# Patient Record
Sex: FEMALE | Race: WHITE | NOT HISPANIC OR LATINO | Employment: OTHER | URBAN - METROPOLITAN AREA
[De-identification: names, ages, dates, MRNs, and addresses within clinical notes are randomized per-mention and may not be internally consistent; named-entity substitution may affect disease eponyms.]

---

## 2017-03-24 DIAGNOSIS — C50.919 MALIGNANT NEOPLASM OF FEMALE BREAST (HCC): ICD-10-CM

## 2017-03-24 DIAGNOSIS — Z86.79 PERSONAL HISTORY OF OTHER DISEASES OF THE CIRCULATORY SYSTEM: ICD-10-CM

## 2017-03-24 DIAGNOSIS — E78.5 HYPERLIPIDEMIA: ICD-10-CM

## 2017-04-05 ENCOUNTER — APPOINTMENT (OUTPATIENT)
Dept: LAB | Facility: CLINIC | Age: 77
End: 2017-04-05
Payer: MEDICARE

## 2017-04-05 DIAGNOSIS — Z86.79 PERSONAL HISTORY OF OTHER DISEASES OF THE CIRCULATORY SYSTEM: ICD-10-CM

## 2017-04-05 DIAGNOSIS — E78.5 HYPERLIPIDEMIA: ICD-10-CM

## 2017-04-05 DIAGNOSIS — C50.919 MALIGNANT NEOPLASM OF FEMALE BREAST (HCC): ICD-10-CM

## 2017-04-05 LAB
ALBUMIN SERPL BCP-MCNC: 3.5 G/DL (ref 3.5–5)
ALP SERPL-CCNC: 100 U/L (ref 46–116)
ALT SERPL W P-5'-P-CCNC: 27 U/L (ref 12–78)
ANION GAP SERPL CALCULATED.3IONS-SCNC: 5 MMOL/L (ref 4–13)
AST SERPL W P-5'-P-CCNC: 16 U/L (ref 5–45)
BILIRUB SERPL-MCNC: 0.64 MG/DL (ref 0.2–1)
BUN SERPL-MCNC: 17 MG/DL (ref 5–25)
CALCIUM SERPL-MCNC: 9.2 MG/DL (ref 8.3–10.1)
CHLORIDE SERPL-SCNC: 105 MMOL/L (ref 100–108)
CHOLEST SERPL-MCNC: 188 MG/DL (ref 50–200)
CO2 SERPL-SCNC: 29 MMOL/L (ref 21–32)
CREAT SERPL-MCNC: 1.05 MG/DL (ref 0.6–1.3)
GFR SERPL CREATININE-BSD FRML MDRD: 50.8 ML/MIN/1.73SQ M
GLUCOSE P FAST SERPL-MCNC: 99 MG/DL (ref 65–99)
HDLC SERPL-MCNC: 49 MG/DL (ref 40–60)
LDLC SERPL CALC-MCNC: 108 MG/DL (ref 0–100)
POTASSIUM SERPL-SCNC: 3.9 MMOL/L (ref 3.5–5.3)
PROT SERPL-MCNC: 7.2 G/DL (ref 6.4–8.2)
SODIUM SERPL-SCNC: 139 MMOL/L (ref 136–145)
TRIGL SERPL-MCNC: 154 MG/DL

## 2017-04-05 PROCEDURE — 36415 COLL VENOUS BLD VENIPUNCTURE: CPT

## 2017-04-05 PROCEDURE — 80061 LIPID PANEL: CPT

## 2017-04-05 PROCEDURE — 80053 COMPREHEN METABOLIC PANEL: CPT

## 2017-04-10 ENCOUNTER — ALLSCRIPTS OFFICE VISIT (OUTPATIENT)
Dept: OTHER | Facility: OTHER | Age: 77
End: 2017-04-10

## 2017-06-09 DIAGNOSIS — C50.919 MALIGNANT NEOPLASM OF FEMALE BREAST (HCC): ICD-10-CM

## 2017-06-09 DIAGNOSIS — Z12.31 ENCOUNTER FOR SCREENING MAMMOGRAM FOR MALIGNANT NEOPLASM OF BREAST: ICD-10-CM

## 2017-06-20 ENCOUNTER — HOSPITAL ENCOUNTER (OUTPATIENT)
Dept: MAMMOGRAPHY | Facility: CLINIC | Age: 77
Discharge: HOME | End: 2017-06-20
Payer: MEDICARE

## 2017-06-20 DIAGNOSIS — C50.919 MALIGNANT NEOPLASM OF FEMALE BREAST (HCC): ICD-10-CM

## 2017-06-20 DIAGNOSIS — Z12.31 ENCOUNTER FOR SCREENING MAMMOGRAM FOR MALIGNANT NEOPLASM OF BREAST: ICD-10-CM

## 2017-06-20 PROCEDURE — G0202 SCR MAMMO BI INCL CAD: HCPCS

## 2017-06-20 PROCEDURE — 77063 BREAST TOMOSYNTHESIS BI: CPT

## 2017-07-03 ENCOUNTER — GENERIC CONVERSION - ENCOUNTER (OUTPATIENT)
Dept: OTHER | Facility: OTHER | Age: 77
End: 2017-07-03

## 2017-07-12 ENCOUNTER — GENERIC CONVERSION - ENCOUNTER (OUTPATIENT)
Dept: OTHER | Facility: OTHER | Age: 77
End: 2017-07-12

## 2017-07-20 ENCOUNTER — GENERIC CONVERSION - ENCOUNTER (OUTPATIENT)
Dept: OTHER | Facility: OTHER | Age: 77
End: 2017-07-20

## 2017-07-24 ENCOUNTER — APPOINTMENT (OUTPATIENT)
Dept: LAB | Facility: CLINIC | Age: 77
End: 2017-07-24
Payer: MEDICARE

## 2017-07-24 DIAGNOSIS — C50.919 MALIGNANT NEOPLASM OF FEMALE BREAST (HCC): ICD-10-CM

## 2017-07-24 LAB
ALBUMIN SERPL BCP-MCNC: 3.5 G/DL (ref 3.5–5)
ALP SERPL-CCNC: 102 U/L (ref 46–116)
ALT SERPL W P-5'-P-CCNC: 23 U/L (ref 12–78)
ANION GAP SERPL CALCULATED.3IONS-SCNC: 7 MMOL/L (ref 4–13)
AST SERPL W P-5'-P-CCNC: 16 U/L (ref 5–45)
BASOPHILS # BLD AUTO: 0.04 THOUSANDS/ΜL (ref 0–0.1)
BASOPHILS NFR BLD AUTO: 1 % (ref 0–1)
BILIRUB SERPL-MCNC: 0.57 MG/DL (ref 0.2–1)
BUN SERPL-MCNC: 15 MG/DL (ref 5–25)
CALCIUM SERPL-MCNC: 8.9 MG/DL (ref 8.3–10.1)
CANCER AG27-29 SERPL-ACNC: 26.4 U/ML (ref 0–42.3)
CHLORIDE SERPL-SCNC: 108 MMOL/L (ref 100–108)
CO2 SERPL-SCNC: 27 MMOL/L (ref 21–32)
CREAT SERPL-MCNC: 1.1 MG/DL (ref 0.6–1.3)
EOSINOPHIL # BLD AUTO: 0.43 THOUSAND/ΜL (ref 0–0.61)
EOSINOPHIL NFR BLD AUTO: 7 % (ref 0–6)
ERYTHROCYTE [DISTWIDTH] IN BLOOD BY AUTOMATED COUNT: 14.5 % (ref 11.6–15.1)
GFR SERPL CREATININE-BSD FRML MDRD: 49 ML/MIN/1.73SQ M
GLUCOSE SERPL-MCNC: 107 MG/DL (ref 65–140)
HCT VFR BLD AUTO: 44.2 % (ref 34.8–46.1)
HGB BLD-MCNC: 14.8 G/DL (ref 11.5–15.4)
LYMPHOCYTES # BLD AUTO: 1.5 THOUSANDS/ΜL (ref 0.6–4.47)
LYMPHOCYTES NFR BLD AUTO: 25 % (ref 14–44)
MCH RBC QN AUTO: 27.3 PG (ref 26.8–34.3)
MCHC RBC AUTO-ENTMCNC: 33.5 G/DL (ref 31.4–37.4)
MCV RBC AUTO: 82 FL (ref 82–98)
MONOCYTES # BLD AUTO: 0.4 THOUSAND/ΜL (ref 0.17–1.22)
MONOCYTES NFR BLD AUTO: 7 % (ref 4–12)
NEUTROPHILS # BLD AUTO: 3.6 THOUSANDS/ΜL (ref 1.85–7.62)
NEUTS SEG NFR BLD AUTO: 60 % (ref 43–75)
NRBC BLD AUTO-RTO: 0 /100 WBCS
PLATELET # BLD AUTO: 195 THOUSANDS/UL (ref 149–390)
PMV BLD AUTO: 11.7 FL (ref 8.9–12.7)
POTASSIUM SERPL-SCNC: 3.6 MMOL/L (ref 3.5–5.3)
PROT SERPL-MCNC: 7.1 G/DL (ref 6.4–8.2)
RBC # BLD AUTO: 5.42 MILLION/UL (ref 3.81–5.12)
SODIUM SERPL-SCNC: 142 MMOL/L (ref 136–145)
WBC # BLD AUTO: 5.98 THOUSAND/UL (ref 4.31–10.16)

## 2017-07-24 PROCEDURE — 36415 COLL VENOUS BLD VENIPUNCTURE: CPT

## 2017-07-24 PROCEDURE — 86300 IMMUNOASSAY TUMOR CA 15-3: CPT

## 2017-07-24 PROCEDURE — 80053 COMPREHEN METABOLIC PANEL: CPT

## 2017-07-24 PROCEDURE — 85025 COMPLETE CBC W/AUTO DIFF WBC: CPT

## 2017-07-25 ENCOUNTER — GENERIC CONVERSION - ENCOUNTER (OUTPATIENT)
Dept: OTHER | Facility: OTHER | Age: 77
End: 2017-07-25

## 2017-07-27 ENCOUNTER — GENERIC CONVERSION - ENCOUNTER (OUTPATIENT)
Dept: OTHER | Facility: OTHER | Age: 77
End: 2017-07-27

## 2017-07-31 ENCOUNTER — ALLSCRIPTS OFFICE VISIT (OUTPATIENT)
Dept: OTHER | Facility: OTHER | Age: 77
End: 2017-07-31

## 2017-08-01 ENCOUNTER — GENERIC CONVERSION - ENCOUNTER (OUTPATIENT)
Dept: OTHER | Facility: OTHER | Age: 77
End: 2017-08-01

## 2018-01-10 NOTE — PROGRESS NOTES
Assessment    1  Encounter for preventive health examination (V70 0) (Z00 00)    Plan   Health Maintenance    · Begin a limited exercise program ; Status:Complete;   Done: 12AJB8234   · Eat a low fat and low cholesterol diet ; Status:Complete;   Done: 08OOY6673   · Eat no more than 30 grams of fat per day ; Status:Complete;   Done: 26JVH5130   · There are many exercise options for seniors ; Status:Complete;   Done: 07Apr2016   · There ways to avoid falling ; Status:Complete;   Done: 07Apr2016   · These are things you can do to prevent falls in and around the home ; Status:Complete;    Done: 07Apr2016   · We encourage all of our patients to exercise regularly  30 minutes of exercise or physical  activity five or more days a week is recommended for children and adults ;  Status:Complete;   Done: 07Apr2016   · We encourage you to begin to make lifestyle changes to help control your blood  pressure  These may include losing weight, increasing your activity level, limiting salt in  your diet, decreasing alcohol intake, and eating a diet low in fat and rich in fruits  and vegetables ; Status:Complete;   Done: 61XIF1231   · We recommend that you create an advance directive ; Status:Complete;   Done:  07Apr2016   · Medicare Annual Wellness Visit; Status:Complete;   Done: 24QSE5613 10:49PM   · Follow-up visit in 1 year Evaluation and Treatment  Follow-up  Status: Complete  Done:  07Apr2016  Influenza vaccine needed    · Stop: Fluzone Quadrivalent Intramuscular Suspension  Need for Tdap vaccination    · Stop: Adacel 5-2-15 5 LF-MCG/0 5 Intramuscular Suspension  Screening for genitourinary condition    · *VB-Urinary Incontinence Screen (Dx V81 6 Screen for UI); Status:Complete;   Done:  94CTN2425 10:48PM    MAMMO DIAGNOSTIC BILATERAL W CAD; Status:Hold For - Scheduling; Requested for:07Apr2016;   Perform:[facility]; PRL:30TUK7852;FZWPXNB;     For:Adenocarcinoma of breast, Screening for breast cancer; Ordered By:Filiberto Escalera;      Discussion/Summary    Angelique Raymond had a normal exam today  She will continue with her healthy diet and exercise  She will follow up with her specialists as scheduled  We will see her for a Medicare physical in 1 year  Impression: Subsequent Annual Wellness Visit, with preventive exam as well as age and risk appropriate counseling completed  Cardiovascular screening and counseling: the risks and benefits of screening were discussed and screening is current  Diabetes screening and counseling: the risks and benefits of screening were discussed and screening is current  Colorectal cancer screening and counseling: the risks and benefits of screening were discussed and screening is current  Breast cancer screening and counseling: the risks and benefits of screening were discussed and screening is current  Cervical cancer screening and counseling: the risks and benefits of screening were discussed  Glaucoma screening and counseling: the risks and benefits of screening were discussed and screening is current  HIV screening and counseling: screening not indicated  Immunizations: the risks and benefits of influenza vaccination were discussed with the patient, influenza vaccine is up to date this year, the risks and benefits of pneumococcal vaccination were discussed with the patient, the lifetime pneumococcal vaccine has been completed, hepatitis B vaccination series is not indicated at this time due to the patient's low risk of benita the disease, the risks and benefits of the Zostavax vaccine were discussed with the patient, Zostavax vaccination up to date, the risks and benefits of the Td vaccine were discussed with the patient, Td vaccination up to date, the risks and benefits of the Tdap vaccine were discussed with the patient and Tdap vaccination up to date     Advance Directive Planning: not complete, paperwork and instructions were given to the patient, she was encouraged to follow-up with me to discuss her questions and/or decisions  Advice and education were given regarding alcohol use, fall risk reduction, helmet use, increasing physical activity, nutrition (non-diabetic), seat belt use, skin self-exam, sunscreen use, weight gain and weight loss  She was referred to dental services  Chief Complaint  Wellness Physical Exam, 68 yo  Patient due for UI assessment  Soon due for mammogram, fall risk and depression screening  History of Present Illness  HPI: Kelli Galloway is a 66 y/o female who presents for a Medicare Wellness visit  She will due for a mammogram  She sees Dr Enrique Solano on a regular basis for a checkup  Welcome to [employer] and Wellness Visits: The patient is being seen for the initial annual wellness visit  Medicare Screening and Risk Factors   Hospitalizations: no previous hospitalizations and she has been hospitalized 0 times  Once per lifetime medicare screening tests: ECG (on EMR)  Medicare Screening Tests Risk Questions   Abdominal aortic aneurysm risk assessment: none indicated  Osteoporosis risk assessment: , female gender and over 48years of age  HIV risk assessment: none indicated  Drug and Alcohol Use: The patient is a former cigarette smoker  The patient reports never drinking alcohol  She has never used illicit drugs  Diet and Physical Activity: Current diet includes well balanced meals, limited junk food, 3-4 servings of fruit per day, 3-4 servings of vegetables per day, 1-2 servings of meat per day, 1-2 servings of whole grains per day, 2-3 servings of dairy products per day, 1 cups of coffee per day, 0 cups of tea per day and 0-1 cans of regular soda per day  She exercises daily and exercises 3 times per week  Exercise: walking 90 minutes per week  Mood Disorder and Cognitive Impairment Screening: PHQ-9 Depression Scale   Over the past 2 weeks, how often have you been bothered by the following problems?    1 ) Little interest or pleasure in doing things? Not at all    2 ) Feeling down, depressed or hopeless? Not at all    3 ) Trouble falling asleep or sleeping too much? Not at all    4 ) Feeling tired or having little energy? Not at all    5 ) Poor appetite or overeating? Not at all    6 ) Feeling bad about yourself, or that you are a failure, or have let yourself or your family down? Not at all    7 ) Trouble concentrating on things, such as reading a newspaper or watching television? Not at all    8 ) Moving or speaking so slowly that other people could have noticed, or the opposite, moving or speaking faster than usual? Not at all    9 ) Thoughts that you would be off dead or of hurting yourself in some way? Not at all  TOTAL SCORE: 0    Depression screening  negative for symptoms  She denies feeling down, depressed, or hopeless over the past two weeks  She denies feeling little interest or pleasure in doing things over the past two weeks  Cognitive impairment screening: denies difficulty learning/retaining new information, denies difficulty handling complex tasks, denies difficulty with reasoning, denies difficulty with spatial ability and orientation, denies difficulty with language and denies difficulty with behavior  Functional Ability/Level of Safety: Hearing is normal bilaterally and a hearing aid is not used  She denies hearing difficulties  The patient is currently able to do activities of daily living without limitations, able to do instrumental activities of daily living without limitations, able to participate in social activities without limitations and able to drive without limitations  Activities of daily living details: does not need help using the phone, no transportation help needed, does not need help shopping, no meal preparation help needed, does not need help doing housework, does not need help doing laundry, does not need help managing medications and does not need help managing money   Fall risk factors: polypharmacy and alcohol use, but The patient fell 0 times in the past 12 months , no mobility impairment, no antidepressant use, no deconditioning, no postural hypotension, no sedative use, no visual impairment, no urinary incontinence, no antihypertensive use, no cognitive impairment, up and go test was normal and no previous fall  Home safety risk factors: There are no stairs in her home , but no unfamiliar surroundings, no loose rugs, no poor household lighting, no uneven floors, no household clutter, grab bars in the bathroom and handrails on the stairs  Advance Directives: Advance directives: she is looking into this  , but no living will, no durable power of  for health care directives and no advance directives  end of life decisions were not reviewed with the patient and I disagree with the patient's decisions  Co-Managers and Medical Equipment/Suppliers: See Patient Care Team   Reviewed Updated Gloria Ruiz:   Last Medicare Wellness Visit Information was reviewed, patient interviewed and updates made to the record today  Patient Care Team    Care Team Member Role Specialty Office Number   Fercho PEREZ  Hematology Oncology [phone]   Hunter Cox MD  Gynecological Oncology [phone]   My Butler  Ophthalmology [phone]   Kady Doan MD  Radiation Oncology [phone]   Sunitha Escalera [address] [phone]     Review of Systems    Constitutional: negative  Eyes: negative  ENT: negative  Cardiovascular: negative  Respiratory: negative  Gastrointestinal: negative  Genitourinary: negative  Musculoskeletal: negative  Integumentary and Breasts: negative  Neurological: negative  Psychiatric: negative  Endocrine: negative  Hematologic and Lymphatic: negative  Active Problems    1  Adenocarcinoma of breast (174 9) (C57 919)   2  Adverse reaction to pneumococcal vaccine (E948 8) (T50 A95A)   3  Anxiety (300 00) (F41 9)   4   Cataract, bilateral (366 9) (H26 9)   5  Chronic Pyometra (615 1)   6  History of hypertension (V12 59) (Z86 79)   7  Hyperlipidemia (272 4) (E78 5)   8  Need for pneumococcal vaccination (V03 82) (Z23)   9  Pes planus (734) (M21 40)   10  Pulmonary nodule seen on imaging study (793 11) (R91 1)   11  History of Radiation Therapy   12  Sleep disorder (780 50) (G47 9)    Past Medical History    · Adenocarcinoma of breast (174 9) (C50 919)   · Anxiety (300 00) (F41 9)   · 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)   · History of Endometrial hyperplasia (621 30) (N85 00)   · History of abnormal weight loss (V13 89) (L55 269)   · History of allergic reaction (V15 09) (Z88 9)   · History of hypertension (V12 59) (Z86 79)   · Hyperlipidemia (272 4) (E78 5)   · History of Internal Hemorrhoids (455 0)   · History of Knee tendonitis (727 09) (M76 899)   · Need for prophylactic vaccination and inoculation against influenza (V04 81) (Z23)   · History of Need for prophylactic vaccination and inoculation against influenza (V04 81)  (Z23)   · History of Ovarian cyst (620 2) (N83 20)   · History of Screening for osteoporosis (V82 81) (Z13 820)   · History of Summary Of Previous Pregnancies  5  (Total No )   · History of Summary Of Previous Pregnancies Para 3  (Deliveries)   · Denied: History of Taking Female Hormones For Postmenopausal HRT    The active problems and past medical history were reviewed and updated today  Surgical History    · History of Breast Biopsy During Breast Surgery   · History of Breast Surgery Excision Of Breast Single Lesion   · History of Cataract Surgery   · History of Cholecystectomy   · History of Radiation Therapy   · History of Salpingo-oophorectomy Bilateral   · History of Tonsillectomy With Adenoidectomy    The surgical history was reviewed and updated today         Family History    · Family history of Gastric Cancer (V16 0)    The family history was reviewed and updated today  Social History    · Denied: History of Alcohol Use (History)   · Denied: History of Drug Use   · Former smoker (V15 82) (H23 499)   · No alcohol use   · No drug use  The social history was reviewed and updated today  The social history was reviewed and is unchanged  Current Meds   1  Adult Aspirin Low Strength 81 MG TBDP; Take 1 tablet daily Recorded   2  Amitriptyline HCl - 25 MG Oral Tablet; TAKE 1 TABLET DAILY AT BEDTIME; Therapy: 57YGL3180 to (Evaluate:95Dob8910)  Requested for: 86LTZ6824; Last   Rx:27Nov2015 Ordered   3  Anastrozole 1 MG Oral Tablet; TAKE 1 TABLET DAILY; Therapy: 30Brc4189 to (Devon Guerrero)  Requested for: 75QHI0764; Last   Rx:14Mar2016 Ordered   4  Prolia 60 MG/ML Subcutaneous Solution; INJECT SUBCUTANEOUSLY  60 MG / 1 ML   EVERY 6 MONTHS Recorded   5  Simvastatin 10 MG Oral Tablet; TAKE 1 TABLET IN THE EVENING; Therapy: 58FHA4075 to (Evaluate:41Wem3168)  Requested for: 99TSW7787; Last   Rx:29Mar2016 Ordered   6  Vitamin D3 1000 UNIT Oral Tablet; Take 1 tablet daily; Therapy: (Recorded:56Zrg9486) to Recorded    Allergies    1  Pneumovax 23 INJ    Immunizations   1 2 3    Influenza  81NRP5035 84LQQ1052 34Xnt4393    Pneumococcal  47Rbo1972      Zoster  48COQ9586       Vitals  Signs [Data Includes: Current Encounter]    Heart Rate: 64  Systolic: 412  Diastolic: 80   Temperature: 98 8 F, Tympanic  Heart Rate: 95  Respiration: 14  Systolic: 325, LUE, Sitting  Diastolic: 84, LUE, Sitting  Height: 5 ft 1 in  Weight: 157 lb   BMI Calculated: 29 66  BSA Calculated: 1 7    Physical Exam    Constitutional   General appearance: No acute distress, well appearing and well nourished  Eyes   Conjunctiva and lids: No swelling, erythema or discharge  Pupils and irises: Equal, round, reactive to light  Ophthalmoscopic examination: Normal fundi and optic discs      Ears, Nose, Mouth, and Throat   External inspection of ears and nose: Normal     Otoscopic examination: Tympanic membranes translucent with normal light reflex  Canals patent without erythema  Hearing: Normal     Nasal mucosa, septum, and turbinates: Normal without edema or erythema  Lips, teeth, and gums: Normal, good dentition  Oropharynx: Normal with no erythema, edema, exudate or lesions  Neck   Neck: Supple, symmetric, trachea midline, no masses  Thyroid: Normal, no thyromegaly  Pulmonary   Respiratory effort: No increased work of breathing or signs of respiratory distress  Percussion of chest: Normal     Palpation of chest: Normal     Auscultation of lungs: Clear to auscultation  Cardiovascular   Palpation of heart: Normal PMI, no thrills  Auscultation of heart: Normal rate and rhythm, normal S1 and S2, no murmurs  Carotid pulses: 2+ bilaterally  Abdominal aorta: Normal     Femoral pulses: 2+ bilaterally  Pedal pulses: 2+ bilaterally  Examination of extremities for edema and/or varicosities: Normal     Chest   Breasts: Normal, no dimpling or skin changes appreciated  Palpation of breasts and axillae: Normal, no masses palpated  Abdomen   Abdomen: Non-tender, no masses  Liver and spleen: No hepatomegaly or splenomegaly  Lymphatic   Palpation of lymph nodes in neck: No lymphadenopathy  Palpation of lymph nodes in axillae: No lymphadenopathy  Palpation of lymph nodes in groin: No lymphadenopathy  Palpation of lymph nodes in other areas: No lymphadenopathy  Musculoskeletal   Gait and station: Normal     Digits and nails: Normal without clubbing or cyanosis  Joints, bones, and muscles: Normal     Range of motion: Normal     Stability: Normal     Muscle strength/tone: Normal     Skin   Skin and subcutaneous tissue: Normal without rashes or lesions  Palpation of skin and subcutaneous tissue: Normal turgor  Neurologic   Cranial nerves: Cranial nerves II-XII intact  Reflexes: 2+ and symmetric  Sensation: No sensory loss      Psychiatric Judgment and insight: Normal     Orientation to person, place, and time: Normal     Recent and remote memory: Intact  Mood and affect: Normal        Results/Data  PHQ-2 Adult Depression Screening 07Apr2016 01:23PM Andra Dyer     Test Name Result Flag Reference   PHQ-2 Adult Depression Score 0     Q1: 0, Q2: 0   PHQ-2 Adult Depression Screening Negative       Falls Risk Assessment (Dx V80 09 Screen for Neurologic Disorder) 07Apr2016 01:23PM Andra Dyer     Test Name Result Flag Reference   Falls Risk      No falls in the past year     (1) LIPID PANEL FASTING W DIRECT LDL REFLEX 80VYW1742 09:27AM Andra Dyer   TW Order Number: [id number]      Triglyceride:         Normal              <150 mg/dl       Borderline High    150-199 mg/dl       High               200-499 mg/dl       Very High          >499 mg/dl  Cholesterol:         Desirable        <200 mg/dl      Borderline High  200-239 mg/dl      High             >239 mg/dl  HDL Cholesterol:        High    >59 mg/dL      Low     <41 mg/dL  LDL Cholesterol:        Optimal          <100 mg/dl         Near Optimal     100-129 mg/dl        Above Optimal          Borderline High   130-159 mg/dl          High              160-189 mg/dl          Very High        >189 mg/dl  LDL CALCULATED:    This screening LDL is a calculated result  It does not have the accuracy of the Direct Measured LDL in the monitoring of patients with hyperlipidemia and/or statin therapy  Direct Measure LDL (NDU835) must be ordered separately in these patients  Test Name Result Flag Reference   CHOLESTEROL 177 mg/dL     LDL CHOLESTEROL CALCULATED 93 mg/dL  0-100   TRIGLYCERIDES 164 mg/dL H <=150   HDL,DIRECT 51 mg/dL  40-60     (1) CBC/PLT/DIFF 22SDQ3649 08:58AM Deacon Broderick     Test Name Result Flag Reference   WBC COUNT 6 25 Thousand/uL  4 31-10 16   RBC COUNT 5 26 Million/uL H 3 81-5 12   HEMOGLOBIN 14 8 g/dL  11 5-15 4   HEMATOCRIT 44 4 %  34 8-46  1   MCV 84 fL  82-98   MCH 28 1 pg 26 8-34 3   MCHC 33 3 g/dL  31 4-37 4   RDW 14 1 %  11 6-15 1   MPV 11 9 fL  8 9-12 7   PLATELET COUNT 742 Thousands/uL  149-390   nRBC AUTOMATED 0 /100 WBCs     NEUTROPHILS RELATIVE PERCENT 58 %  43-75   LYMPHOCYTES RELATIVE PERCENT 25 %  14-44   MONOCYTES RELATIVE PERCENT 6 %  4-12   EOSINOPHILS RELATIVE PERCENT 10 % H 0-6   BASOPHILS RELATIVE PERCENT 1 %  0-1   NEUTROPHILS ABSOLUTE COUNT 3 61 Thousands/µL  1 85-7 62   LYMPHOCYTES ABSOLUTE COUNT 1 56 Thousands/µL  0 60-4 47   MONOCYTES ABSOLUTE COUNT 0 40 Thousand/µL  0 17-1 22   EOSINOPHILS ABSOLUTE COUNT 0 61 Thousand/µL  0 00-0 61   BASOPHILS ABSOLUTE COUNT 0 06 Thousands/µL  0 00-0 10     Procedure    Procedure: Visual Acuity Test    Inforrmation supplied by a Snellen chart  Results: 20/30 in the right eye with corrective device, 20/70 in the left eye with corrective device Wears glasses, needs to see the eye doctor-Dr Marcelina Gunn  Future Appointments    Date/Time Provider Specialty Site   07/25/2016 10:00 AM QI Morton  Hematology Oncology [facility]   04/10/2017 01:00 PM aSvage Lin DO Family Medicine [facility]     Signatures   Electronically signed by : Jason Cadet DO;  Apr 7 2016 10:55PM EST                       (Author)

## 2018-01-12 VITALS
DIASTOLIC BLOOD PRESSURE: 84 MMHG | SYSTOLIC BLOOD PRESSURE: 122 MMHG | BODY MASS INDEX: 29.53 KG/M2 | OXYGEN SATURATION: 98 % | RESPIRATION RATE: 16 BRPM | HEART RATE: 110 BPM | WEIGHT: 160.5 LBS | HEIGHT: 62 IN | TEMPERATURE: 98.5 F

## 2018-01-12 NOTE — MISCELLANEOUS
Message     Recorded as Task   Date: 11/28/2016 02:18 PM, Created By: Christofer Devlin   Task Name: Follow Up   Assigned To: Eduardo Melendrez   Regarding Patient: NAWAF HILL, Status: Active   Comment:    Herminia Teixeira - 28 Nov 2016 2:18 PM     TASK CREATED  Caller: Self; General Medical Question; [phone] (Home); [phone] x,,,,, (Work)  PT WANTS TO WEAN OFF OF AMITRIPTYLIN 25 MG  FEELS SHE DOES NOT NEED THE MED ANYMORE  SHE CAN BE REACHED [phone]   Elena Escalera - 29 Nov 2016 4:54 PM     TASK EDITED  The patient wishes to wean off of the Amitryptiline  I will send in the 10 mg pills and I have instructed her to take 10 mg once daily on MON, Wed, and Friday the first week and 25 mg the other days of that week  Week 2- she will alternate between 10 mg and 25 mg every other day  Week 3- she will take 10 mg at bedtime daily and will then call the office to report on her progress and for further instructions          Plan  Anxiety, Sleep disorder    · From  Amitriptyline HCl - 25 MG Oral Tablet TAKE 1 TABLET AT BEDTIME To  Amitriptyline HCl - 10 MG Oral Tablet TAKE 1 TABLET AT BEDTIME    Signatures   Electronically signed by : Miguel Rivas DO; Nov 29 2016  4:56PM EST                       (Author)

## 2018-01-14 VITALS
WEIGHT: 158 LBS | HEART RATE: 64 BPM | HEIGHT: 61 IN | BODY MASS INDEX: 29.83 KG/M2 | TEMPERATURE: 99 F | DIASTOLIC BLOOD PRESSURE: 80 MMHG | SYSTOLIC BLOOD PRESSURE: 130 MMHG | RESPIRATION RATE: 16 BRPM

## 2018-01-15 NOTE — MISCELLANEOUS
Message   Recorded as Task   Date: 12/27/2016 10:45 AM, Created By: Mendoza John   Task Name: Med Renewal Request   Assigned To: Kenroy Lay   Regarding Patient: NAWAF HILL, Status: Active   Comment:    Anel Zhang - 27 Dec 2016 10:45 AM     TASK CREATED  Caller: Self; Renew Medication; [phone] (Home); [phone] x,,,,, (Work)  NAWAF IS WONDERING IF YOU COULD CALL IN A CREAM, SHE IS VERY RED AND ITCHY "DOWN BELOW", AND WONDERING IF IT COULD BE FROM THE VALTREX, SHE HAS TRIED OTC AND NOTHING IS HELPING  Sepideh Anguiano      [phone] OR  [phone] (C)   Elena Escalera - 27 Dec 2016 7:26 PM     TASK EDITED  I spoke with the patient  She complains of having vaginal redness and itching for the last 2 days  There is no relief with over-the-counter vagisil  I advised her she most likely has a yeast infection we'll treat her with the Terazol cream as ordered  We will see her back as scheduled          Plan  Vulvovaginal candidiasis    · Terconazole 0 8 % Vaginal Cream; INSERT 1 APPLICATORFUL  INTRAVAGINALLY AT BEDTIME    Signatures   Electronically signed by : Buster Martinez DO; Dec 27 2016  7:27PM EST                       (Author)

## 2018-01-16 NOTE — PROGRESS NOTES
Assessment    1  Encounter for preventive health examination (V70 0) (Z00 00)    Plan  Adenocarcinoma of breast, Encounter for screening mammogram for breast cancer    · MAMMO SCREENING BILATERAL W 3D & CAD; Status:Active; Requested BEBE:16YGP4917; Anxiety, Sleep disorder    · Amitriptyline HCl - 25 MG Oral Tablet; TAKE 1 TABLET AT BEDTIME  Health Maintenance    · Follow-up visit in 1 year Evaluation and Treatment  Follow-up  Status: Hold For -  Scheduling  Requested for: 50Amj7431   · Begin a bladder training program to help you control your urgency  Start by urinating  every 2 hours ; Status:Complete;   Done: 86UCH9536   · Begin a limited exercise program ; Status:Complete;   Done: 44RQC4700   · Decreasing the stress in your life may help your condition improve ; Status:Complete;    Done: 17BAK7907   · Eat a low fat and low cholesterol diet ; Status:Complete;   Done: 54NTK6946   · Eat no more than 30 grams of fat per day ; Status:Complete;   Done: 45RJW6887   · Stretch and warm up your muscles during the first 10 minutes , then cool down your  muscles for the last 10 minutes of exercise ; Status:Complete;   Done: 96AGX8909   · The plan of care for falls is detailed in the plan and/or discussion section of today's note ;  Status:Complete;   Done: 83FXY4990   · The plan of care for urinary incontinence is detailed in the plan and/or discussion section  of today's note ; Status:Complete;   Done: 78XOW4737   · There are many exercise options for seniors ; Status:Complete;   Done: 34JWK0720   · There are many ways to reduce your risk of catching or spreading a sexually transmitted  Infection ; Status:Complete;   Done: 36RVQ5457   · Use a sun block product with an SPF of 15 or more ; Status:Complete;   Done:  71GQS9066   · We encourage all of our patients to exercise regularly    30 minutes of exercise or physical  activity five or more days a week is recommended for children and adults ;  Status:Complete;   Done: 05FGF7014   · We recommend you modify your diet to achieve and maintain a healthy weight  Being  underweight may increase your risk of developing health problems from vitamin and  mineral deficiencies  We recommend a balanced diet rich in fruits and vegetables  You  may also consider increasing your calorie intake by eating more frequently or adding  nuts, avocados, and low-fat cheese or milk to your meals  Please let us know  if you would like to learn more about your nutrition and calorie needs, and additional  options to help you achieve your weight goals ; Status:Complete;   Done: 94TNU2847   · Medicare Annual Wellness Visit ; every 1 year; Last 10Apr2017; Next 10Apr2018;  Status:Active  Screening for genitourinary condition    · *VB - Urinary Incontinence Screen (Dx Z13 89 Screen for UI); Status:Complete;   Done:  80YAF0463 01:53PM    Discussion/Summary    Ramses Blancas had a normal exam today  She will continue with her healthy diet and exercise  She will follow up with her specialists as scheduled  We will see her for a Medicare physical in 1 year  Impression: Subsequent Annual Wellness Visit, with preventive exam as well as age and risk appropriate counseling completed  Cardiovascular screening and counseling: the risks and benefits of screening were discussed and screening is current  Diabetes screening and counseling: the risks and benefits of screening were discussed and screening is current  Colorectal cancer screening and counseling: the risks and benefits of screening were discussed and screening is current  Breast cancer screening and counseling: the risks and benefits of screening were discussed and screening is current  Cervical cancer screening and counseling: the risks and benefits of screening were discussed  Glaucoma screening and counseling: the risks and benefits of screening were discussed and screening is current  HIV screening and counseling: screening not indicated  Immunizations: the risks and benefits of influenza vaccination were discussed with the patient, influenza vaccine is up to date this year, the risks and benefits of pneumococcal vaccination were discussed with the patient, the lifetime pneumococcal vaccine has been completed, hepatitis B vaccination series is not indicated at this time due to the patient's low risk of benita the disease, the risks and benefits of the Zostavax vaccine were discussed with the patient, Zostavax vaccination up to date, the risks and benefits of the Td vaccine were discussed with the patient, Td vaccination up to date, the risks and benefits of the Tdap vaccine were discussed with the patient and Tdap vaccination up to date  Advance Directive Planning: not complete, paperwork and instructions were given to the patient, she was encouraged to follow-up with me to discuss her questions and/or decisions, The patient is going to look into this  Advice and education were given regarding alcohol use, fall risk reduction, helmet use, increasing physical activity, nutrition (non-diabetic), seat belt use, skin self-exam, sunscreen use, weight gain and weight loss  She was referred to dental services  Chief Complaint  Medicare Wellness Exam, 66 yo  History of Present Illness  HPI: Dennie Michaels is a 68 y/o female who presents for a Medicare Wellness visit  She will due for a mammogram in June 2016  She sees Dr Jeromy Salas on a regular basis for a checkup and will see him in July 2017  The patient denies any chest pain, shortness of breath, or palpitations  There is no edema  There are no headaches or visual changes  There is no lightheadedness, dizziness, or fainting spells  There are no GI problems  Welcome to [employer] and Wellness Visits: The patient is being seen for the subsequent annual wellness visit  Medicare Screening and Risk Factors   Hospitalizations: no previous hospitalizations and she has been hospitalized 0 times  Once per lifetime medicare screening tests: ECG (on EMR)  Medicare Screening Tests Risk Questions   Abdominal aortic aneurysm risk assessment: none indicated  Osteoporosis risk assessment: , female gender and over 48years of age  HIV risk assessment: none indicated  Drug and Alcohol Use: The patient is a former cigarette smoker  The patient reports never drinking alcohol  She has never used illicit drugs  Diet and Physical Activity: Current diet includes well balanced meals, limited junk food, 1 servings of fruit per day, 1 servings of vegetables per day, 1 servings of meat per day, 1-2 servings of whole grains per day, 2 servings of simple carbohydrates per day, 2 servings of dairy products per day, 1 cups of coffee per day, 0 cups of tea per day, 0-1 cans of regular soda per day and 0 cans of diet soda per day  She exercises daily  Exercise: walking minutes per week  Mood Disorder and Cognitive Impairment Screening: PHQ-9 Depression Scale   Over the past 2 weeks, how often have you been bothered by the following problems? 1 ) Little interest or pleasure in doing things? Not at all    2 ) Feeling down, depressed or hopeless? Not at all    3 ) Trouble falling asleep or sleeping too much? Not at all    4 ) Feeling tired or having little energy? Not at all    5 ) Poor appetite or overeating? Not at all    6 ) Feeling bad about yourself, or that you are a failure, or have let yourself or your family down? Not at all    7 ) Trouble concentrating on things, such as reading a newspaper or watching television? Not at all    8 ) Moving or speaking so slowly that other people could have noticed, or the opposite, moving or speaking faster than usual? Not at all    9 ) Thoughts that you would be off dead or of hurting yourself in some way? Not at all  TOTAL SCORE: 0    Depression screening  negative for symptoms  She denies feeling down, depressed, or hopeless over the past two weeks   She denies feeling little interest or pleasure in doing things over the past two weeks  Cognitive impairment screening: denies difficulty learning/retaining new information, denies difficulty handling complex tasks, denies difficulty with reasoning, denies difficulty with spatial ability and orientation, denies difficulty with language and denies difficulty with behavior  Functional Ability/Level of Safety: Hearing is normal bilaterally and a hearing aid is not used  She denies hearing difficulties  The patient is currently able to do activities of daily living without limitations, able to do instrumental activities of daily living without limitations, able to participate in social activities without limitations and able to drive without limitations  Activities of daily living details: does not need help using the phone, no transportation help needed, does not need help shopping, no meal preparation help needed, does not need help doing housework, does not need help doing laundry, does not need help managing medications and does not need help managing money  Fall risk factors:  polypharmacy, but The patient fell 0 times in the past 12 months , no alcohol use, no mobility impairment, no antidepressant use, no deconditioning, no postural hypotension, no sedative use, no visual impairment, no urinary incontinence, no antihypertensive use, no cognitive impairment, up and go test was normal and no previous fall  Home safety risk factors: There are no stairs in her home , but no unfamiliar surroundings, no loose rugs, no poor household lighting, no uneven floors, no household clutter, grab bars in the bathroom and handrails on the stairs  Advance Directives: Advance directives: she is looking into this  , but no living will, no durable power of  for health care directives and no advance directives  end of life decisions were not reviewed with the patient and I disagree with the patient's decisions   Concerns with the patient's end of life decisions: She is going to look into this  Co-Managers and Medical Equipment/Suppliers: See Patient Care Team   Reviewed Updated [facility]:   Last Medicare Wellness Visit Information was reviewed, patient interviewed and updates made to the record today  Patient Care Team    Care Team Member Role Specialty Office Number   Monalisa Lombardialice PEREZ  Hematology Oncology [phone]   Alexey Tatum MD  Gynecological Oncology [phone]   [facility]  Ophthalmology [phone]   Wilbert Gleason MD  Radiation Oncology [phone]   [facility], [address] [place] [address] [phone]     Review of Systems    Constitutional: negative  Eyes: negative  ENT: negative  Cardiovascular: negative  Respiratory: negative  Gastrointestinal: negative  Genitourinary: negative  Musculoskeletal: negative  Integumentary and Breasts: negative  Neurological: negative  Psychiatric: negative  Endocrine: negative  Hematologic and Lymphatic: negative  Active Problems    1  Adenocarcinoma of breast (174 9) (C50 919)   2  Adverse reaction to pneumococcal vaccine (E948 8) (T50 A95A)   3  Anxiety (300 00) (F41 9)   4  Cataract, bilateral (366 9) (H26 9)   5  Chronic Pyometra (615 1)   6  Herpes zoster without complication (464 2) (P51 3)   7  History of hypertension (V12 59) (Z86 79)   8  Hyperlipidemia (272 4) (E78 5)   9  Influenza vaccine needed (V04 81) (Z23)   10  Need for pneumococcal vaccination (V03 82) (Z23)   11  Pes planus (734) (M21 40)   12  Pulmonary nodule seen on imaging study (793 11) (R91 1)   13  History of Radiation Therapy   14   Sleep disorder (780 50) (G47 9)    Past Medical History    · Adenocarcinoma of breast (174 9) (C50 919)   · Anxiety (300 00) (F41 9)   · 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)   · History of Endometrial hyperplasia (621 30) (N85 00)   · History of abnormal weight loss (V13 89) (X03 196)   · History of allergic reaction (V15 09) (Z88 9)   · History of candidal vulvovaginitis (V13 29) (Z86 19)   · History of hypertension (V12 59) (Z86 79)   · Hyperlipidemia (272 4) (E78 5)   · History of Internal Hemorrhoids (455 0)   · History of Knee tendonitis (727 09) (M76 899)   · Need for prophylactic vaccination and inoculation against influenza (V04 81) (Z23)   · History of Need for prophylactic vaccination and inoculation against influenza (V04 81)  (Z23)   · Need for Tdap vaccination (V06 1) (Z23)   · History of Ovarian cyst (620 2) (N83 20)   · Screening for breast cancer (V76 10) (Z12 39)   · History of Screening for osteoporosis (V82 81) (Z13 820)   · History of Summary Of Previous Pregnancies  5  (Total No )   · History of Summary Of Previous Pregnancies Para 3  (Deliveries)   · Denied: History of Taking Female Hormones For Postmenopausal HRT    The active problems and past medical history were reviewed and updated today  Surgical History    · History of Breast Biopsy During Breast Surgery   · History of Breast Surgery Excision Of Breast Single Lesion   · History of Cataract Surgery   · History of Cholecystectomy   · History of Radiation Therapy   · History of Salpingo-oophorectomy Bilateral   · History of Tonsillectomy With Adenoidectomy    The surgical history was reviewed and updated today  Family History  Mother    · Family history of Gastric Cancer (V16 0)    The family history was reviewed and updated today  Social History    · Denied: History of Alcohol Use (History)   · Denied: History of Drug Use   · Former smoker (V15 82) (Q74 334)   · No alcohol use   · No drug use  The social history was reviewed and updated today  The social history was reviewed and is unchanged  Current Meds   1  Adult Aspirin Low Strength 81 MG TBDP; Take 1 tablet daily Recorded   2  Amitriptyline HCl - 25 MG Oral Tablet; TAKE 1 TABLET AT BEDTIME;    Therapy: 32ECV1087 to (Evaluate:67Zub6277)  Requested for: 26RPR6796; Last JT:37WOO0032; Status: ACTIVE - Transmit to Pharmacy - Awaiting Verification Ordered   3  Simvastatin 10 MG Oral Tablet; TAKE 1 TABLET IN THE EVENING; Therapy: 89TYR1576 to (Jean Guzmán)  Requested for: 45Mia0345; Last   Rx:28Tkc5416; Status: ACTIVE - Renewal Denied Ordered   4  Terconazole 0 8 % Vaginal Cream; INSERT 1 APPLICATORFUL INTRAVAGINALLY AT   BEDTIME; Therapy: 11Hhs8336 to (Evaluate:02Jan2017)  Requested for: 17Uih1414; Last   Rx:34Ugp3105 Ordered   5  ValACYclovir HCl - 1 GM Oral Tablet; TAKE 1 TABLET 3 times daily; Therapy: 77Pwr1736 to (Evaluate:45Vsj6199)  Requested for: 42Nlt3768; Last   Rx:74Jnd5097 Ordered   6  Vitamin D3 1000 UNIT Oral Tablet; Take 1 tablet daily; Therapy: (Recorded:06Apr2015) to Recorded    Allergies    1  Pneumovax 23 INJ    Immunizations   ** Printed in Appendix #1 below  Vitals  Signs    Heart Rate: 64  Systolic: 996  Diastolic: 80   Temperature: 99 F, Tympanic  Heart Rate: 84  Respiration: 16  Systolic: 985, RUE, Sitting  Diastolic: 82, RUE, Sitting  Height: 5 ft 1 in  Weight: 158 lb   BMI Calculated: 29 85  BSA Calculated: 1 71    Physical Exam    Constitutional   General appearance: No acute distress, well appearing and well nourished  Eyes   Conjunctiva and lids: No swelling, erythema or discharge  Pupils and irises: Equal, round, reactive to light  Ophthalmoscopic examination: Normal fundi and optic discs  Ears, Nose, Mouth, and Throat   External inspection of ears and nose: Normal     Otoscopic examination: Tympanic membranes translucent with normal light reflex  Canals patent without erythema  Hearing: Normal     Nasal mucosa, septum, and turbinates: Normal without edema or erythema  Lips, teeth, and gums: Normal, good dentition  Oropharynx: Normal with no erythema, edema, exudate or lesions  Neck   Neck: Supple, symmetric, trachea midline, no masses  Thyroid: Normal, no thyromegaly      Pulmonary   Respiratory effort: No increased work of breathing or signs of respiratory distress  Percussion of chest: Normal     Palpation of chest: Normal     Auscultation of lungs: Clear to auscultation  Auscultation of the lungs revealed no expiratory wheezing, normal expiratory time and no inspiratory wheezing  no rales or crackles were heard bilaterally  no rhonchi  no friction rub  no wheezing  no diminished breath sounds  no bronchial breath sounds  Cardiovascular   Palpation of heart: Normal PMI, no thrills  Auscultation of heart: Normal rate and rhythm, normal S1 and S2, no murmurs  The heart rate was normal  Heart sounds: normal S1, normal S2, no S3 and no S4  no murmurs were heard  Carotid pulses: 2+ bilaterally  Abdominal aorta: Normal     Femoral pulses: 2+ bilaterally  Pedal pulses: 2+ bilaterally  Examination of extremities for edema and/or varicosities: Normal     Chest   Breasts: Normal, no dimpling or skin changes appreciated  Palpation of breasts and axillae: Normal, no masses palpated  Abdomen   Abdomen: Non-tender, no masses  Liver and spleen: No hepatomegaly or splenomegaly  Lymphatic   Palpation of lymph nodes in neck: No lymphadenopathy  Palpation of lymph nodes in axillae: No lymphadenopathy  Palpation of lymph nodes in groin: No lymphadenopathy  Palpation of lymph nodes in other areas: No lymphadenopathy  Musculoskeletal   Gait and station: Normal     Digits and nails: Normal without clubbing or cyanosis  Joints, bones, and muscles: Normal     Range of motion: Normal     Stability: Normal     Muscle strength/tone: Normal     Skin   Skin and subcutaneous tissue: Normal without rashes or lesions  Palpation of skin and subcutaneous tissue: Normal turgor  Neurologic   Cranial nerves: Cranial nerves II-XII intact  Reflexes: 2+ and symmetric  Sensation: No sensory loss      Psychiatric   Judgment and insight: Normal     Orientation to person, place, and time: Normal  Recent and remote memory: Intact  Mood and affect: Normal        Results/Data  Falls Risk Assessment (Dx Z13 89 Screen for Neurologic Disorder) 25Pvp1778 01:23PM Jovani May     Test Name Result Flag Reference   Falls Risk      No falls in the past year     (1) COMPREHENSIVE METABOLIC PANEL 43FNC9586 76:28IJ Melanie Ko Order Number: [id number]     Test Name Result Flag Reference   SODIUM 139 mmol/L  136-145   POTASSIUM 3 9 mmol/L  3 5-5 3   CHLORIDE 105 mmol/L  100-108   CARBON DIOXIDE 29 mmol/L  21-32   ANION GAP (CALC) 5 mmol/L  4-13   BLOOD UREA NITROGEN 17 mg/dL  5-25   CREATININE 1 05 mg/dL  0 60-1 30   Standardized to IDMS reference method   CALCIUM 9 2 mg/dL  8 3-10 1   BILI, TOTAL 0 64 mg/dL  0 20-1 00   ALK PHOSPHATAS 100 U/L     ALT (SGPT) 27 U/L  12-78   AST(SGOT) 16 U/L  5-45   ALBUMIN 3 5 g/dL  3 5-5 0   TOTAL PROTEIN 7 2 g/dL  6 4-8 2   eGFR Non-African American 50 8 ml/min/1 73sq m     - Patient Instructions: This is a fasting blood test  Water, black tea or black coffee only after 9:00pm the night before test Drink 2 glasses of water the morning of test   National Kidney Disease Education Program recommendations are as follows:  GFR calculation is accurate only with a steady state creatinine  Chronic Kidney disease less than 60 ml/min/1 73 sq  meters  Kidney failure less than 15 ml/min/1 73 sq  meters  GLUCOSE FASTING 99 mg/dL  65-99     (1) LIPID PANEL FASTING W DIRECT LDL REFLEX 11Lew3114 08:50AM Jovani May    Order Number: [id number]     Test Name Result Flag Reference   CHOLESTEROL 188 mg/dL     LDL CHOLESTEROL CALCULATED 108 mg/dL H 0-100   - Patient Instructions: This is a fasting blood test  Water, black tea or black coffee only after 9:00pm the night before test   Drink 2 glasses of water the morning of test     - Patient Instructions:  This is a fasting blood test  Water, black tea or black coffee only after 9:00pm the night before test Drink 2 glasses of water the morning of test   Triglyceride:         Normal              <150 mg/dl       Borderline High    150-199 mg/dl       High               200-499 mg/dl       Very High          >499 mg/dl  Cholesterol:         Desirable        <200 mg/dl      Borderline High  200-239 mg/dl      High             >239 mg/dl  HDL Cholesterol:        High    >59 mg/dL      Low     <41 mg/dL  LDL Cholesterol:        Optimal          <100 mg/dl        Near Optimal     100-129 mg/dl        Above Optimal          Borderline High   130-159 mg/dl          High              160-189 mg/dl          Very High        >189 mg/dl  LDL CALCULATED:    This screening LDL is a calculated result  It does not have the accuracy of the Direct Measured LDL in the monitoring of patients with hyperlipidemia and/or statin therapy  Direct Measure LDL (JRB242) must be ordered separately in these patients  TRIGLYCERIDES 154 mg/dL H <=150   Specimen collection should occur prior to N-Acetylcysteine or Metamizole administration due to the potential for falsely depressed results  HDL,DIRECT 49 mg/dL  40-60   Specimen collection should occur prior to Metamizole administration due to the potential for falsely depressed results  Procedure    Procedure: Visual Acuity Test    Inforrmation supplied by a Snellen chart  Results: 20/25 in the right eye with corrective device, 20/40 in the left eye with corrective device Wears glasses      Future Appointments    Date/Time Provider Specialty Site   2017 10:00 AM QI Singer  Hematology Oncology [facility]     Signatures   Electronically signed by : Miguel Rivas DO;  Apr 10 2017  1:54PM EST                       (Author)    Appendix #1     Patient: NAWAF HILL ; : 1940; MRN: [id number]      1 2 3 4    Influenza  17-Oct-2012  (71y) 29-Oct-2013  (72y) 29-Sep-2014  (73y) 25-Oct-2016  (75y)    PPSV  2015  (74y)       Tdap  Temporarily Deferred: Pt refuses Zoster  06-Jun-2007  (08H)

## 2018-03-30 DIAGNOSIS — Z85.3 HISTORY OF RIGHT BREAST CANCER: Primary | ICD-10-CM

## 2018-03-30 DIAGNOSIS — I10 HYPERTENSION, ESSENTIAL: ICD-10-CM

## 2018-03-30 DIAGNOSIS — E78.5 DYSLIPIDEMIA: ICD-10-CM

## 2018-04-03 ENCOUNTER — APPOINTMENT (OUTPATIENT)
Dept: LAB | Facility: CLINIC | Age: 78
End: 2018-04-03
Payer: MEDICARE

## 2018-04-03 DIAGNOSIS — Z85.3 HISTORY OF RIGHT BREAST CANCER: ICD-10-CM

## 2018-04-03 DIAGNOSIS — E78.5 DYSLIPIDEMIA: ICD-10-CM

## 2018-04-03 DIAGNOSIS — I10 HYPERTENSION, ESSENTIAL: ICD-10-CM

## 2018-04-03 LAB
ALBUMIN SERPL BCP-MCNC: 3.5 G/DL (ref 3.5–5)
ALP SERPL-CCNC: 108 U/L (ref 46–116)
ALT SERPL W P-5'-P-CCNC: 24 U/L (ref 12–78)
ANION GAP SERPL CALCULATED.3IONS-SCNC: 4 MMOL/L (ref 4–13)
AST SERPL W P-5'-P-CCNC: 19 U/L (ref 5–45)
BILIRUB SERPL-MCNC: 0.58 MG/DL (ref 0.2–1)
BUN SERPL-MCNC: 16 MG/DL (ref 5–25)
CALCIUM SERPL-MCNC: 8.9 MG/DL (ref 8.3–10.1)
CHLORIDE SERPL-SCNC: 109 MMOL/L (ref 100–108)
CHOLEST SERPL-MCNC: 191 MG/DL (ref 50–200)
CO2 SERPL-SCNC: 30 MMOL/L (ref 21–32)
CREAT SERPL-MCNC: 1.05 MG/DL (ref 0.6–1.3)
GFR SERPL CREATININE-BSD FRML MDRD: 51 ML/MIN/1.73SQ M
GLUCOSE P FAST SERPL-MCNC: 114 MG/DL (ref 65–99)
HDLC SERPL-MCNC: 50 MG/DL (ref 40–60)
LDLC SERPL CALC-MCNC: 112 MG/DL (ref 0–100)
POTASSIUM SERPL-SCNC: 3.9 MMOL/L (ref 3.5–5.3)
PROT SERPL-MCNC: 7.6 G/DL (ref 6.4–8.2)
SODIUM SERPL-SCNC: 143 MMOL/L (ref 136–145)
TRIGL SERPL-MCNC: 144 MG/DL

## 2018-04-03 PROCEDURE — 80053 COMPREHEN METABOLIC PANEL: CPT

## 2018-04-03 PROCEDURE — 36415 COLL VENOUS BLD VENIPUNCTURE: CPT

## 2018-04-03 PROCEDURE — 80061 LIPID PANEL: CPT

## 2018-04-05 ENCOUNTER — CLINICAL SUPPORT (OUTPATIENT)
Dept: FAMILY MEDICINE CLINIC | Facility: CLINIC | Age: 78
End: 2018-04-05
Payer: MEDICARE

## 2018-04-05 DIAGNOSIS — Z86.79 HISTORY OF HYPERTENSION: Primary | ICD-10-CM

## 2018-04-05 PROCEDURE — 93000 ELECTROCARDIOGRAM COMPLETE: CPT

## 2018-04-12 ENCOUNTER — OFFICE VISIT (OUTPATIENT)
Dept: FAMILY MEDICINE CLINIC | Facility: CLINIC | Age: 78
End: 2018-04-12
Payer: MEDICARE

## 2018-04-12 VITALS
SYSTOLIC BLOOD PRESSURE: 144 MMHG | RESPIRATION RATE: 14 BRPM | BODY MASS INDEX: 32.49 KG/M2 | DIASTOLIC BLOOD PRESSURE: 78 MMHG | HEART RATE: 88 BPM | TEMPERATURE: 98.4 F | HEIGHT: 61 IN | WEIGHT: 172.1 LBS

## 2018-04-12 DIAGNOSIS — Z12.31 ENCOUNTER FOR SCREENING MAMMOGRAM FOR BREAST CANCER: ICD-10-CM

## 2018-04-12 DIAGNOSIS — Z13.820 SCREENING FOR OSTEOPOROSIS: ICD-10-CM

## 2018-04-12 DIAGNOSIS — Z00.00 MEDICARE ANNUAL WELLNESS VISIT, SUBSEQUENT: Primary | ICD-10-CM

## 2018-04-12 PROCEDURE — G0439 PPPS, SUBSEQ VISIT: HCPCS | Performed by: FAMILY MEDICINE

## 2018-04-12 RX ORDER — ACETAMINOPHEN 160 MG
2000 TABLET,DISINTEGRATING ORAL DAILY
COMMUNITY

## 2018-04-12 RX ORDER — AMITRIPTYLINE HYDROCHLORIDE 25 MG/1
1 TABLET, FILM COATED ORAL
COMMUNITY
Start: 2014-12-12 | End: 2018-04-23 | Stop reason: SDUPTHER

## 2018-04-12 NOTE — PROGRESS NOTES
Chief Complaint   Patient presents with   [facility] Wellness Visit     65years old       HPI:  Colby Gómez is a 65 y o  female here for her Subsequent Wellness Visit  She has been feeling well  The patient denies any chest pain, shortness of breath, or palpitations  There is no edema  There are no headaches or visual changes  There is no lightheadedness, dizziness, or fainting spells  There are no GI problems  Patient Active Problem List   Diagnosis    Carcinoma of breast ([address] )    Adverse reaction to pneumococcal vaccine    Anxiety    Cataract, bilateral    Chronic inflammatory disease of uterus    Hyperlipidemia    Pulmonary nodule seen on imaging study    Pes planus    Sleep disorder     Past Medical History:   Diagnosis Date    Abnormal weight loss     Allergic reaction     Anxiety     Breast cancer, right (HCC)     Candidal vulvovaginitis     Endometrial hyperplasia     Epithelial cyst     benign, ovary    Hyperlipidemia     Hypertension     Internal hemorrhoids     Knee tendonitis     Ovarian cyst      Past Surgical History:   Procedure Laterality Date    BILATERAL SALPINGOOPHORECTOMY      onset: 7/23/13    BREAST BIOPSY      CATARACT EXTRACTION W/  INTRAOCULAR LENS IMPLANT Right     phacoemulsification   Onset: 10/27/14    CHOLECYSTECTOMY      INTRAOPERATIVE RADIATION THERAPY (IORT)      SKIN LESION EXCISION Right     breast, single lesion    TONSILLECTOMY AND ADENOIDECTOMY       Family History   Problem Relation Age of Onset    Stomach cancer Mother      History   Smoking Status    Former Smoker   Smokeless Tobacco    Never Used     History   Alcohol Use No     Comment: (history)      History   Drug Use No     /78 (BP Location: Left arm, Patient Position: Sitting, Cuff Size: Standard)   Pulse 88   Temp 98 4 °F (36 9 °C) (Tympanic)   Resp 14   Ht 5' 1" (1 549 m)   Wt 78 1 kg (172 lb 1 6 oz)   BMI 32 52 kg/m²       Current Outpatient Prescriptions Medication Sig Dispense Refill    amitriptyline (ELAVIL) 25 mg tablet Take 1 tablet by mouth daily at bedtime      aspirin 81 MG tablet Take 81 mg by mouth daily   Cholecalciferol (VITAMIN D3) 2000 units capsule Take 2,000 Units by mouth daily        Multiple Vitamins-Minerals (ICAPS PO) Take 1 capsule by mouth 2 (two) times a day      simvastatin (ZOCOR) 10 mg tablet Take 10 mg by mouth daily at bedtime  No current facility-administered medications for this visit        Allergies   Allergen Reactions    Sulfa Antibiotics     Pneumococcal Polysaccharide Vaccine Rash and Edema     Immunization History   Administered Date(s) Administered    Influenza 11/15/2006, 11/16/2007, 11/05/2008, 09/29/2014, 10/25/2016, 09/29/2017    Influenza Split High Dose Preservative Free IM 10/17/2012, 10/29/2013, 09/29/2014, 10/25/2016    Pneumococcal Polysaccharide PPV23 11/05/2008, 04/06/2015    Zoster 06/06/2007       Patient Care Team:  Estiven Aquino DO as PCP - General  MD Nicky Burrows MD  [facility], DO  MD Estiven Hernandez, DO    Medicare Screening Tests and Risk Assessments:  AWV Clinical     ISAR:   Previous hospitalizations?:  No       Once in a Lifetime Medicare Screening:   EKG performed?:  Yes Results:  on EMR   AAA screening performed? (if performed, please add date to Health Maintenance):  No       Medicare Screening Tests and Risk Assessment:   AAA Risk Assessment    None Indicated:  Yes    Osteoporosis Risk Assessment     Female:  Yes   :  Yes    Age over 48:  Yes    Low calcium diet:  Yes    HIV Risk Assessment    None indicated:  Yes        Drug and Alcohol Use:   Tobacco use    Cigarettes:  former smoker    Tobacco use duration    Tobacco Cessation Readiness    Alcohol use    Alcohol use:  never    Alcohol Treatment Readiness   Illicit Drug Use    Drug use:  never    Drug type:  no sedative use       Diet & Exercise:   Diet   What is your diet?:  Regular, Low Cholesterol, Low Saturated Fat, No Added Salt   How many servings a day of the following:   Fruits and Vegetables:  1-2, 3-4 Meat:  1-2   Whole Grains:  1 Simple Carbs:  1   Dairy:  2 Soda:  1   Coffee:  1 Tea:  0   Exercise    Do you currently exercise?:  yes    Frequency:  occasional   Times per week:  5     Type of exercise:  walking   She likes to go for walks  Cognitive Impairment Screening:   Anxiety screenings preformed:   Yes Anxiety screen score:  0   Depression screening preformed:  Yes     PHQ-9 Depression scale score:  0   Depression screening results:  negative for symptoms   Cognitive Impairment Screening    Do you have difficulty learning or retaining new information?:  No Do you have difficulty handling new tasks?:  No   Do you have difficulty with reasoning?:  No Do you have difficulty with spatial ability and orientation?:  No   Do you have difficulty with language?:  No Do you have difficulty with behavior?:  No       Functional Ability/Level of Safety:   Hearing    Hearing difficulties:  No Bilateral:  normal   Left:  normal    Hearing aid:  No    Hearing Impairment Assessment    Hearing status:  No impairment   Current Activities    Status:  unlimited ADL's, unlimited IADL's, unlimited social activities, unlimited driving   Help needed with the folllowing:    Using the phone:  No Transportation:  No   Shopping:  No Preparing Meals:  No   Doing Housework:  No Doing Laundry:  No   Managing Medications:  No Managing Money:  No   ADL    Feeding:  Independant   Oral hygiene and Facial grooming:  Independant   Bathing:  Independant   Upper Body Dressing:  Independant   Lower Body Dressing:  Independant   Toileting:  Independant   Bed Mobility:  Independant   Fall Risk   Have you fallen in the last 12 months?:  No    Injury History   Polypharmacy:  No Antidepressant Use:  No   Sedative Use:  No Antihypertensive Use:  No   Previous Fall:  No Alcohol Use:  No   Deconditioning:  No Visual Impairment:  No   Cogitive Impairment:  No Mmobility Impairment:  No   Postural Hypotension:  No Urinary Incontinence:  No       Home Safety:   Are there hazards in your environment?:  No   If you fell, would you need help to get back up from the ground?:  No Do you have problems or concerns getting in/out of a bed, chair, tub, or toilet?:  No   Do you feel unsteady when walking?:  No Is your activity limited by pain?:  Yes   Do you have handrails and grab-bars in the home?:  Yes Are emergency numbers kept by the phone and regularly updated?:  Yes   Are you and/or family members aware of the dangers of smoking in bed?:  Yes    Do you have working smoke alarms and fire extinguisher?:  Yes Do all household members know how to use them?:  Yes   Have you left the stove on unsupervised?:  No    Home Safety Risk Factors   Unfamilar with surroundings:  No Uneven floors:  No   Stairs or handrail saftey risk:  No Loose rugs:  No   Household clutter:  No Poor household lighting:  No   No grab bars in bathroom:  No Further evaluation needed:  No       Advanced Directives:   Advanced Directives    Living Will:  No Durable POA for healthcare:  No   Advanced directive:  No    Patient's End of Life Decisions    Reviewed with patient:  No Provider agrees with end of life decisions:  No   End of life decisions comments:  She is going to look into this           Urinary Incontinence:       Glaucoma:            Provider Screening     Preventative Screening/Counseling:   Cardiovascular Screening/Counseling:   (Labs Q5 years, EKG optional one-time)   General:  Risks and Benefits Discussed, Screening Current           Diabetes Screening/Counseling:   (2 tests/year if Pre-Diabetes or 1 test/year if no Diabetes)   General:  Risks and Benefits Discussed, Screening Current           Colorectal Cancer Screening/Counseling:   (FOBT Q1 yr; Flex Sig Q4 yrs or Q10 yrs after Screening Colonoscopy; Screening Colonoscpy Q2 yrs High Risk or Q10 yrs Low Risk; Barium Enema Q2 yrs High Risk or Q4 yrs Low Risk)   General:  Risks and Benefits Discussed, Screening Current           Prostate Cancer Screening/Counseling:   (Annual)          Breast Cancer Screening/Counseling:   (Baseline Age 28 - 43; Annual Age 36+)   General:  Risks and Benefits Discussed, Screening Current          Cervical Cancer Screening/Counseling:   (Annual for High Risk or Childbearing Age with Abnormal Pap in Last 3 yrs; Every 2 all others)   General:  Risks and Benefits Discussed, Screening Current           Osteoporosis Screening/Counseling:   (Every 2 Yrs if at risk or more if medically necessary)   General:  Risks and Benefits Discussed, Screening Current           AAA Screening/Counseling:   (Once per Lifetime with risk factors)    General:  Screening Not Indicated           Glaucoma Screening/Counseling:   (Annual)   General:  Risks and Benefits Discussed, Screening Current          HIV Screening/Counseling:   (Voluntary;  Once annually for high risk OR 3 times for Pregnancy at diagnosis of IUP; 3rd trimester; and at Labor   General:  Screening Not Indicated           Hepatitis C Screening:             Immunizations:   Influenza (annual):  Risks & Benefits Discussed, Influenza UTD This Year   Pneumococcal (Once in a Lifetime):  Risks & Benefits Discussed, Lifetime Vaccine Completed   Hepatitis B Series (low risk patients):  Series Not Indicated   Zostavax (Medicare D Coverage, Pt >66 yo):  Risks & Benefits Discussed, Zostavax Vaccine UTD   TD (Non-Medicare Wellness  Visit required):  Risks & Benefits Discussed, Td Vaccine UTD   Tdap (Non-Medicare Wellness Visit required):  Risks & Benefits Discussed, Tdap Vaccine UTD       Other Preventative Couseling (Non-Medicare Wellness Visit Required):       Referrals (Non-Medicare Wellness Visit Required):       Medical Equipment/Suppliers:            Visual Acuity Screening    Right eye Left eye Both eyes   Without correction:      With correction: 20/30 20/20    Comments: Wears glasses    Review of Systems   Constitutional: Negative  HENT: Negative  Eyes: Negative  Respiratory: Negative  Cardiovascular: Negative  Gastrointestinal: Negative  Endocrine: Negative  Genitourinary: Negative  Musculoskeletal: Negative  Skin: Negative  Allergic/Immunologic: Negative  Neurological: Negative  Hematological: Negative  Psychiatric/Behavioral: Negative  Physical Exam :  Vitals:    04/12/18 1300   BP: 144/78   BP Location: Left arm   Patient Position: Sitting   Cuff Size: Standard   Pulse: 88   Resp: 14   Temp: 98 4 °F (36 9 °C)   TempSrc: Tympanic   Weight: 78 1 kg (172 lb 1 6 oz)   Height: 5' 1" (1 549 m)     Body mass index is 32 52 kg/m²  Physical Exam   Constitutional: She is oriented to person, place, and time  She appears well-developed and well-nourished  No distress  HENT:   Head: Normocephalic and atraumatic  Right Ear: External ear normal    Left Ear: External ear normal    Nose: Nose normal    Mouth/Throat: Oropharynx is clear and moist  No oropharyngeal exudate  Eyes: EOM are normal  Pupils are equal, round, and reactive to light  Right eye exhibits no discharge  Left eye exhibits no discharge  No scleral icterus  Neck: Normal range of motion  Neck supple  No JVD present  No tracheal deviation present  No thyromegaly present  Cardiovascular: Normal rate, regular rhythm, normal heart sounds and intact distal pulses  Exam reveals no gallop and no friction rub  No murmur heard  Pulmonary/Chest: Effort normal and breath sounds normal  No respiratory distress  She has no wheezes  She has no rales  She exhibits no tenderness  Abdominal: Soft  Bowel sounds are normal  She exhibits no distension and no mass  There is no tenderness  There is no rebound and no guarding  No hernia  Musculoskeletal: Normal range of motion  She exhibits no edema, tenderness or deformity     Lymphadenopathy: She has no cervical adenopathy  Neurological: She is alert and oriented to person, place, and time  She displays normal reflexes  No cranial nerve deficit or sensory deficit  She exhibits normal muscle tone  Coordination normal    Skin: Skin is warm and dry  No rash noted  She is not diaphoretic  No erythema  No pallor  Psychiatric: She has a normal mood and affect  Her behavior is normal  Thought content normal      Appointment on 04/03/2018   Component Date Value    Sodium 04/03/2018 143     Potassium 04/03/2018 3 9     Chloride 04/03/2018 109*    CO2 04/03/2018 30     Anion Gap 04/03/2018 4     BUN 04/03/2018 16     Creatinine 04/03/2018 1 05     Glucose, Fasting 04/03/2018 114*    Calcium 04/03/2018 8 9     AST 04/03/2018 19     ALT 04/03/2018 24     Alkaline Phosphatase 04/03/2018 108     Total Protein 04/03/2018 7 6     Albumin 04/03/2018 3 5     Total Bilirubin 04/03/2018 0 58     eGFR 04/03/2018 51     Cholesterol 04/03/2018 191     Triglycerides 04/03/2018 144     HDL, Direct 04/03/2018 50     LDL Calculated 04/03/2018 112*          Reviewed Updated [facility] Prior Wellness Visits:   Last Medicare wellness visit information was reviewed, patient interviewed , no change since last AWVno  Last Medicare wellness visit information was reviewed, patient interviewed and updates made to the record today yes    Assessment and Plan:  1  Medicare annual wellness visit, subsequent     2  Screening for osteoporosis  DXA bone density spine hip and pelvis   3  Encounter for screening mammogram for breast cancer  Mammo screening bilateral w 3d & cad   Celia Romero had a normal exam today in the office  She is stable on her current medications  Her lab work looked good  She will go for the testing as ordered  We will see her back in the office again in a year for her annual wellness visit    Health Maintenance Due   Topic Date Due    SLP PLAN OF CARE  1940    Depression Screening PHQ-9 12/20/1952    DTaP,Tdap,and Td Vaccines (1 - Tdap) 12/20/1960    GLAUCOMA SCREENING 67+ YR  09/11/2015    DXA SCAN  02/18/2018

## 2018-04-12 NOTE — PATIENT INSTRUCTIONS
Obesity   AMBULATORY CARE:   Obesity  is when your body mass index (BMI) is greater than 30  Your healthcare provider will use your height and weight to measure your BMI  The risks of obesity include  many health problems, such as injuries or physical disability  You may need tests to check for the following:  · Diabetes     · High blood pressure or high cholesterol     · Heart disease     · Gallbladder or liver disease     · Cancer of the colon, breast, prostate, liver, or kidney     · Sleep apnea     · Arthritis or gout  Seek care immediately if:   · You have a severe headache, confusion, or difficulty speaking  · You have weakness on one side of your body  · You have chest pain, sweating, or shortness of breath  Contact your healthcare provider if:   · You have symptoms of gallbladder or liver disease, such as pain in your upper abdomen  · You have knee or hip pain and discomfort while walking  · You have symptoms of diabetes, such as intense hunger and thirst, and frequent urination  · You have symptoms of sleep apnea, such as snoring or daytime sleepiness  · You have questions or concerns about your condition or care  Treatment for obesity  focuses on helping you lose weight to improve your health  Even a small decrease in BMI can reduce the risk for many health problems  Your healthcare provider will help you set a weight-loss goal   · Lifestyle changes  are the first step in treating obesity  These include making healthy food choices and getting regular physical activity  Your healthcare provider may suggest a weight-loss program that involves coaching, education, and therapy  · Medicine  may help you lose weight when it is used with a healthy diet and physical activity  · Surgery  can help you lose weight if you are very obese and have other health problems  There are several types of weight-loss surgery  Ask your healthcare provider for more information    Be successful losing weight:   · Set small, realistic goals  An example of a small goal is to walk for 20 minutes 5 days a week  Anther goal is to lose 5% of your body weight  · Tell friends, family members, and coworkers about your goals  and ask for their support  Ask a friend to lose weight with you, or join a weight-loss support group  · Identify foods or triggers that may cause you to overeat , and find ways to avoid them  Remove tempting high-calorie foods from your home and workplace  Place a bowl of fresh fruit on your kitchen counter  If stress causes you to eat, then find other ways to cope with stress  · Keep a diary to track what you eat and drink  Also write down how many minutes of physical activity you do each day  Weigh yourself once a week and record it in your diary  Eating changes: You will need to eat 500 to 1,000 fewer calories each day than you currently eat to lose 1 to 2 pounds a week  The following changes will help you cut calories:  · Eat smaller portions  Use small plates, no larger than 9 inches in diameter  Fill your plate half full of fruits and vegetables  Measure your food using measuring cups until you know what a serving size looks like  · Eat 3 meals and 1 or 2 snacks each day  Plan your meals in advance  Kena Chen and eat at home most of the time  Eat slowly  · Eat fruits and vegetables at every meal   They are low in calories and high in fiber, which makes you feel full  Do not add butter, margarine, or cream sauce to vegetables  Use herbs to season steamed vegetables  · Eat less fat and fewer fried foods  Eat more baked or grilled chicken and fish  These protein sources are lower in calories and fat than red meat  Limit fast food  Dress your salads with olive oil and vinegar instead of bottled dressing  · Limit the amount of sugar you eat  Do not drink sugary beverages  Limit alcohol  Activity changes:  Physical activity is good for your body in many ways   It helps you burn calories and build strong muscles  It decreases stress and depression, and improves your mood  It can also help you sleep better  Talk to your healthcare provider before you begin an exercise program   · Exercise for at least 30 minutes 5 days a week  Start slowly  Set aside time each day for physical activity that you enjoy and that is convenient for you  It is best to do both weight training and an activity that increases your heart rate, such as walking, bicycling, or swimming  · Find ways to be more active  Do yard work and housecleaning  Walk up the stairs instead of using elevators  Spend your leisure time going to events that require walking, such as outdoor festivals or fairs  This extra physical activity can help you lose weight and keep it off  Follow up with your healthcare provider as directed: You may need to meet with a dietitian  Write down your questions so you remember to ask them during your visits  © 2017 [employer] Information is for End User's use only and may not be sold, redistributed or otherwise used for commercial purposes  All illustrations and images included in CareNotes® are the copyrighted property of [employer]  or Gopi Romero  The above information is an  only  It is not intended as medical advice for individual conditions or treatments  Talk to your doctor, nurse or pharmacist before following any medical regimen to see if it is safe and effective for you  Urinary Incontinence   WHAT YOU NEED TO KNOW:   What is urinary incontinence? Urinary incontinence (UI) is when you lose control of your bladder  What causes UI? UI occurs because your bladder cannot store or empty urine properly  The following are the most common types of UI:  · Stress incontinence  is when you leak urine due to increased bladder pressure  This may happen when you cough, sneeze, or exercise       · Urge incontinence  is when you feel the need to urinate right away and leak urine accidentally  · Mixed incontinence  is when you have both stress and urge UI  What are the signs and symptoms of UI?   · You feel like your bladder does not empty completely when you urinate  · You urinate often and need to urinate immediately  · You leak urine when you sleep, or you wake up with the urge to urinate  · You leak urine when you cough, sneeze, exercise, or laugh  How is UI diagnosed? Your healthcare provider will ask how often you leak urine and whether you have stress or urge symptoms  Tell him which medicines you take, how often you urinate, and how much liquid you drink each day  You may need any of the following tests:  · Urine tests  may show infection or kidney function  · A pelvic exam  may be done to check for blockages  A pelvic exam will also show if your bladder, uterus, or other organs have moved out of place  · An x-ray, ultrasound, or CT  may show problems with parts of your urinary system  You may be given contrast liquid to help your organs show up better in the pictures  Tell the healthcare provider if you have ever had an allergic reaction to contrast liquid  Do not enter the MRI room with anything metal  Metal can cause serious injury  Tell the healthcare provider if you have any metal in or on your body  · A bladder scan  will show how much urine is left in your bladder after you urinate  You will be asked to urinate and then healthcare providers will use a small ultrasound machine to check the urine left in your bladder  · Cystometry  is used to check the function of your urinary system  Your healthcare provider checks the pressure in your bladder while filling it with fluid  Your bladder pressure may also be tested when your bladder is full and while you urinate  How is UI treated? · Medicines  can help strengthen your bladder control      · Electrical stimulation  is used to send a small amount of electrical energy to your pelvic floor muscles  This helps control your bladder function  Electrodes may be placed outside your body or in your rectum  For women, the electrodes may be placed in the vagina  · A bulking agent  may be injected into the wall of your urethra to make it thicker  This helps keep your urethra closed and decreases urine leakage  · Devices  such as a clamp, pessary, or tampon may help stop urine leaks  Ask your healthcare provider for more information about these and other devices  · Surgery  may be needed if other treatments do not work  Several types of surgery can help improve your bladder control  Ask your healthcare provider for more information about the surgery you may need  How can I manage my symptoms? · Do pelvic muscle exercises often  Your pelvic muscles help you stop urinating  Squeeze these muscles tight for 5 seconds, then relax for 5 seconds  Gradually work up to squeezing for 10 seconds  Do 3 sets of 15 repetitions a day, or as directed  This will help strengthen your pelvic muscles and improve bladder control  · A catheter  may be used to help empty your bladder  A catheter is a tiny, plastic tube that is put into your bladder to drain your urine  Your healthcare provider may tell you to use a catheter to prevent your bladder from getting too full and leaking urine  · Keep a UI record  Write down how often you leak urine and how much you leak  Make a note of what you were doing when you leaked urine  · Train your bladder  Go to the bathroom at set times, such as every 2 hours, even if you do not feel the urge to go  You can also try to hold your urine when you feel the urge to go  For example, hold your urine for 5 minutes when you feel the urge to go  As that becomes easier, hold your urine for 10 minutes  · Drink liquids as directed  Ask your healthcare provider how much liquid to drink each day and which liquids are best for you   You may need to limit the amount of liquid you drink to help control your urine leakage  Limit or do not have drinks that contain caffeine or alcohol  Do not drink any liquid right before you go to bed  · Prevent constipation  Eat a variety of high-fiber foods  Good examples are high-fiber cereals, beans, vegetables, and whole-grain breads  Prune juice may help make your bowel movement softer  Walking is the best way to trigger your intestines to have a bowel movement  · Exercise regularly and maintain a healthy weight  Ask your healthcare provider how much you should weigh and about the best exercise plan for you  Weight loss and exercise will decrease pressure on your bladder and help you control your leakage  Ask him to help you create a weight loss plan if you are overweight  When should I seek immediate care? · You have severe pain  · You are confused or cannot think clearly  When should I contact my healthcare provider? · You have a fever  · You see blood in your urine  · You have pain when you urinate  · You have new or worse pain, even after treatment  · Your mouth feels dry or you have vision changes  · Your urine is cloudy or smells bad  · You have questions or concerns about your condition or care  CARE AGREEMENT:   You have the right to help plan your care  Learn about your health condition and how it may be treated  Discuss treatment options with your caregivers to decide what care you want to receive  You always have the right to refuse treatment  The above information is an  only  It is not intended as medical advice for individual conditions or treatments  Talk to your doctor, nurse or pharmacist before following any medical regimen to see if it is safe and effective for you  © 2017 [employer] Information is for End User's use only and may not be sold, redistributed or otherwise used for commercial purposes   All illustrations and images included in CareNotes® are the copyrighted property of [employer] M [employer] Inc  or Gopi Romero  Cigarette Smoking and Your Health   AMBULATORY CARE:   Risks to your health if you smoke:  Nicotine and other chemicals found in tobacco damage every cell in your body  Even if you are a light smoker, you have an increased risk for cancer, heart disease, and lung disease  If you are pregnant or have diabetes, smoking increases your risk for complications  Benefits to your health if you stop smoking:   · You decrease respiratory symptoms such as coughing, wheezing, and shortness of breath  · You reduce your risk for cancers of the lung, mouth, throat, kidney, bladder, pancreas, stomach, and cervix  If you already have cancer, you increase the benefits of chemotherapy  You also reduce your risk for cancer returning or a second cancer from developing  · You reduce your risk for heart disease, blood clots, heart attack, and stroke  · You reduce your risk for lung infections, and diseases such as pneumonia, asthma, chronic bronchitis, and emphysema  · Your circulation improves  More oxygen can be delivered to your body  If you have diabetes, you lower your risk for complications, such as kidney, artery, and eye diseases  You also lower your risk for nerve damage  Nerve damage can lead to amputations, poor vision, and blindness  · You improve your body's ability to heal and to fight infections  Benefits to the health of others if you stop smoking:  Tobacco is harmful to nonsmokers who breathe in your secondhand smoke  The following are ways the health of others around you may improve when you stop smoking:  · You lower the risks for lung cancer and heart disease in nonsmoking adults  · If you are pregnant, you lower the risk for miscarriage, early delivery, low birth weight, and stillbirth  You also lower your baby's risk for SIDS, obesity, developmental delay, and neurobehavioral problems, such as ADHD  · If you have children, you lower their risk for ear infections, colds, pneumonia, bronchitis, and asthma  For more information and support to stop smoking:   · Smokefree  gov  Phone: 0- 166 - 995-6425  Web Address: www smokefree  gov  Follow up with your healthcare provider as directed:  Write down your questions so you remember to ask them during your visits  © 2017 [employer] [employer] Information is for End User's use only and may not be sold, redistributed or otherwise used for commercial purposes  All illustrations and images included in CareNotes® are the copyrighted property of A [employer] M [employer]  or Gopi Romero  The above information is an  only  It is not intended as medical advice for individual conditions or treatments  Talk to your doctor, nurse or pharmacist before following any medical regimen to see if it is safe and effective for you  Fall Prevention   AMBULATORY CARE:   Fall prevention  includes ways to make your home and other areas safer  It also includes ways you can move more carefully to prevent a fall  Health conditions that cause changes in your blood pressure, vision, or muscle strength and coordination may increase your risk for falls  Medicines may also increase your risk for falls if they make you dizzy, weak, or sleepy  Call 911 or have someone else call if:   · You have fallen and are unconscious  · You have fallen and cannot move part of your body  Contact your healthcare provider if:   · You have fallen and have pain or a headache  · You have questions or concerns about your condition or care  Fall prevention tips:   · Stand or sit up slowly  This may help you keep your balance and prevent falls  · Use assistive devices as directed  Your healthcare provider may suggest that you use a cane or walker to help you keep your balance  You may need to have grab bars put in your bathroom near the toilet or in the shower      · Wear shoes that fit well and have soles that   Wear shoes both inside and outside  Use slippers with good   Do not wear shoes with high heels  · Wear a personal alarm  This is a device that allows you to call 911 if you fall and need help  Ask your healthcare provider for more information  · Stay active  Exercise can help strengthen your muscles and improve your balance  Your healthcare provider may recommend water aerobics or walking  He or she may also recommend physical therapy to improve your coordination  Never start an exercise program without talking to your healthcare provider first      · Manage your medical conditions  Keep all appointments with your healthcare providers  Visit your eye doctor as directed  Home safety tips:   · Add items to prevent falls in the bathroom  Put nonslip strips on your bath or shower floor to prevent you from slipping  Use a bath mat if you do not have carpet in the bathroom  This will prevent you from falling when you step out of the bath or shower  Use a shower seat so you do not need to stand while you shower  Sit on the toilet or a chair in your bathroom to dry yourself and put on clothing  This will prevent you from losing your balance from drying or dressing yourself while you are standing  · Keep paths clear  Remove books, shoes, and other objects from walkways and stairs  Place cords for telephones and lamps out of the way so that you do not need to walk over them  Tape them down if you cannot move them  Remove small rugs  If you cannot remove a rug, secure it with double-sided tape  This will prevent you from tripping  · Install bright lights in your home  Use night lights to help light paths to the bathroom or kitchen  Always turn on the light before you start walking  · Keep items you use often on shelves within reach  Do not use a step stool to help you reach an item  · Paint or place reflective tape on the edges of your stairs    This will help you see the stairs better  Follow up with your healthcare provider as directed:  Write down your questions so you remember to ask them during your visits  © 2017 [employer] Information is for End User's use only and may not be sold, redistributed or otherwise used for commercial purposes  All illustrations and images included in CareNotes® are the copyrighted property of [employer] M [employer]  or Gopi Romero  The above information is an  only  It is not intended as medical advice for individual conditions or treatments  Talk to your doctor, nurse or pharmacist before following any medical regimen to see if it is safe and effective for you  Advance Directives   WHAT YOU NEED TO KNOW:   What are advance directives? Advance directives are legal documents that state your wishes and plans for medical care  These plans are made ahead of time in case you lose your ability to make decisions for yourself  Advance directives can apply to any medical decision, such as the treatments you want, and if you want to donate organs  What are the types of advance directives? There are many types of advance directives, and each state has rules about how to use them  You may choose a combination of any of the following:  · Living will: This is a written record of the treatment you want  You can also choose which treatments you do not want, which to limit, and which to stop at a certain time  This includes surgery, medicine, IV fluid, and tube feedings  · Durable power of  for healthcare [place] [facility]): This is a written record that states who you want to make healthcare choices for you when you are unable to make them for yourself  This person, called a proxy, is usually a family member or a friend  You may choose more than 1 proxy  · Do not resuscitate (DNR) order:  A DNR order is used in case your heart stops beating or you stop breathing   It is a request not to have certain forms of treatment, such as CPR  A DNR order may be included in other types of advance directives  · Medical directive: This covers the care that you want if you are in a coma, near death, or unable to make decisions for yourself  You can list the treatments you want for each condition  Treatment may include pain medicine, surgery, blood transfusions, dialysis, IV or tube feedings, and a ventilator (breathing machine)  · Values history: This document has questions about your views, beliefs, and how you feel and think about life  This information can help others choose the care that you would choose  Why are advance directives important? An advance directive helps you control your care  Although spoken wishes may be used, it is better to have your wishes written down  Spoken wishes can be misunderstood, or not followed  Treatments may be given even if you do not want them  An advance directive may make it easier for your family to make difficult choices about your care  How do I decide what to put in my advance directives? · Make informed decisions:  Make sure you fully understand treatments or care you may receive  Think about the benefits and problems your decisions could cause for you or your family  Talk to healthcare providers if you have concerns or questions before you write down your wishes  You may also want to talk with your Rastafari or , or a   Check your state laws to make sure that what you put in your advance directive is legal      · Sign all forms:  Sign and date your advance directive when you have finished  You may also need 2 witnesses to sign the forms  Witnesses cannot be your doctor or his staff, your spouse, heirs or beneficiaries, people you owe money to, or your chosen proxy  Talk to your family, proxy, and healthcare providers about your advance directive  Give each person a copy, and keep one for yourself in a place you can get to easily   Do not keep it hidden or locked away  · Review and revise your plans: You can revise your advance directive at any time, as long as you are able to make decisions  Review your plan every year, and when there are changes in your life, or your health  When you make changes, let your family, proxy, and healthcare providers know  Give each a new copy  Where can I find more information? · [employer]  [place] , [place]  Phone: 3- 802 - [phone]  Phone: 7- [phone]  Web Address: http://www  aafp org  · [address] [facility])  [address] [address] [facility], [address] [facility] , [address] [place] [address]  Phone: [phone]  Phone: [phone]  Web Address: Eva carey  CARE AGREEMENT:   You have the right to help plan your care  To help with this plan, you must learn about your health condition and treatment options  You must also learn about advance directives and how they are used  Work with your healthcare providers to decide what care will be used to treat you  You always have the right to refuse treatment  The above information is an  only  It is not intended as medical advice for individual conditions or treatments  Talk to your doctor, nurse or pharmacist before following any medical regimen to see if it is safe and effective for you  © 2017 [employer] [employer] Information is for End User's use only and may not be sold, redistributed or otherwise used for commercial purposes  All illustrations and images included in CareNotes® are the copyrighted property of A [employer] M , [employer]  or Gopi Romero

## 2018-04-23 DIAGNOSIS — F41.9 ANXIETY: Primary | ICD-10-CM

## 2018-04-23 RX ORDER — AMITRIPTYLINE HYDROCHLORIDE 25 MG/1
25 TABLET, FILM COATED ORAL
Qty: 90 TABLET | Refills: 1 | Status: SHIPPED | OUTPATIENT
Start: 2018-04-23 | End: 2018-10-15 | Stop reason: SDUPTHER

## 2018-06-21 ENCOUNTER — OFFICE VISIT (OUTPATIENT)
Dept: FAMILY MEDICINE CLINIC | Facility: CLINIC | Age: 78
End: 2018-06-21
Payer: MEDICARE

## 2018-06-21 VITALS
BODY MASS INDEX: 32.69 KG/M2 | DIASTOLIC BLOOD PRESSURE: 94 MMHG | WEIGHT: 173 LBS | SYSTOLIC BLOOD PRESSURE: 150 MMHG | HEART RATE: 109 BPM

## 2018-06-21 DIAGNOSIS — N89.8 ITCHING IN THE VAGINAL AREA: Primary | ICD-10-CM

## 2018-06-21 PROCEDURE — 99213 OFFICE O/P EST LOW 20 MIN: CPT | Performed by: NURSE PRACTITIONER

## 2018-06-21 RX ORDER — CLOTRIMAZOLE 1 %
1 CREAM WITH APPLICATOR VAGINAL
Qty: 45 G | Refills: 0 | Status: SHIPPED | OUTPATIENT
Start: 2018-06-21 | End: 2018-06-23

## 2018-06-21 NOTE — PROGRESS NOTES
Assessment/Plan   Diagnoses and all orders for this visit:    Itching in the vaginal area  Comments:   redness and excoriation of labia bilaterally, will attempt some Lotrimin cream, patient will return in a few days for re-evaluation  Orders:  -     clotrimazole (GYNE-LOTRIMIN) 1 % vaginal cream; Insert 1 applicator into the vagina daily at bedtime for 2 days        Chief Complaint   Patient presents with    Vaginal Itching     Vaginal itching and redness, and dry for 1 week  Subjective   Patient ID: Charlotte Atkinson is a 65 y o  female  Vitals:    06/21/18 1401   BP: 150/94   Pulse: (!) 109     Vaginal Itching   The patient's pertinent negatives include no pelvic pain, vaginal bleeding or vaginal discharge  Primary symptoms comment:  states outer skin of vagina is red and inflamed  This is a recurrent ( has had same in past and has used Desitin but it did not work this time to relieve the redness or burning she is experiencing) problem  The current episode started in the past 7 days  The problem occurs constantly  The problem has been unchanged  The pain is moderate  The problem affects both sides  She is not pregnant  Pertinent negatives include no dysuria, fever, flank pain or hematuria  Associated symptoms comments:  No other associated symptoms  Exacerbated by:  urinating  Treatments tried:  initially use Desitin on her labia bilaterally, change to witch hazel,  and then tried aloe from a plant  The treatment provided no relief  She is not sexually active  No, her partner does not have an STD  She uses nothing ( postmenopausal) for contraception  She is postmenopausal        The following portions of the patient's history were reviewed and updated as appropriate: allergies, current medications, past family history, past medical history, past social history and problem list     Review of Systems   Constitutional: Negative  Negative for fatigue and fever  HENT: Negative  Eyes: Negative  Respiratory: Negative  Cardiovascular: Negative  Gastrointestinal: Negative  Endocrine: Negative  Genitourinary: Positive for vaginal pain ( labial pain)  Negative for decreased urine volume, dysuria, flank pain, hematuria, pelvic pain, vaginal bleeding and vaginal discharge  Musculoskeletal: Negative  Skin: Negative  Allergic/Immunologic: Negative  Neurological: Negative  Hematological: Negative  Psychiatric/Behavioral: Negative  Objective     Physical Exam   Constitutional: She is oriented to person, place, and time  She appears well-developed and well-nourished  No distress  HENT:   Head: Normocephalic  Eyes: Conjunctivae are normal    Neck: Normal range of motion  Cardiovascular: Normal rate and regular rhythm  Pulmonary/Chest: Effort normal and breath sounds normal    Abdominal: Soft  She exhibits no distension and no mass  There is no tenderness  Genitourinary:       Pelvic exam was performed with patient prone  No labial fusion  There is tenderness on the right labia  There is no rash ( labia is red and inflamed without any particular rash), lesion or injury on the right labia  There is tenderness on the left labia  There is no rash, lesion or injury on the left labia  No vaginal discharge found  Musculoskeletal: Normal range of motion  She exhibits no edema, tenderness or deformity  Neurological: She is alert and oriented to person, place, and time  Skin: Skin is warm and dry  Capillary refill takes less than 2 seconds  No rash noted  There is erythema ( of labia only)  Psychiatric: She has a normal mood and affect  Her behavior is normal  Judgment and thought content normal    Nursing note and vitals reviewed  Allergies   Allergen Reactions    Sulfa Antibiotics     Pneumococcal Polysaccharide Vaccine Rash and Edema         Patient Active Problem List   Diagnosis    Carcinoma of breast ([facility] [address] )    Adverse reaction to pneumococcal vaccine    Anxiety  Cataract, bilateral    Chronic inflammatory disease of uterus    Hyperlipidemia    Pulmonary nodule seen on imaging study    Pes planus    Sleep disorder       Current Outpatient Prescriptions:     amitriptyline (ELAVIL) 25 mg tablet, Take 1 tablet (25 mg total) by mouth daily at bedtime, Disp: 90 tablet, Rfl: 1    aspirin 81 MG tablet, Take 81 mg by mouth daily  , Disp: , Rfl:     Cholecalciferol (VITAMIN D3) 2000 units capsule, Take 2,000 Units by mouth daily  , Disp: , Rfl:     Multiple Vitamins-Minerals (ICAPS PO), Take 1 capsule by mouth 2 (two) times a day, Disp: , Rfl:     simvastatin (ZOCOR) 10 mg tablet, Take 10 mg by mouth daily at bedtime  , Disp: , Rfl:     clotrimazole (GYNE-LOTRIMIN) 1 % vaginal cream, Insert 1 applicator into the vagina daily at bedtime for 2 days, Disp: 45 g, Rfl: 0  Social History     Social History    Marital status:      Spouse name: N/A    Number of children: N/A    Years of education: N/A     Occupational History    Not on file       Social History Main Topics    Smoking status: Former Smoker    Smokeless tobacco: Never Used    Alcohol use No      Comment: (history)    Drug use: No    Sexual activity: Not on file     Other Topics Concern    Not on file     Social History Narrative    No narrative on file     Family History   Problem Relation Age of Onset    Stomach cancer Mother

## 2018-06-25 ENCOUNTER — HOSPITAL ENCOUNTER (OUTPATIENT)
Dept: MAMMOGRAPHY | Facility: CLINIC | Age: 78
Discharge: HOME | End: 2018-06-25
Payer: MEDICARE

## 2018-06-25 ENCOUNTER — OFFICE VISIT (OUTPATIENT)
Dept: FAMILY MEDICINE CLINIC | Facility: CLINIC | Age: 78
End: 2018-06-25
Payer: MEDICARE

## 2018-06-25 VITALS
BODY MASS INDEX: 32.59 KG/M2 | SYSTOLIC BLOOD PRESSURE: 140 MMHG | WEIGHT: 172.5 LBS | HEART RATE: 98 BPM | DIASTOLIC BLOOD PRESSURE: 90 MMHG

## 2018-06-25 DIAGNOSIS — B37.9 CANDIDIASIS: Primary | ICD-10-CM

## 2018-06-25 DIAGNOSIS — Z12.31 ENCOUNTER FOR SCREENING MAMMOGRAM FOR BREAST CANCER: ICD-10-CM

## 2018-06-25 DIAGNOSIS — E78.5 DYSLIPIDEMIA: Primary | ICD-10-CM

## 2018-06-25 DIAGNOSIS — Z13.820 SCREENING FOR OSTEOPOROSIS: ICD-10-CM

## 2018-06-25 PROCEDURE — 77067 SCR MAMMO BI INCL CAD: CPT

## 2018-06-25 PROCEDURE — 77080 DXA BONE DENSITY AXIAL: CPT

## 2018-06-25 PROCEDURE — 99212 OFFICE O/P EST SF 10 MIN: CPT | Performed by: NURSE PRACTITIONER

## 2018-06-25 PROCEDURE — 77063 BREAST TOMOSYNTHESIS BI: CPT

## 2018-06-25 RX ORDER — SIMVASTATIN 10 MG
10 TABLET ORAL
Qty: 90 TABLET | Refills: 1 | Status: SHIPPED | OUTPATIENT
Start: 2018-06-25 | End: 2018-12-24 | Stop reason: SDUPTHER

## 2018-06-25 NOTE — PROGRESS NOTES
Assessment/Plan   Diagnoses and all orders for this visit:    Candidiasis  Comments:   continue current Lotrimin to labia, improvement noted        Chief Complaint   Patient presents with    Follow-up     Follow up itching vagina       Subjective   Patient ID: Frank Harding is a 65 y o  female  Vitals:    06/25/18 1110   BP: 140/90   Pulse: 80     May Will is here today for follow-up, post inflammation of her labia  Due to a delay at her pharmacy for her prescription she began the Lotrimin is just 2 days ago, but after 2 days and labia is less reddened, states all itchiness has resolved, and is feeling much better  Will continue for another 4 days  The following portions of the patient's history were reviewed and updated as appropriate: allergies, current medications, past medical history, past social history, past surgical history and problem list     Review of Systems   Constitutional: Negative  HENT: Negative  Eyes: Negative  Respiratory: Negative  Cardiovascular: Negative  Gastrointestinal: Negative  Endocrine: Negative  Genitourinary: Positive for vaginal pain  Musculoskeletal: Negative  Skin: Negative  Allergic/Immunologic: Negative  Neurological: Negative  Hematological: Negative  Psychiatric/Behavioral: Negative  Objective     Physical Exam   Constitutional: She is oriented to person, place, and time  She appears well-developed and well-nourished  No distress  HENT:   Head: Normocephalic  Eyes: Conjunctivae are normal    Neck: No thyromegaly present  Cardiovascular: Normal rate and regular rhythm  Pulmonary/Chest: Effort normal and breath sounds normal    Genitourinary:   Genitourinary Comments: The redness  And swelling,of the labia greatly reduced, excoriation resolved  Musculoskeletal: Normal range of motion  Lymphadenopathy:     She has no cervical adenopathy  Neurological: She is alert and oriented to person, place, and time  Skin: Skin is warm and dry  Psychiatric: She has a normal mood and affect  Her behavior is normal  Judgment and thought content normal    Nursing note and vitals reviewed

## 2018-07-27 DIAGNOSIS — C50.919 MALIGNANT NEOPLASM OF FEMALE BREAST (HCC): ICD-10-CM

## 2018-08-14 ENCOUNTER — TELEPHONE (OUTPATIENT)
Dept: HEMATOLOGY ONCOLOGY | Facility: CLINIC | Age: 78
End: 2018-08-14

## 2018-08-14 NOTE — TELEPHONE ENCOUNTER
Pt called back stating the appt change does not work for her  She also is wanting to know how many more f/u does she need, "it's been 7 years, now"  Pt is going to get her blood work done tomorrow morning and would like a call back after they are resulted, and let her know if she even needs a f/u appt

## 2018-08-14 NOTE — TELEPHONE ENCOUNTER
Can we please reschedule her follow up appointment she can discuss follow up moving forward with Dr Priya Carbone at that time  He will need to make that determination

## 2018-08-15 ENCOUNTER — APPOINTMENT (OUTPATIENT)
Dept: LAB | Facility: CLINIC | Age: 78
End: 2018-08-15
Payer: MEDICARE

## 2018-08-15 DIAGNOSIS — C50.919 MALIGNANT NEOPLASM OF FEMALE BREAST (HCC): ICD-10-CM

## 2018-08-15 LAB
ALBUMIN SERPL BCP-MCNC: 3.5 G/DL (ref 3.5–5)
ALP SERPL-CCNC: 103 U/L (ref 46–116)
ALT SERPL W P-5'-P-CCNC: 29 U/L (ref 12–78)
ANION GAP SERPL CALCULATED.3IONS-SCNC: 7 MMOL/L (ref 4–13)
AST SERPL W P-5'-P-CCNC: 19 U/L (ref 5–45)
BASOPHILS # BLD AUTO: 0.05 THOUSANDS/ΜL (ref 0–0.1)
BASOPHILS NFR BLD AUTO: 1 % (ref 0–1)
BILIRUB SERPL-MCNC: 0.69 MG/DL (ref 0.2–1)
BUN SERPL-MCNC: 13 MG/DL (ref 5–25)
CALCIUM SERPL-MCNC: 8.7 MG/DL (ref 8.3–10.1)
CHLORIDE SERPL-SCNC: 107 MMOL/L (ref 100–108)
CO2 SERPL-SCNC: 27 MMOL/L (ref 21–32)
CREAT SERPL-MCNC: 1.05 MG/DL (ref 0.6–1.3)
EOSINOPHIL # BLD AUTO: 0.51 THOUSAND/ΜL (ref 0–0.61)
EOSINOPHIL NFR BLD AUTO: 8 % (ref 0–6)
ERYTHROCYTE [DISTWIDTH] IN BLOOD BY AUTOMATED COUNT: 14.5 % (ref 11.6–15.1)
GFR SERPL CREATININE-BSD FRML MDRD: 51 ML/MIN/1.73SQ M
GLUCOSE SERPL-MCNC: 130 MG/DL (ref 65–140)
HCT VFR BLD AUTO: 46.5 % (ref 34.8–46.1)
HGB BLD-MCNC: 14.6 G/DL (ref 11.5–15.4)
IMM GRANULOCYTES # BLD AUTO: 0.02 THOUSAND/UL (ref 0–0.2)
IMM GRANULOCYTES NFR BLD AUTO: 0 % (ref 0–2)
LYMPHOCYTES # BLD AUTO: 1.38 THOUSANDS/ΜL (ref 0.6–4.47)
LYMPHOCYTES NFR BLD AUTO: 22 % (ref 14–44)
MCH RBC QN AUTO: 26.4 PG (ref 26.8–34.3)
MCHC RBC AUTO-ENTMCNC: 31.4 G/DL (ref 31.4–37.4)
MCV RBC AUTO: 84 FL (ref 82–98)
MONOCYTES # BLD AUTO: 0.48 THOUSAND/ΜL (ref 0.17–1.22)
MONOCYTES NFR BLD AUTO: 8 % (ref 4–12)
NEUTROPHILS # BLD AUTO: 3.94 THOUSANDS/ΜL (ref 1.85–7.62)
NEUTS SEG NFR BLD AUTO: 61 % (ref 43–75)
NRBC BLD AUTO-RTO: 0 /100 WBCS
PLATELET # BLD AUTO: 211 THOUSANDS/UL (ref 149–390)
PMV BLD AUTO: 11.5 FL (ref 8.9–12.7)
POTASSIUM SERPL-SCNC: 3.9 MMOL/L (ref 3.5–5.3)
PROT SERPL-MCNC: 7.5 G/DL (ref 6.4–8.2)
RBC # BLD AUTO: 5.52 MILLION/UL (ref 3.81–5.12)
SODIUM SERPL-SCNC: 141 MMOL/L (ref 136–145)
WBC # BLD AUTO: 6.38 THOUSAND/UL (ref 4.31–10.16)

## 2018-08-15 PROCEDURE — 86300 IMMUNOASSAY TUMOR CA 15-3: CPT

## 2018-08-15 PROCEDURE — 85025 COMPLETE CBC W/AUTO DIFF WBC: CPT

## 2018-08-15 PROCEDURE — 36415 COLL VENOUS BLD VENIPUNCTURE: CPT

## 2018-08-15 PROCEDURE — 80053 COMPREHEN METABOLIC PANEL: CPT

## 2018-08-16 LAB — CANCER AG27-29 SERPL-ACNC: 28.6 U/ML (ref 0–42.3)

## 2018-09-19 ENCOUNTER — OFFICE VISIT (OUTPATIENT)
Dept: HEMATOLOGY ONCOLOGY | Facility: HOSPITAL | Age: 78
End: 2018-09-19
Payer: MEDICARE

## 2018-09-19 VITALS
HEART RATE: 132 BPM | WEIGHT: 172 LBS | HEIGHT: 61 IN | DIASTOLIC BLOOD PRESSURE: 92 MMHG | SYSTOLIC BLOOD PRESSURE: 142 MMHG | TEMPERATURE: 96.6 F | RESPIRATION RATE: 16 BRPM | BODY MASS INDEX: 32.47 KG/M2 | OXYGEN SATURATION: 98 %

## 2018-09-19 DIAGNOSIS — C50.919 CARCINOMA OF BREAST (HCC): Primary | ICD-10-CM

## 2018-09-19 PROCEDURE — 99214 OFFICE O/P EST MOD 30 MIN: CPT | Performed by: INTERNAL MEDICINE

## 2018-09-19 NOTE — PROGRESS NOTES
Hematology/Oncology Outpatient Follow- up Note  Renay Silverman 65 y o  female MRN: @ Encounter: [id number]        Date:  9/19/2018    Presenting Complaint/Diagnosis : The patient presents to the office today with Patient has a history of stage IA breast cancer  The tumor was ER positive IN positive HER-2 negative  Previous Hematologic/ Oncologic History:    The patient is status post resection  She then got adjuvant radiation  She was then put on Arimidex 1 mg once a day  Current Hematologic/ Oncologic Treatment:    Observation  Interval History:      The patient Returns for a followup visit  She states she is doing very well  Again she has had an oophorectomy in the past  As far as symptoms are concerned she states she is doing well  He denies any complaints  Denies any chest pain, shortness of breath, nausea, vomiting, diarrhea and her 14 point review of systems today was otherwise negative  She completed 5 years of an adjuvant aromatase inhibitor  Test Results:    Imaging: No results found  Labs:   Lab Results   Component Value Date    WBC 6 38 08/15/2018    HGB 14 6 08/15/2018    HCT 46 5 (H) 08/15/2018    MCV 84 08/15/2018     08/15/2018     Lab Results   Component Value Date     08/15/2018    K 3 9 08/15/2018     08/15/2018    CO2 27 08/15/2018    ANIONGAP 6 09/08/2015    BUN 13 08/15/2018    CREATININE 1 05 08/15/2018    GLUCOSE 115 09/08/2015    GLUF 114 (H) 04/03/2018    CALCIUM 8 7 08/15/2018    AST 19 08/15/2018    ALT 29 08/15/2018    ALKPHOS 103 08/15/2018    PROT 7 3 09/08/2015    BILITOT 0 66 09/08/2015    EGFR 51 08/15/2018       ROS: As stated in the history of present illness otherwise his 14 point review of systems today was negative        Active Problems:   Patient Active Problem List   Diagnosis    Car[facility]oma of breast ([facility] [address] [facility]    Adverse reaction to pneumococcal vaccine    Anxiety    Cataract, bilateral    Chronic inflammatory disease of uterus    Hyperlipidemia    Pulmonary nodule seen on imaging study    Pes planus    Sleep disorder       Past Medical History:   Past Medical History:   Diagnosis Date    Abnormal weight loss     Allergic reaction     Anxiety     Breast cancer, right (HCC)     Candidal vulvovaginitis     Endometrial hyperplasia     Epithelial cyst     benign, ovary    Hyperlipidemia     Hypertension     Internal hemorrhoids     Knee tendonitis     Ovarian cyst        Surgical History:   Past Surgical History:   Procedure Laterality Date    BILATERAL SALPINGOOPHORECTOMY      onset: 7/23/13    BREAST BIOPSY      CATARACT EXTRACTION W/  INTRAOCULAR LENS IMPLANT Right     phacoemulsification  Onset: 10/27/14    CHOLECYSTECTOMY      INTRAOPERATIVE RADIATION THERAPY ([facility])      SKIN LESION EXCISION Right     breast, single lesion    TONSILLECTOMY AND ADENOIDECTOMY         Family History:    Family History   Problem Relation Age of Onset    Stomach cancer Mother        Cancer-related family history includes Stomach cancer in her mother  Social History:   Social History     Social History    Marital status:      Spouse name: N/A    Number of children: N/A    Years of education: N/A     Occupational History    Not on file  Social History Main Topics    Smoking status: Former Smoker    Smokeless tobacco: Never Used    Alcohol use No      Comment: (history)    Drug use: No    Sexual activity: Not on file     Other Topics Concern    Not on file     Social History Narrative    No narrative on file       Current Medications:   Current Outpatient Prescriptions   Medication Sig Dispense Refill    amitriptyline (ELAVIL) 25 mg tablet Take 1 tablet (25 mg total) by mouth daily at bedtime 90 tablet 1    aspirin 81 MG tablet Take 81 mg by mouth daily        Cholecalciferol (VITAMIN D3) 2000 units capsule Take 2,000 Units by mouth daily        simvastatin (ZOCOR) 10 mg tablet Take 1 tablet (10 mg total) by mouth daily at bedtime 90 tablet 1    Multiple Vitamins-Minerals (ICAPS PO) Take 1 capsule by mouth 2 (two) times a day       No current facility-administered medications for this visit  Allergies: Allergies   Allergen Reactions    Sulfa Antibiotics     Pneumococcal Polysaccharide Vaccine Rash and Edema       Physical Exam:    Body surface area is 1 77 meters squared  Wt Readings from Last 3 Encounters:   09/19/18 78 kg (172 lb)   06/25/18 78 2 kg (172 lb 8 oz)   06/21/18 78 5 kg (173 lb)        Temp Readings from Last 3 Encounters:   09/19/18 (!) 96 6 °F (35 9 °C) (Tympanic)   04/12/18 98 4 °F (36 9 °C) (Tympanic)   07/31/17 98 5 °F (36 9 °C)        BP Readings from Last 3 Encounters:   09/19/18 142/92   06/25/18 140/90   06/21/18 150/94         Pulse Readings from Last 3 Encounters:   09/19/18 (!) 132   06/25/18 98   06/21/18 (!) 109       Physical Exam     Constitutional   General appearance: No acute distress, well appearing and well nourished  Eyes   Conjunctiva and lids: No swelling, erythema or discharge  Pupils and irises: Equal, round and reactive to light  Ears, Nose, Mouth, and Throat   External inspection of ears and nose: Normal     Nasal mucosa, septum, and turbinates: Normal without edema or erythema  Oropharynx: Normal with no erythema, edema, exudate or lesions  Pulmonary   Respiratory effort: No increased work of breathing or signs of respiratory distress  Auscultation of lungs: Clear to auscultation  Cardiovascular   Palpation of heart: Normal PMI, no thrills  Auscultation of heart: Normal rate and rhythm, normal S1 and S2, without murmurs  Examination of extremities for edema and/or varicosities: Normal     Carotid pulses: Normal     Abdomen   Abdomen: Non-tender, no masses  Liver and spleen: No hepatomegaly or splenomegaly  Lymphatic   Palpation of lymph nodes in neck: No lymphadenopathy      Musculoskeletal   Gait and station: Normal     Digits and nails: Normal without clubbing or cyanosis  Inspection/palpation of joints, bones, and muscles: Normal     Skin   Skin and subcutaneous tissue: Normal without rashes or lesions  Neurologic   Cranial nerves: Cranial nerves 2-12 intact  Sensation: No sensory loss  Psychiatric   Orientation to person, place, and time: Normal     Mood and affect: Normal         Assessment / Plan: This is a pleasant 74-year-old female with a past medical history of breast cancer who completed 5 years of adjuvant treatment with aromatase inhibitor for her early stage breast cancer  She finished up her aromatase inhibitor Almost 2 years ago  He is just on yearly observation  She was inquiring if she could see her family doctor once a year and I think this is reasonable  They can check her tumor marker I e  [id number] along with a CBC with differential and CMP  If her liver function tests or any of her other tests become abnormal indicating any sign of recurrence a scan can be done and she can come back to see me  She will see me as needed for now and follow with her primary care physician regularly  Goals and Barriers:  Current Goal:  Prolong Survival from breast cancer  Barriers: None  Patient's Capacity to Self Care:  Patient able to self care  Portions of the record may have been created with voice recognition software   Occasional wrong word or "sound a like" substitutions may have occurred due to the inherent limitations of voice recognition software   Read the chart carefully and recognize, using context, where substitutions have occurred

## 2018-10-15 DIAGNOSIS — F41.9 ANXIETY: ICD-10-CM

## 2018-10-15 RX ORDER — AMITRIPTYLINE HYDROCHLORIDE 25 MG/1
25 TABLET, FILM COATED ORAL
Qty: 90 TABLET | Refills: 1 | Status: SHIPPED | OUTPATIENT
Start: 2018-10-15 | End: 2019-04-15 | Stop reason: SDUPTHER

## 2018-12-23 DIAGNOSIS — E78.5 DYSLIPIDEMIA: ICD-10-CM

## 2018-12-23 RX ORDER — SIMVASTATIN 10 MG
TABLET ORAL
Qty: 90 TABLET | Refills: 1 | Status: CANCELLED | OUTPATIENT
Start: 2018-12-23

## 2018-12-24 DIAGNOSIS — E78.5 DYSLIPIDEMIA: ICD-10-CM

## 2018-12-24 RX ORDER — SIMVASTATIN 10 MG
10 TABLET ORAL
Qty: 90 TABLET | Refills: 1 | Status: SHIPPED | OUTPATIENT
Start: 2018-12-24 | End: 2019-06-24 | Stop reason: SDUPTHER

## 2019-02-19 DIAGNOSIS — E78.2 MIXED HYPERLIPIDEMIA: ICD-10-CM

## 2019-02-19 DIAGNOSIS — Z13.6 SCREENING FOR CARDIOVASCULAR CONDITION: ICD-10-CM

## 2019-02-19 DIAGNOSIS — Z00.00 HEALTHCARE MAINTENANCE: Primary | ICD-10-CM

## 2019-02-19 DIAGNOSIS — F41.9 ANXIETY: ICD-10-CM

## 2019-02-19 DIAGNOSIS — Z13.1 SCREENING FOR DIABETES MELLITUS: ICD-10-CM

## 2019-04-12 ENCOUNTER — APPOINTMENT (OUTPATIENT)
Dept: LAB | Facility: CLINIC | Age: 79
End: 2019-04-12
Payer: MEDICARE

## 2019-04-12 DIAGNOSIS — Z00.00 HEALTHCARE MAINTENANCE: ICD-10-CM

## 2019-04-12 DIAGNOSIS — E78.2 MIXED HYPERLIPIDEMIA: ICD-10-CM

## 2019-04-12 DIAGNOSIS — Z13.6 SCREENING FOR CARDIOVASCULAR CONDITION: ICD-10-CM

## 2019-04-12 DIAGNOSIS — F41.9 ANXIETY: ICD-10-CM

## 2019-04-12 DIAGNOSIS — Z13.1 SCREENING FOR DIABETES MELLITUS: ICD-10-CM

## 2019-04-12 LAB
ALBUMIN SERPL BCP-MCNC: 3.7 G/DL (ref 3.5–5)
ALP SERPL-CCNC: 110 U/L (ref 46–116)
ALT SERPL W P-5'-P-CCNC: 26 U/L (ref 12–78)
ANION GAP SERPL CALCULATED.3IONS-SCNC: 3 MMOL/L (ref 4–13)
AST SERPL W P-5'-P-CCNC: 15 U/L (ref 5–45)
BACTERIA UR QL AUTO: ABNORMAL /HPF
BASOPHILS # BLD AUTO: 0.04 THOUSANDS/ΜL (ref 0–0.1)
BASOPHILS NFR BLD AUTO: 1 % (ref 0–1)
BILIRUB SERPL-MCNC: 0.56 MG/DL (ref 0.2–1)
BILIRUB UR QL STRIP: NEGATIVE
BUN SERPL-MCNC: 16 MG/DL (ref 5–25)
CALCIUM SERPL-MCNC: 8.6 MG/DL (ref 8.3–10.1)
CHLORIDE SERPL-SCNC: 107 MMOL/L (ref 100–108)
CHOLEST SERPL-MCNC: 180 MG/DL (ref 50–200)
CLARITY UR: ABNORMAL
CO2 SERPL-SCNC: 30 MMOL/L (ref 21–32)
COLOR UR: YELLOW
CREAT SERPL-MCNC: 1.11 MG/DL (ref 0.6–1.3)
EOSINOPHIL # BLD AUTO: 0.47 THOUSAND/ΜL (ref 0–0.61)
EOSINOPHIL NFR BLD AUTO: 7 % (ref 0–6)
ERYTHROCYTE [DISTWIDTH] IN BLOOD BY AUTOMATED COUNT: 14.3 % (ref 11.6–15.1)
GFR SERPL CREATININE-BSD FRML MDRD: 47 ML/MIN/1.73SQ M
GLUCOSE P FAST SERPL-MCNC: 115 MG/DL (ref 65–99)
GLUCOSE UR STRIP-MCNC: NEGATIVE MG/DL
HCT VFR BLD AUTO: 48.6 % (ref 34.8–46.1)
HDLC SERPL-MCNC: 46 MG/DL (ref 40–60)
HGB BLD-MCNC: 15.3 G/DL (ref 11.5–15.4)
HGB UR QL STRIP.AUTO: NEGATIVE
HYALINE CASTS #/AREA URNS LPF: ABNORMAL /LPF
IMM GRANULOCYTES # BLD AUTO: 0.01 THOUSAND/UL (ref 0–0.2)
IMM GRANULOCYTES NFR BLD AUTO: 0 % (ref 0–2)
KETONES UR STRIP-MCNC: NEGATIVE MG/DL
LDLC SERPL CALC-MCNC: 106 MG/DL (ref 0–100)
LEUKOCYTE ESTERASE UR QL STRIP: ABNORMAL
LYMPHOCYTES # BLD AUTO: 1.65 THOUSANDS/ΜL (ref 0.6–4.47)
LYMPHOCYTES NFR BLD AUTO: 26 % (ref 14–44)
MCH RBC QN AUTO: 26.9 PG (ref 26.8–34.3)
MCHC RBC AUTO-ENTMCNC: 31.5 G/DL (ref 31.4–37.4)
MCV RBC AUTO: 85 FL (ref 82–98)
MONOCYTES # BLD AUTO: 0.54 THOUSAND/ΜL (ref 0.17–1.22)
MONOCYTES NFR BLD AUTO: 8 % (ref 4–12)
NEUTROPHILS # BLD AUTO: 3.77 THOUSANDS/ΜL (ref 1.85–7.62)
NEUTS SEG NFR BLD AUTO: 58 % (ref 43–75)
NITRITE UR QL STRIP: NEGATIVE
NON-SQ EPI CELLS URNS QL MICRO: ABNORMAL /HPF
NRBC BLD AUTO-RTO: 0 /100 WBCS
PH UR STRIP.AUTO: 6.5 [PH]
PLATELET # BLD AUTO: 222 THOUSANDS/UL (ref 149–390)
PMV BLD AUTO: 11.3 FL (ref 8.9–12.7)
POTASSIUM SERPL-SCNC: 3.6 MMOL/L (ref 3.5–5.3)
PROT SERPL-MCNC: 7.6 G/DL (ref 6.4–8.2)
PROT UR STRIP-MCNC: ABNORMAL MG/DL
RBC # BLD AUTO: 5.69 MILLION/UL (ref 3.81–5.12)
RBC #/AREA URNS AUTO: ABNORMAL /HPF
SODIUM SERPL-SCNC: 140 MMOL/L (ref 136–145)
SP GR UR STRIP.AUTO: 1.02 (ref 1–1.03)
TRIGL SERPL-MCNC: 142 MG/DL
TSH SERPL DL<=0.05 MIU/L-ACNC: 2.99 UIU/ML (ref 0.36–3.74)
UROBILINOGEN UR QL STRIP.AUTO: 0.2 E.U./DL
WBC # BLD AUTO: 6.48 THOUSAND/UL (ref 4.31–10.16)
WBC #/AREA URNS AUTO: ABNORMAL /HPF

## 2019-04-12 PROCEDURE — 81001 URINALYSIS AUTO W/SCOPE: CPT

## 2019-04-12 PROCEDURE — 80061 LIPID PANEL: CPT

## 2019-04-12 PROCEDURE — 85025 COMPLETE CBC W/AUTO DIFF WBC: CPT

## 2019-04-12 PROCEDURE — 80053 COMPREHEN METABOLIC PANEL: CPT

## 2019-04-12 PROCEDURE — 84443 ASSAY THYROID STIM HORMONE: CPT

## 2019-04-12 PROCEDURE — 36415 COLL VENOUS BLD VENIPUNCTURE: CPT

## 2019-04-15 DIAGNOSIS — F41.9 ANXIETY: ICD-10-CM

## 2019-04-15 RX ORDER — AMITRIPTYLINE HYDROCHLORIDE 25 MG/1
25 TABLET, FILM COATED ORAL
Qty: 90 TABLET | Refills: 1 | Status: SHIPPED | OUTPATIENT
Start: 2019-04-15 | End: 2019-10-15 | Stop reason: SDUPTHER

## 2019-04-18 ENCOUNTER — OFFICE VISIT (OUTPATIENT)
Dept: FAMILY MEDICINE CLINIC | Facility: CLINIC | Age: 79
End: 2019-04-18
Payer: MEDICARE

## 2019-04-18 VITALS
WEIGHT: 170.2 LBS | SYSTOLIC BLOOD PRESSURE: 134 MMHG | TEMPERATURE: 99 F | HEIGHT: 62 IN | BODY MASS INDEX: 31.32 KG/M2 | DIASTOLIC BLOOD PRESSURE: 84 MMHG | HEART RATE: 68 BPM

## 2019-04-18 DIAGNOSIS — Z23 ENCOUNTER FOR IMMUNIZATION: ICD-10-CM

## 2019-04-18 DIAGNOSIS — Z12.31 ENCOUNTER FOR SCREENING MAMMOGRAM FOR BREAST CANCER: ICD-10-CM

## 2019-04-18 DIAGNOSIS — Z00.00 MEDICARE ANNUAL WELLNESS VISIT, SUBSEQUENT: Primary | ICD-10-CM

## 2019-04-18 PROBLEM — N18.31 CHRONIC KIDNEY DISEASE (CKD) STAGE G3A/A1, MODERATELY DECREASED GLOMERULAR FILTRATION RATE (GFR) BETWEEN 45-59 ML/MIN/1.73 SQUARE METER AND ALBUMINURIA CREATININE RATIO LESS THAN 30 MG/G (HCC): Status: ACTIVE | Noted: 2019-04-18

## 2019-04-18 PROCEDURE — G0439 PPPS, SUBSEQ VISIT: HCPCS | Performed by: FAMILY MEDICINE

## 2019-06-24 DIAGNOSIS — E78.5 DYSLIPIDEMIA: ICD-10-CM

## 2019-06-24 RX ORDER — SIMVASTATIN 10 MG
10 TABLET ORAL
Qty: 90 TABLET | Refills: 1 | Status: SHIPPED | OUTPATIENT
Start: 2019-06-24 | End: 2019-09-15 | Stop reason: SDUPTHER

## 2019-07-08 ENCOUNTER — HOSPITAL ENCOUNTER (OUTPATIENT)
Dept: MAMMOGRAPHY | Facility: CLINIC | Age: 79
Discharge: HOME | End: 2019-07-08
Payer: MEDICARE

## 2019-07-08 VITALS — BODY MASS INDEX: 31.28 KG/M2 | HEIGHT: 62 IN | WEIGHT: 170 LBS

## 2019-07-08 DIAGNOSIS — Z12.31 ENCOUNTER FOR SCREENING MAMMOGRAM FOR BREAST CANCER: ICD-10-CM

## 2019-07-08 PROCEDURE — 77063 BREAST TOMOSYNTHESIS BI: CPT

## 2019-07-08 PROCEDURE — 77067 SCR MAMMO BI INCL CAD: CPT

## 2019-09-15 DIAGNOSIS — E78.5 DYSLIPIDEMIA: ICD-10-CM

## 2019-09-16 RX ORDER — SIMVASTATIN 10 MG
10 TABLET ORAL
Qty: 90 TABLET | Refills: 1 | Status: SHIPPED | OUTPATIENT
Start: 2019-09-16 | End: 2020-03-16 | Stop reason: SDUPTHER

## 2019-10-15 DIAGNOSIS — F41.9 ANXIETY: ICD-10-CM

## 2019-10-15 RX ORDER — AMITRIPTYLINE HYDROCHLORIDE 25 MG/1
25 TABLET, FILM COATED ORAL
Qty: 90 TABLET | Refills: 1 | Status: SHIPPED | OUTPATIENT
Start: 2019-10-15 | End: 2020-04-07

## 2019-10-21 ENCOUNTER — OFFICE VISIT (OUTPATIENT)
Dept: FAMILY MEDICINE CLINIC | Facility: CLINIC | Age: 79
End: 2019-10-21
Payer: MEDICARE

## 2019-10-21 VITALS
TEMPERATURE: 98.1 F | HEART RATE: 80 BPM | HEIGHT: 62 IN | WEIGHT: 173 LBS | BODY MASS INDEX: 31.83 KG/M2 | DIASTOLIC BLOOD PRESSURE: 90 MMHG | SYSTOLIC BLOOD PRESSURE: 140 MMHG | RESPIRATION RATE: 18 BRPM

## 2019-10-21 DIAGNOSIS — I10 ESSENTIAL HYPERTENSION: Primary | ICD-10-CM

## 2019-10-21 DIAGNOSIS — N18.31 CHRONIC KIDNEY DISEASE (CKD) STAGE G3A/A1, MODERATELY DECREASED GLOMERULAR FILTRATION RATE (GFR) BETWEEN 45-59 ML/MIN/1.73 SQUARE METER AND ALBUMINURIA CREATININE RATIO LESS THAN 30 MG/G (HCC): ICD-10-CM

## 2019-10-21 DIAGNOSIS — Z23 NEED FOR VACCINATION: ICD-10-CM

## 2019-10-21 DIAGNOSIS — E78.2 MIXED HYPERLIPIDEMIA: ICD-10-CM

## 2019-10-21 DIAGNOSIS — F41.9 ANXIETY: ICD-10-CM

## 2019-10-21 DIAGNOSIS — C50.919 CARCINOMA OF FEMALE BREAST, UNSPECIFIED ESTROGEN RECEPTOR STATUS, UNSPECIFIED LATERALITY, UNSPECIFIED SITE OF BREAST (HCC): ICD-10-CM

## 2019-10-21 PROCEDURE — G0008 ADMIN INFLUENZA VIRUS VAC: HCPCS | Performed by: FAMILY MEDICINE

## 2019-10-21 PROCEDURE — 99214 OFFICE O/P EST MOD 30 MIN: CPT | Performed by: FAMILY MEDICINE

## 2019-10-21 PROCEDURE — 90662 IIV NO PRSV INCREASED AG IM: CPT | Performed by: FAMILY MEDICINE

## 2019-10-21 RX ORDER — LISINOPRIL 10 MG/1
10 TABLET ORAL DAILY
Qty: 30 TABLET | Refills: 5 | Status: SHIPPED | OUTPATIENT
Start: 2019-10-21 | End: 2020-04-07

## 2019-10-21 NOTE — PROGRESS NOTES
Assessment/Plan:  Essential hypertension  The patient's blood pressure was elevated today in the office again  We are going to start her on the lisinopril as ordered to treat her hypertension  She will go for the lab work as ordered and will follow up with the results  We will see her back as scheduled  Chronic kidney disease (CKD) stage G3a/A1, moderately decreased glomerular filtration rate (GFR) between 45-59 mL/min/1 73 square meter and albuminuria creatinine ratio less than 30 mg/g (HCC)  We will check the up-to-date CBC and CMP as ordered  The patient will continue to avoid nephrotoxic agents  She will continue to limit the salt in her diet and eat potassium rich foods  We will see her back in the office as scheduled  Hyperlipidemia  The patient will continue with the simvastatin 10 mg daily as ordered  She will continue to work on improving her diet and exercise  Carcinoma of breast [facility])  The patient will go for the tumor markers as ordered  She is stable and will follow up with Oncology as needed  Anxiety  The patient is stable on her current medication  We have made no changes  Diagnoses and all orders for this visit:    Essential hypertension  -     lisinopril (ZESTRIL) 10 mg tablet; Take 1 tablet (10 mg total) by mouth daily  -     CBC and differential; Future  -     Comprehensive metabolic panel; Future    Chronic kidney disease (CKD) stage G3a/A1, moderately decreased glomerular filtration rate (GFR) between 45-59 mL/min/1 73 square meter and albuminuria creatinine ratio less than 30 mg/g (HCC)  -     CBC and differential; Future  -     Comprehensive metabolic panel; Future    Mixed hyperlipidemia    Carcinoma of female breast, unspecified estrogen receptor status, unspecified laterality, unspecified site of breast ([facility] [address] )  -     CBC and differential; Future  -     Comprehensive metabolic panel; Future  -     Cancer antigen 27 29;  Future    Anxiety    Need for vaccination  - influenza vaccine, [id number], high-dose, PF 0 5 mL (FLUZONE HIGH-DOSE)       Return in about 1 month (around 11/21/2019) for Recheck  Subjective:   Chief Complaint   Patient presents with    Follow-up     6 month BP checkup        Patient ID: Monica Reece is a 77 y o  female presents today for a routine checkup  Monica Reece is a 77 y o  female presents today for routine follow-up of her anxiety, hyperlipidemia, chronic kidney disease, history of breast cancer  The patient denies any chest pain, shortness of breath, or palpitations  There is no edema  There are no headaches or visual changes  There is no lightheadedness, dizziness, or fainting spells  The patient currently denies any nausea, vomiting, or GERD symptoms  she has normal bowel movements and normal urine output  she has a normal appetite  The amitriptyline is working well  She has been checking her BP at home and it has been in the 140-150/81          The following portions of the patient's history were reviewed and updated as appropriate: allergies, current medications, past family history, past medical history, past social history, past surgical history and problem list   Patient Active Problem List   Diagnosis    Carcinoma [facility] breast ([facility] [address] )    Adverse reaction to pneumococcal vaccine    Anxiety    Cataract, bilateral    Chronic inflammatory disease of uterus    Hyperlipidemia    Pulmonary nodule seen on imaging study    Pes planus    Sleep disorder    Chronic kidney disease (CKD) stage G3a/A1, moderately decreased glomerular filtration rate (GFR) between 45-59 mL/min/1 73 square meter and albuminuria creatinine ratio less than 30 mg/g ([address] )    Essential hypertension     Past Medical History:   Diagnosis Date    Abnormal weight loss     Allergic reaction     Anxiety     Breast cancer, right ([address] ) 2011    right    Candidal vulvovaginitis     Endometrial hyperplasia     Epithelial cyst     benign, ovary    History of radiation therapy 2011    right breast cancer    Hyperlipidemia     Hypertension     Internal hemorrhoids     Knee tendonitis     Ovarian cyst      Past Surgical History:   Procedure Laterality Date    BILATERAL SALPINGOOPHORECTOMY      onset: 7/23/13    BREAST BIOPSY Right 06/13/2006    benign    BREAST BIOPSY Right 03/02/2011    malignant    BREAST LUMPECTOMY Right 03/30/2011    malignant    CATARACT EXTRACTION W/  INTRAOCULAR LENS IMPLANT Right     phacoemulsification   Onset: 10/27/14    CHOLECYSTECTOMY      INTRAOPERATIVE RADIATION THERAPY (IORT)      SKIN LESION EXCISION Right     breast, single lesion    TONSILLECTOMY AND ADENOIDECTOMY       Allergies   Allergen Reactions    Sulfa Antibiotics     Pneumococcal Polysaccharide Vaccine Rash and Edema     Family History   Problem Relation Age of Onset    Stomach cancer Mother      Social History     Socioeconomic History    Marital status:      Spouse name: Not on file    Number of children: Not on file    Years of education: Not on file    Highest education level: Not on file   Occupational History    Not on file   Social Needs    Financial resource strain: Not on file    Food insecurity:     Worry: Not on file     Inability: Not on file    Transportation needs:     Medical: Not on file     Non-medical: Not on file   Tobacco Use    Smoking status: Former Smoker    Smokeless tobacco: Never Used    Tobacco comment: 4/18/2019-quit more than 25 years ago   Substance and Sexual Activity    Alcohol use: No     Comment: (history)    Drug use: No    Sexual activity: Not on file   Lifestyle    Physical activity:     Days per week: Not on file     Minutes per session: Not on file    Stress: Not on file   Relationships    Social connections:     Talks on phone: Not on file     Gets together: Not on file     Attends Buddhist service: Not on file     Active member of club or organization: Not on file     Attends meetings of clubs or organizations: Not on file     Relationship status: Not on file    Intimate partner violence:     Fear of current or ex partner: Not on file     Emotionally abused: Not on file     Physically abused: Not on file     Forced sexual activity: Not on file   Other Topics Concern    Not on file   Social History Narrative    Not on file     Current Outpatient Medications on File Prior to Visit   Medication Sig Dispense Refill    amitriptyline (ELAVIL) 25 mg tablet Take 1 tablet (25 mg total) by mouth daily at bedtime 90 tablet 1    Cholecalciferol (VITAMIN D3) 2000 units capsule Take 2,000 Units by mouth daily        Multiple Vitamins-Minerals (ICAPS PO) Take 1 capsule by mouth 2 (two) times a day      simvastatin (ZOCOR) 10 mg tablet Take 1 tablet (10 mg total) by mouth daily at bedtime 90 tablet 1     No current facility-administered medications on file prior to visit  Review of Systems   Constitutional: Negative  HENT: Negative  Eyes: Negative  Respiratory: Negative  Cardiovascular: Negative  Gastrointestinal: Negative  Endocrine: Negative  Genitourinary: Negative  Musculoskeletal: Negative  Skin: Negative  Allergic/Immunologic: Negative  Neurological: Negative  Hematological: Negative  Psychiatric/Behavioral: Negative  Objective:  Vitals:    10/21/19 1138   BP: 140/90   BP Location: Left arm   Patient Position: Sitting   Cuff Size: Standard   Pulse: 80   Resp: 18   Temp: 98 1 °F (36 7 °C)   TempSrc: Tympanic   Weight: 78 5 kg (173 lb)   Height: 5' 2" (1 575 m)     Body mass index is 31 64 kg/m²  Physical Exam   Constitutional: She is oriented to person, place, and time  She appears well-developed and well-nourished  HENT:   Head: Normocephalic and atraumatic  Mouth/Throat: No oropharyngeal exudate  Eyes: Pupils are equal, round, and reactive to light  Conjunctivae and EOM are normal    Neck: Normal range of motion  No JVD present   No tracheal deviation present  No thyromegaly present  Cardiovascular: Normal rate, regular rhythm, normal heart sounds and intact distal pulses  Exam reveals no gallop and no friction rub  No murmur heard  Pulmonary/Chest: Effort normal and breath sounds normal  No stridor  No respiratory distress  She has no wheezes  She has no rales  She exhibits no tenderness  Abdominal: Soft  Bowel sounds are normal  She exhibits no distension and no mass  There is no tenderness  There is no rebound and no guarding  Musculoskeletal: Normal range of motion  She exhibits no edema, tenderness or deformity  Lymphadenopathy:     She has no cervical adenopathy  Neurological: She is alert and oriented to person, place, and time  She has normal reflexes  She displays normal reflexes  No cranial nerve deficit  She exhibits normal muscle tone  Coordination normal    Skin: Skin is warm and dry  No visits with results within 2 Month(s) from this visit     Latest known visit with results is:   Appointment on 04/12/2019   Component Date Value    Sodium 04/12/2019 140     Potassium 04/12/2019 3 6     Chloride 04/12/2019 107     CO2 04/12/2019 30     ANION GAP 04/12/2019 3*    BUN 04/12/2019 16     Creatinine 04/12/2019 1 11     Glucose, Fasting 04/12/2019 115*    Calcium 04/12/2019 8 6     AST 04/12/2019 15     ALT 04/12/2019 26     Alkaline Phosphatase 04/12/2019 110     Total Protein 04/12/2019 7 6     Albumin 04/12/2019 3 7     Total Bilirubin 04/12/2019 0 56     eGFR 04/12/2019 47     WBC 04/12/2019 6 48     RBC 04/12/2019 5 69*    Hemoglobin 04/12/2019 15 3     Hematocrit 04/12/2019 48 6*    MCV 04/12/2019 85     MCH 04/12/2019 26 9     MCHC 04/12/2019 31 5     RDW 04/12/2019 14 3     MPV 04/12/2019 11 3     Platelets 18/03/1008 222     nRBC 04/12/2019 0     Neutrophils Relative 04/12/2019 58     Immat GRANS % 04/12/2019 0     Lymphocytes Relative 04/12/2019 26     Monocytes Relative 04/12/2019 8     Eosinophils Relative 04/12/2019 7*    Basophils Relative 04/12/2019 1     Neutrophils Absolute 04/12/2019 3 77     Immature Grans Absolute 04/12/2019 0 01     Lymphocytes Absolute 04/12/2019 1 65     Monocytes Absolute 04/12/2019 0 54     Eosinophils Absolute 04/12/2019 0 47     Basophils Absolute 04/12/2019 0 04     Cholesterol 04/12/2019 180     Triglycerides 04/12/2019 142     HDL, Direct 04/12/2019 46     LDL Calculated 04/12/2019 106*    TSH 3RD GENERATON 04/12/2019 2 990     Color, UA 04/12/2019 Yellow     Clarity, UA 04/12/2019 Cloudy     Specific Gravity, UA 04/12/2019 1 022     pH, UA 04/12/2019 6 5     Leukocytes, UA 04/12/2019 Moderate*    Nitrite, UA 04/12/2019 Negative     Protein, UA 04/12/2019 Trace*    Glucose, UA 04/12/2019 Negative     Ketones, UA 04/12/2019 Negative     Urobilinogen, UA 04/12/2019 0 2     Bilirubin, UA 04/12/2019 Negative     Blood, UA 04/12/2019 Negative     RBC, UA 04/12/2019 None Seen     WBC, UA 04/12/2019 10-20*    Epithelial Cells 04/12/2019 Moderate*    Bacteria, UA 04/12/2019 None Seen     Hyaline Casts, UA 04/12/2019 5-10*

## 2019-10-22 PROBLEM — I10 ESSENTIAL HYPERTENSION: Status: ACTIVE | Noted: 2019-10-22

## 2019-10-22 NOTE — ASSESSMENT & PLAN NOTE
The patient will continue with the simvastatin 10 mg daily as ordered  She will continue to work on improving her diet and exercise

## 2019-10-22 NOTE — ASSESSMENT & PLAN NOTE
We will check the up-to-date CBC and CMP as ordered  The patient will continue to avoid nephrotoxic agents  She will continue to limit the salt in her diet and eat potassium rich foods  We will see her back in the office as scheduled

## 2019-10-22 NOTE — ASSESSMENT & PLAN NOTE
The patient's blood pressure was elevated today in the office again  We are going to start her on the lisinopril as ordered to treat her hypertension  She will go for the lab work as ordered and will follow up with the results  We will see her back as scheduled

## 2019-10-22 NOTE — ASSESSMENT & PLAN NOTE
The patient will go for the tumor markers as ordered  She is stable and will follow up with Oncology as needed

## 2019-10-25 ENCOUNTER — APPOINTMENT (OUTPATIENT)
Dept: LAB | Facility: CLINIC | Age: 79
End: 2019-10-25
Payer: MEDICARE

## 2019-10-25 DIAGNOSIS — N18.31 CHRONIC KIDNEY DISEASE (CKD) STAGE G3A/A1, MODERATELY DECREASED GLOMERULAR FILTRATION RATE (GFR) BETWEEN 45-59 ML/MIN/1.73 SQUARE METER AND ALBUMINURIA CREATININE RATIO LESS THAN 30 MG/G (HCC): ICD-10-CM

## 2019-10-25 DIAGNOSIS — C50.919 CARCINOMA OF FEMALE BREAST, UNSPECIFIED ESTROGEN RECEPTOR STATUS, UNSPECIFIED LATERALITY, UNSPECIFIED SITE OF BREAST (HCC): ICD-10-CM

## 2019-10-25 DIAGNOSIS — I10 ESSENTIAL HYPERTENSION: ICD-10-CM

## 2019-10-25 LAB
ALBUMIN SERPL BCP-MCNC: 3.6 G/DL (ref 3.5–5)
ALP SERPL-CCNC: 100 U/L (ref 46–116)
ALT SERPL W P-5'-P-CCNC: 25 U/L (ref 12–78)
ANION GAP SERPL CALCULATED.3IONS-SCNC: 7 MMOL/L (ref 4–13)
AST SERPL W P-5'-P-CCNC: 16 U/L (ref 5–45)
BASOPHILS # BLD AUTO: 0.04 THOUSANDS/ΜL (ref 0–0.1)
BASOPHILS NFR BLD AUTO: 1 % (ref 0–1)
BILIRUB SERPL-MCNC: 0.59 MG/DL (ref 0.2–1)
BUN SERPL-MCNC: 17 MG/DL (ref 5–25)
CALCIUM SERPL-MCNC: 8.9 MG/DL (ref 8.3–10.1)
CHLORIDE SERPL-SCNC: 110 MMOL/L (ref 100–108)
CO2 SERPL-SCNC: 26 MMOL/L (ref 21–32)
CREAT SERPL-MCNC: 1.06 MG/DL (ref 0.6–1.3)
EOSINOPHIL # BLD AUTO: 0.36 THOUSAND/ΜL (ref 0–0.61)
EOSINOPHIL NFR BLD AUTO: 6 % (ref 0–6)
ERYTHROCYTE [DISTWIDTH] IN BLOOD BY AUTOMATED COUNT: 14.2 % (ref 11.6–15.1)
GFR SERPL CREATININE-BSD FRML MDRD: 50 ML/MIN/1.73SQ M
GLUCOSE SERPL-MCNC: 120 MG/DL (ref 65–140)
HCT VFR BLD AUTO: 46.9 % (ref 34.8–46.1)
HGB BLD-MCNC: 14.9 G/DL (ref 11.5–15.4)
IMM GRANULOCYTES # BLD AUTO: 0.01 THOUSAND/UL (ref 0–0.2)
IMM GRANULOCYTES NFR BLD AUTO: 0 % (ref 0–2)
LYMPHOCYTES # BLD AUTO: 1.25 THOUSANDS/ΜL (ref 0.6–4.47)
LYMPHOCYTES NFR BLD AUTO: 20 % (ref 14–44)
MCH RBC QN AUTO: 27 PG (ref 26.8–34.3)
MCHC RBC AUTO-ENTMCNC: 31.8 G/DL (ref 31.4–37.4)
MCV RBC AUTO: 85 FL (ref 82–98)
MONOCYTES # BLD AUTO: 0.54 THOUSAND/ΜL (ref 0.17–1.22)
MONOCYTES NFR BLD AUTO: 9 % (ref 4–12)
NEUTROPHILS # BLD AUTO: 3.97 THOUSANDS/ΜL (ref 1.85–7.62)
NEUTS SEG NFR BLD AUTO: 64 % (ref 43–75)
NRBC BLD AUTO-RTO: 0 /100 WBCS
PLATELET # BLD AUTO: 208 THOUSANDS/UL (ref 149–390)
PMV BLD AUTO: 11.6 FL (ref 8.9–12.7)
POTASSIUM SERPL-SCNC: 3.8 MMOL/L (ref 3.5–5.3)
PROT SERPL-MCNC: 7.3 G/DL (ref 6.4–8.2)
RBC # BLD AUTO: 5.51 MILLION/UL (ref 3.81–5.12)
SODIUM SERPL-SCNC: 143 MMOL/L (ref 136–145)
WBC # BLD AUTO: 6.17 THOUSAND/UL (ref 4.31–10.16)

## 2019-10-25 PROCEDURE — 85025 COMPLETE CBC W/AUTO DIFF WBC: CPT

## 2019-10-25 PROCEDURE — 36415 COLL VENOUS BLD VENIPUNCTURE: CPT

## 2019-10-25 PROCEDURE — 80053 COMPREHEN METABOLIC PANEL: CPT

## 2019-10-25 PROCEDURE — 86300 IMMUNOASSAY TUMOR CA 15-3: CPT

## 2019-10-26 LAB — CANCER AG27-29 SERPL-ACNC: 29.3 U/ML (ref 0–42.3)

## 2019-10-28 ENCOUNTER — TELEPHONE (OUTPATIENT)
Dept: FAMILY MEDICINE CLINIC | Facility: CLINIC | Age: 79
End: 2019-10-28

## 2019-10-28 NOTE — TELEPHONE ENCOUNTER
Patient would like lab results from last week  She saw them in [place] and doesn't understand them   You can leave a message please call @ [phone]

## 2019-10-29 NOTE — TELEPHONE ENCOUNTER
I did call the patient on 10/29/2019 and advised her that all of her results looked very good  She should continue with her current medications  She should continue with her healthy diet and exercise  We will see her back as scheduled

## 2019-12-05 ENCOUNTER — OFFICE VISIT (OUTPATIENT)
Dept: FAMILY MEDICINE CLINIC | Facility: CLINIC | Age: 79
End: 2019-12-05
Payer: MEDICARE

## 2019-12-05 VITALS
SYSTOLIC BLOOD PRESSURE: 132 MMHG | DIASTOLIC BLOOD PRESSURE: 70 MMHG | TEMPERATURE: 98.9 F | HEART RATE: 68 BPM | WEIGHT: 171 LBS | OXYGEN SATURATION: 98 % | BODY MASS INDEX: 31.47 KG/M2 | HEIGHT: 62 IN

## 2019-12-05 DIAGNOSIS — I10 ESSENTIAL HYPERTENSION: Primary | ICD-10-CM

## 2019-12-05 DIAGNOSIS — N18.31 CHRONIC KIDNEY DISEASE (CKD) STAGE G3A/A1, MODERATELY DECREASED GLOMERULAR FILTRATION RATE (GFR) BETWEEN 45-59 ML/MIN/1.73 SQUARE METER AND ALBUMINURIA CREATININE RATIO LESS THAN 30 MG/G (HCC): ICD-10-CM

## 2019-12-05 DIAGNOSIS — E78.2 MIXED HYPERLIPIDEMIA: ICD-10-CM

## 2019-12-05 PROCEDURE — 99213 OFFICE O/P EST LOW 20 MIN: CPT | Performed by: FAMILY MEDICINE

## 2019-12-05 NOTE — PROGRESS NOTES
Assessment/Plan:  Essential hypertension  The patient's blood pressure is well controlled with her current medication  We will maintain her on this current dose  She will continue to maintain her healthy diet and exercise  We will see her back as scheduled  Chronic kidney disease (CKD) stage G3a/A1, moderately decreased glomerular filtration rate (GFR) between 45-59 mL/min/1 73 square meter and albuminuria creatinine ratio less than 30 mg/g (HCC)  The patient's kidney functions currently stable  She will go for the lab work prior to her upcoming adult wellness visit  She will continue to maintain a healthy diet and will avoid nephrtoxic agents  We will see her back as scheduled  Diagnoses and all orders for this visit:    Essential hypertension  -     CBC and differential; Future  -     Comprehensive metabolic panel; Future  -     LDL cholesterol, direct; Future  -     Lipid panel; Future  -     TSH, 3rd generation with Free T4 reflex; Future  -     UA (URINE) with reflex to Scope; Future    Chronic kidney disease (CKD) stage G3a/A1, moderately decreased glomerular filtration rate (GFR) between 45-59 mL/min/1 73 square meter and albuminuria creatinine ratio less than 30 mg/g (HCC)  -     CBC and differential; Future  -     Comprehensive metabolic panel; Future  -     LDL cholesterol, direct; Future  -     Lipid panel; Future  -     TSH, 3rd generation with Free T4 reflex; Future  -     UA (URINE) with reflex to Scope; Future    Mixed hyperlipidemia  -     Comprehensive metabolic panel; Future  -     LDL cholesterol, direct; Future  -     Lipid panel; Future       Return in about 5 months (around 4/20/2020) for Annual physical-AWV  Subjective:   Chief Complaint   Patient presents with    Follow-up     Check up 1 month Hypertension        Patient ID: Casey Fraser is a 77 y o  female presents today for routine checkup    Casey Fraser is a 77 y o  female who presents today for follow-up of her hypertension and chronic kidney disease  She is watching her diet and lost 4 lbs  She is doing well on the medication  Sh has had a hard time using her BP cuff  The patient denies any chest pain, shortness of breath, or palpitations  There is no edema  There are no headaches or visual changes  There is no lightheadedness, dizziness, or fainting spells  The patient currently denies any nausea, vomiting, or GERD symptoms  she has normal bowel movements and normal urine output  she has a normal appetite  He measured her BP with her home cuff and got 130/78,  106 HR    Hypertension   This is a chronic problem  The current episode started more than 1 month ago  The problem is unchanged  The problem is controlled  Pertinent negatives include no anxiety, blurred vision, chest pain, headaches, malaise/fatigue, neck pain, orthopnea, palpitations, peripheral edema, PND, shortness of breath or sweats  The current treatment provides significant improvement       The following portions of the patient's history were reviewed and updated as appropriate: allergies, current medications, past family history, past medical history, past social history, past surgical history and problem list   Patient Active Problem List   Diagnosis    [facility]    Adverse reaction to pneumococcal vaccine    Anxiety    Cataract, bilateral    Chronic inflammatory disease of uterus    Hyperlipidemia    Pulmonary nodule seen on imaging study    Pes planus    Sleep disorder    Chronic kidney disease (CKD) stage G3a/A1, moderately decreased glomerular filtration rate (GFR) between 45-59 mL/min/1 73 square meter and albuminuria creatinine ratio less than 30 mg/g ([facility]    Essential hypertension     Past Medical History:   Diagnosis Date    Abnormal weight loss     Allergic reaction     Anxiety     [facility]    right    Candidal vulvovaginitis     Endometrial hyperplasia     Epithelial cyst benign, ovary    History of radiation therapy 2011    right breast cancer    Hyperlipidemia     Hypertension     Internal hemorrhoids     Knee tendonitis     Ovarian cyst      Past Surgical History:   Procedure Laterality Date    BILATERAL SALPINGOOPHORECTOMY      onset: 7/23/13    BREAST BIOPSY Right 06/13/2006    benign    BREAST BIOPSY Right 03/02/2011    malignant    BREAST LUMPECTOMY Right 03/30/2011    malignant    CATARACT EXTRACTION W/  INTRAOCULAR LENS IMPLANT Right     phacoemulsification   Onset: 10/27/14    CHOLECYSTECTOMY      INTRAOPERATIVE RADIATION THERAPY (IORT)      SKIN LESION EXCISION Right     breast, single lesion    TONSILLECTOMY AND ADENOIDECTOMY       Allergies   Allergen Reactions    Sulfa Antibiotics     Pneumococcal Polysaccharide Vaccine Rash and Edema     Family History   Problem Relation Age of Onset    Stomach cancer Mother      Social History     Socioeconomic History    Marital status:      Spouse name: Not on file    Number of children: Not on file    Years of education: Not on file    Highest education level: Not on file   Occupational History    Not on file   Social Needs    Financial resource strain: Not on file    Food insecurity:     Worry: Not on file     Inability: Not on file    Transportation needs:     Medical: Not on file     Non-medical: Not on file   Tobacco Use    Smoking status: Former Smoker    Smokeless tobacco: Never Used    Tobacco comment: 4/18/2019-quit more than 25 years ago   Substance and Sexual Activity    Alcohol use: No     Comment: (history)    Drug use: No    Sexual activity: Not on file   Lifestyle    Physical activity:     Days per week: Not on file     Minutes per session: Not on file    Stress: Not on file   Relationships    Social connections:     Talks on phone: Not on file     Gets together: Not on file     Attends Yazidism service: Not on file     Active member of club or organization: Not on file Attends meetings of clubs or organizations: Not on file     Relationship status: Not on file    Intimate partner violence:     Fear of current or ex partner: Not on file     Emotionally abused: Not on file     Physically abused: Not on file     Forced sexual activity: Not on file   Other Topics Concern    Not on file   Social History Narrative    Not on file     Current Outpatient Medications on File Prior to Visit   Medication Sig Dispense Refill    amitriptyline (ELAVIL) 25 mg tablet Take 1 tablet (25 mg total) by mouth daily at bedtime 90 tablet 1    Cholecalciferol (VITAMIN D3) 2000 units capsule Take 2,000 Units by mouth daily        lisinopril (ZESTRIL) 10 mg tablet Take 1 tablet (10 mg total) by mouth daily 30 tablet 5    Multiple Vitamins-Minerals (ICAPS PO) Take 1 capsule by mouth 2 (two) times a day      simvastatin (ZOCOR) 10 mg tablet Take 1 tablet (10 mg total) by mouth daily at bedtime 90 tablet 1     No current facility-administered medications on file prior to visit  Review of Systems   Constitutional: Negative  Negative for malaise/fatigue  HENT: Negative  Eyes: Negative  Negative for blurred vision  Respiratory: Negative  Negative for shortness of breath  Cardiovascular: Negative  Negative for chest pain, palpitations, orthopnea and PND  Gastrointestinal: Negative  Endocrine: Negative  Genitourinary: Negative  Musculoskeletal: Negative  Negative for neck pain  Skin: Negative  Allergic/Immunologic: Negative  Neurological: Negative  Negative for headaches  Hematological: Negative  Psychiatric/Behavioral: Negative  Objective:  Vitals:    12/05/19 1333 12/05/19 1410   BP: 140/70 132/70   BP Location: Left arm    Patient Position: Sitting    Cuff Size: Large    Pulse: (!) 118 68   Temp: 98 9 °F (37 2 °C)    TempSrc: Tympanic    SpO2: 98%    Weight: 77 6 kg (171 lb)    Height: 5' 2" (1 575 m)      Body mass index is 31 28 kg/m²       Physical Exam   Constitutional: She is oriented to person, place, and time  She appears well-developed and well-nourished  HENT:   Head: Normocephalic and atraumatic  Mouth/Throat: No oropharyngeal exudate  Eyes: Pupils are equal, round, and reactive to light  Conjunctivae and EOM are normal    Neck: Normal range of motion  No JVD present  No tracheal deviation present  No thyromegaly present  Cardiovascular: Normal rate, regular rhythm, normal heart sounds and intact distal pulses  Exam reveals no gallop and no friction rub  No murmur heard  Pulmonary/Chest: Effort normal and breath sounds normal  No stridor  No respiratory distress  She has no wheezes  She has no rales  She exhibits no tenderness  Abdominal: Soft  Bowel sounds are normal  She exhibits no distension and no mass  There is no tenderness  There is no rebound and no guarding  Musculoskeletal: Normal range of motion  She exhibits no edema, tenderness or deformity  Lymphadenopathy:     She has no cervical adenopathy  Neurological: She is alert and oriented to person, place, and time  She has normal reflexes  She displays normal reflexes  No cranial nerve deficit  She exhibits normal muscle tone  Coordination normal    Skin: Skin is warm and dry         Appointment on 10/25/2019   Component Date Value    WBC 10/25/2019 6 17     RBC 10/25/2019 5 51*    Hemoglobin 10/25/2019 14 9     Hematocrit 10/25/2019 46 9*    MCV 10/25/2019 85     [address] 10/25/2019 27 0     MCHC 10/25/2019 31 8     RDW 10/25/2019 14 2     MPV 10/25/2019 11 6     Platelets 21/70/6715 208     nRBC 10/25/2019 0     Neutrophils Relative 10/25/2019 64     Immat GRANS % 10/25/2019 0     Lymphocytes Relative 10/25/2019 20     Monocytes Relative 10/25/2019 9     Eosinophils Relative 10/25/2019 6     Basophils Relative 10/25/2019 1     Neutrophils Absolute 10/25/2019 3 97     Immature Grans Absolute 10/25/2019 0 01     Lymphocytes Absolute 10/25/2019 1 25     Monocytes Absolute 10/25/2019 0 54     Eosinophils Absolute 10/25/2019 0 36     Basophils Absolute 10/25/2019 0 04     Sodium 10/25/2019 143     Potassium 10/25/2019 3 8     Chloride 10/25/2019 110*    CO2 10/25/2019 26     ANION GAP 10/25/2019 7     BUN 10/25/2019 17     Creatinine 10/25/2019 1 06     Glucose 10/25/2019 120     Calcium 10/25/2019 8 9     AST 10/25/2019 16     ALT 10/25/2019 25     Alkaline Phosphatase 10/25/2019 100     Total Protein 10/25/2019 7 3     Albumin 10/25/2019 3 6     Total Bilirubin 10/25/2019 0 59     eGFR 10/25/2019 50     CA 27 29 10/25/2019 29 3

## 2019-12-06 NOTE — ASSESSMENT & PLAN NOTE
The patient's blood pressure is well controlled with her current medication  We will maintain her on this current dose  She will continue to maintain her healthy diet and exercise  We will see her back as scheduled

## 2019-12-06 NOTE — ASSESSMENT & PLAN NOTE
The patient's kidney functions currently stable  She will go for the lab work prior to her upcoming adult wellness visit  She will continue to maintain a healthy diet and will avoid nephrtoxic agents  We will see her back as scheduled

## 2020-01-12 DIAGNOSIS — F41.9 ANXIETY: ICD-10-CM

## 2020-01-13 RX ORDER — AMITRIPTYLINE HYDROCHLORIDE 25 MG/1
TABLET, FILM COATED ORAL
Qty: 90 TABLET | Refills: 0 | OUTPATIENT
Start: 2020-01-13

## 2020-03-16 DIAGNOSIS — E78.5 DYSLIPIDEMIA: ICD-10-CM

## 2020-03-16 RX ORDER — SIMVASTATIN 10 MG
10 TABLET ORAL
Qty: 90 TABLET | Refills: 1 | Status: SHIPPED | OUTPATIENT
Start: 2020-03-16 | End: 2020-09-08 | Stop reason: SDUPTHER

## 2020-04-07 DIAGNOSIS — I10 ESSENTIAL HYPERTENSION: ICD-10-CM

## 2020-04-07 DIAGNOSIS — F41.9 ANXIETY: ICD-10-CM

## 2020-04-07 RX ORDER — AMITRIPTYLINE HYDROCHLORIDE 25 MG/1
TABLET, FILM COATED ORAL
Qty: 90 TABLET | Refills: 1 | Status: SHIPPED | OUTPATIENT
Start: 2020-04-07 | End: 2020-10-05 | Stop reason: SDUPTHER

## 2020-04-07 RX ORDER — LISINOPRIL 10 MG/1
TABLET ORAL
Qty: 90 TABLET | Refills: 1 | Status: SHIPPED | OUTPATIENT
Start: 2020-04-07 | End: 2020-10-05 | Stop reason: SDUPTHER

## 2020-05-07 ENCOUNTER — TELEMEDICINE (OUTPATIENT)
Dept: FAMILY MEDICINE CLINIC | Facility: CLINIC | Age: 80
End: 2020-05-07
Payer: MEDICARE

## 2020-05-07 VITALS
BODY MASS INDEX: 31.58 KG/M2 | TEMPERATURE: 97.9 F | HEART RATE: 87 BPM | SYSTOLIC BLOOD PRESSURE: 125 MMHG | HEIGHT: 62 IN | WEIGHT: 171.6 LBS | DIASTOLIC BLOOD PRESSURE: 83 MMHG

## 2020-05-07 DIAGNOSIS — Z12.31 ENCOUNTER FOR SCREENING MAMMOGRAM FOR BREAST CANCER: ICD-10-CM

## 2020-05-07 DIAGNOSIS — Z13.820 SCREENING FOR OSTEOPOROSIS: ICD-10-CM

## 2020-05-07 DIAGNOSIS — Z78.0 POSTMENOPAUSAL: ICD-10-CM

## 2020-05-07 DIAGNOSIS — Z00.00 MEDICARE ANNUAL WELLNESS VISIT, SUBSEQUENT: Primary | ICD-10-CM

## 2020-05-07 PROCEDURE — 1170F FXNL STATUS ASSESSED: CPT | Performed by: FAMILY MEDICINE

## 2020-05-07 PROCEDURE — 3079F DIAST BP 80-89 MM HG: CPT | Performed by: FAMILY MEDICINE

## 2020-05-07 PROCEDURE — 1123F ACP DISCUSS/DSCN MKR DOCD: CPT | Performed by: FAMILY MEDICINE

## 2020-05-07 PROCEDURE — 4040F PNEUMOC VAC/ADMIN/RCVD: CPT | Performed by: FAMILY MEDICINE

## 2020-05-07 PROCEDURE — 1036F TOBACCO NON-USER: CPT | Performed by: FAMILY MEDICINE

## 2020-05-07 PROCEDURE — G0439 PPPS, SUBSEQ VISIT: HCPCS | Performed by: FAMILY MEDICINE

## 2020-05-07 PROCEDURE — 1160F RVW MEDS BY RX/DR IN RCRD: CPT | Performed by: FAMILY MEDICINE

## 2020-05-07 PROCEDURE — 3008F BODY MASS INDEX DOCD: CPT | Performed by: FAMILY MEDICINE

## 2020-05-07 PROCEDURE — 3074F SYST BP LT 130 MM HG: CPT | Performed by: FAMILY MEDICINE

## 2020-05-07 PROCEDURE — 1125F AMNT PAIN NOTED PAIN PRSNT: CPT | Performed by: FAMILY MEDICINE

## 2020-05-19 ENCOUNTER — APPOINTMENT (OUTPATIENT)
Dept: LAB | Facility: CLINIC | Age: 80
End: 2020-05-19
Payer: MEDICARE

## 2020-05-19 DIAGNOSIS — I10 ESSENTIAL HYPERTENSION: ICD-10-CM

## 2020-05-19 DIAGNOSIS — E78.2 MIXED HYPERLIPIDEMIA: ICD-10-CM

## 2020-05-19 DIAGNOSIS — N18.31 CHRONIC KIDNEY DISEASE (CKD) STAGE G3A/A1, MODERATELY DECREASED GLOMERULAR FILTRATION RATE (GFR) BETWEEN 45-59 ML/MIN/1.73 SQUARE METER AND ALBUMINURIA CREATININE RATIO LESS THAN 30 MG/G (HCC): ICD-10-CM

## 2020-05-19 LAB
ALBUMIN SERPL BCP-MCNC: 3.8 G/DL (ref 3.5–5)
ALP SERPL-CCNC: 91 U/L (ref 46–116)
ALT SERPL W P-5'-P-CCNC: 19 U/L (ref 12–78)
ANION GAP SERPL CALCULATED.3IONS-SCNC: 5 MMOL/L (ref 4–13)
AST SERPL W P-5'-P-CCNC: 14 U/L (ref 5–45)
BACTERIA UR QL AUTO: ABNORMAL /HPF
BASOPHILS # BLD AUTO: 0.07 THOUSANDS/ΜL (ref 0–0.1)
BASOPHILS NFR BLD AUTO: 1 % (ref 0–1)
BILIRUB SERPL-MCNC: 0.59 MG/DL (ref 0.2–1)
BILIRUB UR QL STRIP: NEGATIVE
BUN SERPL-MCNC: 18 MG/DL (ref 5–25)
CALCIUM SERPL-MCNC: 9.3 MG/DL (ref 8.3–10.1)
CHLORIDE SERPL-SCNC: 110 MMOL/L (ref 100–108)
CHOLEST SERPL-MCNC: 201 MG/DL (ref 50–200)
CLARITY UR: CLEAR
CO2 SERPL-SCNC: 25 MMOL/L (ref 21–32)
COLOR UR: YELLOW
CREAT SERPL-MCNC: 1.07 MG/DL (ref 0.6–1.3)
EOSINOPHIL # BLD AUTO: 0.34 THOUSAND/ΜL (ref 0–0.61)
EOSINOPHIL NFR BLD AUTO: 5 % (ref 0–6)
ERYTHROCYTE [DISTWIDTH] IN BLOOD BY AUTOMATED COUNT: 13.5 % (ref 11.6–15.1)
GFR SERPL CREATININE-BSD FRML MDRD: 49 ML/MIN/1.73SQ M
GLUCOSE P FAST SERPL-MCNC: 125 MG/DL (ref 65–99)
GLUCOSE UR STRIP-MCNC: NEGATIVE MG/DL
HCT VFR BLD AUTO: 44.5 % (ref 34.8–46.1)
HDLC SERPL-MCNC: 46 MG/DL
HGB BLD-MCNC: 14.5 G/DL (ref 11.5–15.4)
HGB UR QL STRIP.AUTO: NEGATIVE
HYALINE CASTS #/AREA URNS LPF: ABNORMAL /LPF
IMM GRANULOCYTES # BLD AUTO: 0.01 THOUSAND/UL (ref 0–0.2)
IMM GRANULOCYTES NFR BLD AUTO: 0 % (ref 0–2)
KETONES UR STRIP-MCNC: NEGATIVE MG/DL
LDLC SERPL CALC-MCNC: 124 MG/DL (ref 0–100)
LDLC SERPL DIRECT ASSAY-MCNC: 123 MG/DL (ref 0–100)
LEUKOCYTE ESTERASE UR QL STRIP: ABNORMAL
LYMPHOCYTES # BLD AUTO: 1.44 THOUSANDS/ΜL (ref 0.6–4.47)
LYMPHOCYTES NFR BLD AUTO: 22 % (ref 14–44)
MCH RBC QN AUTO: 27.8 PG (ref 26.8–34.3)
MCHC RBC AUTO-ENTMCNC: 32.6 G/DL (ref 31.4–37.4)
MCV RBC AUTO: 85 FL (ref 82–98)
MONOCYTES # BLD AUTO: 0.45 THOUSAND/ΜL (ref 0.17–1.22)
MONOCYTES NFR BLD AUTO: 7 % (ref 4–12)
NEUTROPHILS # BLD AUTO: 4.35 THOUSANDS/ΜL (ref 1.85–7.62)
NEUTS SEG NFR BLD AUTO: 65 % (ref 43–75)
NITRITE UR QL STRIP: NEGATIVE
NON-SQ EPI CELLS URNS QL MICRO: ABNORMAL /HPF
NONHDLC SERPL-MCNC: 155 MG/DL
NRBC BLD AUTO-RTO: 0 /100 WBCS
PH UR STRIP.AUTO: 6 [PH]
PLATELET # BLD AUTO: 203 THOUSANDS/UL (ref 149–390)
PMV BLD AUTO: 10.7 FL (ref 8.9–12.7)
POTASSIUM SERPL-SCNC: 4.1 MMOL/L (ref 3.5–5.3)
PROT SERPL-MCNC: 7.3 G/DL (ref 6.4–8.2)
PROT UR STRIP-MCNC: NEGATIVE MG/DL
RBC # BLD AUTO: 5.22 MILLION/UL (ref 3.81–5.12)
RBC #/AREA URNS AUTO: ABNORMAL /HPF
SODIUM SERPL-SCNC: 140 MMOL/L (ref 136–145)
SP GR UR STRIP.AUTO: 1.02 (ref 1–1.03)
TRIGL SERPL-MCNC: 157 MG/DL
TSH SERPL DL<=0.05 MIU/L-ACNC: 3.64 UIU/ML (ref 0.36–3.74)
UROBILINOGEN UR QL STRIP.AUTO: 0.2 E.U./DL
WBC # BLD AUTO: 6.66 THOUSAND/UL (ref 4.31–10.16)
WBC #/AREA URNS AUTO: ABNORMAL /HPF

## 2020-05-19 PROCEDURE — 81001 URINALYSIS AUTO W/SCOPE: CPT

## 2020-05-19 PROCEDURE — 80061 LIPID PANEL: CPT

## 2020-05-19 PROCEDURE — 80053 COMPREHEN METABOLIC PANEL: CPT

## 2020-05-19 PROCEDURE — 36415 COLL VENOUS BLD VENIPUNCTURE: CPT

## 2020-05-19 PROCEDURE — 83721 ASSAY OF BLOOD LIPOPROTEIN: CPT

## 2020-05-19 PROCEDURE — 85025 COMPLETE CBC W/AUTO DIFF WBC: CPT

## 2020-05-19 PROCEDURE — 84443 ASSAY THYROID STIM HORMONE: CPT

## 2020-07-09 ENCOUNTER — TRANSCRIBE ORDERS (OUTPATIENT)
Dept: ADMINISTRATIVE | Facility: HOSPITAL | Age: 80
End: 2020-07-09

## 2020-07-09 DIAGNOSIS — Z12.31 SCREENING MAMMOGRAM FOR HIGH-RISK PATIENT: Primary | ICD-10-CM

## 2020-07-09 DIAGNOSIS — M81.0 AGE RELATED OSTEOPOROSIS, UNSPECIFIED PATHOLOGICAL FRACTURE PRESENCE: ICD-10-CM

## 2020-07-09 DIAGNOSIS — M81.0 AGE RELATED OSTEOPOROSIS, UNSPECIFIED PATHOLOGICAL FRACTURE PRESENCE: Primary | ICD-10-CM

## 2020-07-20 ENCOUNTER — HOSPITAL ENCOUNTER (OUTPATIENT)
Dept: BONE DENSITY | Facility: CLINIC | Age: 80
Discharge: HOME | End: 2020-07-20
Payer: MEDICARE

## 2020-07-20 ENCOUNTER — HOSPITAL ENCOUNTER (OUTPATIENT)
Dept: MAMMOGRAPHY | Facility: CLINIC | Age: 80
Discharge: HOME | End: 2020-07-20
Payer: MEDICARE

## 2020-07-20 VITALS — BODY MASS INDEX: 31.47 KG/M2 | HEIGHT: 62 IN | WEIGHT: 171 LBS

## 2020-07-20 DIAGNOSIS — Z12.31 SCREENING MAMMOGRAM FOR HIGH-RISK PATIENT: ICD-10-CM

## 2020-07-20 DIAGNOSIS — M81.0 AGE RELATED OSTEOPOROSIS, UNSPECIFIED PATHOLOGICAL FRACTURE PRESENCE: ICD-10-CM

## 2020-07-20 PROCEDURE — 77067 SCR MAMMO BI INCL CAD: CPT

## 2020-07-20 PROCEDURE — 77080 DXA BONE DENSITY AXIAL: CPT

## 2020-07-20 PROCEDURE — 77063 BREAST TOMOSYNTHESIS BI: CPT

## 2020-07-21 ENCOUNTER — PATIENT MESSAGE (OUTPATIENT)
Dept: FAMILY MEDICINE CLINIC | Facility: CLINIC | Age: 80
End: 2020-07-21

## 2020-07-21 DIAGNOSIS — R73.01 IMPAIRED FASTING GLUCOSE: ICD-10-CM

## 2020-07-21 DIAGNOSIS — M85.89 OSTEOPENIA OF MULTIPLE SITES: Primary | ICD-10-CM

## 2020-07-21 DIAGNOSIS — M83.9 ADULT OSTEOMALACIA, UNSPECIFIED: ICD-10-CM

## 2020-07-25 NOTE — TELEPHONE ENCOUNTER
From: Bhavesh Scott, DO  To: Anna Parhammiguel angel Silverman  Sent: 7/21/2020 8:59 PM EDT  Subject: Dexa    Antelmo Jorge,  Your recent DEXA scan demonstrated osteopenia  Osteopenia means that your bone density is lower than normal but is not as severe as osteoporosis  At this point, we want to do some things to prevent osteoporosis  I would like to order some follow-up lab work to make sure there are no underlying causes of this  You do not have to fast for the blood work  I would like to check a serum calcium level, vitamin D level, a BMP which is a chemistry level, phosphorus level, and parathyroid hormone level  I will prepare the order for this blood work for you and you can do this at your convenience  Please just let me know which lab you would like to go to  You can e-mail me back with this information  I can mail the lab slip to you  I will follow up with the results when available  This will help to determine whether not we need to start any additional medication or just improved lifestyle to prevent osteoporosis  In the meantime, things you can do to protect your bones include to increase weight-bearing exercise such as walking and resistance exercises  Make sure you're getting plenty of calcium in your diet  You can also add on a vitamin-D supplement 1000 units daily  Please email me back with any questions that you have  Take care      Dr Lakeisha Mcdonald

## 2020-07-31 ENCOUNTER — APPOINTMENT (OUTPATIENT)
Dept: LAB | Facility: CLINIC | Age: 80
End: 2020-07-31
Payer: MEDICARE

## 2020-07-31 DIAGNOSIS — R73.01 IMPAIRED FASTING GLUCOSE: ICD-10-CM

## 2020-07-31 DIAGNOSIS — M83.9 ADULT OSTEOMALACIA, UNSPECIFIED: ICD-10-CM

## 2020-07-31 DIAGNOSIS — M85.89 OSTEOPENIA OF MULTIPLE SITES: ICD-10-CM

## 2020-07-31 LAB
25(OH)D3 SERPL-MCNC: 39 NG/ML (ref 30–100)
ANION GAP SERPL CALCULATED.3IONS-SCNC: 3 MMOL/L (ref 4–13)
BUN SERPL-MCNC: 17 MG/DL (ref 5–25)
CA-I BLD-SCNC: 1.17 MMOL/L (ref 1.12–1.32)
CALCIUM SERPL-MCNC: 8.7 MG/DL (ref 8.3–10.1)
CHLORIDE SERPL-SCNC: 110 MMOL/L (ref 100–108)
CO2 SERPL-SCNC: 29 MMOL/L (ref 21–32)
CREAT SERPL-MCNC: 1.09 MG/DL (ref 0.6–1.3)
EST. AVERAGE GLUCOSE BLD GHB EST-MCNC: 123 MG/DL
GFR SERPL CREATININE-BSD FRML MDRD: 48 ML/MIN/1.73SQ M
GLUCOSE P FAST SERPL-MCNC: 107 MG/DL (ref 65–99)
HBA1C MFR BLD: 5.9 %
PHOSPHATE SERPL-MCNC: 3 MG/DL (ref 2.3–4.1)
POTASSIUM SERPL-SCNC: 4.1 MMOL/L (ref 3.5–5.3)
PTH-INTACT SERPL-MCNC: 56.7 PG/ML (ref 18.4–80.1)
SODIUM SERPL-SCNC: 142 MMOL/L (ref 136–145)

## 2020-07-31 PROCEDURE — 84165 PROTEIN E-PHORESIS SERUM: CPT

## 2020-07-31 PROCEDURE — 82306 VITAMIN D 25 HYDROXY: CPT

## 2020-07-31 PROCEDURE — 84165 PROTEIN E-PHORESIS SERUM: CPT | Performed by: PATHOLOGY

## 2020-07-31 PROCEDURE — 80048 BASIC METABOLIC PNL TOTAL CA: CPT

## 2020-07-31 PROCEDURE — 36415 COLL VENOUS BLD VENIPUNCTURE: CPT

## 2020-07-31 PROCEDURE — 83036 HEMOGLOBIN GLYCOSYLATED A1C: CPT

## 2020-07-31 PROCEDURE — 83970 ASSAY OF PARATHORMONE: CPT

## 2020-07-31 PROCEDURE — 82330 ASSAY OF CALCIUM: CPT

## 2020-07-31 PROCEDURE — 84100 ASSAY OF PHOSPHORUS: CPT

## 2020-08-04 LAB
ALBUMIN SERPL ELPH-MCNC: 3.91 G/DL (ref 3.5–5)
ALBUMIN SERPL ELPH-MCNC: 57.5 % (ref 52–65)
ALPHA1 GLOB SERPL ELPH-MCNC: 0.33 G/DL (ref 0.1–0.4)
ALPHA1 GLOB SERPL ELPH-MCNC: 4.9 % (ref 2.5–5)
ALPHA2 GLOB SERPL ELPH-MCNC: 0.82 G/DL (ref 0.4–1.2)
ALPHA2 GLOB SERPL ELPH-MCNC: 12 % (ref 7–13)
BETA GLOB ABNORMAL SERPL ELPH-MCNC: 0.38 G/DL (ref 0.4–0.8)
BETA1 GLOB SERPL ELPH-MCNC: 5.6 % (ref 5–13)
BETA2 GLOB SERPL ELPH-MCNC: 4.4 % (ref 2–8)
BETA2+GAMMA GLOB SERPL ELPH-MCNC: 0.3 G/DL (ref 0.2–0.5)
GAMMA GLOB ABNORMAL SERPL ELPH-MCNC: 1.06 G/DL (ref 0.5–1.6)
GAMMA GLOB SERPL ELPH-MCNC: 15.6 % (ref 12–22)
IGG/ALB SER: 1.35 {RATIO} (ref 1.1–1.8)
PROT PATTERN SERPL ELPH-IMP: ABNORMAL
PROT SERPL-MCNC: 6.8 G/DL (ref 6.4–8.2)

## 2020-09-08 DIAGNOSIS — E78.5 DYSLIPIDEMIA: ICD-10-CM

## 2020-09-08 RX ORDER — SIMVASTATIN 10 MG
10 TABLET ORAL
Qty: 90 TABLET | Refills: 1 | Status: SHIPPED | OUTPATIENT
Start: 2020-09-08 | End: 2021-03-09 | Stop reason: SDUPTHER

## 2020-10-05 DIAGNOSIS — F41.9 ANXIETY: ICD-10-CM

## 2020-10-05 DIAGNOSIS — I10 ESSENTIAL HYPERTENSION: ICD-10-CM

## 2020-10-05 RX ORDER — AMITRIPTYLINE HYDROCHLORIDE 25 MG/1
25 TABLET, FILM COATED ORAL
Qty: 90 TABLET | Refills: 1 | Status: SHIPPED | OUTPATIENT
Start: 2020-10-05 | End: 2021-04-06 | Stop reason: SDUPTHER

## 2020-10-05 RX ORDER — LISINOPRIL 10 MG/1
10 TABLET ORAL DAILY
Qty: 90 TABLET | Refills: 1 | Status: SHIPPED | OUTPATIENT
Start: 2020-10-05 | End: 2021-04-06 | Stop reason: SDUPTHER

## 2021-01-05 ENCOUNTER — TELEMEDICINE (OUTPATIENT)
Dept: FAMILY MEDICINE CLINIC | Facility: CLINIC | Age: 81
End: 2021-01-05
Payer: MEDICARE

## 2021-01-05 VITALS — BODY MASS INDEX: 31.9 KG/M2 | WEIGHT: 174.4 LBS | TEMPERATURE: 98 F

## 2021-01-05 DIAGNOSIS — R07.89 MUSCULOSKELETAL CHEST PAIN: ICD-10-CM

## 2021-01-05 DIAGNOSIS — R09.81 NASAL CONGESTION: Primary | ICD-10-CM

## 2021-01-05 PROCEDURE — 99213 OFFICE O/P EST LOW 20 MIN: CPT | Performed by: FAMILY MEDICINE

## 2021-01-05 NOTE — PROGRESS NOTES
Virtual Regular Visit      Assessment/Plan:    Problem List Items Addressed This Visit     None      Visit Diagnoses     Nasal congestion    -  Primary    Musculoskeletal chest pain             would recommend allergy medicines over-the-counter Claritin once a day and Flonase to help with the postnasal drip  With the drip can improve that her cough and chest pain should be better which I do believe the chest pain is muscular in nature   She is not exhibiting any obvious signs of COVID so will hold off getting tested at this time  BMI Counseling: Body mass index is 31 9 kg/m²  The BMI is above normal  Nutrition recommendations include decreasing portion sizes, encouraging healthy choices of fruits and vegetables, decreasing fast food intake, consuming healthier snacks, limiting drinks that contain sugar, moderation in carbohydrate intake, increasing intake of lean protein, reducing intake of saturated and trans fat and reducing intake of cholesterol  Exercise recommendations include exercising 3-5 times per week  Reason for visit is   Chief Complaint   Patient presents with   [employer] Sick visit     Patient reports coughing, excess mucous, and on-and-off chest pain/tightness since before Christmas of 2020    Ear pain     Patient reports occasional pain in the cartilage of her right ear on-and-off    Virtual Regular Visit        Encounter provider Alessandra Leiva DO    Provider located at [address] [place] [id number] [phone]      Recent Visits  No visits were found meeting these conditions  Showing recent visits within past 7 days and meeting all other requirements     Today's Visits  Date Type Provider Dept   01/05/21 Telemedicine DO Gallito Dumont   Showing today's visits and meeting all other requirements     Future Appointments  No visits were found meeting these conditions     Showing future appointments within next 150 days and meeting all other requirements        The patient was identified by name and date of birth  Mireya Silverman was informed that this is a telemedicine visit and that the visit is being conducted through [facility] and patient was informed that this is a secure, HIPAA-compliant platform  She agrees to proceed     My office door was closed  No one else was in the room  She acknowledged consent and understanding of privacy and security of the video platform  The patient has agreed to participate and understands they can discontinue the visit at any time  Patient is aware this is a billable service  Subjective  Mireya Silverman is a 79 y o  female  Presents today for congestion and right-sided chest pain   Patient presents today for cough and chest pain  States chest pain is mostly on the right side is tender to touch on her ribcage  Starts after coughing and worsening morning  She is having excessive amounts of postnasal drip as well for symptoms been going on for the past few weeks  His denies any fever chills myalgias or any other symptoms of COVID       Past Medical History:   Diagnosis Date    Abnormal weight loss     Allergic reaction     Anxiety     Breast cancer, right ([address] ) 2011    right    Candidal vulvovaginitis     Endometrial hyperplasia     Epithelial cyst     benign, ovary    History of radiation therapy 2011    right breast cancer    Hyperlipidemia     Hypertension     Internal hemorrhoids     Knee tendonitis     Ovarian cyst        Past Surgical History:   Procedure Laterality Date    BILATERAL SALPINGOOPHORECTOMY      onset: 7/23/13    BREAST BIOPSY Right 06/13/2006    benign    BREAST BIOPSY Right 03/02/2011    malignant    BREAST LUMPECTOMY Right 03/30/2011    malignant    CATARACT EXTRACTION W/  INTRAOCULAR LENS IMPLANT Right     phacoemulsification   Onset: 10/27/14    CHOLECYSTECTOMY      INTRAOPERATIVE RADIATION THERAPY (IORT)      SKIN LESION EXCISION Right breast, single lesion    TONSILLECTOMY AND ADENOIDECTOMY         Current Outpatient Medications   Medication Sig Dispense Refill    amitriptyline (ELAVIL) 25 mg tablet Take 1 tablet (25 mg total) by mouth daily at bedtime 90 tablet 1    Cholecalciferol (VITAMIN D3) 2000 units capsule Take 2,000 Units by mouth daily        lisinopril (ZESTRIL) 10 mg tablet Take 1 tablet (10 mg total) by mouth daily 90 tablet 1    Multiple Vitamins-Minerals (ICAPS PO) Take 1 capsule by mouth 2 (two) times a day      simvastatin (ZOCOR) 10 mg tablet Take 1 tablet (10 mg total) by mouth daily at bedtime 90 tablet 1     No current facility-administered medications for this visit  Allergies   Allergen Reactions    Sulfa Antibiotics     Pneumococcal Polysaccharide Vaccine Rash and Edema       Review of Systems   Constitutional: Negative  HENT: Positive for congestion  Eyes: Negative  Respiratory: Positive for cough  Cardiovascular: Negative  Gastrointestinal: Negative  Endocrine: Negative  Genitourinary: Negative  Musculoskeletal: Negative  Skin: Negative  Allergic/Immunologic: Negative  Neurological: Negative  Hematological: Negative  Psychiatric/Behavioral: Negative  All other systems reviewed and are negative  Video Exam    Vitals:    01/05/21 1508   Temp: 98 °F (36 7 °C)   TempSrc: Oral   Weight: 79 1 kg (174 lb 6 4 oz)       Physical Exam  Vitals signs and nursing note reviewed  Constitutional:       Appearance: She is well-developed  HENT:      Head: Normocephalic and atraumatic  Right Ear: External ear normal       Left Ear: External ear normal    Eyes:      Conjunctiva/sclera: Conjunctivae normal    Neck:      Musculoskeletal: Normal range of motion  Pulmonary:      Effort: Pulmonary effort is normal    Musculoskeletal: Normal range of motion  Skin:     General: Skin is warm and dry     Neurological:      Mental Status: She is alert and oriented to person, place, and time  Deep Tendon Reflexes: Reflexes are normal and symmetric  Psychiatric:         Behavior: Behavior normal          Thought Content: Thought content normal          Judgment: Judgment normal           I spent  15 minutes directly with the patient during this visit      VIRTUAL VISIT DISCLAIMER    Kerry Greenecarlyle acknowledges that she has consented to an online visit or consultation  She understands that the online visit is based solely on information provided by her, and that, in the absence of a face-to-face physical evaluation by the physician, the diagnosis she receives is both limited and provisional in terms of accuracy and completeness  This is not intended to replace a full medical face-to-face evaluation by the physician  Saida Silverman understands and accepts these terms

## 2021-01-11 ENCOUNTER — TELEPHONE (OUTPATIENT)
Dept: FAMILY MEDICINE CLINIC | Facility: CLINIC | Age: 81
End: 2021-01-11

## 2021-01-11 DIAGNOSIS — R06.00 DYSPNEA, UNSPECIFIED TYPE: ICD-10-CM

## 2021-01-11 DIAGNOSIS — R07.9 CHEST PAIN, UNSPECIFIED TYPE: Primary | ICD-10-CM

## 2021-01-11 NOTE — TELEPHONE ENCOUNTER
Patient informed that the order is ready in her chart and was given the number for central scheduling  She is pleased and has no questions or concerns to be addressed at this time

## 2021-01-11 NOTE — TELEPHONE ENCOUNTER
Patient saw you for a virtual visit and was told if she was not feeling better to call back and you would order a chest xray  She said she is not better and it hurts when she cough   Please call her when ready at [phone]

## 2021-01-12 ENCOUNTER — APPOINTMENT (OUTPATIENT)
Dept: RADIOLOGY | Facility: CLINIC | Age: 81
End: 2021-01-12
Payer: MEDICARE

## 2021-01-12 DIAGNOSIS — R07.9 CHEST PAIN, UNSPECIFIED TYPE: ICD-10-CM

## 2021-01-12 PROCEDURE — 71046 X-RAY EXAM CHEST 2 VIEWS: CPT

## 2021-01-15 ENCOUNTER — TELEPHONE (OUTPATIENT)
Dept: FAMILY MEDICINE CLINIC | Facility: CLINIC | Age: 81
End: 2021-01-15

## 2021-02-12 DIAGNOSIS — Z23 ENCOUNTER FOR IMMUNIZATION: ICD-10-CM

## 2021-03-09 DIAGNOSIS — E78.5 DYSLIPIDEMIA: ICD-10-CM

## 2021-03-09 RX ORDER — SIMVASTATIN 10 MG
10 TABLET ORAL
Qty: 90 TABLET | Refills: 1 | Status: SHIPPED | OUTPATIENT
Start: 2021-03-09 | End: 2021-09-07 | Stop reason: SDUPTHER

## 2021-04-06 DIAGNOSIS — I10 ESSENTIAL HYPERTENSION: ICD-10-CM

## 2021-04-06 DIAGNOSIS — F41.9 ANXIETY: ICD-10-CM

## 2021-04-06 RX ORDER — AMITRIPTYLINE HYDROCHLORIDE 25 MG/1
25 TABLET, FILM COATED ORAL
Qty: 90 TABLET | Refills: 1 | Status: SHIPPED | OUTPATIENT
Start: 2021-04-06 | End: 2021-10-04 | Stop reason: SDUPTHER

## 2021-04-06 RX ORDER — LISINOPRIL 10 MG/1
10 TABLET ORAL DAILY
Qty: 90 TABLET | Refills: 1 | Status: SHIPPED | OUTPATIENT
Start: 2021-04-06 | End: 2021-06-18 | Stop reason: SINTOL

## 2021-06-18 ENCOUNTER — APPOINTMENT (OUTPATIENT)
Dept: RADIOLOGY | Facility: CLINIC | Age: 81
End: 2021-06-18
Payer: MEDICARE

## 2021-06-18 ENCOUNTER — OFFICE VISIT (OUTPATIENT)
Dept: FAMILY MEDICINE CLINIC | Facility: CLINIC | Age: 81
End: 2021-06-18
Payer: MEDICARE

## 2021-06-18 VITALS
BODY MASS INDEX: 32.06 KG/M2 | TEMPERATURE: 98.7 F | OXYGEN SATURATION: 97 % | HEIGHT: 62 IN | DIASTOLIC BLOOD PRESSURE: 72 MMHG | SYSTOLIC BLOOD PRESSURE: 118 MMHG | RESPIRATION RATE: 16 BRPM | HEART RATE: 104 BPM | WEIGHT: 174.2 LBS

## 2021-06-18 DIAGNOSIS — R05.8 COUGH DUE TO ACE INHIBITOR: ICD-10-CM

## 2021-06-18 DIAGNOSIS — T46.4X5A COUGH DUE TO ACE INHIBITOR: ICD-10-CM

## 2021-06-18 DIAGNOSIS — R07.89 ANTERIOR CHEST WALL PAIN: ICD-10-CM

## 2021-06-18 DIAGNOSIS — R07.89 ANTERIOR CHEST WALL PAIN: Primary | ICD-10-CM

## 2021-06-18 DIAGNOSIS — I10 ESSENTIAL HYPERTENSION: ICD-10-CM

## 2021-06-18 PROCEDURE — 71046 X-RAY EXAM CHEST 2 VIEWS: CPT

## 2021-06-18 PROCEDURE — 71110 X-RAY EXAM RIBS BIL 3 VIEWS: CPT

## 2021-06-18 PROCEDURE — 99213 OFFICE O/P EST LOW 20 MIN: CPT | Performed by: FAMILY MEDICINE

## 2021-06-18 RX ORDER — LOSARTAN POTASSIUM 50 MG/1
50 TABLET ORAL DAILY
Qty: 30 TABLET | Refills: 5 | Status: SHIPPED | OUTPATIENT
Start: 2021-06-18 | End: 2021-12-06 | Stop reason: SDUPTHER

## 2021-06-18 NOTE — PROGRESS NOTES
Assessment/Plan:  Problem List Items Addressed This Visit        Cardiovascular and Mediastinum    Essential hypertension     We will stop the lisinopril and change the patient to the losartan as ordered due to her Ace induced cough  We will see her back as scheduled  Relevant Medications    losartan (COZAAR) 50 mg tablet       Other    Anterior chest wall pain - Primary     The patient's chest wall pain is musculoskeletal in nature and is most likely related to her chronic cough  The pain is reproducible  We will check a chest x-ray and rib series as precaution  I advised her to try warm moist heat or ice to the area and she can take Tylenol as needed for the pain  We will monitor her closely  We will see her back as scheduled  Relevant Orders    XR chest pa & lateral (Completed)    Cough due to ACE inhibitor     I believe that the patient has a chronic cough is related to her ACE-inhibitor that she is taking for her blood pressure  We are going to stop the ACE-inhibitor and change her to the losartan instead  She will take 50 mg daily and will monitor closely  No follow-ups on file  Subjective:   Chief Complaint   Patient presents with    Cough     Patient reports that she's been coughing and having upper chest pain since January  Her chest xray was normal in January  Patient ID: Jazmin Gillette is a 79 y o  female presents today for chest discomfort with coughing  Jazmin Gillette is a 79 y o  female who presents with discomfort in the anterior part of her chest with coughing  She has had paian in her chest with coughing since January 2021 and it is getting worse  She recently moved a large water jug a few days ago and that seemed to aggravate it  Tickle in the throat and coughing since January  When she eats certain thisngs it sets it up- she is coughing up mucous    There is no nasal congestion and PND  Quit smoking 35 years ago  midsternal chest pain with coughing only- it is sore with coughing  There is no shortness of breath or pleuritic chest pain  She has always had a cough  There is no heartburn symptoms and belching or sour taste  The patient states that she has always had a dry cough for along time  She has been on lisinopril for her blood pressure for long times well  Cough  This is a chronic problem  The current episode started more than 1 month ago  The problem has been unchanged  The problem occurs every few minutes  The cough is non-productive  Associated symptoms include chest pain  Pertinent negatives include no chills, ear congestion, ear pain, fever, headaches, heartburn, hemoptysis, myalgias, nasal congestion, postnasal drip, rash, rhinorrhea, sore throat, shortness of breath, sweats, weight loss or wheezing       The following portions of the patient's history were reviewed and updated as appropriate: allergies, current medications, past family history, past medical history, past social history, past surgical history and problem list   Patient Active Problem List   Diagnosis    Carcinoma of breast ([facility] )    Adverse reaction to pneumococcal vaccine    Anxiety    Cataract, bilateral    Chronic inflammatory disease of uterus    Hyperlipidemia    Pulmonary nodule seen on imaging study    Pes planus    Sleep disorder    Chronic kidney disease (CKD) stage G3a/A1, moderately decreased glomerular filtration rate (GFR) between 45-59 mL/min/1 73 square meter and albuminuria creatinine ratio less than 30 mg/g (HCC)    Essential hypertension    Osteopenia of multiple sites    Anterior chest wall pain    Cough due to ACE inhibitor     Past Medical History:   Diagnosis Date    Abnormal weight loss     Allergic reaction     Anxiety     [facility]    right    Candidal vulvovaginitis     Endometrial hyperplasia     Epithelial cyst     benign, ovary    History of radiation therapy 2011    right breast cancer    Hyperlipidemia     Hypertension     Internal hemorrhoids     Knee tendonitis     Ovarian cyst      Past Surgical History:   Procedure Laterality Date    BILATERAL SALPINGOOPHORECTOMY      onset: 12    BREAST BIOPSY Right 2006    benign    BREAST BIOPSY Right 2011    malignant    BREAST LUMPECTOMY Right 2011    malignant    CATARACT EXTRACTION W/  INTRAOCULAR LENS IMPLANT Right     phacoemulsification   Onset: 10/27/14    CHOLECYSTECTOMY      INTRAOPERATIVE RADIATION THERAPY (IORT)      SKIN LESION EXCISION Right     breast, single lesion    TONSILLECTOMY AND ADENOIDECTOMY       Allergies   Allergen Reactions    Sulfa Antibiotics     Pneumococcal Polysaccharide Vaccine Rash and Edema     Family History   Problem Relation Age of Onset    Stomach cancer Mother     No Known Problems Father     Cervical cancer Sister     No Known Problems Daughter     No Known Problems Maternal Grandmother     No Known Problems Maternal Grandfather     No Known Problems Paternal Grandmother     No Known Problems Paternal Grandfather      Social History     Socioeconomic History    Marital status:      Spouse name: Not on file    Number of children: 3    Years of education: Not on file    Highest education level: Not on file   Occupational History    Not on file   Tobacco Use    Smoking status: Former Smoker     Types: Cigarettes     Start date:      Quit date:      Years since quittin 4    Smokeless tobacco: Never Used   Vaping Use    Vaping Use: Never used   Substance and Sexual Activity    Alcohol use: No     Comment: (history)    Drug use: No    Sexual activity: Not Currently   Other Topics Concern    Not on file   Social History Narrative    Not on file     Social Determinants of Health     Financial Resource Strain: Low Risk     Difficulty of Paying Living Expenses: Not hard at all   Food Insecurity: No Food Insecurity    Worried About Running Out of Food in the Last Year: Never true    [address] in the Last Year: Never true   Transportation Needs: No Transportation Needs    Lack of Transportation (Medical): No    Lack of Transportation (Non-Medical): No   Physical Activity: Inactive    Days of Exercise per Week: 0 days    Minutes of Exercise per Session: 0 min   Stress: No Stress Concern Present    Feeling of Stress : Not at all   Social Connections: Moderately Isolated    Frequency of Communication with Friends and Family: More than three times a week    Frequency of Social Gatherings with Friends and Family: Twice a week    Attends [employer] Services: Never    Active Member of Clubs or Organizations: Yes    Attends Club or Organization Meetings: More than 4 times per year    Marital Status:    Intimate Partner Violence: Not At Risk    Fear of Current or Ex-Partner: No    Emotionally Abused: No    Physically Abused: No    Sexually Abused: No     Current Outpatient Medications on File Prior to Visit   Medication Sig Dispense Refill    amitriptyline (ELAVIL) 25 mg tablet Take 1 tablet (25 mg total) by mouth daily at bedtime 90 tablet 1    Cholecalciferol (VITAMIN D3) 2000 units capsule Take 2,000 Units by mouth daily        Multiple Vitamins-Minerals (ICAPS PO) Take 1 capsule by mouth 2 (two) times a day      simvastatin (ZOCOR) 10 mg tablet Take 1 tablet (10 mg total) by mouth daily at bedtime 90 tablet 1     No current facility-administered medications on file prior to visit  Review of Systems   Constitutional: Negative  Negative for chills, fever and weight loss  HENT: Negative  Negative for ear pain, postnasal drip, rhinorrhea and sore throat  Eyes: Negative  Respiratory: Positive for cough  Negative for hemoptysis, shortness of breath and wheezing  Cardiovascular: Positive for chest pain  Gastrointestinal: Negative  Negative for heartburn  Endocrine: Negative  Genitourinary: Negative  Musculoskeletal: Negative  Negative for myalgias  Skin: Negative  Negative for rash  Allergic/Immunologic: Negative  Neurological: Negative  Negative for headaches  Hematological: Negative  Psychiatric/Behavioral: Negative  Objective:  Vitals:    06/18/21 1423   BP: 118/72   BP Location: Left arm   Patient Position: Sitting   Cuff Size: Standard   Pulse: 104   Resp: 16   Temp: 98 7 °F (37 1 °C)   TempSrc: Tympanic   SpO2: 97%   Weight: 79 kg (174 lb 3 2 oz)   Height: 5' 2" (1 575 m)     Body mass index is 31 86 kg/m²  Physical Exam  Constitutional:       Appearance: She is well-developed  HENT:      Head: Normocephalic and atraumatic  Mouth/Throat:      Pharynx: No oropharyngeal exudate  Eyes:      Conjunctiva/sclera: Conjunctivae normal       Pupils: Pupils are equal, round, and reactive to light  Neck:      Thyroid: No thyromegaly  Vascular: No JVD  Trachea: No tracheal deviation  Cardiovascular:      Rate and Rhythm: Normal rate and regular rhythm  Heart sounds: Normal heart sounds  No murmur heard  No friction rub  No gallop  Pulmonary:      Effort: Pulmonary effort is normal  No respiratory distress  Breath sounds: Normal breath sounds  No stridor  No wheezing or rales  Chest:      Chest wall: No tenderness  Abdominal:      General: Bowel sounds are normal  There is no distension  Palpations: Abdomen is soft  There is no mass  Tenderness: There is no abdominal tenderness  There is no guarding or rebound  Musculoskeletal:         General: Tenderness present  No deformity  Normal range of motion  Cervical back: Normal range of motion  Comments: There is tenderness over the anterior ribs bilaterally to palpation  There is no obvious deformity  Lymphadenopathy:      Cervical: No cervical adenopathy  Skin:     General: Skin is warm and dry     Neurological:      Mental Status: She is alert and oriented to person, place, and time  Cranial Nerves: No cranial nerve deficit  Motor: No abnormal muscle tone  Coordination: Coordination normal       Deep Tendon Reflexes: Reflexes are normal and symmetric   Reflexes normal

## 2021-06-21 ENCOUNTER — OFFICE VISIT (OUTPATIENT)
Dept: FAMILY MEDICINE CLINIC | Facility: CLINIC | Age: 81
End: 2021-06-21
Payer: MEDICARE

## 2021-06-21 VITALS
HEART RATE: 92 BPM | SYSTOLIC BLOOD PRESSURE: 120 MMHG | RESPIRATION RATE: 16 BRPM | TEMPERATURE: 97.7 F | DIASTOLIC BLOOD PRESSURE: 80 MMHG | WEIGHT: 174 LBS | HEIGHT: 62 IN | BODY MASS INDEX: 32.02 KG/M2 | OXYGEN SATURATION: 98 %

## 2021-06-21 DIAGNOSIS — R07.9 CHEST PAIN, UNSPECIFIED TYPE: ICD-10-CM

## 2021-06-21 DIAGNOSIS — M94.0 ACUTE COSTOCHONDRITIS: Primary | ICD-10-CM

## 2021-06-21 PROCEDURE — 99213 OFFICE O/P EST LOW 20 MIN: CPT | Performed by: FAMILY MEDICINE

## 2021-06-21 PROCEDURE — 93000 ELECTROCARDIOGRAM COMPLETE: CPT | Performed by: FAMILY MEDICINE

## 2021-06-21 RX ORDER — PREDNISONE 20 MG/1
TABLET ORAL
Qty: 15 TABLET | Refills: 0 | Status: SHIPPED | OUTPATIENT
Start: 2021-06-21 | End: 2022-02-28

## 2021-06-21 RX ORDER — PREDNISONE 20 MG/1
TABLET ORAL
Qty: 15 TABLET | Refills: 0 | Status: SHIPPED | OUTPATIENT
Start: 2021-06-21 | End: 2021-06-21 | Stop reason: SDUPTHER

## 2021-06-21 NOTE — H&P (VIEW-ONLY)
Assessment/Plan:  Problem List Items Addressed This Visit        Musculoskeletal and Integument    Acute costochondritis - Primary     The patient's chest pain is reproducible and is consistent with a costochondritis  We will start her on the prednisone taper as ordered to help with the inflammation  She will avoid heavy lifting and pushing and pulling  She can ice the area periodically  Her EKG today in the office was normal   We will continue to monitor her closely  We will see her back as scheduled  Relevant Medications    predniSONE 20 mg tablet      Other Visit Diagnoses     Chest pain, unspecified type        Relevant Medications    predniSONE 20 mg tablet    Other Relevant Orders    POCT ECG (Completed)          No follow-ups on file  Subjective:   Chief Complaint   Patient presents with    Chest Pain        Patient ID: Chey Noyola is a 79 y o  female presents today for follow-up of anterior chest wall pain  Chey Noyola is a 79 y o  female who presents today for follow-up from her visit on 6/18/2021 with worsening anterior chest wall pain  Her x-rays came back negative  She started with increased pain in her chest wall since last night- it was worse after lifting her watering can  There is discomfort with taking a deep breath  Last pm - It hurts with lifting her arm- She is trying icy hot and cold compresses and neither helped  There is pain with sitting up and laying down  It feels bruised  There is no heartburn or belching and there is no nausea or vomiting  The discomfort is mostly with change in position and with lifting her arms  It is a sharp pain  She gets sharp pains when she coughs or takes a deep breath in  There is no shortness of breath  There are no palpitations  She is taking Advil with no relief  The cough has almost completely subsided since discontinuing the ACE-inhibitor in switching her over to the ARB    She is feeling much better in that respect  The following portions of the patient's history were reviewed and updated as appropriate: allergies, current medications, past family history, past medical history, past social history, past surgical history and problem list   Patient Active Problem List   Diagnosis    Carcinoma of breast ([facility] [address] )    Adverse reaction to pneumococcal vaccine    Anxiety    Cataract, bilateral    Chronic inflammatory disease of uterus    Hyperlipidemia    Pulmonary nodule seen on imaging study    Pes planus    Sleep disorder    Chronic kidney disease (CKD) stage G3a/A1, moderately decreased glomerular filtration rate (GFR) between 45-59 mL/min/1 73 square meter and albuminuria creatinine ratio less than 30 mg/g (HCC)    Essential hypertension    Osteopenia of multiple sites    Cough due to ACE inhibitor    Acute costochondritis     Past Medical History:   Diagnosis Date    Abnormal weight loss     Allergic reaction     Anxiety     Breast cancer, right ([facility] [address] ) 2011    right    Candidal vulvovaginitis     Endometrial hyperplasia     Epithelial cyst     benign, ovary    History of radiation therapy 2011    right breast cancer    Hyperlipidemia     Hypertension     Internal hemorrhoids     Knee tendonitis     Ovarian cyst      Past Surgical History:   Procedure Laterality Date    BILATERAL SALPINGOOPHORECTOMY      onset: 7/23/13    BREAST BIOPSY Right 06/13/2006    benign    BREAST BIOPSY Right 03/02/2011    malignant    BREAST LUMPECTOMY Right 03/30/2011    malignant    CATARACT EXTRACTION W/  INTRAOCULAR LENS IMPLANT Right     phacoemulsification   Onset: 10/27/14    CHOLECYSTECTOMY      INTRAOPERATIVE RADIATION THERAPY (IORT)      SKIN LESION EXCISION Right     breast, single lesion    TONSILLECTOMY AND ADENOIDECTOMY       Allergies   Allergen Reactions    Sulfa Antibiotics     Pneumococcal Polysaccharide Vaccine Rash and Edema     Family History   Problem Relation Age of Onset    Stomach cancer Mother     No Known Problems Father     Cervical cancer Sister     No Known Problems Daughter     No Known Problems Maternal Grandmother     No Known Problems Maternal Grandfather     No Known Problems Paternal Grandmother     No Known Problems Paternal Grandfather      Social History     Socioeconomic History    Marital status:      Spouse name: Not on file    Number of children: 3    Years of education: Not on file    Highest education level: Not on file   Occupational History    Not on file   Tobacco Use    Smoking status: Former Smoker     Types: Cigarettes     Start date:      Quit date:      Years since quittin 4    Smokeless tobacco: Never Used   Vaping Use    Vaping Use: Never used   Substance and Sexual Activity    Alcohol use: No     Comment: (history)    Drug use: No    Sexual activity: Not Currently   Other Topics Concern    Not on file   Social History Narrative    Not on file     Social Determinants of Health     Financial Resource Strain: Low Risk     Difficulty of Paying Living Expenses: Not hard at all   Food Insecurity: No Food Insecurity    Worried About Running Out of Food in the Last Year: Never true    Anatoly of Food in the Last Year: Never true   Transportation Needs: No Transportation Needs    Lack of Transportation (Medical): No    Lack of Transportation (Non-Medical): No   Physical Activity: Inactive    Days of Exercise per Week: 0 days    Minutes of Exercise per Session: 0 min   Stress: No Stress Concern Present    Feeling of Stress : Not at all   Social Connections: Moderately Isolated    Frequency of Communication with Friends and Family: More than three times a week    Frequency of Social Gatherings with Friends and Family: Twice a week    Attends [employer] Services: Never    Active Member of Clubs or Organizations:  Yes    Attends Club or Organization Meetings: More than 4 times per year    Marital Status:  Intimate Partner Violence: Not At Risk    Fear of Current or Ex-Partner: No    Emotionally Abused: No    Physically Abused: No    Sexually Abused: No     Current Outpatient Medications on File Prior to Visit   Medication Sig Dispense Refill    amitriptyline (ELAVIL) 25 mg tablet Take 1 tablet (25 mg total) by mouth daily at bedtime 90 tablet 1    Cholecalciferol (VITAMIN D3) 2000 units capsule Take 2,000 Units by mouth daily        losartan (COZAAR) 50 mg tablet Take 1 tablet (50 mg total) by mouth daily 30 tablet 5    Multiple Vitamins-Minerals (ICAPS PO) Take 1 capsule by mouth 2 (two) times a day      simvastatin (ZOCOR) 10 mg tablet Take 1 tablet (10 mg total) by mouth daily at bedtime 90 tablet 1     No current facility-administered medications on file prior to visit  Review of Systems   Constitutional: Negative  HENT: Negative  Eyes: Negative  Respiratory: Negative  Negative for apnea, cough, choking, chest tightness, shortness of breath, wheezing and stridor  Cardiovascular: Positive for chest pain  Negative for palpitations and leg swelling  Gastrointestinal: Negative  Endocrine: Negative  Genitourinary: Negative  Musculoskeletal: Negative  Skin: Negative  Allergic/Immunologic: Negative  Neurological: Negative  Hematological: Negative  Psychiatric/Behavioral: Negative  Objective:  Vitals:    06/21/21 1139   BP: 120/80   BP Location: Left arm   Patient Position: Sitting   Cuff Size: Standard   Pulse: 92   Resp: 16   Temp: 97 7 °F (36 5 °C)   TempSrc: Tympanic   SpO2: 98%   Weight: 78 9 kg (174 lb)   Height: 5' 2" (1 575 m)     Body mass index is 31 83 kg/m²  Physical Exam  Constitutional:       Appearance: She is well-developed  HENT:      Head: Normocephalic and atraumatic  Mouth/Throat:      Pharynx: No oropharyngeal exudate     Eyes:      Conjunctiva/sclera: Conjunctivae normal       Pupils: Pupils are equal, round, and reactive to light    Neck:      Thyroid: No thyromegaly  Vascular: No JVD  Trachea: No tracheal deviation  Cardiovascular:      Rate and Rhythm: Normal rate and regular rhythm  Heart sounds: Normal heart sounds  No murmur heard  No friction rub  No gallop  Pulmonary:      Effort: Pulmonary effort is normal  No respiratory distress  Breath sounds: Normal breath sounds  No stridor  No wheezing or rales  Chest:      Chest wall: No tenderness  Abdominal:      General: Bowel sounds are normal  There is no distension  Palpations: Abdomen is soft  There is no mass  Tenderness: There is no abdominal tenderness  There is no guarding or rebound  Musculoskeletal:         General: Tenderness present  No deformity  Normal range of motion  Cervical back: Normal range of motion  Comments: There is tenderness over the anterior ribs bilaterally to palpation  There is no obvious deformity  There is tenderness over the sternum to palpation  Lymphadenopathy:      Cervical: No cervical adenopathy  Skin:     General: Skin is warm and dry  Neurological:      Mental Status: She is alert and oriented to person, place, and time  Cranial Nerves: No cranial nerve deficit  Motor: No abnormal muscle tone  Coordination: Coordination normal       Deep Tendon Reflexes: Reflexes are normal and symmetric  Reflexes normal          EKG: normal EKG, normal sinus rhythm, unchanged from previous tracings

## 2021-06-21 NOTE — PROGRESS NOTES
Assessment/Plan:  Problem List Items Addressed This Visit        Musculoskeletal and Integument    Acute costochondritis - Primary     The patient's chest pain is reproducible and is consistent with a costochondritis  We will start her on the prednisone taper as ordered to help with the inflammation  She will avoid heavy lifting and pushing and pulling  She can ice the area periodically  Her EKG today in the office was normal   We will continue to monitor her closely  We will see her back as scheduled  Relevant Medications    predniSONE 20 mg tablet      Other Visit Diagnoses     Chest pain, unspecified type        Relevant Medications    predniSONE 20 mg tablet    Other Relevant Orders    POCT ECG (Completed)          No follow-ups on file  Subjective:   Chief Complaint   Patient presents with    Chest Pain        Patient ID: Annabella Mahajan is a 79 y o  female presents today for follow-up of anterior chest wall pain  Annabella Mahajan is a 79 y o  female who presents today for follow-up from her visit on 6/18/2021 with worsening anterior chest wall pain  Her x-rays came back negative  She started with increased pain in her chest wall since last night- it was worse after lifting her watering can  There is discomfort with taking a deep breath  Last pm - It hurts with lifting her arm- She is trying icy hot and cold compresses and neither helped  There is pain with sitting up and laying down  It feels bruised  There is no heartburn or belching and there is no nausea or vomiting  The discomfort is mostly with change in position and with lifting her arms  It is a sharp pain  She gets sharp pains when she coughs or takes a deep breath in  There is no shortness of breath  There are no palpitations  She is taking Advil with no relief  The cough has almost completely subsided since discontinuing the ACE-inhibitor in switching her over to the ARB    She is feeling much better in that respect  The following portions of the patient's history were reviewed and updated as appropriate: allergies, current medications, past family history, past medical history, past social history, past surgical history and problem list   Patient Active Problem List   Diagnosis    Carcinoma of breast ([facility] [address] )    Adverse reaction to pneumococcal vaccine    Anxiety    Cataract, bilateral    Chronic inflammatory disease of uterus    Hyperlipidemia    Pulmonary nodule seen on imaging study    Pes planus    Sleep disorder    Chronic kidney disease (CKD) stage G3a/A1, moderately decreased glomerular filtration rate (GFR) between 45-59 mL/min/1 73 square meter and albuminuria creatinine ratio less than 30 mg/g (HCC)    Essential hypertension    Osteopenia of multiple sites    Cough due to ACE inhibitor    Acute costochondritis     Past Medical History:   Diagnosis Date    Abnormal weight loss     Allergic reaction     Anxiety     Breast cancer, right ([facility] [address] ) 2011    right    Candidal vulvovaginitis     Endometrial hyperplasia     Epithelial cyst     benign, ovary    History of radiation therapy 2011    right breast cancer    Hyperlipidemia     Hypertension     Internal hemorrhoids     Knee tendonitis     Ovarian cyst      Past Surgical History:   Procedure Laterality Date    BILATERAL SALPINGOOPHORECTOMY      onset: 7/23/13    BREAST BIOPSY Right 06/13/2006    benign    BREAST BIOPSY Right 03/02/2011    malignant    BREAST LUMPECTOMY Right 03/30/2011    malignant    CATARACT EXTRACTION W/  INTRAOCULAR LENS IMPLANT Right     phacoemulsification   Onset: 10/27/14    CHOLECYSTECTOMY      INTRAOPERATIVE RADIATION THERAPY (IORT)      SKIN LESION EXCISION Right     breast, single lesion    TONSILLECTOMY AND ADENOIDECTOMY       Allergies   Allergen Reactions    Sulfa Antibiotics     Pneumococcal Polysaccharide Vaccine Rash and Edema     Family History   Problem Relation Age of Onset    Stomach cancer Mother     No Known Problems Father     Cervical cancer Sister     No Known Problems Daughter     No Known Problems Maternal Grandmother     No Known Problems Maternal Grandfather     No Known Problems Paternal Grandmother     No Known Problems Paternal Grandfather      Social History     Socioeconomic History    Marital status:      Spouse name: Not on file    Number of children: 3    Years of education: Not on file    Highest education level: Not on file   Occupational History    Not on file   Tobacco Use    Smoking status: Former Smoker     Types: Cigarettes     Start date:      Quit date:      Years since quittin 4    Smokeless tobacco: Never Used   Vaping Use    Vaping Use: Never used   Substance and Sexual Activity    Alcohol use: No     Comment: (history)    Drug use: No    Sexual activity: Not Currently   Other Topics Concern    Not on file   Social History Narrative    Not on file     Social Determinants of Health     Financial Resource Strain: Low Risk     Difficulty of Paying Living Expenses: Not hard at all   Food Insecurity: No Food Insecurity    Worried About Running Out of Food in the Last Year: Never true    Anatoly of Food in the Last Year: Never true   Transportation Needs: No Transportation Needs    Lack of Transportation (Medical): No    Lack of Transportation (Non-Medical): No   Physical Activity: Inactive    Days of Exercise per Week: 0 days    Minutes of Exercise per Session: 0 min   Stress: No Stress Concern Present    Feeling of Stress : Not at all   Social Connections: Moderately Isolated    Frequency of Communication with Friends and Family: More than three times a week    Frequency of Social Gatherings with Friends and Family: Twice a week    Attends Pentecostalism Services: Never    Active Member of Clubs or Organizations:  Yes    Attends Club or Organization Meetings: More than 4 times per year    Marital Status:  Intimate Partner Violence: Not At Risk    Fear of Current or Ex-Partner: No    Emotionally Abused: No    Physically Abused: No    Sexually Abused: No     Current Outpatient Medications on File Prior to Visit   Medication Sig Dispense Refill    amitriptyline (ELAVIL) 25 mg tablet Take 1 tablet (25 mg total) by mouth daily at bedtime 90 tablet 1    Cholecalciferol (VITAMIN D3) 2000 units capsule Take 2,000 Units by mouth daily        losartan (COZAAR) 50 mg tablet Take 1 tablet (50 mg total) by mouth daily 30 tablet 5    Multiple Vitamins-Minerals (ICAPS PO) Take 1 capsule by mouth 2 (two) times a day      simvastatin (ZOCOR) 10 mg tablet Take 1 tablet (10 mg total) by mouth daily at bedtime 90 tablet 1     No current facility-administered medications on file prior to visit  Review of Systems   Constitutional: Negative  HENT: Negative  Eyes: Negative  Respiratory: Negative  Negative for apnea, cough, choking, chest tightness, shortness of breath, wheezing and stridor  Cardiovascular: Positive for chest pain  Negative for palpitations and leg swelling  Gastrointestinal: Negative  Endocrine: Negative  Genitourinary: Negative  Musculoskeletal: Negative  Skin: Negative  Allergic/Immunologic: Negative  Neurological: Negative  Hematological: Negative  Psychiatric/Behavioral: Negative  Objective:  Vitals:    06/21/21 1139   BP: 120/80   BP Location: Left arm   Patient Position: Sitting   Cuff Size: Standard   Pulse: 92   Resp: 16   Temp: 97 7 °F (36 5 °C)   TempSrc: Tympanic   SpO2: 98%   Weight: 78 9 kg (174 lb)   Height: 5' 2" (1 575 m)     Body mass index is 31 83 kg/m²  Physical Exam  Constitutional:       Appearance: She is well-developed  HENT:      Head: Normocephalic and atraumatic  Mouth/Throat:      Pharynx: No oropharyngeal exudate     Eyes:      Conjunctiva/sclera: Conjunctivae normal       Pupils: Pupils are equal, round, and reactive to light    Neck:      Thyroid: No thyromegaly  Vascular: No JVD  Trachea: No tracheal deviation  Cardiovascular:      Rate and Rhythm: Normal rate and regular rhythm  Heart sounds: Normal heart sounds  No murmur heard  No friction rub  No gallop  Pulmonary:      Effort: Pulmonary effort is normal  No respiratory distress  Breath sounds: Normal breath sounds  No stridor  No wheezing or rales  Chest:      Chest wall: No tenderness  Abdominal:      General: Bowel sounds are normal  There is no distension  Palpations: Abdomen is soft  There is no mass  Tenderness: There is no abdominal tenderness  There is no guarding or rebound  Musculoskeletal:         General: Tenderness present  No deformity  Normal range of motion  Cervical back: Normal range of motion  Comments: There is tenderness over the anterior ribs bilaterally to palpation  There is no obvious deformity  There is tenderness over the sternum to palpation  Lymphadenopathy:      Cervical: No cervical adenopathy  Skin:     General: Skin is warm and dry  Neurological:      Mental Status: She is alert and oriented to person, place, and time  Cranial Nerves: No cranial nerve deficit  Motor: No abnormal muscle tone  Coordination: Coordination normal       Deep Tendon Reflexes: Reflexes are normal and symmetric  Reflexes normal          EKG: normal EKG, normal sinus rhythm, unchanged from previous tracings

## 2021-06-22 PROBLEM — R07.89 ANTERIOR CHEST WALL PAIN: Status: RESOLVED | Noted: 2021-06-22 | Resolved: 2021-06-22

## 2021-06-22 PROBLEM — R05.8 COUGH DUE TO ACE INHIBITOR: Status: ACTIVE | Noted: 2021-06-22

## 2021-06-22 PROBLEM — T46.4X5A COUGH DUE TO ACE INHIBITOR: Status: ACTIVE | Noted: 2021-06-22

## 2021-06-22 PROBLEM — M94.0 ACUTE COSTOCHONDRITIS: Status: ACTIVE | Noted: 2021-06-22

## 2021-06-22 PROBLEM — R07.89 ANTERIOR CHEST WALL PAIN: Status: ACTIVE | Noted: 2021-06-22

## 2021-06-22 NOTE — ASSESSMENT & PLAN NOTE
The patient's chest wall pain is musculoskeletal in nature and is most likely related to her chronic cough  The pain is reproducible  We will check a chest x-ray and rib series as precaution  I advised her to try warm moist heat or ice to the area and she can take Tylenol as needed for the pain  We will monitor her closely  We will see her back as scheduled

## 2021-06-22 NOTE — ASSESSMENT & PLAN NOTE
The patient's chest pain is reproducible and is consistent with a costochondritis  We will start her on the prednisone taper as ordered to help with the inflammation  She will avoid heavy lifting and pushing and pulling  She can ice the area periodically  Her EKG today in the office was normal   We will continue to monitor her closely  We will see her back as scheduled

## 2021-06-22 NOTE — ASSESSMENT & PLAN NOTE
I believe that the patient has a chronic cough is related to her ACE-inhibitor that she is taking for her blood pressure  We are going to stop the ACE-inhibitor and change her to the losartan instead  She will take 50 mg daily and will monitor closely

## 2021-06-25 ENCOUNTER — TELEPHONE (OUTPATIENT)
Dept: FAMILY MEDICINE CLINIC | Facility: CLINIC | Age: 81
End: 2021-06-25

## 2021-06-25 ENCOUNTER — HOSPITAL ENCOUNTER (OUTPATIENT)
Dept: CT IMAGING | Facility: HOSPITAL | Age: 81
Discharge: HOME | End: 2021-06-25
Payer: MEDICARE

## 2021-06-25 ENCOUNTER — NURSE TRIAGE (OUTPATIENT)
Dept: OTHER | Facility: OTHER | Age: 81
End: 2021-06-25

## 2021-06-25 DIAGNOSIS — M89.9 LYTIC LESION OF BONE ON X-RAY: Primary | ICD-10-CM

## 2021-06-25 DIAGNOSIS — Z12.31 ENCOUNTER FOR SCREENING MAMMOGRAM FOR BREAST CANCER: ICD-10-CM

## 2021-06-25 DIAGNOSIS — R07.89 ANTERIOR CHEST WALL PAIN: Primary | ICD-10-CM

## 2021-06-25 DIAGNOSIS — N13.30 HYDRONEPHROSIS OF RIGHT KIDNEY: ICD-10-CM

## 2021-06-25 DIAGNOSIS — R07.89 ANTERIOR CHEST WALL PAIN: ICD-10-CM

## 2021-06-25 DIAGNOSIS — D48.0: ICD-10-CM

## 2021-06-25 DIAGNOSIS — C79.51 BONE METASTASIS (HCC): ICD-10-CM

## 2021-06-25 PROBLEM — M89.8X9 LYTIC LESION OF BONE ON X-RAY: Status: ACTIVE | Noted: 2021-06-25

## 2021-06-25 PROCEDURE — G1004 CDSM NDSC: HCPCS

## 2021-06-25 PROCEDURE — 71250 CT THORAX DX C-: CPT

## 2021-06-25 RX ORDER — NAPROXEN 500 MG/1
500 TABLET ORAL 2 TIMES DAILY WITH MEALS
Qty: 60 TABLET | Refills: 0 | Status: SHIPPED | OUTPATIENT
Start: 2021-06-25 | End: 2021-07-22 | Stop reason: SDUPTHER

## 2021-06-25 NOTE — TELEPHONE ENCOUNTER
Reason for Disposition   [1] Caller requesting NON-URGENT health information AND [2] PCP's office is the best resource    Answer Assessment - Initial Assessment Questions  1   REASON FOR CALL or QUESTION: "What is your reason for calling today?" or "How can I best help you?" or "What question do you have that I can help answer?"      "I would like to discuss my ct scan results"    Protocols used: INFORMATION ONLY CALL - NO TRIAGE-ADULT-

## 2021-06-25 NOTE — TELEPHONE ENCOUNTER
Regarding: results   ----- Message from Gustavo Meadows sent at 6/25/2021  5:53 PM EDT -----  "I'm reading my CT Scan results and it's pretty scary I don't know why my doctor hasn't called me   I need someone to explain"

## 2021-06-25 NOTE — TELEPHONE ENCOUNTER
I called and spoke with the patient on 6/25/2021  She is still experiencing anterior chest wall pain  She is still frequently clearing her throat as well  She had little relief from the prednisone  She does have some pleuritic chest pain  I advised her that I am concerned about her discomfort  She denies any true shortness of breath  I am going to arrange a CT of the chest today for follow up

## 2021-06-25 NOTE — TELEPHONE ENCOUNTER
I did review the results with the patient on 6/25/2021  Please see the telephone note from that day

## 2021-06-25 NOTE — TELEPHONE ENCOUNTER
Patient is still having a lot of pain in her chest she took the last day of two pills of prednisone and is now taking 1  She wanted to see if there was anything else that could be done  Please advise patent can be reached at [phone]

## 2021-06-25 NOTE — TELEPHONE ENCOUNTER
Spoke with the patient on 6/25/2021 and reviewed the results of the CT scan of her chest   Unfortunately, it demonstrates a lytic lesion involving the upper half of the body of the sternum measuring 4 x 1 5 cm  There are no additional lytic lesions  There are no for rib fractures  There are no lesions in the lung  She also has a chronic right hydronephrosis that is partially visualized  I advised that the lesion is suspicious for metastatic disease  I am going to send her for additional testing as ordered to look for primary source  We will follow up closely with the results  I told her to stop taking the prednisone and we sent in the naproxen for inflammation and pain  She will call with any worsening symptoms

## 2021-06-30 ENCOUNTER — PREP FOR PROCEDURE (OUTPATIENT)
Dept: INTERVENTIONAL RADIOLOGY/VASCULAR | Facility: CLINIC | Age: 81
End: 2021-06-30

## 2021-06-30 ENCOUNTER — APPOINTMENT (OUTPATIENT)
Dept: LAB | Facility: HOSPITAL | Age: 81
End: 2021-06-30
Payer: MEDICARE

## 2021-06-30 DIAGNOSIS — D48.0: Primary | ICD-10-CM

## 2021-06-30 DIAGNOSIS — N13.30 HYDRONEPHROSIS OF RIGHT KIDNEY: ICD-10-CM

## 2021-06-30 DIAGNOSIS — M89.9 LYTIC LESION OF BONE ON X-RAY: ICD-10-CM

## 2021-06-30 DIAGNOSIS — D48.0: ICD-10-CM

## 2021-06-30 LAB
ALBUMIN SERPL BCP-MCNC: 3.7 G/DL (ref 3.5–5)
ALP SERPL-CCNC: 99 U/L (ref 46–116)
ALT SERPL W P-5'-P-CCNC: 23 U/L (ref 12–78)
ANION GAP SERPL CALCULATED.3IONS-SCNC: 11 MMOL/L (ref 4–13)
AST SERPL W P-5'-P-CCNC: 12 U/L (ref 5–45)
BASOPHILS # BLD AUTO: 0.04 THOUSANDS/ΜL (ref 0–0.1)
BASOPHILS NFR BLD AUTO: 0 % (ref 0–1)
BILIRUB SERPL-MCNC: 0.7 MG/DL (ref 0.2–1)
BUN SERPL-MCNC: 17 MG/DL (ref 5–25)
CALCIUM SERPL-MCNC: 8.8 MG/DL (ref 8.3–10.1)
CHLORIDE SERPL-SCNC: 107 MMOL/L (ref 100–108)
CO2 SERPL-SCNC: 26 MMOL/L (ref 21–32)
CREAT SERPL-MCNC: 1.05 MG/DL (ref 0.6–1.3)
EOSINOPHIL # BLD AUTO: 0.43 THOUSAND/ΜL (ref 0–0.61)
EOSINOPHIL NFR BLD AUTO: 5 % (ref 0–6)
ERYTHROCYTE [DISTWIDTH] IN BLOOD BY AUTOMATED COUNT: 14.3 % (ref 11.6–15.1)
GFR SERPL CREATININE-BSD FRML MDRD: 50 ML/MIN/1.73SQ M
GLUCOSE P FAST SERPL-MCNC: 129 MG/DL (ref 65–99)
HCT VFR BLD AUTO: 45.1 % (ref 34.8–46.1)
HGB BLD-MCNC: 14.6 G/DL (ref 11.5–15.4)
IMM GRANULOCYTES # BLD AUTO: 0.01 THOUSAND/UL (ref 0–0.2)
IMM GRANULOCYTES NFR BLD AUTO: 0 % (ref 0–2)
LYMPHOCYTES # BLD AUTO: 1.37 THOUSANDS/ΜL (ref 0.6–4.47)
LYMPHOCYTES NFR BLD AUTO: 15 % (ref 14–44)
MCH RBC QN AUTO: 27.4 PG (ref 26.8–34.3)
MCHC RBC AUTO-ENTMCNC: 32.4 G/DL (ref 31.4–37.4)
MCV RBC AUTO: 85 FL (ref 82–98)
MONOCYTES # BLD AUTO: 0.63 THOUSAND/ΜL (ref 0.17–1.22)
MONOCYTES NFR BLD AUTO: 7 % (ref 4–12)
NEUTROPHILS # BLD AUTO: 6.79 THOUSANDS/ΜL (ref 1.85–7.62)
NEUTS SEG NFR BLD AUTO: 73 % (ref 43–75)
NRBC BLD AUTO-RTO: 0 /100 WBCS
PLATELET # BLD AUTO: 228 THOUSANDS/UL (ref 149–390)
PMV BLD AUTO: 10.5 FL (ref 8.9–12.7)
POTASSIUM SERPL-SCNC: 4 MMOL/L (ref 3.5–5.3)
PROT SERPL-MCNC: 7.3 G/DL (ref 6.4–8.2)
RBC # BLD AUTO: 5.32 MILLION/UL (ref 3.81–5.12)
SODIUM SERPL-SCNC: 144 MMOL/L (ref 136–145)
WBC # BLD AUTO: 9.27 THOUSAND/UL (ref 4.31–10.16)

## 2021-06-30 PROCEDURE — 84165 PROTEIN E-PHORESIS SERUM: CPT | Performed by: PATHOLOGY

## 2021-06-30 PROCEDURE — 84165 PROTEIN E-PHORESIS SERUM: CPT

## 2021-06-30 PROCEDURE — 36415 COLL VENOUS BLD VENIPUNCTURE: CPT

## 2021-06-30 PROCEDURE — 80053 COMPREHEN METABOLIC PANEL: CPT

## 2021-06-30 PROCEDURE — 85025 COMPLETE CBC W/AUTO DIFF WBC: CPT

## 2021-06-30 RX ORDER — SODIUM CHLORIDE 9 MG/ML
75 INJECTION, SOLUTION INTRAVENOUS CONTINUOUS
Status: CANCELLED | OUTPATIENT
Start: 2021-06-30

## 2021-07-02 ENCOUNTER — HOSPITAL ENCOUNTER (OUTPATIENT)
Dept: CT IMAGING | Facility: HOSPITAL | Age: 81
Discharge: HOME | End: 2021-07-02
Payer: MEDICARE

## 2021-07-02 DIAGNOSIS — D48.0: ICD-10-CM

## 2021-07-02 DIAGNOSIS — C79.51 BONE METASTASIS (HCC): ICD-10-CM

## 2021-07-02 DIAGNOSIS — M89.9 LYTIC LESION OF BONE ON X-RAY: ICD-10-CM

## 2021-07-02 DIAGNOSIS — N13.30 HYDRONEPHROSIS OF RIGHT KIDNEY: ICD-10-CM

## 2021-07-02 DIAGNOSIS — R07.89 ANTERIOR CHEST WALL PAIN: ICD-10-CM

## 2021-07-02 LAB
ALBUMIN SERPL ELPH-MCNC: 4.13 G/DL (ref 3.5–5)
ALBUMIN SERPL ELPH-MCNC: 57.3 % (ref 52–65)
ALPHA1 GLOB SERPL ELPH-MCNC: 0.34 G/DL (ref 0.1–0.4)
ALPHA1 GLOB SERPL ELPH-MCNC: 4.7 % (ref 2.5–5)
ALPHA2 GLOB SERPL ELPH-MCNC: 0.86 G/DL (ref 0.4–1.2)
ALPHA2 GLOB SERPL ELPH-MCNC: 11.9 % (ref 7–13)
BETA GLOB ABNORMAL SERPL ELPH-MCNC: 0.46 G/DL (ref 0.4–0.8)
BETA1 GLOB SERPL ELPH-MCNC: 6.4 % (ref 5–13)
BETA2 GLOB SERPL ELPH-MCNC: 4.4 % (ref 2–8)
BETA2+GAMMA GLOB SERPL ELPH-MCNC: 0.32 G/DL (ref 0.2–0.5)
GAMMA GLOB ABNORMAL SERPL ELPH-MCNC: 1.1 G/DL (ref 0.5–1.6)
GAMMA GLOB SERPL ELPH-MCNC: 15.3 % (ref 12–22)
IGG/ALB SER: 1.34 {RATIO} (ref 1.1–1.8)
PROT PATTERN SERPL ELPH-IMP: NORMAL
PROT SERPL-MCNC: 7.2 G/DL (ref 6.4–8.2)

## 2021-07-02 PROCEDURE — 74177 CT ABD & PELVIS W/CONTRAST: CPT

## 2021-07-02 PROCEDURE — G1004 CDSM NDSC: HCPCS

## 2021-07-02 RX ADMIN — IOHEXOL 100 ML: 350 INJECTION, SOLUTION INTRAVENOUS at 07:16

## 2021-07-09 ENCOUNTER — HOSPITAL ENCOUNTER (OUTPATIENT)
Dept: CT IMAGING | Facility: HOSPITAL | Age: 81
Discharge: HOME | End: 2021-07-09
Admitting: NURSE PRACTITIONER
Payer: MEDICARE

## 2021-07-09 VITALS
BODY MASS INDEX: 31.17 KG/M2 | HEART RATE: 78 BPM | OXYGEN SATURATION: 93 % | WEIGHT: 170.4 LBS | SYSTOLIC BLOOD PRESSURE: 119 MMHG | DIASTOLIC BLOOD PRESSURE: 60 MMHG | RESPIRATION RATE: 18 BRPM | TEMPERATURE: 97.5 F

## 2021-07-09 DIAGNOSIS — D48.0: ICD-10-CM

## 2021-07-09 PROCEDURE — 88305 TISSUE EXAM BY PATHOLOGIST: CPT | Performed by: PATHOLOGY

## 2021-07-09 PROCEDURE — 77012 CT SCAN FOR NEEDLE BIOPSY: CPT

## 2021-07-09 PROCEDURE — 88333 PATH CONSLTJ SURG CYTO XM 1: CPT | Performed by: PATHOLOGY

## 2021-07-09 PROCEDURE — 99152 MOD SED SAME PHYS/QHP 5/>YRS: CPT | Performed by: RADIOLOGY

## 2021-07-09 PROCEDURE — 88361 TUMOR IMMUNOHISTOCHEM/COMPUT: CPT | Performed by: PATHOLOGY

## 2021-07-09 PROCEDURE — 88341 IMHCHEM/IMCYTCHM EA ADD ANTB: CPT | Performed by: PATHOLOGY

## 2021-07-09 PROCEDURE — 20220 BONE BIOPSY TROCAR/NDL SUPFC: CPT

## 2021-07-09 PROCEDURE — 88342 IMHCHEM/IMCYTCHM 1ST ANTB: CPT | Performed by: PATHOLOGY

## 2021-07-09 PROCEDURE — 20220 BONE BIOPSY TROCAR/NDL SUPFC: CPT | Performed by: RADIOLOGY

## 2021-07-09 PROCEDURE — 77012 CT SCAN FOR NEEDLE BIOPSY: CPT | Performed by: RADIOLOGY

## 2021-07-09 PROCEDURE — 99152 MOD SED SAME PHYS/QHP 5/>YRS: CPT

## 2021-07-09 PROCEDURE — 99153 MOD SED SAME PHYS/QHP EA: CPT

## 2021-07-09 RX ORDER — MIDAZOLAM HYDROCHLORIDE 2 MG/2ML
INJECTION, SOLUTION INTRAMUSCULAR; INTRAVENOUS CODE/TRAUMA/SEDATION MEDICATION
Status: COMPLETED | OUTPATIENT
Start: 2021-07-09 | End: 2021-07-09

## 2021-07-09 RX ORDER — SODIUM CHLORIDE 9 MG/ML
75 INJECTION, SOLUTION INTRAVENOUS CONTINUOUS
Status: DISCONTINUED | OUTPATIENT
Start: 2021-07-09 | End: 2021-07-10 | Stop reason: HOSPADM

## 2021-07-09 RX ORDER — FENTANYL CITRATE 50 UG/ML
INJECTION, SOLUTION INTRAMUSCULAR; INTRAVENOUS CODE/TRAUMA/SEDATION MEDICATION
Status: COMPLETED | OUTPATIENT
Start: 2021-07-09 | End: 2021-07-09

## 2021-07-09 RX ADMIN — MIDAZOLAM 1 MG: 1 INJECTION INTRAMUSCULAR; INTRAVENOUS at 09:26

## 2021-07-09 RX ADMIN — MIDAZOLAM 0.5 MG: 1 INJECTION INTRAMUSCULAR; INTRAVENOUS at 09:56

## 2021-07-09 RX ADMIN — MIDAZOLAM 1 MG: 1 INJECTION INTRAMUSCULAR; INTRAVENOUS at 09:42

## 2021-07-09 RX ADMIN — FENTANYL CITRATE 50 MCG: 50 INJECTION, SOLUTION INTRAMUSCULAR; INTRAVENOUS at 09:42

## 2021-07-09 RX ADMIN — MIDAZOLAM 1 MG: 1 INJECTION INTRAMUSCULAR; INTRAVENOUS at 09:21

## 2021-07-09 RX ADMIN — SODIUM CHLORIDE 75 ML/HR: 0.9 INJECTION, SOLUTION INTRAVENOUS at 08:14

## 2021-07-09 RX ADMIN — FENTANYL CITRATE 50 MCG: 50 INJECTION, SOLUTION INTRAMUSCULAR; INTRAVENOUS at 09:21

## 2021-07-09 RX ADMIN — FENTANYL CITRATE 50 MCG: 50 INJECTION, SOLUTION INTRAMUSCULAR; INTRAVENOUS at 09:26

## 2021-07-09 NOTE — DISCHARGE INSTRUCTIONS
POST BIOPSY    Care after your procedure:    1  Limit your activities for 24 hours after your biopsy  2  No driving day of biopsy  3  Return to your normal diet  Small sips of flat soda will help with mild nausea  4  Remove band-aid or dressing 24 hours after procedure  Contact Interventional Radiology at [phone] [facility] PATIENTS: Contact Interventional Radiology at [phone]) [facility] PATIENTS: Contact Interventional Radiology at [phone]) if:    1  Difficulty breathing, nausea or vomiting  2  Chills or fever above 101 degrees F      3  Pain at biopsy site not relieved by medication  4  Develop any redness, swelling, heat, unusual drainage, heavy bruising or bleeding from biopsy site  Procedural Sedation   WHAT YOU NEED TO KNOW:   Procedural sedation is medicine used during procedures to help you feel relaxed and calm  You will remember little to none of the procedure  After sedation you may feel tired, weak, or unsteady on your feet  You may also have trouble concentrating or short-term memory loss  These symptoms should go away in 24 hours or less  DISCHARGE INSTRUCTIONS:     Call 911 or have someone else call for any of the following:   · You have sudden trouble breathing      · You cannot be woken  Contact Interventional Radiology at [phone]   [facility] PATIENTS: Contact Interventional Radiology at [phone]) [facility] PATIENTS: Contact Interventional Radiology at [phone]) if any of the following occur:     · You have a severe headache or dizziness      · Your heart is beating faster than usual     · You have a fever or chills      · Your skin is itchy, swollen, or you have a rash      · You have nausea or are vomiting for more than 8 hours after the procedure       · You have questions or concerns about your condition or care  Self-care:   · Have someone stay with you for 24 hours  This person can drive you to errands and help you do things around the house  This person can also watch for problems       · Rest and do quiet activities for 24 hours  Do not exercise, ride a bike, or play sports  Stand up slowly to prevent dizziness and falls  Take short walks around the house with another person  Slowly return to your usual activities the next day       · Do not drive or use dangerous machines or tools for 24 hours  You may injure yourself or others  Examples include a lawnmower, saw, or drill  Do not return to work for 24 hours if you use dangerous machines or tools for work       · Do not make important decisions for 24 hours  For example, do not sign important papers or invest money       · Drink liquids as directed  Liquids help flush the sedation medicine out of your body  Ask how much liquid to drink each day and which liquids are best for you       · Eat small, frequent meals to prevent nausea and vomiting  Start with clear liquids such as juice or broth  If you do not vomit after clear liquids, you can eat your usual foods       · Do not drink alcohol or take medicines that make you drowsy  This includes medicines that help you sleep and anxiety medicines  Ask your healthcare provider if it is safe for you to take pain medicine  Follow up with your healthcare provider as directed: Write down your questions so you remember to ask them during your visits

## 2021-07-09 NOTE — INTERVAL H&P NOTE
H&P reviewed  After examining the patient, I find no changed to the H&P since it had been written  Patient w/ CT chest findings of sternal mass c/f met  Patient did have sternal pain but is under control w/ pain med  Plan for biopsy  Patient re-evaluated   Accept as history and physical     Flynn Foss, DO/July 9, 2021/8:03 AM

## 2021-07-09 NOTE — BRIEF OP NOTE (RAD/CATH)
Sternal Mass Biopsy Procedure Note    PATIENT NAME: Allison Silverman  : 1940  MRN: [id number]     Pre-op Diagnosis:   1  Neoplasm of uncertain behavior of sternum      Post-op Diagnosis:   1  Neoplasm of uncertain behavior of sternum        Surgeon:   Irene Monson DO  Assistants:     No qualified resident was available      Estimated Blood Loss: none  Findings: Lytic sternal mass    Specimens: blood and fragmented tissue    Complications:  none    Anesthesia: conscious sedation and local    Irene Monson DO     Date: 2021  Time: 10:06 AM

## 2021-07-09 NOTE — SEDATION DOCUMENTATION
CT guided sternal mass biopsy completed by Dr Marquis Law  [facility] start time 1019

## 2021-07-14 ENCOUNTER — TELEPHONE (OUTPATIENT)
Dept: FAMILY MEDICINE CLINIC | Facility: CLINIC | Age: 81
End: 2021-07-14

## 2021-07-14 DIAGNOSIS — C50.919 METASTATIC BREAST CANCER (HCC): Primary | ICD-10-CM

## 2021-07-14 DIAGNOSIS — M89.9 LYTIC LESION OF BONE ON X-RAY: ICD-10-CM

## 2021-07-14 DIAGNOSIS — D48.0: ICD-10-CM

## 2021-07-16 ENCOUNTER — TELEPHONE (OUTPATIENT)
Dept: FAMILY MEDICINE CLINIC | Facility: CLINIC | Age: 81
End: 2021-07-16

## 2021-07-16 NOTE — TELEPHONE ENCOUNTER
I had spoke with the patient on 7/14/2021 and advised her that the preliminary pathology came back on the lytic lesion in her sternum  Unfortunately, it is positive for metastatic breast cancer  We are still awaiting some additional receptor tests on the specimen  I did review the case with her oncologist, Dr Cami Evangelista and he will see her as scheduled in his office on 7/26/2021  He did state that most likely the patient is going to require radiation treatment to the lesion  I did refer her to Radiation oncologist as indicated  The patient understands and was given the opportunity to ask questions  Will follow along closely

## 2021-07-16 NOTE — TELEPHONE ENCOUNTER
Told patient to wait to see Dr Tom Bauer on 07/26/21 to discuss where to have the Radiation done    [email or web address]

## 2021-07-16 NOTE — TELEPHONE ENCOUNTER
Carmen Vickers called and would like to go to [facility] for her Radiation  Please send the orders there  The fax # is [phone]    Thank you

## 2021-07-22 DIAGNOSIS — R07.89 ANTERIOR CHEST WALL PAIN: ICD-10-CM

## 2021-07-22 DIAGNOSIS — D48.0: ICD-10-CM

## 2021-07-22 DIAGNOSIS — M89.9 LYTIC LESION OF BONE ON X-RAY: ICD-10-CM

## 2021-07-22 RX ORDER — NAPROXEN 500 MG/1
500 TABLET ORAL 2 TIMES DAILY WITH MEALS
Qty: 60 TABLET | Refills: 0 | Status: SHIPPED | OUTPATIENT
Start: 2021-07-22 | End: 2021-08-23 | Stop reason: SDUPTHER

## 2021-07-26 ENCOUNTER — OFFICE VISIT (OUTPATIENT)
Dept: HEMATOLOGY ONCOLOGY | Facility: HOSPITAL | Age: 81
End: 2021-07-26
Payer: MEDICARE

## 2021-07-26 VITALS
OXYGEN SATURATION: 96 % | HEART RATE: 141 BPM | WEIGHT: 171 LBS | BODY MASS INDEX: 31.47 KG/M2 | DIASTOLIC BLOOD PRESSURE: 78 MMHG | TEMPERATURE: 97.3 F | HEIGHT: 62 IN | SYSTOLIC BLOOD PRESSURE: 132 MMHG | RESPIRATION RATE: 16 BRPM

## 2021-07-26 DIAGNOSIS — C50.919 METASTATIC BREAST CANCER (HCC): Primary | ICD-10-CM

## 2021-07-26 DIAGNOSIS — M89.9 LYTIC LESION OF BONE ON X-RAY: ICD-10-CM

## 2021-07-26 PROCEDURE — 99214 OFFICE O/P EST MOD 30 MIN: CPT | Performed by: INTERNAL MEDICINE

## 2021-07-26 RX ORDER — LAMOTRIGINE 25 MG/1
500 TABLET ORAL ONCE
Status: CANCELLED | OUTPATIENT
Start: 2021-08-02

## 2021-07-26 NOTE — PROGRESS NOTES
Hematology/Oncology Outpatient Follow- up Note  Kolby Silverman 79 y o  female MRN: @ Encounter: [id number]        Date:  7/26/2021    Presenting Complaint/Diagnosis : The patient presents to the office today with Patient has a history of stage IA breast cancer  The tumor was ER positive TX positive HER-2 negative  She now has stage IV disease with bony metastases which was ER positive TX negative HER2 negative  Previous Hematologic/ Oncologic History:     Resection  Adjuvant radiation  She was then put on Arimidex 1 mg once a day  Current Hematologic/ Oncologic Treatment:     the patient is here for discussion regarding treatment for metastatic ER positive breast cancer    Interval History:      The patient returns for follow-up visit  She had some discomfort in the bony area of her chest so ended up having imaging which showed a sternal metastases  The rest of her imaging showed a small lesion in the back but otherwise no evidence of metastatic disease  She has 2 osseous metastatic lesions which are biopsy proven to be ER positive breast cancer  She needs to have these radiated and then start on antiestrogen therapy  Since she has Oligometastatic metastatic disease my recommendation would be Faslodex  In combination with Xgeva because she does have bony metastases  The patient is in agreement with this as are her  today  Denies any nausea denies any vomiting denies any diarrhea  Apart from some bony discomfort the rest of her 14 point review of systems today was negative  Test Results:    Imaging: IR biopsy bone    Result Date: 7/9/2021  Narrative: INDICATION: History of breast cancer  Patient with lytic sternal mass  PROCEDURE: 1  CT-guided biopsy     Impression: Successful percutaneous core biopsy of lytic sternal mass  A full pathology report will follow   If there are limited treatment options, CT guided cryoablation could be considered for both treatment and possible palliative purposes if the patient has/develops refractory pain  _______________________________________________________________ COMPARISON: CT chest 6/25/2021 PROCEDURE DETAILS: Operators: Dr Gosia Gutiérrez Anesthesia: Moderate sedation was provided for 45 minutes  Hemodynamic parameters were continuously monitored by IR nursing  Local anesthesia  Medications: 1% lidocaine, fentanyl, Versed This examination, like all CT scans performed in the [facility], was performed utilizing techniques to minimize radiation dose exposure, including the use of iterative reconstruction and automated exposure control  COMMENTS: The patient was placed supine on the CT scanner  After axial images were obtained through the region of interest, an area of skin was then marked, prepped and draped in the usual sterile fashion  A preprocedure timeout was then performed per [facility]'s  protocol  With the use of intermittent CT guidance, a 10-gauge cannula was advanced through the sternal cortex from right chest approach with the Arrow On-Control drill  Next, multiple biopsies were obtained  Of note, soft tissue and bone solid cores were not readily obtained  A bloody aspirate with tissue fragments were obtained and submitted to pathology  Workstation performed: [id number]     CT abdomen pelvis w contrast    Result Date: 7/9/2021  Narrative: CT ABDOMEN AND PELVIS WITH IV CONTRAST INDICATION:   M89 9: Disorder of bone, unspecified D48 0: Neoplasm of uncertain behavior of bone and articular cartilage N13 30: Unspecified hydronephrosis R07 89: Other chest pain C79 51: Secondary malignant neoplasm of bone  Sternal mass  Evaluate for abdominal pelvic primary tumor  COMPARISON:  Chest CT performed June 25, 2021  CT of the chest, abdomen and pelvis performed Lacy 3, 2013 TECHNIQUE:  CT examination of the abdomen and pelvis was performed   Axial, sagittal, and coronal 2D reformatted images were created from the source data and submitted for interpretation  Radiation dose length product (DLP) for this visit:  0699 646 28 85 mGy-cm   This examination, like all CT scans performed in the [facility], was performed utilizing techniques to minimize radiation dose exposure, including the use of iterative reconstruction and automated exposure control  IV Contrast:  100 mL of iohexol (OMNIPAQUE) Enteric Contrast:  Enteric contrast was not administered  FINDINGS: ABDOMEN LOWER CHEST:  Mild bibasilar atelectasis  Small sliding-type hiatal hernia  LIVER/BILIARY TREE:  Hepatic cysts  No solid hepatic mass  Normal hepatic contours  No biliary dilatation  GALLBLADDER:  Gallbladder is surgically absent  SPLEEN:  Unremarkable  PANCREAS:  Unremarkable  ADRENAL GLANDS:  Unremarkable  KIDNEYS/URETERS:  Cortical and parapelvic renal cysts bilaterally, largest containing thin septations at the medial lower pole the left kidney measuring up to 5 1 cm  No solid renal mass  No urinary tract calculus  No hydronephrosis  STOMACH AND BOWEL:  Few sigmoid diverticula without evidence for acute diverticulitis  No evidence of suspicious mass associated with stomach, small intestine, or large intestine  No bowel obstruction  APPENDIX:  A normal appendix was visualized  ABDOMINOPELVIC CAVITY:  No ascites  No pneumoperitoneum  No lymphadenopathy  VESSELS:  Unremarkable for patient's age  PELVIS REPRODUCTIVE ORGANS:  Unremarkable for patient's age  URINARY BLADDER:  Unremarkable  ABDOMINAL WALL/INGUINAL REGIONS:  There is a small fat-containing umbilical hernia, unchanged from previous examination  OSSEOUS STRUCTURES:  A 9 mm lucent lesion is seen in the anterior inferior L3 vertebral body just to the right of midline  This was not present in June 2021 and though indeterminate, in light of the lytic lesion in the sternum, the finding is suspicious  for osseous metastatic disease or myeloma  No other lytic osseous abnormality is noted  No acute fracture  Impression: No CT findings in the abdomen or pelvis to account for primary malignancy  No evidence for soft tissue metastatic disease in the abdomen or pelvis  Indeterminate solitary 8 mm lucency in the anterior inferior L3 endplate which, in light of the lytic lesion in the sternum, is suspicious for osseous metastatic disease or myeloma  Workstation performed: [id number]       Labs:   Lab Results   Component Value Date    WBC 9 27 06/30/2021    HGB 14 6 06/30/2021    HCT 45 1 06/30/2021    MCV 85 06/30/2021     06/30/2021     Lab Results   Component Value Date     09/08/2015    K 4 0 06/30/2021     06/30/2021    CO2 26 06/30/2021    ANIONGAP 6 09/08/2015    BUN 17 06/30/2021    CREATININE 1 05 06/30/2021    GLUCOSE 115 09/08/2015    GLUF 129 (H) 06/30/2021    CALCIUM 8 8 06/30/2021    AST 12 06/30/2021    ALT 23 06/30/2021    ALKPHOS 99 06/30/2021    PROT 7 3 09/08/2015    BILITOT 0 66 09/08/2015    EGFR 50 06/30/2021         Lab Results   Component Value Date    SPEP See Comment 06/30/2021     ROS: As stated in the history of present illness otherwise his 14 point review of systems today was negative        Active Problems:   Patient Active Problem List   Diagnosis    Metastatic breast cancer ([facility] [address] )    Adverse reaction to pneumococcal vaccine    Anxiety    Cataract, bilateral    Chronic inflammatory disease of uterus    Hyperlipidemia    Pulmonary nodule seen on imaging study    Pes planus    Sleep disorder    Chronic kidney disease (CKD) stage G3a/A1, moderately decreased glomerular filtration rate (GFR) between 45-59 mL/min/1 73 square meter and albuminuria creatinine ratio less than 30 mg/g (HCC)    Essential hypertension    Osteopenia of multiple sites    Cough due to ACE inhibitor    Acute costochondritis    Lytic lesion of bone on x-ray    Neoplasm of uncertain behavior of sternum    Hydronephrosis of right kidney       Past Medical History:   Past Medical History: Diagnosis Date    Abnormal weight loss     Allergic reaction     Anxiety     Breast cancer, right ([address] )     right    Candidal vulvovaginitis     Endometrial hyperplasia     Epithelial cyst     benign, ovary    History of radiation therapy 2011    right breast cancer    Hyperlipidemia     Hypertension     Internal hemorrhoids     Knee tendonitis     Ovarian cyst        Surgical History:   Past Surgical History:   Procedure Laterality Date    BILATERAL SALPINGOOPHORECTOMY      onset: 12    BREAST BIOPSY Right 2006    benign    BREAST BIOPSY Right 2011    malignant    BREAST LUMPECTOMY Right 2011    malignant    CATARACT EXTRACTION W/  INTRAOCULAR LENS IMPLANT Right     phacoemulsification  Onset: 10/27/14    CHOLECYSTECTOMY      INTRAOPERATIVE RADIATION THERAPY ([facility])      IR BIOPSY BONE  2021    SKIN LESION EXCISION Right     breast, single lesion    TONSILLECTOMY AND ADENOIDECTOMY         Family History:    Family History   Problem Relation Age of Onset    Stomach cancer Mother     No Known Problems Father     Cervical cancer Sister     No Known Problems Daughter     No Known Problems Maternal Grandmother     No Known Problems Maternal Grandfather     No Known Problems Paternal Grandmother     No Known Problems Paternal Grandfather        Cancer-related family history includes Cervical cancer in her sister; Stomach cancer in her mother      Social History:   Social History     Socioeconomic History    Marital status:      Spouse name: Not on file    Number of children: 3    Years of education: Not on file    Highest education level: Not on file   Occupational History    Not on file   Tobacco Use    Smoking status: Former Smoker     Types: Cigarettes     Start date:      Quit date:      Years since quittin 5    Smokeless tobacco: Never Used   Vaping Use    Vaping Use: Never used   Substance and Sexual Activity    Alcohol use: No     Comment: (history)    Drug use: No    Sexual activity: Not Currently   Other Topics Concern    Not on file   Social History Narrative    Not on file     Social Determinants of Health     Financial Resource Strain: Low Risk     Difficulty of Paying Living Expenses: Not hard at all   Food Insecurity: No Food Insecurity    Worried About Running Out of Food in the Last Year: Never true    Anatoly of Food in the Last Year: Never true   Transportation Needs: No Transportation Needs    Lack of Transportation (Medical): No    Lack of Transportation (Non-Medical): No   Physical Activity: Inactive    Days of Exercise per Week: 0 days    Minutes of Exercise per Session: 0 min   Stress: No Stress Concern Present    Feeling of Stress : Not at all   Social Connections: Moderately Isolated    Frequency of Communication with Friends and Family: More than three times a week    Frequency of Social Gatherings with Friends and Family: Twice a week    Attends Mosque Services: Never    Active Member of Clubs or Organizations:  Yes    Attends Club or Organization Meetings: More than 4 times per year    Marital Status:    Intimate Partner Violence: Not At Risk    Fear of Current or Ex-Partner: No    Emotionally Abused: No    Physically Abused: No    Sexually Abused: No       Current Medications:   Current Outpatient Medications   Medication Sig Dispense Refill    amitriptyline (ELAVIL) 25 mg tablet Take 1 tablet (25 mg total) by mouth daily at bedtime 90 tablet 1    Cholecalciferol (VITAMIN D3) 2000 units capsule Take 2,000 Units by mouth daily        losartan (COZAAR) 50 mg tablet Take 1 tablet (50 mg total) by mouth daily 30 tablet 5    Multiple Vitamins-Minerals (ICAPS PO) Take 1 capsule by mouth 2 (two) times a day      naproxen (NAPROSYN) 500 mg tablet Take 1 tablet (500 mg total) by mouth 2 (two) times a day with meals 60 tablet 0    simvastatin (ZOCOR) 10 mg tablet Take 1 tablet (10 mg total) by mouth daily at bedtime 90 tablet 1    predniSONE 20 mg tablet Take 3 tablets for 2 days then 2 tablets for 3 days then 1 tablet for 3 days then stop 15 tablet 0     No current facility-administered medications for this visit  Allergies: Allergies   Allergen Reactions    Sulfa Antibiotics     Pneumococcal Polysaccharide Vaccine Rash and Edema       Physical Exam:    Body surface area is 1 79 meters squared  Wt Readings from Last 3 Encounters:   07/26/21 77 6 kg (171 lb)   07/09/21 77 3 kg (170 lb 6 4 oz)   06/21/21 78 9 kg (174 lb)        Temp Readings from Last 3 Encounters:   07/26/21 (!) 97 3 °F (36 3 °C) (Temporal)   07/09/21 97 5 °F (36 4 °C) (Temporal)   06/21/21 97 7 °F (36 5 °C) (Tympanic)        BP Readings from Last 3 Encounters:   07/26/21 132/78   07/09/21 119/60   06/21/21 120/80         Pulse Readings from Last 3 Encounters:   07/26/21 (!) 141   07/09/21 78   06/21/21 92        Physical Exam     Constitutional   General appearance: No acute distress, well appearing and well nourished  Eyes   Conjunctiva and lids: No swelling, erythema or discharge  Pupils and irises: Equal, round and reactive to light  Ears, Nose, Mouth, and Throat   External inspection of ears and nose: Normal     Nasal mucosa, septum, and turbinates: Normal without edema or erythema  Oropharynx: Normal with no erythema, edema, exudate or lesions  Pulmonary   Respiratory effort: No increased work of breathing or signs of respiratory distress  Auscultation of lungs: Clear to auscultation  Cardiovascular   Palpation of heart: Normal PMI, no thrills  Auscultation of heart: Normal rate and rhythm, normal S1 and S2, without murmurs  Examination of extremities for edema and/or varicosities: Normal     Carotid pulses: Normal     Abdomen   Abdomen: Non-tender, no masses  Liver and spleen: No hepatomegaly or splenomegaly  Lymphatic   Palpation of lymph nodes in neck: No lymphadenopathy  Musculoskeletal   Gait and station: Normal     Digits and nails: Normal without clubbing or cyanosis  Inspection/palpation of joints, bones, and muscles: Normal     Skin   Skin and subcutaneous tissue: Normal without rashes or lesions  Neurologic   Cranial nerves: Cranial nerves 2-12 intact  Sensation: No sensory loss  Psychiatric   Orientation to person, place, and time: Normal     Mood and affect: Normal         Assessment / Plan:      The patient is a pleasant 50-year-old female with a past medical history of ER positive breast cancer who completed 5 years of adjuvant treatment with aromatase inhibitor for early stage breast cancer  She was on observation then had some bony chest discomfort  Imaging revealed a lytic lesion in the sternum  Biopsy revealed ER positive breast cancer  Restaging revealed 1 more area in the spine around 8 mm that was positive but no other evidence of metastatic disease  Since her disease is ER positive I will start her on antiestrogen therapy with Faslodex  Along with Eri Sallies because she does have bony disease  I went over the risks benefits and alternatives of both drugs  I explained the side effects of antiestrogen therapy to include but not limited to hot flashes, vaginal dryness, signs and symptoms of estrogen deficiency  I explained the signs and symptoms and side effects of Xgeva to include osteonecrosis of the jaw and hypocalcemia  The patient will take calcium 1500 mg daily  She will sign consent today  I will see her back in 6 weeks  She will start treatment  She does need radiation to these 2 areas of bony involvement and she will get this done at [facility] where she he had radiation previously  I will see her back in 6 weeks  Until then if she has any questions she will call our office  Goals and Barriers:  Current Goal:  Prolong Survival from   Breast cancer ER positive  Barriers: None        Patient's Capacity to Self Care: Patient able to self care  Portions of the record may have been created with voice recognition software   Occasional wrong word or "sound a like" substitutions may have occurred due to the inherent limitations of voice recognition software   Read the chart carefully and recognize, using context, where substitutions have occurred

## 2021-07-27 ENCOUNTER — TELEPHONE (OUTPATIENT)
Dept: HEMATOLOGY ONCOLOGY | Facility: CLINIC | Age: 81
End: 2021-07-27

## 2021-07-27 NOTE — TELEPHONE ENCOUNTER
I spoke with Karyle Miser to inform her that I faxed over the two office notes from Dr Turner Prom  Informed Karyle Miser that there were no labs or biopsy's ordered from Dr Turner Prom

## 2021-07-27 NOTE — TELEPHONE ENCOUNTER
Fred Pham from [address] [place] [address] at [facility] called to request office visit note from 9- and 7-  Also any recent labs and biopsy report   Please  Fax to  [phone] and a good call back number is [phone] option 0

## 2021-07-30 ENCOUNTER — APPOINTMENT (OUTPATIENT)
Dept: LAB | Facility: CLINIC | Age: 81
End: 2021-07-30
Payer: MEDICARE

## 2021-07-30 ENCOUNTER — TELEPHONE (OUTPATIENT)
Dept: HEMATOLOGY ONCOLOGY | Facility: CLINIC | Age: 81
End: 2021-07-30

## 2021-07-30 DIAGNOSIS — M89.9 LYTIC LESION OF BONE ON X-RAY: ICD-10-CM

## 2021-07-30 DIAGNOSIS — C50.919 METASTATIC BREAST CANCER (HCC): ICD-10-CM

## 2021-07-30 LAB
ALBUMIN SERPL BCP-MCNC: 3.4 G/DL (ref 3.5–5)
ALP SERPL-CCNC: 89 U/L (ref 46–116)
ALT SERPL W P-5'-P-CCNC: 23 U/L (ref 12–78)
ANION GAP SERPL CALCULATED.3IONS-SCNC: 3 MMOL/L (ref 4–13)
AST SERPL W P-5'-P-CCNC: 18 U/L (ref 5–45)
BILIRUB SERPL-MCNC: 0.69 MG/DL (ref 0.2–1)
BUN SERPL-MCNC: 24 MG/DL (ref 5–25)
CALCIUM ALBUM COR SERPL-MCNC: 9.3 MG/DL (ref 8.3–10.1)
CALCIUM SERPL-MCNC: 8.8 MG/DL (ref 8.3–10.1)
CHLORIDE SERPL-SCNC: 108 MMOL/L (ref 100–108)
CO2 SERPL-SCNC: 27 MMOL/L (ref 21–32)
CREAT SERPL-MCNC: 1.05 MG/DL (ref 0.6–1.3)
GFR SERPL CREATININE-BSD FRML MDRD: 50 ML/MIN/1.73SQ M
GLUCOSE SERPL-MCNC: 118 MG/DL (ref 65–140)
POTASSIUM SERPL-SCNC: 4 MMOL/L (ref 3.5–5.3)
PROT SERPL-MCNC: 6.8 G/DL (ref 6.4–8.2)
SODIUM SERPL-SCNC: 138 MMOL/L (ref 136–145)

## 2021-07-30 PROCEDURE — 80053 COMPREHEN METABOLIC PANEL: CPT

## 2021-07-30 PROCEDURE — 36415 COLL VENOUS BLD VENIPUNCTURE: CPT

## 2021-07-30 NOTE — TELEPHONE ENCOUNTER
Tobias Sow is calling in from [facility] requesting to speak to [facility] regarding patient, Dr Heidi Roy can be reached back at [phone]

## 2021-08-02 ENCOUNTER — HOSPITAL ENCOUNTER (OUTPATIENT)
Dept: INFUSION CENTER | Facility: HOSPITAL | Age: 81
Discharge: HOME | End: 2021-08-02
Attending: INTERNAL MEDICINE
Payer: MEDICARE

## 2021-08-02 VITALS
BODY MASS INDEX: 31.44 KG/M2 | HEART RATE: 120 BPM | HEIGHT: 62 IN | TEMPERATURE: 97.9 F | RESPIRATION RATE: 20 BRPM | DIASTOLIC BLOOD PRESSURE: 85 MMHG | SYSTOLIC BLOOD PRESSURE: 152 MMHG | WEIGHT: 170.86 LBS

## 2021-08-02 DIAGNOSIS — M89.9 LYTIC LESION OF BONE ON X-RAY: Primary | ICD-10-CM

## 2021-08-02 DIAGNOSIS — C50.919 METASTATIC BREAST CANCER (HCC): ICD-10-CM

## 2021-08-02 PROCEDURE — 96402 CHEMO HORMON ANTINEOPL SQ/IM: CPT

## 2021-08-02 PROCEDURE — 96401 CHEMO ANTI-NEOPL SQ/IM: CPT

## 2021-08-02 RX ORDER — LAMOTRIGINE 25 MG/1
500 TABLET ORAL ONCE
Status: CANCELLED | OUTPATIENT
Start: 2021-08-16

## 2021-08-02 RX ORDER — LAMOTRIGINE 25 MG/1
500 TABLET ORAL ONCE
Status: COMPLETED | OUTPATIENT
Start: 2021-08-02 | End: 2021-08-02

## 2021-08-02 RX ADMIN — FULVESTRANT 500 MG: 50 INJECTION INTRAMUSCULAR at 13:54

## 2021-08-02 RX ADMIN — DENOSUMAB 120 MG: 120 INJECTION SUBCUTANEOUS at 13:50

## 2021-08-02 NOTE — PROGRESS NOTES
Patient here for Xgeva and Faslodex  Creatinine clearance = 35 5 ml/min, calcium = 9 3, injections given as per order  Next appointments made, refused AVS  Left unit ambulatory with steady gait

## 2021-08-16 ENCOUNTER — HOSPITAL ENCOUNTER (OUTPATIENT)
Dept: INFUSION CENTER | Facility: HOSPITAL | Age: 81
Discharge: HOME | End: 2021-08-16
Attending: INTERNAL MEDICINE
Payer: MEDICARE

## 2021-08-16 VITALS
HEART RATE: 113 BPM | TEMPERATURE: 96.8 F | DIASTOLIC BLOOD PRESSURE: 90 MMHG | SYSTOLIC BLOOD PRESSURE: 151 MMHG | RESPIRATION RATE: 18 BRPM | OXYGEN SATURATION: 98 %

## 2021-08-16 DIAGNOSIS — C50.919 METASTATIC BREAST CANCER (HCC): ICD-10-CM

## 2021-08-16 DIAGNOSIS — M89.9 LYTIC LESION OF BONE ON X-RAY: Primary | ICD-10-CM

## 2021-08-16 PROCEDURE — 96402 CHEMO HORMON ANTINEOPL SQ/IM: CPT

## 2021-08-16 RX ORDER — LAMOTRIGINE 25 MG/1
500 TABLET ORAL ONCE
Status: CANCELLED | OUTPATIENT
Start: 2021-08-30

## 2021-08-16 RX ORDER — LAMOTRIGINE 25 MG/1
500 TABLET ORAL ONCE
Status: COMPLETED | OUTPATIENT
Start: 2021-08-16 | End: 2021-08-16

## 2021-08-16 RX ADMIN — FULVESTRANT 500 MG: 50 INJECTION INTRAMUSCULAR at 11:47

## 2021-08-23 DIAGNOSIS — M89.9 LYTIC LESION OF BONE ON X-RAY: ICD-10-CM

## 2021-08-23 DIAGNOSIS — D48.0: ICD-10-CM

## 2021-08-23 DIAGNOSIS — R07.89 ANTERIOR CHEST WALL PAIN: ICD-10-CM

## 2021-08-23 RX ORDER — NAPROXEN 500 MG/1
500 TABLET ORAL 2 TIMES DAILY WITH MEALS
Qty: 60 TABLET | Refills: 0 | Status: SHIPPED | OUTPATIENT
Start: 2021-08-23 | End: 2022-02-28

## 2021-08-30 ENCOUNTER — HOSPITAL ENCOUNTER (OUTPATIENT)
Dept: INFUSION CENTER | Facility: HOSPITAL | Age: 81
Discharge: HOME | End: 2021-08-30
Attending: INTERNAL MEDICINE
Payer: MEDICARE

## 2021-08-30 VITALS
DIASTOLIC BLOOD PRESSURE: 65 MMHG | TEMPERATURE: 97.3 F | RESPIRATION RATE: 16 BRPM | HEART RATE: 105 BPM | OXYGEN SATURATION: 98 % | SYSTOLIC BLOOD PRESSURE: 139 MMHG

## 2021-08-30 DIAGNOSIS — M89.9 LYTIC LESION OF BONE ON X-RAY: Primary | ICD-10-CM

## 2021-08-30 DIAGNOSIS — C50.919 METASTATIC BREAST CANCER (HCC): ICD-10-CM

## 2021-08-30 PROCEDURE — 96402 CHEMO HORMON ANTINEOPL SQ/IM: CPT

## 2021-08-30 RX ORDER — LAMOTRIGINE 25 MG/1
500 TABLET ORAL ONCE
Status: CANCELLED | OUTPATIENT
Start: 2021-09-27

## 2021-08-30 RX ORDER — LAMOTRIGINE 25 MG/1
500 TABLET ORAL ONCE
Status: COMPLETED | OUTPATIENT
Start: 2021-08-30 | End: 2021-08-30

## 2021-08-30 RX ADMIN — FULVESTRANT 500 MG: 50 INJECTION INTRAMUSCULAR at 14:49

## 2021-08-30 NOTE — PROGRESS NOTES
Pt tolerated Faslodex injections in b/l buttocks with no adverse reaction  Pt left unit ambulatory with steady gait   Pt refused AVS

## 2021-09-07 DIAGNOSIS — E78.5 DYSLIPIDEMIA: ICD-10-CM

## 2021-09-07 RX ORDER — SIMVASTATIN 10 MG
10 TABLET ORAL
Qty: 90 TABLET | Refills: 1 | Status: SHIPPED | OUTPATIENT
Start: 2021-09-07 | End: 2022-02-28 | Stop reason: SDUPTHER

## 2021-09-09 ENCOUNTER — TELEPHONE (OUTPATIENT)
Dept: HEMATOLOGY ONCOLOGY | Facility: CLINIC | Age: 81
End: 2021-09-09

## 2021-09-10 ENCOUNTER — OFFICE VISIT (OUTPATIENT)
Dept: HEMATOLOGY ONCOLOGY | Facility: HOSPITAL | Age: 81
End: 2021-09-10
Payer: MEDICARE

## 2021-09-10 VITALS
BODY MASS INDEX: 31.1 KG/M2 | RESPIRATION RATE: 18 BRPM | HEIGHT: 62 IN | SYSTOLIC BLOOD PRESSURE: 108 MMHG | WEIGHT: 169 LBS | DIASTOLIC BLOOD PRESSURE: 72 MMHG | TEMPERATURE: 97.3 F | HEART RATE: 130 BPM | OXYGEN SATURATION: 98 %

## 2021-09-10 DIAGNOSIS — C50.919 METASTATIC BREAST CANCER (HCC): Primary | ICD-10-CM

## 2021-09-10 DIAGNOSIS — M89.9 LYTIC LESION OF BONE ON X-RAY: ICD-10-CM

## 2021-09-10 PROCEDURE — 99214 OFFICE O/P EST MOD 30 MIN: CPT | Performed by: INTERNAL MEDICINE

## 2021-09-10 PROCEDURE — 1124F ACP DISCUSS-NO DSCNMKR DOCD: CPT | Performed by: INTERNAL MEDICINE

## 2021-09-10 NOTE — PROGRESS NOTES
Hematology/Oncology Outpatient Follow- up Note  Mary Alice Silverman 79 y o  female MRN: @ Encounter: [id number]        Date:  9/10/2021    Presenting Complaint/Diagnosis :     The patient presents to the office today with Patient has a history of stage IA breast cancer  The tumor was ER positive VT positive HER-2 negative   She now has stage IV disease with bony metastases which was ER positive VT negative HER2 negative  Previous Hematologic/ Oncologic History:      Resection  Adjuvant radiation  She was then put on Arimidex 1 mg once a day      Current Hematologic/ Oncologic Treatment:      Faslodex  Every month    Xgeva    Interval History:      The patient returns for follow-up visit  She has started her new regimen and has no issues  She forgot to get her blood work drawn  She did start radiation and actually finish this up  It made her fatigued  She finished a week ago  Denies any nausea denies any vomiting denies any diarrhea  Overall is otherwise doing well  The rest of her 14 point review of systems today was negative  Test Results:    Imaging: No results found  Labs:   Lab Results   Component Value Date    WBC 9 27 06/30/2021    HGB 14 6 06/30/2021    HCT 45 1 06/30/2021    MCV 85 06/30/2021     06/30/2021     Lab Results   Component Value Date     09/08/2015    K 4 0 07/30/2021     07/30/2021    CO2 27 07/30/2021    ANIONGAP 6 09/08/2015    BUN 24 07/30/2021    CREATININE 1 05 07/30/2021    GLUCOSE 115 09/08/2015    GLUF 129 (H) 06/30/2021    CALCIUM 8 8 07/30/2021    CORRECTEDCA 9 3 07/30/2021    AST 18 07/30/2021    ALT 23 07/30/2021    ALKPHOS 89 07/30/2021    PROT 7 3 09/08/2015    BILITOT 0 66 09/08/2015    EGFR 50 07/30/2021         Lab Results   Component Value Date    SPEP See Comment 06/30/2021       ROS: As stated in the history of present illness otherwise his 14 point review of systems today was negative        Active Problems:   Patient Active Problem List Diagnosis    Metastatic breast cancer (HCC)    Adverse reaction to pneumococcal vaccine    Anxiety    Cataract, bilateral    Chronic inflammatory disease of uterus    Hyperlipidemia    Pulmonary nodule seen on imaging study    Pes planus    Sleep disorder    Chronic kidney disease (CKD) stage G3a/A1, moderately decreased glomerular filtration rate (GFR) between 45-59 mL/min/1 73 square meter and albuminuria creatinine ratio less than 30 mg/g (HCC)    Essential hypertension    Osteopenia of multiple sites    Cough due to ACE inhibitor    Acute costochondritis    Lytic lesion of bone on x-ray    Neoplasm of uncertain behavior of sternum    Hydronephrosis of right kidney       Past Medical History:   Past Medical History:   Diagnosis Date    Abnormal weight loss     Allergic reaction     Anxiety     Breast cancer, right ([address] ) 2011    right    Candidal vulvovaginitis     Endometrial hyperplasia     Epithelial cyst     benign, ovary    History of radiation therapy 2011    right breast cancer    Hyperlipidemia     Hypertension     Internal hemorrhoids     Knee tendonitis     Ovarian cyst        Surgical History:   Past Surgical History:   Procedure Laterality Date    BILATERAL SALPINGOOPHORECTOMY      onset: 7/23/13    BREAST BIOPSY Right 06/13/2006    benign    BREAST BIOPSY Right 03/02/2011    malignant    BREAST LUMPECTOMY Right 03/30/2011    malignant    CATARACT EXTRACTION W/  INTRAOCULAR LENS IMPLANT Right     phacoemulsification   Onset: 10/27/14    CHOLECYSTECTOMY      INTRAOPERATIVE RADIATION THERAPY (IORT)      IR BIOPSY BONE  7/9/2021    SKIN LESION EXCISION Right     breast, single lesion    TONSILLECTOMY AND ADENOIDECTOMY         Family History:    Family History   Problem Relation Age of Onset    Stomach cancer Mother     No Known Problems Father     Cervical cancer Sister     No Known Problems Daughter     No Known Problems Maternal Grandmother     No Known Problems Maternal Grandfather     No Known Problems Paternal Grandmother     No Known Problems Paternal Grandfather        Cancer-related family history includes Cervical cancer in her sister; Stomach cancer in her mother  Social History:   Social History     Socioeconomic History    Marital status:      Spouse name: Not on file    Number of children: 3    Years of education: Not on file    Highest education level: Not on file   Occupational History    Not on file   Tobacco Use    Smoking status: Former Smoker     Types: Cigarettes     Start date:      Quit date:      Years since quittin 7    Smokeless tobacco: Never Used   Vaping Use    Vaping Use: Never used   Substance and Sexual Activity    Alcohol use: No     Comment: (history)    Drug use: No    Sexual activity: Not Currently   Other Topics Concern    Not on file   Social History Narrative    Not on file     Social Determinants of Health     Financial Resource Strain: Low Risk     Difficulty of Paying Living Expenses: Not hard at all   Food Insecurity: No Food Insecurity    Worried About [email or web address] in the Last Year: Never true    [address] [employer] in the Last Year: Never true   Transportation Needs: No Transportation Needs    Lack of Transportation (Medical): No    Lack of Transportation (Non-Medical): No   Physical Activity: Inactive    Days of Exercise per Week: 0 days    Minutes of Exercise per Session: 0 min   Stress: No Stress Concern Present    Feeling of Stress : Not at all   Social Connections: Moderately Isolated    Frequency of Communication with Friends and Family: More than three times a week    Frequency of Social Gatherings with Friends and Family: Twice a week    Attends Mu-ism Services: Never    Active Member of Clubs or Organizations:  Yes    Attends Club or Organization Meetings: More than 4 times per year    Marital Status:    Intimate Partner Violence: Not At Risk    Fear of Current or Ex-Partner: No    Emotionally Abused: No    Physically Abused: No    Sexually Abused: No       Current Medications:   Current Outpatient Medications   Medication Sig Dispense Refill    amitriptyline (ELAVIL) 25 mg tablet Take 1 tablet (25 mg total) by mouth daily at bedtime 90 tablet 1    Cholecalciferol (VITAMIN D3) 2000 units capsule Take 2,000 Units by mouth daily        losartan (COZAAR) 50 mg tablet Take 1 tablet (50 mg total) by mouth daily 30 tablet 5    simvastatin (ZOCOR) 10 mg tablet Take 1 tablet (10 mg total) by mouth daily at bedtime 90 tablet 1    naproxen (NAPROSYN) 500 mg tablet Take 1 tablet (500 mg total) by mouth 2 (two) times a day with meals (Patient not taking: Reported on 9/10/2021) 60 tablet 0    predniSONE 20 mg tablet Take 3 tablets for 2 days then 2 tablets for 3 days then 1 tablet for 3 days then stop 15 tablet 0     No current facility-administered medications for this visit  Allergies: Allergies   Allergen Reactions    Sulfa Antibiotics     Pneumococcal Polysaccharide Vaccine Rash and Edema       Physical Exam:    Body surface area is 1 77 meters squared  Wt Readings from Last 3 Encounters:   09/10/21 76 7 kg (169 lb)   08/02/21 77 5 kg (170 lb 13 7 oz)   07/26/21 77 6 kg (171 lb)        Temp Readings from Last 3 Encounters:   09/10/21 (!) 97 3 °F (36 3 °C) (Temporal)   08/30/21 (!) 97 3 °F (36 3 °C) (Temporal)   08/16/21 (!) 96 8 °F (36 °C) (Temporal)        BP Readings from Last 3 Encounters:   09/10/21 108/72   08/30/21 139/65   08/16/21 151/90         Pulse Readings from Last 3 Encounters:   09/10/21 (!) 130   08/30/21 105   08/16/21 (!) 113           Physical Exam     Constitutional   General appearance: No acute distress, well appearing and well nourished  Eyes   Conjunctiva and lids: No swelling, erythema or discharge  Pupils and irises: Equal, round and reactive to light      Ears, Nose, Mouth, and Throat   External inspection of ears and nose: Normal     Nasal mucosa, septum, and turbinates: Normal without edema or erythema  Oropharynx: Normal with no erythema, edema, exudate or lesions  Pulmonary   Respiratory effort: No increased work of breathing or signs of respiratory distress  Auscultation of lungs: Clear to auscultation  Cardiovascular   Palpation of heart: Normal PMI, no thrills  Auscultation of heart: Normal rate and rhythm, normal S1 and S2, without murmurs  Examination of extremities for edema and/or varicosities: Normal     Carotid pulses: Normal     Abdomen   Abdomen: Non-tender, no masses  Liver and spleen: No hepatomegaly or splenomegaly  Lymphatic   Palpation of lymph nodes in neck: No lymphadenopathy  Musculoskeletal   Gait and station: Normal     Digits and nails: Normal without clubbing or cyanosis  Inspection/palpation of joints, bones, and muscles: Normal     Skin   Skin and subcutaneous tissue: Normal without rashes or lesions  Neurologic   Cranial nerves: Cranial nerves 2-12 intact  Sensation: No sensory loss  Psychiatric   Orientation to person, place, and time: Normal     Mood and affect: Normal         Assessment / Plan:      The patient is a pleasant 65-year-old female with a past medical history of ER positive breast cancer who completed 5 years of adjuvant treatment with aromatase inhibitor for early stage breast cancer  She was on observation then had some bony chest discomfort  Imaging revealed a lytic lesion in the sternum  Biopsy revealed ER positive breast cancer  Restaging revealed 1 more area in the spine around 8 mm that was positive but no other evidence of metastatic disease  Since her disease is ER positive   She was started on Faslodex and Xgeva  She received radiation at [facility]   She has finished this  She will stay on her current regimen with no changes  I will see her back in 4 months  She will get blood work every month    If she has any questions she will call our office  Goals and Barriers:  Current Goal:  Prolong Survival from   ER positive breast cancer  Barriers: None  Patient's Capacity to Self Care:  Patient able to self care  Portions of the record may have been created with voice recognition software   Occasional wrong word or "sound a like" substitutions may have occurred due to the inherent limitations of voice recognition software   Read the chart carefully and recognize, using context, where substitutions have occurred

## 2021-09-13 ENCOUNTER — APPOINTMENT (OUTPATIENT)
Dept: LAB | Facility: CLINIC | Age: 81
End: 2021-09-13
Payer: MEDICARE

## 2021-09-13 DIAGNOSIS — C50.919 METASTATIC BREAST CANCER (HCC): ICD-10-CM

## 2021-09-13 DIAGNOSIS — M89.9 LYTIC LESION OF BONE ON X-RAY: ICD-10-CM

## 2021-09-13 LAB
ALBUMIN SERPL BCP-MCNC: 3.2 G/DL (ref 3.5–5)
ALP SERPL-CCNC: 98 U/L (ref 46–116)
ALT SERPL W P-5'-P-CCNC: 25 U/L (ref 12–78)
ANION GAP SERPL CALCULATED.3IONS-SCNC: 7 MMOL/L (ref 4–13)
AST SERPL W P-5'-P-CCNC: 16 U/L (ref 5–45)
BASOPHILS # BLD AUTO: 0.06 THOUSANDS/ΜL (ref 0–0.1)
BASOPHILS NFR BLD AUTO: 1 % (ref 0–1)
BILIRUB SERPL-MCNC: 0.58 MG/DL (ref 0.2–1)
BUN SERPL-MCNC: 14 MG/DL (ref 5–25)
CALCIUM ALBUM COR SERPL-MCNC: 9.2 MG/DL (ref 8.3–10.1)
CALCIUM SERPL-MCNC: 8.6 MG/DL (ref 8.3–10.1)
CANCER AG27-29 SERPL-ACNC: 58.6 U/ML (ref 0–42.3)
CHLORIDE SERPL-SCNC: 110 MMOL/L (ref 100–108)
CO2 SERPL-SCNC: 25 MMOL/L (ref 21–32)
CREAT SERPL-MCNC: 0.98 MG/DL (ref 0.6–1.3)
EOSINOPHIL # BLD AUTO: 0.82 THOUSAND/ΜL (ref 0–0.61)
EOSINOPHIL NFR BLD AUTO: 13 % (ref 0–6)
ERYTHROCYTE [DISTWIDTH] IN BLOOD BY AUTOMATED COUNT: 14.6 % (ref 11.6–15.1)
GFR SERPL CREATININE-BSD FRML MDRD: 54 ML/MIN/1.73SQ M
GLUCOSE SERPL-MCNC: 124 MG/DL (ref 65–140)
HCT VFR BLD AUTO: 43.4 % (ref 34.8–46.1)
HGB BLD-MCNC: 13.8 G/DL (ref 11.5–15.4)
IMM GRANULOCYTES # BLD AUTO: 0.02 THOUSAND/UL (ref 0–0.2)
IMM GRANULOCYTES NFR BLD AUTO: 0 % (ref 0–2)
LYMPHOCYTES # BLD AUTO: 0.61 THOUSANDS/ΜL (ref 0.6–4.47)
LYMPHOCYTES NFR BLD AUTO: 10 % (ref 14–44)
MCH RBC QN AUTO: 27.9 PG (ref 26.8–34.3)
MCHC RBC AUTO-ENTMCNC: 31.8 G/DL (ref 31.4–37.4)
MCV RBC AUTO: 88 FL (ref 82–98)
MONOCYTES # BLD AUTO: 0.59 THOUSAND/ΜL (ref 0.17–1.22)
MONOCYTES NFR BLD AUTO: 9 % (ref 4–12)
NEUTROPHILS # BLD AUTO: 4.17 THOUSANDS/ΜL (ref 1.85–7.62)
NEUTS SEG NFR BLD AUTO: 67 % (ref 43–75)
NRBC BLD AUTO-RTO: 0 /100 WBCS
PLATELET # BLD AUTO: 194 THOUSANDS/UL (ref 149–390)
PMV BLD AUTO: 11.1 FL (ref 8.9–12.7)
POTASSIUM SERPL-SCNC: 3.7 MMOL/L (ref 3.5–5.3)
PROT SERPL-MCNC: 7.1 G/DL (ref 6.4–8.2)
RBC # BLD AUTO: 4.95 MILLION/UL (ref 3.81–5.12)
SODIUM SERPL-SCNC: 142 MMOL/L (ref 136–145)
WBC # BLD AUTO: 6.27 THOUSAND/UL (ref 4.31–10.16)

## 2021-09-13 PROCEDURE — 85025 COMPLETE CBC W/AUTO DIFF WBC: CPT

## 2021-09-13 PROCEDURE — 80053 COMPREHEN METABOLIC PANEL: CPT

## 2021-09-13 PROCEDURE — 86300 IMMUNOASSAY TUMOR CA 15-3: CPT

## 2021-09-13 PROCEDURE — 36415 COLL VENOUS BLD VENIPUNCTURE: CPT

## 2021-09-24 ENCOUNTER — APPOINTMENT (OUTPATIENT)
Dept: LAB | Facility: CLINIC | Age: 81
End: 2021-09-24
Payer: MEDICARE

## 2021-09-24 DIAGNOSIS — M89.9 LYTIC LESION OF BONE ON X-RAY: ICD-10-CM

## 2021-09-24 DIAGNOSIS — C50.919 METASTATIC BREAST CANCER (HCC): ICD-10-CM

## 2021-09-24 LAB
ALBUMIN SERPL BCP-MCNC: 3.2 G/DL (ref 3.5–5)
ALP SERPL-CCNC: 95 U/L (ref 46–116)
ALT SERPL W P-5'-P-CCNC: 22 U/L (ref 12–78)
ANION GAP SERPL CALCULATED.3IONS-SCNC: 6 MMOL/L (ref 4–13)
AST SERPL W P-5'-P-CCNC: 14 U/L (ref 5–45)
BASOPHILS # BLD AUTO: 0.03 THOUSANDS/ΜL (ref 0–0.1)
BASOPHILS NFR BLD AUTO: 1 % (ref 0–1)
BILIRUB SERPL-MCNC: 0.79 MG/DL (ref 0.2–1)
BUN SERPL-MCNC: 12 MG/DL (ref 5–25)
CALCIUM ALBUM COR SERPL-MCNC: 9 MG/DL (ref 8.3–10.1)
CALCIUM SERPL-MCNC: 8.4 MG/DL (ref 8.3–10.1)
CANCER AG27-29 SERPL-ACNC: 53 U/ML (ref 0–42.3)
CHLORIDE SERPL-SCNC: 110 MMOL/L (ref 100–108)
CO2 SERPL-SCNC: 26 MMOL/L (ref 21–32)
CREAT SERPL-MCNC: 0.92 MG/DL (ref 0.6–1.3)
EOSINOPHIL # BLD AUTO: 0.54 THOUSAND/ΜL (ref 0–0.61)
EOSINOPHIL NFR BLD AUTO: 11 % (ref 0–6)
ERYTHROCYTE [DISTWIDTH] IN BLOOD BY AUTOMATED COUNT: 14.2 % (ref 11.6–15.1)
GFR SERPL CREATININE-BSD FRML MDRD: 59 ML/MIN/1.73SQ M
GLUCOSE P FAST SERPL-MCNC: 113 MG/DL (ref 65–99)
HCT VFR BLD AUTO: 42.4 % (ref 34.8–46.1)
HGB BLD-MCNC: 13.6 G/DL (ref 11.5–15.4)
IMM GRANULOCYTES # BLD AUTO: 0.01 THOUSAND/UL (ref 0–0.2)
IMM GRANULOCYTES NFR BLD AUTO: 0 % (ref 0–2)
LYMPHOCYTES # BLD AUTO: 0.69 THOUSANDS/ΜL (ref 0.6–4.47)
LYMPHOCYTES NFR BLD AUTO: 15 % (ref 14–44)
MCH RBC QN AUTO: 27.6 PG (ref 26.8–34.3)
MCHC RBC AUTO-ENTMCNC: 32.1 G/DL (ref 31.4–37.4)
MCV RBC AUTO: 86 FL (ref 82–98)
MONOCYTES # BLD AUTO: 0.48 THOUSAND/ΜL (ref 0.17–1.22)
MONOCYTES NFR BLD AUTO: 10 % (ref 4–12)
NEUTROPHILS # BLD AUTO: 3.01 THOUSANDS/ΜL (ref 1.85–7.62)
NEUTS SEG NFR BLD AUTO: 63 % (ref 43–75)
NRBC BLD AUTO-RTO: 0 /100 WBCS
PLATELET # BLD AUTO: 192 THOUSANDS/UL (ref 149–390)
PMV BLD AUTO: 11.3 FL (ref 8.9–12.7)
POTASSIUM SERPL-SCNC: 3.8 MMOL/L (ref 3.5–5.3)
PROT SERPL-MCNC: 6.8 G/DL (ref 6.4–8.2)
RBC # BLD AUTO: 4.93 MILLION/UL (ref 3.81–5.12)
SODIUM SERPL-SCNC: 142 MMOL/L (ref 136–145)
WBC # BLD AUTO: 4.76 THOUSAND/UL (ref 4.31–10.16)

## 2021-09-24 PROCEDURE — 80053 COMPREHEN METABOLIC PANEL: CPT

## 2021-09-24 PROCEDURE — 86300 IMMUNOASSAY TUMOR CA 15-3: CPT

## 2021-09-24 PROCEDURE — 36415 COLL VENOUS BLD VENIPUNCTURE: CPT

## 2021-09-24 PROCEDURE — 85025 COMPLETE CBC W/AUTO DIFF WBC: CPT

## 2021-09-27 ENCOUNTER — HOSPITAL ENCOUNTER (OUTPATIENT)
Dept: INFUSION CENTER | Facility: HOSPITAL | Age: 81
Discharge: HOME | End: 2021-09-27
Attending: INTERNAL MEDICINE
Payer: MEDICARE

## 2021-09-27 VITALS
DIASTOLIC BLOOD PRESSURE: 64 MMHG | HEART RATE: 104 BPM | TEMPERATURE: 98.7 F | RESPIRATION RATE: 16 BRPM | OXYGEN SATURATION: 98 % | SYSTOLIC BLOOD PRESSURE: 135 MMHG

## 2021-09-27 DIAGNOSIS — C50.919 METASTATIC BREAST CANCER (HCC): ICD-10-CM

## 2021-09-27 DIAGNOSIS — M89.9 LYTIC LESION OF BONE ON X-RAY: Primary | ICD-10-CM

## 2021-09-27 PROCEDURE — 96402 CHEMO HORMON ANTINEOPL SQ/IM: CPT

## 2021-09-27 RX ORDER — LAMOTRIGINE 25 MG/1
500 TABLET ORAL ONCE
Status: COMPLETED | OUTPATIENT
Start: 2021-09-27 | End: 2021-09-27

## 2021-09-27 RX ORDER — LAMOTRIGINE 25 MG/1
500 TABLET ORAL ONCE
Status: CANCELLED | OUTPATIENT
Start: 2021-10-25

## 2021-09-27 RX ADMIN — FULVESTRANT 500 MG: 50 INJECTION INTRAMUSCULAR at 13:22

## 2021-09-27 NOTE — PROGRESS NOTES
Pt tolerated Faslodex injections in b/l buttocks  Pt left unit ambulatory with steady gait  AVS printed and given to pt

## 2021-09-29 ENCOUNTER — TELEPHONE (OUTPATIENT)
Dept: FAMILY MEDICINE CLINIC | Facility: CLINIC | Age: 81
End: 2021-09-29

## 2021-09-30 DIAGNOSIS — K62.5 RECTAL BLEEDING: Primary | ICD-10-CM

## 2021-10-04 DIAGNOSIS — F41.9 ANXIETY: ICD-10-CM

## 2021-10-04 RX ORDER — AMITRIPTYLINE HYDROCHLORIDE 25 MG/1
25 TABLET, FILM COATED ORAL
Qty: 90 TABLET | Refills: 1 | Status: SHIPPED | OUTPATIENT
Start: 2021-10-04 | End: 2022-03-28 | Stop reason: SDUPTHER

## 2021-10-14 ENCOUNTER — OFFICE VISIT (OUTPATIENT)
Dept: GASTROENTEROLOGY | Facility: CLINIC | Age: 81
End: 2021-10-14
Payer: MEDICARE

## 2021-10-14 VITALS
SYSTOLIC BLOOD PRESSURE: 124 MMHG | HEIGHT: 61 IN | DIASTOLIC BLOOD PRESSURE: 74 MMHG | WEIGHT: 167.4 LBS | BODY MASS INDEX: 31.6 KG/M2

## 2021-10-14 DIAGNOSIS — K62.5 RECTAL BLEEDING: Primary | ICD-10-CM

## 2021-10-14 DIAGNOSIS — C50.919 METASTATIC BREAST CANCER (HCC): Primary | ICD-10-CM

## 2021-10-14 DIAGNOSIS — K62.5 RECTAL BLEEDING: ICD-10-CM

## 2021-10-14 PROCEDURE — 99203 OFFICE O/P NEW LOW 30 MIN: CPT | Performed by: NURSE PRACTITIONER

## 2021-10-14 RX ORDER — SODIUM PICOSULFATE, MAGNESIUM OXIDE, AND ANHYDROUS CITRIC ACID 10; 3.5; 12 MG/160ML; G/160ML; G/160ML
LIQUID ORAL
Qty: 320 ML | Refills: 0 | Status: SHIPPED | COMMUNITY
Start: 2021-10-14 | End: 2021-11-11 | Stop reason: HOSPADM

## 2021-10-22 ENCOUNTER — APPOINTMENT (OUTPATIENT)
Dept: LAB | Facility: CLINIC | Age: 81
End: 2021-10-22
Payer: MEDICARE

## 2021-10-22 DIAGNOSIS — C50.919 METASTATIC BREAST CANCER (HCC): ICD-10-CM

## 2021-10-22 DIAGNOSIS — M89.9 LYTIC LESION OF BONE ON X-RAY: ICD-10-CM

## 2021-10-22 LAB
ALBUMIN SERPL BCP-MCNC: 3.3 G/DL (ref 3.5–5)
ALP SERPL-CCNC: 90 U/L (ref 46–116)
ALT SERPL W P-5'-P-CCNC: 19 U/L (ref 12–78)
ANION GAP SERPL CALCULATED.3IONS-SCNC: 4 MMOL/L (ref 4–13)
AST SERPL W P-5'-P-CCNC: 13 U/L (ref 5–45)
BASOPHILS # BLD AUTO: 0.03 THOUSANDS/ΜL (ref 0–0.1)
BASOPHILS NFR BLD AUTO: 1 % (ref 0–1)
BILIRUB SERPL-MCNC: 0.44 MG/DL (ref 0.2–1)
BUN SERPL-MCNC: 15 MG/DL (ref 5–25)
CALCIUM ALBUM COR SERPL-MCNC: 9.6 MG/DL (ref 8.3–10.1)
CALCIUM SERPL-MCNC: 9 MG/DL (ref 8.3–10.1)
CANCER AG27-29 SERPL-ACNC: 40.5 U/ML (ref 0–42.3)
CHLORIDE SERPL-SCNC: 108 MMOL/L (ref 100–108)
CO2 SERPL-SCNC: 26 MMOL/L (ref 21–32)
CREAT SERPL-MCNC: 0.99 MG/DL (ref 0.6–1.3)
EOSINOPHIL # BLD AUTO: 0.45 THOUSAND/ΜL (ref 0–0.61)
EOSINOPHIL NFR BLD AUTO: 8 % (ref 0–6)
ERYTHROCYTE [DISTWIDTH] IN BLOOD BY AUTOMATED COUNT: 13.9 % (ref 11.6–15.1)
GFR SERPL CREATININE-BSD FRML MDRD: 54 ML/MIN/1.73SQ M
GLUCOSE P FAST SERPL-MCNC: 110 MG/DL (ref 65–99)
HCT VFR BLD AUTO: 43 % (ref 34.8–46.1)
HGB BLD-MCNC: 13.8 G/DL (ref 11.5–15.4)
IMM GRANULOCYTES # BLD AUTO: 0.01 THOUSAND/UL (ref 0–0.2)
IMM GRANULOCYTES NFR BLD AUTO: 0 % (ref 0–2)
LYMPHOCYTES # BLD AUTO: 0.85 THOUSANDS/ΜL (ref 0.6–4.47)
LYMPHOCYTES NFR BLD AUTO: 16 % (ref 14–44)
MCH RBC QN AUTO: 27.8 PG (ref 26.8–34.3)
MCHC RBC AUTO-ENTMCNC: 32.1 G/DL (ref 31.4–37.4)
MCV RBC AUTO: 87 FL (ref 82–98)
MONOCYTES # BLD AUTO: 0.49 THOUSAND/ΜL (ref 0.17–1.22)
MONOCYTES NFR BLD AUTO: 9 % (ref 4–12)
NEUTROPHILS # BLD AUTO: 3.67 THOUSANDS/ΜL (ref 1.85–7.62)
NEUTS SEG NFR BLD AUTO: 66 % (ref 43–75)
NRBC BLD AUTO-RTO: 0 /100 WBCS
PLATELET # BLD AUTO: 201 THOUSANDS/UL (ref 149–390)
PMV BLD AUTO: 11.5 FL (ref 8.9–12.7)
POTASSIUM SERPL-SCNC: 3.9 MMOL/L (ref 3.5–5.3)
PROT SERPL-MCNC: 7.2 G/DL (ref 6.4–8.2)
RBC # BLD AUTO: 4.96 MILLION/UL (ref 3.81–5.12)
SODIUM SERPL-SCNC: 138 MMOL/L (ref 136–145)
WBC # BLD AUTO: 5.5 THOUSAND/UL (ref 4.31–10.16)

## 2021-10-22 PROCEDURE — 85025 COMPLETE CBC W/AUTO DIFF WBC: CPT

## 2021-10-22 PROCEDURE — 86300 IMMUNOASSAY TUMOR CA 15-3: CPT

## 2021-10-22 PROCEDURE — 36415 COLL VENOUS BLD VENIPUNCTURE: CPT

## 2021-10-22 PROCEDURE — 80053 COMPREHEN METABOLIC PANEL: CPT

## 2021-10-25 ENCOUNTER — HOSPITAL ENCOUNTER (OUTPATIENT)
Dept: INFUSION CENTER | Facility: HOSPITAL | Age: 81
Discharge: HOME | End: 2021-10-25
Attending: INTERNAL MEDICINE
Payer: MEDICARE

## 2021-10-25 VITALS
TEMPERATURE: 97.7 F | HEART RATE: 118 BPM | WEIGHT: 167.8 LBS | SYSTOLIC BLOOD PRESSURE: 111 MMHG | HEIGHT: 61 IN | OXYGEN SATURATION: 99 % | BODY MASS INDEX: 31.68 KG/M2 | RESPIRATION RATE: 16 BRPM | DIASTOLIC BLOOD PRESSURE: 82 MMHG

## 2021-10-25 DIAGNOSIS — C50.919 METASTATIC BREAST CANCER (HCC): ICD-10-CM

## 2021-10-25 DIAGNOSIS — M89.9 LYTIC LESION OF BONE ON X-RAY: Primary | ICD-10-CM

## 2021-10-25 PROCEDURE — 96402 CHEMO HORMON ANTINEOPL SQ/IM: CPT

## 2021-10-25 PROCEDURE — 96401 CHEMO ANTI-NEOPL SQ/IM: CPT

## 2021-10-25 RX ORDER — LAMOTRIGINE 25 MG/1
500 TABLET ORAL ONCE
Status: CANCELLED | OUTPATIENT
Start: 2021-11-22

## 2021-10-25 RX ORDER — LAMOTRIGINE 25 MG/1
500 TABLET ORAL ONCE
Status: COMPLETED | OUTPATIENT
Start: 2021-10-25 | End: 2021-10-25

## 2021-10-25 RX ADMIN — DENOSUMAB 120 MG: 120 INJECTION SUBCUTANEOUS at 13:34

## 2021-10-25 RX ADMIN — FULVESTRANT 500 MG: 50 INJECTION INTRAMUSCULAR at 13:35

## 2021-11-10 ENCOUNTER — ANESTHESIA (OUTPATIENT)
Dept: ANESTHESIOLOGY | Facility: HOSPITAL | Age: 81
End: 2021-11-10

## 2021-11-10 ENCOUNTER — TELEPHONE (OUTPATIENT)
Dept: SURGERY | Facility: HOSPITAL | Age: 81
End: 2021-11-10

## 2021-11-10 ENCOUNTER — ANESTHESIA EVENT (OUTPATIENT)
Dept: ANESTHESIOLOGY | Facility: HOSPITAL | Age: 81
End: 2021-11-10

## 2021-11-10 RX ORDER — LIDOCAINE HYDROCHLORIDE 10 MG/ML
0.5 INJECTION, SOLUTION EPIDURAL; INFILTRATION; INTRACAUDAL; PERINEURAL ONCE AS NEEDED
Status: CANCELLED | OUTPATIENT
Start: 2021-11-10

## 2021-11-10 RX ORDER — SODIUM CHLORIDE 9 MG/ML
125 INJECTION, SOLUTION INTRAVENOUS CONTINUOUS
Status: CANCELLED | OUTPATIENT
Start: 2021-11-10

## 2021-11-11 ENCOUNTER — HOSPITAL ENCOUNTER (OUTPATIENT)
Dept: GASTROENTEROLOGY | Facility: HOSPITAL | Age: 81
Setting detail: OUTPATIENT SURGERY
Discharge: HOME | End: 2021-11-11
Attending: INTERNAL MEDICINE | Admitting: INTERNAL MEDICINE
Payer: MEDICARE

## 2021-11-11 ENCOUNTER — ANESTHESIA EVENT (OUTPATIENT)
Dept: GASTROENTEROLOGY | Facility: HOSPITAL | Age: 81
End: 2021-11-11

## 2021-11-11 ENCOUNTER — ANESTHESIA (OUTPATIENT)
Dept: GASTROENTEROLOGY | Facility: HOSPITAL | Age: 81
End: 2021-11-11

## 2021-11-11 VITALS
RESPIRATION RATE: 20 BRPM | HEART RATE: 87 BPM | TEMPERATURE: 98.4 F | DIASTOLIC BLOOD PRESSURE: 72 MMHG | SYSTOLIC BLOOD PRESSURE: 120 MMHG | OXYGEN SATURATION: 97 %

## 2021-11-11 DIAGNOSIS — K62.89 PROCTITIS: Primary | ICD-10-CM

## 2021-11-11 DIAGNOSIS — K62.5 RECTAL BLEEDING: ICD-10-CM

## 2021-11-11 PROCEDURE — 45380 COLONOSCOPY AND BIOPSY: CPT | Performed by: INTERNAL MEDICINE

## 2021-11-11 PROCEDURE — 88305 TISSUE EXAM BY PATHOLOGIST: CPT | Performed by: PATHOLOGY

## 2021-11-11 PROCEDURE — 45385 COLONOSCOPY W/LESION REMOVAL: CPT | Performed by: INTERNAL MEDICINE

## 2021-11-11 RX ORDER — LIDOCAINE HYDROCHLORIDE 10 MG/ML
0.5 INJECTION, SOLUTION EPIDURAL; INFILTRATION; INTRACAUDAL; PERINEURAL ONCE AS NEEDED
Status: DISCONTINUED | OUTPATIENT
Start: 2021-11-11 | End: 2021-11-15 | Stop reason: HOSPADM

## 2021-11-11 RX ORDER — MESALAMINE 1000 MG/1
1000 SUPPOSITORY RECTAL
Qty: 21 SUPPOSITORY | Refills: 0 | Status: SHIPPED | OUTPATIENT
Start: 2021-11-11 | End: 2022-02-28

## 2021-11-11 RX ORDER — SODIUM CHLORIDE 9 MG/ML
125 INJECTION, SOLUTION INTRAVENOUS CONTINUOUS
Status: DISCONTINUED | OUTPATIENT
Start: 2021-11-11 | End: 2021-11-15 | Stop reason: HOSPADM

## 2021-11-11 RX ORDER — LIDOCAINE HYDROCHLORIDE 10 MG/ML
INJECTION, SOLUTION EPIDURAL; INFILTRATION; INTRACAUDAL; PERINEURAL AS NEEDED
Status: DISCONTINUED | OUTPATIENT
Start: 2021-11-11 | End: 2021-11-11

## 2021-11-11 RX ORDER — PROPOFOL 10 MG/ML
INJECTION, EMULSION INTRAVENOUS AS NEEDED
Status: DISCONTINUED | OUTPATIENT
Start: 2021-11-11 | End: 2021-11-11

## 2021-11-11 RX ADMIN — SODIUM CHLORIDE 125 ML/HR: 0.9 INJECTION, SOLUTION INTRAVENOUS at 11:08

## 2021-11-11 RX ADMIN — PROPOFOL 20 MG: 10 INJECTION, EMULSION INTRAVENOUS at 11:45

## 2021-11-11 RX ADMIN — PROPOFOL 20 MG: 10 INJECTION, EMULSION INTRAVENOUS at 11:47

## 2021-11-11 RX ADMIN — PROPOFOL 30 MG: 10 INJECTION, EMULSION INTRAVENOUS at 11:41

## 2021-11-11 RX ADMIN — PROPOFOL 100 MG: 10 INJECTION, EMULSION INTRAVENOUS at 11:37

## 2021-11-11 RX ADMIN — PROPOFOL 30 MG: 10 INJECTION, EMULSION INTRAVENOUS at 11:43

## 2021-11-11 RX ADMIN — PROPOFOL 30 MG: 10 INJECTION, EMULSION INTRAVENOUS at 11:39

## 2021-11-11 RX ADMIN — LIDOCAINE HYDROCHLORIDE 50 MG: 10 INJECTION, SOLUTION EPIDURAL; INFILTRATION; INTRACAUDAL; PERINEURAL at 11:37

## 2021-11-11 RX ADMIN — PROPOFOL 20 MG: 10 INJECTION, EMULSION INTRAVENOUS at 11:49

## 2021-11-11 RX ADMIN — PROPOFOL 20 MG: 10 INJECTION, EMULSION INTRAVENOUS at 11:55

## 2021-11-11 RX ADMIN — PROPOFOL 20 MG: 10 INJECTION, EMULSION INTRAVENOUS at 11:52

## 2021-11-18 ENCOUNTER — APPOINTMENT (OUTPATIENT)
Dept: LAB | Facility: CLINIC | Age: 81
End: 2021-11-18
Payer: MEDICARE

## 2021-11-18 DIAGNOSIS — M89.9 LYTIC LESION OF BONE ON X-RAY: ICD-10-CM

## 2021-11-18 DIAGNOSIS — C50.919 METASTATIC BREAST CANCER (HCC): ICD-10-CM

## 2021-11-18 LAB
ALBUMIN SERPL BCP-MCNC: 3.1 G/DL (ref 3.5–5)
ALP SERPL-CCNC: 100 U/L (ref 46–116)
ALT SERPL W P-5'-P-CCNC: 23 U/L (ref 12–78)
ANION GAP SERPL CALCULATED.3IONS-SCNC: 6 MMOL/L (ref 4–13)
AST SERPL W P-5'-P-CCNC: 13 U/L (ref 5–45)
BASOPHILS # BLD AUTO: 0.05 THOUSANDS/ΜL (ref 0–0.1)
BASOPHILS NFR BLD AUTO: 1 % (ref 0–1)
BILIRUB SERPL-MCNC: 0.55 MG/DL (ref 0.2–1)
BUN SERPL-MCNC: 15 MG/DL (ref 5–25)
CALCIUM ALBUM COR SERPL-MCNC: 9.9 MG/DL (ref 8.3–10.1)
CALCIUM SERPL-MCNC: 9.2 MG/DL (ref 8.3–10.1)
CANCER AG27-29 SERPL-ACNC: 33.6 U/ML (ref 0–42.3)
CHLORIDE SERPL-SCNC: 108 MMOL/L (ref 100–108)
CO2 SERPL-SCNC: 26 MMOL/L (ref 21–32)
CREAT SERPL-MCNC: 1.1 MG/DL (ref 0.6–1.3)
EOSINOPHIL # BLD AUTO: 0.71 THOUSAND/ΜL (ref 0–0.61)
EOSINOPHIL NFR BLD AUTO: 10 % (ref 0–6)
ERYTHROCYTE [DISTWIDTH] IN BLOOD BY AUTOMATED COUNT: 13.6 % (ref 11.6–15.1)
GFR SERPL CREATININE-BSD FRML MDRD: 47 ML/MIN/1.73SQ M
GLUCOSE P FAST SERPL-MCNC: 117 MG/DL (ref 65–99)
HCT VFR BLD AUTO: 42.3 % (ref 34.8–46.1)
HGB BLD-MCNC: 13.5 G/DL (ref 11.5–15.4)
IMM GRANULOCYTES # BLD AUTO: 0.01 THOUSAND/UL (ref 0–0.2)
IMM GRANULOCYTES NFR BLD AUTO: 0 % (ref 0–2)
LYMPHOCYTES # BLD AUTO: 0.94 THOUSANDS/ΜL (ref 0.6–4.47)
LYMPHOCYTES NFR BLD AUTO: 13 % (ref 14–44)
MCH RBC QN AUTO: 27.4 PG (ref 26.8–34.3)
MCHC RBC AUTO-ENTMCNC: 31.9 G/DL (ref 31.4–37.4)
MCV RBC AUTO: 86 FL (ref 82–98)
MONOCYTES # BLD AUTO: 0.8 THOUSAND/ΜL (ref 0.17–1.22)
MONOCYTES NFR BLD AUTO: 11 % (ref 4–12)
NEUTROPHILS # BLD AUTO: 4.52 THOUSANDS/ΜL (ref 1.85–7.62)
NEUTS SEG NFR BLD AUTO: 65 % (ref 43–75)
NRBC BLD AUTO-RTO: 0 /100 WBCS
PLATELET # BLD AUTO: 222 THOUSANDS/UL (ref 149–390)
PMV BLD AUTO: 11.4 FL (ref 8.9–12.7)
POTASSIUM SERPL-SCNC: 4 MMOL/L (ref 3.5–5.3)
PROT SERPL-MCNC: 7 G/DL (ref 6.4–8.2)
RBC # BLD AUTO: 4.93 MILLION/UL (ref 3.81–5.12)
SODIUM SERPL-SCNC: 140 MMOL/L (ref 136–145)
WBC # BLD AUTO: 7.03 THOUSAND/UL (ref 4.31–10.16)

## 2021-11-18 PROCEDURE — 80053 COMPREHEN METABOLIC PANEL: CPT

## 2021-11-18 PROCEDURE — 86300 IMMUNOASSAY TUMOR CA 15-3: CPT

## 2021-11-18 PROCEDURE — 85025 COMPLETE CBC W/AUTO DIFF WBC: CPT

## 2021-11-18 PROCEDURE — 36415 COLL VENOUS BLD VENIPUNCTURE: CPT

## 2021-11-22 ENCOUNTER — HOSPITAL ENCOUNTER (OUTPATIENT)
Dept: INFUSION CENTER | Facility: HOSPITAL | Age: 81
Discharge: HOME | End: 2021-11-22
Attending: INTERNAL MEDICINE
Payer: MEDICARE

## 2021-11-22 VITALS
SYSTOLIC BLOOD PRESSURE: 130 MMHG | RESPIRATION RATE: 18 BRPM | OXYGEN SATURATION: 96 % | TEMPERATURE: 97.7 F | DIASTOLIC BLOOD PRESSURE: 60 MMHG | HEART RATE: 114 BPM

## 2021-11-22 DIAGNOSIS — M89.9 LYTIC LESION OF BONE ON X-RAY: Primary | ICD-10-CM

## 2021-11-22 DIAGNOSIS — C50.919 METASTATIC BREAST CANCER (HCC): ICD-10-CM

## 2021-11-22 PROCEDURE — 96402 CHEMO HORMON ANTINEOPL SQ/IM: CPT

## 2021-11-22 RX ORDER — LAMOTRIGINE 25 MG/1
500 TABLET ORAL ONCE
Status: COMPLETED | OUTPATIENT
Start: 2021-11-22 | End: 2021-11-22

## 2021-11-22 RX ORDER — LAMOTRIGINE 25 MG/1
500 TABLET ORAL ONCE
Status: CANCELLED | OUTPATIENT
Start: 2021-12-20

## 2021-11-22 RX ADMIN — FULVESTRANT 500 MG: 50 INJECTION INTRAMUSCULAR at 13:40

## 2021-12-06 DIAGNOSIS — I10 ESSENTIAL HYPERTENSION: ICD-10-CM

## 2021-12-06 RX ORDER — LOSARTAN POTASSIUM 50 MG/1
50 TABLET ORAL DAILY
Qty: 30 TABLET | Refills: 5 | Status: SHIPPED | OUTPATIENT
Start: 2021-12-06 | End: 2022-01-24 | Stop reason: SDUPTHER

## 2021-12-17 ENCOUNTER — APPOINTMENT (OUTPATIENT)
Dept: LAB | Facility: CLINIC | Age: 81
End: 2021-12-17
Payer: MEDICARE

## 2021-12-17 DIAGNOSIS — M89.9 LYTIC LESION OF BONE ON X-RAY: ICD-10-CM

## 2021-12-17 DIAGNOSIS — C50.919 METASTATIC BREAST CANCER (HCC): ICD-10-CM

## 2021-12-17 LAB
ALBUMIN SERPL BCP-MCNC: 3.3 G/DL (ref 3.5–5)
ALP SERPL-CCNC: 85 U/L (ref 46–116)
ALT SERPL W P-5'-P-CCNC: 21 U/L (ref 12–78)
ANION GAP SERPL CALCULATED.3IONS-SCNC: 5 MMOL/L (ref 4–13)
AST SERPL W P-5'-P-CCNC: 13 U/L (ref 5–45)
BASOPHILS # BLD AUTO: 0.04 THOUSANDS/ΜL (ref 0–0.1)
BASOPHILS NFR BLD AUTO: 1 % (ref 0–1)
BILIRUB SERPL-MCNC: 0.49 MG/DL (ref 0.2–1)
BUN SERPL-MCNC: 12 MG/DL (ref 5–25)
CALCIUM ALBUM COR SERPL-MCNC: 10 MG/DL (ref 8.3–10.1)
CALCIUM SERPL-MCNC: 9.4 MG/DL (ref 8.3–10.1)
CANCER AG27-29 SERPL-ACNC: 45.6 U/ML (ref 0–42.3)
CHLORIDE SERPL-SCNC: 109 MMOL/L (ref 100–108)
CO2 SERPL-SCNC: 26 MMOL/L (ref 21–32)
CREAT SERPL-MCNC: 1.09 MG/DL (ref 0.6–1.3)
EOSINOPHIL # BLD AUTO: 0.61 THOUSAND/ΜL (ref 0–0.61)
EOSINOPHIL NFR BLD AUTO: 11 % (ref 0–6)
ERYTHROCYTE [DISTWIDTH] IN BLOOD BY AUTOMATED COUNT: 14.1 % (ref 11.6–15.1)
GFR SERPL CREATININE-BSD FRML MDRD: 47 ML/MIN/1.73SQ M
GLUCOSE P FAST SERPL-MCNC: 116 MG/DL (ref 65–99)
HCT VFR BLD AUTO: 41.6 % (ref 34.8–46.1)
HGB BLD-MCNC: 13.1 G/DL (ref 11.5–15.4)
IMM GRANULOCYTES # BLD AUTO: 0.01 THOUSAND/UL (ref 0–0.2)
IMM GRANULOCYTES NFR BLD AUTO: 0 % (ref 0–2)
LYMPHOCYTES # BLD AUTO: 0.97 THOUSANDS/ΜL (ref 0.6–4.47)
LYMPHOCYTES NFR BLD AUTO: 18 % (ref 14–44)
MCH RBC QN AUTO: 27.1 PG (ref 26.8–34.3)
MCHC RBC AUTO-ENTMCNC: 31.5 G/DL (ref 31.4–37.4)
MCV RBC AUTO: 86 FL (ref 82–98)
MONOCYTES # BLD AUTO: 0.6 THOUSAND/ΜL (ref 0.17–1.22)
MONOCYTES NFR BLD AUTO: 11 % (ref 4–12)
NEUTROPHILS # BLD AUTO: 3.25 THOUSANDS/ΜL (ref 1.85–7.62)
NEUTS SEG NFR BLD AUTO: 59 % (ref 43–75)
NRBC BLD AUTO-RTO: 0 /100 WBCS
PLATELET # BLD AUTO: 225 THOUSANDS/UL (ref 149–390)
PMV BLD AUTO: 10.9 FL (ref 8.9–12.7)
POTASSIUM SERPL-SCNC: 3.9 MMOL/L (ref 3.5–5.3)
PROT SERPL-MCNC: 7.1 G/DL (ref 6.4–8.2)
RBC # BLD AUTO: 4.84 MILLION/UL (ref 3.81–5.12)
SODIUM SERPL-SCNC: 140 MMOL/L (ref 136–145)
WBC # BLD AUTO: 5.48 THOUSAND/UL (ref 4.31–10.16)

## 2021-12-17 PROCEDURE — 80053 COMPREHEN METABOLIC PANEL: CPT

## 2021-12-17 PROCEDURE — 85025 COMPLETE CBC W/AUTO DIFF WBC: CPT

## 2021-12-17 PROCEDURE — 36415 COLL VENOUS BLD VENIPUNCTURE: CPT

## 2021-12-17 PROCEDURE — 86300 IMMUNOASSAY TUMOR CA 15-3: CPT

## 2021-12-20 ENCOUNTER — HOSPITAL ENCOUNTER (OUTPATIENT)
Dept: INFUSION CENTER | Facility: HOSPITAL | Age: 81
Discharge: HOME | End: 2021-12-20
Attending: INTERNAL MEDICINE
Payer: MEDICARE

## 2021-12-20 VITALS
HEART RATE: 105 BPM | RESPIRATION RATE: 18 BRPM | TEMPERATURE: 99.1 F | SYSTOLIC BLOOD PRESSURE: 142 MMHG | DIASTOLIC BLOOD PRESSURE: 73 MMHG | OXYGEN SATURATION: 97 %

## 2021-12-20 DIAGNOSIS — C50.919 METASTATIC BREAST CANCER (HCC): ICD-10-CM

## 2021-12-20 DIAGNOSIS — M89.9 LYTIC LESION OF BONE ON X-RAY: Primary | ICD-10-CM

## 2021-12-20 PROCEDURE — 96402 CHEMO HORMON ANTINEOPL SQ/IM: CPT

## 2021-12-20 RX ORDER — LAMOTRIGINE 25 MG/1
500 TABLET ORAL ONCE
Status: CANCELLED | OUTPATIENT
Start: 2022-01-17

## 2021-12-20 RX ORDER — LAMOTRIGINE 25 MG/1
500 TABLET ORAL ONCE
Status: COMPLETED | OUTPATIENT
Start: 2021-12-20 | End: 2021-12-20

## 2021-12-20 RX ADMIN — FULVESTRANT 500 MG: 50 INJECTION INTRAMUSCULAR at 13:50

## 2022-01-14 ENCOUNTER — OFFICE VISIT (OUTPATIENT)
Dept: HEMATOLOGY ONCOLOGY | Facility: HOSPITAL | Age: 82
End: 2022-01-14
Payer: MEDICARE

## 2022-01-14 VITALS
DIASTOLIC BLOOD PRESSURE: 70 MMHG | BODY MASS INDEX: 29.81 KG/M2 | HEIGHT: 62 IN | RESPIRATION RATE: 14 BRPM | TEMPERATURE: 99 F | WEIGHT: 162 LBS | OXYGEN SATURATION: 98 % | SYSTOLIC BLOOD PRESSURE: 130 MMHG | HEART RATE: 104 BPM

## 2022-01-14 DIAGNOSIS — M89.9 LYTIC LESION OF BONE ON X-RAY: ICD-10-CM

## 2022-01-14 DIAGNOSIS — C50.919 METASTATIC BREAST CANCER (HCC): Primary | ICD-10-CM

## 2022-01-14 PROCEDURE — 99214 OFFICE O/P EST MOD 30 MIN: CPT | Performed by: INTERNAL MEDICINE

## 2022-01-14 NOTE — PROGRESS NOTES
Hematology/Oncology Outpatient Follow- up Note  Radha Silverman 79 y o  female MRN: @ Encounter: [id number]        Date:  1/14/2022    Presenting Complaint/Diagnosis : The patient presents to the office today with Patient has a history of stage IA breast cancer  The tumor was ER positive OR positive HER-2 negative   She now has stage IV disease with bony metastases which was ER positive OR negative HER2 negative  Previous Hematologic/ Oncologic History:    Resection  Adjuvant radiation  She was then put on Arimidex 1 mg once a day      Current Hematologic/ Oncologic Treatment:    Faslodex  Every month     Xgeva    Interval History:    The patient returns for follow-up visit  She is doing well  Tumor markers fluctuating in the same range  Clinically she denies any complaints  Denies any nausea denies any vomiting denies any diarrhea  The rest of her 14 point review of systems today was negative  Test Results:    Imaging: No results found  Labs:   Lab Results   Component Value Date    WBC 5 48 12/17/2021    HGB 13 1 12/17/2021    HCT 41 6 12/17/2021    MCV 86 12/17/2021     12/17/2021     Lab Results   Component Value Date     09/08/2015    K 3 9 12/17/2021     (H) 12/17/2021    CO2 26 12/17/2021    ANIONGAP 6 09/08/2015    BUN 12 12/17/2021    CREATININE 1 09 12/17/2021    GLUCOSE 115 09/08/2015    GLUF 116 (H) 12/17/2021    CALCIUM 9 4 12/17/2021    CORRECTEDCA 10 0 12/17/2021    AST 13 12/17/2021    ALT 21 12/17/2021    ALKPHOS 85 12/17/2021    PROT 7 3 09/08/2015    BILITOT 0 66 09/08/2015    EGFR 47 12/17/2021         Lab Results   Component Value Date    SPEP See Comment 06/30/2021       ROS: As stated in the history of present illness otherwise his 14 point review of systems today was negative        Active Problems:   Patient Active Problem List   Diagnosis    [address] [facility] [address] [address]    Adverse reaction to pneumococcal vaccine    Anxiety    Cataract, bilateral  Chronic inflammatory disease of uterus    Hyperlipidemia    Pulmonary nodule seen on imaging study    Pes planus    Sleep disorder    Chronic kidney disease (CKD) stage G3a/A1, moderately decreased glomerular filtration rate (GFR) between 45-59 mL/min/1 73 square meter and albuminuria creatinine ratio less than 30 mg/g (HCC)    Essential hypertension    Osteopenia of multiple sites    Cough due to ACE inhibitor    Acute costochondritis    Lytic lesion of bone on x-ray    Neoplasm of uncertain behavior of sternum    Hydronephrosis of right kidney    Rectal bleeding       Past Medical History:   Past Medical History:   Diagnosis Date    Abnormal weight loss     Allergic reaction     Anxiety     Breast cancer, right ([address] ) 2011    right    Candidal vulvovaginitis     Endometrial hyperplasia     Epithelial cyst     benign, ovary    History of radiation therapy 2011    right breast cancer    Hyperlipidemia     Hypertension     Internal hemorrhoids     Knee tendonitis     Ovarian cyst     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[facility])        Surgical History:   Past Surgical History:   Procedure Laterality Date    BILATERAL SALPINGOOPHORECTOMY      onset: 7/23/13    BREAST BIOPSY Right 06/13/2006    benign    BREAST BIOPSY Right 03/02/2011    malignant    BREAST LUMPECTOMY Right 03/30/2011    malignant    CATARACT EXTRACTION W/  INTRAOCULAR LENS IMPLANT Right     phacoemulsification   Onset: 10/27/14    CHOLECYSTECTOMY      INTRAOPERATIVE RADIATION THERAPY (IORT)      IR BIOPSY BONE  7/9/2021    SKIN LESION EXCISION Right     breast, single lesion    TONSILLECTOMY AND ADENOIDECTOMY         Family History:    Family History   Problem Relation Age of Onset    Stomach cancer Mother     No Known Problems Father     Cervical cancer Sister     No Known Problems Daughter     No Known Problems Maternal Grandmother     No Known Problems Maternal Grandfather     No Known Problems Paternal Grandmother  No Known Problems Paternal Grandfather     Colon polyps Neg Hx     Colon cancer Neg Hx        Cancer-related family history includes Cervical cancer in her sister; Stomach cancer in her mother  There is no history of Colon cancer  Social History:   Social History     Socioeconomic History    Marital status:      Spouse name: Not on file    Number of children: 3    Years of education: Not on file    Highest education level: Not on file   Occupational History    Not on file   Tobacco Use    Smoking status: Former Smoker     Types: Cigarettes     Start date:      Quit date:      Years since quittin 0    Smokeless tobacco: Never Used   Vaping Use    Vaping Use: Never used   Substance and Sexual Activity    Alcohol use: No     Comment: (history)    Drug use: No    Sexual activity: Not Currently   Other Topics Concern    Not on file   Social History Narrative    Not on file     Social Determinants of Health     Financial Resource Strain: Not on file   Food Insecurity: Not on file   Transportation Needs: Not on file   Physical Activity: Inactive    Days of Exercise per Week: 0 days   [employer] of Exercise per Session: 0 min   Stress: No Stress Concern Present    Feeling of Stress : Not at all   Social Connections: Moderately Isolated    Frequency of Communication with Friends and Family: More than three times a week    Frequency of Social Gatherings with Friends and Family: Twice a week    Attends Scientologist Services: Never    Active Member of Clubs or Organizations:  Yes    Attends Club or Organization Meetings: More than 4 times per year    Marital Status:    Intimate Partner Violence: Not At Risk    Fear of Current or Ex-Partner: No    Emotionally Abused: No    Physically Abused: No    Sexually Abused: No   Housing Stability: Not on file       Current Medications:   Current Outpatient Medications   Medication Sig Dispense Refill    amitriptyline (ELAVIL) 25 mg tablet Take 1 tablet (25 mg total) by mouth daily at bedtime 90 tablet 1    Cholecalciferol (VITAMIN D3) 2000 units capsule Take 2,000 Units by mouth daily        losartan (COZAAR) 50 mg tablet Take 1 tablet (50 mg total) by mouth daily 30 tablet 5    simvastatin (ZOCOR) 10 mg tablet Take 1 tablet (10 mg total) by mouth daily at bedtime 90 tablet 1    mesalamine (CANASA) 1,000 mg suppository Insert 1 suppository (1,000 mg total) into the rectum daily at bedtime 21 suppository 0    naproxen (NAPROSYN) 500 mg tablet Take 1 tablet (500 mg total) by mouth 2 (two) times a day with meals (Patient not taking: Reported on 9/10/2021) 60 tablet 0    predniSONE 20 mg tablet Take 3 tablets for 2 days then 2 tablets for 3 days then 1 tablet for 3 days then stop 15 tablet 0     No current facility-administered medications for this visit  Allergies: Allergies   Allergen Reactions    Sulfa Antibiotics     Pneumococcal Polysaccharide Vaccine Rash and Edema       Physical Exam:    Body surface area is 1 75 meters squared  Wt Readings from Last 3 Encounters:   01/14/22 73 5 kg (162 lb)   10/25/21 76 1 kg (167 lb 12 8 oz)   10/14/21 75 9 kg (167 lb 6 4 oz)        Temp Readings from Last 3 Encounters:   01/14/22 99 °F (37 2 °C) (Temporal)   12/20/21 99 1 °F (37 3 °C) (Tympanic)   11/22/21 97 7 °F (36 5 °C) (Temporal)        BP Readings from Last 3 Encounters:   01/14/22 130/70   12/20/21 142/73   11/22/21 130/60         Pulse Readings from Last 3 Encounters:   01/14/22 104   12/20/21 105   11/22/21 (!) 114        Physical Exam     Constitutional   General appearance: No acute distress, well appearing and well nourished  Eyes   Conjunctiva and lids: No swelling, erythema or discharge  Pupils and irises: Equal, round and reactive to light  Ears, Nose, Mouth, and Throat   External inspection of ears and nose: Normal     Nasal mucosa, septum, and turbinates: Normal without edema or erythema      Oropharynx: Normal with no erythema, edema, exudate or lesions  Pulmonary   Respiratory effort: No increased work of breathing or signs of respiratory distress  Auscultation of lungs: Clear to auscultation  Cardiovascular   Palpation of heart: Normal PMI, no thrills  Auscultation of heart: Normal rate and rhythm, normal S1 and S2, without murmurs  Examination of extremities for edema and/or varicosities: Normal     Carotid pulses: Normal     Abdomen   Abdomen: Non-tender, no masses  Liver and spleen: No hepatomegaly or splenomegaly  Lymphatic   Palpation of lymph nodes in neck: No lymphadenopathy  Musculoskeletal   Gait and station: Normal     Digits and nails: Normal without clubbing or cyanosis  Inspection/palpation of joints, bones, and muscles: Normal     Skin   Skin and subcutaneous tissue: Normal without rashes or lesions  Neurologic   Cranial nerves: Cranial nerves 2-12 intact  Sensation: No sensory loss  Psychiatric   Orientation to person, place, and time: Normal     Mood and affect: Normal         Assessment / Plan:      The patient is a pleasant 79-year-old female with a past medical history of ER positive breast cancer who completed 5 years of adjuvant treatment with aromatase inhibitor for early stage breast cancer   She was on observation then had some bony chest discomfort   Imaging revealed a lytic lesion in the sternum   Biopsy revealed ER positive breast cancer   Restaging revealed 1 more area in the spine around 8 mm that was positive but no other evidence of metastatic disease   Since her disease is ER positive   She was started on Faslodex and Xgeva  She received radiation at [facility]    I will see her back in 3 months  I will repeat imaging at that time  She will get monthly/the dex as previously written with no changes  Her Dorna Given is every 3 months  Goals and Barriers:  Current Goal:  Prolong Survival from breast cancer  Barriers: None  Patient's Capacity to Self Care:  Patient able to self care  Portions of the record may have been created with voice recognition software  Occasional wrong word or "sound a like" substitutions may have occurred due to the inherent limitations of voice recognition software  Read the chart carefully and recognize, using context, where substitutions have occurred

## 2022-01-17 ENCOUNTER — APPOINTMENT (OUTPATIENT)
Dept: LAB | Facility: CLINIC | Age: 82
End: 2022-01-17
Payer: MEDICARE

## 2022-01-17 DIAGNOSIS — C50.919 METASTATIC BREAST CANCER (HCC): ICD-10-CM

## 2022-01-17 DIAGNOSIS — M89.9 LYTIC LESION OF BONE ON X-RAY: ICD-10-CM

## 2022-01-17 LAB
ALBUMIN SERPL BCP-MCNC: 3.5 G/DL (ref 3.5–5)
ALP SERPL-CCNC: 93 U/L (ref 46–116)
ALT SERPL W P-5'-P-CCNC: 19 U/L (ref 12–78)
ANION GAP SERPL CALCULATED.3IONS-SCNC: 2 MMOL/L (ref 4–13)
AST SERPL W P-5'-P-CCNC: 11 U/L (ref 5–45)
BASOPHILS # BLD AUTO: 0.06 THOUSANDS/ΜL (ref 0–0.1)
BASOPHILS NFR BLD AUTO: 1 % (ref 0–1)
BILIRUB SERPL-MCNC: 0.53 MG/DL (ref 0.2–1)
BUN SERPL-MCNC: 17 MG/DL (ref 5–25)
CALCIUM SERPL-MCNC: 9.7 MG/DL (ref 8.3–10.1)
CANCER AG27-29 SERPL-ACNC: 29.9 U/ML (ref 0–42.3)
CHLORIDE SERPL-SCNC: 107 MMOL/L (ref 100–108)
CO2 SERPL-SCNC: 30 MMOL/L (ref 21–32)
CREAT SERPL-MCNC: 1 MG/DL (ref 0.6–1.3)
EOSINOPHIL # BLD AUTO: 0.49 THOUSAND/ΜL (ref 0–0.61)
EOSINOPHIL NFR BLD AUTO: 8 % (ref 0–6)
ERYTHROCYTE [DISTWIDTH] IN BLOOD BY AUTOMATED COUNT: 14.8 % (ref 11.6–15.1)
GFR SERPL CREATININE-BSD FRML MDRD: 52 ML/MIN/1.73SQ M
GLUCOSE SERPL-MCNC: 111 MG/DL (ref 65–140)
HCT VFR BLD AUTO: 41 % (ref 34.8–46.1)
HGB BLD-MCNC: 13.2 G/DL (ref 11.5–15.4)
IMM GRANULOCYTES # BLD AUTO: 0.02 THOUSAND/UL (ref 0–0.2)
IMM GRANULOCYTES NFR BLD AUTO: 0 % (ref 0–2)
LYMPHOCYTES # BLD AUTO: 1.16 THOUSANDS/ΜL (ref 0.6–4.47)
LYMPHOCYTES NFR BLD AUTO: 19 % (ref 14–44)
MCH RBC QN AUTO: 26.6 PG (ref 26.8–34.3)
MCHC RBC AUTO-ENTMCNC: 32.2 G/DL (ref 31.4–37.4)
MCV RBC AUTO: 83 FL (ref 82–98)
MONOCYTES # BLD AUTO: 0.55 THOUSAND/ΜL (ref 0.17–1.22)
MONOCYTES NFR BLD AUTO: 9 % (ref 4–12)
NEUTROPHILS # BLD AUTO: 3.78 THOUSANDS/ΜL (ref 1.85–7.62)
NEUTS SEG NFR BLD AUTO: 63 % (ref 43–75)
NRBC BLD AUTO-RTO: 0 /100 WBCS
PLATELET # BLD AUTO: 226 THOUSANDS/UL (ref 149–390)
PMV BLD AUTO: 11.2 FL (ref 8.9–12.7)
POTASSIUM SERPL-SCNC: 4.1 MMOL/L (ref 3.5–5.3)
PROT SERPL-MCNC: 7.4 G/DL (ref 6.4–8.2)
RBC # BLD AUTO: 4.96 MILLION/UL (ref 3.81–5.12)
SODIUM SERPL-SCNC: 139 MMOL/L (ref 136–145)
WBC # BLD AUTO: 6.06 THOUSAND/UL (ref 4.31–10.16)

## 2022-01-17 PROCEDURE — 86300 IMMUNOASSAY TUMOR CA 15-3: CPT

## 2022-01-17 PROCEDURE — 85025 COMPLETE CBC W/AUTO DIFF WBC: CPT

## 2022-01-17 PROCEDURE — 80053 COMPREHEN METABOLIC PANEL: CPT

## 2022-01-17 PROCEDURE — 36415 COLL VENOUS BLD VENIPUNCTURE: CPT

## 2022-01-18 ENCOUNTER — HOSPITAL ENCOUNTER (OUTPATIENT)
Dept: INFUSION CENTER | Facility: HOSPITAL | Age: 82
Discharge: HOME | End: 2022-01-18
Attending: INTERNAL MEDICINE
Payer: MEDICARE

## 2022-01-18 VITALS
HEART RATE: 105 BPM | DIASTOLIC BLOOD PRESSURE: 74 MMHG | SYSTOLIC BLOOD PRESSURE: 136 MMHG | WEIGHT: 159.83 LBS | OXYGEN SATURATION: 98 % | RESPIRATION RATE: 16 BRPM | BODY MASS INDEX: 29.23 KG/M2 | TEMPERATURE: 97.9 F

## 2022-01-18 DIAGNOSIS — C50.919 METASTATIC BREAST CANCER (HCC): ICD-10-CM

## 2022-01-18 DIAGNOSIS — M89.9 LYTIC LESION OF BONE ON X-RAY: Primary | ICD-10-CM

## 2022-01-18 PROCEDURE — 96372 THER/PROPH/DIAG INJ SC/IM: CPT

## 2022-01-18 PROCEDURE — 96401 CHEMO ANTI-NEOPL SQ/IM: CPT

## 2022-01-18 RX ORDER — LAMOTRIGINE 25 MG/1
500 TABLET ORAL ONCE
Status: CANCELLED | OUTPATIENT
Start: 2022-02-15

## 2022-01-18 RX ORDER — LAMOTRIGINE 25 MG/1
500 TABLET ORAL ONCE
Status: COMPLETED | OUTPATIENT
Start: 2022-01-18 | End: 2022-01-18

## 2022-01-18 RX ADMIN — FULVESTRANT 500 MG: 50 INJECTION INTRAMUSCULAR at 13:10

## 2022-01-18 RX ADMIN — DENOSUMAB 120 MG: 120 INJECTION SUBCUTANEOUS at 13:14

## 2022-01-24 DIAGNOSIS — I10 ESSENTIAL HYPERTENSION: ICD-10-CM

## 2022-01-24 RX ORDER — LOSARTAN POTASSIUM 50 MG/1
50 TABLET ORAL DAILY
Qty: 90 TABLET | Refills: 1 | Status: ON HOLD | OUTPATIENT
Start: 2022-01-24 | End: 2022-08-03 | Stop reason: SDUPTHER

## 2022-02-02 ENCOUNTER — OFFICE VISIT (OUTPATIENT)
Dept: GASTROENTEROLOGY | Facility: CLINIC | Age: 82
End: 2022-02-02
Payer: MEDICARE

## 2022-02-02 VITALS
HEIGHT: 62 IN | DIASTOLIC BLOOD PRESSURE: 80 MMHG | WEIGHT: 162 LBS | HEART RATE: 106 BPM | SYSTOLIC BLOOD PRESSURE: 118 MMHG | BODY MASS INDEX: 29.81 KG/M2

## 2022-02-02 DIAGNOSIS — K52.9 COLITIS: Primary | ICD-10-CM

## 2022-02-02 DIAGNOSIS — K62.5 RECTAL BLEEDING: ICD-10-CM

## 2022-02-02 PROCEDURE — 99214 OFFICE O/P EST MOD 30 MIN: CPT | Performed by: NURSE PRACTITIONER

## 2022-02-02 RX ORDER — HYDROCORTISONE 100 MG/60ML
100 SUSPENSION RECTAL
Qty: 7 ENEMA | Refills: 0 | Status: SHIPPED | OUTPATIENT
Start: 2022-02-02 | End: 2022-02-28

## 2022-02-02 NOTE — PROGRESS NOTES
[address] [facility] - Outpatient Follow-up Note  Laura Silverman 79 y o  female MRN: [id number]  Encounter: [id number]    ASSESSMENT AND PLAN:      1  Colitis  2  Rectal bleeding  Colonoscopy 11/2021 due to rectal bleeding showed inflammation at distal sigmoid colon and rectum, biopsies with focal active cryptitis  No indication of chronic mucosal injury  She was treated with Canasa suppository x3 weeks with no improvement  Continues to have hematochezia with every bowel movement and spontaneous bright red blood per rectum  Discussed case with Dr Fifi Ross  - recommend hydrocortisone enemas nightly at bedtime x 7 days  - will  Re-evaluate results by phone next week  - consider further treatment with mesalamine  Also repeat colonoscopy,  Consider hemorrhoid banding if indicated      Followup Appointment:  2 months  ______________________________________________________________________    Chief Complaint   Patient presents with    Still with rectal bleeding after colonoscopy     HPI:  Patient presents today due to persistent rectal bleeding  She underwent colonoscopy  11/11/2021 -adenomatous polyp excised from descending colon  Mild localized  Edematous and erythematous mucosa noted at distal sigmoid and rectum,  No active bleeding noted     Biopsies showed focal active cryptitis with no evidence of chronic mucosal injury  She was treated with Canasa suppositories for 3 weeks with no improvement in rectal bleeding    She denies abdominal or rectal pain  No constipation-has formed bowel movement daily,  No straining to defecate  Reports hematochezia with all bowel movements and has spontaneous blood from rectum daily  Review of recent blood work shows stable hemoglobin of 13 2 on 01/17/2022    No change since October 2021    Historical Information   Past Medical History:   Diagnosis Date    Abnormal weight loss     Allergic reaction     Anxiety     Breast cancer, right ([address] ) 2011    right    Cancer [facility]) 2012    Candidal vulvovaginitis     Clotting disorder ([address] ) Same as above    Endometrial hyperplasia     Epithelial cyst     benign, ovary    GI (gastrointestinal bleed) Blood mixed with stool    History of radiation therapy 2011    right breast cancer    Hyperlipidemia     Hypertension     Internal hemorrhoids     Knee tendonitis     Ovarian cyst     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[facility])      Past Surgical History:   Procedure Laterality Date    BILATERAL SALPINGOOPHORECTOMY      onset: 13    BREAST BIOPSY Right 2006    benign    BREAST BIOPSY Right 2011    malignant    BREAST LUMPECTOMY Right 2011    malignant    CATARACT EXTRACTION W/  INTRAOCULAR LENS IMPLANT Right     phacoemulsification   Onset: 10/27/14    CHOLECYSTECTOMY      COLONOSCOPY  2017    approx    HYSTERECTOMY  Yes    INTRAOPERATIVE RADIATION THERAPY (IORT)      IR BIOPSY BONE  2021    SKIN LESION EXCISION Right     breast, single lesion    TONSILLECTOMY AND ADENOIDECTOMY       Social History     Substance and Sexual Activity   Alcohol Use No    Comment: (history)     Social History     Substance and Sexual Activity   Drug Use No     Social History     Tobacco Use   Smoking Status Former Smoker    Packs/day: 1 00    Years: 30 00    Pack years: 30 00    Types: Cigarettes    Start date: 55    Quit date: 0    Years since quittin 1   Smokeless Tobacco Never Used     Family History   Problem Relation Age of Onset    Stomach cancer Mother     No Known Problems Father     Cervical cancer Sister     No Known Problems Daughter     No Known Problems Maternal Grandmother     No Known Problems Maternal Grandfather     No Known Problems Paternal Grandmother     No Known Problems Paternal Grandfather     Colon polyps Neg Hx     Colon cancer Neg Hx          Current Outpatient Medications:     amitriptyline (ELAVIL) 25 mg tablet    Cholecalciferol (VITAMIN D3) 2000 units capsule    losartan (COZAAR) 50 mg tablet    simvastatin (ZOCOR) 10 mg tablet    hydrocortisone (CORTENEMA) 100 mg/60 mL enema    mesalamine (CANASA) 1,000 mg suppository    naproxen (NAPROSYN) 500 mg tablet    predniSONE 20 mg tablet  Allergies   Allergen Reactions    Sulfa Antibiotics     Pneumococcal Polysaccharide Vaccine Rash and Edema     Reviewed medications and allergies and updated as indicated    PHYSICAL EXAM:    Blood pressure 118/80, pulse (!) 106, height 5' 2" (1 575 m), weight 73 5 kg (162 lb), not currently breastfeeding  Body mass index is 29 63 kg/m²  General Appearance: NAD, cooperative, alert  Eyes: Anicteric  ENT:  Normocephalic, atraumatic, normal mucosa  Neck:  Supple, symmetrical, trachea midline  Resp:  Clear to auscultation bilaterally; no rales, rhonchi or wheezing; respirations unlabored   CV:  S1 S2, Regular rate and rhythm; no murmur, rub, or gallop  GI:  Soft, non-tender with palpation all quadrants, non-distended; normal bowel sounds; no masses, no organomegaly   Rectal: Deferred  Musculoskeletal: No cyanosis, clubbing or edema  Normal ROM    Skin:  No jaundice, rashes, or lesions   Psych: Normal affect, good eye contact  Neuro: No gross deficits, AAOx3    Lab Results:   Lab Results   Component Value Date    WBC 6 06 01/17/2022    HGB 13 2 01/17/2022    HCT 41 0 01/17/2022    MCV 83 01/17/2022     01/17/2022     Lab Results   Component Value Date     09/08/2015    K 4 1 01/17/2022     01/17/2022    CO2 30 01/17/2022    ANIONGAP 6 09/08/2015    BUN 17 01/17/2022    CREATININE 1 00 01/17/2022    GLUCOSE 115 09/08/2015    GLUF 116 (H) 12/17/2021    CALCIUM 9 7 01/17/2022    CORRECTEDCA 10 0 12/17/2021    AST 11 01/17/2022    ALT 19 01/17/2022    ALKPHOS 93 01/17/2022    PROT 7 3 09/08/2015    BILITOT 0 66 09/08/2015    EGFR 52 01/17/2022

## 2022-02-02 NOTE — LETTER
February 2, 2022     Anna FrederickDO  [address]  [facility] [address]    Patient: Lizet Silverman   YOB: 1940   Date of Visit: 2/2/2022       Dear Dr Mitul Ugarte: Thank you for referring Jack Silverman to me for evaluation  Below are my notes for this consultation  If you have questions, please do not hesitate to call me  I look forward to following your patient along with you  Sincerely,        BROOKE Cullen        CC: No Recipients  BROOKE Cullen  2/2/2022  3:09 PM  Sign when Signing Visit  [address] [employer] [facility] - Outpatient Follow-up Note  Lizet Silverman 79 y o  female MRN: [id number]  Encounter: [id number]    ASSESSMENT AND PLAN:      1  Colitis  2  Rectal bleeding  Colonoscopy 11/2021 due to rectal bleeding showed inflammation at distal sigmoid colon and rectum, biopsies with focal active cryptitis  No indication of chronic mucosal injury  She was treated with Canasa suppository x3 weeks with no improvement  Continues to have hematochezia with every bowel movement and spontaneous bright red blood per rectum  Discussed case with Dr Tamar Teran  - recommend hydrocortisone enemas nightly at bedtime x 7 days  - will  Re-evaluate results by phone next week  - consider further treatment with mesalamine  Also repeat colonoscopy,  Consider hemorrhoid banding if indicated      Followup Appointment:  2 months  ______________________________________________________________________    Chief Complaint   Patient presents with    Still with rectal bleeding after colonoscopy     HPI:  Patient presents today due to persistent rectal bleeding  She underwent colonoscopy  11/11/2021 -adenomatous polyp excised from descending colon  Mild localized  Edematous and erythematous mucosa noted at distal sigmoid and rectum,  No active bleeding noted     Biopsies showed focal active cryptitis with no evidence of chronic mucosal injury    She was treated with Canasa suppositories for 3 weeks with no improvement in rectal bleeding    She denies abdominal or rectal pain  No constipation-has formed bowel movement daily,  No straining to defecate  Reports hematochezia with all bowel movements and has spontaneous blood from rectum daily  Review of recent blood work shows stable hemoglobin of 13 2 on 01/17/2022  No change since October 2021    Historical Information   Past Medical History:   Diagnosis Date    Abnormal weight loss     Allergic reaction     Anxiety     Breast cancer, right ([facility] ) 2011    right    Cancer [facility]) 2012    Candidal vulvovaginitis     Clotting disorder ([facility] ) Same as above    Endometrial hyperplasia     Epithelial cyst     benign, ovary    GI (gastrointestinal bleed) Blood mixed with stool    History of radiation therapy 2011    right breast cancer    Hyperlipidemia     Hypertension     Internal hemorrhoids     Knee tendonitis     Ovarian cyst     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[facility])      Past Surgical History:   Procedure Laterality Date    BILATERAL SALPINGOOPHORECTOMY      onset: 7/23/13    BREAST BIOPSY Right 06/13/2006    benign    BREAST BIOPSY Right 03/02/2011    malignant    BREAST LUMPECTOMY Right 03/30/2011    malignant    CATARACT EXTRACTION W/  INTRAOCULAR LENS IMPLANT Right     phacoemulsification   Onset: 10/27/14    CHOLECYSTECTOMY      COLONOSCOPY  2017    approx    HYSTERECTOMY  Yes    INTRAOPERATIVE RADIATION THERAPY (IORT)      IR BIOPSY BONE  7/9/2021    SKIN LESION EXCISION Right     breast, single lesion    TONSILLECTOMY AND ADENOIDECTOMY       Social History     Substance and Sexual Activity   Alcohol Use No    Comment: (history)     Social History     Substance and Sexual Activity   Drug Use No     Social History     Tobacco Use   Smoking Status Former Smoker    Packs/day: 1 00    Years: 30 00    Pack years: 30 00    Types: Cigarettes    Start date: 55    Quit date: 0    Years since quittin 1   Smokeless Tobacco Never Used     Family History   Problem Relation Age of Onset    Stomach cancer Mother     No Known Problems Father     Cervical cancer Sister     No Known Problems Daughter     No Known Problems Maternal Grandmother     No Known Problems Maternal Grandfather     No Known Problems Paternal Grandmother     No Known Problems Paternal Grandfather     Colon polyps Neg Hx     Colon cancer Neg Hx          Current Outpatient Medications:     amitriptyline (ELAVIL) 25 mg tablet    Cholecalciferol (VITAMIN D3) 2000 units capsule    losartan (COZAAR) 50 mg tablet    simvastatin (ZOCOR) 10 mg tablet    hydrocortisone (CORTENEMA) 100 mg/60 mL enema    mesalamine (CANASA) 1,000 mg suppository    naproxen (NAPROSYN) 500 mg tablet    predniSONE 20 mg tablet  Allergies   Allergen Reactions    Sulfa Antibiotics     Pneumococcal Polysaccharide Vaccine Rash and Edema     Reviewed medications and allergies and updated as indicated    PHYSICAL EXAM:    Blood pressure 118/80, pulse (!) 106, height 5' 2" (1 575 m), weight 73 5 kg (162 lb), not currently breastfeeding  Body mass index is 29 63 kg/m²  General Appearance: NAD, cooperative, alert  Eyes: Anicteric  ENT:  Normocephalic, atraumatic, normal mucosa  Neck:  Supple, symmetrical, trachea midline  Resp:  Clear to auscultation bilaterally; no rales, rhonchi or wheezing; respirations unlabored   CV:  S1 S2, Regular rate and rhythm; no murmur, rub, or gallop  GI:  Soft, non-tender with palpation all quadrants, non-distended; normal bowel sounds; no masses, no organomegaly   Rectal: Deferred  Musculoskeletal: No cyanosis, clubbing or edema  Normal ROM    Skin:  No jaundice, rashes, or lesions   Psych: Normal affect, good eye contact  Neuro: No gross deficits, AAOx3    Lab Results:   Lab Results   Component Value Date    WBC 6 06 2022    HGB 13 2 2022    HCT 41 0 2022    MCV 83 2022     2022 Lab Results   Component Value Date     09/08/2015    K 4 1 01/17/2022     01/17/2022    CO2 30 01/17/2022    ANIONGAP 6 09/08/2015    BUN 17 01/17/2022    CREATININE 1 00 01/17/2022    GLUCOSE 115 09/08/2015    GLUF 116 (H) 12/17/2021    CALCIUM 9 7 01/17/2022    CORRECTEDCA 10 0 12/17/2021    AST 11 01/17/2022    ALT 19 01/17/2022    ALKPHOS 93 01/17/2022    PROT 7 3 09/08/2015    BILITOT 0 66 09/08/2015    EGFR 52 01/17/2022

## 2022-02-03 ENCOUNTER — TELEPHONE (OUTPATIENT)
Dept: GASTROENTEROLOGY | Facility: CLINIC | Age: 82
End: 2022-02-03

## 2022-02-03 NOTE — TELEPHONE ENCOUNTER
Pt asks for note to be sent to [facility]; Pt was upset by cost of Hydrocortisone - $135 at [facility]; requests an alternative  Pharm was out/they have it ord'd in but Pt wants other med  # [phone]

## 2022-02-03 NOTE — TELEPHONE ENCOUNTER
Spoke with patient-hydrocortisone enemas at [employer] [facility] will cost 135 dollars     She is aware of good Rx but unable to print out coupons and is willing to pay for the enemas

## 2022-02-03 NOTE — TELEPHONE ENCOUNTER
Pt left [facility] stating she called earlier for Jeffry Swenson to call back  She will get the medication even though it is expensive; doesn't need a call back   # [phone]

## 2022-02-11 ENCOUNTER — APPOINTMENT (OUTPATIENT)
Dept: LAB | Facility: CLINIC | Age: 82
End: 2022-02-11
Payer: MEDICARE

## 2022-02-11 DIAGNOSIS — C50.919 METASTATIC BREAST CANCER (HCC): ICD-10-CM

## 2022-02-11 DIAGNOSIS — M89.9 LYTIC LESION OF BONE ON X-RAY: ICD-10-CM

## 2022-02-11 LAB
ALBUMIN SERPL BCP-MCNC: 3.3 G/DL (ref 3.5–5)
ALP SERPL-CCNC: 77 U/L (ref 46–116)
ALT SERPL W P-5'-P-CCNC: 19 U/L (ref 12–78)
ANION GAP SERPL CALCULATED.3IONS-SCNC: 5 MMOL/L (ref 4–13)
AST SERPL W P-5'-P-CCNC: 11 U/L (ref 5–45)
BASOPHILS # BLD AUTO: 0.02 THOUSANDS/ΜL (ref 0–0.1)
BASOPHILS NFR BLD AUTO: 0 % (ref 0–1)
BILIRUB SERPL-MCNC: 0.38 MG/DL (ref 0.2–1)
BUN SERPL-MCNC: 15 MG/DL (ref 5–25)
CALCIUM ALBUM COR SERPL-MCNC: 9.4 MG/DL (ref 8.3–10.1)
CALCIUM SERPL-MCNC: 8.8 MG/DL (ref 8.3–10.1)
CANCER AG27-29 SERPL-ACNC: 27.5 U/ML (ref 0–42.3)
CHLORIDE SERPL-SCNC: 112 MMOL/L (ref 100–108)
CO2 SERPL-SCNC: 27 MMOL/L (ref 21–32)
CREAT SERPL-MCNC: 0.86 MG/DL (ref 0.6–1.3)
EOSINOPHIL # BLD AUTO: 0.03 THOUSAND/ΜL (ref 0–0.61)
EOSINOPHIL NFR BLD AUTO: 0 % (ref 0–6)
ERYTHROCYTE [DISTWIDTH] IN BLOOD BY AUTOMATED COUNT: 15.3 % (ref 11.6–15.1)
GFR SERPL CREATININE-BSD FRML MDRD: 63 ML/MIN/1.73SQ M
GLUCOSE P FAST SERPL-MCNC: 96 MG/DL (ref 65–99)
HCT VFR BLD AUTO: 40.3 % (ref 34.8–46.1)
HGB BLD-MCNC: 12.5 G/DL (ref 11.5–15.4)
IMM GRANULOCYTES # BLD AUTO: 0.02 THOUSAND/UL (ref 0–0.2)
IMM GRANULOCYTES NFR BLD AUTO: 0 % (ref 0–2)
LYMPHOCYTES # BLD AUTO: 1.31 THOUSANDS/ΜL (ref 0.6–4.47)
LYMPHOCYTES NFR BLD AUTO: 19 % (ref 14–44)
MCH RBC QN AUTO: 26.5 PG (ref 26.8–34.3)
MCHC RBC AUTO-ENTMCNC: 31 G/DL (ref 31.4–37.4)
MCV RBC AUTO: 86 FL (ref 82–98)
MONOCYTES # BLD AUTO: 0.42 THOUSAND/ΜL (ref 0.17–1.22)
MONOCYTES NFR BLD AUTO: 6 % (ref 4–12)
NEUTROPHILS # BLD AUTO: 5.07 THOUSANDS/ΜL (ref 1.85–7.62)
NEUTS SEG NFR BLD AUTO: 75 % (ref 43–75)
NRBC BLD AUTO-RTO: 0 /100 WBCS
PLATELET # BLD AUTO: 236 THOUSANDS/UL (ref 149–390)
PMV BLD AUTO: 10.9 FL (ref 8.9–12.7)
POTASSIUM SERPL-SCNC: 3.7 MMOL/L (ref 3.5–5.3)
PROT SERPL-MCNC: 6.8 G/DL (ref 6.4–8.2)
RBC # BLD AUTO: 4.71 MILLION/UL (ref 3.81–5.12)
SODIUM SERPL-SCNC: 144 MMOL/L (ref 136–145)
WBC # BLD AUTO: 6.87 THOUSAND/UL (ref 4.31–10.16)

## 2022-02-11 PROCEDURE — 85025 COMPLETE CBC W/AUTO DIFF WBC: CPT

## 2022-02-11 PROCEDURE — 80053 COMPREHEN METABOLIC PANEL: CPT

## 2022-02-11 PROCEDURE — 86300 IMMUNOASSAY TUMOR CA 15-3: CPT

## 2022-02-11 PROCEDURE — 36415 COLL VENOUS BLD VENIPUNCTURE: CPT

## 2022-02-15 ENCOUNTER — HOSPITAL ENCOUNTER (OUTPATIENT)
Dept: INFUSION CENTER | Facility: HOSPITAL | Age: 82
Discharge: HOME | End: 2022-02-15
Attending: INTERNAL MEDICINE
Payer: MEDICARE

## 2022-02-15 VITALS
SYSTOLIC BLOOD PRESSURE: 137 MMHG | HEART RATE: 101 BPM | OXYGEN SATURATION: 98 % | RESPIRATION RATE: 12 BRPM | TEMPERATURE: 97.1 F | DIASTOLIC BLOOD PRESSURE: 64 MMHG

## 2022-02-15 DIAGNOSIS — M89.9 LYTIC LESION OF BONE ON X-RAY: Primary | ICD-10-CM

## 2022-02-15 DIAGNOSIS — C50.919 METASTATIC BREAST CANCER (HCC): ICD-10-CM

## 2022-02-15 PROCEDURE — 96402 CHEMO HORMON ANTINEOPL SQ/IM: CPT

## 2022-02-15 RX ORDER — LAMOTRIGINE 25 MG/1
500 TABLET ORAL ONCE
Status: COMPLETED | OUTPATIENT
Start: 2022-02-15 | End: 2022-02-15

## 2022-02-15 RX ORDER — LAMOTRIGINE 25 MG/1
500 TABLET ORAL ONCE
Status: CANCELLED | OUTPATIENT
Start: 2022-03-15

## 2022-02-15 RX ADMIN — FULVESTRANT 500 MG: 50 INJECTION, SOLUTION INTRAMUSCULAR at 12:42

## 2022-02-15 NOTE — PROGRESS NOTES
Pt received faslodex injections one into each glute  Band aids applied  Next appt scheduled and calender printed  Pt left ambulatory with steady gait for d/c to home

## 2022-02-28 DIAGNOSIS — E78.5 DYSLIPIDEMIA: ICD-10-CM

## 2022-02-28 RX ORDER — SIMVASTATIN 10 MG
10 TABLET ORAL
Qty: 90 TABLET | Refills: 1 | Status: SHIPPED | OUTPATIENT
Start: 2022-02-28

## 2022-03-10 ENCOUNTER — TELEPHONE (OUTPATIENT)
Dept: HEMATOLOGY ONCOLOGY | Facility: CLINIC | Age: 82
End: 2022-03-10

## 2022-03-10 NOTE — TELEPHONE ENCOUNTER
03/10/22    LMOM informing provider will not be in on 4/18  I will R/S her to see Dr Dexter Bamberger on 4/28  [employer] number provided for call back

## 2022-03-11 ENCOUNTER — APPOINTMENT (OUTPATIENT)
Dept: LAB | Facility: CLINIC | Age: 82
End: 2022-03-11
Payer: MEDICARE

## 2022-03-11 DIAGNOSIS — C50.919 METASTATIC BREAST CANCER (HCC): ICD-10-CM

## 2022-03-11 DIAGNOSIS — M89.9 LYTIC LESION OF BONE ON X-RAY: ICD-10-CM

## 2022-03-11 LAB
ALBUMIN SERPL BCP-MCNC: 3.5 G/DL (ref 3.5–5)
ALP SERPL-CCNC: 90 U/L (ref 46–116)
ALT SERPL W P-5'-P-CCNC: 25 U/L (ref 12–78)
ANION GAP SERPL CALCULATED.3IONS-SCNC: 2 MMOL/L (ref 4–13)
AST SERPL W P-5'-P-CCNC: 18 U/L (ref 5–45)
BASOPHILS # BLD AUTO: 0.04 THOUSANDS/ΜL (ref 0–0.1)
BASOPHILS NFR BLD AUTO: 1 % (ref 0–1)
BILIRUB SERPL-MCNC: 0.46 MG/DL (ref 0.2–1)
BUN SERPL-MCNC: 17 MG/DL (ref 5–25)
CALCIUM SERPL-MCNC: 9.1 MG/DL (ref 8.3–10.1)
CHLORIDE SERPL-SCNC: 109 MMOL/L (ref 100–108)
CO2 SERPL-SCNC: 28 MMOL/L (ref 21–32)
CREAT SERPL-MCNC: 1.06 MG/DL (ref 0.6–1.3)
EOSINOPHIL # BLD AUTO: 0.37 THOUSAND/ΜL (ref 0–0.61)
EOSINOPHIL NFR BLD AUTO: 7 % (ref 0–6)
ERYTHROCYTE [DISTWIDTH] IN BLOOD BY AUTOMATED COUNT: 14.6 % (ref 11.6–15.1)
GFR SERPL CREATININE-BSD FRML MDRD: 49 ML/MIN/1.73SQ M
GLUCOSE P FAST SERPL-MCNC: 117 MG/DL (ref 65–99)
HCT VFR BLD AUTO: 41.9 % (ref 34.8–46.1)
HGB BLD-MCNC: 13.6 G/DL (ref 11.5–15.4)
IMM GRANULOCYTES # BLD AUTO: 0.01 THOUSAND/UL (ref 0–0.2)
IMM GRANULOCYTES NFR BLD AUTO: 0 % (ref 0–2)
LYMPHOCYTES # BLD AUTO: 1.06 THOUSANDS/ΜL (ref 0.6–4.47)
LYMPHOCYTES NFR BLD AUTO: 19 % (ref 14–44)
MCH RBC QN AUTO: 26.7 PG (ref 26.8–34.3)
MCHC RBC AUTO-ENTMCNC: 32.5 G/DL (ref 31.4–37.4)
MCV RBC AUTO: 82 FL (ref 82–98)
MONOCYTES # BLD AUTO: 0.48 THOUSAND/ΜL (ref 0.17–1.22)
MONOCYTES NFR BLD AUTO: 9 % (ref 4–12)
NEUTROPHILS # BLD AUTO: 3.68 THOUSANDS/ΜL (ref 1.85–7.62)
NEUTS SEG NFR BLD AUTO: 64 % (ref 43–75)
NRBC BLD AUTO-RTO: 0 /100 WBCS
PLATELET # BLD AUTO: 217 THOUSANDS/UL (ref 149–390)
PMV BLD AUTO: 10.9 FL (ref 8.9–12.7)
POTASSIUM SERPL-SCNC: 3.9 MMOL/L (ref 3.5–5.3)
PROT SERPL-MCNC: 7.4 G/DL (ref 6.4–8.2)
RBC # BLD AUTO: 5.1 MILLION/UL (ref 3.81–5.12)
SODIUM SERPL-SCNC: 139 MMOL/L (ref 136–145)
WBC # BLD AUTO: 5.64 THOUSAND/UL (ref 4.31–10.16)

## 2022-03-11 PROCEDURE — 86300 IMMUNOASSAY TUMOR CA 15-3: CPT

## 2022-03-11 PROCEDURE — 85025 COMPLETE CBC W/AUTO DIFF WBC: CPT

## 2022-03-11 PROCEDURE — 80053 COMPREHEN METABOLIC PANEL: CPT

## 2022-03-11 PROCEDURE — 36415 COLL VENOUS BLD VENIPUNCTURE: CPT

## 2022-03-12 LAB — CANCER AG27-29 SERPL-ACNC: 25.7 U/ML (ref 0–42.3)

## 2022-03-15 ENCOUNTER — HOSPITAL ENCOUNTER (OUTPATIENT)
Dept: INFUSION CENTER | Facility: HOSPITAL | Age: 82
Discharge: HOME | End: 2022-03-15
Attending: INTERNAL MEDICINE
Payer: MEDICARE

## 2022-03-15 VITALS
RESPIRATION RATE: 14 BRPM | OXYGEN SATURATION: 99 % | WEIGHT: 165.79 LBS | BODY MASS INDEX: 30.32 KG/M2 | SYSTOLIC BLOOD PRESSURE: 140 MMHG | TEMPERATURE: 98.5 F | HEART RATE: 102 BPM | DIASTOLIC BLOOD PRESSURE: 74 MMHG

## 2022-03-15 DIAGNOSIS — M89.9 LYTIC LESION OF BONE ON X-RAY: Primary | ICD-10-CM

## 2022-03-15 DIAGNOSIS — C50.919 METASTATIC BREAST CANCER (HCC): ICD-10-CM

## 2022-03-15 PROCEDURE — 96402 CHEMO HORMON ANTINEOPL SQ/IM: CPT

## 2022-03-15 RX ORDER — LAMOTRIGINE 25 MG/1
500 TABLET ORAL ONCE
Status: CANCELLED | OUTPATIENT
Start: 2022-04-12

## 2022-03-15 RX ORDER — LAMOTRIGINE 25 MG/1
500 TABLET ORAL ONCE
Status: COMPLETED | OUTPATIENT
Start: 2022-03-15 | End: 2022-03-15

## 2022-03-15 RX ADMIN — FULVESTRANT 500 MG: 50 INJECTION INTRAMUSCULAR at 12:40

## 2022-03-15 NOTE — PROGRESS NOTES
Pt here today for FASLODEX injection  Tolerated well, no adverse reaction  AVS provided and pt left ambulatory with steady gait

## 2022-03-16 NOTE — TELEPHONE ENCOUNTER
I called the patient on 10/28/2019 and was connected with her voicemail  I left her message to call the office to review her results on 10/29/2019  [Sore Throat] : sore throat [Negative] : Genitourinary

## 2022-03-28 DIAGNOSIS — F41.9 ANXIETY: ICD-10-CM

## 2022-03-28 RX ORDER — AMITRIPTYLINE HYDROCHLORIDE 25 MG/1
25 TABLET, FILM COATED ORAL
Qty: 90 TABLET | Refills: 1 | Status: SHIPPED | OUTPATIENT
Start: 2022-03-28

## 2022-04-05 ENCOUNTER — HOSPITAL ENCOUNTER (OUTPATIENT)
Dept: CT IMAGING | Facility: HOSPITAL | Age: 82
Discharge: HOME | End: 2022-04-05
Attending: INTERNAL MEDICINE
Payer: MEDICARE

## 2022-04-05 DIAGNOSIS — C50.919 METASTATIC BREAST CANCER (HCC): ICD-10-CM

## 2022-04-05 DIAGNOSIS — M89.9 LYTIC LESION OF BONE ON X-RAY: ICD-10-CM

## 2022-04-05 PROCEDURE — 71260 CT THORAX DX C+: CPT

## 2022-04-05 PROCEDURE — G1004 CDSM NDSC: HCPCS

## 2022-04-05 PROCEDURE — 74177 CT ABD & PELVIS W/CONTRAST: CPT

## 2022-04-05 RX ADMIN — IOHEXOL 100 ML: 350 INJECTION, SOLUTION INTRAVENOUS at 13:32

## 2022-04-06 ENCOUNTER — OFFICE VISIT (OUTPATIENT)
Dept: GASTROENTEROLOGY | Facility: CLINIC | Age: 82
End: 2022-04-06
Payer: MEDICARE

## 2022-04-06 ENCOUNTER — TELEPHONE (OUTPATIENT)
Dept: GASTROENTEROLOGY | Facility: CLINIC | Age: 82
End: 2022-04-06

## 2022-04-06 VITALS
BODY MASS INDEX: 30.55 KG/M2 | SYSTOLIC BLOOD PRESSURE: 146 MMHG | WEIGHT: 166 LBS | DIASTOLIC BLOOD PRESSURE: 92 MMHG | HEIGHT: 62 IN

## 2022-04-06 DIAGNOSIS — K62.5 RECTAL BLEEDING: ICD-10-CM

## 2022-04-06 DIAGNOSIS — K52.9 COLITIS: Primary | ICD-10-CM

## 2022-04-06 PROCEDURE — 99213 OFFICE O/P EST LOW 20 MIN: CPT | Performed by: NURSE PRACTITIONER

## 2022-04-06 RX ORDER — MESALAMINE 1.2 G/1
2400 TABLET, DELAYED RELEASE ORAL
Qty: 60 TABLET | Refills: 3 | Status: SHIPPED | OUTPATIENT
Start: 2022-04-06 | End: 2022-08-03

## 2022-04-06 NOTE — LETTER
April 6, 2022     Anel Ordonez DO  [address]  [facility] [address]    Patient: Allison Silverman   YOB: 1940   Date of Visit: 4/6/2022       Dear Dr Zeinab Wallace: Thank you for referring Sheeba Silverman to me for evaluation  Below are my notes for this consultation  If you have questions, please do not hesitate to call me  I look forward to following your patient along with you  Sincerely,        BROOKE Arce        CC: No Recipients  BROOKE Arce  4/6/2022  2:12 PM  Sign when Signing Visit  [address] [facility] - Outpatient Follow-up Note  Allison Silverman 79 y o  female MRN: [id number]  Encounter: [id number]    ASSESSMENT AND PLAN:      1  Colitis  2  Rectal bleeding  Colonoscopy 11/2021 showed inflammation at distal sigmoid colon and rectum, biopsies showed focal active cryptitis with no evidence of chronic mucosal injury  Rectal bleeding has improved but not resolved with Canasa suppositories x3 weeks and hydrocortisone enemas x7 days  -will add mesalamine 2 4 g daily  -re-evaluate in 1 month, if persistent rectal bleeding consider flex sigmoidoscopy       Followup Appointment:  1 month  ______________________________________________________________________    Chief Complaint   Patient presents with    Follow-up     still having rectal bleeding     HPI:  Patient presents today in follow-up for rectal bleeding  She was found to have inflammation at distal sigmoid on colonoscopy 11/2021  Biopsies showed focal active cryptitis with no evidence of chronic mucosal injury  She was initially treated with Canasa suppositories for 3 weeks with no improvement and rectal bleeding  Most recently she use hydrocortisone enemas x7 days  She does feel that rectal bleeding has decreased after using enema but continues to have daily blood noted in stool and small amounts of bright red blood per rectum    She denies abdominal pain or cramping  Moving her bowels 2-3 times per day with formed brown stools    Historical Information   Past Medical History:   Diagnosis Date    Abnormal weight loss     Allergic reaction     Anxiety     Breast cancer, right ([facility] [address] [facility] ) 2011    right    Cancer [facility]) 2012    Candidal vulvovaginitis     Clotting disorder ([facility] ) Same as above    Endometrial hyperplasia     Epithelial cyst     benign, ovary    GI (gastrointestinal bleed) Blood mixed with stool    History of radiation therapy 2011    right breast cancer    Hyperlipidemia     Hypertension     Internal hemorrhoids     Knee tendonitis     Ovarian cyst     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[facility])      Past Surgical History:   Procedure Laterality Date    BILATERAL SALPINGOOPHORECTOMY      onset: 13    BREAST BIOPSY Right 2006    benign    BREAST BIOPSY Right 2011    malignant    BREAST LUMPECTOMY Right 2011    malignant    CATARACT EXTRACTION W/  INTRAOCULAR LENS IMPLANT Right     phacoemulsification   Onset: 10/27/14    CHOLECYSTECTOMY      COLONOSCOPY  2017    approx    HYSTERECTOMY  Yes    INTRAOPERATIVE RADIATION THERAPY (IORT)      IR BIOPSY BONE  2021    SKIN LESION EXCISION Right     breast, single lesion    TONSILLECTOMY AND ADENOIDECTOMY       Social History     Substance and Sexual Activity   Alcohol Use No    Comment: (history)     Social History     Substance and Sexual Activity   Drug Use No     Social History     Tobacco Use   Smoking Status Former Smoker    Packs/day: 1 00    Years: 30 00    Pack years: 30 00    Types: Cigarettes    Start date: 55    Quit date: 0    Years since quittin 2   Smokeless Tobacco Never Used     Family History   Problem Relation Age of Onset    Stomach cancer Mother     No Known Problems Father     Cervical cancer Sister     No Known Problems Daughter     No Known Problems Maternal Grandmother     No Known Problems Maternal Grandfather     No Known Problems Paternal Grandmother     No Known Problems Paternal Grandfather     Colon polyps Neg Hx     Colon cancer Neg Hx          Current Outpatient Medications:     amitriptyline (ELAVIL) 25 mg tablet    Cholecalciferol (VITAMIN D3) 2000 units capsule    losartan (COZAAR) 50 mg tablet    simvastatin (ZOCOR) 10 mg tablet    mesalamine (LIALDA) 1 2 g EC tablet  Allergies   Allergen Reactions    Sulfa Antibiotics     Pneumococcal Polysaccharide Vaccine Rash and Edema     Reviewed medications and allergies and updated as indicated    PHYSICAL EXAM:    Blood pressure 146/92, height 5' 2" (1 575 m), weight 75 3 kg (166 lb), not currently breastfeeding  Body mass index is 30 36 kg/m²  General Appearance: NAD, cooperative, alert  Eyes: Anicteric  ENT:  Normocephalic, atraumatic, normal mucosa  Neck:  Supple, symmetrical, trachea midline  Resp:  Clear to auscultation bilaterally; no rales, rhonchi or wheezing; respirations unlabored   CV:  S1 S2, Regular rate and rhythm; no murmur, rub, or gallop  GI:  Soft, non-tender, non-distended; normal bowel sounds; no masses, no organomegaly   Rectal: Deferred  Musculoskeletal: No cyanosis, clubbing or edema  Normal ROM  Skin:  No jaundice, rashes, or lesions   Psych: Normal affect, good eye contact  Neuro: No gross deficits, AAOx3    Lab Results:   Lab Results   Component Value Date    WBC 5 64 03/11/2022    HGB 13 6 03/11/2022    HCT 41 9 03/11/2022    MCV 82 03/11/2022     03/11/2022     Lab Results   Component Value Date     09/08/2015    K 3 9 03/11/2022     (H) 03/11/2022    CO2 28 03/11/2022    ANIONGAP 6 09/08/2015    BUN 17 03/11/2022    CREATININE 1 06 03/11/2022    GLUCOSE 115 09/08/2015    GLUF 117 (H) 03/11/2022    CALCIUM 9 1 03/11/2022    CORRECTEDCA 9 4 02/11/2022    AST 18 03/11/2022    ALT 25 03/11/2022    ALKPHOS 90 03/11/2022    PROT 7 3 09/08/2015    BILITOT 0 66 09/08/2015    EGFR 49 03/11/2022

## 2022-04-06 NOTE — PROGRESS NOTES
[id number] - Outpatient Follow-up Note  Georgeana Cooks Clunk 79 y o  female MRN: [id number]  Encounter: [id number]    ASSESSMENT AND PLAN:      1  Colitis  2  Rectal bleeding  Colonoscopy 11/2021 showed inflammation at distal sigmoid colon and rectum, biopsies showed focal active cryptitis with no evidence of chronic mucosal injury  Rectal bleeding has improved but not resolved with Canasa suppositories x3 weeks and hydrocortisone enemas x7 days  -will add mesalamine 2 4 g daily  -re-evaluate in 1 month, if persistent rectal bleeding consider flex sigmoidoscopy       Followup Appointment:  1 month  ______________________________________________________________________    Chief Complaint   Patient presents with    Follow-up     still having rectal bleeding     HPI:  Patient presents today in follow-up for rectal bleeding  She was found to have inflammation at distal sigmoid on colonoscopy 11/2021  Biopsies showed focal active cryptitis with no evidence of chronic mucosal injury  She was initially treated with Canasa suppositories for 3 weeks with no improvement and rectal bleeding  Most recently she use hydrocortisone enemas x7 days  She does feel that rectal bleeding has decreased after using enema but continues to have daily blood noted in stool and small amounts of bright red blood per rectum  She denies abdominal pain or cramping    Moving her bowels 2-3 times per day with formed brown stools    Historical Information   Past Medical History:   Diagnosis Date    Abnormal weight loss     Allergic reaction     Anxiety     Breast cancer, right ([facility] [address] ) 2011    right    Cancer [facility]) 2012    Candidal vulvovaginitis     Clotting disorder ([facility] ) Same as above    Endometrial hyperplasia     Epithelial cyst     benign, ovary    GI (gastrointestinal bleed) Blood mixed with stool    History of radiation therapy 2011    right breast cancer    Hyperlipidemia     Hypertension     Internal hemorrhoids     Knee tendonitis     Ovarian cyst     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[facility])      Past Surgical History:   Procedure Laterality Date    BILATERAL SALPINGOOPHORECTOMY      onset: 12    BREAST BIOPSY Right 2006    benign    BREAST BIOPSY Right 2011    malignant    BREAST LUMPECTOMY Right 2011    malignant    CATARACT EXTRACTION W/  INTRAOCULAR LENS IMPLANT Right     phacoemulsification   Onset: 10/27/14    CHOLECYSTECTOMY      COLONOSCOPY  2017    approx    HYSTERECTOMY  Yes    INTRAOPERATIVE RADIATION THERAPY (IORT)      IR BIOPSY BONE  2021    SKIN LESION EXCISION Right     breast, single lesion    TONSILLECTOMY AND ADENOIDECTOMY       Social History     Substance and Sexual Activity   Alcohol Use No    Comment: (history)     Social History     Substance and Sexual Activity   Drug Use No     Social History     Tobacco Use   Smoking Status Former Smoker    Packs/day: 1 00    Years: 30 00    Pack years: 30 00    Types: Cigarettes    Start date: 55    Quit date: 0    Years since quittin 2   Smokeless Tobacco Never Used     Family History   Problem Relation Age of Onset    Stomach cancer Mother     No Known Problems Father     Cervical cancer Sister     No Known Problems Daughter     No Known Problems Maternal Grandmother     No Known Problems Maternal Grandfather     No Known Problems Paternal Grandmother     No Known Problems Paternal Grandfather     Colon polyps Neg Hx     Colon cancer Neg Hx          Current Outpatient Medications:     amitriptyline (ELAVIL) 25 mg tablet    Cholecalciferol (VITAMIN D3) 2000 units capsule    losartan (COZAAR) 50 mg tablet    simvastatin (ZOCOR) 10 mg tablet    mesalamine (LIALDA) 1 2 g EC tablet  Allergies   Allergen Reactions    Sulfa Antibiotics     Pneumococcal Polysaccharide Vaccine Rash and Edema     Reviewed medications and allergies and updated as indicated    PHYSICAL EXAM:    Blood pressure 146/92, height 5' 2" (1 575 m), weight 75 3 kg (166 lb), not currently breastfeeding  Body mass index is 30 36 kg/m²  General Appearance: NAD, cooperative, alert  Eyes: Anicteric  ENT:  Normocephalic, atraumatic, normal mucosa  Neck:  Supple, symmetrical, trachea midline  Resp:  Clear to auscultation bilaterally; no rales, rhonchi or wheezing; respirations unlabored   CV:  S1 S2, Regular rate and rhythm; no murmur, rub, or gallop  GI:  Soft, non-tender, non-distended; normal bowel sounds; no masses, no organomegaly   Rectal: Deferred  Musculoskeletal: No cyanosis, clubbing or edema  Normal ROM  Skin:  No jaundice, rashes, or lesions   Psych: Normal affect, good eye contact  Neuro: No gross deficits, AAOx3    Lab Results:   Lab Results   Component Value Date    WBC 5 64 03/11/2022    HGB 13 6 03/11/2022    HCT 41 9 03/11/2022    MCV 82 03/11/2022     03/11/2022     Lab Results   Component Value Date     09/08/2015    K 3 9 03/11/2022     (H) 03/11/2022    CO2 28 03/11/2022    ANIONGAP 6 09/08/2015    BUN 17 03/11/2022    CREATININE 1 06 03/11/2022    GLUCOSE 115 09/08/2015    GLUF 117 (H) 03/11/2022    CALCIUM 9 1 03/11/2022    CORRECTEDCA 9 4 02/11/2022    AST 18 03/11/2022    ALT 25 03/11/2022    ALKPHOS 90 03/11/2022    PROT 7 3 09/08/2015    BILITOT 0 66 09/08/2015    EGFR 49 03/11/2022

## 2022-04-14 DIAGNOSIS — C50.919 METASTATIC BREAST CANCER (HCC): ICD-10-CM

## 2022-04-14 DIAGNOSIS — M89.9 LYTIC LESION OF BONE ON X-RAY: Primary | ICD-10-CM

## 2022-04-14 RX ORDER — LAMOTRIGINE 25 MG/1
500 TABLET ORAL ONCE
Status: CANCELLED | OUTPATIENT
Start: 2022-04-18

## 2022-04-15 ENCOUNTER — APPOINTMENT (OUTPATIENT)
Dept: LAB | Facility: CLINIC | Age: 82
End: 2022-04-15
Payer: MEDICARE

## 2022-04-15 DIAGNOSIS — C50.919 METASTATIC BREAST CANCER (HCC): ICD-10-CM

## 2022-04-15 DIAGNOSIS — M89.9 LYTIC LESION OF BONE ON X-RAY: Primary | ICD-10-CM

## 2022-04-15 DIAGNOSIS — M89.9 LYTIC LESION OF BONE ON X-RAY: ICD-10-CM

## 2022-04-15 LAB
ALBUMIN SERPL BCP-MCNC: 3.4 G/DL (ref 3.5–5)
ALBUMIN SERPL BCP-MCNC: 3.4 G/DL (ref 3.5–5)
ALP SERPL-CCNC: 82 U/L (ref 46–116)
ALP SERPL-CCNC: 83 U/L (ref 46–116)
ALT SERPL W P-5'-P-CCNC: 20 U/L (ref 12–78)
ALT SERPL W P-5'-P-CCNC: 21 U/L (ref 12–78)
ANION GAP SERPL CALCULATED.3IONS-SCNC: 6 MMOL/L (ref 4–13)
ANION GAP SERPL CALCULATED.3IONS-SCNC: 6 MMOL/L (ref 4–13)
AST SERPL W P-5'-P-CCNC: 17 U/L (ref 5–45)
AST SERPL W P-5'-P-CCNC: 18 U/L (ref 5–45)
BASOPHILS # BLD AUTO: 0.04 THOUSANDS/ΜL (ref 0–0.1)
BASOPHILS NFR BLD AUTO: 1 % (ref 0–1)
BILIRUB SERPL-MCNC: 0.58 MG/DL (ref 0.2–1)
BILIRUB SERPL-MCNC: 0.61 MG/DL (ref 0.2–1)
BUN SERPL-MCNC: 16 MG/DL (ref 5–25)
BUN SERPL-MCNC: 16 MG/DL (ref 5–25)
CALCIUM ALBUM COR SERPL-MCNC: 9.8 MG/DL (ref 8.3–10.1)
CALCIUM ALBUM COR SERPL-MCNC: 9.8 MG/DL (ref 8.3–10.1)
CALCIUM SERPL-MCNC: 9.3 MG/DL (ref 8.3–10.1)
CALCIUM SERPL-MCNC: 9.3 MG/DL (ref 8.3–10.1)
CANCER AG27-29 SERPL-ACNC: 27.7 U/ML (ref 0–42.3)
CHLORIDE SERPL-SCNC: 108 MMOL/L (ref 100–108)
CHLORIDE SERPL-SCNC: 110 MMOL/L (ref 100–108)
CO2 SERPL-SCNC: 25 MMOL/L (ref 21–32)
CO2 SERPL-SCNC: 27 MMOL/L (ref 21–32)
CREAT SERPL-MCNC: 1.11 MG/DL (ref 0.6–1.3)
CREAT SERPL-MCNC: 1.11 MG/DL (ref 0.6–1.3)
EOSINOPHIL # BLD AUTO: 0.32 THOUSAND/ΜL (ref 0–0.61)
EOSINOPHIL NFR BLD AUTO: 6 % (ref 0–6)
ERYTHROCYTE [DISTWIDTH] IN BLOOD BY AUTOMATED COUNT: 14.5 % (ref 11.6–15.1)
GFR SERPL CREATININE-BSD FRML MDRD: 46 ML/MIN/1.73SQ M
GFR SERPL CREATININE-BSD FRML MDRD: 46 ML/MIN/1.73SQ M
GLUCOSE SERPL-MCNC: 119 MG/DL (ref 65–140)
GLUCOSE SERPL-MCNC: 123 MG/DL (ref 65–140)
HCT VFR BLD AUTO: 42.7 % (ref 34.8–46.1)
HGB BLD-MCNC: 13.9 G/DL (ref 11.5–15.4)
IMM GRANULOCYTES # BLD AUTO: 0.02 THOUSAND/UL (ref 0–0.2)
IMM GRANULOCYTES NFR BLD AUTO: 0 % (ref 0–2)
LYMPHOCYTES # BLD AUTO: 1 THOUSANDS/ΜL (ref 0.6–4.47)
LYMPHOCYTES NFR BLD AUTO: 18 % (ref 14–44)
MCH RBC QN AUTO: 26.8 PG (ref 26.8–34.3)
MCHC RBC AUTO-ENTMCNC: 32.6 G/DL (ref 31.4–37.4)
MCV RBC AUTO: 82 FL (ref 82–98)
MONOCYTES # BLD AUTO: 0.42 THOUSAND/ΜL (ref 0.17–1.22)
MONOCYTES NFR BLD AUTO: 7 % (ref 4–12)
NEUTROPHILS # BLD AUTO: 3.88 THOUSANDS/ΜL (ref 1.85–7.62)
NEUTS SEG NFR BLD AUTO: 68 % (ref 43–75)
NRBC BLD AUTO-RTO: 0 /100 WBCS
PLATELET # BLD AUTO: 189 THOUSANDS/UL (ref 149–390)
PMV BLD AUTO: 11.1 FL (ref 8.9–12.7)
POTASSIUM SERPL-SCNC: 4 MMOL/L (ref 3.5–5.3)
POTASSIUM SERPL-SCNC: 4 MMOL/L (ref 3.5–5.3)
PROT SERPL-MCNC: 7.1 G/DL (ref 6.4–8.2)
PROT SERPL-MCNC: 7.1 G/DL (ref 6.4–8.2)
RBC # BLD AUTO: 5.19 MILLION/UL (ref 3.81–5.12)
SODIUM SERPL-SCNC: 141 MMOL/L (ref 136–145)
SODIUM SERPL-SCNC: 141 MMOL/L (ref 136–145)
WBC # BLD AUTO: 5.68 THOUSAND/UL (ref 4.31–10.16)

## 2022-04-15 PROCEDURE — 86300 IMMUNOASSAY TUMOR CA 15-3: CPT

## 2022-04-15 PROCEDURE — 36415 COLL VENOUS BLD VENIPUNCTURE: CPT

## 2022-04-15 PROCEDURE — 80053 COMPREHEN METABOLIC PANEL: CPT

## 2022-04-15 PROCEDURE — 85025 COMPLETE CBC W/AUTO DIFF WBC: CPT

## 2022-04-15 RX ORDER — LAMOTRIGINE 25 MG/1
500 TABLET ORAL ONCE
Status: CANCELLED | OUTPATIENT
Start: 2022-04-18

## 2022-04-18 ENCOUNTER — HOSPITAL ENCOUNTER (OUTPATIENT)
Dept: INFUSION CENTER | Facility: HOSPITAL | Age: 82
Discharge: HOME | End: 2022-04-18
Attending: INTERNAL MEDICINE
Payer: MEDICARE

## 2022-04-18 VITALS
DIASTOLIC BLOOD PRESSURE: 64 MMHG | HEART RATE: 101 BPM | OXYGEN SATURATION: 98 % | HEIGHT: 61 IN | BODY MASS INDEX: 31.63 KG/M2 | SYSTOLIC BLOOD PRESSURE: 133 MMHG | TEMPERATURE: 97.5 F | RESPIRATION RATE: 17 BRPM | WEIGHT: 167.55 LBS

## 2022-04-18 DIAGNOSIS — C50.919 METASTATIC BREAST CANCER (HCC): ICD-10-CM

## 2022-04-18 DIAGNOSIS — M89.9 LYTIC LESION OF BONE ON X-RAY: Primary | ICD-10-CM

## 2022-04-18 PROCEDURE — 96372 THER/PROPH/DIAG INJ SC/IM: CPT

## 2022-04-18 PROCEDURE — 96402 CHEMO HORMON ANTINEOPL SQ/IM: CPT

## 2022-04-18 RX ORDER — LAMOTRIGINE 25 MG/1
500 TABLET ORAL ONCE
Status: CANCELLED | OUTPATIENT
Start: 2022-05-16

## 2022-04-18 RX ORDER — LAMOTRIGINE 25 MG/1
500 TABLET ORAL ONCE
Status: COMPLETED | OUTPATIENT
Start: 2022-04-18 | End: 2022-04-18

## 2022-04-18 RX ADMIN — DENOSUMAB 120 MG: 120 INJECTION SUBCUTANEOUS at 12:51

## 2022-04-18 RX ADMIN — FULVESTRANT 500 MG: 50 INJECTION INTRAMUSCULAR at 12:53

## 2022-04-18 NOTE — PROGRESS NOTES
Pt arrived on unit for Xgeva and faslodex  No recent dental work and pt is taking her Vit D and calcium supplements  Faslodex given one into each buttock  Band aids applied  Next appt scheduled  Pt left ambulatory with steady gait for d/c to home

## 2022-04-26 ENCOUNTER — TELEPHONE (OUTPATIENT)
Dept: GASTROENTEROLOGY | Facility: CLINIC | Age: 82
End: 2022-04-26

## 2022-04-26 NOTE — TELEPHONE ENCOUNTER
Follow-up call to patient  Started on mesalamine at last office visit in early April  She reports that rectal bleeding has definitely improved  Denies abdominal cramping or diarrhea  Recent blood work 4/15 showed normal hemoglobin at 13 9  urged patient to call the office if rectal bleeding accelerates otherwise continue mesalamine 2 tablets daily    Has follow-up appointment 08/2022

## 2022-04-27 ENCOUNTER — TELEPHONE (OUTPATIENT)
Dept: HEMATOLOGY ONCOLOGY | Facility: CLINIC | Age: 82
End: 2022-04-27

## 2022-04-27 NOTE — TELEPHONE ENCOUNTER
04/27/22    LMOM to pt and her daughter informing I will r/s her to see Dr Salazar Parada this Friday or next Monday since Dr Salazar Parada will not be in tomorrow  I will call her back again by end of today  [employer] number provided

## 2022-04-28 ENCOUNTER — TELEPHONE (OUTPATIENT)
Dept: HEMATOLOGY ONCOLOGY | Facility: CLINIC | Age: 82
End: 2022-04-28

## 2022-04-28 NOTE — TELEPHONE ENCOUNTER
Appointment Confirmation (to confirm pre existing appointments - ONLY)     Appointment with  Dr Lauren Parents   Appointment date & time 4/29/22 at 1:20    Location [place]   Patient verbilized Understanding  Yes

## 2022-04-28 NOTE — TELEPHONE ENCOUNTER
04/28/22    Tried to reach out to pt relaying above info for several time but can only LVM  I r/s pt to see Dr Tavares Gan tomorrow @ 120PM     [facility] number provided

## 2022-04-29 ENCOUNTER — OFFICE VISIT (OUTPATIENT)
Dept: HEMATOLOGY ONCOLOGY | Facility: HOSPITAL | Age: 82
End: 2022-04-29
Payer: MEDICARE

## 2022-04-29 VITALS
WEIGHT: 167 LBS | HEIGHT: 61 IN | HEART RATE: 103 BPM | BODY MASS INDEX: 31.53 KG/M2 | DIASTOLIC BLOOD PRESSURE: 82 MMHG | TEMPERATURE: 97.4 F | RESPIRATION RATE: 16 BRPM | SYSTOLIC BLOOD PRESSURE: 128 MMHG | OXYGEN SATURATION: 96 %

## 2022-04-29 DIAGNOSIS — M89.9 LYTIC LESION OF BONE ON X-RAY: Primary | ICD-10-CM

## 2022-04-29 DIAGNOSIS — C50.919 METASTATIC BREAST CANCER (HCC): ICD-10-CM

## 2022-04-29 PROCEDURE — 99214 OFFICE O/P EST MOD 30 MIN: CPT | Performed by: INTERNAL MEDICINE

## 2022-04-29 RX ORDER — LETROZOLE 2.5 MG/1
2.5 TABLET, FILM COATED ORAL DAILY
Qty: 90 TABLET | Refills: 3 | Status: SHIPPED | OUTPATIENT
Start: 2022-04-29

## 2022-04-29 NOTE — PROGRESS NOTES
Hematology/Oncology Outpatient Follow- up Note  Jakob Silverman 79 y o  female MRN: @ Encounter: [id number]        Date:  4/29/2022    Presenting Complaint/Diagnosis : stage IA breast cancer  The tumor was ER positive SC positive HER-2 negative   She now has stage IV disease with bony metastases which was ER positive SC negative HER2 negative  Previous Hematologic/ Oncologic History:      Resection  Adjuvant radiation  She was then put on Arimidex 1 mg once a day      Current Hematologic/ Oncologic Treatment:      Faslodex  Every month which will now be discontinued     Xgeva    Interval History:    Patient returns for follow-up visit  Her most recent imaging shows small sclerotic lesions in her spine which seem to be progression of disease  Explained this to the patient  She is agreeable to switching treatment to Femara and Kisqali  Faslodex will be discontinued  Patient herself is at baseline  Denies any nausea denies any vomiting denies any diarrhea  The rest of her 14 point review of systems today was negative  Test Results:    Imaging: CT chest abdomen pelvis w contrast    Result Date: 4/11/2022  Narrative: CT CHEST, ABDOMEN AND PELVIS WITH IV CONTRAST INDICATION:   C50 919: Malignant neoplasm of unspecified site of unspecified female breast M89 9: Disorder of bone, unspecified  COMPARISON:  None  TECHNIQUE: CT examination of the chest, abdomen and pelvis was performed  Axial, sagittal, and coronal 2D reformatted images were created from the source data and submitted for interpretation  Radiation dose length product (DLP) for this visit:  854 59 mGy-cm   This examination, like all CT scans performed in the [facility], was performed utilizing techniques to minimize radiation dose exposure, including the use of iterative  reconstruction and automated exposure control  IV Contrast:  100 mL of iohexol (OMNIPAQUE) Enteric Contrast: Enteric contrast was administered   FINDINGS: CHEST LUNGS:  No consolidative airspace opacity  No pleural effusion  There is no tracheal or endobronchial lesion  There is interval development of a 4 mm nodule at the lateral right lower lobe (3/76)  Stable calcified granulomas at both upper lobes (3/20)  Stable 3 mm noncalcified nodule at the anterior right lower lobe (3/82)  Stable 2-3 mm noncalcified nodules at the left upper lobe (3/30, 39, 42, 43)  PLEURA:  Unremarkable  HEART/GREAT VESSELS: Atherosclerotic changes throughout the thoracic aorta  No thoracic aortic aneurysm  MEDIASTINUM AND MADAN:  Unremarkable  CHEST WALL AND LOWER NECK:   Subcentimeter hypodensities are present within both thyroid lobes, stable from prior  ABDOMEN LIVER/BILIARY TREE:  Stable hepatic cysts  No solid hepatic mass  GALLBLADDER:  Patient is status post cholecystectomy with clips in gallbladder fossa  SPLEEN:  Unremarkable  PANCREAS:  Stable 5 mm hypodensity at the pancreatic head which is too small to accurately characterize  ADRENAL GLANDS:  Unremarkable  KIDNEYS/URETERS:  Cortical and parapelvic cysts bilaterally, stable from prior examination with the largest at the lower pole of the left kidney  No solid renal mass  No hydronephrosis  STOMACH AND BOWEL:  No dilated loops of small bowel to suggest mechanical obstruction  Scattered diverticulosis without evidence for acute diverticulitis  APPENDIX:  No findings to suggest appendicitis  ABDOMINOPELVIC CAVITY:  No ascites  No pneumoperitoneum  No lymphadenopathy  VESSELS:  Atherosclerotic changes are present  No evidence of aneurysm  PELVIS REPRODUCTIVE ORGANS:  Unremarkable for patient's age  URINARY BLADDER:  Unremarkable  ABDOMINAL WALL/INGUINAL REGIONS:  Unremarkable  OSSEOUS STRUCTURES:  There is interval development of increased sclerotic lesions throughout the visualized spine, for example there is interval development of a 7 mm sclerotic lesion at the anterior superior aspect of T8    Findings are concerning for metastasis  Impression: There is interval development of a 4 mm nodule at the lateral right lower lobe (3/76)  There is interval development of increased sclerotic lesions throughout the visualized spine, for example there is interval development of a 7 mm sclerotic lesion at the anterior superior aspect of T8  Findings are concerning for metastasis  No definite CT findings in the abdomen or pelvis to account for primary malignancy  Workstation performed: [id number]       Labs:   Lab Results   Component Value Date    WBC 5 68 04/15/2022    HGB 13 9 04/15/2022    HCT 42 7 04/15/2022    MCV 82 04/15/2022     04/15/2022     Lab Results   Component Value Date     09/08/2015    K 4 0 04/15/2022     (H) 04/15/2022    CO2 25 04/15/2022    ANIONGAP 6 09/08/2015    BUN 16 04/15/2022    CREATININE 1 11 04/15/2022    GLUCOSE 115 09/08/2015    GLUF 117 (H) 03/11/2022    CALCIUM 9 3 04/15/2022    CORRECTEDCA 9 8 04/15/2022    AST 18 04/15/2022    ALT 20 04/15/2022    ALKPHOS 82 04/15/2022    PROT 7 3 09/08/2015    BILITOT 0 66 09/08/2015    EGFR 46 04/15/2022         Lab Results   Component Value Date    SPEP See Comment 06/30/2021       ROS: As stated in the history of present illness otherwise his 14 point review of systems today was negative        Active Problems:   Patient Active Problem List   Diagnosis    Metastatic breast cancer ([facility] [address] )    Adverse reaction to pneumococcal vaccine    Anxiety    Cataract, bilateral    Chronic inflammatory disease of uterus    Hyperlipidemia    Pulmonary nodule seen on imaging study    Pes planus    Sleep disorder    Chronic kidney disease (CKD) stage G3a/A1, moderately decreased glomerular filtration rate (GFR) between 45-59 mL/min/1 73 square meter and albuminuria creatinine ratio less than 30 mg/g (HCC)    Essential hypertension    Osteopenia of multiple sites    Cough due to ACE inhibitor    Acute costochondritis    Lytic lesion of bone on x-ray  Neoplasm of uncertain behavior of sternum    Hydronephrosis of right kidney    Rectal bleeding    Colitis       Past Medical History:   Past Medical History:   Diagnosis Date    Abnormal weight loss     Allergic reaction     Anxiety     Breast cancer, right ([facility] [address] ) 2011    right    Cancer ([facility] [address] ) 2012    Candidal vulvovaginitis     Clotting disorder ([facility] [address] ) Same as above    Endometrial hyperplasia     Epithelial cyst     benign, ovary    GI (gastrointestinal bleed) Blood mixed with stool    History of radiation therapy 2011    right breast cancer    Hyperlipidemia     Hypertension     Internal hemorrhoids     Knee tendonitis     Ovarian cyst     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[facility])        Surgical History:   Past Surgical History:   Procedure Laterality Date    BILATERAL SALPINGOOPHORECTOMY      onset: 7/23/13    BREAST BIOPSY Right 06/13/2006    benign    BREAST BIOPSY Right 03/02/2011    malignant    BREAST LUMPECTOMY Right 03/30/2011    malignant    CATARACT EXTRACTION W/  INTRAOCULAR LENS IMPLANT Right     phacoemulsification  Onset: 10/27/14    CHOLECYSTECTOMY      COLONOSCOPY  2017    approx    HYSTERECTOMY  Yes    INTRAOPERATIVE RADIATION THERAPY (IORT)      IR BIOPSY BONE  7/9/2021    SKIN LESION EXCISION Right     breast, single lesion    TONSILLECTOMY AND ADENOIDECTOMY         Family History:    Family History   Problem Relation Age of Onset    Stomach cancer Mother     No Known Problems Father     Cervical cancer Sister     No Known Problems Daughter     No Known Problems Maternal Grandmother     No Known Problems Maternal Grandfather     No Known Problems Paternal Grandmother     No Known Problems Paternal Grandfather     Colon polyps Neg Hx     Colon cancer Neg Hx        Cancer-related family history includes Cervical cancer in her sister; Stomach cancer in her mother  There is no history of Colon cancer      Social History:   Social History     Socioeconomic History  Marital status:      Spouse name: Not on file    Number of children: 3    Years of education: Not on file    Highest education level: Not on file   Occupational History    Not on file   Tobacco Use    Smoking status: Former Smoker     Packs/day: 1 00     Years: 30 00     Pack years: 30 00     Types: Cigarettes     Start date: 55     Quit date:      Years since quittin 3    Smokeless tobacco: Never Used   Vaping Use    Vaping Use: Never used   Substance and Sexual Activity    Alcohol use: No     Comment: (history)    Drug use: No    Sexual activity: Not Currently   Other Topics Concern    Not on file   Social History Narrative    Not on file     Social Determinants of Health     Financial Resource Strain: Not on file   Food Insecurity: Not on file   Transportation Needs: Not on file   Physical Activity: Inactive    Days of Exercise per Week: 0 days   [employer] of Exercise per Session: 0 min   Stress: No Stress Concern Present    Feeling of Stress : Not at all   Social Connections: Moderately Isolated    Frequency of Communication with Friends and Family: More than three times a week    Frequency of Social Gatherings with Friends and Family: Twice a week    Attends Baptism Services: Never    Active Member of Clubs or Organizations:  Yes    Attends Club or Organization Meetings: More than 4 times per year    Marital Status:    Intimate Partner Violence: Not At Risk    Fear of Current or Ex-Partner: No    Emotionally Abused: No    Physically Abused: No    Sexually Abused: No   Housing Stability: Not on file       Current Medications:   Current Outpatient Medications   Medication Sig Dispense Refill    amitriptyline (ELAVIL) 25 mg tablet Take 1 tablet (25 mg total) by mouth daily at bedtime 90 tablet 1    Cholecalciferol (VITAMIN D3) 2000 units capsule Take 2,000 Units by mouth daily        losartan (COZAAR) 50 mg tablet Take 1 tablet (50 mg total) by mouth daily 90 tablet 1    mesalamine (LIALDA) 1 2 g EC tablet Take 2 tablets (2 4 g total) by mouth daily with breakfast 60 tablet 3    simvastatin (ZOCOR) 10 mg tablet Take 1 tablet (10 mg total) by mouth daily at bedtime 90 tablet 1     No current facility-administered medications for this visit  Allergies: Allergies   Allergen Reactions    Sulfa Antibiotics     Pneumococcal Polysaccharide Vaccine Rash and Edema       Physical Exam:    Body surface area is 1 75 meters squared  Wt Readings from Last 3 Encounters:   04/29/22 75 8 kg (167 lb)   04/18/22 76 kg (167 lb 8 8 oz)   04/06/22 75 3 kg (166 lb)        Temp Readings from Last 3 Encounters:   04/29/22 (!) 97 4 °F (36 3 °C) (Tympanic)   04/18/22 97 5 °F (36 4 °C) (Temporal)   03/15/22 98 5 °F (36 9 °C) (Temporal)        BP Readings from Last 3 Encounters:   04/29/22 128/82   04/18/22 133/64   04/06/22 146/92         Pulse Readings from Last 3 Encounters:   04/29/22 103   04/18/22 101   03/15/22 102     @LASTSAO2(3)@      Physical Exam     Constitutional   General appearance: No acute distress, well appearing and well nourished  Eyes   Conjunctiva and lids: No swelling, erythema or discharge  Pupils and irises: Equal, round and reactive to light  Ears, Nose, Mouth, and Throat   External inspection of ears and nose: Normal     Nasal mucosa, septum, and turbinates: Normal without edema or erythema  Oropharynx: Normal with no erythema, edema, exudate or lesions  Pulmonary   Respiratory effort: No increased work of breathing or signs of respiratory distress  Auscultation of lungs: Clear to auscultation  Cardiovascular   Palpation of heart: Normal PMI, no thrills  Auscultation of heart: Normal rate and rhythm, normal S1 and S2, without murmurs  Examination of extremities for edema and/or varicosities: Normal     Carotid pulses: Normal     Abdomen   Abdomen: Non-tender, no masses  Liver and spleen: No hepatomegaly or splenomegaly  Lymphatic   Palpation of lymph nodes in neck: No lymphadenopathy  Musculoskeletal   Gait and station: Normal     Digits and nails: Normal without clubbing or cyanosis  Inspection/palpation of joints, bones, and muscles: Normal     Skin   Skin and subcutaneous tissue: Normal without rashes or lesions  Neurologic   Cranial nerves: Cranial nerves 2-12 intact  Sensation: No sensory loss  Psychiatric   Orientation to person, place, and time: Normal     Mood and affect: Normal         Assessment / Plan:    The patient is a pleasant 74-year-old female with a past medical history of ER positive breast cancer who completed 5 years of adjuvant treatment with aromatase inhibitor for early stage breast cancer   She was on observation then had some bony chest discomfort   Imaging revealed a lytic lesion in the sternum   Biopsy revealed ER positive breast cancer   Restaging revealed 1 more area in the spine around 8 mm that was positive but no other evidence of metastatic disease   Since her disease is ER positive   She was started on Faslodex and Orlena Kanner received radiation at [facility]   her most recent imaging shows interval development of a 4 mm nodule at the right lower lobe along with interval development of increased sclerotic lesions throughout the visualized spine for example there is interval development of 7 mm sclerotic lesion in the anterior superior aspect of T8 concerning for metastases  There are no CT findings in the abdomen or pelvis to account for primary malignancy  Tumor marker which had jumped previously at the time of diagnosis to 58 has actually come down to 27 7  Options at this point include either a bone scan to see if these lesions are actually active versus treated areas that are now more prominent after treatment versus switching to Femara and Rondall Gift  My preference would be to switch  I explained this to the patient    The patient agreed to going on Femara and Amanda Sylvester  I went over the risks benefits and alternatives of both medications  Explained possible side effects to include but not limited to osteoporosis, flashes, menopausal symptoms, vaginal dryness, low blood counts, risk of infection, nausea, rash and even death  The patient is agreeable to proceeding  We will prescribe the medication the patient will start  If she has any questions she will call our office  She will sign a consent form today  She understands she has to get blood work monthly  Faslodex will be discontinued  Xgeva every 3 months will continue  I will repeat imaging in 6 months unless blood work necessitates this sooner  Goals and Barriers:  Current Goal:  Prolong Survival from breast cancer  Barriers: None  Patient's Capacity to Self Care:  Patient able to self care  Portions of the record may have been created with voice recognition software  Occasional wrong word or "sound a like" substitutions may have occurred due to the inherent limitations of voice recognition software  Read the chart carefully and recognize, using context, where substitutions have occurred

## 2022-05-02 ENCOUNTER — TELEPHONE (OUTPATIENT)
Dept: HEMATOLOGY ONCOLOGY | Facility: CLINIC | Age: 82
End: 2022-05-02

## 2022-05-02 NOTE — TELEPHONE ENCOUNTER
CALL RETURN FORM   Reason for patient call? Patient is calling with question on her standing order for her blood work  She states she knows she is supposed to get it done every 4 weeks but would like someone to call her with instructions and a better understanding of what she is to do  Patient's primary oncologist? Dr Niya Joya    Name of person the patient was calling for? [facility] [place]   Any additional information to add, if applicable? Best call back number is   [phone]   Patient would like a call back after 1pm       Informed patient that the message will be forwarded to the team and someone will get back to them as soon as possible    Did you relay this information to the patient?  yes

## 2022-05-03 ENCOUNTER — DOCUMENTATION (OUTPATIENT)
Dept: HEMATOLOGY ONCOLOGY | Facility: CLINIC | Age: 82
End: 2022-05-03

## 2022-05-03 NOTE — PROGRESS NOTES
Received request from clinical for patient to start on Kisqali 600 mg daily   Emily Peak has been submitted via cover my meds and is pending  (Key: [id number])       BIN:       [id number]  PCN:      [id number]  ID:          [id number]  GRP:      [id number]    She also has PACE (secondary)  BIN:       [id number]  PCN:      [id number]  ID:          [id number]  GRP:      [id number]

## 2022-05-04 DIAGNOSIS — C50.919 METASTATIC BREAST CANCER (HCC): Primary | ICD-10-CM

## 2022-05-04 RX ORDER — RIBOCICLIB 200 MG/1
200 TABLET, FILM COATED ORAL DAILY
Qty: 63 EACH | Refills: 11 | Status: ON HOLD | OUTPATIENT
Start: 2022-05-04 | End: 2022-08-01

## 2022-05-09 ENCOUNTER — TELEPHONE (OUTPATIENT)
Dept: HEMATOLOGY ONCOLOGY | Facility: HOSPITAL | Age: 82
End: 2022-05-09

## 2022-05-13 ENCOUNTER — APPOINTMENT (OUTPATIENT)
Dept: LAB | Facility: CLINIC | Age: 82
End: 2022-05-13
Payer: MEDICARE

## 2022-05-13 DIAGNOSIS — C50.919 METASTATIC BREAST CANCER (HCC): ICD-10-CM

## 2022-05-13 DIAGNOSIS — M89.9 LYTIC LESION OF BONE ON X-RAY: ICD-10-CM

## 2022-05-13 LAB
ALBUMIN SERPL BCP-MCNC: 3.5 G/DL (ref 3.5–5)
ALP SERPL-CCNC: 89 U/L (ref 46–116)
ALT SERPL W P-5'-P-CCNC: 24 U/L (ref 12–78)
ANION GAP SERPL CALCULATED.3IONS-SCNC: 5 MMOL/L (ref 4–13)
AST SERPL W P-5'-P-CCNC: 23 U/L (ref 5–45)
BASOPHILS # BLD AUTO: 0.05 THOUSANDS/ΜL (ref 0–0.1)
BASOPHILS NFR BLD AUTO: 1 % (ref 0–1)
BILIRUB SERPL-MCNC: 0.95 MG/DL (ref 0.2–1)
BUN SERPL-MCNC: 20 MG/DL (ref 5–25)
CALCIUM SERPL-MCNC: 9.1 MG/DL (ref 8.3–10.1)
CHLORIDE SERPL-SCNC: 109 MMOL/L (ref 100–108)
CO2 SERPL-SCNC: 26 MMOL/L (ref 21–32)
CREAT SERPL-MCNC: 1.5 MG/DL (ref 0.6–1.3)
EOSINOPHIL # BLD AUTO: 0.45 THOUSAND/ΜL (ref 0–0.61)
EOSINOPHIL NFR BLD AUTO: 9 % (ref 0–6)
ERYTHROCYTE [DISTWIDTH] IN BLOOD BY AUTOMATED COUNT: 14.9 % (ref 11.6–15.1)
GFR SERPL CREATININE-BSD FRML MDRD: 32 ML/MIN/1.73SQ M
GLUCOSE SERPL-MCNC: 114 MG/DL (ref 65–140)
HCT VFR BLD AUTO: 42.2 % (ref 34.8–46.1)
HGB BLD-MCNC: 13.5 G/DL (ref 11.5–15.4)
IMM GRANULOCYTES # BLD AUTO: 0.01 THOUSAND/UL (ref 0–0.2)
IMM GRANULOCYTES NFR BLD AUTO: 0 % (ref 0–2)
LYMPHOCYTES # BLD AUTO: 0.87 THOUSANDS/ΜL (ref 0.6–4.47)
LYMPHOCYTES NFR BLD AUTO: 17 % (ref 14–44)
MCH RBC QN AUTO: 26.7 PG (ref 26.8–34.3)
MCHC RBC AUTO-ENTMCNC: 32 G/DL (ref 31.4–37.4)
MCV RBC AUTO: 84 FL (ref 82–98)
MONOCYTES # BLD AUTO: 0.25 THOUSAND/ΜL (ref 0.17–1.22)
MONOCYTES NFR BLD AUTO: 5 % (ref 4–12)
NEUTROPHILS # BLD AUTO: 3.49 THOUSANDS/ΜL (ref 1.85–7.62)
NEUTS SEG NFR BLD AUTO: 68 % (ref 43–75)
NRBC BLD AUTO-RTO: 0 /100 WBCS
PLATELET # BLD AUTO: 191 THOUSANDS/UL (ref 149–390)
PMV BLD AUTO: 11.9 FL (ref 8.9–12.7)
POTASSIUM SERPL-SCNC: 4.5 MMOL/L (ref 3.5–5.3)
PROT SERPL-MCNC: 7.2 G/DL (ref 6.4–8.2)
RBC # BLD AUTO: 5.05 MILLION/UL (ref 3.81–5.12)
SODIUM SERPL-SCNC: 140 MMOL/L (ref 136–145)
WBC # BLD AUTO: 5.12 THOUSAND/UL (ref 4.31–10.16)

## 2022-05-13 PROCEDURE — 36415 COLL VENOUS BLD VENIPUNCTURE: CPT

## 2022-05-13 PROCEDURE — 80053 COMPREHEN METABOLIC PANEL: CPT

## 2022-05-13 PROCEDURE — 86300 IMMUNOASSAY TUMOR CA 15-3: CPT

## 2022-05-13 PROCEDURE — 85025 COMPLETE CBC W/AUTO DIFF WBC: CPT

## 2022-05-14 LAB — CANCER AG27-29 SERPL-ACNC: 27.6 U/ML (ref 0–42.3)

## 2022-06-10 ENCOUNTER — APPOINTMENT (OUTPATIENT)
Dept: LAB | Facility: CLINIC | Age: 82
End: 2022-06-10
Payer: MEDICARE

## 2022-06-10 DIAGNOSIS — C50.919 METASTATIC BREAST CANCER (HCC): ICD-10-CM

## 2022-06-10 DIAGNOSIS — M89.9 LYTIC LESION OF BONE ON X-RAY: ICD-10-CM

## 2022-06-10 LAB
ALBUMIN SERPL BCP-MCNC: 3.2 G/DL (ref 3.5–5)
ALP SERPL-CCNC: 106 U/L (ref 46–116)
ALT SERPL W P-5'-P-CCNC: 19 U/L (ref 12–78)
ANION GAP SERPL CALCULATED.3IONS-SCNC: 3 MMOL/L (ref 4–13)
AST SERPL W P-5'-P-CCNC: 17 U/L (ref 5–45)
BASOPHILS # BLD AUTO: 0.07 THOUSANDS/ΜL (ref 0–0.1)
BASOPHILS NFR BLD AUTO: 2 % (ref 0–1)
BILIRUB SERPL-MCNC: 0.44 MG/DL (ref 0.2–1)
BUN SERPL-MCNC: 12 MG/DL (ref 5–25)
CALCIUM ALBUM COR SERPL-MCNC: 10.3 MG/DL (ref 8.3–10.1)
CALCIUM SERPL-MCNC: 9.7 MG/DL (ref 8.3–10.1)
CANCER AG27-29 SERPL-ACNC: 27.4 U/ML (ref 0–42.3)
CHLORIDE SERPL-SCNC: 110 MMOL/L (ref 100–108)
CO2 SERPL-SCNC: 27 MMOL/L (ref 21–32)
CREAT SERPL-MCNC: 1.19 MG/DL (ref 0.6–1.3)
EOSINOPHIL # BLD AUTO: 0.32 THOUSAND/ΜL (ref 0–0.61)
EOSINOPHIL NFR BLD AUTO: 9 % (ref 0–6)
ERYTHROCYTE [DISTWIDTH] IN BLOOD BY AUTOMATED COUNT: 18 % (ref 11.6–15.1)
GFR SERPL CREATININE-BSD FRML MDRD: 42 ML/MIN/1.73SQ M
GLUCOSE P FAST SERPL-MCNC: 121 MG/DL (ref 65–99)
HCT VFR BLD AUTO: 39.5 % (ref 34.8–46.1)
HGB BLD-MCNC: 12.9 G/DL (ref 11.5–15.4)
IMM GRANULOCYTES # BLD AUTO: 0.01 THOUSAND/UL (ref 0–0.2)
IMM GRANULOCYTES NFR BLD AUTO: 0 % (ref 0–2)
LYMPHOCYTES # BLD AUTO: 0.89 THOUSANDS/ΜL (ref 0.6–4.47)
LYMPHOCYTES NFR BLD AUTO: 26 % (ref 14–44)
MCH RBC QN AUTO: 28 PG (ref 26.8–34.3)
MCHC RBC AUTO-ENTMCNC: 32.7 G/DL (ref 31.4–37.4)
MCV RBC AUTO: 86 FL (ref 82–98)
MONOCYTES # BLD AUTO: 0.5 THOUSAND/ΜL (ref 0.17–1.22)
MONOCYTES NFR BLD AUTO: 15 % (ref 4–12)
NEUTROPHILS # BLD AUTO: 1.61 THOUSANDS/ΜL (ref 1.85–7.62)
NEUTS SEG NFR BLD AUTO: 48 % (ref 43–75)
NRBC BLD AUTO-RTO: 0 /100 WBCS
PLATELET # BLD AUTO: 229 THOUSANDS/UL (ref 149–390)
PMV BLD AUTO: 10.2 FL (ref 8.9–12.7)
POTASSIUM SERPL-SCNC: 3.9 MMOL/L (ref 3.5–5.3)
PROT SERPL-MCNC: 7.3 G/DL (ref 6.4–8.2)
RBC # BLD AUTO: 4.6 MILLION/UL (ref 3.81–5.12)
SODIUM SERPL-SCNC: 140 MMOL/L (ref 136–145)
WBC # BLD AUTO: 3.4 THOUSAND/UL (ref 4.31–10.16)

## 2022-06-10 PROCEDURE — 36415 COLL VENOUS BLD VENIPUNCTURE: CPT

## 2022-06-10 PROCEDURE — 86300 IMMUNOASSAY TUMOR CA 15-3: CPT

## 2022-06-10 PROCEDURE — 80053 COMPREHEN METABOLIC PANEL: CPT

## 2022-06-10 PROCEDURE — 85025 COMPLETE CBC W/AUTO DIFF WBC: CPT

## 2022-07-01 ENCOUNTER — OFFICE VISIT (OUTPATIENT)
Dept: HEMATOLOGY ONCOLOGY | Facility: HOSPITAL | Age: 82
End: 2022-07-01
Payer: MEDICARE

## 2022-07-01 VITALS
TEMPERATURE: 97.6 F | BODY MASS INDEX: 30.96 KG/M2 | WEIGHT: 164 LBS | SYSTOLIC BLOOD PRESSURE: 126 MMHG | HEART RATE: 115 BPM | OXYGEN SATURATION: 98 % | DIASTOLIC BLOOD PRESSURE: 72 MMHG | RESPIRATION RATE: 14 BRPM | HEIGHT: 61 IN

## 2022-07-01 DIAGNOSIS — C50.919 METASTATIC BREAST CANCER (HCC): Primary | ICD-10-CM

## 2022-07-01 DIAGNOSIS — M89.9 LYTIC LESION OF BONE ON X-RAY: ICD-10-CM

## 2022-07-01 PROCEDURE — 99214 OFFICE O/P EST MOD 30 MIN: CPT | Performed by: INTERNAL MEDICINE

## 2022-07-01 NOTE — PROGRESS NOTES
Hematology/Oncology Outpatient Follow- up Note  Garo Silverman 79 y o  female MRN: @ Encounter: [id number]        Date:  7/1/2022    Presenting Complaint/Diagnosis : stage IA breast cancer  The tumor was ER positive [place] positive HER-2 negative   She now has stage IV disease with bony metastases which was ER positive KS negative HER2 negative  Previous Hematologic/ Oncologic History:    Resection  Adjuvant radiation  She was then put on Arimidex 1 mg once a day      Current Hematologic/ Oncologic Treatment:    Faslodex  Every month which will now be discontinued     Xgeva    Interval History:      Patient returns for follow-up visit  She is doing reasonably well  Her appetite has been affected by the [address]  Occasionally has nausea  Denies any abdominal pain or constipation  ANC is still running above a 1000  The rest of her counts are within acceptable limits  Tumor marker has stabilized  She is due for imaging which we will arrange  I will also set the patient up with palliative care for consideration of medical marijuana  Denies any other complaints  Is doing reasonably well and states the medication is definitely tolerable  We had a discussion again about the fact that she does have stage IV disease as she keeps getting confused about this  She did understand this and her son was with her today  The rest of her 14 point review of systems today was negative  Test Results:    Imaging: No results found      Labs:   Lab Results   Component Value Date    WBC 3 40 (L) 06/10/2022    HGB 12 9 06/10/2022    HCT 39 5 06/10/2022    MCV 86 06/10/2022     06/10/2022     Lab Results   Component Value Date     09/08/2015    K 3 9 06/10/2022     (H) 06/10/2022    CO2 27 06/10/2022    ANIONGAP 6 09/08/2015    BUN 12 06/10/2022    CREATININE 1 19 06/10/2022    GLUCOSE 115 09/08/2015    GLUF 121 (H) 06/10/2022    CALCIUM 9 7 06/10/2022    CORRECTEDCA 10 3 (H) 06/10/2022    AST 17 06/10/2022 ALT 19 06/10/2022    ALKPHOS 106 06/10/2022    PROT 7 3 09/08/2015    BILITOT 0 66 09/08/2015    EGFR 42 06/10/2022         Lab Results   Component Value Date    SPEP See Comment 06/30/2021     ROS: As stated in the history of present illness otherwise his 14 point review of systems today was negative        Active Problems:   Patient Active Problem List   Diagnosis    [facility]    Adverse reaction to pneumococcal vaccine    Anxiety    Cataract, bilateral    Chronic inflammatory disease of uterus    Hyperlipidemia    Pulmonary nodule seen on imaging study    Pes planus    Sleep disorder    Chronic kidney disease (CKD) stage G3a/A1, moderately decreased glomerular filtration rate (GFR) between 45-59 mL/min/1 73 square meter and albuminuria creatinine ratio less than 30 mg/g (HCC)    Essential hypertension    Osteopenia of multiple sites    Cough due to ACE inhibitor    Acute costochondritis    Lytic lesion of bone on x-ray    Neoplasm of uncertain behavior of sternum    Hydronephrosis of right kidney    Rectal bleeding    Colitis       Past Medical History:   Past Medical History:   Diagnosis Date    Abnormal weight loss     Allergic reaction     Anxiety     Breast cancer, right ([facility] ) 2011    right    Cancer ([facility] ) 2012    Candidal vulvovaginitis     Clotting disorder ([facility] ) Same as above    Endometrial hyperplasia     Epithelial cyst     benign, ovary    GI (gastrointestinal bleed) Blood mixed with stool    History of radiation therapy 2011    right breast cancer    Hyperlipidemia     Hypertension     Internal hemorrhoids     Knee tendonitis     Ovarian cyst     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[facility])        Surgical History:   Past Surgical History:   Procedure Laterality Date    BILATERAL SALPINGOOPHORECTOMY      onset: 7/23/13    BREAST BIOPSY Right 06/13/2006    benign    BREAST BIOPSY Right 03/02/2011    malignant    BREAST LUMPECTOMY Right 03/30/2011 malignant    CATARACT EXTRACTION W/  INTRAOCULAR LENS IMPLANT Right     phacoemulsification  Onset: 10/27/14    CHOLECYSTECTOMY      COLONOSCOPY  2017    approx    HYSTERECTOMY  Yes    INTRAOPERATIVE RADIATION THERAPY ([facility])      IR BIOPSY BONE  2021    SKIN LESION EXCISION Right     breast, single lesion    TONSILLECTOMY AND ADENOIDECTOMY         Family History:    Family History   Problem Relation Age of Onset    Stomach cancer Mother     No Known Problems Father     Cervical cancer Sister     No Known Problems Daughter     No Known Problems Maternal Grandmother     No Known Problems Maternal Grandfather     No Known Problems Paternal Grandmother     No Known Problems Paternal Grandfather     Colon polyps Neg Hx     Colon cancer Neg Hx        Cancer-related family history includes Cervical cancer in her sister; Stomach cancer in her mother  There is no history of Colon cancer      Social History:   Social History     Socioeconomic History    Marital status:      Spouse name: Not on file    Number of children: 3    Years of education: Not on file    Highest education level: Not on file   Occupational History    Not on file   Tobacco Use    Smoking status: Former Smoker     Packs/day: 1 00     Years: 30 00     Pack years: 30 00     Types: Cigarettes     Start date: 55     Quit date:      Years since quittin 5    Smokeless tobacco: Never Used   Vaping Use    Vaping Use: Never used   Substance and Sexual Activity    Alcohol use: No     Comment: (history)    Drug use: No    Sexual activity: Not Currently   Other Topics Concern    Not on file   Social History Narrative    Not on file     Social Determinants of Health     Financial Resource Strain: Not on file   Food Insecurity: Not on file   Transportation Needs: Not on file   Physical Activity: Not on file   Stress: Not on file   Social Connections: Not on file   Intimate Partner Violence: Not on file   Housing Stability: Not on file       Current Medications:   Current Outpatient Medications   Medication Sig Dispense Refill    amitriptyline (ELAVIL) 25 mg tablet Take 1 tablet (25 mg total) by mouth daily at bedtime 90 tablet 1    Cholecalciferol (VITAMIN D3) 2000 units capsule Take 2,000 Units by mouth daily        letrozole (FEMARA) 2 5 mg tablet Take 1 tablet (2 5 mg total) by mouth daily 90 tablet 3    losartan (COZAAR) 50 mg tablet Take 1 tablet (50 mg total) by mouth daily 90 tablet 1    mesalamine (LIALDA) 1 2 g EC tablet Take 2 tablets (2 4 g total) by mouth daily with breakfast 60 tablet 3    Ribociclib Succ, 600 MG Dose, (Kisqali, 600 MG Dose,) 200 MG TBPK Take 200 mg by mouth in the morning Pt to take 600mg PO daily (x3 200mg) for 3 Weeks on 1 Week Off in 28 day cycle 63 each 11    simvastatin (ZOCOR) 10 mg tablet Take 1 tablet (10 mg total) by mouth daily at bedtime 90 tablet 1     No current facility-administered medications for this visit  Allergies: Allergies   Allergen Reactions    Sulfa Antibiotics     Pneumococcal Polysaccharide Vaccine Rash and Edema       Physical Exam:    Body surface area is 1 74 meters squared  Wt Readings from Last 3 Encounters:   07/01/22 74 4 kg (164 lb)   04/29/22 75 8 kg (167 lb)   04/18/22 76 kg (167 lb 8 8 oz)        Temp Readings from Last 3 Encounters:   07/01/22 97 6 °F (36 4 °C) (Temporal)   04/29/22 (!) 97 4 °F (36 3 °C) (Tympanic)   04/18/22 97 5 °F (36 4 °C) (Temporal)        BP Readings from Last 3 Encounters:   07/01/22 126/72   04/29/22 128/82   04/18/22 133/64         Pulse Readings from Last 3 Encounters:   07/01/22 (!) 115   04/29/22 103   04/18/22 101         Physical Exam     Constitutional   General appearance: No acute distress, well appearing and well nourished  Eyes   Conjunctiva and lids: No swelling, erythema or discharge  Pupils and irises: Equal, round and reactive to light      Ears, Nose, Mouth, and Throat   External inspection of ears and nose: Normal     Nasal mucosa, septum, and turbinates: Normal without edema or erythema  Oropharynx: Normal with no erythema, edema, exudate or lesions  Pulmonary   Respiratory effort: No increased work of breathing or signs of respiratory distress  Auscultation of lungs: Clear to auscultation  Cardiovascular   Palpation of heart: Normal PMI, no thrills  Auscultation of heart: Normal rate and rhythm, normal S1 and S2, without murmurs  Examination of extremities for edema and/or varicosities: Normal     Carotid pulses: Normal     Abdomen   Abdomen: Non-tender, no masses  Liver and spleen: No hepatomegaly or splenomegaly  Lymphatic   Palpation of lymph nodes in neck: No lymphadenopathy  Musculoskeletal   Gait and station: Normal     Digits and nails: Normal without clubbing or cyanosis  Inspection/palpation of joints, bones, and muscles: Normal     Skin   Skin and subcutaneous tissue: Normal without rashes or lesions  Neurologic   Cranial nerves: Cranial nerves 2-12 intact  Sensation: No sensory loss      Psychiatric   Orientation to person, place, and time: Normal     Mood and affect: Normal         Assessment / Plan:    The patient is a pleasant 79-year-old female with a past medical history of ER positive breast cancer who completed 5 years of adjuvant treatment with aromatase inhibitor for early stage breast cancer   She was on observation then had some bony chest discomfort   Imaging revealed a lytic lesion in the sternum   Biopsy revealed ER positive breast cancer   Restaging revealed 1 more area in the spine around 8 mm that was positive but no other evidence of metastatic disease   Since her disease is ER positive   She was started on Faslodex and Trinity aDsilva received radiation at [facility]   Her last imaging then showed interval development of a 4 mm nodule at the right lower lobe along with interval development of increased sclerotic lesions throughout the visualized spine for example there is interval development of 7 mm sclerotic lesion in the anterior superior aspect of T8 concerning for metastases  We decided to put the patient on Femara and Drucie Bake  She is doing reasonably well on this  Appetite does fluctuate and she gets nausea occasionally  I advised her to see our colleagues in palliative Care to help with these symptoms  Medical marijuana in her case would help significantly I feel  The tumor marker has normalized and stabilized on this  Blood work is stable  White count is slightly low as expected because of the Kisqali but ANC is still 1 61  We will continue same regimen with no changes  I will see her back in 2 months  I will repeat imaging at that time  Goals and Barriers:  Current Goal:  Prolong Survival from metastatic ER positive breast cancer  Barriers: None  Patient's Capacity to Self Care:  Patient  able to self care  Portions of the record may have been created with voice recognition software  Occasional wrong word or "sound a like" substitutions may have occurred due to the inherent limitations of voice recognition software  Read the chart carefully and recognize, using context, where substitutions have occurred

## 2022-07-08 ENCOUNTER — APPOINTMENT (OUTPATIENT)
Dept: LAB | Facility: CLINIC | Age: 82
End: 2022-07-08
Payer: MEDICARE

## 2022-07-08 DIAGNOSIS — M89.9 LYTIC LESION OF BONE ON X-RAY: ICD-10-CM

## 2022-07-08 DIAGNOSIS — C50.919 METASTATIC BREAST CANCER (HCC): ICD-10-CM

## 2022-07-08 LAB
ALBUMIN SERPL BCP-MCNC: 3.1 G/DL (ref 3.5–5)
ALP SERPL-CCNC: 122 U/L (ref 46–116)
ALT SERPL W P-5'-P-CCNC: 25 U/L (ref 12–78)
ANION GAP SERPL CALCULATED.3IONS-SCNC: 8 MMOL/L (ref 4–13)
ANISOCYTOSIS BLD QL SMEAR: PRESENT
AST SERPL W P-5'-P-CCNC: 16 U/L (ref 5–45)
BASOPHILS # BLD MANUAL: 0.03 THOUSAND/UL (ref 0–0.1)
BASOPHILS NFR MAR MANUAL: 1 % (ref 0–1)
BILIRUB SERPL-MCNC: 0.56 MG/DL (ref 0.2–1)
BUN SERPL-MCNC: 10 MG/DL (ref 5–25)
CALCIUM ALBUM COR SERPL-MCNC: 9.4 MG/DL (ref 8.3–10.1)
CALCIUM SERPL-MCNC: 8.7 MG/DL (ref 8.3–10.1)
CANCER AG27-29 SERPL-ACNC: 26.4 U/ML (ref 0–42.3)
CHLORIDE SERPL-SCNC: 110 MMOL/L (ref 100–108)
CO2 SERPL-SCNC: 22 MMOL/L (ref 21–32)
CREAT SERPL-MCNC: 1.2 MG/DL (ref 0.6–1.3)
EOSINOPHIL # BLD MANUAL: 0.16 THOUSAND/UL (ref 0–0.4)
EOSINOPHIL NFR BLD MANUAL: 5 % (ref 0–6)
ERYTHROCYTE [DISTWIDTH] IN BLOOD BY AUTOMATED COUNT: 21.2 % (ref 11.6–15.1)
GFR SERPL CREATININE-BSD FRML MDRD: 42 ML/MIN/1.73SQ M
GLUCOSE P FAST SERPL-MCNC: 127 MG/DL (ref 65–99)
HCT VFR BLD AUTO: 37.4 % (ref 34.8–46.1)
HGB BLD-MCNC: 12.5 G/DL (ref 11.5–15.4)
LYMPHOCYTES # BLD AUTO: 0.28 THOUSAND/UL (ref 0.6–4.47)
LYMPHOCYTES # BLD AUTO: 9 % (ref 14–44)
MCH RBC QN AUTO: 30.3 PG (ref 26.8–34.3)
MCHC RBC AUTO-ENTMCNC: 33.4 G/DL (ref 31.4–37.4)
MCV RBC AUTO: 91 FL (ref 82–98)
MONOCYTES # BLD AUTO: 0.16 THOUSAND/UL (ref 0–1.22)
MONOCYTES NFR BLD: 5 % (ref 4–12)
NEUTROPHILS # BLD MANUAL: 2.43 THOUSAND/UL (ref 1.85–7.62)
NEUTS BAND NFR BLD MANUAL: 5 % (ref 0–8)
NEUTS SEG NFR BLD AUTO: 73 % (ref 43–75)
PLATELET # BLD AUTO: 225 THOUSANDS/UL (ref 149–390)
PLATELET BLD QL SMEAR: ADEQUATE
PMV BLD AUTO: 10.1 FL (ref 8.9–12.7)
POIKILOCYTOSIS BLD QL SMEAR: PRESENT
POLYCHROMASIA BLD QL SMEAR: PRESENT
POTASSIUM SERPL-SCNC: 3.7 MMOL/L (ref 3.5–5.3)
PROT SERPL-MCNC: 7 G/DL (ref 6.4–8.2)
RBC # BLD AUTO: 4.13 MILLION/UL (ref 3.81–5.12)
RBC MORPH BLD: PRESENT
SODIUM SERPL-SCNC: 140 MMOL/L (ref 136–145)
VARIANT LYMPHS # BLD AUTO: 2 %
WBC # BLD AUTO: 3.12 THOUSAND/UL (ref 4.31–10.16)

## 2022-07-08 PROCEDURE — 86300 IMMUNOASSAY TUMOR CA 15-3: CPT

## 2022-07-08 PROCEDURE — 85027 COMPLETE CBC AUTOMATED: CPT

## 2022-07-08 PROCEDURE — 85007 BL SMEAR W/DIFF WBC COUNT: CPT

## 2022-07-08 PROCEDURE — 80053 COMPREHEN METABOLIC PANEL: CPT

## 2022-07-08 PROCEDURE — 36415 COLL VENOUS BLD VENIPUNCTURE: CPT

## 2022-07-15 ENCOUNTER — TELEPHONE (OUTPATIENT)
Dept: HEMATOLOGY ONCOLOGY | Facility: CLINIC | Age: 82
End: 2022-07-15

## 2022-07-15 ENCOUNTER — TELEPHONE (OUTPATIENT)
Dept: HEMATOLOGY ONCOLOGY | Facility: HOSPITAL | Age: 82
End: 2022-07-15

## 2022-07-15 NOTE — TELEPHONE ENCOUNTER
Pt called with worsening symptoms of nausea, vomiting, abdominal pain, not being able to keep kisqaili down and diffuclty with appetite  Advised pt to keep apt with palliative on 7/20 to address side effects related to her treatment  Pt refuses to take kisqauli any more and will continue on femara  Schedule f/u with Dr Osorio Lara in office on 7/18 after xgeva shot

## 2022-07-15 NOTE — TELEPHONE ENCOUNTER
I PHONED PATIENT TO LET HER KNOW THAT I WAS ABLE TO SCHEDULE HER RIGHT AFTER HER XGEVA SHOT TO MEET WITH DR MUNROE REGARDING HER KISQUALI ISSUE  PATIENT WAS VERY APPRECIATIVE

## 2022-07-15 NOTE — TELEPHONE ENCOUNTER
CALL TRANSFER   Reason for patient call? Patient calling in, starting since staring Ribociclib Succ, 600 MG Dose, (Kisqali, 600 MG Dose)  She is having symptoms of dry heaving, extremely weak  , shaking and throwing up  Patient's primary physician? Dr Gavin Gunn    RN call was transferred to and time it was transferred? Juan Daniel 12:13 pm    Informed patient that the message will be forwarded to the team and someone will get back to them as soon as possible    Did you relay this information to the patient?  Yes

## 2022-07-18 ENCOUNTER — HOSPITAL ENCOUNTER (OUTPATIENT)
Dept: INFUSION CENTER | Facility: HOSPITAL | Age: 82
Discharge: HOME | End: 2022-07-18
Attending: INTERNAL MEDICINE
Payer: MEDICARE

## 2022-07-18 ENCOUNTER — TELEPHONE (OUTPATIENT)
Dept: HEMATOLOGY ONCOLOGY | Facility: HOSPITAL | Age: 82
End: 2022-07-18

## 2022-07-18 ENCOUNTER — OFFICE VISIT (OUTPATIENT)
Dept: HEMATOLOGY ONCOLOGY | Facility: HOSPITAL | Age: 82
End: 2022-07-18
Payer: MEDICARE

## 2022-07-18 VITALS
TEMPERATURE: 97 F | WEIGHT: 163.14 LBS | OXYGEN SATURATION: 98 % | BODY MASS INDEX: 30.8 KG/M2 | DIASTOLIC BLOOD PRESSURE: 65 MMHG | HEIGHT: 61 IN | HEART RATE: 129 BPM | SYSTOLIC BLOOD PRESSURE: 138 MMHG

## 2022-07-18 VITALS
OXYGEN SATURATION: 98 % | BODY MASS INDEX: 28.13 KG/M2 | SYSTOLIC BLOOD PRESSURE: 124 MMHG | TEMPERATURE: 98 F | DIASTOLIC BLOOD PRESSURE: 74 MMHG | HEIGHT: 61 IN | HEART RATE: 74 BPM | WEIGHT: 149 LBS | RESPIRATION RATE: 14 BRPM

## 2022-07-18 DIAGNOSIS — C50.919 METASTATIC BREAST CANCER (HCC): Primary | ICD-10-CM

## 2022-07-18 DIAGNOSIS — C50.919 METASTATIC BREAST CANCER (HCC): ICD-10-CM

## 2022-07-18 DIAGNOSIS — M89.9 LYTIC LESION OF BONE ON X-RAY: Primary | ICD-10-CM

## 2022-07-18 PROCEDURE — 96372 THER/PROPH/DIAG INJ SC/IM: CPT

## 2022-07-18 PROCEDURE — 99214 OFFICE O/P EST MOD 30 MIN: CPT | Performed by: INTERNAL MEDICINE

## 2022-07-18 RX ADMIN — DENOSUMAB 120 MG: 120 INJECTION SUBCUTANEOUS at 15:02

## 2022-07-18 NOTE — PROGRESS NOTES
Hematology/Oncology Outpatient Follow- up Note  Ethel Silverman 79 y o  female MRN: @ Encounter: [id number]        Date:  7/18/2022    Presenting Complaint/Diagnosis :  stage IA breast cancer  The tumor was ER positive [place] positive HER-2 negative   She now has stage IV disease with bony metastases which was ER positive [place] negative HER2 negative  Previous Hematologic/ Oncologic History:    Resection  Adjuvant radiation  She was then put on Arimidex 1 mg once a day    Faslodex  Every month     Current Hematologic/ Oncologic Treatment:        Letrozole and Kisqali    Interval History:     patient returns for follow-up visit  She has been nauseous secondary to her Miachel Kandace  She also feels fatigued and has lost weight  She is going to see palliative care and hopefully medical marijuana or some other interventions will help with her appetite as she has no appetite  She also needs something for anxiety / depression  Based on  The fact that her nausea resolved by discontinuing Miachel Kandace I suspect this is the cause and so Miachel Kandace will be discontinued and replaced by Verzenio 150 mg p o  b i d  otherwise she is doing reasonably well  Denies any nausea or vomiting  Denies any diarrhea  Again she states her nausea resolved once she stopped taking Kisqali  She is taking letrozole  The rest of her 14 point review of systems today was negative  Test Results:    Imaging: No results found      Labs:   Lab Results   Component Value Date    WBC 3 12 (L) 07/08/2022    HGB 12 5 07/08/2022    HCT 37 4 07/08/2022    MCV 91 07/08/2022     07/08/2022     Lab Results   Component Value Date     09/08/2015    K 3 7 07/08/2022     (H) 07/08/2022    CO2 22 07/08/2022    ANIONGAP 6 09/08/2015    BUN 10 07/08/2022    CREATININE 1 20 07/08/2022    GLUCOSE 115 09/08/2015    GLUF 127 (H) 07/08/2022    CALCIUM 8 7 07/08/2022    CORRECTEDCA 9 4 07/08/2022    AST 16 07/08/2022    ALT 25 07/08/2022    ALKPHOS 122 (H) 07/08/2022    PROT 7 3 09/08/2015    BILITOT 0 66 09/08/2015    EGFR 42 07/08/2022         Lab Results   Component Value Date    SPEP See Comment 06/30/2021       ROS: As stated in the history of present illness otherwise his 14 point review of systems today was negative        Active Problems:   Patient Active Problem List   Diagnosis    [facility]    Adverse reaction to pneumococcal vaccine    Anxiety    Cataract, bilateral    Chronic inflammatory disease of uterus    Hyperlipidemia    Pulmonary nodule seen on imaging study    Pes planus    Sleep disorder    Chronic kidney disease (CKD) stage G3a/A1, moderately decreased glomerular filtration rate (GFR) between 45-59 mL/min/1 73 square meter and albuminuria creatinine ratio less than 30 mg/g (HCC)    Essential hypertension    Osteopenia of multiple sites    Cough due to ACE inhibitor    Acute costochondritis    Lytic lesion of bone on x-ray    Neoplasm of uncertain behavior of sternum    Hydronephrosis of right kidney    Rectal bleeding    Colitis       Past Medical History:   Past Medical History:   Diagnosis Date    Abnormal weight loss     Allergic reaction     Anxiety     Breast cancer, right ([facility] ) 2011    right    Cancer ([facility] ) 2012    Candidal vulvovaginitis     Clotting disorder ([facility] ) Same as above    Endometrial hyperplasia     Epithelial cyst     benign, ovary    GI (gastrointestinal bleed) Blood mixed with stool    History of radiation therapy 2011    right breast cancer    Hyperlipidemia     Hypertension     Internal hemorrhoids     Knee tendonitis     Ovarian cyst     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[facility])        Surgical History:   Past Surgical History:   Procedure Laterality Date    BILATERAL SALPINGOOPHORECTOMY      onset: 7/23/13    BREAST BIOPSY Right 06/13/2006    benign    BREAST BIOPSY Right 03/02/2011    malignant    BREAST LUMPECTOMY Right 03/30/2011    malignant    CATARACT EXTRACTION W/ INTRAOCULAR LENS IMPLANT Right     phacoemulsification  Onset: 10/27/14    CHOLECYSTECTOMY      COLONOSCOPY  2017    approx    HYSTERECTOMY  Yes    INTRAOPERATIVE RADIATION THERAPY ([facility])      IR BIOPSY BONE  2021    SKIN LESION EXCISION Right     breast, single lesion    TONSILLECTOMY AND ADENOIDECTOMY         Family History:    Family History   Problem Relation Age of Onset    Stomach cancer Mother     No Known Problems Father     Cervical cancer Sister     No Known Problems Daughter     No Known Problems Maternal Grandmother     No Known Problems Maternal Grandfather     No Known Problems Paternal Grandmother     No Known Problems Paternal Grandfather     Colon polyps Neg Hx     Colon cancer Neg Hx        Cancer-related family history includes Cervical cancer in her sister; Stomach cancer in her mother  There is no history of Colon cancer      Social History:   Social History     Socioeconomic History    Marital status:      Spouse name: Not on file    Number of children: 3    Years of education: Not on file    Highest education level: Not on file   Occupational History    Not on file   Tobacco Use    Smoking status: Former Smoker     Packs/day: 1 00     Years: 30 00     Pack years: 30 00     Types: Cigarettes     Start date: 55     Quit date:      Years since quittin 5    Smokeless tobacco: Never Used   Vaping Use    Vaping Use: Never used   Substance and Sexual Activity    Alcohol use: No     Comment: (history)    Drug use: No    Sexual activity: Not Currently   Other Topics Concern    Not on file   Social History Narrative    Not on file     Social Determinants of Health     Financial Resource Strain: Not on file   Food Insecurity: Not on file   Transportation Needs: Not on file   Physical Activity: Not on file   Stress: Not on file   Social Connections: Not on file   Intimate Partner Violence: Not on file   Housing Stability: Not on file       Current Medications:   Current Outpatient Medications   Medication Sig Dispense Refill    amitriptyline (ELAVIL) 25 mg tablet Take 1 tablet (25 mg total) by mouth daily at bedtime 90 tablet 1    Cholecalciferol (VITAMIN D3) 2000 units capsule Take 2,000 Units by mouth daily        letrozole (FEMARA) 2 5 mg tablet Take 1 tablet (2 5 mg total) by mouth daily 90 tablet 3    losartan (COZAAR) 50 mg tablet Take 1 tablet (50 mg total) by mouth daily 90 tablet 1    mesalamine (LIALDA) 1 2 g EC tablet Take 2 tablets (2 4 g total) by mouth daily with breakfast 60 tablet 3    Ribociclib Succ, 600 MG Dose, (Kisqali, 600 MG Dose,) 200 MG TBPK Take 200 mg by mouth in the morning Pt to take 600mg PO daily (x3 200mg) for 3 Weeks on 1 Week Off in 28 day cycle (Patient not taking: Reported on 7/18/2022) 63 each 11    simvastatin (ZOCOR) 10 mg tablet Take 1 tablet (10 mg total) by mouth daily at bedtime 90 tablet 1     No current facility-administered medications for this visit  Facility-Administered Medications Ordered in Other Visits   Medication Dose Route Frequency Provider Last Rate Last Admin    denosumab (XGEVA) subcutaneous injection 120 mg  120 mg Subcutaneous Once Yaritza Klein MD           Allergies: Allergies   Allergen Reactions    Sulfa Antibiotics     Pneumococcal Polysaccharide Vaccine Rash and Edema       Physical Exam:    Body surface area is 1 67 meters squared      Wt Readings from Last 3 Encounters:   07/18/22 67 6 kg (149 lb)   07/18/22 74 kg (163 lb 2 3 oz)   07/01/22 74 4 kg (164 lb)        Temp Readings from Last 3 Encounters:   07/18/22 98 °F (36 7 °C) (Temporal)   07/18/22 (!) 97 °F (36 1 °C) (Temporal)   07/01/22 97 6 °F (36 4 °C) (Temporal)        BP Readings from Last 3 Encounters:   07/18/22 124/74   07/18/22 138/65   07/01/22 126/72         Pulse Readings from Last 3 Encounters:   07/18/22 74   07/18/22 (!) 129   07/01/22 (!) 115        Physical Exam     Constitutional   General appearance: No acute distress, well appearing and well nourished  Eyes   Conjunctiva and lids: No swelling, erythema or discharge  Pupils and irises: Equal, round and reactive to light  Ears, Nose, Mouth, and Throat   External inspection of ears and nose: Normal     Nasal mucosa, septum, and turbinates: Normal without edema or erythema  Oropharynx: Normal with no erythema, edema, exudate or lesions  Pulmonary   Respiratory effort: No increased work of breathing or signs of respiratory distress  Auscultation of lungs: Clear to auscultation  Cardiovascular   Palpation of heart: Normal PMI, no thrills  Auscultation of heart: Normal rate and rhythm, normal S1 and S2, without murmurs  Examination of extremities for edema and/or varicosities: Normal     Carotid pulses: Normal     Abdomen   Abdomen: Non-tender, no masses  Liver and spleen: No hepatomegaly or splenomegaly  Lymphatic   Palpation of lymph nodes in neck: No lymphadenopathy  Musculoskeletal   Gait and station: Normal     Digits and nails: Normal without clubbing or cyanosis  Inspection/palpation of joints, bones, and muscles: Normal     Skin   Skin and subcutaneous tissue: Normal without rashes or lesions  Neurologic   Cranial nerves: Cranial nerves 2-12 intact  Sensation: No sensory loss      Psychiatric   Orientation to person, place, and time: Normal     Mood and affect: Normal         Assessment / Plan:      The patient is a pleasant 24-year-old female with a past medical history of ER positive breast cancer who completed 5 years of adjuvant treatment with aromatase inhibitor for early stage breast cancer   She was on observation then had some bony chest discomfort   Imaging revealed a lytic lesion in the sternum   Biopsy revealed ER positive breast cancer   Restaging revealed 1 more area in the spine around 8 mm that was positive but no other evidence of metastatic disease   Since her disease is ER positive   She was started on Faslodex and Sanju Zana received radiation at [facility]   Her last imaging then showed interval development of a 4 mm nodule at the right lower lobe along with interval development of increased sclerotic lesions throughout the visualized spine for example there is interval development of 7 mm sclerotic lesion in the anterior superior aspect of T8 concerning for metastases    We decided to put the patient on Femara and Kennieth Balder  She has not tolerated Kennieth Balder and has had   Nausea and fatigue  She has lost weight  She states her nausea resolved when she stopped taking Kisqali  At this point I will discontinue Kennieth Balder  Tumor marker at this point is stable  It was elevated in December of 2021 and again in September of 2021  She will stay on her Femara  I will add on Verzenio  I did discuss affinitor and  Ibrance also  These may be options down the road  For now the patient will start Verzenio  I went over the risks benefits and alternatives of the medication  I explained the possible side effects to include but not limited to nausea, vomiting, diarrhea, rash, low blood counts, risk of infection and even death  The patient is agreeable and proceeding  I will set her up to start  She will sign a consent form today  She will stay on her letrozole  This has not been bothering her at all according to her  She has any questions she will call us back  I will do a stat CT scan as soon as possible to make sure there is no other cause behind her nausea and her disease is not progressed  Goals and Barriers:  Current Goal:  Prolong Survival from   Metastatic breast cancer  Barriers: None  Patient's Capacity to Self Care:  Patient able to self care  Portions of the record may have been created with voice recognition software  Occasional wrong word or "sound a like" substitutions may have occurred due to the inherent limitations of voice recognition software    Read the chart carefully and recognize, using context, where substitutions have occurred

## 2022-07-18 NOTE — PROGRESS NOTES
Pt arrived amb for Xgeva injection  Pt taking calcium and denies jaw, gum or teeth difficulties  Calcium level wnl  CrCl = 29 7  Does not meet parameters  Dr Genesis Martinez office notified, states ok to treat pt with a low CrCl  Pt late for Dr Genesis Martinez appt, and Deneice Matters needs to be replace in [address]  Sent pt to Dr Margarita Burk office first and will give Deneice Matters afterwards  Pt amb to Dr Genesis Martinez office

## 2022-07-18 NOTE — PROGRESS NOTES
Pt returned from Dr Gini Quezada office  Xgeva given SQ JEWELS, dsd applied  Disch amb to home, steady gait    Schedule reviewed

## 2022-07-19 ENCOUNTER — DOCUMENTATION (OUTPATIENT)
Dept: HEMATOLOGY ONCOLOGY | Facility: CLINIC | Age: 82
End: 2022-07-19

## 2022-07-19 DIAGNOSIS — C50.919 METASTATIC BREAST CANCER (HCC): Primary | ICD-10-CM

## 2022-07-19 NOTE — PROGRESS NOTES
Received request from clinical for patient to start on Verzenio 150 MG   Hazeline He has been submitted via cover my meds and is pending (Key: [id number])     BIN:       [id number]  PCN:      [id number]  ID:          [id number]  GRP:      [id number]    PACE - Secondary  BIN:       [id number]  PCN:      [id number]  ID:          [id number]  GRP:      [id number]

## 2022-07-21 ENCOUNTER — CONSULT (OUTPATIENT)
Dept: PALLIATIVE MEDICINE | Age: 82
End: 2022-07-21
Payer: MEDICARE

## 2022-07-21 VITALS
HEART RATE: 125 BPM | WEIGHT: 147.8 LBS | BODY MASS INDEX: 27.93 KG/M2 | DIASTOLIC BLOOD PRESSURE: 60 MMHG | TEMPERATURE: 98.8 F | SYSTOLIC BLOOD PRESSURE: 110 MMHG | OXYGEN SATURATION: 98 %

## 2022-07-21 DIAGNOSIS — N13.30 HYDRONEPHROSIS OF RIGHT KIDNEY: ICD-10-CM

## 2022-07-21 DIAGNOSIS — C79.51 SECONDARY MALIGNANT NEOPLASM OF BONE (HCC): ICD-10-CM

## 2022-07-21 DIAGNOSIS — N18.31 CHRONIC KIDNEY DISEASE (CKD) STAGE G3A/A1, MODERATELY DECREASED GLOMERULAR FILTRATION RATE (GFR) BETWEEN 45-59 ML/MIN/1.73 SQUARE METER AND ALBUMINURIA CREATININE RATIO LESS THAN 30 MG/G (HCC): ICD-10-CM

## 2022-07-21 DIAGNOSIS — F32.A DEPRESSION: ICD-10-CM

## 2022-07-21 DIAGNOSIS — F41.9 ANXIETY: ICD-10-CM

## 2022-07-21 DIAGNOSIS — C50.919 METASTATIC BREAST CANCER (HCC): ICD-10-CM

## 2022-07-21 DIAGNOSIS — M89.9 LYTIC LESION OF BONE ON X-RAY: ICD-10-CM

## 2022-07-21 DIAGNOSIS — D48.0: Primary | ICD-10-CM

## 2022-07-21 DIAGNOSIS — M85.89 OSTEOPENIA OF MULTIPLE SITES: ICD-10-CM

## 2022-07-21 PROCEDURE — 99204 OFFICE O/P NEW MOD 45 MIN: CPT | Performed by: PHYSICIAN ASSISTANT

## 2022-07-21 RX ORDER — MIRTAZAPINE 7.5 MG/1
7.5 TABLET, FILM COATED ORAL
Qty: 30 TABLET | Refills: 0 | Status: SHIPPED | OUTPATIENT
Start: 2022-07-21

## 2022-07-21 NOTE — LETTER
July 21, 2022     Judah Bradshaw [address] [place] [address]  700 [facility] 210  [address] [address]    Patient: Vincent Silverman   YOB: 1940   Date of Visit: 7/21/2022       Dear Dr Dexter Bamberger:    Thank you for referring Jerzy Silverman to me for evaluation  Below are my notes for this consultation  If you have questions, please do not hesitate to call me  I look forward to following your patient along with you  Sincerely,        Danika Delacruz PA-C        CC: No Recipients  Aurelioprembhaskar Omar  7/21/2022  3:35 PM  Sign when Signing Visit      Outpatient Consultation - Palliative and Supportive Care   Jerzy Silverman 79 y o  female [id number]    Assessment & Plan  Problems actively addressed:  1  Neoplasm of uncertain behavior of sternum    2  Osteopenia of multiple sites    3  Chronic kidney disease (CKD) stage G3a/A1, moderately decreased glomerular filtration rate (GFR) between 45-59 mL/min/1 73 square meter and albuminuria creatinine ratio less than 30 mg/g (HCC)    4  Metastatic breast cancer ([facility] [address] )    5  Anxiety    6  Hydronephrosis of right kidney    7  Lytic lesion of bone on x-ray    8  Depression    9  Secondary malignant neoplasm of bone (HCC)      PLAN:  · Kerry's primary concern is lack of appetite  · We discussed Clyde Fee would like to try other options first; receptive to info  · Start Remeron 7 5MG QHS  · Encouraged Ensure supplements   · Opioid agreement deferred  · RTC 1 month for close follow up  Medications adjusted this encounter:  Requested Prescriptions     Signed Prescriptions Disp Refills    mirtazapine (REMERON) 7 5 MG tablet 30 tablet 0     Sig: Take 1 tablet (7 5 mg total) by mouth daily at bedtime     No orders of the defined types were placed in this encounter  There are no discontinued medications  PPS: 100%       Jerzy Silverman was seen today for symptoms and planning cares related to above illnesses    Above orders were sent electronically, or provided in hardcopy in clinic  I have reviewed the patient's controlled substance dispensing history in the Prescription Drug Monitoring Program in compliance with the [facility] regulations before prescribing any controlled substances  PDMP reviewed - no active controlled scripts  We appreciate the referral, and wish for her to continue to follow with us  If there are questions or concerns, please contact us through our clinic/answering service 24 hours a day, seven days a week  [address], PA-C  [facility] Palliative and Supportive Care  [phone]    Visit Information    Accompanied By: Family member    Source of History: Self    History Limitations: None     History of Present Illness      Mirna Silverman is a 79 y o  female who presents as a referral from Dr Tod Gowers of heme/onc for primary palliative diagnosis of metastatic Er+ breast cancer  Consultation is requested for: symptom management, goal of care assessment and decisional support, disease process education and discussion of prognosis, advance care planning, emotional support in the setting of serious illness  Anishakilo Beebe is under the care of Dr Tod Gowers for metastatic breast cancer  She was initially treated with 5 years of adjuvant treatment  She then developed chest discomfort  She was found to have a lytic sternal lesion and biopsy revealed ER positive breast cancer  There is an additional area of the spine that was positive, however no other evidence of metastatic disease  Per Oncology, Parvez Costa underwent radiation at [facility]   She was then found to have interval development of lung nodules and more sclerotic lesions  She was being treated with [place] Leela but had severe fatigue and nausea leading to weight loss  K[place]il[place] Leela was discontinued and replaced by [employer]  She is also on letrozole  She is scheduled for repeat imaging to note progression       Lawanda Jones for her initial palliative and supportive Care consultation on 07/21/2022  She is accompanied by her son Jerod Solo at this appointment  We reviewed Kerry's symptoms and introduced the role of palliative and supportive care  Parrish states her lack of appetite is her main concern  She denies significant pain  She denies nausea  She denies insomnia  She states her energy is overall stable although she has experienced mild lightheadedness with diminished oral intake and therefore is having her son drive her  Around the house, she is able to keep up with her activities of daily living  We discussed possible therapies for lack of appetite  We reviewed medical marijuana extensively in the process of obtaining medical marijuana card  Jerod Solo strongly encourage is Parrish to seek medical marijuana as he believes this is the best option for her  Parrish is clearly hesitant to initiate medical marijuana and would prefer to try other treatments  We discussed mirtazapine or possible steroid  At this time we will initiate mirtazapine  I requested Parrish follow-up with my office in approximately 2 weeks if she does not see benefit      Pertinent Palliative Care Domains    Physical Symptoms:ambulates    Psychological Symptoms:mild anxiety    Advance Directive and Living Will:      Power of :    POLST:      Historical Data  Past Medical History:   Diagnosis Date    Abnormal weight loss     Allergic reaction     Anxiety     Breast cancer, right ([facility] [address] ) 2011    right    Cancer [facility]) 2012    Candidal vulvovaginitis     [facility] Same as above    Endometrial hyperplasia     Epithelial cyst     benign, ovary    GI (gastrointestinal bleed) Blood mixed with stool    History of radiation therapy 2011    right breast cancer    Hyperlipidemia     Hypertension     Internal hemorrhoids     Knee tendonitis     Ovarian cyst     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[facility])      Past Surgical History:   Procedure Laterality Date    BILATERAL SALPINGOOPHORECTOMY      onset: 7/23/13  BREAST BIOPSY Right 2006    benign    BREAST BIOPSY Right 2011    malignant    BREAST LUMPECTOMY Right 2011    malignant    CATARACT EXTRACTION W/  INTRAOCULAR LENS IMPLANT Right     phacoemulsification   Onset: 10/27/14    CHOLECYSTECTOMY      COLONOSCOPY  2017    approx    HYSTERECTOMY  Yes    INTRAOPERATIVE RADIATION THERAPY (IORT)      IR BIOPSY BONE  2021    SKIN LESION EXCISION Right     breast, single lesion    TONSILLECTOMY AND ADENOIDECTOMY       Social History     Socioeconomic History    Marital status:      Spouse name: Not on file    Number of children: 3    Years of education: Not on file    Highest education level: Not on file   Occupational History    Not on file   Tobacco Use    Smoking status: Former Smoker     Packs/day: 1 00     Years: 30 00     Pack years: 30 00     Types: Cigarettes     Start date: 55     Quit date:      Years since quittin 5    Smokeless tobacco: Never Used   Vaping Use    Vaping Use: Never used   Substance and Sexual Activity    Alcohol use: No     Comment: (history)    Drug use: No    Sexual activity: Not Currently   Other Topics Concern    Not on file   Social History Narrative    Not on file     Social Determinants of Health     Financial Resource Strain: Not on file   Food Insecurity: Not on file   Transportation Needs: Not on file   Physical Activity: Not on file   Stress: Not on file   Social Connections: Not on file   Intimate Partner Violence: Not on file   Housing Stability: Not on file     Family History   Problem Relation Age of Onset    Stomach cancer Mother     No Known Problems Father     Cervical cancer Sister     No Known Problems Daughter     No Known Problems Maternal Grandmother     No Known Problems Maternal Grandfather     No Known Problems Paternal Grandmother     No Known Problems Paternal Grandfather     Colon polyps Neg Hx     Colon cancer Neg Hx      Allergies   Allergen Reactions    Sulfa Antibiotics     Pneumococcal Polysaccharide Vaccine Rash and Edema     Current Outpatient Medications   Medication Sig Dispense Refill    mirtazapine (REMERON) 7 5 MG tablet Take 1 tablet (7 5 mg total) by mouth daily at bedtime 30 tablet 0    Abemaciclib (Verzenio) 150 MG TABS Take 150 mg by mouth 2 (two) times a day 60 tablet 11    amitriptyline (ELAVIL) 25 mg tablet Take 1 tablet (25 mg total) by mouth daily at bedtime 90 tablet 1    Cholecalciferol (VITAMIN D3) 2000 units capsule Take 2,000 Units by mouth daily        letrozole (FEMARA) 2 5 mg tablet Take 1 tablet (2 5 mg total) by mouth daily 90 tablet 3    losartan (COZAAR) 50 mg tablet Take 1 tablet (50 mg total) by mouth daily 90 tablet 1    mesalamine (LIALDA) 1 2 g EC tablet Take 2 tablets (2 4 g total) by mouth daily with breakfast 60 tablet 3    Ribociclib Succ, 600 MG Dose, (Kisqali, 600 MG Dose,) 200 MG TBPK Take 200 mg by mouth in the morning Pt to take 600mg PO daily (x3 200mg) for 3 Weeks on 1 Week Off in 28 day cycle (Patient not taking: Reported on 7/18/2022) 63 each 11    simvastatin (ZOCOR) 10 mg tablet Take 1 tablet (10 mg total) by mouth daily at bedtime 90 tablet 1     No current facility-administered medications for this visit  Review of Systems   Constitutional: Positive for decreased appetite and weight loss  Negative for malaise/fatigue and night sweats  HENT: Negative for congestion and hearing loss  Eyes: Negative for blurred vision and discharge  Cardiovascular: Negative for chest pain, dyspnea on exertion and irregular heartbeat  Respiratory: Negative for cough and shortness of breath  Endocrine: Negative for cold intolerance  Skin: Negative for dry skin, flushing and nail changes  Musculoskeletal: Negative for arthritis, muscle cramps and muscle weakness  Gastrointestinal: Positive for anorexia   Negative for bloating, change in bowel habit, bowel incontinence, diarrhea, nausea and vomiting  Genitourinary: Negative for bladder incontinence and dysuria  Neurological: Positive for weakness (mild)  Negative for difficulty with concentration, disturbances in coordination, excessive daytime sleepiness and headaches  Psychiatric/Behavioral: Negative for altered mental status, hallucinations and memory loss  The patient is not nervous/anxious  Vital Signs    /60 (BP Location: Left arm, Cuff Size: Standard)   Pulse (!) 125   Temp 98 8 °F (37 1 °C) (Temporal)   Wt 67 kg (147 lb 12 8 oz)   LMP  (LMP Unknown)   SpO2 98%   BMI 27 93 kg/m²     Physical Exam and Objective Data  Physical Exam  Vitals and nursing note reviewed  Constitutional:       General: She is not in acute distress  Appearance: She is well-developed  HENT:      Head: Normocephalic and atraumatic  Eyes:      Conjunctiva/sclera: Conjunctivae normal    Pulmonary:      Effort: Pulmonary effort is normal  No respiratory distress  Musculoskeletal:      Cervical back: Neck supple  Skin:     General: Skin is warm and dry  Coloration: Skin is pale  Neurological:      Mental Status: She is alert and oriented to person, place, and time  Radiology and Laboratory:  I personally reviewed and interpreted the following results: CBC, CMP    45 minutes was spent face to face with Arsalan Silverman and her son with greater than 50% of the time spent in counseling or coordination of care including discussions of risks and benefits of treatment, instructions for disease self management, treatment instructions, follow up requirements, risk factors and risk reduction of disease, patient and family counseling/involvement in care and compliance with treatment regimen  All of the patient's questions were answered during this discussion

## 2022-07-21 NOTE — PROGRESS NOTES
Outpatient Consultation - Palliative and Supportive Care   Cira Silverman 79 y o  female [id number]    Assessment & Plan  Problems actively addressed:  1  Neoplasm of uncertain behavior of sternum    2  Osteopenia of multiple sites    3  Chronic kidney disease (CKD) stage G3a/A1, moderately decreased glomerular filtration rate (GFR) between 45-59 mL/min/1 73 square meter and albuminuria creatinine ratio less than 30 mg/g (HCC)    4  Metastatic breast cancer ([address] )    5  Anxiety    6  Hydronephrosis of right kidney    7  Lytic lesion of bone on x-ray    8  Depression    9  Secondary malignant neoplasm of bone (HCC)      PLAN:  · Kerry's primary concern is lack of appetite  · We discussed Rina Chairez would like to try other options first; receptive to info  · Start Remeron 7 5MG QHS  · Encouraged Ensure supplements   · Opioid agreement deferred  · RTC 1 month for close follow up  Medications adjusted this encounter:  Requested Prescriptions     Signed Prescriptions Disp Refills    mirtazapine (REMERON) 7 5 MG tablet 30 tablet 0     Sig: Take 1 tablet (7 5 mg total) by mouth daily at bedtime     No orders of the defined types were placed in this encounter  There are no discontinued medications  PPS: 100%       Cira Silverman was seen today for symptoms and planning cares related to above illnesses  Above orders were sent electronically, or provided in hardcopy in clinic  I have reviewed the patient's controlled substance dispensing history in the Prescription Drug Monitoring Program in compliance with the [facility] regulations before prescribing any controlled substances  PDMP reviewed - no active controlled scripts  We appreciate the referral, and wish for her to continue to follow with us  If there are questions or concerns, please contact us through our clinic/answering service 24 hours a day, seven days a week      Lawanda Jackson PA-C  [facility] Palliative and Supportive [facility]      Visit Information    Accompanied By: Family member    Source of History: Self    History Limitations: None     History of Present Illness      Gabriel Denver Clunk is a 79 y o  female who presents as a referral from Dr Wayne Valencia of heme/onc for primary palliative diagnosis of metastatic Er+ breast cancer  Consultation is requested for: symptom management, goal of care assessment and decisional support, disease process education and discussion of prognosis, advance care planning, emotional support in the setting of serious illness  Maxine Lux is under the care of Dr Wayne Valencia for metastatic breast cancer  She was initially treated with 5 years of adjuvant treatment  She then developed chest discomfort  She was found to have a lytic sternal lesion and biopsy revealed ER positive breast cancer  There is an additional area of the spine that was positive, however no other evidence of metastatic disease  Per Oncology, Brandee Good underwent radiation at [facility]   She was then found to have interval development of lung nodules and more sclerotic lesions  She was being treated with [place] but had severe fatigue and nausea leading to weight loss  [place] was discontinued and replaced by [employer]  She is also on letrozole  She is scheduled for repeat imaging to note progression  Maday Thrasheramy for her initial palliative and supportive Care consultation on 07/21/2022  She is accompanied by her son Jerod Solo at this appointment  We reviewed Kerry's symptoms and introduced the role of palliative and supportive care  Maxine Lux states her lack of appetite is her main concern  She denies significant pain  She denies nausea  She denies insomnia  She states her energy is overall stable although she has experienced mild lightheadedness with diminished oral intake and therefore is having her son drive her  Around the house, she is able to keep up with her activities of daily living      We discussed possible therapies for lack of appetite  We reviewed medical marijuana extensively in the process of obtaining medical marijuana card  Mellisa Tamayo strongly encourage is [facility] to seek medical marijuana as he believes this is the best option for her  [place] is clearly hesitant to initiate medical marijuana and would prefer to try other treatments  We discussed mirtazapine or possible steroid  At this time we will initiate mirtazapine  I requested [place] follow-up with my office in approximately 2 weeks if she does not see benefit  Pertinent Palliative Care Domains    Physical Symptoms:ambulates    Psychological Symptoms:mild anxiety    Advance Directive and Living Will:      Power of :    POLST:      Historical Data  Past Medical History:   Diagnosis Date    Abnormal weight loss     Allergic reaction     Anxiety     Breast cancer, right ([facility] [address] ) 2011    right    Cancer ([facility] [address] ) 2012    Candidal vulvovaginitis     [facility] Same as above    Endometrial hyperplasia     Epithelial cyst     benign, ovary    GI (gastrointestinal bleed) Blood mixed with stool    History of radiation therapy 2011    right breast cancer    Hyperlipidemia     Hypertension     Internal hemorrhoids     Knee tendonitis     Ovarian cyst     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[facility])      Past Surgical History:   Procedure Laterality Date    BILATERAL SALPINGOOPHORECTOMY      onset: 7/23/13    BREAST BIOPSY Right 06/13/2006    benign    BREAST BIOPSY Right 03/02/2011    malignant    BREAST LUMPECTOMY Right 03/30/2011    malignant    CATARACT EXTRACTION W/  INTRAOCULAR LENS IMPLANT Right     phacoemulsification   Onset: 10/27/14    CHOLECYSTECTOMY      COLONOSCOPY  2017    approx    HYSTERECTOMY  Yes    INTRAOPERATIVE RADIATION THERAPY (IORT)      IR BIOPSY BONE  7/9/2021    SKIN LESION EXCISION Right     breast, single lesion    TONSILLECTOMY AND ADENOIDECTOMY       Social History     Socioeconomic History    Marital status:  Spouse name: Not on file    Number of children: 3    Years of education: Not on file    Highest education level: Not on file   Occupational History    Not on file   Tobacco Use    Smoking status: Former Smoker     Packs/day: 1 00     Years: 30 00     Pack years: 30 00     Types: Cigarettes     Start date: 55     Quit date: 0     Years since quittin 5    Smokeless tobacco: Never Used   Vaping Use    Vaping Use: Never used   Substance and Sexual Activity    Alcohol use: No     Comment: (history)    Drug use: No    Sexual activity: Not Currently   Other Topics Concern    Not on file   Social History Narrative    Not on file     Social Determinants of Health     Financial Resource Strain: Not on file   Food Insecurity: Not on file   Transportation Needs: Not on file   Physical Activity: Not on file   Stress: Not on file   Social Connections: Not on file   Intimate Partner Violence: Not on file   Housing Stability: Not on file     Family History   Problem Relation Age of Onset    Stomach cancer Mother     No Known Problems Father     Cervical cancer Sister     No Known Problems Daughter     No Known Problems Maternal Grandmother     No Known Problems Maternal Grandfather     No Known Problems Paternal Grandmother     No Known Problems Paternal Grandfather     Colon polyps Neg Hx     Colon cancer Neg Hx      Allergies   Allergen Reactions    Sulfa Antibiotics     Pneumococcal Polysaccharide Vaccine Rash and Edema     Current Outpatient Medications   Medication Sig Dispense Refill    mirtazapine (REMERON) 7 5 MG tablet Take 1 tablet (7 5 mg total) by mouth daily at bedtime 30 tablet 0    Abemaciclib (Verzenio) 150 MG TABS Take 150 mg by mouth 2 (two) times a day 60 tablet 11    amitriptyline (ELAVIL) 25 mg tablet Take 1 tablet (25 mg total) by mouth daily at bedtime 90 tablet 1    Cholecalciferol (VITAMIN D3) 2000 units capsule Take 2,000 Units by mouth daily        letrozole [facility]) 2 5 mg tablet Take 1 tablet (2 5 mg total) by mouth daily 90 tablet 3    losartan (COZAAR) 50 mg tablet Take 1 tablet (50 mg total) by mouth daily 90 tablet 1    mesalamine (LIALDA) 1 2 g EC tablet Take 2 tablets (2 4 g total) by mouth daily with breakfast 60 tablet 3    Ribociclib Succ, 600 MG Dose, (Kisqali, 600 MG Dose,) 200 MG TBPK Take 200 mg by mouth in the morning Pt to take 600mg PO daily (x3 200mg) for 3 Weeks on 1 Week Off in 28 day cycle (Patient not taking: Reported on 7/18/2022) 63 each 11    simvastatin (ZOCOR) 10 mg tablet Take 1 tablet (10 mg total) by mouth daily at bedtime 90 tablet 1     No current facility-administered medications for this visit  Review of Systems   Constitutional: Positive for decreased appetite and weight loss  Negative for malaise/fatigue and night sweats  HENT: Negative for congestion and hearing loss  Eyes: Negative for blurred vision and discharge  Cardiovascular: Negative for chest pain, dyspnea on exertion and irregular heartbeat  Respiratory: Negative for cough and shortness of breath  Endocrine: Negative for cold intolerance  Skin: Negative for dry skin, flushing and nail changes  Musculoskeletal: Negative for arthritis, muscle cramps and muscle weakness  Gastrointestinal: Positive for anorexia  Negative for bloating, change in bowel habit, bowel incontinence, diarrhea, nausea and vomiting  Genitourinary: Negative for bladder incontinence and dysuria  Neurological: Positive for weakness (mild)  Negative for difficulty with concentration, disturbances in coordination, excessive daytime sleepiness and headaches  Psychiatric/Behavioral: Negative for altered mental status, hallucinations and memory loss  The patient is not nervous/anxious        Vital Signs    /60 (BP Location: Left arm, Cuff Size: Standard)   Pulse (!) 125   Temp 98 8 °F (37 1 °C) (Temporal)   Wt 67 kg (147 lb 12 8 oz)   LMP  (LMP Unknown)   SpO2 98% BMI 27 93 kg/m²     Physical Exam and Objective Data  Physical Exam  Vitals and nursing note reviewed  Constitutional:       General: She is not in acute distress  Appearance: She is well-developed  HENT:      Head: Normocephalic and atraumatic  Eyes:      Conjunctiva/sclera: Conjunctivae normal    Pulmonary:      Effort: Pulmonary effort is normal  No respiratory distress  Musculoskeletal:      Cervical back: Neck supple  Skin:     General: Skin is warm and dry  Coloration: Skin is pale  Neurological:      Mental Status: She is alert and oriented to person, place, and time  Radiology and Laboratory:  I personally reviewed and interpreted the following results: CBC, CMP    45 minutes was spent face to face with Blas Silverman and her son with greater than 50% of the time spent in counseling or coordination of care including discussions of risks and benefits of treatment, instructions for disease self management, treatment instructions, follow up requirements, risk factors and risk reduction of disease, patient and family counseling/involvement in care and compliance with treatment regimen  All of the patient's questions were answered during this discussion

## 2022-08-01 ENCOUNTER — HOSPITAL ENCOUNTER (INPATIENT)
Facility: HOSPITAL | Age: 82
LOS: 1 days | Discharge: HOME HEALTH | DRG: 394 | End: 2022-08-03
Attending: EMERGENCY MEDICINE | Admitting: HOSPITALIST
Payer: MEDICARE

## 2022-08-01 ENCOUNTER — APPOINTMENT (EMERGENCY)
Dept: RADIOLOGY | Facility: HOSPITAL | Age: 82
DRG: 394 | End: 2022-08-01
Attending: EMERGENCY MEDICINE
Payer: MEDICARE

## 2022-08-01 ENCOUNTER — APPOINTMENT (EMERGENCY)
Dept: CT IMAGING | Facility: HOSPITAL | Age: 82
DRG: 394 | End: 2022-08-01
Payer: MEDICARE

## 2022-08-01 ENCOUNTER — TELEPHONE (OUTPATIENT)
Dept: HEMATOLOGY ONCOLOGY | Facility: CLINIC | Age: 82
End: 2022-08-01

## 2022-08-01 DIAGNOSIS — R55 SYNCOPE: Primary | ICD-10-CM

## 2022-08-01 DIAGNOSIS — I10 ESSENTIAL HYPERTENSION: ICD-10-CM

## 2022-08-01 DIAGNOSIS — R19.7 DIARRHEA: ICD-10-CM

## 2022-08-01 DIAGNOSIS — C79.51 SECONDARY MALIGNANT NEOPLASM OF BONE (HCC): ICD-10-CM

## 2022-08-01 DIAGNOSIS — N17.9 ACUTE KIDNEY INJURY (HCC): ICD-10-CM

## 2022-08-01 DIAGNOSIS — C50.919 METASTATIC BREAST CANCER (HCC): Primary | ICD-10-CM

## 2022-08-01 DIAGNOSIS — M89.9 LYTIC LESION OF BONE ON X-RAY: ICD-10-CM

## 2022-08-01 DIAGNOSIS — D64.9 ANEMIA: ICD-10-CM

## 2022-08-01 DIAGNOSIS — K52.9 COLITIS: ICD-10-CM

## 2022-08-01 LAB
ALBUMIN SERPL BCP-MCNC: 3.1 G/DL (ref 3.5–5)
ALP SERPL-CCNC: 103 U/L (ref 46–116)
ALT SERPL W P-5'-P-CCNC: 20 U/L (ref 12–78)
ANION GAP SERPL CALCULATED.3IONS-SCNC: 15 MMOL/L (ref 4–13)
AST SERPL W P-5'-P-CCNC: 14 U/L (ref 5–45)
ATRIAL RATE: 116 BPM
ATRIAL RATE: 130 BPM
BASOPHILS # BLD AUTO: 0.09 THOUSANDS/ΜL (ref 0–0.1)
BASOPHILS NFR BLD AUTO: 2 % (ref 0–1)
BILIRUB SERPL-MCNC: 0.8 MG/DL (ref 0.2–1)
BUN SERPL-MCNC: 24 MG/DL (ref 5–25)
CALCIUM ALBUM COR SERPL-MCNC: 10.2 MG/DL (ref 8.3–10.1)
CALCIUM SERPL-MCNC: 9.5 MG/DL (ref 8.3–10.1)
CHLORIDE SERPL-SCNC: 103 MMOL/L (ref 96–108)
CO2 SERPL-SCNC: 20 MMOL/L (ref 21–32)
CREAT SERPL-MCNC: 2.14 MG/DL (ref 0.6–1.3)
EOSINOPHIL # BLD AUTO: 0.39 THOUSAND/ΜL (ref 0–0.61)
EOSINOPHIL NFR BLD AUTO: 6 % (ref 0–6)
ERYTHROCYTE [DISTWIDTH] IN BLOOD BY AUTOMATED COUNT: 22 % (ref 11.6–15.1)
GFR SERPL CREATININE-BSD FRML MDRD: 20 ML/MIN/1.73SQ M
GLUCOSE SERPL-MCNC: 151 MG/DL (ref 65–140)
HCT VFR BLD AUTO: 39.7 % (ref 34.8–46.1)
HGB BLD-MCNC: 13.1 G/DL (ref 11.5–15.4)
HOLD SPECIMEN: NORMAL
IMM GRANULOCYTES # BLD AUTO: 0.02 THOUSAND/UL (ref 0–0.2)
IMM GRANULOCYTES NFR BLD AUTO: 0 % (ref 0–2)
LYMPHOCYTES # BLD AUTO: 1.09 THOUSANDS/ΜL (ref 0.6–4.47)
LYMPHOCYTES NFR BLD AUTO: 18 % (ref 14–44)
MCH RBC QN AUTO: 31.3 PG (ref 26.8–34.3)
MCHC RBC AUTO-ENTMCNC: 33 G/DL (ref 31.4–37.4)
MCV RBC AUTO: 95 FL (ref 82–98)
MONOCYTES # BLD AUTO: 0.51 THOUSAND/ΜL (ref 0.17–1.22)
MONOCYTES NFR BLD AUTO: 8 % (ref 4–12)
NEUTROPHILS # BLD AUTO: 3.96 THOUSANDS/ΜL (ref 1.85–7.62)
NEUTS SEG NFR BLD AUTO: 66 % (ref 43–75)
NRBC BLD AUTO-RTO: 0 /100 WBCS
P AXIS: 79 DEGREES
P AXIS: 80 DEGREES
PLATELET # BLD AUTO: 495 THOUSANDS/UL (ref 149–390)
PMV BLD AUTO: 9.9 FL (ref 8.9–12.7)
POTASSIUM SERPL-SCNC: 3.6 MMOL/L (ref 3.5–5.3)
PR INTERVAL: 158 MS
PR INTERVAL: 162 MS
PROT SERPL-MCNC: 7.7 G/DL (ref 6.4–8.4)
QRS AXIS: 64 DEGREES
QRS AXIS: 66 DEGREES
QRSD INTERVAL: 80 MS
QRSD INTERVAL: 82 MS
QT INTERVAL: 298 MS
QT INTERVAL: 338 MS
QTC INTERVAL: 438 MS
QTC INTERVAL: 469 MS
RBC # BLD AUTO: 4.19 MILLION/UL (ref 3.81–5.12)
SODIUM SERPL-SCNC: 138 MMOL/L (ref 135–147)
T WAVE AXIS: 69 DEGREES
T WAVE AXIS: 73 DEGREES
VENTRICULAR RATE: 116 BPM
VENTRICULAR RATE: 130 BPM
WBC # BLD AUTO: 6.06 THOUSAND/UL (ref 4.31–10.16)

## 2022-08-01 PROCEDURE — 72125 CT NECK SPINE W/O DYE: CPT

## 2022-08-01 PROCEDURE — 80053 COMPREHEN METABOLIC PANEL: CPT | Performed by: EMERGENCY MEDICINE

## 2022-08-01 PROCEDURE — 72170 X-RAY EXAM OF PELVIS: CPT

## 2022-08-01 PROCEDURE — 99220 PR INITIAL OBSERVATION CARE/DAY 70 MINUTES: CPT | Performed by: HOSPITALIST

## 2022-08-01 PROCEDURE — 36415 COLL VENOUS BLD VENIPUNCTURE: CPT

## 2022-08-01 PROCEDURE — 93005 ELECTROCARDIOGRAM TRACING: CPT

## 2022-08-01 PROCEDURE — 71045 X-RAY EXAM CHEST 1 VIEW: CPT

## 2022-08-01 PROCEDURE — 85025 COMPLETE CBC W/AUTO DIFF WBC: CPT | Performed by: EMERGENCY MEDICINE

## 2022-08-01 PROCEDURE — 87505 NFCT AGENT DETECTION GI: CPT | Performed by: PHYSICIAN ASSISTANT

## 2022-08-01 PROCEDURE — 99285 EMERGENCY DEPT VISIT HI MDM: CPT

## 2022-08-01 PROCEDURE — 99285 EMERGENCY DEPT VISIT HI MDM: CPT | Performed by: EMERGENCY MEDICINE

## 2022-08-01 PROCEDURE — 70450 CT HEAD/BRAIN W/O DYE: CPT

## 2022-08-01 PROCEDURE — 99214 OFFICE O/P EST MOD 30 MIN: CPT | Performed by: NURSE PRACTITIONER

## 2022-08-01 PROCEDURE — 93010 ELECTROCARDIOGRAM REPORT: CPT | Performed by: INTERNAL MEDICINE

## 2022-08-01 PROCEDURE — 99215 OFFICE O/P EST HI 40 MIN: CPT | Performed by: PHYSICIAN ASSISTANT

## 2022-08-01 PROCEDURE — 87493 C DIFF AMPLIFIED PROBE: CPT | Performed by: PHYSICIAN ASSISTANT

## 2022-08-01 RX ORDER — LOPERAMIDE HYDROCHLORIDE 2 MG/1
4 CAPSULE ORAL 4 TIMES DAILY PRN
Status: DISCONTINUED | OUTPATIENT
Start: 2022-08-01 | End: 2022-08-03 | Stop reason: HOSPADM

## 2022-08-01 RX ORDER — PRAVASTATIN SODIUM 20 MG
20 TABLET ORAL
Refills: 1 | Status: DISCONTINUED | OUTPATIENT
Start: 2022-08-01 | End: 2022-08-03 | Stop reason: HOSPADM

## 2022-08-01 RX ORDER — AMITRIPTYLINE HYDROCHLORIDE 10 MG/1
25 TABLET, FILM COATED ORAL
Status: DISCONTINUED | OUTPATIENT
Start: 2022-08-01 | End: 2022-08-03 | Stop reason: HOSPADM

## 2022-08-01 RX ORDER — ACETAMINOPHEN 325 MG/1
650 TABLET ORAL EVERY 6 HOURS PRN
Status: DISCONTINUED | OUTPATIENT
Start: 2022-08-01 | End: 2022-08-03 | Stop reason: HOSPADM

## 2022-08-01 RX ORDER — DIPHENOXYLATE HYDROCHLORIDE AND ATROPINE SULFATE 2.5; .025 MG/1; MG/1
1 TABLET ORAL 4 TIMES DAILY PRN
Status: DISCONTINUED | OUTPATIENT
Start: 2022-08-01 | End: 2022-08-03 | Stop reason: HOSPADM

## 2022-08-01 RX ORDER — LETROZOLE 2.5 MG/1
2.5 TABLET, FILM COATED ORAL DAILY
Status: DISCONTINUED | OUTPATIENT
Start: 2022-08-01 | End: 2022-08-03 | Stop reason: HOSPADM

## 2022-08-01 RX ORDER — MIRTAZAPINE 15 MG/1
7.5 TABLET, FILM COATED ORAL
Status: DISCONTINUED | OUTPATIENT
Start: 2022-08-01 | End: 2022-08-03 | Stop reason: HOSPADM

## 2022-08-01 RX ORDER — HEPARIN SODIUM 5000 [USP'U]/ML
5000 INJECTION, SOLUTION INTRAVENOUS; SUBCUTANEOUS EVERY 8 HOURS SCHEDULED
Status: DISCONTINUED | OUTPATIENT
Start: 2022-08-01 | End: 2022-08-03 | Stop reason: HOSPADM

## 2022-08-01 RX ORDER — SODIUM CHLORIDE, SODIUM GLUCONATE, SODIUM ACETATE, POTASSIUM CHLORIDE, MAGNESIUM CHLORIDE, SODIUM PHOSPHATE, DIBASIC, AND POTASSIUM PHOSPHATE .53; .5; .37; .037; .03; .012; .00082 G/100ML; G/100ML; G/100ML; G/100ML; G/100ML; G/100ML; G/100ML
1000 INJECTION, SOLUTION INTRAVENOUS ONCE
Status: COMPLETED | OUTPATIENT
Start: 2022-08-01 | End: 2022-08-02

## 2022-08-01 RX ORDER — SODIUM CHLORIDE, SODIUM GLUCONATE, SODIUM ACETATE, POTASSIUM CHLORIDE, MAGNESIUM CHLORIDE, SODIUM PHOSPHATE, DIBASIC, AND POTASSIUM PHOSPHATE .53; .5; .37; .037; .03; .012; .00082 G/100ML; G/100ML; G/100ML; G/100ML; G/100ML; G/100ML; G/100ML
100 INJECTION, SOLUTION INTRAVENOUS CONTINUOUS
Status: DISCONTINUED | OUTPATIENT
Start: 2022-08-01 | End: 2022-08-03 | Stop reason: HOSPADM

## 2022-08-01 RX ADMIN — PRAVASTATIN SODIUM 20 MG: 20 TABLET ORAL at 15:53

## 2022-08-01 RX ADMIN — SODIUM CHLORIDE 1000 ML: 0.9 INJECTION, SOLUTION INTRAVENOUS at 12:13

## 2022-08-01 RX ADMIN — HEPARIN SODIUM 5000 UNITS: 5000 INJECTION INTRAVENOUS; SUBCUTANEOUS at 14:42

## 2022-08-01 RX ADMIN — LETROZOLE 2.5 MG: 2.5 TABLET ORAL at 14:43

## 2022-08-01 RX ADMIN — DIPHENOXYLATE HYDROCHLORIDE AND ATROPINE SULFATE 1 TABLET: 2.5; .025 TABLET ORAL at 21:41

## 2022-08-01 RX ADMIN — SODIUM CHLORIDE, SODIUM GLUCONATE, SODIUM ACETATE, POTASSIUM CHLORIDE, MAGNESIUM CHLORIDE, SODIUM PHOSPHATE, DIBASIC, AND POTASSIUM PHOSPHATE 100 ML/HR: .53; .5; .37; .037; .03; .012; .00082 INJECTION, SOLUTION INTRAVENOUS at 13:25

## 2022-08-01 RX ADMIN — HEPARIN SODIUM 5000 UNITS: 5000 INJECTION INTRAVENOUS; SUBCUTANEOUS at 21:42

## 2022-08-01 RX ADMIN — MIRTAZAPINE 7.5 MG: 15 TABLET, FILM COATED ORAL at 21:41

## 2022-08-01 RX ADMIN — LOPERAMIDE HYDROCHLORIDE 4 MG: 2 CAPSULE ORAL at 19:02

## 2022-08-01 RX ADMIN — AMITRIPTYLINE HYDROCHLORIDE 25 MG: 10 TABLET, FILM COATED ORAL at 21:45

## 2022-08-01 RX ADMIN — SODIUM CHLORIDE, SODIUM GLUCONATE, SODIUM ACETATE, POTASSIUM CHLORIDE, MAGNESIUM CHLORIDE, SODIUM PHOSPHATE, DIBASIC, AND POTASSIUM PHOSPHATE 1000 ML: .53; .5; .37; .037; .03; .012; .00082 INJECTION, SOLUTION INTRAVENOUS at 20:56

## 2022-08-01 NOTE — PLAN OF CARE
Problem: MOBILITY - ADULT  Goal: Maintain or return to baseline ADL function  Description: INTERVENTIONS:  -  Assess patient's ability to carry out ADLs; assess patient's baseline for ADL function and identify physical deficits which impact ability to perform ADLs (bathing, care of mouth/teeth, toileting, grooming, dressing, etc )  - Assess/evaluate cause of self-care deficits   - Assess range of motion  - Assess patient's mobility; develop plan if impaired  - Assess patient's need for assistive devices and provide as appropriate  - Encourage maximum independence but intervene and supervise when necessary  - Involve family in performance of ADLs  - Assess for home care needs following discharge   - Consider OT consult to assist with ADL evaluation and planning for discharge  - Provide patient education as appropriate  Outcome: Progressing  Goal: Maintains/Returns to pre admission functional level  Description: INTERVENTIONS:  - Perform BMAT or MOVE assessment daily    - Set and communicate daily mobility goal to care team and patient/family/caregiver     - Collaborate with rehabilitation services on mobility goals if consulted  - Perform Range of Motion self   - Reposition patient every self   - Dangle patient 4 times a day  - Stand patient 4 times a day  - Ambulate patient 4 times a day  - Out of bed to chair optional  times a day   - Out of bed for meals optional times a day  - Out of bed for toileting  - Record patient progress and toleration of activity level   Outcome: Progressing

## 2022-08-01 NOTE — ED PROVIDER NOTES
Emergency Department Trauma Note  Lizet Silverman 79 y o  female MRN: [id number]  Unit/Bed#: /-01 Encounter: [id number]      Trauma Alert: Trauma Acuity: Trauma Evaluation  Model of Arrival: Mode of Arrival: Direct from scene via    Trauma Team: Current Providers  Attending Provider: Ravindra Bowers DO  Attending Provider: Lazarus Saravia MD  Physician Assistant: Andrew Bateman PA-C  Registered Nurse: Lima Guzman RN  Advanced Practitioner: Dianne Crowder PA-C  Consulting Physician: Deja Omer PA-C  Advanced Practitioner: Deja Omer PA-C  Consulting Physician: Noemy Gupta MD  Registered Nurse: Ida Maldonado RN  Patient Care Assistant: Samuel Shin  Patient Care Assistant: Lynn Caraballo  Consultants:     None      History of Present Illness     Chief Complaint:   Chief Complaint   Patient presents with    Fall     Patient presents to ED with increased weakness, diarrhea for the past three days  Patient reports passing out last night and hitting her head on the floor of her bathroom  She reports hitting rug that lies on cement in the bathroom  HPI:  Mihai Luevano is a 79 y o  female who presents with  Head injury right shoulder and left hip injury after syncopal episode yesterday and fall    Mechanism:            79-year-old female with a history breast cancer currently undergoing immunotherapy rushing to the bathroom for an episode of diarrhea had a syncopal episode last evening striking her right shoulder left hip and right side of her head trauma evaluation is called she denies any chest pain or shortness of breath she is awake alert orient x3 with a Yudelka coma Scale of 15      History provided by:  Patient  Medical Problem  Location:   head right shoulder and left hip  Quality:   contusion  Severity:  Mild  Onset quality:  Sudden  Duration:  1 day  Timing:  Constant  Progression:  Unchanged  Chronicity:  New  Context:   fall secondary to Patient Information     Patient Name MRN Azucena Pepper [id number] Female 2017      Nursing Note by Gracia Patel at 2017 12:45 PM     Author:  Gracia Patel Service:  (none) Author Type:  (none)     Filed:  2017  1:07 PM Encounter Date:  2017 Status:  Signed     :  Gracia Patel            Patient presents for 15 month well child.    [facility] Eligibility Criteria  ( 0 to 18 Years of age ):      __ Uninsured: Does not have insurance    __ [place] (MHCP) enrollee: [place] [facility] ,[place], or a Prepaid Medical Assistance Program (PMAP)               __  or [place] Native      x__ Insured: Has insurance that covers the cost of all vaccines (NOT [facility] ELIGIBLE BECAUSE INSURANCE ALREADY COVERS VACCINES)         __ Has insurance that does not cover vaccines until a deductible has been met. (NOT [facility] ELIGIBLE AT THIS PRIVATE CLINIC. NEEDS TO GO TO PUBLIC HEALTH.)                       __ Underinsured:         Has health insurance that does not cover one or more vaccines.         Has health insurance that caps prevention services at a certain amount.        (NOT [facility] ELIGIBLE AT THIS PRIVATE CLINIC.  NEEDS TO GO TO PUBLIC HEALTH.)               Children that are underinsured are only able to receive [facility]VFC vaccines at local [facility] clinics ([facility]), [facility] ([facility]), [facility] (C), [facility] Service clinics (S), and [facility] clinics. Please let patients know that if immunizations are not covered by their insurance, they could receive a bill for immunizations given at private clinic sites.    Eligibility reviewed and immunization(s) administered by:  Gracia Patel LPN.................2017           syncopal episode last evening  Associated symptoms: diarrhea    Associated symptoms: no abdominal pain and no chest pain      Review of Systems   Cardiovascular: Negative for chest pain  Gastrointestinal: Positive for diarrhea  Negative for abdominal pain  Neurological: Positive for syncope and weakness  All other systems reviewed and are negative        Historical Information     Immunizations:   Immunization History   Administered Date(s) Administered    INFLUENZA 11/15/2006, 11/16/2007, 11/05/2008, 09/29/2014, 10/25/2016, 09/29/2017, 10/30/2018    Influenza Split High Dose Preservative Free IM 10/17/2012, 10/29/2013, 09/29/2014, 10/25/2016    Influenza, high dose seasonal 0 7 mL 10/21/2019    Pneumococcal Polysaccharide PPV23 11/05/2008, 04/06/2015    Zoster 06/06/2007       Past Medical History:   Diagnosis Date    Abnormal weight loss     Allergic reaction     Anxiety     Breast cancer, right ([facility] ) 2011    right    Cancer [facility]) 2012    Candidal vulvovaginitis     Clotting disorder ([place] [address] ) Same as above    Endometrial hyperplasia     Epithelial cyst     benign, ovary    GI (gastrointestinal bleed) Blood mixed with stool    History of radiation therapy 2011    right breast cancer    Hyperlipidemia     Hypertension     Internal hemorrhoids     Knee tendonitis     Ovarian cyst     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[facility] )        Family History   Problem Relation Age of Onset    Stomach cancer Mother     No Known Problems Father     Cervical cancer Sister     No Known Problems Daughter     No Known Problems Maternal Grandmother     No Known Problems Maternal Grandfather     No Known Problems Paternal Grandmother     No Known Problems Paternal Grandfather     Colon polyps Neg Hx     Colon cancer Neg Hx      Past Surgical History:   Procedure Laterality Date    BILATERAL SALPINGOOPHORECTOMY      onset: 7/23/13    BREAST BIOPSY Right 06/13/2006    benign    BREAST BIOPSY Right 03/02/2011 malignant    BREAST LUMPECTOMY Right 2011    malignant    CATARACT EXTRACTION W/  INTRAOCULAR LENS IMPLANT Right     phacoemulsification  Onset: 10/27/14    CHOLECYSTECTOMY      COLONOSCOPY  2017    approx    HYSTERECTOMY  Yes    INTRAOPERATIVE RADIATION THERAPY ([facility])      IR BIOPSY BONE  2021    SKIN LESION EXCISION Right     breast, single lesion    TONSILLECTOMY AND ADENOIDECTOMY       Social History     Tobacco Use    Smoking status: Former Smoker     Packs/day: 1 00     Years: 30 00     Pack years: 30 00     Types: Cigarettes     Start date: 55     Quit date:      Years since quittin 6    Smokeless tobacco: Never Used   Vaping Use    Vaping Use: Never used   Substance Use Topics    Alcohol use: No     Comment: (history)    Drug use: No     E-Cigarette/Vaping    E-Cigarette Use Never User      E-Cigarette/Vaping Substances    Nicotine No     THC No     CBD No     Flavoring No     Other No     Unknown No        Family History: non-contributory    Meds/Allergies   Prior to Admission Medications   Prescriptions Last Dose Informant Patient Reported? Taking?    Abemaciclib (Verzenio) 150 MG TABS 2022 at Unknown time  No Yes   Sig: Take 150 mg by mouth 2 (two) times a day   Cholecalciferol (VITAMIN D3) 2000 units capsule 2022 at Unknown time Self Yes Yes   Sig: Take 2,000 Units by mouth daily     amitriptyline (ELAVIL) 25 mg tablet 2022 at Unknown time Self No Yes   Sig: Take 1 tablet (25 mg total) by mouth daily at bedtime   letrozole [facility]) 2 5 mg tablet 2022 at Unknown time Self No Yes   Sig: Take 1 tablet (2 5 mg total) by mouth daily   losartan (COZAAR) 50 mg tablet 2022 at Unknown time Self No Yes   Sig: Take 1 tablet (50 mg total) by mouth daily   mesalamine (LIALDA) 1 2 g EC tablet Not Taking at Unknown time Self No No   Sig: Take 2 tablets (2 4 g total) by mouth daily with breakfast   Patient not taking: Reported on 2022   mirtazapine (REMERON) 7 5 MG tablet 7/31/2022 at Unknown time  No Yes   Sig: Take 1 tablet (7 5 mg total) by mouth daily at bedtime   simvastatin (ZOCOR) 10 mg tablet 7/31/2022 at Unknown time Self No Yes   Sig: Take 1 tablet (10 mg total) by mouth daily at bedtime      Facility-Administered Medications: None       Allergies   Allergen Reactions    Sulfa Antibiotics     Pneumococcal Polysaccharide Vaccine Rash and Edema       PHYSICAL EXAM    PE limited by:  nothing    Objective   Vitals:   First set: Temperature: 98 2 °F (36 8 °C) (08/01/22 1032)  Pulse: (!) 122 (08/01/22 1045)  Respirations: 20 (08/01/22 1032)  Blood Pressure: 116/64 (08/01/22 1045)  SpO2: 98 % (08/01/22 1032)    Primary Survey:   (A) Airway:  patent  (B) Breathing:  Clear bilateral  (C) Circulation: Pulses:   normal  (D) Disabliity:  GCS Total:  15  (E) Expose:  Completed    Secondary Survey: (Click on Physical Exam tab above)  Physical Exam  Vitals and nursing note reviewed  Constitutional:       General: She is not in acute distress  Appearance: She is not toxic-appearing or diaphoretic  HENT:      Head: Normocephalic  Right Ear: Tympanic membrane, ear canal and external ear normal       Left Ear: Tympanic membrane, ear canal and external ear normal       Nose: Nose normal    Eyes:      General: No scleral icterus  Right eye: No discharge  Left eye: No discharge  Extraocular Movements: Extraocular movements intact  Pupils: Pupils are equal, round, and reactive to light  Cardiovascular:      Rate and Rhythm: Regular rhythm  Pulses: Normal pulses  Heart sounds: No murmur heard  No friction rub  No gallop  Pulmonary:      Effort: Pulmonary effort is normal  No respiratory distress  Breath sounds: No stridor  No wheezing, rhonchi or rales  Abdominal:      General: There is no distension  Palpations: Abdomen is soft  Tenderness: There is no abdominal tenderness   There is no guarding or rebound  Musculoskeletal:         General: Tenderness and signs of injury present  Normal range of motion  Cervical back: Normal range of motion and neck supple  Tenderness present  Right lower leg: No edema  Left lower leg: No edema  Comments: Tenderness to the left hip and right shoulder and right temporal area also lower cervical paraspinal area   Skin:     General: Skin is warm and dry  Coloration: Skin is not jaundiced  Findings: No bruising, erythema or rash  Neurological:      General: No focal deficit present  Mental Status: She is alert and oriented to person, place, and time  Cranial Nerves: No cranial nerve deficit  Sensory: No sensory deficit  Coordination: Coordination normal    Psychiatric:         Mood and Affect: Mood normal          Thought Content: Thought content normal          Cervical spine cleared by clinical criteria? No (imaging required)      Invasive Devices  Report    Peripheral Intravenous Line  Duration           Peripheral IV 08/01/22 Left Antecubital <1 day                Lab Results:   Results Reviewed     Procedure Component Value Units Date/Time    Trauma tubes on hold [[id number]] Collected: 08/01/22 1032    Lab Status: In process Specimen: Blood from Arm, Left Updated: 08/01/22 1204    Narrative: The following orders were created for panel order Trauma tubes on hold    Procedure                               Abnormality         Status                     ---------                               -----------         ------                     Chantal Redding Top on GAPH[[id number]]                           Final result               Gold top on QLTP[[id number]]                                 Final result               Green / Yellow tube on UQJC[[id number]]                      Final result               Green / Black tube on IYBY[[id number]]                       Final result               Lavender Top 3 ml on SMTK[[id number]] Final result               Lavender Top 7ml on WOIR[[id number]]                         In process                   Please view results for these tests on the individual orders      Comprehensive metabolic panel [[id number]]  (Abnormal) Collected: 08/01/22 1032    Lab Status: Final result Specimen: Blood from Arm, Left Updated: 08/01/22 1118     Sodium 138 mmol/L      Potassium 3 6 mmol/L      Chloride 103 mmol/L      CO2 20 mmol/L      ANION GAP 15 mmol/L      BUN 24 mg/dL      Creatinine 2 14 mg/dL      Glucose 151 mg/dL      Calcium 9 5 mg/dL      Corrected Calcium 10 2 mg/dL      AST 14 U/L      ALT 20 U/L      Alkaline Phosphatase 103 U/L      Total Protein 7 7 g/dL      Albumin 3 1 g/dL      Total Bilirubin 0 80 mg/dL      eGFR 20 ml/min/1 73sq m     Narrative:      National Kidney Disease Foundation guidelines for Chronic Kidney Disease (CKD):     Stage 1 with normal or high GFR (GFR > 90 mL/min/1 73 square meters)    Stage 2 Mild CKD (GFR = 60-89 mL/min/1 73 square meters)    Stage 3A Moderate CKD (GFR = 45-59 mL/min/1 73 square meters)    Stage 3B Moderate CKD (GFR = 30-44 mL/min/1 73 square meters)    Stage 4 Severe CKD (GFR = 15-29 mL/min/1 73 square meters)    Stage 5 End Stage CKD (GFR <15 mL/min/1 73 square meters)  Note: GFR calculation is accurate only with a steady state creatinine    CBC and differential [[id number]]  (Abnormal) Collected: 08/01/22 1032    Lab Status: Final result Specimen: Blood from Arm, Left Updated: 08/01/22 1052     WBC 6 06 Thousand/uL      RBC 4 19 Million/uL      Hemoglobin 13 1 g/dL      Hematocrit 39 7 %      MCV 95 fL      MCH 31 3 pg      MCHC 33 0 g/dL      RDW 22 0 %      MPV 9 9 fL      Platelets 641 Thousands/uL      nRBC 0 /100 WBCs      Neutrophils Relative 66 %      Immat GRANS % 0 %      Lymphocytes Relative 18 %      Monocytes Relative 8 %      Eosinophils Relative 6 %      Basophils Relative 2 %      Neutrophils Absolute 3 96 Thousands/µL Immature Grans Absolute 0 02 Thousand/uL      Lymphocytes Absolute 1 09 Thousands/µL      Monocytes Absolute 0 51 Thousand/µL      Eosinophils Absolute 0 39 Thousand/µL      Basophils Absolute 0 09 Thousands/µL                  Imaging Studies:   Direct to CT: Yes  TRAUMA - CT head wo contrast   Final Result by Scooter Curtis MD (08/01 1106)      No acute intracranial abnormality  Workstation performed: [id number]         TRAUMA - CT spine cervical wo contrast   Final Result by Scooter Curtis MD (08/01 1104)      No cervical spine fracture or traumatic malalignment  Workstation performed: [id number]         XR Trauma chest portable   Final Result by Scooter Curtis MD (08/01 1106)      No acute cardiopulmonary disease  Workstation performed: [id number]         XR Trauma pelvis ap only 1 or 2 vw   Final Result by Scooter Curtis MD (08/01 1107)      No acute osseous abnormality        Workstation performed: [id number]               Procedures  ECG 12 Lead Documentation Only    Date/Time: 8/1/2022 10:41 AM  Performed by: Elizabeth Larson DO  Authorized by: Elizabeth Larson DO     ECG reviewed by me, the ED Provider: yes    Patient location:  ED  Rate:     ECG rate:  130    ECG rate assessment: tachycardic    Rhythm:     Rhythm: sinus rhythm    Conduction:     Conduction: normal    T waves:     T waves: normal               ED Course           MDM  Number of Diagnoses or Management Options  Diagnosis management comments:  Syncope yesterday with multiple contusions trauma evaluation called       Amount and/or Complexity of Data Reviewed  Clinical lab tests: ordered  Tests in the radiology section of CPT®: ordered            Disposition  [facility] Transfer: No  Final diagnoses:   Syncope   Acute kidney injury [facility])     Time reflects when diagnosis was documented in both MDM as applicable and the Disposition within this note     Time User Action Codes Description Comment    8/1/2022 11:37 AM Jt Hashimoto Add [R55] Syncope     8/1/2022 11:37 AM Trkathy Hashimoto Add [N17 9] Acute kidney injury ([address] )     8/1/2022 12:28 PM Laila Swift Add [C79 51] Secondary malignant neoplasm of bone [facility])       ED Disposition     ED Disposition   Admit    Condition   Stable    Date/Time   Mon Aug 1, 2022 11:37 AM    Comment   Case was discussed with **Dr Mario Diallo* and the patient's admission status was agreed to be Admission Status: inpatient status to the service of Dr Mario Diallo   Follow-up Information    None       Current Discharge Medication List      CONTINUE these medications which have NOT CHANGED    Details   Abemaciclib (Verzenio) 150 MG TABS Take 150 mg by mouth 2 (two) times a day  Qty: 60 tablet, Refills: 11    Associated Diagnoses: Metastatic breast cancer (HCC)      amitriptyline (ELAVIL) 25 mg tablet Take 1 tablet (25 mg total) by mouth daily at bedtime  Qty: 90 tablet, Refills: 1    Associated Diagnoses: Anxiety      Cholecalciferol (VITAMIN D3) 2000 units capsule Take 2,000 Units by mouth daily        letrozole (FEMARA) 2 5 mg tablet Take 1 tablet (2 5 mg total) by mouth daily  Qty: 90 tablet, Refills: 3    Associated Diagnoses: Lytic lesion of bone on x-ray; Metastatic breast cancer (HCC)      losartan (COZAAR) 50 mg tablet Take 1 tablet (50 mg total) by mouth daily  Qty: 90 tablet, Refills: 1    Comments: We are stopping the lisinopril and changing to Losartan    Associated Diagnoses: Essential hypertension      mirtazapine (REMERON) 7 5 MG tablet Take 1 tablet (7 5 mg total) by mouth daily at bedtime  Qty: 30 tablet, Refills: 0    Associated Diagnoses: Anxiety; Depression      simvastatin (ZOCOR) 10 mg tablet Take 1 tablet (10 mg total) by mouth daily at bedtime  Qty: 90 tablet, Refills: 1    Associated Diagnoses: Dyslipidemia      mesalamine (LIALDA) 1 2 g EC tablet Take 2 tablets (2 4 g total) by mouth daily with breakfast  Qty: 60 tablet, Refills: 3 Associated Diagnoses: Colitis           No discharge procedures on file      PDMP Review       Value Time User    PDMP Reviewed  Yes 7/21/2022  2:34 PM Amanda Peraza PA-C          ED Provider  Electronically Signed by         Bairon Rodriguez DO  08/01/22 6921

## 2022-08-01 NOTE — ASSESSMENT & PLAN NOTE
· Patient with diarrhea, likely secondary toVerzenio  · Baseline Cr most recently 1 2   · 2 14 on admission  · 1L NS in ED, continue Isolyte   · Trend BMP

## 2022-08-01 NOTE — H&P
[address] SATISH [place]unk 1940, 79 y o  female MRN: [id number]  Unit/Bed#: -01 Encounter: [id number]  Primary Care Provider: Cristin Gaytan DO   Date and time admitted to hospital: 8/1/2022 10:20 AM    * REYES (acute kidney injury) [facility])  Assessment & Plan  · Patient with diarrhea, likely secondary toVerzenio  · Baseline Cr most recently 1 2   · 2 14 on admission  · 1L NS in ED, continue Isolyte   · Trend BMP    Diarrhea  Assessment & Plan  · Recently transitioned from [place] (caused nausea) to Verzenio  · Symptoms began shortly after, suspect this is a side effect  · C diff and stool culture pending  · Patient denies recent travel, change in diet other than decreased appetite/ability to tolerate food  · Would initiate Imodium to manage side-effects if studies are negative    Chronic kidney disease (CKD) stage G3a/A1, moderately decreased glomerular filtration rate (GFR) between 45-59 mL/min/1 73 square meter and albuminuria creatinine ratio less than 30 mg/g [facility])  Assessment & Plan  Lab Results   Component Value Date    EGFR 20 08/01/2022    EGFR 42 07/08/2022    EGFR 42 06/10/2022    CREATININE 2 14 (H) 08/01/2022    CREATININE 1 20 07/08/2022    CREATININE 1 19 06/10/2022     · Most recently baseline Cr has been 1 2  · 2 14 on admission  · Trend daily and replace fluids via IVF    Metastatic breast cancer ([facility] [address] )  Assessment & Plan  · Continue Abemaciclib, letrozole  · Outpatient follow up with oncology  · Palliative consulted to assist with symptom management    VTE Pharmacologic Prophylaxis: VTE Score: 6 High Risk (Score >/= 5) - Pharmacological DVT Prophylaxis Ordered: heparin  Sequential Compression Devices Ordered  Code Status: Level 1 - Full Code   Discussion with family: Updated  (son) at bedside      Anticipated Length of Stay: Patient will be admitted on an observation basis with an anticipated length of stay of less than 2 midnights secondary to REYES  Total Time for Visit, including Counseling / Coordination of Care: 60 minutes Greater than 50% of this total time spent on direct patient counseling and coordination of care  Chief Complaint: syncope and diarrhea    History of Present Illness:  Cristin Umana is a 79 y o  female with a PMH of metastatic breast cancer, undergoing immunotherapy, CKD, hyperlipidemia, who presents with syncopal event while rushing to the bathroom with diarrhea overnight  Trauma evaluation was negative in ED, and patient is stable for admission to Med-Surg  Patient had been experiencing worsening weakness and diarrhea x3 days prior to admission  When asked further, patient admits symptoms began shortly after starting Verzenio approximately one week ago  She was recently seen by outpatient Palliative care to help manage her symptoms of limited appetite and weakness  Mirtazapine was added 7/21  She reports some improvement in her appetite since beginning this  Patient recalls frequent episodes of diarrhea that caused her to feel so weak that she fell in her bathroom last night  She denies any SOB, chest pain, headache, dysuria, fevers or chills  She denies recent dietary changes or travel  Continue to replete fluids  Suspect side effect of recent medication change  Tachycardia in ED likely secondary to volume depletion  Continue to minotor rates on [place]  Review of Systems:  Review of Systems   Gastrointestinal: Positive for diarrhea  Neurological: Positive for syncope and weakness  All other systems reviewed and are negative        Past Medical and Surgical History:   Past Medical History:   Diagnosis Date    Abnormal weight loss     Allergic reaction     Anxiety     Breast cancer, [facility] 2011    right    Cancer [facility]) 2012    Candidal vulvovaginitis     Clotting disorder ([facility] [address] ) Same as above    Endometrial hyperplasia     Epithelial cyst     benign, ovary    GI (gastrointestinal bleed) Blood mixed with stool    History of radiation therapy 2011    right breast cancer    Hyperlipidemia     Hypertension     Internal hemorrhoids     Knee tendonitis     Ovarian cyst     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[facility])        Past Surgical History:   Procedure Laterality Date    BILATERAL SALPINGOOPHORECTOMY      onset: 7/23/13    BREAST BIOPSY Right 06/13/2006    benign    BREAST BIOPSY Right 03/02/2011    malignant    BREAST LUMPECTOMY Right 03/30/2011    malignant    CATARACT EXTRACTION W/  INTRAOCULAR LENS IMPLANT Right     phacoemulsification  Onset: 10/27/14    CHOLECYSTECTOMY      COLONOSCOPY  2017    approx    HYSTERECTOMY  Yes    INTRAOPERATIVE RADIATION THERAPY (IORT)      IR BIOPSY BONE  7/9/2021    SKIN LESION EXCISION Right     breast, single lesion    TONSILLECTOMY AND ADENOIDECTOMY         Meds/Allergies:  Prior to Admission medications    Medication Sig Start Date End Date Taking?  Authorizing Provider   Abemaciclib (Verzenio) 150 MG TABS Take 150 mg by mouth 2 (two) times a day 7/19/22   Betsy Rand MD   amitriptyline (ELAVIL) 25 mg tablet Take 1 tablet (25 mg total) by mouth daily at bedtime 3/28/22   Zachary Meléndez DO   Cholecalciferol (VITAMIN D3) 2000 units capsule Take 2,000 Units by mouth daily      Historical Provider, MD   letrozole [facility]) 2 5 mg tablet Take 1 tablet (2 5 mg total) by mouth daily 4/29/22   Betsy Rand MD   losartan (COZAAR) 50 mg tablet Take 1 tablet (50 mg total) by mouth daily 1/24/22   Zachary Meléndez DO   mesalamine (LIALDA) 1 2 g EC tablet Take 2 tablets (2 4 g total) by mouth daily with breakfast 4/6/22   BROOKE Veras   mirtazapine (REMERON) 7 5 MG tablet Take 1 tablet (7 5 mg total) by mouth daily at bedtime 7/21/22   Gus Doherty PA-C   Ribociclib Succ, 600 MG Dose, (Kisqali, 600 MG Dose,) 200 MG TBPK Take 200 mg by mouth in the morning Pt to take 600mg PO daily (x3 200mg) for 3 Weeks on 1 Week Off in 28 day cycle  Patient not taking: Reported on 2022   Sayra Torres MD   simvastatin (ZOCOR) 10 mg tablet Take 1 tablet (10 mg total) by mouth daily at bedtime 22   Lisa Calderón DO     I have reviewed home medications with patient personally  Allergies: Allergies   Allergen Reactions    Sulfa Antibiotics     Pneumococcal Polysaccharide Vaccine Rash and Edema       Social History:  Marital Status:    Occupation:   Patient Pre-hospital Living Situation: Home  Patient Pre-hospital Level of Mobility: walks  Patient Pre-hospital Diet Restrictions: None  Substance Use History:   Social History     Substance and Sexual Activity   Alcohol Use No    Comment: (history)     Social History     Tobacco Use   Smoking Status Former Smoker    Packs/day: 1 00    Years: 30 00    Pack years: 30 00    Types: Cigarettes    Start date: 55    Quit date: 0    Years since quittin 6   Smokeless Tobacco Never Used     Social History     Substance and Sexual Activity   Drug Use No       Family History:  Family History   Problem Relation Age of Onset    Stomach cancer Mother     No Known Problems Father     Cervical cancer Sister     No Known Problems Daughter     No Known Problems Maternal Grandmother     No Known Problems Maternal Grandfather     No Known Problems Paternal Grandmother     No Known Problems Paternal Grandfather     Colon polyps Neg Hx     Colon cancer Neg Hx        Physical Exam:     Vitals:   Blood Pressure: 152/72 (22 1320)  Pulse: (!) 111 (22 1320)  Temperature: 98 3 °F (36 8 °C) (22 1320)  Respirations: 22 (22 1317)  Height: 5' 1" (154 9 cm) (22 1032)  Weight - Scale: 67 kg (147 lb 11 3 oz) (22 1032)  SpO2: 96 % (22 1320)    Physical Exam  Vitals and nursing note reviewed  Constitutional:       General: She is not in acute distress  Appearance: Normal appearance  She is well-developed     HENT:      Head: Normocephalic and atraumatic  Eyes:      General: No scleral icterus  Conjunctiva/sclera: Conjunctivae normal    Cardiovascular:      Rate and Rhythm: Normal rate and regular rhythm  Heart sounds: No murmur heard  Pulmonary:      Effort: Pulmonary effort is normal       Breath sounds: No wheezing, rhonchi or rales  Abdominal:      General: There is no distension  Palpations: Abdomen is soft  Skin:     General: Skin is warm and dry  Neurological:      General: No focal deficit present  Mental Status: She is alert  Psychiatric:         Mood and Affect: Mood normal       Additional Data:     Lab Results:  Results from last 7 days   Lab Units 08/01/22  1032   WBC Thousand/uL 6 06   HEMOGLOBIN g/dL 13 1   HEMATOCRIT % 39 7   PLATELETS Thousands/uL 495*   NEUTROS PCT % 66   LYMPHS PCT % 18   MONOS PCT % 8   EOS PCT % 6     Results from last 7 days   Lab Units 08/01/22  1032   SODIUM mmol/L 138   POTASSIUM mmol/L 3 6   CHLORIDE mmol/L 103   CO2 mmol/L 20*   BUN mg/dL 24   CREATININE mg/dL 2 14*   ANION GAP mmol/L 15*   CALCIUM mg/dL 9 5   ALBUMIN g/dL 3 1*   TOTAL BILIRUBIN mg/dL 0 80   ALK PHOS U/L 103   ALT U/L 20   AST U/L 14   GLUCOSE RANDOM mg/dL 151*                       Imaging: Reviewed radiology reports from this admission including: chest xray, CT head and xray(s)  TRAUMA - CT head wo contrast   Final Result by Jeannette Armendariz MD (08/01 1106)      No acute intracranial abnormality  Workstation performed: [id number]         TRAUMA - CT spine cervical wo contrast   Final Result by Jeannette Armendariz MD (08/01 1104)      No cervical spine fracture or traumatic malalignment  Workstation performed: [id number]         XR Trauma chest portable   Final Result by Jeannette Armendariz MD (08/01 1106)      No acute cardiopulmonary disease                    Workstation performed: [id number]         XR Trauma pelvis ap only 1 or 2 vw   Final Result by Jeannette Armendariz MD (08/01 1107)      No acute osseous abnormality  Workstation performed: [id number]             EKG and Other Studies Reviewed on Admission:   · EKG: Sinus Tachycardia    ** Please Note: This note has been constructed using a voice recognition system   **

## 2022-08-01 NOTE — CONSULTS
Consultation - Palliative and Supportive Care   Jack Silverman 79 y o  female [id number]    Assessment/Problems Actively Addressed:  Goals of care counseling  Diarrhea   CKD3  REYES  Metastatic breast cancer  Spine metastasis    Goals/recommendations:  -Level 1 code status  -Kerry plans to continue disease-directed therapy   -Symptom optimization as below  -Palliative will follow; will discuss with onc  Plan:  Symptom management:    Immunotherapy related diarrhea  -IV fluids as per primary team  -Imodium  -if ineffective, could discuss small steroid taper with Oncology    Cancer related pain  -minimal, continue p r n  Acetaminophen    Anorexia  -recently started on mirtazapine    Social support:   Time spent providing supportive listening   Patient is finding comfort in good family support              I have reviewed the patient's controlled substance dispensing history in the Prescription Drug Monitoring Program in compliance with the [facility] regulations before prescribing any controlled substances  Last refills N/A    Decisional apparatus:  Patient is competent on exam today  We appreciate the invitation to be involved in this patient's care  We will continue to follow throughout this hospitalization  Please do not hesitate to reach our on call provider through our clinic answering service at  should you have acute symptom control concerns  SALOMÓN Deutsch  Palliative and Supportive Care  Clinic/Answering Service: [phone]  You can find me on [employer]!      IDENTIFICATION:  Inpatient consult to Palliative Care  Consult performed by: Deja Omer PA-C  Consult ordered by: Dianne Crowder PA-C        Physician Requesting Consult: Lazarus Saravia MD  Reason for Consult / Principal Problem: GOC counseling and SM secondary to metastatic breast cancer now with intractable diarrhea after immunotherapy    History of Present Illness:  Jack Silverman is a 79 y o  female who presents with a palliative diagnosis of breast cancer was brought into the ED at [address] on 08/01/2022 secondary to syncope and diarrhea,    Jai Barron has a history of breast cancer  She initially underwent treatment with 5 years of adjuvant therapy  Later, she developed chest discomfort and workup revealed a lytic sternal lesion  Biopsy revealed ER positive breast cancer  There is an additional area of the spine that is positive  Jai Barron is now under the care of Dr Rin Mcintyre  and she is undergoing immunotherapy  Her agent was recently switched from [facility] to [facility] because the 1st agent lead to severe fatigue and nausea which led to weight loss  Jai Barron had her first treatment with Verzenio  She reported severe diarrhea which persisted  She became lightheaded and had syncopal episode prompting presentation to the ED  She is being treated with IV fluids  Stool studies have been taken to rule out infectious cause  Palliative and oncology consultations have been added for additional support  Review of Systems:   Review of Systems   Constitutional: Positive for decreased appetite  Negative for malaise/fatigue and night sweats  HENT: Negative for hearing loss and hoarse voice  Cardiovascular: Negative for chest pain, dyspnea on exertion and irregular heartbeat  Respiratory: Negative for cough, shortness of breath and sleep disturbances due to breathing  Musculoskeletal: Negative for arthritis and back pain  Gastrointestinal: Positive for anorexia and vomiting  Negative for abdominal pain and nausea  Neurological: Positive for weakness  Negative for difficulty with concentration and disturbances in coordination  Psychiatric/Behavioral: Negative for hallucinations  The patient does not have insomnia and is not nervous/anxious            Past Medical History:   Diagnosis Date    Abnormal weight loss     Allergic reaction     Anxiety     Breast cancer, right ([facility] [address] ) 2011    right  [facility]    Candidal vulvovaginitis     Clotting disorder ([facility] [address] ) Same as above    Endometrial hyperplasia     Epithelial cyst     benign, ovary    GI (gastrointestinal bleed) Blood mixed with stool    History of radiation therapy 2011    right breast cancer    Hyperlipidemia     Hypertension     Internal hemorrhoids     Knee tendonitis     Ovarian cyst     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[facility])      Past Surgical History:   Procedure Laterality Date    BILATERAL SALPINGOOPHORECTOMY      onset: 12    BREAST BIOPSY Right 2006    benign    BREAST BIOPSY Right 2011    malignant    BREAST LUMPECTOMY Right 2011    malignant    CATARACT EXTRACTION W/  INTRAOCULAR LENS IMPLANT Right     phacoemulsification   Onset: 10/27/14    CHOLECYSTECTOMY      COLONOSCOPY  2017    approx    HYSTERECTOMY  Yes    INTRAOPERATIVE RADIATION THERAPY (IORT)      IR BIOPSY BONE  2021    SKIN LESION EXCISION Right     breast, single lesion    TONSILLECTOMY AND ADENOIDECTOMY       Social History     Socioeconomic History    Marital status:      Spouse name: Not on file    Number of children: 3    Years of education: Not on file    Highest education level: Not on file   Occupational History    Not on file   Tobacco Use    Smoking status: Former Smoker     Packs/day: 1 00     Years: 30 00     Pack years: 30 00     Types: Cigarettes     Start date:      Quit date:      Years since quittin 6    Smokeless tobacco: Never Used   Vaping Use    Vaping Use: Never used   Substance and Sexual Activity    Alcohol use: No     Comment: (history)    Drug use: No    Sexual activity: Not Currently   Other Topics Concern    Not on file   Social History Narrative    Not on file     Social Determinants of Health     Financial Resource Strain: Not on file   Food Insecurity: Not on file   Transportation Needs: Not on file   Physical Activity: Not on file   Stress: Not on file Social Connections: Not on file   Intimate Partner Violence: Not on file   Housing Stability: Not on file     Family History   Problem Relation Age of Onset    Stomach cancer Mother     No Known Problems Father     Cervical cancer Sister     No Known Problems Daughter     No Known Problems Maternal Grandmother     No Known Problems Maternal Grandfather     No Known Problems Paternal Grandmother     No Known Problems Paternal Grandfather     Colon polyps Neg Hx     Colon cancer Neg Hx        Medications:  all current active meds have been reviewed    Allergies   Allergen Reactions    Sulfa Antibiotics     Pneumococcal Polysaccharide Vaccine Rash and Edema     Medications    Current Facility-Administered Medications:     Abemaciclib TABS 150 mg, 150 mg, Oral, BID, Padmini WOODARD PA-C    acetaminophen (TYLENOL) tablet 650 mg, 650 mg, Oral, Q6H PRN, Padmini WOODARD PA-C    amitriptyline (ELAVIL) tablet 25 mg, 25 mg, Oral, HS, Padmini WOODARD PA-C    heparin (porcine) subcutaneous injection 5,000 Units, 5,000 Units, Subcutaneous, Q8H RIA, 5,000 Units at 08/01/22 1442 **AND** Platelet count, , , Once, Cinthya Mandujano PA-C    letrozole [facility]) tablet 2 5 mg, 2 5 mg, Oral, Daily, Padmini WOODARD PA-C, 2 5 mg at 08/01/22 1443    mirtazapine (REMERON) tablet 7 5 mg, 7 5 mg, Oral, HS, Padmini WOODARD PA-C    multi-electrolyte (PLASMALYTE-A/ISOLYTE-S PH 7 4) IV solution, 100 mL/hr, Intravenous, Continuous, Padmini WOODARD PA-C, Last Rate: 100 mL/hr at 08/01/22 1325, 100 mL/hr at 08/01/22 1325    pravastatin (PRAVACHOL) tablet 20 mg, 20 mg, Oral, Daily With Dinner, Padmini WOODARD PA-C    Objective  /72   Pulse (!) 111   Temp 98 3 °F (36 8 °C)   Resp 22   Ht 5' 1" (1 549 m)   Wt 67 kg (147 lb 11 3 oz)   LMP  (LMP Unknown)   SpO2 96%   BMI 27 91 kg/m²     Physical Exam:   Physical Exam  Vitals and nursing note reviewed  Exam conducted with a chaperone present     Constitutional: General: She is not in acute distress  Appearance: She is well-developed  HENT:      Head: Normocephalic and atraumatic  Eyes:      Conjunctiva/sclera: Conjunctivae normal    Pulmonary:      Effort: Pulmonary effort is normal  No respiratory distress  Abdominal:      Palpations: Abdomen is soft  Musculoskeletal:         General: No tenderness  Cervical back: Neck supple  Right lower leg: No edema  Left lower leg: No edema  Skin:     General: Skin is warm and dry  Coloration: Skin is pale  Neurological:      Mental Status: She is alert and oriented to person, place, and time  Mental status is at baseline  Psychiatric:         Mood and Affect: Mood normal          Behavior: Behavior normal        Lab Results:   I have personally reviewed pertinent labs  , CBC:   Lab Results   Component Value Date    WBC 6 06 08/01/2022    HGB 13 1 08/01/2022    HCT 39 7 08/01/2022    MCV 95 08/01/2022     (H) 08/01/2022    MCH 31 3 08/01/2022    MCHC 33 0 08/01/2022    RDW 22 0 (H) 08/01/2022    MPV 9 9 08/01/2022    NRBC 0 08/01/2022   , CMP:   Lab Results   Component Value Date    SODIUM 138 08/01/2022    K 3 6 08/01/2022     08/01/2022    CO2 20 (L) 08/01/2022    BUN 24 08/01/2022    CREATININE 2 14 (H) 08/01/2022    CALCIUM 9 5 08/01/2022    AST 14 08/01/2022    ALT 20 08/01/2022    ALKPHOS 103 08/01/2022    EGFR 20 08/01/2022     Imaging Studies: I have personally reviewed pertinent reports  EKG, Pathology, and Other Studies: I have personally reviewed pertinent reports  Counseling / Coordination of Care  Total floor / unit time spent today 50 minutes  Greater than 50% of total time was spent with the patient and / or family counseling and / or coordination of care   A description of the counseling / coordination of care: Reviewed chart, provided medical updates, determined goals of care, discussed palliative care and symptom management, discussed comfort care and hospice care, discussed code status, determined competency and POA/HCA, determined social/family support, provided psychosocial support  Reviewed with SLIM team, RN and CM  Portions of this document may have been created using dictation software and as such some "sound alike" terms may have been generated by the system  Do not hesitate to contact me with any questions or clarifications

## 2022-08-01 NOTE — TELEPHONE ENCOUNTER
Returned call to pt who recently started taking Verzenio for her breast ca  Called stating having severe diarrhea non stop the past 3 days  Pt was not taking imodium  Pt states she passed out on the bathroom floor this weekend  Advised to go to ER for eval related to severe dehydration ad possible electrolyte imbalance and will most likely need IV fluids  Will see if she can try using imodium as I reccommended and if still cant tolerate treatment related side effects will try to schedule for in office follow up

## 2022-08-01 NOTE — ASSESSMENT & PLAN NOTE
· Recently transitioned from [place] (caused nausea) to Verzenio  · Symptoms began shortly after, suspect this is a side effect  · C diff and stool culture pending  · Patient denies recent travel, change in diet other than decreased appetite/ability to tolerate food  · Would initiate Imodium to manage side-effects if studies are negative

## 2022-08-01 NOTE — ASSESSMENT & PLAN NOTE
· Continue Abemaciclib, letrozole  · Outpatient follow up with oncology  · Palliative consulted to assist with symptom management

## 2022-08-01 NOTE — TELEPHONE ENCOUNTER
CALL TRANSFER   Reason for patient call? Gabriela Nieves calling because she is experiencing nausea, vomiting, diarrhea, and lost consciousness last night  Attempted to transfer to [facility] but got VM  Advised patient go to ER to have her symptoms  She left a voicemail for [facility] to let him know what was going on   Patient's primary physician? Dr Genesis Martinez   RN call was transferred to and time it was transferred? Na Mejia @ 9:32 AM    Informed patient that the message will be forwarded to the team and someone will get back to them as soon as possible    Did you relay this information to the patient?  Yes and advised to go to ER for symptoms

## 2022-08-01 NOTE — CONSULTS
Medical Oncology/Hematology Consult Note  Gabriela Silverman, 79 y  o , 1940  /-[birth year], [id number]  08/01/22    Assessment and Plan:    1  Metastatic breast cancer  Patient has a history stage I A ER/AZ positive, 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 s/p resection and adjuvant radiation therapy  She did receive 5 years of adjuvant hormonal therapy with Arimidex    Due to symptoms of bony discomfort in her sternum she underwent imaging on 6/25/2021  CT of chest demonstrate evidence of a large lytic lesion involving the upper half of the body of the sternum suspicious for metastatic disease  CT of abdomen and pelvis showed additional osseous lesions  On 07/09/2021 patient underwent IR bone biopsy:  Final Diagnosis  A  Bone, Sternal mass, Biopsy:  - Metastatic carcinoma, consistent with breast primary  Patient now has stage IV disease with bony metastasis which was ER positive, AZ negative, HER2 negative    Since patient had Oligometastatic metastatic disease she was started on treatment with Faslodex in combination with Xgeva in July 2021  She was treated with a course of radiation therapy at [address] on 4/5/22  showed interval development of a 4 mm nodule at the right lower lobe along with interval development of increased sclerotic lesions throughout the visualized spine for example there is interval development of 7 mm sclerotic lesion in the anterior superior aspect of T8 concerning for metastases    Patient's treatment was changed to Femara and Margret Eduardo in 4/2022    Due to poor tolerance, Margret Eduardo was discontinued and patient was started on Verzenio 150 mg p o  along with Femara 07/18/2022      2  Diarrhea  3  Dehydration  Patient called our office earlier today complaining of severe diarrhea that has been nonstop for 3 days  Patient has not been taking any Imodium or anti diarrheal medication    Patient started taking Verzenio approximately 1 week ago    Verzenio is a CDK inhibitor selective for CDK4 and CDK6  This is a targeted therapy used in the treatment of metastatic breast cancer  Common side effect of this medication is diarrhea  I suspect patient's diarrhea is secondary to this new medication  I reviewed with her today how test manage this medications so her diarrhea does not becomes so severe that it results in significant dehydration and syncopal episodes  Patient's Luann Ferguson will be on hold until symptoms improve  Stool studies have been collected  Would advise treating diarrhea with Imodium  I have also ordered Lomotil to be used if Imodium is not effective  Hopefully, with adequate fluid resuscitation and management of her diarrhea patient will start to feel better  Her Verzenio will remain on hold until she is seen in follow-up      Outpatient follow up plan:  I will arrange for outpatient follow-up in the [facility] office upon discharge  We will re-evaluate patient and her symptoms  I will discuss with Dr Cindy Oneill if dose reduction is indicated given the severity of diarrhea she had  Communication with patient/family: I did update the patient, as well as patient's daughter at bedside  Patient was unaware on how to use Imodium  She does now verbalized understanding of this  I did also explain importance of calling our office right away if she develops any side effects so we can initiate management as soon as possible     Please do not hesitate to contact me if you have any questions or need additional information  Thank you for this consult  Nikki Red MSN, [facility], [address] [facility]  Hematology Oncology Nurse Practitioner      Reason for Consultation:  Metastatic breast cancer, diarrhea        History of present illness:   Slim Lay is a 79 y o  female who presents with intractable diarrhea resulting in dehydration  Oncologic History:  Primary Outpatient Hematology/Oncology Provider: QI Carter    Per Dr Patrick De Jesus last clinic note dated 7/18/22  " patient is a pleasant 50-year-old female with a past medical history of ER positive breast cancer who completed 5 years of adjuvant treatment with aromatase inhibitor for early stage breast cancer   She was on observation then had some bony chest discomfort   Imaging revealed a lytic lesion in the sternum   Biopsy revealed ER positive breast cancer   Restaging revealed 1 more area in the spine around 8 mm that was positive but no other evidence of metastatic disease   Since her disease is ER positive   She was started on Faslodex and Annie Mendoza received radiation at [facility] last imaging then showed interval development of a 4 mm nodule at the right lower lobe along with interval development of increased sclerotic lesions throughout the visualized spine for example there is interval development of 7 mm sclerotic lesion in the anterior superior aspect of T8 concerning for metastases    We decided to put the patient on Femara and Hal Bk  She has not tolerated Hal Bk and has had   Nausea and fatigue  She has lost weight  She states her nausea resolved when she stopped taking Kisqali  At this point I will discontinue Hal Bk  Tumor marker at this point is stable  It was elevated in December of 2021 and again in September of 2021  She will stay on her Femara  I will add on Verzenio "            Interval history:  Patient seen and examined  She had 3 episodes of diarrhea since admission  IV fluids are running  Stool studies have been collected    Review of Systems   Constitutional: Positive for fatigue  Gastrointestinal: Positive for diarrhea  Neurological: Positive for weakness and light-headedness  All other systems reviewed and are negative  All other review of systems were negative      Past medical history:   Past Medical History:   Diagnosis Date    Abnormal weight loss     Allergic reaction     Anxiety     Breast cancer, right ([facility] [address] ) 2011    right    Cancer [facility]) 2012    Candidal vulvovaginitis     Clotting disorder (HCC) Same as above    Endometrial hyperplasia     Epithelial cyst     benign, ovary    GI (gastrointestinal bleed) Blood mixed with stool    History of radiation therapy 2011    right breast cancer    Hyperlipidemia     Hypertension     Internal hemorrhoids     Knee tendonitis     Ovarian cyst     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[facility])        Past surgical history:   Past Surgical History:   Procedure Laterality Date    BILATERAL SALPINGOOPHORECTOMY      onset: 7/23/13    BREAST BIOPSY Right 06/13/2006    benign    BREAST BIOPSY Right 03/02/2011    malignant    BREAST LUMPECTOMY Right 03/30/2011    malignant    CATARACT EXTRACTION W/  INTRAOCULAR LENS IMPLANT Right     phacoemulsification  Onset: 10/27/14    CHOLECYSTECTOMY      COLONOSCOPY  2017    approx    HYSTERECTOMY  Yes    INTRAOPERATIVE RADIATION THERAPY (IORT)      IR BIOPSY BONE  7/9/2021    SKIN LESION EXCISION Right     breast, single lesion    TONSILLECTOMY AND ADENOIDECTOMY         Allergies:    Allergies   Allergen Reactions    Sulfa Antibiotics     Pneumococcal Polysaccharide Vaccine Rash and Edema       Home medications:   Medications Prior to Admission   Medication    Abemaciclib (Verzenio) 150 MG TABS    amitriptyline (ELAVIL) 25 mg tablet    Cholecalciferol (VITAMIN D3) 2000 units capsule    letrozole (FEMARA) 2 5 mg tablet    losartan (COZAAR) 50 mg tablet    mirtazapine (REMERON) 7 5 MG tablet    simvastatin (ZOCOR) 10 mg tablet    mesalamine (LIALDA) 1 2 g EC tablet       Hospital medications:   Current Facility-Administered Medications:     Abemaciclib TABS 150 mg, 150 mg, Oral, BID, Padmini WOODARD PA-C    acetaminophen (TYLENOL) tablet 650 mg, 650 mg, Oral, Q6H PRN, Padmini WOODARD PA-C    amitriptyline (ELAVIL) tablet 25 mg, 25 mg, Oral, HS, Padmiin WOODARD PA-C    heparin (porcine) subcutaneous injection 5,000 Units, 5,000 Units, Subcutaneous, Q8H [address], 5,000 Units at 22 1442 **AND** Platelet count, , , Once, María Harris PA-C    letrozole [facility]) tablet 2 5 mg, 2 5 mg, Oral, Daily, Padmini WOODARD PA-C, 2 5 mg at 22 1443    mirtazapine (REMERON) tablet 7 5 mg, 7 5 mg, Oral, HS, Padmini WOODARD PA-C    multi-electrolyte (PLASMALYTE-A/ISOLYTE-S PH 7 4) IV solution, 100 mL/hr, Intravenous, Continuous, Padmini WOODARD PA-C, Last Rate: 100 mL/hr at 22 1325, 100 mL/hr at 22 1325    pravastatin (PRAVACHOL) tablet 20 mg, 20 mg, Oral, Daily With Dinner, María Harris PA-C    Social history:   Social History     Tobacco Use    Smoking status: Former Smoker     Packs/day: 1 00     Years: 30 00     Pack years: 30 00     Types: Cigarettes     Start date:      Quit date:      Years since quittin 6    Smokeless tobacco: Never Used   Vaping Use    Vaping Use: Never used   Substance Use Topics    Alcohol use: No     Comment: (history)    Drug use: No       Family history:   Family History   Problem Relation Age of Onset    Stomach cancer Mother     No Known Problems Father     Cervical cancer Sister     No Known Problems Daughter     No Known Problems Maternal Grandmother     No Known Problems Maternal Grandfather     No Known Problems Paternal Grandmother     No Known Problems Paternal Grandfather     Colon polyps Neg Hx     Colon cancer Neg Hx        Vitals:  Vitals:    22 1320   BP: 152/72   Pulse: (!) 111   Resp:    Temp: 98 3 °F (36 8 °C)   SpO2: 96%       Physical Exam  Vitals and nursing note reviewed  Constitutional:       General: She is not in acute distress  Appearance: She is well-developed  HENT:      Head: Normocephalic and atraumatic  Eyes:      Conjunctiva/sclera: Conjunctivae normal    Cardiovascular:      Rate and Rhythm: Normal rate and regular rhythm  Heart sounds: No murmur heard  Pulmonary:      Effort: Pulmonary effort is normal  No respiratory distress        Breath sounds: Normal breath sounds  Abdominal:      Palpations: Abdomen is soft  Tenderness: There is no abdominal tenderness  Musculoskeletal:      Cervical back: Neck supple  Skin:     General: Skin is warm and dry  Neurological:      General: No focal deficit present  Mental Status: She is alert and oriented to person, place, and time  Psychiatric:         Mood and Affect: Mood normal          Behavior: Behavior normal          Recent Results (from the past 48 hour(s))   Green / Yellow tube on hold    Collection Time: 08/01/22 10:32 AM   Result Value Ref Range    Extra Tube Hold for add-ons  Katshiraeene Pi / Black tube on hold    Collection Time: 08/01/22 10:32 AM   Result Value Ref Range    Extra Tube Hold for add-ons  Lavender Top 3 ml on hold    Collection Time: 08/01/22 10:32 AM   Result Value Ref Range    Extra Tube Hold for add-ons      CBC and differential    Collection Time: 08/01/22 10:32 AM   Result Value Ref Range    WBC 6 06 4 31 - 10 16 Thousand/uL    RBC 4 19 3 81 - 5 12 Million/uL    Hemoglobin 13 1 11 5 - 15 4 g/dL    Hematocrit 39 7 34 8 - 46 1 %    MCV 95 82 - 98 fL    MCH 31 3 26 8 - 34 3 pg    MCHC 33 0 31 4 - 37 4 g/dL    RDW 22 0 (H) 11 6 - 15 1 %    MPV 9 9 8 9 - 12 7 fL    Platelets 985 (H) 590 - 390 Thousands/uL    nRBC 0 /100 WBCs    Neutrophils Relative 66 43 - 75 %    Immat GRANS % 0 0 - 2 %    Lymphocytes Relative 18 14 - 44 %    Monocytes Relative 8 4 - 12 %    Eosinophils Relative 6 0 - 6 %    Basophils Relative 2 (H) 0 - 1 %    Neutrophils Absolute 3 96 1 85 - 7 62 Thousands/µL    Immature Grans Absolute 0 02 0 00 - 0 20 Thousand/uL    Lymphocytes Absolute 1 09 0 60 - 4 47 Thousands/µL    Monocytes Absolute 0 51 0 17 - 1 22 Thousand/µL    Eosinophils Absolute 0 39 0 00 - 0 61 Thousand/µL    Basophils Absolute 0 09 0 00 - 0 10 Thousands/µL   Comprehensive metabolic panel    Collection Time: 08/01/22 10:32 AM   Result Value Ref Range    Sodium 138 135 - 147 mmol/L    Potassium 3 6 3 5 - 5 3 mmol/L    Chloride 103 96 - 108 mmol/L    CO2 20 (L) 21 - 32 mmol/L    ANION GAP 15 (H) 4 - 13 mmol/L    BUN 24 5 - 25 mg/dL    Creatinine 2 14 (H) 0 60 - 1 30 mg/dL    Glucose 151 (H) 65 - 140 mg/dL    Calcium 9 5 8 3 - 10 1 mg/dL    Corrected Calcium 10 2 (H) 8 3 - 10 1 mg/dL    AST 14 5 - 45 U/L    ALT 20 12 - 78 U/L    Alkaline Phosphatase 103 46 - 116 U/L    Total Protein 7 7 6 4 - 8 4 g/dL    Albumin 3 1 (L) 3 5 - 5 0 g/dL    Total Bilirubin 0 80 0 20 - 1 00 mg/dL    eGFR 20 ml/min/1 73sq m   ECG 12 lead    Collection Time: 08/01/22 10:38 AM   Result Value Ref Range    Ventricular Rate 130 BPM    Atrial Rate 130 BPM    AZ Interval 158 ms    QRSD Interval 80 ms    QT Interval 298 ms    QTC Interval 438 ms    P Axis 80 degrees    QRS Axis 66 degrees    T Wave Axis 73 degrees   ECG 12 lead    Collection Time: 08/01/22 11:54 AM   Result Value Ref Range    Ventricular Rate 116 BPM    Atrial Rate 116 BPM    AZ Interval 162 ms    QRSD Interval 82 ms    QT Interval 338 ms    QTC Interval 469 ms    P Axis 79 degrees    QRS Axis 64 degrees    T Wave [place] 69 degrees       Imaging Studies:   XR Trauma chest portable    Result Date: 8/1/2022  Narrative: CHEST INDICATION:   TRAUMA  COMPARISON:  6/18/2021  EXAM PERFORMED/VIEWS:  XR CHEST PORTABLE FINDINGS: Cardiomediastinal silhouette appears unremarkable  The lungs are clear  No pneumothorax or pleural effusion  Osseous structures appear within normal limits for patient age  Impression: No acute cardiopulmonary disease  Workstation performed: [id number]     TRAUMA - CT head wo contrast    Result Date: 8/1/2022  Narrative: CT BRAIN - WITHOUT CONTRAST INDICATION:   TRAUMA  COMPARISON:  None  TECHNIQUE:  CT examination of the brain was performed  In addition to axial images, sagittal and coronal 2D reformatted images were created and submitted for interpretation  Radiation dose length product (DLP) for this visit:  865 64 mGy-cm     This examination, like all CT scans performed in the [facility], was performed utilizing techniques to minimize radiation dose exposure, including the use of iterative  reconstruction and automated exposure control  IMAGE QUALITY:  Diagnostic  FINDINGS: PARENCHYMA: Decreased attenuation is noted in periventricular and subcortical white matter demonstrating an appearance that is statistically most likely to represent mild microangiopathic change  No CT signs of acute infarction  No intracranial mass, mass effect or midline shift  No acute parenchymal hemorrhage  VENTRICLES AND EXTRA-AXIAL SPACES:  Normal for the patient's age  VISUALIZED ORBITS AND PARANASAL SINUSES:  Unremarkable  CALVARIUM AND EXTRACRANIAL SOFT TISSUES:  Normal      Impression: No acute intracranial abnormality  Workstation performed: [id number]     TRAUMA - CT spine cervical wo contrast    Result Date: 8/1/2022  Narrative: CT CERVICAL SPINE - WITHOUT CONTRAST INDICATION:   TRAUMA  COMPARISON:  None  TECHNIQUE:  CT examination of the cervical spine was performed without intravenous contrast   Contiguous axial images were obtained  Sagittal and coronal reconstructions were performed  Radiation dose length product (DLP) for this visit:  367 15 mGy-cm   This examination, like all CT scans performed in the [facility], was performed utilizing techniques to minimize radiation dose exposure, including the use of iterative  reconstruction and automated exposure control  IMAGE QUALITY:  Diagnostic  FINDINGS: ALIGNMENT:  Normal alignment of the cervical spine  No subluxation  VERTEBRAL BODIES:  No fracture  DEGENERATIVE CHANGES:  Mild multilevel cervical degenerative changes are noted without critical central canal stenosis  PREVERTEBRAL AND PARASPINAL SOFT TISSUES:  Unremarkable  THORACIC INLET:  Normal      Impression: No cervical spine fracture or traumatic malalignment    Workstation performed: [id number]     XR Trauma pelvis ap only 1 or 2 vw    Result Date: 8/1/2022  Narrative: PELVIS INDICATION:   TRAUMA  COMPARISON:  None VIEWS:  XR PELVIS AP ONLY 1 OR 2 VW FINDINGS: No acute fracture or hip dislocation  No significant degenerative changes  No lytic or blastic osseous lesion  Soft tissues are unremarkable  The visualized lumbar spine is unremarkable  Impression: No acute osseous abnormality  Workstation performed: [id number]        Please note: This report has been generated by voice recognition software system  Therefore, there may be syntax, spelling and/or grammatical errors  Please call if you have any questions

## 2022-08-02 ENCOUNTER — HOME HEALTH ADMISSION (OUTPATIENT)
Dept: HOME HEALTH SERVICES | Facility: HOME HEALTHCARE | Age: 82
End: 2022-08-02

## 2022-08-02 PROBLEM — E87.6 HYPOKALEMIA: Status: ACTIVE | Noted: 2022-08-02

## 2022-08-02 LAB
ALBUMIN SERPL BCP-MCNC: 2.4 G/DL (ref 3.5–5)
ALP SERPL-CCNC: 129 U/L (ref 46–116)
ALT SERPL W P-5'-P-CCNC: 45 U/L (ref 12–78)
ANION GAP SERPL CALCULATED.3IONS-SCNC: 11 MMOL/L (ref 4–13)
ANION GAP SERPL CALCULATED.3IONS-SCNC: 9 MMOL/L (ref 4–13)
AST SERPL W P-5'-P-CCNC: 112 U/L (ref 5–45)
BASOPHILS # BLD AUTO: 0.05 THOUSANDS/ΜL (ref 0–0.1)
BASOPHILS NFR BLD AUTO: 1 % (ref 0–1)
BILIRUB SERPL-MCNC: 1.1 MG/DL (ref 0.2–1)
BUN SERPL-MCNC: 14 MG/DL (ref 5–25)
BUN SERPL-MCNC: 19 MG/DL (ref 5–25)
C DIFF TOX GENS STL QL NAA+PROBE: NEGATIVE
CALCIUM ALBUM COR SERPL-MCNC: 9.3 MG/DL (ref 8.3–10.1)
CALCIUM SERPL-MCNC: 7.8 MG/DL (ref 8.3–10.1)
CALCIUM SERPL-MCNC: 8 MG/DL (ref 8.3–10.1)
CAMPYLOBACTER DNA SPEC NAA+PROBE: NORMAL
CHLORIDE SERPL-SCNC: 106 MMOL/L (ref 96–108)
CHLORIDE SERPL-SCNC: 107 MMOL/L (ref 96–108)
CO2 SERPL-SCNC: 23 MMOL/L (ref 21–32)
CO2 SERPL-SCNC: 24 MMOL/L (ref 21–32)
CREAT SERPL-MCNC: 1.33 MG/DL (ref 0.6–1.3)
CREAT SERPL-MCNC: 1.61 MG/DL (ref 0.6–1.3)
EOSINOPHIL # BLD AUTO: 0.24 THOUSAND/ΜL (ref 0–0.61)
EOSINOPHIL NFR BLD AUTO: 6 % (ref 0–6)
ERYTHROCYTE [DISTWIDTH] IN BLOOD BY AUTOMATED COUNT: 21.8 % (ref 11.6–15.1)
GFR SERPL CREATININE-BSD FRML MDRD: 29 ML/MIN/1.73SQ M
GFR SERPL CREATININE-BSD FRML MDRD: 37 ML/MIN/1.73SQ M
GLUCOSE P FAST SERPL-MCNC: 109 MG/DL (ref 65–99)
GLUCOSE SERPL-MCNC: 109 MG/DL (ref 65–140)
GLUCOSE SERPL-MCNC: 99 MG/DL (ref 65–140)
HCT VFR BLD AUTO: 30.7 % (ref 34.8–46.1)
HGB BLD-MCNC: 10.2 G/DL (ref 11.5–15.4)
IMM GRANULOCYTES # BLD AUTO: 0.02 THOUSAND/UL (ref 0–0.2)
IMM GRANULOCYTES NFR BLD AUTO: 1 % (ref 0–2)
LYMPHOCYTES # BLD AUTO: 0.65 THOUSANDS/ΜL (ref 0.6–4.47)
LYMPHOCYTES NFR BLD AUTO: 15 % (ref 14–44)
MAGNESIUM SERPL-MCNC: 2.2 MG/DL (ref 1.6–2.6)
MCH RBC QN AUTO: 31.3 PG (ref 26.8–34.3)
MCHC RBC AUTO-ENTMCNC: 33.2 G/DL (ref 31.4–37.4)
MCV RBC AUTO: 94 FL (ref 82–98)
MONOCYTES # BLD AUTO: 0.36 THOUSAND/ΜL (ref 0.17–1.22)
MONOCYTES NFR BLD AUTO: 9 % (ref 4–12)
NEUTROPHILS # BLD AUTO: 2.92 THOUSANDS/ΜL (ref 1.85–7.62)
NEUTS SEG NFR BLD AUTO: 68 % (ref 43–75)
NRBC BLD AUTO-RTO: 0 /100 WBCS
PLATELET # BLD AUTO: 310 THOUSANDS/UL (ref 149–390)
PMV BLD AUTO: 9.7 FL (ref 8.9–12.7)
POTASSIUM SERPL-SCNC: 2.9 MMOL/L (ref 3.5–5.3)
POTASSIUM SERPL-SCNC: 3.8 MMOL/L (ref 3.5–5.3)
PROT SERPL-MCNC: 6 G/DL (ref 6.4–8.4)
RBC # BLD AUTO: 3.26 MILLION/UL (ref 3.81–5.12)
SALMONELLA DNA SPEC QL NAA+PROBE: NORMAL
SHIGA TOXIN STX GENE SPEC NAA+PROBE: NORMAL
SHIGELLA DNA SPEC QL NAA+PROBE: NORMAL
SODIUM SERPL-SCNC: 139 MMOL/L (ref 135–147)
SODIUM SERPL-SCNC: 141 MMOL/L (ref 135–147)
WBC # BLD AUTO: 4.24 THOUSAND/UL (ref 4.31–10.16)

## 2022-08-02 PROCEDURE — 97530 THERAPEUTIC ACTIVITIES: CPT

## 2022-08-02 PROCEDURE — 85025 COMPLETE CBC W/AUTO DIFF WBC: CPT | Performed by: PHYSICIAN ASSISTANT

## 2022-08-02 PROCEDURE — 99232 SBSQ HOSP IP/OBS MODERATE 35: CPT | Performed by: PHYSICIAN ASSISTANT

## 2022-08-02 PROCEDURE — 97163 PT EVAL HIGH COMPLEX 45 MIN: CPT

## 2022-08-02 PROCEDURE — 80048 BASIC METABOLIC PNL TOTAL CA: CPT | Performed by: PHYSICIAN ASSISTANT

## 2022-08-02 PROCEDURE — 83735 ASSAY OF MAGNESIUM: CPT | Performed by: PHYSICIAN ASSISTANT

## 2022-08-02 PROCEDURE — 80053 COMPREHEN METABOLIC PANEL: CPT | Performed by: PHYSICIAN ASSISTANT

## 2022-08-02 RX ORDER — POTASSIUM CHLORIDE 20 MEQ/1
40 TABLET, EXTENDED RELEASE ORAL EVERY 4 HOURS
Status: COMPLETED | OUTPATIENT
Start: 2022-08-02 | End: 2022-08-02

## 2022-08-02 RX ORDER — ONDANSETRON 2 MG/ML
4 INJECTION INTRAMUSCULAR; INTRAVENOUS EVERY 6 HOURS PRN
Status: DISCONTINUED | OUTPATIENT
Start: 2022-08-02 | End: 2022-08-03 | Stop reason: HOSPADM

## 2022-08-02 RX ORDER — POTASSIUM CHLORIDE 14.9 MG/ML
20 INJECTION INTRAVENOUS ONCE
Status: COMPLETED | OUTPATIENT
Start: 2022-08-02 | End: 2022-08-02

## 2022-08-02 RX ADMIN — POTASSIUM CHLORIDE 40 MEQ: 1500 TABLET, EXTENDED RELEASE ORAL at 08:32

## 2022-08-02 RX ADMIN — LETROZOLE 2.5 MG: 2.5 TABLET ORAL at 08:33

## 2022-08-02 RX ADMIN — POTASSIUM CHLORIDE 40 MEQ: 1500 TABLET, EXTENDED RELEASE ORAL at 14:52

## 2022-08-02 RX ADMIN — AMITRIPTYLINE HYDROCHLORIDE 25 MG: 10 TABLET, FILM COATED ORAL at 21:31

## 2022-08-02 RX ADMIN — POTASSIUM CHLORIDE 20 MEQ: 14.9 INJECTION, SOLUTION INTRAVENOUS at 09:01

## 2022-08-02 RX ADMIN — HEPARIN SODIUM 5000 UNITS: 5000 INJECTION INTRAVENOUS; SUBCUTANEOUS at 21:31

## 2022-08-02 RX ADMIN — HEPARIN SODIUM 5000 UNITS: 5000 INJECTION INTRAVENOUS; SUBCUTANEOUS at 14:52

## 2022-08-02 RX ADMIN — HEPARIN SODIUM 5000 UNITS: 5000 INJECTION INTRAVENOUS; SUBCUTANEOUS at 08:34

## 2022-08-02 RX ADMIN — MIRTAZAPINE 7.5 MG: 15 TABLET, FILM COATED ORAL at 21:31

## 2022-08-02 RX ADMIN — SODIUM CHLORIDE, SODIUM GLUCONATE, SODIUM ACETATE, POTASSIUM CHLORIDE, MAGNESIUM CHLORIDE, SODIUM PHOSPHATE, DIBASIC, AND POTASSIUM PHOSPHATE 100 ML/HR: .53; .5; .37; .037; .03; .012; .00082 INJECTION, SOLUTION INTRAVENOUS at 18:58

## 2022-08-02 RX ADMIN — PRAVASTATIN SODIUM 20 MG: 20 TABLET ORAL at 16:00

## 2022-08-02 RX ADMIN — ONDANSETRON 4 MG: 2 INJECTION INTRAMUSCULAR; INTRAVENOUS at 09:17

## 2022-08-02 NOTE — PLAN OF CARE
Problem: PAIN - ADULT  Goal: Verbalizes/displays adequate comfort level or baseline comfort level  Description: Interventions:  - Encourage patient to monitor pain and request assistance  - Assess pain using appropriate pain scale  - Administer analgesics based on type and severity of pain and evaluate response  - Implement non-pharmacological measures as appropriate and evaluate response  - Consider cultural and social influences on pain and pain management  - Notify physician/advanced practitioner if interventions unsuccessful or patient reports new pain  Outcome: Progressing     Problem: INFECTION - ADULT  Goal: Absence or prevention of progression during hospitalization  Description: INTERVENTIONS:  - Assess and monitor for signs and symptoms of infection  - Monitor lab/diagnostic results  - Monitor all insertion sites, i e  indwelling lines, tubes, and drains  - Monitor endotracheal if appropriate and nasal secretions for changes in amount and color  - Bruno appropriate cooling/warming therapies per order  - Administer medications as ordered  - Instruct and encourage patient and family to use good hand hygiene technique  - Identify and instruct in appropriate isolation precautions for identified infection/condition  Outcome: Progressing     Problem: SAFETY ADULT  Goal: Maintain or return to baseline ADL function  Description: INTERVENTIONS:  -  Assess patient's ability to carry out ADLs; assess patient's baseline for ADL function and identify physical deficits which impact ability to perform ADLs (bathing, care of mouth/teeth, toileting, grooming, dressing, etc )  - Assess/evaluate cause of self-care deficits   - Assess range of motion  - Assess patient's mobility; develop plan if impaired  - Assess patient's need for assistive devices and provide as appropriate  - Encourage maximum independence but intervene and supervise when necessary  - Involve family in performance of ADLs  - Assess for home care needs following discharge   - Consider OT consult to assist with ADL evaluation and planning for discharge  - Provide patient education as appropriate  Outcome: Progressing

## 2022-08-02 NOTE — PROGRESS NOTES
[place]     Progress Note Nicole Silverman 1940, 79 y o  female MRN: [id number]  Unit/Bed#: -01 Encounter: [id number]  Primary Care Provider: Marquis Reginaldo DO   Date and time admitted to hospital: 8/1/2022 10:20 AM    * REYES (acute kidney injury) [facility])  Assessment & Plan  · Patient with diarrhea, likely secondary to Verzenio  · Baseline Cr most recently 1 2   · 2 14 on admission  · Creatinine improving, 1 61  · Continue to trend BMP  · Continue IVF    Hypokalemia  Assessment & Plan  · Likely related to ongoing diarrhea  · Potassium 2 9  · Repleted  · Check magnesium  · Trend BMP    Diarrhea  Assessment & Plan  · Recently transitioned from [place] (caused nausea) to Verzenio  · Symptoms began shortly after, suspect this is a side effect  · Appears to be improving  · C diff negative  · Stool enteric pending  · Patient denies recent travel, change in diet other than decreased appetite/ability to tolerate food  Likely related to recent cancer treatment regimen  · Continue IVF  · Monitor electrolytes    Chronic kidney disease (CKD) stage G3a/A1, moderately decreased glomerular filtration rate (GFR) between 45-59 mL/min/1 73 square meter and albuminuria creatinine ratio less than 30 mg/g [facility])  Assessment & Plan  Lab Results   Component Value Date    EGFR 29 08/02/2022    EGFR 20 08/01/2022    EGFR 42 07/08/2022    CREATININE 1 61 (H) 08/02/2022    CREATININE 2 14 (H) 08/01/2022    CREATININE 1 20 07/08/2022     · Most recently baseline Cr has been 1 2  · 2 14 on admission  · Creatinine down trending  · Continue IVF    Metastatic breast cancer ([facility] [address] )  Assessment & Plan  · Continue letrozole  · Verzenio on hold in light of side effects  · Outpatient follow up with oncology  · Palliative consulted to assist with symptom management    VTE Pharmacologic Prophylaxis: VTE Score: 6 High Risk (Score >/= 5) - Pharmacological DVT Prophylaxis Ordered: heparin   Sequential Compression Devices Ordered  Patient Centered Rounds: I performed bedside rounds with nursing staff today  Discussions with Specialists or Other Care Team Provider: CHAGO    Education and Discussions with Family / Patient: Patient declined call to   Offered to call  Time Spent for Care: 30 minutes  More than 50% of total time spent on counseling and coordination of care as described above  Current Length of Stay: 0 day(s)  Current Patient Status: Inpatient   Certification Statement: The patient will continue to require additional inpatient hospital stay due to Diarrhea, hypokalemia, generalized weakness  Discharge Plan: Anticipate discharge in 24-48 hrs to home with home services  Code Status: Level 1 - Full Code    Subjective:   Patient feels that her diarrhea has somewhat improved  Admits to generalized fatigue and generally feels unwell since starting her new cancer treatment regimen about 1 week ago  Denies chest pain/palpitations, shortness of breath, nausea vomiting, abdominal pain  Objective:     Vitals:   Temp (24hrs), Av 9 °F (36 6 °C), Min:97 5 °F (36 4 °C), Max:98 3 °F (36 8 °C)    Temp:  [97 5 °F (36 4 °C)-98 3 °F (36 8 °C)] 97 5 °F (36 4 °C)  HR:  [102-112] 105  Resp:  [18-22] 18  BP: (112-152)/(57-72) 124/64  SpO2:  [96 %-99 %] 97 %  Body mass index is 27 91 kg/m²  Input and Output Summary (last 24 hours): Intake/Output Summary (Last 24 hours) at 2022 1201  Last data filed at 2022 1127  Gross per 24 hour   Intake 950 ml   Output 100 ml   Net 850 ml       Physical Exam:   Physical Exam  Vitals and nursing note reviewed  Constitutional:       Appearance: Normal appearance  Comments: No acute distress   HENT:      Head: Normocephalic  Eyes:      General: No scleral icterus  Extraocular Movements: Extraocular movements intact  Conjunctiva/sclera: Conjunctivae normal    Cardiovascular:      Rate and Rhythm: Normal rate and regular rhythm        Heart sounds: S1 normal and S2 normal    Pulmonary:      Effort: Pulmonary effort is normal       Breath sounds: Normal breath sounds  No wheezing, rhonchi or rales  Abdominal:      General: Bowel sounds are normal       Palpations: Abdomen is soft  Tenderness: There is no abdominal tenderness  There is no guarding or rebound  Musculoskeletal:         General: No swelling, tenderness or deformity  Cervical back: Normal range of motion  Comments: Able to move upper/lower extremities bilaterally, no edema   Skin:     General: Skin is warm and dry  Neurological:      Mental Status: She is alert and oriented to person, place, and time     Psychiatric:         Mood and Affect: Mood normal          Speech: Speech normal          Behavior: Behavior normal           Additional Data:     Labs:  Results from last 7 days   Lab Units 08/02/22  0546   WBC Thousand/uL 4 24*   HEMOGLOBIN g/dL 10 2*   HEMATOCRIT % 30 7*   PLATELETS Thousands/uL 310   NEUTROS PCT % 68   LYMPHS PCT % 15   MONOS PCT % 9   EOS PCT % 6     Results from last 7 days   Lab Units 08/02/22  0546   SODIUM mmol/L 141   POTASSIUM mmol/L 2 9*   CHLORIDE mmol/L 107   CO2 mmol/L 23   BUN mg/dL 19   CREATININE mg/dL 1 61*   ANION GAP mmol/L 11   CALCIUM mg/dL 8 0*   ALBUMIN g/dL 2 4*   TOTAL BILIRUBIN mg/dL 1 10*   ALK PHOS U/L 129*   ALT U/L 45   AST U/L 112*   GLUCOSE RANDOM mg/dL 109                       Lines/Drains:  Invasive Devices  Report    Peripheral Intravenous Line  Duration           Peripheral IV 08/01/22 Left Antecubital 1 day                  Telemetry:  Telemetry Orders (From admission, onward)             24 Hour Telemetry Monitoring  (ED Bridging Orders Panel)  Continuous x 24 Hours (Telem)        Expiring   References:    Telemetry Guidelines   Question:  Reason for 24 Hour Telemetry  Answer:  Syncope suspected to be cardiac in origin                 Telemetry Reviewed: Normal Sinus Rhythm  Indication for Continued Telemetry Use: Metabolic/electrolyte disturbance with high probability of dysrhythmia             Imaging: Reviewed radiology reports from this admission including: CT head    Recent Cultures (last 7 days):   Results from last 7 days   Lab Units 08/01/22  1428   C DIFF TOXIN B BY PCR  Negative       Last 24 Hours Medication List:   Current Facility-Administered Medications   Medication Dose Route Frequency Provider Last Rate    acetaminophen  650 mg Oral Q6H PRN Fermin WOODARD PA-C      amitriptyline  25 mg Oral HS Padmini WOODARD PA-C      diphenoxylate-atropine  1 tablet Oral 4x Daily PRN BROOKE Newman      heparin (porcine)  5,000 Units Subcutaneous [facility] Padmini WOODARD PA-C      letrozole  2 5 mg Oral Daily Padmini WOODARD PA-C      loperamide  4 mg Oral 4x Daily PRN Soraya Doherty PA-C      mirtazapine  7 5 mg Oral HS Padmini WOODARD PA-C      multi-electrolyte  100 mL/hr Intravenous Continuous Padmini WOODARD PA-C 100 mL/hr (08/01/22 1325)    ondansetron  4 mg Intravenous Q6H PRN Rosales Dillon PA-C      potassium chloride  40 mEq Oral Q4H Rosales Dillon PA-C      pravastatin  20 mg Oral Daily With [employer] KAIA WOODARD          Today, Patient Was Seen By: Rosales Dillon PA-C    **Please Note: This note may have been constructed using a voice recognition system  **

## 2022-08-02 NOTE — CASE MANAGEMENT
Case Management Assessment & Discharge Planning Note    Patient name Mary Silverman  Location /-01 MRN [id number]  : 1940 Date 2022       Current Admission Date: 2022  Current Admission Diagnosis:REYES (acute kidney injury) [facility])   Patient Active Problem List    Diagnosis Date Noted    Hypokalemia 2022    REYES (acute kidney injury) ([facility] ) 2022    Diarrhea 2022    Secondary malignant neoplasm of bone ([facility] ) 2022    Colitis 2022    Rectal bleeding 10/14/2021    Lytic lesion of bone on x-ray 2021    Neoplasm of uncertain behavior of sternum 2021    Hydronephrosis of right kidney 2021    Cough due to ACE inhibitor 2021    Acute costochondritis 2021    Osteopenia of multiple sites 2020    Essential hypertension 10/22/2019    Chronic kidney disease (CKD) stage G3a/A1, moderately decreased glomerular filtration rate (GFR) between 45-59 mL/min/1 73 square meter and albuminuria creatinine ratio less than 30 mg/g (HCC) 2019    Adverse reaction to pneumococcal vaccine 2015    Cataract, bilateral 2014    Pes planus 2014    Pulmonary nodule seen on imaging study 09/10/2013    Sleep disorder 2013    Chronic inflammatory disease of uterus 07/10/2013    Anxiety 2013    Metastatic breast cancer ([facility] ) 10/17/2012    Hyperlipidemia 2012      LOS (days): 0  Geometric Mean LOS (GMLOS) (days): 3 00  Days to GMLOS:2 7     OBJECTIVE:    Risk of Unplanned Readmission Score: 17 08         Current admission status: Inpatient       Preferred Pharmacy:   [facility] [address] [place] [place] [address] [address]  [address] [facility]  Phone: [phone] Fax: [phone] [facility], [facility] [address] [place] [address]  Phone: [phone] Fax: [phone]    Primary Care Provider: Hal Barnard [employer], DO    Primary Insurance: MEDICARE  Secondary Insurance: AETNA    ASSESSMENT:  Active Health Care Proxies     Ar Silverman Representative - Son   Primary Phone: [phone] (Mobile)               Advance Directives  Does patient have a [address]?: No  Was patient offered paperwork?: Yes (provided)  Does patient currently have a Health Care decision maker?: Yes, please see Health Care Proxy section  Does patient have Advance Directives?: No  Was patient offered paperwork?: Yes (provided)         Readmission Root Cause  30 Day Readmission: No    Patient Information  Admitted from[de-identified] Home  Mental Status: Alert  During Assessment patient was accompanied by: Son  Assessment information provided by[de-identified] Patient  Primary Caregiver: Self  Support Systems: Son  Home entry access options   Select all that apply : No steps to enter home  Type of Current Residence: Apartment (senior complex)  Upon entering residence, is there a bedroom on the main floor (no further steps)?: Yes  Upon entering residence, is there a bathroom on the main floor (no further steps)?: Yes  In the last 12 months, was there a time when you were not able to pay the mortgage or rent on time?: No  In the last 12 months, how many places have you lived?: 1  In the last 12 months, was there a time when you did not have a steady place to sleep or slept in a shelter (including now)?: No  Homeless/housing insecurity resource given?: N/A  Living Arrangements: Lives Alone  Is patient a ?: No    Activities of Daily Living Prior to Admission  Functional Status: Independent  Completes ADLs independently?: Yes  Does patient use assisted devices?: No  Does patient currently own DME?: No  Does patient have a history of Outpatient Therapy (PT/OT)?: Yes  Does the patient have a history of Short-Term Rehab?: No  Does patient have a history of HHC?: No  Does patient currently have [address]?: No         Patient Information Continued  Does patient have prescription coverage?: Yes  Within the past 12 months, you worried that your food would run out before you got the money to buy more : Never true  Within the past 12 months, the food you bought just didn't last and you didn't have money to get more : Never true  Food insecurity resource given?: N/A  Does patient have a history of substance abuse?: No    Means of Transportation  Means of Transport to Appts[de-identified] Drives Self  In the past 12 months, has lack of transportation kept you from medical appointments or from getting medications?: No  In the past 12 months, has lack of transportation kept you from meetings, work, or from getting things needed for daily living?: No  Was application for public transport provided?: N/A    DISCHARGE DETAILS:    Discharge planning discussed with[de-identified] patient and son  Freedom of Choice: Yes  Comments - Freedom of Choice: CM discussed therapies recommendation for home therapy  Pt is agreeable and requesting -[facility]  CM contacted family/caregiver?: Yes  Were Treatment Team discharge recommendations reviewed with patient/caregiver?: Yes  Did patient/caregiver verbalize understanding of patient care needs?: Yes  Were patient/caregiver advised of the risks associated with not following Treatment Team discharge recommendations?: Yes    Contacts  Patient Contacts: Felicita Vanegas (son)  Relationship to Patient[de-identified] Family  Contact Method:  In Person  Reason/Outcome: Discharge [address]         Is the patient interested in [facility] at discharge?: Yes  [address] requested[de-identified] Occupational Therapy, Physical [address] Name[de-identified] [address] [facility] Provider[de-identified] PCP  Home Health Services Needed[de-identified] Evaluate Functional Status and Safety, Gait/ADL Training, Strengthening/Theraputic Exercises to Improve Function  Homebound Criteria Met[de-identified] Requires the Assistance of Another Person for Safe Ambulation or to Leave the Home  Supporting Clincal Findings[de-identified] Limited Endurance    Other Referral/Resources/Interventions Provided:  Interventions: HHC  Referral Comments: referral to [facility]    Treatment Team Recommendation: Home with [address]  Discharge Destination Plan[de-identified] Home with Nia at Discharge : Family     Additional Comments: Met with pt to discuss the role of CM and to discuss any help pt may need prior to dc  Pt lives alone in a senior complex in a 1st floor apartment with no PALMA  Pt performed ADL's indptly pta, no use of DME  No hx of HHC or rehab  No hx of mental health or D&A treatment  Pt's preferred pharmacy is [facility] in [place]  Pt drives  CM discussed therapies recommednation for home therapy  Pt is agreeable and requesting -[facility]  [facility] referral sent  [facility] accepted  CM added -[facility] to pt's dc instructions  Pt's family will transport home at dc

## 2022-08-02 NOTE — ASSESSMENT & PLAN NOTE
Lab Results   Component Value Date    EGFR 29 08/02/2022    EGFR 20 08/01/2022    EGFR 42 07/08/2022    CREATININE 1 61 (H) 08/02/2022    CREATININE 2 14 (H) 08/01/2022    CREATININE 1 20 07/08/2022     · Most recently baseline Cr has been 1 2  · 2 14 on admission  · Creatinine down trending  · Continue IVF

## 2022-08-02 NOTE — ASSESSMENT & PLAN NOTE
· Recently transitioned from [facility] (caused nausea) to Verzenio  · Symptoms began shortly after, suspect this is a side effect  · Appears to be improving  · C diff negative  · Stool enteric pending  · Patient denies recent travel, change in diet other than decreased appetite/ability to tolerate food    Likely related to recent cancer treatment regimen  · Continue IVF  · Monitor electrolytes

## 2022-08-02 NOTE — PLAN OF CARE
Problem: PAIN - ADULT  Goal: Verbalizes/displays adequate comfort level or baseline comfort level  Description: Interventions:  - Encourage patient to monitor pain and request assistance  - Assess pain using appropriate pain scale  - Administer analgesics based on type and severity of pain and evaluate response  - Implement non-pharmacological measures as appropriate and evaluate response  - Consider cultural and social influences on pain and pain management  - Notify physician/advanced practitioner if interventions unsuccessful or patient reports new pain  8/2/2022 1930 by Mak Palomo RN  Outcome: Progressing  8/2/2022 1556 by Mak Palomo RN  Outcome: Progressing     Problem: INFECTION - ADULT  Goal: Absence or prevention of progression during hospitalization  Description: INTERVENTIONS:  - Assess and monitor for signs and symptoms of infection  - Monitor lab/diagnostic results  - Monitor all insertion sites, i e  indwelling lines, tubes, and drains  - Monitor endotracheal if appropriate and nasal secretions for changes in amount and color  - [place] appropriate cooling/warming therapies per order  - Administer medications as ordered  - Instruct and encourage patient and family to use good hand hygiene technique  - Identify and instruct in appropriate isolation precautions for identified infection/condition  8/2/2022 1930 by Mak Palomo RN  Outcome: Progressing  8/2/2022 1556 by Mak Palomo RN  Outcome: Progressing     Problem: MOBILITY - ADULT  Goal: Maintain or return to baseline ADL function  Description: INTERVENTIONS:  -  Assess patient's ability to carry out ADLs; assess patient's baseline for ADL function and identify physical deficits which impact ability to perform ADLs (bathing, care of mouth/teeth, toileting, grooming, dressing, etc )  - Assess/evaluate cause of self-care deficits   - Assess range of motion  - Assess patient's mobility; develop plan if impaired  - Assess patient's need for assistive devices and provide as appropriate  - Encourage maximum independence but intervene and supervise when necessary  - Involve family in performance of ADLs  - Assess for home care needs following discharge   - Consider OT consult to assist with ADL evaluation and planning for discharge  - Provide patient education as appropriate  8/2/2022 1930 by Angelito Anderson, RN  Outcome: Progressing  8/2/2022 1556 by Angelito Anderson, RN  Outcome: Progressing

## 2022-08-02 NOTE — ASSESSMENT & PLAN NOTE
· Continue letrozole  · Verzenio on hold in light of side effects  · Outpatient follow up with oncology  · Palliative consulted to assist with symptom management

## 2022-08-02 NOTE — PLAN OF CARE
Problem: PHYSICAL THERAPY ADULT  Goal: Performs mobility at highest level of function for planned discharge setting  See evaluation for individualized goals  Description: Treatment/Interventions: ADL retraining, Functional transfer training, LE strengthening/ROM, Therapeutic exercise, Endurance training, Patient/family training, Equipment eval/education, Bed mobility, Gait training, Compensatory technique education, Continued evaluation, OT  Equipment Recommended: [address]       See flowsheet documentation for full assessment, interventions and recommendations  Note: Prognosis: Fair  Problem List: Decreased endurance, Impaired balance, Decreased mobility  Assessment: Pt is an 79 y o  female who presented to ED 8/1/22 with c/o weakness, diarrhea, syncope (+) hit head, R shoulder injury, L hip injury  Dx:  Trauma, syncope, REYES, diarrhea  Comorbidities affecting pt's physical performance at time of assessment include: treatment for IV Ca with mets to bone  Personal factors affecting pt at time of IE include: fatigue  PLOF and home set up listed above;  Upon evaluation: Pt mod I for bed mobility, S for sit to stand, and S for ambulation without AD however increased gait deviations and risk for falls; Improved mobility, safety and balance with decreased gait deviations with use of RW;  Pt will require RW for all mobility at NM  Full objective findings from PT assessment regarding body systems outlined above  Current limitations include impaired balance, decreased endurance/activity tolerance, gait deviations  Pt's clinical presentation is currently unstable/unpredictable seen in pt's presentation of continuous monitoring in hospital, fall risk, and syncope     Pt would benefit from continued PT while in hospital and follow up with HHCPT at D/C to increase strength, balance, endurance, independence with funcitonal mobility to return to PLOF, maximize independence, decrease caregiver burden and improve quality of life  The patient's AM-PAC Basic Mobility Inpatient Short Form Raw Score is 22  A Raw score of greater than 17 suggests the patient may benefit from discharge to home  Please also refer to the recommendation of the Physical Therapist for safe discharge planning  PT Discharge Recommendation: Home with home health rehabilitation (use of RW for all mobility)    See flowsheet documentation for full assessment

## 2022-08-02 NOTE — ASSESSMENT & PLAN NOTE
· Patient with diarrhea, likely secondary to Verzenio  · Baseline Cr most recently 1 2   · 2 14 on admission  · Creatinine improving, 1 61  · Continue to trend BMP  · Continue IVF

## 2022-08-02 NOTE — PHYSICAL THERAPY NOTE
PHYSICAL THERAPY Evaluation    Performed at least 2 patient identifiers during session:  Patient Active Problem List   Diagnosis    Metastatic breast cancer ([facility] [address] [address] )    Adverse reaction to pneumococcal vaccine    Anxiety    Cataract, bilateral    Chronic inflammatory disease of uterus    Hyperlipidemia    Pulmonary nodule seen on imaging study    Pes planus    Sleep disorder    Chronic kidney disease (CKD) stage G3a/A1, moderately decreased glomerular filtration rate (GFR) between 45-59 mL/min/1 73 square meter and albuminuria creatinine ratio less than 30 mg/g (HCC)    Essential hypertension    Osteopenia of multiple sites    Cough due to ACE inhibitor    Acute costochondritis    Lytic lesion of bone on x-ray    Neoplasm of uncertain behavior of sternum    Hydronephrosis of right kidney    Rectal bleeding    Colitis    Secondary malignant neoplasm of bone ([facility] [address] )    [facility]YES [facility]acute kidney [facility]) ([facility] )    Diarrhea       Past Medical History:   Diagnosis Date    Abnormal weight loss     Allergic reaction     Anxiety     Breast cancer, right ([facility] ) 2011    right    Cancer ([facility] ) 2012    Candidal vulvovaginitis     Clotting disorder ([facility] ) Same as above    Endometrial hyperplasia     Epithelial cyst     benign, ovary    GI (gastrointestinal bleed) Blood mixed with stool    History of radiation therapy 2011    right breast cancer    Hyperlipidemia     Hypertension     Internal hemorrhoids     Knee tendonitis     Ovarian cyst     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[facility])        Past Surgical History:   Procedure Laterality Date    BILATERAL SALPINGOOPHORECTOMY      onset: 7/23/13    BREAST BIOPSY Right 06/13/2006    benign    BREAST BIOPSY Right 03/02/2011    malignant    BREAST LUMPECTOMY Right 03/30/2011    malignant    CATARACT EXTRACTION W/  INTRAOCULAR LENS IMPLANT Right     phacoemulsification   Onset: 10/27/14    CHOLECYSTECTOMY      COLONOSCOPY  2017 approx    HYSTERECTOMY  Yes    INTRAOPERATIVE RADIATION THERAPY ([facility])      IR BIOPSY BONE  7/9/2021    SKIN LESION EXCISION Right     breast, single lesion    TONSILLECTOMY AND ADENOIDECTOMY          08/02/22 0752   PT Last Visit   PT Visit Date 08/02/22   Note Type   Note type Evaluation   Pain Assessment   Pain Assessment Tool 0-10   Pain Score No Pain   Restrictions/Precautions   Weight Bearing Precautions Per Order No   Other Precautions Contact/isolation   Home Living   Type of Home Apartment  ([place])   Home Layout One level  (0 PALMA)   Additional Comments no AD at baseline   Prior Function   Level of [place] Independent with ADLs and functional mobility   Lives With Alone   Receives Help From Family; Neighbor   ADL Assistance Independent   IADLs Independent   General   Family/Caregiver Present No   Cognition   Overall Cognitive Status WFL   Arousal/Participation Alert   Orientation Level Oriented X4   Memory Within functional limits   Following Commands Follows all commands and directions without difficulty   RUE Assessment   RUE Assessment WFL  (observed through functional mobility 2/2 stage IV Ca with mets to bone)   LUE Assessment   LUE Assessment WFL  (observed through functional mobility 2/2 stage IV Ca with mets to bone)   RLE Assessment   RLE Assessment WFL  (observed through functional mobility 2/2 stage IV Ca with mets to bone)   LLE Assessment   LLE Assessment WFL  (observed through functional mobility 2/2 stage IV Ca with mets to bone)   Bed Mobility   Supine to Sit 6  Modified independent   Sit to Supine 6  Modified independent   Transfers   Sit to Stand 5  Supervision   Additional items Increased time required   Stand to Sit 5  Supervision   Additional items Armrests   Stand pivot 5  Supervision   Additional items Assist x 1   Ambulation/Elevation   Gait pattern Decreased foot clearance; Wide JANINA; Forward Flexion   Gait Assistance 5  Supervision   Additional items Assist x 1   Assistive Device Rolling walker;None   Distance 15ft without AD, wide JANINA, slow chadd, short guarded steps, absent arm swing with arms in mid-guard position;  pt then used RW x 50ft with improved step length and chadd, no LOB or unsteadiness, improved confidence  Balance   Static Sitting Good   Dynamic Sitting Good   Static Standing Fair +   Dynamic Standing Fair +  (pt able to stand to perform hygiene while standing after bowel and urine  no LOB)   Ambulatory Fair -  (RW)   Activity Tolerance   Activity Tolerance Patient limited by fatigue   Nurse Made Aware Michael   Assessment   Prognosis Fair   Problem List Decreased endurance; Impaired balance;Decreased mobility   Assessment Pt is an 79 y o  female who presented to ED 8/1/22 with c/o weakness, diarrhea, syncope (+) hit head, R shoulder injury, L hip injury  Dx:  Trauma, syncope, REYES, diarrhea  Comorbidities affecting pt's physical performance at time of assessment include: treatment for IV Ca with mets to bone  Personal factors affecting pt at time of IE include: fatigue  PLOF and home set up listed above;  Upon evaluation: Pt mod I for bed mobility, S for sit to stand, and S for ambulation without AD however increased gait deviations and risk for falls; Improved mobility, safety and balance with decreased gait deviations with use of RW;  Pt will require RW for all mobility at NH  Full objective findings from PT assessment regarding body systems outlined above  Current limitations include impaired balance, decreased endurance/activity tolerance, gait deviations  Pt's clinical presentation is currently unstable/unpredictable seen in pt's presentation of continuous monitoring in hospital, fall risk, and syncope     Pt would benefit from continued PT while in hospital and follow up with HHCPT at D/C to increase strength, balance, endurance, independence with funcitonal mobility to return to PLOF, maximize independence, decrease caregiver burden and improve quality of life  The patient's AM-PAC Basic Mobility Inpatient Short Form Raw Score is 22  A Raw score of greater than 17 suggests the patient may benefit from discharge to home  Please also refer to the recommendation of the Physical Therapist for safe discharge planning  Goals   Patient Goals to get better and go home   STG Expiration Date 08/16/22   Short Term Goal #1 Pt will be able to demo:  mod I sit to/from standing without AD, mod I to ambulate 150ft without AD and gait speed 1 0m/s;  to [facility] ambulator level with decreased risks for falls   Plan   Treatment/Interventions ADL retraining;Functional transfer training;LE strengthening/ROM; Therapeutic exercise; Endurance training;Patient/family training;Equipment eval/education; Bed mobility;Gait training; Compensatory technique education;Continued evaluation;OT   PT Frequency 3-5x/wk   Recommendation   PT Discharge Recommendation Home with home health rehabilitation  (use of RW for all mobility)   Equipment Recommended Dalila walker   Yinka [address]   Turning in Bed Without Bedrails 4   Lying on Back to Sitting on Edge of Flat Bed 4   Moving Bed to Chair 4   Standing Up From Chair 4   Walk in Room 3   Climb 3-5 Stairs 3   Basic Mobility Inpatient Raw Score 22   Basic Mobility Standardized Score 47 4   Highest Level Of Mobility   JH-HLM Goal 7: Walk 25 feet or more   JH-HLM Achieved 7: Walk 25 feet or more     Remi Thomas PT      Patient Name: Radha Silverman  FKHWT'U Date: 8/2/2022

## 2022-08-03 VITALS
HEIGHT: 61 IN | SYSTOLIC BLOOD PRESSURE: 121 MMHG | OXYGEN SATURATION: 96 % | BODY MASS INDEX: 27.89 KG/M2 | TEMPERATURE: 98.1 F | RESPIRATION RATE: 18 BRPM | HEART RATE: 100 BPM | WEIGHT: 147.71 LBS | DIASTOLIC BLOOD PRESSURE: 54 MMHG

## 2022-08-03 PROBLEM — D64.9 ANEMIA: Status: ACTIVE | Noted: 2022-08-03

## 2022-08-03 LAB
ANION GAP SERPL CALCULATED.3IONS-SCNC: 5 MMOL/L (ref 4–13)
ANISOCYTOSIS BLD QL SMEAR: PRESENT
BASOPHILS # BLD MANUAL: 0.15 THOUSAND/UL (ref 0–0.1)
BASOPHILS NFR MAR MANUAL: 4 % (ref 0–1)
BUN SERPL-MCNC: 9 MG/DL (ref 5–25)
CALCIUM SERPL-MCNC: 7.3 MG/DL (ref 8.3–10.1)
CHLORIDE SERPL-SCNC: 111 MMOL/L (ref 96–108)
CO2 SERPL-SCNC: 26 MMOL/L (ref 21–32)
CREAT SERPL-MCNC: 1.23 MG/DL (ref 0.6–1.3)
EOSINOPHIL # BLD MANUAL: 0.59 THOUSAND/UL (ref 0–0.4)
EOSINOPHIL NFR BLD MANUAL: 16 % (ref 0–6)
ERYTHROCYTE [DISTWIDTH] IN BLOOD BY AUTOMATED COUNT: 22 % (ref 11.6–15.1)
GFR SERPL CREATININE-BSD FRML MDRD: 40 ML/MIN/1.73SQ M
GLUCOSE SERPL-MCNC: 92 MG/DL (ref 65–140)
HCT VFR BLD AUTO: 29.5 % (ref 34.8–46.1)
HCT VFR BLD AUTO: 32 % (ref 34.8–46.1)
HGB BLD-MCNC: 10.6 G/DL (ref 11.5–15.4)
HGB BLD-MCNC: 9.4 G/DL (ref 11.5–15.4)
LYMPHOCYTES # BLD AUTO: 0.73 THOUSAND/UL (ref 0.6–4.47)
LYMPHOCYTES # BLD AUTO: 20 % (ref 14–44)
MCH RBC QN AUTO: 30 PG (ref 26.8–34.3)
MCHC RBC AUTO-ENTMCNC: 31.9 G/DL (ref 31.4–37.4)
MCV RBC AUTO: 94 FL (ref 82–98)
MONOCYTES # BLD AUTO: 0.22 THOUSAND/UL (ref 0–1.22)
MONOCYTES NFR BLD: 6 % (ref 4–12)
NEUTROPHILS # BLD MANUAL: 1.98 THOUSAND/UL (ref 1.85–7.62)
NEUTS SEG NFR BLD AUTO: 54 % (ref 43–75)
OVALOCYTES BLD QL SMEAR: PRESENT
PLATELET # BLD AUTO: 276 THOUSANDS/UL (ref 149–390)
PLATELET BLD QL SMEAR: ADEQUATE
PMV BLD AUTO: 10.1 FL (ref 8.9–12.7)
POTASSIUM SERPL-SCNC: 3.8 MMOL/L (ref 3.5–5.3)
RBC # BLD AUTO: 3.13 MILLION/UL (ref 3.81–5.12)
RBC MORPH BLD: PRESENT
SODIUM SERPL-SCNC: 142 MMOL/L (ref 135–147)
WBC # BLD AUTO: 3.67 THOUSAND/UL (ref 4.31–10.16)

## 2022-08-03 PROCEDURE — 80048 BASIC METABOLIC PNL TOTAL CA: CPT | Performed by: PHYSICIAN ASSISTANT

## 2022-08-03 PROCEDURE — 85018 HEMOGLOBIN: CPT | Performed by: PHYSICIAN ASSISTANT

## 2022-08-03 PROCEDURE — 85014 HEMATOCRIT: CPT | Performed by: PHYSICIAN ASSISTANT

## 2022-08-03 PROCEDURE — 99239 HOSP IP/OBS DSCHRG MGMT >30: CPT | Performed by: PHYSICIAN ASSISTANT

## 2022-08-03 PROCEDURE — 85027 COMPLETE CBC AUTOMATED: CPT | Performed by: PHYSICIAN ASSISTANT

## 2022-08-03 PROCEDURE — 85007 BL SMEAR W/DIFF WBC COUNT: CPT | Performed by: PHYSICIAN ASSISTANT

## 2022-08-03 RX ORDER — LOPERAMIDE HYDROCHLORIDE 2 MG/1
4 CAPSULE ORAL 4 TIMES DAILY PRN
Qty: 30 CAPSULE | Refills: 0 | Status: SHIPPED | OUTPATIENT
Start: 2022-08-03 | End: 2022-10-20

## 2022-08-03 RX ORDER — LOSARTAN POTASSIUM 50 MG/1
50 TABLET ORAL DAILY
Qty: 90 TABLET | Refills: 0 | Status: SHIPPED | OUTPATIENT
Start: 2022-08-06 | End: 2022-09-06

## 2022-08-03 RX ADMIN — LETROZOLE 2.5 MG: 2.5 TABLET ORAL at 11:20

## 2022-08-03 RX ADMIN — HEPARIN SODIUM 5000 UNITS: 5000 INJECTION INTRAVENOUS; SUBCUTANEOUS at 06:33

## 2022-08-03 RX ADMIN — SODIUM CHLORIDE, SODIUM GLUCONATE, SODIUM ACETATE, POTASSIUM CHLORIDE, MAGNESIUM CHLORIDE, SODIUM PHOSPHATE, DIBASIC, AND POTASSIUM PHOSPHATE 100 ML/HR: .53; .5; .37; .037; .03; .012; .00082 INJECTION, SOLUTION INTRAVENOUS at 06:34

## 2022-08-03 NOTE — ASSESSMENT & PLAN NOTE
· Recently transitioned from [facility] (caused nausea) to Verzenio  · Symptoms began shortly after, suspect this is a side effect  · Appears to be improving  · Patient denies recent travel, change in diet other than decreased appetite/ability to tolerate food    Likely related to recent cancer treatment regimen  · C diff negative and Stool enteric negative therefore can trial imodium if still having frequent stooling although pt reports improvement today  · Hold Verzenio on discharge and follow up with Dr Sandy Vela

## 2022-08-03 NOTE — ASSESSMENT & PLAN NOTE
· Resolved, Likely pre-renal injury due to diarrhea likely secondary to Verzenio  · Baseline Cr most recently 1 2   · 2 14 on admission and Cr has improved to baseline today  · Continue to trend BMP, encourage PO fluid intake

## 2022-08-03 NOTE — DISCHARGE INSTRUCTIONS
Resume losartan (Cozaar) for your blood pressure in 2-3 days once you are reliably drinking adequate fluids  Return to the hospital if you develop bloody stools

## 2022-08-03 NOTE — ASSESSMENT & PLAN NOTE
Patient's home losartan was held on admission due to REYES  · Given that Cr back at baseline, advised patient to resume tomorrow 2-3 days if no further diarrhea and consuming adequate liquids  · Advised follow up with Dr Santiago Gaona

## 2022-08-03 NOTE — DISCHARGE SUMMARY
[place]  Discharge- Delores Silverman 1940, 79 y o  female MRN: [id number]  Unit/Bed#: -01 Encounter: [id number]  Primary Care Provider: Myles Newman DO   Date and time admitted to hospital: 8/1/2022 10:20 AM    Anemia  Assessment & Plan  · Hgb slightly downtrended since admission, briefly concerned for colitis given significant diarrhea  · Hemorrhagic enterocolitis ruled out on stool studies  · Possible element of dilution as pt received aggressive IVF  · Repeated H&H prior to discharge and hemoglobin remained stable  · Personally reviewed patient's stool and did not appreciate bright red blood or melena  · Patient will continue to have monthly routine labs with Dr Peace Aguiar, monitor Hgb     Hypokalemia  Assessment & Plan  · Likely related to ongoing diarrhea  · Potassium 2 9, Repleted with resolution of hypokalemia  · Mag level nml  · Trend BMP    Diarrhea  Assessment & Plan  · Recently transitioned from [facility] (caused nausea) to Verzenio  · Symptoms began shortly after, suspect this is a side effect  · Appears to be improving  · Patient denies recent travel, change in diet other than decreased appetite/ability to tolerate food    Likely related to recent cancer treatment regimen  · C diff negative and Stool enteric negative therefore can trial imodium if still having frequent stooling although pt reports improvement today  · Hold Verzenio on discharge and follow up with Dr Peace Aguiar    Hypertension  Assessment & Plan  Patient's home losartan was held on admission due to REYES  · Given that Cr back at baseline, advised patient to resume tomorrow 2-3 days if no further diarrhea and consuming adequate liquids  · Advised follow up with Dr Kia Nguyen     Chronic kidney disease (CKD) stage G3a/A1, moderately decreased glomerular filtration rate (GFR) between 45-59 mL/min/1 73 square meter and albuminuria creatinine ratio less than 30 mg/g [facility])  Assessment & Plan  Lab Results   Component Value Date    EGFR 40 08/03/2022    EGFR 37 08/02/2022    EGFR 29 08/02/2022    CREATININE 1 23 08/03/2022    CREATININE 1 33 (H) 08/02/2022    CREATININE 1 61 (H) 08/02/2022     · Most recently baseline Cr has been 1 2  · 2 14 on admission but improved to baseline following IVF  · Encourage PO fluid intake    Metastatic breast cancer (HCC)  Assessment & Plan  · Continue letrozole  · Verzenio on hold in light of side effects  · Outpatient follow up with oncology  · Palliative consulted to assist with symptom management    * REYES (acute kidney injury) ([facility] [address] )  Assessment & Plan  · Resolved, Likely pre-renal injury due to diarrhea likely secondary to Verzenio  · Baseline Cr most recently 1 2   · 2 14 on admission and Cr has improved to baseline today  · Continue to trend BMP, encourage PO fluid intake    Discharging Physician / Practitioner: Александр Sandoval PA-C  PCP: Marquis Reginaldo DO  Admission Date:   Admission Orders (From admission, onward)     Ordered        08/02/22 0912  Inpatient Admission  Once            08/01/22 1142  Place in Observation  Once                      Discharge Date: 08/03/22    Medical Problems             Resolved Problems  Date Reviewed: 8/3/2022   None                 Consultations During Hospital Stay:  · None    Procedures Performed:   · None    Significant Findings / Test Results:   · C diff and stool enteric bacterial panel negative  · CMP with REYES and hypokalemia  · Anemia with hemoglobin of 10 2 on discharge  · CT head without acute intracranial abnormality  · CT cervical spine without spinal fracture or traumatic malalignment  · Pelvic x-rays and chest x-ray without acute pathology    Incidental Findings:   · None     Test Results Pending at Discharge (will require follow up):   · Continue routine monitoring of basic labs, specifically hemoglobin and renal function     Outpatient Tests Requested:  · None    Complications:  None    Reason for Admission:  REYES in the setting of diarrhea    Hospital Course:     Dimple Silverman is a 79 y o  female patient with past medical history of metastatic breast cancer, CKD who originally presented to the hospital on 8/1/2022 due to syncopal episode following an episode of diarrhea  Upon presentation to the ED, patient was tachycardic but maintaining robust blood pressures  Traumatic evaluation and imaging was negative  Her lab work was notable for an REYES as well as hypokalemia and she was admitted to Medicine given continued poor p o  Intake and diarrhea  Patient was started on IV fluids and stool studies were obtained  She was recently switched from [facility] to [employer] which is possibly the culprit for her diarrhea, her Verzenio was held  She was negative for C diff and her stool enteric bacterial panel is negative  Her BMP was monitored and creatinine his risk improved to baseline with IV fluids and improved p o  Fluid intake confirming that her REYES was likely due to prerenal injury  Of note, her hemoglobin downtrended gradually throughout her stay  She notes a remote history of colitis but has not particularly noticed any bright red blood or melanotic stools on this admission  H&H was repeated to ensure no acute blood loss and remained stable  Patient's frequent stooling improved and she is stable for discharge home to resume her ARB in 2-3 days if her diarrhea continues to improve and PO intake is adequate  Please see above list of diagnoses and related plan for additional information  Condition at Discharge: fair     Discharge Day Visit / Exam:     Subjective:  Patient states she has noted significant improvement in her diarrhea  She had 1 liquid stool this morning and then 1 loose more formed stool prior to discharge  She denies fever or chills  She tolerated a full breakfast without nausea or vomiting  Denies abdominal cramping  Denies fever or chills    She has been ambulating to bedside commode without significant dizziness or headache  Denies chest pain or trouble breathing  Vitals: Blood Pressure: 117/50 (08/03/22 0721)  Pulse: 95 (08/03/22 0721)  Temperature: (!) 97 4 °F (36 3 °C) (08/03/22 0721)  Temp Source: Oral (08/02/22 2312)  Respirations: 15 (08/03/22 0721)  Height: 5' 1" (154 9 cm) (08/01/22 1032)  Weight - Scale: 67 kg (147 lb 11 3 oz) (08/01/22 1032)  SpO2: 97 % (08/03/22 0900)  Exam:   Physical Exam  Vitals and nursing note reviewed  Constitutional:       General: She is not in acute distress  Appearance: Normal appearance  She is normal weight  She is not ill-appearing or toxic-appearing  HENT:      Head: Normocephalic and atraumatic  Right Ear: External ear normal       Left Ear: External ear normal       Nose: Nose normal       Mouth/Throat:      Mouth: Mucous membranes are moist       Pharynx: Oropharynx is clear  Eyes:      Conjunctiva/sclera: Conjunctivae normal    Cardiovascular:      Rate and Rhythm: Normal rate and regular rhythm  Pulses: Normal pulses  Heart sounds: Normal heart sounds  No murmur heard  No friction rub  No gallop  Pulmonary:      Effort: Pulmonary effort is normal  No respiratory distress  Breath sounds: Normal breath sounds  No stridor  No wheezing, rhonchi or rales  Abdominal:      General: Abdomen is flat  Bowel sounds are normal  There is no distension  Palpations: Abdomen is soft  There is no mass  Tenderness: There is no abdominal tenderness  There is no guarding  Hernia: No hernia is present  Comments: Liquid, brown stool    Musculoskeletal:      Cervical back: Normal range of motion  Right lower leg: No edema  Left lower leg: No edema  Skin:     General: Skin is warm and dry  Capillary Refill: Capillary refill takes less than 2 seconds  Coloration: Skin is not jaundiced or pale  Findings: No bruising, erythema or lesion  Neurological:      General: No focal deficit present        Mental Status: She is alert  Mental status is at baseline  Sensory: No sensory deficit  Motor: No weakness  Psychiatric:         Mood and Affect: Mood normal          Thought Content: Thought content normal        Discussion with Family: pt opted to update her family herself    Discharge instructions/Information to patient and family:   See after visit summary for information provided to patient and family  Provisions for Follow-Up Care:  See after visit summary for information related to follow-up care and any pertinent home health orders  Disposition:     Home    For Discharges to [facility] SNF:   · Not Applicable to this Patient - Not Applicable to this Patient    Planned Readmission: no     Discharge Statement:  I spent 45 minutes discharging the patient  This time was spent on the day of discharge  I had direct contact with the patient on the day of discharge  Greater than 50% of the total time was spent examining patient, answering all patient questions, arranging and discussing plan of care with patient as well as directly providing post-discharge instructions  Additional time then spent on discharge activities  Discharge Medications:  See after visit summary for reconciled discharge medications provided to patient and family        ** Please Note: This note has been constructed using a voice recognition system **

## 2022-08-03 NOTE — ASSESSMENT & PLAN NOTE
· Hgb slightly downtrended since admission, briefly concerned for colitis given significant diarrhea  · Hemorrhagic enterocolitis ruled out on stool studies  · Possible element of dilution as pt received aggressive IVF  · Repeated H&H prior to discharge and hemoglobin remained stable  · Personally reviewed patient's stool and did not appreciate bright red blood or melena  · Patient will continue to have monthly routine labs with Dr Wayne Valencia, monitor Hgb

## 2022-08-03 NOTE — ASSESSMENT & PLAN NOTE
Lab Results   Component Value Date    EGFR 40 08/03/2022    EGFR 37 08/02/2022    EGFR 29 08/02/2022    CREATININE 1 23 08/03/2022    CREATININE 1 33 (H) 08/02/2022    CREATININE 1 61 (H) 08/02/2022     · Most recently baseline Cr has been 1 2  · 2 14 on admission but improved to baseline following IVF  · Encourage PO fluid intake

## 2022-08-03 NOTE — CASE MANAGEMENT
Case Management Discharge Planning Note    Patient name Azalea Khoury  Location /-01 MRN [id number]  : 1940 Date 8/3/2022       Current Admission Date: 2022  Current Admission Diagnosis:REYES (acute kidney injury) [facility])   Patient Active Problem List    Diagnosis Date Noted    Hypokalemia 2022    [facility]    Diarrhea 2022    [facility] 2022    Colitis 2022    Rectal bleeding 10/14/2021    Lytic lesion of bone on x-ray 2021    Neoplasm of uncertain behavior of sternum 2021    Hydronephrosis of right kidney 2021    Cough due to ACE inhibitor 2021    Acute costochondritis 2021    Osteopenia of multiple sites 2020    Essential hypertension 10/22/2019    Chronic kidney disease (CKD) stage G3a/A1, moderately decreased glomerular filtration rate (GFR) between 45-59 mL/min/1 73 square meter and albuminuria creatinine ratio less than 30 mg/g (HCC) 2019    Adverse reaction to pneumococcal vaccine 2015    Cataract, bilateral 2014    Pes planus 2014    Pulmonary nodule seen on imaging study 09/10/2013    Sleep disorder 2013    Chronic inflammatory disease of uterus 07/10/2013    Anxiety 2013    [facility] 10/17/2012    Hyperlipidemia 2012      LOS (days): 1  Geometric Mean LOS (GMLOS) (days): 3 00  Days to GMLOS:1 9     OBJECTIVE:  Risk of Unplanned Readmission Score: 16 38         Current admission status: Inpatient   Preferred Pharmacy:   [facility] [address] [place] [address] [address]  [facility]  Phone: [phone] Fax: [address] [facility], [address] [place] [address]  Phone: [phone] Fax: [phone]    Primary Care Provider: DO Adán Lim Insurance: MEDICARE  Secondary Insurance: AETNA    DISCHARGE DETAILS:  Continuing to follow patient  Met with patient, discuss her need for a RW and she is agreeable  Freedom of Choice given and she has no preference  Referral to [facility] made via parachute  IMM discussed, signed and copy given to patient                                       DME Referral Provided  Referral made for DME?: Yes  DME referral completed for the following items[de-identified] Bernard Ko  DME Supplier Name[de-identified] [employer]

## 2022-08-03 NOTE — ASSESSMENT & PLAN NOTE
· Likely related to ongoing diarrhea  · Potassium 2 9, Repleted with resolution of hypokalemia  · Mag level nml  · Trend BMP

## 2022-08-03 NOTE — CASE MANAGEMENT
Case Management Discharge Planning Note    Patient name Stephannie Halsted Clunk  Location /-01 MRN [id number]  : 1940 Date 8/3/2022       Current Admission Date: 2022  Current Admission Diagnosis:REYES (acute kidney injury) [facility])   Patient Active Problem List    Diagnosis Date Noted    Anemia 2022    Hypokalemia 2022    REYES (acute kidney injury) ([place] [address] ) 2022    Diarrhea 2022    Secondary malignant neoplasm of bone ([place] [address] ) 2022    Colitis 2022    Rectal bleeding 10/14/2021    Lytic lesion of bone on x-ray 2021    Neoplasm of uncertain behavior of sternum 2021    Hydronephrosis of right kidney 2021    Cough due to ACE inhibitor 2021    Acute costochondritis 2021    Osteopenia of multiple sites 2020    Hypertension 10/22/2019    Chronic kidney disease (CKD) stage G3a/A1, moderately decreased glomerular filtration rate (GFR) between 45-59 mL/min/1 73 square meter and albuminuria creatinine ratio less than 30 mg/g (HCC) 2019    Adverse reaction to pneumococcal vaccine 2015    Cataract, bilateral 2014    Pes planus 2014    Pulmonary nodule seen on imaging study 09/10/2013    Sleep disorder 2013    Chronic inflammatory disease of uterus 07/10/2013    Anxiety 2013    Metastatic breast cancer ([place] [address] ) 10/17/2012    Hyperlipidemia 2012      LOS (days): 1  Geometric Mean LOS (GMLOS) (days): 3 00  Days to GMLOS:1 8     OBJECTIVE:  Risk of Unplanned Readmission Score: 16 38         Current admission status: Inpatient   Preferred Pharmacy:   [facility] [address] [place] [address] [address]  [address] [facility]  Phone: [phone] Fax: [phone] [facility], [address] [place] [address]  Phone: [phone] Fax: [phone]    Primary Care Provider: Gus Monzon, DO    Primary Insurance: MEDICARE  Secondary Insurance: AETNA    DISCHARGE DETAILS:  RW was delivered to patient from Margarita Starr  Patient informed CM  Her son would transport her home

## 2022-08-04 ENCOUNTER — TRANSITIONAL CARE MANAGEMENT (OUTPATIENT)
Dept: FAMILY MEDICINE CLINIC | Facility: CLINIC | Age: 82
End: 2022-08-04

## 2022-08-04 ENCOUNTER — HOME CARE VISIT (OUTPATIENT)
Dept: HOME HEALTH SERVICES | Facility: HOME HEALTHCARE | Age: 82
End: 2022-08-04

## 2022-08-04 NOTE — CASE COMMUNICATION
Spoke to the patient regarding the home therapy referral  Patient is agreeable to a [facility] visit on Monday 8/8/22  Explained that the PT will call to schedule the visit  Agreeable to [facility] services and no other SN agencies in home: yes    Communication to the physician regarding delay: Yes, new SOC date will be 8/8/22  Insurance, copays, HB status reviewed: Reviewed with patient  Patient reports that she is using a walker since the hosp italization  She reports that she was not using an AD prior to that      Verified address and phone number: verified demographics    Requested that patient secure pets: no pets in home    Medication bottles and DC instructions in home: reviewed    Wound care/IV supplies in home:    CG present to learn:     Other concerns patient/family would like to address at visit:

## 2022-08-05 ENCOUNTER — TELEPHONE (OUTPATIENT)
Dept: HEMATOLOGY ONCOLOGY | Facility: HOSPITAL | Age: 82
End: 2022-08-05

## 2022-08-05 NOTE — TELEPHONE ENCOUNTER
Spoke with patient to move her appointment up to see Natalia Larry sooner  Patient expressed understanding

## 2022-08-07 NOTE — PROGRESS NOTES
Hematology/Oncology Outpatient Follow- up Note  Martine Silverman, 1940, [id number]  8/10/2022        Chief Complaint   Patient presents with    Follow-up       HPI:  Matt Silverman is a 79year old female with a history stage I A ER/OR positive, 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 s/p resection and adjuvant radiation therapy  She did receive 5 years of adjuvant hormonal therapy with Arimidex     Due to symptoms of bony discomfort in her sternum she underwent imaging on 6/25/2021  CT of chest demonstrate evidence of a large lytic lesion involving the upper half of the body of the sternum suspicious for metastatic disease  CT of abdomen and pelvis showed additional osseous lesions      On 07/09/2021 patient underwent IR bone biopsy:  Final Diagnosis  A  Bone, Sternal mass, Biopsy:  - Metastatic carcinoma, consistent with breast primary  Patient now has stage IV disease with bony metastasis which was ER positive, OR negative, HER2 negative     Since patient had Oligometastatic metastatic disease she was started on treatment with Faslodex in combination with Xgeva in July 2021  She was treated with a course of radiation therapy at [facility]      Imaging on 4/5/22  showed interval development of a 4 mm nodule at the right lower lobe along with interval development of increased sclerotic lesions throughout the visualized spine for example there is interval development of 7 mm sclerotic lesion in the anterior superior aspect of T8 concerning for metastases     Patient's treatment was changed to Femara and Roosevelt Ruder in 4/2022     Due to poor tolerance, Roosevelt Ruder was discontinued and patient was started on Verzenio 150 mg p o  along with Femara 07/18/2022        Previous Hematologic/ Oncologic History:    Resection  Adjuvant radiation  She was then put on Arimidex 1 mg once a day    Faslodex  Every month   Letrozole and Kisqali    Current Hematologic/ Oncologic Treatment:    Letrozole and Verzenio 150 mg BID  Luann Ferguson has been on hold  Will dose reduce to 50 mg BID d/t severity of diarrhea she experienced  ECO - Symptomatic but completely ambulatory    Interval History:   The patient presents for hospital follow up  She was admitted at [address] [place] [address] Road -8/3/22 with dehydration, near syncope secondary to severe diarrhea  She was hypokalemic, had REYES  Stool studies were negative  Verzenio was held  Hypokalemia and REYES resolved at time of discharge  She has not resumed taking Verenzio  She has continued to take Letrozole  She reports her diarrhea has resolved  I spent time reviewing common side effects of Verenzio to include severe diarrhea associated with dehydration with her and her son today  She did not take any antidiarrheal therapy when her started diarrhea occurred  Unfortunately, this led to significant dehydration and hospitalization  She is very hesitant to resume therapy  We discussed rational behind use of this medication since she does have stage IV breast cancer  I explained most patients typically experience diarrhea during their 1st month of treatment with Verzenio and the recommendation is to try to manage this with anti diarrheal meds  I reviewed with her proper anti diarrheal regimen to follow  I instructed her to take Imodium at 1st onset of diarrhea and stated she could alternate use of Imodium with Lomotil if needed  We discussed dose reduction  She is feeling much better  No lightheadedness  Eating and drinking well  Cancer Staging:  Cancer Staging  No matching staging information was found for the patient  Molecular Testing:       Test Results:    Imaging: XR Trauma chest portable    Result Date: 2022  Narrative: CHEST INDICATION:   TRAUMA  COMPARISON:  2021  EXAM PERFORMED/VIEWS:  XR CHEST PORTABLE FINDINGS: Cardiomediastinal silhouette appears unremarkable  The lungs are clear  No pneumothorax or pleural effusion   Osseous structures appear within normal limits for patient age  Impression: No acute cardiopulmonary disease  Workstation performed: [id number]     TRAUMA - CT head wo contrast    Result Date: 8/1/2022  Narrative: CT BRAIN - WITHOUT CONTRAST INDICATION:   TRAUMA  COMPARISON:  None  TECHNIQUE:  CT examination of the brain was performed  In addition to axial images, sagittal and coronal 2D reformatted images were created and submitted for interpretation  Radiation dose length product (DLP) for this visit:  865 64 mGy-cm   This examination, like all CT scans performed in the [facility], was performed utilizing techniques to minimize radiation dose exposure, including the use of iterative  reconstruction and automated exposure control  IMAGE QUALITY:  Diagnostic  FINDINGS: PARENCHYMA: Decreased attenuation is noted in periventricular and subcortical white matter demonstrating an appearance that is statistically most likely to represent mild microangiopathic change  No CT signs of acute infarction  No intracranial mass, mass effect or midline shift  No acute parenchymal hemorrhage  VENTRICLES AND EXTRA-AXIAL SPACES:  Normal for the patient's age  VISUALIZED ORBITS AND PARANASAL SINUSES:  Unremarkable  CALVARIUM AND EXTRACRANIAL SOFT TISSUES:  Normal      Impression: No acute intracranial abnormality  Workstation performed: [id number]     TRAUMA - CT spine cervical wo contrast    Result Date: 8/1/2022  Narrative: CT CERVICAL SPINE - WITHOUT CONTRAST INDICATION:   TRAUMA  COMPARISON:  None  TECHNIQUE:  CT examination of the cervical spine was performed without intravenous contrast   Contiguous axial images were obtained  Sagittal and coronal reconstructions were performed  Radiation dose length product (DLP) for this visit:  367 15 mGy-cm     This examination, like all CT scans performed in the [facility], was performed utilizing techniques to minimize radiation dose exposure, including the use of iterative  reconstruction and automated exposure control  IMAGE QUALITY:  Diagnostic  FINDINGS: ALIGNMENT:  Normal alignment of the cervical spine  No subluxation  VERTEBRAL BODIES:  No fracture  DEGENERATIVE CHANGES:  Mild multilevel cervical degenerative changes are noted without critical central canal stenosis  PREVERTEBRAL AND PARASPINAL SOFT TISSUES:  Unremarkable  THORACIC INLET:  Normal      Impression: No cervical spine fracture or traumatic malalignment  Workstation performed: [id number]     XR Trauma pelvis ap only 1 or 2 vw    Result Date: 8/1/2022  Narrative: PELVIS INDICATION:   TRAUMA  COMPARISON:  None VIEWS:  XR PELVIS AP ONLY 1 OR 2 VW FINDINGS: No acute fracture or hip dislocation  No significant degenerative changes  No lytic or blastic osseous lesion  Soft tissues are unremarkable  The visualized lumbar spine is unremarkable  Impression: No acute osseous abnormality  Workstation performed: [id number]       Labs:   Lab Results   Component Value Date    WBC 3 67 (L) 08/03/2022    HGB 10 6 (L) 08/03/2022    HCT 32 0 (L) 08/03/2022    MCV 94 08/03/2022     08/03/2022     Lab Results   Component Value Date     09/08/2015    K 3 8 08/03/2022     (H) 08/03/2022    CO2 26 08/03/2022    ANIONGAP 6 09/08/2015    BUN 9 08/03/2022    CREATININE 1 23 08/03/2022    GLUCOSE 115 09/08/2015    GLUF 109 (H) 08/02/2022    CALCIUM 7 3 (L) 08/03/2022    CORRECTEDCA 9 3 08/02/2022     (H) 08/02/2022    ALT 45 08/02/2022    ALKPHOS 129 (H) 08/02/2022    PROT 7 3 09/08/2015    BILITOT 0 66 09/08/2015    EGFR 40 08/03/2022           Review of Systems   All other systems reviewed and are negative          Active Problems:   Patient Active Problem List   Diagnosis    Metastatic [facility] )    Adverse reaction to pneumococcal vaccine    Anxiety    Cataract, bilateral    Chronic inflammatory disease of uterus    Hyperlipidemia    Pulmonary nodule seen on imaging study    Pes planus    Sleep disorder  Chronic kidney disease (CKD) stage G3a/A1, moderately decreased glomerular filtration rate (GFR) between 45-59 mL/min/1 73 square meter and albuminuria creatinine ratio less than 30 mg/g (HCC)    Hypertension    Osteopenia of multiple sites    Cough due to ACE inhibitor    Acute costochondritis    Lytic lesion of bone on x-ray    Neoplasm of uncertain behavior of sternum    Hydronephrosis of right kidney    Rectal bleeding    Colitis    Secondary malignant neoplasm of bone ([facility] )    REYES (acute kidney injury) ([facility] )    Diarrhea    Hypokalemia    Anemia       Past Medical History:   Past Medical History:   Diagnosis Date    Abnormal weight loss     Allergic reaction     Anxiety     Breast cancer, right ([facility] ) 2011    right    Cancer ([facility] ) 2012    Candidal vulvovaginitis     Clotting disorder ([place] [address] ) Same as above    Endometrial hyperplasia     Epithelial cyst     benign, ovary    GI (gastrointestinal bleed) Blood mixed with stool    History of radiation therapy 2011    right breast cancer    Hyperlipidemia     Hypertension     Internal hemorrhoids     Knee tendonitis     Ovarian cyst     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[facility])        Surgical History:   Past Surgical History:   Procedure Laterality Date    BILATERAL SALPINGOOPHORECTOMY      onset: 7/23/13    BREAST BIOPSY Right 06/13/2006    benign    BREAST BIOPSY Right 03/02/2011    malignant    BREAST LUMPECTOMY Right 03/30/2011    malignant    CATARACT EXTRACTION W/  INTRAOCULAR LENS IMPLANT Right     phacoemulsification   Onset: 10/27/14    CHOLECYSTECTOMY      COLONOSCOPY  2017    approx    HYSTERECTOMY  Yes    INTRAOPERATIVE RADIATION THERAPY ([facility])      IR BIOPSY BONE  7/9/2021    SKIN LESION EXCISION Right     breast, single lesion    TONSILLECTOMY AND ADENOIDECTOMY         Family History:    Family History   Problem Relation Age of Onset    Stomach cancer Mother     No Known Problems Father     Cervical cancer Sister    Yulia Nine No Known Problems Daughter     No Known Problems Maternal Grandmother     No Known Problems Maternal Grandfather     No Known Problems Paternal Grandmother     No Known Problems Paternal Grandfather     Colon polyps Neg Hx     Colon cancer Neg Hx        Cancer-related family history includes Cervical cancer in her sister; Stomach cancer in her mother  There is no history of Colon cancer  Social History:   Social History     Socioeconomic History    Marital status:      Spouse name: Not on file    Number of children: 3    Years of education: Not on file    Highest education level: Not on file   Occupational History    Not on file   Tobacco Use    Smoking status: Former Smoker     Packs/day: 1 00     Years: 30 00     Pack years: 30 00     Types: Cigarettes     Start date:      Quit date:      Years since quittin 6    Smokeless tobacco: Never Used   Vaping Use    Vaping Use: Never used   Substance and Sexual Activity    Alcohol use: No     Comment: (history)    Drug use: No    Sexual activity: Not Currently   Other Topics Concern    Not on file   Social History Narrative    Not on file     Social Determinants of Health     Financial Resource Strain: Not on file   Food Insecurity: No Food Insecurity    Worried About [address] in the Last Year: Never true    [facility] in the Last Year: Never true   Transportation Needs: No Transportation Needs    Lack of Transportation (Medical): No    Lack of Transportation (Non-Medical):  No   Physical Activity: Not on file   Stress: Not on file   Social Connections: Not on file   Intimate Partner Violence: Not on file   Housing Stability: Low Risk     Unable to Pay for Housing in the Last Year: No    Number of Places Lived in the Last Year: 1    Unstable Housing in the Last Year: No       Current Medications:   Current Outpatient Medications   Medication Sig Dispense Refill    amitriptyline (ELAVIL) 25 mg tablet Take 1 tablet (25 mg total) by mouth daily at bedtime 90 tablet 1    Cholecalciferol (VITAMIN D3) 2000 units capsule Take 2,000 Units by mouth daily        diphenoxylate-atropine (LOMOTIL) 2 5-0 025 mg per tablet Take 1 tablet by mouth 4 (four) times a day as needed for diarrhea 30 tablet 0    letrozole (FEMARA) 2 5 mg tablet Take 1 tablet (2 5 mg total) by mouth daily 90 tablet 3    loperamide (IMODIUM) 2 mg capsule Take 2 capsules (4 mg total) by mouth 4 (four) times a day as needed for diarrhea 30 capsule 0    mirtazapine (REMERON) 7 5 MG tablet Take 1 tablet (7 5 mg total) by mouth daily at bedtime 30 tablet 0    simvastatin (ZOCOR) 10 mg tablet Take 1 tablet (10 mg total) by mouth daily at bedtime 90 tablet 1    losartan (COZAAR) 50 mg tablet Take 1 tablet (50 mg total) by mouth daily Resume in 2-3 days once adequately consuming liquids  (Patient not taking: No sig reported) 90 tablet 0     No current facility-administered medications for this visit  Allergies: Allergies   Allergen Reactions    Sulfa Antibiotics     Pneumococcal Polysaccharide Vaccine Rash and Edema       Physical Exam:  /80 (BP Location: Left arm, Patient Position: Sitting, Cuff Size: Adult)   Pulse (!) 112   Temp 98 °F (36 7 °C) (Tympanic)   Resp 16   Ht 5' 1" (1 549 m)   Wt 65 3 kg (144 lb)   LMP  (LMP Unknown)   SpO2 98%   BMI 27 21 kg/m²   Body surface area is 1 64 meters squared  Wt Readings from Last 3 Encounters:   08/10/22 65 3 kg (144 lb)   08/01/22 67 kg (147 lb 11 3 oz)   07/21/22 67 kg (147 lb 12 8 oz)           Physical Exam  Constitutional:       General: She is not in acute distress  Appearance: Normal appearance  HENT:      Head: Normocephalic and atraumatic  Eyes:      General: No scleral icterus  Right eye: No discharge  Left eye: No discharge  Conjunctiva/sclera: Conjunctivae normal    Cardiovascular:      Rate and Rhythm: Normal rate and regular rhythm     Pulmonary: Effort: Pulmonary effort is normal  No respiratory distress  Breath sounds: Normal breath sounds  Chest:   Breasts:      Right: No axillary adenopathy or supraclavicular adenopathy  Left: No axillary adenopathy or supraclavicular adenopathy  Abdominal:      General: Bowel sounds are normal  There is no distension  Palpations: Abdomen is soft  There is no mass  Tenderness: There is no abdominal tenderness  Musculoskeletal:         General: Normal range of motion  Lymphadenopathy:      Cervical: No cervical adenopathy  Upper Body:      Right upper body: No supraclavicular, axillary or pectoral adenopathy  Left upper body: No supraclavicular, axillary or pectoral adenopathy  Skin:     General: Skin is warm and dry  Neurological:      General: No focal deficit present  Mental Status: She is alert and oriented to person, place, and time  Psychiatric:         Mood and Affect: Mood normal          Behavior: Behavior normal          Assessment / Plan:    1  Metastatic breast cancer ([facility] )    2  Functional diarrhea      The patient is a 79year old female with a history stage I A ER/MS positive, 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 s/p resection and adjuvant radiation therapy  She did receive 5 years of adjuvant hormonal therapy with Arimidex  In 6/2021 she was found to have metastatic disease with evidence of a large lytic lesion involving the upper half of the body of the sternum on imaging  Biopsy confirmed metastatic carcinoma consistent with breast primary, ER positive, MS negative, HER2 negative  She has stage IV disease     She was started on treatment with Faslodex in combination with Xgeva in July 2021  She was treated with a course of [facility] at [facility]      Imaging on 4/5/22  showed interval development of a 4 mm nodule at the right lower lobe along with interval development of increased sclerotic lesions throughout the visualized spine for example there is interval development of 7 mm sclerotic lesion in the anterior superior aspect of T8 concerning for metastases     Patient's treatment was changed to Femara and Roosevelt Ruder in 4/2022     Due to poor tolerance, Roosevelt Ruder was discontinued and patient was started on Verzenio 150 mg p o  along with Femara 07/18/2022    Patient was just recently hospitalized with dehydration secondary to severe diarrhea  This was a side effect of her treatment  I reviewed with her and her son in detail today how to manage diarrhea to prevent it from becoming severe  I explained diarrhea is a known side effect of Verzenio  Since she had grade 3 diarrhea that resulted in a hospital stay, I reviewed prescribing information and recommended dose modifications and after discussion with Dr Patito Landin, recommendation was made for dose reduction of 50 mg twice a day  This will be taken in conjunction with her letrozole  At 1st patient was hesitant, but after careful review of how to properly manage diarrhea and how to take anti diarrheal medications and recommendation for dose reduction she is agreeable  We will closely monitor and her symptoms when she resumes treatment at a reduced dose  She is instructed to notify us right away if she develops any diarrhea  I did write down instructions on how to take Imodium and how to interchange this with Lomotil if needed  Patient and her son were appreciative of time spent reviewing all of these recommendations today  I will have my nurse reach out to the specialty pharmacy that since her her Verzenio with new dosing  Patient will notify us when she receives the medication  I also reviewed brat diet to follow if she does have loose stools  She is already scheduled for a follow-up visit with Dr Patito Landin in 2 months  She will keep this appointment  She is aware to call us if she develops any diarrhea and she also verbalizes importance of taking Imodium and/or Lomotil to help manage symptoms  Hopefully with a reduced dose of Verzenio she will tolerate this a lot better  She is instructed to call any time with any additional questions or concerns      Goals and Barriers:  Current Goal:  Prolong Survival from metastatic breast cancer    Barriers: None  Patient's Capacity to Self Care:  Patient able to self care  Portions of the record may have been created with voice recognition software  Occasional wrong word or "sound a like" substitutions may have occurred due to the inherent limitations of voice recognition software  Read the chart carefully and recognize, using context, where substitutions have occurred

## 2022-08-08 ENCOUNTER — HOME CARE VISIT (OUTPATIENT)
Dept: HOME HEALTH SERVICES | Facility: HOME HEALTHCARE | Age: 82
End: 2022-08-08

## 2022-08-09 LAB
DME PARACHUTE DELIVERY DATE ACTUAL: NORMAL
DME PARACHUTE DELIVERY DATE REQUESTED: NORMAL
DME PARACHUTE ITEM DESCRIPTION: NORMAL
DME PARACHUTE ORDER STATUS: NORMAL
DME PARACHUTE SUPPLIER NAME: NORMAL
DME PARACHUTE SUPPLIER PHONE: NORMAL

## 2022-08-10 ENCOUNTER — OFFICE VISIT (OUTPATIENT)
Dept: FAMILY MEDICINE CLINIC | Facility: CLINIC | Age: 82
End: 2022-08-10
Payer: MEDICARE

## 2022-08-10 ENCOUNTER — OFFICE VISIT (OUTPATIENT)
Dept: HEMATOLOGY ONCOLOGY | Facility: HOSPITAL | Age: 82
End: 2022-08-10
Payer: MEDICARE

## 2022-08-10 VITALS
OXYGEN SATURATION: 98 % | RESPIRATION RATE: 16 BRPM | HEART RATE: 112 BPM | HEIGHT: 61 IN | DIASTOLIC BLOOD PRESSURE: 80 MMHG | TEMPERATURE: 98 F | BODY MASS INDEX: 27.19 KG/M2 | SYSTOLIC BLOOD PRESSURE: 140 MMHG | WEIGHT: 144 LBS

## 2022-08-10 VITALS
WEIGHT: 144.4 LBS | DIASTOLIC BLOOD PRESSURE: 78 MMHG | OXYGEN SATURATION: 98 % | HEIGHT: 61 IN | SYSTOLIC BLOOD PRESSURE: 110 MMHG | BODY MASS INDEX: 27.26 KG/M2 | HEART RATE: 110 BPM | RESPIRATION RATE: 12 BRPM | TEMPERATURE: 98.6 F

## 2022-08-10 DIAGNOSIS — C50.919 METASTATIC BREAST CANCER (HCC): Primary | ICD-10-CM

## 2022-08-10 DIAGNOSIS — I10 HYPERTENSION, UNSPECIFIED TYPE: ICD-10-CM

## 2022-08-10 DIAGNOSIS — E87.6 HYPOKALEMIA: ICD-10-CM

## 2022-08-10 DIAGNOSIS — N18.31 CHRONIC KIDNEY DISEASE (CKD) STAGE G3A/A1, MODERATELY DECREASED GLOMERULAR FILTRATION RATE (GFR) BETWEEN 45-59 ML/MIN/1.73 SQUARE METER AND ALBUMINURIA CREATININE RATIO LESS THAN 30 MG/G (HCC): ICD-10-CM

## 2022-08-10 DIAGNOSIS — N17.9 AKI (ACUTE KIDNEY INJURY) (HCC): Primary | ICD-10-CM

## 2022-08-10 DIAGNOSIS — K59.1 FUNCTIONAL DIARRHEA: ICD-10-CM

## 2022-08-10 DIAGNOSIS — C50.919 METASTATIC BREAST CANCER (HCC): ICD-10-CM

## 2022-08-10 DIAGNOSIS — C79.51 SECONDARY MALIGNANT NEOPLASM OF BONE (HCC): ICD-10-CM

## 2022-08-10 PROCEDURE — 99215 OFFICE O/P EST HI 40 MIN: CPT | Performed by: NURSE PRACTITIONER

## 2022-08-10 PROCEDURE — 99496 TRANSJ CARE MGMT HIGH F2F 7D: CPT | Performed by: FAMILY MEDICINE

## 2022-08-10 RX ORDER — DIPHENOXYLATE HYDROCHLORIDE AND ATROPINE SULFATE 2.5; .025 MG/1; MG/1
1 TABLET ORAL 4 TIMES DAILY PRN
Qty: 30 TABLET | Refills: 0 | Status: SHIPPED | OUTPATIENT
Start: 2022-08-10

## 2022-08-10 NOTE — PROGRESS NOTES
Assessment/Plan:    Problem List Items Addressed This Visit        Cardiovascular and Mediastinum    Hypertension     The patient's blood pressure is well controlled with her current medication  We have made no changes today  She will continue with her current regimen  Relevant Orders    CBC and differential    Basic metabolic panel       Musculoskeletal and Integument    Secondary malignant neoplasm of bone [facility])     The patient will continue with her current therapy and will continue to follow-up with Oncology as scheduled  Genitourinary    REYES (acute kidney injury) ([facility] [address] ) - Primary    Relevant Orders    CBC and differential    Basic metabolic panel    Chronic kidney disease (CKD) stage G3a/A1, moderately decreased glomerular filtration rate (GFR) between 45-59 mL/min/1 73 square meter and albuminuria creatinine ratio less than 30 mg/g ([facility])     Lab Results   Component Value Date    EGFR 40 08/03/2022    EGFR 37 08/02/2022    EGFR 29 08/02/2022    CREATININE 1 23 08/03/2022    CREATININE 1 33 (H) 08/02/2022    CREATININE 1 61 (H) 08/02/2022   The patient's kidney function has improved and she will go for the follow-up lab work as indicated  Relevant Orders    CBC and differential    Basic metabolic panel       Other    Hypokalemia    Relevant Orders    CBC and differential    Basic metabolic panel    Metastatic breast cancer [facility])     The patient will continue with her current therapy and will continue to follow-up with Oncology as scheduled  Chief Complaint   Patient presents with    Transition of Care Management     Still feeling fatigued (they tolod her she was very dehydrated)       Subjective:    Patient ID:  Tirso Guzman is a 79 y  o female  Tirso Guzman is a 79 y o  female who presents today for transitional care management visit after her recent hospitalization at [place] [address] for acute kidney injury and a syncopal episode    The patient has a significant history for metastatic breast cancer and chronic kidney disease  She presented to the hospital on 8/1/2022 due to a syncopal episode following an episode of diarrhea  Upon presentation to the ER, the patient was tachycardic but had normal blood pressures  She underwent a CT scan of the head and cervical spine which were negative for fracture or bleed as well as pelvic x-rays and a chest x-ray which were normal   Her lab work demonstrated acute kidney injury and hypokalemia related to the diarrhea and she was admitted because of poor p o  intake and diarrhea  She was started on IV fluids and stool studies were obtained  She was recently switched from [facility] to [facility] for her cancer treatment and was felt that this was causing her diarrhea  She was negative for C diff and her stool cultures were negative  Her creatinine improved to her baseline with IV fluids  Her hemoglobin did go down slightly during her stay  There is no evidence of blood loss  She was stabilized and discharged home in stable condition  The patient is reporting that she feels better today but still has a lot of fatigue  She saw oncology today and they decreased the dose of the Verzenio   There was slight diarrhea this am     The patient denies any chest pain, shortness of breath, or palpitations  There is no edema  There are no headaches or visual changes  There is no lightheadedness, dizziness, or fainting spells  There is slight nausea, there is no vomiting  There is no abdominal pain  There have been no further syncopal episode  The appetite is better  There is no blood in the stool  She had one home health visit  She is feeling cold and tired  There is normal urine output  There is no edema        The following portions of the patient's history were reviewed and updated as appropriate: allergies, current medications, past family history, past medical history, past social history, past surgical history and problem list   Patient Active Problem List   Diagnosis    Metastatic breast cancer ([facility] )    Adverse reaction to pneumococcal vaccine    Anxiety    Cataract, bilateral    Hyperlipidemia    Pulmonary nodule seen on imaging study    Sleep disorder    Chronic kidney disease (CKD) stage G3a/A1, moderately decreased glomerular filtration rate (GFR) between 45-59 mL/min/1 73 square meter and albuminuria creatinine ratio less than 30 mg/g (HCC)    Hypertension    Osteopenia of multiple sites    Cough due to ACE inhibitor    Acute costochondritis    Lytic lesion of bone on x-ray    Neoplasm of uncertain behavior of sternum    Rectal bleeding    Colitis    Secondary malignant neoplasm of bone ([facility] )    REYES (acute kidney injury) ([place] [address] )    Diarrhea    Hypokalemia    Anemia     Past Medical History:   Diagnosis Date    Abnormal weight loss     Allergic reaction     Anxiety     Breast cancer, right ([place] [address] ) 2011    right    Cancer ([place] [address] ) 2012    Candidal vulvovaginitis     Clotting disorder ([place] [address] ) Same as above    Endometrial hyperplasia     Epithelial cyst     benign, ovary    GI (gastrointestinal bleed) Blood mixed with stool    History of radiation therapy 2011    right breast cancer    Hyperlipidemia     Hypertension     Internal hemorrhoids     Knee tendonitis     Ovarian cyst     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[facility])      Past Surgical History:   Procedure Laterality Date    BILATERAL SALPINGOOPHORECTOMY      onset: 7/23/13    BREAST BIOPSY Right 06/13/2006    benign    BREAST BIOPSY Right 03/02/2011    malignant    BREAST LUMPECTOMY Right 03/30/2011    malignant    CATARACT EXTRACTION W/  INTRAOCULAR LENS IMPLANT Right     phacoemulsification   Onset: 10/27/14    CHOLECYSTECTOMY      COLONOSCOPY  2017    approx    HYSTERECTOMY  Yes    INTRAOPERATIVE RADIATION THERAPY (IORT)      IR BIOPSY BONE  7/9/2021    SKIN LESION EXCISION Right     breast, single lesion    TONSILLECTOMY AND ADENOIDECTOMY       Allergies   Allergen Reactions    Sulfa Antibiotics     Pneumococcal Polysaccharide Vaccine Rash and Edema     Family History   Problem Relation Age of Onset    Stomach cancer Mother     No Known Problems Father     Cervical cancer Sister     No Known Problems Daughter     No Known Problems Maternal Grandmother     No Known Problems Maternal Grandfather     No Known Problems Paternal Grandmother     No Known Problems Paternal Grandfather     Colon polyps Neg Hx     Colon cancer Neg Hx      Social History     Socioeconomic History    Marital status:      Spouse name: Not on file    Number of children: 3    Years of education: Not on file    Highest education level: Not on file   Occupational History    Not on file   Tobacco Use    Smoking status: Former Smoker     Packs/day: 1 00     Years: 30 00     Pack years: 30      Types: Cigarettes     Start date:      Quit date:      Years since quittin 6    Smokeless tobacco: Never Used   Vaping Use    Vaping Use: Never used   Substance and Sexual Activity    Alcohol use: No     Comment: (history)    Drug use: No    Sexual activity: Not Currently   Other Topics Concern    Not on file   Social History Narrative    Not on file     Social Determinants of Health     Financial Resource Strain: Not on file   Food Insecurity: No Food Insecurity    Worried About Running Out of Food in the Last Year: Never true    Anatoly of Food in the Last Year: Never true   Transportation Needs: No Transportation Needs    Lack of Transportation (Medical): No    Lack of Transportation (Non-Medical):  No   Physical Activity: Not on file   Stress: Not on file   Social Connections: Not on file   Intimate Partner Violence: Not At Risk    Fear of Current or Ex-Partner: No    Emotionally Abused: No    Physically Abused: No    Sexually Abused: No   Housing Stability: Low Risk     Unable to Pay for Housing in the Last Year: No  Number of Places Lived in the Last Year: 1    Unstable Housing in the Last Year: No     Current Outpatient Medications on File Prior to Visit   Medication Sig Dispense Refill    amitriptyline (ELAVIL) 25 mg tablet Take 1 tablet (25 mg total) by mouth daily at bedtime 90 tablet 1    Cholecalciferol (VITAMIN D3) 2000 units capsule Take 2,000 Units by mouth daily        letrozole (FEMARA) 2 5 mg tablet Take 1 tablet (2 5 mg total) by mouth daily 90 tablet 3    loperamide (IMODIUM) 2 mg capsule Take 2 capsules (4 mg total) by mouth 4 (four) times a day as needed for diarrhea 30 capsule 0    losartan (COZAAR) 50 mg tablet Take 1 tablet (50 mg total) by mouth daily Resume in 2-3 days once adequately consuming liquids  90 tablet 0    mirtazapine (REMERON) 7 5 MG tablet Take 1 tablet (7 5 mg total) by mouth daily at bedtime 30 tablet 0    simvastatin (ZOCOR) 10 mg tablet Take 1 tablet (10 mg total) by mouth daily at bedtime 90 tablet 1     No current facility-administered medications on file prior to visit  Review of Systems   Constitutional: Positive for fatigue  HENT: Negative  Eyes: Negative  Respiratory: Negative  Cardiovascular: Negative  Gastrointestinal: Negative  Endocrine: Negative  Genitourinary: Negative  Musculoskeletal: Negative  Skin: Negative  Allergic/Immunologic: Negative  Neurological: Negative  Hematological: Negative  Psychiatric/Behavioral: Negative  Objective:    Vitals:    08/10/22 1019 08/10/22 1121   BP: 122/74 110/78   BP Location: Left arm    Patient Position: Sitting    Cuff Size: Large    Pulse: (!) 133 (!) 110   Resp: 12    Temp: 98 6 °F (37 °C)    SpO2: 98%    Weight: 65 5 kg (144 lb 6 4 oz)    Height: 5' 1" (1 549 m)      Physical Exam  Constitutional:       General: She is not in acute distress  Appearance: She is well-developed  She is not diaphoretic  HENT:      Head: Normocephalic and atraumatic        Right Ear: External ear normal       Left Ear: External ear normal       Nose: Nose normal       Mouth/Throat:      Pharynx: No oropharyngeal exudate  Eyes:      General: No scleral icterus  Right eye: No discharge  Left eye: No discharge  Pupils: Pupils are equal, round, and reactive to light  Neck:      Thyroid: No thyromegaly  Vascular: No JVD  Trachea: No tracheal deviation  Cardiovascular:      Rate and Rhythm: Normal rate and regular rhythm  Heart sounds: Normal heart sounds  No murmur heard  No friction rub  No gallop  Pulmonary:      Effort: Pulmonary effort is normal  No respiratory distress  Breath sounds: Normal breath sounds  No wheezing or rales  Chest:      Chest wall: No tenderness  Abdominal:      General: Bowel sounds are normal  There is no distension  Palpations: Abdomen is soft  There is no mass  Tenderness: There is no abdominal tenderness  There is no guarding or rebound  Hernia: No hernia is present  Musculoskeletal:         General: No tenderness or deformity  Normal range of motion  Cervical back: Normal range of motion and neck supple  Lymphadenopathy:      Cervical: No cervical adenopathy  Skin:     General: Skin is warm and dry  Coloration: Skin is not pale  Findings: No erythema or rash  Neurological:      Mental Status: She is alert and oriented to person, place, and time  Cranial Nerves: No cranial nerve deficit  Sensory: No sensory deficit  Motor: No abnormal muscle tone  Coordination: Coordination normal       Deep Tendon Reflexes: Reflexes normal    Psychiatric:         Behavior: Behavior normal          Thought Content:  Thought content normal            TCM Call     Date and time call was made  8/4/2022  5:23 PM    Hospital care reviewed  Records reviewed    Patient was hospitial[facility] at  [facility]    Date of Admission  08/01/22    Date of discharge  08/03/22 Diagnosis  Syncope    Disposition  Home    Were the patients medications reviewed and updated  Yes    Current Symptoms  None      TCM Call     Post hospital issues  None    Should patient be enrolled in anticoag monitoring? No    Scheduled for follow up? Yes    Did you obtain your prescribed medications  Yes    Do you need help managing your prescriptions or medications  No    Is transportation to your appointment needed  No    I have advised the patient to call PCP with any new or worsening symptoms  James COVARRUBIAS  [address] [facility]    The type of support provided  Emotional; Physical    Do you have social support  No, not at all    Are you recieving any outpatient services  No    Are you recieving home care services  No    Are you using any community resources  No    Current waiver services  No    Have you fallen in the last 12 months  Yes    How many times  1 time passed out    Interperter language line needed  No    Counseling  Patient    Counseling topics  Activities of daily living;  Importance of RX compliance

## 2022-08-10 NOTE — PHYSICIAN ADVISOR
Current patient class: Inpatient  The patient is currently on Hospital Day: 3 at [facility]      The patient was admitted to the hospital at  9:12 AM on 8/2/22 for the following diagnosis:  Syncope [R55]  Acute kidney injury [facility]) [N17 9]  Secondary malignant neoplasm of bone ([facility] [address] ) [C79 51]  Metastatic breast cancer ([facility] [address] ) [C50 919]       There is documentation in the medical record of an expected length of stay of at least 2 midnights  The patient is therefore expected to satisfy the 2 midnight benchmark and given the 2 midnight presumption is appropriate for INPATIENT ADMISSION  Given this expectation of a satisfying stay, CMS instructs us that the patient is most often appropriate for inpatient admission under part A provided medical necessity is documented in the chart  After review of the relevant documentation, labs, vital signs and test results, the patient is appropriate for INPATIENT ADMISSION  Admission to the hospital as an inpatient is a complex decision making process which requires the practitioner to consider the patients presenting complaint, history and physical examination and all relevant testing  With this in mind, in this case, the patient was deemed appropriate for INPATIENT ADMISSION  After review of the documentation and testing available at the time of the admission I concur with this clinical determination of medical necessity  Rationale is as follows: The patient is a 79 yrs old Female who presented to the ED at 8/1/2022 10:20 AM with a chief complaint of Fall (Patient presents to ED with increased weakness, diarrhea for the past three days  Patient reports passing out last night and hitting her head on the floor of her bathroom  She reports hitting rug that lies on cement in the bathroom  )  Pt with h/o ckd found to have faustina due to diarrhea likely medication induced    Stool studies were negative for infectious etiology and there was conecrn for hemorrhagic enterocolitis but this was ruled out  Hgb initially down trended but then stabilized  Electrolytes were replaced and she received IVF  She was evaluated by PT and stable for discharge home on hospital day 3  Given her hospital course, IP seems appropriate  The patients vitals on arrival were   ED Triage Vitals   Temperature Pulse Respirations Blood Pressure SpO2   08/01/22 1032 08/01/22 1045 08/01/22 1032 08/01/22 1045 08/01/22 1032   98 2 °F (36 8 °C) (!) 122 20 116/64 98 %      Temp Source Heart Rate Source Patient Position - Orthostatic VS BP Location FiO2 (%)   08/02/22 1530 08/01/22 1045 08/01/22 1045 08/01/22 1045 --   Oral Monitor Lying Left arm       Pain Score       08/01/22 1444       No Pain           Past Medical History:   Diagnosis Date    Abnormal weight loss     Allergic reaction     Anxiety     Breast cancer, right ([facility] [address] ) 2011    right    Cancer ([facility] [address] ) 2012    Candidal vulvovaginitis     Clotting disorder ([facility] [address] ) Same as above    Endometrial hyperplasia     Epithelial cyst     benign, ovary    GI (gastrointestinal bleed) Blood mixed with stool    History of radiation therapy 2011    right breast cancer    Hyperlipidemia     Hypertension     Internal hemorrhoids     Knee tendonitis     Ovarian cyst     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[facility])      Past Surgical History:   Procedure Laterality Date    BILATERAL SALPINGOOPHORECTOMY      onset: 7/23/13    BREAST BIOPSY Right 06/13/2006    benign    BREAST BIOPSY Right 03/02/2011    malignant    BREAST LUMPECTOMY Right 03/30/2011    malignant    CATARACT EXTRACTION W/  INTRAOCULAR LENS IMPLANT Right     phacoemulsification   Onset: 10/27/14    CHOLECYSTECTOMY      COLONOSCOPY  2017    approx    HYSTERECTOMY  Yes    INTRAOPERATIVE RADIATION THERAPY (IORT)      IR BIOPSY BONE  7/9/2021    SKIN LESION EXCISION Right     breast, single lesion    TONSILLECTOMY AND ADENOIDECTOMY             Consults have been placed to:   IP CONSULT TO PALLIATIVE CARE  IP CONSULT TO ONCOLOGY    Vitals:    08/02/22 2312 08/03/22 0721 08/03/22 0900 08/03/22 1412   BP: 119/70 117/50  121/54   BP Location: Left arm      Pulse: (!) 106 95  100   Resp:  15  18   Temp: 97 9 °F (36 6 °C) (!) 97 4 °F (36 3 °C)  98 1 °F (36 7 °C)   TempSrc: Oral      SpO2: 98% 98% 97% 96%   Weight:       Height:           Most recent labs:    No results for input(s): WBC, HGB, HCT, PLT, K, NA, CALCIUM, BUN, CREATININE, LIPASE, AMYLASE, INR, TROPONINI, CKTOTAL, AST, ALT, ALKPHOS, BILITOT in the last 72 hours  Scheduled Meds:  Continuous Infusions:No current facility-administered medications for this encounter  PRN Meds:      Surgical procedures (if appropriate):

## 2022-08-10 NOTE — TELEPHONE ENCOUNTER
----- Message from 535Eddie Huizar Wenham sent at 8/10/2022  9:16 AM EDT -----  Regarding: Verzenio  Patient was on Verzenio 150 mg BID  Was hospitalized with severe diarrhea, dehydration  Discussed with Dr Patito Landin  Given her age, severity of symptoms will dose reduce her to 50 mg BID  She will stay on Letrozole  Cand send out new RX/request for medication at the reduced dose    Thanks

## 2022-08-13 PROBLEM — N13.30 HYDRONEPHROSIS OF RIGHT KIDNEY: Status: RESOLVED | Noted: 2021-06-25 | Resolved: 2022-08-13

## 2022-08-13 NOTE — ASSESSMENT & PLAN NOTE
The patient's blood pressure is well controlled with her current medication  We have made no changes today  She will continue with her current regimen

## 2022-08-13 NOTE — ASSESSMENT & PLAN NOTE
The patient will continue with her current therapy and will continue to follow-up with Oncology as scheduled

## 2022-08-13 NOTE — ASSESSMENT & PLAN NOTE
Lab Results   Component Value Date    EGFR 40 08/03/2022    EGFR 37 08/02/2022    EGFR 29 08/02/2022    CREATININE 1 23 08/03/2022    CREATININE 1 33 (H) 08/02/2022    CREATININE 1 61 (H) 08/02/2022   The patient's kidney function has improved and she will go for the follow-up lab work as indicated

## 2022-08-15 ENCOUNTER — HOSPITAL ENCOUNTER (INPATIENT)
Facility: HOSPITAL | Age: 82
LOS: 22 days | Discharge: NURSING, REHAB OR LONG-TERM CARE | DRG: 393 | End: 2022-09-06
Attending: EMERGENCY MEDICINE | Admitting: STUDENT IN AN ORGANIZED HEALTH CARE EDUCATION/TRAINING PROGRAM
Payer: MEDICARE

## 2022-08-15 ENCOUNTER — APPOINTMENT (EMERGENCY)
Dept: CT IMAGING | Facility: HOSPITAL | Age: 82
DRG: 393 | End: 2022-08-15
Payer: MEDICARE

## 2022-08-15 ENCOUNTER — TELEPHONE (OUTPATIENT)
Dept: HEMATOLOGY ONCOLOGY | Facility: CLINIC | Age: 82
End: 2022-08-15

## 2022-08-15 DIAGNOSIS — E86.0 DEHYDRATION: ICD-10-CM

## 2022-08-15 DIAGNOSIS — K62.5 RECTAL BLEEDING: ICD-10-CM

## 2022-08-15 DIAGNOSIS — K51.00 PANCOLITIS (HCC): Primary | ICD-10-CM

## 2022-08-15 DIAGNOSIS — R00.0 TACHYCARDIA: ICD-10-CM

## 2022-08-15 DIAGNOSIS — I95.1 ORTHOSTATIC HYPOTENSION: ICD-10-CM

## 2022-08-15 DIAGNOSIS — E83.39 HYPOPHOSPHATEMIA: ICD-10-CM

## 2022-08-15 DIAGNOSIS — R19.7 DIARRHEA, UNSPECIFIED TYPE: ICD-10-CM

## 2022-08-15 DIAGNOSIS — D64.9 ANEMIA, UNSPECIFIED TYPE: ICD-10-CM

## 2022-08-15 DIAGNOSIS — M79.89 SWELLING OF LIMB: ICD-10-CM

## 2022-08-15 DIAGNOSIS — I26.93 SINGLE SUBSEGMENTAL PULMONARY EMBOLISM WITHOUT ACUTE COR PULMONALE (HCC): ICD-10-CM

## 2022-08-15 DIAGNOSIS — K64.4 EXTERNAL HEMORRHOID: ICD-10-CM

## 2022-08-15 PROBLEM — I95.89 HYPOTENSION DUE TO HYPOVOLEMIA: Status: ACTIVE | Noted: 2022-08-15

## 2022-08-15 PROBLEM — E86.1 HYPOTENSION DUE TO HYPOVOLEMIA: Status: ACTIVE | Noted: 2022-08-15

## 2022-08-15 LAB
ALBUMIN SERPL BCP-MCNC: 1.9 G/DL (ref 3.5–5)
ALP SERPL-CCNC: 158 U/L (ref 46–116)
ALT SERPL W P-5'-P-CCNC: 41 U/L (ref 12–78)
ANION GAP SERPL CALCULATED.3IONS-SCNC: 13 MMOL/L (ref 4–13)
ANISOCYTOSIS BLD QL SMEAR: PRESENT
AST SERPL W P-5'-P-CCNC: 61 U/L (ref 5–45)
ATRIAL RATE: 120 BPM
BASOPHILS # BLD MANUAL: 0 THOUSAND/UL (ref 0–0.1)
BASOPHILS NFR MAR MANUAL: 0 % (ref 0–1)
BILIRUB SERPL-MCNC: 0.6 MG/DL (ref 0.2–1)
BUN SERPL-MCNC: 20 MG/DL (ref 5–25)
CALCIUM ALBUM COR SERPL-MCNC: 9.3 MG/DL (ref 8.3–10.1)
CALCIUM SERPL-MCNC: 7.6 MG/DL (ref 8.3–10.1)
CHLORIDE SERPL-SCNC: 101 MMOL/L (ref 96–108)
CO2 SERPL-SCNC: 22 MMOL/L (ref 21–32)
CREAT SERPL-MCNC: 1.34 MG/DL (ref 0.6–1.3)
CRP SERPL QL: 206.3 MG/L
EOSINOPHIL # BLD MANUAL: 0.52 THOUSAND/UL (ref 0–0.4)
EOSINOPHIL NFR BLD MANUAL: 6 % (ref 0–6)
ERYTHROCYTE [DISTWIDTH] IN BLOOD BY AUTOMATED COUNT: 19.9 % (ref 11.6–15.1)
GFR SERPL CREATININE-BSD FRML MDRD: 36 ML/MIN/1.73SQ M
GLUCOSE SERPL-MCNC: 125 MG/DL (ref 65–140)
HCT VFR BLD AUTO: 35.3 % (ref 34.8–46.1)
HGB BLD-MCNC: 11.2 G/DL (ref 11.5–15.4)
LACTATE SERPL-SCNC: 1.8 MMOL/L (ref 0.5–2)
LIPASE SERPL-CCNC: 79 U/L (ref 73–393)
LYMPHOCYTES # BLD AUTO: 0.87 THOUSAND/UL (ref 0.6–4.47)
LYMPHOCYTES # BLD AUTO: 10 % (ref 14–44)
MAGNESIUM SERPL-MCNC: 2 MG/DL (ref 1.6–2.6)
MCH RBC QN AUTO: 30.2 PG (ref 26.8–34.3)
MCHC RBC AUTO-ENTMCNC: 31.7 G/DL (ref 31.4–37.4)
MCV RBC AUTO: 95 FL (ref 82–98)
MONOCYTES # BLD AUTO: 1.3 THOUSAND/UL (ref 0–1.22)
MONOCYTES NFR BLD: 15 % (ref 4–12)
NEUTROPHILS # BLD MANUAL: 5.98 THOUSAND/UL (ref 1.85–7.62)
NEUTS SEG NFR BLD AUTO: 69 % (ref 43–75)
P AXIS: 83 DEGREES
PHOSPHATE SERPL-MCNC: 1.8 MG/DL (ref 2.3–4.1)
PLATELET # BLD AUTO: 381 THOUSANDS/UL (ref 149–390)
PLATELET BLD QL SMEAR: ADEQUATE
PMV BLD AUTO: 9.8 FL (ref 8.9–12.7)
POLYCHROMASIA BLD QL SMEAR: PRESENT
POTASSIUM SERPL-SCNC: 3.7 MMOL/L (ref 3.5–5.3)
PR INTERVAL: 148 MS
PROT SERPL-MCNC: 7.1 G/DL (ref 6.4–8.4)
QRS AXIS: 70 DEGREES
QRSD INTERVAL: 82 MS
QT INTERVAL: 336 MS
QTC INTERVAL: 474 MS
RBC # BLD AUTO: 3.71 MILLION/UL (ref 3.81–5.12)
RBC MORPH BLD: PRESENT
SODIUM SERPL-SCNC: 136 MMOL/L (ref 135–147)
T WAVE AXIS: 75 DEGREES
TSH SERPL DL<=0.05 MIU/L-ACNC: 3.66 UIU/ML (ref 0.45–4.5)
VENTRICULAR RATE: 120 BPM
WBC # BLD AUTO: 8.67 THOUSAND/UL (ref 4.31–10.16)

## 2022-08-15 PROCEDURE — 84100 ASSAY OF PHOSPHORUS: CPT | Performed by: EMERGENCY MEDICINE

## 2022-08-15 PROCEDURE — 93010 ELECTROCARDIOGRAM REPORT: CPT | Performed by: INTERNAL MEDICINE

## 2022-08-15 PROCEDURE — G1004 CDSM NDSC: HCPCS

## 2022-08-15 PROCEDURE — 93005 ELECTROCARDIOGRAM TRACING: CPT

## 2022-08-15 PROCEDURE — 74177 CT ABD & PELVIS W/CONTRAST: CPT

## 2022-08-15 PROCEDURE — 99223 1ST HOSP IP/OBS HIGH 75: CPT | Performed by: STUDENT IN AN ORGANIZED HEALTH CARE EDUCATION/TRAINING PROGRAM

## 2022-08-15 PROCEDURE — 87040 BLOOD CULTURE FOR BACTERIA: CPT | Performed by: STUDENT IN AN ORGANIZED HEALTH CARE EDUCATION/TRAINING PROGRAM

## 2022-08-15 PROCEDURE — 85007 BL SMEAR W/DIFF WBC COUNT: CPT | Performed by: EMERGENCY MEDICINE

## 2022-08-15 PROCEDURE — 96360 HYDRATION IV INFUSION INIT: CPT

## 2022-08-15 PROCEDURE — 85027 COMPLETE CBC AUTOMATED: CPT | Performed by: EMERGENCY MEDICINE

## 2022-08-15 PROCEDURE — 87505 NFCT AGENT DETECTION GI: CPT | Performed by: EMERGENCY MEDICINE

## 2022-08-15 PROCEDURE — 80053 COMPREHEN METABOLIC PANEL: CPT | Performed by: EMERGENCY MEDICINE

## 2022-08-15 PROCEDURE — 83690 ASSAY OF LIPASE: CPT | Performed by: STUDENT IN AN ORGANIZED HEALTH CARE EDUCATION/TRAINING PROGRAM

## 2022-08-15 PROCEDURE — 86140 C-REACTIVE PROTEIN: CPT | Performed by: STUDENT IN AN ORGANIZED HEALTH CARE EDUCATION/TRAINING PROGRAM

## 2022-08-15 PROCEDURE — 87493 C DIFF AMPLIFIED PROBE: CPT | Performed by: EMERGENCY MEDICINE

## 2022-08-15 PROCEDURE — 83735 ASSAY OF MAGNESIUM: CPT | Performed by: EMERGENCY MEDICINE

## 2022-08-15 PROCEDURE — 83605 ASSAY OF LACTIC ACID: CPT | Performed by: EMERGENCY MEDICINE

## 2022-08-15 PROCEDURE — 36415 COLL VENOUS BLD VENIPUNCTURE: CPT | Performed by: EMERGENCY MEDICINE

## 2022-08-15 PROCEDURE — 99285 EMERGENCY DEPT VISIT HI MDM: CPT | Performed by: EMERGENCY MEDICINE

## 2022-08-15 PROCEDURE — 99285 EMERGENCY DEPT VISIT HI MDM: CPT

## 2022-08-15 PROCEDURE — 84443 ASSAY THYROID STIM HORMONE: CPT | Performed by: STUDENT IN AN ORGANIZED HEALTH CARE EDUCATION/TRAINING PROGRAM

## 2022-08-15 RX ORDER — CHOLESTYRAMINE LIGHT 4 G/5.7G
4 POWDER, FOR SUSPENSION ORAL 2 TIMES DAILY
Status: DISCONTINUED | OUTPATIENT
Start: 2022-08-15 | End: 2022-08-31

## 2022-08-15 RX ORDER — AMITRIPTYLINE HYDROCHLORIDE 25 MG/1
25 TABLET, FILM COATED ORAL
Status: DISCONTINUED | OUTPATIENT
Start: 2022-08-15 | End: 2022-09-06 | Stop reason: HOSPADM

## 2022-08-15 RX ORDER — SODIUM CHLORIDE 9 MG/ML
100 INJECTION, SOLUTION INTRAVENOUS CONTINUOUS
Status: DISCONTINUED | OUTPATIENT
Start: 2022-08-15 | End: 2022-08-17

## 2022-08-15 RX ORDER — MIRTAZAPINE 15 MG/1
7.5 TABLET, FILM COATED ORAL
Status: DISCONTINUED | OUTPATIENT
Start: 2022-08-15 | End: 2022-09-06 | Stop reason: HOSPADM

## 2022-08-15 RX ORDER — ACETAMINOPHEN 325 MG/1
650 TABLET ORAL EVERY 6 HOURS PRN
Status: DISCONTINUED | OUTPATIENT
Start: 2022-08-15 | End: 2022-09-06 | Stop reason: HOSPADM

## 2022-08-15 RX ORDER — LOPERAMIDE HYDROCHLORIDE 2 MG/1
2 CAPSULE ORAL EVERY 4 HOURS PRN
Status: DISCONTINUED | OUTPATIENT
Start: 2022-08-15 | End: 2022-09-06 | Stop reason: HOSPADM

## 2022-08-15 RX ORDER — HEPARIN SODIUM 5000 [USP'U]/ML
5000 INJECTION, SOLUTION INTRAVENOUS; SUBCUTANEOUS EVERY 8 HOURS SCHEDULED
Status: DISCONTINUED | OUTPATIENT
Start: 2022-08-15 | End: 2022-08-19

## 2022-08-15 RX ORDER — ONDANSETRON 2 MG/ML
4 INJECTION INTRAMUSCULAR; INTRAVENOUS EVERY 6 HOURS PRN
Status: DISCONTINUED | OUTPATIENT
Start: 2022-08-15 | End: 2022-09-06 | Stop reason: HOSPADM

## 2022-08-15 RX ORDER — PRAVASTATIN SODIUM 20 MG
20 TABLET ORAL
Refills: 1 | Status: DISCONTINUED | OUTPATIENT
Start: 2022-08-15 | End: 2022-09-06 | Stop reason: HOSPADM

## 2022-08-15 RX ORDER — SACCHAROMYCES BOULARDII 250 MG
250 CAPSULE ORAL 2 TIMES DAILY
Status: DISCONTINUED | OUTPATIENT
Start: 2022-08-15 | End: 2022-08-16

## 2022-08-15 RX ADMIN — SODIUM CHLORIDE 1000 ML: 0.9 INJECTION, SOLUTION INTRAVENOUS at 14:47

## 2022-08-15 RX ADMIN — CHOLESTYRAMINE 4 G: 4 POWDER, FOR SUSPENSION ORAL at 17:26

## 2022-08-15 RX ADMIN — AMITRIPTYLINE HYDROCHLORIDE 25 MG: 25 TABLET, FILM COATED ORAL at 22:36

## 2022-08-15 RX ADMIN — SODIUM CHLORIDE 100 ML/HR: 0.9 INJECTION, SOLUTION INTRAVENOUS at 16:51

## 2022-08-15 RX ADMIN — HEPARIN SODIUM 5000 UNITS: 5000 INJECTION INTRAVENOUS; SUBCUTANEOUS at 17:27

## 2022-08-15 RX ADMIN — Medication 250 MG: at 17:26

## 2022-08-15 RX ADMIN — HEPARIN SODIUM 5000 UNITS: 5000 INJECTION INTRAVENOUS; SUBCUTANEOUS at 23:06

## 2022-08-15 RX ADMIN — IOHEXOL 65 ML: 350 INJECTION, SOLUTION INTRAVENOUS at 11:50

## 2022-08-15 RX ADMIN — PRAVASTATIN SODIUM 20 MG: 20 TABLET ORAL at 17:26

## 2022-08-15 RX ADMIN — POTASSIUM & SODIUM PHOSPHATES POWDER PACK 280-160-250 MG 1 PACKET: 280-160-250 PACK at 17:26

## 2022-08-15 RX ADMIN — PIPERACILLIN AND TAZOBACTAM 2.25 G: 2; .25 INJECTION, POWDER, FOR SOLUTION INTRAVENOUS at 18:25

## 2022-08-15 RX ADMIN — PIPERACILLIN AND TAZOBACTAM 2.25 G: 2; .25 INJECTION, POWDER, FOR SOLUTION INTRAVENOUS at 23:06

## 2022-08-15 RX ADMIN — SODIUM CHLORIDE 1000 ML: 900 INJECTION, SOLUTION INTRAVENOUS at 10:50

## 2022-08-15 RX ADMIN — MIRTAZAPINE 7.5 MG: 15 TABLET, FILM COATED ORAL at 22:36

## 2022-08-15 NOTE — ED PROVIDER NOTES
History  Chief Complaint   Patient presents with    Dizziness     Via ems from home for increased dizziness and multiple episodes diarrhea since the beginning of August  States worse since last night  States is getting new chemo medication     79year old female presents for evaluation of generalized weakness and fatigue associated with diarrhea and rectal bleeding which has been present for the past 2 weeks  Patient states she had been advised to follow up with GI for her rectal bleeding, but has not yet been able to make an appointment  Not currently taking verzenio which was held by hem/onc  History provided by:  Patient  Dizziness  Quality:  Lightheadedness  Severity:  Severe  Duration:  2 weeks  Timing:  Intermittent  Progression:  Waxing and waning  Chronicity:  New  Context: standing up    Relieved by:  Lying down  Worsened by:  Standing up  Ineffective treatments:  None tried  Associated symptoms: blood in stool and diarrhea    Associated symptoms: no chest pain, no headaches, no nausea, no shortness of breath and no vomiting    Risk factors: multiple medications    Risk factors: no hx of vertigo        Prior to Admission Medications   Prescriptions Last Dose Informant Patient Reported? Taking?    Abemaciclib (Verzenio) 50 MG TABS Not Taking at Unknown time  No No   Sig: Take 50 mg by mouth 2 (two) times a day   Patient not taking: Reported on 8/15/2022   Cholecalciferol (VITAMIN D3) 2000 units capsule  Self Yes No   Sig: Take 2,000 Units by mouth daily     amitriptyline (ELAVIL) 25 mg tablet  Self No No   Sig: Take 1 tablet (25 mg total) by mouth daily at bedtime   diphenoxylate-atropine (LOMOTIL) 2 5-0 025 mg per tablet  Self No No   Sig: Take 1 tablet by mouth 4 (four) times a day as needed for diarrhea   letrozole (FEMARA) 2 5 mg tablet  Self No No   Sig: Take 1 tablet (2 5 mg total) by mouth daily   loperamide (IMODIUM) 2 mg capsule  Self No No   Sig: Take 2 capsules (4 mg total) by mouth 4 (four) times a day as needed for diarrhea   losartan (COZAAR) 50 mg tablet  Self No No   Sig: Take 1 tablet (50 mg total) by mouth daily Resume in 2-3 days once adequately consuming liquids  mirtazapine (REMERON) 7 5 MG tablet  Self No No   Sig: Take 1 tablet (7 5 mg total) by mouth daily at bedtime   simvastatin (ZOCOR) 10 mg tablet  Self No No   Sig: Take 1 tablet (10 mg total) by mouth daily at bedtime      Facility-Administered Medications: None       Past Medical History:   Diagnosis Date    Abnormal weight loss     Allergic reaction     Anxiety     Breast cancer, right ([facility] [address] ) 2011    right    Cancer ([facility] [address] ) 2012    Candidal vulvovaginitis     Clotting disorder ([facility] ) Same as above    Endometrial hyperplasia     Epithelial cyst     benign, ovary    GI (gastrointestinal bleed) Blood mixed with stool    History of radiation therapy 2011    right breast cancer    Hyperlipidemia     Hypertension     Internal hemorrhoids     Knee tendonitis     Ovarian cyst     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[facility])        Past Surgical History:   Procedure Laterality Date    BILATERAL SALPINGOOPHORECTOMY      onset: 7/23/13    BREAST BIOPSY Right 06/13/2006    benign    BREAST BIOPSY Right 03/02/2011    malignant    BREAST LUMPECTOMY Right 03/30/2011    malignant    CATARACT EXTRACTION W/  INTRAOCULAR LENS IMPLANT Right     phacoemulsification   Onset: 10/27/14    CHOLECYSTECTOMY      COLONOSCOPY  2017    approx    HYSTERECTOMY  Yes    INTRAOPERATIVE RADIATION THERAPY (IORT)      IR BIOPSY BONE  7/9/2021    SKIN LESION EXCISION Right     breast, single lesion    TONSILLECTOMY AND ADENOIDECTOMY         Family History   Problem Relation Age of Onset    Stomach cancer Mother     No Known Problems Father     Cervical cancer Sister     No Known Problems Daughter     No Known Problems Maternal Grandmother     No Known Problems Maternal Grandfather     No Known Problems Paternal Grandmother     No Known Problems Paternal Grandfather     Colon polyps Neg Hx     Colon cancer Neg Hx      I have reviewed and agree with the history as documented  E-Cigarette/Vaping    E-Cigarette Use Never User      E-Cigarette/Vaping Substances    Nicotine No     THC No     CBD No     Flavoring No     Other No     Unknown No      Social History     Tobacco Use    Smoking status: Former Smoker     Packs/day: 1 00     Years: 30 00     Pack years: 30 00     Types: Cigarettes     Start date: 55     Quit date:      Years since quittin 6    Smokeless tobacco: Never Used   Vaping Use    Vaping Use: Never used   Substance Use Topics    Alcohol use: No     Comment: (history)    Drug use: No       Review of Systems   Constitutional: Negative for chills and fever  HENT: Negative for congestion and sore throat  Respiratory: Negative for cough, chest tightness and shortness of breath  Cardiovascular: Negative for chest pain and leg swelling  Gastrointestinal: Positive for blood in stool and diarrhea  Negative for abdominal pain, constipation, nausea and vomiting  Musculoskeletal: Negative for myalgias  Skin: Negative for rash and wound  Neurological: Positive for light-headedness  Negative for dizziness, syncope and headaches  All other systems reviewed and are negative  Physical Exam  Physical Exam  Vitals and nursing note reviewed  Constitutional:       General: She is not in acute distress  Appearance: She is well-developed  She is not toxic-appearing or diaphoretic  HENT:      Head: Normocephalic and atraumatic  Right Ear: External ear normal       Left Ear: External ear normal       Nose: Nose normal       Mouth/Throat:      Mouth: Mucous membranes are dry  Eyes:      General: No scleral icterus  Cardiovascular:      Rate and Rhythm: Normal rate and regular rhythm  Heart sounds: Normal heart sounds  Pulmonary:      Effort: Pulmonary effort is normal  No respiratory distress  Breath sounds: Normal breath sounds  Abdominal:      General: There is no distension  Palpations: Abdomen is soft  Tenderness: There is abdominal tenderness in the left lower quadrant  There is no guarding or rebound  Genitourinary:     Rectum: Guaiac result positive  External hemorrhoid present  Musculoskeletal:         General: No deformity  Normal range of motion  Right lower leg: No edema  Left lower leg: No edema  Skin:     General: Skin is warm and dry  Findings: No rash  Neurological:      General: No focal deficit present  Mental Status: She is alert and oriented to person, place, and time  Psychiatric:         Mood and Affect: Mood normal          Vital Signs  ED Triage Vitals   Temperature Pulse Respirations Blood Pressure SpO2   08/15/22 1045 08/15/22 1045 08/15/22 1045 08/15/22 1052 08/15/22 1052   97 8 °F (36 6 °C) (!) 118 18 109/55 100 %      Temp Source Heart Rate Source Patient Position - Orthostatic VS BP Location FiO2 (%)   08/15/22 1045 08/15/22 1045 08/15/22 1351 08/15/22 1351 --   Oral Monitor Lying Left arm       Pain Score       08/15/22 1045       No Pain           Vitals:    08/15/22 1351 08/15/22 1443 08/15/22 1501 08/15/22 1523   BP: 105/62 (!) 81/44 94/53 146/76   Pulse: (!) 107 (!) 133  (!) 109   Patient Position - Orthostatic VS: Lying  Sitting          Visual Acuity      ED Medications  Medications   sodium chloride 0 9 % infusion (has no administration in time range)   sodium chloride 0 9 % bolus 1,000 mL (0 mL Intravenous Stopped 8/15/22 1200)   iohexol (OMNIPAQUE) 350 MG/ML injection (MULTI-DOSE) 65 mL (65 mL Intravenous Given 8/15/22 1150)   sodium chloride 0 9 % bolus 1,000 mL (1,000 mL Intravenous New Bag 8/15/22 1447)       Diagnostic Studies  Results Reviewed     Procedure Component Value Units Date/Time    Clostridium difficile toxin by PCR with EIA [[id number]] Collected: 08/15/22 3317    Lab Status:  In process Specimen: Stool from Rectum Updated: 08/15/22 1327    Stool Enteric Bacterial Panel by PCR [[id number]] Collected: 08/15/22 1259    Lab Status: In process Specimen: Stool from Rectum Updated: 08/15/22 1327    CBC and differential [[id number]]  (Abnormal) Collected: 08/15/22 1050    Lab Status: Final result Specimen: Blood from Arm, Left Updated: 08/15/22 1135     WBC 8 67 Thousand/uL      RBC 3 71 Million/uL      Hemoglobin 11 2 g/dL      Hematocrit 35 3 %      MCV 95 fL      MCH 30 2 pg      MCHC 31 7 g/dL      RDW 19 9 %      MPV 9 8 fL      Platelets 339 Thousands/uL     Narrative: This is an appended report  These results have been appended to a previously verified report  Manual Differential(PHLEBS Do Not Order) [[id number]]  (Abnormal) Collected: 08/15/22 1050    Lab Status: Final result Specimen: Blood from Arm, Left Updated: 08/15/22 1135     Segmented % 69 %      Lymphocytes % 10 %      Monocytes % 15 %      Eosinophils, % 6 %      Basophils % 0 %      Absolute Neutrophils 5 98 Thousand/uL      Lymphocytes Absolute 0 87 Thousand/uL      Monocytes Absolute 1 30 Thousand/uL      Eosinophils Absolute 0 52 Thousand/uL      Basophils Absolute 0 00 Thousand/uL      Total Counted --     RBC Morphology Present     Anisocytosis Present     Polychromasia Present     Platelet Estimate Adequate    Lactic acid [[id number]]  (Normal) Collected: 08/15/22 1106    Lab Status: Final result Specimen: Blood from Arm, Left Updated: 08/15/22 1134     LACTIC ACID 1 8 mmol/L     Narrative:      Result may be elevated if tourniquet was used during collection      Comprehensive metabolic panel [[id number]]  (Abnormal) Collected: 08/15/22 1050    Lab Status: Final result Specimen: Blood from Arm, Left Updated: 08/15/22 1133     Sodium 136 mmol/L      Potassium 3 7 mmol/L      Chloride 101 mmol/L      CO2 22 mmol/L      ANION GAP 13 mmol/L      BUN 20 mg/dL      Creatinine 1 34 mg/dL      Glucose 125 mg/dL      Calcium 7 6 mg/dL      Corrected Calcium 9 3 mg/dL      AST 61 U/L      ALT 41 U/L      Alkaline Phosphatase 158 U/L      Total Protein 7 1 g/dL      Albumin 1 9 g/dL      Total Bilirubin 0 60 mg/dL      eGFR 36 ml/min/1 73sq m     Narrative:      [place] guidelines for Chronic Kidney Disease (CKD):     Stage 1 with normal or high GFR (GFR > 90 mL/min/1 73 square meters)    Stage 2 Mild CKD (GFR = 60-89 mL/min/1 73 square meters)    Stage 3A Moderate CKD (GFR = 45-59 mL/min/1 73 square meters)    Stage 3B Moderate CKD (GFR = 30-44 mL/min/1 73 square meters)    Stage 4 Severe CKD (GFR = 15-29 mL/min/1 73 square meters)    Stage 5 End Stage CKD (GFR <15 mL/min/1 73 square meters)  Note: GFR calculation is accurate only with a steady state creatinine    Phosphorus [[id number]]  (Abnormal) Collected: 08/15/22 1050    Lab Status: Final result Specimen: Blood from Arm, Left Updated: 08/15/22 1129     Phosphorus 1 8 mg/dL     Magnesium [[id number]]  (Normal) Collected: 08/15/22 1050    Lab Status: Final result Specimen: Blood from Arm, Left Updated: 08/15/22 1129     Magnesium 2 0 mg/dL     Lipase [[id number]]     Lab Status: No result Specimen: Blood     UA w Reflex to Microscopic w Reflex to Culture [[id number]]     Lab Status: No result Specimen: Urine                  CT abdomen pelvis with contrast   Final Result by Jero Cruz MD (08/15 1238)         1  Diffuse mild-moderate circumferential wall thickening of the colon with pericolic inflammatory stranding consistent with nonspecific pancolitis  2   Multiple stable small scattered sclerotic foci in the visualized lumbar lower thoracic spine osseous pelvis which may reflect osseous metastatic disease in this patient with known metastatic breast cancer  3   Additional stable findings as noted  The study was marked in [facility] for immediate notification              Workstation performed: [id number]                    Procedures  ECG 12 Lead Documentation Only    Date/Time: 8/15/2022 10:47 AM  Performed by: Alla Oliver MD  Authorized by: Alla Oliver MD     Indications / Diagnosis:  Lightheaded  ECG reviewed by me, the ED Provider: yes    Patient location:  ED  Previous ECG:     Previous ECG:  Compared to current    Comparison ECG info:  8/1/22 sinus tachycardia with flattening in V2 and aVL    Similarity:  No change  Interpretation:     Interpretation: non-specific    Rate:     ECG rate:  120    ECG rate assessment: tachycardic    Rhythm:     Rhythm: sinus tachycardia    Ectopy:     Ectopy: none    QRS:     QRS axis:  Normal    QRS intervals:  Normal  Conduction:     Conduction: normal    ST segments:     ST segments:  Normal  T waves:     T waves: flattening      Flattening:  AVL             ED Course  ED Course as of 08/15/22 1549   Mon Aug 15, 2022   1151 Creatinine(!): 1 34  1 23 twelve days ago   1151 Phosphorus(!): 1 8   1253 Discussed with hem/onc  Unlikely to be from letrozol and no longer taking verzenio which had been implicated in causing her diarrhea earlier in the month  1348 Discussed with GI who recommends admission                               SBIRT 20yo+    Flowsheet Row Most Recent Value   SBIRT (25 yo +)    In order to provide better care to our patients, we are screening all of our patients for alcohol and drug use  Would it be okay to ask you these screening questions? Yes Filed at: 08/15/2022 1056   Initial Alcohol Screen: US AUDIT-C     1  How often do you have a drink containing alcohol? 0 Filed at: 08/15/2022 1056   2  How many drinks containing alcohol do you have on a typical day you are drinking? 0 Filed at: 08/15/2022 1056   3b  FEMALE Any Age, or MALE 65+: How often do you have 4 or more drinks on one occassion? 0 Filed at: 08/15/2022 1056   Audit-C Score 0 Filed at: 08/15/2022 1056   ADIS: How many times in the past year have you        Used an illegal drug or used a prescription medication for non-medical reasons? Never Filed at: 08/15/2022 1056                    MDM  Number of Diagnoses or Management Options  Dehydration: new and requires workup  External hemorrhoid: new and requires workup  Hypophosphatemia: new and requires workup  Orthostatic hypotension: new and requires workup  Pancolitis [facility]): new and requires workup  Diagnosis management comments: 79year old female presents for evaluation of dizziness and fatigue associated with diarrhea  LLQ tenderness and external hemorrhoid present on exam  No active bleeding from hemorrhoid  Mucous membranes dry and patient tachycardic consistent with dehydration  Improved with IV fluids  CT with pancolitis  Stool studies pending  Case discussed with hem/onc and GI  Patient admitted for further evaluation and management         Amount and/or Complexity of Data Reviewed  Clinical lab tests: ordered and reviewed  Tests in the radiology section of CPT®: ordered and reviewed  Discuss the patient with other providers: yes    Patient Progress  Patient progress: stable      Disposition  Final diagnoses:   Dehydration   Hypophosphatemia   Pancolitis ([facility] [address] )   Orthostatic hypotension   External hemorrhoid     Time reflects when diagnosis was documented in both MDM as applicable and the Disposition within this note     Time User Action Codes Description Comment    8/15/2022 11:51 AM Marlin Dus Add [E86 0] Dehydration     8/15/2022 11:52 AM Marlin Dus Add [E83 39] Hypophosphatemia     8/15/2022 12:42 PM Marlin Dus Add [K51 00] Pancolitis ([facility] [address] )     8/15/2022  1:50 PM Marlin Dus Modify [E86 0] Dehydration     8/15/2022  1:50 PM Marlin Dus Modify [K51 00] Pancolitis ([facility] [address] )     8/15/2022  2:50 PM Marlin Dus Add [I95 1] Orthostatic hypotension     8/15/2022  2:51 PM Marlin Dus Add [K64 4] External hemorrhoid       ED Disposition     ED Disposition   Admit    Condition   Stable    Date/Time   Mon Aug 15, 2022  1:51 PM    Comment Case was discussed with HERMELINDO and the patient's admission status was agreed to be Admission Status: inpatient status to the service of Dr Ronni Cope   Follow-up Information    None         Current Discharge Medication List      CONTINUE these medications which have NOT CHANGED    Details   Abemaciclib (Verzenio) 50 MG TABS Take 50 mg by mouth 2 (two) times a day  Qty: 60 tablet, Refills: 1    Comments: This is a dose reduction  Associated Diagnoses: Metastatic breast cancer (HCC)      amitriptyline (ELAVIL) 25 mg tablet Take 1 tablet (25 mg total) by mouth daily at bedtime  Qty: 90 tablet, Refills: 1    Associated Diagnoses: Anxiety      Cholecalciferol (VITAMIN D3) 2000 units capsule Take 2,000 Units by mouth daily        diphenoxylate-atropine (LOMOTIL) 2 5-0 025 mg per tablet Take 1 tablet by mouth 4 (four) times a day as needed for diarrhea  Qty: 30 tablet, Refills: 0    Associated Diagnoses: Metastatic breast cancer ([facility] [place] [address] ); Functional diarrhea      letrozole (FEMARA) 2 5 mg tablet Take 1 tablet (2 5 mg total) by mouth daily  Qty: 90 tablet, Refills: 3    Associated Diagnoses: Lytic lesion of bone on x-ray; Metastatic breast cancer (HCC)      loperamide (IMODIUM) 2 mg capsule Take 2 capsules (4 mg total) by mouth 4 (four) times a day as needed for diarrhea  Qty: 30 capsule, Refills: 0    Associated Diagnoses: Colitis      losartan (COZAAR) 50 mg tablet Take 1 tablet (50 mg total) by mouth daily Resume in 2-3 days once adequately consuming liquids  Qty: 90 tablet, Refills: 0    Comments: We are stopping the lisinopril and changing to Losartan    Associated Diagnoses: Essential hypertension      mirtazapine (REMERON) 7 5 MG tablet Take 1 tablet (7 5 mg total) by mouth daily at bedtime  Qty: 30 tablet, Refills: 0    Associated Diagnoses: Anxiety; Depression      simvastatin (ZOCOR) 10 mg tablet Take 1 tablet (10 mg total) by mouth daily at bedtime  Qty: 90 tablet, Refills: 1    Associated Diagnoses: Dyslipidemia             No discharge procedures on file      PDMP Review       Value Time User    PDMP Reviewed  Yes 8/10/2022  8:13 AM Suzan Bonner, [facility]          ED Provider  Electronically Signed by           Gerardo Miller MD  08/15/22 9812

## 2022-08-15 NOTE — PLAN OF CARE
Problem: Nutrition/Hydration-ADULT  Goal: Nutrient/Hydration intake appropriate for improving, restoring or maintaining nutritional needs  Description: Monitor and assess patient's nutrition/hydration status for malnutrition  Collaborate with interdisciplinary team and initiate plan and interventions as ordered  Monitor patient's weight and dietary intake as ordered or per policy  Utilize nutrition screening tool and intervene as necessary  Determine patient's food preferences and provide high-protein, high-caloric foods as appropriate       INTERVENTIONS:  - Monitor oral intake, urinary output, labs, and treatment plans  - Assess nutrition and hydration status and recommend course of action  - Evaluate amount of meals eaten  - Assist patient with eating if necessary   - Allow adequate time for meals  - Recommend/ encourage appropriate diets, oral nutritional supplements, and vitamin/mineral supplements  - Order, calculate, and assess calorie counts as needed  - Recommend, monitor, and adjust tube feedings and TPN/PPN based on assessed needs  - Assess need for intravenous fluids  - Provide specific nutrition/hydration education as appropriate  - Include patient/family/caregiver in decisions related to nutrition  Outcome: Progressing     Problem: MOBILITY - ADULT  Goal: Maintain or return to baseline ADL function  Description: INTERVENTIONS:  -  Assess patient's ability to carry out ADLs; assess patient's baseline for ADL function and identify physical deficits which impact ability to perform ADLs (bathing, care of mouth/teeth, toileting, grooming, dressing, etc )  - Assess/evaluate cause of self-care deficits   - Assess range of motion  - Assess patient's mobility; develop plan if impaired  - Assess patient's need for assistive devices and provide as appropriate  - Encourage maximum independence but intervene and supervise when necessary  - Involve family in performance of ADLs  - Assess for home care needs following discharge   - Consider OT consult to assist with ADL evaluation and planning for discharge  - Provide patient education as appropriate  Outcome: Progressing  Goal: Maintains/Returns to pre admission functional level  Description: INTERVENTIONS:  - Perform BMAT or MOVE assessment daily    - Set and communicate daily mobility goal to care team and patient/family/caregiver  - Collaborate with rehabilitation services on mobility goals if consulted  - Perform Range of Motion 3 times a day  - Reposition patient every 2 hours    - Dangle patient 3 times a day  - Stand patient 3 times a day  - Ambulate patient 3 times a day  - Out of bed to chair 3 times a day   - Out of bed for meals 3 times a day  - Out of bed for toileting  - Record patient progress and toleration of activity level   Outcome: Progressing     Problem: Potential for Falls  Goal: Patient will remain free of falls  Description: INTERVENTIONS:  - Educate patient/family on patient safety including physical limitations  - Instruct patient to call for assistance with activity   - Consult OT/PT to assist with strengthening/mobility   - Keep Call bell within reach  - Keep bed low and locked with side rails adjusted as appropriate  - Keep care items and personal belongings within reach  - Initiate and maintain comfort rounds  - Make Fall Risk Sign visible to staff  - Offer Toileting every 2 Hours, in advance of need  - Initiate/Maintain bed alarm  - Obtain necessary fall risk management equipment:   - Apply yellow socks and bracelet for high fall risk patients  - Consider moving patient to room near nurses station  Outcome: Progressing     Problem: Prexisting or High Potential for Compromised Skin Integrity  Goal: Skin integrity is maintained or improved  Description: INTERVENTIONS:  - Identify patients at risk for skin breakdown  - Assess and monitor skin integrity  - Assess and monitor nutrition and hydration status  - Monitor labs   - Assess for incontinence   - Turn and reposition patient  - Assist with mobility/ambulation  - Relieve pressure over bony prominences  - Avoid friction and shearing  - Provide appropriate hygiene as needed including keeping skin clean and dry  - Evaluate need for skin moisturizer/barrier cream  - Collaborate with interdisciplinary team   - Patient/family teaching  - Consider wound care consult   Outcome: Progressing     Problem: SAFETY ADULT  Goal: Maintain or return to baseline ADL function  Description: INTERVENTIONS:  -  Assess patient's ability to carry out ADLs; assess patient's baseline for ADL function and identify physical deficits which impact ability to perform ADLs (bathing, care of mouth/teeth, toileting, grooming, dressing, etc )  - Assess/evaluate cause of self-care deficits   - Assess range of motion  - Assess patient's mobility; develop plan if impaired  - Assess patient's need for assistive devices and provide as appropriate  - Encourage maximum independence but intervene and supervise when necessary  - Involve family in performance of ADLs  - Assess for home care needs following discharge   - Consider OT consult to assist with ADL evaluation and planning for discharge  - Provide patient education as appropriate  Outcome: Progressing  Goal: Maintains/Returns to pre admission functional level  Description: INTERVENTIONS:  - Perform BMAT or MOVE assessment daily    - Set and communicate daily mobility goal to care team and patient/family/caregiver  - Collaborate with rehabilitation services on mobility goals if consulted  - Perform Range of Motion 3 times a day  - Reposition patient every 2 hours    - Dangle patient 3 times a day  - Stand patient 3 times a day  - Ambulate patient 3 times a day  - Out of bed to chair 3 times a day   - Out of bed for meals 3 times a day  - Out of bed for toileting  - Record patient progress and toleration of activity level   Outcome: Progressing  Goal: Patient will remain free of falls  Description: INTERVENTIONS:  - Educate patient/family on patient safety including physical limitations  - Instruct patient to call for assistance with activity   - Consult OT/PT to assist with strengthening/mobility   - Keep Call bell within reach  - Keep bed low and locked with side rails adjusted as appropriate  - Keep care items and personal belongings within reach  - Initiate and maintain comfort rounds  - Make Fall Risk Sign visible to staff  - Offer Toileting every 2 Hours, in advance of need  - Initiate/Maintain bed alarm  - Obtain necessary fall risk management equipment:   - Apply yellow socks and bracelet for high fall risk patients  - Consider moving patient to room near nurses station  Outcome: Progressing     Problem: DISCHARGE PLANNING  Goal: Discharge to home or other facility with appropriate resources  Description: INTERVENTIONS:  - Identify barriers to discharge w/patient and caregiver  - Arrange for needed discharge resources and transportation as appropriate  - Identify discharge learning needs (meds, wound care, etc )  - Arrange for interpretive services to assist at discharge as needed  - Refer to Case Management Department for coordinating discharge planning if the patient needs post-hospital services based on physician/advanced practitioner order or complex needs related to functional status, cognitive ability, or social support system  Outcome: Progressing     Problem: Knowledge Deficit  Goal: Patient/family/caregiver demonstrates understanding of disease process, treatment plan, medications, and discharge instructions  Description: Complete learning assessment and assess knowledge base    Interventions:  - Provide teaching at level of understanding  - Provide teaching via preferred learning methods  Outcome: Progressing

## 2022-08-15 NOTE — ASSESSMENT & PLAN NOTE
Patient presented with dizziness and diarrhea starting in August that worsened yesterday  Most likely secondary to chemo    CT showed: 1  Diffuse mild-moderate circumferential wall thickening of the colon with pericolic inflammatory stranding consistent with nonspecific pancolitis  2   Multiple stable small scattered sclerotic foci in the visualized lumbar lower thoracic spine osseous pelvis which may reflect osseous metastatic disease in this patient with known metastatic breast cancer   3   Additional stable findings as noted     GI consulted-> recs appreciated  IVF  Zosyn  F/u bld cx and procal, ESR, CRP, and lipase  CLD-> advance as tolerated  Cholestyramine

## 2022-08-15 NOTE — TELEPHONE ENCOUNTER
Patient called in stating that she is dehydrated and feels like she is constantly feeling as if she is going to pass out  Patient disconnected to call [phone]

## 2022-08-15 NOTE — ASSESSMENT & PLAN NOTE
Lab Results   Component Value Date    EGFR 36 08/15/2022    EGFR 40 08/03/2022    EGFR 37 08/02/2022    CREATININE 1 34 (H) 08/15/2022    CREATININE 1 23 08/03/2022    CREATININE 1 33 (H) 08/02/2022     Cr on admission slightly elevated from her baseline at 1 3-> most likely due to volume depletion  Baseline 1 1-1 2  I's and O's  IV fluids  Avoid nephrotoxic agents  Continue to trend

## 2022-08-15 NOTE — H&P
[place]  H&P- Taye Silverman 1940, 79 y o  female MRN: [id number]  Unit/Bed#: -[place] Encounter: [id number]  Primary Care Provider: Anaya Loving DO   Date and time admitted to hospital: 8/15/2022 10:40 AM    * Pancolitis [facility])  Assessment & Plan  Patient presented with dizziness and diarrhea starting in August that worsened yesterday  Most likely secondary to chemo    CT showed: 1  Diffuse mild-moderate circumferential wall thickening of the colon with pericolic inflammatory stranding consistent with nonspecific pancolitis  2   Multiple stable small scattered sclerotic foci in the visualized lumbar lower thoracic spine osseous pelvis which may reflect osseous metastatic disease in this patient with known metastatic breast cancer   3   Additional stable findings as noted     GI consulted-> recs appreciated  IVF  Zosyn  F/u bld cx and procal, ESR, CRP, and lipase  CLD-> advance as tolerated  Cholestyramine    Hypotension due to hypovolemia  Assessment & Plan  Patient was hypotensive in the ED and received 2 L bolus  On IV fluid    Primary hypertension  Assessment & Plan  Hold cozaar d/t hypotension    Chronic kidney disease (CKD) stage G3a/A1, moderately decreased glomerular filtration rate (GFR) between 45-59 mL/min/1 73 square meter and albuminuria creatinine ratio less than 30 mg/g [facility])  Assessment & Plan  Lab Results   Component Value Date    EGFR 36 08/15/2022    EGFR 40 08/03/2022    EGFR 37 08/02/2022    CREATININE 1 34 (H) 08/15/2022    CREATININE 1 23 08/03/2022    CREATININE 1 33 (H) 08/02/2022     Cr on admission slightly elevated from her baseline at 1 3-> most likely due to volume depletion  Baseline 1 1-1 2  I's and O's  IV fluids  Avoid nephrotoxic agents  Continue to trend      Mixed hyperlipidemia  Assessment & Plan  Continue statin    Metastatic breast cancer [facility])  Assessment & Plan  Continue to follow-up with Hematology-Oncology outpatient  Holding Femara at this time    VTE Pharmacologic Prophylaxis: VTE Score: 6 High Risk (Score >/= 5) - Pharmacological DVT Prophylaxis Ordered: heparin  Sequential Compression Devices Ordered  Code Status: Level 1 - Full Code full code  Discussion with family: pt  Anticipated Length of Stay: Patient will be admitted on an inpatient basis with an anticipated length of stay of greater than 2 midnights secondary to pancolitis  Total Time for Visit, including Counseling / Coordination of Care: 30 minutes Greater than 50% of this total time spent on direct patient counseling and coordination of care  Chief Complaint: diarrhea    History of Present Illness:  Ethel Silverman is a 79 y o  female with a PMH of breast cancer, CKD, HTN, HLD, who presents with diarrhea over the past couple of weeks that worsened overnight associated with vertigo and lightheadedness  She endorses changing chemotherapy  She denies any fever, sweats, chest pain, shortness a breath, abdominal pain nausea, vomiting and urinary sx  Review of Systems:  Review of Systems   All other systems reviewed and are negative        Past Medical and Surgical History:   Past Medical History:   Diagnosis Date    Abnormal weight loss     Allergic reaction     Anxiety     Breast cancer, right ([facility] [address] ) 2011    right    Cancer [facility]) 2012    Candidal vulvovaginitis     Clotting disorder ([facility] [place] [facility] ) Same as above    Endometrial hyperplasia     Epithelial cyst     benign, ovary    GI (gastrointestinal bleed) Blood mixed with stool    History of radiation therapy 2011    right breast cancer    Hyperlipidemia     Hypertension     Internal hemorrhoids     Knee tendonitis     Ovarian cyst     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[facility])        Past Surgical History:   Procedure Laterality Date    BILATERAL SALPINGOOPHORECTOMY      onset: 7/23/13    BREAST BIOPSY Right 06/13/2006    benign    BREAST BIOPSY Right 03/02/2011    malignant    BREAST LUMPECTOMY Right 03/30/2011 malignant    CATARACT EXTRACTION W/  INTRAOCULAR LENS IMPLANT Right     phacoemulsification  Onset: 10/27/14    CHOLECYSTECTOMY      COLONOSCOPY  2017    approx    HYSTERECTOMY  Yes    INTRAOPERATIVE RADIATION THERAPY ([facility])      IR BIOPSY BONE  7/9/2021    SKIN LESION EXCISION Right     breast, single lesion    TONSILLECTOMY AND ADENOIDECTOMY         Meds/Allergies:  Prior to Admission medications    Medication Sig Start Date End Date Taking? Authorizing Provider   Abemaciclib (Verzenio) 50 MG TABS Take 50 mg by mouth 2 (two) times a day  Patient not taking: Reported on 8/15/2022 8/10/22   BROOKE Jean   amitriptyline (ELAVIL) 25 mg tablet Take 1 tablet (25 mg total) by mouth daily at bedtime 3/28/22   Chandu Mcdonald DO   Cholecalciferol (VITAMIN D3) 2000 units capsule Take 2,000 Units by mouth daily      Historical Provider, MD   diphenoxylate-atropine (LOMOTIL) 2 5-0 025 mg per tablet Take 1 tablet by mouth 4 (four) times a day as needed for diarrhea 8/10/22   BROOKE Jean   letrozole [facility]) 2 5 mg tablet Take 1 tablet (2 5 mg total) by mouth daily 4/29/22   Yokasta De Oliveira MD   loperamide (IMODIUM) 2 mg capsule Take 2 capsules (4 mg total) by mouth 4 (four) times a day as needed for diarrhea 8/3/22   Sudha Herrera PA-C   losartan (COZAAR) 50 mg tablet Take 1 tablet (50 mg total) by mouth daily Resume in 2-3 days once adequately consuming liquids  8/6/22   Sudha Herrera PA-C   mirtazapine (REMERON) 7 5 MG tablet Take 1 tablet (7 5 mg total) by mouth daily at bedtime 7/21/22   Dee Dee Eng PA-C   simvastatin (ZOCOR) 10 mg tablet Take 1 tablet (10 mg total) by mouth daily at bedtime 2/28/22   Chandu Mcdonald DO     I have reviewed home medications using recent Epic encounter  Allergies:    Allergies   Allergen Reactions    Sulfa Antibiotics     Pneumococcal Polysaccharide Vaccine Rash and Edema       Social History:  Marital Status:  Patient Pre-hospital Living Situation: Home  Patient Pre-hospital Level of Mobility: walks  Patient Pre-hospital Diet Restrictions: none  Substance Use History:   Social History     Substance and Sexual Activity   Alcohol Use No    Comment: (history)     Social History     Tobacco Use   Smoking Status Former Smoker    Packs/day: 1 00    Years: 30 00    Pack years: 30 00    Types: Cigarettes    Start date: 55    Quit date: 0    Years since quittin 6   Smokeless Tobacco Never Used     Social History     Substance and Sexual Activity   Drug Use No       Family History:  Family History   Problem Relation Age of Onset    Stomach cancer Mother     No Known Problems Father     Cervical cancer Sister     No Known Problems Daughter     No Known Problems Maternal Grandmother     No Known Problems Maternal Grandfather     No Known Problems Paternal Grandmother     No Known Problems Paternal Grandfather     Colon polyps Neg Hx     Colon cancer Neg Hx        Physical Exam:     Vitals:   Blood Pressure: 146/76 (08/15/22 1523)  Pulse: (!) 109 (08/15/22 1523)  Temperature: 97 8 °F (36 6 °C) (08/15/22 1045)  Temp Source: Oral (08/15/22 1045)  Respirations: 20 (08/15/22 1523)  Height: 5' 1" (154 9 cm) (08/15/22 1045)  Weight - Scale: 65 3 kg (144 lb) (08/15/22 1045)  SpO2: 98 % (08/15/22 1523)    Physical Exam  Vitals and nursing note reviewed  Constitutional:       Appearance: She is ill-appearing  HENT:      Head: Normocephalic and atraumatic  Cardiovascular:      Rate and Rhythm: Regular rhythm  Tachycardia present  Pulses: Normal pulses  Heart sounds: Normal heart sounds  Pulmonary:      Effort: Pulmonary effort is normal       Breath sounds: Normal breath sounds  Abdominal:      General: Abdomen is flat  Tenderness: There is abdominal tenderness  Musculoskeletal:      Right lower leg: No edema  Left lower leg: No edema  Skin:     General: Skin is warm  Neurological:      General: No focal deficit present  Mental Status: She is alert and oriented to person, place, and time  Additional Data:     Lab Results:  Results from last 7 days   Lab Units 08/15/22  1050   WBC Thousand/uL 8 67   HEMOGLOBIN g/dL 11 2*   HEMATOCRIT % 35 3   PLATELETS Thousands/uL 381   LYMPHO PCT % 10*   MONO PCT % 15*   EOS PCT % 6     Results from last 7 days   Lab Units 08/15/22  1050   SODIUM mmol/L 136   POTASSIUM mmol/L 3 7   CHLORIDE mmol/L 101   CO2 mmol/L 22   BUN mg/dL 20   CREATININE mg/dL 1 34*   ANION GAP mmol/L 13   CALCIUM mg/dL 7 6*   ALBUMIN g/dL 1 9*   TOTAL BILIRUBIN mg/dL 0 60   ALK PHOS U/L 158*   ALT U/L 41   AST U/L 61*   GLUCOSE RANDOM mg/dL 125                 Results from last 7 days   Lab Units 08/15/22  1106   LACTIC ACID mmol/L 1 8       Imaging:   CT abdomen pelvis with contrast   Final Result by Tiny Conley MD (08/15 0238)         1  Diffuse mild-moderate circumferential wall thickening of the colon with pericolic inflammatory stranding consistent with nonspecific pancolitis  2   Multiple stable small scattered sclerotic foci in the visualized lumbar lower thoracic spine osseous pelvis which may reflect osseous metastatic disease in this patient with known metastatic breast cancer  3   Additional stable findings as noted  The study was marked in [facility] for immediate notification  Workstation performed: [id number]             EKG and Other Studies Reviewed on Admission:   EKG:Sinus tachycardia  Nonspecific ST abnormality  Abnormal ECG  When compared with ECG of 01-AUG-2022 11:54,  No significant change was found  · Confirmed by Samreen Reynoso (    ** Please Note: This note has been constructed using a voice recognition system   **

## 2022-08-15 NOTE — ED NOTES
BP now 94/53 sitting in wheelchair  ER Dr Jacque Ulloa ok with patient being transported upstairs to [id number] [place] [address]        Yoli Augustine RN  08/15/22 1799

## 2022-08-15 NOTE — ED NOTES
ER tech assisting patient to wheelchair to transfer to [facility] [address] when patient became dizzy and c/o "not feeling well " VS checked, normal saline administered per MAR   Will check VS prior to transfer to [facility] [place],First Floor, RN  08/15/22 3513

## 2022-08-16 LAB
ALBUMIN SERPL BCP-MCNC: 1.4 G/DL (ref 3.5–5)
ALP SERPL-CCNC: 112 U/L (ref 46–116)
ALT SERPL W P-5'-P-CCNC: 32 U/L (ref 12–78)
ANION GAP SERPL CALCULATED.3IONS-SCNC: 12 MMOL/L (ref 4–13)
AST SERPL W P-5'-P-CCNC: 26 U/L (ref 5–45)
BILIRUB SERPL-MCNC: 0.4 MG/DL (ref 0.2–1)
BUN SERPL-MCNC: 15 MG/DL (ref 5–25)
C DIFF TOX GENS STL QL NAA+PROBE: NEGATIVE
CALCIUM ALBUM COR SERPL-MCNC: 8.2 MG/DL (ref 8.3–10.1)
CALCIUM SERPL-MCNC: 6.1 MG/DL (ref 8.3–10.1)
CAMPYLOBACTER DNA SPEC NAA+PROBE: NORMAL
CHLORIDE SERPL-SCNC: 106 MMOL/L (ref 96–108)
CHOLEST SERPL-MCNC: 70 MG/DL
CO2 SERPL-SCNC: 20 MMOL/L (ref 21–32)
CREAT SERPL-MCNC: 1.05 MG/DL (ref 0.6–1.3)
ERYTHROCYTE [DISTWIDTH] IN BLOOD BY AUTOMATED COUNT: 19.6 % (ref 11.6–15.1)
ERYTHROCYTE [SEDIMENTATION RATE] IN BLOOD: 48 MM/HOUR (ref 0–29)
GFR SERPL CREATININE-BSD FRML MDRD: 49 ML/MIN/1.73SQ M
GLUCOSE SERPL-MCNC: 94 MG/DL (ref 65–140)
HCT VFR BLD AUTO: 26.7 % (ref 34.8–46.1)
HDLC SERPL-MCNC: 29 MG/DL
HGB BLD-MCNC: 8.5 G/DL (ref 11.5–15.4)
LDLC SERPL CALC-MCNC: 25 MG/DL (ref 0–100)
MAGNESIUM SERPL-MCNC: 1.7 MG/DL (ref 1.6–2.6)
MCH RBC QN AUTO: 29.8 PG (ref 26.8–34.3)
MCHC RBC AUTO-ENTMCNC: 31.8 G/DL (ref 31.4–37.4)
MCV RBC AUTO: 94 FL (ref 82–98)
NONHDLC SERPL-MCNC: 41 MG/DL
NRBC BLD AUTO-RTO: 0 /100 WBCS
PHOSPHATE SERPL-MCNC: 1.8 MG/DL (ref 2.3–4.1)
PLATELET # BLD AUTO: 294 THOUSANDS/UL (ref 149–390)
PMV BLD AUTO: 9.9 FL (ref 8.9–12.7)
POTASSIUM SERPL-SCNC: 2.9 MMOL/L (ref 3.5–5.3)
PROCALCITONIN SERPL-MCNC: 1.49 NG/ML
PROT SERPL-MCNC: 4.9 G/DL (ref 6.4–8.4)
RBC # BLD AUTO: 2.85 MILLION/UL (ref 3.81–5.12)
SALMONELLA DNA SPEC QL NAA+PROBE: NORMAL
SHIGA TOXIN STX GENE SPEC NAA+PROBE: NORMAL
SHIGELLA DNA SPEC QL NAA+PROBE: NORMAL
SODIUM SERPL-SCNC: 138 MMOL/L (ref 135–147)
TRIGL SERPL-MCNC: 79 MG/DL
WBC # BLD AUTO: 5.76 THOUSAND/UL (ref 4.31–10.16)

## 2022-08-16 PROCEDURE — 99232 SBSQ HOSP IP/OBS MODERATE 35: CPT | Performed by: PHYSICIAN ASSISTANT

## 2022-08-16 PROCEDURE — 80053 COMPREHEN METABOLIC PANEL: CPT | Performed by: STUDENT IN AN ORGANIZED HEALTH CARE EDUCATION/TRAINING PROGRAM

## 2022-08-16 PROCEDURE — 85652 RBC SED RATE AUTOMATED: CPT | Performed by: STUDENT IN AN ORGANIZED HEALTH CARE EDUCATION/TRAINING PROGRAM

## 2022-08-16 PROCEDURE — 83735 ASSAY OF MAGNESIUM: CPT | Performed by: STUDENT IN AN ORGANIZED HEALTH CARE EDUCATION/TRAINING PROGRAM

## 2022-08-16 PROCEDURE — 84145 PROCALCITONIN (PCT): CPT | Performed by: STUDENT IN AN ORGANIZED HEALTH CARE EDUCATION/TRAINING PROGRAM

## 2022-08-16 PROCEDURE — 99223 1ST HOSP IP/OBS HIGH 75: CPT | Performed by: INTERNAL MEDICINE

## 2022-08-16 PROCEDURE — 85027 COMPLETE CBC AUTOMATED: CPT | Performed by: STUDENT IN AN ORGANIZED HEALTH CARE EDUCATION/TRAINING PROGRAM

## 2022-08-16 PROCEDURE — 80061 LIPID PANEL: CPT | Performed by: STUDENT IN AN ORGANIZED HEALTH CARE EDUCATION/TRAINING PROGRAM

## 2022-08-16 PROCEDURE — 84100 ASSAY OF PHOSPHORUS: CPT | Performed by: STUDENT IN AN ORGANIZED HEALTH CARE EDUCATION/TRAINING PROGRAM

## 2022-08-16 RX ORDER — MESALAMINE 400 MG/1
800 CAPSULE, DELAYED RELEASE ORAL 3 TIMES DAILY
Status: DISCONTINUED | OUTPATIENT
Start: 2022-08-16 | End: 2022-09-01

## 2022-08-16 RX ADMIN — MESALAMINE 800 MG: 400 CAPSULE, DELAYED RELEASE ORAL at 22:22

## 2022-08-16 RX ADMIN — MESALAMINE 800 MG: 400 CAPSULE, DELAYED RELEASE ORAL at 17:19

## 2022-08-16 RX ADMIN — PIPERACILLIN AND TAZOBACTAM 2.25 G: 2; .25 INJECTION, POWDER, FOR SOLUTION INTRAVENOUS at 09:55

## 2022-08-16 RX ADMIN — HEPARIN SODIUM 5000 UNITS: 5000 INJECTION INTRAVENOUS; SUBCUTANEOUS at 22:22

## 2022-08-16 RX ADMIN — MIRTAZAPINE 7.5 MG: 15 TABLET, FILM COATED ORAL at 22:22

## 2022-08-16 RX ADMIN — AMITRIPTYLINE HYDROCHLORIDE 25 MG: 25 TABLET, FILM COATED ORAL at 22:22

## 2022-08-16 RX ADMIN — HEPARIN SODIUM 5000 UNITS: 5000 INJECTION INTRAVENOUS; SUBCUTANEOUS at 13:39

## 2022-08-16 RX ADMIN — POTASSIUM & SODIUM PHOSPHATES POWDER PACK 280-160-250 MG 1 PACKET: 280-160-250 PACK at 07:48

## 2022-08-16 RX ADMIN — MESALAMINE 800 MG: 400 CAPSULE, DELAYED RELEASE ORAL at 13:39

## 2022-08-16 RX ADMIN — Medication 250 MG: at 07:48

## 2022-08-16 RX ADMIN — PRAVASTATIN SODIUM 20 MG: 20 TABLET ORAL at 17:10

## 2022-08-16 RX ADMIN — PIPERACILLIN AND TAZOBACTAM 2.25 G: 2; .25 INJECTION, POWDER, FOR SOLUTION INTRAVENOUS at 05:30

## 2022-08-16 RX ADMIN — CHOLESTYRAMINE 4 G: 4 POWDER, FOR SUSPENSION ORAL at 07:48

## 2022-08-16 RX ADMIN — HEPARIN SODIUM 5000 UNITS: 5000 INJECTION INTRAVENOUS; SUBCUTANEOUS at 05:31

## 2022-08-16 RX ADMIN — PIPERACILLIN AND TAZOBACTAM 2.25 G: 2; .25 INJECTION, POWDER, FOR SOLUTION INTRAVENOUS at 17:10

## 2022-08-16 RX ADMIN — PIPERACILLIN AND TAZOBACTAM 2.25 G: 2; .25 INJECTION, POWDER, FOR SOLUTION INTRAVENOUS at 22:22

## 2022-08-16 RX ADMIN — CHOLESTYRAMINE 4 G: 4 POWDER, FOR SUSPENSION ORAL at 17:10

## 2022-08-16 NOTE — ASSESSMENT & PLAN NOTE
· Patient presented with dizziness and diarrhea starting in August that worsened yesterday  · Most likely secondary to chemo vs UC flare  · CT showed: 1  Diffuse mild-moderate circumferential wall thickening of the colon with pericolic inflammatory stranding consistent with nonspecific pancolitis  2   Multiple stable small scattered sclerotic foci in the visualized lumbar lower thoracic spine osseous pelvis which may reflect osseous metastatic disease in this patient with known metastatic breast cancer   3   Additional stable findings as noted   · GI consulted-> recs appreciated  · IVF  · Zosyn  · F/u bld cx and procal, ESR, CRP, and lipase, C Diff  · ESR:  48  · Procalcitonin:  1 49  · CRP:  206 3  · Clear liquid diet, advance as tolerated  · Cholestyramine

## 2022-08-16 NOTE — ASSESSMENT & PLAN NOTE
Lab Results   Component Value Date    EGFR 49 08/16/2022    EGFR 36 08/15/2022    EGFR 40 08/03/2022    CREATININE 1 05 08/16/2022    CREATININE 1 34 (H) 08/15/2022    CREATININE 1 23 08/03/2022     · Cr on admission slightly elevated from her baseline at 1 3-> most likely due to volume depletion  · Baseline 1 1-1 2  · I's and O's  · IV fluids  · Avoid nephrotoxic agents  · Continue to trend

## 2022-08-16 NOTE — PROGRESS NOTES
[place]  Progress Note Debbie Silverman 1940, 79 y o  female MRN: [id number]  Unit/Bed#: -01 Encounter: [id number]  Primary Care Provider: Humphrey Swift DO   Date and time admitted to hospital: 8/15/2022 10:40 AM    * [facility]  Assessment & Plan  · Patient presented with dizziness and diarrhea starting in August that worsened yesterday  · Most likely secondary to chemo vs UC flare  · CT showed: 1  Diffuse mild-moderate circumferential wall thickening of the colon with pericolic inflammatory stranding consistent with nonspecific pancolitis  2   Multiple stable small scattered sclerotic foci in the visualized lumbar lower thoracic spine osseous pelvis which may reflect osseous metastatic disease in this patient with known metastatic breast cancer   3   Additional stable findings as noted   · GI consulted-> recs appreciated  · IVF  · Zosyn  · F/u bld cx and procal, ESR, CRP, and lipase, C Diff  · ESR:  48  · Procalcitonin:  1 49  · CRP:  206 3  · Clear liquid diet, advance as tolerated  · Cholestyramine    Hypotension due to hypovolemia  Assessment & Plan  · Patient was hypotensive in the ED and received 2 L bolus  · On IV fluid    Primary hypertension  Assessment & Plan  · Hold cozaar d/t hypotension    Chronic kidney disease (CKD) stage G3a/A1, moderately decreased glomerular filtration rate (GFR) between 45-59 mL/min/1 73 square meter and albuminuria creatinine ratio less than 30 mg/g [facility])  Assessment & Plan  Lab Results   Component Value Date    EGFR 49 08/16/2022    EGFR 36 08/15/2022    EGFR 40 08/03/2022    CREATININE 1 05 08/16/2022    CREATININE 1 34 (H) 08/15/2022    CREATININE 1 23 08/03/2022     · Cr on admission slightly elevated from her baseline at 1 3-> most likely due to volume depletion  · Baseline 1 1-1 2  · I's and O's  · IV fluids  · Avoid nephrotoxic agents  · Continue to trend      Mixed hyperlipidemia  Assessment & Plan  · Continue statin    Metastatic breast cancer [facility])  Assessment & Plan  · Continue to follow-up with Hematology-Oncology outpatient  · Holding Femara at this time      VTE Pharmacologic Prophylaxis: VTE Score: 6 High Risk (Score >/= 5) - Pharmacological DVT Prophylaxis Ordered: heparin  Sequential Compression Devices Ordered  Patient Centered Rounds: I performed bedside rounds with nursing staff today  Discussions with Specialists or Other Care Team Provider:  Discussed with GI team, diarrhea likely secondary to ulcerative colitis flare  Follow-up on C diff studies, if negative would initiate steroids for treatment of UC    Education and Discussions with Family / Patient: Patient declined call to   Time Spent for Care: 30 minutes  More than 50% of total time spent on counseling and coordination of care as described above  Current Length of Stay: 1 day(s)  Current Patient Status: Inpatient   Certification Statement: The patient will continue to require additional inpatient hospital stay due to Colitis, IV antibiotics, pending culture results  Discharge Plan: Anticipate discharge in 48-72 hrs to home  Code Status: Level 1 - Full Code    Subjective:   Patient says "kick me in the ass for stopping that mesalamine" patient says she does of and tender abdomen  She understands she has UC  Objective:     Vitals:   Temp (24hrs), Av 8 °F (37 1 °C), Min:98 5 °F (36 9 °C), Max:99 °F (37 2 °C)    Temp:  [98 5 °F (36 9 °C)-99 °F (37 2 °C)] 99 °F (37 2 °C)  HR:  [107-133] 113  Resp:  [18-20] 18  BP: ()/(44-76) 137/64  SpO2:  [98 %-99 %] 98 %  Body mass index is 27 21 kg/m²  Input and Output Summary (last 24 hours): Intake/Output Summary (Last 24 hours) at 2022 1155  Last data filed at 8/15/2022 2100  Gross per 24 hour   Intake 1100 ml   Output --   Net 1100 ml       Physical Exam:   Physical Exam  Vitals and nursing note reviewed     Constitutional:       General: She is not in acute distress  Appearance: Normal appearance  She is well-developed  HENT:      Head: Normocephalic and atraumatic  Eyes:      General: No scleral icterus  Conjunctiva/sclera: Conjunctivae normal    Cardiovascular:      Rate and Rhythm: Normal rate and regular rhythm  Heart sounds: No murmur heard  Pulmonary:      Effort: Pulmonary effort is normal       Breath sounds: No wheezing, rhonchi or rales  Abdominal:      General: There is no distension  Palpations: Abdomen is soft  Tenderness: There is abdominal tenderness  Skin:     General: Skin is warm and dry  Neurological:      General: No focal deficit present  Mental Status: She is alert  Psychiatric:         Mood and Affect: Mood normal         Additional Data:     Labs:  Results from last 7 days   Lab Units 08/16/22  0508 08/15/22  1050   WBC Thousand/uL 5 76 8 67   HEMOGLOBIN g/dL 8 5* 11 2*   HEMATOCRIT % 26 7* 35 3   PLATELETS Thousands/uL 294 381   LYMPHO PCT %  --  10*   MONO PCT %  --  15*   EOS PCT %  --  6     Results from last 7 days   Lab Units 08/16/22  0508   SODIUM mmol/L 138   POTASSIUM mmol/L 2 9*   CHLORIDE mmol/L 106   CO2 mmol/L 20*   BUN mg/dL 15   CREATININE mg/dL 1 05   ANION GAP mmol/L 12   CALCIUM mg/dL 6 1*   ALBUMIN g/dL 1 4*   TOTAL BILIRUBIN mg/dL 0 40   ALK PHOS U/L 112   ALT U/L 32   AST U/L 26   GLUCOSE RANDOM mg/dL 94                 Results from last 7 days   Lab Units 08/16/22  0508 08/15/22  1106   LACTIC ACID mmol/L  --  1 8   PROCALCITONIN ng/ml 1 49*  --        Lines/Drains:  Invasive Devices  Report    Peripheral Intravenous Line  Duration           Peripheral IV 08/15/22 Left Hand 1 day                      Imaging: Reviewed radiology reports from this admission including: abdominal/pelvic CT    Recent Cultures (last 7 days):   Results from last 7 days   Lab Units 08/15/22  1726 08/15/22  1659   BLOOD CULTURE  Received in Microbiology Lab  Culture in Progress  Received in Microbiology Lab  Culture in Progress  Last 24 Hours Medication List:   Current Facility-Administered Medications   Medication Dose Route Frequency Provider Last Rate    acetaminophen  650 mg Oral Q6H PRN Katina Mei MD      amitriptyline  25 mg Oral HS Katina Mei MD      cholestyramine sugar free  4 g Oral BID Katina Mei MD      heparin (porcine)  5,000 Units Subcutaneous Q8H Delmar Lawson MD      loperamide  2 mg Oral Q4H PRN Katina Mei MD      mesalamine  800 mg Oral TID BROOKE León      mirtazapine  7 5 mg Oral HS Katina Mei MD      ondansetron  4 mg Intravenous Q6H PRN Katina Mei MD      piperacillin-tazobactam  2 25 g Intravenous Q6H Katina Mei MD 2 25 g (08/16/22 0955)    pravastatin  20 mg Oral Daily With Sofía Zee MD      sodium chloride  100 mL/hr Intravenous Continuous Katina Mei  mL/hr (08/15/22 1651)        Today, Patient Was Seen By: Glenna More PA-C    **Please Note: This note may have been constructed using a voice recognition system  **

## 2022-08-16 NOTE — CONSULTS
Consultation - GI   Jennifer Silverman 79 y o  female MRN: [id number]  Unit/Bed#: -01 Encounter: [id number]      Assessment/Plan     1  Ulcerative colitis  2  Diarrhea  One year history of persistent diarrhea and rectal bleeding  Diagnosed with ulcerative colitis on colonoscopy 11/2021 with inflammation noted at distal sigmoid and rectum  Initially treated with Canasa suppositories and hydrocortisone enemas  Also treated with mesalamine 2 4 g which patient discontinued due to persistent rectal bleeding  Reports persistent diarrhea for at least 6 months with associated anorexia and 30 lb weight loss  Presented to the ED due to dizziness  CT scan with pancolitis, inflammatory markers increased  Patient's symptoms may be due to IBD flare but would rule out infectious etiology  Plan to treat with mesalamine and possible steroids if stool negative  -check stool cultures  -add Questran b i d   -will add mesalamine 4 8 g daily  -continue IV hydration  -will likely need eventual flex sig/colonoscopy for further evaluation    3  Anemia  Hemoglobin initially 11 5, decreased to 8 5 today with hydration  Suspect this is multifactorial due to current chemotherapy and bone marrow suppression and chronic rectal bleeding  -check iron panel to further characterize  -continue to trend H&H and transfuse as needed    4  Stage IV metastatic breast cancer  Currently follows with [facility] Hematology[facility]Oncology and receiving chemotherapy with recent medication change 07/18/2022      Inpatient consult to gastroenterology  Consult performed by: BROOKE Cuevas  Consult ordered by: Kiran Sifuentes MD          Physician Requesting Consult: Adrianne Leong MD  Reason for Consult / Principal Problem:  Diarrhea, rectal bleeding    HPI: Helena Hodge is a 79y o  year old female with history of now stage IV breast cancer with bony metastasis, status post radiation 09/2021 and currently undergoing chemotherapy      She has 1 year history of intermittent rectal bleeding and diarrhea  Diagnosed with ulcerative colitis by colonoscopy 11/2021 with inflammation noted at distal sigmoid colon and rectum  Biopsies positive for focal active cryptitis at sigmoid colon and rectum   Initially treated with Canasa suppository and then hydrocortisone enemas with persistent rectal bleeding  She was started on mesalamine 2 4 g daily in April 2022 however patient stopped taking medication because she felt it was not helping  She presented to the ED with 1 month history of diarrhea  She developed dizziness/lightheadedness yesterday  Reports decreased p o  Intake and 30 lb weight loss over the past several months  She denies having any formed bowel movements for at least 6 months and continues to have daily bright red blood in bowel movement and with wiping  CT scan in the ED yesterday shows diffuse mild to moderate wall thickening of the entire colon consistent with pancolitis    CRP elevated at 206, sed rate 48  CBC with normal WBCs, hemoglobin initially 11 2 and decreased to 8 5 this a m , normal platelets    Patient follows with [facility] Hematology[facility]Oncology  Recent chemotherapy change 07/18/2022      Review of Systems   Constitutional: Positive for appetite change and unexpected weight change  HENT: Negative  Respiratory: Negative  Cardiovascular: Negative  Gastrointestinal: Positive for abdominal pain, blood in stool, diarrhea and nausea  Genitourinary: Negative  Musculoskeletal: Positive for back pain  Skin: Positive for pallor  Neurological: Positive for light-headedness  Hematological: Negative      Psychiatric/Behavioral:        Anxiety         Historical Information   Past Medical History:   Diagnosis Date    Abnormal weight loss     Allergic reaction     Anxiety     Breast cancer, right ([facility] ) 2011    right    Cancer [facility]) 2012    Candidal vulvovaginitis     Clotting disorder ([facility] ) Same as above    Endometrial hyperplasia     Epithelial cyst     benign, ovary    GI (gastrointestinal bleed) Blood mixed with stool    History of radiation therapy     right breast cancer    Hyperlipidemia     Hypertension     Internal hemorrhoids     Knee tendonitis     Ovarian cyst     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[facility])      Past Surgical History:   Procedure Laterality Date    BILATERAL SALPINGOOPHORECTOMY      onset: 12    BREAST BIOPSY Right 2006    benign    BREAST BIOPSY Right 2011    malignant    BREAST LUMPECTOMY Right 2011    malignant    CATARACT EXTRACTION W/  INTRAOCULAR LENS IMPLANT Right     phacoemulsification   Onset: 10/27/14    CHOLECYSTECTOMY      COLONOSCOPY  2017    approx    HYSTERECTOMY  Yes    INTRAOPERATIVE RADIATION THERAPY ([facility])      IR BIOPSY BONE  2021    SKIN LESION EXCISION Right     breast, single lesion    TONSILLECTOMY AND ADENOIDECTOMY       Social History   Social History     Substance and Sexual Activity   Alcohol Use No    Comment: (history)     Social History     Substance and Sexual Activity   Drug Use No     Social History     Tobacco Use   Smoking Status Former Smoker    Packs/day: 1 00    Years: 30 00    Pack years: 30 00    Types: Cigarettes    Start date: 55    Quit date: 0    Years since quittin 6   Smokeless Tobacco Never Used     Family History   Problem Relation Age of Onset    Stomach cancer Mother     No Known Problems Father     Cervical cancer Sister     No Known Problems Daughter     No Known Problems Maternal Grandmother     No Known Problems Maternal Grandfather     No Known Problems Paternal Grandmother     No Known Problems Paternal Grandfather     Colon polyps Neg Hx     Colon cancer Neg Hx        Meds/Allergies   Current Facility-Administered Medications   Medication Dose Route Frequency    acetaminophen (TYLENOL) tablet 650 mg  650 mg Oral Q6H PRN    amitriptyline (ELAVIL) tablet 25 mg  25 mg Oral HS    cholestyramine sugar free (QUESTRAN LIGHT) packet 4 g  4 g Oral BID    heparin (porcine) subcutaneous injection 5,000 Units  5,000 Units Subcutaneous Q8H [address]    loperamide (IMODIUM) capsule 2 mg  2 mg Oral Q4H PRN    mirtazapine (REMERON) tablet 7 5 mg  7 5 mg Oral HS    ondansetron (ZOFRAN) injection 4 mg  4 mg Intravenous Q6H PRN    piperacillin-tazobactam (ZOSYN) 2 25 g in sodium chloride 0 9 % 50 mL IVPB  2 25 g Intravenous Q6H    pravastatin (PRAVACHOL) tablet 20 mg  20 mg Oral Daily With Dinner    saccharomyces boulardii (FLORASTOR) capsule 250 mg  250 mg Oral BID    sodium chloride 0 9 % infusion  100 mL/hr Intravenous Continuous     Medications Prior to Admission   Medication    Abemaciclib (Verzenio) 50 MG TABS    amitriptyline (ELAVIL) 25 mg tablet    Cholecalciferol (VITAMIN D3) 2000 units capsule    diphenoxylate-atropine (LOMOTIL) 2 5-0 025 mg per tablet    letrozole (FEMARA) 2 5 mg tablet    loperamide (IMODIUM) 2 mg capsule    losartan (COZAAR) 50 mg tablet    mirtazapine (REMERON) 7 5 MG tablet    simvastatin (ZOCOR) 10 mg tablet       Allergies   Allergen Reactions    Sulfa Antibiotics     Pneumococcal Polysaccharide Vaccine Rash and Edema           Physical Exam   Constitutional:  No acute distress but appears pale and weak  HENT: WNL  Head: Normocephalic and atraumatic  Eyes:  Anicteric  Neck: Normal range of motion  Neck supple  Cardiovascular: Normal rate and regular rhythm  S1-S2 normal, no murmur rub or gallop  Pulmonary/Chest: Effort normal and breath sounds normal    Abdominal: Soft, nondistended, mild tenderness with palpation at left and right lower quadrants  Bowel sounds are normal   Reports large watery bowel movement this a m  Musculoskeletal: Normal range of motion  Extremities:  No edema  Neurological: Is alert and oriented to person, place, and time  Skin: Skin is warm and dry     Psychiatric: normal mood and affect, anxious      Lab Results: Admission on 08/15/2022   Component Date Value    Ventricular Rate 08/15/2022 120     Atrial Rate 08/15/2022 120     MT Interval 08/15/2022 148     QRSD Interval 08/15/2022 82     QT Interval 08/15/2022 336     QTC Interval 08/15/2022 474     P Axis 08/15/2022 83     QRS Axis 08/15/2022 70     T Wave Axis 08/15/2022 75     WBC 08/15/2022 8 67     RBC 08/15/2022 3 71 (A)    Hemoglobin 08/15/2022 11 2 (A)    Hematocrit 08/15/2022 35 3     MCV 08/15/2022 95     MCH 08/15/2022 30 2     MCHC 08/15/2022 31 7     RDW 08/15/2022 19 9 (A)    MPV 08/15/2022 9 8     Platelets 60/13/9319 381     Sodium 08/15/2022 136     Potassium 08/15/2022 3 7     Chloride 08/15/2022 101     CO2 08/15/2022 22     ANION GAP 08/15/2022 13     BUN 08/15/2022 20     Creatinine 08/15/2022 1 34 (A)    Glucose 08/15/2022 125     Calcium 08/15/2022 7 6 (A)    Corrected Calcium 08/15/2022 9 3     AST 08/15/2022 61 (A)    ALT 08/15/2022 41     Alkaline Phosphatase 08/15/2022 158 (A)    Total Protein 08/15/2022 7 1     Albumin 08/15/2022 1 9 (A)    Total Bilirubin 08/15/2022 0 60     eGFR 08/15/2022 36     Phosphorus 08/15/2022 1 8 (A)    Magnesium 08/15/2022 2 0     Lipase 08/15/2022 79     LACTIC ACID 08/15/2022 1 8     Segmented % 08/15/2022 69     Lymphocytes % 08/15/2022 10 (A)    Monocytes % 08/15/2022 15 (A)    Eosinophils, % 08/15/2022 6     Basophils % 08/15/2022 0     Absolute Neutrophils 08/15/2022 5 98     Lymphocytes Absolute 08/15/2022 0 87     Monocytes Absolute 08/15/2022 1 30 (A)    Eosinophils Absolute 08/15/2022 0 52 (A)    Basophils Absolute 08/15/2022 0 00     RBC Morphology 08/15/2022 Present     Anisocytosis 08/15/2022 Present     Polychromasia 08/15/2022 Present     Platelet Estimate 52/77/6785 Adequate     CRP 08/15/2022 206 3 (A)    Blood Culture 08/15/2022 Received in Microbiology Lab  Culture in Progress   Blood Culture 08/15/2022 Received in Microbiology Lab  Culture in Progress   TSH 3RD GENERATON 08/15/2022 3 664     Procalcitonin 08/16/2022 1 49 (A)    WBC 08/16/2022 5 76     RBC 08/16/2022 2 85 (A)    Hemoglobin 08/16/2022 8 5 (A)    Hematocrit 08/16/2022 26 7 (A)    MCV 08/16/2022 94     MCH 08/16/2022 29 8     MCHC 08/16/2022 31 8     RDW 08/16/2022 19 6 (A)    MPV 08/16/2022 9 9     Platelets 56/45/6557 294     nRBC 08/16/2022 0     Sodium 08/16/2022 138     Potassium 08/16/2022 2 9 (A)    Chloride 08/16/2022 106     CO2 08/16/2022 20 (A)    ANION GAP 08/16/2022 12     BUN 08/16/2022 15     Creatinine 08/16/2022 1 05     Glucose 08/16/2022 94     Calcium 08/16/2022 6 1 (A)    Corrected Calcium 08/16/2022 8 2 (A)    AST 08/16/2022 26     ALT 08/16/2022 32     Alkaline Phosphatase 08/16/2022 112     Total Protein 08/16/2022 4 9 (A)    Albumin 08/16/2022 1 4 (A)    Total Bilirubin 08/16/2022 0 40     eGFR 08/16/2022 49     Magnesium 08/16/2022 1 7     Phosphorus 08/16/2022 1 8 (A)    Cholesterol 08/16/2022 70     Triglycerides 08/16/2022 79     HDL, Direct 08/16/2022 29 (A)    LDL Calculated 08/16/2022 25     Non-HDL-Chol (CHOL-HDL) 08/16/2022 41     Sed Rate 08/16/2022 48 (A)     Imaging Studies: I have personally reviewed pertinent reports  EKG, Pathology, and Other Studies: I have personally reviewed pertinent reports  Counseling / Coordination of Care  Total floor / unit time spent today 45 minutes

## 2022-08-17 PROBLEM — K52.1 TOXIC GASTROENTERITIS AND COLITIS: Status: ACTIVE | Noted: 2022-04-06

## 2022-08-17 LAB
ERYTHROCYTE [DISTWIDTH] IN BLOOD BY AUTOMATED COUNT: 19.9 % (ref 11.6–15.1)
FERRITIN SERPL-MCNC: 328 NG/ML (ref 8–388)
HCT VFR BLD AUTO: 31.1 % (ref 34.8–46.1)
HGB BLD-MCNC: 10.3 G/DL (ref 11.5–15.4)
IRON SATN MFR SERPL: 19 % (ref 15–50)
IRON SERPL-MCNC: 30 UG/DL (ref 50–170)
MCH RBC QN AUTO: 30.8 PG (ref 26.8–34.3)
MCHC RBC AUTO-ENTMCNC: 33.1 G/DL (ref 31.4–37.4)
MCV RBC AUTO: 93 FL (ref 82–98)
PLATELET # BLD AUTO: 338 THOUSANDS/UL (ref 149–390)
PMV BLD AUTO: 9.7 FL (ref 8.9–12.7)
RBC # BLD AUTO: 3.34 MILLION/UL (ref 3.81–5.12)
TIBC SERPL-MCNC: 161 UG/DL (ref 250–450)
WBC # BLD AUTO: 6.68 THOUSAND/UL (ref 4.31–10.16)

## 2022-08-17 PROCEDURE — 99232 SBSQ HOSP IP/OBS MODERATE 35: CPT | Performed by: INTERNAL MEDICINE

## 2022-08-17 PROCEDURE — 85027 COMPLETE CBC AUTOMATED: CPT | Performed by: NURSE PRACTITIONER

## 2022-08-17 PROCEDURE — 99232 SBSQ HOSP IP/OBS MODERATE 35: CPT | Performed by: PHYSICIAN ASSISTANT

## 2022-08-17 PROCEDURE — 82728 ASSAY OF FERRITIN: CPT | Performed by: NURSE PRACTITIONER

## 2022-08-17 PROCEDURE — 83550 IRON BINDING TEST: CPT | Performed by: NURSE PRACTITIONER

## 2022-08-17 PROCEDURE — 83540 ASSAY OF IRON: CPT | Performed by: NURSE PRACTITIONER

## 2022-08-17 RX ORDER — PREDNISONE 20 MG/1
40 TABLET ORAL DAILY
Status: DISCONTINUED | OUTPATIENT
Start: 2022-08-17 | End: 2022-08-20

## 2022-08-17 RX ORDER — DIPHENOXYLATE HYDROCHLORIDE AND ATROPINE SULFATE 2.5; .025 MG/1; MG/1
1 TABLET ORAL ONCE
Status: COMPLETED | OUTPATIENT
Start: 2022-08-17 | End: 2022-08-17

## 2022-08-17 RX ADMIN — MESALAMINE 800 MG: 400 CAPSULE, DELAYED RELEASE ORAL at 09:25

## 2022-08-17 RX ADMIN — DIPHENOXYLATE HYDROCHLORIDE AND ATROPINE SULFATE 1 TABLET: 2.5; .025 TABLET ORAL at 15:24

## 2022-08-17 RX ADMIN — LOPERAMIDE HYDROCHLORIDE 2 MG: 2 CAPSULE ORAL at 09:25

## 2022-08-17 RX ADMIN — HEPARIN SODIUM 5000 UNITS: 5000 INJECTION INTRAVENOUS; SUBCUTANEOUS at 14:52

## 2022-08-17 RX ADMIN — CHOLESTYRAMINE 4 G: 4 POWDER, FOR SUSPENSION ORAL at 17:41

## 2022-08-17 RX ADMIN — MESALAMINE 800 MG: 400 CAPSULE, DELAYED RELEASE ORAL at 21:49

## 2022-08-17 RX ADMIN — MESALAMINE 800 MG: 400 CAPSULE, DELAYED RELEASE ORAL at 16:15

## 2022-08-17 RX ADMIN — HEPARIN SODIUM 5000 UNITS: 5000 INJECTION INTRAVENOUS; SUBCUTANEOUS at 21:49

## 2022-08-17 RX ADMIN — PREDNISONE 40 MG: 20 TABLET ORAL at 10:42

## 2022-08-17 RX ADMIN — HEPARIN SODIUM 5000 UNITS: 5000 INJECTION INTRAVENOUS; SUBCUTANEOUS at 05:46

## 2022-08-17 RX ADMIN — PIPERACILLIN AND TAZOBACTAM 2.25 G: 2; .25 INJECTION, POWDER, FOR SOLUTION INTRAVENOUS at 17:29

## 2022-08-17 RX ADMIN — AMITRIPTYLINE HYDROCHLORIDE 25 MG: 25 TABLET, FILM COATED ORAL at 21:49

## 2022-08-17 RX ADMIN — PRAVASTATIN SODIUM 20 MG: 20 TABLET ORAL at 16:15

## 2022-08-17 RX ADMIN — MIRTAZAPINE 7.5 MG: 15 TABLET, FILM COATED ORAL at 21:49

## 2022-08-17 RX ADMIN — PIPERACILLIN AND TAZOBACTAM 2.25 G: 2; .25 INJECTION, POWDER, FOR SOLUTION INTRAVENOUS at 10:42

## 2022-08-17 RX ADMIN — PIPERACILLIN AND TAZOBACTAM 2.25 G: 2; .25 INJECTION, POWDER, FOR SOLUTION INTRAVENOUS at 03:50

## 2022-08-17 NOTE — PROGRESS NOTES
Progress note - Gastroenterology   Jack Silverman 79 y o  female MRN: [id number]  Unit/Bed#: -01 Encounter: [id number]    ASSESSMENT and PLAN  1  Diarrhea  2  Ulcerative colitis  Patient admitted with persistent bloody diarrhea, dizziness and weakness  Diagnosed with ulcerative colitis 11/2021-not currently on any treatment  Diarrhea likely combination of inflammatory bowel disease, possibly worsened with chemotherapy  Stool cultures negative on admission  -continue Questran b i d   -continue mesalamine 4 8 g daily  -discussed with Dr Zaki Keith add prednisone 40 mg daily  -Lomotil p r n     3  Rectal bleeding  4  Anemia  Continues to experience rectal bleeding with diarrhea  Anemia likely multifactorial due to chemotherapy and prolonged rectal bleeding  Hemoglobin stable at 10 3 today  Iron panel pending    4  Stage IV metastatic breast cancer  Currently follows with [facility] Hematology/Oncology and receiving chemotherapy with recent medication change 07/18/2022    Chief Complaint   Patient presents with    Dizziness     Via ems from home for increased dizziness and multiple episodes diarrhea since the beginning of August  States worse since last night   States is getting new chemo medication       SUBJECTIVE/HPI   Still with bloody diarrhea-3-4 episodes during the day and had nocturnal stooling x 2  Stool for C diff negative  Appetite poor-taking minimal by mouth  Hemoglobin 10 3 this a m , normal MCV      /71   Pulse (!) 119   Temp 97 9 °F (36 6 °C)   Resp 18   Ht 5' 1" (1 549 m)   Wt 65 3 kg (144 lb)   LMP  (LMP Unknown)   SpO2 98%   BMI 27 21 kg/m²     PHYSICALEXAM  General appearance:  Appears weak, NAD  Eyes:no icterus   Head: Normocephalic, without obvious abnormality, atraumatic  Lungs: clear to auscultation bilaterally  Heart: regular rate and rhythm, S1, S2 normal, no murmur, click, rub or gallop  Abdomen: soft, non-tender; bowel sounds normal, continues with bloody diarrhea 3-4 times yesterday, reports nocturnal stooling  Extremities: extremities normal, atraumatic, no cyanosis or edema  Neurologic: Grossly normal    Lab Results   Component Value Date    GLUCOSE 115 09/08/2015    CALCIUM 6 1 (L) 08/16/2022     09/08/2015    K 2 9 (L) 08/16/2022    CO2 20 (L) 08/16/2022     08/16/2022    BUN 15 08/16/2022    CREATININE 1 05 08/16/2022     Lab Results   Component Value Date    WBC 6 68 08/17/2022    HGB 10 3 (L) 08/17/2022    HCT 31 1 (L) 08/17/2022    MCV 93 08/17/2022     08/17/2022     Lab Results   Component Value Date    ALT 32 08/16/2022    AST 26 08/16/2022    ALKPHOS 112 08/16/2022    BILITOT 0 66 09/08/2015       Lab Results   Component Value Date    LIPASE 79 08/15/2022

## 2022-08-17 NOTE — PROGRESS NOTES
[place]  Progress Note Fern Cogan A Clunk 1940, 79 y o  female MRN: [id number]  Unit/Bed#: -01 Encounter: [id number]  Primary Care Provider: Abdoulaye Anders DO   Date and time admitted to hospital: 8/15/2022 10:40 AM    * Toxic gastroenteritis and colitis  Assessment & Plan  · Patient presented with dizziness and diarrhea starting in August that worsened yesterday  · Most likely secondary to chemo vs UC flare  · CT showed: 1  Diffuse mild-moderate circumferential wall thickening of the colon with pericolic inflammatory stranding consistent with nonspecific pancolitis  2   Multiple stable small scattered sclerotic foci in the visualized lumbar lower thoracic spine osseous pelvis which may reflect osseous metastatic disease in this patient with known metastatic breast cancer  3   Additional stable findings as noted   · GI consulted-> recs appreciated  · IVF  · Discontinue Zosyn as symptoms are most likely secondary to ulcerative colitis  · Steroids initiated 8/17  · F/u bld cx and procal, ESR, CRP, and lipase, C Diff  · ESR:  48  · Procalcitonin:  1 49  · CRP:  206 3  · C   Diff: negative  · Stool enteric panel: negative  · Advanced to low-fiber 8/17  · Cholestyramine    Hypotension due to hypovolemia  Assessment & Plan  · Patient was hypotensive in the ED and received 2 L bolus  · On IV fluid    Primary hypertension  Assessment & Plan  · Hold cozaar d/t hypotension    Chronic kidney disease (CKD) stage G3a/A1, moderately decreased glomerular filtration rate (GFR) between 45-59 mL/min/1 73 square meter and albuminuria creatinine ratio less than 30 mg/g [facility])  Assessment & Plan  Lab Results   Component Value Date    EGFR 49 08/16/2022    EGFR 36 08/15/2022    EGFR 40 08/03/2022    CREATININE 1 05 08/16/2022    CREATININE 1 34 (H) 08/15/2022    CREATININE 1 23 08/03/2022     · Cr on admission slightly elevated from her baseline at 1 3, most likely due to volume depletion  · Baseline 1 1-1 2  · I's and O's  · IV fluids  · Avoid nephrotoxic agents  · Continue to trend      Mixed hyperlipidemia  Assessment & Plan  · Continue statin    Metastatic breast cancer [facility])  Assessment & Plan  · Continue to follow-up with Hematology-Oncology outpatient  · Holding Femara at this time      VTE Pharmacologic Prophylaxis: VTE Score: 6 High Risk (Score >/= 5) - Pharmacological DVT Prophylaxis Ordered: heparin  Sequential Compression Devices Ordered  Patient Centered Rounds: I performed bedside rounds with nursing staff today  Discussions with Specialists or Other Care Team Provider: Discussed with GI, agree with initiation of steroids today  Education and Discussions with Family / Patient: Updated  (daughter) via phone  Time Spent for Care: 30 minutes  More than 50% of total time spent on counseling and coordination of care as described above  Current Length of Stay: 2 day(s)  Current Patient Status: Inpatient   Certification Statement: The patient will continue to require additional inpatient hospital stay due to Pending improvement in diarrhea  Discharge Plan: Anticipate discharge in 24-48 hrs to home  Code Status: Level 1 - Full Code    Subjective:   Patient reports ongoing diarrhea  Agreeable to initiate steroids today and advance diet  Objective:     Vitals:   Temp (24hrs), Av 7 °F (36 5 °C), Min:97 5 °F (36 4 °C), Max:97 9 °F (36 6 °C)    Temp:  [97 5 °F (36 4 °C)-97 9 °F (36 6 °C)] 97 9 °F (36 6 °C)  HR:  [119] 119  Resp:  [16-18] 18  BP: (136-141)/(67-71) 141/71  SpO2:  [98 %] 98 %  Body mass index is 27 21 kg/m²  Input and Output Summary (last 24 hours): Intake/Output Summary (Last 24 hours) at 2022 1131  Last data filed at 2022 0350  Gross per 24 hour   Intake 200 ml   Output --   Net 200 ml       Physical Exam:   Physical Exam  Vitals and nursing note reviewed     Constitutional:       General: She is not in acute distress  Appearance: Normal appearance  She is well-developed  HENT:      Head: Normocephalic and atraumatic  Eyes:      General: No scleral icterus  Conjunctiva/sclera: Conjunctivae normal    Cardiovascular:      Rate and Rhythm: Normal rate and regular rhythm  Heart sounds: No murmur heard  Pulmonary:      Effort: Pulmonary effort is normal       Breath sounds: No wheezing, rhonchi or rales  Abdominal:      General: There is no distension  Palpations: Abdomen is soft  Tenderness: There is abdominal tenderness  Skin:     General: Skin is warm and dry  Neurological:      General: No focal deficit present  Mental Status: She is alert  Psychiatric:         Mood and Affect: Mood normal        Additional Data:     Labs:  Results from last 7 days   Lab Units 08/17/22  0838 08/16/22  0508 08/15/22  1050   WBC Thousand/uL 6 68   < > 8 67   HEMOGLOBIN g/dL 10 3*   < > 11 2*   HEMATOCRIT % 31 1*   < > 35 3   PLATELETS Thousands/uL 338   < > 381   LYMPHO PCT %  --   --  10*   MONO PCT %  --   --  15*   EOS PCT %  --   --  6    < > = values in this interval not displayed  Results from last 7 days   Lab Units 08/16/22  0508   SODIUM mmol/L 138   POTASSIUM mmol/L 2 9*   CHLORIDE mmol/L 106   CO2 mmol/L 20*   BUN mg/dL 15   CREATININE mg/dL 1 05   ANION GAP mmol/L 12   CALCIUM mg/dL 6 1*   ALBUMIN g/dL 1 4*   TOTAL BILIRUBIN mg/dL 0 40   ALK PHOS U/L 112   ALT U/L 32   AST U/L 26   GLUCOSE RANDOM mg/dL 94                 Results from last 7 days   Lab Units 08/16/22  0508 08/15/22  1106   LACTIC ACID mmol/L  --  1 8   PROCALCITONIN ng/ml 1 49*  --        Lines/Drains:  Invasive Devices  Report    Peripheral Intravenous Line  Duration           Peripheral IV 08/15/22 Left Hand 2 days                      Imaging: No pertinent imaging reviewed      Recent Cultures (last 7 days):   Results from last 7 days   Lab Units 08/15/22  1726 08/15/22  1659 08/15/22  1259   BLOOD CULTURE  No Growth at 24 hrs  No Growth at 24 hrs  --    C DIFF TOXIN B BY PCR   --   --  Negative       Last 24 Hours Medication List:   Current Facility-Administered Medications   Medication Dose Route Frequency Provider Last Rate    acetaminophen  650 mg Oral Q6H PRN Juana Miranda MD      amitriptyline  25 mg Oral HS Juana Miranda MD      cholestyramine sugar free  4 g Oral BID Juana Miranda MD      diphenoxylate-atropine  1 tablet Oral Once BROOKE Evans      heparin (porcine)  5,000 Units Subcutaneous Q8H [address] Juana Miranda MD      loperamide  2 mg Oral Q4H PRN Juana Miranda MD      mesalamine  800 mg Oral TID BROOKE Evans      mirtazapine  7 5 mg Oral HS Juana Miranda MD      ondansetron  4 mg Intravenous Q6H PRN Juana Miranda MD      piperacillin-tazobactam  2 25 g Intravenous Q6H Juana Miranda MD 2 25 g (08/17/22 1042)    pravastatin  20 mg Oral Daily With Alem Dooley MD      predniSONE  40 mg Oral Daily BROOKE Evans      sodium chloride  100 mL/hr Intravenous Continuous Juana Miranda  mL/hr (08/15/22 1651)        Today, Patient Was Seen By: Lisa Ang PA-C    **Please Note: This note may have been constructed using a voice recognition system  **

## 2022-08-17 NOTE — PLAN OF CARE
Problem: Potential for Falls  Goal: Patient will remain free of falls  Description: INTERVENTIONS:  - Educate patient/family on patient safety including physical limitations  - Instruct patient to call for assistance with activity   - Consult OT/PT to assist with strengthening/mobility   - Keep Call bell within reach  - Keep bed low and locked with side rails adjusted as appropriate  - Keep care items and personal belongings within reach  - Initiate and maintain comfort rounds  - Make Fall Risk Sign visible to staff  - Offer Toileting every 2 Hours, in advance of need  - Initiate/Maintain bed alarm  - Obtain necessary fall risk management equipment:   - Apply yellow socks and bracelet for high fall risk patients  - Consider moving patient to room near nurses station  Outcome: Progressing     Problem: SAFETY ADULT  Goal: Patient will remain free of falls  Description: INTERVENTIONS:  - Educate patient/family on patient safety including physical limitations  - Instruct patient to call for assistance with activity   - Consult OT/PT to assist with strengthening/mobility   - Keep Call bell within reach  - Keep bed low and locked with side rails adjusted as appropriate  - Keep care items and personal belongings within reach  - Initiate and maintain comfort rounds  - Make Fall Risk Sign visible to staff  - Offer Toileting every 2 Hours, in advance of need  - Initiate/Maintain bed alarm  - Obtain necessary fall risk management equipment:   - Apply yellow socks and bracelet for high fall risk patients  - Consider moving patient to room near nurses station  Outcome: Progressing     Problem: DISCHARGE PLANNING  Goal: Discharge to home or other facility with appropriate resources  Description: INTERVENTIONS:  - Identify barriers to discharge w/patient and caregiver  - Arrange for needed discharge resources and transportation as appropriate  - Identify discharge learning needs (meds, wound care, etc )  - Arrange for interpretive services to assist at discharge as needed  - Refer to Case Management Department for coordinating discharge planning if the patient needs post-hospital services based on physician/advanced practitioner order or complex needs related to functional status, cognitive ability, or social support system  Outcome: Progressing     Problem: Knowledge Deficit  Goal: Patient/family/caregiver demonstrates understanding of disease process, treatment plan, medications, and discharge instructions  Description: Complete learning assessment and assess knowledge base    Interventions:  - Provide teaching at level of understanding  - Provide teaching via preferred learning methods  Outcome: Progressing

## 2022-08-17 NOTE — ASSESSMENT & PLAN NOTE
· Patient presented with dizziness and diarrhea starting in August that worsened yesterday  · Most likely secondary to chemo vs UC flare  · CT showed: 1  Diffuse mild-moderate circumferential wall thickening of the colon with pericolic inflammatory stranding consistent with nonspecific pancolitis  2   Multiple stable small scattered sclerotic foci in the visualized lumbar lower thoracic spine osseous pelvis which may reflect osseous metastatic disease in this patient with known metastatic breast cancer  3   Additional stable findings as noted   · GI consulted-> recs appreciated  · IVF  · Discontinue Zosyn as symptoms are most likely secondary to ulcerative colitis  · Steroids initiated 8/17  · F/u bld cx and procal, ESR, CRP, and lipase, C Diff  · ESR:  48  · Procalcitonin:  1 49  · CRP:  206 3  · C   Diff: negative  · Stool enteric panel: negative  · Advanced to low-fiber 8/17  · Cholestyramine

## 2022-08-17 NOTE — MALNUTRITION/BMI
This medical record reflects one or more clinical indicators suggestive of malnutrition and/or morbid obesity  Malnutrition Findings:   Adult Malnutrition type: Chronic illness  Adult Degree of Malnutrition: Other severe protein calorie malnutrition  Malnutrition Characteristics: Inadequate energy, Weight loss                  360 Statement: Pt presents with chronic severe protein calorie malnutrition due to Stage IV metastatic breast cancer, chronic diarrhea vs UC flare as evidenced by 10 5 lb wt loss-14% wt loss in 4 months and <75 % po intake > 1 month  Treat with Regular diet and Ensure Enlive BID  BMI Findings: Body mass index is 27 21 kg/m²  See Nutrition note dated 8/17/22  for additional details  Completed nutrition assessment is viewable in the nutrition documentation

## 2022-08-18 PROBLEM — E43 SEVERE PROTEIN-CALORIE MALNUTRITION (HCC): Status: ACTIVE | Noted: 2022-08-18

## 2022-08-18 LAB
ALBUMIN SERPL BCP-MCNC: 1.4 G/DL (ref 3.5–5)
ALP SERPL-CCNC: 103 U/L (ref 46–116)
ALT SERPL W P-5'-P-CCNC: 19 U/L (ref 12–78)
ANION GAP SERPL CALCULATED.3IONS-SCNC: 10 MMOL/L (ref 4–13)
AST SERPL W P-5'-P-CCNC: 13 U/L (ref 5–45)
BILIRUB SERPL-MCNC: 0.4 MG/DL (ref 0.2–1)
BUN SERPL-MCNC: 10 MG/DL (ref 5–25)
CALCIUM ALBUM COR SERPL-MCNC: 8.5 MG/DL (ref 8.3–10.1)
CALCIUM SERPL-MCNC: 6.4 MG/DL (ref 8.3–10.1)
CHLORIDE SERPL-SCNC: 110 MMOL/L (ref 96–108)
CO2 SERPL-SCNC: 20 MMOL/L (ref 21–32)
CREAT SERPL-MCNC: 0.83 MG/DL (ref 0.6–1.3)
GFR SERPL CREATININE-BSD FRML MDRD: 65 ML/MIN/1.73SQ M
GLUCOSE SERPL-MCNC: 102 MG/DL (ref 65–140)
POTASSIUM SERPL-SCNC: 2.8 MMOL/L (ref 3.5–5.3)
PROT SERPL-MCNC: 5.4 G/DL (ref 6.4–8.4)
SODIUM SERPL-SCNC: 140 MMOL/L (ref 135–147)

## 2022-08-18 PROCEDURE — 99232 SBSQ HOSP IP/OBS MODERATE 35: CPT | Performed by: INTERNAL MEDICINE

## 2022-08-18 PROCEDURE — 99232 SBSQ HOSP IP/OBS MODERATE 35: CPT | Performed by: PHYSICIAN ASSISTANT

## 2022-08-18 PROCEDURE — 80053 COMPREHEN METABOLIC PANEL: CPT | Performed by: NURSE PRACTITIONER

## 2022-08-18 RX ORDER — POTASSIUM CHLORIDE 20 MEQ/1
40 TABLET, EXTENDED RELEASE ORAL
Status: COMPLETED | OUTPATIENT
Start: 2022-08-18 | End: 2022-08-18

## 2022-08-18 RX ORDER — POTASSIUM CHLORIDE 14.9 MG/ML
20 INJECTION INTRAVENOUS ONCE
Status: DISCONTINUED | OUTPATIENT
Start: 2022-08-18 | End: 2022-08-19

## 2022-08-18 RX ADMIN — MESALAMINE 800 MG: 400 CAPSULE, DELAYED RELEASE ORAL at 21:45

## 2022-08-18 RX ADMIN — POTASSIUM CHLORIDE 40 MEQ: 1500 TABLET, EXTENDED RELEASE ORAL at 12:23

## 2022-08-18 RX ADMIN — AMITRIPTYLINE HYDROCHLORIDE 25 MG: 25 TABLET, FILM COATED ORAL at 21:45

## 2022-08-18 RX ADMIN — HEPARIN SODIUM 5000 UNITS: 5000 INJECTION INTRAVENOUS; SUBCUTANEOUS at 05:33

## 2022-08-18 RX ADMIN — MESALAMINE 800 MG: 400 CAPSULE, DELAYED RELEASE ORAL at 16:57

## 2022-08-18 RX ADMIN — POTASSIUM CHLORIDE 40 MEQ: 1500 TABLET, EXTENDED RELEASE ORAL at 16:57

## 2022-08-18 RX ADMIN — ONDANSETRON 4 MG: 2 INJECTION INTRAMUSCULAR; INTRAVENOUS at 09:25

## 2022-08-18 RX ADMIN — PREDNISONE 40 MG: 20 TABLET ORAL at 08:46

## 2022-08-18 RX ADMIN — MESALAMINE 800 MG: 400 CAPSULE, DELAYED RELEASE ORAL at 08:46

## 2022-08-18 RX ADMIN — CHOLESTYRAMINE 4 G: 4 POWDER, FOR SUSPENSION ORAL at 16:57

## 2022-08-18 RX ADMIN — HEPARIN SODIUM 5000 UNITS: 5000 INJECTION INTRAVENOUS; SUBCUTANEOUS at 21:47

## 2022-08-18 RX ADMIN — HEPARIN SODIUM 5000 UNITS: 5000 INJECTION INTRAVENOUS; SUBCUTANEOUS at 14:44

## 2022-08-18 RX ADMIN — POTASSIUM CHLORIDE 40 MEQ: 1500 TABLET, EXTENDED RELEASE ORAL at 08:46

## 2022-08-18 RX ADMIN — LOPERAMIDE HYDROCHLORIDE 2 MG: 2 CAPSULE ORAL at 09:04

## 2022-08-18 RX ADMIN — MIRTAZAPINE 7.5 MG: 15 TABLET, FILM COATED ORAL at 21:45

## 2022-08-18 RX ADMIN — CHOLESTYRAMINE 4 G: 4 POWDER, FOR SUSPENSION ORAL at 08:46

## 2022-08-18 RX ADMIN — PRAVASTATIN SODIUM 20 MG: 20 TABLET ORAL at 16:57

## 2022-08-18 NOTE — PROGRESS NOTES
Progress note - Gastroenterology   Adan Silverman 79 y o  female MRN: [id number]  Unit/Bed#: -01 Encounter: [id number]    ASSESSMENT and PLAN  1  Diarrhea  2  Ulcerative colitis  Patient admitted with persistent bloody diarrhea, dizziness and weakness  Diagnosed with ulcerative colitis 11/2021-not currently on any treatment  Diarrhea likely combination of inflammatory bowel disease, possibly worsened with chemotherapy  Stool cultures negative on admission  Bowel movements less frequent today, smaller volume and now loose but with some form  Continues with rectal bleeding  -continue Questran b i d   -continue mesalamine 4 8 g daily  - started on prednisone 40 mg daily yesterday 8/17  -Lomotil p r n      3  Rectal bleeding  4  Anemia  Continues to experience rectal bleeding with diarrhea  Anemia likely multifactorial due to chemotherapy and prolonged rectal bleeding  Hemoglobin stable at 10 3 today  Iron panel pending     4  Stage IV metastatic breast cancer  Currently follows with [facility] and receiving chemotherapy with recent medication change 07/18/2022      Chief Complaint   Patient presents with    Dizziness     Via ems from home for increased dizziness and multiple episodes diarrhea since the beginning of August  States worse since last night  States is getting new chemo medication       SUBJECTIVE/HPI   Feels a little better today-reports less frequent stools and decreased volume  Bowel movements are now loose with some form  Had 3 small BMs this a m   Thus far  Continues with rectal bleeding  No nausea and was able to eat breakfast this a m     /81   Pulse 103   Temp (!) 97 3 °F (36 3 °C)   Resp 16   Ht 5' 1" (1 549 m)   Wt 65 3 kg (144 lb)   LMP  (LMP Unknown)   SpO2 96%   BMI 27 21 kg/m²     PHYSICALEXAM  General appearance: alert, appears more comfortable today  Eyes: no icterus   Head: Normocephalic, without obvious abnormality, atraumatic  Lungs: clear to auscultation bilaterally  Heart: regular rate and rhythm, S1, S2 normal, no murmur, click, rub or gallop  Abdomen: soft, nondistended, no tenderness with palpation at lower quadrants , bowel sounds normal, continues with nocturnal stooling but had 1 episode last night and 2 small volume soft to loose stools today    Continues with rectal bleeding  Extremities: extremities normal, atraumatic, no cyanosis or edema  Neurologic: Grossly normal    Lab Results   Component Value Date    GLUCOSE 115 09/08/2015    CALCIUM 6 4 (L) 08/18/2022     09/08/2015    K 2 8 (L) 08/18/2022    CO2 20 (L) 08/18/2022     (H) 08/18/2022    BUN 10 08/18/2022    CREATININE 0 83 08/18/2022     Lab Results   Component Value Date    WBC 6 68 08/17/2022    HGB 10 3 (L) 08/17/2022    HCT 31 1 (L) 08/17/2022    MCV 93 08/17/2022     08/17/2022     Lab Results   Component Value Date    ALT 19 08/18/2022    AST 13 08/18/2022    ALKPHOS 103 08/18/2022    BILITOT 0 66 09/08/2015       Lab Results   Component Value Date    LIPASE 79 08/15/2022     Lab Results   Component Value Date    IRON 30 (L) 08/17/2022    TIBC 161 (L) 08/17/2022    FERRITIN 328 08/17/2022     No results found for: INR

## 2022-08-18 NOTE — PROGRESS NOTES
[place]  Progress Note Ginger Silverman 1940, 79 y o  female MRN: [id number]  Unit/Bed#: -01 Encounter: [id number]  Primary Care Provider: Zachary Meléndez DO   Date and time admitted to hospital: 8/15/2022 10:40 AM    * Toxic gastroenteritis and colitis  Assessment & Plan  · Patient presented with dizziness and diarrhea starting in August that worsened yesterday  · Most likely secondary to chemo vs UC flare  · CT showed: 1  Diffuse mild-moderate circumferential wall thickening of the colon with pericolic inflammatory stranding consistent with nonspecific pancolitis  2   Multiple stable small scattered sclerotic foci in the visualized lumbar lower thoracic spine osseous pelvis which may reflect osseous metastatic disease in this patient with known metastatic breast cancer  3   Additional stable findings as noted   · GI consulted-> recs appreciated  · IVF  · Discontinue Zosyn as symptoms are most likely secondary to ulcerative colitis  · Steroids initiated 8/17  · F/u bld cx and procal, ESR, CRP, and lipase, C Diff  · ESR:  48  · Procalcitonin:  1 49  · CRP:  206 3  · C  Diff: negative  · Stool enteric panel: negative  · Advanced to low-fiber 8/17  · Cholestyramine    Severe protein-calorie malnutrition ([address] )  Assessment & Plan  Malnutrition Findings:   Adult Malnutrition type: Chronic illness  Adult Degree of Malnutrition: Other severe protein calorie malnutrition  Malnutrition Characteristics: Inadequate energy, Weight loss                  360 Statement: Pt presents with chronic severe protein calorie malnutrition due to Stage IV metastatic breast cancer, chronic diarrhea vs UC flare as evidenced by 10 5 lb wt loss-14% wt loss in 4 months and <75 % po intake > 1 month  BMI Findings: Body mass index is 27 21 kg/m²         Hypotension due to hypovolemia  Assessment & Plan  · Patient was hypotensive in the ED and received 2 L bolus  · On IV fluid    Primary hypertension  Assessment & Plan  · Hold cozaar d/t hypotension    Chronic kidney disease (CKD) stage G3a/A1, moderately decreased glomerular filtration rate (GFR) between 45-59 mL/min/1 73 square meter and albuminuria creatinine ratio less than 30 mg/g [facility])  Assessment & Plan  Lab Results   Component Value Date    EGFR 65 08/18/2022    EGFR 49 08/16/2022    EGFR 36 08/15/2022    CREATININE 0 83 08/18/2022    CREATININE 1 05 08/16/2022    CREATININE 1 34 (H) 08/15/2022     · Cr on admission slightly elevated from her baseline at 1 3, most likely due to volume depletion  · Baseline 1 1-1 2  · I's and O's  · IV fluids  · Avoid nephrotoxic agents  · Continue to trend      Mixed hyperlipidemia  Assessment & Plan  · Continue statin    Metastatic breast cancer [facility])  Assessment & Plan  · Continue to follow-up with Hematology-Oncology outpatient  · Holding Femara at this time      VTE Pharmacologic Prophylaxis: VTE Score: 6 High Risk (Score >/= 5) - Pharmacological DVT Prophylaxis Ordered: heparin  Sequential Compression Devices Ordered  Patient Centered Rounds: I performed bedside rounds with nursing staff today  Discussions with Specialists or Other Care Team Provider: Appreciate most recent note by GI team    Education and Discussions with Family / Patient: Patient declined call to   Time Spent for Care: 30 minutes  More than 50% of total time spent on counseling and coordination of care as described above  Current Length of Stay: 3 day(s)  Current Patient Status: Inpatient   Certification Statement: The patient will continue to require additional inpatient hospital stay due to pending improvement in diarrhea and hypokalemia  Discharge Plan: Anticipate discharge in 48-72 hrs to home  Code Status: Level 1 - Full Code    Subjective:   Patient frustrated that she vomited after choking on her potassium pelvis morning, potassium IV as burning    Discussed discontinuing this in taking additional oral potassium as her only IV accesses in her hand at this point  She reports there is no real improvement in her diarrhea  Objective:     Vitals:   Temp (24hrs), Av 4 °F (36 3 °C), Min:97 2 °F (36 2 °C), Max:97 6 °F (36 4 °C)    Temp:  [97 2 °F (36 2 °C)-97 6 °F (36 4 °C)] 97 3 °F (36 3 °C)  HR:  [103-104] 103  Resp:  [16] 16  BP: (119-145)/(64-81) 145/81  SpO2:  [96 %] 96 %  Body mass index is 27 21 kg/m²  Input and Output Summary (last 24 hours): Intake/Output Summary (Last 24 hours) at 2022 1058  Last data filed at 2022 1810  Gross per 24 hour   Intake 120 ml   Output --   Net 120 ml       Physical Exam:   Physical Exam  Vitals and nursing note reviewed  Constitutional:       General: She is not in acute distress  Appearance: Normal appearance  She is well-developed  HENT:      Head: Normocephalic and atraumatic  Eyes:      General: No scleral icterus  Conjunctiva/sclera: Conjunctivae normal    Cardiovascular:      Rate and Rhythm: Normal rate and regular rhythm  Heart sounds: No murmur heard  Pulmonary:      Effort: Pulmonary effort is normal       Breath sounds: No wheezing, rhonchi or rales  Abdominal:      General: There is no distension  Palpations: Abdomen is soft  Skin:     General: Skin is warm and dry  Neurological:      General: No focal deficit present  Mental Status: She is alert  Psychiatric:         Mood and Affect: Mood normal         Additional Data:     Labs:  Results from last 7 days   Lab Units 22  0838 22  0508 08/15/22  1050   WBC Thousand/uL 6 68   < > 8 67   HEMOGLOBIN g/dL 10 3*   < > 11 2*   HEMATOCRIT % 31 1*   < > 35 3   PLATELETS Thousands/uL 338   < > 381   LYMPHO PCT %  --   --  10*   MONO PCT %  --   --  15*   EOS PCT %  --   --  6    < > = values in this interval not displayed       Results from last 7 days   Lab Units 22  0551   SODIUM mmol/L 140   POTASSIUM mmol/L 2 8*   CHLORIDE mmol/L 110*   CO2 mmol/L 20*   BUN mg/dL 10   CREATININE mg/dL 0 83   ANION GAP mmol/L 10   CALCIUM mg/dL 6 4*   ALBUMIN g/dL 1 4*   TOTAL BILIRUBIN mg/dL 0 40   ALK PHOS U/L 103   ALT U/L 19   AST U/L 13   GLUCOSE RANDOM mg/dL 102                 Results from last 7 days   Lab Units 08/16/22  0508 08/15/22  1106   LACTIC ACID mmol/L  --  1 8   PROCALCITONIN ng/ml 1 49*  --        Lines/Drains:  Invasive Devices  Report    Peripheral Intravenous Line  Duration           Peripheral IV 08/17/22 Left Hand <1 day                      Imaging: No pertinent imaging reviewed  Recent Cultures (last 7 days):   Results from last 7 days   Lab Units 08/15/22  1726 08/15/22  1659 08/15/22  1259   BLOOD CULTURE  No Growth at 48 hrs  No Growth at 48 hrs  --    C DIFF TOXIN B BY PCR   --   --  Negative       Last 24 Hours Medication List:   Current Facility-Administered Medications   Medication Dose Route Frequency Provider Last Rate    acetaminophen  650 mg Oral Q6H PRN Kiran Sifuentes MD      amitriptyline  25 mg Oral HS Kiran Sifuentes MD      cholestyramine sugar free  4 g Oral BID Kiran Sifuentes MD      heparin (porcine)  5,000 Units Subcutaneous Q8H Александр Marshall MD      loperamide  2 mg Oral Q4H PRN Kiran Sifuentes MD      mesalamine  800 mg Oral TID BROOKE Cuevas      mirtazapine  7 5 mg Oral HS Kiran Sifuentes MD      ondansetron  4 mg Intravenous Q6H PRN Kiran Sifuentes MD      potassium chloride  40 mEq Oral TID With Meals Fermin Moo WOODARD PA-C      potassium chloride  20 mEq Intravenous Once Fermin Moo WOODARD PA-C      pravastatin  20 mg Oral Daily With Mj Loya MD      predniSONE  40 mg Oral Daily BROOKE Cuevas          Today, Patient Was Seen By: John Merrill PA-C    **Please Note: This note may have been constructed using a voice recognition system  **

## 2022-08-18 NOTE — ASSESSMENT & PLAN NOTE
Lab Results   Component Value Date    EGFR 65 08/18/2022    EGFR 49 08/16/2022    EGFR 36 08/15/2022    CREATININE 0 83 08/18/2022    CREATININE 1 05 08/16/2022    CREATININE 1 34 (H) 08/15/2022     · Cr on admission slightly elevated from her baseline at 1 3, most likely due to volume depletion  · Baseline 1 1-1 2  · I's and O's  · IV fluids  · Avoid nephrotoxic agents  · Continue to trend

## 2022-08-18 NOTE — CASE MANAGEMENT
Case Management Assessment & Discharge Planning Note    Patient name Laura Silverman  Location /-01 MRN [id number]  : 1940 Date 2022       Current Admission Date: 8/15/2022  Current Admission Diagnosis:Toxic gastroenteritis and colitis   Patient Active Problem List    Diagnosis Date Noted    Hypotension due to hypovolemia 08/15/2022    Anemia 2022    Hypokalemia 2022    REYES (acute kidney injury) ([facility] ) 2022    Diarrhea 2022    Secondary malignant neoplasm of bone ([facility] ) 2022    Toxic gastroenteritis and colitis 2022    Rectal bleeding 10/14/2021    Lytic lesion of bone on x-ray 2021    Neoplasm of uncertain behavior of sternum 2021    Cough due to ACE inhibitor 2021    Acute costochondritis 2021    Osteopenia of multiple sites 2020    Primary hypertension 10/22/2019    Chronic kidney disease (CKD) stage G3a/A1, moderately decreased glomerular filtration rate (GFR) between 45-59 mL/min/1 73 square meter and albuminuria creatinine ratio less than 30 mg/g (HCC) 2019    Adverse reaction to pneumococcal vaccine 2015    Cataract, bilateral 2014    Pulmonary nodule seen on imaging study 09/10/2013    Sleep disorder 2013    Anxiety 2013    Metastatic breast cancer ([facility] ) 10/17/2012    Mixed hyperlipidemia 2012      LOS (days): 3  Geometric Mean LOS (GMLOS) (days): 3 00  Days to GMLOS:0 2     OBJECTIVE:  PATIENT READMITTED TO HOSPITAL  Risk of Unplanned Readmission Score: 24 16         Current admission status: Inpatient       Preferred Pharmacy:   [address], [place] - [address]  [facility]  Phone: [phone] Fax: [phone] [facility], [facility] [address] [place] [address]  [address] [address]  Phone: [phone] Fax: [phone]    Primary Care Provider: Violeta Fitzgerald Oj Catalan DO    Primary Insurance: MEDICARE  Secondary Insurance: AETNA    ASSESSMENT:  Active Health Care Proxies     Ar Sliverman - Son   Primary Phone: [phone] (Mobile)               Advance Directives  Does patient have a [address]?: Yes  Does patient have Advance Directives?: No  Was patient offered paperwork?: Yes (given)  Primary Contact: JR Silverman - son         Readmission Root Cause  30 Day Readmission: No    Patient Information  Admitted from[de-identified] Home  Mental Status: Alert  During Assessment patient was accompanied by: Not accompanied during assessment  Assessment information provided by[de-identified] Patient  Primary Caregiver: Self  Support Systems: [address] of Residence: [address] [facility] do you live in?: [employer] entry access options   Select all that apply : No steps to enter home  Type of Current Residence: [facility]  Floor Level: 1  Upon entering residence, is there a bedroom on the main floor (no further steps)?: Yes  Upon entering residence, is there a bathroom on the main floor (no further steps)?: Yes  In the last 12 months, was there a time when you were not able to pay the mortgage or rent on time?: No  In the last 12 months, how many places have you lived?: 1  In the last 12 months, was there a time when you did not have a steady place to sleep or slept in a shelter (including now)?: No  Homeless/housing insecurity resource given?: N/A  Living Arrangements: Lives Alone  Is patient a ?: No    Activities of Daily Living Prior to Admission  Functional Status: Independent  Completes ADLs independently?: Yes  Ambulates independently?: Yes  Does patient use assisted devices?: No  Does patient currently own DME?: Yes  What DME does the patient currently own?: Andrzej Astudillo  Does patient have a history of Outpatient Therapy (PT/OT)?: Yes  Does the patient have a history of Short-Term Rehab?: No  Does patient have a history of [facility]?: No  Does patient currently have [facility]?: No         Patient Information Continued  Income Source: SSI/SSD  Does patient have prescription coverage?: Yes  Within the past 12 months, you worried that your food would run out before you got the money to buy more : Never true  Within the past 12 months, the food you bought just didn't last and you didn't have money to get more : Never true  Food insecurity resource given?: N/A  Does patient receive dialysis treatments?: No  Does patient have a history of substance abuse?: No  Does patient have a history of Mental Health Diagnosis?: No         Means of Transportation  Means of Transport to Appts[de-identified] Drives Self  In the past 12 months, has lack of transportation kept you from medical appointments or from getting medications?: No  In the past 12 months, has lack of transportation kept you from meetings, work, or from getting things needed for daily living?: No  Was application for public transport provided?: N/A        DISCHARGE DETAILS:    Discharge planning discussed with[de-identified] patient  Freedom of Choice: Yes  Comments - Freedom of Choice: Patient plans on returning home at discharge and does not anticipate any discharge needs    CM contacted family/caregiver?: No- see comments (declined)  Were Treatment Team discharge recommendations reviewed with patient/caregiver?: Yes  Did patient/caregiver verbalize understanding of patient care needs?: Yes  Were patient/caregiver advised of the risks associated with not following Treatment Team discharge recommendations?: Yes         Requested [address]         Is the patient interested in [facility] at discharge?: No    DME Referral Provided  Referral made for DME?: No              Treatment Team Recommendation: Home  Discharge Destination Plan[de-identified] Home  Transport at Discharge : Family, Automobile          IMM Given (Date):: 08/18/22  IMM Given to[de-identified] Patient     Additional Comments: Patient lives alone in a first floor apartment at [place] at [employer]  She is independent adl's and ambulation, drives  No services  Son will transport home

## 2022-08-18 NOTE — ASSESSMENT & PLAN NOTE
Malnutrition Findings:   Adult Malnutrition type: Chronic illness  Adult Degree of Malnutrition: Other severe protein calorie malnutrition  Malnutrition Characteristics: Inadequate energy, Weight loss                  360 Statement: Pt presents with chronic severe protein calorie malnutrition due to Stage IV metastatic breast cancer, chronic diarrhea vs UC flare as evidenced by 10 5 lb wt loss-14% wt loss in 4 months and <75 % po intake > 1 month  BMI Findings: Body mass index is 27 21 kg/m²

## 2022-08-19 ENCOUNTER — APPOINTMENT (INPATIENT)
Dept: NON INVASIVE DIAGNOSTICS | Facility: HOSPITAL | Age: 82
DRG: 393 | End: 2022-08-19
Payer: MEDICARE

## 2022-08-19 ENCOUNTER — APPOINTMENT (OUTPATIENT)
Dept: INTERVENTIONAL RADIOLOGY/VASCULAR | Facility: HOSPITAL | Age: 82
DRG: 393 | End: 2022-08-19
Payer: MEDICARE

## 2022-08-19 PROBLEM — R60.0 EXTREMITY EDEMA: Status: ACTIVE | Noted: 2022-08-19

## 2022-08-19 LAB
ALBUMIN SERPL BCP-MCNC: 1.6 G/DL (ref 3.5–5)
ALP SERPL-CCNC: 93 U/L (ref 46–116)
ALT SERPL W P-5'-P-CCNC: 21 U/L (ref 12–78)
ANION GAP SERPL CALCULATED.3IONS-SCNC: 7 MMOL/L (ref 4–13)
ANISOCYTOSIS BLD QL SMEAR: PRESENT
AORTIC ROOT: 2.8 CM
APICAL FOUR CHAMBER EJECTION FRACTION: 70 %
APTT PPP: 129 SECONDS (ref 23–37)
APTT PPP: 24 SECONDS (ref 23–37)
AST SERPL W P-5'-P-CCNC: 14 U/L (ref 5–45)
BASOPHILS # BLD MANUAL: 0 THOUSAND/UL (ref 0–0.1)
BASOPHILS NFR MAR MANUAL: 0 % (ref 0–1)
BILIRUB SERPL-MCNC: 0.3 MG/DL (ref 0.2–1)
BUN SERPL-MCNC: 9 MG/DL (ref 5–25)
CALCIUM ALBUM COR SERPL-MCNC: 9 MG/DL (ref 8.3–10.1)
CALCIUM SERPL-MCNC: 7.1 MG/DL (ref 8.3–10.1)
CHLORIDE SERPL-SCNC: 112 MMOL/L (ref 96–108)
CO2 SERPL-SCNC: 23 MMOL/L (ref 21–32)
CREAT SERPL-MCNC: 0.84 MG/DL (ref 0.6–1.3)
EOSINOPHIL # BLD MANUAL: 0.41 THOUSAND/UL (ref 0–0.4)
EOSINOPHIL NFR BLD MANUAL: 7 % (ref 0–6)
ERYTHROCYTE [DISTWIDTH] IN BLOOD BY AUTOMATED COUNT: 20.3 % (ref 11.6–15.1)
FRACTIONAL SHORTENING: 31 % (ref 28–44)
GFR SERPL CREATININE-BSD FRML MDRD: 64 ML/MIN/1.73SQ M
GLUCOSE SERPL-MCNC: 85 MG/DL (ref 65–140)
HCT VFR BLD AUTO: 27.1 % (ref 34.8–46.1)
HGB BLD-MCNC: 9 G/DL (ref 11.5–15.4)
INR PPP: 1.11 (ref 0.84–1.19)
INTERVENTRICULAR SEPTUM IN DIASTOLE (PARASTERNAL SHORT AXIS VIEW): 1.1 CM
INTERVENTRICULAR SEPTUM: 1.1 CM (ref 0.6–1.1)
LAAS-AP2: 11.2 CM2
LAAS-AP4: 14.3 CM2
LEFT ATRIUM AREA SYSTOLE SINGLE PLANE A4C: 14.7 CM2
LEFT ATRIUM SIZE: 2.8 CM
LEFT INTERNAL DIMENSION IN SYSTOLE: 2.5 CM (ref 2.1–4)
LEFT VENTRICLE DIASTOLIC VOLUME (MOD BIPLANE): 85 ML
LEFT VENTRICLE SYSTOLIC VOLUME (MOD BIPLANE): 32 ML
LEFT VENTRICULAR INTERNAL DIMENSION IN DIASTOLE: 3.6 CM (ref 3.5–6)
LEFT VENTRICULAR POSTERIOR WALL IN END DIASTOLE: 1.1 CM
LEFT VENTRICULAR STROKE VOLUME: 31 ML
LV EF: 63 %
LVSV (TEICH): 31 ML
LYMPHOCYTES # BLD AUTO: 1.3 THOUSAND/UL (ref 0.6–4.47)
LYMPHOCYTES # BLD AUTO: 22 % (ref 14–44)
MCH RBC QN AUTO: 31.3 PG (ref 26.8–34.3)
MCHC RBC AUTO-ENTMCNC: 33.2 G/DL (ref 31.4–37.4)
MCV RBC AUTO: 94 FL (ref 82–98)
METAMYELOCYTES NFR BLD MANUAL: 2 % (ref 0–1)
MONOCYTES # BLD AUTO: 0.41 THOUSAND/UL (ref 0–1.22)
MONOCYTES NFR BLD: 7 % (ref 4–12)
NEUTROPHILS # BLD MANUAL: 3.66 THOUSAND/UL (ref 1.85–7.62)
NEUTS BAND NFR BLD MANUAL: 8 % (ref 0–8)
NEUTS SEG NFR BLD AUTO: 54 % (ref 43–75)
NRBC BLD AUTO-RTO: 1 /100 WBC (ref 0–2)
PLATELET # BLD AUTO: 351 THOUSANDS/UL (ref 149–390)
PLATELET BLD QL SMEAR: ADEQUATE
PMV BLD AUTO: 9.7 FL (ref 8.9–12.7)
POTASSIUM SERPL-SCNC: 4.3 MMOL/L (ref 3.5–5.3)
PROT SERPL-MCNC: 5.5 G/DL (ref 6.4–8.4)
PROTHROMBIN TIME: 15 SECONDS (ref 11.6–14.5)
RBC # BLD AUTO: 2.88 MILLION/UL (ref 3.81–5.12)
RIGHT ATRIUM AREA SYSTOLE A4C: 12.3 CM2
RIGHT VENTRICLE ID DIMENSION: 2.9 CM
SL CV LEFT ATRIUM LENGTH A2C: 4.6 CM
SL CV LV EF: 75
SL CV PED ECHO LEFT VENTRICLE DIASTOLIC VOLUME (MOD BIPLANE) 2D: 53 ML
SL CV PED ECHO LEFT VENTRICLE SYSTOLIC VOLUME (MOD BIPLANE) 2D: 23 ML
SODIUM SERPL-SCNC: 142 MMOL/L (ref 135–147)
TR MAX PG: 20 MMHG
TR PEAK VELOCITY: 2.2 M/S
TRICUSPID VALVE PEAK REGURGITATION VELOCITY: 2.24 M/S
WBC # BLD AUTO: 5.91 THOUSAND/UL (ref 4.31–10.16)

## 2022-08-19 PROCEDURE — 80053 COMPREHEN METABOLIC PANEL: CPT | Performed by: PHYSICIAN ASSISTANT

## 2022-08-19 PROCEDURE — 85007 BL SMEAR W/DIFF WBC COUNT: CPT | Performed by: PHYSICIAN ASSISTANT

## 2022-08-19 PROCEDURE — C1751 CATH, INF, PER/CENT/MIDLINE: HCPCS

## 2022-08-19 PROCEDURE — 85027 COMPLETE CBC AUTOMATED: CPT | Performed by: PHYSICIAN ASSISTANT

## 2022-08-19 PROCEDURE — 93306 TTE W/DOPPLER COMPLETE: CPT | Performed by: INTERNAL MEDICINE

## 2022-08-19 PROCEDURE — 85730 THROMBOPLASTIN TIME PARTIAL: CPT | Performed by: PHYSICIAN ASSISTANT

## 2022-08-19 PROCEDURE — 93970 EXTREMITY STUDY: CPT

## 2022-08-19 PROCEDURE — 93970 EXTREMITY STUDY: CPT | Performed by: INTERNAL MEDICINE

## 2022-08-19 PROCEDURE — 36573 INSJ PICC RS&I 5 YR+: CPT

## 2022-08-19 PROCEDURE — NC001 PR NO CHARGE: Performed by: RADIOLOGY

## 2022-08-19 PROCEDURE — 99232 SBSQ HOSP IP/OBS MODERATE 35: CPT | Performed by: PHYSICIAN ASSISTANT

## 2022-08-19 PROCEDURE — 99222 1ST HOSP IP/OBS MODERATE 55: CPT | Performed by: NURSE PRACTITIONER

## 2022-08-19 PROCEDURE — 85610 PROTHROMBIN TIME: CPT | Performed by: PHYSICIAN ASSISTANT

## 2022-08-19 PROCEDURE — 93306 TTE W/DOPPLER COMPLETE: CPT

## 2022-08-19 PROCEDURE — 76937 US GUIDE VASCULAR ACCESS: CPT

## 2022-08-19 PROCEDURE — 99232 SBSQ HOSP IP/OBS MODERATE 35: CPT | Performed by: INTERNAL MEDICINE

## 2022-08-19 PROCEDURE — 77001 FLUOROGUIDE FOR VEIN DEVICE: CPT

## 2022-08-19 PROCEDURE — 97162 PT EVAL MOD COMPLEX 30 MIN: CPT

## 2022-08-19 RX ORDER — HEPARIN SODIUM 1000 [USP'U]/ML
5200 INJECTION, SOLUTION INTRAVENOUS; SUBCUTANEOUS
Status: DISCONTINUED | OUTPATIENT
Start: 2022-08-19 | End: 2022-08-25

## 2022-08-19 RX ORDER — HEPARIN SODIUM 10000 [USP'U]/100ML
3-30 INJECTION, SOLUTION INTRAVENOUS
Status: DISCONTINUED | OUTPATIENT
Start: 2022-08-19 | End: 2022-08-25

## 2022-08-19 RX ORDER — HEPARIN SODIUM 1000 [USP'U]/ML
2600 INJECTION, SOLUTION INTRAVENOUS; SUBCUTANEOUS
Status: DISCONTINUED | OUTPATIENT
Start: 2022-08-19 | End: 2022-08-25

## 2022-08-19 RX ORDER — CALCIUM CARBONATE 200(500)MG
500 TABLET,CHEWABLE ORAL DAILY PRN
Status: DISCONTINUED | OUTPATIENT
Start: 2022-08-19 | End: 2022-09-06 | Stop reason: HOSPADM

## 2022-08-19 RX ADMIN — AMITRIPTYLINE HYDROCHLORIDE 25 MG: 25 TABLET, FILM COATED ORAL at 21:46

## 2022-08-19 RX ADMIN — MESALAMINE 800 MG: 400 CAPSULE, DELAYED RELEASE ORAL at 16:11

## 2022-08-19 RX ADMIN — HEPARIN SODIUM 18 UNITS/KG/HR: 10000 INJECTION, SOLUTION INTRAVENOUS at 16:04

## 2022-08-19 RX ADMIN — HEPARIN SODIUM 5000 UNITS: 5000 INJECTION INTRAVENOUS; SUBCUTANEOUS at 05:29

## 2022-08-19 RX ADMIN — HEPARIN SODIUM 5000 UNITS: 5000 INJECTION INTRAVENOUS; SUBCUTANEOUS at 13:58

## 2022-08-19 RX ADMIN — CHOLESTYRAMINE 4 G: 4 POWDER, FOR SUSPENSION ORAL at 17:54

## 2022-08-19 RX ADMIN — LOPERAMIDE HYDROCHLORIDE 2 MG: 2 CAPSULE ORAL at 05:34

## 2022-08-19 RX ADMIN — MESALAMINE 800 MG: 400 CAPSULE, DELAYED RELEASE ORAL at 08:36

## 2022-08-19 RX ADMIN — PRAVASTATIN SODIUM 20 MG: 20 TABLET ORAL at 16:11

## 2022-08-19 RX ADMIN — CALCIUM CARBONATE (ANTACID) CHEW TAB 500 MG 500 MG: 500 CHEW TAB at 21:51

## 2022-08-19 RX ADMIN — MIRTAZAPINE 7.5 MG: 15 TABLET, FILM COATED ORAL at 21:47

## 2022-08-19 RX ADMIN — MESALAMINE 800 MG: 400 CAPSULE, DELAYED RELEASE ORAL at 21:46

## 2022-08-19 RX ADMIN — CHOLESTYRAMINE 4 G: 4 POWDER, FOR SUSPENSION ORAL at 08:36

## 2022-08-19 RX ADMIN — PREDNISONE 40 MG: 20 TABLET ORAL at 08:35

## 2022-08-19 NOTE — ASSESSMENT & PLAN NOTE
· Continue to follow-up with Hematology-Oncology outpatient  · Holding Femara at this time  · On recent admission patient's verzenio was held  Reviewed office visit from 8/10/22 appears there was some consideration to resume but at lowered dose    Patient reports she never resumed this medication

## 2022-08-19 NOTE — ASSESSMENT & PLAN NOTE
· Patient was hypotensive in the ED and received 2 L bolus  · Blood pressure stable  · Appears IVF had been discontinued due to LE edema

## 2022-08-19 NOTE — ASSESSMENT & PLAN NOTE
Lab Results   Component Value Date    EGFR 64 08/19/2022    EGFR 65 08/18/2022    EGFR 49 08/16/2022    CREATININE 0 84 08/19/2022    CREATININE 0 83 08/18/2022    CREATININE 1 05 08/16/2022     · Cr on admission slightly elevated from her baseline at 1 3, most likely due to volume depletion  · Baseline 1 1-1 2  · I's and O's  · IV fluids  · Avoid nephrotoxic agents  · Continue to trend - creatinine now below baseline, tolerating clears

## 2022-08-19 NOTE — ASSESSMENT & PLAN NOTE
· Patient presented with dizziness and diarrhea starting in August that worsened yesterday  · Most likely secondary to chemo vs UC flare  · CT showed: 1  Diffuse mild-moderate circumferential wall thickening of the colon with pericolic inflammatory stranding consistent with nonspecific pancolitis  2   Multiple stable small scattered sclerotic foci in the visualized lumbar lower thoracic spine osseous pelvis which may reflect osseous metastatic disease in this patient with known metastatic breast cancer  3   Additional stable findings as noted   · GI consulted-> recs appreciated  · IVF  · Discontinue Zosyn as symptoms are most likely secondary to ulcerative colitis  · Steroids initiated 8/17  · F/u bld cx and procal, ESR, CRP, and lipase, C Diff  · ESR:  48  · Procalcitonin:  1 49  · CRP:  206 3  · C  Diff: negative  · Stool enteric panel: negative  · Advanced to low-fiber 8/17  · Cholestyramine  · Patient reports several episodes daily of diarrhea but is not as severe as her recent hospitalization earlier this month  · Oncology consulted by GI for further recommendations regarding current cancer regimen playing a role in symptoms    Previously believed to to verzenio which has been on hold

## 2022-08-19 NOTE — ASSESSMENT & PLAN NOTE
· Noted bilateral LE edema, L > R  · Appears IVF had been discontinued  · Denies history of heart failure  · Follow up venous duplex rule out DVT, echocardiogram

## 2022-08-19 NOTE — PROGRESS NOTES
Progress note - [address] [facility]   Roberto COVARRUBIAS Clunk 79 y o  female MRN: [id number]  Unit/Bed#: [facility] [place] Encounter: [id number]    ASSESSMENT and PLAN    1  Bloody diarrhea and abd pain  Exacerbation of colitis  First diagnosed in 11/2021 but no evidence of chronic colitis  IBD triggered by chem/immuno therapy  Chemotherapy effect  No remission with mesalamine/cortenemas or changing chemotherapies  Now admitted w/ worsening symptoms  CT w/ diffuse colitis  Improving on prednsione?  - Continue prednisone, cholestyramine and mesalamine  -  Encourage po intake  - If does not continue to respond will flex sig    2  Acute blood loss anemia  HGB down to 9 today  Iron panel not c/w iron deficiency    3  Metastatic breast cancer  Followed by Dr Stern [facility]  Is chemotherapy causing colitis?  - oncology consulted    Chief Complaint   Patient presents with    Dizziness     Via ems from home for increased dizziness and multiple episodes diarrhea since the beginning of August  States worse since last night  States is getting new chemo medication       SUBJECTIVE/HPI   Still w/ diarrhea but forming up  No further bleeding      /89   Pulse (!) 109   Temp (!) 97 4 °F (36 3 °C)   Resp 21   Ht 5' 1" (1 549 m)   Wt 65 3 kg (144 lb)   LMP  (LMP Unknown)   SpO2 100%   BMI 27 21 kg/m²     PHYSICALEXAM  General appearance: alert, appears stated age and cooperative  Eyes: PERLLA, EOMI, no icterus   Head: Normocephalic, without obvious abnormality, atraumatic  Lungs: clear to auscultation bilaterally  Heart: regular rate and rhythm, S1, S2 normal, no murmur, click, rub or gallop  Abdomen: soft, mild LLQ tenderness w/o rebound; bowel sounds normal; no masses,  no organomegaly  Extremities: extremities normal, atraumatic, no cyanosis or edema  Neurologic: Grossly normal    Lab Results   Component Value Date    GLUCOSE 115 09/08/2015    CALCIUM 6 4 (L) 08/18/2022     09/08/2015    K 2 8 (L) 08/18/2022    CO2 20 (L) 08/18/2022     (H) 08/18/2022    BUN 10 08/18/2022    CREATININE 0 83 08/18/2022     Lab Results   Component Value Date    WBC 5 91 08/19/2022    HGB 9 0 (L) 08/19/2022    HCT 27 1 (L) 08/19/2022    MCV 94 08/19/2022     08/19/2022     Lab Results   Component Value Date    ALT 19 08/18/2022    AST 13 08/18/2022    ALKPHOS 103 08/18/2022    BILITOT 0 66 09/08/2015       Lab Results   Component Value Date    LIPASE 79 08/15/2022     Lab Results   Component Value Date    IRON 30 (L) 08/17/2022    TIBC 161 (L) 08/17/2022    FERRITIN 328 08/17/2022

## 2022-08-19 NOTE — OCCUPATIONAL THERAPY NOTE
Occupational Therapy Cx Note     Patient Name: Seymour Silverman  Today's Date: 8/19/2022  Problem List  Principal Problem:    Toxic gastroenteritis and colitis  Active Problems:    Metastatic breast cancer ([facility] )    Mixed hyperlipidemia    Chronic kidney disease (CKD) stage G3a/A1, moderately decreased glomerular filtration rate (GFR) between 45-59 mL/min/1 73 square meter and albuminuria creatinine ratio less than 30 mg/g (HCC)    Primary hypertension    Hypotension due to hypovolemia    Severe protein-calorie malnutrition ([facility] )              08/19/22 1010   OT Last Visit   OT Visit Date 08/19/22   Note Type   Note type Screen   Additional Comments OT orders received  Chart reviewed  Pt at functional baseline, independent in room without AD/DME  No acute OT needs  Will D/C OT orders        SABAS Arellano/RAFAELA

## 2022-08-19 NOTE — PROCEDURES
PICC Line Insertion    Date/Time: 8/19/2022 3:28 PM  Performed by: Corona De Paz  Authorized by: Macario Smith MD     Patient location:  IR  Pre-procedure details:     Skin preparation:  ChloraPrep    Skin preparation agent: Skin preparation agent completely dried prior to procedure    Indications:     PICC line indications: no peripheral vascular access    Anesthesia (see MAR for exact dosages): Anesthesia method:  Local infiltration    Local anesthetic:  Lidocaine 1% w/o epi  Procedure details:     Vessel type: vein      Laterality:  Left    Approach: percutaneous technique used      Catheter size:  5 Fr    Landmarks identified: yes      Ultrasound guidance: yes      Ultrasound image availability:  Images available in PACS    Sterile ultrasound techniques: Sterile gel and sterile probe covers were used      Successful placement: yes      Total catheter length (cm):  48    Catheter out on skin (cm):  0    Max flow rate:  999    Arm circumference:  28 5  Post-procedure details:     Post-procedure:  Securement device placed    Assessment:  Blood return through all ports and free fluid flow    Post-procedure complications: none      Patient tolerance of procedure:   Tolerated well, no immediate complications

## 2022-08-19 NOTE — DISCHARGE INSTRUCTIONS
Peripherally Inserted Central Catheter     WHAT YOU NEED TO KNOW:   A PICC is an IV placed into a large blood vessel near your heart  It is usually inserted through a blood vessel in your arm  Your PICC may have multiple ports  Ports are tubes where you can inject medicine  A PICC can stay in place for several weeks or months  You may need a PICC to get nutrition, medicine, or fluids  Blood samples can be removed from your PICC and sent to the lab for tests  DISCHARGE INSTRUCTIONS:    [address] [facility] patients,    Contact Interventional Radiology at [phone] PATIENTS: Contact Interventional Radiology at [phone]   [facility] PATIENTS: Contact Interventional Radiology at [phone] if:  Blood soaks through your bandage  Your arm or leg feels warm, tender, and painful  It may look swollen and red  You have trouble moving your arm  Your catheter falls out  You have a fever or swelling, redness, pain, or pus where the catheter was inserted  Persistent nausea or vomiting  You cannot flush your catheter, or you feel pain when you flush your catheter  You see a hole or crack in the tubing of your catheter  You see fluid leaking from the insertion site  You run out of supplies to care for your catheter  You have questions or concerns about your condition or care  Microscopic Colitis   WHAT YOU NEED TO KNOW:   Microscopic colitis is long-term inflammation of your colon (large intestine)  Inflammation can damage the lining of your colon and cause long-term diarrhea  Microscopic colitis may be caused by an infection, higher levels of acid in your colon, or the cause may not be known  DISCHARGE INSTRUCTIONS:   Call 911 for any of the following: You have trouble breathing         Seek care immediately if:   You have any of the following signs of severe dehydration:     Dizziness or weakness    Dry mouth, cracked lips, or severe thirst    Fast heartbeat or breathing    Passing little to no urine    You have black or bright red stools  You have blood in your vomit  Your abdomen feels swollen or hard  Contact your healthcare provider if:   You have a fever with abdominal pain that does not go away  You have a fever, chills, cough, or feel weak and achy  Your diarrhea gets worse  Your symptoms do not improve or get worse  You are losing weight without trying  You have questions or concerns about your condition or care  Medicines:   Medicines  may be given to treat a bacterial infection, decrease inflammation in your colon, or treat diarrhea  You may also need medicine to decrease acid levels in your colon that could cause irritation  Take your medicine as directed  Contact your healthcare provider if you think your medicine is not helping or if you have side effects  Tell him of her if you are allergic to any medicine  Keep a list of the medicines, vitamins, and herbs you take  Include the amounts, and when and why you take them  Bring the list or the pill bottles to follow-up visits  Carry your medicine list with you in case of an emergency  Manage your symptoms:   Eat a variety of healthy foods  Healthy foods include fruits, vegetables, whole-grain breads, low-fat dairy products, beans, lean meats, and fish  You may need to eat several small meals throughout the day  Avoid spicy foods, caffeine, chocolate, and foods high in fat  Drink liquids as directed  to help prevent dehydration  Good liquids to drink include water, juice, and broth  Ask how much liquid to drink each day  You may need to drink an oral rehydration solution (ORS)  An ORS contains a balance of water, salt, and sugar to replace body fluids lost during diarrhea  Start to exercise when you feel better  Regular exercise helps your bowels work normally  Ask about the best exercise plan for you  Ask about probiotics    You may need supplements that help balance the bacteria in your colon  This will help decrease you symptoms  Prevent microscopic colitis:   Clean thoroughly  Wash your hands in warm, soapy water for 20 seconds before and after you handle food  Wash your hands after you use the bathroom, change a diaper, or touch an animal  Rinse fruits and vegetables in running water  Clean cutting boards, knives, countertops, and other areas where you prepare food before and after you cook  Wash sponges and dishtowels weekly in hot water  Laurann Carbon food all the way through  Cook eggs until the yolks are firm  Use a meat thermometer to make sure meat is heated to a temperature that will kill bacteria  Do not eat raw or undercooked chicken, turkey, seafood, or meat  Store food properly  Refrigerate or freeze fruits and vegetables, cooked foods, and leftovers  Drink safe water  Drink only treated water  Do not drink water from ponds or lakes, or from swimming pools that do not contain chlorine  Drink bottled water when traveling  Follow up with your healthcare provider as directed:  Keep a written record of your bowel movements  Include the color, form, and if they were bloody  Bring this to your follow-up visits  Write down your questions so you remember to ask them during your visits  © Copyright [employer] 2022 Information is for End User's use only and may not be sold, redistributed or otherwise used for commercial purposes  All illustrations and images included in CareNotes® are the copyrighted property of [employer]  or Hugo [employer]  The above information is an  only  It is not intended as medical advice for individual conditions or treatments  Talk to your doctor, nurse or pharmacist before following any medical regimen to see if it is safe and effective for you

## 2022-08-19 NOTE — PHYSICAL THERAPY NOTE
PHYSICAL THERAPY Evaluation    Performed at least 2 patient identifiers during session:  Patient Active Problem List   Diagnosis    Metastatic breast cancer ([facility] )    Adverse reaction to pneumococcal vaccine    Anxiety    Cataract, bilateral    Mixed hyperlipidemia    Pulmonary nodule seen on imaging study    Sleep disorder    Chronic kidney disease (CKD) stage G3a/A1, moderately decreased glomerular filtration rate (GFR) between 45-59 mL/min/1 73 square meter and albuminuria creatinine ratio less than 30 mg/g (HCC)    Primary hypertension    Osteopenia of multiple sites    Cough due to ACE inhibitor    Acute costochondritis    Lytic lesion of bone on x-ray    Neoplasm of uncertain behavior of sternum    Rectal bleeding    Toxic gastroenteritis and colitis    Secondary malignant neoplasm of bone ([facility] )    REYES (acute kidney injury) ([place] [address] )    Diarrhea    Hypokalemia    Anemia    Hypotension due to hypovolemia    Severe protein-calorie malnutrition ([place] [address] )       Past Medical History:   Diagnosis Date    Abnormal weight loss     Allergic reaction     Anxiety     Breast cancer, right ([facility] ) 2011    right    Cancer ([place] [address] ) 2012    Candidal vulvovaginitis     Clotting disorder ([place] [address] ) Same as above    Endometrial hyperplasia     Epithelial cyst     benign, ovary    GI (gastrointestinal bleed) Blood mixed with stool    History of radiation therapy 2011    right breast cancer    Hyperlipidemia     Hypertension     Internal hemorrhoids     Knee tendonitis     Ovarian cyst     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[facility])        Past Surgical History:   Procedure Laterality Date    BILATERAL SALPINGOOPHORECTOMY      onset: 7/23/13    BREAST BIOPSY Right 06/13/2006    benign    BREAST BIOPSY Right 03/02/2011    malignant    BREAST LUMPECTOMY Right 03/30/2011    malignant    CATARACT EXTRACTION W/  INTRAOCULAR LENS IMPLANT Right     phacoemulsification   Onset: 10/27/14 CHOLECYSTECTOMY      COLONOSCOPY  2017    approx    HYSTERECTOMY  Yes    INTRAOPERATIVE RADIATION THERAPY ([facility]ORT)      IR BIOPSY BONE  7/9/2021    SKIN LESION EXCISION Right     breast, single lesion    TONSILLECTOMY AND ADENOIDECTOMY          08/19/22 0829   PT Last Visit   PT Visit Date 08/19/22   Note Type   Note type Evaluation   Pain Assessment   Pain Assessment Tool 0-10   Pain Score No Pain   Home Living   Type of [address]  (first floor apartment)   [address]  (pt has RW but does not use at baseline)   Prior Function   Level of [place] Independent with ADLs and functional mobility   Lives With Alone   ADL Assistance Independent   IADLs Independent   General   Additional Pertinent History BLE edema   Family/Caregiver Present No   Cognition   Overall Cognitive Status WFL   Arousal/Participation Alert   Orientation Level Oriented X4   Memory Within functional limits   Following Commands Follows all commands and directions without difficulty   RUE Assessment   RUE Assessment WFL   LUE Assessment   LUE Assessment WFL   RLE Assessment   RLE Assessment WFL   LLE Assessment   LLE Assessment WFL   Bed Mobility   Supine to Sit 6  Modified independent   Additional Comments Pt remains OOB in chair at end of session;  pt states she has been taking self to/from [facility] without difficulty   Transfers   Sit to Stand 7  Independent   Stand to Sit 7  Independent   Ambulation/Elevation   Gait pattern Decreased foot clearance; Wide JANINA   Gait Assistance 6  Modified independent   Assistive Device None   Distance 50ft without AD, no LOB or unsteadiness, wide JANINA with increased lateral sway, pt states 2/2 edema  Pt denies need for walker/AD at this time     Balance   Static Sitting Good   Dynamic Sitting Good   Static Standing Good   Dynamic Standing Good   Ambulatory Good   Activity Tolerance   Activity Tolerance   (no adverse effects to PT noted)   Nurse Made Aware Rosalina   Assessment Prognosis Good   Assessment Pt is an 79 y o  female who presented to ED 8/15/22 with c/o dizziness, diarrhea; pt on new chemo meds  Dx:  Dehydration, external hemorrhoid, hypophosphatemia, orthostatic hypotension, pancolitis, ulcerative colitis  Upon evaluation: Pt mod I for bed mobility, independent for sit to stand, and mod I for ambulation without AD  Full objective findings from PT assessment regarding body systems outlined above  The following objective measures performed on IE also reveal limitations: BLE edema  Pt's clinical presentation is currently unstable/unpredictable seen in pt's presentation of continuous monitoring in hospital, diarrhea  Pt appears to be functioning at baseline for mobility, no need for skilled PT;  Safe for home DC when medically ready; Will DC PT order, reconsult if mobility status changes; Encourage ambulation TID and OOB for all meals  Pt was educated on OOB for all meals and ambulate TID with nursing staff  The patient's AM-PAC Basic Mobility Inpatient Short Form Raw Score is 24  A Raw score of greater than 17 suggests the patient may benefit from discharge to home  Please also refer to the recommendation of the Physical Therapist for safe discharge planning     Goals   Patient Goals to get better and go home   Recommendation   PT Discharge Recommendation No rehabilitation needs   AM-PAC Basic Mobility Inpatient   Turning in Bed Without Bedrails 4   Lying on Back to Sitting on Edge of Flat Bed 4   Moving Bed to Chair 4   Standing Up From Chair 4   Walk in Room 4   Climb 3-5 Stairs 4   Basic Mobility Inpatient Raw Score 24   Basic Mobility Standardized Score 57 68   Highest Level Of Mobility   JH-HLM Goal 8: Walk 250 feet or more   JH-HLM Achieved 7: Walk 25 feet or more     Asiya Michaels, PT      Patient Name: Marylen Panther Clunk  EFEDW'R Date: 8/19/2022

## 2022-08-19 NOTE — PROGRESS NOTES
[facility]     Progress Note Doris Silverman 1940, 79 y o  female MRN: [id number]  Unit/Bed#: -[place] Encounter: [id number]  Primary Care Provider: Tigre Gann DO   Date and time admitted to hospital: 8/15/2022 10:40 AM    * Toxic gastroenteritis and colitis  Assessment & Plan  · Patient presented with dizziness and diarrhea starting in August that worsened yesterday  · Most likely secondary to chemo vs UC flare  · CT showed: 1  Diffuse mild-moderate circumferential wall thickening of the colon with pericolic inflammatory stranding consistent with nonspecific pancolitis  2   Multiple stable small scattered sclerotic foci in the visualized lumbar lower thoracic spine osseous pelvis which may reflect osseous metastatic disease in this patient with known metastatic breast cancer  3   Additional stable findings as noted   · GI consulted-> recs appreciated  · IVF  · Discontinue Zosyn as symptoms are most likely secondary to ulcerative colitis  · Steroids initiated 8/17  · F/u bld cx and procal, ESR, CRP, and lipase, C Diff  · ESR:  48  · Procalcitonin:  1 49  · CRP:  206 3  · C  Diff: negative  · Stool enteric panel: negative  · Advanced to low-fiber 8/17  · Cholestyramine  · Patient reports several episodes daily of diarrhea but is not as severe as her recent hospitalization earlier this month  · Oncology consulted by GI for further recommendations regarding current cancer regimen playing a role in symptoms    Previously believed to to verzenio which has been on hold    Extremity edema  Assessment & Plan  · Noted bilateral LE edema, L > R  · Appears IVF had been discontinued  · Denies history of heart failure  · Follow up venous duplex rule out DVT, echocardiogram    Severe protein-calorie malnutrition ([facility] )  Assessment & Plan  Malnutrition Findings:   Adult Malnutrition type: Chronic illness  Adult Degree of Malnutrition: Other severe protein calorie malnutrition  Malnutrition Characteristics: Inadequate energy, Weight loss                  360 Statement: Pt presents with chronic severe protein calorie malnutrition due to Stage IV metastatic breast cancer, chronic diarrhea vs UC flare as evidenced by 10 5 lb wt loss-14% wt loss in 4 months and <75 % po intake > 1 month  BMI Findings: Body mass index is 27 21 kg/m²  Hypotension due to hypovolemia  Assessment & Plan  · Patient was hypotensive in the ED and received 2 L bolus  · Blood pressure stable  · Appears IVF had been discontinued due to LE edema    Primary hypertension  Assessment & Plan  · Hold cozaar d/t hypotension    Chronic kidney disease (CKD) stage G3a/A1, moderately decreased glomerular filtration rate (GFR) between 45-59 mL/min/1 73 square meter and albuminuria creatinine ratio less than 30 mg/g [facility])  Assessment & Plan  Lab Results   Component Value Date    EGFR 64 08/19/2022    EGFR 65 08/18/2022    EGFR 49 08/16/2022    CREATININE 0 84 08/19/2022    CREATININE 0 83 08/18/2022    CREATININE 1 05 08/16/2022     · Cr on admission slightly elevated from her baseline at 1 3, most likely due to volume depletion  · Baseline 1 1-1 2  · I's and O's  · IV fluids  · Avoid nephrotoxic agents  · Continue to trend - creatinine now below baseline, tolerating clears    Mixed hyperlipidemia  Assessment & Plan  · Continue statin    Metastatic breast cancer [facility])  Assessment & Plan  · Continue to follow-up with Hematology-Oncology outpatient  · Holding Femara at this time  · On recent admission patient's verzenio was held  Reviewed office visit from 8/10/22 appears there was some consideration to resume but at lowered dose  Patient reports she never resumed this medication    VTE Pharmacologic Prophylaxis: VTE Score: 6 High Risk (Score >/= 5) - Pharmacological DVT Prophylaxis Ordered: heparin  Sequential Compression Devices Ordered      Patient Centered Rounds: I performed bedside rounds with nursing staff today  Discussions with Specialists or Other Care Team Provider: CLEO ANDERS, will discuss with oncology    Education and Discussions with Family / Patient: Patient declined call to   Offered to call  Time Spent for Care: 30 minutes  More than 50% of total time spent on counseling and coordination of care as described above  Current Length of Stay: 4 day(s)  Current Patient Status: Inpatient   Certification Statement: The patient will continue to require additional inpatient hospital stay due to persistent diarrhea  Discharge Plan: Anticipate discharge in 48-72 hrs to home  Code Status: Level 1 - Full Code    Subjective:   Patient overall feels about the same since admit  Nursing reports frequent diarrhea  Patient denies lightheadedness, chest pain/palpitations, shortness of breath, nausea/vomiting, abdominal pain  Objective:     Vitals:   Temp (24hrs), Av 6 °F (36 4 °C), Min:97 3 °F (36 3 °C), Max:98 1 °F (36 7 °C)    Temp:  [97 3 °F (36 3 °C)-98 1 °F (36 7 °C)] 97 4 °F (36 3 °C)  HR:  [] 109  Resp:  [18-21] 21  BP: (141-154)/(71-89) 151/89  SpO2:  [95 %-100 %] 100 %  Body mass index is 27 21 kg/m²  Input and Output Summary (last 24 hours): Intake/Output Summary (Last 24 hours) at 2022 1152  Last data filed at 2022 1700  Gross per 24 hour   Intake 580 ml   Output --   Net 580 ml       Physical Exam:   Physical Exam  Vitals and nursing note reviewed  Constitutional:       Appearance: Normal appearance  Comments: No acute distress   HENT:      Head: Normocephalic  Eyes:      General: No scleral icterus  Extraocular Movements: Extraocular movements intact  Conjunctiva/sclera: Conjunctivae normal    Cardiovascular:      Rate and Rhythm: Normal rate and regular rhythm  Heart sounds: S1 normal and S2 normal    Pulmonary:      Effort: Pulmonary effort is normal       Breath sounds: Normal breath sounds   No wheezing, rhonchi or rales    Abdominal:      General: Bowel sounds are normal       Palpations: Abdomen is soft  Tenderness: There is no abdominal tenderness  There is no guarding or rebound  Musculoskeletal:         General: No swelling, tenderness or deformity  Cervical back: Normal range of motion  Comments: Able to move upper/lower ext bilaterally  Trace to 1+ pitting edema LE bilaterally, L > R   Skin:     General: Skin is warm and dry  Neurological:      Mental Status: She is alert and oriented to person, place, and time  Psychiatric:         Mood and Affect: Mood normal          Speech: Speech normal          Behavior: Behavior normal           Additional Data:     Labs:  Results from last 7 days   Lab Units 08/19/22  0733   WBC Thousand/uL 5 91   HEMOGLOBIN g/dL 9 0*   HEMATOCRIT % 27 1*   PLATELETS Thousands/uL 351   BANDS PCT % 8   LYMPHO PCT % 22   MONO PCT % 7   EOS PCT % 7*     Results from last 7 days   Lab Units 08/19/22  0733   SODIUM mmol/L 142   POTASSIUM mmol/L 4 3   CHLORIDE mmol/L 112*   CO2 mmol/L 23   BUN mg/dL 9   CREATININE mg/dL 0 84   ANION GAP mmol/L 7   CALCIUM mg/dL 7 1*   ALBUMIN g/dL 1 6*   TOTAL BILIRUBIN mg/dL 0 30   ALK PHOS U/L 93   ALT U/L 21   AST U/L 14   GLUCOSE RANDOM mg/dL 85                 Results from last 7 days   Lab Units 08/16/22  0508 08/15/22  1106   LACTIC ACID mmol/L  --  1 8   PROCALCITONIN ng/ml 1 49*  --        Lines/Drains:  Invasive Devices  Report    Peripheral Intravenous Line  Duration           Peripheral IV 08/17/22 Left Hand 1 day                      Imaging: Reviewed radiology reports from this admission including: abdominal/pelvic CT    Recent Cultures (last 7 days):   Results from last 7 days   Lab Units 08/15/22  1726 08/15/22  1659 08/15/22  1259   BLOOD CULTURE  No Growth at 72 hrs  No Growth at 72 hrs    --    C DIFF TOXIN B BY PCR   --   --  Negative       Last 24 Hours Medication List:   Current Facility-Administered Medications   Medication Dose Route Frequency Provider Last Rate    acetaminophen  650 mg Oral Q6H PRN Boo Adkins MD      amitriptyline  25 mg Oral HS Boo Adkins MD      cholestyramine sugar free  4 g Oral BID Boo Adkins MD      heparin (porcine)  5,000 Units Subcutaneous Q8H Montserrat Argueta MD      loperamide  2 mg Oral Q4H PRN Boo Adkins MD      mesalamine  800 mg Oral TID BROOKE Martinez      mirtazapine  7 5 mg Oral HS Boo Adkins MD      ondansetron  4 mg Intravenous Q6H PRN Boo Adkins MD      pravastatin  20 mg Oral Daily With Clark Farrar MD      predniSONE  40 mg Oral Daily BROOKE Martinez          Today, Patient Was Seen By: Amirah Hay PA-C    **Please Note: This note may have been constructed using a voice recognition system  **

## 2022-08-20 ENCOUNTER — ANESTHESIA (INPATIENT)
Dept: GASTROENTEROLOGY | Facility: HOSPITAL | Age: 82
DRG: 393 | End: 2022-08-20
Payer: MEDICARE

## 2022-08-20 ENCOUNTER — APPOINTMENT (OUTPATIENT)
Dept: GASTROENTEROLOGY | Facility: HOSPITAL | Age: 82
DRG: 393 | End: 2022-08-20
Attending: INTERNAL MEDICINE
Payer: MEDICARE

## 2022-08-20 ENCOUNTER — ANESTHESIA EVENT (INPATIENT)
Dept: GASTROENTEROLOGY | Facility: HOSPITAL | Age: 82
DRG: 393 | End: 2022-08-20
Payer: MEDICARE

## 2022-08-20 PROBLEM — I82.409 DVT (DEEP VENOUS THROMBOSIS) (HCC): Status: ACTIVE | Noted: 2022-08-19

## 2022-08-20 PROBLEM — I95.89 HYPOTENSION DUE TO HYPOVOLEMIA: Status: RESOLVED | Noted: 2022-08-15 | Resolved: 2022-08-20

## 2022-08-20 PROBLEM — E86.1 HYPOTENSION DUE TO HYPOVOLEMIA: Status: RESOLVED | Noted: 2022-08-15 | Resolved: 2022-08-20

## 2022-08-20 LAB
ANION GAP SERPL CALCULATED.3IONS-SCNC: 7 MMOL/L (ref 4–13)
APTT PPP: 105 SECONDS (ref 23–37)
APTT PPP: 60 SECONDS (ref 23–37)
APTT PPP: 75 SECONDS (ref 23–37)
BACTERIA BLD CULT: NORMAL
BACTERIA BLD CULT: NORMAL
BUN SERPL-MCNC: 9 MG/DL (ref 5–25)
CALCIUM SERPL-MCNC: 7.3 MG/DL (ref 8.3–10.1)
CHLORIDE SERPL-SCNC: 110 MMOL/L (ref 96–108)
CO2 SERPL-SCNC: 21 MMOL/L (ref 21–32)
CREAT SERPL-MCNC: 0.83 MG/DL (ref 0.6–1.3)
ERYTHROCYTE [DISTWIDTH] IN BLOOD BY AUTOMATED COUNT: 20.4 % (ref 11.6–15.1)
GFR SERPL CREATININE-BSD FRML MDRD: 65 ML/MIN/1.73SQ M
GLUCOSE SERPL-MCNC: 92 MG/DL (ref 65–140)
HCT VFR BLD AUTO: 26.5 % (ref 34.8–46.1)
HCT VFR BLD AUTO: 27.4 % (ref 34.8–46.1)
HGB BLD-MCNC: 9 G/DL (ref 11.5–15.4)
HGB BLD-MCNC: 9 G/DL (ref 11.5–15.4)
MCH RBC QN AUTO: 31.1 PG (ref 26.8–34.3)
MCHC RBC AUTO-ENTMCNC: 32.8 G/DL (ref 31.4–37.4)
MCV RBC AUTO: 95 FL (ref 82–98)
PLATELET # BLD AUTO: 317 THOUSANDS/UL (ref 149–390)
PMV BLD AUTO: 9.8 FL (ref 8.9–12.7)
POTASSIUM SERPL-SCNC: 5.1 MMOL/L (ref 3.5–5.3)
RBC # BLD AUTO: 2.89 MILLION/UL (ref 3.81–5.12)
SODIUM SERPL-SCNC: 138 MMOL/L (ref 135–147)
WBC # BLD AUTO: 8.67 THOUSAND/UL (ref 4.31–10.16)

## 2022-08-20 PROCEDURE — 85730 THROMBOPLASTIN TIME PARTIAL: CPT | Performed by: PHYSICIAN ASSISTANT

## 2022-08-20 PROCEDURE — 86870 RBC ANTIBODY IDENTIFICATION: CPT | Performed by: PHYSICIAN ASSISTANT

## 2022-08-20 PROCEDURE — 85014 HEMATOCRIT: CPT | Performed by: INTERNAL MEDICINE

## 2022-08-20 PROCEDURE — 85730 THROMBOPLASTIN TIME PARTIAL: CPT | Performed by: INTERNAL MEDICINE

## 2022-08-20 PROCEDURE — 99232 SBSQ HOSP IP/OBS MODERATE 35: CPT | Performed by: PHYSICIAN ASSISTANT

## 2022-08-20 PROCEDURE — 0DBN8ZX EXCISION OF SIGMOID COLON, VIA NATURAL OR ARTIFICIAL OPENING ENDOSCOPIC, DIAGNOSTIC: ICD-10-PCS | Performed by: INTERNAL MEDICINE

## 2022-08-20 PROCEDURE — C9113 INJ PANTOPRAZOLE SODIUM, VIA: HCPCS | Performed by: PHYSICIAN ASSISTANT

## 2022-08-20 PROCEDURE — 88305 TISSUE EXAM BY PATHOLOGIST: CPT | Performed by: PATHOLOGY

## 2022-08-20 PROCEDURE — 88342 IMHCHEM/IMCYTCHM 1ST ANTB: CPT | Performed by: PATHOLOGY

## 2022-08-20 PROCEDURE — 86905 BLOOD TYPING RBC ANTIGENS: CPT

## 2022-08-20 PROCEDURE — 85018 HEMOGLOBIN: CPT | Performed by: INTERNAL MEDICINE

## 2022-08-20 PROCEDURE — 86850 RBC ANTIBODY SCREEN: CPT | Performed by: PHYSICIAN ASSISTANT

## 2022-08-20 PROCEDURE — 86900 BLOOD TYPING SEROLOGIC ABO: CPT | Performed by: PHYSICIAN ASSISTANT

## 2022-08-20 PROCEDURE — 85027 COMPLETE CBC AUTOMATED: CPT | Performed by: INTERNAL MEDICINE

## 2022-08-20 PROCEDURE — 86901 BLOOD TYPING SEROLOGIC RH(D): CPT | Performed by: PHYSICIAN ASSISTANT

## 2022-08-20 PROCEDURE — 88341 IMHCHEM/IMCYTCHM EA ADD ANTB: CPT | Performed by: PATHOLOGY

## 2022-08-20 PROCEDURE — 45331 SIGMOIDOSCOPY AND BIOPSY: CPT | Performed by: INTERNAL MEDICINE

## 2022-08-20 PROCEDURE — NC001 PR NO CHARGE: Performed by: INTERNAL MEDICINE

## 2022-08-20 PROCEDURE — 80048 BASIC METABOLIC PNL TOTAL CA: CPT | Performed by: PHYSICIAN ASSISTANT

## 2022-08-20 PROCEDURE — C9113 INJ PANTOPRAZOLE SODIUM, VIA: HCPCS | Performed by: INTERNAL MEDICINE

## 2022-08-20 RX ORDER — SODIUM CHLORIDE 9 MG/ML
INJECTION, SOLUTION INTRAVENOUS CONTINUOUS PRN
Status: DISCONTINUED | OUTPATIENT
Start: 2022-08-20 | End: 2022-08-20

## 2022-08-20 RX ORDER — PANTOPRAZOLE SODIUM 40 MG/10ML
40 INJECTION, POWDER, LYOPHILIZED, FOR SOLUTION INTRAVENOUS EVERY 12 HOURS SCHEDULED
Status: DISCONTINUED | OUTPATIENT
Start: 2022-08-20 | End: 2022-08-25

## 2022-08-20 RX ORDER — PROPOFOL 10 MG/ML
INJECTION, EMULSION INTRAVENOUS AS NEEDED
Status: DISCONTINUED | OUTPATIENT
Start: 2022-08-20 | End: 2022-08-20

## 2022-08-20 RX ORDER — SODIUM CHLORIDE, SODIUM GLUCONATE, SODIUM ACETATE, POTASSIUM CHLORIDE, MAGNESIUM CHLORIDE, SODIUM PHOSPHATE, DIBASIC, AND POTASSIUM PHOSPHATE .53; .5; .37; .037; .03; .012; .00082 G/100ML; G/100ML; G/100ML; G/100ML; G/100ML; G/100ML; G/100ML
75 INJECTION, SOLUTION INTRAVENOUS CONTINUOUS
Status: DISCONTINUED | OUTPATIENT
Start: 2022-08-20 | End: 2022-08-21

## 2022-08-20 RX ORDER — METHYLPREDNISOLONE SODIUM SUCCINATE 40 MG/ML
40 INJECTION, POWDER, LYOPHILIZED, FOR SOLUTION INTRAMUSCULAR; INTRAVENOUS EVERY 8 HOURS SCHEDULED
Status: DISCONTINUED | OUTPATIENT
Start: 2022-08-20 | End: 2022-08-25

## 2022-08-20 RX ADMIN — AMITRIPTYLINE HYDROCHLORIDE 25 MG: 25 TABLET, FILM COATED ORAL at 21:11

## 2022-08-20 RX ADMIN — PANTOPRAZOLE SODIUM 40 MG: 40 INJECTION, POWDER, FOR SOLUTION INTRAVENOUS at 09:35

## 2022-08-20 RX ADMIN — MESALAMINE 800 MG: 400 CAPSULE, DELAYED RELEASE ORAL at 21:10

## 2022-08-20 RX ADMIN — PREDNISONE 40 MG: 20 TABLET ORAL at 09:31

## 2022-08-20 RX ADMIN — SODIUM CHLORIDE, SODIUM GLUCONATE, SODIUM ACETATE, POTASSIUM CHLORIDE, MAGNESIUM CHLORIDE, SODIUM PHOSPHATE, DIBASIC, AND POTASSIUM PHOSPHATE 75 ML/HR: .53; .5; .37; .037; .03; .012; .00082 INJECTION, SOLUTION INTRAVENOUS at 21:12

## 2022-08-20 RX ADMIN — PROPOFOL 40 MG: 10 INJECTION, EMULSION INTRAVENOUS at 10:48

## 2022-08-20 RX ADMIN — MIRTAZAPINE 7.5 MG: 15 TABLET, FILM COATED ORAL at 21:11

## 2022-08-20 RX ADMIN — PRAVASTATIN SODIUM 20 MG: 20 TABLET ORAL at 17:00

## 2022-08-20 RX ADMIN — SODIUM CHLORIDE, SODIUM GLUCONATE, SODIUM ACETATE, POTASSIUM CHLORIDE, MAGNESIUM CHLORIDE, SODIUM PHOSPHATE, DIBASIC, AND POTASSIUM PHOSPHATE 75 ML/HR: .53; .5; .37; .037; .03; .012; .00082 INJECTION, SOLUTION INTRAVENOUS at 09:32

## 2022-08-20 RX ADMIN — MESALAMINE 800 MG: 400 CAPSULE, DELAYED RELEASE ORAL at 09:31

## 2022-08-20 RX ADMIN — MESALAMINE 800 MG: 400 CAPSULE, DELAYED RELEASE ORAL at 17:00

## 2022-08-20 RX ADMIN — HEPARIN SODIUM 13 UNITS/KG/HR: 10000 INJECTION, SOLUTION INTRAVENOUS at 18:42

## 2022-08-20 RX ADMIN — SODIUM CHLORIDE: 0.9 INJECTION, SOLUTION INTRAVENOUS at 10:27

## 2022-08-20 RX ADMIN — PANTOPRAZOLE SODIUM 40 MG: 40 INJECTION, POWDER, FOR SOLUTION INTRAVENOUS at 21:10

## 2022-08-20 RX ADMIN — METHYLPREDNISOLONE SODIUM SUCCINATE 40 MG: 40 INJECTION, POWDER, FOR SOLUTION INTRAMUSCULAR; INTRAVENOUS at 21:12

## 2022-08-20 RX ADMIN — PROPOFOL 80 MG: 10 INJECTION, EMULSION INTRAVENOUS at 10:43

## 2022-08-20 RX ADMIN — METHYLPREDNISOLONE SODIUM SUCCINATE 40 MG: 40 INJECTION, POWDER, FOR SOLUTION INTRAMUSCULAR; INTRAVENOUS at 13:33

## 2022-08-20 NOTE — PROGRESS NOTES
Progress note - [facility]   Jose COVARRUBIAS Clcarlyle 79 y o  female MRN: [id number]  Unit/Bed#: [place] Encounter: [id number]    ASSESSMENT and PLAN    1  Bloody diarrhea and abd pain  Exacerbation of colitis  First diagnosed in 11/2021 but no evidence of chronic colitis  IBD triggered by chem/immuno therapy? Chemotherapy effect? Oncology does not believe current treatment regimen causing colitis/diarrhea  No remission with mesalamine/cortenemas or changing chemotherapies  Now admitted w/ worsening symptoms  CT w/ diffuse colitis  Initially improving on prednisone but now with increased rectal bleeding and diarrhea after started on heparin for DVT  - Continue prednisone, cholestyramine and mesalamine  - flex sig today     2  Acute blood loss anemia  Increased bleeding and after started on heparin  Hemoglobin stable at 9 though  Iron panel not c/w iron deficiency     3  Metastatic breast cancer  Followed by Dr Tipton Sites  Is chemotherapy causing colitis?  - oncology follow-up    4  DVT  Started on heparin    Chief Complaint   Patient presents with    Dizziness     Via ems from home for increased dizziness and multiple episodes diarrhea since the beginning of August  States worse since last night  States is getting new chemo medication       SUBJECTIVE/HPI   Increasing rectal bleeding    Some lower quadrant abdominal pain    /98 (BP Location: Other (Comment)) Comment (BP Location): right forearm  Pulse (!) 144   Temp 97 8 °F (36 6 °C) (Oral)   Resp 18   Ht 5' 1" (1 549 m)   Wt 65 3 kg (144 lb)   LMP  (LMP Unknown)   SpO2 94%   BMI 27 21 kg/m²     PHYSICALEXAM  General appearance: alert, appears stated age and cooperative  Eyes: PERLLA, EOMI, no icterus   Head: Normocephalic, without obvious abnormality, atraumatic  Lungs: clear to auscultation bilaterally  Heart: regular rate and rhythm, S1, S2 normal, no murmur, click, rub or gallop  Abdomen: soft, mild lower quadrant abdominal pain without rebound or guarding; bowel sounds normal; no masses,  no organomegaly  Extremities: extremities normal, atraumatic, no cyanosis or edema  Neurologic: Grossly normal    Lab Results   Component Value Date    GLUCOSE 115 09/08/2015    CALCIUM 7 3 (L) 08/20/2022     09/08/2015    K 5 1 08/20/2022    CO2 21 08/20/2022     (H) 08/20/2022    BUN 9 08/20/2022    CREATININE 0 83 08/20/2022     Lab Results   Component Value Date    WBC 8 67 08/20/2022    HGB 9 0 (L) 08/20/2022    HCT 27 4 (L) 08/20/2022    MCV 95 08/20/2022     08/20/2022     Lab Results   Component Value Date    ALT 21 08/19/2022    AST 14 08/19/2022    ALKPHOS 93 08/19/2022    BILITOT 0 66 09/08/2015     Lab Results   Component Value Date    LIPASE 79 08/15/2022     Lab Results   Component Value Date    IRON 30 (L) 08/17/2022    TIBC 161 (L) 08/17/2022    FERRITIN 328 08/17/2022     Lab Results   Component Value Date    INR 1 11 08/19/2022

## 2022-08-20 NOTE — ASSESSMENT & PLAN NOTE
· Continue to follow-up with Hematology-Oncology outpatient  · Femara had been held on admit  · On recent admission patient's verzenio was held  Reviewed office visit from 8/10/22 appears there was some consideration to resume but at lowered dose    Patient reports she never resumed this medication

## 2022-08-20 NOTE — ASSESSMENT & PLAN NOTE
· History of chronic anemia, baseline 8-10  · Hemoglobin stable this AM at 9 0 despite BRBPR  · For flex-sig today  · Trend H/H

## 2022-08-20 NOTE — ASSESSMENT & PLAN NOTE
· Patient presented with dizziness and diarrhea starting in August that worsened yesterday  · Most likely secondary to IBD  · CT showed: 1  Diffuse mild-moderate circumferential wall thickening of the colon with pericolic inflammatory stranding consistent with nonspecific pancolitis  2   Multiple stable small scattered sclerotic foci in the visualized lumbar lower thoracic spine osseous pelvis which may reflect osseous metastatic disease in this patient with known metastatic breast cancer  3   Additional stable findings as noted   · GI consulted-> recs appreciated  · IVF  · Discontinue Zosyn as symptoms are most likely secondary to ulcerative colitis  · Steroids initiated 8/17  · F/u bld cx and procal, ESR, CRP, and lipase, C Diff  · ESR:  48  · Procalcitonin:  1 49  · CRP:  206 3  · C  Diff: negative  · Stool enteric panel: negative  · Advanced to low-fiber 8/17  · Make NPO this AM as patient now with BRBPR, ongoing loose stools, plan for flex sig with GI today  · Cholestyramine  · Oncology consulted by GI for further recommendations regarding current cancer regimen playing a role in symptoms    Previously believed due to verzenio which has been on hold since recent admit, doubt this is contributing

## 2022-08-20 NOTE — QUICK NOTE
BRIEF FLEX SIG REPORT      IMPRESSION:  1  Severe colitis into mid sigmoid colon with deep ulcerations  Inflammatory bowel disease? Infectious (CMV)? RECOMMENDATION:  Resume low-fiber diet  Switch oral prednisone to IV Solu-Medrol  Okay to continue heparin unless hemoglobin begins to decrease  Await biopsy results  Sent STAT  There is no recommended follow-up for this procedure   due to age (age = 77 or greater)

## 2022-08-20 NOTE — ANESTHESIA POSTPROCEDURE EVALUATION
Post-Op Assessment Note    CV Status:  Stable  Pain Score: 0    Pain management: adequate     Mental Status:  Sleepy   Hydration Status:  Euvolemic and stable   PONV Controlled:  None   Airway Patency:  Patent      Post Op Vitals Reviewed: Yes      Staff: CRNA         No complications documented      /57 (08/20/22 1050)    Temp      Pulse (!) 118 (08/20/22 1050)   Resp 12 (08/20/22 1050)    SpO2 100 % (08/20/22 1050)

## 2022-08-20 NOTE — ANESTHESIA PREPROCEDURE EVALUATION
Procedure:  FLEXIBLE SIGMOIDOSCOPY    Metastatic breast cancer, CKD, HTN    Admitted with colitis, now with bloody diarrhea in setting of starting hep gtt for RLE acute DVT  Hgb 9    Relevant Problems   ANESTHESIA (within normal limits)   (-) History of anesthesia complications      CARDIO   (+) Mixed hyperlipidemia   (+) Primary hypertension   (-) Chest pain      GI/HEPATIC   (+) Rectal bleeding      /RENAL   (+) REYES (acute kidney injury) ([address] )   (+) Chronic kidney disease (CKD) stage G3a/A1, moderately decreased glomerular filtration rate (GFR) between 45-59 mL/min/1 73 square meter and albuminuria creatinine ratio less than 30 mg/g (HCC)      GYN   (+) Metastatic breast cancer (HCC)      HEMATOLOGY   (+) Anemia      NEURO/PSYCH   (+) Anxiety      PULMONARY   (-) URI (upper respiratory infection)      TTE 8/2022:    Left Ventricle: Wall thickness is not well visualized  The left ventricular ejection fraction is 75% by visual estimation  Systolic function is hyperdynamic  Wall motion cannot be accurately assessed    Right Ventricle: Systolic function is hyperdynamic  Physical Exam    Airway    Mallampati score: II  TM Distance: <3 FB  Neck ROM: full     Dental       Cardiovascular      Pulmonary      Other Findings        Anesthesia Plan  ASA Score- 3     Anesthesia Type- IV sedation with anesthesia with ASA Monitors  Additional Monitors:   Airway Plan:           Plan Factors-Exercise tolerance (METS): >4 METS  Chart reviewed  Existing labs reviewed  Patient summary reviewed  Induction- intravenous  Postoperative Plan-     Informed Consent- Anesthetic plan and risks discussed with patient  I personally reviewed this patient with the CRNA  Discussed and agreed on the Anesthesia Plan with the CRNA  Annette Wetzel

## 2022-08-20 NOTE — ASSESSMENT & PLAN NOTE
· Venous duplex obtained due to LE edema  · Confirmed acute occlusive thrombus in popliteal vein and non-occlusive thrombus in the peroneal veins  · Started on IV heparin gtt 8/19

## 2022-08-20 NOTE — ASSESSMENT & PLAN NOTE
Lab Results   Component Value Date    EGFR 65 08/20/2022    EGFR 64 08/19/2022    EGFR 65 08/18/2022    CREATININE 0 83 08/20/2022    CREATININE 0 84 08/19/2022    CREATININE 0 83 08/18/2022     · Cr on admission slightly elevated from her baseline at 1 3, most likely due to volume depletion  · Baseline 1 1-1 2  · I's and O's  · IV fluids  · Avoid nephrotoxic agents  · Resume gentle IVF with ongoing loose stools, poor intake

## 2022-08-20 NOTE — PLAN OF CARE
Problem: MOBILITY - ADULT  Goal: Maintain or return to baseline ADL function  Description: INTERVENTIONS:  -  Assess patient's ability to carry out ADLs; assess patient's baseline for ADL function and identify physical deficits which impact ability to perform ADLs (bathing, care of mouth/teeth, toileting, grooming, dressing, etc )  - Assess/evaluate cause of self-care deficits   - Assess range of motion  - Assess patient's mobility; develop plan if impaired  - Assess patient's need for assistive devices and provide as appropriate  - Encourage maximum independence but intervene and supervise when necessary  - Involve family in performance of ADLs  - Assess for home care needs following discharge   - Consider OT consult to assist with ADL evaluation and planning for discharge  - Provide patient education as appropriate  Outcome: Progressing  Goal: Maintains/Returns to pre admission functional level  Description: INTERVENTIONS:  - Perform BMAT or MOVE assessment daily    - Set and communicate daily mobility goal to care team and patient/family/caregiver  - Collaborate with rehabilitation services on mobility goals if consulted  - Perform Range of Motion 3 times a day  - Reposition patient every 2 hours  - Dangle patient 3 times a day  - Stand patient 3 times a day  - Ambulate patient 3 times a day  - Out of bed to chair 3 times a day   - Out of bed for meals 3 times a day  - Out of bed for toileting  - Record patient progress and toleration of activity level   Outcome: Progressing     Problem: Nutrition/Hydration-ADULT  Goal: Nutrient/Hydration intake appropriate for improving, restoring or maintaining nutritional needs  Description: Monitor and assess patient's nutrition/hydration status for malnutrition  Collaborate with interdisciplinary team and initiate plan and interventions as ordered  Monitor patient's weight and dietary intake as ordered or per policy   Utilize nutrition screening tool and intervene as necessary  Determine patient's food preferences and provide high-protein, high-caloric foods as appropriate       INTERVENTIONS:  - Monitor oral intake, urinary output, labs, and treatment plans  - Assess nutrition and hydration status and recommend course of action  - Evaluate amount of meals eaten  - Assist patient with eating if necessary   - Allow adequate time for meals  - Recommend/ encourage appropriate diets, oral nutritional supplements, and vitamin/mineral supplements  - Order, calculate, and assess calorie counts as needed  - Recommend, monitor, and adjust tube feedings and TPN/PPN based on assessed needs  - Assess need for intravenous fluids  - Provide specific nutrition/hydration education as appropriate  - Include patient/family/caregiver in decisions related to nutrition  Outcome: Progressing     Problem: Potential for Falls  Goal: Patient will remain free of falls  Description: INTERVENTIONS:  - Educate patient/family on patient safety including physical limitations  - Instruct patient to call for assistance with activity   - Consult OT/PT to assist with strengthening/mobility   - Keep Call bell within reach  - Keep bed low and locked with side rails adjusted as appropriate  - Keep care items and personal belongings within reach  - Initiate and maintain comfort rounds  - Make Fall Risk Sign visible to staff  - Offer Toileting every 2 Hours, in advance of need  - Initiate/Maintain bed alarm  - Obtain necessary fall risk management equipment:   - Apply yellow socks and bracelet for high fall risk patients  - Consider moving patient to room near nurses station  Outcome: Progressing     Problem: Prexisting or High Potential for Compromised Skin Integrity  Goal: Skin integrity is maintained or improved  Description: INTERVENTIONS:  - Identify patients at risk for skin breakdown  - Assess and monitor skin integrity  - Assess and monitor nutrition and hydration status  - Monitor labs   - Assess for incontinence   - Turn and reposition patient  - Assist with mobility/ambulation  - Relieve pressure over bony prominences  - Avoid friction and shearing  - Provide appropriate hygiene as needed including keeping skin clean and dry  - Evaluate need for skin moisturizer/barrier cream  - Collaborate with interdisciplinary team   - Patient/family teaching  - Consider wound care consult   Outcome: Progressing

## 2022-08-21 ENCOUNTER — APPOINTMENT (INPATIENT)
Dept: CT IMAGING | Facility: HOSPITAL | Age: 82
DRG: 393 | End: 2022-08-21
Payer: MEDICARE

## 2022-08-21 PROBLEM — I26.93 SINGLE SUBSEGMENTAL PULMONARY EMBOLISM WITHOUT ACUTE COR PULMONALE (HCC): Status: ACTIVE | Noted: 2022-08-19

## 2022-08-21 LAB
ABO GROUP BLD: NORMAL
ABO GROUP BLD: NORMAL
ALBUMIN SERPL BCP-MCNC: 1.6 G/DL (ref 3.5–5)
ALP SERPL-CCNC: 82 U/L (ref 46–116)
ALT SERPL W P-5'-P-CCNC: 21 U/L (ref 12–78)
ANION GAP SERPL CALCULATED.3IONS-SCNC: 10 MMOL/L (ref 4–13)
ANTIBODY ID. #3: NORMAL
APTT PPP: 69 SECONDS (ref 23–37)
APTT PPP: 75 SECONDS (ref 23–37)
APTT PPP: >210 SECONDS (ref 23–37)
AST SERPL W P-5'-P-CCNC: 17 U/L (ref 5–45)
BILIRUB DIRECT SERPL-MCNC: 0.13 MG/DL (ref 0–0.2)
BILIRUB SERPL-MCNC: 0.3 MG/DL (ref 0.2–1)
BLD GP AB SCN SERPL QL: POSITIVE
BLOOD GROUP ANTIBODIES SERPL: NORMAL
BLOOD GROUP ANTIBODIES SERPL: NORMAL
BUN SERPL-MCNC: 8 MG/DL (ref 5–25)
C AG RBC QL: NEGATIVE
CALCIUM SERPL-MCNC: 6.3 MG/DL (ref 8.3–10.1)
CHLORIDE SERPL-SCNC: 109 MMOL/L (ref 96–108)
CO2 SERPL-SCNC: 22 MMOL/L (ref 21–32)
CREAT SERPL-MCNC: 0.76 MG/DL (ref 0.6–1.3)
E AG RBC QL: NEGATIVE
ERYTHROCYTE [DISTWIDTH] IN BLOOD BY AUTOMATED COUNT: 20.3 % (ref 11.6–15.1)
GFR SERPL CREATININE-BSD FRML MDRD: 73 ML/MIN/1.73SQ M
GLUCOSE SERPL-MCNC: 104 MG/DL (ref 65–140)
HCT VFR BLD AUTO: 23.4 % (ref 34.8–46.1)
HCT VFR BLD AUTO: 24.1 % (ref 34.8–46.1)
HCT VFR BLD AUTO: 25 % (ref 34.8–46.1)
HGB BLD-MCNC: 7.5 G/DL (ref 11.5–15.4)
HGB BLD-MCNC: 8 G/DL (ref 11.5–15.4)
HGB BLD-MCNC: 8 G/DL (ref 11.5–15.4)
KELL GROUP AG RBC: NEGATIVE
MCH RBC QN AUTO: 29.8 PG (ref 26.8–34.3)
MCHC RBC AUTO-ENTMCNC: 32.1 G/DL (ref 31.4–37.4)
MCV RBC AUTO: 93 FL (ref 82–98)
PLATELET # BLD AUTO: 257 THOUSANDS/UL (ref 149–390)
PMV BLD AUTO: 9.8 FL (ref 8.9–12.7)
POTASSIUM SERPL-SCNC: 3.5 MMOL/L (ref 3.5–5.3)
PROT SERPL-MCNC: 4.8 G/DL (ref 6.4–8.4)
RBC # BLD AUTO: 2.52 MILLION/UL (ref 3.81–5.12)
RH BLD: NEGATIVE
RH BLD: NEGATIVE
SODIUM SERPL-SCNC: 141 MMOL/L (ref 135–147)
SPECIMEN EXPIRATION DATE: NORMAL
WBC # BLD AUTO: 6.77 THOUSAND/UL (ref 4.31–10.16)

## 2022-08-21 PROCEDURE — 99497 ADVNCD CARE PLAN 30 MIN: CPT | Performed by: PHYSICIAN ASSISTANT

## 2022-08-21 PROCEDURE — G1004 CDSM NDSC: HCPCS

## 2022-08-21 PROCEDURE — 85014 HEMATOCRIT: CPT | Performed by: PHYSICIAN ASSISTANT

## 2022-08-21 PROCEDURE — 85018 HEMOGLOBIN: CPT | Performed by: PHYSICIAN ASSISTANT

## 2022-08-21 PROCEDURE — 80048 BASIC METABOLIC PNL TOTAL CA: CPT | Performed by: PHYSICIAN ASSISTANT

## 2022-08-21 PROCEDURE — 99232 SBSQ HOSP IP/OBS MODERATE 35: CPT | Performed by: INTERNAL MEDICINE

## 2022-08-21 PROCEDURE — 85730 THROMBOPLASTIN TIME PARTIAL: CPT | Performed by: PHYSICIAN ASSISTANT

## 2022-08-21 PROCEDURE — C9113 INJ PANTOPRAZOLE SODIUM, VIA: HCPCS | Performed by: INTERNAL MEDICINE

## 2022-08-21 PROCEDURE — 85027 COMPLETE CBC AUTOMATED: CPT | Performed by: INTERNAL MEDICINE

## 2022-08-21 PROCEDURE — 85730 THROMBOPLASTIN TIME PARTIAL: CPT | Performed by: INTERNAL MEDICINE

## 2022-08-21 PROCEDURE — 99232 SBSQ HOSP IP/OBS MODERATE 35: CPT | Performed by: PHYSICIAN ASSISTANT

## 2022-08-21 PROCEDURE — 71275 CT ANGIOGRAPHY CHEST: CPT

## 2022-08-21 PROCEDURE — 80076 HEPATIC FUNCTION PANEL: CPT | Performed by: PHYSICIAN ASSISTANT

## 2022-08-21 RX ADMIN — IOHEXOL 85 ML: 350 INJECTION, SOLUTION INTRAVENOUS at 08:12

## 2022-08-21 RX ADMIN — MESALAMINE 800 MG: 400 CAPSULE, DELAYED RELEASE ORAL at 15:27

## 2022-08-21 RX ADMIN — MESALAMINE 800 MG: 400 CAPSULE, DELAYED RELEASE ORAL at 08:28

## 2022-08-21 RX ADMIN — PANTOPRAZOLE SODIUM 40 MG: 40 INJECTION, POWDER, FOR SOLUTION INTRAVENOUS at 08:28

## 2022-08-21 RX ADMIN — PANTOPRAZOLE SODIUM 40 MG: 40 INJECTION, POWDER, FOR SOLUTION INTRAVENOUS at 21:14

## 2022-08-21 RX ADMIN — CHOLESTYRAMINE 4 G: 4 POWDER, FOR SUSPENSION ORAL at 08:28

## 2022-08-21 RX ADMIN — AMITRIPTYLINE HYDROCHLORIDE 25 MG: 25 TABLET, FILM COATED ORAL at 21:12

## 2022-08-21 RX ADMIN — METHYLPREDNISOLONE SODIUM SUCCINATE 40 MG: 40 INJECTION, POWDER, FOR SOLUTION INTRAMUSCULAR; INTRAVENOUS at 21:14

## 2022-08-21 RX ADMIN — MIRTAZAPINE 7.5 MG: 15 TABLET, FILM COATED ORAL at 21:14

## 2022-08-21 RX ADMIN — SODIUM CHLORIDE 200 MG: 9 INJECTION, SOLUTION INTRAVENOUS at 11:25

## 2022-08-21 RX ADMIN — PRAVASTATIN SODIUM 20 MG: 20 TABLET ORAL at 15:32

## 2022-08-21 RX ADMIN — METHYLPREDNISOLONE SODIUM SUCCINATE 40 MG: 40 INJECTION, POWDER, FOR SOLUTION INTRAMUSCULAR; INTRAVENOUS at 15:28

## 2022-08-21 RX ADMIN — METHYLPREDNISOLONE SODIUM SUCCINATE 40 MG: 40 INJECTION, POWDER, FOR SOLUTION INTRAMUSCULAR; INTRAVENOUS at 05:47

## 2022-08-21 RX ADMIN — MESALAMINE 800 MG: 400 CAPSULE, DELAYED RELEASE ORAL at 21:13

## 2022-08-21 NOTE — ASSESSMENT & PLAN NOTE
· Patient presented with dizziness and diarrhea starting in August that worsened yesterday  · Most likely secondary to IBD  · CT showed: 1  Diffuse mild-moderate circumferential wall thickening of the colon with pericolic inflammatory stranding consistent with nonspecific pancolitis  2   Multiple stable small scattered sclerotic foci in the visualized lumbar lower thoracic spine osseous pelvis which may reflect osseous metastatic disease in this patient with known metastatic breast cancer  3   Additional stable findings as noted   · GI consulted-> recs appreciated  · IVF  · Discontinue Zosyn as symptoms are most likely secondary to ulcerative colitis  · Steroids initiated 8/17  · F/u bld cx and procal, ESR, CRP, and lipase, C Diff  · ESR:  48  · Procalcitonin:  1 49  · CRP:  206 3  · C  Diff: negative  · Stool enteric panel: negative  · Advanced to low-fiber   · Cholestyramine  · Oncology consulted by GI for further recommendations regarding current cancer regimen playing a role in symptoms  Previously believed due to verzenio which has been on hold since recent admit, doubt this is contributing   · Flex sig completed 8/20 concerning for IBD, biopsies taken    Also noted ulcerations  · Oral prednisone now transitioned to IV steroids

## 2022-08-21 NOTE — ASSESSMENT & PLAN NOTE
· Venous duplex obtained due to LE edema  · Confirmed acute occlusive thrombus in popliteal vein and non-occlusive thrombus in the peroneal veins  · Started on IV heparin gtt 8/19  · Persistent tachycardia, CTA PE study obtained confirms R lobe subsegmental PE no evidence of R heart strain per radiologist  · Suspect in setting of known metastatic cancer and patient has been more sedentary in recent weeks due to acute illness

## 2022-08-21 NOTE — PLAN OF CARE
Problem: Nutrition/Hydration-ADULT  Goal: Nutrient/Hydration intake appropriate for improving, restoring or maintaining nutritional needs  Description: Monitor and assess patient's nutrition/hydration status for malnutrition  Collaborate with interdisciplinary team and initiate plan and interventions as ordered  Monitor patient's weight and dietary intake as ordered or per policy  Utilize nutrition screening tool and intervene as necessary  Determine patient's food preferences and provide high-protein, high-caloric foods as appropriate       INTERVENTIONS:  - Monitor oral intake, urinary output, labs, and treatment plans  - Assess nutrition and hydration status and recommend course of action  - Evaluate amount of meals eaten  - Assist patient with eating if necessary   - Allow adequate time for meals  - Recommend/ encourage appropriate diets, oral nutritional supplements, and vitamin/mineral supplements  - Order, calculate, and assess calorie counts as needed  - Recommend, monitor, and adjust tube feedings and TPN/PPN based on assessed needs  - Assess need for intravenous fluids  - Provide specific nutrition/hydration education as appropriate  - Include patient/family/caregiver in decisions related to nutrition  Outcome: Progressing     Problem: MOBILITY - ADULT  Goal: Maintain or return to baseline ADL function  Description: INTERVENTIONS:  -  Assess patient's ability to carry out ADLs; assess patient's baseline for ADL function and identify physical deficits which impact ability to perform ADLs (bathing, care of mouth/teeth, toileting, grooming, dressing, etc )  - Assess/evaluate cause of self-care deficits   - Assess range of motion  - Assess patient's mobility; develop plan if impaired  - Assess patient's need for assistive devices and provide as appropriate  - Encourage maximum independence but intervene and supervise when necessary  - Involve family in performance of ADLs  - Assess for home care needs following discharge   - Consider OT consult to assist with ADL evaluation and planning for discharge  - Provide patient education as appropriate  Outcome: Progressing  Goal: Maintains/Returns to pre admission functional level  Description: INTERVENTIONS:  - Perform BMAT or MOVE assessment daily    - Set and communicate daily mobility goal to care team and patient/family/caregiver  - Collaborate with rehabilitation services on mobility goals if consulted  - Perform Range of Motion 3 times a day  - Reposition patient every 2 hours    - Dangle patient 3 times a day  - Stand patient 3 times a day  - Ambulate patient 3 times a day  - Out of bed to chair 3 times a day   - Out of bed for meals 3 times a day  - Out of bed for toileting  - Record patient progress and toleration of activity level   Outcome: Progressing     Problem: Potential for Falls  Goal: Patient will remain free of falls  Description: INTERVENTIONS:  - Educate patient/family on patient safety including physical limitations  - Instruct patient to call for assistance with activity   - Consult OT/PT to assist with strengthening/mobility   - Keep Call bell within reach  - Keep bed low and locked with side rails adjusted as appropriate  - Keep care items and personal belongings within reach  - Initiate and maintain comfort rounds  - Make Fall Risk Sign visible to staff  - Offer Toileting every 2 Hours, in advance of need  - Initiate/Maintain bed alarm  - Obtain necessary fall risk management equipment:   - Apply yellow socks and bracelet for high fall risk patients  - Consider moving patient to room near nurses station  Outcome: Progressing     Problem: Prexisting or High Potential for Compromised Skin Integrity  Goal: Skin integrity is maintained or improved  Description: INTERVENTIONS:  - Identify patients at risk for skin breakdown  - Assess and monitor skin integrity  - Assess and monitor nutrition and hydration status  - Monitor labs   - Assess for incontinence   - Turn and reposition patient  - Assist with mobility/ambulation  - Relieve pressure over bony prominences  - Avoid friction and shearing  - Provide appropriate hygiene as needed including keeping skin clean and dry  - Evaluate need for skin moisturizer/barrier cream  - Collaborate with interdisciplinary team   - Patient/family teaching  - Consider wound care consult   Outcome: Progressing     Problem: SAFETY ADULT  Goal: Maintain or return to baseline ADL function  Description: INTERVENTIONS:  -  Assess patient's ability to carry out ADLs; assess patient's baseline for ADL function and identify physical deficits which impact ability to perform ADLs (bathing, care of mouth/teeth, toileting, grooming, dressing, etc )  - Assess/evaluate cause of self-care deficits   - Assess range of motion  - Assess patient's mobility; develop plan if impaired  - Assess patient's need for assistive devices and provide as appropriate  - Encourage maximum independence but intervene and supervise when necessary  - Involve family in performance of ADLs  - Assess for home care needs following discharge   - Consider OT consult to assist with ADL evaluation and planning for discharge  - Provide patient education as appropriate  Outcome: Progressing  Goal: Maintains/Returns to pre admission functional level  Description: INTERVENTIONS:  - Perform BMAT or MOVE assessment daily    - Set and communicate daily mobility goal to care team and patient/family/caregiver  - Collaborate with rehabilitation services on mobility goals if consulted  - Perform Range of Motion 3 times a day  - Reposition patient every 2 hours    - Dangle patient 3 times a day  - Stand patient 3 times a day  - Ambulate patient 3 times a day  - Out of bed to chair 3 times a day   - Out of bed for meals 3 times a day  - Out of bed for toileting  - Record patient progress and toleration of activity level   Outcome: Progressing  Goal: Patient will remain free of falls  Description: INTERVENTIONS:  - Educate patient/family on patient safety including physical limitations  - Instruct patient to call for assistance with activity   - Consult OT/PT to assist with strengthening/mobility   - Keep Call bell within reach  - Keep bed low and locked with side rails adjusted as appropriate  - Keep care items and personal belongings within reach  - Initiate and maintain comfort rounds  - Make Fall Risk Sign visible to staff  - Offer Toileting every 2 Hours, in advance of need  - Initiate/Maintain bed alarm  - Obtain necessary fall risk management equipment:   - Apply yellow socks and bracelet for high fall risk patients  - Consider moving patient to room near nurses station  Outcome: Progressing

## 2022-08-21 NOTE — ASSESSMENT & PLAN NOTE
Lab Results   Component Value Date    EGFR 73 08/21/2022    EGFR 65 08/20/2022    EGFR 64 08/19/2022    CREATININE 0 76 08/21/2022    CREATININE 0 83 08/20/2022    CREATININE 0 84 08/19/2022     · Cr on admission slightly elevated from her baseline at 1 3, most likely due to volume depletion  · Baseline 1 1-1 2  · I's and O's  · IV fluids  · Avoid nephrotoxic agents  · Discontinue IVF  · Encourage oral intake

## 2022-08-21 NOTE — ACP (ADVANCE CARE PLANNING)
Serious Illness Conversation    What is your understanding now of where you are with your illness? Prognostic Understanding: appropriate understanding of prognosis     How much information about what is likely to be ahead with your illness would you like to have? Information: patient wants to be fully informed     If you become sicker, how much are you willing to go through for the possibility of gaining more time? Be on a ventilator: No       How much does your proxy and family know about your priorities and wishes? Discussion Discussion: extensive discussion with family about goals and wishes       I have spent 25 minutes speaking with my patient on advanced care planning today or during this visit  Discussed at length with patient directly at bedside regarding full medical update and current plan of care  She does admit to feeling slightly overwhelmed regarding all the findings this admission  Discussed her history of known metastatic cancer, DVT/PE, and now development of lower GI bleeding from what appears to be IBD with ulcerations  Reports she does have a living will  Discussed code status in detail and patient expresses she wouldn't want anything aggressive in terms of resuscitation efforts including CPR/intubation  Wishes to be level 3 DNR/DNI  Requested this be discussed with her daughter, Gissel Sargent  Everton Bain and discussed at length current hospital course and plan to which she is in agreement  Also supports and understands her mother's wish to be DNR/DNI  All questions and concerns answered  Otherwise continue current medical cares

## 2022-08-21 NOTE — PROGRESS NOTES
Progress note - [facility]   Matt COVARRUBIAS Clunk 79 y o  female MRN: [id number]  Unit/Bed#: [facility] Encounter: [id number]    ASSESSMENT and PLAN    1  Acute colitis  New onset inflammatory bowel disease? Relatedto or induced by cancer treatment  Atypical infectious colitis  - continue IV Solu-Medrol  - await biopsies from sigmoid to scope from yesterday    2  Acute blood loss anemia  Secondary to colitis and exacerbated by anticoagulation for DVT/PE  - follow H&H and transfuse as needed  - continue anticoagulation for now    3  DVT and pulmonary embolism  On IV heparin    4  Metastatic breast cancer  Followed by Dr Patito Landin    Chief Complaint   Patient presents with    Dizziness     Via ems from home for increased dizziness and multiple episodes diarrhea since the beginning of August  States worse since last night   States is getting new chemo medication       SUBJECTIVE/HPI   Some lower quadrant abdominal cramping    /72 (BP Location: Right arm)   Pulse (!) 112   Temp 97 6 °F (36 4 °C) (Oral)   Resp 16   Ht 5' 1" (1 549 m)   Wt 65 3 kg (144 lb)   LMP  (LMP Unknown)   SpO2 97%   BMI 27 21 kg/m²     PHYSICALEXAM  General appearance: alert, appears stated age and cooperative  Eyes: PERLLA, EOMI, no icterus   Head: Normocephalic, without obvious abnormality, atraumatic  Lungs: clear to auscultation bilaterally  Heart: regular rate and rhythm, S1, S2 normal, no murmur, click, rub or gallop  Abdomen: soft, non-tender; bowel sounds normal; no masses,  no organomegaly  Extremities: extremities normal, atraumatic, no cyanosis or edema  Neurologic: Grossly normal    Lab Results   Component Value Date    GLUCOSE 115 09/08/2015    CALCIUM 6 3 (L) 08/21/2022     09/08/2015    K 3 5 08/21/2022    CO2 22 08/21/2022     (H) 08/21/2022    BUN 8 08/21/2022    CREATININE 0 76 08/21/2022     Lab Results   Component Value Date    WBC 6 77 08/21/2022    HGB 7 5 (L) 08/21/2022    HCT 23 4 (L) 08/21/2022    MCV 93 08/21/2022     08/21/2022     Lab Results   Component Value Date    ALT 21 08/19/2022    AST 14 08/19/2022    ALKPHOS 93 08/19/2022    BILITOT 0 66 09/08/2015     Lab Results   Component Value Date    LIPASE 79 08/15/2022     Lab Results   Component Value Date    IRON 30 (L) 08/17/2022    TIBC 161 (L) 08/17/2022    FERRITIN 328 08/17/2022     Lab Results   Component Value Date    INR 1 11 08/19/2022

## 2022-08-21 NOTE — ASSESSMENT & PLAN NOTE
· History of chronic anemia, baseline 8-10  · Underwent flex sig yesterday which showed inflammation, ulcerations  · Also with BRBPR since starting heparin gtt  · Did have decrease of hgb to 7 5 - continue to trend    Blood consent obtained  · Trend H/H  · Discussed with GI - recommending IV venofer

## 2022-08-21 NOTE — PROGRESS NOTES
[place]     Progress Note Greg Silverman 1940, 79 y o  female MRN: [id number]  Unit/Bed#: -[place] Encounter: [id number]  Primary Care Provider: Anaya Loving DO   Date and time admitted to hospital: 8/15/2022 10:40 AM    * Toxic gastroenteritis and colitis  Assessment & Plan  · Patient presented with dizziness and diarrhea starting in August that worsened yesterday  · Most likely secondary to IBD  · CT showed: 1  Diffuse mild-moderate circumferential wall thickening of the colon with pericolic inflammatory stranding consistent with nonspecific pancolitis  2   Multiple stable small scattered sclerotic foci in the visualized lumbar lower thoracic spine osseous pelvis which may reflect osseous metastatic disease in this patient with known metastatic breast cancer  3   Additional stable findings as noted   · GI consulted-> recs appreciated  · IVF  · Discontinue Zosyn as symptoms are most likely secondary to ulcerative colitis  · Steroids initiated 8/17  · F/u bld cx and procal, ESR, CRP, and lipase, C Diff  · ESR:  48  · Procalcitonin:  1 49  · CRP:  206 3  · C  Diff: negative  · Stool enteric panel: negative  · Advanced to low-fiber   · Cholestyramine  · Oncology consulted by GI for further recommendations regarding current cancer regimen playing a role in symptoms  Previously believed due to verzenio which has been on hold since recent admit, doubt this is contributing   · Flex sig completed 8/20 concerning for IBD, biopsies taken    Also noted ulcerations  · Oral prednisone now transitioned to IV steroids    Single subsegmental pulmonary embolism without acute cor pulmonale (HCC)  Assessment & Plan  · Venous duplex obtained due to LE edema  · Confirmed acute occlusive thrombus in popliteal vein and non-occlusive thrombus in the peroneal veins  · Started on IV heparin gtt 8/19  · Persistent tachycardia, CTA PE study obtained confirms R lobe subsegmental PE no evidence of R heart strain per radiologist  · Suspect in setting of known metastatic cancer and patient has been more sedentary in recent weeks due to acute illness    Severe protein-calorie malnutrition ([address] )  Assessment & Plan  Malnutrition Findings:   Adult Malnutrition type: Chronic illness  Adult Degree of Malnutrition: Other severe protein calorie malnutrition  Malnutrition Characteristics: Inadequate energy, Weight loss                  360 Statement: Pt presents with chronic severe protein calorie malnutrition due to Stage IV metastatic breast cancer, chronic diarrhea vs UC flare as evidenced by 10 5 lb wt loss-14% wt loss in 4 months and <75 % po intake > 1 month  BMI Findings: Body mass index is 27 21 kg/m²  Anemia  Assessment & Plan  · History of chronic anemia, baseline 8-10  · Underwent flex sig yesterday which showed inflammation, ulcerations  · Also with BRBPR since starting heparin gtt  · Did have decrease of hgb to 7 5 - continue to trend    Blood consent obtained  · Trend H/H  · Discussed with GI - recommending IV venofer    Primary hypertension  Assessment & Plan  · Blood pressure stable off cozaar    Chronic kidney disease (CKD) stage G3a/A1, moderately decreased glomerular filtration rate (GFR) between 45-59 mL/min/1 73 square meter and albuminuria creatinine ratio less than 30 mg/g [facility])  Assessment & Plan  Lab Results   Component Value Date    EGFR 73 08/21/2022    EGFR 65 08/20/2022    EGFR 64 08/19/2022    CREATININE 0 76 08/21/2022    CREATININE 0 83 08/20/2022    CREATININE 0 84 08/19/2022     · Cr on admission slightly elevated from her baseline at 1 3, most likely due to volume depletion  · Baseline 1 1-1 2  · I's and O's  · IV fluids  · Avoid nephrotoxic agents  · Discontinue IVF  · Encourage oral intake    Mixed hyperlipidemia  Assessment & Plan  · Continue statin    Metastatic breast cancer [facility])  Assessment & Plan  · Continue to follow-up with Hematology-Oncology outpatient  · Jay had been held on admit  · On recent admission patient's verzenio was held  Reviewed office visit from 8/10/22 appears there was some consideration to resume but at lowered dose  Patient reports she never resumed this medication    VTE Pharmacologic Prophylaxis: VTE Score: 6 High Risk (Score >/= 5) - Pharmacological DVT Prophylaxis Ordered: heparin drip  Sequential Compression Devices Ordered  Patient Centered Rounds: I performed bedside rounds with nursing staff today  Discussions with Specialists or Other Care Team Provider: GI, attending    Education and Discussions with Family / Patient: Updated  (daughter) via phone  Time Spent for Care: 45 minutes  More than 50% of total time spent on counseling and coordination of care as described above  Current Length of Stay: 6 day(s)  Current Patient Status: Inpatient   Certification Statement: The patient will continue to require additional inpatient hospital stay due to GI bleeding, acute PE/DVT  Discharge Plan: Anticipate discharge in 48-72 hrs to home  Code Status: Level 3 - DNAR and DNI    Subjective:   Patient reports ongoing loose stools which are still fairly frequent but volume seems to be decreasing  Denies chest pain/palpitations, shortness of breath, nausea vomiting  Admits to mild lower abdominal cramping  Objective:     Vitals:   Temp (24hrs), Av 8 °F (36 6 °C), Min:97 6 °F (36 4 °C), Max:97 9 °F (36 6 °C)    Temp:  [97 6 °F (36 4 °C)-97 9 °F (36 6 °C)] 97 6 °F (36 4 °C)  HR:  [108-112] 112  Resp:  [16-17] 16  BP: (138-139)/(70-72) 138/72  SpO2:  [97 %] 97 %  Body mass index is 27 21 kg/m²  Input and Output Summary (last 24 hours): Intake/Output Summary (Last 24 hours) at 2022 1150  Last data filed at 2022 0743  Gross per 24 hour   Intake 875 ml   Output 150 ml   Net 725 ml       Physical Exam:   Physical Exam  Vitals and nursing note reviewed     Constitutional:       Appearance: Normal appearance  Comments: No acute distress   HENT:      Head: Normocephalic  Eyes:      General: No scleral icterus  Extraocular Movements: Extraocular movements intact  Conjunctiva/sclera: Conjunctivae normal    Cardiovascular:      Rate and Rhythm: Regular rhythm  Tachycardia present  Heart sounds: S1 normal and S2 normal    Pulmonary:      Effort: Pulmonary effort is normal       Breath sounds: Normal breath sounds  No wheezing, rhonchi or rales  Abdominal:      General: Bowel sounds are normal       Palpations: Abdomen is soft  Tenderness: There is no abdominal tenderness  There is no guarding or rebound  Musculoskeletal:         General: No swelling, tenderness or deformity  Cervical back: Normal range of motion  Comments: Able to move upper/lower extremities bilaterally  Pitting edema lower extremities 1-2 + bilaterally   Skin:     General: Skin is warm and dry  Neurological:      Mental Status: She is alert and oriented to person, place, and time  Psychiatric:         Mood and Affect: Mood normal          Speech: Speech normal          Behavior: Behavior normal           Additional Data:     Labs:  Results from last 7 days   Lab Units 08/21/22  0547 08/20/22  0537 08/19/22  0733   WBC Thousand/uL 6 77   < > 5 91   HEMOGLOBIN g/dL 7 5*   < > 9 0*   HEMATOCRIT % 23 4*   < > 27 1*   PLATELETS Thousands/uL 257   < > 351   BANDS PCT %  --   --  8   LYMPHO PCT %  --   --  22   MONO PCT %  --   --  7   EOS PCT %  --   --  7*    < > = values in this interval not displayed       Results from last 7 days   Lab Units 08/21/22  0547 08/20/22  0537 08/19/22  0733   SODIUM mmol/L 141   < > 142   POTASSIUM mmol/L 3 5   < > 4 3   CHLORIDE mmol/L 109*   < > 112*   CO2 mmol/L 22   < > 23   BUN mg/dL 8   < > 9   CREATININE mg/dL 0 76   < > 0 84   ANION GAP mmol/L 10   < > 7   CALCIUM mg/dL 6 3*   < > 7 1*   ALBUMIN g/dL  --   --  1 6*   TOTAL BILIRUBIN mg/dL  --   --  0 30   ALK PHOS U/L  --   --  93   ALT U/L  --   --  21   AST U/L  --   --  14   GLUCOSE RANDOM mg/dL 104   < > 85    < > = values in this interval not displayed  Results from last 7 days   Lab Units 08/19/22  1435   INR  1 11             Results from last 7 days   Lab Units 08/16/22  0508 08/15/22  1106   LACTIC ACID mmol/L  --  1 8   PROCALCITONIN ng/ml 1 49*  --        Lines/Drains:  Invasive Devices  Report    Peripherally Inserted Central Catheter Line  Duration           PICC Line 08/19/22 1 day                Central Line:  Goal for removal: Will discontinue when meds requiring line are completed  Telemetry:  Telemetry Orders (From admission, onward)             24 Hour Telemetry Monitoring  Continuous x 24 Hours (Telem)        References:    Telemetry Guidelines   Question:  Reason for 24 Hour Telemetry  Answer:  Pulmonary Embolism - 24 hours without resp  compromise, dysrhythmias, hemodynamically stable                 Telemetry Reviewed: Sinus Tachycardia  Indication for Continued Telemetry Use: PE             Imaging: Reviewed radiology reports from this admission including: chest CT scan and procedure reports    Recent Cultures (last 7 days):   Results from last 7 days   Lab Units 08/15/22  1726 08/15/22  1659 08/15/22  1259   BLOOD CULTURE  No Growth After 5 Days  No Growth After 5 Days    --    C DIFF TOXIN B BY PCR   --   --  Negative       Last 24 Hours Medication List:   Current Facility-Administered Medications   Medication Dose Route Frequency Provider Last Rate    acetaminophen  650 mg Oral Q6H PRN Bhumi Buck MD      amitriptyline  25 mg Oral HS Bhumi Buck MD      calcium carbonate  500 mg Oral Daily PRN Bhumi Buck MD      cholestyramine sugar free  4 g Oral BID Bhumi Buck MD      heparin (porcine)  3-30 Units/kg/hr (Order-Specific) Intravenous Titrated Bhumi Buck MD 10 Units/kg/hr (08/21/22 0915)    heparin (porcine)  2,600 Units Intravenous Q1H PRN Benito Collier MD      heparin (porcine)  5,200 Units Intravenous Q1H PRN Benito Collier MD      iron sucrose  200 mg Intravenous Once per day on Mon Wed Fri Savaeg Mac PA-C      loperamide  2 mg Oral Q4H PRN Benito Collier MD      mesalamine  800 mg Oral TID Benito Collier MD      methylPREDNISolone sodium succinate  40 mg Intravenous [facility] Benito Collier MD      mirtazapine  7 5 mg Oral HS Benito Collier MD      ondansetron  4 mg Intravenous Q6H PRN Benito Collier MD      pantoprazole  40 mg Intravenous Q12H [address] Benito Collier MD      pravastatin  20 mg Oral Daily With Herberth Fox MD          Today, Patient Was Seen By: Savage Mac PA-C    **Please Note: This note may have been constructed using a voice recognition system  **

## 2022-08-22 LAB
ANION GAP SERPL CALCULATED.3IONS-SCNC: 8 MMOL/L (ref 4–13)
ANISOCYTOSIS BLD QL SMEAR: PRESENT
APTT PPP: 81 SECONDS (ref 23–37)
BASOPHILS # BLD MANUAL: 0 THOUSAND/UL (ref 0–0.1)
BASOPHILS NFR MAR MANUAL: 0 % (ref 0–1)
BUN SERPL-MCNC: 9 MG/DL (ref 5–25)
CALCIUM SERPL-MCNC: 6.4 MG/DL (ref 8.3–10.1)
CHLORIDE SERPL-SCNC: 110 MMOL/L (ref 96–108)
CO2 SERPL-SCNC: 24 MMOL/L (ref 21–32)
CREAT SERPL-MCNC: 0.74 MG/DL (ref 0.6–1.3)
EOSINOPHIL # BLD MANUAL: 0 THOUSAND/UL (ref 0–0.4)
EOSINOPHIL NFR BLD MANUAL: 0 % (ref 0–6)
ERYTHROCYTE [DISTWIDTH] IN BLOOD BY AUTOMATED COUNT: 20.6 % (ref 11.6–15.1)
GFR SERPL CREATININE-BSD FRML MDRD: 75 ML/MIN/1.73SQ M
GLUCOSE SERPL-MCNC: 101 MG/DL (ref 65–140)
HCT VFR BLD AUTO: 22.2 % (ref 34.8–46.1)
HCT VFR BLD AUTO: 24 % (ref 34.8–46.1)
HGB BLD-MCNC: 7.3 G/DL (ref 11.5–15.4)
HGB BLD-MCNC: 7.7 G/DL (ref 11.5–15.4)
LYMPHOCYTES # BLD AUTO: 1.79 THOUSAND/UL (ref 0.6–4.47)
LYMPHOCYTES # BLD AUTO: 26 % (ref 14–44)
MAGNESIUM SERPL-MCNC: 1.8 MG/DL (ref 1.6–2.6)
MCH RBC QN AUTO: 30.9 PG (ref 26.8–34.3)
MCHC RBC AUTO-ENTMCNC: 32.9 G/DL (ref 31.4–37.4)
MCV RBC AUTO: 94 FL (ref 82–98)
MONOCYTES # BLD AUTO: 0.21 THOUSAND/UL (ref 0–1.22)
MONOCYTES NFR BLD: 3 % (ref 4–12)
NEUTROPHILS # BLD MANUAL: 4.88 THOUSAND/UL (ref 1.85–7.62)
NEUTS BAND NFR BLD MANUAL: 3 % (ref 0–8)
NEUTS SEG NFR BLD AUTO: 68 % (ref 43–75)
OVALOCYTES BLD QL SMEAR: PRESENT
PLATELET # BLD AUTO: 227 THOUSANDS/UL (ref 149–390)
PLATELET BLD QL SMEAR: ADEQUATE
PMV BLD AUTO: 9.9 FL (ref 8.9–12.7)
POIKILOCYTOSIS BLD QL SMEAR: PRESENT
POTASSIUM SERPL-SCNC: 3.3 MMOL/L (ref 3.5–5.3)
RBC # BLD AUTO: 2.36 MILLION/UL (ref 3.81–5.12)
RBC MORPH BLD: PRESENT
SODIUM SERPL-SCNC: 142 MMOL/L (ref 135–147)
WBC # BLD AUTO: 6.87 THOUSAND/UL (ref 4.31–10.16)

## 2022-08-22 PROCEDURE — 99232 SBSQ HOSP IP/OBS MODERATE 35: CPT | Performed by: PHYSICIAN ASSISTANT

## 2022-08-22 PROCEDURE — 85007 BL SMEAR W/DIFF WBC COUNT: CPT | Performed by: PHYSICIAN ASSISTANT

## 2022-08-22 PROCEDURE — 85730 THROMBOPLASTIN TIME PARTIAL: CPT | Performed by: INTERNAL MEDICINE

## 2022-08-22 PROCEDURE — 85014 HEMATOCRIT: CPT | Performed by: PHYSICIAN ASSISTANT

## 2022-08-22 PROCEDURE — 99232 SBSQ HOSP IP/OBS MODERATE 35: CPT | Performed by: INTERNAL MEDICINE

## 2022-08-22 PROCEDURE — 83735 ASSAY OF MAGNESIUM: CPT | Performed by: PHYSICIAN ASSISTANT

## 2022-08-22 PROCEDURE — 80048 BASIC METABOLIC PNL TOTAL CA: CPT | Performed by: PHYSICIAN ASSISTANT

## 2022-08-22 PROCEDURE — 85027 COMPLETE CBC AUTOMATED: CPT | Performed by: PHYSICIAN ASSISTANT

## 2022-08-22 PROCEDURE — C9113 INJ PANTOPRAZOLE SODIUM, VIA: HCPCS | Performed by: INTERNAL MEDICINE

## 2022-08-22 PROCEDURE — 85018 HEMOGLOBIN: CPT | Performed by: PHYSICIAN ASSISTANT

## 2022-08-22 RX ORDER — POTASSIUM CHLORIDE 20 MEQ/1
40 TABLET, EXTENDED RELEASE ORAL ONCE
Status: COMPLETED | OUTPATIENT
Start: 2022-08-22 | End: 2022-08-22

## 2022-08-22 RX ADMIN — METHYLPREDNISOLONE SODIUM SUCCINATE 40 MG: 40 INJECTION, POWDER, FOR SOLUTION INTRAMUSCULAR; INTRAVENOUS at 05:45

## 2022-08-22 RX ADMIN — PANTOPRAZOLE SODIUM 40 MG: 40 INJECTION, POWDER, FOR SOLUTION INTRAVENOUS at 08:31

## 2022-08-22 RX ADMIN — MIRTAZAPINE 7.5 MG: 15 TABLET, FILM COATED ORAL at 21:44

## 2022-08-22 RX ADMIN — MESALAMINE 800 MG: 400 CAPSULE, DELAYED RELEASE ORAL at 17:38

## 2022-08-22 RX ADMIN — CHOLESTYRAMINE 4 G: 4 POWDER, FOR SUSPENSION ORAL at 17:38

## 2022-08-22 RX ADMIN — METHYLPREDNISOLONE SODIUM SUCCINATE 40 MG: 40 INJECTION, POWDER, FOR SOLUTION INTRAMUSCULAR; INTRAVENOUS at 14:32

## 2022-08-22 RX ADMIN — METHYLPREDNISOLONE SODIUM SUCCINATE 40 MG: 40 INJECTION, POWDER, FOR SOLUTION INTRAMUSCULAR; INTRAVENOUS at 21:44

## 2022-08-22 RX ADMIN — MESALAMINE 800 MG: 400 CAPSULE, DELAYED RELEASE ORAL at 21:46

## 2022-08-22 RX ADMIN — AMITRIPTYLINE HYDROCHLORIDE 25 MG: 25 TABLET, FILM COATED ORAL at 21:44

## 2022-08-22 RX ADMIN — MESALAMINE 800 MG: 400 CAPSULE, DELAYED RELEASE ORAL at 08:31

## 2022-08-22 RX ADMIN — HEPARIN SODIUM 10 UNITS/KG/HR: 10000 INJECTION, SOLUTION INTRAVENOUS at 05:45

## 2022-08-22 RX ADMIN — POTASSIUM CHLORIDE 40 MEQ: 1500 TABLET, EXTENDED RELEASE ORAL at 08:31

## 2022-08-22 RX ADMIN — PANTOPRAZOLE SODIUM 40 MG: 40 INJECTION, POWDER, FOR SOLUTION INTRAVENOUS at 21:47

## 2022-08-22 RX ADMIN — CHOLESTYRAMINE 4 G: 4 POWDER, FOR SUSPENSION ORAL at 08:31

## 2022-08-22 RX ADMIN — PRAVASTATIN SODIUM 20 MG: 20 TABLET ORAL at 17:38

## 2022-08-22 NOTE — CASE MANAGEMENT
Case Management Progress Note    Patient name Ethel Silverman  Location /-01 MRN [id number]  : 1940 Date 2022       LOS (days): 7  Geometric Mean LOS (GMLOS) (days): 4 30  Days to GMLOS:-2 8        OBJECTIVE:        Current admission status: Inpatient  Preferred Pharmacy[facility]   [facility] [address]  [address] [facility]  Phone: [phone] Fax: 1929 [facility], [address] [place] [address]  [address] [address]  Phone: [phone] Fax: [phone]    Primary Care Provider: Daniela Wolff DO    Primary Insurance: MEDICARE  Secondary Insurance: Rogelio Leung    PROGRESS NOTE:  [address]  pharmacy to pricecheck Eliquis    $15/copay for Eliquis 5mg BID - 60 tablets

## 2022-08-22 NOTE — PROGRESS NOTES
Progress note - Gastroenterology   Jack COVARRUBIAS Clunk 79 y o  female MRN: [id number]  Unit/Bed#: -01 Encounter: [id number]    ASSESSMENT and PLAN    1  Acute colitis  New onset inflammatory bowel disease? Related to or induced by cancer treatment  Atypical infectious colitis  On exam, patient does complain of mild left lower quadrant abdominal discomfort increased with palpation  Patient continues to have bloody loose bowel movements  One at the bedside commode this morning  She does state they have decreased from approximately 4 bowel movements per day  - continue IV Solu-Medrol  - Biopsy was a sent out to lab located in [place]  Called lab, They just received the specimen this morning      2  Acute blood loss anemia  Secondary to colitis and exacerbated by anticoagulation for DVT/PE    - follow H&H and transfuse as needed  - continue anticoagulation for now  - continue PPI  - continue iron infusion     3  DVT and pulmonary embolism    On IV heparin     4  Metastatic breast cancer  Followed by Dr Tylor Talley    Chief Complaint   Patient presents with    Dizziness     Via ems from home for increased dizziness and multiple episodes diarrhea since the beginning of August  States worse since last night  States is getting new chemo medication       SUBJECTIVE/HPI   Patient states she is just feeling tired this morning  Per patient  Decreased appetite contributed to taking too many pills and filling up with water  Explained to patient she can have her tray left at the bedside and pick at it as she pleases  Discussed with patient that biopsy was received at lab this morning and we will have results as soon as possible      /58   Pulse 103   Temp 97 9 °F (36 6 °C)   Resp 16   Ht 5' 1" (1 549 m)   Wt 65 3 kg (144 lb)   LMP  (LMP Unknown)   SpO2 94%   BMI 27 21 kg/m²     PHYSICALEXAM  General appearance: alert, appears stated age and cooperative  Eyes: PERLLA, EOMI, no icterus   Head: Normocephalic, without obvious abnormality, atraumatic  Lungs: clear to auscultation bilaterally  Heart: regular rate and rhythm, S1, S2 normal, no murmur, click, rub or gallop  Abdomen: soft, mild left lower quadrant discomfort increased with palpation; bowel sounds normal; no masses,  no organomegaly  Extremities: extremities normal, atraumatic, no cyanosis   bilateral lower extremity +1 edema  Neurologic: Grossly normal    Lab Results   Component Value Date    GLUCOSE 115 09/08/2015    CALCIUM 6 4 (L) 08/22/2022     09/08/2015    K 3 3 (L) 08/22/2022    CO2 24 08/22/2022     (H) 08/22/2022    BUN 9 08/22/2022    CREATININE 0 74 08/22/2022     Lab Results   Component Value Date    WBC 6 87 08/22/2022    HGB 7 3 (L) 08/22/2022    HCT 22 2 (L) 08/22/2022    MCV 94 08/22/2022     08/22/2022     Lab Results   Component Value Date    ALT 21 08/21/2022    AST 17 08/21/2022    ALKPHOS 82 08/21/2022    BILITOT 0 66 09/08/2015     No results found for: AMYLASE  Lab Results   Component Value Date    LIPASE 79 08/15/2022     Lab Results   Component Value Date    IRON 30 (L) 08/17/2022    TIBC 161 (L) 08/17/2022    FERRITIN 328 08/17/2022     Lab Results   Component Value Date    INR 1 11 08/19/2022

## 2022-08-22 NOTE — ASSESSMENT & PLAN NOTE
Lab Results   Component Value Date    EGFR 75 08/22/2022    EGFR 73 08/21/2022    EGFR 65 08/20/2022    CREATININE 0 74 08/22/2022    CREATININE 0 76 08/21/2022    CREATININE 0 83 08/20/2022     · Cr on admission slightly elevated from her baseline at 1 3, most likely due to volume depletion  · Baseline 1 1-1 2  · I's and O's  · IV fluids  · Avoid nephrotoxic agents  · Discontinue IVF  · Encourage oral intake

## 2022-08-22 NOTE — ASSESSMENT & PLAN NOTE
· History of chronic anemia, baseline 8-10  · Underwent flex sig yesterday which showed inflammation, ulcerations  · Also with BRBPR since starting heparin gtt  · Did have decrease of hgb to 7 3 - continue to trend    Blood consent obtained  · Trend H/H  · Discussed with GI - recommending IV venofer

## 2022-08-22 NOTE — PROGRESS NOTES
Pastoral Care Progress Note    2022  Patient: Dimple Silverman : 1940  Admission Date & Time: 8/15/2022 1040  MRN: [id number] CSN: [id number]       made introductory visit with patient  We had a very nice and pleasant conversation  Pt has a strong nathaly and support system                 Chaplaincy Interventions Utilized:     Exploration: Explored emotional needs & resources, Facilitated life review, and Facilitated story telling    Relationship Building: Cultivated a relationship of care and support and Listened empathically    Ritual: Provided prayer    Chaplaincy Outcomes Achieved:  Expressed gratitude, Expressed peace, and Identified meaningful connections    Spiritual Coping Strategies Utilized:   Spiritual Practice of Gratitude and daily prayer       [id number]   Clinical Encounter Type   Visited With Patient   Routine Visit Introduction   Anglican Encounters   Anglican Needs Prayer

## 2022-08-23 LAB
ALBUMIN SERPL BCP-MCNC: 1.8 G/DL (ref 3.5–5)
ALP SERPL-CCNC: 90 U/L (ref 46–116)
ALT SERPL W P-5'-P-CCNC: 19 U/L (ref 12–78)
ANION GAP SERPL CALCULATED.3IONS-SCNC: 10 MMOL/L (ref 4–13)
ANION GAP SERPL CALCULATED.3IONS-SCNC: 6 MMOL/L (ref 4–13)
APTT PPP: 63 SECONDS (ref 23–37)
AST SERPL W P-5'-P-CCNC: 15 U/L (ref 5–45)
BASOPHILS # BLD MANUAL: 0 THOUSAND/UL (ref 0–0.1)
BASOPHILS NFR MAR MANUAL: 0 % (ref 0–1)
BILIRUB SERPL-MCNC: 0.4 MG/DL (ref 0.2–1)
BUN SERPL-MCNC: 9 MG/DL (ref 5–25)
BUN SERPL-MCNC: 9 MG/DL (ref 5–25)
CA-I BLD-SCNC: 0.97 MMOL/L (ref 1.12–1.32)
CALCIUM ALBUM COR SERPL-MCNC: 8.6 MG/DL (ref 8.3–10.1)
CALCIUM SERPL-MCNC: 6.6 MG/DL (ref 8.3–10.1)
CALCIUM SERPL-MCNC: 6.8 MG/DL (ref 8.3–10.1)
CHLORIDE SERPL-SCNC: 110 MMOL/L (ref 96–108)
CHLORIDE SERPL-SCNC: 110 MMOL/L (ref 96–108)
CO2 SERPL-SCNC: 22 MMOL/L (ref 21–32)
CO2 SERPL-SCNC: 23 MMOL/L (ref 21–32)
CREAT SERPL-MCNC: 0.86 MG/DL (ref 0.6–1.3)
CREAT SERPL-MCNC: 0.87 MG/DL (ref 0.6–1.3)
EOSINOPHIL # BLD MANUAL: 0 THOUSAND/UL (ref 0–0.4)
EOSINOPHIL NFR BLD MANUAL: 0 % (ref 0–6)
ERYTHROCYTE [DISTWIDTH] IN BLOOD BY AUTOMATED COUNT: 20.8 % (ref 11.6–15.1)
GFR SERPL CREATININE-BSD FRML MDRD: 62 ML/MIN/1.73SQ M
GFR SERPL CREATININE-BSD FRML MDRD: 63 ML/MIN/1.73SQ M
GLUCOSE SERPL-MCNC: 134 MG/DL (ref 65–140)
GLUCOSE SERPL-MCNC: 93 MG/DL (ref 65–140)
HCT VFR BLD AUTO: 23.2 % (ref 34.8–46.1)
HGB BLD-MCNC: 7.6 G/DL (ref 11.5–15.4)
LYMPHOCYTES # BLD AUTO: 0.84 THOUSAND/UL (ref 0.6–4.47)
LYMPHOCYTES # BLD AUTO: 9 % (ref 14–44)
MCH RBC QN AUTO: 31.4 PG (ref 26.8–34.3)
MCHC RBC AUTO-ENTMCNC: 32.8 G/DL (ref 31.4–37.4)
MCV RBC AUTO: 96 FL (ref 82–98)
MONOCYTES # BLD AUTO: 1.22 THOUSAND/UL (ref 0–1.22)
MONOCYTES NFR BLD: 13 % (ref 4–12)
NEUTROPHILS # BLD MANUAL: 7.3 THOUSAND/UL (ref 1.85–7.62)
NEUTS BAND NFR BLD MANUAL: 2 % (ref 0–8)
NEUTS SEG NFR BLD AUTO: 76 % (ref 43–75)
NRBC BLD AUTO-RTO: 1 /100 WBC (ref 0–2)
PLATELET # BLD AUTO: 235 THOUSANDS/UL (ref 149–390)
PLATELET BLD QL SMEAR: ADEQUATE
PMV BLD AUTO: 10.1 FL (ref 8.9–12.7)
POLYCHROMASIA BLD QL SMEAR: PRESENT
POTASSIUM SERPL-SCNC: 3.6 MMOL/L (ref 3.5–5.3)
POTASSIUM SERPL-SCNC: 3.7 MMOL/L (ref 3.5–5.3)
PROT SERPL-MCNC: 5 G/DL (ref 6.4–8.4)
RBC # BLD AUTO: 2.42 MILLION/UL (ref 3.81–5.12)
RBC MORPH BLD: PRESENT
SODIUM SERPL-SCNC: 139 MMOL/L (ref 135–147)
SODIUM SERPL-SCNC: 142 MMOL/L (ref 135–147)
WBC # BLD AUTO: 9.36 THOUSAND/UL (ref 4.31–10.16)

## 2022-08-23 PROCEDURE — 99232 SBSQ HOSP IP/OBS MODERATE 35: CPT | Performed by: PHYSICIAN ASSISTANT

## 2022-08-23 PROCEDURE — 99232 SBSQ HOSP IP/OBS MODERATE 35: CPT | Performed by: INTERNAL MEDICINE

## 2022-08-23 PROCEDURE — 80048 BASIC METABOLIC PNL TOTAL CA: CPT | Performed by: PHYSICIAN ASSISTANT

## 2022-08-23 PROCEDURE — 82330 ASSAY OF CALCIUM: CPT | Performed by: PHYSICIAN ASSISTANT

## 2022-08-23 PROCEDURE — 85730 THROMBOPLASTIN TIME PARTIAL: CPT | Performed by: STUDENT IN AN ORGANIZED HEALTH CARE EDUCATION/TRAINING PROGRAM

## 2022-08-23 PROCEDURE — 85027 COMPLETE CBC AUTOMATED: CPT | Performed by: PHYSICIAN ASSISTANT

## 2022-08-23 PROCEDURE — 80053 COMPREHEN METABOLIC PANEL: CPT | Performed by: PHYSICIAN ASSISTANT

## 2022-08-23 PROCEDURE — 85007 BL SMEAR W/DIFF WBC COUNT: CPT | Performed by: PHYSICIAN ASSISTANT

## 2022-08-23 PROCEDURE — C9113 INJ PANTOPRAZOLE SODIUM, VIA: HCPCS | Performed by: INTERNAL MEDICINE

## 2022-08-23 RX ADMIN — METHYLPREDNISOLONE SODIUM SUCCINATE 40 MG: 40 INJECTION, POWDER, FOR SOLUTION INTRAMUSCULAR; INTRAVENOUS at 06:10

## 2022-08-23 RX ADMIN — AMITRIPTYLINE HYDROCHLORIDE 25 MG: 25 TABLET, FILM COATED ORAL at 22:15

## 2022-08-23 RX ADMIN — CHOLESTYRAMINE 4 G: 4 POWDER, FOR SUSPENSION ORAL at 17:06

## 2022-08-23 RX ADMIN — MIRTAZAPINE 7.5 MG: 15 TABLET, FILM COATED ORAL at 22:15

## 2022-08-23 RX ADMIN — PANTOPRAZOLE SODIUM 40 MG: 40 INJECTION, POWDER, FOR SOLUTION INTRAVENOUS at 22:18

## 2022-08-23 RX ADMIN — MESALAMINE 800 MG: 400 CAPSULE, DELAYED RELEASE ORAL at 17:06

## 2022-08-23 RX ADMIN — METHYLPREDNISOLONE SODIUM SUCCINATE 40 MG: 40 INJECTION, POWDER, FOR SOLUTION INTRAMUSCULAR; INTRAVENOUS at 22:18

## 2022-08-23 RX ADMIN — HEPARIN SODIUM 10 UNITS/KG/HR: 10000 INJECTION, SOLUTION INTRAVENOUS at 23:47

## 2022-08-23 RX ADMIN — METHYLPREDNISOLONE SODIUM SUCCINATE 40 MG: 40 INJECTION, POWDER, FOR SOLUTION INTRAMUSCULAR; INTRAVENOUS at 13:23

## 2022-08-23 RX ADMIN — CHOLESTYRAMINE 4 G: 4 POWDER, FOR SUSPENSION ORAL at 08:03

## 2022-08-23 RX ADMIN — PANTOPRAZOLE SODIUM 40 MG: 40 INJECTION, POWDER, FOR SOLUTION INTRAVENOUS at 08:03

## 2022-08-23 RX ADMIN — PRAVASTATIN SODIUM 20 MG: 20 TABLET ORAL at 17:07

## 2022-08-23 RX ADMIN — MESALAMINE 800 MG: 400 CAPSULE, DELAYED RELEASE ORAL at 22:15

## 2022-08-23 RX ADMIN — MESALAMINE 800 MG: 400 CAPSULE, DELAYED RELEASE ORAL at 08:03

## 2022-08-23 NOTE — ASSESSMENT & PLAN NOTE
· History of chronic anemia, baseline 8-10  · Underwent flex sig yesterday which showed inflammation, ulcerations  · Also with BRBPR since starting heparin gtt  · Did have decrease of hgb to 7 3 - continue to trend    Blood consent obtained  · Trend H/H  · Discussed with GI - recommending IV venofer  · Hemoglobin overall remains stable

## 2022-08-23 NOTE — PROGRESS NOTES
Progress note - Gastroenterology   Ricky Estimable A Clunk 79 y o  female MRN: [id number]  Unit/Bed#: -01 Encounter: [id number]    ASSESSMENT and PLAN    1  Acute colitis  New onset inflammatory bowel disease? Related to or induced by cancer treatment/immunotherapy,   Atypical infectious colitis? Patient continues to have bloody loose bowel movements  - continue IV Solu-Medrol for now until pathology comes back  - Await viral studies   - Biopsy processing and review requested to be expedited      2  Acute blood loss anemia  Secondary to colitis and exacerbated by anticoagulation for DVT/PE  - follow H&H and transfuse as needed  - continue anticoagulation for now  - continue PPI     3  DVT and pulmonary embolism    On IV heparin     4  Metastatic breast cancer  Followed by Dr Sharon Boxer    Chief Complaint   Patient presents with    Dizziness     Via ems from home for increased dizziness and multiple episodes diarrhea since the beginning of August  States worse since last night  States is getting new chemo medication       SUBJECTIVE/HPI   Continues to have multiple loose blood tinges stool  She states frequency is about the same as yesterday  Denies significant pain  /62   Pulse (!) 113   Temp 97 8 °F (36 6 °C)   Resp 18   Ht 5' 1" (1 549 m)   Wt 65 3 kg (144 lb)   LMP  (LMP Unknown)   SpO2 96%   BMI 27 21 kg/m²     PHYSICALEXAM  General appearance: alert, appears stated age and cooperative  Eyes: PERLLA, EOMI, no icterus   Head: Normocephalic, without obvious abnormality, atraumatic  Lungs: clear to auscultation bilaterally  Heart: regular rate and rhythm, S1, S2 normal, no murmur, click, rub or gallop  Abdomen: soft, mild left lower quadrant discomfort increased with palpation; bowel sounds normal; no masses,  no organomegaly  Extremities: extremities normal, atraumatic, no cyanosis   bilateral lower extremity +1 edema  Neurologic: Grossly normal    Lab Results   Component Value Date    GLUCOSE 115 09/08/2015    CALCIUM 6 6 (L) 08/23/2022     09/08/2015    K 3 7 08/23/2022    CO2 22 08/23/2022     (H) 08/23/2022    BUN 9 08/23/2022    CREATININE 0 87 08/23/2022     Lab Results   Component Value Date    WBC 9 36 08/23/2022    HGB 7 6 (L) 08/23/2022    HCT 23 2 (L) 08/23/2022    MCV 96 08/23/2022     08/23/2022     Lab Results   Component Value Date    ALT 21 08/21/2022    AST 17 08/21/2022    ALKPHOS 82 08/21/2022    BILITOT 0 66 09/08/2015     No results found for: AMYLASE  Lab Results   Component Value Date    LIPASE 79 08/15/2022     Lab Results   Component Value Date    IRON 30 (L) 08/17/2022    TIBC 161 (L) 08/17/2022    FERRITIN 328 08/17/2022     Lab Results   Component Value Date    INR 1 11 08/19/2022

## 2022-08-23 NOTE — ASSESSMENT & PLAN NOTE
· Patient presented with dizziness and diarrhea starting in August that worsened yesterday  · Most likely secondary to IBD  · CT showed: 1  Diffuse mild-moderate circumferential wall thickening of the colon with pericolic inflammatory stranding consistent with nonspecific pancolitis  2   Multiple stable small scattered sclerotic foci in the visualized lumbar lower thoracic spine osseous pelvis which may reflect osseous metastatic disease in this patient with known metastatic breast cancer  3   Additional stable findings as noted   · GI consulted-> recs appreciated  · IVF  · Discontinue Zosyn as symptoms are most likely secondary to ulcerative colitis  · Steroids initiated 8/17  · F/u bld cx and procal, ESR, CRP, and lipase, C Diff  · ESR:  48  · Procalcitonin:  1 49  · CRP:  206 3  · C  Diff: negative  · Stool enteric panel: negative  · Advanced to low-fiber   · Cholestyramine  · Oncology consulted by GI for further recommendations regarding current cancer regimen playing a role in symptoms  Previously believed due to verzenio which has been on hold since recent admit, doubt this is contributing   · Flex sig completed 8/20 concerning for IBD, biopsies taken  Also noted ulcerations  · Oral prednisone now transitioned to IV steroids  · Patient still with frequent loose stools    Appreciate ongoing Gastroenterology recommendations

## 2022-08-23 NOTE — ASSESSMENT & PLAN NOTE
Lab Results   Component Value Date    EGFR 62 08/23/2022    EGFR 75 08/22/2022    EGFR 73 08/21/2022    CREATININE 0 87 08/23/2022    CREATININE 0 74 08/22/2022    CREATININE 0 76 08/21/2022     · Cr on admission slightly elevated from her baseline at 1 3, most likely due to volume depletion  · Baseline 1 1-1 2  · I's and O's  · IV fluids  · Avoid nephrotoxic agents  · Discontinue IVF  · Encourage oral intake

## 2022-08-23 NOTE — PROGRESS NOTES
[place]     Progress Note Sahara Silverman 1940, 79 y o  female MRN: [id number]  Unit/Bed#: -01 Encounter: [id number]  Primary Care Provider: Katty Singletary DO   Date and time admitted to hospital: 8/15/2022 10:40 AM    * Toxic gastroenteritis and colitis  Assessment & Plan  · Patient presented with dizziness and diarrhea starting in August that worsened yesterday  · Most likely secondary to IBD  · CT showed: 1  Diffuse mild-moderate circumferential wall thickening of the colon with pericolic inflammatory stranding consistent with nonspecific pancolitis  2   Multiple stable small scattered sclerotic foci in the visualized lumbar lower thoracic spine osseous pelvis which may reflect osseous metastatic disease in this patient with known metastatic breast cancer  3   Additional stable findings as noted   · GI consulted-> recs appreciated  · IVF  · Discontinue Zosyn as symptoms are most likely secondary to ulcerative colitis  · Steroids initiated 8/17  · F/u bld cx and procal, ESR, CRP, and lipase, C Diff  · ESR:  48  · Procalcitonin:  1 49  · CRP:  206 3  · C  Diff: negative  · Stool enteric panel: negative  · Advanced to low-fiber   · Cholestyramine  · Oncology consulted by GI for further recommendations regarding current cancer regimen playing a role in symptoms  Previously believed due to verzenio which has been on hold since recent admit, doubt this is contributing   · Flex sig completed 8/20 concerning for IBD, biopsies taken  Also noted ulcerations  · Oral prednisone now transitioned to IV steroids  · Patient still with frequent loose stools    Appreciate ongoing Gastroenterology recommendations    Single subsegmental pulmonary embolism without acute cor pulmonale (HCC)  Assessment & Plan  · Venous duplex obtained due to LE edema  · Confirmed acute occlusive thrombus in popliteal vein and non-occlusive thrombus in the peroneal veins  · Started on IV heparin gtt 8/19  · Persistent tachycardia, CTA PE study obtained confirms R lobe subsegmental PE no evidence of R heart strain per radiologist  · Suspect in setting of known metastatic cancer and patient has been more sedentary in recent weeks due to acute illness    Severe protein-calorie malnutrition ([address] )  Assessment & Plan  Malnutrition Findings:   Adult Malnutrition type: Chronic illness  Adult Degree of Malnutrition: Other severe protein calorie malnutrition  Malnutrition Characteristics: Inadequate energy, Weight loss                  360 Statement: Pt presents with chronic severe protein calorie malnutrition due to Stage IV metastatic breast cancer, chronic diarrhea vs UC flare as evidenced by 10 5 lb wt loss-14% wt loss in 4 months and <75 % po intake > 1 month  BMI Findings: Body mass index is 27 21 kg/m²  Anemia  Assessment & Plan  · History of chronic anemia, baseline 8-10  · Underwent flex sig yesterday which showed inflammation, ulcerations  · Also with BRBPR since starting heparin gtt  · Did have decrease of hgb to 7 3 - continue to trend    Blood consent obtained  · Trend H/H  · Discussed with GI - recommending IV venofer  · Hemoglobin overall remains stable    Primary hypertension  Assessment & Plan  · Blood pressure stable off cozaar    Chronic kidney disease (CKD) stage G3a/A1, moderately decreased glomerular filtration rate (GFR) between 45-59 mL/min/1 73 square meter and albuminuria creatinine ratio less than 30 mg/g [facility])  Assessment & Plan  Lab Results   Component Value Date    EGFR 62 08/23/2022    EGFR 75 08/22/2022    EGFR 73 08/21/2022    CREATININE 0 87 08/23/2022    CREATININE 0 74 08/22/2022    CREATININE 0 76 08/21/2022     · Cr on admission slightly elevated from her baseline at 1 3, most likely due to volume depletion  · Baseline 1 1-1 2  · I's and O's  · IV fluids  · Avoid nephrotoxic agents  · Discontinue IVF  · Encourage oral intake    Mixed hyperlipidemia  Assessment & Plan  · Continue statin    Metastatic breast cancer [facility])  Assessment & Plan  · Continue to follow-up with Hematology-Oncology outpatient  · Femara had been held on admit  · On recent admission patient's verzenio was held  Reviewed office visit from 8/10/22 appears there was some consideration to resume but at lowered dose  Patient reports she never resumed this medication    VTE Pharmacologic Prophylaxis: VTE Score: 6 High Risk (Score >/= 5) - Pharmacological DVT Prophylaxis Ordered: heparin drip  Sequential Compression Devices Ordered  Patient Centered Rounds: I performed bedside rounds with nursing staff today  Discussions with Specialists or Other Care Team Provider: CM, GI    Education and Discussions with Family / Patient: Updated  (daughter) via phone  Time Spent for Care: 30 minutes  More than 50% of total time spent on counseling and coordination of care as described above  Current Length of Stay: 8 day(s)  Current Patient Status: Inpatient   Certification Statement: The patient will continue to require additional inpatient hospital stay due to Ongoing diarrhea, bloody stools  Discharge Plan: Anticipate discharge in 48-72 hrs to home  Code Status: Level 3 - DNAR and DNI    Subjective:   Patient reports she did not have a good night last night  Unfortunately had poor sleep as she had frequent loose stools again  Denies chest pain/palpitations, shortness of breath, nausea vomiting, active abdominal pain  Patient admits she was having lower abdominal cramping prior to a bowel movement and afterwards would usually feel better  Objective:     Vitals:   Temp (24hrs), Av °F (36 7 °C), Min:97 4 °F (36 3 °C), Max:98 4 °F (36 9 °C)    Temp:  [97 4 °F (36 3 °C)-98 4 °F (36 9 °C)] 98 4 °F (36 9 °C)  HR:  [103-106] 106  Resp:  [12-18] 12  BP: (121-128)/(59-62) 128/62  SpO2:  [95 %-98 %] 98 %  Body mass index is 27 21 kg/m²       Input and Output Summary (last 24 hours): Intake/Output Summary (Last 24 hours) at 8/23/2022 1241  Last data filed at 8/22/2022 1832  Gross per 24 hour   Intake 240 ml   Output --   Net 240 ml       Physical Exam:   Physical Exam  Vitals and nursing note reviewed  Constitutional:       Appearance: Normal appearance  Comments: No acute distress   HENT:      Head: Normocephalic  Eyes:      General: No scleral icterus  Extraocular Movements: Extraocular movements intact  Conjunctiva/sclera: Conjunctivae normal    Cardiovascular:      Rate and Rhythm: Regular rhythm  Tachycardia present  Heart sounds: S1 normal and S2 normal    Pulmonary:      Effort: Pulmonary effort is normal       Breath sounds: Normal breath sounds  No wheezing, rhonchi or rales  Abdominal:      General: Bowel sounds are normal       Palpations: Abdomen is soft  Tenderness: There is no abdominal tenderness  There is no guarding or rebound  Musculoskeletal:         General: No swelling, tenderness or deformity  Cervical back: Normal range of motion  Comments: Able to move upper/lower extremities bilaterally  1-2 +pitting lower extremity edema bilaterally   Skin:     General: Skin is warm and dry  Neurological:      Mental Status: She is alert and oriented to person, place, and time     Psychiatric:         Mood and Affect: Mood normal          Speech: Speech normal          Behavior: Behavior normal           Additional Data:     Labs:  Results from last 7 days   Lab Units 08/23/22  0448   WBC Thousand/uL 9 36   HEMOGLOBIN g/dL 7 6*   HEMATOCRIT % 23 2*   PLATELETS Thousands/uL 235   BANDS PCT % 2   LYMPHO PCT % 9*   MONO PCT % 13*   EOS PCT % 0     Results from last 7 days   Lab Units 08/23/22  0448 08/22/22  0551 08/21/22  0547   SODIUM mmol/L 142   < > 141   POTASSIUM mmol/L 3 7   < > 3 5   CHLORIDE mmol/L 110*   < > 109*   CO2 mmol/L 22   < > 22   BUN mg/dL 9   < > 8   CREATININE mg/dL 0 87   < > 0 76   ANION GAP mmol/L 10   < > 10 CALCIUM mg/dL 6 6*   < > 6 3*   ALBUMIN g/dL  --   --  1 6*   TOTAL BILIRUBIN mg/dL  --   --  0 30   ALK PHOS U/L  --   --  82   ALT U/L  --   --  21   AST U/L  --   --  17   GLUCOSE RANDOM mg/dL 93   < > 104    < > = values in this interval not displayed  Results from last 7 days   Lab Units 22  1435   INR  1 11                   Lines/Drains:  Invasive Devices  Report    Peripherally Inserted Central Catheter Line  Duration           PICC Line 22 3 days                Central Line:  Goal for removal: Will discontinue when meds requiring line are completed  Telemetry:  Telemetry Orders (From admission, onward)             24 Hour Telemetry Monitoring  Continuous x 24 Hours (Telem)           References:    Telemetry Guidelines   Question:  Reason for 24 Hour Telemetry  Answer:  Pulmonary Embolism - 24 hours without resp  compromise, dysrhythmias, hemodynamically stable                 Telemetry Reviewed: Sinus Tachycardia  Indication for Continued Telemetry Use: PE             Imaging: No pertinent imaging reviewed      Recent Cultures (last 7 days):         Last 24 Hours Medication List:   Current Facility-Administered Medications   Medication Dose Route Frequency Provider Last Rate    acetaminophen  650 mg Oral Q6H PRN Roz Samuels MD      amitriptyline  25 mg Oral HS Roz Samuels MD      calcium carbonate  500 mg Oral Daily PRN Roz Samuels MD      cholestyramine sugar free  4 g Oral BID Roz Samuels MD      heparin (porcine)  3-30 Units/kg/hr (Order-Specific) Intravenous Titrated Roz Samuels MD 10 Units/kg/hr (22 0545)    heparin (porcine)  2,600 Units Intravenous Q1H PRN Roz Samuels MD      heparin (porcine)  5,200 Units Intravenous Q1H PRN Roz Samuels MD      iron sucrose  200 mg Intravenous Once per day on  Flavio Tovar PA-C      loperamide  2 mg Oral Q4H PRN Roz Samuels MD      mesalamine  800 mg Oral TID Alison Blanchard MD      methylPREDNISolone sodium succinate  40 mg Intravenous [facility] Alison Blanchard MD      mirtazapine  7 5 mg Oral HS Alison Blanchard MD      ondansetron  4 mg Intravenous Q6H PRN Alison Blanchard MD      pantoprazole  40 mg Intravenous [facility] Alison Blanchard MD      pravastatin  20 mg Oral Daily With Fred Ku MD          Today, Patient Was Seen By: Aster Gardner PA-C    **Please Note: This note may have been constructed using a voice recognition system  **

## 2022-08-24 LAB
ALBUMIN SERPL BCP-MCNC: 1.9 G/DL (ref 3.5–5)
ALP SERPL-CCNC: 92 U/L (ref 46–116)
ALT SERPL W P-5'-P-CCNC: 22 U/L (ref 12–78)
ANION GAP SERPL CALCULATED.3IONS-SCNC: 8 MMOL/L (ref 4–13)
APTT PPP: 51 SECONDS (ref 23–37)
APTT PPP: 56 SECONDS (ref 23–37)
APTT PPP: 97 SECONDS (ref 23–37)
AST SERPL W P-5'-P-CCNC: 14 U/L (ref 5–45)
BASOPHILS # BLD AUTO: 0.02 THOUSANDS/ΜL (ref 0–0.1)
BASOPHILS NFR BLD AUTO: 0 % (ref 0–1)
BILIRUB SERPL-MCNC: 0.3 MG/DL (ref 0.2–1)
BUN SERPL-MCNC: 10 MG/DL (ref 5–25)
CALCIUM ALBUM COR SERPL-MCNC: 8.6 MG/DL (ref 8.3–10.1)
CALCIUM SERPL-MCNC: 6.9 MG/DL (ref 8.3–10.1)
CHLORIDE SERPL-SCNC: 109 MMOL/L (ref 96–108)
CO2 SERPL-SCNC: 23 MMOL/L (ref 21–32)
CREAT SERPL-MCNC: 0.94 MG/DL (ref 0.6–1.3)
EOSINOPHIL # BLD AUTO: 0 THOUSAND/ΜL (ref 0–0.61)
EOSINOPHIL NFR BLD AUTO: 0 % (ref 0–6)
ERYTHROCYTE [DISTWIDTH] IN BLOOD BY AUTOMATED COUNT: 21.2 % (ref 11.6–15.1)
GFR SERPL CREATININE-BSD FRML MDRD: 56 ML/MIN/1.73SQ M
GLUCOSE SERPL-MCNC: 141 MG/DL (ref 65–140)
HCT VFR BLD AUTO: 24 % (ref 34.8–46.1)
HGB BLD-MCNC: 7.7 G/DL (ref 11.5–15.4)
IMM GRANULOCYTES # BLD AUTO: 0.35 THOUSAND/UL (ref 0–0.2)
IMM GRANULOCYTES NFR BLD AUTO: 4 % (ref 0–2)
LYMPHOCYTES # BLD AUTO: 0.54 THOUSANDS/ΜL (ref 0.6–4.47)
LYMPHOCYTES NFR BLD AUTO: 7 % (ref 14–44)
MAGNESIUM SERPL-MCNC: 1.7 MG/DL (ref 1.6–2.6)
MCH RBC QN AUTO: 31.3 PG (ref 26.8–34.3)
MCHC RBC AUTO-ENTMCNC: 32.1 G/DL (ref 31.4–37.4)
MCV RBC AUTO: 98 FL (ref 82–98)
MONOCYTES # BLD AUTO: 0.77 THOUSAND/ΜL (ref 0.17–1.22)
MONOCYTES NFR BLD AUTO: 9 % (ref 4–12)
NEUTROPHILS # BLD AUTO: 6.65 THOUSANDS/ΜL (ref 1.85–7.62)
NEUTS SEG NFR BLD AUTO: 80 % (ref 43–75)
NRBC BLD AUTO-RTO: 1 /100 WBCS
PLATELET # BLD AUTO: 237 THOUSANDS/UL (ref 149–390)
PMV BLD AUTO: 10 FL (ref 8.9–12.7)
POTASSIUM SERPL-SCNC: 3.7 MMOL/L (ref 3.5–5.3)
PROT SERPL-MCNC: 5 G/DL (ref 6.4–8.4)
RBC # BLD AUTO: 2.46 MILLION/UL (ref 3.81–5.12)
SODIUM SERPL-SCNC: 140 MMOL/L (ref 135–147)
WBC # BLD AUTO: 8.33 THOUSAND/UL (ref 4.31–10.16)

## 2022-08-24 PROCEDURE — 83735 ASSAY OF MAGNESIUM: CPT | Performed by: PHYSICIAN ASSISTANT

## 2022-08-24 PROCEDURE — 85730 THROMBOPLASTIN TIME PARTIAL: CPT | Performed by: STUDENT IN AN ORGANIZED HEALTH CARE EDUCATION/TRAINING PROGRAM

## 2022-08-24 PROCEDURE — C9113 INJ PANTOPRAZOLE SODIUM, VIA: HCPCS | Performed by: INTERNAL MEDICINE

## 2022-08-24 PROCEDURE — 85027 COMPLETE CBC AUTOMATED: CPT | Performed by: PHYSICIAN ASSISTANT

## 2022-08-24 PROCEDURE — 99232 SBSQ HOSP IP/OBS MODERATE 35: CPT | Performed by: PHYSICIAN ASSISTANT

## 2022-08-24 PROCEDURE — 88341 IMHCHEM/IMCYTCHM EA ADD ANTB: CPT | Performed by: PATHOLOGY

## 2022-08-24 PROCEDURE — 80053 COMPREHEN METABOLIC PANEL: CPT | Performed by: PHYSICIAN ASSISTANT

## 2022-08-24 RX ORDER — MAGNESIUM SULFATE HEPTAHYDRATE 40 MG/ML
2 INJECTION, SOLUTION INTRAVENOUS ONCE
Status: COMPLETED | OUTPATIENT
Start: 2022-08-24 | End: 2022-08-24

## 2022-08-24 RX ADMIN — MAGNESIUM SULFATE HEPTAHYDRATE 2 G: 40 INJECTION, SOLUTION INTRAVENOUS at 15:25

## 2022-08-24 RX ADMIN — METHYLPREDNISOLONE SODIUM SUCCINATE 40 MG: 40 INJECTION, POWDER, FOR SOLUTION INTRAMUSCULAR; INTRAVENOUS at 21:43

## 2022-08-24 RX ADMIN — PANTOPRAZOLE SODIUM 40 MG: 40 INJECTION, POWDER, FOR SOLUTION INTRAVENOUS at 20:43

## 2022-08-24 RX ADMIN — AMITRIPTYLINE HYDROCHLORIDE 25 MG: 25 TABLET, FILM COATED ORAL at 21:42

## 2022-08-24 RX ADMIN — MIRTAZAPINE 7.5 MG: 15 TABLET, FILM COATED ORAL at 21:43

## 2022-08-24 RX ADMIN — METHYLPREDNISOLONE SODIUM SUCCINATE 40 MG: 40 INJECTION, POWDER, FOR SOLUTION INTRAMUSCULAR; INTRAVENOUS at 06:16

## 2022-08-24 RX ADMIN — SODIUM CHLORIDE 200 MG: 9 INJECTION, SOLUTION INTRAVENOUS at 09:32

## 2022-08-24 RX ADMIN — PANTOPRAZOLE SODIUM 40 MG: 40 INJECTION, POWDER, FOR SOLUTION INTRAVENOUS at 09:32

## 2022-08-24 RX ADMIN — MESALAMINE 800 MG: 400 CAPSULE, DELAYED RELEASE ORAL at 17:09

## 2022-08-24 RX ADMIN — MESALAMINE 800 MG: 400 CAPSULE, DELAYED RELEASE ORAL at 20:41

## 2022-08-24 RX ADMIN — CHOLESTYRAMINE 4 G: 4 POWDER, FOR SUSPENSION ORAL at 09:32

## 2022-08-24 RX ADMIN — PRAVASTATIN SODIUM 20 MG: 20 TABLET ORAL at 17:09

## 2022-08-24 RX ADMIN — METHYLPREDNISOLONE SODIUM SUCCINATE 40 MG: 40 INJECTION, POWDER, FOR SOLUTION INTRAMUSCULAR; INTRAVENOUS at 15:25

## 2022-08-24 RX ADMIN — HEPARIN SODIUM 2600 UNITS: 1000 INJECTION INTRAVENOUS; SUBCUTANEOUS at 23:16

## 2022-08-24 RX ADMIN — MESALAMINE 800 MG: 400 CAPSULE, DELAYED RELEASE ORAL at 09:44

## 2022-08-24 RX ADMIN — LOPERAMIDE HYDROCHLORIDE 2 MG: 2 CAPSULE ORAL at 09:45

## 2022-08-24 RX ADMIN — HEPARIN SODIUM 2600 UNITS: 1000 INJECTION INTRAVENOUS; SUBCUTANEOUS at 07:22

## 2022-08-24 RX ADMIN — LOPERAMIDE HYDROCHLORIDE 2 MG: 2 CAPSULE ORAL at 21:54

## 2022-08-24 NOTE — PLAN OF CARE
Problem: Nutrition/Hydration-ADULT  Goal: Nutrient/Hydration intake appropriate for improving, restoring or maintaining nutritional needs  Description: Monitor and assess patient's nutrition/hydration status for malnutrition  Collaborate with interdisciplinary team and initiate plan and interventions as ordered  Monitor patient's weight and dietary intake as ordered or per policy  Utilize nutrition screening tool and intervene as necessary  Determine patient's food preferences and provide high-protein, high-caloric foods as appropriate       INTERVENTIONS:  - Monitor oral intake, urinary output, labs, and treatment plans  - Assess nutrition and hydration status and recommend course of action  - Evaluate amount of meals eaten  - Assist patient with eating if necessary   - Allow adequate time for meals  - Recommend/ encourage appropriate diets, oral nutritional supplements, and vitamin/mineral supplements  - Order, calculate, and assess calorie counts as needed  - Recommend, monitor, and adjust tube feedings and TPN/PPN based on assessed needs  - Assess need for intravenous fluids  - Provide specific nutrition/hydration education as appropriate  - Include patient/family/caregiver in decisions related to nutrition  Outcome: Progressing     Problem: DISCHARGE PLANNING  Goal: Discharge to home or other facility with appropriate resources  Description: INTERVENTIONS:  - Identify barriers to discharge w/patient and caregiver  - Arrange for needed discharge resources and transportation as appropriate  - Identify discharge learning needs (meds, wound care, etc )  - Arrange for interpretive services to assist at discharge as needed  - Refer to Case Management Department for coordinating discharge planning if the patient needs post-hospital services based on physician/advanced practitioner order or complex needs related to functional status, cognitive ability, or social support system  Outcome: Progressing

## 2022-08-24 NOTE — ASSESSMENT & PLAN NOTE
· Patient presented with dizziness and diarrhea starting in August that worsened yesterday  · Most likely secondary to IBD  · CT showed: 1  Diffuse mild-moderate circumferential wall thickening of the colon with pericolic inflammatory stranding consistent with nonspecific pancolitis  2   Multiple stable small scattered sclerotic foci in the visualized lumbar lower thoracic spine osseous pelvis which may reflect osseous metastatic disease in this patient with known metastatic breast cancer  3   Additional stable findings as noted   · GI consulted-> recs appreciated  · IVF  · Discontinue Zosyn as symptoms are most likely secondary to ulcerative colitis  · Steroids initiated 8/17  · F/u bld cx and procal, ESR, CRP, and lipase, C Diff  · ESR:  48  · Procalcitonin:  1 49  · CRP:  206 3  · C  Diff: negative  · Stool enteric panel: negative  · Advanced to low-fiber   · Cholestyramine  · Oncology consulted by GI for further recommendations regarding current cancer regimen playing a role in symptoms  Previously believed due to verzenio which has been on hold since recent admit, doubt this is contributing   · Flex sig completed 8/20 concerning for IBD, biopsies taken  Also noted ulcerations  · Biopsy still pending  · Oral prednisone now transitioned to IV steroids  · Patient still with frequent loose stools    Appreciate ongoing Gastroenterology recommendations

## 2022-08-24 NOTE — ASSESSMENT & PLAN NOTE
· History of chronic anemia, baseline 8-10  · Underwent flex sig yesterday which showed inflammation, ulcerations  · Also with BRBPR since starting heparin gtt  · Trend H/H - morning labs pending however hemoglobin has been stable in the mid 7s and bloody stools appear to be decreasing  · Discussed with GI - recommending IV venofer

## 2022-08-24 NOTE — QUICK NOTE
Pt noted to still be having frequent loose stools  Hgb stable  No fevers/chills  Path still pending  Continue solumedrol IV for now      Loraine Trotter MD  GI/Hepatology

## 2022-08-24 NOTE — PROGRESS NOTES
[place]     Progress Note Jayda Silverman 1940, 79 y o  female MRN: [id number]  Unit/Bed#: -[place] Encounter: [id number]  Primary Care Provider: Anna Frederick DO   Date and time admitted to hospital: 8/15/2022 10:40 AM    * Toxic gastroenteritis and colitis  Assessment & Plan  · Patient presented with dizziness and diarrhea starting in August that worsened yesterday  · Most likely secondary to IBD  · CT showed: 1  Diffuse mild-moderate circumferential wall thickening of the colon with pericolic inflammatory stranding consistent with nonspecific pancolitis  2   Multiple stable small scattered sclerotic foci in the visualized lumbar lower thoracic spine osseous pelvis which may reflect osseous metastatic disease in this patient with known metastatic breast cancer  3   Additional stable findings as noted   · GI consulted-> recs appreciated  · IVF  · Discontinue Zosyn as symptoms are most likely secondary to ulcerative colitis  · Steroids initiated 8/17  · F/u bld cx and procal, ESR, CRP, and lipase, C Diff  · ESR:  48  · Procalcitonin:  1 49  · CRP:  206 3  · C  Diff: negative  · Stool enteric panel: negative  · Advanced to low-fiber   · Cholestyramine  · Oncology consulted by GI for further recommendations regarding current cancer regimen playing a role in symptoms  Previously believed due to verzenio which has been on hold since recent admit, doubt this is contributing   · Flex sig completed 8/20 concerning for IBD, biopsies taken  Also noted ulcerations  · Biopsy still pending  · Oral prednisone now transitioned to IV steroids  · Patient still with frequent loose stools    Appreciate ongoing Gastroenterology recommendations    Single subsegmental pulmonary embolism without acute cor pulmonale (HCC)  Assessment & Plan  · Venous duplex obtained due to LE edema  · Confirmed acute occlusive thrombus in popliteal vein and non-occlusive thrombus in the peroneal veins  · Started on IV heparin gtt 8/19  · Persistent tachycardia, CTA PE study obtained confirms R lobe subsegmental PE no evidence of R heart strain per radiologist  · Suspect in setting of known metastatic cancer and patient has been more sedentary in recent weeks due to acute illness    Severe protein-calorie malnutrition ([address] )  Assessment & Plan  Malnutrition Findings:   Adult Malnutrition type: Chronic illness  Adult Degree of Malnutrition: Other severe protein calorie malnutrition  Malnutrition Characteristics: Inadequate energy, Weight loss                  360 Statement: Pt presents with chronic severe protein calorie malnutrition due to Stage IV metastatic breast cancer, chronic diarrhea vs UC flare as evidenced by 10 5 lb wt loss-14% wt loss in 4 months and <75 % po intake > 1 month  BMI Findings: Body mass index is 27 21 kg/m²         Anemia  Assessment & Plan  · History of chronic anemia, baseline 8-10  · Underwent flex sig yesterday which showed inflammation, ulcerations  · Also with BRBPR since starting heparin gtt  · Trend H/H - morning labs pending however hemoglobin has been stable in the mid 7s and bloody stools appear to be decreasing  · Discussed with GI - recommending IV venofer    Primary hypertension  Assessment & Plan  · Blood pressure stable off cozaar    Chronic kidney disease (CKD) stage G3a/A1, moderately decreased glomerular filtration rate (GFR) between 45-59 mL/min/1 73 square meter and albuminuria creatinine ratio less than 30 mg/g [facility])  Assessment & Plan  Lab Results   Component Value Date    EGFR 63 08/23/2022    EGFR 62 08/23/2022    EGFR 75 08/22/2022    CREATININE 0 86 08/23/2022    CREATININE 0 87 08/23/2022    CREATININE 0 74 08/22/2022     · Cr on admission slightly elevated from her baseline at 1 3, most likely due to volume depletion  · Baseline 1 1-1 2  · I's and O's  · IV fluids  · Avoid nephrotoxic agents  · Discontinue IVF  · Encourage oral intake    Mixed hyperlipidemia  Assessment & Plan  · Continue statin    Metastatic breast cancer [facility])  Assessment & Plan  · Continue to follow-up with Hematology-Oncology outpatient  · Femara had been held on admit  · On recent admission patient's verzenio was held  Reviewed office visit from 8/10/22 appears there was some consideration to resume but at lowered dose  Patient reports she never resumed this medication    VTE Pharmacologic Prophylaxis: VTE Score: 6 High Risk (Score >/= 5) - Pharmacological DVT Prophylaxis Ordered: heparin drip  Sequential Compression Devices Ordered  Patient Centered Rounds: I performed bedside rounds with nursing staff today  Discussions with Specialists or Other Care Team Provider: CHAGO, elen GI input    Education and Discussions with Family / Patient: Updated  (daughter) via phone  Time Spent for Care: 30 minutes  More than 50% of total time spent on counseling and coordination of care as described above  Current Length of Stay: 9 day(s)  Current Patient Status: Inpatient   Certification Statement: The patient will continue to require additional inpatient hospital stay due to Colonoscopy biopsy results, monitoring bloody stools and hemoglobin  Discharge Plan: Anticipate discharge in 24-48 hrs to home  Code Status: Level 3 - DNAR and DNI    Subjective:   Patient states she had a good night last night  Feels her loose stools are more formed today  Appetite is improving  Objective:     Vitals:   Temp (24hrs), Av 7 °F (36 5 °C), Min:97 5 °F (36 4 °C), Max:97 8 °F (36 6 °C)    Temp:  [97 5 °F (36 4 °C)-97 8 °F (36 6 °C)] 97 5 °F (36 4 °C)  HR:  [101-113] 105  Resp:  [18] 18  BP: (125-144)/(62-86) 139/80  SpO2:  [96 %-97 %] 97 %  Body mass index is 27 21 kg/m²  Input and Output Summary (last 24 hours):      Intake/Output Summary (Last 24 hours) at 2022 1252  Last data filed at 2022 1221  Gross per 24 hour   Intake 480 ml   Output --   Net 480 ml Physical Exam:   Physical Exam  Vitals and nursing note reviewed  Constitutional:       Appearance: Normal appearance  Comments: No acute distress   HENT:      Head: Normocephalic  Eyes:      General: No scleral icterus  Extraocular Movements: Extraocular movements intact  Conjunctiva/sclera: Conjunctivae normal    Cardiovascular:      Rate and Rhythm: Normal rate and regular rhythm  Heart sounds: S1 normal and S2 normal    Pulmonary:      Effort: Pulmonary effort is normal       Breath sounds: Normal breath sounds  No wheezing, rhonchi or rales  Abdominal:      General: Bowel sounds are normal       Palpations: Abdomen is soft  Tenderness: There is no abdominal tenderness  There is no guarding or rebound  Musculoskeletal:         General: No swelling, tenderness or deformity  Cervical back: Normal range of motion  Comments: Able to move upper/excised bilaterally  1-2 +pitting edema lower extremities   Skin:     General: Skin is warm and dry  Neurological:      Mental Status: She is alert and oriented to person, place, and time     Psychiatric:         Mood and Affect: Mood normal          Speech: Speech normal          Behavior: Behavior normal           Additional Data:     Labs:  Results from last 7 days   Lab Units 08/23/22  0448   WBC Thousand/uL 9 36   HEMOGLOBIN g/dL 7 6*   HEMATOCRIT % 23 2*   PLATELETS Thousands/uL 235   BANDS PCT % 2   LYMPHO PCT % 9*   MONO PCT % 13*   EOS PCT % 0     Results from last 7 days   Lab Units 08/23/22  1421   SODIUM mmol/L 139   POTASSIUM mmol/L 3 6   CHLORIDE mmol/L 110*   CO2 mmol/L 23   BUN mg/dL 9   CREATININE mg/dL 0 86   ANION GAP mmol/L 6   CALCIUM mg/dL 6 8*   ALBUMIN g/dL 1 8*   TOTAL BILIRUBIN mg/dL 0 40   ALK PHOS U/L 90   ALT U/L 19   AST U/L 15   GLUCOSE RANDOM mg/dL 134     Results from last 7 days   Lab Units 08/19/22  1435   INR  1 11                   Lines/Drains:  Invasive Devices  Report    Peripherally Inserted Central Catheter Line  Duration           PICC Line 08/19/22 4 days                Central Line:  Goal for removal: Will discontinue when meds requiring line are completed  Telemetry:  Telemetry Orders (From admission, onward)             24 Hour Telemetry Monitoring  Continuous x 24 Hours ([place])        Expiring   References:    Telemetry Guidelines   Question:  Reason for 24 Hour Telemetry  Answer:  Pulmonary Embolism - 24 hours without resp  compromise, dysrhythmias, hemodynamically stable                 Telemetry Reviewed: Sinus Tachycardia  Indication for Continued Telemetry Use: No indication for continued use  Will discontinue  Imaging: No pertinent imaging reviewed      Recent Cultures (last 7 days):         Last 24 Hours Medication List:   Current Facility-Administered Medications   Medication Dose Route Frequency Provider Last Rate    acetaminophen  650 mg Oral Q6H PRN Aide Collins MD      amitriptyline  25 mg Oral HS Aide Collins MD      calcium carbonate  500 mg Oral Daily PRN Aide Collins MD      cholestyramine sugar free  4 g Oral BID Aide Collins MD      heparin (porcine)  3-30 Units/kg/hr (Order-Specific) Intravenous Titrated Aide Collins MD 12 Units/kg/hr (08/24/22 0723)    heparin (porcine)  2,600 Units Intravenous Q1H PRN Aide Collins MD      heparin (porcine)  5,200 Units Intravenous Q1H PRN Aide Collins MD      iron sucrose  200 mg Intravenous Once per day on Mon Wed Fri Aaron Ramos PA-C      loperamide  2 mg Oral Q4H PRN Aide Collins MD      mesalamine  800 mg Oral TID Aide Collins MD      methylPREDNISolone sodium succinate  40 mg Intravenous [facility] Aide Collins MD      mirtazapine  7 5 mg Oral HS Aide Collins MD      ondansetron  4 mg Intravenous Q6H PRN Aide Collins MD      pantoprazole  40 mg Intravenous Q12H [address] Aide Collins MD      pravastatin  20 mg Oral Daily With Olena Stroud MD          Today, Patient Was Seen By: Clarence Pierson PA-C    **Please Note: This note may have been constructed using a voice recognition system  **

## 2022-08-24 NOTE — ASSESSMENT & PLAN NOTE
Lab Results   Component Value Date    EGFR 63 08/23/2022    EGFR 62 08/23/2022    EGFR 75 08/22/2022    CREATININE 0 86 08/23/2022    CREATININE 0 87 08/23/2022    CREATININE 0 74 08/22/2022     · Cr on admission slightly elevated from her baseline at 1 3, most likely due to volume depletion  · Baseline 1 1-1 2  · I's and O's  · IV fluids  · Avoid nephrotoxic agents  · Discontinue IVF  · Encourage oral intake

## 2022-08-25 LAB
APTT PPP: 160 SECONDS (ref 23–37)
APTT PPP: 194 SECONDS (ref 23–37)
APTT PPP: 43 SECONDS (ref 23–37)

## 2022-08-25 PROCEDURE — 99232 SBSQ HOSP IP/OBS MODERATE 35: CPT | Performed by: INTERNAL MEDICINE

## 2022-08-25 PROCEDURE — 85730 THROMBOPLASTIN TIME PARTIAL: CPT | Performed by: STUDENT IN AN ORGANIZED HEALTH CARE EDUCATION/TRAINING PROGRAM

## 2022-08-25 PROCEDURE — C9113 INJ PANTOPRAZOLE SODIUM, VIA: HCPCS | Performed by: INTERNAL MEDICINE

## 2022-08-25 PROCEDURE — 99232 SBSQ HOSP IP/OBS MODERATE 35: CPT | Performed by: PHYSICIAN ASSISTANT

## 2022-08-25 RX ORDER — PREDNISONE 20 MG/1
40 TABLET ORAL DAILY
Status: DISCONTINUED | OUTPATIENT
Start: 2022-08-26 | End: 2022-08-29

## 2022-08-25 RX ORDER — PANTOPRAZOLE SODIUM 40 MG/1
40 TABLET, DELAYED RELEASE ORAL
Status: DISCONTINUED | OUTPATIENT
Start: 2022-08-25 | End: 2022-09-06 | Stop reason: HOSPADM

## 2022-08-25 RX ORDER — ALBUMIN (HUMAN) 12.5 G/50ML
12.5 SOLUTION INTRAVENOUS ONCE
Status: COMPLETED | OUTPATIENT
Start: 2022-08-25 | End: 2022-08-25

## 2022-08-25 RX ADMIN — AMITRIPTYLINE HYDROCHLORIDE 25 MG: 25 TABLET, FILM COATED ORAL at 21:59

## 2022-08-25 RX ADMIN — HEPARIN SODIUM 2600 UNITS: 1000 INJECTION INTRAVENOUS; SUBCUTANEOUS at 14:44

## 2022-08-25 RX ADMIN — MIRTAZAPINE 7.5 MG: 15 TABLET, FILM COATED ORAL at 21:59

## 2022-08-25 RX ADMIN — RIVAROXABAN 15 MG: 15 TABLET, FILM COATED ORAL at 17:24

## 2022-08-25 RX ADMIN — LOPERAMIDE HYDROCHLORIDE 2 MG: 2 CAPSULE ORAL at 11:12

## 2022-08-25 RX ADMIN — ALBUMIN (HUMAN) 12.5 G: 0.25 INJECTION, SOLUTION INTRAVENOUS at 17:23

## 2022-08-25 RX ADMIN — MESALAMINE 800 MG: 400 CAPSULE, DELAYED RELEASE ORAL at 08:47

## 2022-08-25 RX ADMIN — PANTOPRAZOLE SODIUM 40 MG: 40 INJECTION, POWDER, FOR SOLUTION INTRAVENOUS at 08:47

## 2022-08-25 RX ADMIN — METHYLPREDNISOLONE SODIUM SUCCINATE 40 MG: 40 INJECTION, POWDER, FOR SOLUTION INTRAMUSCULAR; INTRAVENOUS at 05:03

## 2022-08-25 RX ADMIN — LOPERAMIDE HYDROCHLORIDE 2 MG: 2 CAPSULE ORAL at 15:38

## 2022-08-25 RX ADMIN — MESALAMINE 800 MG: 400 CAPSULE, DELAYED RELEASE ORAL at 21:59

## 2022-08-25 RX ADMIN — CHOLESTYRAMINE 4 G: 4 POWDER, FOR SUSPENSION ORAL at 08:47

## 2022-08-25 RX ADMIN — METHYLPREDNISOLONE SODIUM SUCCINATE 40 MG: 40 INJECTION, POWDER, FOR SOLUTION INTRAMUSCULAR; INTRAVENOUS at 13:33

## 2022-08-25 RX ADMIN — PRAVASTATIN SODIUM 20 MG: 20 TABLET ORAL at 17:30

## 2022-08-25 RX ADMIN — PANTOPRAZOLE SODIUM 40 MG: 40 TABLET, DELAYED RELEASE ORAL at 17:24

## 2022-08-25 RX ADMIN — MESALAMINE 800 MG: 400 CAPSULE, DELAYED RELEASE ORAL at 17:24

## 2022-08-25 NOTE — ASSESSMENT & PLAN NOTE
· Venous duplex obtained due to LE edema  · Confirmed acute occlusive thrombus in popliteal vein and non-occlusive thrombus in the peroneal veins  · Started on IV heparin gtt 8/19  · Persistent tachycardia, CTA PE study obtained confirms R lobe subsegmental PE no evidence of R heart strain per radiologist  · Suspect in setting of known metastatic cancer and patient has been more sedentary in recent weeks due to acute illness  · Transition to [address] [place] [address] for diascharge

## 2022-08-25 NOTE — PROGRESS NOTES
Progress note - Gastroenterology   Cira Silverman 79 y o  female MRN: [id number]  Unit/Bed#: -01 Encounter: [id number]    ASSESSMENT and PLAN      1  Acute colitis  New onset inflammatory bowel disease?  Related to or induced by cancer treatment/immunotherapy,   Atypical infectious colitis? Patient continues to have bloody loose bowel movements  Per patient's nurse approximately 5 or more loose bowels, red or blood-tinged per day  Patient is currently taking cholestyramine 4 g twice daily  Prescribed Imodium 2 mg every 4 hours as needed  Discussed with patient's nurse to offer as long as she is having diarrhea  Biopsy from EGD 8/20; active colitis, no viral inclusions identified on cm the immunohistochemistry stain, negative for dysplasia  - continue IV Solu-Medrol for now until pathology comes back  - offer Imodium 2 mg every 4 hours as needed as long as patient has liquid bowel movements  Discuss biopsy results and treatment plan with Dr Rodriguez Claudio on rounds      2  Acute blood loss anemia  Secondary to colitis and exacerbated by anticoagulation for DVT/PE  Hemoglobin 7 7 with a m  Labs    - follow H&H and transfuse as needed  - continue anticoagulation for now  - continue PPI     3  DVT and pulmonary embolism     On IV heparin     4  Metastatic breast cancer  Followed by Dr Yovany Del Rio    Chief Complaint   Patient presents with    Dizziness     Via ems from home for increased dizziness and multiple episodes diarrhea since the beginning of August  States worse since last night  States is getting new chemo medication       SUBJECTIVE/HPI   Patient states she is feeling well  She does feel tired  States she is tolerating her meals  Denies any abdominal pain, nausea vomiting      /79   Pulse 104   Temp 97 6 °F (36 4 °C)   Resp 15   Ht 5' 1" (1 549 m)   Wt 65 3 kg (144 lb)   LMP  (LMP Unknown)   SpO2 96%   BMI 27 21 kg/m²     PHYSICALEXAM  General appearance: alert, appears stated age and cooperative, pallor  Eyes: PERLLA, EOMI, no icterus   Head: Normocephalic, without obvious abnormality, atraumatic  Lungs: clear to auscultation bilaterally  Heart: regular rate and rhythm, S1, S2 normal, no murmur, click, rub or gallop  Abdomen: soft, non-tender; bowel sounds normal; no masses,  no organomegaly  Extremities: extremities normal, atraumatic, no cyanosis    Lower extremity edema  Neurologic: Grossly normal    Lab Results   Component Value Date    GLUCOSE 115 09/08/2015    CALCIUM 6 9 (L) 08/24/2022     09/08/2015    K 3 7 08/24/2022    CO2 23 08/24/2022     (H) 08/24/2022    BUN 10 08/24/2022    CREATININE 0 94 08/24/2022     Lab Results   Component Value Date    WBC 8 33 08/24/2022    HGB 7 7 (L) 08/24/2022    HCT 24 0 (L) 08/24/2022    MCV 98 08/24/2022     08/24/2022     Lab Results   Component Value Date    ALT 22 08/24/2022    AST 14 08/24/2022    ALKPHOS 92 08/24/2022    BILITOT 0 66 09/08/2015     No results found for: AMYLASE  Lab Results   Component Value Date    LIPASE 79 08/15/2022     Lab Results   Component Value Date    IRON 30 (L) 08/17/2022    TIBC 161 (L) 08/17/2022    FERRITIN 328 08/17/2022     Lab Results   Component Value Date    INR 1 11 08/19/2022

## 2022-08-25 NOTE — ASSESSMENT & PLAN NOTE
Lab Results   Component Value Date    EGFR 56 08/24/2022    EGFR 63 08/23/2022    EGFR 62 08/23/2022    CREATININE 0 94 08/24/2022    CREATININE 0 86 08/23/2022    CREATININE 0 87 08/23/2022     · Cr on admission slightly elevated from her baseline at 1 3, most likely due to volume depletion  · Baseline 1 1-1 2  · I's and O's  · IV fluids  · Avoid nephrotoxic agents  · Discontinued IVF  · Encourage oral intake

## 2022-08-25 NOTE — PROGRESS NOTES
[place]  Progress Note Radha Silverman 1940, 79 y o  female MRN: [id number]  Unit/Bed#: -01 Encounter: [id number]  Primary Care Provider: Virgen Pagan DO   Date and time admitted to hospital: 8/15/2022 10:40 AM    * Toxic gastroenteritis and colitis  Assessment & Plan  · Patient presented with dizziness and diarrhea starting in August that worsened yesterday  · Most likely secondary to IBD  · CT showed: 1  Diffuse mild-moderate circumferential wall thickening of the colon with pericolic inflammatory stranding consistent with nonspecific pancolitis  2   Multiple stable small scattered sclerotic foci in the visualized lumbar lower thoracic spine osseous pelvis which may reflect osseous metastatic disease in this patient with known metastatic breast cancer  3   Additional stable findings as noted   · GI consulted-> recs appreciated  · IVF  · Discontinue Zosyn as symptoms are most likely secondary to ulcerative colitis  · Steroids initiated 8/17  · F/u bld cx and procal, ESR, CRP, and lipase, C Diff  · ESR:  48  · Procalcitonin:  1 49  · CRP:  206 3  · C  Diff: negative  · Stool enteric panel: negative  · Advanced to low-fiber   · Cholestyramine  · Oncology consulted by GI for further recommendations regarding current cancer regimen playing a role in symptoms  Previously believed due to verzenio which has been on hold since recent admit, doubt this is contributing   · Flex sig completed 8/20 concerning for IBD, biopsies taken  Also noted ulcerations  · Biopsy reveals colitis  · IV steroids now to be transitioned back to oral prednisone  · Patient still with frequent loose stools    Appreciate ongoing Gastroenterology recommendations    Single subsegmental pulmonary embolism without acute cor pulmonale (HCC)  Assessment & Plan  · Venous duplex obtained due to LE edema  · Confirmed acute occlusive thrombus in popliteal vein and non-occlusive thrombus in the peroneal veins  · Started on IV heparin gtt 8/19  · Persistent tachycardia, CTA PE study obtained confirms R lobe subsegmental PE no evidence of R heart strain per radiologist  · Suspect in setting of known metastatic cancer and patient has been more sedentary in recent weeks due to acute illness  · Transition to [facility] for diascharge    Severe protein-calorie malnutrition ([address] )  Assessment & Plan  Malnutrition Findings:   Adult Malnutrition type: Chronic illness  Adult Degree of Malnutrition: Other severe protein calorie malnutrition  Malnutrition Characteristics: Inadequate energy, Weight loss                  360 Statement: Pt presents with chronic severe protein calorie malnutrition due to Stage IV metastatic breast cancer, chronic diarrhea vs UC flare as evidenced by 10 5 lb wt loss-14% wt loss in 4 months and <75 % po intake > 1 month  BMI Findings: Body mass index is 27 21 kg/m²         Anemia  Assessment & Plan  · History of chronic anemia, baseline 8-10  · Underwent flex sig yesterday which showed inflammation, ulcerations  · Also with BRBPR since starting heparin gtt  · Trend H/H - morning labs pending however hemoglobin has been stable in the mid 7s and bloody stools appear to be decreasing  · Prior provider discussed with GI - recommending IV venofer    Primary hypertension  Assessment & Plan  · Blood pressure stable off cozaar    Chronic kidney disease (CKD) stage G3a/A1, moderately decreased glomerular filtration rate (GFR) between 45-59 mL/min/1 73 square meter and albuminuria creatinine ratio less than 30 mg/g [facility])  Assessment & Plan  Lab Results   Component Value Date    EGFR 56 08/24/2022    EGFR 63 08/23/2022    EGFR 62 08/23/2022    CREATININE 0 94 08/24/2022    CREATININE 0 86 08/23/2022    CREATININE 0 87 08/23/2022     · Cr on admission slightly elevated from her baseline at 1 3, most likely due to volume depletion  · Baseline 1 1-1 2  · I's and O's  · IV fluids  · Avoid nephrotoxic agents  · Discontinued IVF  · Encourage oral intake    Mixed hyperlipidemia  Assessment & Plan  · Continue statin    Metastatic breast cancer [facility])  Assessment & Plan  · Continue to follow-up with Hematology-Oncology outpatient  · Femara had been held on admit  · On recent admission patient's verzenio was held  Reviewed office visit from 8/10/22 appears there was some consideration to resume but at lowered dose  Patient reports she never resumed this medication      VTE Pharmacologic Prophylaxis: VTE Score: 6 High Risk (Score >/= 5) - Pharmacological DVT Prophylaxis Ordered: heparin drip  Sequential Compression Devices Ordered  Patient Centered Rounds: I performed bedside rounds with nursing staff today  Discussions with Specialists or Other Care Team Provider:  Discussed with GI, anticipate initiation of oral prednisone    Education and Discussions with Family / Patient: Patient declined call to   Time Spent for Care: 30 minutes  More than 50% of total time spent on counseling and coordination of care as described above  Current Length of Stay: 10 day(s)  Current Patient Status: Inpatient   Certification Statement: The patient will continue to require additional inpatient hospital stay due to toleration of oral prednisone  Discharge Plan: Anticipate discharge tomorrow to home  Code Status: Level 3 - DNAR and DNI    Subjective:   Patient reports he feels much better, no more diarrhea, she is eager to leave the hospital in hopeful that she can go tomorrow  Objective:     Vitals:   Temp (24hrs), Av 6 °F (36 4 °C), Min:97 3 °F (36 3 °C), Max:97 9 °F (36 6 °C)    Temp:  [97 3 °F (36 3 °C)-97 9 °F (36 6 °C)] 97 6 °F (36 4 °C)  HR:  [] 104  Resp:  [15] 15  BP: (122-135)/(66-79) 135/79  SpO2:  [95 %-99 %] 96 %  Body mass index is 27 21 kg/m²       Input and Output Summary (last 24 hours):   No intake or output data in the 24 hours ending 22 1620    Physical Exam:   Physical Exam  Vitals and nursing note reviewed  Constitutional:       General: She is not in acute distress  Appearance: Normal appearance  She is well-developed  HENT:      Head: Normocephalic and atraumatic  Eyes:      General: No scleral icterus  Conjunctiva/sclera: Conjunctivae normal    Cardiovascular:      Rate and Rhythm: Normal rate and regular rhythm  Heart sounds: No murmur heard  Pulmonary:      Effort: Pulmonary effort is normal       Breath sounds: No wheezing, rhonchi or rales  Abdominal:      General: There is no distension  Palpations: Abdomen is soft  Skin:     General: Skin is warm and dry  Neurological:      General: No focal deficit present  Mental Status: She is alert  Psychiatric:         Mood and Affect: Mood normal         Additional Data:     Labs:  Results from last 7 days   Lab Units 08/24/22  1327 08/23/22  0448   WBC Thousand/uL 8 33 9 36   HEMOGLOBIN g/dL 7 7* 7 6*   HEMATOCRIT % 24 0* 23 2*   PLATELETS Thousands/uL 237 235   BANDS PCT %  --  2   NEUTROS PCT % 80*  --    LYMPHS PCT % 7*  --    LYMPHO PCT %  --  9*   MONOS PCT % 9  --    MONO PCT %  --  13*   EOS PCT % 0 0     Results from last 7 days   Lab Units 08/24/22  1327   SODIUM mmol/L 140   POTASSIUM mmol/L 3 7   CHLORIDE mmol/L 109*   CO2 mmol/L 23   BUN mg/dL 10   CREATININE mg/dL 0 94   ANION GAP mmol/L 8   CALCIUM mg/dL 6 9*   ALBUMIN g/dL 1 9*   TOTAL BILIRUBIN mg/dL 0 30   ALK PHOS U/L 92   ALT U/L 22   AST U/L 14   GLUCOSE RANDOM mg/dL 141*     Results from last 7 days   Lab Units 08/19/22  1435   INR  1 11                   Lines/Drains:  Invasive Devices  Report    Peripherally Inserted Central Catheter Line  Duration           PICC Line 08/19/22 6 days                Central Line:  Goal for removal: Will discontinue when meds requiring line are completed               Imaging: Reviewed radiology reports from this admission including: chest CT scan    Recent Cultures (last 7 days): Last 24 Hours Medication List:   Current Facility-Administered Medications   Medication Dose Route Frequency Provider Last Rate    acetaminophen  650 mg Oral Q6H PRN Yenifer Vega MD      albumin human  12 5 g Intravenous Once [facility] KAIA WOODARD      amitriptyline  25 mg Oral HS Yenifer Vega MD      calcium carbonate  500 mg Oral Daily PRN Yenifer Vega MD      cholestyramine sugar free  4 g Oral BID Yenifer Vega MD      iron sucrose  200 mg Intravenous Once per day on Mon Wed Fri Cee Salinas PA-C      loperamide  2 mg Oral Q4H PRN Yenifer Vega MD      mesalamine  800 mg Oral TID Yenifer Vega MD      mirtazapine  7 5 mg Oral HS Yenifer Vega MD      ondansetron  4 mg Intravenous Q6H PRN Yenifer Vega MD      pantoprazole  40 mg Oral BID AC BROOKE Keating      pravastatin  20 mg Oral Daily With Blanka Sutton MD      Bianca Lay ON 8/26/2022] predniSONE  40 mg Oral Daily [facility] KAIA WOODARD      rivaroxaban  15 mg Oral BID With Meals Tremaine Hurtado PA-C          Today, Patient Was Seen By: Candace Harris PA-C    **Please Note: This note may have been constructed using a voice recognition system  **

## 2022-08-25 NOTE — ASSESSMENT & PLAN NOTE
· History of chronic anemia, baseline 8-10  · Underwent flex sig yesterday which showed inflammation, ulcerations  · Also with BRBPR since starting heparin gtt  · Trend H/H - morning labs pending however hemoglobin has been stable in the mid 7s and bloody stools appear to be decreasing  · Prior provider discussed with GI - recommending IV venofer

## 2022-08-26 LAB
ANISOCYTOSIS BLD QL SMEAR: PRESENT
ATRIAL RATE: 121 BPM
ATRIAL RATE: 123 BPM
BASOPHILS # BLD MANUAL: 0 THOUSAND/UL (ref 0–0.1)
BASOPHILS NFR MAR MANUAL: 0 % (ref 0–1)
EOSINOPHIL # BLD MANUAL: 0.21 THOUSAND/UL (ref 0–0.4)
EOSINOPHIL NFR BLD MANUAL: 4 % (ref 0–6)
ERYTHROCYTE [DISTWIDTH] IN BLOOD BY AUTOMATED COUNT: 21.7 % (ref 11.6–15.1)
HCT VFR BLD AUTO: 22 % (ref 34.8–46.1)
HGB BLD-MCNC: 7 G/DL (ref 11.5–15.4)
LYMPHOCYTES # BLD AUTO: 0.96 THOUSAND/UL (ref 0.6–4.47)
LYMPHOCYTES # BLD AUTO: 18 % (ref 14–44)
MCH RBC QN AUTO: 31.5 PG (ref 26.8–34.3)
MCHC RBC AUTO-ENTMCNC: 31.8 G/DL (ref 31.4–37.4)
MCV RBC AUTO: 99 FL (ref 82–98)
MONOCYTES # BLD AUTO: 0.53 THOUSAND/UL (ref 0–1.22)
MONOCYTES NFR BLD: 10 % (ref 4–12)
NEUTROPHILS # BLD MANUAL: 3.62 THOUSAND/UL (ref 1.85–7.62)
NEUTS BAND NFR BLD MANUAL: 8 % (ref 0–8)
NEUTS SEG NFR BLD AUTO: 60 % (ref 43–75)
NRBC BLD AUTO-RTO: 3 /100 WBC (ref 0–2)
OVALOCYTES BLD QL SMEAR: PRESENT
P AXIS: 56 DEGREES
P AXIS: 59 DEGREES
PLATELET # BLD AUTO: 209 THOUSANDS/UL (ref 149–390)
PLATELET BLD QL SMEAR: ADEQUATE
PMV BLD AUTO: 9.6 FL (ref 8.9–12.7)
POLYCHROMASIA BLD QL SMEAR: PRESENT
PR INTERVAL: 154 MS
PR INTERVAL: 166 MS
QRS AXIS: 25 DEGREES
QRS AXIS: 25 DEGREES
QRSD INTERVAL: 68 MS
QRSD INTERVAL: 70 MS
QT INTERVAL: 300 MS
QT INTERVAL: 304 MS
QTC INTERVAL: 426 MS
QTC INTERVAL: 435 MS
RBC # BLD AUTO: 2.22 MILLION/UL (ref 3.81–5.12)
T WAVE AXIS: 52 DEGREES
T WAVE AXIS: 53 DEGREES
VENTRICULAR RATE: 121 BPM
VENTRICULAR RATE: 123 BPM
WBC # BLD AUTO: 5.32 THOUSAND/UL (ref 4.31–10.16)

## 2022-08-26 PROCEDURE — 93005 ELECTROCARDIOGRAM TRACING: CPT

## 2022-08-26 PROCEDURE — 85007 BL SMEAR W/DIFF WBC COUNT: CPT | Performed by: NURSE PRACTITIONER

## 2022-08-26 PROCEDURE — 85027 COMPLETE CBC AUTOMATED: CPT | Performed by: NURSE PRACTITIONER

## 2022-08-26 PROCEDURE — 93010 ELECTROCARDIOGRAM REPORT: CPT | Performed by: INTERNAL MEDICINE

## 2022-08-26 PROCEDURE — 99232 SBSQ HOSP IP/OBS MODERATE 35: CPT | Performed by: INTERNAL MEDICINE

## 2022-08-26 PROCEDURE — 99232 SBSQ HOSP IP/OBS MODERATE 35: CPT | Performed by: PHYSICIAN ASSISTANT

## 2022-08-26 RX ORDER — PANTOPRAZOLE SODIUM 40 MG/1
40 TABLET, DELAYED RELEASE ORAL
Qty: 30 TABLET | Refills: 0 | Status: SHIPPED | OUTPATIENT
Start: 2022-08-26

## 2022-08-26 RX ORDER — MESALAMINE 400 MG/1
800 CAPSULE, DELAYED RELEASE ORAL 3 TIMES DAILY
Qty: 90 CAPSULE | Refills: 0 | Status: SHIPPED | OUTPATIENT
Start: 2022-08-26 | End: 2022-09-06

## 2022-08-26 RX ORDER — SODIUM CHLORIDE, SODIUM GLUCONATE, SODIUM ACETATE, POTASSIUM CHLORIDE, MAGNESIUM CHLORIDE, SODIUM PHOSPHATE, DIBASIC, AND POTASSIUM PHOSPHATE .53; .5; .37; .037; .03; .012; .00082 G/100ML; G/100ML; G/100ML; G/100ML; G/100ML; G/100ML; G/100ML
100 INJECTION, SOLUTION INTRAVENOUS CONTINUOUS
Status: DISCONTINUED | OUTPATIENT
Start: 2022-08-26 | End: 2022-08-28

## 2022-08-26 RX ORDER — PREDNISONE 20 MG/1
40 TABLET ORAL DAILY
Qty: 60 TABLET | Refills: 0 | Status: SHIPPED | OUTPATIENT
Start: 2022-08-27 | End: 2022-10-20

## 2022-08-26 RX ORDER — SIMETHICONE 80 MG
80 TABLET,CHEWABLE ORAL
Status: DISCONTINUED | OUTPATIENT
Start: 2022-08-26 | End: 2022-09-06 | Stop reason: HOSPADM

## 2022-08-26 RX ORDER — SIMETHICONE 80 MG
80 TABLET,CHEWABLE ORAL
Qty: 30 TABLET | Refills: 0 | Status: SHIPPED | OUTPATIENT
Start: 2022-08-26 | End: 2022-10-20

## 2022-08-26 RX ORDER — CHOLESTYRAMINE LIGHT 4 G/5.7G
4 POWDER, FOR SUSPENSION ORAL 2 TIMES DAILY
Qty: 60 PACKET | Refills: 0 | Status: SHIPPED | OUTPATIENT
Start: 2022-08-26 | End: 2022-09-06

## 2022-08-26 RX ADMIN — CALCIUM CARBONATE (ANTACID) CHEW TAB 500 MG 500 MG: 500 CHEW TAB at 22:02

## 2022-08-26 RX ADMIN — SODIUM CHLORIDE 200 MG: 9 INJECTION, SOLUTION INTRAVENOUS at 09:11

## 2022-08-26 RX ADMIN — MESALAMINE 800 MG: 400 CAPSULE, DELAYED RELEASE ORAL at 17:41

## 2022-08-26 RX ADMIN — ACETAMINOPHEN 325MG 650 MG: 325 TABLET ORAL at 22:03

## 2022-08-26 RX ADMIN — LOPERAMIDE HYDROCHLORIDE 2 MG: 2 CAPSULE ORAL at 18:23

## 2022-08-26 RX ADMIN — SIMETHICONE 80 MG: 80 TABLET, CHEWABLE ORAL at 21:17

## 2022-08-26 RX ADMIN — MIRTAZAPINE 7.5 MG: 15 TABLET, FILM COATED ORAL at 21:17

## 2022-08-26 RX ADMIN — LOPERAMIDE HYDROCHLORIDE 2 MG: 2 CAPSULE ORAL at 10:05

## 2022-08-26 RX ADMIN — SODIUM CHLORIDE, SODIUM GLUCONATE, SODIUM ACETATE, POTASSIUM CHLORIDE, MAGNESIUM CHLORIDE, SODIUM PHOSPHATE, DIBASIC, AND POTASSIUM PHOSPHATE 100 ML/HR: .53; .5; .37; .037; .03; .012; .00082 INJECTION, SOLUTION INTRAVENOUS at 15:12

## 2022-08-26 RX ADMIN — CHOLESTYRAMINE 4 G: 4 POWDER, FOR SUSPENSION ORAL at 09:01

## 2022-08-26 RX ADMIN — PANTOPRAZOLE SODIUM 40 MG: 40 TABLET, DELAYED RELEASE ORAL at 17:40

## 2022-08-26 RX ADMIN — RIVAROXABAN 15 MG: 15 TABLET, FILM COATED ORAL at 09:01

## 2022-08-26 RX ADMIN — CHOLESTYRAMINE 4 G: 4 POWDER, FOR SUSPENSION ORAL at 17:40

## 2022-08-26 RX ADMIN — MESALAMINE 800 MG: 400 CAPSULE, DELAYED RELEASE ORAL at 21:17

## 2022-08-26 RX ADMIN — SIMETHICONE 80 MG: 80 TABLET, CHEWABLE ORAL at 17:41

## 2022-08-26 RX ADMIN — RIVAROXABAN 15 MG: 15 TABLET, FILM COATED ORAL at 17:41

## 2022-08-26 RX ADMIN — AMITRIPTYLINE HYDROCHLORIDE 25 MG: 25 TABLET, FILM COATED ORAL at 21:17

## 2022-08-26 RX ADMIN — SIMETHICONE 80 MG: 80 TABLET, CHEWABLE ORAL at 10:37

## 2022-08-26 RX ADMIN — MESALAMINE 800 MG: 400 CAPSULE, DELAYED RELEASE ORAL at 09:01

## 2022-08-26 RX ADMIN — PRAVASTATIN SODIUM 20 MG: 20 TABLET ORAL at 17:41

## 2022-08-26 RX ADMIN — PREDNISONE 40 MG: 20 TABLET ORAL at 09:01

## 2022-08-26 RX ADMIN — PANTOPRAZOLE SODIUM 40 MG: 40 TABLET, DELAYED RELEASE ORAL at 06:44

## 2022-08-26 NOTE — ASSESSMENT & PLAN NOTE
· History of chronic anemia, baseline 8-10  · Underwent flex sig which showed inflammation, ulcerations  · Also with BRBPR since starting heparin gtt  · Trend H/H -hemoglobin has been stable in the mid 7s and bloody stools appear to be decreasing  · Prior provider discussed with GI - recommending IV venofer

## 2022-08-26 NOTE — PROGRESS NOTES
Progress note - Gastroenterology   Norma Silverman 79 y o  female MRN: [id number]  Unit/Bed#: -01 Encounter: [id number]    ASSESSMENT and PLAN    1  Acute colitis  New onset inflammatory bowel disease?  Related to or induced by cancer treatment/immunotherapy,   Atypical infectious colitis? Patient continues to have bloody loose bowel movements  Per patient's nurse approximately 5 or more loose bowels, red or blood-tinged per day  Patient is currently taking cholestyramine 4 g twice daily  Prescribed Imodium 2 mg every 4 hours as needed  Discussed with patient's nurse to offer as long as she is having diarrhea  On exam, patient continues have moderate generalized abdominal discomfort with palpation      Biopsy from EGD 8/20; active colitis, no viral inclusions identified on cm the immunohistochemistry stain, negative for dysplasia     - prednisone 40 mg p o  To continue if discharged as an outpatient until followed up in the office   - continue mesalamine 800 mg p o  3 times a day  - continue amitriptyline 25 mg p o  Daily HS  - offer Imodium 2 mg every 4 hours as needed as long as patient has liquid bowel movements  Discussed with patient office will contact her for follow-up outpatient office visit  Possibly be referred to IBD specialist      No GI barriers for discharge       2  Acute blood loss anemia  Secondary to colitis and exacerbated by anticoagulation for DVT/PE  Hemoglobin 7 0 with a m  Labs     - follow H&H and transfuse as needed  - continue anticoagulation for now  - continue PPI  - IV Venofer      3  DVT and pulmonary embolism   Xarelto 15 mg p o  Twice daily  On IV heparin     4  Metastatic breast cancer  Followed by Dr Cristina Diallo    Chief Complaint   Patient presents with    Dizziness     Via ems from home for increased dizziness and multiple episodes diarrhea since the beginning of August  States worse since last night   States is getting new chemo medication       SUBJECTIVE/HPI Patient states she still continues to just not feel herself  Feeling drained and increased weakness  States just getting out of bed to the bedside commode is exhausting  Discussed with patient that if discharged, office will be calling her to set up all a palpation appointment and possible follow-up with IBD specialists  Continues to have moderate abdominal discomfort increased with palpation  Continues to have multiple liquid brown/red stools      /74   Pulse (!) 116   Temp 97 7 °F (36 5 °C)   Resp 18   Ht 5' 1" (1 549 m)   Wt 65 3 kg (144 lb)   LMP  (LMP Unknown)   SpO2 97%   BMI 27 21 kg/m²     PHYSICALEXAM  General appearance: alert, appears stated age and cooperative  Eyes: PERLLA, EOMI, no icterus   Head: Normocephalic, without obvious abnormality, atraumatic  Lungs: clear to auscultation bilaterally  Heart: regular rate and rhythm, S1, S2 normal, no murmur, click, rub or gallop  Abdomen: soft, generalized abdominal discomfort increased with palpation; bowel sounds normal; no masses,  no organomegaly  Extremities: extremities normal, atraumatic, no cyanosis or edema  Neurologic: Grossly normal    Lab Results   Component Value Date    GLUCOSE 115 09/08/2015    CALCIUM 6 9 (L) 08/24/2022     09/08/2015    K 3 7 08/24/2022    CO2 23 08/24/2022     (H) 08/24/2022    BUN 10 08/24/2022    CREATININE 0 94 08/24/2022     Lab Results   Component Value Date    WBC 5 32 08/26/2022    HGB 7 0 (L) 08/26/2022    HCT 22 0 (L) 08/26/2022    MCV 99 (H) 08/26/2022     08/26/2022     Lab Results   Component Value Date    ALT 22 08/24/2022    AST 14 08/24/2022    ALKPHOS 92 08/24/2022    BILITOT 0 66 09/08/2015     No results found for: AMYLASE  Lab Results   Component Value Date    LIPASE 79 08/15/2022     Lab Results   Component Value Date    IRON 30 (L) 08/17/2022    TIBC 161 (L) 08/17/2022    FERRITIN 328 08/17/2022     Lab Results   Component Value Date    INR 1 11 08/19/2022

## 2022-08-26 NOTE — ASSESSMENT & PLAN NOTE
· Patient presented with dizziness and diarrhea starting in August that worsened yesterday  · Most likely secondary to IBD  · CT showed: 1  Diffuse mild-moderate circumferential wall thickening of the colon with pericolic inflammatory stranding consistent with nonspecific pancolitis  2   Multiple stable small scattered sclerotic foci in the visualized lumbar lower thoracic spine osseous pelvis which may reflect osseous metastatic disease in this patient with known metastatic breast cancer  3   Additional stable findings as noted   · GI consulted-> recs appreciated  · IVF  · Discontinue Zosyn as symptoms are most likely secondary to ulcerative colitis  · Steroids initiated 8/17  · F/u bld cx and procal, ESR, CRP, and lipase, C Diff  · ESR:  48  · Procalcitonin:  1 49  · CRP:  206 3  · C  Diff: negative  · Stool enteric panel: negative  · Advanced to low-fiber   · Cholestyramine  · Oncology consulted by GI for further recommendations regarding current cancer regimen playing a role in symptoms  Previously believed due to verzenio which has been on hold since recent admit, doubt this is contributing   · Flex sig completed 8/20 concerning for IBD, biopsies taken  Also noted ulcerations  · Biopsy reveals colitis  · IV steroids now to be transitioned back to oral prednisone  · Patient still with frequent loose stools    Appreciate ongoing Gastroenterology recommendations

## 2022-08-26 NOTE — ASSESSMENT & PLAN NOTE
· Continue to follow-up with Hematology-Oncology outpatient  · Femara had been held on admit  · On recent admission patient's verzenio was held  Reviewed office visit from 8/10/22 appears there was some consideration to resume but at lowered dose  Patient reports she never resumed this medication    · Patient will require follow-up to discuss possible lumbar mets noted on CT this admission

## 2022-08-26 NOTE — OCCUPATIONAL THERAPY NOTE
Occupational Therapy Screen Note     Patient Name: Henry Silverman  Today's Date: 8/26/2022  Problem List  Principal Problem:    Toxic gastroenteritis and colitis  Active Problems:    Metastatic breast cancer ([facility] [address] )    Mixed hyperlipidemia    Chronic kidney disease (CKD) stage G3a/A1, moderately decreased glomerular filtration rate (GFR) between 45-59 mL/min/1 73 square meter and albuminuria creatinine ratio less than 30 mg/g (HCC)    Primary hypertension    Anemia    Severe protein-calorie malnutrition (HCC)    Single subsegmental pulmonary embolism without acute [facility] pul[facility]ale ([facility] )            08/26/22 1527   OT Last Visit   OT Visit Date 08/26/22   Note Type   Note type Screen   Additional Comments OT screened pt on 8/19 as pt was functioning at baseline;  OT re-consulted this date  Pt reported to PT that "I will be fine at home " Pt denies need for skilled therapy  Per nursing documentation pt continues to be independent in the room  Pt with no acute OT needs, will D/C OT orders       Amelia Burdick OTR/L

## 2022-08-26 NOTE — DISCHARGE SUMMARY
[place]  Discharge- Maynard Sharon Silverman 1940, 79 y o  female MRN: [id number]  Unit/Bed#: -01 Encounter: [id number]  Primary Care Provider: Juan Sutton DO   Date and time admitted to hospital: 8/15/2022 10:40 AM    No new Assessment & Plan notes have been filed under this hospital service since the last note was generated  Service: Hospitalist    Medical Problems             Resolved Problems  Date Reviewed: 8/26/2022          Resolved    Hypotension due to hypovolemia 8/20/2022     Resolved by  Petar Alva PA-C              Discharging Physician / Practitioner: Isidro Pacheco PA-C  PCP: Juan Sutton DO  Admission Date:   Admission Orders (From admission, onward)     Ordered        08/15/22 1351  INPATIENT ADMISSION  Once                      Discharge Date: 08/26/22    Consultations During Hospital Stay:  · Gastroenterology  · Oncology    Procedures Performed:   · Echo 8/19:    Left Ventricle: Wall thickness is not well visualized  The left ventricular ejection fraction is 75% by visual estimation  Systolic function is hyperdynamic  Wall motion cannot be accurately assessed    Right Ventricle: Systolic function is hyperdynamic  · Flexible sigmoidoscopy 8/20: Severe colitis into mid sigmoid colon with deep ulcerations  Inflammatory bowel disease? Infectious (CMV)? · LE venous duplex:  RIGHT LOWER LIMB:  Evaluation shows acute occlusive thrombus in the popliteal vein and  non-occlusive thrombus in the peroneal veins  No evidence of superficial thrombophlebitis noted  Doppler evaluation shows a normal response to augmentation maneuvers  Popliteal, posterior tibial and anterior tibial arterial Doppler waveforms are  biphasic  LEFT LOWER LIMB:  No evidence of acute or chronic deep vein thrombosis  No evidence of superficial thrombophlebitis noted  Doppler evaluation shows a normal response to augmentation maneuvers    Popliteal, posterior tibial and anterior tibial arterial Doppler waveforms are  Biphasic  Significant Findings / Test Results:   · CT abdomen pelvis 8/15:   · Diffuse mild-moderate circumferential wall thickening of the colon with pericolic inflammatory stranding consistent with nonspecific pancolitis  · Multiple stable small scattered sclerotic foci in the visualized lumbar lower thoracic spine osseous pelvis which may reflect osseous metastatic disease in this patient with known metastatic breast cancer  · Additional stable findings as noted  Incidental Findings:   · None     Test Results Pending at Discharge (will require follow up): · None     Outpatient Tests Requested:  · None    Complications:  None    Reason for Admission: Pancolitis    Hospital Course:   Ade Sweet is a 79 y o  female patient with past medical history of breast cancer, CKD, hypertension, hyperlipidemia who originally presented to the hospital on 8/15/2022 due to diarrhea for multiple weeks associated with vertigo and lightheadedness  She had discontinued her Verzenio rates prior to admission  She has never been on chemotherapy agents  She is on letrozole which does not cause diarrhea  Colitis was noted on admission imaging  Stool studies and C diff resulted as negative  Mesalamine was resumed  GI continued to follow patient throughout her admission  Flexible sigmoidoscopy performed with results as above  Patient treated with steroids  GI anticipating close outpatient follow-up within the next 2 weeks  Biopsy taken during flexible sigmoidoscopy revealed no CMV, did reveal acute colitis  During admission patient was noted to have lower extremity edema/swelling  Venous duplex was obtained and revealed occlusive thrombus  She was started on heparin drip 8/19  Causation was likely multifactorial due to metastatic cancer, sedentary due to recent illness  She was started on heparin drip with some GI bleeding noted    Hemoglobin did ultimately stabilized  Patient to be discharged on Xarelto  Patient displayed some tachycardia related to PE in steroids as well as volume loss  Volume repleted in rates improved ***   Patient hemodynamically stable and appropriate for outpatient follow-up  Continue symptom management with mesalamine, Lomotil and Imodium  Continue prednisone at least until outpatient follow-up with GI  Please see above list of diagnoses and related plan for additional information  Condition at Discharge: stable    Discharge Day Visit / Exam:   Subjective:  Patient reports difficult night of sleep, having gas pains throughout the night  Discussed adding Gas-X and being compliant with Imodium to help alleviate symptoms  Vitals: Blood Pressure: 154/74 (08/26/22 0722)  Pulse: (!) 116 (08/26/22 0722)  Temperature: 97 7 °F (36 5 °C) (08/26/22 0722)  Temp Source: Oral (08/24/22 2040)  Respirations: 18 (08/26/22 0722)  Height: 5' 1" (154 9 cm) (08/19/22 1220)  Weight - Scale: 65 3 kg (144 lb) (08/19/22 1220)  SpO2: 97 % (08/26/22 0722)  Exam:   Physical Exam  Vitals and nursing note reviewed  Constitutional:       General: She is not in acute distress  Appearance: Normal appearance  She is well-developed  HENT:      Head: Normocephalic and atraumatic  Eyes:      General: No scleral icterus  Conjunctiva/sclera: Conjunctivae normal    Cardiovascular:      Rate and Rhythm: Normal rate and regular rhythm  Heart sounds: No murmur heard  Pulmonary:      Effort: Pulmonary effort is normal       Breath sounds: No wheezing, rhonchi or rales  Abdominal:      General: There is no distension  Palpations: Abdomen is soft  Musculoskeletal:      Right lower leg: Edema present  Left lower leg: Edema present  Skin:     General: Skin is warm and dry  Neurological:      General: No focal deficit present  Mental Status: She is alert     Psychiatric:         Mood and Affect: Mood normal         Discussion with Family: Patient declined call to   Discharge instructions/Information to patient and family:   See after visit summary for information provided to patient and family  Provisions for Follow-Up Care:  See after visit summary for information related to follow-up care and any pertinent home health orders  Disposition:   Home    Planned Readmission: None     Discharge Statement:  I spent 90 minutes discharging the patient  This time was spent on the day of discharge  I had direct contact with the patient on the day of discharge  Greater than 50% of the total time was spent examining patient, answering all patient questions, arranging and discussing plan of care with patient as well as directly providing post-discharge instructions  Additional time then spent on discharge activities  Discharge Medications:  See after visit summary for reconciled discharge medications provided to patient and/or family        **Please Note: This note may have been constructed using a voice recognition system**    ***change date of service

## 2022-08-26 NOTE — DISCHARGE SUMMARY
[place]  Discharge- Brendan Silverman 1940, 79 y o  female MRN: [id number]  Unit/Bed#: -[place] Encounter: [id number]  Primary Care Provider: Efrain Parham DO   Date and time admitted to hospital: 8/15/2022 10:40 AM    * Toxic gastroenteritis and colitis  Assessment & Plan  · Patient presented with dizziness and diarrhea starting in August that worsened yesterday  · Most likely secondary to IBD  · CT showed: 1  Diffuse mild-moderate circumferential wall thickening of the colon with pericolic inflammatory stranding consistent with nonspecific pancolitis  2   Multiple stable small scattered sclerotic foci in the visualized lumbar lower thoracic spine osseous pelvis which may reflect osseous metastatic disease in this patient with known metastatic breast cancer  3   Additional stable findings as noted   · GI consulted-> recs appreciated  · IVF  · Discontinue Zosyn as symptoms are most likely secondary to ulcerative colitis  · Steroids initiated 8/17  · F/u bld cx and procal, ESR, CRP, and lipase, C Diff  · ESR:  48  · Procalcitonin:  1 49  · CRP:  206 3  · C  Diff: negative  · Stool enteric panel: negative  · Advanced to low-fiber   · Cholestyramine  · Oncology consulted by GI for further recommendations regarding current cancer regimen playing a role in symptoms  Previously believed due to verzenio which has been on hold since recent admit, doubt this is contributing   · Flex sig completed 8/20 concerning for IBD, biopsies taken  Also noted ulcerations  · Biopsy reveals colitis  · IV steroids now to be transitioned back to oral prednisone  · Patient still with frequent loose stools    Appreciate ongoing Gastroenterology recommendations    Single subsegmental pulmonary embolism without acute cor pulmonale (HCC)  Assessment & Plan  · Venous duplex obtained due to LE edema  · Confirmed acute occlusive thrombus in popliteal vein and non-occlusive thrombus in the peroneal veins  · Started on IV heparin gtt 8/19  · Persistent tachycardia, CTA PE study obtained confirms R lobe subsegmental PE no evidence of R heart strain per radiologist  · Suspect in setting of known metastatic cancer and patient has been more sedentary in recent weeks due to acute illness  · Transition to [facility] 8/25    Severe protein-calorie malnutrition ([address] )  Assessment & Plan  Malnutrition Findings:   Adult Malnutrition type: Chronic illness  Adult Degree of Malnutrition: Other severe protein calorie malnutrition  Malnutrition Characteristics: Inadequate energy, Weight loss                  360 Statement: Pt presents with chronic severe protein calorie malnutrition due to Stage IV metastatic breast cancer, chronic diarrhea vs UC flare as evidenced by 10 5 lb wt loss-14% wt loss in 4 months and <75 % po intake > 1 month  BMI Findings: Body mass index is 27 21 kg/m²         Anemia  Assessment & Plan  · History of chronic anemia, baseline 8-10  · Underwent flex sig which showed inflammation, ulcerations  · Also with BRBPR since starting heparin gtt  · Trend H/H -hemoglobin has been stable in the mid 7s and bloody stools appear to be decreasing  · Prior provider discussed with GI - recommending IV venofer    Primary hypertension  Assessment & Plan  · Blood pressure stable off cozaar    Chronic kidney disease (CKD) stage G3a/A1, moderately decreased glomerular filtration rate (GFR) between 45-59 mL/min/1 73 square meter and albuminuria creatinine ratio less than 30 mg/g [facility])  Assessment & Plan  Lab Results   Component Value Date    EGFR 56 08/24/2022    EGFR 63 08/23/2022    EGFR 62 08/23/2022    CREATININE 0 94 08/24/2022    CREATININE 0 86 08/23/2022    CREATININE 0 87 08/23/2022     · Cr on admission slightly elevated from her baseline at 1 3, most likely due to volume depletion  · Baseline 1 1-1 2  · I's and O's  · IV fluids  · Avoid nephrotoxic agents  · Discontinued IVF  · Encourage oral intake    Mixed hyperlipidemia  Assessment & Plan  · Continue statin    Metastatic breast cancer [facility])  Assessment & Plan  · Continue to follow-up with Hematology-Oncology outpatient  · Femara had been held on admit  · On recent admission patient's verzenio was held  Reviewed office visit from 8/10/22 appears there was some consideration to resume but at lowered dose  Patient reports she never resumed this medication  · Patient will require follow-up to discuss possible lumbar mets noted on CT this admission    Medical Problems             Resolved Problems  Date Reviewed: 8/26/2022          Resolved    Hypotension due to hypovolemia 8/20/2022     Resolved by  Charis Johns PA-C              Discharging Physician / Practitioner: Radha Sagastume PA-C  PCP: Chandu Mcdonald DO  Admission Date:   Admission Orders (From admission, onward)     Ordered        08/15/22 1351  INPATIENT ADMISSION  Once                      Discharge Date: 08/26/22    Consultations During Hospital Stay:  · Gastroenterology  · Oncology    Procedures Performed:   · Echo 8/19:    Left Ventricle: Wall thickness is not well visualized  The left ventricular ejection fraction is 75% by visual estimation  Systolic function is hyperdynamic  Wall motion cannot be accurately assessed    Right Ventricle: Systolic function is hyperdynamic  · Flexible sigmoidoscopy 8/20: Severe colitis into mid sigmoid colon with deep ulcerations  Inflammatory bowel disease? Infectious (CMV)? · LE venous duplex:  RIGHT LOWER LIMB:  Evaluation shows acute occlusive thrombus in the popliteal vein and  non-occlusive thrombus in the peroneal veins  No evidence of superficial thrombophlebitis noted  Doppler evaluation shows a normal response to augmentation maneuvers  Popliteal, posterior tibial and anterior tibial arterial Doppler waveforms are  biphasic  LEFT LOWER LIMB:  No evidence of acute or chronic deep vein thrombosis    No evidence of superficial thrombophlebitis noted  Doppler evaluation shows a normal response to augmentation maneuvers  Popliteal, posterior tibial and anterior tibial arterial Doppler waveforms are  Biphasic  Significant Findings / Test Results:   · CT abdomen pelvis 8/15:   · Diffuse mild-moderate circumferential wall thickening of the colon with pericolic inflammatory stranding consistent with nonspecific pancolitis  · Multiple stable small scattered sclerotic foci in the visualized lumbar lower thoracic spine osseous pelvis which may reflect osseous metastatic disease in this patient with known metastatic breast cancer  · Additional stable findings as noted  Incidental Findings:   · None     Test Results Pending at Discharge (will require follow up): · None     Outpatient Tests Requested:  · None    Complications:  None    Reason for Admission: Pancolitis    Hospital Course:   Vashti Navarro is a 79 y o  female patient with past medical history of breast cancer, CKD, hypertension, hyperlipidemia who originally presented to the hospital on 8/15/2022 due to diarrhea for multiple weeks associated with vertigo and lightheadedness  She had discontinued her Verzenio rates prior to admission  She has never been on chemotherapy agents  She is on letrozole which does not cause diarrhea  Colitis was noted on admission imaging  Stool studies and C diff resulted as negative  Mesalamine was resumed  GI continued to follow patient throughout her admission  Flexible sigmoidoscopy performed with results as above  Patient treated with steroids  GI anticipating close outpatient follow-up within the next 2 weeks  Biopsy taken during flexible sigmoidoscopy revealed no CMV, did reveal acute colitis  During admission patient was noted to have lower extremity edema/swelling  Venous duplex was obtained and revealed occlusive thrombus  She was started on heparin drip 8/19    Causation was likely multifactorial due to metastatic cancer, sedentary due to recent illness  She was started on heparin drip with some GI bleeding noted  Hemoglobin did ultimately stabilized  Patient to be discharged on Xarelto  Patient hemodynamically stable and appropriate for outpatient follow-up  Continue symptom management with mesalamine, Lomotil and Imodium  Continue prednisone at least until outpatient follow-up with GI  Please see above list of diagnoses and related plan for additional information  Condition at Discharge: stable    Discharge Day Visit / Exam:   Subjective:  Patient reports difficult night of sleep, having gas pains throughout the night  Discussed adding Gas-X and being compliant with Imodium to help alleviate symptoms  Vitals: Blood Pressure: 154/74 (08/26/22 0722)  Pulse: (!) 116 (08/26/22 0722)  Temperature: 97 7 °F (36 5 °C) (08/26/22 0722)  Temp Source: Oral (08/24/22 2040)  Respirations: 18 (08/26/22 0722)  Height: 5' 1" (154 9 cm) (08/19/22 1220)  Weight - Scale: 65 3 kg (144 lb) (08/19/22 1220)  SpO2: 97 % (08/26/22 0722)  Exam:   Physical Exam  Vitals and nursing note reviewed  Constitutional:       General: She is not in acute distress  Appearance: Normal appearance  She is well-developed  HENT:      Head: Normocephalic and atraumatic  Eyes:      General: No scleral icterus  Conjunctiva/sclera: Conjunctivae normal    Cardiovascular:      Rate and Rhythm: Normal rate and regular rhythm  Heart sounds: No murmur heard  Pulmonary:      Effort: Pulmonary effort is normal       Breath sounds: No wheezing, rhonchi or rales  Abdominal:      General: There is no distension  Palpations: Abdomen is soft  Musculoskeletal:      Right lower leg: Edema present  Left lower leg: Edema present  Skin:     General: Skin is warm and dry  Neurological:      General: No focal deficit present  Mental Status: She is alert     Psychiatric:         Mood and Affect: Mood normal  Discussion with Family: Patient declined call to   Discharge instructions/Information to patient and family:   See after visit summary for information provided to patient and family  Provisions for Follow-Up Care:  See after visit summary for information related to follow-up care and any pertinent home health orders  Disposition:   Home    Planned Readmission: None     Discharge Statement:  I spent 90 minutes discharging the patient  This time was spent on the day of discharge  I had direct contact with the patient on the day of discharge  Greater than 50% of the total time was spent examining patient, answering all patient questions, arranging and discussing plan of care with patient as well as directly providing post-discharge instructions  Additional time then spent on discharge activities  Discharge Medications:  See after visit summary for reconciled discharge medications provided to patient and/or family        **Please Note: This note may have been constructed using a voice recognition system**

## 2022-08-26 NOTE — ASSESSMENT & PLAN NOTE
· Venous duplex obtained due to LE edema  · Confirmed acute occlusive thrombus in popliteal vein and non-occlusive thrombus in the peroneal veins  · Started on IV heparin gtt 8/19  · Persistent tachycardia, CTA PE study obtained confirms R lobe subsegmental PE no evidence of R heart strain per radiologist  · Suspect in setting of known metastatic cancer and patient has been more sedentary in recent weeks due to acute illness  · Transition to [address] [place] [address] 8/25

## 2022-08-26 NOTE — PROGRESS NOTES
Progress note for today entered as discharge summary  Patient became more tachycardic, complained of weakness  This is likely related to volume depletion, steroid use and pulmonary embolism  Patient declined PT/OT evaluation  Will administer IV fluids overnight and reassess in the morning

## 2022-08-26 NOTE — PHYSICAL THERAPY NOTE
Physical Therapy Screen    Patient Name: Stephannie Halsted Clunk    [id number]'[id number] Date: 8/26/2022     Problem List  Principal Problem:    Toxic gastroenteritis and colitis  Active Problems:    Metastatic breast cancer ([facility] )    Mixed hyperlipidemia    Chronic kidney disease (CKD) stage G3a/A1, moderately decreased glomerular filtration rate (GFR) between 45-59 mL/min/1 73 square meter and albuminuria creatinine ratio less than 30 mg/g (HCC)    Primary hypertension    Anemia    Severe protein-calorie malnutrition ([facility] )    Single subsegmental pulmonary embolism without acute cor pulmonale ([facility] )       Past Medical History  Past Medical History:   Diagnosis Date    Abnormal weight loss     Allergic reaction     Anxiety     Breast cancer, right ([place] [address] ) 2011    right    Cancer ([place] [address] ) 2012    Candidal vulvovaginitis     Clotting disorder ([place] [address] ) Same as above    Endometrial hyperplasia     Epithelial cyst     benign, ovary    GI (gastrointestinal bleed) Blood mixed with stool    History of radiation therapy 2011    right breast cancer    Hyperlipidemia     Hypertension     Internal hemorrhoids     Knee tendonitis     Ovarian cyst     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[facility])         Past Surgical History  Past Surgical History:   Procedure Laterality Date    BILATERAL SALPINGOOPHORECTOMY      onset: 7/23/13    BREAST BIOPSY Right 06/13/2006    benign    BREAST BIOPSY Right 03/02/2011    malignant    BREAST LUMPECTOMY Right 03/30/2011    malignant    CATARACT EXTRACTION W/  INTRAOCULAR LENS IMPLANT Right     phacoemulsification   Onset: 10/27/14    CHOLECYSTECTOMY      COLONOSCOPY  2017    approx    HYSTERECTOMY  Yes    INTRAOPERATIVE RADIATION THERAPY ([facility])      IR BIOPSY BONE  7/9/2021    IR PICC PLACEMENT SINGLE LUMEN  8/19/2022    SKIN LESION EXCISION Right     breast, single lesion    TONSILLECTOMY AND ADENOIDECTOMY           08/26/22 1030   PT Last Visit   PT Visit Date 08/26/22   Note Type Note type Screen   Additional Comments PT IE completed 8/19 and pt was functioning at baseline;  PT re-consulted this date  PT offered PT services, pt states, "I will be fine at home "  Pt denies need for skilled PT;  re-educated pt on ambulation with nursing staff TID and OOB for all meals  Pt states, "Oh, I didn't know I could do that " Pt was educated on this on PT IE 8/19/22 as per documentation; Will DC PT order;  discussed with PA and CM at interdisciplinary rounds         Yolanda Moon, PT

## 2022-08-27 ENCOUNTER — APPOINTMENT (INPATIENT)
Dept: CT IMAGING | Facility: HOSPITAL | Age: 82
DRG: 393 | End: 2022-08-27
Payer: MEDICARE

## 2022-08-27 LAB
ANISOCYTOSIS BLD QL SMEAR: PRESENT
BASOPHILS # BLD MANUAL: 0 THOUSAND/UL (ref 0–0.1)
BASOPHILS NFR MAR MANUAL: 0 % (ref 0–1)
EOSINOPHIL # BLD MANUAL: 0.12 THOUSAND/UL (ref 0–0.4)
EOSINOPHIL NFR BLD MANUAL: 2 % (ref 0–6)
ERYTHROCYTE [DISTWIDTH] IN BLOOD BY AUTOMATED COUNT: 21.8 % (ref 11.6–15.1)
HCT VFR BLD AUTO: 21.3 % (ref 34.8–46.1)
HGB BLD-MCNC: 7.1 G/DL (ref 11.5–15.4)
LYMPHOCYTES # BLD AUTO: 0.69 THOUSAND/UL (ref 0.6–4.47)
LYMPHOCYTES # BLD AUTO: 12 % (ref 14–44)
MCH RBC QN AUTO: 32.7 PG (ref 26.8–34.3)
MCHC RBC AUTO-ENTMCNC: 33.3 G/DL (ref 31.4–37.4)
MCV RBC AUTO: 98 FL (ref 82–98)
MONOCYTES # BLD AUTO: 0.12 THOUSAND/UL (ref 0–1.22)
MONOCYTES NFR BLD: 2 % (ref 4–12)
NEUTROPHILS # BLD MANUAL: 4.83 THOUSAND/UL (ref 1.85–7.62)
NEUTS BAND NFR BLD MANUAL: 6 % (ref 0–8)
NEUTS SEG NFR BLD AUTO: 78 % (ref 43–75)
NRBC BLD AUTO-RTO: 1 /100 WBC (ref 0–2)
PLATELET # BLD AUTO: 179 THOUSANDS/UL (ref 149–390)
PLATELET BLD QL SMEAR: ADEQUATE
PMV BLD AUTO: 9.9 FL (ref 8.9–12.7)
POLYCHROMASIA BLD QL SMEAR: PRESENT
RBC # BLD AUTO: 2.17 MILLION/UL (ref 3.81–5.12)
WBC # BLD AUTO: 5.75 THOUSAND/UL (ref 4.31–10.16)

## 2022-08-27 PROCEDURE — 99232 SBSQ HOSP IP/OBS MODERATE 35: CPT | Performed by: HOSPITALIST

## 2022-08-27 PROCEDURE — G1004 CDSM NDSC: HCPCS

## 2022-08-27 PROCEDURE — 74177 CT ABD & PELVIS W/CONTRAST: CPT

## 2022-08-27 PROCEDURE — 85027 COMPLETE CBC AUTOMATED: CPT | Performed by: NURSE PRACTITIONER

## 2022-08-27 PROCEDURE — 85007 BL SMEAR W/DIFF WBC COUNT: CPT | Performed by: NURSE PRACTITIONER

## 2022-08-27 RX ADMIN — MESALAMINE 800 MG: 400 CAPSULE, DELAYED RELEASE ORAL at 08:13

## 2022-08-27 RX ADMIN — LOPERAMIDE HYDROCHLORIDE 2 MG: 2 CAPSULE ORAL at 17:39

## 2022-08-27 RX ADMIN — MESALAMINE 800 MG: 400 CAPSULE, DELAYED RELEASE ORAL at 17:39

## 2022-08-27 RX ADMIN — PANTOPRAZOLE SODIUM 40 MG: 40 TABLET, DELAYED RELEASE ORAL at 17:39

## 2022-08-27 RX ADMIN — RIVAROXABAN 15 MG: 15 TABLET, FILM COATED ORAL at 17:39

## 2022-08-27 RX ADMIN — PRAVASTATIN SODIUM 20 MG: 20 TABLET ORAL at 17:39

## 2022-08-27 RX ADMIN — MIRTAZAPINE 7.5 MG: 15 TABLET, FILM COATED ORAL at 21:34

## 2022-08-27 RX ADMIN — PREDNISONE 40 MG: 20 TABLET ORAL at 08:13

## 2022-08-27 RX ADMIN — IOHEXOL 65 ML: 350 INJECTION, SOLUTION INTRAVENOUS at 06:38

## 2022-08-27 RX ADMIN — SIMETHICONE 80 MG: 80 TABLET, CHEWABLE ORAL at 12:48

## 2022-08-27 RX ADMIN — PANTOPRAZOLE SODIUM 40 MG: 40 TABLET, DELAYED RELEASE ORAL at 05:27

## 2022-08-27 RX ADMIN — SIMETHICONE 80 MG: 80 TABLET, CHEWABLE ORAL at 21:35

## 2022-08-27 RX ADMIN — SIMETHICONE 80 MG: 80 TABLET, CHEWABLE ORAL at 17:39

## 2022-08-27 RX ADMIN — SIMETHICONE 80 MG: 80 TABLET, CHEWABLE ORAL at 08:12

## 2022-08-27 RX ADMIN — RIVAROXABAN 15 MG: 15 TABLET, FILM COATED ORAL at 08:13

## 2022-08-27 RX ADMIN — AMITRIPTYLINE HYDROCHLORIDE 25 MG: 25 TABLET, FILM COATED ORAL at 21:34

## 2022-08-27 RX ADMIN — CHOLESTYRAMINE 4 G: 4 POWDER, FOR SUSPENSION ORAL at 08:13

## 2022-08-27 RX ADMIN — CHOLESTYRAMINE 4 G: 4 POWDER, FOR SUSPENSION ORAL at 17:38

## 2022-08-27 RX ADMIN — MESALAMINE 800 MG: 400 CAPSULE, DELAYED RELEASE ORAL at 21:34

## 2022-08-27 NOTE — QUICK NOTE
Noticed that pt was tachycardiac into the 120s  Pt seen and examined at bedside  Reports LLQ abdominal pain that onset last night and is worsening  She is ill-appearing but conversational  Abdomen is soft but with exquisite tenderness in the LLQ with no rebound but has voluntary guarding  Pt noted to have blood liquid stool in bedside commode  Plan - place on telemetry  Obtain CTA A/P to evaluate abdomen as last imaging was 8/15 and pt with new severe tenderness  NPO for now

## 2022-08-27 NOTE — ASSESSMENT & PLAN NOTE
· History of chronic anemia, baseline 8-10  · Underwent flex sig which showed inflammation, ulcerations  · Also with BRBPR since starting heparin gtt  · Trend H/H -hemoglobin has been stable in the mid 7s and bloody stools appear to be decreasing  · Prior provider discussed with GI - recommending IV venofer, cont

## 2022-08-27 NOTE — ASSESSMENT & PLAN NOTE
· Venous duplex obtained due to LE edema  · Confirmed acute occlusive thrombus in popliteal vein and non-occlusive thrombus in the peroneal veins  · Started on IV heparin gtt 8/19  · Persistent tachycardia, CTA PE study obtained confirms R lobe subsegmental PE no evidence of R heart strain per radiologist  · Suspect in setting of known metastatic cancer and patient has been more sedentary in recent weeks due to acute illness  · Transition to [address] [place] [address] 8/25

## 2022-08-27 NOTE — PLAN OF CARE
Problem: MOBILITY - ADULT  Goal: Maintain or return to baseline ADL function  Description: INTERVENTIONS:  -  Assess patient's ability to carry out ADLs; assess patient's baseline for ADL function and identify physical deficits which impact ability to perform ADLs (bathing, care of mouth/teeth, toileting, grooming, dressing, etc )  - Assess/evaluate cause of self-care deficits   - Assess range of motion  - Assess patient's mobility; develop plan if impaired  - Assess patient's need for assistive devices and provide as appropriate  - Encourage maximum independence but intervene and supervise when necessary  - Involve family in performance of ADLs  - Assess for home care needs following discharge   - Consider OT consult to assist with ADL evaluation and planning for discharge  - Provide patient education as appropriate  Outcome: Progressing  Goal: Maintains/Returns to pre admission functional level  Description: INTERVENTIONS:  - Perform BMAT or MOVE assessment daily    - Set and communicate daily mobility goal to care team and patient/family/caregiver  - Collaborate with rehabilitation services on mobility goals if consulted  - Perform Range of Motion 3 times a day  - Reposition patient every 2 hours    - Dangle patient 3 times a day  - Stand patient 3 times a day  - Ambulate patient 3 times a day  - Out of bed to chair 3 times a day   - Out of bed for meals 3 times a day  - Out of bed for toileting  - Record patient progress and toleration of activity level   Outcome: Progressing     Problem: Potential for Falls  Goal: Patient will remain free of falls  Description: INTERVENTIONS:  - Educate patient/family on patient safety including physical limitations  - Instruct patient to call for assistance with activity   - Consult OT/PT to assist with strengthening/mobility   - Keep Call bell within reach  - Keep bed low and locked with side rails adjusted as appropriate  - Keep care items and personal belongings within reach  - Initiate and maintain comfort rounds  - Make Fall Risk Sign visible to staff  - Offer Toileting every 2 Hours, in advance of need  - Initiate/Maintain bed alarm  - Obtain necessary fall risk management equipment:   - Apply yellow socks and bracelet for high fall risk patients  - Consider moving patient to room near nurses station  Outcome: Progressing     Problem: SAFETY ADULT  Goal: Maintain or return to baseline ADL function  Description: INTERVENTIONS:  -  Assess patient's ability to carry out ADLs; assess patient's baseline for ADL function and identify physical deficits which impact ability to perform ADLs (bathing, care of mouth/teeth, toileting, grooming, dressing, etc )  - Assess/evaluate cause of self-care deficits   - Assess range of motion  - Assess patient's mobility; develop plan if impaired  - Assess patient's need for assistive devices and provide as appropriate  - Encourage maximum independence but intervene and supervise when necessary  - Involve family in performance of ADLs  - Assess for home care needs following discharge   - Consider OT consult to assist with ADL evaluation and planning for discharge  - Provide patient education as appropriate  Outcome: Progressing  Goal: Maintains/Returns to pre admission functional level  Description: INTERVENTIONS:  - Perform BMAT or MOVE assessment daily    - Set and communicate daily mobility goal to care team and patient/family/caregiver  - Collaborate with rehabilitation services on mobility goals if consulted  - Perform Range of Motion 3 times a day  - Reposition patient every 2 hours    - Dangle patient 3 times a day  - Stand patient 3 times a day  - Ambulate patient 3 times a day  - Out of bed to chair 3 times a day   - Out of bed for meals 3 times a day  - Out of bed for toileting  - Record patient progress and toleration of activity level   Outcome: Progressing  Goal: Patient will remain free of falls  Description: INTERVENTIONS:  - Educate patient/family on patient safety including physical limitations  - Instruct patient to call for assistance with activity   - Consult OT/PT to assist with strengthening/mobility   - Keep Call bell within reach  - Keep bed low and locked with side rails adjusted as appropriate  - Keep care items and personal belongings within reach  - Initiate and maintain comfort rounds  - Make Fall Risk Sign visible to staff  - Offer Toileting every 2 Hours, in advance of need  - Initiate/Maintain bed alarm  - Obtain necessary fall risk management equipment:   - Apply yellow socks and bracelet for high fall risk patients  - Consider moving patient to room near nurses station  Outcome: Progressing     Problem: DISCHARGE PLANNING  Goal: Discharge to home or other facility with appropriate resources  Description: INTERVENTIONS:  - Identify barriers to discharge w/patient and caregiver  - Arrange for needed discharge resources and transportation as appropriate  - Identify discharge learning needs (meds, wound care, etc )  - Arrange for interpretive services to assist at discharge as needed  - Refer to Case Management Department for coordinating discharge planning if the patient needs post-hospital services based on physician/advanced practitioner order or complex needs related to functional status, cognitive ability, or social support system  Outcome: Progressing     Problem: Knowledge Deficit  Goal: Patient/family/caregiver demonstrates understanding of disease process, treatment plan, medications, and discharge instructions  Description: Complete learning assessment and assess knowledge base    Interventions:  - Provide teaching at level of understanding  - Provide teaching via preferred learning methods  Outcome: Progressing

## 2022-08-27 NOTE — ASSESSMENT & PLAN NOTE
· Patient presented with dizziness and diarrhea starting in August that worsened yesterday  · Most likely secondary to IBD  · CT showed: 1  Diffuse mild-moderate circumferential wall thickening of the colon with pericolic inflammatory stranding consistent with nonspecific pancolitis  2   Multiple stable small scattered sclerotic foci in the visualized lumbar lower thoracic spine osseous pelvis which may reflect osseous metastatic disease in this patient with known metastatic breast cancer  3   Additional stable findings as noted   · GI consulted-> recs appreciated  · IVF  · Discontinue Zosyn as symptoms are most likely secondary to ulcerative colitis  · Steroids initiated 8/17  · F/u bld cx and procal, ESR, CRP, and lipase, C Diff  · ESR:  48  · Procalcitonin:  1 49  · CRP:  206 3  · C  Diff: negative  · Stool enteric panel: negative  · Advanced to low-fiber   · Cholestyramine  · Oncology consulted by GI for further recommendations regarding current cancer regimen playing a role in symptoms  Previously believed due to verzenio which has been on hold since recent admit, doubt this is contributing   · Flex sig completed 8/20 concerning for IBD, biopsies taken  Also noted ulcerations  · Biopsy reveals colitis  · IV steroids now to be transitioned back to oral prednisone, continue  · Patient still with frequent loose stools    Appreciate ongoing Gastroenterology recommendations  · Repeat CT abd today demonstrates ongoing pancolitis, will d/w GI

## 2022-08-27 NOTE — PROGRESS NOTES
[place]  Progress Note Theresa Silverman 1940, 79 y o  female MRN: [id number]  Unit/Bed#: -[place] Encounter: [id number]  Primary Care Provider: Juan Sutton DO   Date and time admitted to hospital: 8/15/2022 10:40 AM    Single subsegmental pulmonary embolism without acute cor pulmonale (HCC)  Assessment & Plan  · Venous duplex obtained due to LE edema  · Confirmed acute occlusive thrombus in popliteal vein and non-occlusive thrombus in the peroneal veins  · Started on IV heparin gtt 8/19  · Persistent tachycardia, CTA PE study obtained confirms R lobe subsegmental PE no evidence of R heart strain per radiologist  · Suspect in setting of known metastatic cancer and patient has been more sedentary in recent weeks due to acute illness  · Transition to NOAC 8/25    Severe protein-calorie malnutrition ([address] )  Assessment & Plan  Malnutrition Findings:   Adult Malnutrition type: Chronic illness  Adult Degree of Malnutrition: Other severe protein calorie malnutrition  Malnutrition Characteristics: Inadequate energy, Weight loss                  360 Statement: Pt presents with chronic severe protein calorie malnutrition due to Stage IV metastatic breast cancer, chronic diarrhea vs UC flare as evidenced by 10 5 lb wt loss-14% wt loss in 4 months and <75 % po intake > 1 month  BMI Findings: Body mass index is 27 21 kg/m²         Anemia  Assessment & Plan  · History of chronic anemia, baseline 8-10  · Underwent flex sig which showed inflammation, ulcerations  · Also with BRBPR since starting heparin gtt  · Trend H/H -hemoglobin has been stable in the mid 7s and bloody stools appear to be decreasing  · Prior provider discussed with GI - recommending IV venofer, cont    Primary hypertension  Assessment & Plan  · Blood pressure stable off cozaar    Chronic kidney disease (CKD) stage G3a/A1, moderately decreased glomerular filtration rate (GFR) between 45-59 mL/min/1 73 square meter and albuminuria creatinine ratio less than 30 mg/g [facility])  Assessment & Plan  Lab Results   Component Value Date    EGFR 56 08/24/2022    EGFR 63 08/23/2022    EGFR 62 08/23/2022    CREATININE 0 94 08/24/2022    CREATININE 0 86 08/23/2022    CREATININE 0 87 08/23/2022     · Cr on admission slightly elevated from her baseline at 1 3, most likely due to volume depletion  · Baseline 1 1-1 2  · I's and O's  · IV fluids  · Avoid nephrotoxic agents  · Discontinued IVF  · Encourage oral intake    Mixed hyperlipidemia  Assessment & Plan  · Continue statin    Metastatic breast cancer [facility])  Assessment & Plan  · Continue to follow-up with Hematology-Oncology outpatient  · Femara had been held on admit  · On recent admission patient's verzenio was held  Reviewed office visit from 8/10/22 appears there was some consideration to resume but at lowered dose  Patient reports she never resumed this medication  · Patient will require follow-up to discuss possible lumbar mets noted on CT this admission    * Toxic gastroenteritis and colitis  Assessment & Plan  · Patient presented with dizziness and diarrhea starting in August that worsened yesterday  · Most likely secondary to IBD  · CT showed: 1  Diffuse mild-moderate circumferential wall thickening of the colon with pericolic inflammatory stranding consistent with nonspecific pancolitis  2   Multiple stable small scattered sclerotic foci in the visualized lumbar lower thoracic spine osseous pelvis which may reflect osseous metastatic disease in this patient with known metastatic breast cancer  3   Additional stable findings as noted   · GI consulted-> recs appreciated  · IVF  · Discontinue Zosyn as symptoms are most likely secondary to ulcerative colitis  · Steroids initiated 8/17  · F/u bld cx and procal, ESR, CRP, and lipase, C Diff  · ESR:  48  · Procalcitonin:  1 49  · CRP:  206 3  · C   Diff: negative  · Stool enteric panel: negative  · Advanced to low-fiber · Cholestyramine  · Oncology consulted by GI for further recommendations regarding current cancer regimen playing a role in symptoms  Previously believed due to verzenio which has been on hold since recent admit, doubt this is contributing   · Flex sig completed  concerning for IBD, biopsies taken  Also noted ulcerations  · Biopsy reveals colitis  · IV steroids now to be transitioned back to oral prednisone, continue  · Patient still with frequent loose stools  Appreciate ongoing Gastroenterology recommendations  · Repeat CT abd today demonstrates ongoing pancolitis, will d/w GI         VTE  Prophylaxis:   Pharmacologic: in place    Patient Centered Rounds: I have performed bedside rounds with nursing staff today  Discussions with Specialists or Other Care Team Provider: case management    Education and Discussions with Family / Patient: pt    Current Length of Stay: 12 day(s)    Current Patient Status: Inpatient        Code Status: Level 3 - DNAR and DNI      Subjective:      Pt had rough night  Developed LLQ abd pain  Repeat CT was done   Pt not hungry whatsoever    Patient is seen and examined at bedside  All other ROS are negative  Objective:     Vitals:   Temp (24hrs), Av 7 °F (36 5 °C), Min:97 6 °F (36 4 °C), Max:97 8 °F (36 6 °C)    Temp:  [97 6 °F (36 4 °C)-97 8 °F (36 6 °C)] 97 8 °F (36 6 °C)  HR:  [118-134] 134  Resp:  [18-19] 18  BP: (127-144)/(61-74) 127/61  SpO2:  [93 %-96 %] 96 %  Body mass index is 27 21 kg/m²  Input and Output Summary (last 24 hours):        Intake/Output Summary (Last 24 hours) at 2022 0932  Last data filed at 2022 0301  Gross per 24 hour   Intake 120 ml   Output --   Net 120 ml       Physical Exam:       GEN: malaised  HEEENT: No JVD, PERRLA, no scleral icterus  RESP: Lungs clear to auscultation bilaterally  CV: RRR, +s1/s2   ABD: SOFT llq discomfort, POSITIVE BOWEL SOUNDS, NO DISTENTION  PSYCH: CALM  NEURO: A X O X 3, NO FOCAL DEFICITS  SKIN: NO RASH  EXTREM: NO EDEMA    Additional Data:     Labs:    Results from last 7 days   Lab Units 08/27/22  0540 08/26/22  0645 08/24/22  1327   WBC Thousand/uL 5 75   < > 8 33   HEMOGLOBIN g/dL 7 1*   < > 7 7*   HEMATOCRIT % 21 3*   < > 24 0*   PLATELETS Thousands/uL 179   < > 237   BANDS PCT % 6   < >  --    NEUTROS PCT %  --   --  80*   LYMPHS PCT %  --   --  7*   LYMPHO PCT % 12*   < >  --    MONOS PCT %  --   --  9   MONO PCT % 2*   < >  --    EOS PCT % 2   < > 0    < > = values in this interval not displayed  Results from last 7 days   Lab Units 08/24/22  1327   SODIUM mmol/L 140   POTASSIUM mmol/L 3 7   CHLORIDE mmol/L 109*   CO2 mmol/L 23   BUN mg/dL 10   CREATININE mg/dL 0 94   ANION GAP mmol/L 8   CALCIUM mg/dL 6 9*   ALBUMIN g/dL 1 9*   TOTAL BILIRUBIN mg/dL 0 30   ALK PHOS U/L 92   ALT U/L 22   AST U/L 14   GLUCOSE RANDOM mg/dL 141*                           * I Have Reviewed All Lab Data Listed Above  Imaging:     Results for orders placed during the hospital encounter of 08/01/22    XR Trauma chest portable    Narrative  CHEST    INDICATION:   TRAUMA  COMPARISON:  6/18/2021  EXAM PERFORMED/VIEWS:  XR CHEST PORTABLE      FINDINGS:    Cardiomediastinal silhouette appears unremarkable  The lungs are clear  No pneumothorax or pleural effusion  Osseous structures appear within normal limits for patient age  Impression  No acute cardiopulmonary disease  Workstation performed: [id number]    Results for orders placed in visit on 06/18/21    XR chest pa & lateral    Narrative  CHEST    INDICATION:   R07 89: Other chest pain  Cough since January  History of breast cancer  Anterior upper costochondral pain  COMPARISON:  Chest x-ray from 1/12/2021  Chest CT from 9/13/2013  EXAM PERFORMED/VIEWS:  XR CHEST PA & LATERAL      FINDINGS:    Cardiomediastinal silhouette appears unremarkable  The lungs are clear  No pneumothorax or pleural effusion      Osseous structures appear within normal limits for patient age  There is a bone island in the proximal left clavicle  Impression  No acute cardiopulmonary disease  Workstation performed: [id number]      *I have reviewed all imaging reports listed above      Recent Cultures (last 7 days):           Last 24 Hours Medication List:   Current Facility-Administered Medications   Medication Dose Route Frequency Provider Last Rate    acetaminophen  650 mg Oral Q6H PRN Fred Velasquez MD      amitriptyline  25 mg Oral HS Fred Velasquez MD      calcium carbonate  500 mg Oral Daily PRN Fred Velasquez MD      cholestyramine sugar free  4 g Oral BID Fred Velasquez MD      iron sucrose  200 mg Intravenous Once per day on Mon Wed Fri Edna Nickerson PA-C      loperamide  2 mg Oral Q4H PRN Naomi Rust PA-C      mesalamine  800 mg Oral TID Fred Velasquez MD      mirtazapine  7 5 mg Oral HS Fred Velasquez MD      multi-electrolyte  100 mL/hr Intravenous Continuous Raj WOODARD PA-C 100 mL/hr (08/26/22 1512)    ondansetron  4 mg Intravenous Q6H PRN Fred Velasquez MD      pantoprazole  40 mg Oral BID AC BROOKE Smith      pravastatin  20 mg Oral Daily With Tram Rodriguez MD      predniSONE  40 mg Oral Daily Raj WOODARD PA-C      rivaroxaban  15 mg Oral BID With Meals Raj WOODARD PA-C      simethicone  80 mg Oral 4x Daily (with meals and at bedtime) Naomi Rust PA-C          Today, Patient Was Seen By: Nikita Heredia MD    ** Please Note: Dictation voice to text software may have been used in the creation of this document   **

## 2022-08-28 LAB
ALBUMIN SERPL BCP-MCNC: 1.6 G/DL (ref 3.5–5)
ALP SERPL-CCNC: 72 U/L (ref 46–116)
ALT SERPL W P-5'-P-CCNC: 17 U/L (ref 12–78)
ANION GAP SERPL CALCULATED.3IONS-SCNC: 6 MMOL/L (ref 4–13)
AST SERPL W P-5'-P-CCNC: 8 U/L (ref 5–45)
BASOPHILS # BLD MANUAL: 0 THOUSAND/UL (ref 0–0.1)
BASOPHILS NFR MAR MANUAL: 0 % (ref 0–1)
BILIRUB SERPL-MCNC: 0.4 MG/DL (ref 0.2–1)
BUN SERPL-MCNC: 10 MG/DL (ref 5–25)
CALCIUM ALBUM COR SERPL-MCNC: 8.5 MG/DL (ref 8.3–10.1)
CALCIUM SERPL-MCNC: 6.6 MG/DL (ref 8.3–10.1)
CHLORIDE SERPL-SCNC: 105 MMOL/L (ref 96–108)
CO2 SERPL-SCNC: 26 MMOL/L (ref 21–32)
CREAT SERPL-MCNC: 0.72 MG/DL (ref 0.6–1.3)
EOSINOPHIL # BLD MANUAL: 0.04 THOUSAND/UL (ref 0–0.4)
EOSINOPHIL NFR BLD MANUAL: 1 % (ref 0–6)
ERYTHROCYTE [DISTWIDTH] IN BLOOD BY AUTOMATED COUNT: 22.3 % (ref 11.6–15.1)
FERRITIN SERPL-MCNC: 755 NG/ML (ref 8–388)
GFR SERPL CREATININE-BSD FRML MDRD: 78 ML/MIN/1.73SQ M
GLUCOSE SERPL-MCNC: 80 MG/DL (ref 65–140)
HCT VFR BLD AUTO: 21.2 % (ref 34.8–46.1)
HGB BLD-MCNC: 6.7 G/DL (ref 11.5–15.4)
IRON SATN MFR SERPL: 18 % (ref 15–50)
IRON SERPL-MCNC: 19 UG/DL (ref 50–170)
LYMPHOCYTES # BLD AUTO: 0.3 THOUSAND/UL (ref 0.6–4.47)
LYMPHOCYTES # BLD AUTO: 7 % (ref 14–44)
MCH RBC QN AUTO: 31.5 PG (ref 26.8–34.3)
MCHC RBC AUTO-ENTMCNC: 31.6 G/DL (ref 31.4–37.4)
MCV RBC AUTO: 100 FL (ref 82–98)
MONOCYTES # BLD AUTO: 0.17 THOUSAND/UL (ref 0–1.22)
MONOCYTES NFR BLD: 4 % (ref 4–12)
NEUTROPHILS # BLD MANUAL: 3.74 THOUSAND/UL (ref 1.85–7.62)
NEUTS BAND NFR BLD MANUAL: 5 % (ref 0–8)
NEUTS SEG NFR BLD AUTO: 83 % (ref 43–75)
PLATELET # BLD AUTO: 190 THOUSANDS/UL (ref 149–390)
PLATELET BLD QL SMEAR: ADEQUATE
PMV BLD AUTO: 10.4 FL (ref 8.9–12.7)
POTASSIUM SERPL-SCNC: 3.4 MMOL/L (ref 3.5–5.3)
PROT SERPL-MCNC: 4.4 G/DL (ref 6.4–8.4)
RBC # BLD AUTO: 2.13 MILLION/UL (ref 3.81–5.12)
RBC MORPH BLD: NORMAL
SODIUM SERPL-SCNC: 137 MMOL/L (ref 135–147)
TIBC SERPL-MCNC: 105 UG/DL (ref 250–450)
WBC # BLD AUTO: 4.25 THOUSAND/UL (ref 4.31–10.16)

## 2022-08-28 PROCEDURE — 83540 ASSAY OF IRON: CPT | Performed by: PHYSICIAN ASSISTANT

## 2022-08-28 PROCEDURE — 82728 ASSAY OF FERRITIN: CPT | Performed by: PHYSICIAN ASSISTANT

## 2022-08-28 PROCEDURE — 80053 COMPREHEN METABOLIC PANEL: CPT | Performed by: HOSPITALIST

## 2022-08-28 PROCEDURE — 85007 BL SMEAR W/DIFF WBC COUNT: CPT | Performed by: NURSE PRACTITIONER

## 2022-08-28 PROCEDURE — 83550 IRON BINDING TEST: CPT | Performed by: PHYSICIAN ASSISTANT

## 2022-08-28 PROCEDURE — 99232 SBSQ HOSP IP/OBS MODERATE 35: CPT | Performed by: PHYSICIAN ASSISTANT

## 2022-08-28 PROCEDURE — 85027 COMPLETE CBC AUTOMATED: CPT | Performed by: NURSE PRACTITIONER

## 2022-08-28 RX ORDER — POTASSIUM CHLORIDE 20 MEQ/1
40 TABLET, EXTENDED RELEASE ORAL ONCE
Status: COMPLETED | OUTPATIENT
Start: 2022-08-28 | End: 2022-08-28

## 2022-08-28 RX ADMIN — MESALAMINE 800 MG: 400 CAPSULE, DELAYED RELEASE ORAL at 17:31

## 2022-08-28 RX ADMIN — POTASSIUM CHLORIDE 40 MEQ: 1500 TABLET, EXTENDED RELEASE ORAL at 08:41

## 2022-08-28 RX ADMIN — CHOLESTYRAMINE 4 G: 4 POWDER, FOR SUSPENSION ORAL at 08:41

## 2022-08-28 RX ADMIN — PRAVASTATIN SODIUM 20 MG: 20 TABLET ORAL at 17:31

## 2022-08-28 RX ADMIN — LOPERAMIDE HYDROCHLORIDE 2 MG: 2 CAPSULE ORAL at 08:41

## 2022-08-28 RX ADMIN — PANTOPRAZOLE SODIUM 40 MG: 40 TABLET, DELAYED RELEASE ORAL at 17:32

## 2022-08-28 RX ADMIN — MIRTAZAPINE 7.5 MG: 15 TABLET, FILM COATED ORAL at 21:46

## 2022-08-28 RX ADMIN — RIVAROXABAN 15 MG: 15 TABLET, FILM COATED ORAL at 08:41

## 2022-08-28 RX ADMIN — MESALAMINE 800 MG: 400 CAPSULE, DELAYED RELEASE ORAL at 08:41

## 2022-08-28 RX ADMIN — PANTOPRAZOLE SODIUM 40 MG: 40 TABLET, DELAYED RELEASE ORAL at 05:15

## 2022-08-28 RX ADMIN — SIMETHICONE 80 MG: 80 TABLET, CHEWABLE ORAL at 12:46

## 2022-08-28 RX ADMIN — MESALAMINE 800 MG: 400 CAPSULE, DELAYED RELEASE ORAL at 21:46

## 2022-08-28 RX ADMIN — SIMETHICONE 80 MG: 80 TABLET, CHEWABLE ORAL at 08:41

## 2022-08-28 RX ADMIN — LOPERAMIDE HYDROCHLORIDE 2 MG: 2 CAPSULE ORAL at 17:31

## 2022-08-28 RX ADMIN — AMITRIPTYLINE HYDROCHLORIDE 25 MG: 25 TABLET, FILM COATED ORAL at 21:46

## 2022-08-28 RX ADMIN — PREDNISONE 40 MG: 20 TABLET ORAL at 08:41

## 2022-08-28 RX ADMIN — SODIUM CHLORIDE 200 MG: 9 INJECTION, SOLUTION INTRAVENOUS at 12:46

## 2022-08-28 RX ADMIN — RIVAROXABAN 15 MG: 15 TABLET, FILM COATED ORAL at 17:31

## 2022-08-28 RX ADMIN — SIMETHICONE 80 MG: 80 TABLET, CHEWABLE ORAL at 17:31

## 2022-08-28 RX ADMIN — SIMETHICONE 80 MG: 80 TABLET, CHEWABLE ORAL at 21:46

## 2022-08-28 NOTE — PROGRESS NOTES
[place]  Progress Note Valentín Shi Silverman 1940, 79 y o  female MRN: [id number]  Unit/Bed#: -[place] Encounter: [id number]  Primary Care Provider: Lisa Calderón DO   Date and time admitted to hospital: 8/15/2022 10:40 AM    * Toxic gastroenteritis and colitis  Assessment & Plan  · Patient presented with dizziness and diarrhea starting in August that worsened yesterday  · Most likely secondary to IBD  · CT showed: 1  Diffuse mild-moderate circumferential wall thickening of the colon with pericolic inflammatory stranding consistent with nonspecific pancolitis  2   Multiple stable small scattered sclerotic foci in the visualized lumbar lower thoracic spine osseous pelvis which may reflect osseous metastatic disease in this patient with known metastatic breast cancer  3   Additional stable findings as noted   · GI consulted-> recs appreciated  · IVF  · Discontinue Zosyn as symptoms are most likely secondary to ulcerative colitis  · Steroids initiated 8/17  · F/u bld cx and procal, ESR, CRP, and lipase, C Diff  · ESR:  48  · Procalcitonin:  1 49  · CRP:  206 3  · C  Diff: negative  · Stool enteric panel: negative  · Advanced to low-fiber   · Cholestyramine  · Oncology consulted by GI for further recommendations regarding current cancer regimen playing a role in symptoms  Previously believed due to verzenio which has been on hold since recent admit, doubt this is contributing   · Flex sig completed 8/20 concerning for IBD, biopsies taken  Also noted ulcerations  · Biopsy reveals colitis  · IV steroids now to be transitioned back to oral prednisone, continue  · Patient still with frequent loose stools  Appreciate ongoing Gastroenterology recommendations  · Repeat CT abd demonstrates ongoing pancolitis  Feels better and is hungry today  Will resume diet      Single subsegmental pulmonary embolism without acute cor pulmonale (HCC)  Assessment & Plan  · Venous duplex obtained due to LE edema  · Confirmed acute occlusive thrombus in popliteal vein and non-occlusive thrombus in the peroneal veins  · Started on IV heparin gtt 8/19t  · Suspect in setting of known metastatic cancer and patient has been more sedentary in recent weeks due to acute illness  · Transition to NOAC 8/25  · With persistent sinus tachycardia  No heart strain noted and no chest pain or palpitations  If persistently tachycardic she may need cardiology eval     Severe protein-calorie malnutrition ([address] )  Assessment & Plan  Malnutrition Findings:   Adult Malnutrition type: Chronic illness  Adult Degree of Malnutrition: Other severe protein calorie malnutrition  Malnutrition Characteristics: Inadequate energy, Weight loss    360 Statement: Pt presents with chronic severe protein calorie malnutrition due to Stage IV metastatic breast cancer, chronic diarrhea vs UC flare as evidenced by 10 5 lb wt loss-14% wt loss in 4 months and <75 % po intake > 1 month  BMI Findings: Body mass index is 27 21 kg/m²  Anemia  Assessment & Plan  · History of chronic anemia, baseline 8-10  · Underwent flex sig which showed inflammation, ulcerations  · Also with BRBPR since starting heparin gtt  · Trend H/H -hemoglobin has been stable in the mid 7s and bloody stools appear to be decreasing  · Prior provider discussed with GI - recommending IV venofer, cont    Primary hypertension  Assessment & Plan  · Blood pressure stable off cozaar    Chronic kidney disease (CKD) stage G3a/A1, moderately decreased glomerular filtration rate (GFR) between 45-59 mL/min/1 73 square meter and albuminuria creatinine ratio less than 30 mg/g [facility])  Assessment & Plan  Lab Results   Component Value Date    EGFR 78 08/28/2022    EGFR 56 08/24/2022    EGFR 63 08/23/2022    CREATININE 0 72 08/28/2022    CREATININE 0 94 08/24/2022    CREATININE 0 86 08/23/2022     · Cr on admission slightly elevated from her baseline at 1 3, most likely due to volume depletion   Now improved after IVF  · Baseline 0 8    Mixed hyperlipidemia  Assessment & Plan  · Continue statin    Metastatic breast cancer [facility])  Assessment & Plan  · Continue to follow-up with Hematology-Oncology outpatient  · Femara had been held on admit  · On recent admission patient's verzenio was held  Reviewed office visit from 8/10/22 appears there was some consideration to resume but at lowered dose  Patient reports she never resumed this medication  · Patient will require follow-up to discuss possible lumbar mets noted on CT this admission      VTE Pharmacologic Prophylaxis: VTE Score: 6 High Risk (Score >/= 5) - Pharmacological DVT Prophylaxis Ordered: rivaroxaban (Xarelto)  Sequential Compression Devices Ordered  Patient Centered Rounds: I performed bedside rounds with nursing staff today  Discussions with Specialists or Other Care Team Provider: cm, nursing    Education and Discussions with Family / Patient: Patient declined call to   Time Spent for Care: 30 minutes  More than 50% of total time spent on counseling and coordination of care as described above  Current Length of Stay: 13 day(s)  Current Patient Status: Inpatient   Certification Statement: The patient will continue to require additional inpatient hospital stay due to tachycardia, abdominal pain and restarting diet today  Discharge Plan: Anticipate discharge tomorrow to home with home services  Code Status: Level 3 - DNAR and DNI    Subjective:   Patient reports her pain is improved  Okay to resume diet  Denies chest pain, palpitations, shortness of breath  Objective:     Vitals:   Temp (24hrs), Av 7 °F (36 5 °C), Min:97 4 °F (36 3 °C), Max:98 °F (36 7 °C)    Temp:  [97 4 °F (36 3 °C)-98 °F (36 7 °C)] 97 4 °F (36 3 °C)  HR:  [109-123] 121  Resp:  [18-20] 18  BP: (120-125)/(66-71) 123/71  SpO2:  [94 %-96 %] 94 %  Body mass index is 27 21 kg/m²       Input and Output Summary (last 24 hours):   No intake or output data in the 24 hours ending 08/28/22 0830    Physical Exam:   Physical Exam  Vitals and nursing note reviewed  Constitutional:       General: She is not in acute distress  Appearance: Normal appearance  HENT:      Head: Normocephalic  Mouth/Throat:      Mouth: Mucous membranes are moist    Eyes:      General: No scleral icterus  Pupils: Pupils are equal, round, and reactive to light  Cardiovascular:      Rate and Rhythm: Regular rhythm  Tachycardia present  Heart sounds: No murmur heard  Pulmonary:      Effort: Pulmonary effort is normal  No respiratory distress  Breath sounds: Normal breath sounds  No wheezing, rhonchi or rales  Abdominal:      General: Bowel sounds are normal  There is no distension  Palpations: Abdomen is soft  Tenderness: There is no abdominal tenderness  Musculoskeletal:         General: No swelling  Right lower leg: No edema  Left lower leg: No edema  Skin:     Capillary Refill: Capillary refill takes less than 2 seconds  Neurological:      General: No focal deficit present  Mental Status: She is alert and oriented to person, place, and time  Mental status is at baseline  Additional Data:     Labs:  Results from last 7 days   Lab Units 08/27/22  0540 08/26/22  0645 08/24/22  1327   WBC Thousand/uL 5 75   < > 8 33   HEMOGLOBIN g/dL 7 1*   < > 7 7*   HEMATOCRIT % 21 3*   < > 24 0*   PLATELETS Thousands/uL 179   < > 237   BANDS PCT % 6   < >  --    NEUTROS PCT %  --   --  80*   LYMPHS PCT %  --   --  7*   LYMPHO PCT % 12*   < >  --    MONOS PCT %  --   --  9   MONO PCT % 2*   < >  --    EOS PCT % 2   < > 0    < > = values in this interval not displayed       Results from last 7 days   Lab Units 08/28/22  0515   SODIUM mmol/L 137   POTASSIUM mmol/L 3 4*   CHLORIDE mmol/L 105   CO2 mmol/L 26   BUN mg/dL 10   CREATININE mg/dL 0 72   ANION GAP mmol/L 6   CALCIUM mg/dL 6 6*   ALBUMIN g/dL 1 6*   TOTAL BILIRUBIN mg/dL 0 40   ALK PHOS U/L 72   ALT U/L 17   AST U/L 8   GLUCOSE RANDOM mg/dL 80                       Lines/Drains:  Invasive Devices  Report    Peripherally Inserted Central Catheter Line  Duration           PICC Line 08/19/22 8 days                Central Line:  Goal for removal: N/A - Chronic PICC         Telemetry:  Telemetry Orders (From admission, onward)             48 Hour Telemetry Monitoring  Continuous x 48 hours        References:    Telemetry Guidelines   Question:  Reason for 48 Hour Telemetry  Answer:  Arrhythmias Requiring Medical Therapy (eg  SVT, Vtach/fib, Bradycardia, Uncontrolled A-fib)                 Telemetry Reviewed: Sinus Tachycardia  Indication for Continued Telemetry Use: Arrthymias requiring medical therapy             Imaging: No pertinent imaging reviewed      Recent Cultures (last 7 days):         Last 24 Hours Medication List:   Current Facility-Administered Medications   Medication Dose Route Frequency Provider Last Rate    acetaminophen  650 mg Oral Q6H PRN Gale Riedel, MD      amitriptyline  25 mg Oral HS Gale Riedel, MD      calcium carbonate  500 mg Oral Daily PRN Gale Riedel, MD      cholestyramine sugar free  4 g Oral BID Gale Riedel, MD      iron sucrose  200 mg Intravenous Once per day on Mon Wed Fri David Wong PA-C      loperamide  2 mg Oral Q4H PRN Patti Melgar PA-C      mesalamine  800 mg Oral TID Gale Riedel, MD      mirtazapine  7 5 mg Oral HS Gale Riedel, MD      ondansetron  4 mg Intravenous Q6H PRN Gale Riedel, MD      pantoprazole  40 mg Oral BID AC BROOKE Schreiber      potassium chloride  40 mEq Oral Once Celena Gonzalez PA-C      pravastatin  20 mg Oral Daily With Jake Kelly MD      predniSONE  40 mg Oral Daily [facility]      rivaroxaban  15 mg Oral BID With Meals [facility]      simethicone  80 mg Oral 4x Daily (with meals and at bedtime) Patti Melgar PA-C          Today, Patient Was Seen By: Geovanny Meeks PA-C    **Please Note: This note may have been constructed using a voice recognition system  **

## 2022-08-28 NOTE — ASSESSMENT & PLAN NOTE
· Venous duplex obtained due to LE edema  · Confirmed acute occlusive thrombus in popliteal vein and non-occlusive thrombus in the peroneal veins  · Started on IV heparin gtt 8/19t  · Suspect in setting of known metastatic cancer and patient has been more sedentary in recent weeks due to acute illness  · Transition to NOAC 8/25  · With persistent sinus tachycardia  No heart strain noted and no chest pain or palpitations    If persistently tachycardic she may need cardiology eval

## 2022-08-28 NOTE — ASSESSMENT & PLAN NOTE
· Patient presented with dizziness and diarrhea starting in August that worsened yesterday  · Most likely secondary to IBD  · CT showed: 1  Diffuse mild-moderate circumferential wall thickening of the colon with pericolic inflammatory stranding consistent with nonspecific pancolitis  2   Multiple stable small scattered sclerotic foci in the visualized lumbar lower thoracic spine osseous pelvis which may reflect osseous metastatic disease in this patient with known metastatic breast cancer  3   Additional stable findings as noted   · GI consulted-> recs appreciated  · IVF  · Discontinue Zosyn as symptoms are most likely secondary to ulcerative colitis  · Steroids initiated 8/17  · F/u bld cx and procal, ESR, CRP, and lipase, C Diff  · ESR:  48  · Procalcitonin:  1 49  · CRP:  206 3  · C  Diff: negative  · Stool enteric panel: negative  · Advanced to low-fiber   · Cholestyramine  · Oncology consulted by GI for further recommendations regarding current cancer regimen playing a role in symptoms  Previously believed due to verzenio which has been on hold since recent admit, doubt this is contributing   · Flex sig completed 8/20 concerning for IBD, biopsies taken  Also noted ulcerations  · Biopsy reveals colitis  · IV steroids now to be transitioned back to oral prednisone, continue  · Patient still with frequent loose stools  Appreciate ongoing Gastroenterology recommendations  · Repeat CT abd demonstrates ongoing pancolitis  Feels better and is hungry today  Will resume diet

## 2022-08-28 NOTE — ASSESSMENT & PLAN NOTE
Lab Results   Component Value Date    EGFR 78 08/28/2022    EGFR 56 08/24/2022    EGFR 63 08/23/2022    CREATININE 0 72 08/28/2022    CREATININE 0 94 08/24/2022    CREATININE 0 86 08/23/2022     · Cr on admission slightly elevated from her baseline at 1 3, most likely due to volume depletion  Now improved after IVF    · Baseline 0 8

## 2022-08-29 ENCOUNTER — APPOINTMENT (INPATIENT)
Dept: NON INVASIVE DIAGNOSTICS | Facility: HOSPITAL | Age: 82
DRG: 393 | End: 2022-08-29
Payer: MEDICARE

## 2022-08-29 ENCOUNTER — TELEPHONE (OUTPATIENT)
Dept: HEMATOLOGY ONCOLOGY | Facility: HOSPITAL | Age: 82
End: 2022-08-29

## 2022-08-29 LAB
ABO GROUP BLD: NORMAL
ANION GAP SERPL CALCULATED.3IONS-SCNC: 5 MMOL/L (ref 4–13)
ANISOCYTOSIS BLD QL SMEAR: PRESENT
BASOPHILS # BLD MANUAL: 0.04 THOUSAND/UL (ref 0–0.1)
BASOPHILS NFR MAR MANUAL: 1 % (ref 0–1)
BLD GP AB SCN SERPL QL: POSITIVE
BUN SERPL-MCNC: 11 MG/DL (ref 5–25)
CALCIUM SERPL-MCNC: 7 MG/DL (ref 8.3–10.1)
CHLORIDE SERPL-SCNC: 107 MMOL/L (ref 96–108)
CO2 SERPL-SCNC: 26 MMOL/L (ref 21–32)
CREAT SERPL-MCNC: 0.86 MG/DL (ref 0.6–1.3)
CRP SERPL QL: 122.4 MG/L
EOSINOPHIL # BLD MANUAL: 0 THOUSAND/UL (ref 0–0.4)
EOSINOPHIL NFR BLD MANUAL: 0 % (ref 0–6)
ERYTHROCYTE [DISTWIDTH] IN BLOOD BY AUTOMATED COUNT: 21.9 % (ref 11.6–15.1)
GFR SERPL CREATININE-BSD FRML MDRD: 63 ML/MIN/1.73SQ M
GLUCOSE SERPL-MCNC: 101 MG/DL (ref 65–140)
HCT VFR BLD AUTO: 20.3 % (ref 34.8–46.1)
HCT VFR BLD AUTO: 23.8 % (ref 34.8–46.1)
HCT VFR BLD AUTO: 24.3 % (ref 34.8–46.1)
HGB BLD-MCNC: 6.3 G/DL (ref 11.5–15.4)
HGB BLD-MCNC: 7.3 G/DL (ref 11.5–15.4)
HGB BLD-MCNC: 7.5 G/DL (ref 11.5–15.4)
LYMPHOCYTES # BLD AUTO: 0.76 THOUSAND/UL (ref 0.6–4.47)
LYMPHOCYTES # BLD AUTO: 17 % (ref 14–44)
MCH RBC QN AUTO: 31.3 PG (ref 26.8–34.3)
MCHC RBC AUTO-ENTMCNC: 31 G/DL (ref 31.4–37.4)
MCV RBC AUTO: 101 FL (ref 82–98)
MONOCYTES # BLD AUTO: 0.4 THOUSAND/UL (ref 0–1.22)
MONOCYTES NFR BLD: 9 % (ref 4–12)
NEUTROPHILS # BLD MANUAL: 3.26 THOUSAND/UL (ref 1.85–7.62)
NEUTS BAND NFR BLD MANUAL: 4 % (ref 0–8)
NEUTS SEG NFR BLD AUTO: 69 % (ref 43–75)
PLATELET # BLD AUTO: 176 THOUSANDS/UL (ref 149–390)
PLATELET BLD QL SMEAR: ABNORMAL
PMV BLD AUTO: 10 FL (ref 8.9–12.7)
POTASSIUM SERPL-SCNC: 4 MMOL/L (ref 3.5–5.3)
RBC # BLD AUTO: 2.01 MILLION/UL (ref 3.81–5.12)
RBC MORPH BLD: PRESENT
RH BLD: NEGATIVE
SODIUM SERPL-SCNC: 138 MMOL/L (ref 135–147)
SPECIMEN EXPIRATION DATE: NORMAL
WBC # BLD AUTO: 4.46 THOUSAND/UL (ref 4.31–10.16)

## 2022-08-29 PROCEDURE — 86900 BLOOD TYPING SEROLOGIC ABO: CPT | Performed by: PHYSICIAN ASSISTANT

## 2022-08-29 PROCEDURE — 99232 SBSQ HOSP IP/OBS MODERATE 35: CPT | Performed by: INTERNAL MEDICINE

## 2022-08-29 PROCEDURE — 85007 BL SMEAR W/DIFF WBC COUNT: CPT | Performed by: NURSE PRACTITIONER

## 2022-08-29 PROCEDURE — 85018 HEMOGLOBIN: CPT | Performed by: PHYSICIAN ASSISTANT

## 2022-08-29 PROCEDURE — 93971 EXTREMITY STUDY: CPT

## 2022-08-29 PROCEDURE — 85014 HEMATOCRIT: CPT | Performed by: PHYSICIAN ASSISTANT

## 2022-08-29 PROCEDURE — 85014 HEMATOCRIT: CPT | Performed by: INTERNAL MEDICINE

## 2022-08-29 PROCEDURE — 85027 COMPLETE CBC AUTOMATED: CPT | Performed by: NURSE PRACTITIONER

## 2022-08-29 PROCEDURE — 86850 RBC ANTIBODY SCREEN: CPT | Performed by: PHYSICIAN ASSISTANT

## 2022-08-29 PROCEDURE — 85018 HEMOGLOBIN: CPT | Performed by: INTERNAL MEDICINE

## 2022-08-29 PROCEDURE — 99232 SBSQ HOSP IP/OBS MODERATE 35: CPT | Performed by: NURSE PRACTITIONER

## 2022-08-29 PROCEDURE — 86140 C-REACTIVE PROTEIN: CPT | Performed by: REGISTERED NURSE

## 2022-08-29 PROCEDURE — 86901 BLOOD TYPING SEROLOGIC RH(D): CPT | Performed by: PHYSICIAN ASSISTANT

## 2022-08-29 PROCEDURE — 80048 BASIC METABOLIC PNL TOTAL CA: CPT | Performed by: PHYSICIAN ASSISTANT

## 2022-08-29 RX ORDER — METHYLPREDNISOLONE SODIUM SUCCINATE 40 MG/ML
40 INJECTION, POWDER, LYOPHILIZED, FOR SOLUTION INTRAMUSCULAR; INTRAVENOUS EVERY 8 HOURS SCHEDULED
Status: DISCONTINUED | OUTPATIENT
Start: 2022-08-29 | End: 2022-09-01

## 2022-08-29 RX ORDER — SODIUM CHLORIDE 9 MG/ML
75 INJECTION, SOLUTION INTRAVENOUS CONTINUOUS
Status: DISPENSED | OUTPATIENT
Start: 2022-08-29 | End: 2022-08-29

## 2022-08-29 RX ORDER — LETROZOLE 2.5 MG/1
2.5 TABLET, FILM COATED ORAL DAILY
Status: DISCONTINUED | OUTPATIENT
Start: 2022-08-29 | End: 2022-09-06 | Stop reason: HOSPADM

## 2022-08-29 RX ADMIN — RIVAROXABAN 15 MG: 15 TABLET, FILM COATED ORAL at 16:02

## 2022-08-29 RX ADMIN — SODIUM CHLORIDE 200 MG: 9 INJECTION, SOLUTION INTRAVENOUS at 08:56

## 2022-08-29 RX ADMIN — PANTOPRAZOLE SODIUM 40 MG: 40 TABLET, DELAYED RELEASE ORAL at 16:02

## 2022-08-29 RX ADMIN — SIMETHICONE 80 MG: 80 TABLET, CHEWABLE ORAL at 21:39

## 2022-08-29 RX ADMIN — AMITRIPTYLINE HYDROCHLORIDE 25 MG: 25 TABLET, FILM COATED ORAL at 21:37

## 2022-08-29 RX ADMIN — PRAVASTATIN SODIUM 20 MG: 20 TABLET ORAL at 16:02

## 2022-08-29 RX ADMIN — LETROZOLE 2.5 MG: 2.5 TABLET ORAL at 16:02

## 2022-08-29 RX ADMIN — MESALAMINE 800 MG: 400 CAPSULE, DELAYED RELEASE ORAL at 08:55

## 2022-08-29 RX ADMIN — CHOLESTYRAMINE 4 G: 4 POWDER, FOR SUSPENSION ORAL at 16:53

## 2022-08-29 RX ADMIN — PREDNISONE 40 MG: 20 TABLET ORAL at 08:55

## 2022-08-29 RX ADMIN — MESALAMINE 800 MG: 400 CAPSULE, DELAYED RELEASE ORAL at 21:38

## 2022-08-29 RX ADMIN — PANTOPRAZOLE SODIUM 40 MG: 40 TABLET, DELAYED RELEASE ORAL at 05:18

## 2022-08-29 RX ADMIN — SIMETHICONE 80 MG: 80 TABLET, CHEWABLE ORAL at 16:02

## 2022-08-29 RX ADMIN — MESALAMINE 800 MG: 400 CAPSULE, DELAYED RELEASE ORAL at 16:02

## 2022-08-29 RX ADMIN — SODIUM CHLORIDE 75 ML/HR: 0.9 INJECTION, SOLUTION INTRAVENOUS at 11:34

## 2022-08-29 RX ADMIN — METHYLPREDNISOLONE SODIUM SUCCINATE 40 MG: 40 INJECTION, POWDER, FOR SOLUTION INTRAMUSCULAR; INTRAVENOUS at 21:39

## 2022-08-29 RX ADMIN — CHOLESTYRAMINE 4 G: 4 POWDER, FOR SUSPENSION ORAL at 08:55

## 2022-08-29 RX ADMIN — SIMETHICONE 80 MG: 80 TABLET, CHEWABLE ORAL at 11:33

## 2022-08-29 RX ADMIN — SIMETHICONE 80 MG: 80 TABLET, CHEWABLE ORAL at 08:55

## 2022-08-29 RX ADMIN — RIVAROXABAN 15 MG: 15 TABLET, FILM COATED ORAL at 08:55

## 2022-08-29 RX ADMIN — MIRTAZAPINE 7.5 MG: 15 TABLET, FILM COATED ORAL at 21:38

## 2022-08-29 NOTE — PROGRESS NOTES
Progress note - Gastroenterology   Creola Sharon Silverman 79 y o  female MRN: [id number]  Unit/Bed#: -01 Encounter: [id number]    ASSESSMENT and PLAN  1  Acute colitis  New onset inflammatory bowel disease?  Related to or induced by cancer treatment/immunotherapy,   Atypical infectious colitis? Patient continues to have bloody loose bowel movements  Patient is currently taking cholestyramine 4 g twice daily   Prescribed Imodium 2 mg every 4 hours as needed      Biopsy from flex sig 8/20; active colitis, no viral inclusions identified on cm the immunohistochemistry stain, negative for dysplasia  As of today 8/29 continues to have 4-5 soft-bloody bowel movements per day      - prednisone 40 mg p o  To continue if discharged as an outpatient until followed up in the office   - continue mesalamine 800 mg p o  3 times a day  - continue amitriptyline 25 mg p o  Daily HS  - offer Imodium 2 mg every 4 hours as needed as long as patient has liquid bowel movements      Discussed with patient office will contact her for follow-up outpatient office visit  Possibly be referred to IBD specialist     Per Dr Yamile Augustin IBD specialist-I would recommend checking CRP, restarting IV steroids for an additional 3 days and assessing her response with repeat CRP and clinical symptoms  Depending on how she does she might meet criteria for inpatient remicade  She is in her [de-identified], so obviously the risks and benefits of all treatment options should be weighed  Given her age I would also suggest you speak with pathology and confirm if it looks like IBD (and no ischemic, or an alternative diagnosis)            2  Acute blood loss anemia  Secondary to colitis and exacerbated by anticoagulation for DVT/PE  Hemoglobin 6 3 today      - follow H&H and transfuse as needed- to receive 1 unit PRBC  - continue anticoagulation for now  - continue PPI  - IV Venofer      3  DVT and pulmonary embolism   Xarelto 15 mg p o   Twice daily        4  Metastatic breast cancer  Followed by Dr Shahab Hernandez    Chief Complaint   Patient presents with    Dizziness     Via ems from home for increased dizziness and multiple episodes diarrhea since the beginning of August  States worse since last night  States is getting new chemo medication       SUBJECTIVE/HPI   Still with 4-5 bloody bowel movements per day  Denies any lightheaded or dizziness  She does present with tachycardia      /64   Pulse (!) 118   Temp 97 8 °F (36 6 °C)   Resp 15   Ht 5' 1" (1 549 m)   Wt 65 3 kg (144 lb)   LMP  (LMP Unknown)   SpO2 96%   BMI 27 21 kg/m²     PHYSICALEXAM  General appearance: alert, appears stated age and cooperative  Eyes: PERLLA, EOMI, no icterus   Head: Normocephalic, without obvious abnormality, atraumatic  Lungs: clear to auscultation bilaterally  Heart: regular rate and rhythm, S1, S2 normal, no murmur, click, rub or gallop  Abdomen: soft, non-tender; bowel sounds normal; no masses,  no organomegaly  Extremities: extremities normal, atraumatic, no cyanosis or edema  Neurologic: Grossly normal    Lab Results   Component Value Date    GLUCOSE 115 09/08/2015    CALCIUM 7 0 (L) 08/29/2022     09/08/2015    K 4 0 08/29/2022    CO2 26 08/29/2022     08/29/2022    BUN 11 08/29/2022    CREATININE 0 86 08/29/2022     Lab Results   Component Value Date    WBC 4 46 08/29/2022    HGB 6 3 (LL) 08/29/2022    HCT 20 3 (L) 08/29/2022     (H) 08/29/2022     08/29/2022     Lab Results   Component Value Date    ALT 17 08/28/2022    AST 8 08/28/2022    ALKPHOS 72 08/28/2022    BILITOT 0 66 09/08/2015     No results found for: AMYLASE  Lab Results   Component Value Date    LIPASE 79 08/15/2022     Lab Results   Component Value Date    IRON 19 (L) 08/28/2022    TIBC 105 (L) 08/28/2022    FERRITIN 755 (H) 08/28/2022     Lab Results   Component Value Date    INR 1 11 08/19/2022

## 2022-08-29 NOTE — OCCUPATIONAL THERAPY NOTE
Occupational Therapy Cx Note     Patient Name: Mary Silverman  Today's Date: 8/29/2022  Problem List  Principal Problem:    Toxic gastroenteritis and colitis  Active Problems:    [facility] )    Mixed hyperlipidemia    Chronic kidney disease (CKD) stage G3a/A1, moderately decreased glomerular filtration rate (GFR) between 45-59 mL/min/1 73 square meter and albuminuria creatinine ratio less than 30 mg/g (HCC)    Primary hypertension    Anemia    Severe protein-calorie malnutrition (HCC)    Single subsegmental pulmonary embolism without acute cor pulmonale ([facility] )          08/29/22 0810   OT Last Visit   OT Visit Date 08/29/22   Note Type   Note type Cancelled Session   Additional Comments OT re-consult received this date  Pt's Hgb 6 3 which is contraindicated to therapy intervention  Will follow-up as schedule allows & pt appropriate       Krishan Ellis OTR/L

## 2022-08-29 NOTE — PROGRESS NOTES
[place]  Progress Note Giana Silverman 1940, 79 y o  female MRN: [id number]  Unit/Bed#: -[place] Encounter: [id number]  Primary Care Provider: Mary Santos DO   Date and time admitted to hospital: 8/15/2022 10:40 AM    Single subsegmental pulmonary embolism without acute cor pulmonale (HCC)  Assessment & Plan  · Venous duplex obtained due to LE edema  · Confirmed acute occlusive thrombus in popliteal vein and non-occlusive thrombus in the peroneal veins  · Started on IV heparin gtt 8/19t  · Suspect in setting of known metastatic cancer and patient has been more sedentary in recent weeks due to acute illness  · Transition to NOAC 8/25  · With persistent sinus tachycardia  No heart strain noted and no chest pain or palpitations  · Continue to monitor closely    Severe protein-calorie malnutrition ([address] )  Assessment & Plan  Malnutrition Findings:   Adult Malnutrition type: Chronic illness  Adult Degree of Malnutrition: Other severe protein calorie malnutrition  Malnutrition Characteristics: Inadequate energy, Weight loss    360 Statement: Pt presents with chronic severe protein calorie malnutrition due to Stage IV metastatic breast cancer, chronic diarrhea vs UC flare as evidenced by 10 5 lb wt loss-14% wt loss in 4 months and <75 % po intake > 1 month  BMI Findings: Body mass index is 27 21 kg/m²         Anemia  Assessment & Plan  · History of chronic anemia, baseline 8-10  · Underwent flex sig which showed inflammation, ulcerations  · Also with BRBPR since starting heparin gtt  · Trend H/H  · Prior provider discussed with GI - recommending IV venofer, continue on IV Venofer  · Hemoglobin this morning is 6 3  · Repeat hemoglobin came back at 7 5  · Hold off on transfusion at this time  · GI follow-up  · GI follow-up  · On Protonix    Primary hypertension  Assessment & Plan  · Blood pressure stable off cozaar    Chronic kidney disease (CKD) stage G3a/A1, moderately decreased glomerular filtration rate (GFR) between 45-59 mL/min/1 73 square meter and albuminuria creatinine ratio less than 30 mg/g [facility])  Assessment & Plan  Lab Results   Component Value Date    EGFR 78 08/28/2022    EGFR 56 08/24/2022    EGFR 63 08/23/2022    CREATININE 0 72 08/28/2022    CREATININE 0 94 08/24/2022    CREATININE 0 86 08/23/2022     · Cr on admission slightly elevated from her baseline at 1 3, most likely due to volume depletion  Now improved after IVF  · Baseline 0 8    Mixed hyperlipidemia  Assessment & Plan  · Continue statin    Metastatic breast cancer [facility])  Assessment & Plan  · Continue to follow-up with Hematology-Oncology outpatient  · Femara had been held on admit  · On recent admission patient's verzenio was held  Reviewed office visit from 8/10/22 appears there was some consideration to resume but at lowered dose  Patient reports she never resumed this medication  · Patient will require follow-up to discuss possible lumbar mets noted on CT this admission    * Toxic gastroenteritis and colitis  Assessment & Plan  · Patient presented with dizziness and diarrhea starting in August that worsened yesterday  · Most likely secondary to IBD  · CT showed: 1  Diffuse mild-moderate circumferential wall thickening of the colon with pericolic inflammatory stranding consistent with nonspecific pancolitis  2   Multiple stable small scattered sclerotic foci in the visualized lumbar lower thoracic spine osseous pelvis which may reflect osseous metastatic disease in this patient with known metastatic breast cancer  3   Additional stable findings as noted   · GI consulted-> recs appreciated  · Off IV fluids  · Discontinue Zosyn as symptoms are most likely secondary to ulcerative colitis  · Steroids initiated 8/17  · F/u bld cx and procal, ESR, CRP, and lipase, C Diff  · ESR:  48  · Procalcitonin:  1 49  · CRP:  206 3  · C   Diff: negative  · Stool enteric panel: negative  · Advanced to low-fiber · Cholestyramine  · Oncology consulted by GI for further recommendations regarding current cancer regimen playing a role in symptoms  Previously believed due to verzenio which has been on hold since recent admit, doubt this is contributing   · Flex sig completed 8/20 concerning for IBD, biopsies taken  Also noted ulcerations  · Biopsy reveals colitis  · IV steroids now to be transitioned back to oral prednisone, continue  · Patient still with frequent loose stools  Appreciate ongoing Gastroenterology recommendations  · Repeat CT abd demonstrates ongoing pancolitis  Feels better and is hungry today  Will resume diet  · Hemoglobin this morning is 6 3  · Repeat hemoglobin this morning is 7 5  · Will repeat hemoglobin this evening and transfuse to keep hemoglobin greater than 6 5      Labs & Imaging: I have personally reviewed pertinent reports  VTE Prophylaxis: in place  Code Status:   Level 3 - DNAR and DNI    Patient Centered Rounds: I have performed bedside rounds with nursing staff today  Discussions with Specialists or Other Care Team Provider:  GI    Education and Discussions with Family / Patient:  Daughter    Current Length of Stay: 14 day(s)    Current Patient Status: Inpatient   Certification Statement: The patient will continue to require additional inpatient hospital stay due to see my assessment and plan  Subjective:   Patient is seen and examined at bedside  Denies any chest pain, shortness of breath, lightheadedness or dizziness  Afebrile  Still has 4-5 bloody bowel movements per day  All other ROS are negative  Objective:    Vitals: Blood pressure 109/64, pulse (!) 118, temperature 97 8 °F (36 6 °C), resp  rate 15, height 5' 1" (1 549 m), weight 65 3 kg (144 lb), SpO2 96 %, not currently breastfeeding  ,Body mass index is 27 21 kg/m²    SPO2 RA Rest    Flowsheet Row ED to Hosp-Admission (Current) from 8/15/2022 in [facility]   SpO2 96 %   SpO2 Activity At Rest   O2 Device None (Room air)   O2 Flow Rate --        I&O:     Intake/Output Summary (Last 24 hours) at 8/29/2022 0759  Last data filed at 8/28/2022 1725  Gross per 24 hour   Intake 0 ml   Output --   Net 0 ml       Physical Exam:    General- Alert, lying comfortably in bed  Not in any acute distress  Neck- Supple, No JVD  CVS- regular, S1 and S2 normal  Chest- Bilateral Air entry, No rhochi, crackles or wheezing present  Abdomen- soft, nontender, not distended, no guarding or rigidity, BS+  Extremities-  No pedal edema, No calf tenderness  CNS-   Alert, awake and orientedx3  No focal deficits present      Invasive Devices  Report    Peripherally Inserted Central Catheter Line  Duration           PICC Line 08/19/22 9 days                      Social History  reviewed  Family History   Problem Relation Age of Onset    Stomach cancer Mother     No Known Problems Father     Cervical cancer Sister     No Known Problems Daughter     No Known Problems Maternal Grandmother     No Known Problems Maternal Grandfather     No Known Problems Paternal Grandmother     No Known Problems Paternal Grandfather     Colon polyps Neg Hx     Colon cancer Neg Hx     reviewed    Meds:  Current Facility-Administered Medications   Medication Dose Route Frequency Provider Last Rate Last Admin    acetaminophen (TYLENOL) tablet 650 mg  650 mg Oral Q6H PRN Maribel Amaro MD   650 mg at 08/26/22 2203    amitriptyline (ELAVIL) tablet 25 mg  25 mg Oral HS Maribel Amaro MD   25 mg at 08/28/22 2146    calcium carbonate (TUMS) chewable tablet 500 mg  500 mg Oral Daily PRN Maribel Amaro MD   500 mg at 08/26/22 2202    cholestyramine sugar free (QUESTRAN LIGHT) packet 4 g  4 g Oral BID Maribel Amaro MD   4 g at 08/28/22 0841    iron sucrose (VENOFER) 200 mg in sodium chloride 0 9 % 100 mL IVPB  200 mg Intravenous Once per day on Mon Wed Fri Rosales Dillon PA-C   200 mg at 08/26/22 6466    loperamide (IMODIUM) capsule 2 mg  2 mg Oral Q4H PRN Leticia WOODARD PA-C   2 mg at 08/28/22 1731    mesalamine (DELZICOL) delayed release capsule 800 mg  800 mg Oral TID Alison Blanchard MD   800 mg at 08/28/22 2146    mirtazapine (REMERON) tablet 7 5 mg  7 5 mg Oral HS Alison Blanchard MD   7 5 mg at 08/28/22 2146    ondansetron (ZOFRAN) injection 4 mg  4 mg Intravenous Q6H PRN Alison Blanchard MD   4 mg at 08/18/22 4849    pantoprazole (PROTONIX) EC tablet 40 mg  40 mg Oral BID AC BROOKE Helms   40 mg at 08/29/22 0518    pravastatin (PRAVACHOL) tablet 20 mg  20 mg Oral Daily With Fred Ku MD   20 mg at 08/28/22 1731    predniSONE tablet 40 mg  40 mg Oral Daily Padmini WOODARD PA-C   40 mg at 08/28/22 0595    rivaroxaban (XARELTO) tablet 15 mg  15 mg Oral BID With Meals Padmini WOODARD PA-C   15 mg at 08/28/22 1731    simethicone (MYLICON) chewable tablet 80 mg  80 mg Oral 4x Daily (with meals and at bedtime) Leticia WOODARD PA-C   80 mg at 08/28/22 2146      Medications Prior to Admission   Medication    Abemaciclib (Verzenio) 50 MG TABS    amitriptyline (ELAVIL) 25 mg tablet    Cholecalciferol (VITAMIN D3) 2000 units capsule    diphenoxylate-atropine (LOMOTIL) 2 5-0 025 mg per tablet    letrozole (FEMARA) 2 5 mg tablet    loperamide (IMODIUM) 2 mg capsule    losartan (COZAAR) 50 mg tablet    mirtazapine (REMERON) 7 5 MG tablet    simvastatin (ZOCOR) 10 mg tablet       Labs:  Results from last 7 days   Lab Units 08/29/22  0440 08/28/22  0515 08/27/22  0540 08/26/22  0645 08/24/22  1327   WBC Thousand/uL 4 46 4 25* 5 75   < > 8 33   HEMOGLOBIN g/dL 6 3* 6 7* 7 1*   < > 7 7*   HEMATOCRIT % 20 3* 21 2* 21 3*   < > 24 0*   PLATELETS Thousands/uL 176 190 179   < > 237   NEUTROS PCT %  --   --   --   --  80*   LYMPHS PCT %  --   --   --   --  7*   LYMPHO PCT % 17 7* 12*   < >  --    MONOS PCT %  --   --   --   --  9   MONO PCT % 9 4 2*   < >  --    EOS PCT % 0 1 2 < > 0    < > = values in this interval not displayed  Results from last 7 days   Lab Units 08/28/22  0515 08/24/22  1327 08/23/22  1421   POTASSIUM mmol/L 3 4* 3 7 3 6   CHLORIDE mmol/L 105 109* 110*   CO2 mmol/L 26 23 23   BUN mg/dL 10 10 9   CREATININE mg/dL 0 72 0 94 0 86   CALCIUM mg/dL 6 6* 6 9* 6 8*   ALK PHOS U/L 72 92 90   ALT U/L 17 22 19   AST U/L 8 14 15     No results found for: TROPONINI, CKMB, CKTOTAL      Lab Results   Component Value Date    BLOODCX No Growth After 5 Days  08/15/2022    BLOODCX No Growth After 5 Days  08/15/2022         Imaging:  Results for orders placed during the hospital encounter of 08/01/22    XR Trauma chest portable    Narrative  CHEST    INDICATION:   TRAUMA  COMPARISON:  6/18/2021  EXAM PERFORMED/VIEWS:  XR CHEST PORTABLE      FINDINGS:    Cardiomediastinal silhouette appears unremarkable  The lungs are clear  No pneumothorax or pleural effusion  Osseous structures appear within normal limits for patient age  Impression  No acute cardiopulmonary disease  Workstation performed: [id number]    Results for orders placed in visit on 06/18/21    XR chest pa & lateral    Narrative  CHEST    INDICATION:   R07 89: Other chest pain  Cough since January  History of breast cancer  Anterior upper costochondral pain  COMPARISON:  Chest x-ray from 1/12/2021  Chest CT from 9/13/2013  EXAM PERFORMED/VIEWS:  XR CHEST PA & LATERAL      FINDINGS:    Cardiomediastinal silhouette appears unremarkable  The lungs are clear  No pneumothorax or pleural effusion  Osseous structures appear within normal limits for patient age  There is a bone island in the proximal left clavicle  Impression  No acute cardiopulmonary disease              Workstation performed: [id number]      Last 24 Hours Medication List:   Current Facility-Administered Medications   Medication Dose Route Frequency Provider Last Rate    acetaminophen  650 mg Oral Q6H PRN Lenn Negus Mo Brock MD      amitriptyline  25 mg Oral HS Emily Robbins MD      calcium carbonate  500 mg Oral Daily PRN Emily Robbins MD      cholestyramine sugar free  4 g Oral BID Emily Robbins MD      iron sucrose  200 mg Intravenous Once per day on Mon Wed Fri Clarence Pierson PA-C      loperamide  2 mg Oral Q4H PRN Yasmeen Layton PA-C      mesalamine  800 mg Oral TID Emily Robbins MD      mirtazapine  7 5 mg Oral HS Emily Robbins MD      ondansetron  4 mg Intravenous Q6H PRN Emily Robbins MD      pantoprazole  40 mg Oral BID AC BROOKE Roldan      pravastatin  20 mg Oral Daily With Ebb MD Maximus      predniSONE  40 mg Oral Daily [facility]      rivarox[facility]  [facility] BID [facility]      simethicone  80 mg Oral 4x Daily (with meals and at bedtime) Yasmeen Layton PA-C          Today, Patient Was Seen By: Lisa Bronson MD    ** Please Note: Dictation voice to text software may have been used in the creation of this document   **

## 2022-08-29 NOTE — PLAN OF CARE
Problem: MOBILITY - ADULT  Goal: Maintain or return to baseline ADL function  Description: INTERVENTIONS:  -  Assess patient's ability to carry out ADLs; assess patient's baseline for ADL function and identify physical deficits which impact ability to perform ADLs (bathing, care of mouth/teeth, toileting, grooming, dressing, etc )  - Assess/evaluate cause of self-care deficits   - Assess range of motion  - Assess patient's mobility; develop plan if impaired  - Assess patient's need for assistive devices and provide as appropriate  - Encourage maximum independence but intervene and supervise when necessary  - Involve family in performance of ADLs  - Assess for home care needs following discharge   - Consider OT consult to assist with ADL evaluation and planning for discharge  - Provide patient education as appropriate  Outcome: Progressing  Goal: Maintains/Returns to pre admission functional level  Description: INTERVENTIONS:  - Perform BMAT or MOVE assessment daily    - Set and communicate daily mobility goal to care team and patient/family/caregiver  - Collaborate with rehabilitation services on mobility goals if consulted  - Perform Range of Motion 3 times a day  - Reposition patient every 2 hours    - Dangle patient 3 times a day  - Stand patient 3 times a day  - Ambulate patient 3 times a day  - Out of bed to chair 3 times a day   - Out of bed for meals 3 times a day  - Out of bed for toileting  - Record patient progress and toleration of activity level   Outcome: Progressing     Problem: SAFETY ADULT  Goal: Maintain or return to baseline ADL function  Description: INTERVENTIONS:  -  Assess patient's ability to carry out ADLs; assess patient's baseline for ADL function and identify physical deficits which impact ability to perform ADLs (bathing, care of mouth/teeth, toileting, grooming, dressing, etc )  - Assess/evaluate cause of self-care deficits   - Assess range of motion  - Assess patient's mobility; develop plan if impaired  - Assess patient's need for assistive devices and provide as appropriate  - Encourage maximum independence but intervene and supervise when necessary  - Involve family in performance of ADLs  - Assess for home care needs following discharge   - Consider OT consult to assist with ADL evaluation and planning for discharge  - Provide patient education as appropriate  Outcome: Progressing  Goal: Maintains/Returns to pre admission functional level  Description: INTERVENTIONS:  - Perform BMAT or MOVE assessment daily    - Set and communicate daily mobility goal to care team and patient/family/caregiver  - Collaborate with rehabilitation services on mobility goals if consulted  - Perform Range of Motion 3 times a day  - Reposition patient every 2 hours    - Dangle patient 3 times a day  - Stand patient 3 times a day  - Ambulate patient 3 times a day  - Out of bed to chair 3 times a day   - Out of bed for meals 3 times a day  - Out of bed for toileting  - Record patient progress and toleration of activity level   Outcome: Progressing

## 2022-08-29 NOTE — ASSESSMENT & PLAN NOTE
· Patient presented with dizziness and diarrhea starting in August that worsened yesterday  · Most likely secondary to IBD  · CT showed: 1  Diffuse mild-moderate circumferential wall thickening of the colon with pericolic inflammatory stranding consistent with nonspecific pancolitis  2   Multiple stable small scattered sclerotic foci in the visualized lumbar lower thoracic spine osseous pelvis which may reflect osseous metastatic disease in this patient with known metastatic breast cancer  3   Additional stable findings as noted   · GI consulted-> recs appreciated  · Off IV fluids  · Discontinue Zosyn as symptoms are most likely secondary to ulcerative colitis  · Steroids initiated 8/17  · F/u bld cx and procal, ESR, CRP, and lipase, C Diff  · ESR:  48  · Procalcitonin:  1 49  · CRP:  206 3  · C  Diff: negative  · Stool enteric panel: negative  · Advanced to low-fiber   · Cholestyramine  · Oncology consulted by GI for further recommendations regarding current cancer regimen playing a role in symptoms  Previously believed due to verzenio which has been on hold since recent admit, doubt this is contributing   · Flex sig completed 8/20 concerning for IBD, biopsies taken  Also noted ulcerations  · Biopsy reveals colitis  · IV steroids now to be transitioned back to oral prednisone, continue  · Patient still with frequent loose stools  Appreciate ongoing Gastroenterology recommendations  · Repeat CT abd demonstrates ongoing pancolitis  Feels better and is hungry today  Will resume diet    · Hemoglobin this morning is 6 3  · Repeat hemoglobin this morning is 7 5  · Will repeat hemoglobin this evening and transfuse to keep hemoglobin greater than 6 5

## 2022-08-29 NOTE — QUICK NOTE
Notified of Hgb 6 3, due to blood shortage and patient's blood type Hgb has to be less than 6 0 to transfuse per blood bank  Will recheck at 0800 with type and screen as prior is   Discussed with RN

## 2022-08-29 NOTE — TELEPHONE ENCOUNTER
Pt is still hospitalized with expected d/c date today. Rachel and Dr Cardoso are aware.  For now, pt is scheduled on 10/7 with Dr Cardoso but this appt will need to be moved up to discuss the use of Femara and molecular test.    Sarah- keep you in the loop. Thanks.

## 2022-08-29 NOTE — PROGRESS NOTES
Medical Oncology/Hematology Progress Note  Mario Silverman, female, 79 y  o , 1940,  /-[birth year], [id number]     Assessment and Plan  1  Metastatic breast cancer  Patient has a history stage I A ER/TX positive, 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 s/p resection and adjuvant radiation therapy   She did receive 5 years of adjuvant hormonal therapy with Arimidex     Due to symptoms of bony discomfort in her sternum she underwent imaging on 6/25/2021   CT of chest demonstrate evidence of a large lytic lesion involving the upper half of the body of the sternum suspicious for metastatic disease  CT of abdomen and pelvis showed additional osseous lesions      On 07/09/2021 patient underwent IR bone biopsy:  Final Diagnosis  A  Bone, Sternal mass, Biopsy:  - Metastatic carcinoma, consistent with breast primary    Patient now has stage IV disease with bony metastasis which was ER positive, TX negative, HER2 negative     Since patient had Oligometastatic metastatic disease she was started on treatment with Faslodex in combination with [facility] July 2021  She was treated with a course of radiation therapy at [address] [place] [address] 4/5/22  showed interval development of a 4 mm nodule at the right lower lobe along with interval development of increased sclerotic lesions throughout the visualized spine for example there is interval development of 7 mm sclerotic lesion in the anterior superior aspect of T8 concerning for metastases     Patient's treatment was changed to Femara and Kisqali in 4/2022     Due to poor tolerance, Marge Coast was discontinued and patient was started on Verzenio 150 mg p o  along with Femara 07/18/2022     Verzenio has been on hold since 8/1/22 and will be permanently discontinued    Patient's letrozole has been held this hospital stay  There is no indication to hold this medication    This is an aromatase inhibitor and will not exacerbate side effects of diarrhea from colitis for which she is being treated  I discussed with patient today my recommendation to restart her letrozole since she does have metastatic breast cancer this will at least provide some treatment for her cancer  Patient is in agreement  PLAN:  · Restart Letrozole 2 5 mg daily   · I will arrange for outpatient follow-up upon discharge to discuss additional treatment options  · We will send off molecular studies to look for any targetable mutations  If she has a PIK3CA mutation, can consider Piqray  · Other consideration include Ibrance verses Afinitor     2  Acute colitis  Patient continues to experience bloody diarrhea  She is being treated for acute colitis/new onset inflammatory bowel disease with steroids  Consideration will be given for Remicade if symptoms do not improve  Appreciate GI management and recs    3  Acute blood loss anemia  Secondary to #2  Hemoglobin   Date Value Ref Range Status   08/29/2022 7 5 (L) 11 5 - 15 4 g/dL Final   08/29/2022 6 3 (LL) 11 5 - 15 4 g/dL Final   08/28/2022 6 7 (LL) 11 5 - 15 4 g/dL Final   08/27/2022 7 1 (L) 11 5 - 15 4 g/dL Final   08/26/2022 7 0 (L) 11 5 - 15 4 g/dL Final   09/08/2015 14 7 11 5 - 15 4 g/dL Final     Patient is receiving IV Venofer 200 mg 3 times a week  Please continue to monitor and transfuse as needed        HPI:  Annabella Mahajan is a 79 y o  female who presents with dizziness and diarrhea on 8/15/22  CT AP with contrast shows:   IMPRESSION:   1   Diffuse mild-moderate circumferential wall thickening of the colon with pericolic inflammatory stranding consistent with nonspecific pancolitis  2   Multiple stable small scattered sclerotic foci in the visualized lumbar lower thoracic spine osseous pelvis which may reflect osseous metastatic disease in this patient with known metastatic breast cancer         Kerry A Clunk current outpatient oncology regimen is: Letrozole and Verzenio, last dose Verzenio was 8/1/22    Her letrozole has been on hold this [facility] stay        Oncologic History:  Primary Outpatient Hematology/Oncology Provider: Fortino Litten, M D  Oncology History   Per Dr Lotus Najjar last clinic note dated 7/18/22  " patient is a pleasant 79-year-old female with a past medical history of ER positive breast cancer who completed 5 years of adjuvant treatment with aromatase inhibitor for early stage breast cancer   She was on observation then had some bony chest discomfort   Imaging revealed a lytic lesion in the sternum   Biopsy revealed ER positive breast cancer   Restaging revealed 1 more area in the spine around 8 mm that was positive but no other evidence of metastatic disease   Since her disease is ER positive   She was started on Faslodex and Khloe Laird received radiation at [facility] last imaging then showed interval development of a 4 mm nodule at the right lower lobe along with interval development of increased sclerotic lesions throughout the visualized spine for example there is interval development of 7 mm sclerotic lesion in the anterior superior aspect of T8 concerning for metastases    We decided to put the patient on Femara and Kisqali   She has not tolerated Roosevelt Ruder and has had   Nausea and fatigue   She has lost weight  She states her nausea resolved when she stopped taking Korin Belts this point I will discontinue Roosevelt Ruder  Tumor marker at this point is stable   It was elevated in December of 2021 and again in September of 2021  She will stay on her Molinda Td will add on Verzenio "     Patient experienced grade 3 diarrhea resulting in dehydration and hospitalization a few weeks ago  Her Verzenio has been on hold          Review of Systems   Constitutional: Positive for fatigue  Gastrointestinal: Positive for blood in stool and diarrhea  Neurological: Positive for extremity weakness  All other systems reviewed and are negative  Interval History:  Patient seen and examined  She appears pale, tired    She reports she continues to have bloody diarrhea  She is frustrated  Denies any abdominal pain  Denies any lightheadedness or dizziness  She has become anemic from blood loss  She is receiving IV Venofer  /64   Pulse (!) 112   Temp 97 7 °F (36 5 °C)   Resp 15   Ht 5' 1" (1 549 m)   Wt 65 3 kg (144 lb)   LMP  (LMP Unknown)   SpO2 95%   BMI 27 21 kg/m²       Physical Exam  Constitutional:       General: She is not in acute distress  Appearance: Normal appearance  HENT:      Head: Normocephalic and atraumatic  Eyes:      General: No scleral icterus  Right eye: No discharge  Left eye: No discharge  Conjunctiva/sclera: Conjunctivae normal    Cardiovascular:      Rate and Rhythm: Normal rate and regular rhythm  Pulmonary:      Effort: Pulmonary effort is normal  No respiratory distress  Chest:   Breasts:      Right: No axillary adenopathy or supraclavicular adenopathy  Left: No axillary adenopathy or supraclavicular adenopathy  Musculoskeletal:         General: Normal range of motion  Lymphadenopathy:      Cervical: No cervical adenopathy  Upper Body:      Right upper body: No supraclavicular, axillary or pectoral adenopathy  Left upper body: No supraclavicular, axillary or pectoral adenopathy  Skin:     General: Skin is warm and dry  Coloration: Skin is pale  Neurological:      General: No focal deficit present  Mental Status: She is alert and oriented to person, place, and time     Psychiatric:         Mood and Affect: Mood normal          Behavior: Behavior normal          Recent Results (from the past 48 hour(s))   Comprehensive metabolic panel    Collection Time: 08/28/22  5:15 AM   Result Value Ref Range    Sodium 137 135 - 147 mmol/L    Potassium 3 4 (L) 3 5 - 5 3 mmol/L    Chloride 105 96 - 108 mmol/L    CO2 26 21 - 32 mmol/L    ANION GAP 6 4 - 13 mmol/L    BUN 10 5 - 25 mg/dL    Creatinine 0 72 0 60 - 1 30 mg/dL    Glucose 80 65 - 140 mg/dL    Calcium 6 6 (L) 8 3 - 10 1 mg/dL    Corrected Calcium 8 5 8 3 - 10 1 mg/dL    AST 8 5 - 45 U/L    ALT 17 12 - 78 U/L    Alkaline Phosphatase 72 46 - 116 U/L    Total Protein 4 4 (L) 6 4 - 8 4 g/dL    Albumin 1 6 (L) 3 5 - 5 0 g/dL    Total Bilirubin 0 40 0 20 - 1 00 mg/dL    eGFR 78 ml/min/1 73sq m   CBC and differential    Collection Time: 08/28/22  5:15 AM   Result Value Ref Range    WBC 4 25 (L) 4 31 - 10 16 Thousand/uL    RBC 2 13 (L) 3 81 - 5 12 Million/uL    Hemoglobin 6 7 (LL) 11 5 - 15 4 g/dL    Hematocrit 21 2 (L) 34 8 - 46 1 %     (H) 82 - 98 fL    MCH 31 5 26 8 - 34 3 pg    MCHC 31 6 31 4 - 37 4 g/dL    RDW 22 3 (H) 11 6 - 15 1 %    MPV 10 4 8 9 - 12 7 fL    Platelets 224 833 - 187 Thousands/uL   Manual Differential(PHLEBS Do Not Order)    Collection Time: 08/28/22  5:15 AM   Result Value Ref Range    Segmented % 83 (H) 43 - 75 %    Bands % 5 0 - 8 %    Lymphocytes % 7 (L) 14 - 44 %    Monocytes % 4 4 - 12 %    Eosinophils, % 1 0 - 6 %    Basophils % 0 0 - 1 %    Absolute Neutrophils 3 74 1 85 - 7 62 Thousand/uL    Lymphocytes Absolute 0 30 (L) 0 60 - 4 47 Thousand/uL    Monocytes Absolute 0 17 0 00 - 1 22 Thousand/uL    Eosinophils Absolute 0 04 0 00 - 0 40 Thousand/uL    Basophils Absolute 0 00 0 00 - 0 10 Thousand/uL    Total Counted      RBC Morphology Normal     Platelet Estimate Adequate Adequate   Iron Saturation %    Collection Time: 08/28/22  5:15 AM   Result Value Ref Range    Iron Saturation 18 15 - 50 %    TIBC 105 (L) 250 - 450 ug/dL    Iron 19 (L) 50 - 170 ug/dL   Ferritin    Collection Time: 08/28/22  5:15 AM   Result Value Ref Range    Ferritin 755 (H) 8 - 388 ng/mL   Basic metabolic panel    Collection Time: 08/29/22  4:40 AM   Result Value Ref Range    Sodium 138 135 - 147 mmol/L    Potassium 4 0 3 5 - 5 3 mmol/L    Chloride 107 96 - 108 mmol/L    CO2 26 21 - 32 mmol/L    ANION GAP 5 4 - 13 mmol/L    BUN 11 5 - 25 mg/dL    Creatinine 0 86 0 60 - 1 30 mg/dL    Glucose 101 65 - 140 mg/dL    Calcium 7 0 (L) 8 3 - 10 1 mg/dL    eGFR 63 ml/min/1 73sq m   CBC and differential    Collection Time: 08/29/22  4:40 AM   Result Value Ref Range    WBC 4 46 4 31 - 10 16 Thousand/uL    RBC 2 01 (L) 3 81 - 5 12 Million/uL    Hemoglobin 6 3 (LL) 11 5 - 15 4 g/dL    Hematocrit 20 3 (L) 34 8 - 46 1 %     (H) 82 - 98 fL    MCH 31 3 26 8 - 34 3 pg    MCHC 31 0 (L) 31 4 - 37 4 g/dL    RDW 21 9 (H) 11 6 - 15 1 %    MPV 10 0 8 9 - 12 7 fL    Platelets 516 571 - 725 Thousands/uL   Manual Differential(PHLEBS Do Not Order)    Collection Time: 08/29/22  4:40 AM   Result Value Ref Range    Segmented % 69 43 - 75 %    Bands % 4 0 - 8 %    Lymphocytes % 17 14 - 44 %    Monocytes % 9 4 - 12 %    Eosinophils, % 0 0 - 6 %    Basophils % 1 0 - 1 %    Absolute Neutrophils 3 26 1 85 - 7 62 Thousand/uL    Lymphocytes Absolute 0 76 0 60 - 4 47 Thousand/uL    Monocytes Absolute 0 40 0 00 - 1 22 Thousand/uL    Eosinophils Absolute 0 00 0 00 - 0 40 Thousand/uL    Basophils Absolute 0 04 0 00 - 0 10 Thousand/uL    Total Counted      RBC Morphology Present     Anisocytosis Present     Platelet Estimate Borderline (A) Adequate   C-reactive protein    Collection Time: 08/29/22  4:40 AM   Result Value Ref Range     4 (H) <3 0 mg/L   Hemoglobin and hematocrit, blood    Collection Time: 08/29/22  8:54 AM   Result Value Ref Range    Hemoglobin 7 5 (L) 11 5 - 15 4 g/dL    Hematocrit 24 3 (L) 34 8 - 46 1 %   Type and screen    Collection Time: 08/29/22  8:54 AM   Result Value Ref Range    ABO Grouping O     Rh Factor Negative     Antibody Screen Positive     Specimen Expiration Date [id number]        XR Trauma chest portable    Result Date: 8/1/2022  Narrative: CHEST INDICATION:   TRAUMA  COMPARISON:  6/18/2021  EXAM PERFORMED/VIEWS:  XR CHEST PORTABLE FINDINGS: Cardiomediastinal silhouette appears unremarkable  The lungs are clear  No pneumothorax or pleural effusion   Osseous structures appear within normal limits for patient age  Impression: No acute cardiopulmonary disease  Workstation performed: [id number]     TRAUMA - CT head wo contrast    Result Date: 8/1/2022  Narrative: CT BRAIN - WITHOUT CONTRAST INDICATION:   TRAUMA  COMPARISON:  None  TECHNIQUE:  CT examination of the brain was performed  In addition to axial images, sagittal and coronal 2D reformatted images were created and submitted for interpretation  Radiation dose length product (DLP) for this visit:  865 64 mGy-cm   This examination, like all CT scans performed in the [facility], was performed utilizing techniques to minimize radiation dose exposure, including the use of iterative  reconstruction and automated exposure control  IMAGE QUALITY:  Diagnostic  FINDINGS: PARENCHYMA: Decreased attenuation is noted in periventricular and subcortical white matter demonstrating an appearance that is statistically most likely to represent mild microangiopathic change  No CT signs of acute infarction  No intracranial mass, mass effect or midline shift  No acute parenchymal hemorrhage  VENTRICLES AND EXTRA-AXIAL SPACES:  Normal for the patient's age  VISUALIZED ORBITS AND PARANASAL SINUSES:  Unremarkable  CALVARIUM AND EXTRACRANIAL SOFT TISSUES:  Normal      Impression: No acute intracranial abnormality  Workstation performed: [id number]     TRAUMA - CT spine cervical wo contrast    Result Date: 8/1/2022  Narrative: CT CERVICAL SPINE - WITHOUT CONTRAST INDICATION:   TRAUMA  COMPARISON:  None  TECHNIQUE:  CT examination of the cervical spine was performed without intravenous contrast   Contiguous axial images were obtained  Sagittal and coronal reconstructions were performed  Radiation dose length product (DLP) for this visit:  367 15 mGy-cm     This examination, like all CT scans performed in the [facility], was performed utilizing techniques to minimize radiation dose exposure, including the use of iterative  reconstruction and automated exposure control  IMAGE QUALITY:  Diagnostic  FINDINGS: ALIGNMENT:  Normal alignment of the cervical spine  No subluxation  VERTEBRAL BODIES:  No fracture  DEGENERATIVE CHANGES:  Mild multilevel cervical degenerative changes are noted without critical central canal stenosis  PREVERTEBRAL AND PARASPINAL SOFT TISSUES:  Unremarkable  THORACIC INLET:  Normal      Impression: No cervical spine fracture or traumatic malalignment  Workstation performed: [id number]     XR Trauma pelvis ap only 1 or 2 vw    Result Date: 8/1/2022  Narrative: PELVIS INDICATION:   TRAUMA  COMPARISON:  None VIEWS:  XR PELVIS AP ONLY 1 OR 2 VW FINDINGS: No acute fracture or hip dislocation  No significant degenerative changes  No lytic or blastic osseous lesion  Soft tissues are unremarkable  The visualized lumbar spine is unremarkable  Impression: No acute osseous abnormality  Workstation performed: [id number]     CTA chest pe study    Result Date: 8/21/2022  Narrative: CTA - CHEST WITH IV CONTRAST - PULMONARY ANGIOGRAM INDICATION:   Pulmonary embolism (PE) suspected, unknown D-dimer known DVT, r/o PE  COMPARISON: CT 4/5/2022  TECHNIQUE: CTA examination of the chest was performed using angiographic technique according to a protocol specifically tailored to evaluate for pulmonary embolism  Axial, sagittal, and coronal 2D reformatted images were created from the source data and  submitted for interpretation  In addition, coronal 3D MIP postprocessing was performed on the acquisition scanner  Radiation dose length product (DLP) for this visit:  244 mGy-cm   This examination, like all CT scans performed in the [facility], was performed utilizing techniques to minimize radiation dose exposure, including the use of iterative reconstruction and automated exposure control   IV Contrast:  85 mL of iohexol (OMNIPAQUE)  FINDINGS: PULMONARY ARTERIAL TREE:  Filling defect in a right lower lobe subsegmental artery compatible with embolus  No central embolus  RV/LV ratio of 0 77 is within normal limits  LUNGS:  Minimal bibasilar atelectasis  Unchanged 3 mm right lower lobe nodule, image 3/63  Multiple other stable tiny calcified and noncalcified pulmonary nodules  There is no tracheal or endobronchial lesion  PLEURA:  Trace left effusion  No right effusion  HEART/GREAT VESSELS:  Unremarkable for patient's age  No thoracic aortic aneurysm  Left PICC terminates at the cavoatrial junction  MEDIASTINUM AND MADAN:  Unremarkable  CHEST WALL AND LOWER NECK:   Unremarkable  VISUALIZED STRUCTURES IN THE UPPER ABDOMEN: Stable hepatic cysts  Cholecystectomy  OSSEOUS STRUCTURES:  No acute fracture or destructive osseous lesion  Impression: Right lower lobe subsegmental embolus  No central embolus  No CT evidence of right heart strain  I personally discussed this study with Elyssa Louis on 8/21/2022 at 8:57 AM  Workstation performed: [id number]     VAS lower limb venous duplex study, complete bilateral    Result Date: 8/19/2022  Narrative:  [facility] REPORT CLINICAL: Indications: Patient presents with bilateral lower extremity edema  History of Breast cancer  FINDINGS:  Right      Impression              Peroneal   Non Occlusive Thrombus  Popliteal  Occlusive Subsegmental     CONCLUSION: Impression: RIGHT LOWER LIMB: Evaluation shows acute occlusive thrombus in the popliteal vein and non-occlusive thrombus in the peroneal veins  No evidence of superficial thrombophlebitis noted  Doppler evaluation shows a normal response to augmentation maneuvers  Popliteal, posterior tibial and anterior tibial arterial Doppler waveforms are biphasic  LEFT LOWER LIMB: No evidence of acute or chronic deep vein thrombosis  No evidence of superficial thrombophlebitis noted  Doppler evaluation shows a normal response to augmentation maneuvers   Popliteal, posterior tibial and anterior tibial arterial Doppler waveforms are biphasic  Technical findings were given to Bree Mckeon on 8/16/2022 @ 1410  SIGNATURE: Electronically Signed by: Harry Garcia MD on [id number] 25:69:02 PM    CT abdomen pelvis w contrast    Result Date: 8/27/2022  Narrative: CT ABDOMEN AND PELVIS WITH IV CONTRAST INDICATION:   LLQ abdominal pain new onset severe LLQ abdominal pain, tachycardia, ongoing bloody stools  COMPARISON:  CT abdomen pelvis 8/15/2022 TECHNIQUE:  CT examination of the abdomen and pelvis was performed  Axial, sagittal, and coronal 2D reformatted images were created from the source data and submitted for interpretation  Radiation dose length product (DLP) for this visit:  830 mGy-cm   This examination, like all CT scans performed in the [facility], was performed utilizing techniques to minimize radiation dose exposure, including the use of iterative reconstruction and automated exposure control  IV Contrast:  65 mL of iohexol (OMNIPAQUE) Enteric Contrast:  Enteric contrast was not administered  FINDINGS: ABDOMEN LOWER CHEST:  Atelectatic changes are noted at the lung bases  LIVER/BILIARY TREE:  Scattered cysts and hypoattenuating foci that are too small to characterize but likely represent cysts are present  GALLBLADDER:  Gallbladder is surgically absent  SPLEEN:  Unremarkable  PANCREAS:  Unremarkable  ADRENAL GLANDS:  Unremarkable  KIDNEYS/URETERS:  Cortical and parapelvic cysts are again seen  No hydronephrosis  STOMACH AND BOWEL:  Pan-colonic wall thickening with pericolonic vascular engorgement is again seen, consistent with pancolitis  This is similar in appearance the prior CT  No bowel obstruction  APPENDIX:  No findings to suggest appendicitis  ABDOMINOPELVIC CAVITY:  Trace ascites is present along the right paracolic gutter  There is no well-formed fluid collection  There is no free intraperitoneal air  VESSELS:  Unremarkable for patient's age    The celiac axis and, superior mesenteric artery, and inferior mesenteric artery are patent  PELVIS REPRODUCTIVE ORGANS:  Unremarkable for patient's age  URINARY BLADDER:  Unremarkable  ABDOMINAL WALL/INGUINAL REGIONS:  Unremarkable  OSSEOUS STRUCTURES:  No acute fracture or destructive osseous lesion  Tiny sclerotic foci are again noted within the axial and appendicular skeleton in keeping with known metastatic breast cancer  Impression: No significant change in pan-colitis  Workstation performed: [id number]     CT abdomen pelvis with contrast    Result Date: 8/15/2022  Narrative: CT ABDOMEN AND PELVIS WITH IV CONTRAST INDICATION:   LLQ abdominal pain diarrhea, LLQ tenderness  COMPARISON:  CT 4/5/2022 TECHNIQUE:  CT examination of the abdomen and pelvis was performed  Axial, sagittal, and coronal 2D reformatted images were created from the source data and submitted for interpretation  Radiation dose length product (DLP) for this visit:  431 86 mGy-cm   This examination, like all CT scans performed in the [facility], was performed utilizing techniques to minimize radiation dose exposure, including the use of iterative  reconstruction and automated exposure control  IV Contrast:  65 mL of iohexol (OMNIPAQUE) Enteric Contrast:  Enteric contrast was not administered  FINDINGS: ABDOMEN LOWER CHEST:  Stable 3 mm nodule lateral left lung base (series 2 image 18)  Small hiatal hernia  The heart is normal in size  LIVER/BILIARY TREE:  Somewhat hypoplastic left lobe the liver  Stable small hepatic cysts some of which are too small fracture characterization  Diffuse hepatic steatosis  GALLBLADDER:  Gallbladder is surgically absent  SPLEEN:  Unremarkable  PANCREAS:  Unremarkable  ADRENAL GLANDS:  Unremarkable  KIDNEYS/URETERS:  One or more simple renal cysts are again noted, some of which are parapelvic in nature  Otherwise unremarkable kidneys  No hydronephrosis  STOMACH AND BOWEL:  Small hiatal hernia is present    There is diffuse mild-moderate circumferential bowel wall thickening present extending from the hepatic flexure to the rectum with mild pericolic inflammatory stranding consistent with diffuse colitis,  new since the study of 4/5/2022  No pneumatosis identified  No obstruction appreciated  APPENDIX:  No findings to suggest appendicitis  ABDOMINOPELVIC CAVITY:  No ascites  No pneumoperitoneum  No lymphadenopathy  VESSELS:  Atherosclerotic changes are present  No evidence of aneurysm  PELVIS REPRODUCTIVE ORGANS:  Unremarkable for patient's age  URINARY BLADDER:  Unremarkable  ABDOMINAL WALL/INGUINAL REGIONS:  Unremarkable  OSSEOUS STRUCTURES:  Again identified are multiple tiny scattered sclerotic foci throughout the spine and osseous pelvis which may be reflective of the patient's known underlying metastatic breast cancer  Impression: 1  Diffuse mild-moderate circumferential wall thickening of the colon with pericolic inflammatory stranding consistent with nonspecific pancolitis  2   Multiple stable small scattered sclerotic foci in the visualized lumbar lower thoracic spine osseous pelvis which may reflect osseous metastatic disease in this patient with known metastatic breast cancer  3   Additional stable findings as noted  The study was marked in [facility] for immediate notification  Workstation performed: [id number]     Flexible Sigmoidoscopy    Result Date: 8/20/2022  Narrative: [address]  [employer] [id number] [phone] DATE OF SERVICE: 8/20/22 PHYSICIAN(S): Attending: No Staff Documented Fellow: No Staff Documented INDICATION: Rectal bleeding POST-OP DIAGNOSIS: See the impression below  HISTORY: Prior colonoscopy: Less than 3 years ago  It is being repeated at an interval of less than 3 years because: This colonoscopy is being performed for a diagnostic indication BOWEL PREPARATION: Enema PREPROCEDURE: Informed consent was obtained for the procedure, including sedation   Risks including but not limited to bleeding, infection, perforation, adverse drug reaction and aspiration were explained in detail  Also explained about less than 100% sensitivity with the exam and other alternatives  The patient was placed in the left lateral decubitus position  DETAILS OF PROCEDURE: Patient was taken to the procedure room where a time out was performed to confirm correct patient and correct procedure  The patient underwent monitored anesthesia care, which was administered by an anesthesia professional  The patient's blood pressure, heart rate, level of consciousness, oxygen and respirations were monitored throughout the procedure  A digital rectal exam was performed  The scope was introduced through the anus and advanced to the sigmoid colon  The patient experienced no blood loss  The procedure was not difficult  The patient tolerated the procedure well  There were no apparent complications  ANESTHESIA INFORMATION: ASA: III Anesthesia Type: IV Sedation with Anesthesia MEDICATIONS: Totals unavailable because the procedure time range is not set FINDINGS: Severe erythematous, friable, hemorrhagic and ulcerated mucosa in the sigmoid colon and rectum; performed cold forceps biopsy EVENTS: Procedure Events Event Event Time SPECIMENS: * No specimens in log * EQUIPMENT: Scope not documented     Impression: Severe colitis into mid sigmoid colon with deep ulcerations  Inflammatory bowel disease? Infectious (CMV)? RECOMMENDATION: Resume low-fiber diet Switch oral prednisone to IV Solu-Medrol Okay to continue heparin unless hemoglobin begins to decrease Await biopsy results  Sent STAT There is no recommended follow-up for this procedure  due to age (age = 77 or greater)  No Staff Documented     IR PICC line placement single lumen (preferred for home anitbiotics/medications)    Result Date: 8/19/2022  Narrative: Peripherally inserted central venous catheter Clinical History: IV access   Fluoroscopy time: 1 1 minutes  Findings: A fluoroscopic spot view of the chest was obtained demonstrating a 5 Serbian double-lumen left PICC line terminating at the RA/SVC junction  The procedure was performed by the interventional radiology staff  All elements of maximal sterile barrier technique, cap and mask and sterile gown and sterile gloves and sterile full-body drape and hand hygiene and 2% chlorhexidine for cutaneous antisepsis  Sterile ultrasound technique with sterile gel and sterile probe covers was also utilized  Impression: Impression: PICC line centrally positioned  Workstation performed: [id number]     Echo complete w/ contrast if indicated    Result Date: 8/19/2022  Narrative: Wash Caller  Left Ventricle: Wall thickness is not well visualized  The left ventricular ejection fraction is 75% by visual estimation  Systolic function is hyperdynamic  Wall motion cannot be accurately assessed    Right Ventricle: Systolic function is hyperdynamic  Very technically difficult study  I have personally reviewed labs, imaging studies, and pertinent reports  This note has been generated by voice recognition software system  Therefore, there may be spelling, grammar, and or syntax errors  Please contact if questions arise

## 2022-08-29 NOTE — ASSESSMENT & PLAN NOTE
· History of chronic anemia, baseline 8-10  · Underwent flex sig which showed inflammation, ulcerations  · Also with BRBPR since starting heparin gtt  · Trend H/H  · Prior provider discussed with GI - recommending IV venofer, continue on IV Venofer  · Hemoglobin this morning is 6 3  · Repeat hemoglobin came back at 7 5  · Hold off on transfusion at this time  · GI follow-up  · GI follow-up  · On Protonix

## 2022-08-29 NOTE — ASSESSMENT & PLAN NOTE
· Venous duplex obtained due to LE edema  · Confirmed acute occlusive thrombus in popliteal vein and non-occlusive thrombus in the peroneal veins  · Started on IV heparin gtt 8/19t  · Suspect in setting of known metastatic cancer and patient has been more sedentary in recent weeks due to acute illness  · Transition to NOAC 8/25  · With persistent sinus tachycardia  No heart strain noted and no chest pain or palpitations    · Continue to monitor closely

## 2022-08-29 NOTE — QUICK NOTE
Notified by nursing that patient has developed swelling around LUE PICC line  LUE venous duplex revealing superficial thrombophlebitis  Recommend warm compress and elevation  Confirmed with IR, can continue to use PICC line

## 2022-08-29 NOTE — PHYSICAL THERAPY NOTE
Physical Therapy Cancellation Note           08/29/22 0747   PT Last Visit   PT Visit Date 08/29/22   Note Type   Note type Cancelled Session;  PT order received for the 3rd time; Pt not appropriate for PT as Hgb 6 3;  will follow up as schedule allows and pt appropriate        Nanci Jones, PT

## 2022-08-30 ENCOUNTER — HOME HEALTH ADMISSION (OUTPATIENT)
Dept: HOME HEALTH SERVICES | Facility: HOME HEALTHCARE | Age: 82
End: 2022-08-30

## 2022-08-30 ENCOUNTER — TELEPHONE (OUTPATIENT)
Dept: FAMILY MEDICINE CLINIC | Facility: CLINIC | Age: 82
End: 2022-08-30

## 2022-08-30 LAB
ANION GAP SERPL CALCULATED.3IONS-SCNC: 9 MMOL/L (ref 4–13)
ATRIAL RATE: 117 BPM
ATRIAL RATE: 118 BPM
BASOPHILS # BLD MANUAL: 0 THOUSAND/UL (ref 0–0.1)
BASOPHILS NFR MAR MANUAL: 0 % (ref 0–1)
BUN SERPL-MCNC: 11 MG/DL (ref 5–25)
CALCIUM SERPL-MCNC: 7.8 MG/DL (ref 8.3–10.1)
CHLORIDE SERPL-SCNC: 104 MMOL/L (ref 96–108)
CO2 SERPL-SCNC: 23 MMOL/L (ref 21–32)
CREAT SERPL-MCNC: 0.88 MG/DL (ref 0.6–1.3)
EOSINOPHIL # BLD MANUAL: 0 THOUSAND/UL (ref 0–0.4)
EOSINOPHIL NFR BLD MANUAL: 0 % (ref 0–6)
ERYTHROCYTE [DISTWIDTH] IN BLOOD BY AUTOMATED COUNT: 21.6 % (ref 11.6–15.1)
GFR SERPL CREATININE-BSD FRML MDRD: 61 ML/MIN/1.73SQ M
GLUCOSE SERPL-MCNC: 137 MG/DL (ref 65–140)
HCT VFR BLD AUTO: 21.8 % (ref 34.8–46.1)
HCT VFR BLD AUTO: 24.4 % (ref 34.8–46.1)
HGB BLD-MCNC: 6.8 G/DL (ref 11.5–15.4)
HGB BLD-MCNC: 7.5 G/DL (ref 11.5–15.4)
LYMPHOCYTES # BLD AUTO: 0.66 THOUSAND/UL (ref 0.6–4.47)
LYMPHOCYTES # BLD AUTO: 15 % (ref 14–44)
MAGNESIUM SERPL-MCNC: 2.3 MG/DL (ref 1.6–2.6)
MCH RBC QN AUTO: 30.7 PG (ref 26.8–34.3)
MCHC RBC AUTO-ENTMCNC: 30.7 G/DL (ref 31.4–37.4)
MCV RBC AUTO: 100 FL (ref 82–98)
MONOCYTES # BLD AUTO: 0.04 THOUSAND/UL (ref 0–1.22)
MONOCYTES NFR BLD: 1 % (ref 4–12)
NEUTROPHILS # BLD MANUAL: 3.7 THOUSAND/UL (ref 1.85–7.62)
NEUTS BAND NFR BLD MANUAL: 3 % (ref 0–8)
NEUTS SEG NFR BLD AUTO: 81 % (ref 43–75)
P AXIS: 54 DEGREES
P AXIS: 56 DEGREES
PLATELET # BLD AUTO: 226 THOUSANDS/UL (ref 149–390)
PLATELET BLD QL SMEAR: ADEQUATE
PMV BLD AUTO: 9.5 FL (ref 8.9–12.7)
POTASSIUM SERPL-SCNC: 4.3 MMOL/L (ref 3.5–5.3)
PR INTERVAL: 200 MS
PR INTERVAL: 202 MS
QRS AXIS: 38 DEGREES
QRS AXIS: 39 DEGREES
QRSD INTERVAL: 74 MS
QRSD INTERVAL: 76 MS
QT INTERVAL: 294 MS
QT INTERVAL: 302 MS
QTC INTERVAL: 412 MS
QTC INTERVAL: 421 MS
RBC # BLD AUTO: 2.44 MILLION/UL (ref 3.81–5.12)
SODIUM SERPL-SCNC: 136 MMOL/L (ref 135–147)
T WAVE AXIS: 50 DEGREES
T WAVE AXIS: 55 DEGREES
VENTRICULAR RATE: 117 BPM
VENTRICULAR RATE: 118 BPM
WBC # BLD AUTO: 4.41 THOUSAND/UL (ref 4.31–10.16)

## 2022-08-30 PROCEDURE — 85014 HEMATOCRIT: CPT | Performed by: INTERNAL MEDICINE

## 2022-08-30 PROCEDURE — 99222 1ST HOSP IP/OBS MODERATE 55: CPT | Performed by: PHYSICIAN ASSISTANT

## 2022-08-30 PROCEDURE — 80048 BASIC METABOLIC PNL TOTAL CA: CPT | Performed by: INTERNAL MEDICINE

## 2022-08-30 PROCEDURE — 85027 COMPLETE CBC AUTOMATED: CPT | Performed by: INTERNAL MEDICINE

## 2022-08-30 PROCEDURE — 85018 HEMOGLOBIN: CPT | Performed by: INTERNAL MEDICINE

## 2022-08-30 PROCEDURE — 93010 ELECTROCARDIOGRAM REPORT: CPT | Performed by: INTERNAL MEDICINE

## 2022-08-30 PROCEDURE — 99232 SBSQ HOSP IP/OBS MODERATE 35: CPT | Performed by: INTERNAL MEDICINE

## 2022-08-30 PROCEDURE — 93971 EXTREMITY STUDY: CPT | Performed by: SURGERY

## 2022-08-30 PROCEDURE — 93005 ELECTROCARDIOGRAM TRACING: CPT

## 2022-08-30 PROCEDURE — 85007 BL SMEAR W/DIFF WBC COUNT: CPT | Performed by: INTERNAL MEDICINE

## 2022-08-30 PROCEDURE — 97167 OT EVAL HIGH COMPLEX 60 MIN: CPT

## 2022-08-30 PROCEDURE — 97164 PT RE-EVAL EST PLAN CARE: CPT

## 2022-08-30 PROCEDURE — 83735 ASSAY OF MAGNESIUM: CPT | Performed by: INTERNAL MEDICINE

## 2022-08-30 RX ADMIN — METHYLPREDNISOLONE SODIUM SUCCINATE 40 MG: 40 INJECTION, POWDER, FOR SOLUTION INTRAMUSCULAR; INTRAVENOUS at 14:09

## 2022-08-30 RX ADMIN — PANTOPRAZOLE SODIUM 40 MG: 40 TABLET, DELAYED RELEASE ORAL at 17:04

## 2022-08-30 RX ADMIN — SIMETHICONE 80 MG: 80 TABLET, CHEWABLE ORAL at 09:02

## 2022-08-30 RX ADMIN — RIVAROXABAN 15 MG: 15 TABLET, FILM COATED ORAL at 17:04

## 2022-08-30 RX ADMIN — PRAVASTATIN SODIUM 20 MG: 20 TABLET ORAL at 17:04

## 2022-08-30 RX ADMIN — METOPROLOL TARTRATE 25 MG: 25 TABLET, FILM COATED ORAL at 21:06

## 2022-08-30 RX ADMIN — METHYLPREDNISOLONE SODIUM SUCCINATE 40 MG: 40 INJECTION, POWDER, FOR SOLUTION INTRAMUSCULAR; INTRAVENOUS at 05:00

## 2022-08-30 RX ADMIN — PANTOPRAZOLE SODIUM 40 MG: 40 TABLET, DELAYED RELEASE ORAL at 05:00

## 2022-08-30 RX ADMIN — ALTEPLASE 2 MG: 2.2 INJECTION, POWDER, LYOPHILIZED, FOR SOLUTION INTRAVENOUS at 16:16

## 2022-08-30 RX ADMIN — CHOLESTYRAMINE 4 G: 4 POWDER, FOR SUSPENSION ORAL at 17:09

## 2022-08-30 RX ADMIN — AMITRIPTYLINE HYDROCHLORIDE 25 MG: 25 TABLET, FILM COATED ORAL at 21:06

## 2022-08-30 RX ADMIN — METHYLPREDNISOLONE SODIUM SUCCINATE 40 MG: 40 INJECTION, POWDER, FOR SOLUTION INTRAMUSCULAR; INTRAVENOUS at 21:07

## 2022-08-30 RX ADMIN — MESALAMINE 800 MG: 400 CAPSULE, DELAYED RELEASE ORAL at 08:57

## 2022-08-30 RX ADMIN — SIMETHICONE 80 MG: 80 TABLET, CHEWABLE ORAL at 11:55

## 2022-08-30 RX ADMIN — MESALAMINE 800 MG: 400 CAPSULE, DELAYED RELEASE ORAL at 17:04

## 2022-08-30 RX ADMIN — SIMETHICONE 80 MG: 80 TABLET, CHEWABLE ORAL at 17:04

## 2022-08-30 RX ADMIN — LETROZOLE 2.5 MG: 2.5 TABLET ORAL at 09:02

## 2022-08-30 RX ADMIN — CHOLESTYRAMINE 4 G: 4 POWDER, FOR SUSPENSION ORAL at 08:57

## 2022-08-30 RX ADMIN — METOPROLOL TARTRATE 25 MG: 25 TABLET, FILM COATED ORAL at 14:42

## 2022-08-30 RX ADMIN — MESALAMINE 800 MG: 400 CAPSULE, DELAYED RELEASE ORAL at 21:07

## 2022-08-30 RX ADMIN — RIVAROXABAN 15 MG: 15 TABLET, FILM COATED ORAL at 09:02

## 2022-08-30 RX ADMIN — SIMETHICONE 80 MG: 80 TABLET, CHEWABLE ORAL at 21:06

## 2022-08-30 RX ADMIN — MIRTAZAPINE 7.5 MG: 15 TABLET, FILM COATED ORAL at 21:06

## 2022-08-30 NOTE — PLAN OF CARE
Problem: OCCUPATIONAL THERAPY ADULT  Goal: Performs self-care activities at highest level of function for planned discharge setting  See evaluation for individualized goals  Description: Treatment Interventions: ADL retraining, Functional transfer training, Endurance training, Patient/family training, Equipment evaluation/education, Compensatory technique education, Energy conservation          See flowsheet documentation for full assessment, interventions and recommendations  Note: Limitation: Decreased self-care trans, Decreased high-level ADLs, Decreased ADL status, Decreased endurance  Prognosis: Good  Assessment: Pt is a 79 y o  female seen for OT evaluation at [facility], admitted 8/15/2022 w/ increased dizziness & multiple episodes of diarrhea  Upon admission pt found to have Toxic gastroenteritis and colitis  Pt initially screened as pt has been independent in room  However, pt's hospital stay has been prolonged d/t low HgB, tachycardia, & continued diarrhea  OT re-ordered  OT completed extensive review of pt's medical and social history  Comorbidities affecting pt's functional performance at time of assessment include: metastatic breast cancer, HLD, CKD, HTN, anemia, malnutrition, single subsegmental PE, tachycardia, anxiety, etc  Personal factors affecting pt at time of IE include: limited home support, difficulty performing ADLS, difficulty performing IADLS , compliance and decreased initiation and engagement   Prior to admission, pt was living alone in a  senior living apartment  Pt was I w/  ADLS and w/ IADLS, (+) drove, & required use of no DME/AD PTA  Upon evaluation: Pt requires Mod I for bed mobility, S for functional mobility/transfers, S for UB ADLs and S for LB ADLS 2* the following deficits impacting occupational performance: weakness, decreased strength, decreased balance and decreased tolerance   Full objective findings from OT assessment regarding body systems outlined above  Pt to benefit from continued skilled OT tx while in the hospital to address deficits as defined above and maximize level of functional independence w/ ADL's and functional mobility  Occupational Performance areas to address include: grooming, bathing/shower, toilet hygiene, dressing, health maintenance, functional mobility, community mobility and clothing management  Based on findings, pt is of high complexity  The patient's raw score on the AM-PAC Daily Activity inpatient short form is 24, standardized score is 57 54, greater than 39 4  Patients at this level are likely to benefit from DC to home, which does coincide with current above OT recommendations  However, please refer to therapist recommendation for discharge planning given other factors that may influence destination  At this time, OT recommendations at time of discharge are no OT needs       OT Discharge Recommendation: No rehabilitation needs

## 2022-08-30 NOTE — PROGRESS NOTES
[place]  Progress Note Kahlil Silverman 1940, 79 y o  female MRN: [id number]  Unit/Bed#: -01 Encounter: [id number]  Primary Care Provider: Humberto Mustafa DO   Date and time admitted to hospital: 8/15/2022 10:40 AM    Single subsegmental pulmonary embolism without acute cor pulmonale (HCC)  Assessment & Plan  · Venous duplex obtained due to LE edema  · Confirmed acute occlusive thrombus in popliteal vein and non-occlusive thrombus in the peroneal veins  · Started on IV heparin gtt 8/19t  · Suspect in setting of known metastatic cancer and patient has been more sedentary in recent weeks due to acute illness  · Transition to NOAC 8/25  · Tachycardia is improved  No heart strain noted and no chest pain or palpitations  · Continue to monitor closely    Severe protein-calorie malnutrition ([address] )  Assessment & Plan  Malnutrition Findings:   Adult Malnutrition type: Chronic illness  Adult Degree of Malnutrition: Other severe protein calorie malnutrition  Malnutrition Characteristics: Inadequate energy, Weight loss    360 Statement: Pt presents with chronic severe protein calorie malnutrition due to Stage IV metastatic breast cancer, chronic diarrhea vs UC flare as evidenced by 10 5 lb wt loss-14% wt loss in 4 months and <75 % po intake > 1 month  BMI Findings: Body mass index is 27 21 kg/m²         Anemia  Assessment & Plan  · History of chronic anemia, baseline 8-10  · Underwent flex sig which showed inflammation, ulcerations  · Also with BRBPR since starting heparin gtt  · Trend H/H  · Prior provider discussed with GI - recommending IV venofer, continue on IV Venofer  · Hemoglobin this morning is 7 5  · Hold off on transfusion at this time  · GI follow-up  · On Protonix    Primary hypertension  Assessment & Plan  · Blood pressure stable off cozaar    Chronic kidney disease (CKD) stage G3a/A1, moderately decreased glomerular filtration rate (GFR) between 45-59 mL/min/1 73 square meter and albuminuria creatinine ratio less than 30 mg/g [facility])  Assessment & Plan  Lab Results   Component Value Date    EGFR 61 08/30/2022    EGFR 63 08/29/2022    EGFR 78 08/28/2022    CREATININE 0 88 08/30/2022    CREATININE 0 86 08/29/2022    CREATININE 0 72 08/28/2022     · Cr on admission slightly elevated from her baseline at 1 3, most likely due to volume depletion  Now improved after IVF  · Baseline 0 8    Mixed hyperlipidemia  Assessment & Plan  · Continue statin    Metastatic breast cancer [facility])  Assessment & Plan  · Continue to follow-up with Hematology-Oncology outpatient  · Femara had been held on admit  · On recent admission patient's verzenio was held  Reviewed office visit from 8/10/22 appears there was some consideration to resume but at lowered dose  Patient reports she never resumed this medication  · Patient will require follow-up to discuss possible lumbar mets noted on CT this admission  · Oncology follow-up noted  · Started on Femara    * Toxic gastroenteritis and colitis  Assessment & Plan  · Patient presented with dizziness and diarrhea starting in August that worsened yesterday  · Most likely secondary to IBD  · CT showed: 1  Diffuse mild-moderate circumferential wall thickening of the colon with pericolic inflammatory stranding consistent with nonspecific pancolitis  2   Multiple stable small scattered sclerotic foci in the visualized lumbar lower thoracic spine osseous pelvis which may reflect osseous metastatic disease in this patient with known metastatic breast cancer  3   Additional stable findings as noted   · GI consulted-> recs appreciated  · Off IV fluids  · Discontinue Zosyn as symptoms are most likely secondary to ulcerative colitis  · Steroids initiated 8/17  · F/u bld cx and procal, ESR, CRP, and lipase, C Diff  · ESR:  48  · Procalcitonin:  1 49  · CRP:  206 3  · C   Diff: negative  · Stool enteric panel: negative  · Advanced to low-fiber · Cholestyramine  · Oncology consulted by GI for further recommendations regarding current cancer regimen playing a role in symptoms  Previously believed due to verzenio which has been on hold since recent admit, doubt this is contributing   · Flex sig completed 8/20 concerning for IBD, biopsies taken  Also noted ulcerations  · Biopsy reveals colitis  · IV steroids were transitioned back to oral prednisone  · Repeat CT abd demonstrates ongoing pancolitis  · GI spoke with IBD specialist Dr Cesario Mcnamara and CRP was ordered which is 122 4  Patient per their recommendations, was restarted on IV Solu-Medrol for 3 more days and repeat CRP after 3 days  · Hemoglobin this morning is 7 5  · Trend H&H and transfuse as needed      Labs & Imaging: I have personally reviewed pertinent reports  VTE Prophylaxis: in place  Code Status:   Level 3 - DNAR and DNI    Patient Centered Rounds: I have performed bedside rounds with nursing staff today  Discussions with Specialists or Other Care Team Provider:  GI    Education and Discussions with Family / Patient:  Daughter Jason Barrett    Current Length of Stay: 15 day(s)    Current Patient Status: Inpatient   Certification Statement: The patient will continue to require additional inpatient hospital stay due to see my assessment and plan  Subjective:   Patient is seen and examined at bedside  States her diarrhea is better than yesterday  Denies any other complaint  Afebrile  All other ROS are negative  Objective:    Vitals: Blood pressure 135/79, pulse (!) 110, temperature 97 8 °F (36 6 °C), resp  rate 16, height 5' 1" (1 549 m), weight 65 3 kg (144 lb), SpO2 98 %, not currently breastfeeding  ,Body mass index is 27 21 kg/m²    SPO2 RA Rest    Flowsheet Row ED to Hosp-Admission (Current) from 8/15/2022 in [address] Med Surg Unit   SpO2 98 %   SpO2 Activity At Rest   O2 Device None (Room air)   O2 Flow Rate --        I&O:     Intake/Output Summary (Last 24 hours) at 8/30/2022 0733  Last data filed at 8/29/2022 1428  Gross per 24 hour   Intake 480 ml   Output --   Net 480 ml       Physical Exam:    General- Alert, lying comfortably in bed  Not in any acute distress  HEENT- HANSA, EOM intact  Neck- Supple, No JVD  CVS- tachycardic, S1 and S2 normal  Chest- Bilateral Air entry, No rhochi, crackles or wheezing present  Abdomen- soft, nontender, not distended, no guarding or rigidity, BS+  Extremities-  No pedal edema, No calf tenderness  CNS-   Alert, awake and orientedx3  No focal deficits present      Invasive Devices  Report    Peripherally Inserted Central Catheter Line  Duration           PICC Line 08/19/22 10 days                      Social History  reviewed  Family History   Problem Relation Age of Onset    Stomach cancer Mother     No Known Problems Father     Cervical cancer Sister     No Known Problems Daughter     No Known Problems Maternal Grandmother     No Known Problems Maternal Grandfather     No Known Problems Paternal Grandmother     No Known Problems Paternal Grandfather     Colon polyps Neg Hx     Colon cancer Neg Hx     reviewed    Meds:  Current Facility-Administered Medications   Medication Dose Route Frequency Provider Last Rate Last Admin    acetaminophen (TYLENOL) tablet 650 mg  650 mg Oral Q6H PRN Alison Blanchard MD   650 mg at 08/26/22 2203    amitriptyline (ELAVIL) tablet 25 mg  25 mg Oral HS Alison Blanchard MD   25 mg at 08/29/22 2137    calcium carbonate (TUMS) chewable tablet 500 mg  500 mg Oral Daily PRN Alison Blanchard MD   500 mg at 08/26/22 2202    cholestyramine sugar free (QUESTRAN LIGHT) packet 4 g  4 g Oral BID Alison Blanchard MD   4 g at 08/29/22 1653    iron sucrose (VENOFER) 200 mg in sodium chloride 0 9 % 100 mL IVPB  200 mg Intravenous Once per day on Mon Wed Fri Aster Gardner PA-C   200 mg at 08/29/22 0590    letrozole [facility]) tablet 2 5 mg  2 5 mg Oral Daily BROOKE Marcus 2 5 mg at 08/29/22 1602    loperamide (IMODIUM) capsule 2 mg  2 mg Oral Q4H PRN Addy WOODARD PA-C   2 mg at 08/28/22 1731    mesalamine (DELZICOL) delayed release capsule 800 mg  800 mg Oral TID Emily Robbins MD   800 mg at 08/29/22 2138    methylPREDNISolone sodium succinate (Solu-MEDROL) injection 40 mg  40 mg Intravenous Q8H [facility] BROOKE Pérez   40 mg at 08/30/22 0500    mirtazapine (REMERON) tablet 7 5 mg  7 5 mg Oral HS Emily Robbins MD   7 5 mg at 08/29/22 2138    ondansetron (ZOFRAN) injection 4 mg  4 mg Intravenous Q6H PRN Emily Robbins MD   4 mg at 08/18/22 3368    pantoprazole (PROTONIX) EC tablet 40 mg  40 mg Oral BID AC BROOKE Roldan   40 mg at 08/30/22 0500    pravastatin (PRAVACHOL) tablet 20 mg  20 mg Oral Daily With Ebb MD Maximus   20 mg at 08/29/22 1602    rivaroxaban (XARELTO) tablet 15 mg  15 mg Oral BID With Meals Padmini WOODARD PA-C   15 mg at 08/29/22 1602    simethicone (MYLICON) chewable tablet 80 mg  80 mg Oral 4x Daily (with meals and at bedtime) Addy WOODARD PA-C   80 mg at 08/29/22 2139      Medications Prior to Admission   Medication    Abemaciclib (Verzenio) 50 MG TABS    amitriptyline (ELAVIL) 25 mg tablet    Cholecalciferol (VITAMIN D3) 2000 units capsule    diphenoxylate-atropine (LOMOTIL) 2 5-0 025 mg per tablet    letrozole (FEMARA) 2 5 mg tablet    loperamide (IMODIUM) 2 mg capsule    losartan (COZAAR) 50 mg tablet    mirtazapine (REMERON) 7 5 MG tablet    simvastatin (ZOCOR) 10 mg tablet       Labs:  Results from last 7 days   Lab Units 08/30/22  0451 08/29/22  1958 08/29/22  0854 08/29/22  0440 08/28/22  0515 08/26/22  0645 08/24/22  1327   WBC Thousand/uL 4 41  --   --  4 46 4 25*   < > 8 33   HEMOGLOBIN g/dL 7 5* 7 3* 7 5* 6 3* 6 7*   < > 7 7*   HEMATOCRIT % 24 4* 23 8* 24 3* 20 3* 21 2*   < > 24 0*   PLATELETS Thousands/uL 226  --   --  176 190   < > 237   NEUTROS PCT %  --   --   --   --   --   -- 80*   LYMPHS PCT %  --   --   --   --   --   --  7*   LYMPHO PCT % 15  --   --  17 7*   < >  --    MONOS PCT %  --   --   --   --   --   --  9   MONO PCT % 1*  --   --  9 4   < >  --    EOS PCT % 0  --   --  0 1   < > 0    < > = values in this interval not displayed  Results from last 7 days   Lab Units 08/30/22  0451 08/29/22  0440 08/28/22  0515 08/24/22  1327 08/23/22  1421   POTASSIUM mmol/L 4 3 4 0 3 4* 3 7 3 6   CHLORIDE mmol/L 104 107 105 109* 110*   CO2 mmol/L 23 26 26 23 23   BUN mg/dL 11 11 10 10 9   CREATININE mg/dL 0 88 0 86 0 72 0 94 0 86   CALCIUM mg/dL 7 8* 7 0* 6 6* 6 9* 6 8*   ALK PHOS U/L  --   --  72 92 90   ALT U/L  --   --  17 22 19   AST U/L  --   --  8 14 15     No results found for: TROPONINI, CKMB, CKTOTAL      Lab Results   Component Value Date    BLOODCX No Growth After 5 Days  08/15/2022    BLOODCX No Growth After 5 Days  08/15/2022         Imaging:  Results for orders placed during the hospital encounter of 08/01/22    XR Trauma chest portable    Narrative  CHEST    INDICATION:   TRAUMA  COMPARISON:  6/18/2021  EXAM PERFORMED/VIEWS:  XR CHEST PORTABLE      FINDINGS:    Cardiomediastinal silhouette appears unremarkable  The lungs are clear  No pneumothorax or pleural effusion  Osseous structures appear within normal limits for patient age  Impression  No acute cardiopulmonary disease  Workstation performed: [id number]    Results for orders placed in visit on 06/18/21    XR chest pa & lateral    Narrative  CHEST    INDICATION:   R07 89: Other chest pain  Cough since January  History of breast cancer  Anterior upper costochondral pain  COMPARISON:  Chest x-ray from 1/12/2021  Chest CT from 9/13/2013  EXAM PERFORMED/VIEWS:  XR CHEST PA & LATERAL      FINDINGS:    Cardiomediastinal silhouette appears unremarkable  The lungs are clear  No pneumothorax or pleural effusion  Osseous structures appear within normal limits for patient age    There is a bone island in the proximal left clavicle  Impression  No acute cardiopulmonary disease  Workstation performed: [id number]      Last 24 Hours Medication List:   Current Facility-Administered Medications   Medication Dose Route Frequency Provider Last Rate    acetaminophen  650 mg Oral Q6H PRN Lawrence Hazel MD      amitriptyline  25 mg Oral HS Lawrence Hazel MD      calcium carbonate  500 mg Oral Daily PRN Lawrence Hazel MD      cholestyramine sugar free  4 g Oral BID Lawrence Hazel MD      iron sucrose  200 mg Intravenous Once per day on Mon Wed Fri Dionisio Pinto PA-C      letrozole  2 5 mg Oral Daily BROOKE Gamboa      loperamide  2 mg Oral Q4H PRN Azalia Castellano PA-C      mesalamine  800 mg Oral TID Lawrence Hazel MD      methylPREDNISolone sodium succinate  40 mg Intravenous [facility] BROOKE Pérez      mirtazapine  7 5 mg Oral HS Lawrence Hazel MD      ondansetron  4 mg Intravenous Q6H PRN Lawrence Hazel MD      pantoprazole  40 mg Oral BID AC BROOKE Bailey      pravastatin  20 mg Oral Daily With Cortez Stock MD      rivaroxaban  15 mg Oral BID [facility]      simethicone  80 mg Oral 4x Daily (with meals and at bedtime) Azalia Castellano PA-C          Today, Patient Was Seen By: Alexis Devlin MD    ** Please Note: Dictation voice to text software may have been used in the creation of this document   **

## 2022-08-30 NOTE — PHYSICAL THERAPY NOTE
PHYSICAL THERAPY Re-Evaluation    Performed at least 2 patient identifiers during session:  Patient Active Problem List   Diagnosis    Metastatic breast cancer ([facility] )    Adverse reaction to pneumococcal vaccine    Anxiety    Cataract, bilateral    Mixed hyperlipidemia    Pulmonary nodule seen on imaging study    Sleep disorder    Chronic kidney disease (CKD) stage G3a/A1, moderately decreased glomerular filtration rate (GFR) between 45-59 mL/min/1 73 square meter and albuminuria creatinine ratio less than 30 mg/g (HCC)    Primary hypertension    Osteopenia of multiple sites    Cough due to ACE inhibitor    Acute costochondritis    Lytic lesion of bone on x-ray    Neoplasm of uncertain behavior of sternum    Rectal bleeding    Toxic gastroenteritis and colitis    Secondary malignant neoplasm of bone ([facility] )    REYES (acute kidney injury) ([place] [address] )    Diarrhea    Hypokalemia    Anemia    Severe protein-calorie malnutrition ([facility] )    Single subsegmental pulmonary embolism without acute cor pulmonale ([facility] )       Past Medical History:   Diagnosis Date    Abnormal weight loss     Allergic reaction     Anxiety     Breast cancer, right ([facility] ) 2011    right    Cancer ([facility] ) 2012    Candidal vulvovaginitis     Clotting disorder ([place] [address] ) Same as above    Endometrial hyperplasia     Epithelial cyst     benign, ovary    GI (gastrointestinal bleed) Blood mixed with stool    History of radiation therapy 2011    right breast cancer    Hyperlipidemia     Hypertension     Internal hemorrhoids     Knee tendonitis     Ovarian cyst     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[facility])        Past Surgical History:   Procedure Laterality Date    BILATERAL SALPINGOOPHORECTOMY      onset: 7/23/13    BREAST BIOPSY Right 06/13/2006    benign    BREAST BIOPSY Right 03/02/2011    malignant    BREAST LUMPECTOMY Right 03/30/2011    malignant    CATARACT EXTRACTION W/  INTRAOCULAR LENS IMPLANT Right     phacoemulsification  Onset: 10/27/14    CHOLECYSTECTOMY      COLONOSCOPY  2017    approx    HYSTERECTOMY  Yes    INTRAOPERATIVE RADIATION THERAPY (IORT)      IR BIOPSY BONE  7/9/2021    IR PICC PLACEMENT SINGLE LUMEN  8/19/2022    SKIN LESION EXCISION Right     breast, single lesion    TONSILLECTOMY AND ADENOIDECTOMY          08/30/22 0918   PT Last Visit   PT Visit Date 08/30/22   Note Type   Note type Re-Evaluation   Pain Assessment   Pain Assessment Tool 0-10   Pain Score No Pain   Restrictions/Precautions   Weight Bearing Precautions Per Order No   Home Living   Type of [address]  One level  (first floor apartment, independent living at [place])   [address]  (pt has RW but does not use at baseline)   Prior Function   Level of [place] Independent with ADLs and functional mobility   Lives With Alone   ADL Assistance Independent   IADLs Independent   General   Additional Pertinent History (S)  BLE edema continues;  pt with LUE PICC and RUE limb alert;  take BP on L lower arm     Family/Caregiver Present No   Cognition   Overall Cognitive Status WFL   Arousal/Participation Alert   Orientation Level Oriented X4   Memory Within functional limits   Following Commands Follows all commands and directions without difficulty   Subjective   Subjective I just get so tired   RUE Assessment   RUE Assessment WFL   LUE Assessment   LUE Assessment WFL   RLE Assessment   RLE Assessment WFL   LLE Assessment   LLE Assessment WFL   Bed Mobility   Supine to Sit 6  Modified independent   Sit to Supine 6  Modified independent   Transfers   Sit to Stand 6  Modified independent   Additional items Increased time required  (RW)   Stand to Sit 6  Modified independent   Additional items Increased time required   Ambulation/Elevation   Gait pattern Forward Flexion;Decreased foot clearance   Gait Assistance 5  Supervision   Additional items Assist x 1   Assistive Device Rolling walker   Distance 30ft with RW, no LOB however pt limited by HR to 150's, SOB and light headed  Balance   Static Sitting Good   Dynamic Sitting Good   Static Standing Fair -  (RW)   Dynamic Standing Fair -  (RW)   Ambulatory Fair -  (RW)   Activity Tolerance   Activity Tolerance Patient limited by fatigue   Nurse Made Aware Juanita   Assessment   Prognosis Fair   Assessment See PT IE for admission details; Pt had a prolonged hospital stay complicated by tachycardia, low Hgb  Pt now presents with decreased endurance with standing/ambulation activities  Pt able to perform all mobility without physical assistance;  Limited by SOB and HR to 150's;  Nurse present and aware  Pt would benefit from continued PT while in hospital and follow up with HHCPT at [place] to increase strength, balance, endurance, mobility independence to return to [facility], decrease caregiver burden and improve quality of life  The patient's AM-PAC Basic Mobility Inpatient Short Form Raw Score is 23  A Raw score of greater than 17 suggests the patient may benefit from discharge to home  Please also refer to the recommendation of the Physical Therapist for safe discharge planning  Goals   Patient Goals to get better and go home   STG Expiration Date 09/13/22   Short Term Goal #1 Pt will be able to demo:  mod I to ambulate 150ft with or without AD;  to return to independent living apartment at [facility]  Plan   Treatment/Interventions LE strengthening/ROM; Therapeutic exercise; Endurance training;Patient/family training;Equipment eval/education;Gait training; Compensatory technique education;Continued evaluation;Spoke to nursing;OT   PT Frequency 2-3x/wk   Recommendation   PT Discharge Recommendation Home with home health rehabilitation  (use of RW for all mobility;  pt states family will provide meals  )   AM-PAC Basic Mobility Inpatient   Turning in Bed Without Bedrails 4   Lying on Back to Sitting on Edge of Flat Bed 4   Moving Bed to Chair 4   Standing Up From Chair 4 Walk in Room 4   Climb 3-5 Stairs 3   Basic Mobility Inpatient Raw Score 23   Basic Mobility Standardized Score 50 88   Highest Level Of Mobility   JH-HLM Goal 7: Walk 25 feet or more   JH-HLM Achieved 7: Walk 25 feet or more     Sydney Duong PT      Patient Name: Gisela Silverman  GPHOL'N Date: 8/30/2022

## 2022-08-30 NOTE — ASSESSMENT & PLAN NOTE
· Continue to follow-up with Hematology-Oncology outpatient  · Femara had been held on admit  · On recent admission patient's verzenio was held  Reviewed office visit from 8/10/22 appears there was some consideration to resume but at lowered dose  Patient reports she never resumed this medication    · Patient will require follow-up to discuss possible lumbar mets noted on CT this admission  · Oncology follow-up noted  · Started on Femara

## 2022-08-30 NOTE — PLAN OF CARE
Problem: PHYSICAL THERAPY ADULT  Goal: Performs mobility at highest level of function for planned discharge setting  See evaluation for individualized goals  Description: Treatment/Interventions: LE strengthening/ROM, Therapeutic exercise, Endurance training, Patient/family training, Equipment eval/education, Gait training, Compensatory technique education, Continued evaluation, Spoke to nursing, OT          See flowsheet documentation for full assessment, interventions and recommendations  Note: Prognosis: Fair     Assessment: See PT IE for admission details; Pt had a prolonged hospital stay complicated by tachycardia, low Hgb  Pt now presents with decreased endurance with standing/ambulation activities  Pt able to perform all mobility without physical assistance;  Limited by SOB and HR to 150's;  Nurse present and aware  Pt would benefit from continued PT while in hospital and follow up with HHCPT at [place] to increase strength, balance, endurance, mobility independence to return to PLOF, decrease caregiver burden and improve quality of life  The patient's AM-PAC Basic Mobility Inpatient Short Form Raw Score is 23  A Raw score of greater than 17 suggests the patient may benefit from discharge to home  Please also refer to the recommendation of the Physical Therapist for safe discharge planning  PT Discharge Recommendation: Home with home health rehabilitation (use of RW for all mobility;  pt states family will provide meals )    See flowsheet documentation for full assessment

## 2022-08-30 NOTE — PROGRESS NOTES
Progress note - Gastroenterology   Darrellarry Silverman 79 y o  female MRN: [id number]  Unit/Bed#: -01 Encounter: [id number]    ASSESSMENT and PLAN  1  Acute colitis  New onset inflammatory bowel disease?  Related to or induced by cancer treatment/immunotherapy,   Atypical infectious colitis? Patient continues to have bloody loose bowel movements      Patient is currently taking cholestyramine 4 g twice daily   Prescribed Imodium 2 mg every 4 hours as needed      Biopsy from flex sig 8/20; active colitis, no viral inclusions identified on cm the immunohistochemistry stain, negative for dysplasia  As of today 8/30 continues to have multiple bowel movements however more firm at this time  Still with hematochezia     - steroid switched back to IV for 72 hours  - continue mesalamine 800 mg p o  3 times a day  - continue amitriptyline 25 mg p o  Daily HS  - offer Imodium 2 mg every 4 hours as needed as long as patient has liquid bowel movements      Discussed with patient office will contact her for follow-up outpatient office visit   Possibly be referred to IBD specialist      Per Dr Soha Kwon IBD specialist-I would recommend checking CRP, restarting IV steroids for an additional 3 days and assessing her response with repeat CRP and clinical symptoms  Depending on how she does she might meet criteria for inpatient remicade  She is in her [de-identified], so obviously the risks and benefits of all treatment options should be weighed  Given her age I would also suggest you speak with pathology and confirm if it looks like IBD (and no ischemic, or an alternative diagnosis)  2  Acute blood loss anemia  Secondary to colitis and exacerbated by anticoagulation for DVT/PE  Hemoglobin stable at 7 5     - follow H&H and transfuse as needed  - continue anticoagulation for now  - continue PPI  - IV Venofer      3  DVT and pulmonary embolism   Xarelto 15 mg p o   Twice daily         4  Metastatic breast cancer  Followed by   Peace Aguiar    Chief Complaint   Patient presents with    Dizziness     Via ems from home for increased dizziness and multiple episodes diarrhea since the beginning of August  States worse since last night  States is getting new chemo medication       SUBJECTIVE/HPI   Still having multiple bowel movements however more solid  Still with hematochezia      /81   Pulse (!) 145   Temp 97 8 °F (36 6 °C)   Resp 16   Ht 5' 1" (1 549 m)   Wt 65 3 kg (144 lb)   LMP  (LMP Unknown)   SpO2 97%   BMI 27 21 kg/m²     PHYSICALEXAM  General appearance: alert, appears stated age and cooperative  Eyes: PERLLA, EOMI, no icterus   Head: Normocephalic, without obvious abnormality, atraumatic  Lungs: clear to auscultation bilaterally  Heart: regular rate and rhythm, S1, S2 normal, no murmur, click, rub or gallop  Abdomen: soft, non-tender; bowel sounds normal; no masses,  no organomegaly  Extremities: extremities normal, atraumatic, no cyanosis or edema  Neurologic: Grossly normal    Lab Results   Component Value Date    GLUCOSE 115 09/08/2015    CALCIUM 7 8 (L) 08/30/2022     09/08/2015    K 4 3 08/30/2022    CO2 23 08/30/2022     08/30/2022    BUN 11 08/30/2022    CREATININE 0 88 08/30/2022     Lab Results   Component Value Date    WBC 4 41 08/30/2022    HGB 7 5 (L) 08/30/2022    HCT 24 4 (L) 08/30/2022     (H) 08/30/2022     08/30/2022     Lab Results   Component Value Date    ALT 17 08/28/2022    AST 8 08/28/2022    ALKPHOS 72 08/28/2022    BILITOT 0 66 09/08/2015     No results found for: AMYLASE  Lab Results   Component Value Date    LIPASE 79 08/15/2022     Lab Results   Component Value Date    IRON 19 (L) 08/28/2022    TIBC 105 (L) 08/28/2022    FERRITIN 755 (H) 08/28/2022     Lab Results   Component Value Date    INR 1 11 08/19/2022

## 2022-08-30 NOTE — CONSULTS
Consult - Cardiology   Jai Silverman 79 y o  female MRN: [id number]  Unit/Bed#: -01 Encounter: [id number]        Reason For Consult: Tachycardia  Outpatient Cardiologist:  None prior               ASSESSMENT:  1  Tachycardia, suspect sinus based on prior EKGs  a  Vital sign trend indicating heart rate trending 120-140 for the last 3 days  b  EKG 06/20/2022:  Sinus tachycardia,   c  Echo 08/19/2022:  EF 75%  2  Stage IV metastatic breast cancer  a  Previously treated with both radiation and chemotherapy  b  CT A/P 08/2021 demonstrating bony metastases  3  Acute pulmonary embolism  a  CTA 8/21/2022:  Right lower lobe subsegmental embolus  b  Suspected due to metastatic cancer and being more sedentary recently due to her illness  c  LE US 08/19/2022:  Acute thrombus in right lower limb, no thrombus or DVT in left lower limb  d  UE US 08/29/2022:  No evidence of some thrombus  e  Currently on Xarelto  4  Anemia secondary to hematochezia  a  Hemoglobin currently trending 7-7 5  b  With critical blood shortage transfusions being withheld unless hemoglobin is less than 6  5  Acute colitis  a  Prolonged hospitalization with frequent diarrhea and hematochezia  b  Multiple CT scans most recent 08/27/2022 showing pancolitis  c  GI following, patient on IV steroids    PLAN/ DISCUSSION:      Tachycardia multifactorial driven by acute PE, anemia, colitis, IV steroids, deconditioning, and stage IV metastatic cancer   Agree with placing patient on telemetry   Would not overtreat tachycardia as given her multiple acute illnesses this is likely helping to support her hemodynamics   As hemoglobin improves and acute illness resolves so should her heart rate   Xarelto for PE/DVT    History Of Present Illness:  79-year-old female with no prior care from a cardiologist   Has no problems with hard that she knows of  He has never had any invasive testing performed on her heart  At her baseline she lives by herself    She is independent of her ADLs  She has been hospitalized for greater than 2 weeks  She was admitted with generalized weakness  She was found to have pancolitis  She has been having hematochezia  She has been anemic however hemoglobin has not gone less than 6 so she has not got any blood transfusions  She was found to have an acute PE as well as DVT on this admission  She has been started on Xarelto  She has been noted to be tachycardic throughout her hospital stay for which we have been asked see in consultation  She has no chest pain or chest pressure  She has no palpitations  She has no dizziness or passing out  Her breathing is stable  Past Medical History:        Past Medical History:   Diagnosis Date    Abnormal weight loss     Allergic reaction     Anxiety     Breast cancer, right ([facility] ) 2011    right    Cancer [facility]) 2012    Candidal vulvovaginitis     Clotting disorder ([place] [address] ) Same as above    Endometrial hyperplasia     Epithelial cyst     benign, ovary    GI (gastrointestinal bleed) Blood mixed with stool    History of radiation therapy 2011    right breast cancer    Hyperlipidemia     Hypertension     Internal hemorrhoids     Knee tendonitis     Ovarian cyst     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[facility])       Past Surgical History:   Procedure Laterality Date    BILATERAL SALPINGOOPHORECTOMY      onset: 7/23/13    BREAST BIOPSY Right 06/13/2006    benign    BREAST BIOPSY Right 03/02/2011    malignant    BREAST LUMPECTOMY Right 03/30/2011    malignant    CATARACT EXTRACTION W/  INTRAOCULAR LENS IMPLANT Right     phacoemulsification   Onset: 10/27/14    CHOLECYSTECTOMY      COLONOSCOPY  2017    approx    HYSTERECTOMY  Yes    INTRAOPERATIVE RADIATION THERAPY (IORT)      IR BIOPSY BONE  7/9/2021    IR PICC PLACEMENT SINGLE LUMEN  8/19/2022    SKIN LESION EXCISION Right     breast, single lesion    TONSILLECTOMY AND ADENOIDECTOMY          Allergy:        Allergies   Allergen Reactions    Sulfa Antibiotics     Pneumococcal Polysaccharide Vaccine Rash and Edema       Medications:       Prior to Admission medications    Medication Sig Start Date End Date Taking?  Authorizing Provider   cholestyramine sugar free (QUESTRAN LIGHT) 4 g packet Take 1 packet (4 g total) by mouth 2 (two) times a day 8/26/22  Yes Padmini WOODARD PA-C   mesalamine (DELZICOL) 400 mg Take 2 capsules (800 mg total) by mouth 3 (three) times a day 8/26/22  Yes Padmini WOODARD PA-C   pantoprazole (PROTONIX) 40 mg tablet Take 1 tablet (40 mg total) by mouth 2 (two) times a day before meals 8/26/22  Yes Padmini WOODARD PA-C   predniSONE 20 mg tablet Take 2 tablets (40 mg total) by mouth daily 8/27/22  Yes Padmini WOODARD PA-C   rivaroxaban (XARELTO) 15 mg tablet Take 1 tablet (15 mg total) by mouth 2 (two) times a day with meals for 20 days 8/26/22 9/15/22 Yes Padmini WOODARD PA-C   rivaroxaban (Xarelto) 20 mg tablet Take 1 tablet (20 mg total) by mouth daily with breakfast 9/16/22  Yes Padmini WOODARD PA-C   simethicone (MYLICON) 80 mg chewable tablet Chew 1 tablet (80 mg total) 4 (four) times a day (with meals and at bedtime) 8/26/22  Yes Padmini WOODARD PA-C   Abemaciclib (Verzenio) 50 MG TABS Take 50 mg by mouth 2 (two) times a day  Patient not taking: Reported on 8/15/2022 8/10/22   BROOKE Curry   amitriptyline (ELAVIL) 25 mg tablet Take 1 tablet (25 mg total) by mouth daily at bedtime 3/28/22   Nacho Sotelo DO   Cholecalciferol (VITAMIN D3) 2000 units capsule Take 2,000 Units by mouth daily      Historical Provider, MD   diphenoxylate-atropine (LOMOTIL) 2 5-0 025 mg per tablet Take 1 tablet by mouth 4 (four) times a day as needed for diarrhea 8/10/22   BROOKE Curry   letrozole [facility]) 2 5 mg tablet Take 1 tablet (2 5 mg total) by mouth daily 4/29/22   Tirso Harmon MD   loperamide (IMODIUM) 2 mg capsule Take 2 capsules (4 mg total) by mouth 4 (four) times a day as needed for diarrhea 8/3/22   [employer], PA-C   losartan (COZAAR) 50 mg tablet Take 1 tablet (50 mg total) by mouth daily Resume in 2-3 days once adequately consuming liquids  22    [employer], PA-C   mirtazapine (REMERON) 7 5 MG tablet Take 1 tablet (7 5 mg total) by mouth daily at bedtime 22   [place], PA-C   simvastatin (ZOCOR) 10 mg tablet Take 1 tablet (10 mg total) by mouth daily at bedtime 22   Oziel Liming, DO       Family History:     Family History   Problem Relation Age of Onset    Stomach cancer Mother     No Known Problems Father     Cervical cancer Sister     No Known Problems Daughter     No Known Problems Maternal Grandmother     No Known Problems Maternal Grandfather     No Known Problems Paternal Grandmother     No Known Problems Paternal Grandfather     Colon polyps Neg Hx     Colon cancer Neg Hx         Social History:       Social History     Socioeconomic History    Marital status:      Spouse name: None    Number of children: 3    Years of education: None    Highest education level: None   Occupational History    None   Tobacco Use    Smoking status: Former Smoker     Packs/day: 1 00     Years: 30 00     Pack years: 30 00     Types: Cigarettes     Start date:      Quit date:      Years since quittin 6    Smokeless tobacco: Never Used   Vaping Use    Vaping Use: Never used   Substance and Sexual Activity    Alcohol use: Not Currently     Comment: (history)    Drug use: No    Sexual activity: Not Currently   Other Topics Concern    None   Social History Narrative    None     Social Determinants of Health     Financial Resource Strain: Not on file   Food Insecurity: No Food Insecurity    Worried About Running Out of Food in the Last Year: Never true    Anatoly of Food in the Last Year: Never true   Transportation Needs: No Transportation Needs    Lack of Transportation (Medical):  No    Lack of Transportation (Non-Medical): No   Physical Activity: Not on file   Stress: Not on file   Social Connections: Not on file   Intimate Partner Violence: Not At Risk    Fear of Current or Ex-Partner: No    Emotionally Abused: No    Physically Abused: No    Sexually Abused: No   Housing Stability: Low Risk     Unable to Pay for Housing in the Last Year: No    Number of Places Lived in the Last Year: 1    Unstable Housing in the Last Year: No       ROS:  14 point ROS negative except as outlined above  Remainder review of systems is negative    Exam:  General:  alert, oriented and in no distress, cooperative  Head: Normocephalic, atraumatic  Eyes:  EOMI  Pupils - equal, round, reactive to accomodation  No icterus  Normal Conjunctiva  Oropharynx: moist and normal-appearing mucosa  Neck: supple, symmetrical, trachea midline and no JVD  Heart: tachycardia, regular, No: murmer, rub or gallop, S1 & S2 normal   Respiratory effort / Chest Inspection: unlabored  Lungs:  normal air entry, lungs clear to auscultation and no rales, rhonchi or wheezing   Abdomen: flat, normal findings: bowel sounds normal and soft, non-tender  Lower Limbs:  2-3+ pitting edema  Pulses[de-identified]  RLE - DP: present 2+                 LLE - DP: present 2+  Musculoskeletal: ROM grossly normal        DATA:      ECG:                     Telemetry: Pending           Echocardiogram:           Ischemic Testing:         Weights: Wt Readings from Last 3 Encounters:   08/19/22 65 3 kg (144 lb)   08/10/22 65 5 kg (144 lb 6 4 oz)   08/10/22 65 3 kg (144 lb)   , Body mass index is 27 21 kg/m²           Lab Studies:             Results from last 7 days   Lab Units 08/30/22  0451 08/29/22  1958 08/29/22  0854 08/29/22  0440 08/28/22  0515   WBC Thousand/uL 4 41  --   --  4 46 4 25*   HEMOGLOBIN g/dL 7 5* 7 3* 7 5* 6 3* 6 7*   HEMATOCRIT % 24 4* 23 8* 24 3* 20 3* 21 2*   PLATELETS Thousands/uL 226  --   --  176 190   ,   Results from last 7 days   Lab Units 08/30/22  0451 08/29/22  0440 08/28/22  0515 08/24/22  1327 08/23/22  1421   POTASSIUM mmol/L 4 3 4 0 3 4* 3 7 3 6   CHLORIDE mmol/L 104 107 105 109* 110*   CO2 mmol/L 23 26 26 23 23   BUN mg/dL 11 11 10 10 9   CREATININE mg/dL 0 88 0 86 0 72 0 94 0 86   CALCIUM mg/dL 7 8* 7 0* 6 6* 6 9* 6 8*   ALK PHOS U/L  --   --  72 92 90   ALT U/L  --   --  17 22 19   AST U/L  --   --  8 14 15

## 2022-08-30 NOTE — PLAN OF CARE
Problem: Nutrition/Hydration-ADULT  Goal: Nutrient/Hydration intake appropriate for improving, restoring or maintaining nutritional needs  Description: Monitor and assess patient's nutrition/hydration status for malnutrition  Collaborate with interdisciplinary team and initiate plan and interventions as ordered  Monitor patient's weight and dietary intake as ordered or per policy  Utilize nutrition screening tool and intervene as necessary  Determine patient's food preferences and provide high-protein, high-caloric foods as appropriate       INTERVENTIONS:  - Monitor oral intake, urinary output, labs, and treatment plans  - Assess nutrition and hydration status and recommend course of action  - Evaluate amount of meals eaten  - Assist patient with eating if necessary   - Allow adequate time for meals  - Recommend/ encourage appropriate diets, oral nutritional supplements, and vitamin/mineral supplements  - Order, calculate, and assess calorie counts as needed  - Recommend, monitor, and adjust tube feedings and TPN/PPN based on assessed needs  - Assess need for intravenous fluids  - Provide specific nutrition/hydration education as appropriate  - Include patient/family/caregiver in decisions related to nutrition  Outcome: Progressing     Problem: MOBILITY - ADULT  Goal: Maintain or return to baseline ADL function  Description: INTERVENTIONS:  -  Assess patient's ability to carry out ADLs; assess patient's baseline for ADL function and identify physical deficits which impact ability to perform ADLs (bathing, care of mouth/teeth, toileting, grooming, dressing, etc )  - Assess/evaluate cause of self-care deficits   - Assess range of motion  - Assess patient's mobility; develop plan if impaired  - Assess patient's need for assistive devices and provide as appropriate  - Encourage maximum independence but intervene and supervise when necessary  - Involve family in performance of ADLs  - Assess for home care needs following discharge   - Consider OT consult to assist with ADL evaluation and planning for discharge  - Provide patient education as appropriate  Outcome: Progressing  Goal: Maintains/Returns to pre admission functional level  Description: INTERVENTIONS:  - Perform BMAT or MOVE assessment daily    - Set and communicate daily mobility goal to care team and patient/family/caregiver  - Collaborate with rehabilitation services on mobility goals if consulted  - Perform Range of Motion 3 times a day  - Reposition patient every 2 hours    - Dangle patient 3 times a day  - Stand patient 3 times a day  - Ambulate patient 3 times a day  - Out of bed to chair 3 times a day   - Out of bed for meals 3 times a day  - Out of bed for toileting  - Record patient progress and toleration of activity level   Outcome: Progressing     Problem: Potential for Falls  Goal: Patient will remain free of falls  Description: INTERVENTIONS:  - Educate patient/family on patient safety including physical limitations  - Instruct patient to call for assistance with activity   - Consult OT/PT to assist with strengthening/mobility   - Keep Call bell within reach  - Keep bed low and locked with side rails adjusted as appropriate  - Keep care items and personal belongings within reach  - Initiate and maintain comfort rounds  - Make Fall Risk Sign visible to staff  - Offer Toileting every 2 Hours, in advance of need  - Initiate/Maintain bed alarm  - Obtain necessary fall risk management equipment:   - Apply yellow socks and bracelet for high fall risk patients  - Consider moving patient to room near nurses station  Outcome: Progressing     Problem: Prexisting or High Potential for Compromised Skin Integrity  Goal: Skin integrity is maintained or improved  Description: INTERVENTIONS:  - Identify patients at risk for skin breakdown  - Assess and monitor skin integrity  - Assess and monitor nutrition and hydration status  - Monitor labs   - Assess for incontinence   - Turn and reposition patient  - Assist with mobility/ambulation  - Relieve pressure over bony prominences  - Avoid friction and shearing  - Provide appropriate hygiene as needed including keeping skin clean and dry  - Evaluate need for skin moisturizer/barrier cream  - Collaborate with interdisciplinary team   - Patient/family teaching  - Consider wound care consult   Outcome: Progressing

## 2022-08-30 NOTE — OCCUPATIONAL THERAPY NOTE
Occupational Therapy Evaluation     Patient Name: Gómez Silverman  LKSAG'G Date: 8/30/2022  Problem List  Principal Problem:    Toxic gastroenteritis and colitis  Active Problems:    Metastatic breast cancer ([facility] )    Mixed hyperlipidemia    Chronic kidney disease (CKD) stage G3a/A1, moderately decreased glomerular filtration rate (GFR) between 45-59 mL/min/1 73 square meter and albuminuria creatinine ratio less than 30 mg/g (HCC)    Primary hypertension    Anemia    Severe protein-calorie malnutrition ([facility] )    Single subsegmental pulmonary embolism without acute cor pulmonale ([facility] )    Past Medical History  Past Medical History:   Diagnosis Date    Abnormal weight loss     Allergic reaction     Anxiety     Breast cancer, right ([facility] ) 2011    right    Cancer ([facility] ) 2012    Candidal vulvovaginitis     [facility] disorder [facility] ) Same as above    Endometrial hyperplasia     Epithelial cyst     benign, ovary    GI (gastrointestinal bleed) Blood mixed with stool    History of radiation therapy 2011    right breast cancer    Hyperlipidemia     Hypertension     Internal hemorrhoids     Knee tendonitis     Ovarian cyst     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[facility])      Past Surgical History  Past Surgical History:   Procedure Laterality Date    BILATERAL SALPINGOOPHORECTOMY      onset: 7/23/13    BREAST BIOPSY Right 06/13/2006    benign    BREAST BIOPSY Right 03/02/2011    malignant    BREAST LUMPECTOMY Right 03/30/2011    malignant    CATARACT EXTRACTION W/  INTRAOCULAR LENS IMPLANT Right     phacoemulsification   Onset: 10/27/14    CHOLECYSTECTOMY      COLONOSCOPY  2017    approx    HYSTERECTOMY  Yes    INTRAOPERATIVE RADIATION THERAPY ([facility])      IR BIOPSY BONE  7/9/2021    IR PICC PLACEMENT SINGLE LUMEN  8/19/2022    SKIN LESION EXCISION Right     breast, single lesion    TONSILLECTOMY AND ADENOIDECTOMY               08/30/22 0915   OT Last Visit   OT Visit Date 08/30/22   Note Type   Note type Evaluation   Restrictions/Precautions Weight Bearing Precautions Per Order No   Other Precautions Limb alert  (RUE Limb alert, LUE PICC line - Take BP on L [place])   Pain Assessment   Pain Assessment Tool 0-10   Pain Score No Pain   Home Living   Type of Home Apartment  (Senior apartments, able to pull call bell if needed)   Home Layout One level  (FF Apt)   Home Equipment Walker   Additional Comments Owns RW but does not use at baseline   Prior Function   Level of [place] Independent with ADLs and functional mobility   Lives With Alone   Receives Help From Family   ADL Assistance Independent   IADLs Independent   Falls in the last 6 months 0   Comments (+) drives   Lifestyle   Autonomy Pt is independent in ADL, IADLs, drives, cooks & cleans   Subjective   Subjective "I want to do this and walk!"   ADL   Eating Assistance 7  Independent   Grooming Assistance 7  Independent   UB Bathing Assistance 5  Supervision/Setup   LB Bathing Assistance 5  Supervision/Setup   UB Dressing Assistance 5  Supervision/Setup   LB Dressing Assistance 5  Supervision/Setup   Toileting Assistance  5  Supervision/Setup   Bed Mobility   Supine to Sit 6  Modified independent   Sit to Supine 5  Supervision   Additional Comments Pt remains OOB to recliner at end of session  Pt states she has been taking self to & from [employer] plugging phone in wall across room  Transfers   Sit to Stand 5  Supervision   Stand to Sit 5  Supervision   Additional Comments BP as follows s/p mobility & returning to supine  Supine: 143/82; Sittin/82; Standin/81   Functional Mobility   Functional Mobility 5  Supervision   Additional Comments Pt mobilized approx 10 ft before becoming symptomatic/lightheaded  Returned to sitting EOB, /73     Additional items Rolling walker   Balance   Static Sitting Good   Dynamic Sitting Good   Static Standing Fair +   Dynamic Standing Fair   Ambulatory Fair   Activity Tolerance   Activity Tolerance Patient limited by fatigue   Medical Staff Made Aware PT Liz   Nurse Made Aware MAURO Grant   RUCESAR Assessment   RUE Assessment WFL   LUE Assessment   LUE Assessment WFL   Hand Function   Gross Motor Coordination Functional   Fine Motor Coordination Functional  (Pt noted to be shaking/tremulous  Pt reports this is not normal  Likely 2/2 elevated HR & decreased endurance )   Vision-Basic Assessment   Current Vision Wears glasses all the time   Cognition   Overall Cognitive Status WFL   Arousal/Participation Alert   Attention Within functional limits   Orientation Level Oriented X4   Memory Within functional limits   Following Commands Follows all commands and directions without difficulty   Assessment   Limitation Decreased self-care trans;Decreased high-level ADLs; Decreased ADL status; Decreased endurance   Prognosis Good   Assessment Pt is a 79 y o  female seen for OT evaluation at [facility], admitted 8/15/2022 w/ increased dizziness & multiple episodes of diarrhea  Upon admission pt found to have Toxic gastroenteritis and colitis  Pt initially screened as pt has been independent in room  However, pt's hospital stay has been prolonged d/t low HgB, tachycardia, & continued diarrhea  OT re-ordered  OT completed extensive review of pt's medical and social history  Comorbidities affecting pt's functional performance at time of assessment include: metastatic breast cancer, HLD, CKD, HTN, anemia, malnutrition, single subsegmental PE, tachycardia, anxiety, etc  Personal factors affecting pt at time of IE include: limited home support, difficulty performing ADLS, difficulty performing IADLS , compliance and decreased initiation and engagement   Prior to admission, pt was living alone in a  senior living apartment  Pt was I w/  ADLS and w/ IADLS, (+) drove, & required use of no DME/AD PTA   Upon evaluation: Pt requires Mod I for bed mobility, S for functional mobility/transfers, S for UB ADLs and S for LB ADLS 2* the following deficits impacting occupational performance: weakness, decreased strength, decreased balance and decreased tolerance  Full objective findings from OT assessment regarding body systems outlined above  Pt to benefit from continued skilled OT tx while in the hospital to address deficits as defined above and maximize level of functional independence w/ ADL's and functional mobility  Occupational Performance areas to address include: grooming, bathing/shower, toilet hygiene, dressing, health maintenance, functional mobility, community mobility and clothing management  Based on findings, pt is of high complexity  The patient's raw score on the AM-PAC Daily Activity inpatient short form is 24, standardized score is 57 54, greater than 39 4  Patients at this level are likely to benefit from DC to home, which does coincide with current above OT recommendations  However, please refer to therapist recommendation for discharge planning given other factors that may influence destination  At this time, OT recommendations at time of discharge are no OT needs  Goals   Patient Goals Pt wants to get better & go home   Plan   Treatment Interventions ADL retraining;Functional transfer training; Endurance training;Patient/family training;Equipment evaluation/education; Compensatory technique education; Energy conservation   Goal Expiration Date 09/09/22   OT Treatment Day 0   OT Frequency 2-3x/wk   Recommendation   OT Discharge Recommendation No rehabilitation needs   AM-PAC Daily Activity Inpatient   Lower Body Dressing 4   Bathing 4   Toileting 4   Upper Body Dressing 4   Grooming 4   Eating 4   Daily Activity Raw Score 24   Daily Activity Standardized Score (Calc for Raw Score >=11) 57 54   AM-PAC Applied Cognition Inpatient   Following a Speech/Presentation 4   Understanding Ordinary Conversation 4   Taking Medications 4   Remembering Where Things Are Placed or Put Away 4   Remembering List of 4-5 Errands 4   Taking Care of Complicated Tasks 4   Applied Cognition Raw Score 24   Applied Cognition Standardized Score 62 21     Pt will achieve the following goals within 10 days  *Pt will complete LB bathing and dressing with independence to ensure safe D/C to home  *Pt will complete toileting independently w/ G hygiene/thoroughness to ensure safe D/C to home  *Pt will perform functional transfers with on/off all surfaces with Mod I using DME as needed w/ G balance/safety  *Pt will increase standing tolerance to 5 minutes in order to complete sinkside ADL  *Pt will demonstrate increased activity tolerance >20 min in order to complete ADL routine  *Pt will improve functional mobility during ADL/IADL/leisure tasks to Mod I using DME as needed w/ G balance/safety      *Assess DME needs     Noemy Chapin OTR/L

## 2022-08-30 NOTE — TELEPHONE ENCOUNTER
[facility] from [facility] called to say Edwin Whyte will be d/c from [facility] 8/31 or 9/1 and was chedking to see if Dr Wallace Chery still sees her   I advised yes

## 2022-08-30 NOTE — ASSESSMENT & PLAN NOTE
· Patient presented with dizziness and diarrhea starting in August that worsened yesterday  · Most likely secondary to IBD  · CT showed: 1  Diffuse mild-moderate circumferential wall thickening of the colon with pericolic inflammatory stranding consistent with nonspecific pancolitis  2   Multiple stable small scattered sclerotic foci in the visualized lumbar lower thoracic spine osseous pelvis which may reflect osseous metastatic disease in this patient with known metastatic breast cancer  3   Additional stable findings as noted   · GI consulted-> recs appreciated  · Off IV fluids  · Discontinue Zosyn as symptoms are most likely secondary to ulcerative colitis  · Steroids initiated 8/17  · F/u bld cx and procal, ESR, CRP, and lipase, C Diff  · ESR:  48  · Procalcitonin:  1 49  · CRP:  206 3  · C  Diff: negative  · Stool enteric panel: negative  · Advanced to low-fiber   · Cholestyramine  · Oncology consulted by GI for further recommendations regarding current cancer regimen playing a role in symptoms  Previously believed due to verzenio which has been on hold since recent admit, doubt this is contributing   · Flex sig completed 8/20 concerning for IBD, biopsies taken  Also noted ulcerations  · Biopsy reveals colitis  · IV steroids were transitioned back to oral prednisone  · Repeat CT abd demonstrates ongoing pancolitis  · GI spoke with IBD specialist Dr Merrell Kayser and CRP was ordered which is 122 4   Patient per their recommendations, was restarted on IV Solu-Medrol for 3 more days and repeat CRP after 3 days  · Hemoglobin this morning is 7 5  · Trend H&H and transfuse as needed

## 2022-08-30 NOTE — ASSESSMENT & PLAN NOTE
· Venous duplex obtained due to LE edema  · Confirmed acute occlusive thrombus in popliteal vein and non-occlusive thrombus in the peroneal veins  · Started on IV heparin gtt 8/19t  · Suspect in setting of known metastatic cancer and patient has been more sedentary in recent weeks due to acute illness  · Transition to [address] [place] [address] 8/25  · Tachycardia is improved  No heart strain noted and no chest pain or palpitations    · Continue to monitor closely

## 2022-08-30 NOTE — CASE MANAGEMENT
Case Management Discharge Planning Note    Patient name Flash Silverman  Location /-41 MRN [id number]  : 1940 Date 2022       Current Admission Date: 8/15/2022  Current Admission Diagnosis:Toxic gastroenteritis and colitis   Patient Active Problem List    Diagnosis Date Noted    [facility] 2022    Severe protein-calorie malnutrition ([facility] ) 2022    Anemia 2022    Hypokalemia 2022    REYES (acute kidney injury) ([place] [address] ) 2022    Diarrhea 2022    Secondary malignant neoplasm of bone ([place] [address] ) 2022    Toxic gastroenteritis and colitis 2022    Rectal bleeding 10/14/2021    Lytic lesion of bone on x-ray 2021    Neoplasm of uncertain behavior of sternum 2021    Cough due to ACE inhibitor 2021    Acute costochondritis 2021    Osteopenia of multiple sites 2020    Primary hypertension 10/22/2019    Chronic kidney disease (CKD) stage G3a/A1, moderately decreased glomerular filtration rate (GFR) between 45-59 mL/min/1 73 square meter and albuminuria creatinine ratio less than 30 mg/g ([facility]) 2019    Adverse reaction to pneumococcal vaccine 2015    Cataract, bilateral 2014    Pulmonary nodule seen on imaging study 09/10/2013    Sleep disorder 2013    Anxiety 2013    Metastatic breast cancer ([place] [address] ) 10/17/2012    Mixed hyperlipidemia 2012      LOS (days): 15  Geometric Mean LOS (GMLOS) (days): 3 70  Days to GMLOS:-11 2     OBJECTIVE:  Risk of Unplanned Readmission Score: 29 42         Current admission status: Inpatient   Preferred Pharmacy:   [facility] [address] ROBIN BAUMAN [place], [place] - [address]  [address] [facility]  Phone: [phone] Fax: [phone] [facility] [address] [place] [address]  [address]  [address] [place] [address]  Phone: [phone] Fax: [phone]    Primary Care Provider: Anna Frederick DO    Primary Insurance: MEDICARE  Secondary Insurance: Swathi Yanez    DISCHARGE DETAILS:    Discharge planning discussed with[de-identified] patient  Freedom of Choice: Yes  Comments - Freedom of Choice: Therapy is now recommending home rehab  Discussed same with pt  She is agreeable to blanket referrals  Referral sent via [employer] for VN/PT/OT        Requested [address]         Is the patient interested in [facility] at discharge?: Yes  [address] requested[de-identified] Nursing, Physical Therapy, [address] Name[de-identified] Other (TBD)  [address] [place] [address] Provider[de-identified] PCP  Home Health Services Needed[de-identified] Evaluate Functional Status and Safety, Strengthening/Theraputic Exercises to Improve Function, Other (comment) (assess gi status  Medication and s/s to report teaching)  Homebound Criteria Met[de-identified] Requires the Assistance of Another Person for Safe Ambulation or to Leave the Home, Uses an Assist Device (i e  cane, walker, etc)  Supporting Clincal Findings[de-identified] Limited Endurance, Fatigues Easliy in [employer]      Treatment Team Recommendation: Home with [address] [employer]  Discharge Destination Plan[de-identified] Home with Nia at Discharge : Family (daughter or son)        Therapy is now recommending Home w/ rehab services  Pt is agreeable   [place] referrals sent via [employer]  **addendum 1407  Pt has been accepted by [facility] for post d/c care

## 2022-08-30 NOTE — ASSESSMENT & PLAN NOTE
· History of chronic anemia, baseline 8-10  · Underwent flex sig which showed inflammation, ulcerations  · Also with BRBPR since starting heparin gtt  · Trend H/H  · Prior provider discussed with GI - recommending IV venofer, continue on IV Venofer  · Hemoglobin this morning is 7 5  · Hold off on transfusion at this time  · GI follow-up  · On Protonix

## 2022-08-30 NOTE — ASSESSMENT & PLAN NOTE
Lab Results   Component Value Date    EGFR 61 08/30/2022    EGFR 63 08/29/2022    EGFR 78 08/28/2022    CREATININE 0 88 08/30/2022    CREATININE 0 86 08/29/2022    CREATININE 0 72 08/28/2022     · Cr on admission slightly elevated from her baseline at 1 3, most likely due to volume depletion  Now improved after IVF    · Baseline 0 8

## 2022-08-31 LAB
ANION GAP SERPL CALCULATED.3IONS-SCNC: 9 MMOL/L (ref 4–13)
BUN SERPL-MCNC: 14 MG/DL (ref 5–25)
CALCIUM SERPL-MCNC: 7.8 MG/DL (ref 8.3–10.1)
CHLORIDE SERPL-SCNC: 102 MMOL/L (ref 96–108)
CO2 SERPL-SCNC: 24 MMOL/L (ref 21–32)
CREAT SERPL-MCNC: 0.89 MG/DL (ref 0.6–1.3)
ERYTHROCYTE [DISTWIDTH] IN BLOOD BY AUTOMATED COUNT: 21.3 % (ref 11.6–15.1)
GFR SERPL CREATININE-BSD FRML MDRD: 60 ML/MIN/1.73SQ M
GLUCOSE SERPL-MCNC: 122 MG/DL (ref 65–140)
HBV SURFACE AB SER-ACNC: <3.1 MIU/ML
HBV SURFACE AG SER QL: NORMAL
HCT VFR BLD AUTO: 23.3 % (ref 34.8–46.1)
HCT VFR BLD AUTO: 23.5 % (ref 34.8–46.1)
HGB BLD-MCNC: 7.2 G/DL (ref 11.5–15.4)
HGB BLD-MCNC: 7.5 G/DL (ref 11.5–15.4)
MCH RBC QN AUTO: 31.2 PG (ref 26.8–34.3)
MCHC RBC AUTO-ENTMCNC: 30.9 G/DL (ref 31.4–37.4)
MCV RBC AUTO: 101 FL (ref 82–98)
PLATELET # BLD AUTO: 205 THOUSANDS/UL (ref 149–390)
PMV BLD AUTO: 9.3 FL (ref 8.9–12.7)
POTASSIUM SERPL-SCNC: 4.4 MMOL/L (ref 3.5–5.3)
RBC # BLD AUTO: 2.31 MILLION/UL (ref 3.81–5.12)
SODIUM SERPL-SCNC: 135 MMOL/L (ref 135–147)
WBC # BLD AUTO: 5.72 THOUSAND/UL (ref 4.31–10.16)

## 2022-08-31 PROCEDURE — 80048 BASIC METABOLIC PNL TOTAL CA: CPT | Performed by: INTERNAL MEDICINE

## 2022-08-31 PROCEDURE — 85027 COMPLETE CBC AUTOMATED: CPT | Performed by: INTERNAL MEDICINE

## 2022-08-31 PROCEDURE — 99232 SBSQ HOSP IP/OBS MODERATE 35: CPT | Performed by: PHYSICIAN ASSISTANT

## 2022-08-31 PROCEDURE — 85014 HEMATOCRIT: CPT | Performed by: INTERNAL MEDICINE

## 2022-08-31 PROCEDURE — 99232 SBSQ HOSP IP/OBS MODERATE 35: CPT | Performed by: INTERNAL MEDICINE

## 2022-08-31 PROCEDURE — 87340 HEPATITIS B SURFACE AG IA: CPT | Performed by: INTERNAL MEDICINE

## 2022-08-31 PROCEDURE — 86706 HEP B SURFACE ANTIBODY: CPT | Performed by: INTERNAL MEDICINE

## 2022-08-31 PROCEDURE — 85018 HEMOGLOBIN: CPT | Performed by: INTERNAL MEDICINE

## 2022-08-31 RX ORDER — CHOLESTYRAMINE LIGHT 4 G/5.7G
4 POWDER, FOR SUSPENSION ORAL
Status: DISCONTINUED | OUTPATIENT
Start: 2022-08-31 | End: 2022-09-06 | Stop reason: HOSPADM

## 2022-08-31 RX ORDER — LANOLIN ALCOHOL/MO/W.PET/CERES
3 CREAM (GRAM) TOPICAL
Status: DISCONTINUED | OUTPATIENT
Start: 2022-08-31 | End: 2022-09-06 | Stop reason: HOSPADM

## 2022-08-31 RX ADMIN — SIMETHICONE 80 MG: 80 TABLET, CHEWABLE ORAL at 11:41

## 2022-08-31 RX ADMIN — SIMETHICONE 80 MG: 80 TABLET, CHEWABLE ORAL at 08:32

## 2022-08-31 RX ADMIN — PANTOPRAZOLE SODIUM 40 MG: 40 TABLET, DELAYED RELEASE ORAL at 05:03

## 2022-08-31 RX ADMIN — SIMETHICONE 80 MG: 80 TABLET, CHEWABLE ORAL at 22:18

## 2022-08-31 RX ADMIN — MESALAMINE 800 MG: 400 CAPSULE, DELAYED RELEASE ORAL at 17:37

## 2022-08-31 RX ADMIN — METOPROLOL TARTRATE 25 MG: 25 TABLET, FILM COATED ORAL at 22:19

## 2022-08-31 RX ADMIN — CHOLESTYRAMINE 4 G: 4 POWDER, FOR SUSPENSION ORAL at 22:18

## 2022-08-31 RX ADMIN — MESALAMINE 800 MG: 400 CAPSULE, DELAYED RELEASE ORAL at 08:32

## 2022-08-31 RX ADMIN — AMITRIPTYLINE HYDROCHLORIDE 25 MG: 25 TABLET, FILM COATED ORAL at 22:19

## 2022-08-31 RX ADMIN — RIVAROXABAN 15 MG: 15 TABLET, FILM COATED ORAL at 08:32

## 2022-08-31 RX ADMIN — SIMETHICONE 80 MG: 80 TABLET, CHEWABLE ORAL at 17:37

## 2022-08-31 RX ADMIN — PRAVASTATIN SODIUM 20 MG: 20 TABLET ORAL at 17:37

## 2022-08-31 RX ADMIN — METHYLPREDNISOLONE SODIUM SUCCINATE 40 MG: 40 INJECTION, POWDER, FOR SOLUTION INTRAMUSCULAR; INTRAVENOUS at 13:30

## 2022-08-31 RX ADMIN — METOPROLOL TARTRATE 25 MG: 25 TABLET, FILM COATED ORAL at 08:32

## 2022-08-31 RX ADMIN — MIRTAZAPINE 7.5 MG: 15 TABLET, FILM COATED ORAL at 22:19

## 2022-08-31 RX ADMIN — LETROZOLE 2.5 MG: 2.5 TABLET ORAL at 08:37

## 2022-08-31 RX ADMIN — SODIUM CHLORIDE 200 MG: 9 INJECTION, SOLUTION INTRAVENOUS at 08:37

## 2022-08-31 RX ADMIN — Medication 3 MG: at 00:45

## 2022-08-31 RX ADMIN — PANTOPRAZOLE SODIUM 40 MG: 40 TABLET, DELAYED RELEASE ORAL at 17:37

## 2022-08-31 RX ADMIN — METHYLPREDNISOLONE SODIUM SUCCINATE 40 MG: 40 INJECTION, POWDER, FOR SOLUTION INTRAMUSCULAR; INTRAVENOUS at 22:18

## 2022-08-31 RX ADMIN — RIVAROXABAN 15 MG: 15 TABLET, FILM COATED ORAL at 17:37

## 2022-08-31 RX ADMIN — METHYLPREDNISOLONE SODIUM SUCCINATE 40 MG: 40 INJECTION, POWDER, FOR SOLUTION INTRAMUSCULAR; INTRAVENOUS at 05:03

## 2022-08-31 RX ADMIN — MESALAMINE 800 MG: 400 CAPSULE, DELAYED RELEASE ORAL at 22:19

## 2022-08-31 NOTE — ASSESSMENT & PLAN NOTE
Lab Results   Component Value Date    EGFR 60 08/31/2022    EGFR 61 08/30/2022    EGFR 63 08/29/2022    CREATININE 0 89 08/31/2022    CREATININE 0 88 08/30/2022    CREATININE 0 86 08/29/2022     · Cr on admission slightly elevated from her baseline at 1 3, most likely due to volume depletion  Now improved after IVF    · Baseline 0 8

## 2022-08-31 NOTE — QUICK NOTE
Colon biopsy slides revaluated by pathology    No adenovirus inclusions identified on immunohistochemistry stain  Case discussed with Dr Antelmo Vega on 8/30/22 at 12:13 pm via [employer]  As indicated in the [address], the differential diagnosis includes but is not limited to drug-induced colitis, infection, diverticular-associated colitis, and inflammatory bowel disease  Suggest clinical correlation and appropriate follow-up

## 2022-08-31 NOTE — ASSESSMENT & PLAN NOTE
· Patient presented with dizziness and diarrhea starting in August that worsened yesterday  · Most likely secondary to IBD  · CT showed: 1  Diffuse mild-moderate circumferential wall thickening of the colon with pericolic inflammatory stranding consistent with nonspecific pancolitis  2   Multiple stable small scattered sclerotic foci in the visualized lumbar lower thoracic spine osseous pelvis which may reflect osseous metastatic disease in this patient with known metastatic breast cancer  3   Additional stable findings as noted   · GI consulted-> recs appreciated  · Off IV fluids  · Discontinue Zosyn as symptoms are most likely secondary to ulcerative colitis  · Steroids initiated 8/17  · F/u bld cx and procal, ESR, CRP, and lipase, C Diff  · ESR:  48  · Procalcitonin:  1 49  · CRP:  206 3  · C  Diff: negative  · Stool enteric panel: negative  · Advanced to low-fiber   · Cholestyramine  · Oncology consulted by GI for further recommendations regarding current cancer regimen playing a role in symptoms  Previously believed due to verzenio which has been on hold since recent admit, doubt this is contributing   · Flex sig completed 8/20 concerning for IBD, biopsies taken  Also noted ulcerations  · Biopsy reveals colitis  · IV steroids were transitioned back to oral prednisone  · Repeat CT abd demonstrates ongoing pancolitis  · GI spoke with IBD specialist Dr Rodríguez Castro and CRP was ordered which is 122 4   Patient per their recommendations, was restarted on IV Solu-Medrol for 3 more days and repeat CRP after 3 days  · Hemoglobin this morning is 7 2  · Trend H&H and transfuse as needed

## 2022-08-31 NOTE — PROGRESS NOTES
[place]  Progress Note Doris Silverman 1940, 79 y o  female MRN: [id number]  Unit/Bed#: -[place] Encounter: [id number]  Primary Care Provider: Tigre January DO   Date and time admitted to hospital: 8/15/2022 10:40 AM    Single subsegmental pulmonary embolism without acute cor pulmonale (HCC)  Assessment & Plan  · Venous duplex obtained due to LE edema  · Confirmed acute occlusive thrombus in popliteal vein and non-occlusive thrombus in the peroneal veins  · Started on IV heparin gtt 8/19t  · Suspect in setting of known metastatic cancer and patient has been more sedentary in recent weeks due to acute illness  · Transition to NOAC 8/25  · Tachycardia is improved  No heart strain noted and no chest pain or palpitations  · Cardiology consult appreciated  Started on Lopressor  · Continue to monitor closely    Severe protein-calorie malnutrition ([facility] [address] )  Assessment & Plan  Malnutrition Findings:   Adult Malnutrition type: Chronic illness  Adult Degree of Malnutrition: Other severe protein calorie malnutrition  Malnutrition Characteristics: Inadequate energy, Weight loss    360 Statement: Pt presents with chronic severe protein calorie malnutrition due to Stage IV metastatic breast cancer, chronic diarrhea vs UC flare as evidenced by 10 5 lb wt loss-14% wt loss in 4 months and <75 % po intake > 1 month  BMI Findings: Body mass index is 27 21 kg/m²         Anemia  Assessment & Plan  · History of chronic anemia, baseline 8-10  · Underwent flex sig which showed inflammation, ulcerations  · Also with BRBPR since starting heparin gtt  · Trend H/H  · Prior provider discussed with GI - recommending IV venofer, continue on IV Venofer  · Hemoglobin this morning is 7 2  · Hold off on transfusion at this time  · GI follow-up  · On Protonix    Primary hypertension  Assessment & Plan  · On Lopressor    Chronic kidney disease (CKD) stage G3a/A1, moderately decreased glomerular filtration rate (GFR) between 45-59 mL/min/1 73 square meter and albuminuria creatinine ratio less than 30 mg/g [facility])  Assessment & Plan  Lab Results   Component Value Date    EGFR 60 08/31/2022    EGFR 61 08/30/2022    EGFR 63 08/29/2022    CREATININE 0 89 08/31/2022    CREATININE 0 88 08/30/2022    CREATININE 0 86 08/29/2022     · Cr on admission slightly elevated from her baseline at 1 3, most likely due to volume depletion  Now improved after IVF  · Baseline 0 8    Mixed hyperlipidemia  Assessment & Plan  · Continue statin    Metastatic breast cancer [facility])  Assessment & Plan  · Continue to follow-up with Hematology-Oncology outpatient  · Femara had been held on admit  · On recent admission patient's verzenio was held  Reviewed office visit from 8/10/22 appears there was some consideration to resume but at lowered dose  Patient reports she never resumed this medication  · Patient will require follow-up to discuss possible lumbar mets noted on CT this admission  · Oncology follow-up noted  · On Femara    * Toxic gastroenteritis and colitis  Assessment & Plan  · Patient presented with dizziness and diarrhea starting in August that worsened yesterday  · Most likely secondary to IBD  · CT showed: 1  Diffuse mild-moderate circumferential wall thickening of the colon with pericolic inflammatory stranding consistent with nonspecific pancolitis  2   Multiple stable small scattered sclerotic foci in the visualized lumbar lower thoracic spine osseous pelvis which may reflect osseous metastatic disease in this patient with known metastatic breast cancer  3   Additional stable findings as noted   · GI consulted-> recs appreciated  · Off IV fluids  · Discontinue Zosyn as symptoms are most likely secondary to ulcerative colitis  · Steroids initiated 8/17  · F/u bld cx and procal, ESR, CRP, and lipase, C Diff  · ESR:  48  · Procalcitonin:  1 49  · CRP:  206 3  · C   Diff: negative  · Stool enteric panel: negative  · Advanced to low-fiber   · Cholestyramine  · Oncology consulted by GI for further recommendations regarding current cancer regimen playing a role in symptoms  Previously believed due to verzenio which has been on hold since recent admit, doubt this is contributing   · Flex sig completed 8/20 concerning for IBD, biopsies taken  Also noted ulcerations  · Biopsy reveals colitis  · IV steroids were transitioned back to oral prednisone  · Repeat CT abd demonstrates ongoing pancolitis  · GI spoke with IBD specialist Dr Harsh Cantrell and CRP was ordered which is 122 4  Patient per their recommendations, was restarted on IV Solu-Medrol for 3 more days and repeat CRP after 3 days  · Hemoglobin this morning is 7 2  · Trend H&H and transfuse as needed      Labs & Imaging: I have personally reviewed pertinent reports  VTE Prophylaxis: in place  Code Status:   Level 3 - DNAR and DNI    Patient Centered Rounds: I have performed bedside rounds with nursing staff today  Discussions with Specialists or Other Care Team Provider:  Cardiology    Education and Discussions with Family / Patient:  Attempted to call daughter    Current Length of Stay: 12 day(s)    Current Patient Status: Inpatient   Certification Statement: The patient will continue to require additional inpatient hospital stay due to see my assessment and plan  Subjective:   Patient is seen and examined at bedside  States her diarrhea is improved  Denies any new complaints  Afebrile  Patient is still tachycardic  Started on Lopressor yesterday  All other ROS are negative  Objective:    Vitals: Blood pressure 140/82, pulse (!) 108, temperature 97 5 °F (36 4 °C), resp  rate 18, height 5' 1" (1 549 m), weight 65 3 kg (144 lb), SpO2 96 %, not currently breastfeeding  ,Body mass index is 27 21 kg/m²    SPO2 RA Rest    Flowsheet Row ED to Hosp-Admission (Current) from 8/15/2022 in [address] Med S[address]   SpO2 96 %   SpO2 Activity At Rest   O2 Device None (Room air)   O2 Flow Rate --        I&O:     Intake/Output Summary (Last 24 hours) at 8/31/2022 1115  Last data filed at 8/31/2022 0848  Gross per 24 hour   Intake 960 ml   Output --   Net 960 ml       Physical Exam:    General- Alert, sitting comfortably in chair  Not in any acute distress  Neck- Supple, No JVD  CVS- regular, S1 and S2 normal  Chest- Bilateral Air entry, No rhochi, crackles or wheezing present  Abdomen- soft, nontender, not distended, no guarding or rigidity, BS+  Extremities-  has pedal edema, No calf tenderness                         Normal ROM in all extremities  CNS-   Alert, awake and orientedx3  No focal deficits present      Invasive Devices  Report    Peripherally Inserted Central Catheter Line  Duration           PICC Line 08/19/22 11 days                      Social History  reviewed  Family History   Problem Relation Age of Onset    Stomach cancer Mother     No Known Problems Father     Cervical cancer Sister     No Known Problems Daughter     No Known Problems Maternal Grandmother     No Known Problems Maternal Grandfather     No Known Problems Paternal Grandmother     No Known Problems Paternal Grandfather     Colon polyps Neg Hx     Colon cancer Neg Hx     reviewed    Meds:  Current Facility-Administered Medications   Medication Dose Route Frequency Provider Last Rate Last Admin    acetaminophen (TYLENOL) tablet 650 mg  650 mg Oral Q6H PRN Erin Alba MD   650 mg at 08/26/22 2203    amitriptyline (ELAVIL) tablet 25 mg  25 mg Oral HS Erin Alba MD   25 mg at 08/30/22 2106    calcium carbonate (TUMS) chewable tablet 500 mg  500 mg Oral Daily PRN Erin Alba MD   500 mg at 08/26/22 2202    cholestyramine sugar free (QUESTRAN LIGHT) packet 4 g  4 g Oral HS BROOKE Pérez        iron sucrose (VENOFER) 200 mg in sodium chloride 0 9 % 100 mL IVPB  200 mg Intravenous Once per day on Mon Wed Fri Abel BUSBY KAIA Wheeler   200 mg at 08/31/22 9991    letrozole [facility]) tablet 2 5 mg  2 5 mg Oral Daily BROOKE Garcia   2 5 mg at 08/31/22 2491    loperamide (IMODIUM) capsule 2 mg  2 mg Oral Q4H PRN Salo WOODARD PA-C   2 mg at 08/28/22 1731    melatonin tablet 3 mg  3 mg Oral HS Jenise Jolly PA-C   3 mg at 08/31/22 0045    mesalamine (DELZICOL) delayed release capsule 800 mg  800 mg Oral TID Regulo Toledo MD   800 mg at 08/31/22 3162    methylPREDNISolone sodium succinate (Solu-MEDROL) injection 40 mg  40 mg Intravenous Pending sale to [facility] BROOKE Pérez   40 mg at 08/31/22 0503    metoprolol tartrate (LOPRESSOR) tablet 25 mg  25 mg Oral Q12H Danica Cadena MD   25 mg at 08/31/22 5905    mirtazapine (REMERON) tablet 7 5 mg  7 5 mg Oral HS Regulo Toledo MD   7 5 mg at 08/30/22 2106    ondansetron (ZOFRAN) injection 4 mg  4 mg Intravenous Q6H PRN Regulo Toledo MD   4 mg at 08/18/22 0925    pantoprazole (PROTONIX) EC tablet 40 mg  40 mg Oral BID AC BROOKE King   40 mg at 08/31/22 0503    pravastatin (PRAVACHOL) tablet 20 mg  20 mg Oral Daily With Alexey Perez MD   20 mg at 08/30/22 1704    rivaroxaban (XARELTO) tablet 15 mg  15 mg Oral BID With Meals Padmini WOODARD PA-C   15 mg at 08/31/22 3290    simethicone (MYLICON) chewable tablet 80 mg  80 mg Oral 4x Daily (with meals and at bedtime) Salo WOODARD PA-C   80 mg at 08/31/22 2736      Medications Prior to Admission   Medication    Abemaciclib (Verzenio) 50 MG TABS    amitriptyline (ELAVIL) 25 mg tablet    Cholecalciferol (VITAMIN D3) 2000 units capsule    diphenoxylate-atropine (LOMOTIL) 2 5-0 025 mg per tablet    letrozole (FEMARA) 2 5 mg tablet    loperamide (IMODIUM) 2 mg capsule    losartan (COZAAR) 50 mg tablet    mirtazapine (REMERON) 7 5 MG tablet    simvastatin (ZOCOR) 10 mg tablet       Labs:  Results from last 7 days   Lab Units 08/31/22  0448 08/30/22  2115 08/30/22  045 08/29/22  0854 08/29/22  0440 08/28/22  0515 08/26/22  0645 08/24/22  1327   WBC Thousand/uL 5 72  --  4 41  --  4 46 4 25*   < > 8 33   HEMOGLOBIN g/dL 7 2* 6 8* 7 5*   < > 6 3* 6 7*   < > 7 7*   HEMATOCRIT % 23 3* 21 8* 24 4*   < > 20 3* 21 2*   < > 24 0*   PLATELETS Thousands/uL 205  --  226  --  176 190   < > 237   NEUTROS PCT %  --   --   --   --   --   --   --  80*   LYMPHS PCT %  --   --   --   --   --   --   --  7*   LYMPHO PCT %  --   --  15  --  17 7*   < >  --    MONOS PCT %  --   --   --   --   --   --   --  9   MONO PCT %  --   --  1*  --  9 4   < >  --    EOS PCT %  --   --  0  --  0 1   < > 0    < > = values in this interval not displayed  Results from last 7 days   Lab Units 08/31/22 0448 08/30/22  0451 08/29/22  0440 08/28/22  0515 08/24/22  1327   POTASSIUM mmol/L 4 4 4 3 4 0 3 4* 3 7   CHLORIDE mmol/L 102 104 107 105 109*   CO2 mmol/L 24 23 26 26 23   BUN mg/dL 14 11 11 10 10   CREATININE mg/dL 0 89 0 88 0 86 0 72 0 94   CALCIUM mg/dL 7 8* 7 8* 7 0* 6 6* 6 9*   ALK PHOS U/L  --   --   --  72 92   ALT U/L  --   --   --  17 22   AST U/L  --   --   --  8 14     No results found for: TROPONINI, CKMB, CKTOTAL      Lab Results   Component Value Date    BLOODCX No Growth After 5 Days  08/15/2022    BLOODCX No Growth After 5 Days  08/15/2022         Imaging:  Results for orders placed during the hospital encounter of 08/01/22    XR Trauma chest portable    Narrative  CHEST    INDICATION:   TRAUMA  COMPARISON:  6/18/2021  EXAM PERFORMED/VIEWS:  XR CHEST PORTABLE      FINDINGS:    Cardiomediastinal silhouette appears unremarkable  The lungs are clear  No pneumothorax or pleural effusion  Osseous structures appear within normal limits for patient age  Impression  No acute cardiopulmonary disease  Workstation performed: [id number]    Results for orders placed in visit on 06/18/21    XR chest pa & lateral    Narrative  CHEST    INDICATION:   R07 89: Other chest pain    Cough since January  History of breast cancer  Anterior upper costochondral pain  COMPARISON:  Chest x-ray from 1/12/2021  Chest CT from 9/13/2013  EXAM PERFORMED/VIEWS:  XR CHEST PA & LATERAL      FINDINGS:    Cardiomediastinal silhouette appears unremarkable  The lungs are clear  No pneumothorax or pleural effusion  Osseous structures appear within normal limits for patient age  There is a bone island in the proximal left clavicle  Impression  No acute cardiopulmonary disease              Workstation performed: [id number]      Last 24 Hours Medication List:   Current Facility-Administered Medications   Medication Dose Route Frequency Provider Last Rate    acetaminophen  650 mg Oral Q6H PRN Emily Robbins MD      amitriptyline  25 mg Oral HS Emily Robbins MD      calcium carbonate  500 mg Oral Daily PRN Emily Robbins MD      cholestyramine sugar free  4 g Oral HS BROOKE Pérez      iron sucrose  200 mg Intravenous Once per day on Mon Wed Fri Keaton Wheeler PA-C      letrozole  2 5 mg Oral Daily BROOKE Kim      loperamide  2 mg Oral Q4H PRN Addy WOODARD PA-C      melatonin  3 mg Oral HS Vencdaen Flores PA-C      mesalamine  800 mg Oral TID Emily Robbins MD      methylPREDNISolone sodium succinate  40 mg Intravenous [facility] BROOKE Pérez      metoprolol tartrate  25 mg Oral Q12H [address] Roosevelt Cifuentes MD      mirtazapine  7 5 mg Oral HS Emily Robbins MD      ondansetron  4 mg Intravenous Q6H PRN Emily Robbins MD      pantoprazole  40 mg Oral BID AC BROOKE Roldan      pravastatin  20 mg Oral Daily With Ebkartik Stroud MD      rivaroxaban  15 mg Oral BID With Meals Addy WOODARD PA-C      simethicone  80 mg Oral 4x Daily (with meals and at bedtime) Yasmeen Layton PA-C          Today, Patient Was Seen By: Lisa Bronson MD    ** Please Note: Dictation voice to text software may have been used in the creation of this document   **

## 2022-08-31 NOTE — PROGRESS NOTES
Progress note - Gastroenterology   Edwin Silverman 79 y o  female MRN: [id number]  Unit/Bed#: -01 Encounter: [id number]    ASSESSMENT and PLAN  1  Acute colitis  New onset inflammatory bowel disease?  Related to or induced by cancer treatment/immunotherapy,   Atypical infectious colitis? Patient continues to have bloody loose bowel movements      Imodium 2 mg every 4 hours as needed      Biopsy from flex sig 8/20; active colitis, no viral inclusions identified on cm the immunohistochemistry stain, negative for dysplasia -Dr Ely Soto did reach out to pathology to relook at slides  - steroid switched back to IV for 72 hours  - continue mesalamine 800 mg p o  3 times a day  - continue amitriptyline 25 mg p o  Daily HS  -Imodium p r n   -continue IV Solu-Medrol today with repeat CRP in the a m   -hepatitis-B and TB titers and process-in preparation for biologic  -Questran powder once daily    Switched Questran the daily instead of b i d  With more formed bowel movements  Patient also refused dose yesterday evening because it caused her nausea  Bowel movements have improved in frequency as well as more solid  No bowel movement yet this morning, only to overnight  Still with notable blood      Discussed with patient office will contact her for follow-up outpatient office visit   Possibly be referred to IBD specialist      Per Dr Cesario Mcnamara IBD specialist-I would recommend checking CRP, restarting IV steroids for an additional 3 days and assessing her response with repeat CRP and clinical symptoms  Depending on how she does she might meet criteria for inpatient remicade  She is in her [de-identified], so obviously the risks and benefits of all treatment options should be weighed   Given her age I would also suggest you speak with pathology and confirm if it looks like IBD (and no ischemic, or an alternative diagnosis)       2  Acute blood loss anemia  Secondary to colitis and exacerbated by anticoagulation for DVT/PE  Hemoglobin stable- receiving IV iron     - follow H&H and transfuse as needed  - continue anticoagulation for now  - continue PPI  - IV Venofer      3  DVT and pulmonary embolism   Xarelto 15 mg p o  Twice daily         4  Metastatic breast cancer  Followed by Dr Sandy Vela      Chief Complaint   Patient presents with    Dizziness     Via ems from home for increased dizziness and multiple episodes diarrhea since the beginning of August  States worse since last night  States is getting new chemo medication       SUBJECTIVE/HPI   Stools more firm and less in frequency  No bowel movement yet this morning  Two bowel movements overnight  Still with bright red blood noted      /82   Pulse (!) 108   Temp 97 5 °F (36 4 °C)   Resp 18   Ht 5' 1" (1 549 m)   Wt 65 3 kg (144 lb)   LMP  (LMP Unknown)   SpO2 97%   BMI 27 21 kg/m²     PHYSICALEXAM  General appearance: alert, appears stated age and cooperative  Eyes: PERLLA, EOMI, no icterus   Head: Normocephalic, without obvious abnormality, atraumatic  Lungs: clear to auscultation bilaterally  Heart: regular rate and rhythm, S1, S2 normal, no murmur, click, rub or gallop  Abdomen: soft, non-tender; bowel sounds normal; no masses,  no organomegaly  Extremities: extremities normal, atraumatic, no cyanosis or edema  Neurologic: Grossly normal    Lab Results   Component Value Date    GLUCOSE 115 09/08/2015    CALCIUM 7 8 (L) 08/31/2022     09/08/2015    K 4 4 08/31/2022    CO2 24 08/31/2022     08/31/2022    BUN 14 08/31/2022    CREATININE 0 89 08/31/2022     Lab Results   Component Value Date    WBC 5 72 08/31/2022    HGB 7 2 (L) 08/31/2022    HCT 23 3 (L) 08/31/2022     (H) 08/31/2022     08/31/2022     Lab Results   Component Value Date    ALT 17 08/28/2022    AST 8 08/28/2022    ALKPHOS 72 08/28/2022    BILITOT 0 66 09/08/2015     No results found for: AMYLASE  Lab Results   Component Value Date    LIPASE 79 08/15/2022     Lab Results Component Value Date    IRON 19 (L) 08/28/2022    TIBC 105 (L) 08/28/2022    FERRITIN 755 (H) 08/28/2022     Lab Results   Component Value Date    INR 1 11 08/19/2022

## 2022-08-31 NOTE — PLAN OF CARE
Problem: MOBILITY - ADULT  Goal: Maintain or return to baseline ADL function  Description: INTERVENTIONS:  -  Assess patient's ability to carry out ADLs; assess patient's baseline for ADL function and identify physical deficits which impact ability to perform ADLs (bathing, care of mouth/teeth, toileting, grooming, dressing, etc )  - Assess/evaluate cause of self-care deficits   - Assess range of motion  - Assess patient's mobility; develop plan if impaired  - Assess patient's need for assistive devices and provide as appropriate  - Encourage maximum independence but intervene and supervise when necessary  - Involve family in performance of ADLs  - Assess for home care needs following discharge   - Consider OT consult to assist with ADL evaluation and planning for discharge  - Provide patient education as appropriate  Outcome: Progressing     Problem: Potential for Falls  Goal: Patient will remain free of falls  Description: INTERVENTIONS:  - Educate patient/family on patient safety including physical limitations  - Instruct patient to call for assistance with activity   - Consult OT/PT to assist with strengthening/mobility   - Keep Call bell within reach  - Keep bed low and locked with side rails adjusted as appropriate  - Keep care items and personal belongings within reach  - Initiate and maintain comfort rounds  - Make Fall Risk Sign visible to staff  - Offer Toileting every 2 Hours, in advance of need  - Initiate/Maintain bed alarm  - Obtain necessary fall risk management equipment:   - Apply yellow socks and bracelet for high fall risk patients  - Consider moving patient to room near nurses station  Outcome: Progressing     Problem: SAFETY ADULT  Goal: Maintain or return to baseline ADL function  Description: INTERVENTIONS:  -  Assess patient's ability to carry out ADLs; assess patient's baseline for ADL function and identify physical deficits which impact ability to perform ADLs (bathing, care of mouth/teeth, toileting, grooming, dressing, etc )  - Assess/evaluate cause of self-care deficits   - Assess range of motion  - Assess patient's mobility; develop plan if impaired  - Assess patient's need for assistive devices and provide as appropriate  - Encourage maximum independence but intervene and supervise when necessary  - Involve family in performance of ADLs  - Assess for home care needs following discharge   - Consider OT consult to assist with ADL evaluation and planning for discharge  - Provide patient education as appropriate  Outcome: Progressing  Goal: Patient will remain free of falls  Description: INTERVENTIONS:  - Educate patient/family on patient safety including physical limitations  - Instruct patient to call for assistance with activity   - Consult OT/PT to assist with strengthening/mobility   - Keep Call bell within reach  - Keep bed low and locked with side rails adjusted as appropriate  - Keep care items and personal belongings within reach  - Initiate and maintain comfort rounds  - Make Fall Risk Sign visible to staff  - Offer Toileting every 2 Hours, in advance of need  - Initiate/Maintain bed alarm  - Obtain necessary fall risk management equipment:   - Apply yellow socks and bracelet for high fall risk patients  - Consider moving patient to room near nurses station  Outcome: Progressing

## 2022-08-31 NOTE — ASSESSMENT & PLAN NOTE
· History of chronic anemia, baseline 8-10  · Underwent flex sig which showed inflammation, ulcerations  · Also with BRBPR since starting heparin gtt  · Trend H/H  · Prior provider discussed with GI - recommending IV venofer, continue on IV Venofer  · Hemoglobin this morning is 7 2  · Hold off on transfusion at this time  · GI follow-up  · On Protonix

## 2022-08-31 NOTE — ASSESSMENT & PLAN NOTE
· Continue to follow-up with Hematology-Oncology outpatient  · Femara had been held on admit  · On recent admission patient's verzenio was held  Reviewed office visit from 8/10/22 appears there was some consideration to resume but at lowered dose  Patient reports she never resumed this medication    · Patient will require follow-up to discuss possible lumbar mets noted on CT this admission  · Oncology follow-up noted  · On Femara

## 2022-08-31 NOTE — CASE MANAGEMENT
Case Management Discharge Planning Note    Patient name Gisela Silverman  Location /-45 MRN [id number]  : 1940 Date 2022       Current Admission Date: 8/15/2022  Current Admission Diagnosis:Toxic gastroenteritis and colitis   Patient Active Problem List    Diagnosis Date Noted    [facility] 20[facility]    Severe protein-calorie malnutrition ([facility] ) 2022    Anemia 2022    Hypokalemia 2022    REYES (acute kidney injury) ([place] [address] ) 2022    Diarrhea 2022    Secondary malignant neoplasm of bone ([place] [address] ) 2022    Toxic gastroenteritis and colitis 2022    Rectal bleeding 10/14/2021    Lytic lesion of bone on x-ray 2021    Neoplasm of uncertain behavior of sternum 2021    Cough due to ACE inhibitor 2021    Acute costochondritis 2021    Osteopenia of multiple sites 2020    Primary hypertension 10/22/2019    Chronic kidney disease (CKD) stage G3a/A1, moderately decreased glomerular filtration rate (GFR) between 45-59 mL/min/1 73 square meter and albuminuria creatinine ratio less than 30 mg/g ([facility]) 2019    Adverse reaction to pneumococcal vaccine 2015    Cataract, bilateral 2014    Pulmonary nodule seen on imaging study 09/10/2013    Sleep disorder 2013    Anxiety 2013    Metastatic breast cancer ([place] [address] ) 10/17/2012    Mixed hyperlipidemia 2012      LOS (days): 16  Geometric Mean LOS (GMLOS) (days): 3 70  Days to GMLOS:-12 2     OBJECTIVE:  Risk of Unplanned Readmission Score: 30 38         Current admission status: Inpatient   Preferred Pharmacy:   [facility] [address] [place] - [address]  [address] [facility]  Phone: [phone] Fax: [phone] [facility], [facility] [address] [place] [address]  [address]  [address] [place] [address]  Phone: [phone] Fax: [phone]    Primary Care Provider: Oziel Morfin DO    Primary Insurance: MEDICARE  Secondary Insurance: Trish Banerjee DETAILS:         IMM Given (Date):: 08/31/22  IMM Given to[de-identified] Patient

## 2022-08-31 NOTE — ASSESSMENT & PLAN NOTE
· Venous duplex obtained due to LE edema  · Confirmed acute occlusive thrombus in popliteal vein and non-occlusive thrombus in the peroneal veins  · Started on IV heparin gtt 8/19t  · Suspect in setting of known metastatic cancer and patient has been more sedentary in recent weeks due to acute illness  · Transition to [address] [place] [address] 8/25  · Tachycardia is improved  No heart strain noted and no chest pain or palpitations  · Cardiology consult appreciated    Started on Lopressor  · Continue to monitor closely

## 2022-08-31 NOTE — PROGRESS NOTES
Progress Note - Cardiology   Jacoby COVARRUBIAS Clunk 79 y o  female MRN: [id number]  Unit/Bed#: -01 Encounter: [id number]        Problem List:  Principal Problem:    Toxic gastroenteritis and colitis  Active Problems:    Metastatic breast cancer ([address] )    Mixed hyperlipidemia    Chronic kidney disease (CKD) stage G3a/A1, moderately decreased glomerular filtration rate (GFR) between 45-59 mL/min/1 73 square meter and albuminuria creatinine ratio less than 30 mg/g (HCC)    Primary hypertension    Anemia    Severe protein-calorie malnutrition (HCC)    Single subsegmental pulmonary embolism without acute cor pulmonale (HCC)      Asessment:  1  Tachycardia, sinus secondary to acute illness  a  Vital sign trend indicating heart rate trending 120-140 for the last 3 days  b  EKG 06/20/2022:  Sinus tachycardia,   c  Echo 08/19/2022:  EF 75%  d  Lopressor 25 BID started 8/30/22  e  Brief (7 beats) of probable atrial tachycardia on 8/31/22  2  Stage IV metastatic breast cancer  a  Previously treated with both radiation and chemotherapy  b  CT A/P 08/2021 demonstrating bony metastases  3  Acute pulmonary embolism  a  CTA 8/21/2022:  Right lower lobe subsegmental embolus  b  Suspected due to metastatic cancer and being more sedentary recently due to her illness  c  LE US 08/19/2022:  Acute thrombus in right lower limb, no thrombus or DVT in left lower limb  d  UE US 08/29/2022:  No evidence of some thrombus  e  Currently on Xarelto  4  Anemia secondary to hematochezia  a  Hemoglobin currently trending 7-7 5  b  With critical blood shortage transfusions being withheld unless hemoglobin is less than 6  5  Acute colitis  a  Prolonged hospitalization with frequent diarrhea and hematochezia  b  Multiple CT scans most recent 08/27/2022 showing pancolitis  c  GI following, patient on IV steroids      Plan/ Discussion:   Heart rate improving but does remain tachycardic trending around 100-120    She had brief bout of atrial tachycardia in the early morning hours of 08/31/2022   Continue metoprolol 25 twice daily and up titrate should she have higher burden of atrial tachycardia   Hopefully once her acute illnesses resolve beta-blocker can be weaned down   Xarelto for PE/DVT    Subjective:  Feeling tired today but overall better compared to yesterday  Slept okay  Had restless legs overnight  No chest pain or shortness of breath      Vitals:  Vitals:    08/15/22 1045 08/19/22 1220   Weight: 65 3 kg (144 lb) 65 3 kg (144 lb)   ,  Vitals:    08/30/22 2105 08/30/22 2105 08/31/22 0716 08/31/22 0900   BP: 125/70 125/70 140/82    Pulse: 99 98 (!) 108    Resp:   18    Temp:  97 7 °F (36 5 °C) 97 5 °F (36 4 °C)    TempSrc:       SpO2: 95% 96% 97% 96%   Weight:       Height:           Exam:  General: Alert awake and oriented, no acute distress  Heart:  Rhythm is regular rate is tachycardic, no murmurs, Normal S1, no edema    Respiratory effort/ Lungs:  Breathing comfortably on room air, clear bilaterally without wheezing, rales, crackles   Abdominal: Non-tender to palpation, + bowel sounds, soft, no masses or distension  Lower Limbs:  No edema            Telemetry:       tachycardic, Heart Rate 100-120    Medications:    Current Facility-Administered Medications:     acetaminophen (TYLENOL) tablet 650 mg, 650 mg, Oral, Q6H PRN, Honorio Elizondo MD, 650 mg at 08/26/22 2203    amitriptyline (ELAVIL) tablet 25 mg, 25 mg, Oral, HS, Honorio Elizondo MD, 25 mg at 08/30/22 2106    calcium carbonate (TUMS) chewable tablet 500 mg, 500 mg, Oral, Daily PRN, Honorio Elizondo MD, 500 mg at 08/26/22 2202    cholestyramine sugar free (QUESTRAN LIGHT) packet 4 g, 4 g, Oral, HS, BROOKE Pérez    iron sucrose (VENOFER) 200 mg in sodium chloride 0 9 % 100 mL IVPB, 200 mg, Intravenous, Once per day on Mon Wed Fri, Marcos Wheeler PA-C, 200 mg at 08/31/22 9793    letrozole [facility]) tablet 2 5 mg, 2 5 mg, Oral, Daily, BROOKE Johnson, 2 5 mg at 08/31/22 0837    loperamide (IMODIUM) capsule 2 mg, 2 mg, Oral, Q4H PRN, Belinda WOODARD PA-C, 2 mg at 08/28/22 1731    melatonin tablet 3 mg, 3 mg, Oral, HS, Jenise Jolly PA-C, 3 mg at 08/31/22 0045    mesalamine (DELZICOL) delayed release capsule 800 mg, 800 mg, Oral, TID, Bhumi Buck MD, 800 mg at 08/31/22 7448    methylPREDNISolone sodium succinate (Solu-MEDROL) injection 40 mg, 40 mg, Intravenous, Q8H [facility], BROOKE Pérez, 40 mg at 08/31/22 0503    metoprolol tartrate (LOPRESSOR) tablet 25 mg, 25 mg, Oral, Q12H [facility], Rinku Parekh MD, 25 mg at 08/31/22 1069    mirtazapine (REMERON) tablet 7 5 mg, 7 5 mg, Oral, HS, Bhumi Buck MD, 7 5 mg at 08/30/22 2106    ondansetron [facility]) injection 4 mg, 4 mg, Intravenous, Q6H PRN, Bhumi Buck MD, 4 mg at 08/18/22 0925    pantoprazole (PROTONIX) EC tablet 40 mg, 40 mg, Oral, BID AC, BROOKE Waite, 40 mg at 08/31/22 0503    pravastatin (PRAVACHOL) tablet 20 mg, 20 mg, Oral, Daily With Boyd Pearl MD, 20 mg at 08/30/22 1704    rivaroxaban (XARELTO) tablet 15 mg, 15 mg, Oral, BID With Meals, Belinda WOODARD PA-C, 15 mg at 08/31/22 0409    simethicone (MYLICON) chewable tablet 80 mg, 80 mg, Oral, 4x Daily (with meals and at bedtime), Belinda WOODARD PA-C, 80 mg at 08/31/22 7247      Labs/Data:        Results from last 7 days   Lab Units 08/31/22  0448 08/30/22  2115 08/30/22  0451 08/29/22  0854 08/29/22  0440   WBC Thousand/uL 5 72  --  4 41  --  4 46   HEMOGLOBIN g/dL 7 2* 6 8* 7 5*   < > 6 3*   HEMATOCRIT % 23 3* 21 8* 24 4*   < > 20 3*   PLATELETS Thousands/uL 205  --  226  --  176    < > = values in this interval not displayed       Results from last 7 days   Lab Units 08/31/22  0448 08/30/22  0451 08/29/22  0440   POTASSIUM mmol/L 4 4 4 3 4 0   CHLORIDE mmol/L 102 104 107   CO2 mmol/L 24 23 26   BUN mg/dL 14 11 11

## 2022-09-01 ENCOUNTER — TELEPHONE (OUTPATIENT)
Dept: GASTROENTEROLOGY | Facility: CLINIC | Age: 82
End: 2022-09-01

## 2022-09-01 LAB
ANION GAP SERPL CALCULATED.3IONS-SCNC: 4 MMOL/L (ref 4–13)
ANISOCYTOSIS BLD QL SMEAR: PRESENT
BASOPHILS # BLD MANUAL: 0 THOUSAND/UL (ref 0–0.1)
BASOPHILS NFR MAR MANUAL: 0 % (ref 0–1)
BUN SERPL-MCNC: 15 MG/DL (ref 5–25)
CALCIUM SERPL-MCNC: 7.7 MG/DL (ref 8.3–10.1)
CHLORIDE SERPL-SCNC: 105 MMOL/L (ref 96–108)
CO2 SERPL-SCNC: 27 MMOL/L (ref 21–32)
CREAT SERPL-MCNC: 0.82 MG/DL (ref 0.6–1.3)
CRP SERPL QL: 10.1 MG/L
EOSINOPHIL # BLD MANUAL: 0 THOUSAND/UL (ref 0–0.4)
EOSINOPHIL NFR BLD MANUAL: 0 % (ref 0–6)
ERYTHROCYTE [DISTWIDTH] IN BLOOD BY AUTOMATED COUNT: 21.5 % (ref 11.6–15.1)
GFR SERPL CREATININE-BSD FRML MDRD: 66 ML/MIN/1.73SQ M
GLUCOSE SERPL-MCNC: 98 MG/DL (ref 65–140)
HCT VFR BLD AUTO: 20.9 % (ref 34.8–46.1)
HCT VFR BLD AUTO: 21.6 % (ref 34.8–46.1)
HGB BLD-MCNC: 6.6 G/DL (ref 11.5–15.4)
HGB BLD-MCNC: 6.8 G/DL (ref 11.5–15.4)
LYMPHOCYTES # BLD AUTO: 0.29 THOUSAND/UL (ref 0.6–4.47)
LYMPHOCYTES # BLD AUTO: 6 % (ref 14–44)
MACROCYTES BLD QL AUTO: PRESENT
MCH RBC QN AUTO: 32.5 PG (ref 26.8–34.3)
MCHC RBC AUTO-ENTMCNC: 31.5 G/DL (ref 31.4–37.4)
MCV RBC AUTO: 103 FL (ref 82–98)
MONOCYTES # BLD AUTO: 0.15 THOUSAND/UL (ref 0–1.22)
MONOCYTES NFR BLD: 3 % (ref 4–12)
NEUTROPHILS # BLD MANUAL: 4.4 THOUSAND/UL (ref 1.85–7.62)
NEUTS BAND NFR BLD MANUAL: 2 % (ref 0–8)
NEUTS SEG NFR BLD AUTO: 89 % (ref 43–75)
PLATELET # BLD AUTO: 162 THOUSANDS/UL (ref 149–390)
PLATELET BLD QL SMEAR: ADEQUATE
PMV BLD AUTO: 9.4 FL (ref 8.9–12.7)
POTASSIUM SERPL-SCNC: 4.1 MMOL/L (ref 3.5–5.3)
RBC # BLD AUTO: 2.09 MILLION/UL (ref 3.81–5.12)
RBC MORPH BLD: PRESENT
SODIUM SERPL-SCNC: 136 MMOL/L (ref 135–147)
WBC # BLD AUTO: 4.84 THOUSAND/UL (ref 4.31–10.16)

## 2022-09-01 PROCEDURE — 85014 HEMATOCRIT: CPT | Performed by: INTERNAL MEDICINE

## 2022-09-01 PROCEDURE — 99232 SBSQ HOSP IP/OBS MODERATE 35: CPT | Performed by: INTERNAL MEDICINE

## 2022-09-01 PROCEDURE — 99232 SBSQ HOSP IP/OBS MODERATE 35: CPT | Performed by: PHYSICIAN ASSISTANT

## 2022-09-01 PROCEDURE — 85018 HEMOGLOBIN: CPT | Performed by: INTERNAL MEDICINE

## 2022-09-01 PROCEDURE — 86480 TB TEST CELL IMMUN MEASURE: CPT | Performed by: INTERNAL MEDICINE

## 2022-09-01 PROCEDURE — 86140 C-REACTIVE PROTEIN: CPT | Performed by: REGISTERED NURSE

## 2022-09-01 PROCEDURE — 80048 BASIC METABOLIC PNL TOTAL CA: CPT | Performed by: INTERNAL MEDICINE

## 2022-09-01 PROCEDURE — 85027 COMPLETE CBC AUTOMATED: CPT | Performed by: INTERNAL MEDICINE

## 2022-09-01 PROCEDURE — 85007 BL SMEAR W/DIFF WBC COUNT: CPT | Performed by: INTERNAL MEDICINE

## 2022-09-01 RX ORDER — METOPROLOL TARTRATE 50 MG/1
50 TABLET, FILM COATED ORAL EVERY 12 HOURS SCHEDULED
Status: DISCONTINUED | OUTPATIENT
Start: 2022-09-01 | End: 2022-09-06

## 2022-09-01 RX ORDER — PREDNISONE 20 MG/1
40 TABLET ORAL DAILY
Status: DISCONTINUED | OUTPATIENT
Start: 2022-09-01 | End: 2022-09-06 | Stop reason: HOSPADM

## 2022-09-01 RX ADMIN — METHYLPREDNISOLONE SODIUM SUCCINATE 40 MG: 40 INJECTION, POWDER, FOR SOLUTION INTRAMUSCULAR; INTRAVENOUS at 05:25

## 2022-09-01 RX ADMIN — METOPROLOL TARTRATE 50 MG: 50 TABLET ORAL at 21:56

## 2022-09-01 RX ADMIN — SIMETHICONE 80 MG: 80 TABLET, CHEWABLE ORAL at 13:45

## 2022-09-01 RX ADMIN — AMITRIPTYLINE HYDROCHLORIDE 25 MG: 25 TABLET, FILM COATED ORAL at 21:56

## 2022-09-01 RX ADMIN — PANTOPRAZOLE SODIUM 40 MG: 40 TABLET, DELAYED RELEASE ORAL at 05:25

## 2022-09-01 RX ADMIN — MESALAMINE 800 MG: 400 CAPSULE, DELAYED RELEASE ORAL at 09:50

## 2022-09-01 RX ADMIN — PREDNISONE 40 MG: 20 TABLET ORAL at 09:49

## 2022-09-01 RX ADMIN — RIVAROXABAN 15 MG: 15 TABLET, FILM COATED ORAL at 17:23

## 2022-09-01 RX ADMIN — PRAVASTATIN SODIUM 20 MG: 20 TABLET ORAL at 17:23

## 2022-09-01 RX ADMIN — METOPROLOL TARTRATE 25 MG: 25 TABLET, FILM COATED ORAL at 09:50

## 2022-09-01 RX ADMIN — RIVAROXABAN 15 MG: 15 TABLET, FILM COATED ORAL at 09:50

## 2022-09-01 RX ADMIN — LETROZOLE 2.5 MG: 2.5 TABLET ORAL at 09:50

## 2022-09-01 RX ADMIN — PANTOPRAZOLE SODIUM 40 MG: 40 TABLET, DELAYED RELEASE ORAL at 17:23

## 2022-09-01 RX ADMIN — MIRTAZAPINE 7.5 MG: 15 TABLET, FILM COATED ORAL at 21:56

## 2022-09-01 RX ADMIN — Medication 3 MG: at 21:56

## 2022-09-01 RX ADMIN — SIMETHICONE 80 MG: 80 TABLET, CHEWABLE ORAL at 21:56

## 2022-09-01 RX ADMIN — SIMETHICONE 80 MG: 80 TABLET, CHEWABLE ORAL at 09:50

## 2022-09-01 RX ADMIN — SIMETHICONE 80 MG: 80 TABLET, CHEWABLE ORAL at 17:23

## 2022-09-01 NOTE — PROGRESS NOTES
Pastoral Care Progress Note    2022  Patient: Noel Silverman : 1940  Admission Date & Time: 8/15/2022 1040  MRN: [id number] CSN: [id number]       made a follow up visit to patient as pt has been here quite a while  Patient expressed gratitude for the conversation and was hopeful she would be soon discharged                   Chaplaincy Interventions Utilized:   Exploration: Explored hope, Explored spiritual needs & resources, and Facilitated life review    Ritual: Provided prayer    Chaplaincy Outcomes Achieved:  Expressed gratitude, Expressed peace, and Expressed ultimate hope       22 1810   Clinical Encounter Type   Visited With Patient   Routine Visit Follow-up   [employer] Encounters   [employer] Needs Prayer

## 2022-09-01 NOTE — ASSESSMENT & PLAN NOTE
· Patient presented with dizziness and diarrhea starting in August that worsened yesterday  · Most likely secondary to IBD  · CT showed: 1  Diffuse mild-moderate circumferential wall thickening of the colon with pericolic inflammatory stranding consistent with nonspecific pancolitis  2   Multiple stable small scattered sclerotic foci in the visualized lumbar lower thoracic spine osseous pelvis which may reflect osseous metastatic disease in this patient with known metastatic breast cancer  3   Additional stable findings as noted   · GI consulted-> recs appreciated  · Off IV fluids  · Discontinue Zosyn as symptoms are most likely secondary to ulcerative colitis  · Steroids initiated 8/17  · F/u bld cx and procal, ESR, CRP, and lipase, C Diff  · ESR:  48  · Procalcitonin:  1 49  · CRP:  206 3  · C  Diff: negative  · Stool enteric panel: negative  · Advanced to low-fiber   · Cholestyramine  · Oncology consulted by GI for further recommendations regarding current cancer regimen playing a role in symptoms  Previously believed due to verzenio which has been on hold since recent admit, doubt this is contributing   · Flex sig completed 8/20 concerning for IBD, biopsies taken  Also noted ulcerations  · Biopsy reveals colitis  · IV steroids were transitioned back to oral prednisone  · Repeat CT abd demonstrates ongoing pancolitis  · GI spoke with IBD specialist Dr Yahir Reynaga and CRP was ordered which is 122 4   Patient per their recommendations, was restarted on IV Solu-Medrol for 3 more days and repeat CRP was done this morning and is 10 1  · Hemoglobin this morning is 6 8  · Trend H&H and transfuse as needed

## 2022-09-01 NOTE — PROGRESS NOTES
Progress Note - Cardiology   Taye COVARRUBIAS Clunk 79 y o  female MRN: [id number]  Unit/Bed#: -01 Encounter: [id number]        Problem List:  Principal Problem:    Toxic gastroenteritis and colitis  Active Problems:    Metastatic breast cancer ([address] )    Mixed hyperlipidemia    Chronic kidney disease (CKD) stage G3a/A1, moderately decreased glomerular filtration rate (GFR) between 45-59 mL/min/1 73 square meter and albuminuria creatinine ratio less than 30 mg/g (HCC)    Primary hypertension    Anemia    Severe protein-calorie malnutrition (HCC)    Single subsegmental pulmonary embolism without acute cor pulmonale (HCC)      Asessment:  1  Tachycardia, sinus secondary to acute illness  a  This is improving  HR is now 100-120  Multifactor etiologies with acute PE, anemia, stage IV cancer, acute colitis, diarrhea  b  CPY 68/33/3717:  Sinus tachycardia,   c  Echo 08/19/2022:  EF 75%  d  Lopressor 25 BID started 8/30/22  e  Brief (7 beats) of probable atrial tachycardia on 8/31/22  2  Stage IV metastatic breast cancer  a  Previously treated with both radiation and chemotherapy  b  CT A/P 08/2021 demonstrating bony metastases  3  Acute pulmonary embolism  a  CTA 8/21/2022:  Right lower lobe subsegmental embolus  b  Suspected due to metastatic cancer and being more sedentary recently due to her illness  c  LE US 08/19/2022:  Acute thrombus in right lower limb, no thrombus or DVT in left lower limb  d  UE US 08/29/2022:  No evidence of some thrombus  e  Currently on Xarelto  4  Anemia secondary to hematochezia  a  Hemoglobin currently trending 7-7 5  b  With critical blood shortage transfusions being withheld unless hemoglobin is less than 6  c  HgB is 6 8 this morning  5  Acute colitis  a   Prolonged hospitalization with frequent diarrhea and hematochezia  b  Multiple CT scans most recent 08/27/2022 showing pancolitis  c  GI following, previously on IV steroids now transitioned to PO      Plan/ Discussion:   Overall HR improved, but still with a few short bursts of atrial tachycardia  Consider increasing metoprolol to 50 BID prior to DC   Xarelto for DVT/PE   JORJE noted, likely multifactorial in the setting of DVT, low albumin, and IVF administration  Echo earlier this admission with preserved EF  Consider low dose lasix X 1 to help with edema  Subjective:  Legs feeling sore  Breathing is ok   Feeling tired todafy    Vitals:  Vitals:    08/15/22 1045 08/19/22 1220   Weight: 65 3 kg (144 lb) 65 3 kg (144 lb)   ,  Vitals:    08/31/22 1630 08/31/22 2110 08/31/22 2111 09/01/22 0757   BP:  131/71 131/71 127/74   Pulse:  98 99 (!) 132   Resp:    16   Temp:  97 5 °F (36 4 °C) 97 5 °F (36 4 °C) 97 6 °F (36 4 °C)   TempSrc:       SpO2: 94% 97% 98% 99%   Weight:       Height:           Exam:  General: Alert awake and oriented, no acute distress  Heart:  Regular rate and rhythm, no murmurs, Normal S1, no edema    Respiratory effort/ Lungs:  Breathing comfortably on room air, clear bilaterally without wheezing, rales, crackles   Abdominal: Non-tender to palpation, + bowel sounds, soft, no masses or distension  Lower Limbs: 2+ pitting edema bilaterally            Telemetry:       tachycardic, Heart Rate 100-130    Medications:    Current Facility-Administered Medications:     acetaminophen (TYLENOL) tablet 650 mg, 650 mg, Oral, Q6H PRN, Cuauhtemoc Humphrey MD, 650 mg at 08/26/22 2203    amitriptyline (ELAVIL) tablet 25 mg, 25 mg, Oral, HS, Cuauhtemoc Humphrey MD, 25 mg at 08/31/22 2219    calcium carbonate (TUMS) chewable tablet 500 mg, 500 mg, Oral, Daily PRN, Cuauhtemoc Humphrey MD, 500 mg at 08/26/22 2202    cholestyramine sugar free (QUESTRAN LIGHT) packet 4 g, 4 g, Oral, HS, BROOKE Pérez, 4 g at 08/31/22 2218    iron sucrose (VENOFER) 200 mg in sodium chloride 0 9 % 100 mL IVPB, 200 mg, Intravenous, Once per day on Mon Wed Fri, Reginaldo Wheeler PA-C, 200 mg at 08/31/22 3210    letrozole [facility]) tablet 2 5 mg, 2 5 mg, Oral, Daily, BROOKE Wade, 2 5 mg at 09/01/22 0950    loperamide (IMODIUM) capsule 2 mg, 2 mg, Oral, Q4H PRN, Mattie WOODARD PA-C, 2 mg at 08/28/22 1731    melatonin tablet 3 mg, 3 mg, Oral, HS, Jenise Jolly PA-C, 3 mg at 08/31/22 0045    metoprolol tartrate (LOPRESSOR) tablet 25 mg, 25 mg, Oral, Q12H [address], Rosa Ren MD, 25 mg at 09/01/22 0950    mirtazapine (REMERON) tablet 7 5 mg, 7 5 mg, Oral, HS, Kwame Agee MD, 7 5 mg at 08/31/22 2219    ondansetron [facility]) injection 4 mg, 4 mg, Intravenous, Q6H PRN, Kwame Agee MD, 4 mg at 08/18/22 0925    pantoprazole (PROTONIX) EC tablet 40 mg, 40 mg, Oral, BID AC, BROOKE Roy, 40 mg at 09/01/22 0525    pravastatin (PRAVACHOL) tablet 20 mg, 20 mg, Oral, Daily With Vahid Henson MD, 20 mg at 08/31/22 1737    predniSONE tablet 40 mg, 40 mg, Oral, Daily, BROOKE Pérez, 40 mg at 09/01/22 8757    rivaroxaban (XARELTO) tablet 15 mg, 15 mg, Oral, BID With Meals, Mattie WOODARD PA-C, 15 mg at 09/01/22 0950    simethicone (MYLICON) chewable tablet 80 mg, 80 mg, Oral, 4x Daily (with meals and at bedtime), Mattie WOODARD PA-C, 80 mg at 09/01/22 0950      Labs/Data:        Results from last 7 days   Lab Units 09/01/22  0453 08/31/22  2234 08/31/22  0448 08/30/22  2115 08/30/22  0451   WBC Thousand/uL 4 84  --  5 72  --  4 41   HEMOGLOBIN g/dL 6 8* 7 5* 7 2*   < > 7 5*   HEMATOCRIT % 21 6* 23 5* 23 3*   < > 24 4*   PLATELETS Thousands/uL 162  --  205  --  226    < > = values in this interval not displayed       Results from last 7 days   Lab Units 09/01/22  0453 08/31/22  0448 08/30/22  0451   POTASSIUM mmol/L 4 1 4 4 4 3   CHLORIDE mmol/L 105 102 104   CO2 mmol/L 27 24 23   BUN mg/dL 15 14 11

## 2022-09-01 NOTE — ASSESSMENT & PLAN NOTE
· History of chronic anemia, baseline 8-10  · Underwent flex sig which showed inflammation, ulcerations  · Also with BRBPR since starting heparin gtt  · Trend H/H  · Prior provider discussed with GI - recommending IV venofer, continue on IV Venofer  · Hemoglobin this morning is 6 8  · Repeat H&H  · GI follow-up  · On Protonix

## 2022-09-01 NOTE — ASSESSMENT & PLAN NOTE
Lab Results   Component Value Date    EGFR 66 09/01/2022    EGFR 60 08/31/2022    EGFR 61 08/30/2022    CREATININE 0 82 09/01/2022    CREATININE 0 89 08/31/2022    CREATININE 0 88 08/30/2022     · Cr on admission slightly elevated from her baseline at 1 3, most likely due to volume depletion  Now improved after IVF    · Baseline 0 8

## 2022-09-01 NOTE — TELEPHONE ENCOUNTER
----- Message from Jasmin Almanza, [address] sent at 9/1/2022  9:01 AM EDT -----  Regarding: UC  Good morning  Patient is going to be discharge from the hospital today or tomorrow  New diagnosis of UC  Can work on setting her up for outpatient Remicade  Hepatitis b normal   TB titer still pending  Also we can see about getting her into the office with 1 of the doctors in the next couple weeks  Thank you!

## 2022-09-01 NOTE — PROGRESS NOTES
[place]  Progress Note Ginger Silverman 1940, 79 y o  female MRN: [id number]  Unit/Bed#: -01 Encounter: [id number]  Primary Care Provider: Zachary Meléndez DO   Date and time admitted to hospital: 8/15/2022 10:40 AM    Single subsegmental pulmonary embolism without acute cor pulmonale (HCC)  Assessment & Plan  · Venous duplex obtained due to LE edema  · Confirmed acute occlusive thrombus in popliteal vein and non-occlusive thrombus in the peroneal veins  · Started on IV heparin gtt 8/19t  · Suspect in setting of known metastatic cancer and patient has been more sedentary in recent weeks due to acute illness  · Transition to NOAC 8/25  · Tachycardia is improved  No heart strain noted and no chest pain or palpitations  · Cardiology consult appreciated  Continue on Lopressor  · Continue to monitor closely    Severe protein-calorie malnutrition ([facility] [address] )  Assessment & Plan  Malnutrition Findings:   Adult Malnutrition type: Chronic illness  Adult Degree of Malnutrition: Other severe protein calorie malnutrition  Malnutrition Characteristics: Inadequate energy, Weight loss    360 Statement: Pt presents with chronic severe protein calorie malnutrition due to Stage IV metastatic breast cancer, chronic diarrhea vs UC flare as evidenced by 10 5 lb wt loss-14% wt loss in 4 months and <75 % po intake > 1 month  BMI Findings: Body mass index is 27 21 kg/m²         Anemia  Assessment & Plan  · History of chronic anemia, baseline 8-10  · Underwent flex sig which showed inflammation, ulcerations  · Also with BRBPR since starting heparin gtt  · Trend H/H  · Prior provider discussed with GI - recommending IV venofer, continue on IV Venofer  · Hemoglobin this morning is 6 8  · Repeat H&H  · GI follow-up  · On Protonix    Primary hypertension  Assessment & Plan  · On Lopressor    Chronic kidney disease (CKD) stage G3a/A1, moderately decreased glomerular filtration rate (GFR) between 45-59 mL/min/1 73 square meter and albuminuria creatinine ratio less than 30 mg/g [facility])  Assessment & Plan  Lab Results   Component Value Date    EGFR 66 09/01/2022    EGFR 60 08/31/2022    EGFR 61 08/30/2022    CREATININE 0 82 09/01/2022    CREATININE 0 89 08/31/2022    CREATININE 0 88 08/30/2022     · Cr on admission slightly elevated from her baseline at 1 3, most likely due to volume depletion  Now improved after IVF  · Baseline 0 8    Mixed hyperlipidemia  Assessment & Plan  · Continue statin    Metastatic breast cancer [facility])  Assessment & Plan  · Continue to follow-up with Hematology-Oncology outpatient  · Femara had been held on admit  · On recent admission patient's verzenio was held  Reviewed office visit from 8/10/22 appears there was some consideration to resume but at lowered dose  Patient reports she never resumed this medication  · Patient will require follow-up to discuss possible lumbar mets noted on CT this admission  · Oncology follow-up noted  · On Femara    * Toxic gastroenteritis and colitis  Assessment & Plan  · Patient presented with dizziness and diarrhea starting in August that worsened yesterday  · Most likely secondary to IBD  · CT showed: 1  Diffuse mild-moderate circumferential wall thickening of the colon with pericolic inflammatory stranding consistent with nonspecific pancolitis  2   Multiple stable small scattered sclerotic foci in the visualized lumbar lower thoracic spine osseous pelvis which may reflect osseous metastatic disease in this patient with known metastatic breast cancer  3   Additional stable findings as noted   · GI consulted-> recs appreciated  · Off IV fluids  · Discontinue Zosyn as symptoms are most likely secondary to ulcerative colitis  · Steroids initiated 8/17  · F/u bld cx and procal, ESR, CRP, and lipase, C Diff  · ESR:  48  · Procalcitonin:  1 49  · CRP:  206 3  · C   Diff: negative  · Stool enteric panel: negative  · Advanced to low-fiber · Cholestyramine  · Oncology consulted by GI for further recommendations regarding current cancer regimen playing a role in symptoms  Previously believed due to verzenio which has been on hold since recent admit, doubt this is contributing   · Flex sig completed 8/20 concerning for IBD, biopsies taken  Also noted ulcerations  · Biopsy reveals colitis  · IV steroids were transitioned back to oral prednisone  · Repeat CT abd demonstrates ongoing pancolitis  · GI spoke with IBD specialist Dr Lashay Mclain and CRP was ordered which is 122 4  Patient per their recommendations, was restarted on IV Solu-Medrol for 3 more days and repeat CRP was done this morning and is 10 1  · Hemoglobin this morning is 6 8  · Trend H&H and transfuse as needed        Labs & Imaging: I have personally reviewed pertinent reports  VTE Prophylaxis: in place  Code Status:   Level 3 - DNAR and DNI    Patient Centered Rounds: I have performed bedside rounds with nursing staff today  Discussions with Specialists or Other Care Team Provider:   and GI    Education and Discussions with Family / Patient:  Daughter Rufus Lambert    Current Length of Stay: 17 day(s)    Current Patient Status: Inpatient   Certification Statement: The patient will continue to require additional inpatient hospital stay due to see my assessment and plan  Subjective:   Patient is seen and examined at bedside  States she feels better  Denies any other complaint  Still having tachycardia  Afebrile  All other ROS are negative  Objective:    Vitals: Blood pressure 127/74, pulse (!) 132, temperature 97 6 °F (36 4 °C), resp  rate 16, height 5' 1" (1 549 m), weight 65 3 kg (144 lb), SpO2 99 %, not currently breastfeeding  ,Body mass index is 27 21 kg/m²    SPO2 RA Rest    Flowsheet Row ED to Hosp-Admission (Current) from 8/15/2022 in [address] Med Surg Unit   SpO2 99 %   SpO2 Activity At Rest   O2 Device None (Room air)   O2 Flow Rate --        I&O:     Intake/Output Summary (Last 24 hours) at 9/1/2022 0801  Last data filed at 9/1/2022 6511  Gross per 24 hour   Intake 360 ml   Output 350 ml   Net 10 ml       Physical Exam:    General- Alert, lying comfortably in bed  Not in any acute distress  Neck- Supple, No JVD  CVS- regular, S1 and S2 normal, tachycardic  Chest- Bilateral Air entry, No rhochi, crackles or wheezing present  Abdomen- soft, nontender, not distended, no guarding or rigidity, BS+  Extremities-  has pedal edema, No calf tenderness                         Normal ROM in all extremities  CNS-   Alert, awake and orientedx3  No focal deficits present      Invasive Devices  Report    Peripherally Inserted Central Catheter Line  Duration           PICC Line 08/19/22 12 days                      Social History  reviewed  Family History   Problem Relation Age of Onset    Stomach cancer Mother     No Known Problems Father     Cervical cancer Sister     No Known Problems Daughter     No Known Problems Maternal Grandmother     No Known Problems Maternal Grandfather     No Known Problems Paternal Grandmother     No Known Problems Paternal Grandfather     Colon polyps Neg Hx     Colon cancer Neg Hx     reviewed    Meds:  Current Facility-Administered Medications   Medication Dose Route Frequency Provider Last Rate Last Admin    acetaminophen (TYLENOL) tablet 650 mg  650 mg Oral Q6H PRN Anna Walker MD   650 mg at 08/26/22 2203    amitriptyline (ELAVIL) tablet 25 mg  25 mg Oral HS Anna Walker MD   25 mg at 08/31/22 2219    calcium carbonate (TUMS) chewable tablet 500 mg  500 mg Oral Daily PRN Anna Walker MD   500 mg at 08/26/22 2202    cholestyramine sugar free (QUESTRAN LIGHT) packet 4 g  4 g Oral HS BROOKE Pérez   4 g at 08/31/22 2218    iron sucrose (VENOFER) 200 mg in sodium chloride 0 9 % 100 mL IVPB  200 mg Intravenous Once per day on Mon Wed Fri Elena Sullivan PA-C   200 mg at 08/31/22 3344    letrozole [facility]) tablet 2 5 mg  2 5 mg Oral Daily BROOKE Gonzáles   2 5 mg at 08/31/22 3864    loperamide (IMODIUM) capsule 2 mg  2 mg Oral Q4H PRN Christel WOODARD PA-C   2 mg at 08/28/22 1731    melatonin tablet 3 mg  3 mg Oral HS Jenise Jolly PA-C   3 mg at 08/31/22 0045    mesalamine (DELZICOL) delayed release capsule 800 mg  800 mg Oral TID Aide Collins MD   800 mg at 08/31/22 2219    methylPREDNISolone sodium succinate (Solu-MEDROL) injection 40 mg  40 mg Intravenous [facility] BROOKE Pérez   40 mg at 09/01/22 0525    metoprolol tartrate (LOPRESSOR) tablet 25 mg  25 mg Oral Q12H [facility] Nghia Ruelas MD   25 mg at 08/31/22 2219    mirtazapine (REMERON) tablet 7 5 mg  7 5 mg Oral HS Aide Collins MD   7 5 mg at 08/31/22 2219    ondansetron (ZOFRAN) injection 4 mg  4 mg Intravenous Q6H PRN Aide Collins MD   4 mg at 08/18/22 0925    pantoprazole (PROTONIX) EC tablet 40 mg  40 mg Oral BID AC BROOKE Prasad   40 mg at 09/01/22 0697    pravastatin (PRAVACHOL) tablet 20 mg  20 mg Oral Daily With Jax Schaeffer MD   20 mg at 08/31/22 1737    rivaroxaban (XARELTO) tablet 15 mg  15 mg Oral BID With Meals Padmini WOODARD PA-C   15 mg at 08/31/22 1737    simethicone (MYLICON) chewable tablet 80 mg  80 mg Oral 4x Daily (with meals and at bedtime) Christel WOODARD PA-C   80 mg at 08/31/22 2218      Medications Prior to Admission   Medication    Abemaciclib (Verzenio) 50 MG TABS    amitriptyline (ELAVIL) 25 mg tablet    Cholecalciferol (VITAMIN D3) 2000 units capsule    diphenoxylate-atropine (LOMOTIL) 2 5-0 025 mg per tablet    letrozole (FEMARA) 2 5 mg tablet    loperamide (IMODIUM) 2 mg capsule    losartan (COZAAR) 50 mg tablet    mirtazapine (REMERON) 7 5 MG tablet    simvastatin (ZOCOR) 10 mg tablet       Labs:  Results from last 7 days   Lab Units 09/01/22  0453 08/31/22  2234 08/31/22  0448 08/30/22  2115 08/30/22  0451 08/29/22  4447 08/29/22 0440   WBC Thousand/uL 4 84  --  5 72  --  4 41  --  4 46   HEMOGLOBIN g/dL 6 8* 7 5* 7 2*   < > 7 5*   < > 6 3*   HEMATOCRIT % 21 6* 23 5* 23 3*   < > 24 4*   < > 20 3*   PLATELETS Thousands/uL 162  --  205  --  226  --  176   LYMPHO PCT % 6*  --   --   --  15  --  17   MONO PCT % 3*  --   --   --  1*  --  9   EOS PCT % 0  --   --   --  0  --  0    < > = values in this interval not displayed  Results from last 7 days   Lab Units 09/01/22 0453 08/31/22 0448 08/30/22  0451 08/29/22 0440 08/28/22  0515   POTASSIUM mmol/L 4 1 4 4 4 3   < > 3 4*   CHLORIDE mmol/L 105 102 104   < > 105   CO2 mmol/L 27 24 23   < > 26   BUN mg/dL 15 14 11   < > 10   CREATININE mg/dL 0 82 0 89 0 88   < > 0 72   CALCIUM mg/dL 7 7* 7 8* 7 8*   < > 6 6*   ALK PHOS U/L  --   --   --   --  72   ALT U/L  --   --   --   --  17   AST U/L  --   --   --   --  8    < > = values in this interval not displayed  No results found for: TROPONINI, CKMB, CKTOTAL      Lab Results   Component Value Date    BLOODCX No Growth After 5 Days  08/15/2022    BLOODCX No Growth After 5 Days  08/15/2022         Imaging:  Results for orders placed during the hospital encounter of 08/01/22    XR Trauma chest portable    Narrative  CHEST    INDICATION:   TRAUMA  COMPARISON:  6/18/2021  EXAM PERFORMED/VIEWS:  XR CHEST PORTABLE      FINDINGS:    Cardiomediastinal silhouette appears unremarkable  The lungs are clear  No pneumothorax or pleural effusion  Osseous structures appear within normal limits for patient age  Impression  No acute cardiopulmonary disease  Workstation performed: [id number]    Results for orders placed in visit on 06/18/21    XR chest pa & lateral    Narrative  CHEST    INDICATION:   R07 89: Other chest pain  Cough since January  History of breast cancer  Anterior upper costochondral pain  COMPARISON:  Chest x-ray from 1/12/2021  Chest CT from 9/13/2013      EXAM PERFORMED/VIEWS:  XR CHEST PA & LATERAL      FINDINGS:    Cardiomediastinal silhouette appears unremarkable  The lungs are clear  No pneumothorax or pleural effusion  Osseous structures appear within normal limits for patient age  There is a bone island in the proximal left clavicle  Impression  No acute cardiopulmonary disease  Workstation performed: [id number]      Last 24 Hours Medication List:   Current Facility-Administered Medications   Medication Dose Route Frequency Provider Last Rate    acetaminophen  650 mg Oral Q6H PRN Nicola Buckley MD      amitriptyline  25 mg Oral HS Nicola Buckley MD      calcium carbonate  500 mg Oral Daily PRN Nicola Buckley MD      cholestyramine sugar free  4 g Oral HS BROOKE Pérez      iron sucrose  200 mg Intravenous Once per day on Mon Wed Fri Jasmeet Wheeler PA-C      letrozole  2 5 mg Oral Daily BROOKE Mike      loperamide  2 mg Oral Q4H PRN [place] Sic VKAIA      melatonin  3 mg Oral HS Neelima Thao PA-C      mesalamine  800 mg Oral TID Nicola Buckley MD      methylPREDNISolone sodium succinate  40 mg Intravenous [facility] BROOKE Pérez      metoprolol tartrate  25 mg Oral Q12H [facility] Woo Trammell MD      mirtazapine  7 5 mg Oral HS Nicola Buckley MD      ondansetron  4 mg Intravenous Q6H PRN Nicola Buckley MD      pantoprazole  40 mg Oral BID AC BROOKE Thompson      pravastatin  20 mg Oral Daily With Audi Young MD      rivaroxaban  15 mg Oral BID With Meals [place] Sic VKAIA      simethicone  80 mg Oral 4x Daily (with meals and at bedtime) Cinthya Mandujano PA-C          Today, Patient Was Seen By: Stacey Nair MD    ** Please Note: Dictation voice to text software may have been used in the creation of this document   **

## 2022-09-01 NOTE — ASSESSMENT & PLAN NOTE
· Venous duplex obtained due to LE edema  · Confirmed acute occlusive thrombus in popliteal vein and non-occlusive thrombus in the peroneal veins  · Started on IV heparin gtt 8/19t  · Suspect in setting of known metastatic cancer and patient has been more sedentary in recent weeks due to acute illness  · Transition to [address] [place] [address] 8/25  · Tachycardia is improved  No heart strain noted and no chest pain or palpitations  · Cardiology consult appreciated    Continue on Lopressor  · Continue to monitor closely

## 2022-09-01 NOTE — PROGRESS NOTES
Progress note - Gastroenterology   Kimmy Likes A Clunk 79 y o  female MRN: [id number]  Unit/Bed#: -01 Encounter: [id number]    ASSESSMENT and PLAN  1  Acute colitis  New onset inflammatory bowel disease?  Related to or induced by cancer treatment/immunotherapy,   Atypical infectious colitis? Patient continues to have bloody loose bowel movements      Imodium 2 mg every 4 hours as needed      Biopsy from flex sig 8/20; active colitis, no viral inclusions identified on cm the immunohistochemistry stain, negative for dysplasia -Dr Mary Mills did reach out to pathology to relook at slides      - steroid switched back to IV for 72 hours  - continue mesalamine 800 mg p o  3 times a day  - continue amitriptyline 25 mg p o  Daily HS  -Imodium p r n   -continue IV Solu-Medrol today with repeat CRP in the a m   -hepatitis-B normal TB titers and process-in preparation for biologic  -Questran powder once daily    IV steroids complete  CRP 10 1 this morning  Hemoglobin did drop again to 6 8  Repeat hemoglobin  Prednisone switched to 40 mg p o   Message sent to our office staff to begin process for Remicade  Also  set up office visit in the next couple weeks  Continue 40 mg of prednisone until seen in the office      Discussed with patient office will contact her for follow-up outpatient office visit   Possibly be referred to IBD specialist      Per Dr Owen Lora IBD specialist-I would recommend checking CRP, restarting IV steroids for an additional 3 days and assessing her response with repeat CRP and clinical symptoms  Depending on how she does she might meet criteria for inpatient remicade  She is in her [de-identified], so obviously the risks and benefits of all treatment options should be weighed   Given her age I would also suggest you speak with pathology and confirm if it looks like IBD (and no ischemic, or an alternative diagnosis)       2  Acute blood loss anemia  Secondary to colitis and exacerbated by anticoagulation for DVT/PE  Hemoglobin stable- receiving IV iron     - follow H&H and transfuse as needed  - continue anticoagulation for now  - continue PPI  - IV Venofer      3  DVT and pulmonary embolism   Xarelto 15 mg p o  Twice daily         4  Metastatic breast cancer  Followed by Dr Bryan Cabrera      Chief Complaint   Patient presents with    Dizziness     Via ems from home for increased dizziness and multiple episodes diarrhea since the beginning of August  States worse since last night  States is getting new chemo medication       SUBJECTIVE/HPI   Bowel movements continue to be less frequent and more firm  Per nursing, more brown stool in last blood noted      /74   Pulse (!) 132   Temp 97 6 °F (36 4 °C)   Resp 16   Ht 5' 1" (1 549 m)   Wt 65 3 kg (144 lb)   LMP  (LMP Unknown)   SpO2 99%   BMI 27 21 kg/m²     PHYSICALEXAM  General appearance: alert, appears stated age and cooperative  Eyes: PERLLA, EOMI, no icterus   Head: Normocephalic, without obvious abnormality, atraumatic  Lungs: clear to auscultation bilaterally  Heart: regular rate and rhythm, S1, S2 normal, no murmur, click, rub or gallop  Abdomen: soft, non-tender; bowel sounds normal; no masses,  no organomegaly  Extremities: extremities normal, atraumatic, no cyanosis or edema  Neurologic: Grossly normal    Lab Results   Component Value Date    GLUCOSE 115 09/08/2015    CALCIUM 7 7 (L) 09/01/2022     09/08/2015    K 4 1 09/01/2022    CO2 27 09/01/2022     09/01/2022    BUN 15 09/01/2022    CREATININE 0 82 09/01/2022     Lab Results   Component Value Date    WBC 4 84 09/01/2022    HGB 6 8 (LL) 09/01/2022    HCT 21 6 (L) 09/01/2022     (H) 09/01/2022     09/01/2022     Lab Results   Component Value Date    ALT 17 08/28/2022    AST 8 08/28/2022    ALKPHOS 72 08/28/2022    BILITOT 0 66 09/08/2015     No results found for: AMYLASE  Lab Results   Component Value Date    LIPASE 79 08/15/2022     Lab Results   Component Value Date    IRON 19 (L) 08/28/2022    TIBC 105 (L) 08/28/2022    FERRITIN 755 (H) 08/28/2022     Lab Results   Component Value Date    INR 1 11 08/19/2022

## 2022-09-02 LAB
ANION GAP SERPL CALCULATED.3IONS-SCNC: 4 MMOL/L (ref 4–13)
BASOPHILS # BLD AUTO: 0.01 THOUSANDS/ΜL (ref 0–0.1)
BASOPHILS NFR BLD AUTO: 0 % (ref 0–1)
BUN SERPL-MCNC: 18 MG/DL (ref 5–25)
CALCIUM SERPL-MCNC: 7.4 MG/DL (ref 8.3–10.1)
CHLORIDE SERPL-SCNC: 106 MMOL/L (ref 96–108)
CO2 SERPL-SCNC: 28 MMOL/L (ref 21–32)
CREAT SERPL-MCNC: 0.95 MG/DL (ref 0.6–1.3)
EOSINOPHIL # BLD AUTO: 0.08 THOUSAND/ΜL (ref 0–0.61)
EOSINOPHIL NFR BLD AUTO: 1 % (ref 0–6)
ERYTHROCYTE [DISTWIDTH] IN BLOOD BY AUTOMATED COUNT: 22.2 % (ref 11.6–15.1)
GFR SERPL CREATININE-BSD FRML MDRD: 55 ML/MIN/1.73SQ M
GLUCOSE SERPL-MCNC: 74 MG/DL (ref 65–140)
HCT VFR BLD AUTO: 21.7 % (ref 34.8–46.1)
HCT VFR BLD AUTO: 21.9 % (ref 34.8–46.1)
HGB BLD-MCNC: 6.7 G/DL (ref 11.5–15.4)
HGB BLD-MCNC: 7 G/DL (ref 11.5–15.4)
IMM GRANULOCYTES # BLD AUTO: 0.08 THOUSAND/UL (ref 0–0.2)
IMM GRANULOCYTES NFR BLD AUTO: 1 % (ref 0–2)
LYMPHOCYTES # BLD AUTO: 0.78 THOUSANDS/ΜL (ref 0.6–4.47)
LYMPHOCYTES NFR BLD AUTO: 12 % (ref 14–44)
MCH RBC QN AUTO: 32.5 PG (ref 26.8–34.3)
MCHC RBC AUTO-ENTMCNC: 31.3 G/DL (ref 31.4–37.4)
MCV RBC AUTO: 104 FL (ref 82–98)
MONOCYTES # BLD AUTO: 0.53 THOUSAND/ΜL (ref 0.17–1.22)
MONOCYTES NFR BLD AUTO: 8 % (ref 4–12)
NEUTROPHILS # BLD AUTO: 4.9 THOUSANDS/ΜL (ref 1.85–7.62)
NEUTS SEG NFR BLD AUTO: 78 % (ref 43–75)
NRBC BLD AUTO-RTO: 1 /100 WBCS
PLATELET # BLD AUTO: 155 THOUSANDS/UL (ref 149–390)
PMV BLD AUTO: 9.2 FL (ref 8.9–12.7)
POTASSIUM SERPL-SCNC: 3.6 MMOL/L (ref 3.5–5.3)
RBC # BLD AUTO: 2.09 MILLION/UL (ref 3.81–5.12)
SODIUM SERPL-SCNC: 138 MMOL/L (ref 135–147)
WBC # BLD AUTO: 6.38 THOUSAND/UL (ref 4.31–10.16)

## 2022-09-02 PROCEDURE — 97530 THERAPEUTIC ACTIVITIES: CPT

## 2022-09-02 PROCEDURE — 85018 HEMOGLOBIN: CPT | Performed by: INTERNAL MEDICINE

## 2022-09-02 PROCEDURE — 97116 GAIT TRAINING THERAPY: CPT

## 2022-09-02 PROCEDURE — 85014 HEMATOCRIT: CPT | Performed by: INTERNAL MEDICINE

## 2022-09-02 PROCEDURE — 85025 COMPLETE CBC W/AUTO DIFF WBC: CPT | Performed by: INTERNAL MEDICINE

## 2022-09-02 PROCEDURE — 80048 BASIC METABOLIC PNL TOTAL CA: CPT | Performed by: INTERNAL MEDICINE

## 2022-09-02 PROCEDURE — 99232 SBSQ HOSP IP/OBS MODERATE 35: CPT | Performed by: INTERNAL MEDICINE

## 2022-09-02 RX ADMIN — SIMETHICONE 80 MG: 80 TABLET, CHEWABLE ORAL at 16:05

## 2022-09-02 RX ADMIN — SIMETHICONE 80 MG: 80 TABLET, CHEWABLE ORAL at 08:04

## 2022-09-02 RX ADMIN — AMITRIPTYLINE HYDROCHLORIDE 25 MG: 25 TABLET, FILM COATED ORAL at 22:04

## 2022-09-02 RX ADMIN — METOPROLOL TARTRATE 50 MG: 50 TABLET ORAL at 22:04

## 2022-09-02 RX ADMIN — PREDNISONE 40 MG: 20 TABLET ORAL at 08:04

## 2022-09-02 RX ADMIN — PANTOPRAZOLE SODIUM 40 MG: 40 TABLET, DELAYED RELEASE ORAL at 06:38

## 2022-09-02 RX ADMIN — LETROZOLE 2.5 MG: 2.5 TABLET ORAL at 08:16

## 2022-09-02 RX ADMIN — SIMETHICONE 80 MG: 80 TABLET, CHEWABLE ORAL at 22:04

## 2022-09-02 RX ADMIN — MIRTAZAPINE 7.5 MG: 15 TABLET, FILM COATED ORAL at 22:03

## 2022-09-02 RX ADMIN — Medication 3 MG: at 22:04

## 2022-09-02 RX ADMIN — SIMETHICONE 80 MG: 80 TABLET, CHEWABLE ORAL at 14:06

## 2022-09-02 RX ADMIN — RIVAROXABAN 15 MG: 15 TABLET, FILM COATED ORAL at 16:05

## 2022-09-02 RX ADMIN — SODIUM CHLORIDE 200 MG: 9 INJECTION, SOLUTION INTRAVENOUS at 08:04

## 2022-09-02 RX ADMIN — PRAVASTATIN SODIUM 20 MG: 20 TABLET ORAL at 16:05

## 2022-09-02 RX ADMIN — METOPROLOL TARTRATE 50 MG: 50 TABLET ORAL at 08:04

## 2022-09-02 RX ADMIN — RIVAROXABAN 15 MG: 15 TABLET, FILM COATED ORAL at 08:04

## 2022-09-02 RX ADMIN — PANTOPRAZOLE SODIUM 40 MG: 40 TABLET, DELAYED RELEASE ORAL at 16:05

## 2022-09-02 NOTE — PHYSICAL THERAPY NOTE
PHYSICAL THERAPY NOTE       22 0737   PT Last Visit   PT Visit Date 22   Note Type   Note Type Treatment   Pain Assessment   Pain Assessment Tool 0-10   Pain Score No Pain   Restrictions/Precautions   Weight Bearing Precautions Per Order No   Other Precautions Limb alert  (RUE Limb alert, LUE PICC line - Take BP on L [place])   General   Chart Reviewed Yes   Additional Pertinent History Orthostatic BP measurements this session:  Supine:  123/66mmHg, sitting EOB:  101/67mmHg, standin/63mmHg  Nurse aware;  BP seated in chair at end of session:  109/67mmHg   Family/Caregiver Present No   Cognition   Overall Cognitive Status WFL   Arousal/Participation Alert   Attention Within functional limits   Orientation Level Oriented X4   Memory Within functional limits   Following Commands Follows all commands and directions without difficulty   Subjective   Subjective "I can't stay one more day here  I can't stand the food "  Pt states she continues to take herself to/from commode without calling for assistance;  pt is independent for bed mobility and transfers without AD  Bed Mobility   Supine to Sit 7  Independent   Sit to Supine 7  Independent   Transfers   Sit to Stand 7  Independent   Stand to Sit 7  Independent   Toilet transfer 7  Independent   Additional items Commode  (no AD;  pt able to perform hygiene and change underwear and pad without assistance this session)   Ambulation/Elevation   Gait pattern Decreased foot clearance   Gait Assistance 6  Modified independent   Assistive Device Rolling walker;None   Distance initially used RW x 3 ft however pt then ambulated bed to commode and commode to bed and bed to chair without AD; 3ft x 4 this session; pt has been taking self to/from commode independently while in room;  pt continues to have limited standing tolerance 2/2 SOB/tachycardia however strength and balance WFL  Balance   Static Sitting Good   Dynamic Sitting Good   Static Standing Fair +   Dynamic Standing Fair +   Ambulatory Fair +   Activity Tolerance   Activity Tolerance Treatment limited secondary to medical complications (Comment)  (orthostatic BP and c/o SOB and tachycardia )   Assessment   Prognosis Fair   Problem List Decreased endurance   Assessment Treatment consisted of bed mobility,balance training, ambulation training, safety awareness, fall prevention, endurance training to increase upright positions and functional mobility  Pt continues to be able to perform all mobility without physical assistance; Pt continues to be limited by SOB and tachycardia; Pt with limited standing tolerance as well as (+) orthostatic BP measurements  Hgb 6 6 per last blood work available at time of this PT session; Pt remains seated upright in chair, needs in reach;  /67mmHg at end of session; The patient's AM-PAC Basic Mobility Inpatient Short Form Raw Score is 23  A Raw score of greater than 17 suggests the patient may benefit from discharge to home  Please also refer to the recommendation of the Physical Therapist for safe discharge planning  Barriers to Discharge Decreased caregiver support   Goals   Patient Goals to leave here today   STG Expiration Date 09/13/22   Plan   Treatment/Interventions LE strengthening/ROM; Therapeutic exercise; Endurance training;Patient/family training;Equipment eval/education;Gait training; Compensatory technique education;Spoke to nursing   PT Frequency 2-3x/wk   Recommendation   PT Discharge Recommendation Home with home health rehabilitation   [address] [place] [address] Mobility Inpatient   Turning in Bed Without Bedrails 4   Lying on Back to Sitting on Edge of Flat Bed 4   Moving Bed to Chair 4   Standing Up From Chair 4   Walk in Room 4   Climb 3-5 Stairs 3   Basic Mobility Inpatient Raw Score 23   Basic Mobility Standardized Score 50 88   Highest Level Of Mobility   [facility] Goal 7: Walk 25 feet or more   -HLM Achieved 6: Walk 10 steps or more     Tesha Dhaliwal, PT      Patient Name: Oskar Silverman  MGEFL'O Date: 9/2/2022

## 2022-09-02 NOTE — ASSESSMENT & PLAN NOTE
· History of chronic anemia, baseline 8-10  · Underwent flex sig which showed inflammation, ulcerations  · Also with BRBPR since starting heparin gtt  · Trend H/H  · Prior provider discussed with GI - recommending IV venofer, continue on IV Venofer  · Hemoglobin this morning is 6 7  · Repeat H&H  · GI follow-up  · On Protonix

## 2022-09-02 NOTE — ASSESSMENT & PLAN NOTE
Lab Results   Component Value Date    EGFR 55 09/02/2022    EGFR 66 09/01/2022    EGFR 60 08/31/2022    CREATININE 0 95 09/02/2022    CREATININE 0 82 09/01/2022    CREATININE 0 89 08/31/2022     · Cr on admission slightly elevated from her baseline at 1 3, most likely due to volume depletion  Now improved after IVF    · Baseline 0 8

## 2022-09-02 NOTE — PROGRESS NOTES
[place]  Progress Note Rosario Silverman 1940, 79 y o  female MRN: [id number]  Unit/Bed#: -[place] Encounter: [id number]  Primary Care Provider: Elliot Meza DO   Date and time admitted to hospital: 8/15/2022 10:40 AM    Single subsegmental pulmonary embolism without acute cor pulmonale (HCC)  Assessment & Plan  · Venous duplex obtained due to LE edema  · Confirmed acute occlusive thrombus in popliteal vein and non-occlusive thrombus in the peroneal veins  · Started on IV heparin gtt 8/19t  · Suspect in setting of known metastatic cancer and patient has been more sedentary in recent weeks due to acute illness  · Transition to [facility] 8/25  · Tachycardia is improved  No heart strain noted and no chest pain or palpitations  · Cardiology consult appreciated  Continue on Lopressor  · Continue to monitor closely    Severe protein-calorie malnutrition ([facility] [address] )  Assessment & Plan  Malnutrition Findings:   Adult Malnutrition type: Chronic illness  Adult Degree of Malnutrition: Other severe protein calorie malnutrition  Malnutrition Characteristics: Inadequate energy, Weight loss    360 Statement: Pt presents with chronic severe protein calorie malnutrition due to Stage IV metastatic breast cancer, chronic diarrhea vs UC flare as evidenced by 10 5 lb wt loss-14% wt loss in 4 months and <75 % po intake > 1 month  BMI Findings: Body mass index is 27 21 kg/m²         Anemia  Assessment & Plan  · History of chronic anemia, baseline 8-10  · Underwent flex sig which showed inflammation, ulcerations  · Also with BRBPR since starting heparin gtt  · Trend H/H  · Prior provider discussed with GI - recommending IV venofer, continue on IV Venofer  · Hemoglobin this morning is 6 7  · Repeat H&H  · GI follow-up  · On Protonix    Primary hypertension  Assessment & Plan  · On Lopressor    Chronic kidney disease (CKD) stage G3a/A1, moderately decreased glomerular filtration rate (GFR) between 45-59 mL/min/1 73 square meter and albuminuria creatinine ratio less than 30 mg/g [facility])  Assessment & Plan  Lab Results   Component Value Date    EGFR 55 09/02/2022    EGFR 66 09/01/2022    EGFR 60 08/31/2022    CREATININE 0 95 09/02/2022    CREATININE 0 82 09/01/2022    CREATININE 0 89 08/31/2022     · Cr on admission slightly elevated from her baseline at 1 3, most likely due to volume depletion  Now improved after IVF  · Baseline 0 8    Mixed hyperlipidemia  Assessment & Plan  · Continue statin    Metastatic breast cancer [facility])  Assessment & Plan  · Continue to follow-up with Hematology-Oncology outpatient  · Femara had been held on admit  · On recent admission patient's verzenio was held  Reviewed office visit from 8/10/22 appears there was some consideration to resume but at lowered dose  Patient reports she never resumed this medication  · Patient will require follow-up to discuss possible lumbar mets noted on CT this admission  · Oncology follow-up noted  · On Femara    * Toxic gastroenteritis and colitis  Assessment & Plan  · Patient presented with dizziness and diarrhea starting in August that worsened yesterday  · Most likely secondary to IBD  · CT showed: 1  Diffuse mild-moderate circumferential wall thickening of the colon with pericolic inflammatory stranding consistent with nonspecific pancolitis  2   Multiple stable small scattered sclerotic foci in the visualized lumbar lower thoracic spine osseous pelvis which may reflect osseous metastatic disease in this patient with known metastatic breast cancer  3   Additional stable findings as noted   · GI consulted-> recs appreciated  · Off IV fluids  · Discontinue Zosyn as symptoms are most likely secondary to ulcerative colitis  · Steroids initiated 8/17  · F/u bld cx and procal, ESR, CRP, and lipase, C Diff  · ESR:  48  · Procalcitonin:  1 49  · CRP:  206 3  · C   Diff: negative  · Stool enteric panel: negative  · Advanced to low-fiber · Cholestyramine  · Oncology consulted by GI for further recommendations regarding current cancer regimen playing a role in symptoms  Previously believed due to verzenio which has been on hold since recent admit, doubt this is contributing   · Flex sig completed 8/20 concerning for IBD, biopsies taken  Also noted ulcerations  · Biopsy reveals colitis  · IV steroids were transitioned back to oral prednisone  · Repeat CT abd demonstrates ongoing pancolitis  · GI spoke with IBD specialist Dr Jia Ceron and CRP was ordered which is 122 4  Patient per their recommendations, was restarted on IV Solu-Medrol for 3 more days and repeat CRP was done  and is 10 1  · Patient is switched back to oral prednisone by GI  · Hemoglobin this morning is 6 7  · Trend H&H and transfuse as needed        Labs & Imaging: I have personally reviewed pertinent reports  VTE Prophylaxis: in place  Code Status:   Level 3 - DNAR and DNI    Patient Centered Rounds: I have performed bedside rounds with nursing staff today  Discussions with Specialists or Other Care Team Provider:  GI    Education and Discussions with Family / Patient:  Daughter    Current Length of Stay: 25 day(s)    Current Patient Status: Inpatient   Certification Statement: The patient will continue to require additional inpatient hospital stay due to see my assessment and plan  Subjective:   Patient is seen and examined at bedside  Denies any new complaints  Afebrile  States she feels a little better  All other ROS are negative  Objective:    Vitals: Blood pressure 117/63, pulse (!) 109, temperature 97 7 °F (36 5 °C), resp  rate 21, height 5' 1" (1 549 m), weight 65 3 kg (144 lb), SpO2 98 %, not currently breastfeeding  ,Body mass index is 27 21 kg/m²    SPO2 RA Rest    Flowsheet Row ED to Hosp-Admission (Current) from 8/15/2022 in [address] [facility]   SpO2 98 %   SpO2 Activity At Rest   O2 Device None (Room air)   O2 Flow Rate --        I&O:     Intake/Output Summary (Last 24 hours) at 9/2/2022 1400  Last data filed at 9/2/2022 1101  Gross per 24 hour   Intake 480 ml   Output 400 ml   Net 80 ml       Physical Exam:    General- Alert, lying comfortably in bed  Not in any acute distress  Neck- Supple, No JVD  CVS- regular, S1 and S2 normal   Tachycardic  Chest- Bilateral Air entry, No rhochi, crackles or wheezing present  Abdomen- soft, nontender, not distended, no guarding or rigidity, BS+  Extremities-  has pedal edema, No calf tenderness                         Normal ROM in all extremities  CNS-   Alert, awake and orientedx3  No focal deficits present      Invasive Devices  Report    Peripherally Inserted Central Catheter Line  Duration           PICC Line 08/19/22 13 days                      Social History  reviewed  Family History   Problem Relation Age of Onset    Stomach cancer Mother     No Known Problems Father     Cervical cancer Sister     No Known Problems Daughter     No Known Problems Maternal Grandmother     No Known Problems Maternal Grandfather     No Known Problems Paternal Grandmother     No Known Problems Paternal Grandfather     Colon polyps Neg Hx     Colon cancer Neg Hx     reviewed    Meds:  Current Facility-Administered Medications   Medication Dose Route Frequency Provider Last Rate Last Admin    acetaminophen (TYLENOL) tablet 650 mg  650 mg Oral Q6H PRN Ana M Ayala MD   650 mg at 08/26/22 2203    amitriptyline (ELAVIL) tablet 25 mg  25 mg Oral HS Ana M Ayala MD   25 mg at 09/01/22 2156    calcium carbonate (TUMS) chewable tablet 500 mg  500 mg Oral Daily PRN Ana M Ayala MD   500 mg at 08/26/22 2202    cholestyramine sugar free (QUESTRAN LIGHT) packet 4 g  4 g Oral HS BROOKE Pérez   4 g at 08/31/22 2218    iron sucrose (VENOFER) 200 mg in sodium chloride 0 9 % 100 mL IVPB  200 mg Intravenous Once per day on Mon Wed Fri Judy Mora PA-C   200 mg at 09/02/22 5275    letrozole [facility]) tablet 2 5 mg  2 5 mg Oral Daily BROOKE Candelario   2 5 mg at 09/02/22 0816    loperamide (IMODIUM) capsule 2 mg  2 mg Oral Q4H PRN Radha WOODARD PA-C   2 mg at 08/28/22 1731    melatonin tablet 3 mg  3 mg Oral HS Jenise Jolly PA-C   3 mg at 09/01/22 2156    metoprolol tartrate (LOPRESSOR) tablet 50 mg  50 mg Oral Q12H Silvestre Miller MD   50 mg at 09/02/22 0804    mirtazapine (REMERON) tablet 7 5 mg  7 5 mg Oral HS Cuauhtemoc Humphrey MD   7 5 mg at 09/01/22 2156    ondansetron (ZOFRAN) injection 4 mg  4 mg Intravenous Q6H PRN Cuauhtemoc Humphrey MD   4 mg at 08/18/22 0082    pantoprazole (PROTONIX) EC tablet 40 mg  40 mg Oral BID AC BROOKE Traore   40 mg at 09/02/22 9215    pravastatin (PRAVACHOL) tablet 20 mg  20 mg Oral Daily With Alvino Pena MD   20 mg at 09/01/22 1723    predniSONE tablet 40 mg  40 mg Oral Daily BROOKE Pérez   40 mg at 09/02/22 0804    rivaroxaban (XARELTO) tablet 15 mg  15 mg Oral BID With Meals Padmini WOODARD PA-C   15 mg at 09/02/22 0804    simethicone (MYLICON) chewable tablet 80 mg  80 mg Oral 4x Daily (with meals and at bedtime) Radha WOODARD PA-C   80 mg at 09/02/22 0804      Medications Prior to Admission   Medication    Abemaciclib (Verzenio) 50 MG TABS    amitriptyline (ELAVIL) 25 mg tablet    Cholecalciferol (VITAMIN D3) 2000 units capsule    diphenoxylate-atropine (LOMOTIL) 2 5-0 025 mg per tablet    letrozole (FEMARA) 2 5 mg tablet    loperamide (IMODIUM) 2 mg capsule    losartan (COZAAR) 50 mg tablet    mirtazapine (REMERON) 7 5 MG tablet    simvastatin (ZOCOR) 10 mg tablet       Labs:  Results from last 7 days   Lab Units 09/02/22  0638 09/01/22 2010 09/01/22  0453 08/31/22  2234 08/31/22  0448 08/30/22  2115 08/30/22  0451   WBC Thousand/uL 6 38  --  4 84  --  5 72  --  4 41   HEMOGLOBIN g/dL 6 7* 6 6* 6 8*   < > 7 2*   < > 7 5*   HEMATOCRIT % 21 7* 20 9* 21 6*   < > 23 3*   < > 24 4*   PLATELETS Thousands/uL 155  --  162  --  205  --  226   NEUTROS PCT % 78*  --   --   --   --   --   --    LYMPHS PCT % 12*  --   --   --   --   --   --    LYMPHO PCT %  --   --  6*  --   --   --  15   MONOS PCT % 8  --   --   --   --   --   --    MONO PCT %  --   --  3*  --   --   --  1*   EOS PCT % 1  --  0  --   --   --  0    < > = values in this interval not displayed  Results from last 7 days   Lab Units 09/02/22  0638 09/01/22  0453 08/31/22  0448 08/29/22  0440 08/28/22  0515   POTASSIUM mmol/L 3 6 4 1 4 4   < > 3 4*   CHLORIDE mmol/L 106 105 102   < > 105   CO2 mmol/L 28 27 24   < > 26   BUN mg/dL 18 15 14   < > 10   CREATININE mg/dL 0 95 0 82 0 89   < > 0 72   CALCIUM mg/dL 7 4* 7 7* 7 8*   < > 6 6*   ALK PHOS U/L  --   --   --   --  72   ALT U/L  --   --   --   --  17   AST U/L  --   --   --   --  8    < > = values in this interval not displayed  No results found for: TROPONINI, CKMB, CKTOTAL      Lab Results   Component Value Date    BLOODCX No Growth After 5 Days  08/15/2022    BLOODCX No Growth After 5 Days  08/15/2022         Imaging:  Results for orders placed during the hospital encounter of 08/01/22    XR Trauma chest portable    Narrative  CHEST    INDICATION:   TRAUMA  COMPARISON:  6/18/2021  EXAM PERFORMED/VIEWS:  XR CHEST PORTABLE      FINDINGS:    Cardiomediastinal silhouette appears unremarkable  The lungs are clear  No pneumothorax or pleural effusion  Osseous structures appear within normal limits for patient age  Impression  No acute cardiopulmonary disease  Workstation performed: [id number]    Results for orders placed in visit on 06/18/21    XR chest pa & lateral    Narrative  CHEST    INDICATION:   R07 89: Other chest pain  Cough since January  History of breast cancer  Anterior upper costochondral pain  COMPARISON:  Chest x-ray from 1/12/2021  Chest CT from 9/13/2013      EXAM PERFORMED/VIEWS:  XR CHEST PA & LATERAL      FINDINGS:    Cardiomediastinal silhouette appears unremarkable  The lungs are clear  No pneumothorax or pleural effusion  Osseous structures appear within normal limits for patient age  There is a bone island in the proximal left clavicle  Impression  No acute cardiopulmonary disease  Workstation performed: [id number]      Last 24 Hours Medication List:   Current Facility-Administered Medications   Medication Dose Route Frequency Provider Last Rate    acetaminophen  650 mg Oral Q6H PRN Pravin Vergara MD      amitriptyline  25 mg Oral HS Pravin Vergara MD      calcium carbonate  500 mg Oral Daily PRN Pravin Vergara MD      cholestyramine sugar free  4 g Oral HS BROOKE Pérez      iron sucrose  200 mg Intravenous Once per day on Mon Wed Fri Melina Hollins PA-C      letrozole  2 5 mg Oral Daily OneBROOKE Nava      loperamide  2 mg Oral Q4H PRN Panda Goodness VKAIA      melatonin  3 mg Oral HS Missael Ayala PA-C      metoprolol tartrate  50 mg Oral Q12H [address] Yomaira Mckinney MD      mirtazapine  7 5 mg Oral HS Pravin Vergara MD      ondansetron  4 mg Intravenous Q6H PRN Pravin Vergara MD      pantoprazole  40 mg Oral BID AC BROOKE Nunes      pravastatin  20 mg Oral Daily With Enedina Sylvester MD      predniSONE  40 mg Oral Daily BROOKE Pérez      rivaroxaban  15 mg Oral BID With Meals Panda Goodness KAIA WOODARD      simethicone  80 mg Oral 4x Daily (with meals and at bedtime) Chandni Beebe PA-C          Today, Patient Was Seen By: Yomaira Mckinney MD    ** Please Note: Dictation voice to text software may have been used in the creation of this document   **

## 2022-09-02 NOTE — ASSESSMENT & PLAN NOTE
· Venous duplex obtained due to LE edema  · Confirmed acute occlusive thrombus in popliteal vein and non-occlusive thrombus in the peroneal veins  · Started on IV heparin gtt 8/19t  · Suspect in setting of known metastatic cancer and patient has been more sedentary in recent weeks due to acute illness  · Transition to [address] [place] [address] 8/25  · Tachycardia is improved  No heart strain noted and no chest pain or palpitations  · Cardiology consult appreciated    Continue on Lopressor  · Continue to monitor closely

## 2022-09-02 NOTE — PROGRESS NOTES
Progress note - Gastroenterology   Leana COVARRUBIAS Clunk 79 y o  female MRN: [id number]  Unit/Bed#: -01 Encounter: [id number]    ASSESSMENT and PLAN    1  Acute colitis  New onset inflammatory bowel disease?  Related to or induced by cancer treatment/immunotherapy,   Atypical infectious colitis? Patient continues to have bloody loose bowel movements      Imodium 2 mg every 4 hours as needed      Biopsy from flex sig 8/20; active colitis, no viral inclusions identified on cm the immunohistochemistry stain, negative for dysplasia -Dr Mukund Aparicio did reach out to pathology to relook at slides      - steroid switched back to IV for 72 hours  - continue mesalamine 800 mg p o  3 times a day  - continue amitriptyline 25 mg p o  Daily HS  -Imodium p r n   -continue IV Solu-Medrol today with repeat CRP in the a m   -hepatitis-B normal TB titers and process-in preparation for biologic  -Questran powder once daily     IV steroids complete  CRP 10 1  Hemoglobin did drop again to 6 8  Repeat hemoglobin  Prednisone switched to 40 mg p o  Faheem Ingles our office is working on Remicade infusion  Would discharge on oral iron since it will probably still drip down with active colitis and the need for anticoagulation  Will arrange follow-up office visit in 2 weeks  Continue 40 mg of prednisone and what she gets the loading dose of Remicade we will begin to taper  Hemoglobin continues to be low at 6 7     Discussed with patient office will contact her for follow-up outpatient office visit   Possibly be referred to IBD specialist      Per Dr Jeffry Bowers IBD specialist-I would recommend checking CRP, restarting IV steroids for an additional 3 days and assessing her response with repeat CRP and clinical symptoms  Depending on how she does she might meet criteria for inpatient remicade  She is in her [de-identified], so obviously the risks and benefits of all treatment options should be weighed   Given her age I would also suggest you speak with pathology and confirm if it looks like IBD (and no ischemic, or an alternative diagnosis)       2  Acute blood loss anemia  Secondary to colitis and exacerbated by anticoagulation for DVT/PE  Hemoglobin stable- receiving IV iron     - follow H&H and transfuse as needed  - continue anticoagulation for now  - continue PPI  - IV Venofer      3  DVT and pulmonary embolism   Xarelto 15 mg p o  Twice daily         4  Metastatic breast cancer  Followed by Dr Jose Lopez    Chief Complaint   Patient presents with    Dizziness     Via ems from home for increased dizziness and multiple episodes diarrhea since the beginning of August  States worse since last night  States is getting new chemo medication       SUBJECTIVE/HPI   No GI complaints  Bowel movements are more formed at this point  Patient denies any more blood mixed in with her stools      /63   Pulse (!) 109   Temp 97 7 °F (36 5 °C)   Resp 21   Ht 5' 1" (1 549 m)   Wt 65 3 kg (144 lb)   LMP  (LMP Unknown)   SpO2 98%   BMI 27 21 kg/m²     PHYSICALEXAM  General appearance: alert, appears stated age and cooperative  Eyes: PERLLA, EOMI, no icterus   Head: Normocephalic, without obvious abnormality, atraumatic  Lungs: clear to auscultation bilaterally  Heart: regular rate and rhythm, S1, S2 normal, no murmur, click, rub or gallop  Abdomen: soft, non-tender; bowel sounds normal; no masses,  no organomegaly  Extremities: extremities normal, atraumatic, no cyanosis or edema  Neurologic: Grossly normal    Lab Results   Component Value Date    GLUCOSE 115 09/08/2015    CALCIUM 7 4 (L) 09/02/2022     09/08/2015    K 3 6 09/02/2022    CO2 28 09/02/2022     09/02/2022    BUN 18 09/02/2022    CREATININE 0 95 09/02/2022     Lab Results   Component Value Date    WBC 6 38 09/02/2022    HGB 6 7 (LL) 09/02/2022    HCT 21 7 (L) 09/02/2022     (H) 09/02/2022     09/02/2022     Lab Results   Component Value Date    ALT 17 08/28/2022    AST 8 08/28/2022    ALKPHOS 72 08/28/2022    BILITOT 0 66 09/08/2015     No results found for: AMYLASE  Lab Results   Component Value Date    LIPASE 79 08/15/2022     Lab Results   Component Value Date    IRON 19 (L) 08/28/2022    TIBC 105 (L) 08/28/2022    FERRITIN 755 (H) 08/28/2022     Lab Results   Component Value Date    INR 1 11 08/19/2022

## 2022-09-02 NOTE — ASSESSMENT & PLAN NOTE
· Patient presented with dizziness and diarrhea starting in August that worsened yesterday  · Most likely secondary to IBD  · CT showed: 1  Diffuse mild-moderate circumferential wall thickening of the colon with pericolic inflammatory stranding consistent with nonspecific pancolitis  2   Multiple stable small scattered sclerotic foci in the visualized lumbar lower thoracic spine osseous pelvis which may reflect osseous metastatic disease in this patient with known metastatic breast cancer  3   Additional stable findings as noted   · GI consulted-> recs appreciated  · Off IV fluids  · Discontinue Zosyn as symptoms are most likely secondary to ulcerative colitis  · Steroids initiated 8/17  · F/u bld cx and procal, ESR, CRP, and lipase, C Diff  · ESR:  48  · Procalcitonin:  1 49  · CRP:  206 3  · C  Diff: negative  · Stool enteric panel: negative  · Advanced to low-fiber   · Cholestyramine  · Oncology consulted by GI for further recommendations regarding current cancer regimen playing a role in symptoms  Previously believed due to verzenio which has been on hold since recent admit, doubt this is contributing   · Flex sig completed 8/20 concerning for IBD, biopsies taken  Also noted ulcerations  · Biopsy reveals colitis  · IV steroids were transitioned back to oral prednisone  · Repeat CT abd demonstrates ongoing pancolitis  · GI spoke with IBD specialist Dr Edi Rahman and CRP was ordered which is 122 4   Patient per their recommendations, was restarted on IV Solu-Medrol for 3 more days and repeat CRP was done  and is 10 1  · Patient is switched back to oral prednisone by GI  · Hemoglobin this morning is 6 7  · Trend H&H and transfuse as needed

## 2022-09-03 LAB
ANION GAP SERPL CALCULATED.3IONS-SCNC: 6 MMOL/L (ref 4–13)
BASOPHILS # BLD AUTO: 0.01 THOUSANDS/ΜL (ref 0–0.1)
BASOPHILS NFR BLD AUTO: 0 % (ref 0–1)
BUN SERPL-MCNC: 19 MG/DL (ref 5–25)
CALCIUM SERPL-MCNC: 7.6 MG/DL (ref 8.3–10.1)
CHLORIDE SERPL-SCNC: 105 MMOL/L (ref 96–108)
CO2 SERPL-SCNC: 26 MMOL/L (ref 21–32)
CREAT SERPL-MCNC: 0.8 MG/DL (ref 0.6–1.3)
EOSINOPHIL # BLD AUTO: 0.15 THOUSAND/ΜL (ref 0–0.61)
EOSINOPHIL NFR BLD AUTO: 2 % (ref 0–6)
ERYTHROCYTE [DISTWIDTH] IN BLOOD BY AUTOMATED COUNT: 22.5 % (ref 11.6–15.1)
GFR SERPL CREATININE-BSD FRML MDRD: 68 ML/MIN/1.73SQ M
GLUCOSE SERPL-MCNC: 80 MG/DL (ref 65–140)
HCT VFR BLD AUTO: 23 % (ref 34.8–46.1)
HGB BLD-MCNC: 7.1 G/DL (ref 11.5–15.4)
IMM GRANULOCYTES # BLD AUTO: 0.07 THOUSAND/UL (ref 0–0.2)
IMM GRANULOCYTES NFR BLD AUTO: 1 % (ref 0–2)
LYMPHOCYTES # BLD AUTO: 0.55 THOUSANDS/ΜL (ref 0.6–4.47)
LYMPHOCYTES NFR BLD AUTO: 8 % (ref 14–44)
MCH RBC QN AUTO: 32.9 PG (ref 26.8–34.3)
MCHC RBC AUTO-ENTMCNC: 30.9 G/DL (ref 31.4–37.4)
MCV RBC AUTO: 107 FL (ref 82–98)
MONOCYTES # BLD AUTO: 0.46 THOUSAND/ΜL (ref 0.17–1.22)
MONOCYTES NFR BLD AUTO: 7 % (ref 4–12)
NEUTROPHILS # BLD AUTO: 5.46 THOUSANDS/ΜL (ref 1.85–7.62)
NEUTS SEG NFR BLD AUTO: 82 % (ref 43–75)
NRBC BLD AUTO-RTO: 0 /100 WBCS
PLATELET # BLD AUTO: 135 THOUSANDS/UL (ref 149–390)
PMV BLD AUTO: 10.8 FL (ref 8.9–12.7)
POTASSIUM SERPL-SCNC: 3.9 MMOL/L (ref 3.5–5.3)
RBC # BLD AUTO: 2.16 MILLION/UL (ref 3.81–5.12)
SODIUM SERPL-SCNC: 137 MMOL/L (ref 135–147)
WBC # BLD AUTO: 6.7 THOUSAND/UL (ref 4.31–10.16)

## 2022-09-03 PROCEDURE — 85025 COMPLETE CBC W/AUTO DIFF WBC: CPT | Performed by: INTERNAL MEDICINE

## 2022-09-03 PROCEDURE — 99232 SBSQ HOSP IP/OBS MODERATE 35: CPT | Performed by: INTERNAL MEDICINE

## 2022-09-03 PROCEDURE — 80048 BASIC METABOLIC PNL TOTAL CA: CPT | Performed by: INTERNAL MEDICINE

## 2022-09-03 RX ADMIN — AMITRIPTYLINE HYDROCHLORIDE 25 MG: 25 TABLET, FILM COATED ORAL at 21:36

## 2022-09-03 RX ADMIN — CHOLESTYRAMINE 4 G: 4 POWDER, FOR SUSPENSION ORAL at 21:37

## 2022-09-03 RX ADMIN — SIMETHICONE 80 MG: 80 TABLET, CHEWABLE ORAL at 08:40

## 2022-09-03 RX ADMIN — LETROZOLE 2.5 MG: 2.5 TABLET ORAL at 08:39

## 2022-09-03 RX ADMIN — METOPROLOL TARTRATE 50 MG: 50 TABLET ORAL at 21:37

## 2022-09-03 RX ADMIN — PREDNISONE 40 MG: 20 TABLET ORAL at 08:40

## 2022-09-03 RX ADMIN — SIMETHICONE 80 MG: 80 TABLET, CHEWABLE ORAL at 16:53

## 2022-09-03 RX ADMIN — MIRTAZAPINE 7.5 MG: 15 TABLET, FILM COATED ORAL at 21:36

## 2022-09-03 RX ADMIN — RIVAROXABAN 15 MG: 15 TABLET, FILM COATED ORAL at 16:53

## 2022-09-03 RX ADMIN — SIMETHICONE 80 MG: 80 TABLET, CHEWABLE ORAL at 21:37

## 2022-09-03 RX ADMIN — PANTOPRAZOLE SODIUM 40 MG: 40 TABLET, DELAYED RELEASE ORAL at 16:53

## 2022-09-03 RX ADMIN — METOPROLOL TARTRATE 50 MG: 50 TABLET ORAL at 08:40

## 2022-09-03 RX ADMIN — PRAVASTATIN SODIUM 20 MG: 20 TABLET ORAL at 16:53

## 2022-09-03 RX ADMIN — SIMETHICONE 80 MG: 80 TABLET, CHEWABLE ORAL at 11:45

## 2022-09-03 RX ADMIN — Medication 3 MG: at 21:37

## 2022-09-03 RX ADMIN — PANTOPRAZOLE SODIUM 40 MG: 40 TABLET, DELAYED RELEASE ORAL at 06:29

## 2022-09-03 RX ADMIN — RIVAROXABAN 15 MG: 15 TABLET, FILM COATED ORAL at 08:40

## 2022-09-03 NOTE — ASSESSMENT & PLAN NOTE
· Patient presented with dizziness and diarrhea starting in August that worsened yesterday  · Most likely secondary to IBD  · CT showed: 1  Diffuse mild-moderate circumferential wall thickening of the colon with pericolic inflammatory stranding consistent with nonspecific pancolitis  2   Multiple stable small scattered sclerotic foci in the visualized lumbar lower thoracic spine osseous pelvis which may reflect osseous metastatic disease in this patient with known metastatic breast cancer  3   Additional stable findings as noted   · GI consulted-> recs appreciated  · Off IV fluids  · Discontinue Zosyn as symptoms are most likely secondary to ulcerative colitis  · Steroids initiated 8/17  · F/u bld cx and procal, ESR, CRP, and lipase, C Diff  · ESR:  48  · Procalcitonin:  1 49  · CRP:  206 3  · C  Diff: negative  · Stool enteric panel: negative  · Advanced to low-fiber   · Cholestyramine  · Oncology consulted by GI for further recommendations regarding current cancer regimen playing a role in symptoms  Previously believed due to verzenio which has been on hold since recent admit, doubt this is contributing   · Flex sig completed 8/20 concerning for IBD, biopsies taken  Also noted ulcerations  · Biopsy reveals colitis  · IV steroids were transitioned back to oral prednisone  · Repeat CT abd demonstrates ongoing pancolitis  · GI spoke with IBD specialist Dr Edi Rahman and CRP was ordered which is 122 4   Patient per their recommendations, was restarted on IV Solu-Medrol for 3 more days and repeat CRP was done  and is 10 1  · Patient is switched back to oral prednisone by GI  · Hemoglobin this morning is 7 1  · Trend H&H and transfuse as needed

## 2022-09-03 NOTE — PROGRESS NOTES
Progress note - [facility]astroenterology   Ricky Estimable A Clunk 79 y o  female MRN: [id number]  Unit/Bed#: -[place] Encounter: [id number]    ASSESSMENT and PLAN    1  Acute colitis  Flex-sig August 20th showed severe colitis into the mid sigmoid colon with deep ulcerations (not present on colonoscopy November 2021)  Differential includes drug-induced colitis, infection, diverticular-associated colitis, and inflammatory bowel disease    Diarrhea and inflammatory markers responded to IV steroids which were transitioned to prednisone September 1st   Has not required Imodium in several days  Our office is working on biologic approval   Will continue prednisone 40 mg daily until  she receives Remicade loading dose  Okay for discharge from the GI standpoint  2  Acute blood loss anemia  Secondary to colitis and exacerbated by anticoagulation for DVT/PE  Overt bleeding has resolved  Does not meet criteria for transfusion  Continue IV iron     3  DVT and pulmonary embolism   Xarelto 15 mg p o  Twice daily       4  Metastatic breast cancer  Followed by Dr Sharon Boxer    5  Deconditioning  Exacerbated by anemia    Discussed with Dr Joey Willson    Chief Complaint   Patient presents with    Dizziness     Via ems from home for increased dizziness and multiple episodes diarrhea since the beginning of August  States worse since last night  States is getting new chemo medication       SUBJECTIVE/HPI   Soft, loose stool overnight  No incontinence  Did not notice any bleeding  Tolerating diet without nausea or vomiting but does not like hospital food  No abdominal pain  Still feels weak      /64   Pulse 101   Temp (!) 97 °F (36 1 °C)   Resp (!) 24   Ht 5' 1" (1 549 m)   Wt 65 3 kg (144 lb)   LMP  (LMP Unknown)   SpO2 98%   BMI 27 21 kg/m²     PHYSICALEXAM  General appearance: alert, appears stated age and cooperative  Eyes: PERLLA, EOMI, no icterus   Head: Normocephalic, without obvious abnormality, atraumatic  Lungs: clear to auscultation bilaterally  Heart: regular rate and rhythm, S1, S2 normal, no murmur, click, rub or gallop  Abdomen: soft, non-tender; bowel sounds normal; no masses,  no organomegaly  Extremities: extremities normal, atraumatic, no cyanosis or edema  Neurologic: Grossly normal    Lab Results   Component Value Date    GLUCOSE 115 09/08/2015    CALCIUM 7 6 (L) 09/03/2022     09/08/2015    K 3 9 09/03/2022    CO2 26 09/03/2022     09/03/2022    BUN 19 09/03/2022    CREATININE 0 80 09/03/2022     Lab Results   Component Value Date    WBC 6 70 09/03/2022    HGB 7 1 (L) 09/03/2022    HCT 23 0 (L) 09/03/2022     (H) 09/03/2022     (L) 09/03/2022     Lab Results   Component Value Date    ALT 17 08/28/2022    AST 8 08/28/2022    ALKPHOS 72 08/28/2022    BILITOT 0 66 09/08/2015     No results found for: AMYLASE  Lab Results   Component Value Date    LIPASE 79 08/15/2022     Lab Results   Component Value Date    IRON 19 (L) 08/28/2022    TIBC 105 (L) 08/28/2022    FERRITIN 755 (H) 08/28/2022     Lab Results   Component Value Date    INR 1 11 08/19/2022

## 2022-09-03 NOTE — PLAN OF CARE
Problem: Nutrition/Hydration-ADULT  Goal: Nutrient/Hydration intake appropriate for improving, restoring or maintaining nutritional needs  Description: Monitor and assess patient's nutrition/hydration status for malnutrition  Collaborate with interdisciplinary team and initiate plan and interventions as ordered  Monitor patient's weight and dietary intake as ordered or per policy  Utilize nutrition screening tool and intervene as necessary  Determine patient's food preferences and provide high-protein, high-caloric foods as appropriate       INTERVENTIONS:  - Monitor oral intake, urinary output, labs, and treatment plans  - Assess nutrition and hydration status and recommend course of action  - Evaluate amount of meals eaten  - Assist patient with eating if necessary   - Allow adequate time for meals  - Recommend/ encourage appropriate diets, oral nutritional supplements, and vitamin/mineral supplements  - Order, calculate, and assess calorie counts as needed  - Recommend, monitor, and adjust tube feedings and TPN/PPN based on assessed needs  - Assess need for intravenous fluids  - Provide specific nutrition/hydration education as appropriate  - Include patient/family/caregiver in decisions related to nutrition  Outcome: Progressing     Problem: DISCHARGE PLANNING  Goal: Discharge to home or other facility with appropriate resources  Description: INTERVENTIONS:  - Identify barriers to discharge w/patient and caregiver  - Arrange for needed discharge resources and transportation as appropriate  - Identify discharge learning needs (meds, wound care, etc )  - Arrange for interpretive services to assist at discharge as needed  - Refer to Case Management Department for coordinating discharge planning if the patient needs post-hospital services based on physician/advanced practitioner order or complex needs related to functional status, cognitive ability, or social support system  Outcome: Progressing     Problem: SAFETY ADULT  Goal: Maintain or return to baseline ADL function  Description: INTERVENTIONS:  -  Assess patient's ability to carry out ADLs; assess patient's baseline for ADL function and identify physical deficits which impact ability to perform ADLs (bathing, care of mouth/teeth, toileting, grooming, dressing, etc )  - Assess/evaluate cause of self-care deficits   - Assess range of motion  - Assess patient's mobility; develop plan if impaired  - Assess patient's need for assistive devices and provide as appropriate  - Encourage maximum independence but intervene and supervise when necessary  - Involve family in performance of ADLs  - Assess for home care needs following discharge   - Consider OT consult to assist with ADL evaluation and planning for discharge  - Provide patient education as appropriate  Outcome: Progressing     Problem: Knowledge Deficit  Goal: Patient/family/caregiver demonstrates understanding of disease process, treatment plan, medications, and discharge instructions  Description: Complete learning assessment and assess knowledge base    Interventions:  - Provide teaching at level of understanding  - Provide teaching via preferred learning methods  Outcome: Progressing

## 2022-09-03 NOTE — PROGRESS NOTES
[place]  Progress Note Gabriel Silverman 1940, 79 y o  female MRN: [id number]  Unit/Bed#: -[place] Encounter: [id number]  Primary Care Provider: Debbie Chris DO   Date and time admitted to hospital: 8/15/2022 10:40 AM    Single subsegmental pulmonary embolism without acute cor pulmonale (HCC)  Assessment & Plan  · Venous duplex obtained due to LE edema  · Confirmed acute occlusive thrombus in popliteal vein and non-occlusive thrombus in the peroneal veins  · Started on IV heparin gtt 8/19t  · Suspect in setting of known metastatic cancer and patient has been more sedentary in recent weeks due to acute illness  · Transition to [facility] 8/25  · Tachycardia is improved  No heart strain noted and no chest pain or palpitations  · Cardiology consult appreciated  Continue on Lopressor  · Continue to monitor closely    Severe protein-calorie malnutrition ([facility] [address] )  Assessment & Plan  Malnutrition Findings:   Adult Malnutrition type: Chronic illness  Adult Degree of Malnutrition: Other severe protein calorie malnutrition  Malnutrition Characteristics: Inadequate energy, Weight loss    360 Statement: Pt presents with chronic severe protein calorie malnutrition due to Stage IV metastatic breast cancer, chronic diarrhea vs UC flare as evidenced by 10 5 lb wt loss-14% wt loss in 4 months and <75 % po intake > 1 month  BMI Findings: Body mass index is 27 21 kg/m²         Anemia  Assessment & Plan  · History of chronic anemia, baseline 8-10  · Underwent flex sig which showed inflammation, ulcerations  · Also with BRBPR since starting heparin gtt  · Trend H/H  · Prior provider discussed with GI - recommending IV venofer, continue on IV Venofer  · Hemoglobin this morning is 7 1  · Repeat H&H  · GI follow-up  · On Protonix    Primary hypertension  Assessment & Plan  · On Lopressor    Chronic kidney disease (CKD) stage G3a/A1, moderately decreased glomerular filtration rate (GFR) between Patient contacted on-call service for fluctuating oxygen levels on her home pulse ox. Patient is positive for COVID-19. She reports that her oxygen may have dropped as low as 85 but she does not know if she trust her pulse ox entirely. She reports she continues to have a low-grade fever but denies shortness of breath or respiratory distress. Patient reports she feels about the same as she did when she was first diagnosed. Over the phone there does not appear to be any shortness of breath or respiratory distress heard in her voice. I instructed her to change the batteries in her pulse ox and if her oxygen continues to be in the 80s to low 90s she is to report immediately to the ER for evaluation. She is also to report to the ER with any worsening symptoms. 45-59 mL/min/1 73 square meter and albuminuria creatinine ratio less than 30 mg/g [facility])  Assessment & Plan  Lab Results   Component Value Date    EGFR 68 09/03/2022    EGFR 55 09/02/2022    EGFR 66 09/01/2022    CREATININE 0 80 09/03/2022    CREATININE 0 95 09/02/2022    CREATININE 0 82 09/01/2022     · Cr on admission slightly elevated from her baseline at 1 3, most likely due to volume depletion  · Baseline 0 8    Mixed hyperlipidemia  Assessment & Plan  · Continue statin    Metastatic breast cancer [facility])  Assessment & Plan  · Continue to follow-up with Hematology-Oncology outpatient  · Femara had been held on admit  · On recent admission patient's verzenio was held  Reviewed office visit from 8/10/22 appears there was some consideration to resume but at lowered dose  Patient reports she never resumed this medication  · Patient will require follow-up to discuss possible lumbar mets noted on CT this admission  · Oncology follow-up noted  · On Femara    * Toxic gastroenteritis and colitis  Assessment & Plan  · Patient presented with dizziness and diarrhea starting in August that worsened yesterday  · Most likely secondary to IBD  · CT showed: 1  Diffuse mild-moderate circumferential wall thickening of the colon with pericolic inflammatory stranding consistent with nonspecific pancolitis  2   Multiple stable small scattered sclerotic foci in the visualized lumbar lower thoracic spine osseous pelvis which may reflect osseous metastatic disease in this patient with known metastatic breast cancer  3   Additional stable findings as noted   · GI consulted-> recs appreciated  · Off IV fluids  · Discontinue Zosyn as symptoms are most likely secondary to ulcerative colitis  · Steroids initiated 8/17  · F/u bld cx and procal, ESR, CRP, and lipase, C Diff  · ESR:  48  · Procalcitonin:  1 49  · CRP:  206 3  · C   Diff: negative  · Stool enteric panel: negative  · Advanced to low-fiber   · Cholestyramine  · Oncology consulted by GI for further recommendations regarding current cancer regimen playing a role in symptoms  Previously believed due to verzenio which has been on hold since recent admit, doubt this is contributing   · Flex sig completed 8/20 concerning for IBD, biopsies taken  Also noted ulcerations  · Biopsy reveals colitis  · IV steroids were transitioned back to oral prednisone  · Repeat CT abd demonstrates ongoing pancolitis  · GI spoke with IBD specialist Dr Cynthia Paulino and CRP was ordered which is 122 4  Patient per their recommendations, was restarted on IV Solu-Medrol for 3 more days and repeat CRP was done  and is 10 1  · Patient is switched back to oral prednisone by GI  · Hemoglobin this morning is 7 1  · Trend H&H and transfuse as needed        Labs & Imaging: I have personally reviewed pertinent reports  VTE Prophylaxis: in place  Code Status:   Level 3 - DNAR and DNI    Patient Centered Rounds: I have performed bedside rounds with nursing staff today  Discussions with Specialists or Other Care Team Provider: GI    Education and Discussions with Family / Patient:  Daughter Judge Dunn    Current Length of Stay: 19 day(s)    Current Patient Status: Inpatient   Certification Statement: The patient will continue to require additional inpatient hospital stay due to see my assessment and plan  Subjective:   Patient is seen and examined at bedside  Denies any new complaints  Still with some diarrhea  States she feels very weak and tired  Afebrile  All other ROS are negative  Objective:    Vitals: Blood pressure 127/64, pulse 101, temperature (!) 97 °F (36 1 °C), resp  rate (!) 24, height 5' 1" (1 549 m), weight 65 3 kg (144 lb), SpO2 98 %, not currently breastfeeding  ,Body mass index is 27 21 kg/m²    SPO2 RA Rest    Flowsheet Row ED to Hosp-Admission (Current) from 8/15/2022 in [address] Med Surg [address]   SpO2 98 %   SpO2 Activity At Rest   O2 Device None (Room air)   O2 Flow Rate --        I&O:     Intake/Output Summary (Last 24 hours) at 9/3/2022 1300  Last data filed at 9/2/2022 1700  Gross per 24 hour   Intake 100 ml   Output --   Net 100 ml       Physical Exam:    General- Alert, lying comfortably in bed  Not in any acute distress  HEENT- HANSA, EOM intact  Neck- Supple, No JVD  CVS- regular, S1 and S2 normal  Chest- Bilateral Air entry, No rhochi, crackles or wheezing present  Abdomen- soft, nontender, not distended, no guarding or rigidity, BS+  Extremities-  has pedal edema, No calf tenderness  CNS-   Alert, awake and orientedx3  No focal deficits present      Invasive Devices  Report    Peripherally Inserted Central Catheter Line  Duration           PICC Line 08/19/22 14 days                      Social History  reviewed  Family History   Problem Relation Age of Onset    Stomach cancer Mother     No Known Problems Father     Cervical cancer Sister     No Known Problems Daughter     No Known Problems Maternal Grandmother     No Known Problems Maternal Grandfather     No Known Problems Paternal Grandmother     No Known Problems Paternal Grandfather     Colon polyps Neg Hx     Colon cancer Neg Hx     reviewed    Meds:  Current Facility-Administered Medications   Medication Dose Route Frequency Provider Last Rate Last Admin    acetaminophen (TYLENOL) tablet 650 mg  650 mg Oral Q6H PRN Cuauhtemoc Humphrey MD   650 mg at 08/26/22 2203    amitriptyline (ELAVIL) tablet 25 mg  25 mg Oral HS Cuauhtemoc Humphrey MD   25 mg at 09/02/22 2204    calcium carbonate (TUMS) chewable tablet 500 mg  500 mg Oral Daily PRN Cuauhtemoc Humphrey MD   500 mg at 08/26/22 2202    cholestyramine sugar free (QUESTRAN LIGHT) packet 4 g  4 g Oral HS BROOKE Pérez   4 g at 08/31/22 2218    iron sucrose (VENOFER) 200 mg in sodium chloride 0 9 % 100 mL IVPB  200 mg Intravenous Once per day on Mon Wed Fri Luz Aldrich PA-C   200 mg at 09/02/22 0804    letrozole [facility]) tablet 2 5 mg  2 5 mg Oral Daily BROOKE Lane   2 5 mg at 09/03/22 9393    loperamide (IMODIUM) capsule 2 mg  2 mg Oral Q4H PRN Evan WOODARD PA-C   2 mg at 08/28/22 1731    melatonin tablet 3 mg  3 mg Oral HS Jenise Jolly PA-C   3 mg at 09/02/22 2204    metoprolol tartrate (LOPRESSOR) tablet 50 mg  50 mg Oral Q12H Rivera Brewer MD   50 mg at 09/03/22 0840    mirtazapine (REMERON) tablet 7 5 mg  7 5 mg Oral HS Tarik Lind MD   7 5 mg at 09/02/22 2203    ondansetron (ZOFRAN) injection 4 mg  4 mg Intravenous Q6H PRN Tarik Lind MD   4 mg at 08/18/22 3628    pantoprazole (PROTONIX) EC tablet 40 mg  40 mg Oral BID AC BROOKE Patterson   40 mg at 09/03/22 3051    pravastatin (PRAVACHOL) tablet 20 mg  20 mg Oral Daily With Sherif Finnegan MD   20 mg at 09/02/22 1605    predniSONE tablet 40 mg  40 mg Oral Daily BROOKE Pérez   40 mg at 09/03/22 0840    rivaroxaban (XARELTO) tablet 15 mg  15 mg Oral BID With Meals Padmini WOODARD PA-C   15 mg at 09/03/22 0840    simethicone (MYLICON) chewable tablet 80 mg  80 mg Oral 4x Daily (with meals and at bedtime) Evan WOODARD PA-C   80 mg at 09/03/22 1145      Medications Prior to Admission   Medication    Abemaciclib (Verzenio) 50 MG TABS    amitriptyline (ELAVIL) 25 mg tablet    Cholecalciferol (VITAMIN D3) 2000 units capsule    diphenoxylate-atropine (LOMOTIL) 2 5-0 025 mg per tablet    letrozole (FEMARA) 2 5 mg tablet    loperamide (IMODIUM) 2 mg capsule    losartan (COZAAR) 50 mg tablet    mirtazapine (REMERON) 7 5 MG tablet    simvastatin (ZOCOR) 10 mg tablet       Labs:  Results from last 7 days   Lab Units 09/03/22 0628 09/02/22 2034 09/02/22  0638 09/01/22 2010 09/01/22  0453   WBC Thousand/uL 6 70  --  6 38  --  4 84   HEMOGLOBIN g/dL 7 1* 7 0* 6 7*   < > 6 8*   HEMATOCRIT % 23 0* 21 9* 21 7*   < > 21 6*   PLATELETS Thousands/uL 135*  --  155  --  162   NEUTROS PCT % 82*  --  78*  --   -- LYMPHS PCT % 8*  --  12*  --   --    LYMPHO PCT %  --   --   --   --  6*   MONOS PCT % 7  --  8  --   --    MONO PCT %  --   --   --   --  3*   EOS PCT % 2  --  1  --  0    < > = values in this interval not displayed  Results from last 7 days   Lab Units 09/03/22  0628 09/02/22  4570 09/01/22  0453 08/29/22  0440 08/28/22  0515   POTASSIUM mmol/L 3 9 3 6 4 1   < > 3 4*   CHLORIDE mmol/L 105 106 105   < > 105   CO2 mmol/L 26 28 27   < > 26   BUN mg/dL 19 18 15   < > 10   CREATININE mg/dL 0 80 0 95 0 82   < > 0 72   CALCIUM mg/dL 7 6* 7 4* 7 7*   < > 6 6*   ALK PHOS U/L  --   --   --   --  72   ALT U/L  --   --   --   --  17   AST U/L  --   --   --   --  8    < > = values in this interval not displayed  No results found for: TROPONINI, CKMB, CKTOTAL      Lab Results   Component Value Date    BLOODCX No Growth After 5 Days  08/15/2022    BLOODCX No Growth After 5 Days  08/15/2022         Imaging:  Results for orders placed during the hospital encounter of 08/01/22    XR Trauma chest portable    Narrative  CHEST    INDICATION:   TRAUMA  COMPARISON:  6/18/2021  EXAM PERFORMED/VIEWS:  XR CHEST PORTABLE      FINDINGS:    Cardiomediastinal silhouette appears unremarkable  The lungs are clear  No pneumothorax or pleural effusion  Osseous structures appear within normal limits for patient age  Impression  No acute cardiopulmonary disease  Workstation performed: [id number]    Results for orders placed in visit on 06/18/21    XR chest pa & lateral    Narrative  CHEST    INDICATION:   R07 89: Other chest pain  Cough since January  History of breast cancer  Anterior upper costochondral pain  COMPARISON:  Chest x-ray from 1/12/2021  Chest CT from 9/13/2013  EXAM PERFORMED/VIEWS:  XR CHEST PA & LATERAL      FINDINGS:    Cardiomediastinal silhouette appears unremarkable  The lungs are clear  No pneumothorax or pleural effusion      Osseous structures appear within normal limits for patient age  There is a bone island in the proximal left clavicle  Impression  No acute cardiopulmonary disease  Workstation performed: [id number]      Last 24 Hours Medication List:   Current Facility-Administered Medications   Medication Dose Route Frequency Provider Last Rate    acetaminophen  650 mg Oral Q6H PRN Ana M Block, MD      amitriptyline  25 mg Oral HS Ana M Block, MD      calcium carbonate  500 mg Oral Daily PRN Ana Mtrinh Ayala, MD      cholestyramine sugar free  4 g Oral HS BROOKE Pérez      iron sucrose  200 mg Intravenous Once per day on Mon Wed Fri Judy Mora PA-C      letrozole  2 5 mg Oral Daily BROOKE Markham      loperamide  2 mg Oral Q4H PRN Rommel [place] KAIA WOODARD      melatonin  3 mg Oral HS Ahmet Grant PA-C      metoprolol tartrate  50 mg Oral Q12H [address] Dora Lord MD      mirtazapine  7 5 mg Oral HS Ana M Jamie, MD      ondansetron  4 mg Intravenous Q6H PRN Ana M Block, MD      pantoprazole  40 mg Oral BID AC BROOKE Harrison      pravastatin  20 mg Oral Daily With Kenton French MD      predniSONE  40 mg Oral Daily BROOKE Pérez      rivaroxaban  15 mg Oral BID With Meals [facility] KAIA WOODARD      simethicone  80 mg Oral 4x Daily (with meals and at bedtime) Fina Cortes PA-C          Today, Patient Was Seen By: Dora Lord MD    ** Please Note: Dictation voice to text software may have been used in the creation of this document   **

## 2022-09-03 NOTE — ASSESSMENT & PLAN NOTE
Lab Results   Component Value Date    EGFR 68 09/03/2022    EGFR 55 09/02/2022    EGFR 66 09/01/2022    CREATININE 0 80 09/03/2022    CREATININE 0 95 09/02/2022    CREATININE 0 82 09/01/2022     · Cr on admission slightly elevated from her baseline at 1 3, most likely due to volume depletion     · Baseline 0 8

## 2022-09-03 NOTE — ASSESSMENT & PLAN NOTE
· History of chronic anemia, baseline 8-10  · Underwent flex sig which showed inflammation, ulcerations  · Also with BRBPR since starting heparin gtt  · Trend H/H  · Prior provider discussed with GI - recommending IV venofer, continue on IV Venofer  · Hemoglobin this morning is 7 1  · Repeat H&H  · GI follow-up  · On Protonix

## 2022-09-03 NOTE — ASSESSMENT & PLAN NOTE
· Venous duplex obtained due to LE edema  · Confirmed acute occlusive thrombus in popliteal vein and non-occlusive thrombus in the peroneal veins  · Started on IV heparin gtt 8/19t  · Suspect in setting of known metastatic cancer and patient has been more sedentary in recent weeks due to acute illness  · Transition to [address] [place] [address] 8/25  · Tachycardia is improved  No heart strain noted and no chest pain or palpitations  · Cardiology consult appreciated    Continue on Lopressor  · Continue to monitor closely

## 2022-09-04 LAB
ANION GAP SERPL CALCULATED.3IONS-SCNC: 10 MMOL/L (ref 4–13)
BASOPHILS # BLD AUTO: 0.01 THOUSANDS/ΜL (ref 0–0.1)
BASOPHILS NFR BLD AUTO: 0 % (ref 0–1)
BUN SERPL-MCNC: 16 MG/DL (ref 5–25)
CALCIUM SERPL-MCNC: 7.7 MG/DL (ref 8.3–10.1)
CHLORIDE SERPL-SCNC: 103 MMOL/L (ref 96–108)
CO2 SERPL-SCNC: 26 MMOL/L (ref 21–32)
CREAT SERPL-MCNC: 0.92 MG/DL (ref 0.6–1.3)
EOSINOPHIL # BLD AUTO: 0.21 THOUSAND/ΜL (ref 0–0.61)
EOSINOPHIL NFR BLD AUTO: 4 % (ref 0–6)
ERYTHROCYTE [DISTWIDTH] IN BLOOD BY AUTOMATED COUNT: 22.5 % (ref 11.6–15.1)
GAMMA INTERFERON BACKGROUND BLD IA-ACNC: 0.03 IU/ML
GFR SERPL CREATININE-BSD FRML MDRD: 58 ML/MIN/1.73SQ M
GLUCOSE SERPL-MCNC: 118 MG/DL (ref 65–140)
HCT VFR BLD AUTO: 25.1 % (ref 34.8–46.1)
HGB BLD-MCNC: 7.8 G/DL (ref 11.5–15.4)
IMM GRANULOCYTES # BLD AUTO: 0.05 THOUSAND/UL (ref 0–0.2)
IMM GRANULOCYTES NFR BLD AUTO: 1 % (ref 0–2)
LYMPHOCYTES # BLD AUTO: 0.53 THOUSANDS/ΜL (ref 0.6–4.47)
LYMPHOCYTES NFR BLD AUTO: 9 % (ref 14–44)
M TB IFN-G BLD-IMP: ABNORMAL
M TB IFN-G CD4+ BCKGRND COR BLD-ACNC: -0.01 IU/ML
M TB IFN-G CD4+ BCKGRND COR BLD-ACNC: -0.01 IU/ML
MCH RBC QN AUTO: 32.8 PG (ref 26.8–34.3)
MCHC RBC AUTO-ENTMCNC: 31.1 G/DL (ref 31.4–37.4)
MCV RBC AUTO: 106 FL (ref 82–98)
MITOGEN IGNF BCKGRD COR BLD-ACNC: <0.1 IU/ML
MONOCYTES # BLD AUTO: 0.33 THOUSAND/ΜL (ref 0.17–1.22)
MONOCYTES NFR BLD AUTO: 6 % (ref 4–12)
NEUTROPHILS # BLD AUTO: 4.56 THOUSANDS/ΜL (ref 1.85–7.62)
NEUTS SEG NFR BLD AUTO: 80 % (ref 43–75)
NRBC BLD AUTO-RTO: 0 /100 WBCS
PLATELET # BLD AUTO: 134 THOUSANDS/UL (ref 149–390)
PMV BLD AUTO: 9.7 FL (ref 8.9–12.7)
POTASSIUM SERPL-SCNC: 3.3 MMOL/L (ref 3.5–5.3)
RBC # BLD AUTO: 2.38 MILLION/UL (ref 3.81–5.12)
SODIUM SERPL-SCNC: 139 MMOL/L (ref 135–147)
WBC # BLD AUTO: 5.69 THOUSAND/UL (ref 4.31–10.16)

## 2022-09-04 PROCEDURE — 99232 SBSQ HOSP IP/OBS MODERATE 35: CPT | Performed by: INTERNAL MEDICINE

## 2022-09-04 PROCEDURE — 85025 COMPLETE CBC W/AUTO DIFF WBC: CPT | Performed by: INTERNAL MEDICINE

## 2022-09-04 PROCEDURE — 80048 BASIC METABOLIC PNL TOTAL CA: CPT | Performed by: INTERNAL MEDICINE

## 2022-09-04 RX ORDER — POTASSIUM CHLORIDE 20 MEQ/1
40 TABLET, EXTENDED RELEASE ORAL ONCE
Status: COMPLETED | OUTPATIENT
Start: 2022-09-04 | End: 2022-09-04

## 2022-09-04 RX ADMIN — Medication 3 MG: at 22:13

## 2022-09-04 RX ADMIN — RIVAROXABAN 15 MG: 15 TABLET, FILM COATED ORAL at 16:06

## 2022-09-04 RX ADMIN — PANTOPRAZOLE SODIUM 40 MG: 40 TABLET, DELAYED RELEASE ORAL at 05:58

## 2022-09-04 RX ADMIN — SIMETHICONE 80 MG: 80 TABLET, CHEWABLE ORAL at 08:16

## 2022-09-04 RX ADMIN — ALTEPLASE 2 MG: 2.2 INJECTION, POWDER, LYOPHILIZED, FOR SOLUTION INTRAVENOUS at 07:12

## 2022-09-04 RX ADMIN — LETROZOLE 2.5 MG: 2.5 TABLET ORAL at 08:30

## 2022-09-04 RX ADMIN — METOPROLOL TARTRATE 50 MG: 50 TABLET ORAL at 22:23

## 2022-09-04 RX ADMIN — SIMETHICONE 80 MG: 80 TABLET, CHEWABLE ORAL at 16:06

## 2022-09-04 RX ADMIN — MIRTAZAPINE 7.5 MG: 15 TABLET, FILM COATED ORAL at 22:13

## 2022-09-04 RX ADMIN — AMITRIPTYLINE HYDROCHLORIDE 25 MG: 25 TABLET, FILM COATED ORAL at 22:13

## 2022-09-04 RX ADMIN — CHOLESTYRAMINE 4 G: 4 POWDER, FOR SUSPENSION ORAL at 22:12

## 2022-09-04 RX ADMIN — PANTOPRAZOLE SODIUM 40 MG: 40 TABLET, DELAYED RELEASE ORAL at 16:06

## 2022-09-04 RX ADMIN — POTASSIUM CHLORIDE 40 MEQ: 1500 TABLET, EXTENDED RELEASE ORAL at 10:11

## 2022-09-04 RX ADMIN — PRAVASTATIN SODIUM 20 MG: 20 TABLET ORAL at 16:06

## 2022-09-04 RX ADMIN — METOPROLOL TARTRATE 50 MG: 50 TABLET ORAL at 08:15

## 2022-09-04 RX ADMIN — PREDNISONE 40 MG: 20 TABLET ORAL at 08:16

## 2022-09-04 RX ADMIN — SIMETHICONE 80 MG: 80 TABLET, CHEWABLE ORAL at 12:05

## 2022-09-04 RX ADMIN — RIVAROXABAN 15 MG: 15 TABLET, FILM COATED ORAL at 08:16

## 2022-09-04 RX ADMIN — SIMETHICONE 80 MG: 80 TABLET, CHEWABLE ORAL at 22:12

## 2022-09-04 NOTE — ASSESSMENT & PLAN NOTE
· Venous duplex obtained due to LE edema  · Confirmed acute occlusive thrombus in popliteal vein and non-occlusive thrombus in the peroneal veins  · Started on IV heparin gtt 8/19t  · Suspect in setting of known metastatic cancer and patient has been more sedentary in recent weeks due to acute illness  · Transition to [address] [place] [address] 8/25  · Tachycardia is improved  No heart strain noted and no chest pain or palpitations  · Cardiology consult appreciated    Continue on Lopressor  · Continue to monitor closely

## 2022-09-04 NOTE — ASSESSMENT & PLAN NOTE
· Patient presented with dizziness and diarrhea starting in August that worsened yesterday  · Most likely secondary to IBD  · CT showed: 1  Diffuse mild-moderate circumferential wall thickening of the colon with pericolic inflammatory stranding consistent with nonspecific pancolitis  2   Multiple stable small scattered sclerotic foci in the visualized lumbar lower thoracic spine osseous pelvis which may reflect osseous metastatic disease in this patient with known metastatic breast cancer  3   Additional stable findings as noted   · GI consulted-> recs appreciated  · Off IV fluids  · Discontinue Zosyn as symptoms are most likely secondary to ulcerative colitis  · Steroids initiated 8/17  · F/u bld cx and procal, ESR, CRP, and lipase, C Diff  · ESR:  48  · Procalcitonin:  1 49  · CRP:  206 3  · C  Diff: negative  · Stool enteric panel: negative  · Advanced to low-fiber   · Cholestyramine  · Oncology consulted by GI for further recommendations regarding current cancer regimen playing a role in symptoms  Previously believed due to verzenio which has been on hold since recent admit, doubt this is contributing   · Flex sig completed 8/20 concerning for IBD, biopsies taken  Also noted ulcerations  · Biopsy reveals colitis  · IV steroids were transitioned back to oral prednisone  · Repeat CT abd demonstrates ongoing pancolitis  · GI spoke with IBD specialist Dr Hernando Main and CRP was ordered which is 122 4   Patient per their recommendations, was restarted on IV Solu-Medrol for 3 more days and repeat CRP was done  and is 10 1  · Patient is switched back to oral prednisone by GI  · Hemoglobin this morning is 7 8  · Trend H&H and transfuse as needed  · Awaiting repeat P T /OT evaluation as patient feels she is too weak and lives alone  · Continue to monitor

## 2022-09-04 NOTE — ASSESSMENT & PLAN NOTE
· History of chronic anemia, baseline 8-10  · Underwent flex sig which showed inflammation, ulcerations  · Also with BRBPR since starting heparin gtt  · Trend H/H  · Prior provider discussed with GI - recommending IV venofer, continue on IV Venofer  · Hemoglobin this morning is 7 8  · Repeat H&H in a m    · GI follow-up  · On Protonix

## 2022-09-04 NOTE — PROGRESS NOTES
[place]  Progress Note Arlen Silverman 1940, 79 y o  female MRN: [id number]  Unit/Bed#: -[place] Encounter: [id number]  Primary Care Provider: Jamir Wilkes DO   Date and time admitted to hospital: 8/15/2022 10:40 AM    Single subsegmental pulmonary embolism without acute cor pulmonale (HCC)  Assessment & Plan  · Venous duplex obtained due to LE edema  · Confirmed acute occlusive thrombus in popliteal vein and non-occlusive thrombus in the peroneal veins  · Started on IV heparin gtt 8/19t  · Suspect in setting of known metastatic cancer and patient has been more sedentary in recent weeks due to acute illness  · Transition to NOAC 8/25  · Tachycardia is improved  No heart strain noted and no chest pain or palpitations  · Cardiology consult appreciated  Continue on Lopressor  · Continue to monitor closely    Severe protein-calorie malnutrition ([facility] [address] )  Assessment & Plan  Malnutrition Findings:   Adult Malnutrition type: Chronic illness  Adult Degree of Malnutrition: Other severe protein calorie malnutrition  Malnutrition Characteristics: Inadequate energy, Weight loss    360 Statement: Pt presents with chronic severe protein calorie malnutrition due to Stage IV metastatic breast cancer, chronic diarrhea vs UC flare as evidenced by 10 5 lb wt loss-14% wt loss in 4 months and <75 % po intake > 1 month  BMI Findings: Body mass index is 27 21 kg/m²  Anemia  Assessment & Plan  · History of chronic anemia, baseline 8-10  · Underwent flex sig which showed inflammation, ulcerations  · Also with BRBPR since starting heparin gtt  · Trend H/H  · Prior provider discussed with GI - recommending IV venofer, continue on IV Venofer  · Hemoglobin this morning is 7 8  · Repeat H&H in a m    · GI follow-up  · On Protonix    Primary hypertension  Assessment & Plan  · On Lopressor    Chronic kidney disease (CKD) stage G3a/A1, moderately decreased glomerular filtration rate (GFR) between 45-59 mL/min/1 73 square meter and albuminuria creatinine ratio less than 30 mg/g [facility])  Assessment & Plan  Lab Results   Component Value Date    EGFR 68 09/03/2022    EGFR 55 09/02/2022    EGFR 66 09/01/2022    CREATININE 0 80 09/03/2022    CREATININE 0 95 09/02/2022    CREATININE 0 82 09/01/2022     · Cr on admission slightly elevated from her baseline at 1 3, most likely due to volume depletion  · Baseline 0 8    Mixed hyperlipidemia  Assessment & Plan  · Continue statin    Metastatic breast cancer [facility])  Assessment & Plan  · Continue to follow-up with Hematology-Oncology outpatient  · Femara had been held on admit  · On recent admission patient's verzenio was held  Reviewed office visit from 8/10/22 appears there was some consideration to resume but at lowered dose  Patient reports she never resumed this medication  · Patient will require follow-up to discuss possible lumbar mets noted on CT this admission  · Oncology follow-up noted  · On Femara    * Toxic gastroenteritis and colitis  Assessment & Plan  · Patient presented with dizziness and diarrhea starting in August that worsened yesterday  · Most likely secondary to IBD  · CT showed: 1  Diffuse mild-moderate circumferential wall thickening of the colon with pericolic inflammatory stranding consistent with nonspecific pancolitis  2   Multiple stable small scattered sclerotic foci in the visualized lumbar lower thoracic spine osseous pelvis which may reflect osseous metastatic disease in this patient with known metastatic breast cancer  3   Additional stable findings as noted   · GI consulted-> recs appreciated  · Off IV fluids  · Discontinue Zosyn as symptoms are most likely secondary to ulcerative colitis  · Steroids initiated 8/17  · F/u bld cx and procal, ESR, CRP, and lipase, C Diff  · ESR:  48  · Procalcitonin:  1 49  · CRP:  206 3  · C   Diff: negative  · Stool enteric panel: negative  · Advanced to low-fiber   · Cholestyramine  · Oncology consulted by GI for further recommendations regarding current cancer regimen playing a role in symptoms  Previously believed due to verzenio which has been on hold since recent admit, doubt this is contributing   · Flex sig completed 8/20 concerning for IBD, biopsies taken  Also noted ulcerations  · Biopsy reveals colitis  · IV steroids were transitioned back to oral prednisone  · Repeat CT abd demonstrates ongoing pancolitis  · GI spoke with IBD specialist Dr Hossein Laird and CRP was ordered which is 122 4  Patient per their recommendations, was restarted on IV Solu-Medrol for 3 more days and repeat CRP was done  and is 10 1  · Patient is switched back to oral prednisone by GI  · Hemoglobin this morning is 7 8  · Trend H&H and transfuse as needed  · Awaiting repeat P T /OT evaluation as patient feels she is too weak and lives alone  · Continue to monitor      Labs & Imaging: I have personally reviewed pertinent reports  VTE Prophylaxis: in place  Code Status:   Level 3 - DNAR and DNI    Patient Centered Rounds: I have performed bedside rounds with nursing staff today  Discussions with Specialists or Other Care Team Provider:  GI    Education and Discussions with Family / Patient:  Daughter Jovi Almendarez    Current Length of Stay: 20 day(s)    Current Patient Status: Inpatient   Certification Statement: The patient will continue to require additional inpatient hospital stay due to see my assessment and plan  Subjective:   Patient is seen and examined at bedside  Denies any new complaints  Still feels very weak and tired  Afebrile  All other ROS are negative  Objective:    Vitals: Blood pressure 112/63, pulse (!) 110, temperature 97 8 °F (36 6 °C), temperature source Oral, resp  rate 19, height 5' 1" (1 549 m), weight 65 3 kg (144 lb), SpO2 95 %, not currently breastfeeding  ,Body mass index is 27 21 kg/m²    SPO2 RA Rest    Flowsheet Row ED to Hosp-Admission (Current) from 8/15/2022 in [address] Med Surg Unit   SpO2 95 %   SpO2 Activity At Rest   O2 Device None (Room air)   O2 Flow Rate --        I&O:     Intake/Output Summary (Last 24 hours) at 9/4/2022 0648  Last data filed at 9/3/2022 2101  Gross per 24 hour   Intake 600 ml   Output 200 ml   Net 400 ml       Physical Exam:    General- Alert, lying comfortably in bed  Not in any acute distress  Neck- Supple, No JVD  CVS- regular, S1 and S2 normal  Chest- Bilateral Air entry, No rhochi, crackles or wheezing present  Abdomen- soft, nontender, not distended, no guarding or rigidity, BS+  Extremities-  has pedal edema, No calf tenderness  CNS-   Alert, awake and orientedx3  No focal deficits present      Invasive Devices  Report    Peripherally Inserted Central Catheter Line  Duration           PICC Line 08/19/22 15 days                      Social History  reviewed  Family History   Problem Relation Age of Onset    Stomach cancer Mother     No Known Problems Father     Cervical cancer Sister     No Known Problems Daughter     No Known Problems Maternal Grandmother     No Known Problems Maternal Grandfather     No Known Problems Paternal Grandmother     No Known Problems Paternal Grandfather     Colon polyps Neg Hx     Colon cancer Neg Hx     reviewed    Meds:  Current Facility-Administered Medications   Medication Dose Route Frequency Provider Last Rate Last Admin    acetaminophen (TYLENOL) tablet 650 mg  650 mg Oral Q6H PRN Jim Lorenzana MD   650 mg at 08/26/22 2203    alteplase (CATHFLO) injection 2 mg  2 mg Intracatheter Once Nydia Marley, PA-C        alteplase (CATHFLO) injection 2 mg  2 mg Intracatheter Once Nydia Marley, PA-C        amitriptyline (ELAVIL) tablet 25 mg  25 mg Oral HS Jim Lorenzana MD   25 mg at 09/03/22 2136    calcium carbonate (TUMS) chewable tablet 500 mg  500 mg Oral Daily PRN Jim Lorenzana MD   500 mg at 08/26/22 2202    cholestyramine sugar free (QUESTRAN LIGHT) packet 4 g  4 g Oral HS BROOKE Garg   4 g at 09/03/22 2137    iron sucrose (VENOFER) 200 mg in sodium chloride 0 9 % 100 mL IVPB  200 mg Intravenous Once per day on Mon Wed Fri Leatha Marx PA-C   200 mg at 09/02/22 0410    letrozole [facility]) tablet 2 5 mg  2 5 mg Oral Daily BROOKE Mike   2 5 mg at 09/03/22 0790    loperamide (IMODIUM) capsule 2 mg  2 mg Oral Q4H PRN Paris WOODARD PA-C   2 mg at 08/28/22 1731    melatonin tablet 3 mg  3 mg Oral HS Jenise Jolly PA-C   3 mg at 09/03/22 2137    metoprolol tartrate (LOPRESSOR) tablet 50 mg  50 mg Oral Q12H Tika Hyatt MD   50 mg at 09/03/22 2137    mirtazapine (REMERON) tablet 7 5 mg  7 5 mg Oral HS Nicola Buckley MD   7 5 mg at 09/03/22 2136    ondansetron (ZOFRAN) injection 4 mg  4 mg Intravenous Q6H PRN Nicola Buckley MD   4 mg at 08/18/22 2170    pantoprazole (PROTONIX) EC tablet 40 mg  40 mg Oral BID AC BROOKE Thompson   40 mg at 09/04/22 4819    pravastatin (PRAVACHOL) tablet 20 mg  20 mg Oral Daily With Audi Young MD   20 mg at 09/03/22 1653    predniSONE tablet 40 mg  40 mg Oral Daily BROOKE Pérez   40 mg at 09/03/22 0840    rivaroxaban (XARELTO) tablet 15 mg  15 mg Oral BID With Meals Padmini WOODARD PA-C   15 mg at 09/03/22 1653    simethicone (MYLICON) chewable tablet 80 mg  80 mg Oral 4x Daily (with meals and at bedtime) Paris WOODARD PA-C   80 mg at 09/03/22 2137      Medications Prior to Admission   Medication    Abemaciclib (Verzenio) 50 MG TABS    amitriptyline (ELAVIL) 25 mg tablet    Cholecalciferol (VITAMIN D3) 2000 units capsule    diphenoxylate-atropine (LOMOTIL) 2 5-0 025 mg per tablet    letrozole (FEMARA) 2 5 mg tablet    loperamide (IMODIUM) 2 mg capsule    losartan (COZAAR) 50 mg tablet    mirtazapine (REMERON) 7 5 MG tablet    simvastatin (ZOCOR) 10 mg tablet       Labs:  Results from last 7 days   Lab Units 09/03/22  0628 09/02/22 2034 09/02/22  9557 09/01/22 2010 09/01/22  0453   WBC Thousand/uL 6 70  --  6 38  --  4 84   HEMOGLOBIN g/dL 7 1* 7 0* 6 7*   < > 6 8*   HEMATOCRIT % 23 0* 21 9* 21 7*   < > 21 6*   PLATELETS Thousands/uL 135*  --  155  --  162   NEUTROS PCT % 82*  --  78*  --   --    LYMPHS PCT % 8*  --  12*  --   --    LYMPHO PCT %  --   --   --   --  6*   MONOS PCT % 7  --  8  --   --    MONO PCT %  --   --   --   --  3*   EOS PCT % 2  --  1  --  0    < > = values in this interval not displayed  Results from last 7 days   Lab Units 09/03/22  0628 09/02/22 0638 09/01/22  0453   POTASSIUM mmol/L 3 9 3 6 4 1   CHLORIDE mmol/L 105 106 105   CO2 mmol/L 26 28 27   BUN mg/dL 19 18 15   CREATININE mg/dL 0 80 0 95 0 82   CALCIUM mg/dL 7 6* 7 4* 7 7*     No results found for: TROPONINI, CKMB, CKTOTAL      Lab Results   Component Value Date    BLOODCX No Growth After 5 Days  08/15/2022    BLOODCX No Growth After 5 Days  08/15/2022         Imaging:  Results for orders placed during the hospital encounter of 08/01/22    XR Trauma chest portable    Narrative  CHEST    INDICATION:   TRAUMA  COMPARISON:  6/18/2021  EXAM PERFORMED/VIEWS:  XR CHEST PORTABLE      FINDINGS:    Cardiomediastinal silhouette appears unremarkable  The lungs are clear  No pneumothorax or pleural effusion  Osseous structures appear within normal limits for patient age  Impression  No acute cardiopulmonary disease  Workstation performed: [id number]    Results for orders placed in visit on 06/18/21    XR chest pa & lateral    Narrative  CHEST    INDICATION:   R07 89: Other chest pain  Cough since January  History of breast cancer  Anterior upper costochondral pain  COMPARISON:  Chest x-ray from 1/12/2021  Chest CT from 9/13/2013  EXAM PERFORMED/VIEWS:  XR CHEST PA & LATERAL      FINDINGS:    Cardiomediastinal silhouette appears unremarkable  The lungs are clear  No pneumothorax or pleural effusion      Osseous structures appear within normal limits for patient age  There is a bone island in the proximal left clavicle  Impression  No acute cardiopulmonary disease  Workstation performed: [id number]      Last 24 Hours Medication List:   Current Facility-Administered Medications   Medication Dose Route Frequency Provider Last Rate    acetaminophen  650 mg Oral Q6H PRN Deyanira Goins MD      alteplase  2 mg Intracatheter Once Dixon Maxcy, PA-MADDIE      alteplase  2 mg Intracatheter Once Dixon Maxcy, PA-C      amitriptyline  25 mg Oral HS Deyanira Goins MD      calcium carbonate  500 mg Oral Daily PRN Deyanira Goins MD      cholestyramine sugar free  4 g Oral HS BROOKE Pérez      iron sucrose  200 mg Intravenous Once per day on Mon Wed Fri Renay Holley [id number]      letrozole  2 5 mg Oral Daily BROOKE Morales      loperamide  2 mg Oral Q4H PRN Deepak WOODARD PA-C      melatonin  3 mg Oral HS Dixon Maxcy, KAIA      metoprolol tartrate  50 mg Oral Q12H [address] Josefina Vergara MD      mirtazapine  7 5 mg Oral HS Deyanira Goins MD      ondansetron  4 mg Intravenous Q6H PRN Deyanira Goins MD      pantoprazole  40 mg Oral BID AC BROOKE Perdomo      pravastatin  20 mg Oral Daily With Rebecca Willis MD      predniSONE  40 mg Oral Daily BROOKE Pérez      rivaroxaban  15 mg Oral BID With Meals Deepak WOODARD PA-C      simethicone  80 mg Oral 4x Daily (with meals and at bedtime) Malvin Goode PA-C          Today, Patient Was Seen By: Josefina Vergara MD    ** Please Note: Dictation voice to text software may have been used in the creation of this document   **

## 2022-09-04 NOTE — PROGRESS NOTES
Progress note - [facility]   Radha Silverman 79 y o  female MRN: [id number]  Unit/Bed#: -01 Encounter: [id number]    ASSESSMENT and PLAN    1  Acute colitis  2  Diarrhea  Flex-sig August 20th showed severe colitis into the mid sigmoid colon with deep ulcerations (not present on colonoscopy November 2021)  Differential primarily includes drug-induced colitis, or inflammatory bowel disease     Diarrhea and inflammatory markers responded to IV steroids which were transitioned to prednisone September 1st   Has not required Imodium in several days  continue Questran     Our office is working on biologic approval   Will continue prednisone 40 mg daily until  she receives Remicade loading dose  Okay for discharge from the GI standpoint      3  Acute blood loss anemia  Secondary to colitis and exacerbated by anticoagulation for DVT/PE   Does not meet criteria for transfusion  Continue IV iron     4 DVT and pulmonary embolism   Xarelto 15 mg p o  Twice daily       5  Metastatic breast cancer  Followed by Dr Que David     6  Deconditioning  Exacerbated by anemia, await PT/OT evaluation about skilled nursing     Discussed with RN    Chief Complaint   Patient presents with    Dizziness     Via ems from home for increased dizziness and multiple episodes diarrhea since the beginning of August  States worse since last night  States is getting new chemo medication       SUBJECTIVE/HPI   Appetite improving slowly  No nausea, vomiting or abdominal pain  Stool still loose but starting to form    Still blood-tinged per RN  Main issue is weakness    /59 (BP Location: Right arm)   Pulse 98   Temp 97 9 °F (36 6 °C) (Oral)   Resp 18   Ht 5' 1" (1 549 m)   Wt 65 3 kg (144 lb)   LMP  (LMP Unknown)   SpO2 97%   BMI 27 21 kg/m²     PHYSICALEXAM  General appearance: alert, appears stated age and cooperative  Eyes: PERLLA, EOMI, no icterus   Head: Normocephalic, without obvious abnormality, atraumatic  Lungs: clear to auscultation bilaterally  Heart: regular rate and rhythm, S1, S2 normal, no murmur, click, rub or gallop  Abdomen: soft, non-tender; bowel sounds normal; no masses,  no organomegaly  Extremities: extremities normal, atraumatic, no cyanosis or edema  Neurologic: Grossly normal    Lab Results   Component Value Date    GLUCOSE 115 09/08/2015    CALCIUM 7 7 (L) 09/04/2022     09/08/2015    K 3 3 (L) 09/04/2022    CO2 26 09/04/2022     09/04/2022    BUN 16 09/04/2022    CREATININE 0 92 09/04/2022     Lab Results   Component Value Date    WBC 5 69 09/04/2022    HGB 7 8 (L) 09/04/2022    HCT 25 1 (L) 09/04/2022     (H) 09/04/2022     (L) 09/04/2022     Lab Results   Component Value Date    ALT 17 08/28/2022    AST 8 08/28/2022    ALKPHOS 72 08/28/2022    BILITOT 0 66 09/08/2015     No results found for: AMYLASE  Lab Results   Component Value Date    LIPASE 79 08/15/2022     Lab Results   Component Value Date    IRON 19 (L) 08/28/2022    TIBC 105 (L) 08/28/2022    FERRITIN 755 (H) 08/28/2022     Lab Results   Component Value Date    INR 1 11 08/19/2022

## 2022-09-05 LAB
ANION GAP SERPL CALCULATED.3IONS-SCNC: 6 MMOL/L (ref 4–13)
BASOPHILS # BLD AUTO: 0.01 THOUSANDS/ΜL (ref 0–0.1)
BASOPHILS NFR BLD AUTO: 0 % (ref 0–1)
BUN SERPL-MCNC: 14 MG/DL (ref 5–25)
CALCIUM SERPL-MCNC: 7.6 MG/DL (ref 8.3–10.1)
CHLORIDE SERPL-SCNC: 106 MMOL/L (ref 96–108)
CO2 SERPL-SCNC: 27 MMOL/L (ref 21–32)
CREAT SERPL-MCNC: 0.81 MG/DL (ref 0.6–1.3)
EOSINOPHIL # BLD AUTO: 0.12 THOUSAND/ΜL (ref 0–0.61)
EOSINOPHIL NFR BLD AUTO: 2 % (ref 0–6)
ERYTHROCYTE [DISTWIDTH] IN BLOOD BY AUTOMATED COUNT: 22.1 % (ref 11.6–15.1)
FOLATE SERPL-MCNC: 3.3 NG/ML (ref 3.1–17.5)
GFR SERPL CREATININE-BSD FRML MDRD: 67 ML/MIN/1.73SQ M
GLUCOSE SERPL-MCNC: 101 MG/DL (ref 65–140)
HCT VFR BLD AUTO: 23.3 % (ref 34.8–46.1)
HGB BLD-MCNC: 7.2 G/DL (ref 11.5–15.4)
IMM GRANULOCYTES # BLD AUTO: 0.03 THOUSAND/UL (ref 0–0.2)
IMM GRANULOCYTES NFR BLD AUTO: 1 % (ref 0–2)
LYMPHOCYTES # BLD AUTO: 0.61 THOUSANDS/ΜL (ref 0.6–4.47)
LYMPHOCYTES NFR BLD AUTO: 12 % (ref 14–44)
MCH RBC QN AUTO: 32.9 PG (ref 26.8–34.3)
MCHC RBC AUTO-ENTMCNC: 30.9 G/DL (ref 31.4–37.4)
MCV RBC AUTO: 106 FL (ref 82–98)
MONOCYTES # BLD AUTO: 0.32 THOUSAND/ΜL (ref 0.17–1.22)
MONOCYTES NFR BLD AUTO: 6 % (ref 4–12)
NEUTROPHILS # BLD AUTO: 3.92 THOUSANDS/ΜL (ref 1.85–7.62)
NEUTS SEG NFR BLD AUTO: 79 % (ref 43–75)
NRBC BLD AUTO-RTO: 0 /100 WBCS
PLATELET # BLD AUTO: 124 THOUSANDS/UL (ref 149–390)
PMV BLD AUTO: 9.9 FL (ref 8.9–12.7)
POTASSIUM SERPL-SCNC: 3.8 MMOL/L (ref 3.5–5.3)
RBC # BLD AUTO: 2.19 MILLION/UL (ref 3.81–5.12)
SODIUM SERPL-SCNC: 139 MMOL/L (ref 135–147)
VIT B12 SERPL-MCNC: 645 PG/ML (ref 100–900)
WBC # BLD AUTO: 5.01 THOUSAND/UL (ref 4.31–10.16)

## 2022-09-05 PROCEDURE — 82746 ASSAY OF FOLIC ACID SERUM: CPT | Performed by: STUDENT IN AN ORGANIZED HEALTH CARE EDUCATION/TRAINING PROGRAM

## 2022-09-05 PROCEDURE — 82607 VITAMIN B-12: CPT | Performed by: STUDENT IN AN ORGANIZED HEALTH CARE EDUCATION/TRAINING PROGRAM

## 2022-09-05 PROCEDURE — 80048 BASIC METABOLIC PNL TOTAL CA: CPT | Performed by: INTERNAL MEDICINE

## 2022-09-05 PROCEDURE — 99232 SBSQ HOSP IP/OBS MODERATE 35: CPT | Performed by: INTERNAL MEDICINE

## 2022-09-05 PROCEDURE — 99232 SBSQ HOSP IP/OBS MODERATE 35: CPT | Performed by: STUDENT IN AN ORGANIZED HEALTH CARE EDUCATION/TRAINING PROGRAM

## 2022-09-05 PROCEDURE — 85025 COMPLETE CBC W/AUTO DIFF WBC: CPT | Performed by: INTERNAL MEDICINE

## 2022-09-05 RX ADMIN — RIVAROXABAN 15 MG: 15 TABLET, FILM COATED ORAL at 08:43

## 2022-09-05 RX ADMIN — SIMETHICONE 80 MG: 80 TABLET, CHEWABLE ORAL at 21:13

## 2022-09-05 RX ADMIN — PANTOPRAZOLE SODIUM 40 MG: 40 TABLET, DELAYED RELEASE ORAL at 15:58

## 2022-09-05 RX ADMIN — PANTOPRAZOLE SODIUM 40 MG: 40 TABLET, DELAYED RELEASE ORAL at 06:12

## 2022-09-05 RX ADMIN — AMITRIPTYLINE HYDROCHLORIDE 25 MG: 25 TABLET, FILM COATED ORAL at 21:13

## 2022-09-05 RX ADMIN — CHOLESTYRAMINE 4 G: 4 POWDER, FOR SUSPENSION ORAL at 21:13

## 2022-09-05 RX ADMIN — RIVAROXABAN 15 MG: 15 TABLET, FILM COATED ORAL at 15:59

## 2022-09-05 RX ADMIN — SIMETHICONE 80 MG: 80 TABLET, CHEWABLE ORAL at 15:58

## 2022-09-05 RX ADMIN — Medication 3 MG: at 21:13

## 2022-09-05 RX ADMIN — METOPROLOL TARTRATE 50 MG: 50 TABLET ORAL at 08:43

## 2022-09-05 RX ADMIN — SODIUM CHLORIDE 200 MG: 9 INJECTION, SOLUTION INTRAVENOUS at 09:00

## 2022-09-05 RX ADMIN — MIRTAZAPINE 7.5 MG: 15 TABLET, FILM COATED ORAL at 21:12

## 2022-09-05 RX ADMIN — PREDNISONE 40 MG: 20 TABLET ORAL at 08:43

## 2022-09-05 RX ADMIN — SIMETHICONE 80 MG: 80 TABLET, CHEWABLE ORAL at 12:35

## 2022-09-05 RX ADMIN — PRAVASTATIN SODIUM 20 MG: 20 TABLET ORAL at 15:59

## 2022-09-05 RX ADMIN — SIMETHICONE 80 MG: 80 TABLET, CHEWABLE ORAL at 08:48

## 2022-09-05 RX ADMIN — LETROZOLE 2.5 MG: 2.5 TABLET ORAL at 08:42

## 2022-09-05 NOTE — PROGRESS NOTES
Progress note - [facility]   Armaan Silverman 79 y o  female MRN: [id number]  Unit/Bed#: -01 Encounter: [id number]    ASSESSMENT and PLAN    1  Acute colitis  2  Diarrhea  Flex-sig August 20th showed severe colitis into the mid sigmoid colon with deep ulcerations (not present on colonoscopy November 2021)  Differential primarily includes drug-induced colitis or IBD     Diarrhea and inflammatory markers responded to IV steroids which were transitioned to prednisone September 1st   Has not required Imodium in several days    continue Questran and prednisone     Our office is working on biologic approval  Yonis Crook continue prednisone 40 mg daily until  she receives Remicade loading dose   No GI contraindication for transfer to skilled nursing     3  Acute blood loss anemia  Secondary to colitis and exacerbated by anticoagulation for DVT/PE   Does not meet criteria for transfusion   Continue IV iron  Stool is still blood-tinged but no jim bleeding     4 DVT and pulmonary embolism   Xarelto 15 mg p o  Twice daily       5  Metastatic breast cancer  Followed by Dr Nadia Marie     6  Deconditioning  Exacerbated by anemia, await PT/OT evaluation about skilled nursing     Discussed with RN    Chief Complaint   Patient presents with    Dizziness     Via ems from home for increased dizziness and multiple episodes diarrhea since the beginning of August  States worse since last night  States is getting new chemo medication       SUBJECTIVE/HPI   Four stools yesterday, 2 so far today  Starting to become more formed  No accidents  Stool is still blood-tinged  Tolerating diet without nausea, vomiting or abdominal pain  Weakness/fatigue is her main complaint      /68   Pulse (!) 107   Temp 97 9 °F (36 6 °C)   Resp 13   Ht 5' 1" (1 549 m)   Wt 65 3 kg (144 lb)   LMP  (LMP Unknown)   SpO2 100%   BMI 27 21 kg/m²     PHYSICALEXAM  General appearance: alert, appears stated age and cooperative  Eyes: PERLLA, EOMI, no icterus   Head: Normocephalic, without obvious abnormality, atraumatic  Lungs: clear to auscultation bilaterally  Heart: regular rate and rhythm, S1, S2 normal, no murmur, click, rub or gallop  Abdomen: soft, non-tender; bowel sounds normal; no masses,  no organomegaly  Extremities: extremities normal, atraumatic, no cyanosis or edema  Neurologic: Grossly normal    Lab Results   Component Value Date    GLUCOSE 115 09/08/2015    CALCIUM 7 6 (L) 09/05/2022     09/08/2015    K 3 8 09/05/2022    CO2 27 09/05/2022     09/05/2022    BUN 14 09/05/2022    CREATININE 0 81 09/05/2022     Lab Results   Component Value Date    WBC 5 01 09/05/2022    HGB 7 2 (L) 09/05/2022    HCT 23 3 (L) 09/05/2022     (H) 09/05/2022     (L) 09/05/2022     Lab Results   Component Value Date    ALT 17 08/28/2022    AST 8 08/28/2022    ALKPHOS 72 08/28/2022    BILITOT 0 66 09/08/2015     No results found for: AMYLASE  Lab Results   Component Value Date    LIPASE 79 08/15/2022     Lab Results   Component Value Date    IRON 19 (L) 08/28/2022    TIBC 105 (L) 08/28/2022    FERRITIN 755 (H) 08/28/2022     Lab Results   Component Value Date    INR 1 11 08/19/2022

## 2022-09-05 NOTE — UTILIZATION REVIEW
Continued Stay Review    Date: 9/4/22                          Current Patient Class: inpatient   Current Level of Care: med surg    HPI:81 y o  female initially admitted on 8/15/22 inpatient     Assessment/Plan: 9/4/22   Single subsegmental pulmonary embolism without acute cor pulmonale ([facility] [address] )  Assessment & Plan  · Venous duplex obtained due to LE edema  ? Confirmed acute occlusive thrombus in popliteal vein and non-occlusive thrombus in the peroneal veins  · Started on IV heparin gtt 8/19t  · Suspect in setting of known metastatic cancer and patient has been more sedentary in recent weeks due to acute illness  · Transition to [address] [place] [address] [employer] 8/25  · Tachycardia is improved  No heart strain noted and no chest pain or palpitations  · Cardiology consult appreciated  Continue on Lopressor  · Continue to monitor closely     Severe protein-calorie malnutrition ([facility] [address] )  Assessment & Plan  Malnutrition Findings:   Adult Malnutrition type: Chronic illness  Adult Degree of Malnutrition: Other severe protein calorie malnutrition  Malnutrition Characteristics: Inadequate energy, Weight loss     360 Statement: Pt presents with chronic severe protein calorie malnutrition due to Stage IV metastatic breast cancer, chronic diarrhea vs UC flare as evidenced by 10 5 lb wt loss-14% wt loss in 4 months and <75 % po intake > 1 month      BMI Findings: Body mass index is 27 21 kg/m²          Anemia  Assessment & Plan  · History of chronic anemia, baseline 8-10  · Underwent flex sig which showed inflammation, ulcerations  · Also with BRBPR since starting heparin gtt  · Trend H/H  · Prior provider discussed with GI - recommending IV venofer, continue on IV Venofer  · Hemoglobin this morning is 7 8  · Repeat H&H in a m    · GI follow-up  · On Protonix     Primary hypertension  Assessment & Plan  · On Lopressor     Chronic kidney disease (CKD) stage G3a/A1, moderately decreased glomerular filtration rate (GFR) between 45-59 mL/min/1 73 square meter and albuminuria creatinine ratio less than 30 mg/g [facility])  Assessment & Plan        Lab Results   Component Value Date     EGFR 68 09/03/2022     EGFR 55 09/02/2022     EGFR 66 09/01/2022     CREATININE 0 80 09/03/2022     CREATININE 0 95 09/02/2022     CREATININE 0 82 09/01/2022      · Cr on admission slightly elevated from her baseline at 1 3, most likely due to volume depletion  · Baseline 0 8     Mixed hyperlipidemia  Assessment & Plan  · Continue statin     Metastatic breast cancer [facility])  Assessment & Plan  · Continue to follow-up with Hematology-Oncology outpatient  · Femara had been held on admit  · On recent admission patient's verzenio was held  Reviewed office visit from 8/10/22 appears there was some consideration to resume but at lowered dose  Patient reports she never resumed this medication  · Patient will require follow-up to discuss possible lumbar mets noted on CT this admission  · Oncology follow-up noted  · On Femara     * Toxic gastroenteritis and colitis  Assessment & Plan  · Patient presented with dizziness and diarrhea starting in August that worsened yesterday  · Most likely secondary to IBD  · CT showed: 1   Diffuse mild-moderate circumferential wall thickening of the colon with pericolic inflammatory stranding consistent with nonspecific pancolitis  2   Multiple stable small scattered sclerotic foci in the visualized lumbar lower thoracic spine osseous pelvis which may reflect osseous metastatic disease in this patient with known metastatic breast cancer  3   Additional stable findings as noted   · GI consulted-> recs appreciated  · Off IV fluids  · Discontinue Zosyn as symptoms are most likely secondary to ulcerative colitis  ? Steroids initiated 8/17  · F/u bld cx and procal, ESR, CRP, and lipase, C Diff  ? ESR:  48  ? Procalcitonin:  1 49  ? CRP:  206 3  ? C  Diff: negative  ?  Stool enteric panel: negative  · Advanced to low-fiber   · Cholestyramine  · Oncology consulted by GI for further recommendations regarding current cancer regimen playing a role in symptoms  Previously believed due to verzenio which has been on hold since recent admit, doubt this is contributing   · Flex sig completed 8/20 concerning for IBD, biopsies taken  Also noted ulcerations  ? Biopsy reveals colitis  · IV steroids were transitioned back to oral prednisone  · Repeat CT abd demonstrates ongoing pancolitis  · GI spoke with IBD specialist Dr Albertina Villa and CRP was ordered which is 122 4  Patient per their recommendations, was restarted on IV Solu-Medrol for 3 more days and repeat CRP was done  and is 10 1  · Patient is switched back to oral prednisone by GI  · Hemoglobin this morning is 7 8  · Trend H&H and transfuse as needed  · Awaiting repeat P T /OT evaluation as patient feels she is too weak and lives alone  · Continue to monitor      Vital Signs:   09/04/22 22:24:56 97 6 °F (36 4 °C) 91 -- 112/57 75 98 % -- --   09/04/22 22:24:19 -- 90 20 112/57 75 98 % -- --   09/04/22 15:46:49 98 °F (36 7 °C) 86 18 111/57 75 97 % -- --   09/04/22 15:23:12 98 2 °F (36 8 °C) 86 16 111/55 74 95 % None (Room air) Lying   09/04/22 07:40:02 97 9 °F (36 6 °C) 98 18 119/59 79 97 % None (Room air)        Pertinent Labs/Diagnostic Results:   VAS upper limb venous duplex scan, unilateral/limited   Final Result by Sean Chang MD (08/30 1051)      CT abdomen pelvis w contrast   Final Result by Jonathan Page MD (08/27 0810)      No significant change in pan-colitis  Workstation performed: [id number]         CTA chest pe study   Final Result by Jeremy Wellington MD (08/21 2242)      Right lower lobe subsegmental embolus  No central embolus  No CT evidence of right heart strain               I personally discussed this study with Kelli Maravilla on 8/21/2022 at 8:57 AM                      Workstation performed: [id number]         IR PICC line placement single lumen (preferred for home anitbiotics/medications) Final Result by Ceferino Lovett DO (08/19 1739)   Impression:      PICC line centrally positioned  Workstation performed: [id number]         VAS lower limb venous duplex study, complete bilateral   Final Result by Ros Cook MD (08/19 1728)      CT abdomen pelvis with contrast   Final Result by Los Hernandez MD (08/15 1238)         1  Diffuse mild-moderate circumferential wall thickening of the colon with pericolic inflammatory stranding consistent with nonspecific pancolitis  2   Multiple stable small scattered sclerotic foci in the visualized lumbar lower thoracic spine osseous pelvis which may reflect osseous metastatic disease in this patient with known metastatic breast cancer  3   Additional stable findings as noted  The study was marked in [facility] for immediate notification  Workstation performed: [id number]           8/29/22 venous duplex RIGHT UPPER LIMB LIMITED:  Evaluation shows no evidence of thrombus in the subclavian vein  Internal  jugular vein and brachiocephalic vein were not visualized  [place] LEFT UPPER LIMB:  No evidence of acute or chronic deep vein thrombosis  There is acute non-occlusive superficial thrombophlebitis within the  antecubital vein and the cephalic vein in the distal upper arm  Basilic vein was  not visualized at site of PICC line  Doppler evaluation shows a normal response to augmentation maneuvers  8/19/22 venous duplex RIGHT LOWER LIMB:  Evaluation shows acute occlusive thrombus in the popliteal vein and  non-occlusive thrombus in the peroneal veins  No evidence of superficial thrombophlebitis noted  Doppler evaluation shows a normal response to augmentation maneuvers  Popliteal, posterior tibial and anterior tibial arterial Doppler waveforms are  biphasic  LEFT LOWER LIMB:  No evidence of acute or chronic deep vein thrombosis  No evidence of superficial thrombophlebitis noted    Doppler evaluation shows a normal response to augmentation maneuvers  Popliteal, posterior tibial and anterior tibial arterial Doppler waveforms are  biphasic  Results from last 7 days   Lab Units 09/05/22  0612 09/04/22  0826 09/03/22  0628 09/02/22 2034 09/02/22  0638 09/01/22 2010 09/01/22 0453 08/30/22 2115 08/30/22  0451   WBC Thousand/uL 5 01 5 69 6 70  --  6 38  --  4 84   < > 4 41   HEMOGLOBIN g/dL 7 2* 7 8* 7 1* 7 0* 6 7*   < > 6 8*   < > 7 5*   HEMATOCRIT % 23 3* 25 1* 23 0* 21 9* 21 7*   < > 21 6*   < > 24 4*   PLATELETS Thousands/uL 124* 134* 135*  --  155  --  162   < > 226   NEUTROS ABS Thousands/µL 3 92 4 56 5 46  --  4 90   < >  --   --   --    BANDS PCT %  --   --   --   --   --   --  2  --  3    < > = values in this interval not displayed  Results from last 7 days   Lab Units 09/05/22  0612 09/04/22  0826 09/03/22  2924 09/02/22  9002 09/01/22  0453 08/31/22  0448 08/30/22  0451   SODIUM mmol/L 139 139 137 138 136   < > 136   POTASSIUM mmol/L 3 8 3 3* 3 9 3 6 4 1   < > 4 3   CHLORIDE mmol/L 106 103 105 106 105   < > 104   CO2 mmol/L 27 26 26 28 27   < > 23   ANION GAP mmol/L 6 10 6 4 4   < > 9   BUN mg/dL 14 16 19 18 15   < > 11   CREATININE mg/dL 0 81 0 92 0 80 0 95 0 82   < > 0 88   EGFR ml/min/1 73sq m 67 58 68 55 66   < > 61   CALCIUM mg/dL 7 6* 7 7* 7 6* 7 4* 7 7*   < > 7 8*   MAGNESIUM mg/dL  --   --   --   --   --   --  2 3    < > = values in this interval not displayed       Results from last 7 days   Lab Units 09/05/22  0612 09/04/22  0826 09/03/22 0628 09/02/22 0638 09/01/22 0453 08/31/22 0448 08/30/22  0451   GLUCOSE RANDOM mg/dL 101 118 80 74 98 122 137     Results from last 7 days   Lab Units 08/31/22  0448   HEP B S AG  Non-reactive     Results from last 7 days   Lab Units 09/01/22  0453   CRP mg/L 10 1*         Medications:   Scheduled Medications:  amitriptyline, 25 mg, Oral, HS  cholestyramine sugar free, 4 g, Oral, HS  iron sucrose, 200 mg, Intravenous, Once per day on Mon Wed Fri  letrozole, 2 5 mg, Oral, Daily  melatonin, 3 mg, Oral, HS  metoprolol tartrate, 50 mg, Oral, Q12H RIA  mirtazapine, 7 5 mg, Oral, HS  pantoprazole, 40 mg, Oral, BID AC  pravastatin, 20 mg, Oral, Daily With Dinner  predniSONE, 40 mg, Oral, Daily  rivaroxaban, 15 mg, Oral, BID With Meals  simethicone, 80 mg, Oral, 4x Daily (with meals and at bedtime)    alteplase (CATHFLO) injection 2 mg  Dose: 2 mg  Freq: Once Route: IK  Start: 09/04/22 0615 End: 09/04/22 0712    alteplase (CATHFLO) injection 2 mg  Dose: 2 mg  Freq: Once Route: IK  Start: 09/04/22 0615 End: 09/04/22 0712    potassium chloride (K-DUR,KLOR-CON) CR tablet 40 mEq  Dose: 40 mEq  Freq: Once Route: PO  Start: 09/04/22 0930 End: 09/04/22 1011    Continuous IV Infusions: none     PRN Meds:not used   acetaminophen, 650 mg, Oral, Q6H PRN  calcium carbonate, 500 mg, Oral, Daily PRN  loperamide, 2 mg, Oral, Q4H PRN  ondansetron, 4 mg, Intravenous, Q6H PRN        Discharge Plan: To be determined  Network Utilization Review Department  ATTENTION: Please call with any questions or concerns to [phone] and carefully listen to the prompts so that you are directed to the right person  All voicemails are confidential   Chin Gillespie all requests for admission clinical reviews, approved or denied determinations and any other requests to dedicated fax number below belonging to the campus where the patient is receiving treatment   List of dedicated fax numbers for the Facilities:  [address] DENIALS (Administrative/Medical Necessity) [phone]   [address] (Maternity/NICU/Pediatrics) [phone]   [address] [phone]   [address]  [address] [facility] [place] [address] [place] [address] [facility] [phone] [address] [place] [address] [address] [place] [address] [phone]

## 2022-09-05 NOTE — PROGRESS NOTES
[place]  Progress Note Kevankole Silverman 1940, 79 y o  female MRN: [id number]  Unit/Bed#: -01 Encounter: [id number]  Primary Care Provider: Denisa Mills DO   Date and time admitted to hospital: 8/15/2022 10:40 AM    * Toxic gastroenteritis and colitis  Assessment & Plan  · Patient presented with dizziness and diarrhea starting in August that worsened yesterday  · Most likely secondary to IBD  · CT showed: 1  Diffuse mild-moderate circumferential wall thickening of the colon with pericolic inflammatory stranding consistent with nonspecific pancolitis  2   Multiple stable small scattered sclerotic foci in the visualized lumbar lower thoracic spine osseous pelvis which may reflect osseous metastatic disease in this patient with known metastatic breast cancer  3   Additional stable findings as noted   · GI consulted-> recs appreciated  · Off IV fluids  · Discontinue Zosyn as symptoms are most likely secondary to ulcerative colitis  · Steroids initiated 8/17  · ESR:  48  · Procalcitonin:  1 49  · CRP:  206 3  · C  Diff: negative  · Stool enteric panel: negative  · Advanced to low-fiber   · Cholestyramine  · Oncology consulted by GI for further recommendations regarding current cancer regimen playing a role in symptoms  Previously believed due to verzenio which has been on hold since recent admit, doubt this is contributing   · Flex sig completed 8/20 concerning for IBD, biopsies taken  Also noted ulcerations  · Biopsy reveals colitis  · IV steroids were transitioned back to oral prednisone  · Repeat CT abd demonstrates ongoing pancolitis  · GI spoke with IBD specialist Dr Hernando Main and CRP was ordered which is 122 4  Patient per their recommendations, was restarted on IV Solu-Medrol for 3 more days and repeat CRP was done  and is 10 1  Finished 3 day course of solumedrol    · Patient is switched back to oral prednisone by GI  · Hemoglobin this morning is 7 2  · Trend H&H and transfuse as needed  · Awaiting repeat P T /OT evaluation as patient feels she is too weak and lives alone  · Continue to monitor    Single subsegmental pulmonary embolism without acute cor pulmonale (H[facility])  Assessment & Plan  · Venous duplex obtained due to LE edema  · Confirmed acute occlusive thrombus in popliteal vein and non-occlusive thrombus in the peroneal veins  · Started on IV heparin gtt 8/19t  · Suspect in setting of known metastatic cancer and patient has been more sedentary in recent weeks due to acute illness  · Transition to [address] [place] [address] 8/25  · Tachycardia is improved  No heart strain noted and no chest pain or palpitations  · Cardiology consult appreciated  Continue on Lopressor  · Continue to monitor closely    Severe protein-calorie malnutrition ([address] )  Assessment & Plan  Malnutrition Findings:   Adult Malnutrition type: Chronic illness  Adult Degree of Malnutrition: Other severe protein calorie malnutrition  Malnutrition Characteristics: Inadequate energy, Weight loss    360 Statement: Pt presents with chronic severe protein calorie malnutrition due to Stage IV metastatic breast cancer, chronic diarrhea vs UC flare as evidenced by 10 5 lb wt loss-14% wt loss in 4 months and <75 % po intake > 1 month  BMI Findings: Body mass index is 27 21 kg/m²  Anemia  Assessment & Plan  · History of chronic anemia, baseline 8-10  · Underwent flex sig which showed inflammation, ulcerations  · Also with BRBPR since starting heparin gtt  · Trend H/H  · Prior provider discussed with GI - recommending IV venofer, continue on IV Venofer  · Hemoglobin this morning is 7 2  · Repeat H&H in a m    · GI follow-up  · On Protonix    Primary hypertension  Assessment & Plan  · On Lopressor    Chronic kidney disease (CKD) stage G3a/A1, moderately decreased glomerular filtration rate (GFR) between 45-59 mL/min/1 73 square meter and albuminuria creatinine ratio less than 30 mg/g [facility])  Assessment & Plan  Lab Results   Component Value Date    EGFR 67 09/05/2022    EGFR 58 09/04/2022    EGFR 68 09/03/2022    CREATININE 0 81 09/05/2022    CREATININE 0 92 09/04/2022    CREATININE 0 80 09/03/2022     · Cr on admission slightly elevated from her baseline at 1 3, most likely due to volume depletion  · Baseline 0 8    Mixed hyperlipidemia  Assessment & Plan  · Continue statin    Metastatic breast cancer [facility])  Assessment & Plan  · Continue to follow-up with Hematology-Oncology outpatient  · Femara had been held on admit  · On recent admission patient's verzenio was held  Reviewed office visit from 8/10/22 appears there was some consideration to resume but at lowered dose  Patient reports she never resumed this medication  · Patient will require follow-up to discuss possible lumbar mets noted on CT this admission  · Oncology follow-up noted  · On Femara          VTE Pharmacologic Prophylaxis: VTE Score: 6 High Risk (Score >/= 5) - Pharmacological DVT Prophylaxis Ordered: rivaroxaban (Xarelto)  Sequential Compression Devices Ordered  Patient Centered Rounds: I performed bedside rounds with nursing staff today  Discussions with Specialists or Other Care Team Provider: case management    Education and Discussions with Family / Patient: Updated  (daughter) via phone  Time Spent for Care: 30 minutes  More than 50% of total time spent on counseling and coordination of care as described above  Current Length of Stay: 21 day(s)  Current Patient Status: Inpatient   Certification Statement: The patient will continue to require additional inpatient hospital stay due to pending pt/ot eval for placement  Discharge Plan: Anticipate discharge in 24-48 hrs to discharge location to be determined pending rehab evaluations  Code Status: Level 3 - DNAR and DNI    Subjective:   Patient seen examined at bedside  No acute events overnight  Currently offers no acute complaints    Patient denies having any rectal bleeding  Objective:     Vitals:   Temp (24hrs), Av 9 °F (36 6 °C), Min:97 6 °F (36 4 °C), Max:98 2 °F (36 8 °C)    Temp:  [97 6 °F (36 4 °C)-98 2 °F (36 8 °C)] 97 9 °F (36 6 °C)  HR:  [] 107  Resp:  [13-20] 13  BP: (111-129)/(55-68) 129/68  SpO2:  [95 %-100 %] 100 %  Body mass index is 27 21 kg/m²  Input and Output Summary (last 24 hours):   No intake or output data in the 24 hours ending 22 1141    Physical Exam:   Physical Exam  Vitals reviewed  HENT:      Head: Normocephalic and atraumatic  Right Ear: External ear normal       Left Ear: External ear normal       Nose: Nose normal       Mouth/Throat:      Mouth: Mucous membranes are moist       Pharynx: Oropharynx is clear  Eyes:      Extraocular Movements: Extraocular movements intact  Cardiovascular:      Rate and Rhythm: Normal rate and regular rhythm  Heart sounds: Normal heart sounds  Pulmonary:      Effort: Pulmonary effort is normal       Breath sounds: Normal breath sounds  Abdominal:      General: Abdomen is flat  Palpations: Abdomen is soft  Tenderness: There is no abdominal tenderness  Musculoskeletal:      Cervical back: Normal range of motion  Right lower leg: No edema  Left lower leg: No edema  Skin:     General: Skin is warm and dry  Neurological:      Mental Status: She is alert  Mental status is at baseline     Psychiatric:         Mood and Affect: Mood normal          Behavior: Behavior normal           Additional Data:     Labs:  Results from last 7 days   Lab Units 22  0612 22  0453   WBC Thousand/uL 5 01   < > 4 84   HEMOGLOBIN g/dL 7 2*   < > 6 8*   HEMATOCRIT % 23 3*   < > 21 6*   PLATELETS Thousands/uL 124*   < > 162   BANDS PCT %  --   --  2   NEUTROS PCT % 79*   < >  --    LYMPHS PCT % 12*   < >  --    LYMPHO PCT %  --   --  6*   MONOS PCT % 6   < >  --    MONO PCT %  --   --  3*   EOS PCT % 2   < > 0    < > = values in this interval not displayed  Results from last 7 days   Lab Units 09/05/22  0612   SODIUM mmol/L 139   POTASSIUM mmol/L 3 8   CHLORIDE mmol/L 106   CO2 mmol/L 27   BUN mg/dL 14   CREATININE mg/dL 0 81   ANION GAP mmol/L 6   CALCIUM mg/dL 7 6*   GLUCOSE RANDOM mg/dL 101                       Lines/Drains:  Invasive Devices  Report    Peripherally Inserted Central Catheter Line  Duration           PICC Line 08/19/22 16 days                Central Line:  Goal for removal: N/A - Chronic PICC             Imaging: Reviewed radiology reports from this admission including: ultrasound(s)    Recent Cultures (last 7 days):         Last 24 Hours Medication List:   Current Facility-Administered Medications   Medication Dose Route Frequency Provider Last Rate    acetaminophen  650 mg Oral Q6H PRN Ham Kramer MD      amitriptyline  25 mg Oral HS Ham Kramer MD      calcium carbonate  500 mg Oral Daily PRN Ham Kramer MD      cholestyramine sugar free  4 g Oral HS BROOKE Pérez      iron sucrose  200 mg Intravenous Once per day on Mon Wed Fri Yamel Dang PA-C      letrozole  2 5 mg Oral Daily BROOKE Gilmore      loperamide  2 mg Oral Q4H  [facility]      melatonin  3 mg Oral HS Rolo Landers PA-C      metoprolol tartrate  50 mg Oral Q12H [address] Itzel Lafleur MD      mirtazapine  7 5 mg Oral HS Ham Kramer MD      ondansetron  4 mg Intravenous Q6H PRN Ham Kramer MD      pantoprazole  40 mg Oral BID BROOKE Durbin      pravastatin  20 mg Oral Daily With Diamante Holman MD      predniSONE  40 mg Oral Daily BROOKE Pérez      rivaroxaban  15 mg Oral BID With Meals [address] [facility]      simethicone  80 mg Oral 4x Daily (with meals and at bedtime) Sofia Santiago PA-C          Today, Patient Was Seen By: Vika Muñoz DO    **Please Note: This note may have been constructed using a voice recognition system  **

## 2022-09-05 NOTE — ASSESSMENT & PLAN NOTE
· History of chronic anemia, baseline 8-10  · Underwent flex sig which showed inflammation, ulcerations  · Also with BRBPR since starting heparin gtt  · Trend H/H  · Prior provider discussed with GI - recommending IV venofer, continue on IV Venofer  · Hemoglobin this morning is 7 1  · On Protonix  · Ok for d/c from GI stand point

## 2022-09-05 NOTE — ASSESSMENT & PLAN NOTE
· Venous duplex obtained due to LE edema  · Confirmed acute occlusive thrombus in popliteal vein and non-occlusive thrombus in the peroneal veins  · Started on IV heparin gtt 8/19t  · Suspect in setting of known metastatic cancer and patient has been more sedentary in recent weeks due to acute illness  · Transition to [address] [place] [address] 8/25  · Tachycardia is improved  No heart strain noted and no chest pain or palpitations  · Cardiology consult appreciated    Continue on Lopressor  · Continue to monitor closely

## 2022-09-05 NOTE — PLAN OF CARE
Problem: SAFETY ADULT  Goal: Maintain or return to baseline ADL function  Description: INTERVENTIONS:  -  Assess patient's ability to carry out ADLs; assess patient's baseline for ADL function and identify physical deficits which impact ability to perform ADLs (bathing, care of mouth/teeth, toileting, grooming, dressing, etc )  - Assess/evaluate cause of self-care deficits   - Assess range of motion  - Assess patient's mobility; develop plan if impaired  - Assess patient's need for assistive devices and provide as appropriate  - Encourage maximum independence but intervene and supervise when necessary  - Involve family in performance of ADLs  - Assess for home care needs following discharge   - Consider OT consult to assist with ADL evaluation and planning for discharge  - Provide patient education as appropriate  Outcome: Progressing  Goal: Maintains/Returns to pre admission functional level  Description: INTERVENTIONS:  - Perform BMAT or MOVE assessment daily    - Set and communicate daily mobility goal to care team and patient/family/caregiver  - Collaborate with rehabilitation services on mobility goals if consulted  - Perform Range of Motion 3 times a day  - Reposition patient every 2 hours    - Dangle patient 3 times a day  - Stand patient 3 times a day  - Ambulate patient 3 times a day  - Out of bed to chair 3 times a day   - Out of bed for meals 3 times a day  - Out of bed for toileting  - Record patient progress and toleration of activity level   Outcome: Progressing  Goal: Patient will remain free of falls  Description: INTERVENTIONS:  - Educate patient/family on patient safety including physical limitations  - Instruct patient to call for assistance with activity   - Consult OT/PT to assist with strengthening/mobility   - Keep Call bell within reach  - Keep bed low and locked with side rails adjusted as appropriate  - Keep care items and personal belongings within reach  - Initiate and maintain comfort rounds  - Make Fall Risk Sign visible to staff  - Offer Toileting every 2 Hours, in advance of need  - Initiate/Maintain bed alarm  - Obtain necessary fall risk management equipment:   - Apply yellow socks and bracelet for high fall risk patients  - Consider moving patient to room near nurses station  Outcome: Progressing

## 2022-09-05 NOTE — PHYSICIAN ADVISOR
Current patient class: Inpatient  The patient is currently on Hospital Day: 22      The patient was admitted to the hospital at  1:51 PM on 8/15/22 for the following diagnosis:  Orthostatic hypotension [I95 1]  Dehydration [E86 0]  Dizziness [R42]  Hypophosphatemia [E83 39]  Pancolitis (HCC) [K51 00]  External hemorrhoid [K64 4]     CMS OUTLIER STAY REVIEW    After review of the relevant documentation, labs, vital signs and test results, the patient is appropriate for CONTINUED INPATIENT ADMISSION  The patient continues to remain hospitalized receiving acute medical care  The patient has surpassed the expected duration of stay, however given the clinical condition, need for further acute care management, the patient is appropriate to remain in an inpatient status  The patient still being actively managed, and does have unresolved medical issues requiring further hospitalization  This review is conducted at 10 day intervals, to help satisfy the requirements for significant outlier stay review as per CMS  Given the current condition of this patient, the patient satisfies this review was determination for continued inpatient stay  Rationale is as follows: The patient is a 79 yrs old Female who presented to the ED at 8/15/2022 10:40 AM with a chief complaint of Dizziness (Via ems from home for increased dizziness and multiple episodes diarrhea since the beginning of August  States worse since last night  States is getting new chemo medication)   Initially concern was for drug induced colitis as she is on rx for metastatic cancer  However; after endoscopy evaluaiton it was more suggestive of colitis from ibd; appetite has fluctuated  CT scan 2 wks after starting rx was unchanged showing pancolitis  Still with diarrhea  Was on IV steriods transitioned to po prednisione, then sxs worse so back on iv steroids  ongoind anemia and deconditioning      Due to severity of colitis and refractoriness of condition, IP treatment seems appropriate  The patients vitals on arrival were   ED Triage Vitals   Temperature Pulse Respirations Blood Pressure SpO2   08/15/22 1045 08/15/22 1045 08/15/22 1045 08/15/22 1052 08/15/22 1052   97 8 °F (36 6 °C) (!) 118 18 109/55 100 %      Temp Source Heart Rate Source Patient Position - Orthostatic VS BP Location FiO2 (%)   08/15/22 1045 08/15/22 1045 08/15/22 1351 08/15/22 1351 --   Oral Monitor Lying Left arm       Pain Score       08/15/22 1045       No Pain           Past Medical History:   Diagnosis Date    Abnormal weight loss     Allergic reaction     Anxiety     Breast cancer, right ([facility] ) 2011    right    Cancer ([facility] ) 2012    Candidal vulvovaginitis     Clotting disorder ([facility] ) Same as above    Endometrial hyperplasia     Epithelial cyst     benign, ovary    GI (gastrointestinal bleed) Blood mixed with stool    History of radiation therapy 2011    right breast cancer    Hyperlipidemia     Hypertension     Internal hemorrhoids     Knee tendonitis     Ovarian cyst     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[facility])      Past Surgical History:   Procedure Laterality Date    BILATERAL SALPINGOOPHORECTOMY      onset: 7/23/13    BREAST BIOPSY Right 06/13/2006    benign    BREAST BIOPSY Right 03/02/2011    malignant    BREAST LUMPECTOMY Right 03/30/2011    malignant    CATARACT EXTRACTION W/  INTRAOCULAR LENS IMPLANT Right     phacoemulsification   Onset: 10/27/14    CHOLECYSTECTOMY      COLONOSCOPY  2017    approx    HYSTERECTOMY  Yes    INTRAOPERATIVE RADIATION THERAPY ([facility])      IR BIOPSY BONE  7/9/2021    IR PICC PLACEMENT SINGLE LUMEN  8/19/2022    SKIN LESION EXCISION Right     breast, single lesion    TONSILLECTOMY AND ADENOIDECTOMY             Consults have been placed to:   IP CONSULT TO GASTROENTEROLOGY  IP CONSULT TO ONCOLOGY  IP CONSULT TO CARDIOLOGY    Vitals:    09/04/22 2224 09/04/22 2224 09/05/22 0813 09/05/22 1520   BP: 112/57 112/57 129/68 118/59 BP Location:    Left arm   Pulse: 90 91 (!) 107 87   Resp: 20  13 16   Temp:  97 6 °F (36 4 °C) 97 9 °F (36 6 °C) 97 5 °F (36 4 °C)   TempSrc:    Oral   SpO2: 98% 98% 100%    Weight:       Height:           Most recent labs:    Recent Labs     09/05/22  0612   WBC 5 01   HGB 7 2*   HCT 23 3*   *   K 3 8   CALCIUM 7 6*   BUN 14   CREATININE 0 81       Scheduled Meds:  Current Facility-Administered Medications   Medication Dose Route Frequency Provider Last Rate    acetaminophen  650 mg Oral Q6H PRN Maribel Amaro MD      amitriptyline  25 mg Oral HS Maribel Amaro MD      calcium carbonate  500 mg Oral Daily PRN Maribel Amaro MD      cholestyramine sugar free  4 g Oral HS BROOKE Pérez      iron sucrose  200 mg Intravenous Once per day on Mon Wed Fri Yadiel Dang PA-C      letrozole  2 5 mg Oral Daily BROOKE Newman      loperamide  2 mg Oral Q4H PRN Fermin WOODARD PA-C      melatonin  3 mg Oral HS Ayaan MorningKAIA      metoprolol tartrate  50 mg Oral Q12H [address] Adrianne Leong MD      mirtazapine  7 5 mg Oral HS Maribel Amaro MD      ondansetron  4 mg Intravenous Q6H PRN Maribel Amaro MD      pantoprazole  40 mg Oral BID AC BROOKE Santiago      pravastatin  20 mg Oral Daily With Digna Rao MD      predniSONE  40 mg Oral Daily BROOKE Pérez      rivaroxaban  15 mg Oral BID With Meals Fermin WOODARD PA-C      simethicone  80 mg Oral 4x Daily (with meals and at bedtime) Fermin WOODARD PA-C       Continuous Infusions:   PRN Meds:   acetaminophen    calcium carbonate    loperamide    ondansetron    Surgical procedures (if appropriate):

## 2022-09-05 NOTE — ASSESSMENT & PLAN NOTE
· Patient presented with dizziness and diarrhea starting in August that worsened yesterday  · Most likely secondary to IBD  · CT showed: 1  Diffuse mild-moderate circumferential wall thickening of the colon with pericolic inflammatory stranding consistent with nonspecific pancolitis  2   Multiple stable small scattered sclerotic foci in the visualized lumbar lower thoracic spine osseous pelvis which may reflect osseous metastatic disease in this patient with known metastatic breast cancer  3   Additional stable findings as noted   · GI consulted-> recs appreciated  · Off IV fluids  · Discontinue Zosyn as symptoms are most likely secondary to ulcerative colitis  · Steroids initiated 8/17  · ESR:  48  · Procalcitonin:  1 49  · CRP:  206 3  · C  Diff: negative  · Stool enteric panel: negative  · Advanced to low-fiber   · Cholestyramine  · Oncology consulted by GI for further recommendations regarding current cancer regimen playing a role in symptoms  Previously believed due to verzenio which has been on hold since recent admit, doubt this is contributing   · Flex sig completed 8/20 concerning for IBD, biopsies taken  Also noted ulcerations  · Biopsy reveals colitis  · IV steroids were transitioned back to oral prednisone  · Repeat CT abd demonstrates ongoing pancolitis  · GI spoke with IBD specialist Dr Tramaine Austin and CRP was ordered which is 122 4  Patient per their recommendations, was restarted on IV Solu-Medrol for 3 more days and repeat CRP was done  and is 10 1  Finished 3 day course of solumedrol    · Patient is switched back to oral prednisone by GI  · Hemoglobin this morning is 7 2  · Trend H&H and transfuse as needed  · Awaiting repeat P T /OT evaluation as patient feels she is too weak and lives alone  · Continue to monitor

## 2022-09-05 NOTE — ASSESSMENT & PLAN NOTE
· History of chronic anemia, baseline 8-10  · Underwent flex sig which showed inflammation, ulcerations  · Also with BRBPR since starting heparin gtt  · Trend H/H  · Prior provider discussed with GI - recommending IV venofer, continue on IV Venofer  · Hemoglobin this morning is 7 2  · Repeat H&H in a m    · GI follow-up  · On Protonix

## 2022-09-05 NOTE — ASSESSMENT & PLAN NOTE
Lab Results   Component Value Date    EGFR 67 09/05/2022    EGFR 58 09/04/2022    EGFR 68 09/03/2022    CREATININE 0 81 09/05/2022    CREATININE 0 92 09/04/2022    CREATININE 0 80 09/03/2022     · Cr on admission slightly elevated from her baseline at 1 3, most likely due to volume depletion     · Baseline 0 8

## 2022-09-06 VITALS
RESPIRATION RATE: 17 BRPM | HEART RATE: 116 BPM | OXYGEN SATURATION: 100 % | TEMPERATURE: 97.8 F | HEIGHT: 61 IN | SYSTOLIC BLOOD PRESSURE: 112 MMHG | DIASTOLIC BLOOD PRESSURE: 66 MMHG | WEIGHT: 154.6 LBS | BODY MASS INDEX: 29.19 KG/M2

## 2022-09-06 PROBLEM — R00.0 TACHYCARDIA: Status: ACTIVE | Noted: 2022-09-06

## 2022-09-06 LAB
ALBUMIN SERPL BCP-MCNC: 1.8 G/DL (ref 3.5–5)
ANION GAP SERPL CALCULATED.3IONS-SCNC: 7 MMOL/L (ref 4–13)
BUN SERPL-MCNC: 16 MG/DL (ref 5–25)
CALCIUM ALBUM COR SERPL-MCNC: 9.6 MG/DL (ref 8.3–10.1)
CALCIUM SERPL-MCNC: 7.8 MG/DL (ref 8.3–10.1)
CHLORIDE SERPL-SCNC: 105 MMOL/L (ref 96–108)
CO2 SERPL-SCNC: 26 MMOL/L (ref 21–32)
CREAT SERPL-MCNC: 0.98 MG/DL (ref 0.6–1.3)
ERYTHROCYTE [DISTWIDTH] IN BLOOD BY AUTOMATED COUNT: 21.7 % (ref 11.6–15.1)
FLUAV RNA RESP QL NAA+PROBE: NEGATIVE
FLUBV RNA RESP QL NAA+PROBE: NEGATIVE
GFR SERPL CREATININE-BSD FRML MDRD: 53 ML/MIN/1.73SQ M
GLUCOSE SERPL-MCNC: 101 MG/DL (ref 65–140)
HCT VFR BLD AUTO: 22.9 % (ref 34.8–46.1)
HGB BLD-MCNC: 7.1 G/DL (ref 11.5–15.4)
MAGNESIUM SERPL-MCNC: 1.8 MG/DL (ref 1.6–2.6)
MCH RBC QN AUTO: 33 PG (ref 26.8–34.3)
MCHC RBC AUTO-ENTMCNC: 31 G/DL (ref 31.4–37.4)
MCV RBC AUTO: 107 FL (ref 82–98)
PHOSPHATE SERPL-MCNC: 2 MG/DL (ref 2.3–4.1)
PLATELET # BLD AUTO: 127 THOUSANDS/UL (ref 149–390)
PMV BLD AUTO: 9.8 FL (ref 8.9–12.7)
POTASSIUM SERPL-SCNC: 3.6 MMOL/L (ref 3.5–5.3)
RBC # BLD AUTO: 2.15 MILLION/UL (ref 3.81–5.12)
RSV RNA RESP QL NAA+PROBE: NEGATIVE
SARS-COV-2 RNA RESP QL NAA+PROBE: NEGATIVE
SODIUM SERPL-SCNC: 138 MMOL/L (ref 135–147)
WBC # BLD AUTO: 4.71 THOUSAND/UL (ref 4.31–10.16)

## 2022-09-06 PROCEDURE — 83735 ASSAY OF MAGNESIUM: CPT | Performed by: STUDENT IN AN ORGANIZED HEALTH CARE EDUCATION/TRAINING PROGRAM

## 2022-09-06 PROCEDURE — 85027 COMPLETE CBC AUTOMATED: CPT | Performed by: STUDENT IN AN ORGANIZED HEALTH CARE EDUCATION/TRAINING PROGRAM

## 2022-09-06 PROCEDURE — 0241U HB NFCT DS VIR RESP RNA 4 TRGT: CPT | Performed by: STUDENT IN AN ORGANIZED HEALTH CARE EDUCATION/TRAINING PROGRAM

## 2022-09-06 PROCEDURE — 80069 RENAL FUNCTION PANEL: CPT | Performed by: STUDENT IN AN ORGANIZED HEALTH CARE EDUCATION/TRAINING PROGRAM

## 2022-09-06 PROCEDURE — 97535 SELF CARE MNGMENT TRAINING: CPT

## 2022-09-06 PROCEDURE — 99239 HOSP IP/OBS DSCHRG MGMT >30: CPT | Performed by: STUDENT IN AN ORGANIZED HEALTH CARE EDUCATION/TRAINING PROGRAM

## 2022-09-06 PROCEDURE — 99232 SBSQ HOSP IP/OBS MODERATE 35: CPT | Performed by: INTERNAL MEDICINE

## 2022-09-06 PROCEDURE — 97530 THERAPEUTIC ACTIVITIES: CPT

## 2022-09-06 PROCEDURE — 99232 SBSQ HOSP IP/OBS MODERATE 35: CPT | Performed by: STUDENT IN AN ORGANIZED HEALTH CARE EDUCATION/TRAINING PROGRAM

## 2022-09-06 RX ORDER — METOPROLOL TARTRATE 50 MG/1
50 TABLET, FILM COATED ORAL EVERY 12 HOURS SCHEDULED
Qty: 60 TABLET | Refills: 0 | Status: SHIPPED | OUTPATIENT
Start: 2022-09-06 | End: 2022-10-20

## 2022-09-06 RX ORDER — CYANOCOBALAMIN 1000 UG/ML
1000 INJECTION, SOLUTION INTRAMUSCULAR; SUBCUTANEOUS ONCE
Status: COMPLETED | OUTPATIENT
Start: 2022-09-06 | End: 2022-09-06

## 2022-09-06 RX ORDER — METOPROLOL TARTRATE 50 MG/1
50 TABLET, FILM COATED ORAL EVERY 12 HOURS SCHEDULED
Status: DISCONTINUED | OUTPATIENT
Start: 2022-09-06 | End: 2022-09-06 | Stop reason: HOSPADM

## 2022-09-06 RX ORDER — POTASSIUM CHLORIDE 20 MEQ/1
40 TABLET, EXTENDED RELEASE ORAL ONCE
Status: COMPLETED | OUTPATIENT
Start: 2022-09-06 | End: 2022-09-06

## 2022-09-06 RX ORDER — MAGNESIUM SULFATE HEPTAHYDRATE 40 MG/ML
2 INJECTION, SOLUTION INTRAVENOUS ONCE
Status: COMPLETED | OUTPATIENT
Start: 2022-09-06 | End: 2022-09-06

## 2022-09-06 RX ORDER — PREDNISONE 20 MG/1
40 TABLET ORAL DAILY
Qty: 60 TABLET | Refills: 0 | Status: CANCELLED | OUTPATIENT
Start: 2022-09-06 | End: 2022-10-06

## 2022-09-06 RX ORDER — FOLIC ACID 1 MG/1
1 TABLET ORAL DAILY
Status: DISCONTINUED | OUTPATIENT
Start: 2022-09-06 | End: 2022-09-06 | Stop reason: HOSPADM

## 2022-09-06 RX ORDER — CHOLESTYRAMINE LIGHT 4 G/5.7G
4 POWDER, FOR SUSPENSION ORAL
Qty: 30 PACKET | Refills: 0 | Status: SHIPPED | OUTPATIENT
Start: 2022-09-06 | End: 2022-10-20

## 2022-09-06 RX ORDER — FOLIC ACID 1 MG/1
1 TABLET ORAL DAILY
Qty: 30 TABLET | Refills: 0 | Status: SHIPPED | OUTPATIENT
Start: 2022-09-07 | End: 2022-10-07

## 2022-09-06 RX ADMIN — PANTOPRAZOLE SODIUM 40 MG: 40 TABLET, DELAYED RELEASE ORAL at 16:18

## 2022-09-06 RX ADMIN — RIVAROXABAN 15 MG: 15 TABLET, FILM COATED ORAL at 16:18

## 2022-09-06 RX ADMIN — POTASSIUM CHLORIDE 40 MEQ: 20 TABLET, EXTENDED RELEASE ORAL at 08:14

## 2022-09-06 RX ADMIN — PRAVASTATIN SODIUM 20 MG: 20 TABLET ORAL at 16:18

## 2022-09-06 RX ADMIN — POTASSIUM & SODIUM PHOSPHATES POWDER PACK 280-160-250 MG 1 PACKET: 280-160-250 PACK at 08:14

## 2022-09-06 RX ADMIN — LETROZOLE 2.5 MG: 2.5 TABLET ORAL at 08:13

## 2022-09-06 RX ADMIN — SIMETHICONE 80 MG: 80 TABLET, CHEWABLE ORAL at 12:02

## 2022-09-06 RX ADMIN — PANTOPRAZOLE SODIUM 40 MG: 40 TABLET, DELAYED RELEASE ORAL at 06:16

## 2022-09-06 RX ADMIN — CYANOCOBALAMIN 1000 MCG: 1000 INJECTION, SOLUTION INTRAMUSCULAR at 08:13

## 2022-09-06 RX ADMIN — MAGNESIUM SULFATE HEPTAHYDRATE 2 G: 40 INJECTION, SOLUTION INTRAVENOUS at 08:16

## 2022-09-06 RX ADMIN — SIMETHICONE 80 MG: 80 TABLET, CHEWABLE ORAL at 16:18

## 2022-09-06 RX ADMIN — PREDNISONE 40 MG: 20 TABLET ORAL at 08:15

## 2022-09-06 RX ADMIN — SIMETHICONE 80 MG: 80 TABLET, CHEWABLE ORAL at 08:16

## 2022-09-06 RX ADMIN — FOLIC ACID 1 MG: 1 TABLET ORAL at 08:13

## 2022-09-06 RX ADMIN — RIVAROXABAN 15 MG: 15 TABLET, FILM COATED ORAL at 08:15

## 2022-09-06 NOTE — CASE MANAGEMENT
Case Management Discharge Planning Note    Patient name Garo Silverman  Location /-01 MRN [id number]  : 1940 Date 2022       Current Admission Date: 8/15/2022  Current Admission Diagnosis:Toxic gastroenteritis and colitis   Patient Active Problem List    Diagnosis Date Noted    Tachycardia 2022    Single subsegmental pulmonary embolism without acute cor pulmonale ([facility] ) 2022    Severe protein-calorie malnutrition ([facility] ) 2022    Anemia 2022    Hypokalemia 2022    REYES (acute kidney injury) ([place] [address] ) 2022    Diarrhea 2022    Secondary malignant neoplasm of bone ([place] [address] ) 2022    Toxic gastroenteritis and colitis 2022    Rectal bleeding 10/14/2021    Lytic lesion of bone on x-ray 2021    Neoplasm of uncertain behavior of sternum 2021    Cough due to ACE inhibitor 2021    Acute costochondritis 2021    Osteopenia of multiple sites 2020    Primary hypertension 10/22/2019    Chronic kidney disease (CKD) stage G3a/A1, moderately decreased glomerular filtration rate (GFR) between 45-59 mL/min/1 73 square meter and albuminuria creatinine ratio less than 30 mg/g ([facility]) 2019    Adverse reaction to pneumococcal vaccine 2015    Cataract, bilateral 2014    Pulmonary nodule seen on imaging study 09/10/2013    Sleep disorder 2013    Anxiety 2013    Metastatic breast cancer ([facility] ) 10/17/2012    Mixed hyperlipidemia 2012      LOS (days): 22  Geometric Mean LOS (GMLOS) (days): 5 00  Days to GMLOS:-17 1     OBJECTIVE:  Risk of Unplanned Readmission Score: 35 07         Current admission status: Inpatient   Preferred Pharmacy:   [facility] [address]  [address] [facility]  Phone: [phone] Fax: [address] [facility], [address] [place] [address]  [address]  [place] [address] [address]  Phone: [phone] Fax: [phone]    Primary Care Provider: Bryce Marques DO    Primary Insurance: MEDICARE  Secondary Insurance: Brandon Pert DETAILS:      Other Referral/Resources/Interventions Provided:  Interventions: Short Term Rehab  Referral Comments: referrals sent to SNF's within a 20 mile radius    Treatment Team Recommendation: Short Term Rehab  Discharge Destination Plan[de-identified] Short Term Rehab  Transport at Discharge : Wheelchair Joe Phlegm  Dispatcher Contacted: Yes  Number/Name of Dispatcher: Joe Huang of Transport (Date): 09/06/22  ETA of Transport (Time): 1630     Transfer Mode: Stretcher        IMM Given (Date):: 09/06/22 (signed at 930am)  IMM Given to[de-identified] Patient     Additional Comments: Transport scheduled with [facility] for a 4:30pm  time to [facility]  CM notified pt's daughter Desire Joy, pt and Smith Siegel at Son of dc time  Facility transfer form completed      Accepting Facility Name, [address] : [facility]  Receiving Facility/Agency Phone Number: [phone]  Facility/Agency Fax Number: [phone]

## 2022-09-06 NOTE — ASSESSMENT & PLAN NOTE
· Venous duplex obtained due to LE edema  · Confirmed acute occlusive thrombus in popliteal vein and non-occlusive thrombus in the peroneal veins  · Started on IV heparin gtt 8/19  · Suspect in setting of known metastatic cancer and patient has been more sedentary in recent weeks due to acute illness  · Transition to [address] [place] [address] 8/25  · Tachycardia is improved  No heart strain noted on echo  · Cardiology consult appreciated    Continue on Lopressor 50 mg bid  · Continue to monitor closely

## 2022-09-06 NOTE — ASSESSMENT & PLAN NOTE
· Patient presented with dizziness and diarrhea starting in August that worsened on the day prior to admission  · DDX: Drug induced vs IBD  · CT showed: 1  Diffuse mild-moderate circumferential wall thickening of the colon with pericolic inflammatory stranding consistent with nonspecific pancolitis  2   Multiple stable small scattered sclerotic foci in the visualized lumbar lower thoracic spine osseous pelvis which may reflect osseous metastatic disease in this patient with known metastatic breast cancer  3   Additional stable findings as noted   · GI consulted  · Discontinue Zosyn as symptoms are most likely secondary to ulcerative colitis  · Steroids initiated 8/17  · ESR:  48  · Procalcitonin:  1 49  · CRP:  206 3  · C  Diff: negative  · Stool enteric panel: negative  · Cholestyramine added for diarrhea  · Oncology consulted by GI for further recommendations regarding current cancer regimen playing a role in symptoms  Previously believed due to verzenio which has been on hold since recent admit,    · Flex sig completed 8/20 showed severe colitis into mid sigmoid colon with deep ulcerations  ?Inflammatory bowel disease  · Biopsy reveals colitis  · IV steroids were transitioned to oral prednisone  · Repeat CT abd demonstrates ongoing pancolitis  · GI spoke with IBD specialist Dr Bhumika Avila and CRP was ordered which was 122 4  Pt was restarted on IV Solu-Medrol for 3 more days and repeat CRP was done  Which was 10 1     · Patient is switched back to oral prednisone by GI  · Awaiting repeat P T /OT evaluation - recommending [facility]    Gi office is working on biologic approval  Isaacnatalie Zoe continue prednisone 40 mg daily until  she receives Remicade loading dose

## 2022-09-06 NOTE — ASSESSMENT & PLAN NOTE
Cardiology was consulted during patient's hospitalization for persistent Tachycardia  Tachycardia attributed to acute illness, new diagnosis DVT/PE, progression of metastatic cancer  Patient initially placed on metoprolol 25 b i d  Then increased to 50 mg b i d  Per cardiology Eventually can wean off once sinus tach resolves

## 2022-09-06 NOTE — PHYSICAL THERAPY NOTE
PHYSICAL THERAPY NOTE       09/06/22 0904   PT Last Visit   PT Visit Date 09/06/22   Note Type   Note Type Treatment   Pain Assessment   Pain Assessment Tool 0-10   Pain Score No Pain   Restrictions/Precautions   Weight Bearing Precautions Per Order No   Other Precautions Limb alert  (RUE Limb alert, LUE PICC line - Take BP on L Foream)   General   Chart Reviewed Yes   Additional Pertinent History (S)  Pt with continued fatigue and decreased endurance;  pt now only able to tolerate standing and side stepping 2/2 light headed, SOB and "shaking" in BUE;  HR to 160's  Pt now requires min A for sit to supine and sit to/from standing transfers;  not safe for mod I home DC and inpt rehab is now recommended  Pt in agreement and  aware  Pt c/o sore buttocks;  nurse aware  Family/Caregiver Present No   Cognition   Overall Cognitive Status WFL   Arousal/Participation Alert   Attention Within functional limits   Orientation Level Oriented X4   Memory Within functional limits   Following Commands Follows all commands and directions without difficulty   Subjective   Subjective I am just so weak now   Bed Mobility   Supine to Sit 5  Supervision   Additional items Assist x 1; Increased time required;HOB elevated; Bedrails   Sit to Supine 4  Minimal assistance   Additional items Increased time required;LE management   Additional Comments Pt unable to remain OOB at end of session 2/2 SOB, fatigue, required to return to supine in bed;  pt states she is now calling for assistance to/from commode and no longer able to take herself as she was prior  Transfers   Sit to Stand 4  Minimal assistance   Additional items Assist x 1; Increased time required;Verbal cues  (RW;  Pt with BUE shaking while standing;  pt able to stand for orthostatic BP measurement however (+) SOB and HR to 160's, returned to supine in bed, unable to tolerate ambulation )   Stand to Sit 4  Minimal assistance   Additional items Increased time required   Additional Comments Orthostatic BP measurements:  117/64mmHg, sitting EOB:  115/56mmHg, standing at RW:  106/67mmHg  (+) SOB and HR to 160's  Ambulation/Elevation   Gait pattern Not appropriate  (unable to tolerate)   Distance Pt with BUE shaking while standing;  pt able to stand for orthostatic BP measurement however (+) SOB and HR to 160's, returned to supine in bed, unable to tolerate ambulation  Balance   Static Sitting Fair   Dynamic Sitting Fair   Static Standing Fair -   Dynamic Standing Fair -   Activity Tolerance   Activity Tolerance Treatment limited secondary to medical complications (Comment)  (SOB, HR to 160's with standing, BUE shaking, unable to tolerate increased mobility/PT this session )   Nurse Made Aware Kendra   Assessment   Prognosis Guarded   Problem List Decreased strength;Decreased range of motion;Decreased endurance; Impaired balance;Decreased mobility;Obesity; Decreased skin integrity   Assessment Treatment consisted of bed mobility,balance training, standing tolerance, safety awareness, fall prevention, endurance training to increase upright positions and functional mobility  Pt with decreased endurance, decreased strength (noted in need for physical assistance for bed mobility and transfers) pt with BUE shaking when standing;  (+) SOB, HR to 160's  Pt now requires inpt rehab, not safe for mod I home DC; Pt would benefit from continued PT while in hospital and follow up with inpt rehab at [place] to increase strength, balance, endurance, mobility independence to return to PLOF, decrease caregiver burden and improve quality of life  The patient's AM-PAC Basic Mobility Inpatient Short Form Raw Score is 14  A Raw score of less than or equal to 17 suggests the patient may benefit from discharge to post-acute rehabilitation services   Please also refer to the recommendation of the Physical Therapist for safe discharge planning  Barriers to Discharge Decreased caregiver support   Goals   Patient Goals to figure out why I feel like this   STG Expiration Date 09/13/22   Plan   Treatment/Interventions ADL retraining;Functional transfer training;LE strengthening/ROM; Therapeutic exercise; Endurance training;Patient/family training;Equipment eval/education; Bed mobility;Gait training; Compensatory technique education;Continued evaluation;Spoke to nursing;Spoke to case management;OT   PT Frequency 2-3x/wk   Recommendation   PT Discharge Recommendation Home with home health rehabilitation   Equipment Recommended [address]   Turning in Bed Without Bedrails 3   Lying on Back to Sitting on Edge of Flat Bed 3   Moving Bed to Chair 3   Standing Up From Chair 3   Walk in Room 1   Climb 3-5 Stairs 1   Basic Mobility Inpatient Raw Score 14   Basic Mobility Standardized Score 35 55   Highest Level Of Mobility   JH-HLM Goal 4: Move to chair/commode   JH-HLM Achieved 5: Stand (1 or more minutes)     Ariadne Loza, PT      Patient Name: Stephannie Halsted Clunk  Today's Date: 9/6/2022

## 2022-09-06 NOTE — ASSESSMENT & PLAN NOTE
· Venous duplex obtained due to LE edema  · Confirmed acute occlusive thrombus in popliteal vein and non-occlusive thrombus in the peroneal veins  · Started on IV heparin gtt 8/19t  · Suspect in setting of known metastatic cancer and patient has been more sedentary in recent weeks due to acute illness  · Transition to [address] [place] [address] 8/25  · Tachycardia is improved  No heart strain noted and no chest pain or palpitations  · Cardiology consult appreciated    Continue on Lopressor  · Continue to monitor closely

## 2022-09-06 NOTE — ASSESSMENT & PLAN NOTE
· On Lopressor for tachycardia  · Will d/c losartan on discharge due to borderline blood pressures systolic [phone]

## 2022-09-06 NOTE — PROGRESS NOTES
[place]  Progress Note Debbie Silverman 1940, 79 y o  female MRN: [id number]  Unit/Bed#: -01 Encounter: [id number]  Primary Care Provider: Humphrey Swift DO   Date and time admitted to hospital: 8/15/2022 10:40 AM    * Toxic gastroenteritis and colitis  Assessment & Plan  · Patient presented with dizziness and diarrhea starting in August that worsened yesterday  · Most likely secondary to IBD  · CT showed: 1  Diffuse mild-moderate circumferential wall thickening of the colon with pericolic inflammatory stranding consistent with nonspecific pancolitis  2   Multiple stable small scattered sclerotic foci in the visualized lumbar lower thoracic spine osseous pelvis which may reflect osseous metastatic disease in this patient with known metastatic breast cancer  3   Additional stable findings as noted   · GI consulted-> recs appreciated  · Discontinue Zosyn as symptoms are most likely secondary to ulcerative colitis  · Steroids initiated 8/17  · ESR:  48  · Procalcitonin:  1 49  · CRP:  206 3  · C  Diff: negative  · Stool enteric panel: negative  · Advanced to low-fiber   · Cholestyramine  · Oncology consulted by GI for further recommendations regarding current cancer regimen playing a role in symptoms  Previously believed due to verzenio which has been on hold since recent admit, doubt this is contributing   · Flex sig completed 8/20 concerning for IBD, biopsies taken  Also noted ulcerations  · Biopsy reveals colitis  · IV steroids were transitioned back to oral prednisone  · Repeat CT abd demonstrates ongoing pancolitis  · GI spoke with IBD specialist Dr Owen Lora and CRP was ordered which is 122 4  Patient per their recommendations, was restarted on IV Solu-Medrol for 3 more days and repeat CRP was done  Which was 10 1     · Patient is switched back to oral prednisone by GI  · Awaiting repeat P T /OT evaluation - recommending SNF    Gi office is working on biologic approval  Floresita Echavarria continue prednisone 40 mg daily until  she receives Remicade loading dose    Single subsegmental pulmonary embolism without acute cor pulmonale (HCC)  Assessment & Plan  · Venous duplex obtained due to LE edema  · Confirmed acute occlusive thrombus in popliteal vein and non-occlusive thrombus in the peroneal veins  · Started on IV heparin gtt 8/19t  · Suspect in setting of known metastatic cancer and patient has been more sedentary in recent weeks due to acute illness  · Transition to [address] [place] [address] 8/25  · Tachycardia is improved  No heart strain noted and no chest pain or palpitations  · Cardiology consult appreciated  Continue on Lopressor  · Continue to monitor closely    Metastatic breast cancer [facility])  Assessment & Plan  · Continue to follow-up with Hematology-Oncology outpatient  · Femara had been held on admit  · On recent admission patient's verzenio was held  Reviewed office visit from 8/10/22 appears there was some consideration to resume but at lowered dose  Patient reports she never resumed this medication  · Would recommend holding Femara and Verzenio medications in setting of acute Colitis and until seen by her oncologist in outpatient setting  · Patient will require follow-up to discuss possible lumbar mets noted on CT this admission  · Oncology follow-up noted      Severe protein-calorie malnutrition ([address] )  Assessment & Plan  Malnutrition Findings:   Adult Malnutrition type: Chronic illness  Adult Degree of Malnutrition: Other severe protein calorie malnutrition  Malnutrition Characteristics: Inadequate energy, Weight loss    360 Statement: Pt presents with chronic severe protein calorie malnutrition due to Stage IV metastatic breast cancer, chronic diarrhea vs UC flare as evidenced by 10 5 lb wt loss-14% wt loss in 4 months and <75 % po intake > 1 month  BMI Findings: Body mass index is 29 21 kg/m²         Anemia  Assessment & Plan  · History of chronic anemia, baseline 8-10  · Underwent flex sig which showed inflammation, ulcerations  · Also with BRBPR since starting heparin gtt  · Trend H/H  · Prior provider discussed with GI - recommending IV venofer, continue on IV Venofer  · Hemoglobin this morning is 7 1  · On Protonix  · Ok for d/c from GI stand point    Primary hypertension  Assessment & Plan  · On Lopressor    Chronic kidney disease (CKD) stage G3a/A1, moderately decreased glomerular filtration rate (GFR) between 45-59 mL/min/1 73 square meter and albuminuria creatinine ratio less than 30 mg/g [facility])  Assessment & Plan  Lab Results   Component Value Date    EGFR 53 09/06/2022    EGFR 67 09/05/2022    EGFR 58 09/04/2022    CREATININE 0 98 09/06/2022    CREATININE 0 81 09/05/2022    CREATININE 0 92 09/04/2022     · Cr on admission slightly elevated from her baseline at 1 3, most likely due to volume depletion  · Back to baseline    Mixed hyperlipidemia  Assessment & Plan  · Continue statin    Hypotension due to hypovolemia-resolved as of 8/20/2022  Assessment & Plan  · Patient was hypotensive in the ED and received 2 L bolus  · Blood pressure stable  · Appears IVF had been discontinued due to LE edema          VTE Pharmacologic Prophylaxis: VTE Score: 6 High Risk (Score >/= 5) - Pharmacological DVT Prophylaxis Ordered: rivaroxaban (Xarelto)  Sequential Compression Devices Ordered  Patient Centered Rounds: I performed bedside rounds with nursing staff today  Discussions with Specialists or Other Care Team Provider: GI    Education and Discussions with Family / Patient: Updated  (daughter) via phone  Time Spent for Care: 30 minutes  More than 50% of total time spent on counseling and coordination of care as described above      Current Length of Stay: 22 day(s)  Current Patient Status: Inpatient   Certification Statement: The patient will continue to require additional inpatient hospital stay due to placement  Discharge Plan: Anticipate discharge in 24-48 hrs to rehab facility  Code Status: Level 3 - DNAR and DNI    Subjective:   Patient seen examined at bedside  No acute events overnight  Patient states that she is feeling well  Currently offers no acute complaints  Objective:     Vitals:   Temp (24hrs), Av 6 °F (36 4 °C), Min:97 5 °F (36 4 °C), Max:97 8 °F (36 6 °C)    Temp:  [97 5 °F (36 4 °C)-97 8 °F (36 6 °C)] 97 8 °F (36 6 °C)  HR:  [] 129  Resp:  [12-18] 12  BP: (103-118)/(56-65) 107/65  SpO2:  [97 %-100 %] 100 %  Body mass index is 29 21 kg/m²  Input and Output Summary (last 24 hours): Intake/Output Summary (Last 24 hours) at 2022 1222  Last data filed at 2022 0600  Gross per 24 hour   Intake 840 ml   Output 350 ml   Net 490 ml       Physical Exam:   Physical Exam  Vitals reviewed  HENT:      Head: Normocephalic and atraumatic  Right Ear: External ear normal       Left Ear: External ear normal       Nose: Nose normal       Mouth/Throat:      Mouth: Mucous membranes are moist       Pharynx: Oropharynx is clear  Eyes:      Extraocular Movements: Extraocular movements intact  Cardiovascular:      Rate and Rhythm: Normal rate and regular rhythm  Heart sounds: Normal heart sounds  Pulmonary:      Effort: Pulmonary effort is normal       Breath sounds: Normal breath sounds  Abdominal:      General: Abdomen is flat  Palpations: Abdomen is soft  Tenderness: There is no abdominal tenderness  Musculoskeletal:      Cervical back: Normal range of motion  Right lower leg: No edema  Left lower leg: No edema  Skin:     General: Skin is warm and dry  Neurological:      General: No focal deficit present  Mental Status: She is alert  Mental status is at baseline     Psychiatric:         Mood and Affect: Mood normal          Behavior: Behavior normal           Additional Data:     Labs:  Results from last 7 days   Lab Units 22  0511 22  0612 22  0453   WBC Thousand/uL 4 71 5 01   < > 4 84   HEMOGLOBIN g/dL 7 1* 7 2*   < > 6 8*   HEMATOCRIT % 22 9* 23 3*   < > 21 6*   PLATELETS Thousands/uL 127* 124*   < > 162   BANDS PCT %  --   --   --  2   NEUTROS PCT %  --  79*   < >  --    LYMPHS PCT %  --  12*   < >  --    LYMPHO PCT %  --   --   --  6*   MONOS PCT %  --  6   < >  --    MONO PCT %  --   --   --  3*   EOS PCT %  --  2   < > 0    < > = values in this interval not displayed       Results from last 7 days   Lab Units 09/06/22  0511   SODIUM mmol/L 138   POTASSIUM mmol/L 3 6   CHLORIDE mmol/L 105   CO2 mmol/L 26   BUN mg/dL 16   CREATININE mg/dL 0 98   ANION GAP mmol/L 7   CALCIUM mg/dL 7 8*   ALBUMIN g/dL 1 8*   GLUCOSE RANDOM mg/dL 101                       Lines/Drains:  Invasive Devices  Report    Peripherally Inserted Central Catheter Line  Duration           PICC Line 08/19/22 17 days                Central Line:  Goal for removal: N/A - Chronic PICC             Imaging: Reviewed radiology reports from this admission including: ultrasound(s)    Recent Cultures (last 7 days):         Last 24 Hours Medication List:   Current Facility-Administered Medications   Medication Dose Route Frequency Provider Last Rate    acetaminophen  650 mg Oral Q6H PRN Velna Hodgkins, MD      amitriptyline  25 mg Oral HS Velna Hodgkins, MD      calcium carbonate  500 mg Oral Daily PRN Velna Hodgkins, MD      cholestyramine sugar free  4 g Oral HS BROOKE Pérez      folic acid  1 mg Oral Daily Ricki Cohen DO      iron sucrose  200 mg Intravenous Once per day on Mon Wed Fri Shalini Dang PA-C      letrozole  2 5 mg Oral Daily BROOKE Aragon      loperamide  2 mg Oral Q4H PRN Kurtis WOODARD PA-C      melatonin  3 mg Oral HS Michell Hanks PA-C      metoprolol tartrate  50 mg Oral Q12H [facility] Ricki Cohen DO      mirtazapine  7 5 mg Oral HS Velna Hodgkins, MD      ondansetron  4 mg Intravenous Q6H PRN Velna Hodgkins, MD      pantoprazole 40 mg Oral BID BROOKE Jurado      pravastatin  20 mg Oral Daily With Diamante Holman MD      predniSONE  40 mg Oral Daily YUM! Tylers, CRHEMANTH      rivaroxaban  15 mg Oral BID With Meals [facility] KAIA WOODARD      simethicone  80 mg Oral 4x Daily (with meals and at bedtime) Sofia Santiago PA-C          Today, Patient Was Seen By: Vika Muñoz DO    **Please Note: This note may have been constructed using a voice recognition system  **

## 2022-09-06 NOTE — OCCUPATIONAL THERAPY NOTE
Occupational Therapy Treatment Note     Patient Name: Fidelia Silverman  IGSDU'Z Date: 9/6/2022  Problem List  Principal Problem:    Toxic gastroenteritis and colitis  Active Problems:    Metastatic breast cancer ([address] )    Chronic kidney disease (CKD) stage G3a/A1, moderately decreased glomerular filtration rate (GFR) between 45-59 mL/min/1 73 square meter and albuminuria creatinine ratio less than 30 mg/g (HCC)    Primary hypertension    Anemia    Severe protein-calorie malnutrition (HCC)    Single subsegmental pulmonary embolism without acute cor pulmonale (HCC)    Tachycardia            09/06/22 1402   OT Last Visit   OT Visit Date 09/06/22   Note Type   Note Type Treatment   Restrictions/Precautions   Weight Bearing Precautions Per Order No   Other Precautions Fall Risk   Pain Assessment   Pain Assessment Tool 0-10   Pain Score No Pain   ADL   LB Dressing Assistance 3  Moderate Assistance   LB Dressing Deficit Thread RLE into pants; Thread LLE into pants   Toileting Assistance  4  Minimal Assistance   Toileting Deficit Supervison/safety; Bedside commode;Perineal hygiene   Bed Mobility   Supine to Sit 5  Supervision   Additional items Increased time required;HOB elevated   Sit to Supine 4  Minimal assistance   Additional items Assist x 1;LE management;Verbal cues   Additional Comments Pt SOB & fatigued after toileting   Transfers   Sit to Stand 4  Minimal assistance   Additional items Assist x 1; Increased time required;Verbal cues;Armrests   Stand to Sit 4  Minimal assistance   Additional items Assist x 1; Increased time required;Verbal cues;Armrests   Stand pivot 4  Minimal assistance   Additional items Armrests; Increased time required;Verbal cues  (RW)   Toilet transfer 4  Minimal assistance   Additional items Assist x 1;Commode  (Pt utilized bed rails next to commode for support)   Additional Comments Multiple sit <> stands performed  Pt tearful during session   Pt with new tremors, possibly 2/2 fatigue & deconditioning? Cognition   Overall Cognitive Status WFL   Arousal/Participation Alert   Attention Within functional limits   Orientation Level Oriented X4   Memory Within functional limits   Following Commands Follows one step commands without difficulty   Activity Tolerance   Activity Tolerance Patient limited by fatigue   Medical Staff Made Aware RN Kendra   Assessment   Assessment Pt seen for OT tx session with focus on functional balance, functional mobility, ADL status, and transfer safety  Patient agreeable to OT treatment session  Pt received supine in bed  Pt performed supine > sit with S  Pt performed 3 sit <> stands with Min Ax1  Pt required Min Ax1 for toilet transfer & stand pivot <> commode with RW  Pt required Mod A to doff soiled underwear & Mod A to don clean brief  Pt able to perform regan-care in stance with CGA for balance  Pt performed sit > supine with Min Ax1 for LE management & verbal cues  Pt with a decrease in functional performance due to deconditioning  Patient continues to be functioning below baseline level, occupational performance remains limited secondary to factors listed above, and pt at increased risk for falls and injury  The patient's raw score on the AM-PAC Daily Activity inpatient short form is 18, standardized score is 38 66, less than 39 4  Patients at this level are likely to benefit from DC to post-acute rehabilitation services  Please refer to the recommendation of the Occupational Therapist for safe DC planning  From OT standpoint, recommendation at time of D/C would be Short Term Rehab  Patient to benefit from continued Occupational Therapy treatment while in the hospital to address deficits as defined above and maximize level of functional independence with ADLs and functional mobility  Pt left supine in bed with call bell in reach, tray table in reach, needs met, RN informed     Plan   Treatment Interventions ADL retraining;Functional transfer training;UE strengthening/ROM; Endurance training;Patient/family training;Equipment evaluation/education; Compensatory technique education; Energy conservation; Activityengagement;Continued evaluation   Goal Expiration Date 09/09/22   OT Treatment Day 1   OT Frequency 2-3x/wk   Recommendation   OT Discharge Recommendation Post acute rehabilitation services   AM-PAC Daily Activity Inpatient   Lower Body Dressing 2   Bathing 2   Toileting 3   Upper Body Dressing 3   Grooming 4   Eating 4   Daily Activity Raw Score 18   Daily Activity Standardized Score (Calc for Raw Score >=11) 38 66   AM-PAC Applied Cognition Inpatient   Following a Speech/Presentation 4   Understanding Ordinary Conversation 4   Taking Medications 4   Remembering Where Things Are Placed or Put Away 4   Remembering List of 4-5 Errands 4   Taking Care of Complicated Tasks 4   Applied Cognition Raw Score 24   Applied Cognition Standardized Score 62 21     Amee Wylie, OTR/L

## 2022-09-06 NOTE — ASSESSMENT & PLAN NOTE
· Patient presented with dizziness and diarrhea starting in August that worsened yesterday  · Most likely secondary to IBD  · CT showed: 1  Diffuse mild-moderate circumferential wall thickening of the colon with pericolic inflammatory stranding consistent with nonspecific pancolitis  2   Multiple stable small scattered sclerotic foci in the visualized lumbar lower thoracic spine osseous pelvis which may reflect osseous metastatic disease in this patient with known metastatic breast cancer  3   Additional stable findings as noted   · GI consulted-> recs appreciated  · Discontinue Zosyn as symptoms are most likely secondary to ulcerative colitis  · Steroids initiated 8/17  · ESR:  48  · Procalcitonin:  1 49  · CRP:  206 3  · C  Diff: negative  · Stool enteric panel: negative  · Advanced to low-fiber   · Cholestyramine  · Oncology consulted by GI for further recommendations regarding current cancer regimen playing a role in symptoms  Previously believed due to verzenio which has been on hold since recent admit, doubt this is contributing   · Flex sig completed 8/20 concerning for IBD, biopsies taken  Also noted ulcerations  · Biopsy reveals colitis  · IV steroids were transitioned back to oral prednisone  · Repeat CT abd demonstrates ongoing pancolitis  · GI spoke with IBD specialist Dr Owen Lora and CRP was ordered which is 122 4  Patient per their recommendations, was restarted on IV Solu-Medrol for 3 more days and repeat CRP was done  Which was 10 1     · Patient is switched back to oral prednisone by GI  · Awaiting repeat P T /OT evaluation - recommending [facility]    Gi office is working on biologic approval  Sunillarry Lona continue prednisone 40 mg daily until  she receives Remicade loading dose

## 2022-09-06 NOTE — PLAN OF CARE
Problem: PHYSICAL THERAPY ADULT  Goal: Performs mobility at highest level of function for planned discharge setting  See evaluation for individualized goals  Description: Treatment/Interventions: LE strengthening/ROM, Therapeutic exercise, Endurance training, Patient/family training, Equipment eval/education, Gait training, Compensatory technique education, Continued evaluation, Spoke to nursing, OT          See flowsheet documentation for full assessment, interventions and recommendations  Note: Prognosis: Guarded  Problem List: Decreased strength, Decreased range of motion, Decreased endurance, Impaired balance, Decreased mobility, Obesity, Decreased skin integrity  Assessment: Treatment consisted of bed mobility,balance training, standing tolerance, safety awareness, fall prevention, endurance training to increase upright positions and functional mobility  Pt with decreased endurance, decreased strength (noted in need for physical assistance for bed mobility and transfers) pt with BUE shaking when standing;  (+) SOB, HR to 160's  Pt now requires inpt rehab, not safe for mod I home DC; Pt would benefit from continued PT while in hospital and follow up with inpt rehab at DC to increase strength, balance, endurance, mobility independence to return to PLOF, decrease caregiver burden and improve quality of life  The patient's AM-PAC Basic Mobility Inpatient Short Form Raw Score is 14  A Raw score of less than or equal to 17 suggests the patient may benefit from discharge to post-acute rehabilitation services  Please also refer to the recommendation of the Physical Therapist for safe discharge planning  Barriers to Discharge: Decreased caregiver support     PT Discharge Recommendation: Home with home health rehabilitation    See flowsheet documentation for full assessment

## 2022-09-06 NOTE — DISCHARGE INSTR - AVS FIRST PAGE
Please continue taking prednisone 40 mg daily until GI is able to obtain prior approval for biologic agent for your colitis  CT scan of the chest showed you have a blood clot in the lung  You are being discharged on xeralto 15 mg twice daily until 9/15 then continue taking xeralto 20 mg daily    Please follow up with Dr German Sandhu regarding your on going cancer treatment    You are also being discharged on metoprolol 50 mg twice daily for your rapid heart rate  Your losartan has been discontinued

## 2022-09-06 NOTE — DISCHARGE SUMMARY
[place]  Discharge- Matt Silverman 1940, 79 y o  female MRN: [id number]  Unit/Bed#: -01 Encounter: [id number]  Primary Care Provider: Debbie Chris DO   Date and time admitted to hospital: 8/15/2022 10:40 AM    * Toxic gastroenteritis and colitis  Assessment & Plan  · Patient presented with dizziness and diarrhea starting in August that worsened on the day prior to admission  · DDX: Drug induced vs IBD  · CT showed: 1  Diffuse mild-moderate circumferential wall thickening of the colon with pericolic inflammatory stranding consistent with nonspecific pancolitis  2   Multiple stable small scattered sclerotic foci in the visualized lumbar lower thoracic spine osseous pelvis which may reflect osseous metastatic disease in this patient with known metastatic breast cancer  3   Additional stable findings as noted   · GI consulted  · Discontinue Zosyn as symptoms are most likely secondary to ulcerative colitis  · Steroids initiated 8/17  · ESR:  48  · Procalcitonin:  1 49  · CRP:  206 3  · C  Diff: negative  · Stool enteric panel: negative  · Cholestyramine added for diarrhea  · Oncology consulted by GI for further recommendations regarding current cancer regimen playing a role in symptoms  Previously believed due to verzenio which has been on hold since recent admit,    · Flex sig completed 8/20 showed severe colitis into mid sigmoid colon with deep ulcerations  ?Inflammatory bowel disease  · Biopsy reveals colitis  · IV steroids were transitioned to oral prednisone  · Repeat CT abd demonstrates ongoing pancolitis  · GI spoke with IBD specialist Dr Krishna Reynolds and CRP was ordered which was 122 4  Pt was restarted on IV Solu-Medrol for 3 more days and repeat CRP was done  Which was 10 1     · Patient is switched back to oral prednisone by GI  · Awaiting repeat P T /OT evaluation - recommending [facility]    Gi office is working on biologic approval  Selvin Granado continue prednisone 40 mg daily until  she receives Remicade loading dose    Single subsegmental pulmonary embolism without acute cor pulmonale (HCC)  Assessment & Plan  · Venous duplex obtained due to LE edema  · Confirmed acute occlusive thrombus in popliteal vein and non-occlusive thrombus in the peroneal veins  · Started on IV heparin gtt 8/19  · Suspect in setting of known metastatic cancer and patient has been more sedentary in recent weeks due to acute illness  · Transition to [address] [place] [address] 8/25  · Tachycardia is improved  No heart strain noted on echo  · Cardiology consult appreciated  Continue on Lopressor 50 mg bid  · Continue to monitor closely    Tachycardia  Assessment & Plan  Cardiology was consulted during patient's hospitalization for persistent Tachycardia  Tachycardia attributed to acute illness, new diagnosis DVT/PE, progression of metastatic cancer  Patient initially placed on metoprolol 25 b i d  Then increased to 50 mg b i d  Per cardiology Eventually can wean off once sinus tach resolves  Metastatic breast cancer [facility])  Assessment & Plan  · Continue to follow-up with Hematology-Oncology outpatient  · Femara had been held on admit  · On recent admission patient's verzenio was held  Reviewed office visit from 8/10/22 appears there was some consideration to resume but at lowered dose  Patient reports she never resumed this medication  · Would recommend holding Femara and Verzenio medications in setting of acute Colitis and until seen by her oncologist in outpatient setting     · Patient will require follow-up to discuss possible lumbar mets noted on CT this admission  · Oncology follow-up noted      Severe protein-calorie malnutrition ([address] )  Assessment & Plan  Malnutrition Findings:   Adult Malnutrition type: Chronic illness  Adult Degree of Malnutrition: Other severe protein calorie malnutrition  Malnutrition Characteristics: Inadequate energy, Weight loss    360 Statement: Pt presents with chronic severe protein calorie malnutrition due to Stage IV metastatic breast cancer, chronic diarrhea vs UC flare as evidenced by 10 5 lb wt loss-14% wt loss in 4 months and <75 % po intake > 1 month  BMI Findings: Body mass index is 29 21 kg/m²  Anemia  Assessment & Plan  · History of chronic anemia, baseline 8-10  · Underwent flex sig which showed inflammation, ulcerations  · Also with BRBPR since starting heparin gtt  · Trend H/H  · Prior provider discussed with GI - recommending IV venofer, continue on IV Venofer  · Hemoglobin this morning is 7 1  · On Protonix  · Ok for d/c from GI stand point    Primary hypertension  Assessment & Plan  · On Lopressor for tachycardia  · Will d/c losartan on discharge due to borderline blood pressures systolic 045-222    Chronic kidney disease (CKD) stage G3a/A1, moderately decreased glomerular filtration rate (GFR) between 45-59 mL/min/1 73 square meter and albuminuria creatinine ratio less than 30 mg/g [facility])  Assessment & Plan  Lab Results   Component Value Date    EGFR 53 09/06/2022    EGFR 67 09/05/2022    EGFR 58 09/04/2022    CREATININE 0 98 09/06/2022    CREATININE 0 81 09/05/2022    CREATININE 0 92 09/04/2022     · Cr on admission slightly elevated from her baseline at 1 3, most likely due to volume depletion     · Back to baseline    Mixed hyperlipidemia  Assessment & Plan  · Continue statin    Hypotension due to hypovolemia-resolved as of 8/20/2022  Assessment & Plan  · Patient was hypotensive in the ED and received 2 L bolus  · Blood pressure stable  · Appears IVF had been discontinued due to LE edema        Medical Problems             Resolved Problems  Date Reviewed: 9/6/2022   None               Discharging Physician / Practitioner: Danuta Grant DO  PCP: Elliot Meza DO  Admission Date:   Admission Orders (From admission, onward)     Ordered        08/15/22 1351  INPATIENT ADMISSION  Once                      Discharge Date: 09/06/22    [address] Stay:  · GI  · Cardio  · Oncology    Procedures Performed:   · Flex sigmoidoscopy    Significant Findings / Test Results:   IMPRESSION:  1  Severe colitis into mid sigmoid colon with deep ulcerations  Inflammatory bowel disease? Infectious (CMV)?     RECOMMENDATION:  Resume low-fiber diet  Await biopsy results  Sent STAT  There is no recommended follow-up for this procedure  due to age (age = 77 or greater)      CT PE: Right lower lobe subsegmental embolus  No central embolus  No CT evidence of right heart strain  CT abd/pelvis: pand colitis    Incidental Findings:   · See above     Test Results Pending at Discharge (will require follow up):   · n/a     Outpatient Tests Requested:  · Outpatient GI follow up  · Monitor tachycardia  · Heme/onc follow up  · Periodic hemoglobin monitoring    Complications:  n/a    Reason for Admission: Colitis    Hospital Course:   Annabella Mahajan is a 79 y o  female patient who originally presented to the hospital on 8/15/2022 due to progressively worsening diarrhea  CT abd showed pancolitis  Patient initially placed on zosyn (which was later discontinued) and GI was consulted  C diff and stool enteric panel were negative  Flex sigmoidoscopy done on 8/20 showing severe colitis and patient was started on IV steroids and later transitioned to PO steroids  Repeat CT abdomen continued to show pancolitis, Gi spoke with IBD specialist Dr Owen Lora  CRP noted to be elevated and was started on another 3 day course of IV solumedrol  Repeat CRP showed improvement and patient was transitioned to prednisone 40 mg daily  Patient is to continue prednisone 40 mg daily until GI is able to obtain approval for Remicade  Oncology was consulted per Gi recommendations in regards to cancer treatment playing a role in symptoms  Patient was on Verzenio which has been on hold since admission  Will continue to hold on discharge and have patient follow up with oncology on discharge       During hospital course patient was noted to have Lower extremity edema  Venous duplex confirmed acute occlusive thrombus in popliteal vein  CT PE also showed RLL subsegmental PE  Patient was started on heparin gtt on 8/19 and transitioned to [place] on 8/25  Patient also evaluated by cardio for tachycardia  Thought to be multifactorial and patient is being discharged on lopressor 50 mg bid  Pt losartan has been discontinued due to borderline BP with systolic in 574 to 601 range  Please see above list of diagnoses and related plan for additional information  Condition at Discharge: fair    Discharge Day Visit / Exam:   * Please refer to separate progress note for these details *    Discussion with Family: Patient declined call to   Discharge instructions/Information to patient and family:   See after visit summary for information provided to patient and family  Provisions for Follow-Up Care:  See after visit summary for information related to follow-up care and any pertinent home health orders  Disposition:   Izabella Osborne at [facility] Readmission: no     Discharge Statement:  I spent 45 minutes discharging the patient  This time was spent on the day of discharge  I had direct contact with the patient on the day of discharge  Greater than 50% of the total time was spent examining patient, answering all patient questions, arranging and discussing plan of care with patient as well as directly providing post-discharge instructions  Additional time then spent on discharge activities  Discharge Medications:  See after visit summary for reconciled discharge medications provided to patient and/or family        **Please Note: This note may have been constructed using a voice recognition system**

## 2022-09-06 NOTE — ASSESSMENT & PLAN NOTE
· Continue to follow-up with Hematology-Oncology outpatient  · Femara had been held on admit  · On recent admission patient's verzenio was held  Reviewed office visit from 8/10/22 appears there was some consideration to resume but at lowered dose  Patient reports she never resumed this medication  · Would recommend holding Femara and Verzenio medications in setting of acute Colitis and until seen by her oncologist in outpatient setting     · Patient will require follow-up to discuss possible lumbar mets noted on CT this admission  · Oncology follow-up noted

## 2022-09-06 NOTE — ASSESSMENT & PLAN NOTE
Malnutrition Findings:   Adult Malnutrition type: Chronic illness  Adult Degree of Malnutrition: Other severe protein calorie malnutrition  Malnutrition Characteristics: Inadequate energy, Weight loss    360 Statement: Pt presents with chronic severe protein calorie malnutrition due to Stage IV metastatic breast cancer, chronic diarrhea vs UC flare as evidenced by 10 5 lb wt loss-14% wt loss in 4 months and <75 % po intake > 1 month  BMI Findings: Body mass index is 29 21 kg/m²

## 2022-09-06 NOTE — PROGRESS NOTES
Progress note - Gastroenterology   Jacobydarnell Silverman 79 y o  female MRN: [id number]  Unit/Bed#: -01 Encounter: [id number]    ASSESSMENT and PLAN  1  Acute colitis  2  Diarrhea  Flex-sig 8/20/22 showed severe colitis into the mid sigmoid colon with deep ulcerations (not present on colonoscopy November 2021)  Differential primarily includes drug-induced colitis or IBD  Diarrhea and inflammatory markers responded to IV steroids which were transitioned to prednisone September 1st   Has not required Imodium in several days    continue Questran and prednisone     Our office is working on biologic approval  Najma Jacques continue prednisone 40 mg daily until  she receives Remicade loading dose   No GI contraindication for transfer to skilled nursing     3  Acute blood loss anemia  Secondary to colitis and exacerbated by anticoagulation for DVT/PE   Does not meet criteria for transfusion     Hemoglobin stable at 7 1 today,   Continue IV iron        4 DVT and pulmonary embolism   Xarelto 15 mg p o  Twice daily       5  Metastatic breast cancer  Followed by Dr Anastasia Severe     6  Deconditioning  Exacerbated by anemia, await PT/OT evaluation for short-term rehab    Chief Complaint   Patient presents with    Dizziness     Via ems from home for increased dizziness and multiple episodes diarrhea since the beginning of August  States worse since last night   States is getting new chemo medication       SUBJECTIVE/HPI   Mild lower abdominal cramping last evening but feels better today  Had soft to mushy bowel movement last p m -no blood noted  Appetite slightly improved, taking Ensure 2-3 times per day    /65 (BP Location: Left arm)   Pulse (!) 129   Temp 97 8 °F (36 6 °C) (Oral)   Resp 12   Ht 5' 1" (1 549 m)   Wt 70 1 kg (154 lb 9 6 oz)   LMP  (LMP Unknown)   SpO2 100%   BMI 29 21 kg/m²     PHYSICALEXAM  General appearance: alert, appears stated age and cooperative  Eyes:  no icterus   Head: Normocephalic, without obvious abnormality, atraumatic  Lungs: clear to auscultation bilaterally  Heart: regular rate and rhythm, S1, S2 normal, no murmur, click, rub or gallop  Abdomen: soft, non-tender; bowel sounds normal, continues to have mushy bowel movements 1-2 times per day, no blood or rectal bleeding noted  Extremities:  +1-+2 +2 pitting edema bilateral lower extremities, weak  Neurologic: Grossly normal    Lab Results   Component Value Date    GLUCOSE 115 09/08/2015    CALCIUM 7 8 (L) 09/06/2022     09/08/2015    K 3 6 09/06/2022    CO2 26 09/06/2022     09/06/2022    BUN 16 09/06/2022    CREATININE 0 98 09/06/2022     Lab Results   Component Value Date    WBC 4 71 09/06/2022    HGB 7 1 (L) 09/06/2022    HCT 22 9 (L) 09/06/2022     (H) 09/06/2022     (L) 09/06/2022     Lab Results   Component Value Date    ALT 17 08/28/2022    AST 8 08/28/2022    ALKPHOS 72 08/28/2022    BILITOT 0 66 09/08/2015     No results found for: AMYLASE  Lab Results   Component Value Date    LIPASE 79 08/15/2022     Lab Results   Component Value Date    IRON 19 (L) 08/28/2022    TIBC 105 (L) 08/28/2022    FERRITIN 755 (H) 08/28/2022     Lab Results   Component Value Date    INR 1 11 08/19/2022

## 2022-09-06 NOTE — PLAN OF CARE
Problem: OCCUPATIONAL THERAPY ADULT  Goal: Performs self-care activities at highest level of function for planned discharge setting  See evaluation for individualized goals  Description: Treatment Interventions: ADL retraining, Functional transfer training, UE strengthening/ROM, Endurance training, Patient/family training, Equipment evaluation/education, Compensatory technique education, Energy conservation, Activityengagement, Continued evaluation          See flowsheet documentation for full assessment, interventions and recommendations  Outcome: Not Progressing  Note: Limitation: Decreased self-care trans, Decreased high-level ADLs, Decreased ADL status, Decreased endurance  Prognosis: Good  Assessment: Pt seen for OT tx session with focus on functional balance, functional mobility, ADL status, and transfer safety  Patient agreeable to OT treatment session  Pt received supine in bed  Pt performed supine > sit with S  Pt performed 3 sit <> stands with Min Ax1  Pt required Min Ax1 for toilet transfer & stand pivot <> commode with RW  Pt required Mod A to doff soiled underwear & Mod A to don clean brief  Pt able to perform regan-care in stance with CGA for balance  Pt performed sit > supine with Min Ax1 for LE management & verbal cues  Pt with a decrease in functional performance due to deconditioning  Patient continues to be functioning below baseline level, occupational performance remains limited secondary to factors listed above, and pt at increased risk for falls and injury  The patient's raw score on the AM-PAC Daily Activity inpatient short form is 18, standardized score is 38 66, less than 39 4  Patients at this level are likely to benefit from DC to post-acute rehabilitation services  Please refer to the recommendation of the Occupational Therapist for safe DC planning  From OT standpoint, recommendation at time of D/C would be Short Term Rehab   Patient to benefit from continued Occupational Therapy treatment while in the hospital to address deficits as defined above and maximize level of functional independence with ADLs and functional mobility  Pt left supine in bed with call bell in reach, tray table in reach, needs met, RN informed       OT Discharge Recommendation: Post acute rehabilitation services

## 2022-09-06 NOTE — ASSESSMENT & PLAN NOTE
Lab Results   Component Value Date    EGFR 53 09/06/2022    EGFR 67 09/05/2022    EGFR 58 09/04/2022    CREATININE 0 98 09/06/2022    CREATININE 0 81 09/05/2022    CREATININE 0 92 09/04/2022     · Cr on admission slightly elevated from her baseline at 1 3, most likely due to volume depletion     · Back to baseline

## 2022-09-06 NOTE — CASE MANAGEMENT
Case Management Discharge Planning Note    Patient name Seymour Silverman  Location /-01 MRN [id number]  : 1940 Date 2022       Current Admission Date: 8/15/2022  Current Admission Diagnosis:Toxic gastroenteritis and colitis   Patient Active Problem List    Diagnosis Date Noted    Tachycardia 2022    Single subsegmental pulmonary embolism without acute cor pulmonale ([facility] ) 2022    Severe protein-calorie malnutrition ([facility] ) 2022    Anemia 2022    Hypokalemia 2022    REYES (acute kidney injury) ([place] [id number] ) 2022    Diarrhea 2022    Secondary malignant neoplasm of bone ([place] [id number] ) 2022    Toxic gastroenteritis and colitis 2022    Rectal bleeding 10/14/2021    Lytic lesion of bone on x-ray 2021    Neoplasm of uncertain behavior of sternum 2021    Cough due to ACE inhibitor 2021    Acute costochondritis 2021    Osteopenia of multiple sites 2020    Primary hypertension 10/22/2019    Chronic kidney disease (CKD) stage G3a/A1, moderately decreased glomerular filtration rate (GFR) between 45-59 mL/min/1 73 square meter and albuminuria creatinine ratio less than 30 mg/g (HCC) 2019    Adverse reaction to pneumococcal vaccine 2015    Cataract, bilateral 2014    Pulmonary nodule seen on imaging study 09/10/2013    Sleep disorder 2013    Anxiety 2013    Metastatic breast cancer ([facility] ) 10/17/2012    Mixed hyperlipidemia 2012      LOS (days): 22  Geometric Mean LOS (GMLOS) (days): 5 00  Days to GMLOS:-17     OBJECTIVE:  Risk of Unplanned Readmission Score: 35 07         Current admission status: Inpatient   Preferred Pharmacy:   [facility] DR ROBIN BAUMAN [place], [place] - [address]  [address] [facility]  Phone: [phone] Fax: [phone] [facility], [address] [place] [address]  [address]  [facility] [address]  Phone: [phone] Fax: [phone]    Primary Care Provider: Katty Singletary DO    Primary Insurance: MEDICARE  Secondary Insurance: Andrés Weller DETAILS:      Other Referral/Resources/Interventions Provided:  Interventions: Short Term Rehab  Referral Comments: referrals sent to SNF's within a 20 mile radius    Treatment Team Recommendation: Short Term Rehab  Discharge Destination Plan[de-identified] Short Term Rehab  Transport at Discharge : Wheelchair [facility]     IMM Given (Date):: 09/06/22 (signed at 930am)  IMM Given to[de-identified] Patient     Additional Comments: Met with pt to discuss therapies recommendation for rehab  Pt is agreeable and requesting referrals to SNF's within a 20 mile radius  Pt is not vaccinated  Met with pt to discuss accepting facilities; pt is requesting [facility]  Pt asked CM to contact her daughter to inform her of same  CM called and spoke to pt's daughter Osvaldo Hairston; she is agreeable to [facility]  CM requested [facility] transport for a 3pm ; awaiting confirmation of time  CM discussed [facility] transport cost with pt; she is aware adn agreeable

## 2022-09-07 ENCOUNTER — NURSING HOME VISIT (OUTPATIENT)
Dept: FAMILY MEDICINE CLINIC | Facility: CLINIC | Age: 82
End: 2022-09-07
Payer: MEDICARE

## 2022-09-07 ENCOUNTER — TELEPHONE (OUTPATIENT)
Dept: GASTROENTEROLOGY | Facility: CLINIC | Age: 82
End: 2022-09-07

## 2022-09-07 DIAGNOSIS — Z11.1 SCREENING-PULMONARY TB: Primary | ICD-10-CM

## 2022-09-07 DIAGNOSIS — C79.51 SECONDARY MALIGNANT NEOPLASM OF BONE (HCC): ICD-10-CM

## 2022-09-07 DIAGNOSIS — I10 PRIMARY HYPERTENSION: ICD-10-CM

## 2022-09-07 DIAGNOSIS — I26.93 SINGLE SUBSEGMENTAL PULMONARY EMBOLISM WITHOUT ACUTE COR PULMONALE (HCC): ICD-10-CM

## 2022-09-07 DIAGNOSIS — K52.1 TOXIC GASTROENTERITIS AND COLITIS: Primary | ICD-10-CM

## 2022-09-07 DIAGNOSIS — N18.31 CHRONIC KIDNEY DISEASE (CKD) STAGE G3A/A1, MODERATELY DECREASED GLOMERULAR FILTRATION RATE (GFR) BETWEEN 45-59 ML/MIN/1.73 SQUARE METER AND ALBUMINURIA CREATININE RATIO LESS THAN 30 MG/G (HCC): ICD-10-CM

## 2022-09-07 DIAGNOSIS — N17.9 AKI (ACUTE KIDNEY INJURY) (HCC): ICD-10-CM

## 2022-09-07 DIAGNOSIS — K52.1 TOXIC GASTROENTERITIS AND COLITIS: ICD-10-CM

## 2022-09-07 PROCEDURE — 99306 1ST NF CARE HIGH MDM 50: CPT | Performed by: FAMILY MEDICINE

## 2022-09-07 RX ORDER — DIPHENHYDRAMINE HCL 25 MG
25 TABLET ORAL ONCE
Status: CANCELLED | OUTPATIENT
Start: 2022-09-15

## 2022-09-07 RX ORDER — METHYLPREDNISOLONE SODIUM SUCCINATE 40 MG/ML
40 INJECTION, POWDER, LYOPHILIZED, FOR SOLUTION INTRAMUSCULAR; INTRAVENOUS ONCE
Status: CANCELLED | OUTPATIENT
Start: 2022-09-15

## 2022-09-07 RX ORDER — SODIUM CHLORIDE 9 MG/ML
20 INJECTION, SOLUTION INTRAVENOUS ONCE
Status: CANCELLED | OUTPATIENT
Start: 2022-09-15

## 2022-09-07 RX ORDER — ACETAMINOPHEN 325 MG/1
650 TABLET ORAL ONCE
Status: CANCELLED | OUTPATIENT
Start: 2022-09-15

## 2022-09-07 NOTE — CONSULTS
Medical Oncology/Hematology Consult Note  Quincy Chantale Silverman, 79 y  o , 1940  /-[birth year], [id number]  08/19/22    Assessment and Plan:    1  Metastatic breast cancer  Patient has a history stage I A ER/CO positive, 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 s/p resection and adjuvant radiation therapy  She did receive 5 years of adjuvant hormonal therapy with Arimidex     Due to symptoms of bony discomfort in her sternum she underwent imaging on 6/25/2021  CT of chest demonstrate evidence of a large lytic lesion involving the upper half of the body of the sternum suspicious for metastatic disease  CT of abdomen and pelvis showed additional osseous lesions      On 07/09/2021 patient underwent IR bone biopsy:  Final Diagnosis  A  Bone, Sternal mass, Biopsy:  - Metastatic carcinoma, consistent with breast primary  Patient now has stage IV disease with bony metastasis which was ER positive, CO negative, HER2 negative     Since patient had Oligometastatic metastatic disease she was started on treatment with Faslodex in combination with Xgeva in July 2021  She was treated with a course of radiation therapy at [facility]      Imaging on 4/5/22  showed interval development of a 4 mm nodule at the right lower lobe along with interval development of increased sclerotic lesions throughout the visualized spine for example there is interval development of 7 mm sclerotic lesion in the anterior superior aspect of T8 concerning for metastases     Patient's treatment was changed to Femara and Taffy Snow in 4/2022     Due to poor tolerance, Taffy Snow was discontinued and patient was started on Verzenio 150 mg p o  along with Femara 07/18/2022    Verzenio has been on hold since 8/1/22        2  Diarrhea  Severe diarrhea first started approximately one week after first starting Verzenio    Patient did not take any antidiarrheals and her severe diarrhea (grade 3) led to dehydration and hospital stay earlier this month (8/1-8/3/22)  Verzenio is a CDK inhibitor selective for CDK4 and CDK6  This is a targeted therapy used in the treatment of metastatic breast cancer  This is not chemotherapy  However, Common side effect of this medication is diarrhea  Most patients experienced diarrhea during the first month of Verzenio treatment  The median time to onset of the first diarrhea event ranged from 6 to 8 days; and the median duration of Grade 2 and Grade 3 diarrhea ranged from 6 to 11 days and 5 to 8 days, respectively  Across trials, 19% to 26% of patients with diarrhea required a Verzenio dose interruption and 13% to 23% required a dose reduction  CT scan on this admission shows diffuse colitis    Verzenio has been held since 8/1  It is unlikely this medication is resulting in continuation of her diarrhea    Patient is also on letrozole  This is an antiestrogen  This is not chemotherapy  This is a hormonal therapy  Diarrhea is not associated with this drug  Her letrozole has been held during this hospital stay  Patient continues to have diarrhea    GI is following  Appreciate recommendations  She has been started on steroids, cholestyramine and mesalamine  Would likely benefit from flex sig if diarrhea does not improve    PLAN:  · Letrozole does not need to be held  However at patient's request will continue to hold off on this  We will not resume treatment with Verzenio at a lower dose as originally discussed  · I have requested her tumor be sent off for molecular studies to look for any targetable mutations  If she has a PIK3CA mutation, can consider Piqray  · Other consideration include Ibrance verses Afinitor       Outpatient follow up plan:  I will arrange for outpatient follow-up with myself or Dr Gisel Banerjee upon hospital discharge to discuss other treatment options for her stage IV breast cancer since patient has been unable to tolerate current line of therapy         Please do not hesitate to contact me if you have any questions or need additional information  Thank you for this consult  Alden Jai Francis MSN, [facility]  Hematology Oncology Nurse Practitioner      Reason for Consultation: diarrhea, metastatic breast cancer         History of present illness:   Fidelia Silverman is a 79 y o  female who presents with dizziness and diarrhea on 8/15/22  CT AP with contrast shows:   IMPRESSION:   1   Diffuse mild-moderate circumferential wall thickening of the colon with pericolic inflammatory stranding consistent with nonspecific pancolitis  2   Multiple stable small scattered sclerotic foci in the visualized lumbar lower thoracic spine osseous pelvis which may reflect osseous metastatic disease in this patient with known metastatic breast cancer  Brendan Silverman current outpatient oncology regimen is: Letrozole and Verzenio, last dose Verzenio was 8/1/22  Her letrozole has been on hold this hospital stay      Oncologic History:  Primary Outpatient Hematology/Oncology Provider: QI Connor    Oncology History   Per Dr Ismael Dominguez last clinic note dated 7/18/22  " patient is a pleasant 79-year-old female with a past medical history of ER positive breast cancer who completed 5 years of adjuvant treatment with aromatase inhibitor for early stage breast cancer   She was on observation then had some bony chest discomfort   Imaging revealed a lytic lesion in the sternum   Biopsy revealed ER positive breast cancer   Restaging revealed 1 more area in the spine around 8 mm that was positive but no other evidence of metastatic disease   Since her disease is ER positive   She was started on Faslodex and Trent Prime received radiation at [facility] last imaging then showed interval development of a 4 mm nodule at the right lower lobe along with interval development of increased sclerotic lesions throughout the visualized spine for example there is interval development of 7 mm sclerotic lesion in the anterior superior aspect of T8 concerning for metastases    We decided to put the patient on Femara and Kisqali   She has not tolerated Pennye Muzzy and has had   Nausea and fatigue   She has lost weight  She states her nausea resolved when she stopped taking Unknown Calderon this point I will discontinue Pennye Muzzy  Tumor marker at this point is stable   It was elevated in December of 2021 and again in September of 2021  She will stay on her [place] will add on Verzenio "    Patient experienced grade 3 diarrhea resulting in dehydration and hospitalization a few weeks ago  Her Verzenio has been on hold  Interval history:  Patient continues to have diarrhea  Per her bedside nurse, she has had 15 episodes today  These are very small and loose  Patient states previously she was having much larger volume with each bowel movement  She is feeling better with IV fluids and medications started  No longer feels dizzy  Review of Systems   Constitutional: Positive for fatigue  Cardiovascular: Positive for leg swelling  Gastrointestinal: Positive for diarrhea  Neurological: Positive for weakness  All other systems reviewed and are negative  All other review of systems were negative      Past medical history:   Past Medical History:   Diagnosis Date    Abnormal weight loss     Allergic reaction     Anxiety     Breast cancer, right ([facility] [address] ) 2011    right    Cancer [facility]) 2012    Candidal vulvovaginitis     [facility] Same as above    Endometrial hyperplasia     Epithelial cyst     benign, ovary    GI (gastrointestinal bleed) Blood mixed with stool    History of radiation therapy 2011    right breast cancer    Hyperlipidemia     Hypertension     Internal hemorrhoids     Knee tendonitis     Ovarian cyst     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[facility])        Past surgical history:   Past Surgical History:   Procedure Laterality Date    BILATERAL SALPINGOOPHORECTOMY      onset: 7/23/13    BREAST BIOPSY Right 06/13/2006    benign    BREAST BIOPSY Right 03/02/2011    malignant    BREAST LUMPECTOMY Right 03/30/2011    malignant    CATARACT EXTRACTION W/  INTRAOCULAR LENS IMPLANT Right     phacoemulsification  Onset: 10/27/14    CHOLECYSTECTOMY      COLONOSCOPY  2017    approx    HYSTERECTOMY  Yes    INTRAOPERATIVE RADIATION THERAPY (IORT)      IR BIOPSY BONE  7/9/2021    SKIN LESION EXCISION Right     breast, single lesion    TONSILLECTOMY AND ADENOIDECTOMY         Allergies:    Allergies   Allergen Reactions    Sulfa Antibiotics     Pneumococcal Polysaccharide Vaccine Rash and Edema       Home medications:   Medications Prior to Admission   Medication    Abemaciclib (Verzenio) 50 MG TABS    amitriptyline (ELAVIL) 25 mg tablet    Cholecalciferol (VITAMIN D3) 2000 units capsule    diphenoxylate-atropine (LOMOTIL) 2 5-0 025 mg per tablet    letrozole (FEMARA) 2 5 mg tablet    loperamide (IMODIUM) 2 mg capsule    losartan (COZAAR) 50 mg tablet    mirtazapine (REMERON) 7 5 MG tablet    simvastatin (ZOCOR) 10 mg tablet       Hospital medications:   Current Facility-Administered Medications:     acetaminophen (TYLENOL) tablet 650 mg, 650 mg, Oral, Q6H PRN, Angel Melendrez MD    amitriptyline (ELAVIL) tablet 25 mg, 25 mg, Oral, HS, Angel Melendrez MD, 25 mg at 08/18/22 2145    cholestyramine sugar free (QUESTRAN LIGHT) packet 4 g, 4 g, Oral, BID, Angel Melendrez MD, 4 g at 08/18/22 1657    heparin (porcine) subcutaneous injection 5,000 Units, 5,000 Units, Subcutaneous, Q8H [address], Angel Melendrez MD, 5,000 Units at 08/19/22 0529    loperamide (IMODIUM) capsule 2 mg, 2 mg, Oral, Q4H PRN, Angel Melendrez MD, 2 mg at 08/19/22 0534    mesalamine (DELZICOL) delayed release capsule 800 mg, 800 mg, Oral, TID, BROOKE Van, 800 mg at 08/18/22 2145    mirtazapine (REMERON) tablet 7 5 mg, 7 5 mg, Oral, HS, Angel Melendrez MD, 7 5 mg at 08/18/22 2145    ondansetron (ZOFRAN) injection 4 mg, 4 mg, Intravenous, Q6H PRN, Boo Adkins MD, 4 mg at 22 0925    potassium chloride 20 mEq IVPB (premix), 20 mEq, Intravenous, Once, [employer], PA-C    pravastatin (PRAVACHOL) tablet 20 mg, 20 mg, Oral, Daily With Romaine Copeland MD, 20 mg at 22 1657    predniSONE tablet 40 mg, 40 mg, Oral, Daily, BROOKE Martinez, 40 mg at 22 5980    Social history:   Social History     Tobacco Use    Smoking status: Former Smoker     Packs/day: 1 00     Years: 30      Pack years: 30 00     Types: Cigarettes     Start date:      Quit date:      Years since quittin 6    Smokeless tobacco: Never Used   Vaping Use    Vaping Use: Never used   Substance Use Topics    Alcohol use: No     Comment: (history)    Drug use: No       Family history:   Family History   Problem Relation Age of Onset    Stomach cancer Mother     No Known Problems Father     Cervical cancer Sister     No Known Problems Daughter     No Known Problems Maternal Grandmother     No Known Problems Maternal Grandfather     No Known Problems Paternal Grandmother     No Known Problems Paternal Grandfather     Colon polyps Neg Hx     Colon cancer Neg Hx        Vitals:  Vitals:    22   BP: 141/73   Pulse: 97   Resp: 18   Temp: 98 1 °F (36 7 °C)   SpO2: 97%       Physical Exam  Constitutional:       General: She is not in acute distress  HENT:      Head: Normocephalic and atraumatic  Mouth/Throat:      Mouth: Mucous membranes are dry  Eyes:      General: No scleral icterus  Right eye: No discharge  Left eye: No discharge  Cardiovascular:      Rate and Rhythm: Normal rate and regular rhythm  Abdominal:      General: Bowel sounds are normal       Palpations: Abdomen is soft  Musculoskeletal:         General: Normal range of motion  Left lower leg: Edema present  Skin:     Coloration: Skin is pale  Neurological:      General: No focal deficit present        Mental Status: She is alert and oriented to person, place, and time  Psychiatric:         Mood and Affect: Mood normal          Behavior: Behavior normal          Recent Results (from the past 48 hour(s))   CBC and differential    Collection Time: 08/17/22  8:38 AM   Result Value Ref Range    WBC 6 68 4 31 - 10 16 Thousand/uL    RBC 3 34 (L) 3 81 - 5 12 Million/uL    Hemoglobin 10 3 (L) 11 5 - 15 4 g/dL    Hematocrit 31 1 (L) 34 8 - 46 1 %    MCV 93 82 - 98 fL    MCH 30 8 26 8 - 34 3 pg    MCHC 33 1 31 4 - 37 4 g/dL    RDW 19 9 (H) 11 6 - 15 1 %    MPV 9 7 8 9 - 12 7 fL    Platelets 840 677 - 141 Thousands/uL   Comprehensive metabolic panel    Collection Time: 08/18/22  5:51 AM   Result Value Ref Range    Sodium 140 135 - 147 mmol/L    Potassium 2 8 (L) 3 5 - 5 3 mmol/L    Chloride 110 (H) 96 - 108 mmol/L    CO2 20 (L) 21 - 32 mmol/L    ANION GAP 10 4 - 13 mmol/L    BUN 10 5 - 25 mg/dL    Creatinine 0 83 0 60 - 1 30 mg/dL    Glucose 102 65 - 140 mg/dL    Calcium 6 4 (L) 8 3 - 10 1 mg/dL    Corrected Calcium 8 5 8 3 - 10 1 mg/dL    AST 13 5 - 45 U/L    ALT 19 12 - 78 U/L    Alkaline Phosphatase 103 46 - 116 U/L    Total Protein 5 4 (L) 6 4 - 8 4 g/dL    Albumin 1 4 (L) 3 5 - 5 0 g/dL    Total Bilirubin 0 40 0 20 - 1 00 mg/dL    eGFR 65 ml/min/1 73sq m       Imaging Studies:   XR Trauma chest portable    Result Date: 8/1/2022  Narrative: CHEST INDICATION:   TRAUMA  COMPARISON:  6/18/2021  EXAM PERFORMED/VIEWS:  XR CHEST PORTABLE FINDINGS: Cardiomediastinal silhouette appears unremarkable  The lungs are clear  No pneumothorax or pleural effusion  Osseous structures appear within normal limits for patient age  Impression: No acute cardiopulmonary disease  Workstation performed: [id number]     TRAUMA - CT head wo contrast    Result Date: 8/1/2022  Narrative: CT BRAIN - WITHOUT CONTRAST INDICATION:   TRAUMA  COMPARISON:  None  TECHNIQUE:  CT examination of the brain was performed    In addition to axial images, sagittal and coronal 2D reformatted images were created and submitted for interpretation  Radiation dose length product (DLP) for this visit:  865 64 mGy-cm   This examination, like all CT scans performed in the [facility], was performed utilizing techniques to minimize radiation dose exposure, including the use of iterative  reconstruction and automated exposure control  IMAGE QUALITY:  Diagnostic  FINDINGS: PARENCHYMA: Decreased attenuation is noted in periventricular and subcortical white matter demonstrating an appearance that is statistically most likely to represent mild microangiopathic change  No CT signs of acute infarction  No intracranial mass, mass effect or midline shift  No acute parenchymal hemorrhage  VENTRICLES AND EXTRA-AXIAL SPACES:  Normal for the patient's age  VISUALIZED ORBITS AND PARANASAL SINUSES:  Unremarkable  CALVARIUM AND EXTRACRANIAL SOFT TISSUES:  Normal      Impression: No acute intracranial abnormality  Workstation performed: [id number]     TRAUMA - CT spine cervical wo contrast    Result Date: 8/1/2022  Narrative: CT CERVICAL SPINE - WITHOUT CONTRAST INDICATION:   TRAUMA  COMPARISON:  None  TECHNIQUE:  CT examination of the cervical spine was performed without intravenous contrast   Contiguous axial images were obtained  Sagittal and coronal reconstructions were performed  Radiation dose length product (DLP) for this visit:  367 15 mGy-cm   This examination, like all CT scans performed in the [facility], was performed utilizing techniques to minimize radiation dose exposure, including the use of iterative  reconstruction and automated exposure control  IMAGE QUALITY:  Diagnostic  FINDINGS: ALIGNMENT:  Normal alignment of the cervical spine  No subluxation  VERTEBRAL BODIES:  No fracture  DEGENERATIVE CHANGES:  Mild multilevel cervical degenerative changes are noted without critical central canal stenosis   PREVERTEBRAL AND PARASPINAL SOFT TISSUES: Unremarkable  THORACIC INLET:  Normal      Impression: No cervical spine fracture or traumatic malalignment  Workstation performed: [id number]     XR Trauma pelvis ap only 1 or 2 vw    Result Date: 8/1/2022  Narrative: PELVIS INDICATION:   TRAUMA  COMPARISON:  None VIEWS:  XR PELVIS AP ONLY 1 OR 2 VW FINDINGS: No acute fracture or hip dislocation  No significant degenerative changes  No lytic or blastic osseous lesion  Soft tissues are unremarkable  The visualized lumbar spine is unremarkable  Impression: No acute osseous abnormality  Workstation performed: [id number]     CT abdomen pelvis with contrast    Result Date: 8/15/2022  Narrative: CT ABDOMEN AND PELVIS WITH IV CONTRAST INDICATION:   LLQ abdominal pain diarrhea, LLQ tenderness  COMPARISON:  CT 4/5/2022 TECHNIQUE:  CT examination of the abdomen and pelvis was performed  Axial, sagittal, and coronal 2D reformatted images were created from the source data and submitted for interpretation  Radiation dose length product (DLP) for this visit:  431 86 mGy-cm   This examination, like all CT scans performed in the [facility], was performed utilizing techniques to minimize radiation dose exposure, including the use of iterative  reconstruction and automated exposure control  IV Contrast:  65 mL of iohexol (OMNIPAQUE) Enteric Contrast:  Enteric contrast was not administered  FINDINGS: ABDOMEN LOWER CHEST:  Stable 3 mm nodule lateral left lung base (series 2 image 18)  Small hiatal hernia  The heart is normal in size  LIVER/BILIARY TREE:  Somewhat hypoplastic left lobe the liver  Stable small hepatic cysts some of which are too small fracture characterization  Diffuse hepatic steatosis  GALLBLADDER:  Gallbladder is surgically absent  SPLEEN:  Unremarkable  PANCREAS:  Unremarkable  ADRENAL GLANDS:  Unremarkable  KIDNEYS/URETERS:  One or more simple renal cysts are again noted, some of which are parapelvic in nature    Otherwise unremarkable kidneys  No hydronephrosis  STOMACH AND BOWEL:  Small hiatal hernia is present  There is diffuse mild-moderate circumferential bowel wall thickening present extending from the hepatic flexure to the rectum with mild pericolic inflammatory stranding consistent with diffuse colitis,  new since the study of 4/5/2022  No pneumatosis identified  No obstruction appreciated  APPENDIX:  No findings to suggest appendicitis  ABDOMINOPELVIC CAVITY:  No ascites  No pneumoperitoneum  No lymphadenopathy  VESSELS:  Atherosclerotic changes are present  No evidence of aneurysm  PELVIS REPRODUCTIVE ORGANS:  Unremarkable for patient's age  URINARY BLADDER:  Unremarkable  ABDOMINAL WALL/INGUINAL REGIONS:  Unremarkable  OSSEOUS STRUCTURES:  Again identified are multiple tiny scattered sclerotic foci throughout the spine and osseous pelvis which may be reflective of the patient's known underlying metastatic breast cancer  Impression: 1  Diffuse mild-moderate circumferential wall thickening of the colon with pericolic inflammatory stranding consistent with nonspecific pancolitis  2   Multiple stable small scattered sclerotic foci in the visualized lumbar lower thoracic spine osseous pelvis which may reflect osseous metastatic disease in this patient with known metastatic breast cancer  3   Additional stable findings as noted  The study was marked in [facility] for immediate notification  Workstation performed: [id number]           Please note: This report has been generated by voice recognition software system  Therefore, there may be syntax, spelling and/or grammatical errors  Please call if you have any questions  independent

## 2022-09-07 NOTE — PROGRESS NOTES
[address]  Facility: [facility]    NAME: Jerzy Silverman  AGE: 79 y o  SEX: female    DATE OF ENCOUNTER: 9/7/2022    Code status:  DNR w/ Hospitalization    Assessment and Plan     1  Toxic gastroenteritis and colitis    2  Primary hypertension    3  Single subsegmental pulmonary embolism without acute cor pulmonale (HCC)    4  Secondary malignant neoplasm of bone ([facility] )    5  REYES (acute kidney injury) ([facility] )    6  Chronic kidney disease (CKD) stage G3a/A1, moderately decreased glomerular filtration rate (GFR) between 45-59 mL/min/1 73 square meter and albuminuria creatinine ratio less than 30 mg/g (HCC)        All medications and routine orders were reviewed and updated as needed  Plan discussed with: Patient    Chief Complaint     Seen for admission at [address] [place] [address]    History of Present Illness     76-year-old female here after hospitalization for pan colitis  The patient has been under the care of the oncologist and receiving treatment for her cancer  She then developed massive diarrhea and abdominal cramping  She was diagnosed with pan colitis in the hospital and placed on intravenous steroids as well as antibiotics and IV hydration  She has persistent unformed bowel movements  The cramping has been better  Her stools are forming up with the use of Lomotil and Imodium  She notes infrequent urination  She reports feeling dry in her mouth  She does not keep up with her fluids as well as she should  She denies any dyspnea  She has no cough  She was noted to have increased swelling in her lower extremities and was diagnosed with a DVT of the lower extremity and a small pulmonary embolism  She is on the loading dose of Xarelto and will transition to maintenance therapy      HISTORY:  Past Medical History:   Diagnosis Date    Abnormal weight loss     Allergic reaction     Anxiety     Breast cancer, right ([facility] ) 2011    right    Cancer ([facility] )     Candidal vulvovaginitis     Clotting disorder (HCC) Same as above    Endometrial hyperplasia     Epithelial cyst     benign, ovary    GI (gastrointestinal bleed) Blood mixed with stool    History of radiation therapy 2011    right breast cancer    Hyperlipidemia     Hypertension     Internal hemorrhoids     Knee tendonitis     Ovarian cyst     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[facility])      Past Surgical History:   Procedure Laterality Date    BILATERAL SALPINGOOPHORECTOMY      onset: 12    BREAST BIOPSY Right 2006    benign    BREAST BIOPSY Right 2011    malignant    BREAST LUMPECTOMY Right 2011    malignant    CATARACT EXTRACTION W/  INTRAOCULAR LENS IMPLANT Right     phacoemulsification   Onset: 10/27/14    CHOLECYSTECTOMY      COLONOSCOPY  2017    approx    HYSTERECTOMY  Yes    INTRAOPERATIVE RADIATION THERAPY ([facility])      IR BIOPSY BONE  2021    IR PICC PLACEMENT SINGLE LUMEN  2022    SKIN LESION EXCISION Right     breast, single lesion    TONSILLECTOMY AND ADENOIDECTOMY       Family History   Problem Relation Age of Onset    Stomach cancer Mother     No Known Problems Father     Cervical cancer Sister     No Known Problems Daughter     No Known Problems Maternal Grandmother     No Known Problems Maternal Grandfather     No Known Problems Paternal Grandmother     No Known Problems Paternal Grandfather     Colon polyps Neg Hx     Colon cancer Neg Hx      Social History     Socioeconomic History    Marital status:      Spouse name: None    Number of children: 3    Years of education: None    Highest education level: None   Occupational History    None   Tobacco Use    Smoking status: Former Smoker     Packs/day: 1 00     Years: 30 00     Pack years: 30 00     Types: Cigarettes     Start date: 55     Quit date:      Years since quittin 7    Smokeless tobacco: Never Used   Vaping Use    Vaping Use: Never used   Substance and Sexual Activity    Alcohol use: Not Currently     Comment: (history)    Drug use: No    Sexual activity: Not Currently   Other Topics Concern    None   Social History Narrative    None     Social Determinants of Health     Financial Resource Strain: Not on file   Food Insecurity: No Food Insecurity    Worried About Running Out of Food in the Last Year: Never true    Anatoly of Food in the Last Year: Never true   Transportation Needs: No Transportation Needs    Lack of Transportation (Medical): No    Lack of Transportation (Non-Medical): No   Physical Activity: Not on file   Stress: Not on file   Social Connections: Not on file   Intimate Partner Violence: Not At Risk    Fear of Current or Ex-Partner: No    Emotionally Abused: No    Physically Abused: No    Sexually Abused: No   Housing Stability: Low Risk     Unable to Pay for Housing in the Last Year: No    Number of Places Lived in the Last Year: 1    Unstable Housing in the Last Year: No       Allergies: Allergies   Allergen Reactions    Sulfa Antibiotics     Pneumococcal Polysaccharide Vaccine Rash and Edema       Review of Systems     Review of Systems   Constitutional: Negative for activity change, appetite change, chills, diaphoresis, fatigue and unexpected weight change  HENT: Negative for congestion, ear discharge, ear pain, hearing loss, nosebleeds and rhinorrhea  Eyes: Negative for pain, redness, itching and visual disturbance  Respiratory: Negative for cough, choking, chest tightness and shortness of breath  Cardiovascular: Positive for leg swelling  Negative for chest pain  Gastrointestinal: Positive for diarrhea  Negative for abdominal pain, blood in stool, constipation and nausea  Endocrine: Negative for cold intolerance, polydipsia and polyphagia  Genitourinary: Negative for dysuria, frequency, hematuria and urgency     Musculoskeletal: Negative for arthralgias, back pain, gait problem, joint swelling, neck pain and neck stiffness  Skin: Negative for color change and rash  Allergic/Immunologic: Negative for environmental allergies and food allergies  Neurological: Positive for weakness  Negative for dizziness, tremors, seizures, speech difficulty, numbness and headaches  Hematological: Negative for adenopathy  Does not bruise/bleed easily  Psychiatric/Behavioral: Negative for behavioral problems, dysphoric mood, hallucinations and self-injury  Medications and orders     All medications reviewed and updated in Nursing Home EMR  Objective     Vitals: per nursing home record    Physical Exam  Constitutional:       Appearance: She is well-developed  HENT:      Head: Normocephalic and atraumatic  Right Ear: External ear normal       Left Ear: External ear normal       Mouth/Throat:      Pharynx: No oropharyngeal exudate  Eyes:      General: No scleral icterus  Right eye: No discharge  Left eye: No discharge  Conjunctiva/sclera: Conjunctivae normal       Pupils: Pupils are equal, round, and reactive to light  Neck:      Thyroid: No thyromegaly  Cardiovascular:      Rate and Rhythm: Normal rate and regular rhythm  Heart sounds: Normal heart sounds  No murmur heard  No gallop  Pulmonary:      Effort: Pulmonary effort is normal  No respiratory distress  Breath sounds: Normal breath sounds  No wheezing or rales  Abdominal:      Palpations: Abdomen is soft  There is no mass  Tenderness: There is abdominal tenderness  There is no guarding or rebound  Musculoskeletal:         General: No tenderness or deformity  Normal range of motion  Cervical back: Normal range of motion and neck supple  Right lower leg: Edema present  Left lower leg: Edema present  Lymphadenopathy:      Cervical: No cervical adenopathy  Skin:     General: Skin is warm and dry  Findings: No rash     Neurological:      Mental Status: She is alert and oriented to person, place, and time  Cranial Nerves: No cranial nerve deficit  Coordination: Coordination normal       Deep Tendon Reflexes: Reflexes are normal and symmetric  Reflexes normal    Psychiatric:         Behavior: Behavior normal          Thought Content: Thought content normal          Judgment: Judgment normal          Pertinent Laboratory/Diagnostic Studies: The following labs/studies were reviewed please see chart or hospital paperwork for details  Diagnostic testing from the hospital was reviewed    - Admit for PT OT and medical therapy  We will monitor her renal status  She will continue with Lomotil and Imodium  We will await the gastroenterologist approval of Remicade  Complete the steroids and continue with Xarelto      Mariela Almonte DO  9/7/2022 1:29 PM

## 2022-09-07 NOTE — TELEPHONE ENCOUNTER
----- Message from Yvonne Martinez sent at 9/7/2022 10:36 AM EDT -----  Hi Joshf -Elvis Soler said your working on getting approval for biologic  Any update?   Patient was discharged yesterday to [facility]

## 2022-09-08 ENCOUNTER — NURSING HOME VISIT (OUTPATIENT)
Dept: FAMILY MEDICINE CLINIC | Facility: CLINIC | Age: 82
End: 2022-09-08
Payer: MEDICARE

## 2022-09-08 DIAGNOSIS — E43 SEVERE PROTEIN-CALORIE MALNUTRITION (HCC): ICD-10-CM

## 2022-09-08 DIAGNOSIS — C79.51 SECONDARY MALIGNANT NEOPLASM OF BONE (HCC): ICD-10-CM

## 2022-09-08 DIAGNOSIS — N17.9 AKI (ACUTE KIDNEY INJURY) (HCC): ICD-10-CM

## 2022-09-08 DIAGNOSIS — R00.0 TACHYCARDIA: ICD-10-CM

## 2022-09-08 DIAGNOSIS — I26.93 SINGLE SUBSEGMENTAL PULMONARY EMBOLISM WITHOUT ACUTE COR PULMONALE (HCC): ICD-10-CM

## 2022-09-08 DIAGNOSIS — C50.919 METASTATIC BREAST CANCER (HCC): ICD-10-CM

## 2022-09-08 DIAGNOSIS — K52.1 TOXIC GASTROENTERITIS AND COLITIS: Primary | ICD-10-CM

## 2022-09-08 PROCEDURE — 99308 SBSQ NF CARE LOW MDM 20: CPT | Performed by: FAMILY MEDICINE

## 2022-09-08 NOTE — PROGRESS NOTES
[address]  Facility: [facility]    NAME: Arsalan Silverman  AGE: 79 y o  SEX: female    DATE OF ENCOUNTER: 9/8/2022    Code status:  DNR w/ Hospitalization    Assessment and Plan     1  Toxic gastroenteritis and colitis    2  Single subsegmental pulmonary embolism without acute cor pulmonale (HCC)    3  Secondary malignant neoplasm of bone ([facility] )    4  REYES (acute kidney injury) ([facility] )    5  Metastatic breast cancer ([facility] )    6  Severe protein-calorie malnutrition ([facility] )    7  Tachycardia        All medications and routine orders were reviewed and updated as needed  Plan discussed with: Patient    Chief Complaint     Interim evaluation    History of Present Illness     The patient reports an improvement in her appetite  Her bowels remain unformed  She is able to get her to self to the bathroom however  She is ambulating with her walker and feels more motivated  She is hopeful she can return home and eventually get back to her oncologist for continued treatment of her metastatic breast cancer  The following portions of the patient's history were reviewed and updated as appropriate: current medications, past family history, past medical history, past social history, past surgical history and problem list     Allergies: Allergies   Allergen Reactions    Sulfa Antibiotics     Pneumococcal Polysaccharide Vaccine Rash and Edema       Review of Systems     Review of Systems   Constitutional: Negative for activity change, appetite change, chills, diaphoresis, fatigue and unexpected weight change  HENT: Negative for congestion, ear discharge, ear pain, hearing loss, nosebleeds and rhinorrhea  Eyes: Negative for pain, redness, itching and visual disturbance  Respiratory: Negative for cough, choking, chest tightness and shortness of breath  Cardiovascular: Negative for chest pain and leg swelling  Gastrointestinal: Positive for diarrhea  Negative for abdominal pain, blood in stool, constipation and nausea  Endocrine: Negative for cold intolerance, polydipsia and polyphagia  Genitourinary: Negative for dysuria, frequency, hematuria and urgency  Musculoskeletal: Positive for gait problem  Negative for arthralgias, back pain, joint swelling, neck pain and neck stiffness  Skin: Negative for color change and rash  Allergic/Immunologic: Negative for environmental allergies and food allergies  Neurological: Positive for weakness  Negative for dizziness, tremors, seizures, speech difficulty, numbness and headaches  Hematological: Negative for adenopathy  Does not bruise/bleed easily  Psychiatric/Behavioral: Negative for behavioral problems, dysphoric mood, hallucinations and self-injury  Medications and orders     All medications reviewed and updated in Nursing Home EMR  Objective     Vitals: per nursing home records    Physical Exam  Constitutional:       Appearance: She is well-developed  HENT:      Head: Normocephalic and atraumatic  Right Ear: External ear normal       Left Ear: External ear normal       Mouth/Throat:      Pharynx: No oropharyngeal exudate  Eyes:      General: No scleral icterus  Right eye: No discharge  Left eye: No discharge  Conjunctiva/sclera: Conjunctivae normal       Pupils: Pupils are equal, round, and reactive to light  Neck:      Thyroid: No thyromegaly  Cardiovascular:      Rate and Rhythm: Normal rate and regular rhythm  Heart sounds: Normal heart sounds  No murmur heard  No gallop  Pulmonary:      Effort: Pulmonary effort is normal  No respiratory distress  Breath sounds: Normal breath sounds  No wheezing or rales  Abdominal:      Palpations: Abdomen is soft  There is no mass  Tenderness: There is abdominal tenderness  There is no guarding or rebound  Musculoskeletal:         General: No tenderness or deformity  Normal range of motion  Cervical back: Normal range of motion and neck supple  Lymphadenopathy:      Cervical: No cervical adenopathy  Skin:     General: Skin is warm and dry  Findings: No rash  Neurological:      Mental Status: She is alert and oriented to person, place, and time  Cranial Nerves: No cranial nerve deficit  Motor: Weakness present  Coordination: Coordination normal       Deep Tendon Reflexes: Reflexes are normal and symmetric  Reflexes normal    Psychiatric:         Behavior: Behavior normal          Thought Content: Thought content normal          Judgment: Judgment normal          Pertinent Laboratory/Diagnostic Studies: The following studies were reviewed please see chart or hospital paperwork for details  Space for lab dictation no new diagnostics testing    - Continue the current medical regimen    [address] [place] [address]  9/8/2022 10:37 AM

## 2022-09-09 NOTE — TELEPHONE ENCOUNTER
I discussed with Dr Mariia Fernandez; we will repeat Quant TB Gold testing  Ania Stokes will be able to draw blood work Monday  For now we are cancelling her Infusion appointments until we can confirm testing is negative  I left a message for Margaret Lidia informing her of treatment plan  New orders placed for Quant TB Gold; orders faxed to [phone]

## 2022-09-09 NOTE — TELEPHONE ENCOUNTER
Quant TB Gold- came back as indeterminate  Will need to be re-drawn prior to us scheduling her Infusion    8/31/22 Hep B surface Ag- normal     Patient currently at [facility]

## 2022-09-09 NOTE — TELEPHONE ENCOUNTER
I have been in communication with Nurse Manager of  [facility] and nurse Avila Lee at [facility] re: Indeterminate Quant TB Gold  Yobani Nieves will discuss situation with ID  According to [facility] admission protocol since patient is on Prednisone; she is not to have PPD-skin testing performed as it could cause a false positive  If repeat TB testing required Quant TB Gold blood test; it can be drawn at [facility] on Monday  It is processed through [employer]; may take up to 7 days to result

## 2022-09-09 NOTE — TELEPHONE ENCOUNTER
Room [facility] [phone]  [facility]#[phone]  Nurse Michael Steinberg: Telephone [phone]   I spoke with patients nurse Michael Steinberg  We were able to coordinate her first three induction Remicade Infusions with [facility]  Remicade Induction Dosing Week 0,2, & 6 then every 8 weeks  Her first three infusions are scheduled for 9/15/22 (8:30 am), 9/29/22 (11:30 am), then 10/27/22 (8:30 am)  Location: [address] First floor    Patient will be transported by [place]

## 2022-09-12 ENCOUNTER — NURSING HOME VISIT (OUTPATIENT)
Dept: FAMILY MEDICINE CLINIC | Facility: CLINIC | Age: 82
End: 2022-09-12
Payer: MEDICARE

## 2022-09-12 DIAGNOSIS — K52.1 TOXIC GASTROENTERITIS AND COLITIS: Primary | ICD-10-CM

## 2022-09-12 DIAGNOSIS — E43 SEVERE PROTEIN-CALORIE MALNUTRITION (HCC): ICD-10-CM

## 2022-09-12 DIAGNOSIS — N17.9 AKI (ACUTE KIDNEY INJURY) (HCC): ICD-10-CM

## 2022-09-12 DIAGNOSIS — C79.51 SECONDARY MALIGNANT NEOPLASM OF BONE (HCC): ICD-10-CM

## 2022-09-12 DIAGNOSIS — I26.93 SINGLE SUBSEGMENTAL PULMONARY EMBOLISM WITHOUT ACUTE COR PULMONALE (HCC): ICD-10-CM

## 2022-09-12 DIAGNOSIS — C50.919 METASTATIC BREAST CANCER (HCC): ICD-10-CM

## 2022-09-12 PROCEDURE — 99308 SBSQ NF CARE LOW MDM 20: CPT | Performed by: FAMILY MEDICINE

## 2022-09-12 NOTE — PROGRESS NOTES
[address]  Facility: [facility]    NAME: Taye Silverman  AGE: 79 y o  SEX: female    DATE OF ENCOUNTER: 9/12/2022    Code status:  DNR w/ Hospitalization    Assessment and Plan     1  Toxic gastroenteritis and colitis    2  Single subsegmental pulmonary embolism without acute cor pulmonale (HCC)    3  Secondary malignant neoplasm of bone ([facility] )    4  REYES (acute kidney injury) ([facility] )    5  Metastatic breast cancer ([facility] )    6  Severe protein-calorie malnutrition ([facility] )        All medications and routine orders were reviewed and updated as needed  Plan discussed with: Patient    Chief Complaint     Interim evaluation    History of Present Illness     Patient reports her bowels have remained controlled over the last 3 days  She continues to work with therapy to improve her strength and conditioning  She denies any dyspnea  She has less swelling in her legs  She has no dyspnea  Appetite is improved  There has been no dysuria  The following portions of the patient's history were reviewed and updated as appropriate: current medications, past family history, past medical history, past social history, past surgical history and problem list     Allergies: Allergies   Allergen Reactions    Sulfa Antibiotics     Pneumococcal Polysaccharide Vaccine Rash and Edema       Review of Systems     Review of Systems   Constitutional: Negative for activity change, appetite change, chills, diaphoresis, fatigue and unexpected weight change  HENT: Negative for congestion, ear discharge, ear pain, hearing loss, nosebleeds and rhinorrhea  Eyes: Negative for pain, redness, itching and visual disturbance  Respiratory: Negative for cough, choking, chest tightness and shortness of breath  Cardiovascular: Positive for leg swelling  Negative for chest pain  Gastrointestinal: Negative for abdominal pain, blood in stool, constipation, diarrhea and nausea  Endocrine: Negative for cold intolerance, polydipsia and polyphagia  Genitourinary: Negative for dysuria, frequency, hematuria and urgency  Musculoskeletal: Positive for gait problem  Negative for arthralgias, back pain, joint swelling, neck pain and neck stiffness  Skin: Negative for color change and rash  Allergic/Immunologic: Negative for environmental allergies and food allergies  Neurological: Positive for weakness  Negative for dizziness, tremors, seizures, speech difficulty, numbness and headaches  Hematological: Negative for adenopathy  Does not bruise/bleed easily  Psychiatric/Behavioral: Positive for dysphoric mood  Negative for behavioral problems, hallucinations and self-injury  Medications and orders     All medications reviewed and updated in Nursing Home EMR  Objective     Vitals: per nursing home records    Physical Exam  Constitutional:       Appearance: She is well-developed  HENT:      Head: Normocephalic and atraumatic  Right Ear: External ear normal       Left Ear: External ear normal       Mouth/Throat:      Pharynx: No oropharyngeal exudate  Eyes:      General: No scleral icterus  Right eye: No discharge  Left eye: No discharge  Conjunctiva/sclera: Conjunctivae normal       Pupils: Pupils are equal, round, and reactive to light  Neck:      Thyroid: No thyromegaly  Cardiovascular:      Rate and Rhythm: Normal rate and regular rhythm  Heart sounds: Normal heart sounds  No murmur heard  No gallop  Pulmonary:      Effort: Pulmonary effort is normal  No respiratory distress  Breath sounds: No wheezing or rales  Comments: Decreased breath sounds bilaterally  Abdominal:      General: Bowel sounds are normal       Palpations: Abdomen is soft  There is no mass  Tenderness: There is no guarding or rebound  Musculoskeletal:         General: No tenderness or deformity  Normal range of motion        Cervical back: Normal range of motion and neck supple  Right lower leg: Edema present  Left lower leg: Edema present  Lymphadenopathy:      Cervical: No cervical adenopathy  Skin:     General: Skin is warm and dry  Findings: No rash  Neurological:      Mental Status: She is alert and oriented to person, place, and time  Cranial Nerves: No cranial nerve deficit  Motor: Weakness present  Coordination: Coordination normal       Deep Tendon Reflexes: Reflexes are normal and symmetric  Reflexes normal    Psychiatric:         Behavior: Behavior normal          Thought Content: Thought content normal          Judgment: Judgment normal          Pertinent Laboratory/Diagnostic Studies: The following studies were reviewed please see chart or hospital paperwork for details      Space for lab dictation no new diagnostic studies    - Continue the current medical regimen and therapy plan    Jenny Luong DO  9/12/2022 11:34 AM

## 2022-09-12 NOTE — TELEPHONE ENCOUNTER
I spoke with nurse Ladan Barnhart [phone], she confirmed that Kerry's Quant TB Gold was drawn by [facility] today  It may take up to 7 days to result  I plan to call [facility] [phone] Thursday to check in regarding results  Once we receive a negative result she can be reschedule at the [facility]

## 2022-09-14 NOTE — TELEPHONE ENCOUNTER
I spoke with [facility], intake representative [address]  Patient's Quant TB Gold came back indeterminate again  First lab drawn and resulted 9/1/22 while inpatient  Please advise how to proceed in regards to her Remicade Infusion? I left a message [phone] for Nurse Huma Alfaro (from [facility]) to callback so I can inform her of results also get an update on patient status  To my knowledge she was on IV steroid while inpatient then transitioned to oral Prednisone 40 mg daily until she receives Remicade Infusion; which may be a factor re: indeterminate results for Quant Tb Gold

## 2022-09-14 NOTE — TELEPHONE ENCOUNTER
I spoke with nurse Chen Bansal; she also received the indeterminate results of the Quant Tb Gold  Update[facility]  Nursing notes from 9/12:Bowels remained controlled over the past 3 days  Having approximately 1-2 BM's daily  Notes from 9/14:Formed BM, no pain or discomfort  Blood present within the stool  Patient is ambulating to the bathroom  Plan is to go back home where she lives independently; daughter to provide assistance

## 2022-09-15 ENCOUNTER — NURSING HOME VISIT (OUTPATIENT)
Dept: FAMILY MEDICINE CLINIC | Facility: CLINIC | Age: 82
End: 2022-09-15
Payer: MEDICARE

## 2022-09-15 ENCOUNTER — HOSPITAL ENCOUNTER (OUTPATIENT)
Dept: INFUSION CENTER | Facility: HOSPITAL | Age: 82
Discharge: HOME | End: 2022-09-15
Attending: INTERNAL MEDICINE

## 2022-09-15 DIAGNOSIS — C50.919 METASTATIC BREAST CANCER (HCC): ICD-10-CM

## 2022-09-15 DIAGNOSIS — C79.51 SECONDARY MALIGNANT NEOPLASM OF BONE (HCC): ICD-10-CM

## 2022-09-15 DIAGNOSIS — K52.1 TOXIC GASTROENTERITIS AND COLITIS: Primary | ICD-10-CM

## 2022-09-15 DIAGNOSIS — I26.93 SINGLE SUBSEGMENTAL PULMONARY EMBOLISM WITHOUT ACUTE COR PULMONALE (HCC): ICD-10-CM

## 2022-09-15 DIAGNOSIS — N18.31 CHRONIC KIDNEY DISEASE (CKD) STAGE G3A/A1, MODERATELY DECREASED GLOMERULAR FILTRATION RATE (GFR) BETWEEN 45-59 ML/MIN/1.73 SQUARE METER AND ALBUMINURIA CREATININE RATIO LESS THAN 30 MG/G (HCC): ICD-10-CM

## 2022-09-15 PROCEDURE — 99308 SBSQ NF CARE LOW MDM 20: CPT | Performed by: FAMILY MEDICINE

## 2022-09-15 NOTE — PROGRESS NOTES
[address]  Facility: [facility]    NAME: Kimmy Silverman  AGE: 79 y o  SEX: female    DATE OF ENCOUNTER: 9/15/2022    Code status:  DNR w/ Hospitalization    Assessment and Plan     1  Toxic gastroenteritis and colitis    2  Single subsegmental pulmonary embolism without acute cor pulmonale (HCC)    3  Secondary malignant neoplasm of bone ([facility] [address] )    4  Chronic kidney disease (CKD) stage G3a/A1, moderately decreased glomerular filtration rate (GFR) between 45-59 mL/min/1 73 square meter and albuminuria creatinine ratio less than 30 mg/g (HCC)    5  Metastatic breast cancer ([facility] [address] )        All medications and routine orders were reviewed and updated as needed  Plan discussed with: Patient    Chief Complaint     Interim evaluation    History of Present Illness     The patient reports that her bowels are formed up  She is gaining some strength and wants to be able to ambulate with her walker in and out of the bathroom  By accomplishing that she should be able to return home in the next 2 weeks  She is hoping then to resume her infusions for her malignancy and metastatic disease  She denies any dysuria  Her appetite is improving  She has had no swallowing issues  The following portions of the patient's history were reviewed and updated as appropriate: current medications, past family history, past medical history, past social history, past surgical history and problem list     Allergies: Allergies   Allergen Reactions    Sulfa Antibiotics     Pneumococcal Polysaccharide Vaccine Rash and Edema       Review of Systems     Review of Systems   Constitutional: Negative for activity change, appetite change, chills, diaphoresis, fatigue and unexpected weight change  HENT: Negative for congestion, ear discharge, ear pain, hearing loss, nosebleeds and rhinorrhea  Eyes: Negative for pain, redness, itching and visual disturbance     Respiratory: Negative for cough, choking and chest tightness  Cardiovascular: Positive for chest pain and leg swelling  Gastrointestinal: Negative for abdominal pain, blood in stool, constipation, diarrhea and nausea  Endocrine: Negative for cold intolerance, polydipsia and polyphagia  Genitourinary: Negative for dysuria, frequency, hematuria and urgency  Musculoskeletal: Positive for arthralgias and gait problem  Negative for back pain, joint swelling, neck pain and neck stiffness  Skin: Negative for color change and rash  Allergic/Immunologic: Negative for environmental allergies and food allergies  Neurological: Positive for weakness  Negative for dizziness, tremors, seizures, speech difficulty, numbness and headaches  Hematological: Negative for adenopathy  Does not bruise/bleed easily  Psychiatric/Behavioral: Negative for behavioral problems, dysphoric mood, hallucinations and self-injury  Medications and orders     All medications reviewed and updated in Nursing Home EMR  Objective     Vitals: per nursing home records    Physical Exam  Constitutional:       Appearance: She is well-developed  HENT:      Head: Normocephalic and atraumatic  Right Ear: External ear normal       Left Ear: External ear normal       Mouth/Throat:      Pharynx: No oropharyngeal exudate  Eyes:      General: No scleral icterus  Right eye: No discharge  Left eye: No discharge  Conjunctiva/sclera: Conjunctivae normal       Pupils: Pupils are equal, round, and reactive to light  Neck:      Thyroid: No thyromegaly  Cardiovascular:      Rate and Rhythm: Normal rate and regular rhythm  Heart sounds: Normal heart sounds  No murmur heard  No gallop  Pulmonary:      Effort: Pulmonary effort is normal  No respiratory distress  Breath sounds: Normal breath sounds  No wheezing or rales  Abdominal:      General: Bowel sounds are normal       Palpations: Abdomen is soft   There is no mass       Tenderness: There is no guarding or rebound  Musculoskeletal:         General: No tenderness or deformity  Normal range of motion  Cervical back: Normal range of motion and neck supple  Right lower leg: Edema present  Left lower leg: Edema present  Lymphadenopathy:      Cervical: No cervical adenopathy  Skin:     General: Skin is warm and dry  Findings: No rash  Neurological:      Mental Status: She is alert and oriented to person, place, and time  Cranial Nerves: No cranial nerve deficit  Coordination: Coordination normal       Deep Tendon Reflexes: Reflexes are normal and symmetric  Reflexes normal    Psychiatric:         Behavior: Behavior normal          Thought Content: Thought content normal          Judgment: Judgment normal          Pertinent Laboratory/Diagnostic Studies: The following studies were reviewed please see chart or hospital paperwork for details  Space for lab dictation no new diagnostic studies    - Maintain the current medical regimen      Niko Desai DO  9/15/2022 12:00 PM

## 2022-09-19 ENCOUNTER — NURSING HOME VISIT (OUTPATIENT)
Dept: FAMILY MEDICINE CLINIC | Facility: CLINIC | Age: 82
End: 2022-09-19
Payer: MEDICARE

## 2022-09-19 DIAGNOSIS — I87.2 VENOUS INSUFFICIENCY: ICD-10-CM

## 2022-09-19 DIAGNOSIS — K52.1 TOXIC GASTROENTERITIS AND COLITIS: Primary | ICD-10-CM

## 2022-09-19 DIAGNOSIS — C79.51 SECONDARY MALIGNANT NEOPLASM OF BONE (HCC): ICD-10-CM

## 2022-09-19 DIAGNOSIS — I26.93 SINGLE SUBSEGMENTAL PULMONARY EMBOLISM WITHOUT ACUTE COR PULMONALE (HCC): ICD-10-CM

## 2022-09-19 DIAGNOSIS — C50.919 METASTATIC BREAST CANCER (HCC): ICD-10-CM

## 2022-09-19 PROCEDURE — 99308 SBSQ NF CARE LOW MDM 20: CPT | Performed by: FAMILY MEDICINE

## 2022-09-19 NOTE — PROGRESS NOTES
[address]  Facility: [facility]    NAME: Jose Silverman  AGE: 79 y o  SEX: female    DATE OF ENCOUNTER: 9/19/2022    Code status:  DNR w/ Hospitalization    Assessment and Plan     1  Toxic gastroenteritis and colitis    2  Single subsegmental pulmonary embolism without acute cor pulmonale (HCC)    3  Secondary malignant neoplasm of bone ([facility] [address] )    4  Metastatic breast cancer ([facility] [address] )    5  Venous insufficiency        All medications and routine orders were reviewed and updated as needed  Plan discussed with: Patient    Chief Complaint     Interim evaluation    History of Present Illness     The patient reports fluid leaking from her legs bilaterally  She is hopeful she can go home in the next couple days  She is anticipating restarting her infusions for her metastatic disease  She notes that her bowels have improved  She has not seen any blood  She has no dyspnea  She is sore over the sacrum and wants to be able to lay on her side  She takes herself to the bathroom  The following portions of the patient's history were reviewed and updated as appropriate: current medications, past family history, past medical history, past social history, past surgical history and problem list     Allergies: Allergies   Allergen Reactions    Sulfa Antibiotics     Pneumococcal Polysaccharide Vaccine Rash and Edema       Review of Systems     Review of Systems   Constitutional: Negative for activity change, appetite change, chills, diaphoresis, fatigue and unexpected weight change  HENT: Negative for congestion, ear discharge, ear pain, hearing loss, nosebleeds and rhinorrhea  Eyes: Negative for pain, redness, itching and visual disturbance  Respiratory: Negative for cough, choking, chest tightness and shortness of breath  Cardiovascular: Positive for leg swelling  Negative for chest pain     Gastrointestinal: Negative for abdominal pain, blood in stool, constipation, diarrhea and nausea  Endocrine: Negative for cold intolerance, polydipsia and polyphagia  Genitourinary: Negative for dysuria, frequency, hematuria and urgency  Musculoskeletal: Positive for back pain and gait problem  Negative for arthralgias, joint swelling, neck pain and neck stiffness  Skin: Negative for color change and rash  Allergic/Immunologic: Negative for environmental allergies and food allergies  Neurological: Positive for weakness  Negative for dizziness, tremors, seizures, speech difficulty, numbness and headaches  Hematological: Negative for adenopathy  Does not bruise/bleed easily  Psychiatric/Behavioral: Negative for behavioral problems, dysphoric mood, hallucinations and self-injury  Medications and orders     All medications reviewed and updated in Nursing Home EMR  Objective     Vitals: per nursing home records    Physical Exam  Constitutional:       Appearance: She is well-developed  HENT:      Head: Normocephalic and atraumatic  Right Ear: External ear normal       Left Ear: External ear normal       Mouth/Throat:      Pharynx: No oropharyngeal exudate  Eyes:      General: No scleral icterus  Right eye: No discharge  Left eye: No discharge  Conjunctiva/sclera: Conjunctivae normal       Pupils: Pupils are equal, round, and reactive to light  Neck:      Thyroid: No thyromegaly  Cardiovascular:      Rate and Rhythm: Normal rate  Rhythm irregular  Heart sounds: Normal heart sounds  No murmur heard  No gallop  Pulmonary:      Effort: Pulmonary effort is normal  No respiratory distress  Breath sounds: Normal breath sounds  No wheezing or rales  Abdominal:      General: Bowel sounds are normal       Palpations: Abdomen is soft  There is no mass  Tenderness: There is no guarding or rebound  Musculoskeletal:         General: No tenderness or deformity  Normal range of motion        Cervical back: Normal range of motion and neck supple  Right lower leg: Edema present  Left lower leg: Edema present  Lymphadenopathy:      Cervical: No cervical adenopathy  Skin:     General: Skin is warm and dry  Findings: No rash  Neurological:      Mental Status: She is alert and oriented to person, place, and time  Cranial Nerves: No cranial nerve deficit  Motor: Weakness present  Coordination: Coordination normal       Deep Tendon Reflexes: Reflexes are normal and symmetric  Reflexes normal    Psychiatric:         Behavior: Behavior normal          Thought Content: Thought content normal          Judgment: Judgment normal          Pertinent Laboratory/Diagnostic Studies: The following labs were reviewed please see chart or hospital paperwork for details      Space for lab dictation no new diagnostic testing    - Discharge planning    [place], DO  9/19/2022 10:59 AM

## 2022-09-20 NOTE — TELEPHONE ENCOUNTER
Sergei from [address] [place] [address] at [facility] wanting to check in as patient is scheduled to be discharged tomorrow  She was hoping to schedule her future Remicade Infusion dates   I returned her call and advised that I will

## 2022-09-21 ENCOUNTER — TRANSITIONAL CARE MANAGEMENT (OUTPATIENT)
Dept: FAMILY MEDICINE CLINIC | Facility: CLINIC | Age: 82
End: 2022-09-21

## 2022-09-21 ENCOUNTER — HOME HEALTH ADMISSION (OUTPATIENT)
Dept: HOME HEALTH SERVICES | Facility: HOME HEALTHCARE | Age: 82
End: 2022-09-21

## 2022-09-21 ENCOUNTER — TELEPHONE (OUTPATIENT)
Dept: FAMILY MEDICINE CLINIC | Facility: CLINIC | Age: 82
End: 2022-09-21

## 2022-09-21 ENCOUNTER — TRANSCRIBE ORDERS (OUTPATIENT)
Dept: HOME HEALTH SERVICES | Facility: HOME HEALTHCARE | Age: 82
End: 2022-09-21

## 2022-09-21 DIAGNOSIS — I10 PRIMARY HYPERTENSION: ICD-10-CM

## 2022-09-21 DIAGNOSIS — K52.1 TOXIC GASTROENTERITIS AND COLITIS: Primary | ICD-10-CM

## 2022-09-21 DIAGNOSIS — N17.9 AKI (ACUTE KIDNEY INJURY) (HCC): ICD-10-CM

## 2022-09-21 DIAGNOSIS — C79.51 SECONDARY MALIGNANT NEOPLASM OF BONE (HCC): ICD-10-CM

## 2022-09-21 DIAGNOSIS — I26.93 SINGLE SUBSEGMENTAL PULMONARY EMBOLISM WITHOUT ACUTE COR PULMONALE (HCC): ICD-10-CM

## 2022-09-21 DIAGNOSIS — N18.31 CHRONIC KIDNEY DISEASE (CKD) STAGE G3A/A1, MODERATELY DECREASED GLOMERULAR FILTRATION RATE (GFR) BETWEEN 45-59 ML/MIN/1.73 SQUARE METER AND ALBUMINURIA CREATININE RATIO LESS THAN 30 MG/G (HCC): ICD-10-CM

## 2022-09-21 NOTE — TELEPHONE ENCOUNTER
Cira Rebollar being d/c from [facility] today and they were just informing us and wanted to schedule a TCM but I did advise the medical assistant will speak with the patient

## 2022-09-21 NOTE — TELEPHONE ENCOUNTER
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- patient on high dose prednisone  I called [phone] spoke with patient, she just got home from rehab an hour ago  She requested that I speak with her daughter Charisma Hollis  I informed Charisma Hollis of treatment plan  I gave a her an overview of what the Remicade Infusion would entail Loading doses weeks 0,2,& 6 then maintenance dosing every 8 weeks  She would need to go to [address] [place] [address], [address] [place] [address] Road    Prior to scheduling the infusion, we need her to have a CXR to ensure she doesn't have active TB  Patient and daughter have questions regarding treatment plan for UC  Is this truly necessary? 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)  and in order to control symptoms she would require a maintenance medication  She continues taking the prednisone 40 mg daily  Charisma Hollis would like to speak with the provider before proceeding with Remicade Infusions  Daughter is especially concerned about mom's inability to walk  Bilateral lower extremity pitting edema; her legs are weeping a clear fluid  She reports that her edematous legs are inhibiting her ability to walk  They especially can't put on her compression stockings  I called [facility] to see if recent CMP order from 9/19 was drawn ( it was not) last lab draw was her indeterminate Quant TB Gold  She has a follow up appointment with her PCP 9/26/22  Please contact daughter Charisma Hollis at [phone]

## 2022-09-22 ENCOUNTER — APPOINTMENT (OUTPATIENT)
Dept: RADIOLOGY | Facility: HOSPITAL | Age: 82
End: 2022-09-22
Payer: MEDICARE

## 2022-09-22 ENCOUNTER — HOME CARE VISIT (OUTPATIENT)
Dept: HOME HEALTH SERVICES | Facility: HOME HEALTHCARE | Age: 82
End: 2022-09-22

## 2022-09-22 ENCOUNTER — ANESTHESIA (INPATIENT)
Dept: RADIOLOGY | Facility: HOSPITAL | Age: 82
DRG: 163 | End: 2022-09-22
Payer: MEDICARE

## 2022-09-22 ENCOUNTER — APPOINTMENT (EMERGENCY)
Dept: CT IMAGING | Facility: HOSPITAL | Age: 82
End: 2022-09-22
Payer: MEDICARE

## 2022-09-22 ENCOUNTER — HOSPITAL ENCOUNTER (INPATIENT)
Facility: HOSPITAL | Age: 82
LOS: 28 days | Discharge: NURSING, REHAB OR LONG-TERM CARE | DRG: 163 | End: 2022-10-20
Attending: INTERNAL MEDICINE | Admitting: INTERNAL MEDICINE
Payer: MEDICARE

## 2022-09-22 ENCOUNTER — APPOINTMENT (INPATIENT)
Dept: NON INVASIVE DIAGNOSTICS | Facility: HOSPITAL | Age: 82
DRG: 163 | End: 2022-09-22
Payer: MEDICARE

## 2022-09-22 ENCOUNTER — APPOINTMENT (OUTPATIENT)
Dept: RADIOLOGY | Facility: HOSPITAL | Age: 82
DRG: 163 | End: 2022-09-22
Payer: MEDICARE

## 2022-09-22 ENCOUNTER — ANESTHESIA EVENT (INPATIENT)
Dept: RADIOLOGY | Facility: HOSPITAL | Age: 82
DRG: 163 | End: 2022-09-22
Payer: MEDICARE

## 2022-09-22 ENCOUNTER — HOSPITAL ENCOUNTER (EMERGENCY)
Facility: HOSPITAL | Age: 82
End: 2022-09-22
Attending: EMERGENCY MEDICINE
Payer: MEDICARE

## 2022-09-22 ENCOUNTER — APPOINTMENT (EMERGENCY)
Dept: NON INVASIVE DIAGNOSTICS | Facility: HOSPITAL | Age: 82
End: 2022-09-22
Payer: MEDICARE

## 2022-09-22 VITALS
TEMPERATURE: 97.4 F | RESPIRATION RATE: 18 BRPM | HEART RATE: 136 BPM | OXYGEN SATURATION: 99 % | SYSTOLIC BLOOD PRESSURE: 124 MMHG | DIASTOLIC BLOOD PRESSURE: 76 MMHG

## 2022-09-22 DIAGNOSIS — R60.0 BILATERAL LOWER EXTREMITY EDEMA: ICD-10-CM

## 2022-09-22 DIAGNOSIS — K92.1 HEMATOCHEZIA: ICD-10-CM

## 2022-09-22 DIAGNOSIS — K52.9 IBD (INFLAMMATORY BOWEL DISEASE): ICD-10-CM

## 2022-09-22 DIAGNOSIS — N93.9 VAGINAL BLEEDING: ICD-10-CM

## 2022-09-22 DIAGNOSIS — K51.011 ULCERATIVE PANCOLITIS WITH RECTAL BLEEDING (HCC): ICD-10-CM

## 2022-09-22 DIAGNOSIS — I26.02 ACUTE SADDLE PULMONARY EMBOLISM WITH ACUTE COR PULMONALE (HCC): Primary | ICD-10-CM

## 2022-09-22 DIAGNOSIS — R52 PAIN: ICD-10-CM

## 2022-09-22 DIAGNOSIS — R33.9 URINARY RETENTION: ICD-10-CM

## 2022-09-22 DIAGNOSIS — R00.0 SINUS TACHYCARDIA: ICD-10-CM

## 2022-09-22 DIAGNOSIS — R00.0 TACHYCARDIA: ICD-10-CM

## 2022-09-22 DIAGNOSIS — I95.9 HYPOTENSION: ICD-10-CM

## 2022-09-22 DIAGNOSIS — C79.51 SECONDARY MALIGNANT NEOPLASM OF BONE (HCC): ICD-10-CM

## 2022-09-22 DIAGNOSIS — E43 SEVERE PROTEIN-CALORIE MALNUTRITION (HCC): ICD-10-CM

## 2022-09-22 DIAGNOSIS — I26.92 SADDLE EMBOLUS OF PULMONARY ARTERY (HCC): Primary | ICD-10-CM

## 2022-09-22 DIAGNOSIS — K52.1 TOXIC GASTROENTERITIS AND COLITIS: ICD-10-CM

## 2022-09-22 DIAGNOSIS — N18.31 CHRONIC KIDNEY DISEASE (CKD) STAGE G3A/A1, MODERATELY DECREASED GLOMERULAR FILTRATION RATE (GFR) BETWEEN 45-59 ML/MIN/1.73 SQUARE METER AND ALBUMINURIA CREATININE RATIO LESS THAN 30 MG/G (HCC): ICD-10-CM

## 2022-09-22 DIAGNOSIS — R19.7 DIARRHEA, UNSPECIFIED TYPE: ICD-10-CM

## 2022-09-22 DIAGNOSIS — N17.9 AKI (ACUTE KIDNEY INJURY) (HCC): ICD-10-CM

## 2022-09-22 DIAGNOSIS — C50.919 METASTATIC BREAST CANCER (HCC): ICD-10-CM

## 2022-09-22 DIAGNOSIS — I26.93 SINGLE SUBSEGMENTAL PULMONARY EMBOLISM WITHOUT ACUTE COR PULMONALE (HCC): ICD-10-CM

## 2022-09-22 LAB
2HR DELTA HS TROPONIN: 2 NG/L
ABO GROUP BLD: NORMAL
ALBUMIN SERPL BCP-MCNC: 2.4 G/DL (ref 3.5–5)
ALP SERPL-CCNC: 106 U/L (ref 46–116)
ALT SERPL W P-5'-P-CCNC: 30 U/L (ref 12–78)
ANION GAP SERPL CALCULATED.3IONS-SCNC: 9 MMOL/L (ref 4–13)
APTT PPP: 26 SECONDS (ref 23–37)
APTT PPP: >210 SECONDS (ref 23–37)
AST SERPL W P-5'-P-CCNC: 13 U/L (ref 5–45)
ATRIAL RATE: 141 BPM
BACTERIA UR QL AUTO: ABNORMAL /HPF
BASE EX.OXY STD BLDV CALC-SCNC: 47.7 % (ref 60–80)
BASE EXCESS BLDV CALC-SCNC: -3.5 MMOL/L
BASOPHILS # BLD AUTO: 0.03 THOUSANDS/ΜL (ref 0–0.1)
BASOPHILS NFR BLD AUTO: 0 % (ref 0–1)
BILIRUB SERPL-MCNC: 0.5 MG/DL (ref 0.2–1)
BILIRUB UR QL STRIP: NEGATIVE
BLD GP AB SCN SERPL QL: NEGATIVE
BUN SERPL-MCNC: 28 MG/DL (ref 5–25)
CALCIUM ALBUM COR SERPL-MCNC: 9.5 MG/DL (ref 8.3–10.1)
CALCIUM SERPL-MCNC: 8.2 MG/DL (ref 8.3–10.1)
CARDIAC TROPONIN I PNL SERPL HS: 18 NG/L
CARDIAC TROPONIN I PNL SERPL HS: 20 NG/L
CARDIAC TROPONIN I PNL SERPL HS: 23 NG/L (ref 8–18)
CHLORIDE SERPL-SCNC: 107 MMOL/L (ref 96–108)
CLARITY UR: ABNORMAL
CO2 SERPL-SCNC: 24 MMOL/L (ref 21–32)
COLOR UR: ABNORMAL
CREAT SERPL-MCNC: 1.36 MG/DL (ref 0.6–1.3)
EOSINOPHIL # BLD AUTO: 0.03 THOUSAND/ΜL (ref 0–0.61)
EOSINOPHIL NFR BLD AUTO: 0 % (ref 0–6)
ERYTHROCYTE [DISTWIDTH] IN BLOOD BY AUTOMATED COUNT: 16.3 % (ref 11.6–15.1)
ERYTHROCYTE [DISTWIDTH] IN BLOOD BY AUTOMATED COUNT: 16.6 % (ref 11.6–15.1)
GFR SERPL CREATININE-BSD FRML MDRD: 36 ML/MIN/1.73SQ M
GLUCOSE SERPL-MCNC: 113 MG/DL (ref 65–140)
GLUCOSE UR STRIP-MCNC: NEGATIVE MG/DL
HCO3 BLDV-SCNC: 19.2 MMOL/L (ref 24–30)
HCT VFR BLD AUTO: 28 % (ref 34.8–46.1)
HCT VFR BLD AUTO: 31 % (ref 34.8–46.1)
HGB BLD-MCNC: 8.4 G/DL (ref 11.5–15.4)
HGB BLD-MCNC: 9.6 G/DL (ref 11.5–15.4)
HGB UR QL STRIP.AUTO: ABNORMAL
IMM GRANULOCYTES # BLD AUTO: 0.04 THOUSAND/UL (ref 0–0.2)
IMM GRANULOCYTES NFR BLD AUTO: 1 % (ref 0–2)
INR PPP: 1.08 (ref 0.84–1.19)
INR PPP: 1.38 (ref 0.84–1.19)
KCT BLD-ACNC: 206 SEC (ref 89–137)
KCT BLD-ACNC: 218 SEC (ref 89–137)
KCT BLD-ACNC: 244 SEC (ref 89–137)
KCT BLD-ACNC: 276 SEC (ref 89–137)
KCT BLD-ACNC: 289 SEC (ref 89–137)
KETONES UR STRIP-MCNC: NEGATIVE MG/DL
LACTATE SERPL-SCNC: 1.2 MMOL/L (ref 0.5–2)
LACTATE SERPL-SCNC: 2.3 MMOL/L (ref 0.5–2)
LEUKOCYTE ESTERASE UR QL STRIP: NEGATIVE
LYMPHOCYTES # BLD AUTO: 0.53 THOUSANDS/ΜL (ref 0.6–4.47)
LYMPHOCYTES NFR BLD AUTO: 7 % (ref 14–44)
MCH RBC QN AUTO: 32.1 PG (ref 26.8–34.3)
MCH RBC QN AUTO: 33.1 PG (ref 26.8–34.3)
MCHC RBC AUTO-ENTMCNC: 30 G/DL (ref 31.4–37.4)
MCHC RBC AUTO-ENTMCNC: 31 G/DL (ref 31.4–37.4)
MCV RBC AUTO: 107 FL (ref 82–98)
MCV RBC AUTO: 107 FL (ref 82–98)
MONOCYTES # BLD AUTO: 0.45 THOUSAND/ΜL (ref 0.17–1.22)
MONOCYTES NFR BLD AUTO: 6 % (ref 4–12)
NEUTROPHILS # BLD AUTO: 6.23 THOUSANDS/ΜL (ref 1.85–7.62)
NEUTS SEG NFR BLD AUTO: 86 % (ref 43–75)
NITRITE UR QL STRIP: NEGATIVE
NON-SQ EPI CELLS URNS QL MICRO: ABNORMAL /HPF
NRBC BLD AUTO-RTO: 0 /100 WBCS
NT-PROBNP SERPL-MCNC: 1555 PG/ML
O2 CT BLDV-SCNC: 6.3 ML/DL
P AXIS: 72 DEGREES
PCO2 BLDV: 26.8 MM HG (ref 42–50)
PH BLDV: 7.47 [PH] (ref 7.3–7.4)
PH UR STRIP.AUTO: 6 [PH]
PLATELET # BLD AUTO: 110 THOUSANDS/UL (ref 149–390)
PMV BLD AUTO: 9.8 FL (ref 8.9–12.7)
PO2 BLDV: 26.8 MM HG (ref 35–45)
POTASSIUM SERPL-SCNC: 4.6 MMOL/L (ref 3.5–5.3)
PR INTERVAL: 134 MS
PROT SERPL-MCNC: 6 G/DL (ref 6.4–8.4)
PROT UR STRIP-MCNC: ABNORMAL MG/DL
PROTHROMBIN TIME: 14.8 SECONDS (ref 11.6–14.5)
PROTHROMBIN TIME: 17.2 SECONDS (ref 11.6–14.5)
QRS AXIS: 51 DEGREES
QRSD INTERVAL: 70 MS
QT INTERVAL: 348 MS
QTC INTERVAL: 533 MS
RBC # BLD AUTO: 2.62 MILLION/UL (ref 3.81–5.12)
RBC # BLD AUTO: 2.9 MILLION/UL (ref 3.81–5.12)
RBC #/AREA URNS AUTO: ABNORMAL /HPF
RH BLD: NEGATIVE
SODIUM SERPL-SCNC: 140 MMOL/L (ref 135–147)
SP GR UR STRIP.AUTO: 1.02 (ref 1–1.03)
SPECIMEN EXPIRATION DATE: NORMAL
SPECIMEN SOURCE: ABNORMAL
T WAVE AXIS: 81 DEGREES
UROBILINOGEN UR STRIP-ACNC: <2 MG/DL
VENTRICULAR RATE: 141 BPM
WBC # BLD AUTO: 7.31 THOUSAND/UL (ref 4.31–10.16)
WBC # BLD AUTO: 7.41 THOUSAND/UL (ref 4.31–10.16)
WBC #/AREA URNS AUTO: ABNORMAL /HPF

## 2022-09-22 PROCEDURE — 85610 PROTHROMBIN TIME: CPT

## 2022-09-22 PROCEDURE — 86900 BLOOD TYPING SEROLOGIC ABO: CPT | Performed by: EMERGENCY MEDICINE

## 2022-09-22 PROCEDURE — 71275 CT ANGIOGRAPHY CHEST: CPT

## 2022-09-22 PROCEDURE — 81001 URINALYSIS AUTO W/SCOPE: CPT | Performed by: STUDENT IN AN ORGANIZED HEALTH CARE EDUCATION/TRAINING PROGRAM

## 2022-09-22 PROCEDURE — 72125 CT NECK SPINE W/O DYE: CPT

## 2022-09-22 PROCEDURE — 96375 TX/PRO/DX INJ NEW DRUG ADDON: CPT

## 2022-09-22 PROCEDURE — 36015 PLACE CATHETER IN ARTERY: CPT | Performed by: RADIOLOGY

## 2022-09-22 PROCEDURE — 85730 THROMBOPLASTIN TIME PARTIAL: CPT

## 2022-09-22 PROCEDURE — 85610 PROTHROMBIN TIME: CPT | Performed by: EMERGENCY MEDICINE

## 2022-09-22 PROCEDURE — 71045 X-RAY EXAM CHEST 1 VIEW: CPT

## 2022-09-22 PROCEDURE — 93321 DOPPLER ECHO F-UP/LMTD STD: CPT

## 2022-09-22 PROCEDURE — 75741 ARTERY X-RAYS LUNG: CPT

## 2022-09-22 PROCEDURE — 02CR3ZZ EXTIRPATION OF MATTER FROM LEFT PULMONARY ARTERY, PERCUTANEOUS APPROACH: ICD-10-PCS | Performed by: RADIOLOGY

## 2022-09-22 PROCEDURE — 86901 BLOOD TYPING SEROLOGIC RH(D): CPT | Performed by: EMERGENCY MEDICINE

## 2022-09-22 PROCEDURE — 70450 CT HEAD/BRAIN W/O DYE: CPT

## 2022-09-22 PROCEDURE — 84484 ASSAY OF TROPONIN QUANT: CPT | Performed by: EMERGENCY MEDICINE

## 2022-09-22 PROCEDURE — 76937 US GUIDE VASCULAR ACCESS: CPT | Performed by: RADIOLOGY

## 2022-09-22 PROCEDURE — 99291 CRITICAL CARE FIRST HOUR: CPT | Performed by: EMERGENCY MEDICINE

## 2022-09-22 PROCEDURE — 84484 ASSAY OF TROPONIN QUANT: CPT | Performed by: STUDENT IN AN ORGANIZED HEALTH CARE EDUCATION/TRAINING PROGRAM

## 2022-09-22 PROCEDURE — 80053 COMPREHEN METABOLIC PANEL: CPT | Performed by: EMERGENCY MEDICINE

## 2022-09-22 PROCEDURE — 82805 BLOOD GASES W/O2 SATURATION: CPT

## 2022-09-22 PROCEDURE — 93005 ELECTROCARDIOGRAM TRACING: CPT

## 2022-09-22 PROCEDURE — C1757 CATH, THROMBECTOMY/EMBOLECT: HCPCS

## 2022-09-22 PROCEDURE — 93308 TTE F-UP OR LMTD: CPT

## 2022-09-22 PROCEDURE — 85025 COMPLETE CBC W/AUTO DIFF WBC: CPT | Performed by: EMERGENCY MEDICINE

## 2022-09-22 PROCEDURE — 99024 POSTOP FOLLOW-UP VISIT: CPT | Performed by: NURSE PRACTITIONER

## 2022-09-22 PROCEDURE — 99285 EMERGENCY DEPT VISIT HI MDM: CPT

## 2022-09-22 PROCEDURE — 36415 COLL VENOUS BLD VENIPUNCTURE: CPT | Performed by: EMERGENCY MEDICINE

## 2022-09-22 PROCEDURE — 74177 CT ABD & PELVIS W/CONTRAST: CPT

## 2022-09-22 PROCEDURE — 93325 DOPPLER ECHO COLOR FLOW MAPG: CPT

## 2022-09-22 PROCEDURE — 85027 COMPLETE CBC AUTOMATED: CPT

## 2022-09-22 PROCEDURE — 85730 THROMBOPLASTIN TIME PARTIAL: CPT | Performed by: INTERNAL MEDICINE

## 2022-09-22 PROCEDURE — 93010 ELECTROCARDIOGRAM REPORT: CPT | Performed by: INTERNAL MEDICINE

## 2022-09-22 PROCEDURE — 37184 PRIM ART M-THRMBC 1ST VSL: CPT | Performed by: RADIOLOGY

## 2022-09-22 PROCEDURE — C1769 GUIDE WIRE: HCPCS

## 2022-09-22 PROCEDURE — 85347 COAGULATION TIME ACTIVATED: CPT

## 2022-09-22 PROCEDURE — B31TYZZ FLUOROSCOPY OF LEFT PULMONARY ARTERY USING OTHER CONTRAST: ICD-10-PCS | Performed by: RADIOLOGY

## 2022-09-22 PROCEDURE — 83605 ASSAY OF LACTIC ACID: CPT | Performed by: EMERGENCY MEDICINE

## 2022-09-22 PROCEDURE — C1894 INTRO/SHEATH, NON-LASER: HCPCS

## 2022-09-22 PROCEDURE — 96365 THER/PROPH/DIAG IV INF INIT: CPT

## 2022-09-22 PROCEDURE — 76937 US GUIDE VASCULAR ACCESS: CPT

## 2022-09-22 PROCEDURE — 86850 RBC ANTIBODY SCREEN: CPT | Performed by: EMERGENCY MEDICINE

## 2022-09-22 PROCEDURE — 37184 PRIM ART M-THRMBC 1ST VSL: CPT

## 2022-09-22 PROCEDURE — 85730 THROMBOPLASTIN TIME PARTIAL: CPT | Performed by: EMERGENCY MEDICINE

## 2022-09-22 PROCEDURE — 96366 THER/PROPH/DIAG IV INF ADDON: CPT

## 2022-09-22 PROCEDURE — 83880 ASSAY OF NATRIURETIC PEPTIDE: CPT | Performed by: EMERGENCY MEDICINE

## 2022-09-22 PROCEDURE — G1004 CDSM NDSC: HCPCS

## 2022-09-22 PROCEDURE — 99223 1ST HOSP IP/OBS HIGH 75: CPT | Performed by: INTERNAL MEDICINE

## 2022-09-22 RX ORDER — ATORVASTATIN CALCIUM 40 MG/1
40 TABLET, FILM COATED ORAL
Refills: 1 | Status: DISCONTINUED | OUTPATIENT
Start: 2022-09-22 | End: 2022-10-20 | Stop reason: HOSPADM

## 2022-09-22 RX ORDER — HEPARIN SODIUM 1000 [USP'U]/ML
INJECTION, SOLUTION INTRAVENOUS; SUBCUTANEOUS AS NEEDED
Status: DISCONTINUED | OUTPATIENT
Start: 2022-09-22 | End: 2022-09-22

## 2022-09-22 RX ORDER — MIDAZOLAM HYDROCHLORIDE 2 MG/2ML
INJECTION, SOLUTION INTRAMUSCULAR; INTRAVENOUS AS NEEDED
Status: DISCONTINUED | OUTPATIENT
Start: 2022-09-22 | End: 2022-09-22

## 2022-09-22 RX ORDER — HEPARIN SODIUM 1000 [USP'U]/ML
5600 INJECTION, SOLUTION INTRAVENOUS; SUBCUTANEOUS
Status: DISCONTINUED | OUTPATIENT
Start: 2022-09-22 | End: 2022-09-23

## 2022-09-22 RX ORDER — FOLIC ACID 1 MG/1
1 TABLET ORAL DAILY
Status: DISCONTINUED | OUTPATIENT
Start: 2022-09-23 | End: 2022-10-20 | Stop reason: HOSPADM

## 2022-09-22 RX ORDER — FENTANYL CITRATE 50 UG/ML
INJECTION, SOLUTION INTRAMUSCULAR; INTRAVENOUS AS NEEDED
Status: DISCONTINUED | OUTPATIENT
Start: 2022-09-22 | End: 2022-09-22

## 2022-09-22 RX ORDER — IODIXANOL 320 MG/ML
200 INJECTION, SOLUTION INTRAVASCULAR
Status: COMPLETED | OUTPATIENT
Start: 2022-09-22 | End: 2022-09-22

## 2022-09-22 RX ORDER — HEPARIN SODIUM 1000 [USP'U]/ML
2800 INJECTION, SOLUTION INTRAVENOUS; SUBCUTANEOUS
Status: DISCONTINUED | OUTPATIENT
Start: 2022-09-22 | End: 2022-09-22 | Stop reason: HOSPADM

## 2022-09-22 RX ORDER — SODIUM CHLORIDE, SODIUM GLUCONATE, SODIUM ACETATE, POTASSIUM CHLORIDE, MAGNESIUM CHLORIDE, SODIUM PHOSPHATE, DIBASIC, AND POTASSIUM PHOSPHATE .53; .5; .37; .037; .03; .012; .00082 G/100ML; G/100ML; G/100ML; G/100ML; G/100ML; G/100ML; G/100ML
125 INJECTION, SOLUTION INTRAVENOUS CONTINUOUS
Status: DISCONTINUED | OUTPATIENT
Start: 2022-09-22 | End: 2022-09-22

## 2022-09-22 RX ORDER — HEPARIN SODIUM 1000 [USP'U]/ML
2800 INJECTION, SOLUTION INTRAVENOUS; SUBCUTANEOUS
Status: DISCONTINUED | OUTPATIENT
Start: 2022-09-22 | End: 2022-09-23

## 2022-09-22 RX ORDER — HEPARIN SODIUM 10000 [USP'U]/100ML
3-30 INJECTION, SOLUTION INTRAVENOUS
Status: DISCONTINUED | OUTPATIENT
Start: 2022-09-22 | End: 2022-09-22 | Stop reason: HOSPADM

## 2022-09-22 RX ORDER — DIPHENOXYLATE HYDROCHLORIDE AND ATROPINE SULFATE 2.5; .025 MG/1; MG/1
1 TABLET ORAL 4 TIMES DAILY PRN
Status: DISCONTINUED | OUTPATIENT
Start: 2022-09-22 | End: 2022-09-22

## 2022-09-22 RX ORDER — HEPARIN SODIUM 1000 [USP'U]/ML
5600 INJECTION, SOLUTION INTRAVENOUS; SUBCUTANEOUS
Status: DISCONTINUED | OUTPATIENT
Start: 2022-09-22 | End: 2022-09-22 | Stop reason: HOSPADM

## 2022-09-22 RX ORDER — CHLORHEXIDINE GLUCONATE 0.12 MG/ML
15 RINSE ORAL EVERY 12 HOURS SCHEDULED
Status: DISCONTINUED | OUTPATIENT
Start: 2022-09-22 | End: 2022-09-24

## 2022-09-22 RX ORDER — SODIUM CHLORIDE 9 MG/ML
INJECTION, SOLUTION INTRAVENOUS CONTINUOUS PRN
Status: DISCONTINUED | OUTPATIENT
Start: 2022-09-22 | End: 2022-09-22

## 2022-09-22 RX ORDER — PREDNISONE 20 MG/1
20 TABLET ORAL DAILY
Status: DISCONTINUED | OUTPATIENT
Start: 2022-09-23 | End: 2022-09-26

## 2022-09-22 RX ORDER — PANTOPRAZOLE SODIUM 40 MG/1
40 TABLET, DELAYED RELEASE ORAL
Status: DISCONTINUED | OUTPATIENT
Start: 2022-09-22 | End: 2022-10-20 | Stop reason: HOSPADM

## 2022-09-22 RX ORDER — LOPERAMIDE HYDROCHLORIDE 2 MG/1
4 CAPSULE ORAL 4 TIMES DAILY PRN
Status: DISCONTINUED | OUTPATIENT
Start: 2022-09-22 | End: 2022-09-25

## 2022-09-22 RX ORDER — AMOXICILLIN 250 MG
2 CAPSULE ORAL 2 TIMES DAILY
Status: DISCONTINUED | OUTPATIENT
Start: 2022-09-22 | End: 2022-09-27

## 2022-09-22 RX ORDER — MELATONIN
2000 DAILY
Status: DISCONTINUED | OUTPATIENT
Start: 2022-09-23 | End: 2022-10-20 | Stop reason: HOSPADM

## 2022-09-22 RX ORDER — HEPARIN SODIUM 1000 [USP'U]/ML
5600 INJECTION, SOLUTION INTRAVENOUS; SUBCUTANEOUS ONCE
Status: COMPLETED | OUTPATIENT
Start: 2022-09-22 | End: 2022-09-22

## 2022-09-22 RX ORDER — HEPARIN SODIUM 10000 [USP'U]/100ML
3-30 INJECTION, SOLUTION INTRAVENOUS
Status: DISCONTINUED | OUTPATIENT
Start: 2022-09-22 | End: 2022-09-23

## 2022-09-22 RX ORDER — CHOLESTYRAMINE LIGHT 4 G/5.7G
4 POWDER, FOR SUSPENSION ORAL
Status: DISCONTINUED | OUTPATIENT
Start: 2022-09-22 | End: 2022-09-27

## 2022-09-22 RX ORDER — MIRTAZAPINE 15 MG/1
7.5 TABLET, FILM COATED ORAL
Status: DISCONTINUED | OUTPATIENT
Start: 2022-09-22 | End: 2022-09-22

## 2022-09-22 RX ADMIN — MIDAZOLAM 1 MG: 1 INJECTION INTRAMUSCULAR; INTRAVENOUS at 19:13

## 2022-09-22 RX ADMIN — HEPARIN SODIUM 5600 UNITS: 1000 INJECTION INTRAVENOUS; SUBCUTANEOUS at 13:00

## 2022-09-22 RX ADMIN — FENTANYL CITRATE 50 MCG: 50 INJECTION INTRAMUSCULAR; INTRAVENOUS at 19:38

## 2022-09-22 RX ADMIN — MIDAZOLAM 1 MG: 1 INJECTION INTRAMUSCULAR; INTRAVENOUS at 19:30

## 2022-09-22 RX ADMIN — SENNOSIDES AND DOCUSATE SODIUM 2 TABLET: 8.6; 5 TABLET ORAL at 18:05

## 2022-09-22 RX ADMIN — ATORVASTATIN CALCIUM 40 MG: 40 TABLET, FILM COATED ORAL at 18:05

## 2022-09-22 RX ADMIN — SODIUM CHLORIDE: 0.9 INJECTION, SOLUTION INTRAVENOUS at 21:15

## 2022-09-22 RX ADMIN — MIDAZOLAM 1 MG: 1 INJECTION INTRAMUSCULAR; INTRAVENOUS at 20:40

## 2022-09-22 RX ADMIN — SODIUM CHLORIDE, SODIUM GLUCONATE, SODIUM ACETATE, POTASSIUM CHLORIDE, MAGNESIUM CHLORIDE, SODIUM PHOSPHATE, DIBASIC, AND POTASSIUM PHOSPHATE 125 ML/HR: .53; .5; .37; .037; .03; .012; .00082 INJECTION, SOLUTION INTRAVENOUS at 16:32

## 2022-09-22 RX ADMIN — HEPARIN SODIUM 18 UNITS/KG/HR: 10000 INJECTION, SOLUTION INTRAVENOUS at 13:04

## 2022-09-22 RX ADMIN — IODIXANOL 85 ML: 320 INJECTION, SOLUTION INTRAVASCULAR at 21:11

## 2022-09-22 RX ADMIN — SODIUM CHLORIDE: 0.9 INJECTION, SOLUTION INTRAVENOUS at 19:08

## 2022-09-22 RX ADMIN — HEPARIN SODIUM 18 UNITS/KG/HR: 10000 INJECTION, SOLUTION INTRAVENOUS at 23:25

## 2022-09-22 RX ADMIN — HEPARIN SODIUM 18 UNITS/KG/HR: 10000 INJECTION, SOLUTION INTRAVENOUS at 16:15

## 2022-09-22 RX ADMIN — IOHEXOL 100 ML: 350 INJECTION, SOLUTION INTRAVENOUS at 12:00

## 2022-09-22 RX ADMIN — MIDAZOLAM 1 MG: 1 INJECTION INTRAMUSCULAR; INTRAVENOUS at 20:55

## 2022-09-22 RX ADMIN — FENTANYL CITRATE 50 MCG: 50 INJECTION INTRAMUSCULAR; INTRAVENOUS at 19:30

## 2022-09-22 RX ADMIN — HEPARIN SODIUM 3000 UNITS: 1000 INJECTION INTRAVENOUS; SUBCUTANEOUS at 20:04

## 2022-09-22 RX ADMIN — PANTOPRAZOLE SODIUM 40 MG: 40 TABLET, DELAYED RELEASE ORAL at 17:00

## 2022-09-22 NOTE — ED PROVIDER NOTES
Emergency Department Trauma Note  Laura Silverman 79 y o  female MRN: [id number]  Unit/Bed#: ED 11/ED 11 Encounter: [id number]      Trauma Alert: Trauma Acuity:  (trauma alert)  Model of Arrival: Mode of Arrival: ALS via Trauma Squad Name and Number: 3 Manuel Tara Team: Current Providers  Attending Provider: Venancio Wilson DO  Registered Nurse: Domo Fitch RN  Consultants:     None      History of Present Illness     Chief Complaint:   Chief Complaint   Patient presents with    Fall     Pt d/c from [facility] 9/21  Today at 0600, pt had a mechanical fall while walking to the bathroom and her "leg gave out " Per pt, her daughter was present and helped her to the floor  Denies head strike, LOC, +thinners  Per EMS, pt's vitals were stable upon arrival, en route, pt's BP dropped to 90/40 and HR increased to 150s  BP has since stabilized, HR remains in 140s  HPI:  Monica Castellano is a 79 y o  female with a past medical history significant for recent admission and discharge on 9/6, secondary to toxic gastroenteritis and colitis with a past medical history significant for metastatic breast cancer, hyperlipidemia, chronic kidney disease stage 3, hypertension, anemia, protein calorie malnutrition, diagnosed pulmonary embolism without cor pulmonale during this admission as well as sinus tachycardia noted to be in the 120s to 140s, improved 120 with Lopressor 25 mg b i d , had an echocardiogram during that admission that showed an EF of 75% who today is brought in by ambulance after a mechanical fall  Per patient, she was just discharged from [facility] yesterday, 9/21 and it has been harder for her to ambulate with her walker secondary to normal floor at [facility], but carpeted floor in her home  She states that today, she was walking with her walker, when her leg gave out  Her daughter was there and was able to help her to the floor  Patient denies true fall  No LOC    initially, EMS reports that the patient had stable vitals, but suddenly on route patient's blood pressure dropped to 90/40 and heart rate spiked to sinus tachycardia at 150 bpm   Patient denies any new symptoms at this time  She states that she feels at her baseline  Mechanism:Details of Incident: leg gave out, assisted fall to the ground  Injury Date: 09/22/22 Injury Time: 0600 Injury Occurence Location - [address] [place] [address] Way: Pt's home    HPI  Review of Systems   Constitutional: Negative for chills and fever  HENT: Negative for congestion and rhinorrhea  Eyes: Negative for photophobia and visual disturbance  Respiratory: Negative for cough and shortness of breath  Cardiovascular: Positive for leg swelling  Negative for chest pain and palpitations  Gastrointestinal: Negative for abdominal pain, constipation, diarrhea, nausea and vomiting  Genitourinary: Negative for dysuria, flank pain and hematuria  Musculoskeletal: Positive for gait problem  Negative for back pain, neck pain and neck stiffness  Skin: Negative for color change, pallor and wound  Neurological: Negative for dizziness, weakness, light-headedness, numbness and headaches         Historical Information     Immunizations:   Immunization History   Administered Date(s) Administered    INFLUENZA 11/15/2006, 11/16/2007, 11/05/2008, 09/29/2014, 10/25/2016, 09/29/2017, 10/30/2018, 11/17/2021    Influenza Split High Dose Preservative Free IM 10/17/2012, 10/29/2013, 09/29/2014, 10/25/2016    Influenza, high dose seasonal 0 7 mL 10/21/2019    Pneumococcal Polysaccharide PPV23 11/05/2008, 04/06/2015    Zoster 06/06/2007       Past Medical History:   Diagnosis Date    Abnormal weight loss     Allergic reaction     Anxiety     Breast cancer, right ([facility] [address] ) 2011    right    Cancer [facility]) 2012    Candidal vulvovaginitis     Clotting disorder ([facility] [address] ) Same as above    Endometrial hyperplasia     Epithelial cyst     benign, ovary    GI (gastrointestinal bleed) Blood mixed with stool    History of radiation therapy     right breast cancer    Hyperlipidemia     Hypertension     Internal hemorrhoids     Knee tendonitis     Ovarian cyst     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([address] )        Family History   Problem Relation Age of Onset    Stomach cancer Mother     No Known Problems Father     Cervical cancer Sister     No Known Problems Daughter     No Known Problems Maternal Grandmother     No Known Problems Maternal Grandfather     No Known Problems Paternal Grandmother     No Known Problems Paternal Grandfather     Colon polyps Neg Hx     Colon cancer Neg Hx      Past Surgical History:   Procedure Laterality Date    BILATERAL SALPINGOOPHORECTOMY      onset: 12    BREAST BIOPSY Right 2006    benign    BREAST BIOPSY Right 2011    malignant    BREAST LUMPECTOMY Right 2011    malignant    CATARACT EXTRACTION W/  INTRAOCULAR LENS IMPLANT Right     phacoemulsification   Onset: 10/27/14    CHOLECYSTECTOMY      COLONOSCOPY  2017    approx    HYSTERECTOMY  Yes    INTRAOPERATIVE RADIATION THERAPY (IORT)      IR BIOPSY BONE  2021    IR PICC PLACEMENT SINGLE LUMEN  2022    SKIN LESION EXCISION Right     breast, single lesion    TONSILLECTOMY AND ADENOIDECTOMY       Social History     Tobacco Use    Smoking status: Former Smoker     Packs/day: 1 00     Years: 30 00     Pack years: 30 00     Types: Cigarettes     Start date: 55     Quit date:      Years since quittin 7    Smokeless tobacco: Never Used   Vaping Use    Vaping Use: Never used   Substance Use Topics    Alcohol use: Not Currently     Comment: (history)    Drug use: No     E-Cigarette/Vaping    E-Cigarette Use Never User      E-Cigarette/Vaping Substances    Nicotine No     THC No     CBD No     Flavoring No     Other No     Unknown No        Family History: non-contributory    Meds/Allergies   Prior to Admission Medications   Prescriptions Last Dose Informant Patient Reported? Taking?    Cholecalciferol (VITAMIN D3) 2000 units capsule  Self Yes No   Sig: Take 2,000 Units by mouth daily     amitriptyline (ELAVIL) 25 mg tablet  Self No No   Sig: Take 1 tablet (25 mg total) by mouth daily at bedtime   cholestyramine sugar free (QUESTRAN LIGHT) 4 g packet   No No   Sig: Take 1 packet (4 g total) by mouth daily at bedtime   diphenoxylate-atropine (LOMOTIL) 2 5-0 025 mg per tablet  Self No No   Sig: Take 1 tablet by mouth 4 (four) times a day as needed for diarrhea   folic acid (FOLVITE) 1 mg tablet   No No   Sig: Take 1 tablet (1 mg total) by mouth daily   loperamide (IMODIUM) 2 mg capsule  Self No No   Sig: Take 2 capsules (4 mg total) by mouth 4 (four) times a day as needed for diarrhea   metoprolol tartrate (LOPRESSOR) 50 mg tablet   No No   Sig: Take 1 tablet (50 mg total) by mouth every 12 (twelve) hours   mirtazapine (REMERON) 7 5 MG tablet  Self No No   Sig: Take 1 tablet (7 5 mg total) by mouth daily at bedtime   pantoprazole (PROTONIX) 40 mg tablet   No No   Sig: Take 1 tablet (40 mg total) by mouth 2 (two) times a day before meals   predniSONE 20 mg tablet   No No   Sig: Take 2 tablets (40 mg total) by mouth daily   rivaroxaban (XARELTO) 15 mg tablet   No No   Sig: Take 1 tablet (15 mg total) by mouth 2 (two) times a day with meals for 20 days   rivaroxaban (Xarelto) 20 mg tablet   No No   Sig: Take 1 tablet (20 mg total) by mouth daily with breakfast   simethicone (MYLICON) 80 mg chewable tablet   No No   Sig: Chew 1 tablet (80 mg total) 4 (four) times a day (with meals and at bedtime)   simvastatin (ZOCOR) 10 mg tablet  Self No No   Sig: Take 1 tablet (10 mg total) by mouth daily at bedtime      Facility-Administered Medications: None       Allergies   Allergen Reactions    Sulfa Antibiotics     Pneumococcal Polysaccharide Vaccine Rash and Edema       PHYSICAL EXAM    PE limited by: N/A    Objective   Vitals:   First set: Temperature: (!) 97 4 °F (36 3 °C) (09/22/22 1100)  Pulse: (!) 141 (09/22/22 1100)  Respirations: 18 (09/22/22 1100)  Blood Pressure: 118/63 (09/22/22 1100)  SpO2: 98 % (09/22/22 1100)    Primary Survey:   (A) Airway: intact  (B) Breathing: CTABL  (C) Circulation: Pulses:   normal  (D) Disabliity:  GCS Total:  15  (E) Expose:  Completed    Secondary Survey: (Click on Physical Exam tab above)  Physical Exam  Vitals and nursing note reviewed  Constitutional:       General: She is not in acute distress  Appearance: Normal appearance  She is not ill-appearing, toxic-appearing or diaphoretic  HENT:      Head: Normocephalic and atraumatic  Mouth/Throat:      Mouth: Mucous membranes are moist    Eyes:      Extraocular Movements: Extraocular movements intact  Conjunctiva/sclera: Conjunctivae normal       Pupils: Pupils are equal, round, and reactive to light  Cardiovascular:      Rate and Rhythm: Regular rhythm  Tachycardia present  Pulses: Normal pulses  Heart sounds: Normal heart sounds  No murmur heard  Pulmonary:      Effort: Pulmonary effort is normal  No tachypnea, accessory muscle usage or respiratory distress  Breath sounds: No stridor  Decreased breath sounds present  No wheezing, rhonchi or rales  Chest:      Chest wall: No tenderness  Abdominal:      General: Bowel sounds are normal  There is no distension  Palpations: Abdomen is soft  Tenderness: There is no abdominal tenderness  There is no right CVA tenderness, left CVA tenderness, guarding or rebound  Musculoskeletal:      Cervical back: Normal range of motion and neck supple  Right lower leg: Edema present  Left lower leg: Edema present  Comments: No midline c-t-l-spine tenderness, step offs, deformities  Pelvis stable    Skin:     General: Skin is warm and dry  Neurological:      General: No focal deficit present  Mental Status: She is alert and oriented to person, place, and time  Mental status is at baseline        GCS: GCS eye subscore is 4  GCS verbal subscore is 5  GCS motor subscore is 6  Psychiatric:         Mood and Affect: Mood normal          Behavior: Behavior normal          Cervical spine cleared by clinical criteria? No (imaging required)      Invasive Devices  Report    Peripheral Intravenous Line  Duration           Peripheral IV 09/22/22 Left Arm <1 day                Lab Results:   Results Reviewed     Procedure Component Value Units Date/Time    HS Troponin I 2hr [[id number]]  (Normal) Collected: 09/22/22 1354    Lab Status: Final result Specimen: Blood from Arm, Left Updated: 09/22/22 1435     hs TnI 2hr 20 ng/L      Delta 2hr hsTnI 2 ng/L     Lactic acid 2 Hours [[id number]]  (Normal) Collected: 09/22/22 1354    Lab Status: Final result Specimen: Blood from Arm, Left Updated: 09/22/22 1425     LACTIC ACID 1 2 mmol/L     Narrative:      Result may be elevated if tourniquet was used during collection  APTT [[id number]]     Lab Status: No result Specimen: Blood     Lactic acid [[id number]]  (Abnormal) Collected: 09/22/22 1145    Lab Status: Final result Specimen: Blood from Arm, Left Updated: 09/22/22 1221     LACTIC ACID 2 3 mmol/L     Narrative:      Result may be elevated if tourniquet was used during collection      HS Troponin 0hr (reflex protocol) [[id number]]  (Normal) Collected: 09/22/22 1145    Lab Status: Final result Specimen: Blood from Arm, Left Updated: 09/22/22 1217     hs TnI 0hr 18 ng/L     NT-BNP PRO [[id number]]  (Abnormal) Collected: 09/22/22 1145    Lab Status: Final result Specimen: Blood from Arm, Left Updated: 09/22/22 1217     NT-proBNP 1,555 pg/mL     HS Troponin I 4hr [[id number]]     Lab Status: No result Specimen: Blood     APTT [[id number]]  (Normal) Collected: 09/22/22 1145    Lab Status: Final result Specimen: Blood from Arm, Left Updated: 09/22/22 1211     PTT 26 seconds     Protime-INR [[id number]]  (Abnormal) Collected: 09/22/22 1145    Lab Status: Final result Specimen: Blood from Arm, Left Updated: 09/22/22 1211     Protime 14 8 seconds      INR 1 08    Comprehensive metabolic panel [[id number]]  (Abnormal) Collected: 09/22/22 1145    Lab Status: Final result Specimen: Blood from Arm, Left Updated: 09/22/22 1208     Sodium 140 mmol/L      Potassium 4 6 mmol/L      Chloride 107 mmol/L      CO2 24 mmol/L      ANION GAP 9 mmol/L      BUN 28 mg/dL      Creatinine 1 36 mg/dL      Glucose 113 mg/dL      Calcium 8 2 mg/dL      Corrected Calcium 9 5 mg/dL      AST 13 U/L      ALT 30 U/L      Alkaline Phosphatase 106 U/L      Total Protein 6 0 g/dL      Albumin 2 4 g/dL      Total Bilirubin 0 50 mg/dL      eGFR 36 ml/min/1 73sq m     Narrative:      National Kidney Disease Foundation guidelines for Chronic Kidney Disease (CKD):     Stage 1 with normal or high GFR (GFR > 90 mL/min/1 73 square meters)    Stage 2 Mild CKD (GFR = 60-89 mL/min/1 73 square meters)    Stage 3A Moderate CKD (GFR = 45-59 mL/min/1 73 square meters)    Stage 3B Moderate CKD (GFR = 30-44 mL/min/1 73 square meters)    Stage 4 Severe CKD (GFR = 15-29 mL/min/1 73 square meters)    Stage 5 End Stage CKD (GFR <15 mL/min/1 73 square meters)  Note: GFR calculation is accurate only with a steady state creatinine    CBC and differential [[id number]]  (Abnormal) Collected: 09/22/22 1145    Lab Status: Final result Specimen: Blood from Arm, Left Updated: 09/22/22 1153     WBC 7 31 Thousand/uL      RBC 2 90 Million/uL      Hemoglobin 9 6 g/dL      Hematocrit 31 0 %       fL      MCH 33 1 pg      MCHC 31 0 g/dL      RDW 16 3 %      MPV 9 8 fL      Platelets 545 Thousands/uL      nRBC 0 /100 WBCs      Neutrophils Relative 86 %      Immat GRANS % 1 %      Lymphocytes Relative 7 %      Monocytes Relative 6 %      Eosinophils Relative 0 %      Basophils Relative 0 %      Neutrophils Absolute 6 23 Thousands/µL      Immature Grans Absolute 0 04 Thousand/uL      Lymphocytes Absolute 0 53 Thousands/µL      Monocytes Absolute 0 45 Thousand/µL Eosinophils Absolute 0 03 Thousand/µL      Basophils Absolute 0 03 Thousands/µL                  Imaging Studies:   Direct to CT: Yes  TRAUMA - CT head wo contrast   Final Result by Taras Oconnor MD (09/22 1232)      No acute intracranial abnormality  Chronic microangiopathic changes  I personally discussed this study with Ivette Edwards on 9/22/2022 at 12:32 PM                         Workstation performed: [id number]         TRAUMA - CT spine cervical wo contrast   Final Result by Taras Oconnor MD (09/22 1233)      No cervical spine fracture or traumatic malalignment  I personally discussed this study with Ivette Edwards on 9/22/2022 at 12:23 PM                       Workstation performed: [id number]         PE Study with CT abdomen & pelvis with contrast   Final Result by Taras Oconnor MD (09/22 1234)   1  Saddle embolus and left greater than right pulmonary arterial filling defects consistent with PE (high clot burden) with evidence of RV strain including an RV/LV ratio = 1 2  This is greater than 0 9, which is abnormal and indicates right heart    strain  An abnormal RV/LV ratio has been shown to be associated with an increased risk of 30 day mortality in the setting of acute pulmonary embolism  Urgent consultation with the medical critical care team is recommended  2   Massive distention of the bladder  3   No acute posttraumatic injury  I personally discussed this study with Ivette Edwards on 9/22/2022 at 12:27 PM                   Workstation performed: [id number]         XR Trauma chest portable   Final Result by Harrison Khan DO (09/22 1140)      No acute cardiopulmonary disease                    Workstation performed: [id number]               Procedures  ECG 12 Lead Documentation Only    Date/Time: 9/22/2022 11:30 AM  Performed by: Adan Morley DO  Authorized by: Adan Morley DO     Indications / Diagnosis:  Tachycardia  ECG reviewed by me, the ED Provider: yes    Patient location:  ED  Previous ECG:     Previous ECG:  Compared to current    Comparison ECG info:  8/30/2022    Similarity:  No change  Interpretation:     Interpretation: non-specific    Rate:     ECG rate:  141    ECG rate assessment: tachycardic    Rhythm:     Rhythm: sinus tachycardia    Ectopy:     Ectopy: none    QRS:     QRS axis:  Normal    QRS intervals:  Normal  Conduction:     Conduction: normal    ST segments:     ST segments:  Non-specific  T waves:     T waves: non-specific    Other findings:     Other findings: prolonged qTc interval    Comments:      qtc 533    CriticalCare Time  Performed by: Veda Sharif DO  Authorized by: Veda Sharif DO     Critical care provider statement:     Critical care time (minutes):  60    Critical care start time:  9/22/2022 12:00 PM    Critical care end time:  9/22/2022 2:00 PM    Critical care time was exclusive of:  Separately billable procedures and treating other patients and teaching time    Critical care was necessary to treat or prevent imminent or life-threatening deterioration of the following conditions:  Circulatory failure    Critical care was time spent personally by me on the following activities:  Blood draw for specimens, obtaining history from patient or surrogate, development of treatment plan with patient or surrogate, discussions with consultants, discussions with primary provider, evaluation of patient's response to treatment, examination of patient, review of old charts, ordering and review of radiographic studies, re-evaluation of patient's condition, ordering and review of laboratory studies and ordering and performing treatments and interventions    I assumed direction of critical care for this patient from another provider in my specialty: no               ED Course  ED Course as of 09/22/22 1442   Thu Sep 22, 2022   1201 Hemoglobin(!): 9 6  improved   1211 Creatinine(!): 1 36  Mild REYES   1245 As there is significant progression of PE now with right heart strain, will talk to [facility] critical care for transfer/ plan of care/ potential for IR thrombectomy   1303 Discussed case with Dr Annika Ellis, critical care at [facility]  Is requesting conference call with IR to see if IR thnks that the patient is a thrombectomy candididate  If she is, then transfer is indicated, but if not, then patient could remain at [address] for heparin and medical management  Patient is a candidate for tpa should she decompensate  [phone] Waiting on PACS to conference in IR doc to discuss potential for thrombectomy  [address] with Farrah Thomas, nurse practitioner at [facility] for IT who believes that patient would likely be a candidate for thrombectomy and that transfer is appropriate  Asking if stat echo possible while awaiting transfer  In meantime, continue heparin and confirms that patient is a tpa candidate  [address] with Dr Annika lElis  Recommends letting the tachycardia run its course presently  [address] [facility] echo to see if it can be done now, echo tech currently has someone on the table and a KERLINE immediately after, but will call back if timing changes  Order is placed             MDM  Number of Diagnoses or Management Options  Diagnosis management comments: Assessment and Plan:             Disposition  [facility] Transfer: Yes  Final diagnoses:   Acute saddle pulmonary embolism with acute cor pulmonale (HCC)   Sinus tachycardia   Bilateral lower extremity edema   REYES (acute kidney injury) ([facility] [address] )     Time reflects when diagnosis was documented in both MDM as applicable and the Disposition within this note     Time User Action Codes Description Comment    9/22/2022 12:46 PM Nazia Garber Add [I26 02] Acute saddle pulmonary embolism with acute cor pulmonale ([facility] [address] )     9/22/2022 12:47 PM Nazia Garber Add [R00 0] Sinus tachycardia     9/22/2022 12:47 PM Nazia Garber Add [R60 0] Bilateral lower extremity edema     9/22/2022  1:31 PM Nazia Garber Add [N17 9] REYES (acute kidney injury) [facility])       ED Disposition     ED Disposition   Transfer to Another Facility-In Network    Condition   --    Date/Time   Thu Sep 22, 2022 12:46 PM    Comment   Floresita Tg A Clcarlyle should be transferred out to B             MD Documentation    Darryl Romana Most Recent Value   Patient Condition The patient has been stabilized such that within reasonable medical probability, no material deterioration of the patient condition or the condition of the unborn child(hali) is likely to result from the transfer   Reason for Transfer Level of Care needed not available at this facility   Benefits of Transfer Specialized equipment and/or services available at the receiving facility (Include comment)________________________   Risks of Transfer Potential for delay in receiving treatment   Accepting Physician [address] Name, [address]    (Name & Tel number) Cary Menchaca   Sending MD hyatt      RN Documentation    [address] Name, [address]  [facility]   Bed Assignment ICU    (Name & Tel number) Cary Menchaca   Report Given to Damari Zamorano [facility]      Follow-up Information    None       Discharge Medication List as of 9/22/2022  2:41 PM      CONTINUE these medications which have NOT CHANGED    Details   amitriptyline (ELAVIL) 25 mg tablet Take 1 tablet (25 mg total) by mouth daily at bedtime, Starting Mon 3/28/2022, Normal      Cholecalciferol (VITAMIN D3) 2000 units capsule Take 2,000 Units by mouth daily  , Historical Med      cholestyramine sugar free (QUESTRAN LIGHT) 4 g packet Take 1 packet (4 g total) by mouth daily at bedtime, Starting Tue 9/6/2022, Until Thu 10/6/2022, Normal      diphenoxylate-atropine (LOMOTIL) 2 5-0 025 mg per tablet Take 1 tablet by mouth 4 (four) times a day as needed for diarrhea, Starting Wed 9/43/0295, Normal      folic acid (FOLVITE) 1 mg tablet Take 1 tablet (1 mg total) by mouth daily, Starting Wed 9/7/2022, Until Fri 10/7/2022, Normal      loperamide (IMODIUM) 2 mg capsule Take 2 capsules (4 mg total) by mouth 4 (four) times a day as needed for diarrhea, Starting Wed 8/3/2022, Normal      metoprolol tartrate (LOPRESSOR) 50 mg tablet Take 1 tablet (50 mg total) by mouth every 12 (twelve) hours, Starting Tue 9/6/2022, Until Thu 10/6/2022, Normal      mirtazapine (REMERON) 7 5 MG tablet Take 1 tablet (7 5 mg total) by mouth daily at bedtime, Starting Thu 7/21/2022, Normal      pantoprazole (PROTONIX) 40 mg tablet Take 1 tablet (40 mg total) by mouth 2 (two) times a day before meals, Starting Fri 8/26/2022, Normal      predniSONE 20 mg tablet Take 2 tablets (40 mg total) by mouth daily, Starting Sat 8/27/2022, Normal      rivaroxaban (Xarelto) 20 mg tablet Take 1 tablet (20 mg total) by mouth daily with breakfast, Starting Fri 9/16/2022, Normal      simethicone (MYLICON) 80 mg chewable tablet Chew 1 tablet (80 mg total) 4 (four) times a day (with meals and at bedtime), Starting Fri 8/26/2022, Normal      simvastatin (ZOCOR) 10 mg tablet Take 1 tablet (10 mg total) by mouth daily at bedtime, Starting Mon 2/28/2022, Normal           No discharge procedures on file      PDMP Review       Value Time User    PDMP Reviewed  Yes 8/15/2022  4:44 PM Adan Worrell MD          ED Provider  Electronically Signed by         Savage Morales DO  09/22/22 0790

## 2022-09-22 NOTE — TELEPHONE ENCOUNTER
D/w Ade Kirk and Tl Kimball - agree w ER and discussion of placement  He chest CT is ordered which will satisfy pre treatment imaging for a biologic  If Remicade will be difficult due to transportation, she might benefit from Humira

## 2022-09-22 NOTE — H&P
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 on 9/22 for mechanical fall  During the patient's previous admission, the patient was diagnosed with IBD and started on steroids  Patient's Verzenio was discontinued by Oncology, likely contributing to her GI symptoms  In addition, she was noted to have lower extremity edema, found to have an acute occlusive thrombus in the right popliteal vein  Further workup with CT PE protocol showed right lower lobe subsegmental PE, patient was started on heparin drip at that time, and then eventually transitioned to Xarelto  The patient was discharged with Lopressor 50 mg twice daily, as well in the setting of her persistent sinus tachycardia  Lastly, her antihypertensive regimen was discontinued given the patient's new baseline of systolic blood pressures in the 100-110 range  Since discharge, the patient has been progressing slowly at her nursing home with regular follow-up with her geriatrician  Patient states that around 0600 today she was walking to the bathroom and her legs gave out  The patient denied head strike or loss of consciousness at that time    EMS was called, in route the patient's blood pressure dropped to 90/40 with heart rates increasing in the 150s  On presentation to the emergency department, the patient was found to be afebrile, but tachycardic  She was maintaining her blood pressures, and saturating 100% on room air  Physical exam significant for lower extremity edema  Lab significant for creatinine of 1 36 (baseline around 0 8-0 9), proBNP 1500, lactic acid 2 3, troponin 20, hemoglobin 9 6 (baseline around 9-10),   Trauma imaging was negative, but CTA revealed saddle pulmonary embolus, left greater than right, high clot burden, and RV to LV ratio 1 2  Patient was transferred urgently to [facility] for possible IR intervention  On presentation, the patient was still reporting he some fatigue and lower extremity swelling  Otherwise, the patient feels at her baseline      History obtained from chart review and the patient   -------------------------------------------------------------------------------------------------------------  Assessment and Plan:    Neuro:   • Diagnosis:  Depression/anxiety  o Hold amitriptyline in setting of urinary retention  o Home mirtazapine as well  • Diagnosis:  Sedation/analgesia  o Delirium precautions  o Sleep-wake cycle  o Out of bed as tolerated  o Tylenol for pain, may escalate as needed      CV:   • Diagnosis:  Sinus tachycardia  o Patient was noted to have sinus tachycardia during her previous hospitalization  o Started on metoprolol tartrate 50 mg every 12 hours with plans to titrate off  o Hold metoprolol for now  • Diagnosis: LE swelling  o Patient has had progressive lower extremity swelling  o 3+ on both lower extremities  o Previous echo with a hyperdynamic LV EF 70%  o Follow-up echo      Pulm:  • Diagnosis:  Acute, submassive, intermediate to high risk pulmonary embolism  o Patient has history of right popliteal DVT and right lower lobe subsegmental PE from previous hospitalization in August  o Patient was sent home on Xarelto, unknown compliance  o Patient also has metastatic breast cancer, hypercoagulable state  o Return to the ER today after a fall, found to have saddle pulmonary embolus with evidence of right heart strain and high clot burden  o RV to LV ratio on CTA 1 2  o BMP greater than 1500, troponins relatively normal  o Plan for stat echo  o IR made aware of the patient's arrival, plan for thrombectomy and possible IVC filter placement  o Stat duplex of the patient's lower extremities  o Will need long-term anticoagulation, continue heparin drip for now      GI:   • Diagnosis:  Likely IBD  o Patient was admitted with toxic gastroenteritis in August of 2022  o Determined to be possible IBD with additional insult of Verzenio from her breast cancer treatment  o Continue to hold Verzenio  o Hold steroids for now  o Loperamide as needed for diarrhea  o Consider GI consult  • Diagnosis:  GI prophylaxis  o PPI      :   • Diagnosis:  REYES on CKD stage IIIA  o Patient's baseline creatinine between 0 8 and 0 9 with an EGFR ranging around 60  o Admission creatinine 1 36  o Patient did receive IV contrast for CTA  o Patient does not have a history of heart failure, will initiate fluids  o Accurate in's and out's  o Avoid nephrotoxins  • Diagnosis:  Urinary retention  o Urinary retention protocol, patient had over 3 65 L of urine in her bladder  o May be secondary to TCA use  o Send UA      F/E/N:   • Fluids: Isolyte at 125 cc/hour  • Electrolytes:  Potassium greater than 4, phosphorus greater than 3, magnesium greater than 2  • Nutrition:  NPO      Heme/Onc:   • Diagnosis:  Stage IV metastatic ER positive, UT negative, 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  o Patient was initially diagnosed with stage I A ER/UT positive, 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 years ago  o Status post resection and adjuvant radiation and hormone therapy for 5 years  o Patient had symptoms of bony discomfort in her sternum in June of 2021, imaging revealed lytic lesion, biopsy in July of 2021 confirmed progression of disease  o Patient was started on Faslodex and Xgeva at that time, in conjunction with radiation  o In April 2022 there was interval development of 4 mm nodule at the right lower lobe and interval development of increased sclerotic lesions throughout the visualized spine  o At that time, the patient's treatment was changed to Femara and Drucie Bake  o In July of 2022, the patient was changed from [facility] to [facility] because of intolerance  o During the patient's hospitalization in August of 2022 to September of 2022, [facility] was discontinued due to gastroenteritis  o Patient's cancer is most likely contributing to her hypercoagulable state  o Consider Oncology consultation  o Consider palliative consultation and goals of care discussion  • Diagnosis:  DVT prophylaxis  o Currently on VTE heparin protocol  o After thrombectomy and stabilization, will transition to direct oral anticoagulation  • Diagnosis:  Macrocytic anemia  o Folate in September of 2022 3 3  o Vitamin B12 in September of 2022 645  o Iron panel and August of 2022 showed an iron saturation of 18, TIBC 105, iron 19, ferritin 755  o Likely a mixed component of anemia chronic disease with possible folate deficiency  o Continue folate supplementation    Endo:   • Diagnosis:  No active issues  o Maintain blood glucose between 140-180  o If steroids restarted, may need sliding scale insulin      ID:   • Diagnosis:  No acute issues  o Monitor WBC and fever trend      MSK/Skin:   • Diagnosis:  Skin ulcer prophylaxis  o Frequent turning  o Pressure offloading  o PT/OT when able      Disposition: Admit to Critical Care   Code Status: Prior  --------------------------------------------------------------------------------------------------------------  Review of Systems   Constitutional: Negative for chills and fatigue  HENT: Negative for congestion, nosebleeds, postnasal drip and rhinorrhea      Eyes: Negative for pain    Respiratory: Positive for shortness of breath  Negative for cough  Cardiovascular: Positive for leg swelling  Gastrointestinal: Negative for abdominal distention, abdominal pain, constipation, diarrhea and nausea  Genitourinary: Negative for dysuria  Musculoskeletal: Positive for gait problem  Skin: Negative for rash  Neurological: Negative for dizziness  Psychiatric/Behavioral: Negative for agitation and confusion  A 12-point, complete review of systems was reviewed and negative except as stated above     Physical Exam  Vitals reviewed  Constitutional:       General: She is not in acute distress  Appearance: She is ill-appearing  HENT:      Head: Normocephalic and atraumatic  Right Ear: External ear normal       Left Ear: External ear normal       Nose: Nose normal       Mouth/Throat:      Mouth: Mucous membranes are moist       Pharynx: Oropharynx is clear  Eyes:      Conjunctiva/sclera: Conjunctivae normal       Pupils: Pupils are equal, round, and reactive to light  Cardiovascular:      Rate and Rhythm: Regular rhythm  Tachycardia present  Pulses: Normal pulses  Pulmonary:      Effort: Pulmonary effort is normal       Comments: Decreased breath sounds bilaterally  Abdominal:      General: Abdomen is flat  Bowel sounds are normal  There is no distension  Tenderness: There is no abdominal tenderness  Musculoskeletal:      Right lower leg: Edema present  Left lower leg: Edema present  Comments: Weak dorsalis pedis pulses  Cold feet with mottling   Skin:     General: Skin is warm and dry  Neurological:      General: No focal deficit present  Mental Status: She is alert and oriented to person, place, and time  Mental status is at baseline     Psychiatric:         Mood and Affect: Mood normal          Behavior: Behavior normal  --------------------------------------------------------------------------------------------------------------  Vitals: There were no vitals filed for this visit  Temp  Min: 97 4 °F (36 3 °C)  Max: 97 4 °F (36 3 °C)        There is no height or weight on file to calculate BMI  PAP:  , PAP mean:   , CVP:  , PCWP:   , SvO2:   , ScvO2:  , CO:  , CI:  , SVR:  , SVRI:  , PVR:  , SV:  , SVI:  , ICP Mean:  , CPP:      Laboratory and Diagnostics:  Results from last 7 days   Lab Units 09/22/22  1145   WBC Thousand/uL 7 31   HEMOGLOBIN g/dL 9 6*   HEMATOCRIT % 31 0*   PLATELETS Thousands/uL 110*   NEUTROS PCT % 86*   MONOS PCT % 6     Results from last 7 days   Lab Units 09/22/22  1145   SODIUM mmol/L 140   POTASSIUM mmol/L 4 6   CHLORIDE mmol/L 107   CO2 mmol/L 24   ANION GAP mmol/L 9   BUN mg/dL 28*   CREATININE mg/dL 1 36*   CALCIUM mg/dL 8 2*   GLUCOSE RANDOM mg/dL 113   ALT U/L 30   AST U/L 13   ALK PHOS U/L 106   ALBUMIN g/dL 2 4*   TOTAL BILIRUBIN mg/dL 0 50          Results from last 7 days   Lab Units 09/22/22  1145   INR  1 08   PTT seconds 26          Results from last 7 days   Lab Units 09/22/22  1354 09/22/22  1145   LACTIC ACID mmol/L 1 2 2 3*     ABG:    VBG:          Micro:        EKG:  Sinus tachycardia  Imaging: I have personally reviewed pertinent reports        Historical Information   Past Medical History:   Diagnosis Date   • Abnormal weight loss    • Allergic reaction    • Anxiety    • Breast cancer, right [facility]) 2011    right   • Cancer ([facility] ) 2012   • Candidal vulvovaginitis    • Clotting disorder ([facility] ) Same as above   • Endometrial hyperplasia    • Epithelial cyst     benign, ovary   • GI (gastrointestinal bleed) Blood mixed with stool   • History of radiation therapy 2011    right breast cancer   • Hyperlipidemia    • Hypertension    • Internal hemorrhoids    • Knee tendonitis    • Ovarian cyst    • 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[facility])      Past Surgical History:   Procedure Laterality Date   • BILATERAL SALPINGOOPHORECTOMY      onset: 12   • BREAST BIOPSY Right 2006    benign   • BREAST BIOPSY Right 2011    malignant   • BREAST LUMPECTOMY Right 2011    malignant   • CATARACT EXTRACTION W/  INTRAOCULAR LENS IMPLANT Right     phacoemulsification  Onset: 10/27/14   • CHOLECYSTECTOMY     • COLONOSCOPY  2017    approx   • HYSTERECTOMY  Yes   • INTRAOPERATIVE RADIATION THERAPY ([facility])     • IR BIOPSY BONE  2021   • IR PICC PLACEMENT SINGLE LUMEN  2022   • SKIN LESION EXCISION Right     breast, single lesion   • TONSILLECTOMY AND ADENOIDECTOMY       Social History   Social History     Substance and Sexual Activity   Alcohol Use Not Currently    Comment: (history)     Social History     Substance and Sexual Activity   Drug Use No     Social History     Tobacco Use   Smoking Status Former Smoker   • Packs/day: 1 00   • Years: 30 00   • Pack years: 30 00   • Types: Cigarettes   • Start date: 55   • Quit date: 0   • Years since quittin 7   Smokeless Tobacco Never Used     Exercise History:  Noncontributory  Family History:  Noncontributory  Family History   Problem Relation Age of Onset   • Stomach cancer Mother    • No Known Problems Father    • Cervical cancer Sister    • No Known Problems Daughter    • No Known Problems Maternal Grandmother    • No Known Problems Maternal Grandfather    • No Known Problems Paternal Grandmother    • No Known Problems Paternal Grandfather    • Colon polyps Neg Hx    • Colon cancer Neg Hx      I have reviewed this patient's family history and commented on sigificant items within the HPI      Medications:  No current facility-administered medications for this encounter       Facility-Administered Medications Ordered in Other Encounters   Medication Dose Route Frequency   • heparin (porcine) 25,000 units in 0 45% NaCl 250 mL infusion (premix)  3-30 Units/kg/hr (Order-Specific) Intravenous Titrated   • heparin (porcine) injection 2,800 Units 2,800 Units Intravenous Q1H PRN   • heparin (porcine) injection 5,600 Units  5,600 Units Intravenous Q1H PRN     Home medications:  Cannot display prior to admission medications because the patient has not been admitted in this contact  Allergies: Allergies   Allergen Reactions   • Sulfa Antibiotics    • Pneumococcal Polysaccharide Vaccine Rash and Edema     ------------------------------------------------------------------------------------------------------------  Advance Directive and Living Will:      Power of :    POLST:    ------------------------------------------------------------------------------------------------------------  Anticipated Length of Stay is > 2 midnights    Care Time Delivered:   45 minutes      Eddie Gill DO        Portions of the record may have been created with voice recognition software  Occasional wrong word or "sound a like" substitutions may have occurred due to the inherent limitations of voice recognition software    Read the chart carefully and recognize, using context, where substitutions have occurred

## 2022-09-22 NOTE — ANESTHESIA PREPROCEDURE EVALUATION
Procedure:  IR PE ENDOVASCULAR THERAPY    Relevant Problems   ANESTHESIA (within normal limits)      CARDIO   (+) Mixed hyperlipidemia   (+) Primary hypertension      GI/HEPATIC   (+) Rectal bleeding      /RENAL   (+) REYES (acute kidney injury) (HCC)   (+) Chronic kidney disease (CKD) stage G3a/A1, moderately decreased glomerular filtration rate (GFR) between 45-59 mL/min/1 73 square meter and albuminuria creatinine ratio less than 30 mg/g (HCC)      GYN   (+) Metastatic breast cancer (HCC)      HEMATOLOGY   (+) Anemia      NEURO/PSYCH   (+) Anxiety        Physical Exam    Airway       Dental       Cardiovascular  Rhythm: regular, Rate: abnormal,     Pulmonary  Comment: +PE on room air satting well,     Other Findings        Anesthesia Plan  ASA Score- 4 Emergent    Anesthesia Type- IV sedation with anesthesia with ASA Monitors  Additional Monitors:   Airway Plan:     Comment: Per patient, appropriately NPO, denies active CP/SOB/wheezing/symptoms related to heartburn/nausea/vomiting  Plan Factors-Exercise tolerance (METS): >4 METS  Chart reviewed  EKG reviewed  Imaging results reviewed  Existing labs reviewed  Patient summary reviewed  Patient is not a current smoker  Induction- intravenous  Postoperative Plan-     Informed Consent- Anesthetic plan and risks discussed with patient  I personally reviewed this patient with the CRNA  Discussed and agreed on the Anesthesia Plan with the CRNA  Dara Soulier

## 2022-09-22 NOTE — CONSULTS
Consultation - Interventional Radiology  Aylin Silverman 79 y o  female MRN: [id number]  Unit/Bed#: MICU 03 Encounter: [id number]        IP Consult to IR  Consult performed by: BROOKE Granados  Consult ordered by: Ebony Escobar MD          ASSESSMENT/PLAN:    79year old female presented to [address] [place] [address] for assisted fall after her leg gave out this morning at home this morning  She was recently [facility] from [facility] on 9/21  Patient was diagnosed with a right lower lobe subsegmental PE in 8/21 without evidence of heart strain or central embolus  She was discharged home on Xarelto  Patient was found to be hypotensive in the ambulance 90/50's, but normotensive on arrival to the ER  She is tachycardic 140's/150's and on RA  CTA C/A/P from today demonstrate a saddle PE with RV/LV ratio of 1 2  BNP elevated 1,555, lactic peak 2 3, normal troponin  Echocardiogram not completed at this time  STAT consult placed and called to MICU  On assessment, patient is lying completely flat, tachycardic, able to speak in complete sentences, not SOB  IR to decide about thrombectomy pending echo outcomes of right heart strain  Plus/minus filter given questionable xarelto failure       - keep NPO  - echo pending   - possible IR thrombectomy   - possible IVC filter placement       Medical Problems             Problem List     Metastatic breast cancer ([facility] [address] )    Overview Signed 4/12/2018  5:54 AM by Nickie Kwong DO     Laterality: Right; Transitioned From: Breast cancer         Adverse reaction to pneumococcal vaccine    Anxiety    Cataract, bilateral    Mixed hyperlipidemia    Pulmonary nodule seen on imaging study    Sleep disorder    Chronic kidney disease (CKD) stage G3a/A1, moderately decreased glomerular filtration rate (GFR) between 45-59 mL/min/1 73 square meter and albuminuria creatinine ratio less than 30 mg/g [facility])    Lab Results   Component Value Date    EGFR 36 09/22/2022    EGFR 53 09/06/2022    EGFR 67 09/05/2022    CREATININE 1 36 (H) 09/22/2022    CREATININE 0 98 09/06/2022    CREATININE 0 81 09/05/2022         Primary hypertension    Osteopenia of multiple sites    Cough due to ACE inhibitor    Acute costochondritis    Lytic lesion of bone on x-ray    Neoplasm of uncertain behavior of sternum    Rectal bleeding    Toxic gastroenteritis and colitis    Secondary malignant neoplasm of bone (HCC)    REYES (acute kidney injury) ([facility] [address] )    Diarrhea    Hypokalemia    Anemia    Severe protein-calorie malnutrition ([facility] )    Malnutrition Findings:                                 BMI Findings: There is no height or weight on file to calculate BMI  Single subsegmental pulmonary embolism without acute cor pulmonale (HCC)    Tachycardia    Venous insufficiency                Reason for Consult / Principal Problem:    HPI: Raiza Stoddard is a 79y o  year old female who presents with saddle PE  Patient is feeling ok, explained procedure to patient, understands she's waiting for echo before a decision is made  Review of Systems   Constitutional: Negative for activity change, appetite change, fatigue and fever  HENT: Negative  Eyes: Negative  Respiratory: Positive for cough and shortness of breath  Negative for choking, chest tightness and wheezing  Cardiovascular: Positive for leg swelling  Negative for chest pain and palpitations  Gastrointestinal: Positive for abdominal distention  Negative for abdominal pain, constipation, diarrhea and nausea  Endocrine: Negative  Genitourinary: Positive for difficulty urinating  Negative for dysuria and urgency  Musculoskeletal: Positive for gait problem  Skin: Negative  Allergic/Immunologic: Negative  Neurological: Negative for dizziness, light-headedness and headaches  Hematological: Negative  Psychiatric/Behavioral: Negative for behavioral problems and hallucinations           Past Medical History:  Past Medical History:   Diagnosis Date   • Abnormal weight loss • Allergic reaction    • Anxiety    • Breast cancer, right [facility]) 2011    right   • Cancer ([facility] [address] ) 2012   • Candidal vulvovaginitis    • Clotting disorder ([facility] ) Same as above   • Endometrial hyperplasia    • Epithelial cyst     benign, ovary   • GI (gastrointestinal bleed) Blood mixed with stool   • History of radiation therapy 2011    right breast cancer   • Hyperlipidemia    • Hypertension    • Internal hemorrhoids    • Knee tendonitis    • Ovarian cyst    • 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[facility])        Past Surgical History:  Past Surgical History:   Procedure Laterality Date   • BILATERAL SALPINGOOPHORECTOMY      onset: 12   • BREAST BIOPSY Right 2006    benign   • BREAST BIOPSY Right 2011    malignant   • BREAST LUMPECTOMY Right 2011    malignant   • CATARACT EXTRACTION W/  INTRAOCULAR LENS IMPLANT Right     phacoemulsification   Onset: 10/27/14   • CHOLECYSTECTOMY     • COLONOSCOPY  2017    approx   • HYSTERECTOMY  Yes   • INTRAOPERATIVE RADIATION THERAPY (IORT)     • IR BIOPSY BONE  2021   • IR PICC PLACEMENT SINGLE LUMEN  2022   • SKIN LESION EXCISION Right     breast, single lesion   • TONSILLECTOMY AND ADENOIDECTOMY         Social History:  Social History     Substance and Sexual Activity   Alcohol Use Not Currently    Comment: (history)     Social History     Substance and Sexual Activity   Drug Use No     Social History     Tobacco Use   Smoking Status Former Smoker   • Packs/day: 1 00   • Years: 30 00   • Pack years: 30 00   • Types: Cigarettes   • Start date: 55   • Quit date: 0   • Years since quittin 7   Smokeless Tobacco Never Used       Family History:  Family History   Problem Relation Age of Onset   • Stomach cancer Mother    • No Known Problems Father    • Cervical cancer Sister    • No Known Problems Daughter    • No Known Problems Maternal Grandmother    • No Known Problems Maternal Grandfather    • No Known Problems Paternal Grandmother    • No Known Problems Paternal Grandfather    • Colon polyps Neg Hx    • Colon cancer Neg Hx        Allergies: Allergies   Allergen Reactions   • Sulfa Antibiotics    • Pneumococcal Polysaccharide Vaccine Rash and Edema       Medications:  Medications Prior to Admission   Medication   • amitriptyline (ELAVIL) 25 mg tablet   • Cholecalciferol (VITAMIN D3) 2000 units capsule   • cholestyramine sugar free (QUESTRAN LIGHT) 4 g packet   • diphenoxylate-atropine (LOMOTIL) 2 5-0 025 mg per tablet   • folic acid (FOLVITE) 1 mg tablet   • loperamide (IMODIUM) 2 mg capsule   • metoprolol tartrate (LOPRESSOR) 50 mg tablet   • mirtazapine (REMERON) 7 5 MG tablet   • pantoprazole (PROTONIX) 40 mg tablet   • predniSONE 20 mg tablet   • rivaroxaban (XARELTO) 15 mg tablet   • rivaroxaban (Xarelto) 20 mg tablet   • simethicone (MYLICON) 80 mg chewable tablet   • simvastatin (ZOCOR) 10 mg tablet     Current Facility-Administered Medications   Medication Dose Route Frequency   • atorvastatin (LIPITOR) tablet 40 mg  40 mg Oral After Dinner   • chlorhexidine (PERIDEX) 0 12 % oral rinse 15 mL  15 mL Mouth/Throat Q12H [address]   • [START ON 2/42/9603] folic acid (FOLVITE) tablet 1 mg  1 mg Oral Daily   • heparin (porcine) 25,000 units in 0 45% NaCl 250 mL infusion (premix)  3-30 Units/kg/hr (Order-Specific) Intravenous Titrated   • heparin (porcine) injection 2,800 Units  2,800 Units Intravenous Q1H PRN   • heparin (porcine) injection 5,600 Units  5,600 Units Intravenous Q1H PRN   • multi-electrolyte (PLASMALYTE-A/ISOLYTE-S PH 7 4) IV solution  125 mL/hr Intravenous Continuous   • pantoprazole (PROTONIX) EC tablet 40 mg  40 mg Oral BID AC   • senna-docusate sodium (SENOKOT S) 8 6-50 mg per tablet 2 tablet  2 tablet Oral BID       Vitals:  LMP  (LMP Unknown)   There is no height or weight on file to calculate BMI    Weight (last 2 days)     None          I/Os:  No intake or output data in the 24 hours ending 09/22/22 1604    PHYSICAL EXAM  Physical Exam  Vitals and nursing note reviewed  Constitutional:       General: She is not in acute distress  Appearance: She is well-developed and normal weight  HENT:      Head: Normocephalic and atraumatic  Eyes:      Conjunctiva/sclera: Conjunctivae normal    Cardiovascular:      Rate and Rhythm: Regular rhythm  Tachycardia present  Heart sounds: No murmur heard  Pulmonary:      Effort: Pulmonary effort is normal  No respiratory distress  Breath sounds: Normal breath sounds  Abdominal:      General: Bowel sounds are increased  Palpations: Abdomen is soft  Tenderness: There is no abdominal tenderness  Genitourinary:     Comments: Distended bladder, bladder scan > 600  Musculoskeletal:      Cervical back: Neck supple  Right lower le+ Pitting Edema present  Left lower le+ Pitting Edema present  Feet:      Comments: Right foot largely swollen, ecchymosis   Skin:     General: Skin is cool and dry  Capillary Refill: Capillary refill takes 2 to 3 seconds  Neurological:      Mental Status: She is alert and oriented to person, place, and time     Psychiatric:         Mood and Affect: Mood normal          Behavior: Behavior normal           Lab Results and Cultures:   CBC with diff:   Lab Results   Component Value Date    WBC 7 31 2022    HGB 9 6 (L) 2022    HCT 31 0 (L) 2022     (H) 2022     (L) 2022    MCH 33 1 2022    MCHC 31 0 (L) 2022    RDW 16 3 (H) 2022    MPV 9 8 2022    NRBC 0 2022      BMP/CMP:  Lab Results   Component Value Date     2015    K 4 6 2022    K 4 0 2015     2022     (H) 2015    CO2 24 2022    CO2 28 2015    ANIONGAP 6 2015    BUN 28 (H) 2022    BUN 13 2015    CREATININE 1 36 (H) 2022    CREATININE 1 10 2015    GLUCOSE 115 2015    CALCIUM 8 2 (L) 2022    CALCIUM 9 1 2015    AST 13 09/22/2022    AST 18 09/08/2015    ALT 30 09/22/2022    ALT 27 09/08/2015    ALKPHOS 106 09/22/2022    ALKPHOS 91 09/08/2015    PROT 7 3 09/08/2015    BILITOT 0 66 09/08/2015    EGFR 36 09/22/2022   ,     Coags:   Lab Results   Component Value Date    PTT 26 09/22/2022    INR 1 08 09/22/2022   ,   Results from last 7 days   Lab Units 09/22/22  1145   PTT seconds 26   INR  1 08        Lipid Panel:   Lab Results   Component Value Date    CHOL 183 03/28/2015     Lab Results   Component Value Date    HDL 29 (L) 08/16/2022     Lab Results   Component Value Date    HDL 29 (L) 08/16/2022     Lab Results   Component Value Date    LDLCALC 25 08/16/2022     Lab Results   Component Value Date    TRIG 79 08/16/2022       HgbA1c:   Lab Results   Component Value Date    HGBA1C 5 9 (H) 07/31/2020       Blood Culture:   Lab Results   Component Value Date    BLOODCX No Growth After 5 Days  08/15/2022   ,   Urinalysis:   Lab Results   Component Value Date    COLORU Yellow 05/19/2020    CLARITYU Clear 05/19/2020    SPECGRAV 1 019 05/19/2020    PHUR 6 0 05/19/2020    LEUKOCYTESUR Small (A) 05/19/2020    NITRITE Negative 05/19/2020    GLUCOSEU Negative 05/19/2020    KETONESU Negative 05/19/2020    BILIRUBINUR Negative 05/19/2020    BLOODU Negative 05/19/2020   ,   Urine Culture: No results found for: URINECX,   Wound Culure:  No results found for: WOUNDCULT    Imaging Studies: I have personally reviewed pertinent films in PACS with a Radiologist     Counseling / Coordination of Care  Total time spent today  45 minutes  Greater than 50% of total time was spent with the patient and / or family counseling and / or coordination of care  Thank you for allowing me to participate in the care of Kerry Silverman  Please don't hesitate to call, text, email, or TigerText with any questions  This text is generated with voice recognition software  There may be translation, syntax,  or grammatical errors   If you have any questions, please contact the dictating provider

## 2022-09-22 NOTE — TELEPHONE ENCOUNTER
Patient's daughter Timoteo Joseph called  She thought patient had an upcoming appointment with PCP  I told her she did not but one should be made as she was in the hospital (TCM)  Timoteo Ruiz states that patient's right leg gave out today and her legs and feet are swollen  She will be taking the patient to the hospital  She asked that patient's PCP be made aware      Thank you

## 2022-09-22 NOTE — TELEPHONE ENCOUNTER
I spoke with daughter Grisel Chanel and her  this am, things are not going well  They are realizing that Mom can not live by herself and they are not going to be able to provide 24 hour care  This morning she fell in the bathroom because she was very weak, sob with activity, shaking, her right leg completey gave out on her  Her daughter Grisel Chanel was there to break the fall  She denies mom hitting her head, loosing consciousness, no scraps/cuts  Mom is back in bed resting  According to daughter Jai Barron is extremely deconditioned, has no upper arms strength, severe difficulty ambulating despite her walker, fluid continues to leak from legs bilaterally  They are concerned she has bilateral leg clots  Very poor appetite; they are trying to supplement with ensure  She moved her bowels twice yesterday (varies from liquid to formed), had an episode of incontinence over night and moved her bowels again this am more on the liquid side  The family does not agree with all the medications she's supposed to be taking  They are picking and choosing what to give her  Grisel Chanel mentioned that they did not give her the prednisone 40 mg yesterday  I reinforced that she needs to continue this medication and cannot stop abruptly as it could be harmful to patient  I advised that she needs to go to the hospital for further evaluation  Daughter is agreeable to call [phone]

## 2022-09-22 NOTE — EMTALA/ACUTE CARE TRANSFER
[facility] EMERGENCY DEPARTMENT  [address] [address]  Dept: [phone]      EMTALA TRANSFER CONSENT    NAME Fidelia Silverman                                         1940                              MRN [id number]    I have been informed of my rights regarding examination, treatment, and transfer   by Dr Amaya Grewal DO    Benefits: Specialized equipment and/or services available at the receiving facility (Include comment)________________________    Risks: Potential for delay in receiving treatment      Consent for Transfer:  I acknowledge that my medical condition has been evaluated and explained to me by the emergency department physician or other qualified medical person and/or my attending physician, who has recommended that I be transferred to the service of  Accepting Physician: Dr Liban Barr at [address] [facility] [address], [address] : [facility] ICU  The above potential benefits of such transfer, the potential risks associated with such transfer, and the probable risks of not being transferred have been explained to me, and I fully understand them  The doctor has explained that, in my case, the benefits of transfer outweigh the risks  I agree to be transferred  I authorize the performance of emergency medical procedures and treatments upon me in both transit and upon arrival at the receiving facility  Additionally, I authorize the release of any and all medical records to the receiving facility and request they be transported with me, if possible  I understand that the safest mode of transportation during a medical emergency is an ambulance and that the Hospital advocates the use of this mode of transport  Risks of traveling to the receiving facility by car, including absence of medical control, life sustaining equipment, such as oxygen, and medical personnel has been explained to me and I fully understand them      ([address])  [  ] I consent to the stated transfer and to be transported by ambulance/helicopter  [  ]  I consent to the stated transfer, but refuse transportation by ambulance and accept full responsibility for my transportation by car  I understand the risks of non-ambulance transfers and I exonerate the Hospital and its staff from any deterioration in my condition that results from this refusal     X___________________________________________    DATE  22  TIME________  Signature of patient or legally responsible individual signing on patient behalf           RELATIONSHIP TO PATIENT_________________________          Provider Certification    NAME Gabriel Denver Clunk                                        [facility] 1940                              MRN [id number]    A medical screening exam was performed on the above named patient  Based on the examination:    Condition Necessitating Transfer The primary encounter diagnosis was Acute saddle pulmonary embolism with acute cor pulmonale ([address] )  Diagnoses of Sinus tachycardia, Bilateral lower extremity edema, and REYES (acute kidney injury) ([facility] [address] ) were also pertinent to this visit      Patient Condition: The patient has been stabilized such that within reasonable medical probability, no material deterioration of the patient condition or the condition of the unborn child(hali) is likely to result from the transfer    Reason for Transfer: Level of Care needed not available at this facility    Transfer Requirements: Facility [facility] ICU   · Space available and qualified personnel available for treatment as acknowledged by Anai Johnson  · Agreed to accept transfer and to provide appropriate medical treatment as acknowledged by       Dr Brie Swift  · Appropriate medical records of the examination and treatment of the patient are provided at the time of transfer   [address] _______  · Transfer will be performed by qualified personnel from    and appropriate transfer equipment as required, including the use of necessary and appropriate life support measures  Provider Certification: I have examined the patient and explained the following risks and benefits of being transferred/refusing transfer to the patient/family:         Based on these reasonable risks and benefits to the patient and/or the unborn child(hali), and based upon the information available at the time of the patients examination, I certify that the medical benefits reasonably to be expected from the provision of appropriate medical treatments at another medical facility outweigh the increasing risks, if any, to the individuals medical condition, and in the case of labor to the unborn child, from effecting the transfer      X____________________________________________ DATE 09/22/22        TIME_______      ORIGINAL - SEND TO MEDICAL RECORDS   COPY - SEND WITH PATIENT DURING TRANSFER

## 2022-09-22 NOTE — ANESTHESIA PROCEDURE NOTES
Arterial Line Insertion  Performed by: Jean Sarmiento CRNA  Authorized by: Royce Thornton MD   Consent: Verbal consent obtained  Written consent obtained  Risks and benefits: risks, benefits and alternatives were discussed  Consent given by: patient  Patient understanding: patient states understanding of the procedure being performed  Patient consent: the patient's understanding of the procedure matches consent given  Procedure consent: procedure consent matches procedure scheduled  Relevant documents: relevant documents present and verified  Required items: required blood products, implants, devices, and special equipment available  Patient identity confirmed: verbally with patient and arm band  Preparation: Patient was prepped and draped in the usual sterile fashion    Indications: hemodynamic monitoring    Procedure Details:  Needle gauge: 20  Seldinger technique: Seldinger technique used  Number of attempts: 1    Post-procedure:  Post-procedure: dressing applied  Waveform: good waveform and waveform confirmed  Patient tolerance: Patient tolerated the procedure well with no immediate complications

## 2022-09-22 NOTE — QUICK NOTE
Confirmed with the patient's daughter, Rufus Lambert [phone], that the patient was discharged from [address] on 9/6/22 with [facility]  But, unfortunately, the [facility] was not on her discharge paperwork from [facility]  Confirmation has not been established with the nursing home, I was unable to reach them at this hour  Will call back tomorrow at [phone] to confirm medication list   The patient is plan for thrombectomy tonight with IVC filter placement  The patient's family plans on visiting tomorrow morning      Mer Montoya DO, [address]  PGY-3, Internal Medicine  [address] [place] [address]

## 2022-09-23 ENCOUNTER — APPOINTMENT (INPATIENT)
Dept: NON INVASIVE DIAGNOSTICS | Facility: HOSPITAL | Age: 82
DRG: 163 | End: 2022-09-23
Payer: MEDICARE

## 2022-09-23 ENCOUNTER — APPOINTMENT (OUTPATIENT)
Dept: RADIOLOGY | Facility: HOSPITAL | Age: 82
DRG: 163 | End: 2022-09-23
Payer: MEDICARE

## 2022-09-23 PROBLEM — R33.9 URINARY RETENTION: Status: ACTIVE | Noted: 2022-09-23

## 2022-09-23 PROBLEM — M79.89 SWELLING OF LOWER EXTREMITY: Status: ACTIVE | Noted: 2022-09-23

## 2022-09-23 PROBLEM — I26.92 SADDLE EMBOLUS OF PULMONARY ARTERY (HCC): Status: ACTIVE | Noted: 2022-09-23

## 2022-09-23 LAB
ABO GROUP BLD BPU: NORMAL
ABO GROUP BLD: NORMAL
ABO GROUP BLD: NORMAL
ALBUMIN SERPL BCP-MCNC: 1.6 G/DL (ref 3.5–5)
ALBUMIN SERPL BCP-MCNC: 1.6 G/DL (ref 3.5–5)
ALP SERPL-CCNC: 64 U/L (ref 46–116)
ALP SERPL-CCNC: 74 U/L (ref 46–116)
ALT SERPL W P-5'-P-CCNC: 18 U/L (ref 12–78)
ALT SERPL W P-5'-P-CCNC: 21 U/L (ref 12–78)
ANION GAP SERPL CALCULATED.3IONS-SCNC: 7 MMOL/L (ref 4–13)
ANION GAP SERPL CALCULATED.3IONS-SCNC: 8 MMOL/L (ref 4–13)
ANION GAP SERPL CALCULATED.3IONS-SCNC: 9 MMOL/L (ref 4–13)
APTT PPP: 102 SECONDS (ref 23–37)
APTT PPP: 36 SECONDS (ref 23–37)
APTT PPP: 81 SECONDS (ref 23–37)
APTT PPP: 91 SECONDS (ref 23–37)
APTT PPP: >210 SECONDS (ref 23–37)
AST SERPL W P-5'-P-CCNC: 10 U/L (ref 5–45)
AST SERPL W P-5'-P-CCNC: 9 U/L (ref 5–45)
ATRIAL RATE: 82 BPM
ATRIAL RATE: 88 BPM
BASE EXCESS BLDA CALC-SCNC: -9 MMOL/L (ref -2–3)
BASOPHILS # BLD MANUAL: 0 THOUSAND/UL (ref 0–0.1)
BASOPHILS NFR MAR MANUAL: 0 % (ref 0–1)
BILIRUB SERPL-MCNC: 0.4 MG/DL (ref 0.2–1)
BILIRUB SERPL-MCNC: 0.4 MG/DL (ref 0.2–1)
BLD GP AB SCN SERPL QL: NEGATIVE
BLD GP AB SCN SERPL QL: NEGATIVE
BPU ID: NORMAL
BUN SERPL-MCNC: 15 MG/DL (ref 5–25)
BUN SERPL-MCNC: 16 MG/DL (ref 5–25)
BUN SERPL-MCNC: 17 MG/DL (ref 5–25)
CA-I BLD-SCNC: 0.89 MMOL/L (ref 1.12–1.32)
CALCIUM ALBUM COR SERPL-MCNC: 8.5 MG/DL (ref 8.3–10.1)
CALCIUM ALBUM COR SERPL-MCNC: 8.6 MG/DL (ref 8.3–10.1)
CALCIUM SERPL-MCNC: 6.6 MG/DL (ref 8.3–10.1)
CALCIUM SERPL-MCNC: 6.7 MG/DL (ref 8.3–10.1)
CALCIUM SERPL-MCNC: 6.9 MG/DL (ref 8.3–10.1)
CHLORIDE SERPL-SCNC: 115 MMOL/L (ref 96–108)
CHLORIDE SERPL-SCNC: 118 MMOL/L (ref 96–108)
CHLORIDE SERPL-SCNC: 118 MMOL/L (ref 96–108)
CO2 SERPL-SCNC: 18 MMOL/L (ref 21–32)
CO2 SERPL-SCNC: 18 MMOL/L (ref 21–32)
CO2 SERPL-SCNC: 19 MMOL/L (ref 21–32)
CREAT SERPL-MCNC: 0.73 MG/DL (ref 0.6–1.3)
CREAT SERPL-MCNC: 0.79 MG/DL (ref 0.6–1.3)
CREAT SERPL-MCNC: 0.8 MG/DL (ref 0.6–1.3)
CROSSMATCH: NORMAL
CROSSMATCH: NORMAL
EOSINOPHIL # BLD MANUAL: 0 THOUSAND/UL (ref 0–0.4)
EOSINOPHIL NFR BLD MANUAL: 0 % (ref 0–6)
ERYTHROCYTE [DISTWIDTH] IN BLOOD BY AUTOMATED COUNT: 16.7 % (ref 11.6–15.1)
ERYTHROCYTE [DISTWIDTH] IN BLOOD BY AUTOMATED COUNT: 16.8 % (ref 11.6–15.1)
GFR SERPL CREATININE-BSD FRML MDRD: 68 ML/MIN/1.73SQ M
GFR SERPL CREATININE-BSD FRML MDRD: 69 ML/MIN/1.73SQ M
GFR SERPL CREATININE-BSD FRML MDRD: 76 ML/MIN/1.73SQ M
GLUCOSE SERPL-MCNC: 111 MG/DL (ref 65–140)
GLUCOSE SERPL-MCNC: 60 MG/DL (ref 65–140)
GLUCOSE SERPL-MCNC: 67 MG/DL (ref 65–140)
GLUCOSE SERPL-MCNC: 78 MG/DL (ref 65–140)
GLUCOSE SERPL-MCNC: 79 MG/DL (ref 65–140)
GLUCOSE SERPL-MCNC: 91 MG/DL (ref 65–140)
HCO3 BLDA-SCNC: 15.1 MMOL/L (ref 22–28)
HCT VFR BLD AUTO: 22.6 % (ref 34.8–46.1)
HCT VFR BLD AUTO: 23.6 % (ref 34.8–46.1)
HCT VFR BLD CALC: <15 % (ref 34.8–46.1)
HGB BLD-MCNC: 6.6 G/DL (ref 11.5–15.4)
HGB BLD-MCNC: 7 G/DL (ref 11.5–15.4)
HGB BLD-MCNC: 7.1 G/DL (ref 11.5–15.4)
INR PPP: 1.29 (ref 0.84–1.19)
LYMPHOCYTES # BLD AUTO: 0.05 THOUSAND/UL (ref 0.6–4.47)
LYMPHOCYTES # BLD AUTO: 1 % (ref 14–44)
MACROCYTES BLD QL AUTO: PRESENT
MAGNESIUM SERPL-MCNC: 2 MG/DL (ref 1.6–2.6)
MCH RBC QN AUTO: 31.7 PG (ref 26.8–34.3)
MCH RBC QN AUTO: 32.3 PG (ref 26.8–34.3)
MCHC RBC AUTO-ENTMCNC: 29.2 G/DL (ref 31.4–37.4)
MCHC RBC AUTO-ENTMCNC: 30.1 G/DL (ref 31.4–37.4)
MCV RBC AUTO: 107 FL (ref 82–98)
MCV RBC AUTO: 109 FL (ref 82–98)
METAMYELOCYTES NFR BLD MANUAL: 2 % (ref 0–1)
MICROCYTES BLD QL AUTO: PRESENT
MONOCYTES # BLD AUTO: 0 THOUSAND/UL (ref 0–1.22)
MONOCYTES NFR BLD: 0 % (ref 4–12)
NEUTROPHILS # BLD MANUAL: 5.08 THOUSAND/UL (ref 1.85–7.62)
NEUTS BAND NFR BLD MANUAL: 34 % (ref 0–8)
NEUTS SEG NFR BLD AUTO: 63 % (ref 43–75)
NRBC BLD AUTO-RTO: 0 /100 WBCS
P AXIS: 56 DEGREES
P AXIS: 62 DEGREES
PCO2 BLD: 16 MMOL/L (ref 21–32)
PCO2 BLD: 22.2 MM HG (ref 36–44)
PH BLD: 7.44 [PH] (ref 7.35–7.45)
PHOSPHATE SERPL-MCNC: 2.1 MG/DL (ref 2.3–4.1)
PLATELET # BLD AUTO: 79 THOUSANDS/UL (ref 149–390)
PLATELET # BLD AUTO: 88 THOUSANDS/UL (ref 149–390)
PLATELET BLD QL SMEAR: ABNORMAL
PMV BLD AUTO: 10.4 FL (ref 8.9–12.7)
PMV BLD AUTO: 10.4 FL (ref 8.9–12.7)
PO2 BLD: 113 MM HG (ref 75–129)
POLYCHROMASIA BLD QL SMEAR: PRESENT
POTASSIUM BLD-SCNC: 2.2 MMOL/L (ref 3.5–5.3)
POTASSIUM SERPL-SCNC: 3 MMOL/L (ref 3.5–5.3)
POTASSIUM SERPL-SCNC: 3.4 MMOL/L (ref 3.5–5.3)
POTASSIUM SERPL-SCNC: 4.3 MMOL/L (ref 3.5–5.3)
PR INTERVAL: 129 MS
PR INTERVAL: 133 MS
PROT SERPL-MCNC: 4.1 G/DL (ref 6.4–8.4)
PROT SERPL-MCNC: 4.2 G/DL (ref 6.4–8.4)
PROTHROMBIN TIME: 16.4 SECONDS (ref 11.6–14.5)
QRS AXIS: 70 DEGREES
QRS AXIS: 71 DEGREES
QRSD INTERVAL: 88 MS
QRSD INTERVAL: 88 MS
QT INTERVAL: 388 MS
QT INTERVAL: 388 MS
QTC INTERVAL: 454 MS
QTC INTERVAL: 470 MS
RA PRESSURE ESTIMATED: 5 MMHG
RBC # BLD AUTO: 2.08 MILLION/UL (ref 3.81–5.12)
RBC # BLD AUTO: 2.2 MILLION/UL (ref 3.81–5.12)
RH BLD: NEGATIVE
RH BLD: NEGATIVE
RV PSP: 34 MMHG
SAO2 % BLD FROM PO2: 99 % (ref 60–85)
SL CV LV EF: 70
SODIUM BLD-SCNC: 145 MMOL/L (ref 136–145)
SODIUM SERPL-SCNC: 142 MMOL/L (ref 135–147)
SODIUM SERPL-SCNC: 144 MMOL/L (ref 135–147)
SODIUM SERPL-SCNC: 144 MMOL/L (ref 135–147)
SPECIMEN EXPIRATION DATE: NORMAL
SPECIMEN EXPIRATION DATE: NORMAL
SPECIMEN SOURCE: ABNORMAL
T WAVE AXIS: 67 DEGREES
T WAVE AXIS: 67 DEGREES
TR MAX PG: 29 MMHG
TR PEAK VELOCITY: 2.7 M/S
TRICUSPID VALVE PEAK REGURGITATION VELOCITY: 2.69 M/S
UNIT DISPENSE STATUS: NORMAL
UNIT PRODUCT CODE: NORMAL
UNIT PRODUCT VOLUME: 280 ML
UNIT PRODUCT VOLUME: 280 ML
UNIT PRODUCT VOLUME: 319 ML
UNIT PRODUCT VOLUME: 325 ML
UNIT PRODUCT VOLUME: 350 ML
UNIT RH: NORMAL
VENTRICULAR RATE: 82 BPM
VENTRICULAR RATE: 88 BPM
WBC # BLD AUTO: 5.24 THOUSAND/UL (ref 4.31–10.16)
WBC # BLD AUTO: 6.2 THOUSAND/UL (ref 4.31–10.16)

## 2022-09-23 PROCEDURE — 86921 COMPATIBILITY TEST INCUBATE: CPT

## 2022-09-23 PROCEDURE — 93321 DOPPLER ECHO F-UP/LMTD STD: CPT | Performed by: INTERNAL MEDICINE

## 2022-09-23 PROCEDURE — 06H03DZ INSERTION OF INTRALUMINAL DEVICE INTO INFERIOR VENA CAVA, PERCUTANEOUS APPROACH: ICD-10-PCS | Performed by: RADIOLOGY

## 2022-09-23 PROCEDURE — 93005 ELECTROCARDIOGRAM TRACING: CPT

## 2022-09-23 PROCEDURE — 30233N1 TRANSFUSION OF NONAUTOLOGOUS RED BLOOD CELLS INTO PERIPHERAL VEIN, PERCUTANEOUS APPROACH: ICD-10-PCS | Performed by: INTERNAL MEDICINE

## 2022-09-23 PROCEDURE — C1769 GUIDE WIRE: HCPCS

## 2022-09-23 PROCEDURE — 85027 COMPLETE CBC AUTOMATED: CPT | Performed by: NURSE PRACTITIONER

## 2022-09-23 PROCEDURE — 84132 ASSAY OF SERUM POTASSIUM: CPT

## 2022-09-23 PROCEDURE — 93010 ELECTROCARDIOGRAM REPORT: CPT | Performed by: INTERNAL MEDICINE

## 2022-09-23 PROCEDURE — 82947 ASSAY GLUCOSE BLOOD QUANT: CPT

## 2022-09-23 PROCEDURE — P9016 RBC LEUKOCYTES REDUCED: HCPCS

## 2022-09-23 PROCEDURE — 85730 THROMBOPLASTIN TIME PARTIAL: CPT | Performed by: STUDENT IN AN ORGANIZED HEALTH CARE EDUCATION/TRAINING PROGRAM

## 2022-09-23 PROCEDURE — 86922 COMPATIBILITY TEST ANTIGLOB: CPT

## 2022-09-23 PROCEDURE — 85018 HEMOGLOBIN: CPT | Performed by: STUDENT IN AN ORGANIZED HEALTH CARE EDUCATION/TRAINING PROGRAM

## 2022-09-23 PROCEDURE — NC001 PR NO CHARGE: Performed by: INTERNAL MEDICINE

## 2022-09-23 PROCEDURE — 80048 BASIC METABOLIC PNL TOTAL CA: CPT | Performed by: STUDENT IN AN ORGANIZED HEALTH CARE EDUCATION/TRAINING PROGRAM

## 2022-09-23 PROCEDURE — 82948 REAGENT STRIP/BLOOD GLUCOSE: CPT

## 2022-09-23 PROCEDURE — 99222 1ST HOSP IP/OBS MODERATE 55: CPT | Performed by: UROLOGY

## 2022-09-23 PROCEDURE — 94760 N-INVAS EAR/PLS OXIMETRY 1: CPT

## 2022-09-23 PROCEDURE — 99024 POSTOP FOLLOW-UP VISIT: CPT | Performed by: NURSE PRACTITIONER

## 2022-09-23 PROCEDURE — 85014 HEMATOCRIT: CPT

## 2022-09-23 PROCEDURE — 80053 COMPREHEN METABOLIC PANEL: CPT | Performed by: INTERNAL MEDICINE

## 2022-09-23 PROCEDURE — 99291 CRITICAL CARE FIRST HOUR: CPT | Performed by: INTERNAL MEDICINE

## 2022-09-23 PROCEDURE — NC001 PR NO CHARGE: Performed by: NURSE PRACTITIONER

## 2022-09-23 PROCEDURE — 85610 PROTHROMBIN TIME: CPT | Performed by: STUDENT IN AN ORGANIZED HEALTH CARE EDUCATION/TRAINING PROGRAM

## 2022-09-23 PROCEDURE — NC001 PR NO CHARGE: Performed by: UROLOGY

## 2022-09-23 PROCEDURE — 82803 BLOOD GASES ANY COMBINATION: CPT

## 2022-09-23 PROCEDURE — 82330 ASSAY OF CALCIUM: CPT

## 2022-09-23 PROCEDURE — 86900 BLOOD TYPING SEROLOGIC ABO: CPT | Performed by: STUDENT IN AN ORGANIZED HEALTH CARE EDUCATION/TRAINING PROGRAM

## 2022-09-23 PROCEDURE — 84295 ASSAY OF SERUM SODIUM: CPT

## 2022-09-23 PROCEDURE — 37191 INS ENDOVAS VENA CAVA FILTR: CPT | Performed by: RADIOLOGY

## 2022-09-23 PROCEDURE — 93970 EXTREMITY STUDY: CPT

## 2022-09-23 PROCEDURE — 86850 RBC ANTIBODY SCREEN: CPT | Performed by: STUDENT IN AN ORGANIZED HEALTH CARE EDUCATION/TRAINING PROGRAM

## 2022-09-23 PROCEDURE — 83735 ASSAY OF MAGNESIUM: CPT | Performed by: STUDENT IN AN ORGANIZED HEALTH CARE EDUCATION/TRAINING PROGRAM

## 2022-09-23 PROCEDURE — 37191 INS ENDOVAS VENA CAVA FILTR: CPT

## 2022-09-23 PROCEDURE — 93308 TTE F-UP OR LMTD: CPT | Performed by: INTERNAL MEDICINE

## 2022-09-23 PROCEDURE — 86902 BLOOD TYPE ANTIGEN DONOR EA: CPT

## 2022-09-23 PROCEDURE — C1880 VENA CAVA FILTER: HCPCS

## 2022-09-23 PROCEDURE — 80053 COMPREHEN METABOLIC PANEL: CPT | Performed by: STUDENT IN AN ORGANIZED HEALTH CARE EDUCATION/TRAINING PROGRAM

## 2022-09-23 PROCEDURE — 85730 THROMBOPLASTIN TIME PARTIAL: CPT | Performed by: NURSE PRACTITIONER

## 2022-09-23 PROCEDURE — 93970 EXTREMITY STUDY: CPT | Performed by: SURGERY

## 2022-09-23 PROCEDURE — 85007 BL SMEAR W/DIFF WBC COUNT: CPT | Performed by: STUDENT IN AN ORGANIZED HEALTH CARE EDUCATION/TRAINING PROGRAM

## 2022-09-23 PROCEDURE — 85027 COMPLETE CBC AUTOMATED: CPT | Performed by: STUDENT IN AN ORGANIZED HEALTH CARE EDUCATION/TRAINING PROGRAM

## 2022-09-23 PROCEDURE — 84100 ASSAY OF PHOSPHORUS: CPT | Performed by: STUDENT IN AN ORGANIZED HEALTH CARE EDUCATION/TRAINING PROGRAM

## 2022-09-23 PROCEDURE — 85730 THROMBOPLASTIN TIME PARTIAL: CPT | Performed by: INTERNAL MEDICINE

## 2022-09-23 PROCEDURE — 93325 DOPPLER ECHO COLOR FLOW MAPG: CPT | Performed by: INTERNAL MEDICINE

## 2022-09-23 PROCEDURE — 86901 BLOOD TYPING SEROLOGIC RH(D): CPT | Performed by: STUDENT IN AN ORGANIZED HEALTH CARE EDUCATION/TRAINING PROGRAM

## 2022-09-23 RX ORDER — ENOXAPARIN SODIUM 300 MG/3ML
70 INJECTION INTRAVENOUS; SUBCUTANEOUS EVERY 12 HOURS
Qty: 42 ML | Refills: 0 | Status: SHIPPED | OUTPATIENT
Start: 2022-09-23 | End: 2022-10-20

## 2022-09-23 RX ORDER — ALBUMIN (HUMAN) 12.5 G/50ML
12.5 SOLUTION INTRAVENOUS ONCE
Status: COMPLETED | OUTPATIENT
Start: 2022-09-23 | End: 2022-09-23

## 2022-09-23 RX ORDER — HEPARIN SODIUM 1000 [USP'U]/ML
5200 INJECTION, SOLUTION INTRAVENOUS; SUBCUTANEOUS ONCE
Status: DISCONTINUED | OUTPATIENT
Start: 2022-09-23 | End: 2022-09-23

## 2022-09-23 RX ORDER — POTASSIUM CHLORIDE 20 MEQ/1
40 TABLET, EXTENDED RELEASE ORAL ONCE
Status: DISCONTINUED | OUTPATIENT
Start: 2022-09-23 | End: 2022-09-23

## 2022-09-23 RX ORDER — POTASSIUM CHLORIDE 20MEQ/15ML
40 LIQUID (ML) ORAL ONCE
Status: COMPLETED | OUTPATIENT
Start: 2022-09-23 | End: 2022-09-23

## 2022-09-23 RX ORDER — SODIUM CHLORIDE, SODIUM LACTATE, POTASSIUM CHLORIDE, CALCIUM CHLORIDE 600; 310; 30; 20 MG/100ML; MG/100ML; MG/100ML; MG/100ML
125 INJECTION, SOLUTION INTRAVENOUS CONTINUOUS
Status: DISCONTINUED | OUTPATIENT
Start: 2022-09-23 | End: 2022-09-23

## 2022-09-23 RX ORDER — POTASSIUM CHLORIDE 14.9 MG/ML
20 INJECTION INTRAVENOUS ONCE
Status: COMPLETED | OUTPATIENT
Start: 2022-09-23 | End: 2022-09-23

## 2022-09-23 RX ORDER — CALCIUM GLUCONATE 20 MG/ML
2 INJECTION, SOLUTION INTRAVENOUS ONCE
Status: DISCONTINUED | OUTPATIENT
Start: 2022-09-23 | End: 2022-09-23

## 2022-09-23 RX ORDER — DEXTROSE MONOHYDRATE 25 G/50ML
50 INJECTION, SOLUTION INTRAVENOUS ONCE
Status: COMPLETED | OUTPATIENT
Start: 2022-09-23 | End: 2022-09-23

## 2022-09-23 RX ORDER — HEPARIN SODIUM 10000 [USP'U]/100ML
3-30 INJECTION, SOLUTION INTRAVENOUS
Status: DISCONTINUED | OUTPATIENT
Start: 2022-09-23 | End: 2022-09-24

## 2022-09-23 RX ORDER — HEPARIN SODIUM 1000 [USP'U]/ML
5200 INJECTION, SOLUTION INTRAVENOUS; SUBCUTANEOUS
Status: DISCONTINUED | OUTPATIENT
Start: 2022-09-23 | End: 2022-09-24

## 2022-09-23 RX ORDER — HEPARIN SODIUM 1000 [USP'U]/ML
2600 INJECTION, SOLUTION INTRAVENOUS; SUBCUTANEOUS
Status: DISCONTINUED | OUTPATIENT
Start: 2022-09-23 | End: 2022-09-24

## 2022-09-23 RX ORDER — POTASSIUM CHLORIDE 20 MEQ/1
40 TABLET, EXTENDED RELEASE ORAL ONCE
Status: COMPLETED | OUTPATIENT
Start: 2022-09-23 | End: 2022-09-23

## 2022-09-23 RX ADMIN — CHOLESTYRAMINE 4 G: 4 POWDER, FOR SUSPENSION ORAL at 21:46

## 2022-09-23 RX ADMIN — ALBUMIN (HUMAN) 12.5 G: 0.25 INJECTION, SOLUTION INTRAVENOUS at 05:59

## 2022-09-23 RX ADMIN — PANTOPRAZOLE SODIUM 40 MG: 40 TABLET, DELAYED RELEASE ORAL at 16:26

## 2022-09-23 RX ADMIN — ATORVASTATIN CALCIUM 40 MG: 40 TABLET, FILM COATED ORAL at 17:09

## 2022-09-23 RX ADMIN — DEXTROSE MONOHYDRATE 50 ML: 25 INJECTION, SOLUTION INTRAVENOUS at 05:58

## 2022-09-23 RX ADMIN — SODIUM CHLORIDE, SODIUM LACTATE, POTASSIUM CHLORIDE, AND CALCIUM CHLORIDE 125 ML/HR: .6; .31; .03; .02 INJECTION, SOLUTION INTRAVENOUS at 06:42

## 2022-09-23 RX ADMIN — POTASSIUM CHLORIDE 20 MEQ: 14.9 INJECTION, SOLUTION INTRAVENOUS at 10:14

## 2022-09-23 RX ADMIN — POTASSIUM & SODIUM PHOSPHATES POWDER PACK 280-160-250 MG 2 PACKET: 280-160-250 PACK at 21:46

## 2022-09-23 RX ADMIN — IOHEXOL 20 ML: 350 INJECTION, SOLUTION INTRAVENOUS at 20:11

## 2022-09-23 RX ADMIN — PREDNISONE 20 MG: 20 TABLET ORAL at 08:06

## 2022-09-23 RX ADMIN — Medication 2000 UNITS: at 08:06

## 2022-09-23 RX ADMIN — COLLAGENASE SANTYL: 250 OINTMENT TOPICAL at 18:04

## 2022-09-23 RX ADMIN — POTASSIUM CHLORIDE 40 MEQ: 20 SOLUTION ORAL at 04:48

## 2022-09-23 RX ADMIN — CHLORHEXIDINE GLUCONATE 15 ML: 1.2 SOLUTION ORAL at 08:06

## 2022-09-23 RX ADMIN — SENNOSIDES AND DOCUSATE SODIUM 2 TABLET: 8.6; 5 TABLET ORAL at 08:06

## 2022-09-23 RX ADMIN — FOLIC ACID 1 MG: 1 TABLET ORAL at 08:06

## 2022-09-23 RX ADMIN — CHLORHEXIDINE GLUCONATE 15 ML: 1.2 SOLUTION ORAL at 21:46

## 2022-09-23 RX ADMIN — HEPARIN SODIUM 15 UNITS/KG/HR: 10000 INJECTION, SOLUTION INTRAVENOUS at 13:08

## 2022-09-23 RX ADMIN — POTASSIUM CHLORIDE 40 MEQ: 1500 TABLET, EXTENDED RELEASE ORAL at 08:06

## 2022-09-23 RX ADMIN — PANTOPRAZOLE SODIUM 40 MG: 40 TABLET, DELAYED RELEASE ORAL at 08:06

## 2022-09-23 RX ADMIN — POTASSIUM CHLORIDE 40 MEQ: 20 SOLUTION ORAL at 11:56

## 2022-09-23 NOTE — ASSESSMENT & PLAN NOTE
Acute, submassive, intermediate to high risk pulmonary embolism  Patient has history of right popliteal DVT and right lower lobe subsegmental PE from previous hospitalization in August   ? Patient was sent home on Xarelto, likely not failure, was receiving it at her nursing home  ? Patient also has metastatic breast cancer, hypercoagulable state  ? Return to the ER on 09/22 after a fall, found to have saddle pulmonary embolus with evidence of right heart strain and high clot burden  ? RV to LV ratio on CTA 1 2  ? BMP greater than 1500, troponins relatively normal  ? TTE inconclusive for right heart strain  ? Patient is now status post thrombectomy with IR, no IVC filter placement  ? Duplex showing significant right lower extremity clot burden  ?  Continue on Lovenox for pulmonary embolism

## 2022-09-23 NOTE — DISCHARGE INSTR - OTHER ORDERS
Skin Care Plan:  1-LEFT BUTTOCK: Cleanse wound & pat dry  Dust wound bed with Stoma Powder and apply TRIAD Paste overtop  Apply TID or PRN episodes of incontinence  2-Turn/reposition q2h or when medically stable for pressure re-distribution on skin   3-Elevate heels to offload pressure  4-Moisturize skin daily with skin nourishing cream  5-Ehob cushion in chair when out of bed  6-INNER THIGHS, PERINEUM, RIGHT BUTTOCKS: Cleanse areas with soap and water & pat dry  Apply ALYSSA anti-fungal cream BID and Prn episodes of incontinence  7-Continue with [address]  8-B/L Preventative Allevyn Foam Dressings  Osmin with P for Prevention  Change Q3 days or PRN   Peel back and inspect skin Q-shift

## 2022-09-23 NOTE — ASSESSMENT & PLAN NOTE
Lab Results   Component Value Date    EGFR 69 09/23/2022    EGFR 68 09/23/2022    EGFR 36 09/22/2022    CREATININE 0 79 09/23/2022    CREATININE 0 80 09/23/2022    CREATININE 1 36 (H) 09/22/2022

## 2022-09-23 NOTE — SEDATION DOCUMENTATION
Pulmonary embolectomy completed by Dr Toscano Half with Anesthesia support  Patient denies pain/discomfort post procedure  Left groin dressing clean, dry and intact  Pt transported to ICCU 204 and bedside report given to MAURO Grissom

## 2022-09-23 NOTE — ASSESSMENT & PLAN NOTE
Macrocytic anemia complicated by acute blood loss anemia  ? Folate in September of 2022 3 3  ? Vitamin B12 in September of 2022 645  ? Iron panel and August of 2022 showed an iron saturation of 18, TIBC 105, iron 19, ferritin 755  ? Likely a mixed component of anemia chronic disease with possible folate deficiency  ? Continue folate supplementation  ?  Will transfuse 1 unit for hemoglobin 6 9

## 2022-09-23 NOTE — ASSESSMENT & PLAN NOTE
Patient presented with a massively distended bladder, 3 6 L out of for straight cath, greater than 800 mL out of 2nd straight cath  ? Urinary retention protocol, patient will likely need Crawley catheter  ? May be secondary to TCA use  ? UA with 1+ protein, innumerable rbc's, 4-10 wbc's  ?  Urology consult, appreciate recommendations

## 2022-09-23 NOTE — PROGRESS NOTES
Consults - Urology  Leana Silverman 1940, 79 y o  female MRN: [id number]    Unit/Bed#: MICU 10 Encounter: [id number]    Assessment and Plan:  1  Urinary retention  - Creatinine 0 79, improved since admission   - WBC 6 20  - Continue medical optimization per primary team  - Recommend suarez catheter insertion per urinary retention protocol  Will have straight catheterization now with RN, should this continue to be elevated, recommend inserting suarez catheter  Suarez catheter should be maintained until patient clinical status improved and mobile  - Urology will continue to follow    Subjective:   HPI: Angel Gonzalez is an 79year old female with history of stage IV metastatic breast cancer, CKD, HTN, chronic anemia and recent admission for toxic gastroenteritis and PE discharged on Xarelto who presents to ER after fall  Denies trauma from fall  Patient admitted to ICU  Urology consulted for urinary retention  Patient denies history of urinary issues in the past  Denies ever having urinary retention needing suarez catheter insertion  Denies suprapubic pressure, flank pain, fever, chills, nausea, vomiting  Does not feel incompletely empty  Patient has had two straight catheterization since her admission, over 4 L urine drained so far  Review of Systems   Constitutional: Negative for activity change, appetite change, chills and fever  HENT: Negative for congestion and trouble swallowing  Respiratory: Negative for cough and shortness of breath  Cardiovascular: Negative for chest pain, palpitations and leg swelling  Gastrointestinal: Negative for abdominal pain, constipation, diarrhea, nausea and vomiting  Genitourinary: Negative for dysuria, flank pain and hematuria  Musculoskeletal: Negative for back pain and gait problem  Skin: Negative for wound  Allergic/Immunologic: Negative for immunocompromised state  Neurological: Negative for dizziness and syncope     Hematological: Does not bruise/bleed easily  Psychiatric/Behavioral: Negative for confusion  All other systems reviewed and are negative  Objective:  Nursing Rounds:   Vitals: Blood pressure 116/50, pulse (!) 108, temperature (!) 97 1 °F (36 2 °C), temperature source Axillary, resp  rate 15, height 5' 1" (1 549 m), weight 69 9 kg (154 lb), SpO2 100 %, not currently breastfeeding  ,Body mass index is 29 1 kg/m²  Physical Exam  Constitutional:       General: She is not in acute distress  Appearance: Normal appearance  She is not ill-appearing, toxic-appearing or diaphoretic  HENT:      Head: Normocephalic  Nose: No congestion  Eyes:      General: No scleral icterus  Right eye: No discharge  Left eye: No discharge  Conjunctiva/sclera: Conjunctivae normal       Pupils: Pupils are equal, round, and reactive to light  Pulmonary:      Effort: Pulmonary effort is normal    Musculoskeletal:      Cervical back: Normal range of motion  Skin:     General: Skin is warm and dry  Coloration: Skin is not jaundiced or pale  Findings: No bruising, erythema, lesion or rash  Neurological:      General: No focal deficit present  Mental Status: She is alert and oriented to person, place, and time  Mental status is at baseline  Gait: Gait normal    Psychiatric:         Mood and Affect: Mood normal          Behavior: Behavior normal          Thought Content: Thought content normal          Judgment: Judgment normal          Imaging:    Imaging reviewed - both report and images personally reviewed       Labs:  Recent Labs     09/22/22  1145 09/22/22  1630 09/23/22  0514   WBC 7 31 7 41 6 20     Recent Labs     09/22/22  1145 09/22/22  1630 09/23/22  0416 09/23/22  0514   HGB 9 6* 8 4* 7 0* 7 1*     Recent Labs     09/22/22  1145 09/22/22  1630 09/23/22  0436 09/23/22  0514   HCT 31 0* 28 0* <15* 23 6*     Recent Labs     09/22/22  1145 09/23/22  0000 09/23/22  1009   CREATININE 1 36* 0 80 0 79 History:  Past Medical History:   Diagnosis Date   • Abnormal weight loss    • Allergic reaction    • Anxiety    • Breast cancer, right [facility]) 2011    right   • Cancer ([place] [address] ) 2012   • Candidal vulvovaginitis    • Clotting disorder ([place] [address] ) Same as above   • Endometrial hyperplasia    • Epithelial cyst     benign, ovary   • GI (gastrointestinal bleed) Blood mixed with stool   • History of radiation therapy 2011    right breast cancer   • Hyperlipidemia    • Hypertension    • Internal hemorrhoids    • Knee tendonitis    • Ovarian cyst    • 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([place] [address] )      Social History     Socioeconomic History   • Marital status:      Spouse name: None   • Number of children: 3   • Years of education: None   • Highest education level: None   Occupational History   • None   Tobacco Use   • Smoking status: Former Smoker     Packs/day: 1 00     Years: 30 00     Pack years: 30      Types: Cigarettes     Start date:      Quit date:      Years since quittin 7   • Smokeless tobacco: Never Used   Vaping Use   • Vaping Use: Never used   Substance and Sexual Activity   • Alcohol use: Not Currently     Comment: (history)   • Drug use: No   • Sexual activity: Not Currently   Other Topics Concern   • None   Social History Narrative   • None     Social Determinants of Health     Financial Resource Strain: Not on file   Food Insecurity: No Food Insecurity   • Worried About Running Out of Food in the Last Year: Never true   • Ran Out of Food in the Last Year: Never true   Transportation Needs: No Transportation Needs   • Lack of Transportation (Medical): No   • Lack of Transportation (Non-Medical):  No   Physical Activity: Not on file   Stress: Not on file   Social Connections: Not on file   Intimate Partner Violence: Not At Risk   • Fear of Current or Ex-Partner: No   • Emotionally Abused: No   • Physically Abused: No   • Sexually Abused: No   Housing Stability: Low Risk    • Unable to Pay for Housing in the Last Year: No   • Number of Places Lived in the Last Year: 1   • Unstable Housing in the Last Year: No     Past Surgical History:   Procedure Laterality Date   • BILATERAL SALPINGOOPHORECTOMY      onset: 7/23/13   • BREAST BIOPSY Right 06/13/2006    benign   • BREAST BIOPSY Right 03/02/2011    malignant   • BREAST LUMPECTOMY Right 03/30/2011    malignant   • CATARACT EXTRACTION W/  INTRAOCULAR LENS IMPLANT Right     phacoemulsification   Onset: 10/27/14   • CHOLECYSTECTOMY     • COLONOSCOPY  2017    approx   • HYSTERECTOMY  Yes   • INTRAOPERATIVE RADIATION THERAPY (IORT)     • IR BIOPSY BONE  7/9/2021   • IR PICC PLACEMENT SINGLE LUMEN  8/19/2022   • SKIN LESION EXCISION Right     breast, single lesion   • TONSILLECTOMY AND ADENOIDECTOMY       Family History   Problem Relation Age of Onset   • Stomach cancer Mother    • No Known Problems Father    • Cervical cancer Sister    • No Known Problems Daughter    • No Known Problems Maternal Grandmother    • No Known Problems Maternal Grandfather    • No Known Problems Paternal Grandmother    • No Known Problems Paternal Grandfather    • Colon polyps Neg Hx    • Colon cancer Neg Hx        Samara Dakin, [place]  Date: 9/23/2022 Time: 11:17 AM

## 2022-09-23 NOTE — DISCHARGE INSTRUCTIONS
Suarez Cath Care instructions---Maintain suarez catheter to straight drainage  May use leg bag and shower  May flush TID prn using Real syringe and 120 ml NSS  May use more saline ad donna to prevent/treat cath obstruction  Remove catheter on 8th day rehab by 0600 hrs  Bladder scan if no void or less than 200ml in 6hrs  Straight cath for bladder scan >350ml and repeat process  If patient requires straight cath x3 , re-insert suarez and call for Urologist follow-up  Information above  Suarez can remain in place for up to 4 weeks at a time  Suarez placed at [address] [place] [address] on 09/24/22                        Inferior Vena Cava Filter Placement     WHAT YOU NEED TO KNOW:   Inferior vena cava filter placement is surgery to place a filter into your inferior vena cava (IVC)  The IVC is a large blood vessel that brings blood from your lower body back to your heart  The filter is a small mesh strainer made of thin wires  It is placed in the center of the IVC to trap blood clots going to your heart or lungs  Filter types: Permanent Filters are used for patients who can't have anticoagulation medications  The filters are left in place permanently  Optional filters: Are filters that can be removed when a patient's risk for clotting is decreased  DISCHARGE INSTRUCTIONS:     Wound care: Keep your wound clean and dry  Band aid may come off in 24 hours  Self-care:   Limit activity: Do not lift, pull or push heavy objects for 24 hours  Slowly start to do more each day  Return to your daily activities as directed  Resume your normal diet  Small sips of flat soda will help with nausea  Contact Interventional Radiology at [phone] El[place] PATIENTS: Contact Interventional Radiology at [phone]) Miriam Castelan PATIENTS: Contact Interventional Radiology at [phone]) if:  You have a fever  You have chills, a cough, or feel weak and achy  Persistent nausea or vomiting  Your wound is red, swollen, or draining pus     You have questions or concerns about your condition  Optional filters can and should  be removed when you no longer need it  Please call Interventional Radiology to make your removal appointment  Peripherally Inserted Central Catheter     WHAT YOU NEED TO KNOW:   A PICC is an IV placed into a large blood vessel near your heart  It is usually inserted through a blood vessel in your arm  Your PICC may have multiple ports  Ports are tubes where you can inject medicine  A PICC can stay in place for several weeks or months  You may need a PICC to get nutrition, medicine, or fluids  Blood samples can be removed from your PICC and sent to the lab for tests  DISCHARGE INSTRUCTIONS:    [facility] patients,    Contact Interventional Radiology at [phone] PATIENTS: Contact Interventional Radiology at [phone]   [facility] PATIENTS: Contact Interventional Radiology at [phone] if:  Blood soaks through your bandage  Your arm or leg feels warm, tender, and painful  It may look swollen and red  You have trouble moving your arm  Your catheter falls out  You have a fever or swelling, redness, pain, or pus where the catheter was inserted  Persistent nausea or vomiting  You cannot flush your catheter, or you feel pain when you flush your catheter  You see a hole or crack in the tubing of your catheter  You see fluid leaking from the insertion site  You run out of supplies to care for your catheter  You have questions or concerns about your condition or care

## 2022-09-23 NOTE — WOUND OSTOMY CARE
Consult Note - Wound   Gómez Silverman 79 y o  female MRN: [id number]  Unit/Bed#: MICU 10 Encounter: [id number]        History and Present Illness:  Patient is 80 yo female admitted to [facility] with saddle PE which was discovered after patient came to the ER from a fall  Patient has history of stage IV metastatic breast cancer, anemia, CKD3, and hypertension  Patient has urinary retention at this time requiring catheterization and incontinent of bowel  Patient is moderate assist to turn side to side for assessment  Patient is independent with meals  Patient seen in MICU on critical care low air loss mattress  Assessment Findings:     1  POA unstageable to right buttocks- wound does appear to be full thickness but cannot appreciate full depth related to wound bed is 95% slough tissue and 5% purple dark tissue, surrounded with pink hyperpigmentation  Small amount of drainage  Obtained order for santyl to be applied to wound bed for enzymatic debridement, covered with Allevyn foam        2  Right foot weeping edema- serous fluid is leaking from edematous skin with pink tissue  Applied calcium alginate for drainage management and dry dressing  No induration, fluctuance, odor, warmth/temperature differences, redness, or purulence noted to the above noted wounds and skin areas assessed  Patient tolerated well- no s/s of non-verbal pain or discomfort observed during the encounter  Bedside nurse aware of plan of care  See flow sheets for more detailed assessment findings  Wound care will continue to follow  Skin care Plan:  1-Cleanse sacro-buttocks with normal saline  Apply Santyl nickel thick to wound bed and cover with Allevyn foam  Osmin with T for treatment  Change everyday and PRN  2-Turn/reposition q2h or when medically stable for pressure re-distribution on skin   3-Elevate heels to offload pressure  4-Moisturize skin daily with skin nourishing cream  5-Ehob cushion in chair when out of bed    6-Hydraguard to bilateral heels BID and PRN  7-Cleanse right foot with soap and water, pat dry, apply calcium alginate to weeping area, cover with ABD, secure with johann and tape  Change every other day and PRN soilage/displacement  Wounds:  Wound 09/22/22 Buttocks (Active)   Wound Image   09/23/22 1145   Wound Description Slough;Probes to bone 09/23/22 1145   Pressure Injury Stage U 09/23/22 1145   Romana-wound Assessment Pink; Hyperpigmented 09/23/22 1145   Wound Length (cm) 2 5 cm 09/23/22 1145   Wound Width (cm) 1 1 cm 09/23/22 1145   Wound Depth (cm) 0 2 cm 09/23/22 1145   Wound Surface Area (cm^2) 2 75 cm^2 09/23/22 1145   Wound Volume (cm^3) 0 55 cm^3 09/23/22 1145   Calculated Wound Volume (cm^3) 0 55 cm^3 09/23/22 1145   Tunneling 0 cm 09/23/22 1145   Tunneling in depth located at 0 09/23/22 1145   Undermining 0 09/23/22 1145   Undermining is depth extending from 0 09/23/22 1145   Wound Site Closure RUDY 09/23/22 1145   Drainage Amount Small 09/23/22 1145   Drainage Description Serosanguineous 09/23/22 1145   Non-staged Wound Description Full thickness 09/23/22 1145   Treatments Cleansed 09/23/22 1145   Dressing Enzymatic debrider;Foam, Silicon (eg  Allevyn, etc) 09/23/22 1145   Wound packed? No 09/23/22 1145   Packing- # removed 0 09/23/22 1145   Packing- # inserted 0 09/23/22 1145   Dressing Changed New 09/23/22 1145   Patient Tolerance Tolerated well 09/23/22 1145   Dressing Status Clean;Dry; Intact 09/23/22 1145   Wound 09/23/22 Foot Anterior;Right (Active)   Wound Image   09/23/22 1144   Wound Description Epithelialization;Fragile;Pink 09/23/22 1144   Romana-wound Assessment Edema 09/23/22 1144   Wound Length (cm) 6 cm 09/23/22 1144   Wound Width (cm) 7 cm 09/23/22 1144   Wound Depth (cm) 0 1 cm 09/23/22 1144   Wound Surface Area (cm^2) 42 cm^2 09/23/22 1144   Wound Volume (cm^3) 4 2 cm^3 09/23/22 1144   Calculated Wound Volume (cm^3) 4 2 cm^3 09/23/22 1144   Tunneling 0 cm 09/23/22 1144   Tunneling in depth located at 0 09/23/22 1144   Undermining 0 09/23/22 1144   Undermining is depth extending from 0 09/23/22 1144   Wound Site Closure RUDY 09/23/22 1144   Drainage Amount Small 09/23/22 1144   Drainage Description Serous 09/23/22 1144   Non-staged Wound Description Partial thickness 09/23/22 1144   Treatments Cleansed 09/23/22 1144   Dressing Calcium Alginate;Dry dressing 09/23/22 1144   Wound packed? No 09/23/22 1144   Packing- # removed 0 09/23/22 1144   Packing- # inserted 0 09/23/22 1144   Dressing Changed New 09/23/22 1144   Patient Tolerance Tolerated well 09/23/22 1144   Dressing Status Clean;Dry; Intact 09/23/22 [address] [place] [address] Brisa BSN, RN, [facility]

## 2022-09-23 NOTE — PHYSICAL THERAPY NOTE
Physical Therapy Cancellation Note    PT orders received chart review completed  PT spoke to nsg and pt and not appropriate to participate in skilled PT at this time  PT will follow and eval as medically appropriate      Alton Roth, PT

## 2022-09-23 NOTE — ASSESSMENT & PLAN NOTE
REYES on CKD stage IIIA  ? Patient's baseline creatinine between 0 8 and 0 9 with an EGFR ranging around 60  ? Admission creatinine 1 36, down to 0 71  ? Patient did receive IV contrast for CTA  ? Patient does not have a history of heart failure, will initiate fluids  ? Accurate in's and out's  ?  Avoid nephrotoxins

## 2022-09-23 NOTE — ASSESSMENT & PLAN NOTE
Stage IV metastatic ER positive, PA negative, 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, progressed from stage I A ER/PA positive, 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 years ago  Status post resection and adjuvant radiation and hormone therapy for 5 years  Patient had symptoms of bony discomfort in her sternum in June of 2021, imaging revealed lytic lesion, biopsy in July of 2021 confirmed progression of disease  ? Patient was started on Faslodex and Xgeva at that time, in conjunction with radiation  ? In April 2022 there was interval development of 4 mm nodule at the right lower lobe and interval development of increased sclerotic lesions throughout the visualized spine  ? At that time, the patient's treatment was changed to Femara and Ma Reddish  ? In July of 2022, the patient was changed from Ma Reddish to Teresabury because of intolerance  ? During the patient's hospitalization in August of 2022 to September of 2022, Verzenio was discontinued due to gastroenteritis  ? Patient's cancer is most likely contributing to her hypercoagulable state  ? Appreciate oncology and palliative care consult  ?  Will continue on Lovenox for now

## 2022-09-23 NOTE — QUICK NOTE
Confirmed that the patient WAS on [facility], confirmed with Dr Zaida Dillon  Her case is therefore likely a failure on Xarelto  Family is aware and was updated at the bedside  All questions were answered  Hematology/Oncology made aware and consulted, suggest IVC filter placement with initiation of Lovenox  Pharmacy confirmed Lovenox dosing at 1 mg/kg Q12H  Price checked for 30 days - $21 07  Also, IR will plan for an IVC filter today or tomorrow  The patient does not need to be NPO      Tevin Lynn DO, [address] 86  PGY-3, Internal Medicine  [facility]

## 2022-09-23 NOTE — ASSESSMENT & PLAN NOTE
Patient was admitted with toxic gastroenteritis in August of 2022  ? Determined to be possible IBD with additional insult of Verzenio from her breast cancer treatment  ? Continue to hold Verzenio    ? Patient positive for C diff will start on oral vancomycin  ? Will wean prednisone slowly as she has been on this chronically  ?  Will stop loperamide

## 2022-09-23 NOTE — ASSESSMENT & PLAN NOTE
Patient's antihypertensives were discontinued after previous hospital stay due to normotension    · Continue to monitor

## 2022-09-23 NOTE — BRIEF OP NOTE (RAD/CATH)
INTERVENTIONAL RADIOLOGY PROCEDURE NOTE    Date: 9/22/2022    Procedure: IR PE ENDOVASCULAR THERAPY    Preoperative diagnosis:   1  Single subsegmental pulmonary embolism without acute cor pulmonale (HCC)         Postoperative diagnosis: Same  Surgeon: Alyse House MD     Assistant: None  No qualified resident was available  Blood loss: Minimal    Specimens: None    Findings: PE thrombectomy performed  Large clot burden removed  Patient tolerated the procedure well  Purse string suture used to close femoral vein access site  Complications: None immediate      Anesthesia: conscious sedation

## 2022-09-23 NOTE — OCCUPATIONAL THERAPY NOTE
Occupational Therapy         Patient Name: Martine Silverman  YGXES'D Date: 9/23/2022 09/23/22 1037   OT Last Visit   OT Visit Date 09/23/22   Note Type   Note type Cancelled Session   Cancel Reasons Medical status     OT orders received  Chart reviewed  Pt was hypothermic overnight and is not medically appropriate for therapy at this time  OT will cont to follow        SABAS Flores/RAFAELA

## 2022-09-23 NOTE — PROGRESS NOTES
MICU Transfer Note    Code Status: Level 3 - DNAR and DNI     Reason for ICU admission:   Acute, submassive saddle pulmonary embolism    Active problems:   Principal Problem:    Saddle embolus of pulmonary artery (HCC)  Active Problems:    Metastatic breast cancer (HCC)    Anxiety    Mixed hyperlipidemia    Primary hypertension    Toxic gastroenteritis and colitis    REYES (acute kidney injury) ([facility] [address] )    Anemia    Tachycardia    Urinary retention    Swelling of lower extremity  Resolved Problems:    * No resolved hospital problems  *      Consultants:   IR  Urology  Hematology/oncology    History of Present Illness/Summary of clinical course:   Arnol Bass a 79 y  o  female with past medical history of stage IV metastatic ER positive, RI negative, 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, hyperlipidemia, CKD stage 3a, hypertension, chronic anemia, protein-calorie malnutrition, and recent admission with discharge for toxic gastroenteritis and pulmonary embolism discharged on Xarelto on 09/06 that initially presented to [address] [place] [address] on 9/22 for mechanical fall   During the patient's previous admission, the patient was diagnosed with IBD and started on steroids   Patient's Verzenio was discontinued by Oncology, likely contributing to her GI symptoms   In addition, she was noted to have lower extremity edema, found to have an acute occlusive thrombus in the right popliteal vein   Further workup with CT PE protocol showed right lower lobe subsegmental PE, patient was started on heparin drip at that time, and then eventually transitioned to [address] patient was discharged with Lopressor 50 mg twice daily, as well in the setting of her persistent sinus tachycardia   Lastly, her antihypertensive regimen was discontinued given the patient's new baseline of systolic blood pressures in the 100-110 range   Since discharge, the patient has been progressing slowly at her nursing home with regular follow-up with her geriatrician      Patient states that around 0600 on the day of presentation she was walking to the bathroom and her legs gave out   The patient denied head strike or loss of consciousness at that time   EMS was called, in route the patient's blood pressure dropped to 90/40 with heart rates increasing in the 150s   On presentation to the emergency department, the patient was found to be afebrile, but tachycardic   She was maintaining her blood pressures, and saturating 100% on room air   Physical exam significant for lower extremity edema   Lab significant for creatinine of 1 36 (baseline around 0 8-0 9), proBNP 1500, lactic acid 2 3, troponin 20, hemoglobin 9 6 (baseline around 9-10),   Trauma imaging was negative, but CTA revealed saddle pulmonary embolus, left greater than right, high clot burden, and RV to LV ratio 1 2   Patient was transferred urgently to [facility] for possible IR intervention      Patient is now status post thrombectomy with IR  Patient was noted to be supratherapeutic on her heparin drip on 09/22/2022 overnight, and subsequently had questionable GI bleeding versus bleeding from her puncture site  A code crimson was called, but she was never transfused  Repeat hemoglobins have been coming back around 7  The patient has been tachycardic, but maintain pressures  She does report some shivering, but afebrile  The patient was transferred back from step-down 1 to critical care due to concerns for bleeding  Of note, the patient had over 4 L out between 2 straight caths yesterday  Urology has been consulted, the suggest maintaining Crawley catheter  Further conversations ongoing with IR and Hematology/Oncology regarding filter placement and further anticoagulation  After contacting the patient's nursing home, it seems as though the patient did in fact fail Xarelto therapy      * Saddle embolus of pulmonary artery (HCC)  Assessment & Plan  Acute, submassive, intermediate to high risk pulmonary embolism  Patient has history of right popliteal DVT and right lower lobe subsegmental PE from previous hospitalization in August   ? Patient was sent home on Xarelto, likely not failure, was receiving it at her nursing home  ? Patient also has metastatic breast cancer, hypercoagulable state  ? Return to the ER on 09/22 after a fall, found to have saddle pulmonary embolus with evidence of right heart strain and high clot burden  ? RV to LV ratio on CTA 1 2  ? BMP greater than 1500, troponins relatively normal  ? TTE inconclusive for right heart strain  ? Patient is now status post thrombectomy with IR, no IVC filter placement  ? Duplex showing significant right lower extremity clot burden  ? Possible IVC filter placement, will likely need transition to Lovenox in the setting of a Xarelto failure      Swelling of lower extremity  Assessment & Plan  LE swelling has been worsening over the past few weeks  Right worse than left  ? 3+ on both lower extremities, improving along with pulses  ? Previous echo with a hyperdynamic LV EF 70%  ? Lower extremity duplex revealing significant acute DVTs in the right lower extremity  ? Can consider diuresis once the patient is stable for further fluid management      Urinary retention  Assessment & Plan  Patient presented with a massively distended bladder, 3 6 L out of for straight cath, greater than 800 mL out of 2nd straight cath  ? Urinary retention protocol, patient will likely need Crawley catheter  ? May be secondary to TCA use  ? UA with 1+ protein, innumerable rbc's, 4-10 wbc's  ? Urology consult, appreciate recommendations    Tachycardia  Assessment & Plan  · Patient had sinus tachycardia on her last discharge, likely in setting of pulmonary embolism  Still with sinus tachycardia on this admission  ? Started on metoprolol tartrate 50 mg every 12 hours with plans to titrate off  ?  Hold metoprolol for now      Anemia  Assessment & Plan  Macrocytic anemia complicated by acute blood loss anemia  ? Folate in September of 2022 3 3  ? Vitamin B12 in September of 2022 645  ? Iron panel and August of 2022 showed an iron saturation of 18, TIBC 105, iron 19, ferritin 755  ? Likely a mixed component of anemia chronic disease with possible folate deficiency  ? Continue folate supplementation  ? Patient with questionable GI bleeding last night, possibly bleeding from the insertion site  ? Hemoglobin stabilized, prepared 2 units, but not transfused       REYES (acute kidney injury) ([facility] [address] )  Assessment & Plan  REYES on CKD stage IIIA  ? Patient's baseline creatinine between 0 8 and 0 9 with an EGFR ranging around 60  ? Admission creatinine 1 36, down to 0 8  ? Patient did receive IV contrast for CTA  ? Patient does not have a history of heart failure, will initiate fluids  ? Accurate in's and out's  ? Avoid nephrotoxins    Toxic gastroenteritis and colitis  Assessment & Plan  Patient was admitted with toxic gastroenteritis in August of 2022  ? Determined to be possible IBD with additional insult of Verzenio from her breast cancer treatment  ? Continue to hold Verzenio  ? Continue steroids  ? Loperamide as needed for diarrhea  ? Consider GI consult, will need follow-up outpatient      Primary hypertension  Assessment & Plan  Patient's antihypertensives were discontinued after previous hospital stay due to normotension  · Continue to monitor    Chronic kidney disease (CKD) stage G3a/A1, moderately decreased glomerular filtration rate (GFR) between 45-59 mL/min/1 73 square meter and albuminuria creatinine ratio less than 30 mg/g [facility])  Assessment & Plan  Lab Results   Component Value Date    EGFR 69 09/23/2022    EGFR 68 09/23/2022    EGFR 36 09/22/2022    CREATININE 0 79 09/23/2022    CREATININE 0 80 09/23/2022    CREATININE 1 36 (H) 09/22/2022         Mixed hyperlipidemia  Assessment & Plan  Stable  · Continue statin    Anxiety  Assessment & Plan  Depression/anxiety  ?  Hold amitriptyline in setting of urinary retention  ? Hold mirtazapine as well      Metastatic breast cancer ([facility] [address] )  Assessment & Plan  Stage IV metastatic ER positive, AK negative, 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, progressed from stage I A ER/AK positive, 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 years ago  Status post resection and adjuvant radiation and hormone therapy for 5 years  Patient had symptoms of bony discomfort in her sternum in June of 2021, imaging revealed lytic lesion, biopsy in July of 2021 confirmed progression of disease  ? Patient was started on Faslodex and Xgeva at that time, in conjunction with radiation  ? In April 2022 there was interval development of 4 mm nodule at the right lower lobe and interval development of increased sclerotic lesions throughout the visualized spine  ? At that time, the patient's treatment was changed to Femara and Gaby Gutter  ? In July of 2022, the patient was changed from Gaby Gutter to [place] because of intolerance  ? During the patient's hospitalization in August of 2022 to September of 2022, Verzenio was discontinued due to gastroenteritis  ? Patient's cancer is most likely contributing to her hypercoagulable state  ? Consider Oncology consultation  ?  Consider palliative consultation and goals of care discussion            Recent or scheduled procedures:   CTA 9/22: Saddle embolus and left greater than right pulmonary arterial filling defects consistent with PE (high clot burden) with evidence of RV strain including an RV/LV ratio = 1 2     09/22/2022 - Thrombectomy      Outstanding/pending diagnostics:   None    Cultures:   None       Mobilization Plan:   PT/OT following    Nutrition Plan:   Regular diet    Invasive Devices Review  Invasive Devices  Report    Peripheral Intravenous Line  Duration           Peripheral IV 09/22/22 Left Arm 1 day    Peripheral IV 09/22/22 Left;Upper;Ventral (anterior) Arm <1 day          Arterial Line  Duration           Arterial Line 09/22/22 <1 day Rationale for remaining devices: Crawley catheter for urinary retention per Urology    VTE Pharmacologic Prophylaxis: Enoxaparin (Lovenox)  VTE Mechanical Prophylaxis: sequential compression device    Discharge Plan:   Patient should be ready for discharge after the patient is deemed stable from her pulmonary embolism    Initial Physical Therapy Recommendations: Following  Initial Occupational Therapy Recommendations: Following  Initial /Plan:  Following    Home medications that are not reordered and reason why:   Amitriptyline and mirtazapine, urinary retention and prevention of encephalopathy  Lomotil, patient already has loperamide ordered  Xarelto, patient has failed  Simethicone, not indicated currently    Spoke with Dr Agustín Rangel  regarding transfer  Please contact critical care via [employer] with any questions or concerns  Portions of the record may have been created with voice recognition software  Occasional wrong word or "sound a like" substitutions may have occurred due to the inherent limitations of voice recognition software  Read the chart carefully and recognize, using context, where substitutions have occurred      Matthew Vera MD   PGY-1   Emergency Medicine

## 2022-09-23 NOTE — PROGRESS NOTES
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, hyperlipidemia, CKD stage 3a, hypertension, chronic anemia, protein-calorie malnutrition, and recent admission with discharge for toxic gastroenteritis and pulmonary embolism discharged on Xarelto on 09/06 that initially presented to [facility] on 9/22 for mechanical fall  During the patient's previous admission, the patient was diagnosed with IBD and started on steroids  Patient's Verzenio was discontinued by Oncology, likely contributing to her GI symptoms  In addition, she was noted to have lower extremity edema, found to have an acute occlusive thrombus in the right popliteal vein  Further workup with CT PE protocol showed right lower lobe subsegmental PE, patient was started on heparin drip at that time, and then eventually transitioned to Xarelto  The patient was discharged with Lopressor 50 mg twice daily, as well in the setting of her persistent sinus tachycardia  Lastly, her antihypertensive regimen was discontinued given the patient's new baseline of systolic blood pressures in the 100-110 range  Since discharge, the patient has been progressing slowly at her nursing home with regular follow-up with her geriatrician      Patient states that around 0600 on the day of presentation she was walking to the bathroom and her legs gave out  The patient denied head strike or loss of consciousness at that time  EMS was called, in route the patient's blood pressure dropped to 90/40 with heart rates increasing in the 150s    On presentation to the emergency department, the patient was found to be afebrile, but tachycardic  She was maintaining her blood pressures, and saturating 100% on room air  Physical exam significant for lower extremity edema  Lab significant for creatinine of 1 36 (baseline around 0 8-0 9), proBNP 1500, lactic acid 2 3, troponin 20, hemoglobin 9 6 (baseline around 9-10),   Trauma imaging was negative, but CTA revealed saddle pulmonary embolus, left greater than right, high clot burden, and RV to LV ratio 1 2  Patient was transferred urgently to [facility] for possible IR intervention  Patient is now status post thrombectomy with IR  No IVC filter placed  Patient was noted to be supratherapeutic on her heparin drip last night, and subsequently had questionable GI bleeding versus bleeding from her puncture site  A Code crimson was called, but she was never transfused  Repeat hemoglobins have been coming back around 7  The patient has been tachycardic, but maintain pressures  She does report some shivering, but afebrile  The patient was transferred back from step-down 1 to critical care due to concerns for bleeding  Of note, the patient had over 4 L out between 2 straight caths yesterday     ---------------------------------------------------------------------------------------  SUBJECTIVE  Patient only reporting that she is slightly cold  Currently with [employer] on  Denies any chest pain, shortness of breath, abdominal pain, nausea, vomiting  Patient has not been able for weight on her own  Review of Systems   Constitutional: Positive for chills  Negative for fatigue  HENT: Negative for congestion, nosebleeds, postnasal drip and rhinorrhea  Respiratory: Negative for cough and shortness of breath  Cardiovascular: Negative for chest pain  Gastrointestinal: Negative for abdominal distention, abdominal pain, constipation and diarrhea  Genitourinary: Positive for difficulty urinating  Musculoskeletal: Negative for back pain     Skin: Positive for pallor  Neurological: Negative for dizziness and headaches  Psychiatric/Behavioral: Negative for agitation and confusion  Review of systems was reviewed and negative unless stated above in HPI/24-hour events   ---------------------------------------------------------------------------------------  Assessment and Plan:    Neuro:   · Diagnosis:  Depression/anxiety  ? Hold amitriptyline in setting of urinary retention  ? Hold mirtazapine as well  · Diagnosis:  Sedation/analgesia  ? Delirium precautions  ? Sleep-wake cycle  ? Out of bed as tolerated  ? Tylenol for pain, may escalate as needed        CV:   · Diagnosis:  Sinus tachycardia  ? Patient was noted to have sinus tachycardia during her previous hospitalization  ? Started on metoprolol tartrate 50 mg every 12 hours with plans to titrate off  ? Hold metoprolol for now  · Diagnosis: LE swelling  ? Patient has had progressive lower extremity swelling  ? 3+ on both lower extremities, improving along with pulses  ? Previous echo with a hyperdynamic LV EF 70%  ? Follow-up echo        Pulm:  · Diagnosis:  Acute, submassive, intermediate to high risk pulmonary embolism  ? Patient has history of right popliteal DVT and right lower lobe subsegmental PE from previous hospitalization in August  ? Patient was sent home on Xarelto, likely not failure, was not receiving it at her nursing home  ? Patient also has metastatic breast cancer, hypercoagulable state  ? Return to the ER on 09/22 after a fall, found to have saddle pulmonary embolus with evidence of right heart strain and high clot burden  ? RV to LV ratio on CTA 1 2  ? BMP greater than 1500, troponins relatively normal  ? TTE pending  ? Patient is now status post thrombectomy with IR, no IVC filter placement  ? Duplex pending  ? Will need long-term anticoagulation, heparin currently on hold for bleeding overnight, will reach out to IR        GI:   · Diagnosis:  Likely IBD  ?  Patient was admitted with toxic gastroenteritis in August of 2022  ? Determined to be possible IBD with additional insult of Verzenio from her breast cancer treatment  ? Continue to hold Verzenio  ? Continue steroids  ? Loperamide as needed for diarrhea  ? Consider GI consult  · Diagnosis:  GI prophylaxis  ? PPI        :   · Diagnosis:  REYES on CKD stage IIIA  ? Patient's baseline creatinine between 0 8 and 0 9 with an EGFR ranging around 60  ? Admission creatinine 1 36, down to 0 8  ? Patient did receive IV contrast for CTA  ? Patient does not have a history of heart failure, will initiate fluids  ? Accurate in's and out's  ? Avoid nephrotoxins  · Diagnosis:  Urinary retention  ? Urinary retention protocol, patient had over 3 65 L of urine in her bladder  ? Has had another straight cath, over 4 L out so far  ? Still unable to void on her own, may need [place]  ? Tamsulosin daily  ? May be secondary to TCA use  ? UA with 1+ protein, innumerable rbc's, 4-10 wbc's  ? Consider urology consult        F/E/N:   · Fluids:  discontinue fluids in the setting of edema  · Electrolytes:  Potassium greater than 4, phosphorus greater than 3, magnesium greater than 2; 100 mEq of potassium given today  · Nutrition:  NPO        Heme/Onc:   · Diagnosis:  Stage IV metastatic ER positive, MO negative, 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  ? 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 Status post resection and adjuvant radiation and hormone therapy for 5 years  ? Patient had symptoms of bony discomfort in her sternum in June of 2021, imaging revealed lytic lesion, biopsy in July of 2021 confirmed progression of disease  ? Patient was started on Faslodex and Xgeva at that time, in conjunction with radiation  ? In April 2022 there was interval development of 4 mm nodule at the right lower lobe and interval development of increased sclerotic lesions throughout the visualized spine  ?  At that time, the patient's treatment was changed to [address] and Gisella George  ? In July of 2022, the patient was changed from Gisella Se to [place] because of intolerance  ? During the patient's hospitalization in August of 2022 to September of 2022, Verzenio was discontinued due to gastroenteritis  ? Patient's cancer is most likely contributing to her hypercoagulable state  ? Consider Oncology consultation  ? Consider palliative consultation and goals of care discussion  · Diagnosis:  DVT prophylaxis  ? Currently on VTE heparin protocol  ? After thrombectomy and stabilization, will transition to direct oral anticoagulation  · Diagnosis:  Macrocytic anemia complicated by acute blood loss anemia  ? Folate in September of 2022 3 3  ? Vitamin B12 in September of 2022 645  ? Iron panel and August of 2022 showed an iron saturation of 18, TIBC 105, iron 19, ferritin 755  ? Likely a mixed component of anemia chronic disease with possible folate deficiency  ? Continue folate supplementation  ? Patient with questionable GI bleeding last night, possibly bleeding from the insertion site  ? Hemoglobin stabilized, prepared 2 units, but not transfused     Endo:   · Diagnosis:  No active issues  ? Maintain blood glucose between 140-180  ? If steroids restarted, may need sliding scale insulin        ID:   · Diagnosis:  No acute issues  ? Monitor WBC and fever trend        MSK/Skin:   · Diagnosis:  Skin ulcer prophylaxis  ? Frequent turning  ? Pressure offloading  ? PT/OT when able     Patient appropriate for transfer out of the ICU today?: No  Disposition: Continue Critical Care   Code Status: Level 3 - DNAR and DNI  ---------------------------------------------------------------------------------------  ICU CORE MEASURES    Prophylaxis   VTE Pharmacologic Prophylaxis: Heparin Drip  VTE Mechanical Prophylaxis: sequential compression device  Stress Ulcer Prophylaxis: Pantoprazole PO    ABCDE Protocol (if indicated)  Plan to perform spontaneous awakening trial today?  Not applicable  Plan to perform spontaneous breathing trial today? Not applicable  Obvious barriers to extubation? Not applicable  CAM-ICU: Negative    Invasive Devices Review  Invasive Devices  Report    Peripheral Intravenous Line  Duration           Peripheral IV 22 Left Arm <1 day    Peripheral IV 22 Left;Upper;Ventral (anterior) Arm <1 day          Arterial Line  Duration           Arterial Line 22 <1 day              Can any invasive devices be discontinued today? Not applicable  ---------------------------------------------------------------------------------------  OBJECTIVE    Vitals   Vitals:    22 0000 22 0100 22 0200 22 0454   BP: 113/52 116/50     BP Location:       Pulse: (!) 128 (!) 112 (!) 118    Resp:  18 18    Temp:   (!) 97 1 °F (36 2 °C)    TempSrc:   Axillary    SpO2:  100% 100% 98%   Weight:       Height:         Temp (24hrs), Av 1 °F (36 2 °C), Min:97 °F (36 1 °C), Max:97 4 °F (36 3 °C)  Current: Temperature: (!) 97 1 °F (36 2 °C)  HR: 118  BP: 116/50  RR: 18  SpO2: 98%    Respiratory:  SpO2: SpO2: 98 %, SpO2 Activity: SpO2 Activity: At Rest, SpO2 Device: O2 Device: Nasal cannula  Nasal Cannula O2 Flow Rate (L/min): (S) 4 L/min (low hgb)    Invasive/non-invasive ventilation settings   Respiratory  Report   Lab Data (Last 4 hours)    None         O2/Vent Data (Last 4 hours)    None                Physical Exam  Vitals reviewed  Constitutional:       General: She is not in acute distress  HENT:      Head: Normocephalic and atraumatic  Nose: Nose normal       Mouth/Throat:      Mouth: Mucous membranes are dry  Eyes:      General: No scleral icterus  Conjunctiva/sclera: Conjunctivae normal       Pupils: Pupils are equal, round, and reactive to light  Cardiovascular:      Rate and Rhythm: Regular rhythm  Tachycardia present  Pulmonary:      Effort: Pulmonary effort is normal       Breath sounds: Normal breath sounds     Abdominal: General: Bowel sounds are normal  There is no distension  Tenderness: There is no abdominal tenderness  Musculoskeletal:      Right lower leg: Edema present  Left lower leg: Edema present  Skin:     General: Skin is warm  Coloration: Skin is pale  Neurological:      General: No focal deficit present  Mental Status: She is alert and oriented to person, place, and time     Psychiatric:         Mood and Affect: Mood normal          Behavior: Behavior normal              Laboratory and Diagnostics:  Results from last 7 days   Lab Units 09/23/22  0514 09/23/22  0436 09/23/22  0416 09/22/22  1630 09/22/22  1145   WBC Thousand/uL 6 20  --   --  7 41 7 31   HEMOGLOBIN g/dL 7 1*  --  7 0* 8 4* 9 6*   HEMATOCRIT % 23 6*  --   --  28 0* 31 0*   HEMATOCRIT, ISTAT %  --  <15*  --   --   --    PLATELETS Thousands/uL 88*  --   --   --  110*   NEUTROS PCT %  --   --   --   --  86*   MONOS PCT %  --   --   --   --  6     Results from last 7 days   Lab Units 09/23/22  0436 09/23/22  0000 09/22/22  1145   SODIUM mmol/L  --  142 140   POTASSIUM mmol/L  --  3 0* 4 6   CHLORIDE mmol/L  --  115* 107   CO2 mmol/L  --  18* 24   CO2, I-STAT mmol/L 16*  --   --    ANION GAP mmol/L  --  9 9   BUN mg/dL  --  17 28*   CREATININE mg/dL  --  0 80 1 36*   CALCIUM mg/dL  --  6 6* 8 2*   GLUCOSE RANDOM mg/dL  --  78 113   ALT U/L  --  21 30   AST U/L  --  10 13   ALK PHOS U/L  --  74 106   ALBUMIN g/dL  --  1 6* 2 4*   TOTAL BILIRUBIN mg/dL  --  0 40 0 50          Results from last 7 days   Lab Units 09/23/22  0514 09/23/22  0416 09/22/22  2332 09/22/22  1630 09/22/22  1145   INR   --  1 29*  --  1 38* 1 08   PTT seconds 91*  --  >210* >210* 26          Results from last 7 days   Lab Units 09/22/22  1354 09/22/22  1145   LACTIC ACID mmol/L 1 2 2 3*     ABG:    VBG:  Results from last 7 days   Lab Units 09/22/22  1630   PH MICHELLE  7 474*   PCO2 MICHELLE mm Hg 26 8*   PO2 MICHELLE mm Hg 26 8*   HCO3 MICHELLE mmol/L 19 2*   BASE EXC MICHELLE mmol/L -3 5           Micro        EKG: Sinus tachycardia  Imaging: I have personally reviewed pertinent reports  Intake and Output  I/O       09/21 0701 09/22 0700 09/22 0701  09/23 0700 09/23 0701  09/24 0700    P  O   0     I V  (mL/kg)  2166 3 (31)     Total Intake(mL/kg)  2166 3 (31)     Urine (mL/kg/hr)  4622     Emesis/NG output  0     Stool  0     Total Output  4622     Net  -2455 7            Unmeasured Stool Occurrence  1 x         UOP: 4 6 L     Height and Weights   Height: 5' 1" (154 9 cm)     Body mass index is 29 1 kg/m²  Weight (last 2 days)     Date/Time Weight    09/22/22 1640 69 9 (154)    09/22/22 1600 66 (145 5)            Nutrition       Diet Orders   (From admission, onward)             Start     Ordered    09/22/22 2351  Diet Regular; Regular House  Diet effective now        References:    Nutrtion Support Algorithm Enteral vs  Parenteral   Question Answer Comment   Diet Type Regular    Regular Regular House    RD to adjust diet per protocol?  Yes        09/22/22 2352                  Active Medications  Scheduled Meds:  Current Facility-Administered Medications   Medication Dose Route Frequency Provider Last Rate   • atorvastatin  40 mg Oral After Britney Sousa MD     • chlorhexidine  15 mL Mouth/Throat Q12H [facility] Karla Toledo MD     • cholecalciferol  2,000 Units Oral Daily Pio Yanez DO     • cholestyramine sugar free  4 g Oral HS Pio Yanez DO     • folic acid  1 mg Oral Daily Karla Toledo MD     • lactated ringers  125 mL/hr Intravenous Continuous Edy Gomez  mL/hr (09/23/22 6062)   • loperamide  4 mg Oral 4x Daily PRN Pio Yanez DO     • pantoprazole  40 mg Oral BID AC Karla Toledo MD     • potassium chloride  20 mEq Intravenous Once Edy Gomez MD     • predniSONE  20 mg Oral Daily Karla Toledo MD     • protamine IVPB  30 mg Intravenous Once Brook Decker MD     • senna-docusate sodium  2 tablet Oral BID Karla Toledo MD       Continuous Infusions:  lactated ringers, 125 mL/hr, Last Rate: 125 mL/hr (09/23/22 0642)      PRN Meds:   loperamide, 4 mg, 4x Daily PRN        Allergies   Allergies   Allergen Reactions   • Sulfa Antibiotics    • Pneumococcal Polysaccharide Vaccine Rash and Edema     ---------------------------------------------------------------------------------------  Advance Directive and Living Will:      Power of :    POLST:    ---------------------------------------------------------------------------------------  Care Time Delivered:   [address] [facility],       Portions of the record may have been created with voice recognition software  Occasional wrong word or "sound a like" substitutions may have occurred due to the inherent limitations of voice recognition software    Read the chart carefully and recognize, using context, where substitutions have occurred

## 2022-09-23 NOTE — QUICK NOTE
Examined the patient in MICU  80 yo female with medical hx of stage IV metastatic breast cancer with evidence of acute vein thrombosis of the right/left limb and saddle embolus s/p thrombectomy was consulted for placement of IVC filter since patient developed clots while being on anticoagulation Jbassem Vidal)  Patient is on IV heparin currently  From Hematology point of view IVC filter is okay to be placed as long as IR is on board as well  Her anticoagulation can be converted to full dose therapeutic Lovenox  Please reach out with any questions

## 2022-09-23 NOTE — PROGRESS NOTES
Progress Note -Interventional Radiology NP  Radha Silverman 79 y o  female MRN: [id number]  Unit/Bed#: MICU 10 Encounter: [id number]    ADDENDUM: Confirmation that the patient was on Xarelto at rehab  Patient is xarelto failure, it is reasonable for a filter placement at this time, and has LE DVT  Waiting for formal heme/onc consult before making final determination  Please consult IR if filter placement is wanted  Assessment/Plan:    79year old female transferred from [address] [place] [address] Road with saddle PE tachycardia, RV/LV ratio on CT 1 2  Underwent IR mechanical thrombectomy on 9/22 for removal of clot through right groin  Consideration for IVC filter placement, however, it appears that patient was not previously getting Xarelto 20 mg PO at rehab as previously thought, there was no failure of medication, thus filter is not indicated in this case  Some concern for possible bleeding from groin, there is clot surrounding the right groin dressing, patient was supratherapeutic on heparin overnight, may have led to some bleeding and oozing, as it is a 24f catheter used for thrombectomy  No active bleeding or oozing seen at this time  Groin is soft with some pain on palpation, nothing out of the ordinary      - will remove right groin woggle/purse string later today  - transition to xarelto per primary  - continue neurovascular checks          Subjective: Radha Silverman is a 79 y o  female who presented with saddle PE  Patient is tired as she had an eventful night  She otherwise has no pain and feels well        Patient Active Problem List   Diagnosis   • Metastatic breast cancer ([facility] [address] )   • Adverse reaction to pneumococcal vaccine   • Anxiety   • Cataract, bilateral   • Mixed hyperlipidemia   • Pulmonary nodule seen on imaging study   • Sleep disorder   • Chronic kidney disease (CKD) stage G3a/A1, moderately decreased glomerular filtration rate (GFR) between 45-59 mL/min/1 73 square meter and albuminuria creatinine ratio less than 30 mg/g (HCC)   • Primary hypertension   • Osteopenia of multiple sites   • Cough due to ACE inhibitor   • Acute costochondritis   • Lytic lesion of bone on x-ray   • Neoplasm of uncertain behavior of sternum   • Rectal bleeding   • Toxic gastroenteritis and colitis   • Secondary malignant neoplasm of bone (HCC)   • REYES (acute kidney injury) ([facility] [address] )   • Diarrhea   • Hypokalemia   • Anemia   • Severe protein-calorie malnutrition (HCC)   • Single subsegmental pulmonary embolism without acute cor pulmonale (HCC)   • Tachycardia   • Venous insufficiency   • Saddle embolus of pulmonary artery (HCC)          Objective:    Vitals:  /50   Pulse (!) 130   Temp (!) 97 1 °F (36 2 °C) (Axillary)   Resp 20   Ht 5' 1" (1 549 m)   Wt 69 9 kg (154 lb)   LMP  (LMP Unknown)   SpO2 (!) 83%   BMI 29 10 kg/m²   Body mass index is 29 1 kg/m²  Weight (last 2 days)     Date/Time Weight    09/22/22 1640 69 9 (154)    09/22/22 1600 66 (145 5)          I/Os:    Intake/Output Summary (Last 24 hours) at 9/23/2022 1041  Last data filed at 9/23/2022 0750  Gross per 24 hour   Intake 2307 96 ml   Output 4622 ml   Net -2314 04 ml       Invasive Devices  Report    Peripheral Intravenous Line  Duration           Peripheral IV 09/22/22 Left Arm <1 day    Peripheral IV 09/22/22 Left;Upper;Ventral (anterior) Arm <1 day          Arterial Line  Duration           Arterial Line 09/22/22 <1 day                Physical Exam:  Physical Exam  Vitals and nursing note reviewed  Constitutional:       General: She is awake  She is not in acute distress  Appearance: She is well-developed  Interventions: Nasal cannula in place  HENT:      Head: Normocephalic and atraumatic  Eyes:      Conjunctiva/sclera: Conjunctivae normal    Cardiovascular:      Rate and Rhythm: Normal rate and regular rhythm  Pulses:           Radial pulses are 2+ on the right side and 2+ on the left side          Posterior tibial pulses are 1+ on the right side and 1+ on the left side  Heart sounds: No murmur heard  Pulmonary:      Effort: Pulmonary effort is normal  No respiratory distress  Breath sounds: Normal breath sounds  Abdominal:      General: Bowel sounds are normal       Palpations: Abdomen is soft  Tenderness: There is no abdominal tenderness  Musculoskeletal:      Cervical back: Neck supple  Right lower le+ Pitting Edema present  Left lower le+ Pitting Edema present  Feet:      Comments: Right foot ecchymosis, swollen  Skin:     General: Skin is warm and dry  Capillary Refill: Capillary refill takes 2 to 3 seconds  Coloration: Skin is pale  Comments: Right groin puncture dressing with clots, blood   Neurological:      Mental Status: She is alert  Psychiatric:         Behavior: Behavior normal  Behavior is cooperative                        Lab Results and Cultures:   CBC with diff:   Lab Results   Component Value Date    WBC 6 20 2022    HGB 7 1 (L) 2022    HCT 23 6 (L) 2022     (H) 2022    PLT 88 (L) 2022    MCH 32 3 2022    MCHC 30 1 (L) 2022    RDW 16 7 (H) 2022    MPV 10 4 2022    NRBC 0 2022      BMP/CMP:  Lab Results   Component Value Date     2015    K 3 0 (L) 2022    K 4 0 2015     (H) 2022     (H) 2015    CO2 16 (L) 2022    ANIONGAP 6 2015    BUN 17 2022    BUN 13 2015    CREATININE 0 80 2022    CREATININE 1 10 2015    GLUCOSE 60 (L) 2022    GLUCOSE 115 2015    CALCIUM 6 6 (L) 2022    CALCIUM 9 1 2015    AST 10 2022    AST 18 2015    ALT 21 2022    ALT 27 2015    ALKPHOS 74 2022    ALKPHOS 91 2015    PROT 7 3 2015    BILITOT 0 66 2015    EGFR 68 2022   ,     Coags:   Lab Results   Component Value Date    PTT 91 (H) 2022    INR 1 29 (H) 2022   ,   Results from last 7 days   Lab Units 09/23/22  0514 09/23/22  0416 09/22/22  2332 09/22/22  1630 09/22/22  1145   PTT seconds 91*  --  >210* >210* 26   INR   --  1 29*  --  1 38* 1 08        Lipid Panel:   Lab Results   Component Value Date    CHOL 183 03/28/2015     Lab Results   Component Value Date    HDL 29 (L) 08/16/2022     Lab Results   Component Value Date    HDL 29 (L) 08/16/2022     Lab Results   Component Value Date    LDLCALC 25 08/16/2022     Lab Results   Component Value Date    TRIG 79 08/16/2022       HgbA1c:   Lab Results   Component Value Date    HGBA1C 5 9 (H) 07/31/2020       Blood Culture:   Lab Results   Component Value Date    BLOODCX No Growth After 5 Days  08/15/2022   ,   Urinalysis:   Lab Results   Component Value Date    COLORU Light Orange 09/22/2022    CLARITYU Turbid 09/22/2022    SPECGRAV 1 018 09/22/2022    PHUR 6 0 09/22/2022    LEUKOCYTESUR Negative 09/22/2022    NITRITE Negative 09/22/2022    GLUCOSEU Negative 09/22/2022    KETONESU Negative 09/22/2022    BILIRUBINUR Negative 09/22/2022    BLOODU Large (A) 09/22/2022   ,   Urine Culture: No results found for: URINECX,   Wound Culure:  No results found for: WOUNDCULT    VTE Pharmacologic Prophylaxis: Heparin      Thank you for allowing me to participate in the care of Kerry Silverman  Please don't hesitate to call, text, email, or TigerText with any questions  This text is generated with voice recognition software  There may be translation, syntax,  or grammatical errors  If you have any questions, please contact the dictating provider

## 2022-09-23 NOTE — ASSESSMENT & PLAN NOTE
LE swelling has been worsening over the past few weeks  Right worse than left  ? 3+ on both lower extremities, improving along with pulses  ? Previous echo with a hyperdynamic LV EF 70%  ? Lower extremity duplex revealing significant acute DVTs in the right lower extremity  ?  Can consider diuresis once the patient is stable for further fluid management

## 2022-09-23 NOTE — ANESTHESIA POSTPROCEDURE EVALUATION
Post-Op Assessment Note    CV Status:  Stable    Pain management: adequate     Mental Status:  Alert and awake   Hydration Status:  Euvolemic   PONV Controlled:  Controlled   Airway Patency:  Patent      Post Op Vitals Reviewed: Yes      Staff: CRNA, Anesthesiologist         No complications documented      AVSS, transported to ICU with continuous monitoring

## 2022-09-23 NOTE — TREATMENT PLAN
NCCU Attending Significant Event Note    Patient assessed for bloody stools  Perineal area was not bloody  Noticed saturated gauze pads on right inguinal area applied pressure  Noticed Heparin gtt was on asked to stop  Ordered H/H aPTT, INR, MAPs at borderline 65-75  Patient tachycardic but she has been around this heart rate since admission  Ordered a Unit of blood given high suspicion for acute blood loss and being on heparin gtt  Stat Arterial blood chemistry showed undetectable low Hb so started massive transfusion protocol  But in the mean time H/H came as 7 0  Held the MTP and Protamine  Repeat H/h in 1 hour was stable around 7 1  PTT was down to 91  Ordered 2 U of RBC with leuco reduced given history of antibodies       Radha Mendoza MD  Neurocritical Care Attending  7:20 AM

## 2022-09-23 NOTE — SEPSIS NOTE
Sepsis Note   Jeni Silverman 79 y o  female MRN: [id number]  Unit/Bed#: MICU 10 Encounter: [id number]       qSOFA     Row Name 09/23/22 1400 09/23/22 1300 09/23/22 1200 09/23/22 1100 09/23/22 1000    Altered mental status GCS < 15 -- -- -- -- --    Respiratory Rate > / =22 1 0 1 0 0    Systolic BP < / =223 -- -- -- -- --    Q Sofa Score -- -- -- -- --    Row Name 09/23/22 0900 09/23/22 0800 09/23/22 0500 09/23/22 0200 09/23/22 0100    Altered mental status GCS < 15 -- 0 -- -- --    Respiratory Rate > / =22 0 0 0 0 0    Systolic BP < / =970 -- -- -- -- 0    Q Sofa Score -- -- 0 0 0    Row Name 09/23/22 0000 09/22/22 2330 09/22/22 2300 09/22/22 1700 09/22/22 1640    Altered mental status GCS < 15 -- -- -- -- --    Respiratory Rate > / =22 -- -- 0 -- --    Systolic BP < / =976 0 0 0 1 0    Q Sofa Score 0 0 0 -- --    Row Name 09/22/22 1600 09/22/22 1540 09/22/22 0000          Altered mental status GCS < 15 0 -- 0      Respiratory Rate > / =22 -- 1 --      Systolic BP < / =015 0 -- --      Q Sofa Score 1 -- --                Patient meets SIRS criteria, but not sepsis criteria  Currently no evidence of infection  Patient is tachycardic and intermittently tachypneic secondary to pulmonary embolism  Currently undergoing treatment with anticoagulation, thrombectomy, and currently in talks about IVC filter with new anticoagulation regimen      Celena Kendrick DO, [address]  PGY-3, Internal Medicine  [address] [place] [address]

## 2022-09-23 NOTE — CASE MANAGEMENT
Case Management Assessment & Discharge Planning Note    Patient name Jennifer Silverman  Location MICU 10/MICU 10 MRN [id number]  : 1940 Date 2022       Current Admission Date: 2022  Current Admission Diagnosis:Saddle embolus of pulmonary artery [facility])   Patient Active Problem List    Diagnosis Date Noted   • Saddle embolus [facility] pulmonary artery ([facility] ) 2022   • Venous insufficiency 2022   • Tachycardia 2022   • Single subsegmental pulmonary embolism without acute cor pulmonale ([facility] ) 2022   • Severe protein-calorie malnutrition ([place] [address] ) 2022   • Anemia 2022   • Hypokalemia 2022   • REYES (acute kidney injury) ([place] [address] ) 2022   • Diarrhea 2022   • Secondary malignant neoplasm of bone ([place] [address] ) 2022   • Toxic gastroenteritis and colitis 2022   • Rectal bleeding 10/14/2021   • Lytic lesion of bone on x-ray 2021   • Neoplasm of uncertain behavior of sternum 2021   • Cough due to ACE inhibitor 2021   • Acute costochondritis 2021   • Osteopenia of multiple sites 2020   • Primary hypertension 10/22/2019   • Chronic kidney disease (CKD) stage G3a/A1, moderately decreased glomerular filtration rate (GFR) between 45-59 mL/min/1 73 square meter and albuminuria creatinine ratio less than 30 mg/g ([facility]) 2019   • Adverse reaction to pneumococcal vaccine 2015   • Cataract, bilateral 2014   • Pulmonary nodule seen on imaging study 09/10/2013   • Sleep disorder 2013   • Anxiety 2013   • Metastatic breast cancer ([facility] ) 10/17/2012   • Mixed hyperlipidemia 2012      LOS (days): 1  Geometric Mean LOS (GMLOS) (days): 4 40  Days to GMLOS:3 5     OBJECTIVE:  PATIENT READMITTED TO HOSPITAL  Risk of Unplanned Readmission Score: 32 72         Current admission status: Inpatient       Preferred Pharmacy:   [address] [place] [address] [place] [facility] [address]  [facility] [address]  Phone: [phone] Fax: [phone] Critical access hospital, [address] [place] [address]  [address]  [address] [place] [address]  Phone: [phone] Fax: [phone]    Primary Care Provider: Josias Hart DO    Primary Insurance: MEDICARE  Secondary Insurance: Eloisa Velez    ASSESSMENT:  Active Health Care Proxies     Ar Silverman Representative - Son   Primary Phone: [phone] (Mobile)                         Readmission Root Cause  30 Day Readmission: Yes  Who directed you to return to the hospital?: Self, Family  Did you understand whom to contact if you had questions or problems?: Yes  Did you get your prescriptions before you left the hospital?: Yes  Did you take your medications as prescribed?: Yes  Were you able to get to your follow-up appointments?: Yes  During previous admission, was a post-acute recommendation made?: Yes  What post-acute resources were offered?: STR  Patient was readmitted due to: Mechanical fall, workup revealing saddle pulmonary embolism  Action Plan: possible IR intervention, KERLINE    Patient Information  Admitted from[de-identified] Home  Mental Status: Alert  Assessment information provided by[de-identified] Daughter, Son  Primary Caregiver: Self  Support Systems: [address] of Residence: [address] [facility] do you live in?: [address] entry access options   Select all that apply : No steps to enter home  Type of Current Residence: Apartment ([facility] [address], [facility])  Floor Level: 1  Upon entering residence, is there a bedroom on the main floor (no further steps)?: Yes  Upon entering residence, is there a bathroom on the main floor (no further steps)?: Yes  In the last 12 months, was there a time when you were not able to pay the mortgage or rent on time?: No  In the last 12 months, was there a time when you did not have a steady place to sleep or slept in a shelter (including now)?: No  Homeless/housing insecurity resource given?: N/A  Living Arrangements: Lives Alone  Is patient a ?: No    Activities of Daily Living Prior to Admission  Functional Status: Independent  Completes ADLs independently?: Yes  Ambulates independently?: Yes  Does patient use assisted devices?: Yes  Assisted Devices (DME) used: Alyce Linares Shower Chair  Does patient currently own DME?: Yes  What DME does the patient currently own?: Jose Woodard  Does patient have a history of Outpatient Therapy (PT/OT)?: No  Does the patient have a history of Short-Term Rehab?: Yes (recently discharge from [facility])  Does patient have a history of [address]?: No  Does patient currently have [address]?: No (daughter stated was supposed to be set up by [facility] but patient has only been home 1 day, did not start)         Patient Information Continued  Income Source: Pension/FDC  Does patient have prescription coverage?: Yes  Within the past 12 months, you worried that your food would run out before you got the money to buy more : Never true  Within the past 12 months, the food you bought just didn't last and you didn't have money to get more : Never true  Food insecurity resource given?: N/A  Does patient receive dialysis treatments?: No  Does patient have a history of substance abuse?: No  Does patient have a history of Mental Health Diagnosis?: No         Means of Transportation  In the past 12 months, has lack of transportation kept you from medical appointments or from getting medications?: No  In the past 12 months, has lack of transportation kept you from meetings, work, or from getting things needed for daily living?: No  Was application for public transport provided?: N/A        DISCHARGE DETAILS:    Discharge planning discussed with[de-identified] patient's daughter Gissel Sargent via phone  Freedom of Choice: Yes  Comments - Freedom of Choice: would not return to [facility] contacted family/caregiver?: Yes  Were Treatment Team discharge recommendations reviewed with patient/caregiver?: Yes  Did patient/caregiver verbalize understanding of patient care needs?: Yes  Were patient/caregiver advised of the risks associated with not following Treatment Team discharge recommendations?: Yes    Contacts  Patient Contacts: Karime Spring, daughter  Relationship to Patient[de-identified] Family  Contact Method: Phone  Phone Number: [phone]  Reason/Outcome: Discharge Planning, Emergency Contact                        Treatment Team Recommendation: Other (TBD)  Discharge Destination Plan[de-identified] Other (TBD -- likely STR v  H[facility])  Transport at Discharge : Other (Comment) (TBD)                Patient/caregiver received discharge checklist   Content reviewed  Patient/caregiver encouraged to participate in discharge plan of care prior to discharge home  CM reviewed d/c planning process including the following: identifying help at home, patient preference for d/c planning needs, Discharge Lounge, [facility] Meds to Bed program, availability of treatment team to discuss questions or concerns patient and/or family may have regarding understanding medications and recognizing signs and symptoms once discharged  CM also encouraged patient to follow up with all recommended appointments after discharge  Patient advised of importance for patient and family to participate in managing patient’s medical well being  Additional Comments: Patient is normally independent at baseline  Was recently discharged from [facility], according to daughter because "her insurance ran out" and she was not making progress in rehab  Patient has straight Medicare, CM explained that at day 21, Medicare would start paying for 80% of the STR stay, and patient would be responsible for remaining 20%  Patient is not vaccinated for COVID    Discussed with daughter that CM will continue to follow for discharge recommendations; and reviewed that the current barriers if the recommendation is for STR are (1) possible limited availability of beds due to vaccination status; and (2) that patient may not have refreshed her 20 days of STR benefits and may be responsible for STR co-pay soon into a rehab stay  Daughter understood  CM will continue to follow for d/c needs

## 2022-09-23 NOTE — ASSESSMENT & PLAN NOTE
· Patient had sinus tachycardia on her last discharge, likely in setting of pulmonary embolism  Still with sinus tachycardia on this admission  ? Consider amitriptyline as possible etiology as well  ? Due to hypotension will decrease metoprolol to 12 5 mg 4 times daily  ? Patient is notably 3rd spacing, will bolus with albumin  ?  Start midodrine for blood pressure support

## 2022-09-24 ENCOUNTER — APPOINTMENT (INPATIENT)
Dept: NON INVASIVE DIAGNOSTICS | Facility: HOSPITAL | Age: 82
DRG: 163 | End: 2022-09-24
Payer: MEDICARE

## 2022-09-24 LAB
ABO GROUP BLD BPU: NORMAL
ALBUMIN SERPL BCP-MCNC: 1.8 G/DL (ref 3.5–5)
ALP SERPL-CCNC: 72 U/L (ref 46–116)
ALT SERPL W P-5'-P-CCNC: 21 U/L (ref 12–78)
ANION GAP SERPL CALCULATED.3IONS-SCNC: 6 MMOL/L (ref 4–13)
APTT PPP: 140 SECONDS (ref 23–37)
APTT PPP: 35 SECONDS (ref 23–37)
AST SERPL W P-5'-P-CCNC: 10 U/L (ref 5–45)
BILIRUB SERPL-MCNC: 0.48 MG/DL (ref 0.2–1)
BPU ID: NORMAL
BUN SERPL-MCNC: 13 MG/DL (ref 5–25)
CA-I BLD-SCNC: 0.99 MMOL/L (ref 1.12–1.32)
CALCIUM ALBUM COR SERPL-MCNC: 8.5 MG/DL (ref 8.3–10.1)
CALCIUM SERPL-MCNC: 6.7 MG/DL (ref 8.3–10.1)
CHLORIDE SERPL-SCNC: 117 MMOL/L (ref 96–108)
CO2 SERPL-SCNC: 21 MMOL/L (ref 21–32)
CREAT SERPL-MCNC: 0.62 MG/DL (ref 0.6–1.3)
CROSSMATCH: NORMAL
ERYTHROCYTE [DISTWIDTH] IN BLOOD BY AUTOMATED COUNT: 19.1 % (ref 11.6–15.1)
GFR SERPL CREATININE-BSD FRML MDRD: 84 ML/MIN/1.73SQ M
GLUCOSE SERPL-MCNC: 81 MG/DL (ref 65–140)
HCT VFR BLD AUTO: 25.5 % (ref 34.8–46.1)
HGB BLD-MCNC: 8.1 G/DL (ref 11.5–15.4)
HGB BLD-MCNC: 8.6 G/DL (ref 11.5–15.4)
MAGNESIUM SERPL-MCNC: 1.9 MG/DL (ref 1.6–2.6)
MCH RBC QN AUTO: 32.1 PG (ref 26.8–34.3)
MCHC RBC AUTO-ENTMCNC: 31.8 G/DL (ref 31.4–37.4)
MCV RBC AUTO: 101 FL (ref 82–98)
PHOSPHATE SERPL-MCNC: 1.8 MG/DL (ref 2.3–4.1)
PLATELET # BLD AUTO: 79 THOUSANDS/UL (ref 149–390)
PMV BLD AUTO: 10.4 FL (ref 8.9–12.7)
POTASSIUM SERPL-SCNC: 3.8 MMOL/L (ref 3.5–5.3)
PROT SERPL-MCNC: 4.2 G/DL (ref 6.4–8.4)
RBC # BLD AUTO: 2.52 MILLION/UL (ref 3.81–5.12)
SODIUM SERPL-SCNC: 144 MMOL/L (ref 135–147)
UNIT DISPENSE STATUS: NORMAL
UNIT PRODUCT CODE: NORMAL
UNIT PRODUCT VOLUME: 350 ML
UNIT RH: NORMAL
WBC # BLD AUTO: 4.24 THOUSAND/UL (ref 4.31–10.16)

## 2022-09-24 PROCEDURE — 80053 COMPREHEN METABOLIC PANEL: CPT | Performed by: STUDENT IN AN ORGANIZED HEALTH CARE EDUCATION/TRAINING PROGRAM

## 2022-09-24 PROCEDURE — 85730 THROMBOPLASTIN TIME PARTIAL: CPT

## 2022-09-24 PROCEDURE — 93971 EXTREMITY STUDY: CPT

## 2022-09-24 PROCEDURE — 87493 C DIFF AMPLIFIED PROBE: CPT | Performed by: STUDENT IN AN ORGANIZED HEALTH CARE EDUCATION/TRAINING PROGRAM

## 2022-09-24 PROCEDURE — 87329 GIARDIA AG IA: CPT

## 2022-09-24 PROCEDURE — 99223 1ST HOSP IP/OBS HIGH 75: CPT | Performed by: INTERNAL MEDICINE

## 2022-09-24 PROCEDURE — 99222 1ST HOSP IP/OBS MODERATE 55: CPT | Performed by: INTERNAL MEDICINE

## 2022-09-24 PROCEDURE — 85027 COMPLETE CBC AUTOMATED: CPT | Performed by: STUDENT IN AN ORGANIZED HEALTH CARE EDUCATION/TRAINING PROGRAM

## 2022-09-24 PROCEDURE — 83735 ASSAY OF MAGNESIUM: CPT | Performed by: STUDENT IN AN ORGANIZED HEALTH CARE EDUCATION/TRAINING PROGRAM

## 2022-09-24 PROCEDURE — 83993 ASSAY FOR CALPROTECTIN FECAL: CPT

## 2022-09-24 PROCEDURE — 84100 ASSAY OF PHOSPHORUS: CPT | Performed by: STUDENT IN AN ORGANIZED HEALTH CARE EDUCATION/TRAINING PROGRAM

## 2022-09-24 PROCEDURE — 99233 SBSQ HOSP IP/OBS HIGH 50: CPT | Performed by: INTERNAL MEDICINE

## 2022-09-24 PROCEDURE — 85018 HEMOGLOBIN: CPT

## 2022-09-24 PROCEDURE — 82330 ASSAY OF CALCIUM: CPT | Performed by: STUDENT IN AN ORGANIZED HEALTH CARE EDUCATION/TRAINING PROGRAM

## 2022-09-24 PROCEDURE — 93971 EXTREMITY STUDY: CPT | Performed by: SURGERY

## 2022-09-24 RX ORDER — POTASSIUM CHLORIDE 20MEQ/15ML
20 LIQUID (ML) ORAL ONCE
Status: COMPLETED | OUTPATIENT
Start: 2022-09-24 | End: 2022-09-24

## 2022-09-24 RX ORDER — CALCIUM GLUCONATE 20 MG/ML
INJECTION, SOLUTION INTRAVENOUS
Status: DISPENSED
Start: 2022-09-24 | End: 2022-09-24

## 2022-09-24 RX ORDER — ENOXAPARIN SODIUM 100 MG/ML
1 INJECTION SUBCUTANEOUS EVERY 12 HOURS SCHEDULED
Status: DISCONTINUED | OUTPATIENT
Start: 2022-09-24 | End: 2022-10-13

## 2022-09-24 RX ORDER — MAGNESIUM SULFATE 1 G/100ML
1 INJECTION INTRAVENOUS ONCE
Status: COMPLETED | OUTPATIENT
Start: 2022-09-24 | End: 2022-09-24

## 2022-09-24 RX ORDER — CALCIUM GLUCONATE 20 MG/ML
2 INJECTION, SOLUTION INTRAVENOUS ONCE
Status: COMPLETED | OUTPATIENT
Start: 2022-09-24 | End: 2022-09-24

## 2022-09-24 RX ORDER — ENOXAPARIN SODIUM 100 MG/ML
1 INJECTION SUBCUTANEOUS EVERY 12 HOURS SCHEDULED
Status: DISCONTINUED | OUTPATIENT
Start: 2022-09-24 | End: 2022-09-24

## 2022-09-24 RX ORDER — METOPROLOL TARTRATE 50 MG/1
50 TABLET, FILM COATED ORAL EVERY 12 HOURS SCHEDULED
Status: DISCONTINUED | OUTPATIENT
Start: 2022-09-24 | End: 2022-09-26

## 2022-09-24 RX ADMIN — MAGNESIUM SULFATE HEPTAHYDRATE 1 G: 1 INJECTION, SOLUTION INTRAVENOUS at 08:27

## 2022-09-24 RX ADMIN — PANTOPRAZOLE SODIUM 40 MG: 40 TABLET, DELAYED RELEASE ORAL at 16:23

## 2022-09-24 RX ADMIN — COLLAGENASE SANTYL: 250 OINTMENT TOPICAL at 08:15

## 2022-09-24 RX ADMIN — METOPROLOL TARTRATE 50 MG: 50 TABLET ORAL at 21:26

## 2022-09-24 RX ADMIN — ENOXAPARIN SODIUM 70 MG: 80 INJECTION SUBCUTANEOUS at 02:42

## 2022-09-24 RX ADMIN — POTASSIUM & SODIUM PHOSPHATES POWDER PACK 280-160-250 MG 2 PACKET: 280-160-250 PACK at 16:23

## 2022-09-24 RX ADMIN — POTASSIUM & SODIUM PHOSPHATES POWDER PACK 280-160-250 MG 2 PACKET: 280-160-250 PACK at 08:14

## 2022-09-24 RX ADMIN — ENOXAPARIN SODIUM 70 MG: 80 INJECTION SUBCUTANEOUS at 21:28

## 2022-09-24 RX ADMIN — METOPROLOL TARTRATE 50 MG: 50 TABLET ORAL at 09:53

## 2022-09-24 RX ADMIN — Medication 2000 UNITS: at 08:15

## 2022-09-24 RX ADMIN — CHOLESTYRAMINE 4 G: 4 POWDER, FOR SUSPENSION ORAL at 21:28

## 2022-09-24 RX ADMIN — FOLIC ACID 1 MG: 1 TABLET ORAL at 08:15

## 2022-09-24 RX ADMIN — PANTOPRAZOLE SODIUM 40 MG: 40 TABLET, DELAYED RELEASE ORAL at 09:53

## 2022-09-24 RX ADMIN — POTASSIUM CHLORIDE 20 MEQ: 20 SOLUTION ORAL at 08:15

## 2022-09-24 RX ADMIN — ENOXAPARIN SODIUM 70 MG: 80 INJECTION SUBCUTANEOUS at 08:16

## 2022-09-24 RX ADMIN — PREDNISONE 20 MG: 20 TABLET ORAL at 08:15

## 2022-09-24 RX ADMIN — ATORVASTATIN CALCIUM 40 MG: 40 TABLET, FILM COATED ORAL at 17:41

## 2022-09-24 RX ADMIN — CALCIUM GLUCONATE 2 G: 20 INJECTION, SOLUTION INTRAVENOUS at 08:16

## 2022-09-24 NOTE — PLAN OF CARE
Problem: Potential for Falls  Goal: Patient will remain free of falls  Description: INTERVENTIONS:  - Educate patient/family on patient safety including physical limitations  - Instruct patient to call for assistance with activity   - Consult OT/PT to assist with strengthening/mobility   - Keep Call bell within reach  - Keep bed low and locked with side rails adjusted as appropriate  - Keep care items and personal belongings within reach  - Initiate and maintain comfort rounds  - Make Fall Risk Sign visible to staff  - Offer Toileting every 2 Hours, in advance of need  - Initiate/Maintain bed alarm  - Obtain necessary fall risk management equipment  - Apply yellow socks and bracelet for high fall risk patients  - Consider moving patient to room near nurses station  Outcome: Progressing     Problem: MOBILITY - ADULT  Goal: Maintain or return to baseline ADL function  Description: INTERVENTIONS:  -  Assess patient's ability to carry out ADLs; assess patient's baseline for ADL function and identify physical deficits which impact ability to perform ADLs (bathing, care of mouth/teeth, toileting, grooming, dressing, etc )  - Assess/evaluate cause of self-care deficits   - Assess range of motion  - Assess patient's mobility; develop plan if impaired  - Assess patient's need for assistive devices and provide as appropriate  - Encourage maximum independence but intervene and supervise when necessary  - Involve family in performance of ADLs  - Assess for home care needs following discharge   - Consider OT consult to assist with ADL evaluation and planning for discharge  - Provide patient education as appropriate  Outcome: Progressing  Goal: Maintains/Returns to pre admission functional level  Description: INTERVENTIONS:  - Perform BMAT or MOVE assessment daily    - Set and communicate daily mobility goal to care team and patient/family/caregiver     - Collaborate with rehabilitation services on mobility goals if consulted  - Perform Range of Motion 6 times a day  - Reposition patient every 2 hours    - Record patient progress and toleration of activity level   Outcome: Progressing     Problem: Prexisting or High Potential for Compromised Skin Integrity  Goal: Skin integrity is maintained or improved  Description: INTERVENTIONS:  - Identify patients at risk for skin breakdown  - Assess and monitor skin integrity  - Assess and monitor nutrition and hydration status  - Monitor labs   - Assess for incontinence   - Turn and reposition patient  - Assist with mobility/ambulation  - Relieve pressure over bony prominences  - Avoid friction and shearing  - Provide appropriate hygiene as needed including keeping skin clean and dry  - Evaluate need for skin moisturizer/barrier cream  - Collaborate with interdisciplinary team   - Patient/family teaching  - Consider wound care consult   Outcome: Progressing     Problem: GENITOURINARY - ADULT  Goal: Maintains or returns to baseline urinary function  Description: INTERVENTIONS:  - Assess urinary function  - Encourage oral fluids to ensure adequate hydration if ordered  - Administer IV fluids as ordered to ensure adequate hydration  - Administer ordered medications as needed  - Offer frequent toileting  - Follow urinary retention protocol if ordered  Outcome: Progressing  Goal: Absence of urinary retention  Description: INTERVENTIONS:  - Assess patient’s ability to void and empty bladder  - Monitor I/O  - Bladder scan as needed  - Discuss with physician/AP medications to alleviate retention as needed  - Discuss catheterization for long term situations as appropriate  Outcome: Progressing  Goal: Urinary catheter remains patent  Description: INTERVENTIONS:  - Assess patency of urinary catheter  - If patient has a chronic suarez, consider changing catheter if non-functioning  - Follow guidelines for intermittent irrigation of non-functioning urinary catheter  Outcome: Progressing     Problem: METABOLIC, FLUID AND ELECTROLYTES - ADULT  Goal: Electrolytes maintained within normal limits  Description: INTERVENTIONS:  - Monitor labs and assess patient for signs and symptoms of electrolyte imbalances  - Administer electrolyte replacement as ordered  - Monitor response to electrolyte replacements, including repeat lab results as appropriate  - Instruct patient on fluid and nutrition as appropriate  Outcome: Progressing  Goal: Fluid balance maintained  Description: INTERVENTIONS:  - Monitor labs   - Monitor I/O and WT  - Instruct patient on fluid and nutrition as appropriate  - Assess for signs & symptoms of volume excess or deficit  Outcome: Progressing  Goal: Glucose maintained within target range  Description: INTERVENTIONS:  - Monitor Blood Glucose as ordered  - Assess for signs and symptoms of hyperglycemia and hypoglycemia  - Administer ordered medications to maintain glucose within target range  - Assess nutritional intake and initiate nutrition service referral as needed  Outcome: Progressing     Problem: SKIN/TISSUE INTEGRITY - ADULT  Goal: Skin Integrity remains intact(Skin Breakdown Prevention)  Description: Assess:  -Perform Osito assessment every 12  -Clean and moisturize skin every 6  -Inspect skin when repositioning, toileting, and assisting with ADLS  -Assess extremities for adequate circulation and sensation     Bed Management:  -Have minimal linens on bed & keep smooth, unwrinkled  -Change linens as needed when moist or perspiring  -Avoid sitting or lying in one position for more than 2 hours while in bed  -Keep HOB at 25degrees     Toileting:  -Offer bedside commode  -Assess for incontinence every 1  -Use incontinent care products after each incontinent episode    Activity:  -Encourage activity and walks on unit  -Encourage or provide ROM exercises   -Turn and reposition patient every 2 Hours  -Use appropriate equipment to lift or move patient in bed      Goal: Incision(s), wounds(s) or drain site(s) healing without S/S of infection  Description: INTERVENTIONS  - Assess and document dressing, incision, wound bed, drain sites and surrounding tissue  - Provide patient and family education  - Perform skin care/dressing changes every 2  Outcome: Progressing  Goal: Pressure injury heals and does not worsen  Description: Interventions:  - Implement low air loss mattress or specialty surface (Criteria met)  - Apply silicone foam dressing    - Perform passive or active ROM every 4  - Turn and reposition patient & offload bony prominences every 2 hours   - Utilize friction reducing device or surface for transfers   - - Use incontinent care products after each incontinent episode such as   - Consider nutrition services referral as needed  Outcome: Progressing

## 2022-09-24 NOTE — CONSULTS
Consultation - 126 [facility]  Parvin Silverman 79 y o  female MRN: [id number]  Unit/Bed#: MICU 10 Encounter: [id number]        Consults    Reason for Consult / Principal Problem:     IBD    ASSESSMENT AND PLAN:      53-year-old female with history significant for ulcerative colitis currently on prednisone 40 mg daily, NAFLD, DVT/PE on Xarelto, CKD stage 3, HTN, chronic anemia, metastatic breast cancer on chemotherapy, and severe protein calorie malnutrition admitted with saddle PE status post thrombectomy and IVC filter placement on 09/23 with hospital course complicated by intractable blood tinged diarrhea  Patient was found have a low hemoglobin of 6 6 requiring 1 unit blood transfusion on 9/23  She denies abdominal pain  She continues to have blood-tinged diarrhea seen on rectal tube over the last 24 hours  Suspect her intractable bloody diarrhea is again related to her known severe colitis  Her overall clinical presentation appears to be identical to her prior admission to [facility] from the mid August to September at which point time she underwent flexible sigmoidoscopy and was found to have severe colitis prompting initiation of steroids  Cannot definitively exclude infectious etiology such as C diff and Giardia  1  Recommend checking CRP, C diff PCR, Giardia Ag, and fecal calprotectin  2  Hold Imodium for now until C diff negative  3  Continue to monitor hemoglobin  Transfuse for less than 7  4  Continue PTA Cholestyramine and Prednisone 40 mg daily    Rest of care per primary team   Thank you for this consultation     ______________________________________________________________________    HPI: Nels Fabry is an 53-year-old female with history significant for ulcerative colitis currently on prednisone 40 mg daily, DVT/PE on Xarelto, CKD stage 3, HTN, chronic anemia, metastatic breast cancer on chemotherapy, and severe protein calorie malnutrition whom we are asked to see in consultation for 1 day history of intractable diarrhea  Patient initially presented to [facility] following mechanical fall on 9/22  She was found to have lower extremity edema and underwent CT PE study with evidence of right lower lobe subsegmental PE for which she was started on heparin infusion  She was transferred to [facility] for IR intervention and is now status post IR guided thrombectomy and IVC filter placement on 09/23  She was found have a hemoglobin of 6 6 on 09/23 status post 1 unit PRBCs  Hemoglobin remains stable since transfusion  Her baseline hemoglobin appears to be normal, however, her hemoglobin has remained in the 7-10 range since early August     Of note, patient was recently admitted to [place] [address] in mid August- early September with similar intractable diarrhea and rectal bleeding  She was seen by GI at the time and underwent flexible sigmoidoscopy on 8/20 which revealed severe colitis in the mid sigmoid and rectum with deep ulcerations concerning for drug-induced colitis versus ulcerative colitis  She was started on cholestyramine and prednisone 40 mg daily which was to be continued outpatient until initiation of Remicade at which point she was to be started on a prolonged taper  Her rectal bleeding was believed to be secondary to her severe colitis and exacerbated by anticoagulation in the setting of DVT/PE  Per patient's daughter, patient has had persistent rectal bleeding with diarrhea since 2021  She underwent colonoscopy in 11/2021 with evidence of colitis with focal active cryptitis on biopsies  She was initially started on [place] suppositories x3 weeks with hydrocortisone enemas x7 days with mild improvement in her rectal bleeding with the addition of Mesalamine 2 4g daily in 4/2022 without improvement  Her bloody diarrhea continued prompting admission in mid August 2022       On my evaluation today, patient continues to have blood-tinged watery stools and intractable diarrhea  Per nursing, she has had about 13 watery bowel movements in the last 24 hours resulting in placement of rectal tube  She denies any fevers, chills, lightheadedness/dizziness, nausea/vomiting, or abdominal pain  Per patient and family, she has continued to have blood diarrhea in the interval since her last admission  Prior Scopes:  - Colonoscopy 11/2021 with evidence of inflammation at distal sigmoid colon and rectum with bx c/w focal active cryptitis  REVIEW OF SYSTEMS:  10 point ROS reviewed and negative except otherwise noted in the HPI above  Historical Information   Past Medical History:   Diagnosis Date   • Abnormal weight loss    • Allergic reaction    • Anxiety    • Breast cancer, right [facility]) 2011    right   • [facility] 2012   • Candidal vulvovaginitis    • Clotting disorder ([facility] ) Same as above   • Endometrial hyperplasia    • Epithelial cyst     benign, ovary   • GI (gastrointestinal bleed) Blood mixed with stool   • History of radiation therapy 2011    right breast cancer   • Hyperlipidemia    • Hypertension    • Internal hemorrhoids    • Knee tendonitis    • Ovarian cyst    • 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[facility])      Past Surgical History:   Procedure Laterality Date   • BILATERAL SALPINGOOPHORECTOMY      onset: 7/23/13   • BREAST BIOPSY Right 06/13/2006    benign   • BREAST BIOPSY Right 03/02/2011    malignant   • BREAST LUMPECTOMY Right 03/30/2011    malignant   • CATARACT EXTRACTION W/  INTRAOCULAR LENS IMPLANT Right     phacoemulsification   Onset: 10/27/14   • CHOLECYSTECTOMY     • COLONOSCOPY  2017    approx   • HYSTERECTOMY  Yes   • INTRAOPERATIVE RADIATION THERAPY (IORT)     • IR BIOPSY BONE  7/9/2021   • IR PICC PLACEMENT SINGLE LUMEN  8/19/2022   • SKIN LESION EXCISION Right     breast, single lesion   • TONSILLECTOMY AND ADENOIDECTOMY       Social History   Social History     Substance and Sexual Activity   Alcohol Use Not Currently    Comment: (history) Social History     Substance and Sexual Activity   Drug Use No     Social History     Tobacco Use   Smoking Status Former Smoker   • Packs/day: 1 00   • Years: 30 00   • Pack years: 30 00   • Types: Cigarettes   • Start date: 55   • Quit date: 0   • Years since quittin 7   Smokeless Tobacco Never Used     Family History   Problem Relation Age of Onset   • Stomach cancer Mother    • No Known Problems Father    • Cervical cancer Sister    • No Known Problems Daughter    • No Known Problems Maternal Grandmother    • No Known Problems Maternal Grandfather    • No Known Problems Paternal Grandmother    • No Known Problems Paternal Grandfather    • Colon polyps Neg Hx    • Colon cancer Neg Hx        Meds/Allergies     Medications Prior to Admission   Medication   • amitriptyline (ELAVIL) 25 mg tablet   • Cholecalciferol (VITAMIN D3) 2000 units capsule   • cholestyramine sugar free (QUESTRAN LIGHT) 4 g packet   • diphenoxylate-atropine (LOMOTIL) 2 5-0 025 mg per tablet   • folic acid (FOLVITE) 1 mg tablet   • loperamide (IMODIUM) 2 mg capsule   • metoprolol tartrate (LOPRESSOR) 50 mg tablet   • mirtazapine (REMERON) 7 5 MG tablet   • pantoprazole (PROTONIX) 40 mg tablet   • predniSONE 20 mg tablet   • rivaroxaban (XARELTO) 15 mg tablet   • rivaroxaban (Xarelto) 20 mg tablet   • simethicone (MYLICON) 80 mg chewable tablet   • simvastatin (ZOCOR) 10 mg tablet     Current Facility-Administered Medications   Medication Dose Route Frequency   • atorvastatin (LIPITOR) tablet 40 mg  40 mg Oral After Dinner   • Calcium Gluconate 2-0 675 GM/100ML-% in Sodium Chloride IVPB (premix) **ADS Override Pull**       • cholecalciferol (VITAMIN D3) tablet 2,000 Units  2,000 Units Oral Daily   • cholestyramine sugar free (QUESTRAN LIGHT) packet 4 g  4 g Oral HS   • collagenase (SANTYL) ointment   Topical Daily   • enoxaparin (LOVENOX) subcutaneous injection 70 mg  1 mg/kg Subcutaneous G28Y [address]   • folic acid (FOLVITE) tablet 1 mg  1 mg Oral Daily   • loperamide (IMODIUM) capsule 4 mg  4 mg Oral 4x Daily PRN   • metoprolol tartrate (LOPRESSOR) tablet 50 mg  50 mg Oral Q12H [address]   • pantoprazole (PROTONIX) EC tablet 40 mg  40 mg Oral BID AC   • potassium phosphates 12 mmol in sodium chloride 0 9 % 250 mL infusion  12 mmol Intravenous Once   • potassium-sodium phosphates (PHOS-NAK) packet 2 packet  2 packet Oral BID With Meals   • potassium-sodium phosphates (PHOS-NAK) packet 2 packet  2 packet Oral BID With Meals   • predniSONE tablet 20 mg  20 mg Oral Daily   • senna-docusate sodium (SENOKOT S) 8 6-50 mg per tablet 2 tablet  2 tablet Oral BID       Allergies   Allergen Reactions   • Sulfa Antibiotics    • Pneumococcal Polysaccharide Vaccine Rash and Edema         Objective     Blood pressure (!) 113/41, pulse (!) 134, temperature 97 8 °F (36 6 °C), temperature source Oral, resp  rate (!) 24, height 5' 1" (1 549 m), weight 69 9 kg (154 lb), SpO2 95 %, not currently breastfeeding  Body mass index is 29 1 kg/m²  Intake/Output Summary (Last 24 hours) at 9/24/2022 1223  Last data filed at 9/24/2022 1000  Gross per 24 hour   Intake 479 66 ml   Output 1050 ml   Net -570 34 ml         PHYSICAL EXAM:    General: chronically ill-appearing  Eyes: no conjunctival icterus or pallor  CV: RRR, no m/r/g appreciated  Resp: CTAB, normal chest rise  Abdominal: Soft, non-tender, non-distended, +bowel sounds  Rectum: + rectal tube with pink tinged stool  Extremities: Warm, no deformities, no edema  Skin: No rashes  Neuro: alert and oriented  Psych: Normal affect    Lab Results:   No results displayed because visit has over 200 results  Imaging Studies: I have personally reviewed pertinent imaging studies  XR Trauma chest portable    Result Date: 9/22/2022  Narrative: CHEST INDICATION:   TRAUMA  COMPARISON:  8/1/2022 EXAM PERFORMED/VIEWS:  XR CHEST PORTABLE FINDINGS: Cardiomediastinal silhouette appears unremarkable  The lungs are clear    No pneumothorax or pleural effusion  Benign-appearing sclerotic lesion is identified within the proximal left clavicle, unchanged from prior CT and x-ray examinations  Impression: No acute cardiopulmonary disease  Workstation performed: [id number]     TRAUMA - CT head wo contrast    Result Date: 9/22/2022  Narrative: CT BRAIN - WITHOUT CONTRAST INDICATION:   TRAUMA  COMPARISON:  8/1/2022  TECHNIQUE:  CT examination of the brain was performed  In addition to axial images, sagittal and coronal 2D reformatted images were created and submitted for interpretation  Radiation dose length product (DLP) for this visit:  877 mGy-cm   This examination, like all CT scans performed in the [facility], was performed utilizing techniques to minimize radiation dose exposure, including the use of iterative reconstruction and automated exposure control  IMAGE QUALITY:  Diagnostic  FINDINGS: PARENCHYMA: Decreased attenuation is noted in periventricular and subcortical white matter demonstrating an appearance that is statistically most likely to represent mild microangiopathic change  No CT signs of acute infarction  No intracranial mass, mass effect or midline shift  No acute parenchymal hemorrhage  VENTRICLES AND EXTRA-AXIAL SPACES:  Ventricles and extra-axial CSF spaces are prominent commensurate with the degree of volume loss  No hydrocephalus  No acute extra-axial hemorrhage  VISUALIZED ORBITS AND PARANASAL SINUSES:  Unremarkable  CALVARIUM AND EXTRACRANIAL SOFT TISSUES:  Normal      Impression: No acute intracranial abnormality  Chronic microangiopathic changes  I personally discussed this study with Brandi Crabtree on 9/22/2022 at 12:32 PM  Workstation performed: [id number]     TRAUMA - CT spine cervical wo contrast    Result Date: 9/22/2022  Narrative: CT CERVICAL SPINE - WITHOUT CONTRAST INDICATION:   TRAUMA   COMPARISON:  8/1/2022 TECHNIQUE:  CT examination of the cervical spine was performed without intravenous contrast   Contiguous axial images were obtained  Sagittal and coronal reconstructions were performed  Radiation dose length product (DLP) for this visit:   This examination, like all CT scans performed in the [facility], was performed utilizing techniques to minimize radiation dose exposure, including the use of iterative reconstruction  and automated exposure control  IMAGE QUALITY:  Diagnostic  FINDINGS: ALIGNMENT:  Normal alignment of the cervical spine  No subluxation  VERTEBRAL BODIES:  No fracture  DEGENERATIVE CHANGES:  Mild multilevel cervical degenerative changes are noted without critical central canal stenosis  PREVERTEBRAL AND PARASPINAL SOFT TISSUES:  Unremarkable  THORACIC INLET:  Biapical pleural parenchymal scarring noted, right greater than left  Impression: No cervical spine fracture or traumatic malalignment  I personally discussed this study with Satya Hollis on 9/22/2022 at 12:23 PM   Workstation performed: [id number]     PE Study with CT abdomen & pelvis with contrast    Result Date: 9/22/2022  Narrative: CT PULMONARY ANGIOGRAM OF THE CHEST AND CT ABDOMEN AND PELVIS WITH INTRAVENOUS CONTRAST INDICATION:   tachycardia, hypotension, recent dx of pe and fall today  COMPARISON:  8/27/2022, 8/21/2022  TECHNIQUE:  CT examination of the chest, abdomen and pelvis was performed  Thin section CT angiographic technique was used in the chest in order to evaluate for pulmonary embolus and coronal 3D MIP postprocessing was performed on the acquisition scanner  Axial, sagittal, and coronal 2D reformatted images were created from the source data and submitted for interpretation  Radiation dose length product (DLP) for this visit:  1339 14 mGy-cm     This examination, like all CT scans performed in the [facility], was performed utilizing techniques to minimize radiation dose exposure, including the use of iterative reconstruction and automated exposure control  IV Contrast:  100 mL of iohexol (OMNIPAQUE) Enteric Contrast:  Enteric contrast was not administered  FINDINGS: CHEST PULMONARY ARTERIAL TREE:  Saddle embolus identified extending into the left main pulmonary artery and left lower lobe branches  Small filling defect left upper lobe extending to subsegmental vessels  Right basilar pulmonary artery filling defect also extending into subsegmental vessels  LUNGS:  Lungs are clear  There is no tracheal or endobronchial lesion  PLEURA:  Unremarkable  HEART/AORTA:  Heart is unremarkable for patient's age  No thoracic aortic aneurysm  MEDIASTINUM AND MADAN:  Unremarkable  CHEST WALL AND LOWER NECK:  Unremarkable  ABDOMEN LIVER/BILIARY TREE:  Enlarged fatty liver with scattered probable cysts noted  GALLBLADDER:  Gallbladder is surgically absent  SPLEEN:  Unremarkable  PANCREAS:  Unremarkable  ADRENAL GLANDS:  Unremarkable  KIDNEYS/URETERS:  Right greater than left parapelvic cortical cysts noted  STOMACH AND BOWEL:  Unremarkable  APPENDIX:  No findings to suggest appendicitis  ABDOMINOPELVIC CAVITY:  No ascites  No pneumoperitoneum  No lymphadenopathy  VESSELS:  Unremarkable for patient's age  PELVIS REPRODUCTIVE ORGANS:  Unremarkable for patient's age  URINARY BLADDER:  Massive distention of the bladder  ABDOMINAL WALL/INGUINAL REGIONS:  Unremarkable  OSSEOUS STRUCTURES:  No acute fracture or destructive osseous lesion  Impression: 1  Saddle embolus and left greater than right pulmonary arterial filling defects consistent with PE (high clot burden) with evidence of RV strain including an RV/LV ratio = 1 2  This is greater than 0 9, which is abnormal and indicates right heart strain  An abnormal RV/LV ratio has been shown to be associated with an increased risk of 30 day mortality in the setting of acute pulmonary embolism  Urgent consultation with the medical critical care team is recommended  2   Massive distention of the bladder   3   No acute posttraumatic injury  I personally discussed this study with Siri Barba on 9/22/2022 at 12:27 PM  Workstation performed: [id number]     VAS upper limb venous duplex scan, unilateral/limited    Result Date: 8/30/2022  Narrative:  [facility] REPORT CLINICAL: Indications: Other specified soft tissue disorders [M79 89]  Patient presents with left upper extremity edema near the site of her PICC line  Risk Factors The patient has history of DVT  FINDINGS:  Left                       Impression  Cephalic Upper Arm Distal  Thrombus       CONCLUSION:  Impression RIGHT UPPER LIMB LIMITED: Evaluation shows no evidence of thrombus in the subclavian vein  Internal jugular vein and brachiocephalic vein were not visualized  Turner Campbell LEFT UPPER LIMB: No evidence of acute or chronic deep vein thrombosis  There is acute non-occlusive superficial thrombophlebitis within the antecubital vein and the cephalic vein in the distal upper arm  Basilic vein was not visualized at site of PICC line  Doppler evaluation shows a normal response to augmentation maneuvers  Technical findings were given to patient's nurse Guadalupe Pisano on Med/Surg unit  SIGNATURE: Electronically Signed by: Belgica Victoria on 2022-08-30 10:51:51 AM    VAS lower limb venous duplex study, complete bilateral    Result Date: 9/23/2022  Narrative:  [facility] REPORT CLINICAL: Indications: MICU patient presents with recent discovery of pulmonary embolism  Physician wants to determine propagation of previously noted DVT in the right popliteal and peroneal veins  discovered on 8/19/22  Operative History: 2022-09-22 IR PE Endovascular Therapy Risk Factors The patient has history of DVT    FINDINGS:  Segment         Right                   Left                            Impression              Impression              Ext_Iliac       Not Visualized                                  CFV             Non Occlusive Thrombus                          PFV Non Occlusive Thrombus                          FV Prox         Non Occlusive Thrombus                          FV Mid          Non Occlusive Thrombus                          FV Dist         Non Occlusive Thrombus                          GSV Inguinal    Occlusive Subsegmental                          GSV Prox Thigh  Occlusive Subsegmental                          GSV Mid Thigh   Occlusive Subsegmental                          GSV Dist Thigh  Occlusive Subsegmental                          GSV Knee        Occlusive Subsegmental                          Peroneal        Non Occlusive Thrombus  Occlusive Subsegmental  Popliteal       Occlusive Subsegmental                          PostTibial      Non Occlusive Thrombus  Occlusive Subsegmental     CONCLUSION: Impression: RIGHT LOWER LIMB: Evidence of acute deep vein thrombosis identified in the common femoral, deep femoral, femoral, popliteal, posterior tibial and peroneal veins  Evidence of superficial thrombophlebitis noted in the saphenofemoral junction and in the great saphenous vein from the knee to the proximal thigh  Popliteal, posterior tibial and anterior tibial arterial Doppler waveforms are triphasic  LEFT LOWER LIMB: Evidence of acute deep vein thrombosis identified in the posterior tibial and peroneal veins  No evidence of superficial thrombophlebitis noted  Doppler evaluation shows a normal response to augmentation maneuvers  Popliteal, posterior tibial and anterior tibial arterial Doppler waveforms are triphasic  In comparison to the study of 8/19/22 , there is propagation of DVT and SVT in the right lower extremity and there is new DVT in the left PTV and Peroneal veins  Technical findings shared with Dr Devorah Soares and posted in [address] [place] [address] Rd    SIGNATURE: Electronically Signed by: Kamran Molina MD, RPVI on 2022-09-23 03:17:16 PM    CT abdomen pelvis w contrast    Result Date: 8/27/2022  Narrative: CT ABDOMEN AND PELVIS WITH IV CONTRAST INDICATION:   LLQ abdominal pain new onset severe LLQ abdominal pain, tachycardia, ongoing bloody stools  COMPARISON:  CT abdomen pelvis 8/15/2022 TECHNIQUE:  CT examination of the abdomen and pelvis was performed  Axial, sagittal, and coronal 2D reformatted images were created from the source data and submitted for interpretation  Radiation dose length product (DLP) for this visit:  830 mGy-cm   This examination, like all CT scans performed in the [facility], was performed utilizing techniques to minimize radiation dose exposure, including the use of iterative reconstruction and automated exposure control  IV Contrast:  65 mL of iohexol (OMNIPAQUE) Enteric Contrast:  Enteric contrast was not administered  FINDINGS: ABDOMEN LOWER CHEST:  Atelectatic changes are noted at the lung bases  LIVER/BILIARY TREE:  Scattered cysts and hypoattenuating foci that are too small to characterize but likely represent cysts are present  GALLBLADDER:  Gallbladder is surgically absent  SPLEEN:  Unremarkable  PANCREAS:  Unremarkable  ADRENAL GLANDS:  Unremarkable  KIDNEYS/URETERS:  Cortical and parapelvic cysts are again seen  No hydronephrosis  STOMACH AND BOWEL:  Pan-colonic wall thickening with pericolonic vascular engorgement is again seen, consistent with pancolitis  This is similar in appearance the prior CT  No bowel obstruction  APPENDIX:  No findings to suggest appendicitis  ABDOMINOPELVIC CAVITY:  Trace ascites is present along the right paracolic gutter  There is no well-formed fluid collection  There is no free intraperitoneal air  VESSELS:  Unremarkable for patient's age  The celiac axis and, superior mesenteric artery, and inferior mesenteric artery are patent  PELVIS REPRODUCTIVE ORGANS:  Unremarkable for patient's age  URINARY BLADDER:  Unremarkable  ABDOMINAL WALL/INGUINAL REGIONS:  Unremarkable  OSSEOUS STRUCTURES:  No acute fracture or destructive osseous lesion    Tiny sclerotic foci are again noted within the axial and appendicular skeleton in keeping with known metastatic breast cancer  Impression: No significant change in pan-colitis  Workstation performed: [id number]     Echo follow up/limited w/ contrast if indicated    Result Date: 9/23/2022  Narrative: •  Left Ventricle: The left ventricular ejection fraction is 70%  Systolic function is vigorous  Wall motion cannot be accurately assessed  •  Right Ventricle: Right ventricular cavity size is normal  Systolic function is normal  •  Tricuspid Valve: There is mild regurgitation  The right ventricular systolic pressure is normal  The estimated right ventricular systolic pressure is 75 95 mmHg  •  Only imaging window was the subcostal window  Limited and technically difficult study  CHRIS Stern    Gastroenterology Fellow  PGY-4  Available on [employer]  9/24/2022 12:23 PM

## 2022-09-24 NOTE — PROGRESS NOTES
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, hyperlipidemia, CKD stage 3a, hypertension, chronic anemia, protein-calorie malnutrition, and recent admission with discharge for toxic gastroenteritis and pulmonary embolism discharged on Xarelto on 09/06 that initially presented to [facility] [address] [facility] on 9/22 for mechanical fall   During the patient's previous admission, the patient was diagnosed with IBD and started on steroids   Patient's Verzenio was discontinued by Oncology, likely contributing to her GI symptoms   In addition, she was noted to have lower extremity edema, found to have an acute occlusive thrombus in the right popliteal vein   Further workup with CT PE protocol showed right lower lobe subsegmental PE, patient was started on heparin drip at that time, and then eventually transitioned to [address] patient was discharged with Lopressor 50 mg twice daily, as well in the setting of her persistent sinus tachycardia   Lastly, her antihypertensive regimen was discontinued given the patient's new baseline of systolic blood pressures in the 100-110 range   Since discharge, the patient has been progressing slowly at her nursing home with regular follow-up with her geriatrician      Patient states that around 0600 on the day of presentation she was walking to the bathroom and her legs gave out   The patient denied head strike or loss of consciousness at that time   EMS was called, in route the patient's blood pressure dropped to 90/40 with heart rates increasing in the 150s   On presentation to the emergency department, the patient was found to be afebrile, but tachycardic   She was maintaining her blood pressures, and saturating 100% on room air   Physical exam significant for lower extremity edema   Lab significant for creatinine of 1 36 (baseline around 0 8-0 9), proBNP 1500, lactic acid 2 3, troponin 20, hemoglobin 9 6 (baseline around 9-10),   Trauma imaging was negative, but CTA revealed saddle pulmonary embolus, left greater than right, high clot burden, and RV to LV ratio 1 2   Patient was transferred urgently to [facility] for possible IR intervention      Patient is now status post thrombectomy with IR  No IVC filter placed  Patient was noted to be supratherapeutic on her heparin drip last night, and subsequently had questionable GI bleeding versus bleeding from her puncture site  A Code crimson was called, but she was never transfused  Repeat hemoglobins have been coming back around 7  The patient has been tachycardic, but maintain pressures  She does report some shivering, but afebrile  The patient was transferred back from step-down 1 to critical care due to concerns for bleeding  Overnight: Hgb was 6 6 she received 1u PRBCs and hgb increased to 8 6  She went for her IVC filters last night  Was having pain in right arm last night where her IV was so a duplex was ordered  Now only has access on her left arm so had to stop the heparin for the time that the blood went in and PTT was 32        ---------------------------------------------------------------------------------------  SUBJECTIVE  Patients is tired but otherwise feels well  She had no problems with the blood transfusion or the IVC filter procedure last night  She denies chest pain, SOB, or dizziness  Review of Systems   Constitutional: Negative for chills and fever  HENT: Negative for congestion and sore throat  Eyes: Negative for photophobia and visual disturbance  Respiratory: Negative for cough and shortness of breath  Cardiovascular: Negative for chest pain and palpitations  Gastrointestinal: Negative for abdominal pain and nausea  Genitourinary: Negative for dysuria and hematuria  Musculoskeletal: Negative for back pain and myalgias  Neurological: Negative for dizziness and weakness  ---------------------------------------------------------------------------------------  Assessment and Plan:    Neuro:   · Diagnosis:  Depression/anxiety  ? Hold amitriptyline in setting of urinary retention  ? Hold mirtazapine as well  · Diagnosis:  Sedation/analgesia  ? Delirium precautions  ? Sleep-wake cycle  ? Out of bed as tolerated  ? Tylenol for pain, may escalate as needed        CV:   · Diagnosis:  Sinus tachycardia  ? Patient was noted to have sinus tachycardia during her previous hospitalization  ? Started on metoprolol tartrate 50 mg every 12 hours with plans to titrate off  ? Hold metoprolol for now  · Diagnosis: LE swelling  ? Patient has had progressive lower extremity swelling  ? 3+ on both lower extremities, improving along with pulses  ? Previous echo with a hyperdynamic LV EF 70%  ? Follow-up echo  ? VAS duplex 9/23:  ? RLL: Evidence of acute deep vein thrombosis identified in the common femoral, deep femoral, femoral, popliteal, posterior tibial and peroneal veins  ? LLL: Evidence of acute deep vein thrombosis identified in the posterior tibial and peroneal veins  ? Doppler waveforms are triphasic bilaterally         Pulm:  · Diagnosis:  Acute, submassive, intermediate to high risk pulmonary embolism  ? Patient has history of right popliteal DVT and right lower lobe subsegmental PE from previous hospitalization in August  ? Patient was sent home on Xarelto  After speaking with nursing home yesterday they stated that they were giving the patient her Xarelto so this would be considered a failure of therapy  She will be transitioned to Lovenox today  ? Patient also has metastatic breast cancer, hypercoagulable state  ?  Return to the ER on 09/22 after a fall, found to have saddle pulmonary embolus with evidence of right heart strain and high clot burden  ? RV to LV ratio on CTA 1 2  ? BMP greater than 1500, troponins relatively normal  ? Patient is now status post thrombectomy with IR with IVC filter placement  ? Duplex shows b/l DVTs  ? Begin Lovenox for anticoagulation         GI:   · Diagnosis:  Likely IBD  ? Patient was admitted with toxic gastroenteritis in August of 2022  ? Determined to be possible IBD with additional insult of Verzenio from her breast cancer treatment  ? Continue to hold Verzenio  ? Continue steroids  ? Loperamide as needed for diarrhea  ? Consider GI consult  · Diagnosis:  GI prophylaxis  ? Protonix         :   · Diagnosis:  REYES on CKD stage IIIA  ? Patient's baseline creatinine between 0 8 and 0 9 with an EGFR ranging around 60  ? Admission creatinine 1 36, down to 0 62  ? Patient did receive IV contrast for CTA  ? Patient does not have a history of heart failure, will initiate fluids  ? Accurate in's and out's  ? Avoid nephrotoxins  · Diagnosis:  Urinary retention  ? Urinary retention protocol, patient had over 3 65 L of urine in her bladder  ? Has had another straight cath, over 4 L out so far  ? Still unable to void on her own, may need Crawley  ? Tamsulosin daily  ? May be secondary to TCA use  ? UA with 1+ protein, innumerable rbc's, 4-10 wbc's  ? Urology consult placed  ? Recommended Crawley placement         F/E/N:   · Fluids: No maintenance fluids in the setting of edema   · Electrolytes:  Potassium greater than 4, phosphorus greater than 3, magnesium greater than 2; Replaced K, mag, phos, and calcium  · Nutrition:  Regular diet         Heme/Onc:   · Diagnosis:  Stage IV metastatic ER positive, IL negative, 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  ? Patient was initially diagnosed with stage I A ER/IL positive, 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 years ago  ? Status post resection and adjuvant radiation and hormone therapy for 5 years  ?  Patient had symptoms of bony discomfort in her sternum in June of 2021, imaging revealed lytic lesion, biopsy in July of 2021 confirmed progression of disease  ? Patient was started on Faslodex and Xgeva at that time, in conjunction with radiation  ? In April 2022 there was interval development of 4 mm nodule at the right lower lobe and interval development of increased sclerotic lesions throughout the visualized spine  ? At that time, the patient's treatment was changed to Femara and Traci Good  ? In July of 2022, the patient was changed from Traci Good to Teresabury because of intolerance  ? During the patient's hospitalization in August of 2022 to September of 2022, Verzenio was discontinued due to gastroenteritis  ? Patient's cancer is most likely contributing to her hypercoagulable state  ? Consider Oncology consultation  ? Consider palliative consultation and goals of care discussion  · Diagnosis:  DVT prophylaxis  ? Currently on VTE heparin protocol  ? S/p thrombectomy and IVC filter placement  Transitioned to Lovenox for ppx   · Diagnosis:  Macrocytic anemia complicated by acute blood loss anemia  ? Folate in September of 2022 3 3  ? Vitamin B12 in September of 2022 645  ? Iron panel and August of 2022 showed an iron saturation of 18, TIBC 105, iron 19, ferritin 755  ? Likely a mixed component of anemia chronic disease with possible folate deficiency  ? Continue folate supplementation  ? Bleeding from thrombectomy site 2 nights ago  ? S/p 1u PRBCs  Hgb 6 6 to 8 1   ? Continue to monitor with CBC and transfuse as needed      Endo:   · Diagnosis:  No active issues  ? Maintain blood glucose between 140-180  ? Monitor glucose due to steroids           ID:   · Diagnosis:  No acute issues  ? Monitor WBC and fever trend        MSK/Skin:   · Diagnosis:  Skin ulcer prophylaxis  ? Frequent turning  ? Pressure offloading  ?  PT/OT when able     Patient appropriate for transfer out of the ICU today?: No  Disposition: Continue Critical Care   Code Status: Level 3 - DNAR and DNI  ---------------------------------------------------------------------------------------  ICU CORE MEASURES    Prophylaxis   VTE Pharmacologic Prophylaxis: Heparin  VTE Mechanical Prophylaxis: sequential compression device  Stress Ulcer Prophylaxis: Pantoprazole PO    ABCDE Protocol (if indicated)  Plan to perform spontaneous awakening trial today? Not applicable  Plan to perform spontaneous breathing trial today? Not applicable  Obvious barriers to extubation? Not applicable  CAM-ICU: Negative    Invasive Devices Review  Invasive Devices  Report    Peripheral Intravenous Line  Duration           Peripheral IV 22 Left;Upper;Ventral (anterior) Arm 1 day          Arterial Line  Duration           Arterial Line 22 1 day              Can any invasive devices be discontinued today? Not applicable  ---------------------------------------------------------------------------------------  OBJECTIVE    Vitals   Vitals:    22 0100 22 0200 22 0300 22 0400   BP:       Pulse: (!) 126 (!) 124 (!) 132 (!) 122   Resp: (!) 39 (!) 36 (!) 29 16   Temp:    97 8 °F (36 6 °C)   TempSrc:    Oral   SpO2:       Weight:       Height:         Temp (24hrs), Av 9 °F (36 6 °C), Min:97 1 °F (36 2 °C), Max:98 4 °F (36 9 °C)  Current: Temperature: 97 8 °F (36 6 °C)  Temp:  [97 1 °F (36 2 °C)-98 4 °F (36 9 °C)] 97 8 °F (36 6 °C)  HR:  [108-138] 114  Resp:  [14-57] 16  BP: (142-170)/() 148/38  Arterial Line BP: ()/(36-88) 122/36    Respiratory:    Nasal Cannula O2 Flow Rate (L/min): 2 L/min    Invasive/non-invasive ventilation settings   Respiratory  Report   Lab Data (Last 4 hours)    None         O2/Vent Data (Last 4 hours)    None                Physical Exam  Constitutional:       Appearance: She is not ill-appearing  HENT:      Head: Normocephalic and atraumatic  Mouth/Throat:      Mouth: Mucous membranes are dry  Eyes:      General: No scleral icterus  Extraocular Movements: Extraocular movements intact  Cardiovascular:      Rate and Rhythm: Regular rhythm  Tachycardia present  Heart sounds: No murmur heard  Pulmonary:      Effort: Pulmonary effort is normal  No respiratory distress  Breath sounds: No wheezing or rales  Abdominal:      General: Bowel sounds are normal       Palpations: Abdomen is soft  Tenderness: There is no abdominal tenderness  There is no guarding  Musculoskeletal:      Right lower leg: No edema  Left lower leg: No edema  Skin:     General: Skin is warm and dry  Neurological:      Mental Status: She is alert and oriented to person, place, and time  Sensory: No sensory deficit  Motor: No weakness           Laboratory and Diagnostics:  Results from last 7 days   Lab Units 09/24/22  0517 09/24/22  0133 09/23/22  1630 09/23/22  0514 09/23/22  0436 09/23/22  0416 09/22/22  1630 09/22/22  1145   WBC Thousand/uL 4 24*  --  5 24 6 20  --   --  7 41 7 31   HEMOGLOBIN g/dL 8 1* 8 6* 6 6* 7 1*  --  7 0* 8 4* 9 6*   HEMATOCRIT % 25 5*  --  22 6* 23 6*  --   --  28 0* 31 0*   HEMATOCRIT, ISTAT %  --   --   --   --  <15*  --   --   --    PLATELETS Thousands/uL 79*  --  79* 88*  --   --   --  110*   NEUTROS PCT %  --   --   --   --   --   --   --  86*   BANDS PCT %  --   --  34*  --   --   --   --   --    MONOS PCT %  --   --   --   --   --   --   --  6   MONO PCT %  --   --  0*  --   --   --   --   --      Results from last 7 days   Lab Units 09/23/22  1630 09/23/22  1009 09/23/22  0436 09/23/22  0000 09/22/22  1145   SODIUM mmol/L 144 144  --  142 140   POTASSIUM mmol/L 4 3 3 4*  --  3 0* 4 6   CHLORIDE mmol/L 118* 118*  --  115* 107   CO2 mmol/L 18* 19*  --  18* 24   CO2, I-STAT mmol/L  --   --  16*  --   --    ANION GAP mmol/L 8 7  --  9 9   BUN mg/dL 15 16  --  17 28*   CREATININE mg/dL 0 73 0 79  --  0 80 1 36*   CALCIUM mg/dL 6 9* 6 7*  --  6 6* 8 2*   GLUCOSE RANDOM mg/dL 91 79  --  78 113   ALT U/L  --  18 --  21 30   AST U/L  --  9  --  10 13   ALK PHOS U/L  --  64  --  74 106   ALBUMIN g/dL  --  1 6*  --  1 6* 2 4*   TOTAL BILIRUBIN mg/dL  --  0 40  --  0 40 0 50     Results from last 7 days   Lab Units 09/24/22  0517 09/23/22  1630   MAGNESIUM mg/dL 1 9 2 0   PHOSPHORUS mg/dL 1 8* 2 1*      Results from last 7 days   Lab Units 09/24/22  0133 09/23/22  2329 09/23/22  1906 09/23/22  1630 09/23/22  1009 09/23/22  0514 09/23/22  0416 09/22/22  2332 09/22/22  1630 09/22/22  1145   INR   --   --   --   --   --   --  1 29*  --  1 38* 1 08   PTT seconds 35 140* 102* 81* 36 91*  --  >210* >210* 26          Results from last 7 days   Lab Units 09/22/22  1354 09/22/22  1145   LACTIC ACID mmol/L 1 2 2 3*     ABG:    VBG:  Results from last 7 days   Lab Units 09/22/22  1630   PH MICHELLE  7 474*   PCO2 MICHELLE mm Hg 26 8*   PO2 MICHELLE mm Hg 26 8*   HCO3 MICHELLE mmol/L 19 2*   BASE EXC MICHELLE mmol/L -3 5           Micro        EKG: Sinus tach  Imaging: I have personally reviewed pertinent reports  and I have personally reviewed pertinent films in PACS    Intake and Output  I/O       09/21 0701 09/22 0700 09/22 0701  09/23 0700 09/23 0701 09/24 0700    P  O   0     I V  (mL/kg)  2166 3 (31) 141 7 (2)    Total Intake(mL/kg)  2166 3 (31) 141 7 (2)    Urine (mL/kg/hr)  4622     Emesis/NG output  0     Stool  0     Total Output  4622     Net  -2455 7 +141 7           Unmeasured Stool Occurrence  1 x           Height and Weights   Height: 5' 1" (154 9 cm)     Body mass index is 29 1 kg/m²  Weight (last 2 days)     Date/Time Weight    09/22/22 1640 69 9 (154)    09/22/22 1600 66 (145 5)            Nutrition       Diet Orders   (From admission, onward)             Start     Ordered    09/23/22 1011  Diet Regular; Regular House  Diet effective now        References:    Nutrtion Support Algorithm Enteral vs  Parenteral   Question Answer Comment   Diet Type Regular    Regular Regular House    RD to adjust diet per protocol?  Yes        09/23/22 1010 Active Medications  Scheduled Meds:  Current Facility-Administered Medications   Medication Dose Route Frequency Provider Last Rate   • atorvastatin  40 mg Oral After Zelda Rebollar MD     • cholecalciferol  2,000 Units Oral Daily Chito Eddy DO     • cholestyramine sugar free  4 g Oral HS Chito Eddy DO     • collagenase   Topical Daily Kevin Bales MD     • enoxaparin  1 mg/kg Subcutaneous Q12H [address] Grace Land MD     • folic acid  1 mg Oral Daily Estiven Garcia MD     • loperamide  4 mg Oral 4x Daily PRN Chito Eddy DO     • pantoprazole  40 mg Oral BID AC Estiven Garcia MD     • potassium phosphate  12 mmol Intravenous Once BROOKE Haro     • potassium-sodium phosphates  2 packet Oral BID With Meals LafranceBROOKE Nelson     • predniSONE  20 mg Oral Daily Estiven Garcia MD     • senna-docusate sodium  2 tablet Oral BID Estiven Garcia MD       Continuous Infusions:     PRN Meds:   loperamide, 4 mg, 4x Daily PRN        Allergies   Allergies   Allergen Reactions   • Sulfa Antibiotics    • Pneumococcal Polysaccharide Vaccine Rash and Edema     ---------------------------------------------------------------------------------------  Advance Directive and Living Will:      Power of :    POLST:    ---------------------------------------------------------------------------------------  Care Time Delivered:   Upon my evaluation, this patient had a high probability of imminent or life-threatening deterioration due to PE, which required my direct attention, intervention, and personal management  I have personally provided 30 minutes (0630 to 0700) of critical care time, exclusive of procedures, teaching, family meetings, and any prior time recorded by providers other than myself  Estiven Garcia MD      Portions of the record may have been created with voice recognition software    Occasional wrong word or "sound a like" substitutions may have occurred due to the inherent limitations of voice recognition software    Read the chart carefully and recognize, using context, where substitutions have occurred

## 2022-09-24 NOTE — QUICK NOTE
Critical Care Postop Check    Procedure: IVC filter placement    Subjective:  No acute distress  Resting comfortably in bed  Objective  Vitals:    09/23/22 1800 09/23/22 1900 09/23/22 1958 09/23/22 2012   BP:   170/94 (!) 142/115   Pulse: (!) 138 (!) 138 (!) 133 (!) 128   Resp: (!) 25 22 20 20   Temp: 97 7 °F (36 5 °C)      TempSrc:       SpO2:  97% 94% 91%   Weight:       Height:         GENERAL: No acute distress, AxO times 4  CV: Regular rate and rhythm SBPs 110s, MAP around 65  LUNGS: Nonlabored respirations, CTA B/L, remains on nasal cannulat  ABDOMEN: Abdomen soft, non-tender, nondistended  Assessment and Plan:  Status post IVC placement       Continue IV heparin in setting of PE  Will transfuse 1u PRBC now given hgb of 6 6, borderline hypotension   Will f/u Hgb  Incentive spirometry  Analgesia as needed  Monitor and replete electrolytes    BROOKE Prakash

## 2022-09-24 NOTE — CONSULTS
Oncology Consult Note  Jhon Silverman 79 y o  female MRN: [id number]  Unit/Bed#: MICU 10 Encounter: [id number]      Presenting Complaint:  Subacute DVT, pulmonary embolism, failure of Xarelto  Oncology History    No history exists  History of Presenting Illness:  Consult was seen on 9/23/22 (Claudean Coy in place)  This is subsequent consult note  80 yo female with medical hx of stage IV metastatic breast cancer with evidence of acute vein thrombosis of the right/left limb and saddle embolus s/p thrombectomy was consulted for placement of IVC filter since patient developed clots while being on anticoagulation Karina Vidal)  Patient is a resident of a nursing home  As per report, there was some confusion regarding whether patient was receiving her anticoagulation at nursing home or not  But upon further investigation it was determined that she was  She developed the clots while on Xeralto therefore this is considered a failure of Xeralto  Labs were significant for WBC 5 24, H/H 6 6/22 6,   She has received 1 unit of PRBCs and her H&H responded well  Patient denies any chest pain nausea vomiting bleeding headache  Review of Systems - As stated in the HPI otherwise the fourteen point review of systems was negative      ECOG PS: 2-3    Past Medical History:   Diagnosis Date   • Abnormal weight loss    • Allergic reaction    • Anxiety    • Breast cancer, right [facility]) 2011    right   • Cancer (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 )        Social History     Socioeconomic History   • Marital status:      Spouse name: None   • Number of children: 3   • Years of education: None   • Highest education level: None   Occupational History   • None   Tobacco Use   • Smoking status: Former Smoker     Packs/day: 1 00     Years: 30 00     Pack years: 30 00     Types: Cigarettes     Start date: 55     Quit date:      Years since quittin 7   • Smokeless tobacco: Never Used   Vaping Use   • Vaping Use: Never used   Substance and Sexual Activity   • Alcohol use: Not Currently     Comment: (history)   • Drug use: No   • Sexual activity: Not Currently   Other Topics Concern   • None   Social History Narrative   • None     Social Determinants of Health     Financial Resource Strain: Not on file   Food Insecurity: No Food Insecurity   • Worried About Running Out of Food in the Last Year: Never true   • Ran Out of Food in the Last Year: Never true   Transportation Needs: No Transportation Needs   • Lack of Transportation (Medical): No   • Lack of Transportation (Non-Medical):  No   Physical Activity: Not on file   Stress: Not on file   Social Connections: Not on file   Intimate Partner Violence: Not At Risk   • Fear of Current or Ex-Partner: No   • Emotionally Abused: No   • Physically Abused: No   • Sexually Abused: No   Housing Stability: Low Risk    • Unable to Pay for Housing in the Last Year: No   • Number of Places Lived in the Last Year: 1   • Unstable Housing in the Last Year: No       Family History   Problem Relation Age of Onset   • Stomach cancer Mother    • No Known Problems Father    • Cervical cancer Sister    • No Known Problems Daughter    • No Known Problems Maternal Grandmother    • No Known Problems Maternal Grandfather    • No Known Problems Paternal Grandmother    • No Known Problems Paternal Grandfather    • Colon polyps Neg Hx    • Colon cancer Neg Hx        Allergies   Allergen Reactions   • Sulfa Antibiotics    • Pneumococcal Polysaccharide Vaccine Rash and Edema         Current Facility-Administered Medications:   •  atorvastatin (LIPITOR) tablet 40 mg, 40 mg, Oral, After Devorah Boast, MD, 40 mg at 09/23/22 1709  •  Calcium Gluconate 2-0 675 GM/100ML-% in Sodium Chloride IVPB (premix) **ADS Override Pull**, , , ,   •  cholecalciferol (VITAMIN D3) tablet 2,000 Units, 2,000 Units, Oral, Daily, Eddie Gill DO, 2,000 Units at 09/24/22 0815  •  cholestyramine sugar free (QUESTRAN LIGHT) packet 4 g, 4 g, Oral, HS, Loc Correa, DO, 4 g at 09/23/22 2146  •  collagenase (SANTYL) ointment, , Topical, Daily, Bob Kendrick MD, Given at 09/24/22 0815  •  enoxaparin (LOVENOX) subcutaneous injection 70 mg, 1 mg/kg, Subcutaneous, Q12H [facility], Nelly Davis MD, 70 mg at 02/25/33 8672  •  folic acid (FOLVITE) tablet 1 mg, 1 mg, Oral, Daily, Madeline Pablo MD, 1 mg at 09/24/22 0815  •  loperamide (IMODIUM) capsule 4 mg, 4 mg, Oral, 4x Daily PRN, Eddie Gill DO  •  metoprolol tartrate (LOPRESSOR) tablet 50 mg, 50 mg, Oral, Q12H [facility], [facility], DO, 50 mg at 09/24/22 7251  •  pantoprazole (PROTONIX) EC tablet 40 mg, 40 mg, Oral, BID AC, Madeline Pablo MD, 40 mg at 09/24/22 3014  •  potassium phosphates 12 mmol in sodium chloride 0 9 % 250 mL infusion, 12 mmol, Intravenous, Once, [employer], CRNP  •  potassium-sodium phosphates (PHOS-NAK) packet 2 packet, 2 packet, Oral, BID With Meals, BROOKE Iverson, 2 packet at 09/24/22 3339  •  potassium-sodium phosphates (PHOS-NAK) packet 2 packet, 2 packet, Oral, BID With Meals, Madeline Pablo MD  •  predniSONE tablet 20 mg, 20 mg, Oral, Daily, Madeline Pablo MD, 20 mg at 09/24/22 0815  •  senna-docusate sodium (SENOKOT S) 8 6-50 mg per tablet 2 tablet, 2 tablet, Oral, BID, Madeline Pablo MD, 2 tablet at 09/23/22 0806      BP (!) 113/41   Pulse (!) 134   Temp 97 8 °F (36 6 °C) (Oral)   Resp (!) 24   Ht 5' 1" (1 549 m)   Wt 69 9 kg (154 lb)   LMP  (LMP Unknown)   SpO2 95%   BMI 29 10 kg/m²     General Appearance:    Alert, oriented        Eyes:    PERRL   Ears:    Normal external ear canals, both ears   Nose:   Nares normal, septum midline   Throat:   Mucosa moist  Pharynx without injection  Neck:   Supple       Lungs:     Clear to auscultation bilaterally   Chest Wall:    No tenderness or deformity    Heart:    Regular rate and rhythm       Abdomen:     Soft, non-tender, bowel sounds +, no organomegaly           Extremities:   Extremities no cyanosis or edema       Skin:   brusing noticed on site of IV, no rash or icterus      Lymph nodes:   Cervical, supraclavicular, and axillary nodes normal   Neurologic:   CNII-XII intact, normal strength, sensation and reflexes     Throughout               Recent Results (from the past 48 hour(s))   HS Troponin I 2hr    Collection Time: 09/22/22  1:54 PM   Result Value Ref Range    hs TnI 2hr 20 "Refer to ACS Flowchart"- see link ng/L    Delta 2hr hsTnI 2 <20 ng/L   Lactic acid 2 Hours    Collection Time: 09/22/22  1:54 PM   Result Value Ref Range    LACTIC ACID 1 2 0 5 - 2 0 mmol/L   Blood gas, venous    Collection Time: 09/22/22  4:30 PM   Result Value Ref Range    pH, Kaz 7 474 (H) 7 300 - 7 400    pCO2, Kaz 26 8 (L) 42 0 - 50 0 mm Hg    pO2, Kaz 26 8 (L) 35 0 - 45 0 mm Hg    HCO3, Kaz 19 2 (L) 24 - 30 mmol/L    Base Excess, Kaz -3 5 mmol/L    O2 Content, Kaz 6 3 ml/dL    O2 HGB, VENOUS 47 7 (L) 60 0 - 80 0 %   CBC    Collection Time: 09/22/22  4:30 PM   Result Value Ref Range    WBC 7 41 4 31 - 10 16 Thousand/uL    RBC 2 62 (L) 3 81 - 5 12 Million/uL    Hemoglobin 8 4 (L) 11 5 - 15 4 g/dL    Hematocrit 28 0 (L) 34 8 - 46 1 %     (H) 82 - 98 fL    MCH 32 1 26 8 - 34 3 pg    MCHC 30 0 (L) 31 4 - 37 4 g/dL    RDW 16 6 (H) 11 6 - 15 1 %    Platelets     APTT    Collection Time: 09/22/22  4:30 PM   Result Value Ref Range    PTT >210 (HH) 23 - 37 seconds   Protime-INR    Collection Time: 09/22/22  4:30 PM   Result Value Ref Range    Protime 17 2 (H) 11 6 - 14 5 seconds    INR 1 38 (H) 0 84 - 1 19   Echo follow up/limited w/ contrast if indicated    Collection Time: 09/22/22  5:00 PM   Result Value Ref Range    TR Peak Buck 2 7 m/s    Triscuspid Valve Regurgitation Peak Gradient 29 0 mmHg    Tricuspid valve peak regurgitation velocity 2 69 m/s    LV EF 70     Est  RA pres 5 0 mmHg    Right Ventricular Peak Systolic Pressure 81 01 mmHg   Urinalysis with microscopic    Collection Time: 09/22/22  5:07 PM   Result Value Ref Range    Color, UA Light Orange     Clarity, UA Turbid     Specific [place], UA 1 018 1 003 - 1 030    pH, UA 6 0 4 5, 5 0, 5 5, 6 0, 6 5, 7 0, 7 5, 8 0    Leukocytes, UA Negative Negative    Nitrite, UA Negative Negative    Protein, UA 30 (1+) (A) Negative mg/dl    Glucose, UA Negative Negative mg/dl    Ketones, UA Negative Negative mg/dl    Urobilinogen, UA <2 0 <2 0 mg/dl mg/dl    Bilirubin, UA Negative Negative    Occult Blood, UA Large (A) Negative    RBC, UA Innumerable (A) None Seen, 1-2 /hpf    WBC, UA 4-10 (A) None Seen, 1-2 /hpf    Epithelial Cells None Seen None Seen, Occasional /hpf    Bacteria, UA Occasional None Seen, Occasional /hpf   High Sensitivity Troponin I Random    Collection Time: 09/22/22  6:49 PM   Result Value Ref Range    HS TnI random 23 (H) 8 - 18 ng/L   POCT activated clotting time    Collection Time: 09/22/22  7:58 PM   Result Value Ref Range    Activated Clotting Time, i-STAT 206 (H) 89 - 137 sec    Specimen Type VENOUS    POCT activated clotting time    Collection Time: 09/22/22  8:22 PM   Result Value Ref Range    Activated Clotting Time, i-STAT 289 (H) 89 - 137 sec    Specimen Type VENOUS    POCT activated clotting time    Collection Time: 09/22/22  9:01 PM   Result Value Ref Range    Activated Clotting Time, i-STAT 276 (H) 89 - 137 sec    Specimen Type VENOUS    POCT activated clotting time    Collection Time: 09/22/22  9:34 PM   Result Value Ref Range    Activated Clotting Time, i-STAT 244 (H) 89 - 137 sec    Specimen Type VENOUS    POCT activated clotting time    Collection Time: 09/22/22 10:01 PM   Result Value Ref Range    Activated Clotting Time, i-STAT 218 (H) 89 - 137 sec    Specimen Type VENOUS    APTT    Collection Time: 09/22/22 11:32 PM   Result Value Ref Range    PTT >210 (HH) 23 - 37 seconds   Comprehensive metabolic panel    Collection Time: 09/23/22 12:00 AM   Result Value Ref Range    Sodium 142 135 - 147 mmol/L    Potassium 3 0 (L) 3 5 - 5 3 mmol/L    Chloride 115 (H) 96 - 108 mmol/L    CO2 18 (L) 21 - 32 mmol/L    ANION GAP 9 4 - 13 mmol/L    BUN 17 5 - 25 mg/dL    Creatinine 0 80 0 60 - 1 30 mg/dL    Glucose 78 65 - 140 mg/dL    Calcium 6 6 (L) 8 3 - 10 1 mg/dL    Corrected Calcium 8 5 8 3 - 10 1 mg/dL    AST 10 5 - 45 U/L    ALT 21 12 - 78 U/L    Alkaline Phosphatase 74 46 - 116 U/L    Total Protein 4 1 (L) 6 4 - 8 4 g/dL    Albumin 1 6 (L) 3 5 - 5 0 g/dL    Total Bilirubin 0 40 0 20 - 1 00 mg/dL    eGFR 68 ml/min/1 73sq m   Hemoglobin    Collection Time: 09/23/22  4:16 AM   Result Value Ref Range    Hemoglobin 7 0 (L) 11 5 - 15 4 g/dL   Protime-INR    Collection Time: 09/23/22  4:16 AM   Result Value Ref Range    Protime 16 4 (H) 11 6 - 14 5 seconds    INR 1 29 (H) 0 84 - 1 19   Type and screen    Collection Time: 09/23/22  4:36 AM   Result Value Ref Range    ABO Grouping O     Rh Factor Negative     Antibody Screen Negative     Specimen Expiration Date 20220926    POCT Blood Gas (CG8+)    Collection Time: 09/23/22  4:36 AM   Result Value Ref Range    pH, Art i-STAT 7 442 7 350 - 7 450    pCO2, Art i-STAT 22 2 (LL) 36 0 - 44 0 mm HG    pO2, ART i-STAT 113 0 75 0 - 129 0 mm HG    BE, i-STAT -9 (L) -2 - 3 mmol/L    HCO3, Art i-STAT 15 1 (LL) 22 0 - 28 0 mmol/L    CO2, i-STAT 16 (L) 21 - 32 mmol/L    O2 Sat, i-STAT 99 (H) 60 - 85 %    SODIUM, I-STAT 145 136 - 145 mmol/l    Potassium, i-STAT 2 2 (LL) 3 5 - 5 3 mmol/L    Calcium, Ionized i-STAT 0 89 (L) 1 12 - 1 32 mmol/L    Hct, i-STAT <15 (L) 34 8 - 46 1 %    Hgb, i-STAT      Glucose, i-STAT 60 (L) 65 - 140 mg/dl    Specimen Type ARTERIAL Prepare Leukoreduced RBC    Collection Time: 09/23/22  4:44 AM   Result Value Ref Range    Unit Product Code O1578C51     Unit Number [id number]     Unit ABO O     Unit RH NEG     Unit Dispense Status Return to [facility]     Unit Product Volume 350 mL    Unit Product Code E5567S43     Unit Number [id number]     Unit ABO O     Unit RH NEG     Unit Dispense Status Return to [facility]     Unit Product Volume 350 ml    Unit Product Code H5363U12     Unit Number [id number]     Unit ABO O     Unit RH NEG     Unit Dispense Status Return to [facility]     Unit Product Volume 350 mL    Unit Product Code E4367R68     Unit Number [id number]     Unit ABO O     Unit RH NEG     Unit Dispense Status Return to Inv     Unit Product Volume 350 mL   Prepare fresh frozen plasma    Collection Time: 09/23/22  4:49 AM   Result Value Ref Range    Unit Product Code X5769L54     Unit Number [id number]     Unit ABO A     Unit RH POS     Unit Dispense Status Return to [facility]     Unit Product Volume 319 ml    Unit Product Code P5824M06     Unit Number [id number]     Unit ABO A     Unit [facility] POS     Unit Dispense Status Return to [facility]     Unit Product Volume 325 ml    Unit Product Code R5944Q00     Unit Number [id number]     Unit ABO AB     Unit [facility] POS     Unit Dispense Status Return to [facility]     Unit Product Volume 280 ml    Unit Product Code Z8654F82     Unit Number [id number]     Unit ABO AB     Unit [facility] POS     Unit Dispense Status Return to Inv     Unit Product Volume 280 ml   Type and screen    Collection Time: 09/23/22  5:01 AM   Result Value Ref Range    ABO Grouping O     Rh Factor Negative     Antibody Screen Negative     Specimen Expiration Date [id number]    CBC    Collection Time: 09/23/22  5:14 AM   Result Value Ref Range    WBC 6 20 4  31 - 10 16 Thousand/uL    RBC 2 20 (L) 3 81 - 5 12 Million/uL    Hemoglobin 7 1 (L) 11 5 - 15 4 g/dL    Hematocrit 23 6 (L) 34 8 - 46 1 %     (H) 82 - 98 fL    MCH 32 3 26 8 - 34 3 pg    MCHC 30 1 (L) 31 4 - 37 4 g/dL    RDW 16 7 (H) 11 6 - 15 1 %    Platelets 88 (L) 824 - 390 Thousands/uL    MPV 10 4 8 9 - 12 7 fL   APTT    Collection Time: 09/23/22  5:14 AM   Result Value Ref Range    PTT 91 (H) 23 - 37 seconds   Fingerstick Glucose (POCT)    Collection Time: 09/23/22  5:18 AM   Result Value Ref Range    POC Glucose 67 65 - 140 mg/dl   Prepare Leukoreduced RBC: 2 Units, Leukoreduced    Collection Time: 09/23/22  7:14 AM   Result Value Ref Range    Unit Product Code O2526G93     Unit Number [id number]     Unit ABO O     Unit RH NEG     Crossmatch Compatible     Unit Dispense Status Crossmatched     Unit Product Volume 350 mL    Unit Product Code D0290P73     Unit Number [id number]     Unit ABO O     Unit [facility] NEG     Crossmatch Compatible     Unit Dispense Status Crossmatched     Unit Product Volume 350 mL   Fingerstick Glucose (POCT)    Collection Time: 09/23/22  8:04 AM   Result Value Ref Range    POC Glucose 111 65 - 140 mg/dl   APTT    Collection Time: 09/23/22 10:09 AM   Result Value Ref Range    PTT 36 23 - 37 seconds   Comprehensive metabolic panel    Collection Time: 09/23/22 10:09 AM   Result Value Ref Range    Sodium 144 135 - 147 mmol/L    Potassium 3 4 (L) 3 5 - 5 3 mmol/L    Chloride 118 (H) 96 - 108 mmol/L    CO2 19 (L) 21 - 32 mmol/L    ANION GAP 7 4 - 13 mmol/L    BUN 16 5 - 25 mg/dL    Creatinine 0 79 0 60 - 1 30 mg/dL    Glucose 79 65 - 140 mg/dL    Calcium 6 7 (L) 8 3 - 10 1 mg/dL    Corrected Calcium 8 6 8 3 - 10 1 mg/dL    AST 9 5 - 45 U/L    ALT 18 12 - 78 U/L    Alkaline Phosphatase 64 46 - 116 U/L    Total Protein 4 2 (L) 6 4 - 8 4 g/dL    Albumin 1 6 (L) 3 5 - 5 0 g/dL    Total Bilirubin 0 40 0 20 - 1 00 mg/dL    eGFR 69 ml/min/1 73sq m   ECG 12 lead    Collection Time: 09/23/22  2:05 PM   Result Value Ref Range    Ventricular Rate 88 BPM    Atrial Rate 88 BPM    ME Interval 129 ms    QRSD Interval 88 ms    QT Interval 388 ms    QTC Interval 470 ms    P Axis 56 degrees    QRS Axis 70 degrees    T Wave Axis 67 degrees   ECG 12 lead    Collection Time: 09/23/22  2:16 PM   Result Value Ref Range    Ventricular Rate 82 BPM    Atrial Rate 82 BPM    MT Interval 133 ms    QRSD Interval 88 ms    QT Interval 388 ms    QTC Interval 454 ms    P Axis 62 degrees    QRS Axis 71 degrees    T Wave Axis 67 degrees   APTT    Collection Time: 09/23/22  4:30 PM   Result Value Ref Range    PTT 81 (H) 23 - 37 seconds   CBC and differential    Collection Time: 09/23/22  4:30 PM   Result Value Ref Range    WBC 5 24 4 31 - 10 16 Thousand/uL    RBC 2 08 (L) 3 81 - 5 12 Million/uL    Hemoglobin 6 6 (LL) 11 5 - 15 4 g/dL    Hematocrit 22 6 (L) 34 8 - 46 1 %     (H) 82 - 98 fL    MCH 31 7 26 8 - 34 3 pg    MCHC 29 2 (L) 31 4 - 37 4 g/dL    RDW 16 8 (H) 11 6 - 15 1 %    MPV 10 4 8 9 - 12 7 fL    Platelets 79 (L) 376 - 390 Thousands/uL    nRBC 0 /100 WBCs   Basic metabolic panel    Collection Time: 09/23/22  4:30 PM   Result Value Ref Range    Sodium 144 135 - 147 mmol/L    Potassium 4 3 3 5 - 5 3 mmol/L    Chloride 118 (H) 96 - 108 mmol/L    CO2 18 (L) 21 - 32 mmol/L    ANION GAP 8 4 - 13 mmol/L    BUN 15 5 - 25 mg/dL    Creatinine 0 73 0 60 - 1 30 mg/dL    Glucose 91 65 - 140 mg/dL    Calcium 6 9 (L) 8 3 - 10 1 mg/dL    eGFR 76 ml/min/1 73sq m   Magnesium    Collection Time: 09/23/22  4:30 PM   Result Value Ref Range    Magnesium 2 0 1 6 - 2 6 mg/dL   Phosphorus    Collection Time: 09/23/22  4:30 PM   Result Value Ref Range    Phosphorus 2 1 (L) 2 3 - 4 1 mg/dL   Manual Differential(PHLEBS Do Not Order)    Collection Time: 09/23/22  4:30 PM   Result Value Ref Range    Segmented % 63 43 - 75 %    Bands % 34 (H) 0 - 8 %    Lymphocytes % 1 (L) 14 - 44 %    Monocytes % 0 (L) 4 - 12 %    Eosinophils, % 0 0 - 6 %    Basophils % 0 0 - 1 %    Metamyelocytes% 2 (H) 0 - 1 %    Absolute Neutrophils 5 08 1 85 - 7 62 Thousand/uL    Lymphocytes Absolute 0 05 (L) 0 60 - 4 47 Thousand/uL    Monocytes Absolute 0 00 0 00 - 1 22 Thousand/uL    Eosinophils Absolute 0 00 0 00 - 0 40 Thousand/uL    Basophils Absolute 0 00 0 00 - 0 10 Thousand/uL    Total Counted      Macrocytes Present     Microcytes Present     Polychromasia Present     Platelet Estimate Decreased (A) Adequate   APTT    Collection Time: 09/23/22  7:06 PM   Result Value Ref Range     (H) 23 - 37 seconds   APTT    Collection Time: 09/23/22 11:29 PM   Result Value Ref Range     (HH) 23 - 37 seconds   Hemoglobin    Collection Time: 09/24/22  1:33 AM   Result Value Ref Range    Hemoglobin 8 6 (L) 11 5 - 15 4 g/dL   APTT    Collection Time: 09/24/22  1:33 AM   Result Value Ref Range    PTT 35 23 - 37 seconds   CBC and differential    Collection Time: 09/24/22  5:17 AM   Result Value Ref Range    WBC 4 24 (L) 4 31 - 10 16 Thousand/uL    RBC 2 52 (L) 3 81 - 5 12 Million/uL    Hemoglobin 8 1 (L) 11 5 - 15 4 g/dL    Hematocrit 25 5 (L) 34 8 - 46 1 %     (H) 82 - 98 fL    MCH 32 1 26 8 - 34 3 pg    MCHC 31 8 31 4 - 37 4 g/dL    RDW 19 1 (H) 11 6 - 15 1 %    MPV 10 4 8 9 - 12 7 fL    Platelets 79 (L) 180 - 390 Thousands/uL   Comprehensive metabolic panel    Collection Time: 09/24/22  5:17 AM   Result Value Ref Range    Sodium 144 135 - 147 mmol/L    Potassium 3 8 3 5 - 5 3 mmol/L    Chloride 117 (H) 96 - 108 mmol/L    CO2 21 21 - 32 mmol/L    ANION GAP 6 4 - 13 mmol/L    BUN 13 5 - 25 mg/dL    Creatinine 0 62 0 60 - 1 30 mg/dL    Glucose 81 65 - 140 mg/dL    Calcium 6 7 (L) 8 3 - 10 1 mg/dL    Corrected Calcium 8 5 8 3 - 10 1 mg/dL    AST 10 5 - 45 U/L    ALT 21 12 - 78 U/L    Alkaline Phosphatase 72 46 - 116 U/L    Total Protein 4 2 (L) 6 4 - 8 4 g/dL    Albumin 1 8 (L) 3 5 - 5 0 g/dL    Total Bilirubin 0 48 0 20 - 1 00 mg/dL    eGFR 84 ml/min/1 73sq m   Magnesium    Collection Time: 09/24/22  5:17 AM   Result Value Ref Range    Magnesium 1 9 1 6 - 2 6 mg/dL   Phosphorus    Collection Time: 09/24/22  5:17 AM   Result Value Ref Range Phosphorus 1 8 (L) 2 3 - 4 1 mg/dL   Calcium, ionized    Collection Time: 09/24/22  5:17 AM   Result Value Ref Range    Calcium, Ionized 0 99 (L) 1 12 - 1 32 mmol/L   Prepare Leukoreduced RBC: 1 Units, Leukoreduced    Collection Time: 09/24/22  5:55 AM   Result Value Ref Range    Unit Product Code M4314R73     Unit Number [id number]     Unit ABO O     Unit RH NEG     Crossmatch Compatible     Unit Dispense Status Presumed Trans     Unit Product Volume 350 mL         XR Trauma chest portable    Result Date: 9/22/2022  Narrative: CHEST INDICATION:   TRAUMA  COMPARISON:  8/1/2022 EXAM PERFORMED/VIEWS:  XR CHEST PORTABLE FINDINGS: Cardiomediastinal silhouette appears unremarkable  The lungs are clear  No pneumothorax or pleural effusion  Benign-appearing sclerotic lesion is identified within the proximal left clavicle, unchanged from prior CT and x-ray examinations  Impression: No acute cardiopulmonary disease  Workstation performed: [id number]     TRAUMA - CT head wo contrast    Result Date: 9/22/2022  Narrative: CT BRAIN - WITHOUT CONTRAST INDICATION:   TRAUMA  COMPARISON:  8/1/2022  TECHNIQUE:  CT examination of the brain was performed  In addition to axial images, sagittal and coronal 2D reformatted images were created and submitted for interpretation  Radiation dose length product (DLP) for this visit:  877 mGy-cm   This examination, like all CT scans performed in the [facility], was performed utilizing techniques to minimize radiation dose exposure, including the use of iterative reconstruction and automated exposure control  IMAGE QUALITY:  Diagnostic  FINDINGS: PARENCHYMA: Decreased attenuation is noted in periventricular and subcortical white matter demonstrating an appearance that is statistically most likely to represent mild microangiopathic change  No CT signs of acute infarction  No intracranial mass, mass effect or midline shift  No acute parenchymal hemorrhage  VENTRICLES AND EXTRA-AXIAL SPACES:  Ventricles and extra-axial CSF spaces are prominent commensurate with the degree of volume loss  No hydrocephalus  No acute extra-axial hemorrhage  VISUALIZED ORBITS AND PARANASAL SINUSES:  Unremarkable  CALVARIUM AND EXTRACRANIAL SOFT TISSUES:  Normal      Impression: No acute intracranial abnormality  Chronic microangiopathic changes  I personally discussed this study with Kyaw Mejia on 9/22/2022 at 12:32 PM  Workstation performed: [id number]     TRAUMA - CT spine cervical wo contrast    Result Date: 9/22/2022  Narrative: CT CERVICAL SPINE - WITHOUT CONTRAST INDICATION:   TRAUMA  COMPARISON:  8/1/2022 TECHNIQUE:  CT examination of the cervical spine was performed without intravenous contrast   Contiguous axial images were obtained  Sagittal and coronal reconstructions were performed  Radiation dose length product (DLP) for this visit:   This examination, like all CT scans performed in the [facility], was performed utilizing techniques to minimize radiation dose exposure, including the use of iterative reconstruction  and automated exposure control  IMAGE QUALITY:  Diagnostic  FINDINGS: ALIGNMENT:  Normal alignment of the cervical spine  No subluxation  VERTEBRAL BODIES:  No fracture  DEGENERATIVE CHANGES:  Mild multilevel cervical degenerative changes are noted without critical central canal stenosis  PREVERTEBRAL AND PARASPINAL SOFT TISSUES:  Unremarkable  THORACIC INLET:  Biapical pleural parenchymal scarring noted, right greater than left  Impression: No cervical spine fracture or traumatic malalignment    I personally discussed this study with Kyaw Ba on 9/22/2022 at 12:23 PM   Workstation performed: [id number]     PE Study with CT abdomen & pelvis with contrast    Result Date: 9/22/2022  Narrative: CT PULMONARY ANGIOGRAM OF THE CHEST AND CT ABDOMEN AND PELVIS WITH INTRAVENOUS CONTRAST INDICATION:   tachycardia, hypotension, recent dx of pe and fall today  COMPARISON:  8/27/2022, 8/21/2022  TECHNIQUE:  CT examination of the chest, abdomen and pelvis was performed  Thin section CT angiographic technique was used in the chest in order to evaluate for pulmonary embolus and coronal 3D MIP postprocessing was performed on the acquisition scanner  Axial, sagittal, and coronal 2D reformatted images were created from the source data and submitted for interpretation  Radiation dose length product (DLP) for this visit:  1339 14 mGy-cm   This examination, like all CT scans performed in the [place], was performed utilizing techniques to minimize radiation dose exposure, including the use of iterative reconstruction and automated exposure control  IV Contrast:  100 mL of iohexol (OMNIPAQUE) Enteric Contrast:  Enteric contrast was not administered  FINDINGS: CHEST PULMONARY ARTERIAL TREE:  Saddle embolus identified extending into the left main pulmonary artery and left lower lobe branches  Small filling defect left upper lobe extending to subsegmental vessels  Right basilar pulmonary artery filling defect also extending into subsegmental vessels  LUNGS:  Lungs are clear  There is no tracheal or endobronchial lesion  PLEURA:  Unremarkable  HEART/AORTA:  Heart is unremarkable for patient's age  No thoracic aortic aneurysm  MEDIASTINUM AND MADAN:  Unremarkable  CHEST WALL AND LOWER NECK:  Unremarkable  ABDOMEN LIVER/BILIARY TREE:  Enlarged fatty liver with scattered probable cysts noted  GALLBLADDER:  Gallbladder is surgically absent  SPLEEN:  Unremarkable  PANCREAS:  Unremarkable  ADRENAL GLANDS:  Unremarkable  KIDNEYS/URETERS:  Right greater than left parapelvic cortical cysts noted  STOMACH AND BOWEL:  Unremarkable  APPENDIX:  No findings to suggest appendicitis  ABDOMINOPELVIC CAVITY:  No ascites  No pneumoperitoneum  No lymphadenopathy  VESSELS:  Unremarkable for patient's age   PELVIS REPRODUCTIVE ORGANS:  Unremarkable for patient's age  URINARY BLADDER:  Massive distention of the bladder  ABDOMINAL WALL/INGUINAL REGIONS:  Unremarkable  OSSEOUS STRUCTURES:  No acute fracture or destructive osseous lesion  Impression: 1  Saddle embolus and left greater than right pulmonary arterial filling defects consistent with PE (high clot burden) with evidence of RV strain including an RV/LV ratio = 1 2  This is greater than 0 9, which is abnormal and indicates right heart strain  An abnormal RV/LV ratio has been shown to be associated with an increased risk of 30 day mortality in the setting of acute pulmonary embolism  Urgent consultation with the medical critical care team is recommended  2   Massive distention of the bladder  3   No acute posttraumatic injury  I personally discussed this study with Siri Barba on 9/22/2022 at 12:27 PM  Workstation performed: [id number]     VAS upper limb venous duplex scan, unilateral/limited    Result Date: 8/30/2022  Narrative:  [facility]PORT [facility]: Indications: Other specified soft tissue disorders [M79 [id number]]  Patient presents with left upper extremity edema near the site of her PICC line  Risk Factors The patient has history of DVT  FINDINGS:  Left                       Impression  Cephalic Upper Arm Distal  Thrombus       CONCLUSION:  Impression RIGHT UPPER LIMB LIMITED: Evaluation shows no evidence of thrombus in the subclavian vein  Internal jugular vein and brachiocephalic vein were not visualized  Turner [place] LEFT UPPER LIMB: No evidence of acute or chronic deep vein thrombosis  There is acute non-occlusive superficial thrombophlebitis within the antecubital vein and the cephalic vein in the distal upper arm  Basilic vein was not visualized at site of PICC line  Doppler evaluation shows a normal response to augmentation maneuvers  Technical findings were given to patient's nurse Guadalupe Pisano on Med/Surg unit    SIGNATURE: Electronically Signed by: Belgica Victoria on 2022-08-30 10:51:51 AM    VAS lower limb venous duplex study, complete bilateral    Result Date: 9/23/2022  Narrative:  [facility] REPORT [facility]INICAL: Indications: MICU patient presents with recent discovery of pulmonary embolism  Physician wants to determine propagation of previously noted DVT in the right popliteal and peroneal veins  discovered on 8/19/22  Operative History: 2022-09-22 IR PE Endovascular Therapy Risk Factors The patient has history of DVT  FINDINGS:  Segment         Right                   Left                            Impression              Impression              Ext_Iliac       Not Visualized                                  CFV             Non Occlusive Thrombus                          PFV             Non Occlusive Thrombus                          FV Prox         Non Occlusive Thrombus                          FV Mid          Non Occlusive Thrombus                          FV Dist         Non Occlusive Thrombus                          GSV Inguinal    Occlusive Subsegmental                          GSV Prox Thigh  Occlusive Subsegmental                          GSV Mid Thigh   Occlusive Subsegmental                          GSV Dist Thigh  Occlusive Subsegmental                          GSV Knee        Occlusive Subsegmental                          Peroneal        Non Occlusive Thrombus  Occlusive Subsegmental  Popliteal       Occlusive Subsegmental                          PostTibial      Non Occlusive Thrombus  Occlusive Subsegmental     CONCLUSION: Impression: RIGHT LOWER LIMB: Evidence of acute deep vein thrombosis identified in the common femoral, deep femoral, femoral, popliteal, posterior tibial and peroneal veins  Evidence of superficial thrombophlebitis noted in the saphenofemoral junction and in the great saphenous vein from the knee to the proximal thigh  Popliteal, posterior tibial and anterior tibial arterial Doppler waveforms are triphasic    LEFT LOWER LIMB: Evidence of acute deep vein thrombosis identified in the posterior tibial and peroneal veins  No evidence of superficial thrombophlebitis noted  Doppler evaluation shows a normal response to augmentation maneuvers  Popliteal, posterior tibial and anterior tibial arterial Doppler waveforms are triphasic  In comparison to the study of 8/19/22 , there is propagation of DVT and SVT in the right lower extremity and there is new DVT in the left PTV and Peroneal veins  Technical findings shared with Dr Jaylene Duran and posted in [address] [place] [address]  SIGNATURE: Electronically Signed by: Rashaun Caal MD, RPVI on 2022-09-23 03:17:16 PM    CT abdomen pelvis w contrast    Result Date: 8/27/2022  Narrative: CT ABDOMEN AND PELVIS WITH IV CONTRAST INDICATION:   LLQ abdominal pain new onset severe LLQ abdominal pain, tachycardia, ongoing bloody stools  COMPARISON:  CT abdomen pelvis 8/15/2022 TECHNIQUE:  CT examination of the abdomen and pelvis was performed  Axial, sagittal, and coronal 2D reformatted images were created from the source data and submitted for interpretation  Radiation dose length product (DLP) for this visit:  830 mGy-cm   This examination, like all CT scans performed in the [facility], was performed utilizing techniques to minimize radiation dose exposure, including the use of iterative reconstruction and automated exposure control  IV Contrast:  65 mL of iohexol (OMNIPAQUE) Enteric Contrast:  Enteric contrast was not administered  FINDINGS: ABDOMEN LOWER CHEST:  Atelectatic changes are noted at the lung bases  LIVER/BILIARY TREE:  Scattered cysts and hypoattenuating foci that are too small to characterize but likely represent cysts are present  GALLBLADDER:  Gallbladder is surgically absent  SPLEEN:  Unremarkable  PANCREAS:  Unremarkable  ADRENAL GLANDS:  Unremarkable  KIDNEYS/URETERS:  Cortical and parapelvic cysts are again seen  No hydronephrosis   STOMACH AND BOWEL:  Pan-colonic wall thickening with pericolonic vascular engorgement is again seen, consistent with pancolitis  This is similar in appearance the prior CT  No bowel obstruction  APPENDIX:  No findings to suggest appendicitis  ABDOMINOPELVIC CAVITY:  Trace ascites is present along the right paracolic gutter  There is no well-formed fluid collection  There is no free intraperitoneal air  VESSELS:  Unremarkable for patient's age  The celiac axis and, superior mesenteric artery, and inferior mesenteric artery are patent  PELVIS REPRODUCTIVE ORGANS:  Unremarkable for patient's age  URINARY BLADDER:  Unremarkable  ABDOMINAL WALL/INGUINAL REGIONS:  Unremarkable  OSSEOUS STRUCTURES:  No acute fracture or destructive osseous lesion  Tiny sclerotic foci are again noted within the axial and appendicular skeleton in keeping with known metastatic breast cancer  Impression: No significant change in pan-colitis  Workstation performed: [id number]     Echo follow up/limited w/ contrast if indicated    Result Date: 9/23/2022  Narrative: •  Left Ventricle: The left ventricular ejection fraction is 70%  Systolic function is vigorous  Wall motion cannot be accurately assessed  •  Right Ventricle: Right ventricular cavity size is normal  Systolic function is normal  •  Tricuspid Valve: There is mild regurgitation  The right ventricular systolic pressure is normal  The estimated right ventricular systolic pressure is 62 09 mmHg  •  Only imaging window was the subcostal window  Limited and technically difficult study  Assessment and Plan:  78 yo female with medical hx of stage IV metastatic breast cancer with evidence of acute vein thrombosis of the right/left limb and saddle embolus s/p thrombectomy was consulted for placement of IVC filter since patient developed clots while being on anticoagulation Siri Byrd)  Patient was transitioned to IV heparin and now is on full dose Lovenox  Hematology agrees with IVC filter placement   Patient can be either discharged on Lovenox and continue  treatment until the patient is in remission or has a major bleeding complication  Other anticoagulation options like warfarin / pradaxa are also feasible  Please reach out with any questions  I spent 40 minutes on chart review, direct face to face counseling, coordination of care and documentation

## 2022-09-24 NOTE — BRIEF OP NOTE (RAD/CATH)
INTERVENTIONAL RADIOLOGY PROCEDURE NOTE    Date: 9/23/2022    Procedure: IVC filter placement    Preoperative diagnosis:   1  Saddle embolus of pulmonary artery ([address] )    2  Single subsegmental pulmonary embolism without acute cor pulmonale (HCC)    3  Urinary retention         Postoperative diagnosis: Same  Surgeon: Ferd Aschoff, MD     Assistant: None  No qualified resident was available  Blood loss: Minimal    Specimens: None     Findings: IVC filter placement, Davis retrievable  Complications: None immediate      Anesthesia: local and IV Fentanyl

## 2022-09-25 ENCOUNTER — APPOINTMENT (OUTPATIENT)
Dept: RADIOLOGY | Facility: HOSPITAL | Age: 82
DRG: 163 | End: 2022-09-25
Payer: MEDICARE

## 2022-09-25 LAB
ANION GAP SERPL CALCULATED.3IONS-SCNC: 4 MMOL/L (ref 4–13)
BASOPHILS # BLD AUTO: 0 THOUSANDS/ΜL (ref 0–0.1)
BASOPHILS NFR BLD AUTO: 0 % (ref 0–1)
BUN SERPL-MCNC: 13 MG/DL (ref 5–25)
C DIFF TOX A+B STL QL IA: NEGATIVE
C DIFF TOX GENS STL QL NAA+PROBE: POSITIVE
CA-I BLD-SCNC: 1.07 MMOL/L (ref 1.12–1.32)
CALCIUM SERPL-MCNC: 7.2 MG/DL (ref 8.3–10.1)
CHLORIDE SERPL-SCNC: 114 MMOL/L (ref 96–108)
CO2 SERPL-SCNC: 23 MMOL/L (ref 21–32)
CREAT SERPL-MCNC: 0.68 MG/DL (ref 0.6–1.3)
EOSINOPHIL # BLD AUTO: 0.04 THOUSAND/ΜL (ref 0–0.61)
EOSINOPHIL NFR BLD AUTO: 1 % (ref 0–6)
ERYTHROCYTE [DISTWIDTH] IN BLOOD BY AUTOMATED COUNT: 19.3 % (ref 11.6–15.1)
GFR SERPL CREATININE-BSD FRML MDRD: 81 ML/MIN/1.73SQ M
GLUCOSE SERPL-MCNC: 91 MG/DL (ref 65–140)
HCT VFR BLD AUTO: 24.2 % (ref 34.8–46.1)
HGB BLD-MCNC: 7.3 G/DL (ref 11.5–15.4)
HGB BLD-MCNC: 8.1 G/DL (ref 11.5–15.4)
IMM GRANULOCYTES # BLD AUTO: 0.02 THOUSAND/UL (ref 0–0.2)
IMM GRANULOCYTES NFR BLD AUTO: 1 % (ref 0–2)
LYMPHOCYTES # BLD AUTO: 0.28 THOUSANDS/ΜL (ref 0.6–4.47)
LYMPHOCYTES NFR BLD AUTO: 9 % (ref 14–44)
MAGNESIUM SERPL-MCNC: 2 MG/DL (ref 1.6–2.6)
MCH RBC QN AUTO: 31.7 PG (ref 26.8–34.3)
MCHC RBC AUTO-ENTMCNC: 30.2 G/DL (ref 31.4–37.4)
MCV RBC AUTO: 105 FL (ref 82–98)
MONOCYTES # BLD AUTO: 0.25 THOUSAND/ΜL (ref 0.17–1.22)
MONOCYTES NFR BLD AUTO: 8 % (ref 4–12)
NEUTROPHILS # BLD AUTO: 2.61 THOUSANDS/ΜL (ref 1.85–7.62)
NEUTS SEG NFR BLD AUTO: 81 % (ref 43–75)
NRBC BLD AUTO-RTO: 0 /100 WBCS
PHOSPHATE SERPL-MCNC: 2.1 MG/DL (ref 2.3–4.1)
PLATELET # BLD AUTO: 66 THOUSANDS/UL (ref 149–390)
PMV BLD AUTO: 10.2 FL (ref 8.9–12.7)
POTASSIUM SERPL-SCNC: 4.5 MMOL/L (ref 3.5–5.3)
RBC # BLD AUTO: 2.3 MILLION/UL (ref 3.81–5.12)
SODIUM SERPL-SCNC: 141 MMOL/L (ref 135–147)
WBC # BLD AUTO: 3.2 THOUSAND/UL (ref 4.31–10.16)

## 2022-09-25 PROCEDURE — 93005 ELECTROCARDIOGRAM TRACING: CPT

## 2022-09-25 PROCEDURE — 80048 BASIC METABOLIC PNL TOTAL CA: CPT

## 2022-09-25 PROCEDURE — 71045 X-RAY EXAM CHEST 1 VIEW: CPT

## 2022-09-25 PROCEDURE — 83735 ASSAY OF MAGNESIUM: CPT

## 2022-09-25 PROCEDURE — 85025 COMPLETE CBC W/AUTO DIFF WBC: CPT

## 2022-09-25 PROCEDURE — 85007 BL SMEAR W/DIFF WBC COUNT: CPT | Performed by: NURSE PRACTITIONER

## 2022-09-25 PROCEDURE — 84100 ASSAY OF PHOSPHORUS: CPT

## 2022-09-25 PROCEDURE — 85018 HEMOGLOBIN: CPT

## 2022-09-25 PROCEDURE — 99233 SBSQ HOSP IP/OBS HIGH 50: CPT | Performed by: INTERNAL MEDICINE

## 2022-09-25 PROCEDURE — 82330 ASSAY OF CALCIUM: CPT

## 2022-09-25 PROCEDURE — 80053 COMPREHEN METABOLIC PANEL: CPT | Performed by: NURSE PRACTITIONER

## 2022-09-25 PROCEDURE — 99222 1ST HOSP IP/OBS MODERATE 55: CPT | Performed by: STUDENT IN AN ORGANIZED HEALTH CARE EDUCATION/TRAINING PROGRAM

## 2022-09-25 PROCEDURE — 85027 COMPLETE CBC AUTOMATED: CPT | Performed by: NURSE PRACTITIONER

## 2022-09-25 PROCEDURE — 86140 C-REACTIVE PROTEIN: CPT | Performed by: INTERNAL MEDICINE

## 2022-09-25 PROCEDURE — 84145 PROCALCITONIN (PCT): CPT | Performed by: NURSE PRACTITIONER

## 2022-09-25 RX ORDER — CALCIUM GLUCONATE 20 MG/ML
2 INJECTION, SOLUTION INTRAVENOUS ONCE
Status: COMPLETED | OUTPATIENT
Start: 2022-09-25 | End: 2022-09-25

## 2022-09-25 RX ORDER — ALBUMIN, HUMAN INJ 5% 5 %
12.5 SOLUTION INTRAVENOUS ONCE
Status: COMPLETED | OUTPATIENT
Start: 2022-09-25 | End: 2022-09-25

## 2022-09-25 RX ORDER — ALBUMIN (HUMAN) 12.5 G/50ML
12.5 SOLUTION INTRAVENOUS ONCE
Status: COMPLETED | OUTPATIENT
Start: 2022-09-25 | End: 2022-09-25

## 2022-09-25 RX ORDER — METOPROLOL TARTRATE 5 MG/5ML
2.5 INJECTION INTRAVENOUS EVERY 6 HOURS PRN
Status: DISCONTINUED | OUTPATIENT
Start: 2022-09-25 | End: 2022-09-30

## 2022-09-25 RX ORDER — ALBUMIN (HUMAN) 12.5 G/50ML
12.5 SOLUTION INTRAVENOUS ONCE
Status: COMPLETED | OUTPATIENT
Start: 2022-09-25 | End: 2022-09-26

## 2022-09-25 RX ORDER — SODIUM CHLORIDE, SODIUM GLUCONATE, SODIUM ACETATE, POTASSIUM CHLORIDE, MAGNESIUM CHLORIDE, SODIUM PHOSPHATE, DIBASIC, AND POTASSIUM PHOSPHATE .53; .5; .37; .037; .03; .012; .00082 G/100ML; G/100ML; G/100ML; G/100ML; G/100ML; G/100ML; G/100ML
75 INJECTION, SOLUTION INTRAVENOUS CONTINUOUS
Status: DISCONTINUED | OUTPATIENT
Start: 2022-09-25 | End: 2022-09-25

## 2022-09-25 RX ADMIN — PANTOPRAZOLE SODIUM 40 MG: 40 TABLET, DELAYED RELEASE ORAL at 15:26

## 2022-09-25 RX ADMIN — Medication 125 MG: at 17:03

## 2022-09-25 RX ADMIN — COLLAGENASE SANTYL: 250 OINTMENT TOPICAL at 08:44

## 2022-09-25 RX ADMIN — Medication 2000 UNITS: at 08:29

## 2022-09-25 RX ADMIN — CALCIUM GLUCONATE 2 G: 20 INJECTION, SOLUTION INTRAVENOUS at 08:28

## 2022-09-25 RX ADMIN — POTASSIUM & SODIUM PHOSPHATES POWDER PACK 280-160-250 MG 2 PACKET: 280-160-250 PACK at 08:31

## 2022-09-25 RX ADMIN — ENOXAPARIN SODIUM 70 MG: 80 INJECTION SUBCUTANEOUS at 22:38

## 2022-09-25 RX ADMIN — CHOLESTYRAMINE 4 G: 4 POWDER, FOR SUSPENSION ORAL at 22:38

## 2022-09-25 RX ADMIN — CALCIUM GLUCONATE 2 G: 20 INJECTION, SOLUTION INTRAVENOUS at 03:35

## 2022-09-25 RX ADMIN — METOPROLOL TARTRATE 50 MG: 50 TABLET ORAL at 08:29

## 2022-09-25 RX ADMIN — PANTOPRAZOLE SODIUM 40 MG: 40 TABLET, DELAYED RELEASE ORAL at 06:18

## 2022-09-25 RX ADMIN — ALBUMIN (HUMAN) 12.5 G: 0.25 INJECTION, SOLUTION INTRAVENOUS at 23:59

## 2022-09-25 RX ADMIN — SODIUM CHLORIDE, SODIUM GLUCONATE, SODIUM ACETATE, POTASSIUM CHLORIDE, MAGNESIUM CHLORIDE, SODIUM PHOSPHATE, DIBASIC, AND POTASSIUM PHOSPHATE 75 ML/HR: .53; .5; .37; .037; .03; .012; .00082 INJECTION, SOLUTION INTRAVENOUS at 18:02

## 2022-09-25 RX ADMIN — POTASSIUM & SODIUM PHOSPHATES POWDER PACK 280-160-250 MG 2 PACKET: 280-160-250 PACK at 16:37

## 2022-09-25 RX ADMIN — ATORVASTATIN CALCIUM 40 MG: 40 TABLET, FILM COATED ORAL at 17:03

## 2022-09-25 RX ADMIN — FOLIC ACID 1 MG: 1 TABLET ORAL at 08:29

## 2022-09-25 RX ADMIN — ALBUMIN (HUMAN) 12.5 G: 0.25 INJECTION, SOLUTION INTRAVENOUS at 22:38

## 2022-09-25 RX ADMIN — PREDNISONE 20 MG: 20 TABLET ORAL at 08:29

## 2022-09-25 RX ADMIN — ALBUMIN (HUMAN) 12.5 G: 12.5 INJECTION, SOLUTION INTRAVENOUS at 02:21

## 2022-09-25 RX ADMIN — ENOXAPARIN SODIUM 70 MG: 80 INJECTION SUBCUTANEOUS at 08:30

## 2022-09-25 NOTE — PROGRESS NOTES
Pastoral Care Progress Note    2022  Patient: Gabriel Denver Clunk : 1940  Admission Date & Time: 2022 1535  MRN: [id number] [facility]: [id number]                     Chaplaincy Interventions Utilized:   Empowerment: Clarified, confirmed, or reviewed information from treatment team     Exploration: Explored hope    Relationship Building: Cultivated a relationship of care and support    Chaplaincy Outcomes Achieved:  Expressed ultimate hope, Identified meaningful connections, and Progressed toward acceptance    Spiritual Coping Strategies Utilized:   Spiritual comfort, Spiritual gratitude, and Spiritual meaning    Blessed visit with the pt in which we talked about the hope of heaven, and peace in [place]  We also discussed hospice, which she is considering  However, it seems as though she is not quite ready to stop treatment  Maxine Clutter has the support of her loving family  Will follow

## 2022-09-25 NOTE — PROGRESS NOTES
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, hyperlipidemia, CKD stage 3a, hypertension, chronic anemia, protein-calorie malnutrition, and recent admission with discharge for toxic gastroenteritis and pulmonary embolism discharged on Xarelto on 09/06 that initially presented to [place] [id number] on 9/22 for mechanical fall   During the patient's previous admission, the patient was diagnosed with IBD and started on steroids   Patient's Verzenio was discontinued by Oncology, likely contributing to her GI symptoms   In addition, she was noted to have lower extremity edema, found to have an acute occlusive thrombus in the right popliteal vein   Further workup with CT PE protocol showed right lower lobe subsegmental PE, patient was started on heparin drip at that time, and then eventually transitioned to [address] patient was discharged with Lopressor 50 mg twice daily, as well in the setting of her persistent sinus tachycardia   Lastly, her antihypertensive regimen was discontinued given the patient's new baseline of systolic blood pressures in the 100-110 range   Since discharge, the patient has been progressing slowly at her nursing home with regular follow-up with her geriatrician      Patient states that around 0600 on the day of presentation she was walking to the bathroom and her legs gave out   The patient denied head strike or loss of consciousness at that time   EMS was called, in route the patient's blood pressure dropped to 90/40 with heart rates increasing in the 150s   On presentation to the emergency department, the patient was found to be afebrile, but tachycardic   She was maintaining her blood pressures, and saturating 100% on room air   Physical exam significant for lower extremity edema   Lab significant for creatinine of 1 36 (baseline around 0 8-0 9), proBNP 1500, lactic acid 2 3, troponin 20, hemoglobin 9 6 (baseline around 9-10),   Trauma imaging was negative, but CTA revealed saddle pulmonary embolus, left greater than right, high clot burden, and RV to LV ratio 1 2   Patient was transferred urgently to [facility] for possible IR intervention      Patient is now status post thrombectomy with IR  No IVC filter placed  Patient was noted to be supratherapeutic on her heparin drip last night, and subsequently had questionable GI bleeding versus bleeding from her puncture site  A Code crimson was called, but she was never transfused  Repeat hemoglobins have been coming back around 7  The patient has been tachycardic, but maintain pressures  She does report some shivering, but afebrile  The patient was transferred back from step-down 1 to critical care due to concerns for bleeding  Overnight:  Overnight:  Overnight, pt became hypotensive with BP's in the 90's/40's  BP's slightly improved after she was given 12 5mg of albumin     ---------------------------------------------------------------------------------------  SUBJECTIVE  Pt examined at bedside this AM  Sleeping comfortably  Denies any acute complaints or concerns at this time  Review of Systems   Constitutional: Negative for chills and fever  HENT: Negative for ear pain and sore throat  Eyes: Negative for pain and visual disturbance  Respiratory: Negative for cough and shortness of breath  Cardiovascular: Negative for chest pain and palpitations  Gastrointestinal: Negative for abdominal pain and vomiting  Genitourinary: Negative for dysuria and hematuria  Musculoskeletal: Negative for arthralgias and back pain  Skin: Negative for color change and rash     Neurological: Negative for seizures and syncope  All other systems reviewed and are negative         ---------------------------------------------------------------------------------------  Assessment and Plan:    Neuro:   · Diagnosis:  Depression/anxiety  ? Hold amitriptyline in setting of urinary retention  ? Hold mirtazapine as well  · Diagnosis:  Sedation/analgesia  ? Delirium precautions  ? Sleep-wake cycle  ? Out of bed as tolerated  ? Tylenol for pain, may escalate as needed        CV:   · Diagnosis:  Sinus tachycardia  ? Patient was noted to have sinus tachycardia during her previous hospitalization  ? Started on metoprolol tartrate 50 mg every 12 hours with plans to titrate off  ? Hold metoprolol for now  · Diagnosis: LE swelling  ? Patient has had progressive lower extremity swelling  ? 3+ on both lower extremities, improving along with pulses  ? Previous echo with a hyperdynamic LV EF 70%  ? Follow-up echo        Pulm:  · Diagnosis:  Acute, submassive, intermediate to high risk pulmonary embolism  ? Patient has history of right popliteal DVT and right lower lobe subsegmental PE from previous hospitalization in August  ? Patient was sent home on Xarelto, likely not failure, was not receiving it at her nursing home  ? Patient also has metastatic breast cancer, hypercoagulable state  ? Return to the ER on 09/22 after a fall, found to have saddle pulmonary embolus with evidence of right heart strain and high clot burden  ? RV to LV ratio on CTA 1 2  ? BMP greater than 1500, troponins relatively normal  ? TTE pending  ? Patient is now status post thrombectomy with IR, no IVC filter placement  ? Duplex pending  ? Will need long-term anticoagulation, heparin currently on hold for bleeding overnight, will reach out to IR        GI:   · Diagnosis:  Likely IBD  ? Patient was admitted with toxic gastroenteritis in August of 2022  ?  Determined to be possible IBD with additional insult of Verzenio from her breast cancer treatment  ? Continue to hold Verzenio  ? Continue steroids  ? Loperamide as needed for diarrhea  ? Consider GI consult  · Diagnosis:  GI prophylaxis  ? PPI        :   · Diagnosis:  REYES on CKD stage IIIA  ? Patient's baseline creatinine between 0 8 and 0 9 with an EGFR ranging around 60  ? Admission creatinine 1 36, down to 0 8  ? Patient did receive IV contrast for CTA  ? Patient does not have a history of heart failure, will initiate fluids  ? Accurate in's and out's  ? Avoid nephrotoxins  · Diagnosis:  Urinary retention  ? Urinary retention protocol, patient had over 3 65 L of urine in her bladder  ? Has had another straight cath, over 4 L out so far  ? Still unable to void on her own, may need [place]  ? Tamsulosin daily  ? May be secondary to TCA use  ? UA with 1+ protein, innumerable rbc's, 4-10 wbc's  ? 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 Status post resection and adjuvant radiation and hormone therapy for 5 years  ? Patient had symptoms of bony discomfort in her sternum in June of 2021, imaging revealed lytic lesion, biopsy in July of 2021 confirmed progression of disease  ? Patient was started on Faslodex and Xgeva at that time, in conjunction with radiation  ? In April 2022 there was interval development of 4 mm nodule at the right lower lobe and interval development of increased sclerotic lesions throughout the visualized spine  ? At that time, the patient's treatment was changed to Femara and Asim Kin  ?  In July of 2022, the patient was changed from Asim Kin to [employer] because of intolerance  ? During the patient's hospitalization in August of 2022 to September of 2022, Verzenio was discontinued due to gastroenteritis  ? Patient's cancer is most likely contributing to her hypercoagulable state  ? Consider Oncology consultation  ? Consider palliative consultation and goals of care discussion  · Diagnosis:  DVT prophylaxis  ? Currently on VTE heparin protocol  ? After thrombectomy and stabilization, will transition to direct oral anticoagulation  · Diagnosis:  Macrocytic anemia complicated by acute blood loss anemia  ? Folate in September of 2022 3 3  ? Vitamin B12 in September of 2022 645  ? Iron panel and August of 2022 showed an iron saturation of 18, TIBC 105, iron 19, ferritin 755  ? Likely a mixed component of anemia chronic disease with possible folate deficiency  ? Continue folate supplementation  ? Patient with questionable GI bleeding last night, possibly bleeding from the insertion site  ? Hemoglobin stabilized, prepared 2 units, but not transfused     Endo:   · Diagnosis:  No active issues  ? Maintain blood glucose between 140-180  ? If steroids restarted, may need sliding scale insulin        ID:   · Diagnosis:  No acute issues  ? Monitor WBC and fever trend        MSK/Skin:   · Diagnosis:  Skin ulcer prophylaxis  ? Frequent turning  ? Pressure offloading  ? PT/OT when able       Disposition: Signed out to [facility]   Code Status: Level 3 - DNAR and DNI  ---------------------------------------------------------------------------------------  ICU CORE MEASURES    Prophylaxis   VTE Pharmacologic Prophylaxis: Heparin  VTE Mechanical Prophylaxis: sequential compression device  Stress Ulcer Prophylaxis: Pantoprazole PO    ABCDE Protocol (if indicated)  Plan to perform spontaneous awakening trial today? Not applicable  Plan to perform spontaneous breathing trial today? Not applicable  Obvious barriers to extubation?  Not applicable  CAM-ICU: Negative    Invasive Devices Review  Invasive Devices  Report    Peripheral Intravenous Line  Duration           Peripheral IV 22 Left;Upper;Ventral (anterior) Arm 2 days          Drain  Duration           Urethral Catheter 18 Fr  1 day    Rectal Tube With balloon <1 day              Can any invasive devices be discontinued today? Not applicable  ---------------------------------------------------------------------------------------  OBJECTIVE    Vitals   Vitals:    22 0300 22 0423 22 0537 22 0638   BP: (!) 94/44 (!) 105/46 (!) 119/47 (!) 105/43   BP Location:  Left arm     Pulse: 80 90 92 100   Resp: 17 18 16 17   Temp:  97 9 °F (36 6 °C)     TempSrc:  Oral     SpO2: 97% 96% 96% 96%   Weight:       Height:         Temp (24hrs), Av 9 °F (36 6 °C), Min:97 4 °F (36 3 °C), Max:98 6 °F (37 °C)  Current: Temperature: 97 9 °F (36 6 °C)    Respiratory:    Nasal Cannula O2 Flow Rate (L/min): 2 L/min    Invasive/non-invasive ventilation settings   Respiratory  Report   Lab Data (Last 4 hours)    None         O2/Vent Data (Last 4 hours)    None                Physical Exam  Constitutional:       Appearance: She is not ill-appearing  HENT:      Mouth/Throat:      Mouth: Mucous membranes are dry  Cardiovascular:      Rate and Rhythm: Regular rhythm  Tachycardia present  Heart sounds: No murmur heard  Pulmonary:      Effort: Pulmonary effort is normal       Breath sounds: No wheezing or rales  Abdominal:      General: Bowel sounds are normal       Palpations: Abdomen is soft  Tenderness: There is no abdominal tenderness  There is no guarding  Musculoskeletal:      Right lower leg: No edema  Left lower leg: No edema  Neurological:      Mental Status: She is alert and oriented to person, place, and time     Psychiatric:         Mood and Affect: Mood normal          Behavior: Behavior normal          Laboratory and Diagnostics:  Results from last 7 days   Lab Units 22  0324 22  0517 22  0133 09/23/22  1630 09/23/22  0514 09/23/22  0436 09/23/22  0416 09/22/22  1630 09/22/22  1145   WBC Thousand/uL 3 20* 4 24*  --  5 24 6 20  --   --  7 41 7 31   HEMOGLOBIN g/dL 7 3* 8 1* 8 6* 6 6* 7 1*  --  7 0* 8 4* 9 6*   HEMATOCRIT % 24 2* 25 5*  --  22 6* 23 6*  --   --  28 0* 31 0*   HEMATOCRIT, ISTAT %  --   --   --   --   --  <15*  --   --   --    PLATELETS Thousands/uL 66* 79*  --  79* 88*  --   --   --  110*   NEUTROS PCT % 81*  --   --   --   --   --   --   --  86*   BANDS PCT %  --   --   --  34*  --   --   --   --   --    MONOS PCT % 8  --   --   --   --   --   --   --  6   MONO PCT %  --   --   --  0*  --   --   --   --   --      Results from last 7 days   Lab Units 09/25/22  0330 09/24/22  0517 09/23/22  1630 09/23/22  1009 09/23/22  0436 09/23/22  0000 09/22/22  1145   SODIUM mmol/L 141 144 144 144  --  142 140   POTASSIUM mmol/L 4 5 3 8 4 3 3 4*  --  3 0* 4 6   CHLORIDE mmol/L 114* 117* 118* 118*  --  115* 107   CO2 mmol/L 23 21 18* 19*  --  18* 24   CO2, I-STAT mmol/L  --   --   --   --  16*  --   --    ANION GAP mmol/L 4 6 8 7  --  9 9   BUN mg/dL 13 13 15 16  --  17 28*   CREATININE mg/dL 0 68 0 62 0 73 0 79  --  0 80 1 36*   CALCIUM mg/dL 7 2* 6 7* 6 9* 6 7*  --  6 6* 8 2*   GLUCOSE RANDOM mg/dL 91 81 91 79  --  78 113   ALT U/L  --  21  --  18  --  21 30   AST U/L  --  10  --  9  --  10 13   ALK PHOS U/L  --  72  --  64  --  74 106   ALBUMIN g/dL  --  1 8*  --  1 6*  --  1 6* 2 4*   TOTAL BILIRUBIN mg/dL  --  0 48  --  0 40  --  0 40 0 50     Results from last 7 days   Lab Units 09/25/22  0330 09/24/22  0517 09/23/22  1630   MAGNESIUM mg/dL 2 0 1 9 2 0   PHOSPHORUS mg/dL 2 1* 1 8* 2 1*      Results from last 7 days   Lab Units 09/24/22  0133 09/23/22  2329 09/23/22  1906 09/23/22  1630 09/23/22  1009 09/23/22  0514 09/23/22  0416 09/22/22  2332 09/22/22  1630 09/22/22  1145   INR   --   --   --   --   --   --  1 29*  --  1 38* 1 08   PTT seconds 35 140* 102* 81* 36 91*  --  >210* >210* 26 Results from last 7 days   Lab Units 09/22/22  1354 09/22/22  1145   LACTIC ACID mmol/L 1 2 2 3*     ABG:    VBG:  Results from last 7 days   Lab Units 09/22/22  1630   PH MICHELLE  7 474*   PCO2 MICHELLE mm Hg 26 8*   PO2 MICHELLE mm Hg 26 8*   HCO3 MICHELLE mmol/L 19 2*   BASE EXC MICHELLE mmol/L -3 5           Micro        EKG: Sinus tach  Imaging: I have personally reviewed pertinent reports  and I have personally reviewed pertinent films in PACS    Intake and Output  I/O       09/21 0701 09/22 0700 09/22 0701  09/23 0700 09/23 0701 09/24 0700    P  O   0     I V  (mL/kg)  2166 3 (31) 141 7 (2)    Total Intake(mL/kg)  2166 3 (31) 141 7 (2)    Urine (mL/kg/hr)  4622     Emesis/NG output  0     Stool  0     Total Output  4622     Net  -2455 7 +141 7           Unmeasured Stool Occurrence  1 x         Height and Weights   Height: 5' 1" (154 9 cm)     Body mass index is 29 1 kg/m²  Weight (last 2 days)     None            Nutrition       Diet Orders   (From admission, onward)             Start     Ordered    09/23/22 1011  Diet Regular; Regular House  Diet effective now        References:    Nutrtion Support Algorithm Enteral vs  Parenteral   Question Answer Comment   Diet Type Regular    Regular Regular House    RD to adjust diet per protocol?  Yes        09/23/22 1010              Active Medications  Scheduled Meds:  Current Facility-Administered Medications   Medication Dose Route Frequency Provider Last Rate   • atorvastatin  40 mg Oral After Nydia Robles MD     • calcium gluconate  2 g Intravenous Once Gerardo Rodgers DO     • cholecalciferol  2,000 Units Oral Daily Bhumi Mohamud DO     • cholestyramine sugar free  4 g Oral HS Bhumi Mohamud DO     • collagenase   Topical Daily Noel Landis MD     • enoxaparin  1 mg/kg Subcutaneous Q12H [address] Katelin Hanks MD     • folic acid  1 mg Oral Daily Enedelia Monterroso MD     • loperamide  4 mg Oral 4x Daily PRN Bhumi Mohamud DO     • metoprolol tartrate  50 mg Oral Q12H [address] Mikel Lozano DO     • pantoprazole  40 mg Oral BID AC Alyse Smyth MD     • potassium-sodium phosphates  2 packet Oral BID With Meals BROOKE Armenta     • predniSONE  20 mg Oral Daily Alyse Smyth MD     • senna-docusate sodium  2 tablet Oral BID Alyse Smyth MD       Continuous Infusions:     PRN Meds:   loperamide, 4 mg, 4x Daily PRN        Allergies   Allergies   Allergen Reactions   • Sulfa Antibiotics    • Pneumococcal Polysaccharide Vaccine Rash and Edema     ---------------------------------------------------------------------------------------  Advance Directive and Living Will:      Power of :    POLST:    ---------------------------------------------------------------------------------------  Care Time Delivered:   Upon my evaluation, this patient had a high probability of imminent or life-threatening deterioration due to PE, which required my direct attention, intervention, and personal management  I have personally provided 30 minutes (0630 to 0700) of critical care time, exclusive of procedures, teaching, family meetings, and any prior time recorded by providers other than myself  Isamar Orozco DO      Portions of the record may have been created with voice recognition software  Occasional wrong word or "sound a like" substitutions may have occurred due to the inherent limitations of voice recognition software    Read the chart carefully and recognize, using context, where substitutions have occurred

## 2022-09-25 NOTE — CONSULTS
Consultation - [address] [facility] A Clunk 79 y o  female MRN: [id number]  Unit/Bed#: [facility] 406-01 Encounter: [id number]      Assessment/Plan   Patient Active Problem List   Diagnosis   • Metastatic breast cancer ([facility] )   • Adverse reaction to pneumococcal vaccine   • Anxiety   • Cataract, bilateral   • Mixed hyperlipidemia   • Pulmonary nodule seen on imaging study   • Sleep disorder   • Chronic kidney disease (CKD) stage G3a/A1, moderately decreased glomerular filtration rate (GFR) between 45-59 mL/min/1 73 square meter and albuminuria creatinine ratio less than 30 mg/g (HCC)   • Primary hypertension   • Osteopenia of multiple sites   • Cough due to ACE inhibitor   • Acute costochondritis   • Lytic lesion of bone on x-ray   • Neoplasm of uncertain behavior of sternum   • Rectal bleeding   • Toxic gastroenteritis and colitis   • Secondary malignant neoplasm of bone (HCC)   • REYES (acute kidney injury) ([facility] )   • Diarrhea   • Hypokalemia   • Anemia   • Severe protein-calorie malnutrition (HCC)   • Single subsegmental pulmonary embolism without acute cor pulmonale (HCC)   • Tachycardia   • Venous insufficiency   • Saddle embolus of pulmonary artery (HCC)   • Urinary retention   • Swelling of lower extremity     Active issues specifically addressed today include:   - acute, submassive saddle PE s/p mechanical thrombectomy and IVC filter   - stage IV breast CA   - REYES on CKD stage 3   - debility/deconditioning   - protein-calorie malnutrition   - goals of care support   - palliative care encounter    Plan:  #symptom management   - per primary team    #goals of care   - outpatient initial consultation with Palliative and Supportive Care on 07/21/2022   - no prior AD/LW completed   - confirmed Level 3, DNAR/DNI   - clearly states no desire to pursue antineoplastic therapy for cancer treatment   - would consider further hospitalizations in the future at this time   - expanded discussion regarding hospice model of care, overall patient would wish to return home, communicates insight into knowledge and peace with death  Finds strength in relationship and belief in God  Understands hospice appropriate diagnosis at this time, however, given consideration for future hospitalization, will hold on hospice liaison at this time  Will continue supportive conversation, as patient's plan of care in alignment with hospice outside of hospitalizations  - does not define current condition as suffering, feels well cared for    #psychosocial support   - emotional support provided   -    - three adult children   - Anny Solis [daughter] [phone]   - Amelie Gains [phone]   - Perez Gens   - four grandchildren, one great-grandchild   - currently resides alone   - Bahai nathaly background   - spiritual care referral placed   - discussed with  in person      We appreciate the opportunity to participate in this patient's care  We will continue to follow  Please do not hesitate to contact our on-call provider through our clinic answering service at [phone] should you have acute symptom control concerns  Controlled Sub[place]    [place] or [place]  reviewed: No red flags were identified; safe to proceed with prescription  [place] PDMP Review       Value Time User    PDMP Reviewed  Yes 8/15/2022  4:44 PM Jerome Guerrero MD          History of Present Illness   Physician Requesting Consult: Tiffanie Medel DO  Reason for Consult / Principal Problem: goals of care support  Hx and PE limited by: none  HPI: Mihai Luevano is a 79y o  year old female with a PMH of stage IV breast CA, CKD stage 3, protein-calorie malnutrition with recent admission [08/15-09/06] for evaluation/management of toxic gastroenteritis with other findings concerning for subsegmental PE with initiation of AC therapy  At that time, patient was discharged to [facility] and discharged on 09/21   Presented to [facility] ED after concerning for mechanical fall, initial VS concerning for hypotension and HR 150s  CTA revealed saddle PE with high clot burden  Transferred urgently to B for IR intervention  s/p IR mechanical thrombectomy on 09/22  Given failure of AC IVC filter was placed on 09/23  [facility] consulted for goals of care support  Initial OP [facility] consultation on 07/21/2022 with initiation of mirtazapine therapy for decreased appetite  Oncology History    No history exists  From OP [facility] visit on 07/21/2022: Jerzy Philip is under the care of Dr Jackson Lee for metastatic breast cancer  She was initially treated with 5 years of adjuvant treatment  She then developed chest discomfort  She was found to have a lytic sternal lesion and biopsy revealed ER positive breast cancer  There is an additional area of the spine that was positive, however no other evidence of metastatic disease  Per Oncology, Agustin Tate underwent radiation at [facility]   She was then found to have interval development of lung nodules and more sclerotic lesions  She was being treated with Jazmyne Aldo but had severe fatigue and nausea leading to weight loss  Jazmyne Aldo was discontinued and replaced by [employer]  She is also on letrozole  She is scheduled for repeat imaging to note progression  Patient currently reports no pain  Denies nausea, vomiting  Last BM last night  Well managed dyspnea while in ICU  Adequate sleep  Inpatient consult to Palliative Care  Consult performed by: Tiesha Avendano MD  Consult ordered by: Jigna Berg DO          Review of Systems   Constitutional: Positive for activity change and fatigue  Negative for chills and fever  HENT: Negative for congestion  Eyes: Negative for visual disturbance  Respiratory: Positive for shortness of breath  Cardiovascular: Negative for chest pain  Gastrointestinal: Negative for abdominal pain, constipation, nausea and vomiting  Genitourinary: Negative for frequency  Musculoskeletal: Negative for back pain     Neurological: Negative for syncope  Psychiatric/Behavioral: Negative for sleep disturbance  All other systems reviewed and are negative  Historical Information   Past Medical History:   Diagnosis Date   • Abnormal weight loss    • Allergic reaction    • Anxiety    • Breast cancer, right [facility]) 2011    right   • Cancer ([facility] ) 2012   • Candidal vulvovaginitis    • Clotting disorder ([facility] ) Same as above   • Endometrial hyperplasia    • Epithelial cyst     benign, ovary   • GI (gastrointestinal bleed) Blood mixed with stool   • History of radiation therapy 2011    right breast cancer   • Hyperlipidemia    • Hypertension    • Internal hemorrhoids    • Knee tendonitis    • Ovarian cyst    • 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[facility])      Past Surgical History:   Procedure Laterality Date   • BILATERAL SALPINGOOPHORECTOMY      onset: 7/23/13   • BREAST BIOPSY Right 06/13/2006    benign   • BREAST BIOPSY Right 03/02/2011    malignant   • BREAST LUMPECTOMY Right 03/30/2011    malignant   • CATARACT EXTRACTION W/  INTRAOCULAR LENS IMPLANT Right     phacoemulsification   Onset: 10/27/14   • CHOLECYSTECTOMY     • COLONOSCOPY  2017    approx   • HYSTERECTOMY  Yes   • INTRAOPERATIVE RADIATION THERAPY ([facility])     • IR BIOPSY BONE  7/9/2021   • IR PICC PLACEMENT SINGLE LUMEN  8/19/2022   • SKIN LESION EXCISION Right     breast, single lesion   • TONSILLECTOMY AND ADENOIDECTOMY       E-Cigarette/Vaping   • E-Cigarette Use Never User      E-Cigarette/Vaping Substances   • Nicotine No    • THC No    • CBD No    • Flavoring No    • Other No    • Unknown No      Social History     Socioeconomic History   • Marital status:      Spouse name: None   • Number of children: 3   • Years of education: None   • Highest education level: None   Occupational History   • None   Tobacco Use   • Smoking status: Former Smoker     Packs/day: 1 00     Years: 30 00     Pack years: 30 00     Types: Cigarettes     Start date: 55     Quit date: 1981     Years since quittin 7   • Smokeless tobacco: Never Used   Vaping Use   • Vaping Use: Never used   Substance and Sexual Activity   • Alcohol use: Not Currently     Comment: (history)   • Drug use: No   • Sexual activity: Not Currently   Other Topics Concern   • None   Social History Narrative   • None     Social Determinants of Health     Financial Resource Strain: Not on file   Food Insecurity: No Food Insecurity   • Worried About Running Out of Food in the Last Year: Never true   • Ran Out of Food in the Last Year: Never true   Transportation Needs: No Transportation Needs   • Lack of Transportation (Medical): No   • Lack of Transportation (Non-Medical):  No   Physical Activity: Not on file   Stress: Not on file   Social Connections: Not on file   Intimate Partner Violence: Not At Risk   • Fear of Current or Ex-Partner: No   • Emotionally Abused: No   • Physically Abused: No   • Sexually Abused: No   Housing Stability: Low Risk    • Unable to Pay for Housing in the Last Year: No   • Number of Places Lived in the Last Year: 1   • Unstable Housing in the Last Year: No     Family History   Problem Relation Age of Onset   • Stomach cancer Mother    • No Known Problems Father    • Cervical cancer Sister    • No Known Problems Daughter    • No Known Problems Maternal Grandmother    • No Known Problems Maternal Grandfather    • No Known Problems Paternal Grandmother    • No Known Problems Paternal Grandfather    • Colon polyps Neg Hx    • Colon cancer Neg Hx        Meds/Allergies   current meds:   Current Facility-Administered Medications   Medication Dose Route Frequency   • atorvastatin (LIPITOR) tablet 40 mg  40 mg Oral After Dinner   • cholecalciferol (VITAMIN D3) tablet 2,000 Units  2,000 Units Oral Daily   • cholestyramine sugar free (QUESTRAN LIGHT) packet 4 g  4 g Oral HS   • collagenase (SANTYL) ointment   Topical Daily   • enoxaparin (LOVENOX) subcutaneous injection 70 mg  1 mg/kg Subcutaneous E34D [address]   • folic acid (FOLVITE) tablet 1 mg  1 mg Oral Daily   • loperamide (IMODIUM) capsule 4 mg  4 mg Oral 4x Daily PRN   • metoprolol tartrate (LOPRESSOR) tablet 50 mg  50 mg Oral Q12H [address]   • pantoprazole (PROTONIX) EC tablet 40 mg  40 mg Oral BID AC   • potassium-sodium phosphates (PHOS-NAK) packet 2 packet  2 packet Oral BID With Meals   • predniSONE tablet 20 mg  20 mg Oral Daily   • senna-docusate sodium (SENOKOT S) 8 6-50 mg per tablet 2 tablet  2 tablet Oral BID         Allergies   Allergen Reactions   • Sulfa Antibiotics    • Pneumococcal Polysaccharide Vaccine Rash and Edema       Objective     Physical Exam  Vitals and nursing note reviewed  Constitutional:       General: She is awake  Appearance: She is not diaphoretic  Comments: Chronically ill appearing in NAD  BMI 29 1  Non-toxic appearing   HENT:      Head: Normocephalic and atraumatic  Right Ear: External ear normal       Left Ear: External ear normal       Nose: No rhinorrhea  Eyes:      Comments: No gaze preference   Cardiovascular:      Rate and Rhythm: Normal rate  Pulmonary:      Effort: No tachypnea, accessory muscle usage or respiratory distress  Comments: Completes full sentences without difficulty  NC in place  Musculoskeletal:      Cervical back: Normal range of motion  Skin:     Coloration: Skin is pale  Neurological:      General: No focal deficit present  Mental Status: She is alert and oriented to person, place, and time  Psychiatric:         Attention and Perception: Attention normal          Mood and Affect: Mood and affect normal          Speech: Speech normal          Cognition and Memory: Cognition and memory normal          Lab Results:   I have personally reviewed pertinent labs  , CBC:   Lab Results   Component Value Date    WBC 3 20 (L) 09/25/2022    HGB 8 1 (L) 09/25/2022    HCT 24 2 (L) 09/25/2022     (H) 09/25/2022    PLT 66 (L) 09/25/2022    MCH 31 7 09/25/2022    MCHC 30 2 (L) 09/25/2022    RDW 19 3 (H) 09/25/2022    MPV 10 2 09/25/2022    NRBC 0 09/25/2022   , CMP:   Lab Results   Component Value Date    SODIUM 141 09/25/2022    K 4 5 09/25/2022     (H) 09/25/2022    CO2 23 09/25/2022    BUN 13 09/25/2022    CREATININE 0 68 09/25/2022    CALCIUM 7 2 (L) 09/25/2022    EGFR 81 09/25/2022   , PT/PTT:  No results found for: PT, PTT  Imaging Studies: I have personally reviewed pertinent reports  EKG, Pathology, and Other Studies: I have personally reviewed pertinent reports  Code Status: Level 3 - DNAR and DNI  Advance Directive and Living Will:   Not on File, not previously completed  Power of :   n/a  POLST:   n/a    Counseling / Coordination of Care  Total floor / unit time spent today 45 minutes  Greater than 50% of total time was spent with the patient and / or family counseling and / or coordination of care  A description of the counseling / coordination of care: provided medical updates, discussed palliative care, determined competency, determined goals of care, determined POA, determined social/family support, discussed plans of care, discussed symptom management, provided psychosocial support  Expanded goals of care discussion  Expanded hospice model of care discussion  PDMP Reviewed   Coordinated plan of care with primary team

## 2022-09-25 NOTE — PLAN OF CARE
Problem: Potential for Falls  Goal: Patient will remain free of falls  Description: INTERVENTIONS:  - Educate patient/family on patient safety including physical limitations  - Instruct patient to call for assistance with activity   - Consult OT/PT to assist with strengthening/mobility   - Keep Call bell within reach  - Keep bed low and locked with side rails adjusted as appropriate  - Keep care items and personal belongings within reach  - Initiate and maintain comfort rounds  - Make Fall Risk Sign visible to staff  - Offer Toileting every  Hours, in advance of need  - Initiate/Maintain alarm  - Obtain necessary fall risk management equipment:   - Apply yellow socks and bracelet for high fall risk patients  - Consider moving patient to room near nurses station  Outcome: Progressing     Problem: MOBILITY - ADULT  Goal: Maintain or return to baseline ADL function  Description: INTERVENTIONS:  -  Assess patient's ability to carry out ADLs; assess patient's baseline for ADL function and identify physical deficits which impact ability to perform ADLs (bathing, care of mouth/teeth, toileting, grooming, dressing, etc )  - Assess/evaluate cause of self-care deficits   - Assess range of motion  - Assess patient's mobility; develop plan if impaired  - Assess patient's need for assistive devices and provide as appropriate  - Encourage maximum independence but intervene and supervise when necessary  - Involve family in performance of ADLs  - Assess for home care needs following discharge   - Consider OT consult to assist with ADL evaluation and planning for discharge  - Provide patient education as appropriate  Outcome: Progressing  Goal: Maintains/Returns to pre admission functional level  Description: INTERVENTIONS:  - Perform BMAT or MOVE assessment daily    - Set and communicate daily mobility goal to care team and patient/family/caregiver     - Collaborate with rehabilitation services on mobility goals if consulted  - Perform Range of Motion  times a day  - Reposition patient every  hours    - Dangle patient  times a day  - Stand patient  times a day  - Ambulate patient  times a day  - Out of bed to chair  times a day   - Out of bed for meals  times a day  - Out of bed for toileting  - Record patient progress and toleration of activity level   Outcome: Progressing     Problem: Prexisting or High Potential for Compromised Skin Integrity  Goal: Skin integrity is maintained or improved  Description: INTERVENTIONS:  - Identify patients at risk for skin breakdown  - Assess and monitor skin integrity  - Assess and monitor nutrition and hydration status  - Monitor labs   - Assess for incontinence   - Turn and reposition patient  - Assist with mobility/ambulation  - Relieve pressure over bony prominences  - Avoid friction and shearing  - Provide appropriate hygiene as needed including keeping skin clean and dry  - Evaluate need for skin moisturizer/barrier cream  - Collaborate with interdisciplinary team   - Patient/family teaching  - Consider wound care consult   Outcome: Progressing     Problem: GENITOURINARY - ADULT  Goal: Maintains or returns to baseline urinary function  Description: INTERVENTIONS:  - Assess urinary function  - Encourage oral fluids to ensure adequate hydration if ordered  - Administer IV fluids as ordered to ensure adequate hydration  - Administer ordered medications as needed  - Offer frequent toileting  - Follow urinary retention protocol if ordered  Outcome: Progressing  Goal: Absence of urinary retention  Description: INTERVENTIONS:  - Assess patient’s ability to void and empty bladder  - Monitor I/O  - Bladder scan as needed  - Discuss with physician/AP medications to alleviate retention as needed  - Discuss catheterization for long term situations as appropriate  Outcome: Progressing  Goal: Urinary catheter remains patent  Description: INTERVENTIONS:  - Assess patency of urinary catheter  - If patient has a chronic suarez, consider changing catheter if non-functioning  - Follow guidelines for intermittent irrigation of non-functioning urinary catheter  Outcome: Progressing     Problem: METABOLIC, FLUID AND ELECTROLYTES - ADULT  Goal: Electrolytes maintained within normal limits  Description: INTERVENTIONS:  - Monitor labs and assess patient for signs and symptoms of electrolyte imbalances  - Administer electrolyte replacement as ordered  - Monitor response to electrolyte replacements, including repeat lab results as appropriate  - Instruct patient on fluid and nutrition as appropriate  Outcome: Progressing  Goal: Fluid balance maintained  Description: INTERVENTIONS:  - Monitor labs   - Monitor I/O and WT  - Instruct patient on fluid and nutrition as appropriate  - Assess for signs & symptoms of volume excess or deficit  Outcome: Progressing  Goal: Glucose maintained within target range  Description: INTERVENTIONS:  - Monitor Blood Glucose as ordered  - Assess for signs and symptoms of hyperglycemia and hypoglycemia  - Administer ordered medications to maintain glucose within target range  - Assess nutritional intake and initiate nutrition service referral as needed  Outcome: Progressing     Problem: SKIN/TISSUE INTEGRITY - ADULT  Goal: Skin Integrity remains intact(Skin Breakdown Prevention)  Description: Assess:  -Perform Osito assessment every   -Clean and moisturize skin every   -Inspect skin when repositioning, toileting, and assisting with ADLS  -Assess under medical devices such as  every   -Assess extremities for adequate circulation and sensation     Bed Management:  -Have minimal linens on bed & keep smooth, unwrinkled  -Change linens as needed when moist or perspiring  -Avoid sitting or lying in one position for more than  hours while in bed  -Keep HOB at degrees     Toileting:  -Offer bedside commode  -Assess for incontinence every   -Use incontinent care products after each incontinent episode such as Activity:  -Mobilize patient  times a day  -Encourage activity and walks on unit  -Encourage or provide ROM exercises   -Turn and reposition patient every  Hours  -Use appropriate equipment to lift or move patient in bed  -Instruct/ Assist with weight shifting every  when out of bed in chair  -Consider limitation of chair time  hour intervals    Skin Care:  -Avoid use of baby powder, tape, friction and shearing, hot water or constrictive clothing  -Relieve pressure over bony prominences using   -Do not massage red bony areas    Next Steps:  -Teach patient strategies to minimize risks such as    -Consider consults to  interdisciplinary teams such as   Outcome: Progressing  Goal: Incision(s), wounds(s) or drain site(s) healing without S/S of infection  Description: INTERVENTIONS  - Assess and document dressing, incision, wound bed, drain sites and surrounding tissue  - Provide patient and family education  - Perform skin care/dressing changes every   Outcome: Progressing  Goal: Pressure injury heals and does not worsen  Description: Interventions:  - Implement low air loss mattress or specialty surface (Criteria met)  - Apply silicone foam dressing  - Instruct/assist with weight shifting every  minutes when in chair   - Limit chair time to  hour intervals  - Use special pressure reducing interventions such as  when in chair   - Apply fecal or urinary incontinence containment device   - Perform passive or active ROM every   - Turn and reposition patient & offload bony prominences every  hours   - Utilize friction reducing device or surface for transfers   - Consider consults to  interdisciplinary teams such as   - Use incontinent care products after each incontinent episode such a  - Consider nutrition services referral as needed  Outcome: Progressing

## 2022-09-26 ENCOUNTER — APPOINTMENT (OUTPATIENT)
Dept: RADIOLOGY | Facility: HOSPITAL | Age: 82
DRG: 163 | End: 2022-09-26
Attending: INTERNAL MEDICINE
Payer: MEDICARE

## 2022-09-26 LAB
ABO GROUP BLD BPU: NORMAL
ABO GROUP BLD BPU: NORMAL
ALBUMIN SERPL BCP-MCNC: 2 G/DL (ref 3.5–5)
ALBUMIN SERPL BCP-MCNC: 2.1 G/DL (ref 3.5–5)
ALP SERPL-CCNC: 57 U/L (ref 46–116)
ALP SERPL-CCNC: 62 U/L (ref 46–116)
ALT SERPL W P-5'-P-CCNC: 18 U/L (ref 12–78)
ALT SERPL W P-5'-P-CCNC: 19 U/L (ref 12–78)
ANION GAP SERPL CALCULATED.3IONS-SCNC: 5 MMOL/L (ref 4–13)
ANION GAP SERPL CALCULATED.3IONS-SCNC: 6 MMOL/L (ref 4–13)
ANISOCYTOSIS BLD QL SMEAR: PRESENT
AST SERPL W P-5'-P-CCNC: 7 U/L (ref 5–45)
AST SERPL W P-5'-P-CCNC: 8 U/L (ref 5–45)
ATRIAL RATE: 133 BPM
BASOPHILS # BLD MANUAL: 0 THOUSAND/UL (ref 0–0.1)
BASOPHILS NFR MAR MANUAL: 0 % (ref 0–1)
BILIRUB SERPL-MCNC: 0.39 MG/DL (ref 0.2–1)
BILIRUB SERPL-MCNC: 0.52 MG/DL (ref 0.2–1)
BPU ID: NORMAL
BPU ID: NORMAL
BUN SERPL-MCNC: 13 MG/DL (ref 5–25)
BUN SERPL-MCNC: 13 MG/DL (ref 5–25)
CALCIUM ALBUM COR SERPL-MCNC: 8.8 MG/DL (ref 8.3–10.1)
CALCIUM ALBUM COR SERPL-MCNC: 9 MG/DL (ref 8.3–10.1)
CALCIUM SERPL-MCNC: 7.2 MG/DL (ref 8.3–10.1)
CALCIUM SERPL-MCNC: 7.5 MG/DL (ref 8.3–10.1)
CHLORIDE SERPL-SCNC: 112 MMOL/L (ref 96–108)
CHLORIDE SERPL-SCNC: 112 MMOL/L (ref 96–108)
CO2 SERPL-SCNC: 22 MMOL/L (ref 21–32)
CO2 SERPL-SCNC: 23 MMOL/L (ref 21–32)
CREAT SERPL-MCNC: 0.71 MG/DL (ref 0.6–1.3)
CREAT SERPL-MCNC: 0.75 MG/DL (ref 0.6–1.3)
CROSSMATCH: NORMAL
CROSSMATCH: NORMAL
CRP SERPL QL: 111 MG/L
EOSINOPHIL # BLD MANUAL: 0 THOUSAND/UL (ref 0–0.4)
EOSINOPHIL NFR BLD MANUAL: 0 % (ref 0–6)
ERYTHROCYTE [DISTWIDTH] IN BLOOD BY AUTOMATED COUNT: 17.3 % (ref 11.6–15.1)
ERYTHROCYTE [DISTWIDTH] IN BLOOD BY AUTOMATED COUNT: 17.9 % (ref 11.6–15.1)
G LAMBLIA AG STL QL IA: NEGATIVE
GFR SERPL CREATININE-BSD FRML MDRD: 74 ML/MIN/1.73SQ M
GFR SERPL CREATININE-BSD FRML MDRD: 79 ML/MIN/1.73SQ M
GLUCOSE SERPL-MCNC: 135 MG/DL (ref 65–140)
GLUCOSE SERPL-MCNC: 82 MG/DL (ref 65–140)
HCT VFR BLD AUTO: 22.5 % (ref 34.8–46.1)
HCT VFR BLD AUTO: 23.3 % (ref 34.8–46.1)
HGB BLD-MCNC: 6.9 G/DL (ref 11.5–15.4)
HGB BLD-MCNC: 7.3 G/DL (ref 11.5–15.4)
LYMPHOCYTES # BLD AUTO: 0.17 THOUSAND/UL (ref 0.6–4.47)
LYMPHOCYTES # BLD AUTO: 3 % (ref 14–44)
MACROCYTES BLD QL AUTO: PRESENT
MCH RBC QN AUTO: 32.1 PG (ref 26.8–34.3)
MCH RBC QN AUTO: 32.2 PG (ref 26.8–34.3)
MCHC RBC AUTO-ENTMCNC: 30.7 G/DL (ref 31.4–37.4)
MCHC RBC AUTO-ENTMCNC: 31.3 G/DL (ref 31.4–37.4)
MCV RBC AUTO: 103 FL (ref 82–98)
MCV RBC AUTO: 105 FL (ref 82–98)
METAMYELOCYTES NFR BLD MANUAL: 1 % (ref 0–1)
MONOCYTES # BLD AUTO: 0.17 THOUSAND/UL (ref 0–1.22)
MONOCYTES NFR BLD: 3 % (ref 4–12)
NEUTROPHILS # BLD MANUAL: 5.13 THOUSAND/UL (ref 1.85–7.62)
NEUTS BAND NFR BLD MANUAL: 12 % (ref 0–8)
NEUTS SEG NFR BLD AUTO: 80 % (ref 43–75)
NRBC BLD AUTO-RTO: 0 /100 WBCS
NRBC BLD AUTO-RTO: 1 /100 WBC (ref 0–2)
PLATELET # BLD AUTO: 62 THOUSANDS/UL (ref 149–390)
PLATELET # BLD AUTO: 66 THOUSANDS/UL (ref 149–390)
PLATELET BLD QL SMEAR: ABNORMAL
PMV BLD AUTO: 11.1 FL (ref 8.9–12.7)
PMV BLD AUTO: 11.4 FL (ref 8.9–12.7)
POIKILOCYTOSIS BLD QL SMEAR: PRESENT
POLYCHROMASIA BLD QL SMEAR: PRESENT
POTASSIUM SERPL-SCNC: 3.3 MMOL/L (ref 3.5–5.3)
POTASSIUM SERPL-SCNC: 3.5 MMOL/L (ref 3.5–5.3)
PR INTERVAL: 104 MS
PROCALCITONIN SERPL-MCNC: 0.19 NG/ML
PROT SERPL-MCNC: 4.2 G/DL (ref 6.4–8.4)
PROT SERPL-MCNC: 4.3 G/DL (ref 6.4–8.4)
QRS AXIS: 55 DEGREES
QRSD INTERVAL: 66 MS
QT INTERVAL: 382 MS
QTC INTERVAL: 568 MS
RBC # BLD AUTO: 2.15 MILLION/UL (ref 3.81–5.12)
RBC # BLD AUTO: 2.27 MILLION/UL (ref 3.81–5.12)
RBC MORPH BLD: PRESENT
SODIUM SERPL-SCNC: 140 MMOL/L (ref 135–147)
SODIUM SERPL-SCNC: 140 MMOL/L (ref 135–147)
T WAVE AXIS: 84 DEGREES
UNIT DISPENSE STATUS: NORMAL
UNIT DISPENSE STATUS: NORMAL
UNIT PRODUCT CODE: NORMAL
UNIT PRODUCT CODE: NORMAL
UNIT PRODUCT VOLUME: 350 ML
UNIT PRODUCT VOLUME: 350 ML
UNIT RH: NORMAL
UNIT RH: NORMAL
VARIANT LYMPHS # BLD AUTO: 1 %
VENTRICULAR RATE: 133 BPM
WBC # BLD AUTO: 4.11 THOUSAND/UL (ref 4.31–10.16)
WBC # BLD AUTO: 5.58 THOUSAND/UL (ref 4.31–10.16)

## 2022-09-26 PROCEDURE — 97167 OT EVAL HIGH COMPLEX 60 MIN: CPT

## 2022-09-26 PROCEDURE — 36569 INSJ PICC 5 YR+ W/O IMAGING: CPT

## 2022-09-26 PROCEDURE — 99232 SBSQ HOSP IP/OBS MODERATE 35: CPT | Performed by: INTERNAL MEDICINE

## 2022-09-26 PROCEDURE — 80053 COMPREHEN METABOLIC PANEL: CPT | Performed by: INTERNAL MEDICINE

## 2022-09-26 PROCEDURE — 30233N1 TRANSFUSION OF NONAUTOLOGOUS RED BLOOD CELLS INTO PERIPHERAL VEIN, PERCUTANEOUS APPROACH: ICD-10-PCS | Performed by: INTERNAL MEDICINE

## 2022-09-26 PROCEDURE — 99222 1ST HOSP IP/OBS MODERATE 55: CPT | Performed by: INTERNAL MEDICINE

## 2022-09-26 PROCEDURE — 85027 COMPLETE CBC AUTOMATED: CPT | Performed by: INTERNAL MEDICINE

## 2022-09-26 PROCEDURE — 36573 INSJ PICC RS&I 5 YR+: CPT | Performed by: NURSE PRACTITIONER

## 2022-09-26 PROCEDURE — 97163 PT EVAL HIGH COMPLEX 45 MIN: CPT

## 2022-09-26 PROCEDURE — C1751 CATH, INF, PER/CENT/MIDLINE: HCPCS

## 2022-09-26 PROCEDURE — 93010 ELECTROCARDIOGRAM REPORT: CPT | Performed by: INTERNAL MEDICINE

## 2022-09-26 PROCEDURE — P9016 RBC LEUKOCYTES REDUCED: HCPCS

## 2022-09-26 PROCEDURE — 99233 SBSQ HOSP IP/OBS HIGH 50: CPT | Performed by: SURGERY

## 2022-09-26 PROCEDURE — 02HV33Z INSERTION OF INFUSION DEVICE INTO SUPERIOR VENA CAVA, PERCUTANEOUS APPROACH: ICD-10-PCS | Performed by: RADIOLOGY

## 2022-09-26 PROCEDURE — 86902 BLOOD TYPE ANTIGEN DONOR EA: CPT

## 2022-09-26 RX ORDER — POTASSIUM CHLORIDE 20 MEQ/1
40 TABLET, EXTENDED RELEASE ORAL ONCE
Status: COMPLETED | OUTPATIENT
Start: 2022-09-26 | End: 2022-09-26

## 2022-09-26 RX ORDER — ALBUMIN (HUMAN) 12.5 G/50ML
12.5 SOLUTION INTRAVENOUS ONCE
Status: COMPLETED | OUTPATIENT
Start: 2022-09-26 | End: 2022-09-26

## 2022-09-26 RX ORDER — ALBUMIN (HUMAN) 12.5 G/50ML
12.5 SOLUTION INTRAVENOUS ONCE
Status: DISCONTINUED | OUTPATIENT
Start: 2022-09-26 | End: 2022-09-26

## 2022-09-26 RX ORDER — PREDNISONE 10 MG/1
10 TABLET ORAL DAILY
Status: DISCONTINUED | OUTPATIENT
Start: 2022-09-27 | End: 2022-09-27

## 2022-09-26 RX ORDER — MIRTAZAPINE 15 MG/1
7.5 TABLET, FILM COATED ORAL
Status: DISCONTINUED | OUTPATIENT
Start: 2022-09-26 | End: 2022-10-20 | Stop reason: HOSPADM

## 2022-09-26 RX ORDER — LIDOCAINE HYDROCHLORIDE 10 MG/ML
INJECTION, SOLUTION EPIDURAL; INFILTRATION; INTRACAUDAL; PERINEURAL CODE/TRAUMA/SEDATION MEDICATION
Status: COMPLETED | OUTPATIENT
Start: 2022-09-26 | End: 2022-09-26

## 2022-09-26 RX ORDER — MIDODRINE HYDROCHLORIDE 2.5 MG/1
2.5 TABLET ORAL
Status: DISCONTINUED | OUTPATIENT
Start: 2022-09-26 | End: 2022-10-20 | Stop reason: HOSPADM

## 2022-09-26 RX ORDER — MAGNESIUM SULFATE HEPTAHYDRATE 40 MG/ML
2 INJECTION, SOLUTION INTRAVENOUS ONCE
Status: COMPLETED | OUTPATIENT
Start: 2022-09-26 | End: 2022-09-26

## 2022-09-26 RX ORDER — ALBUMIN, HUMAN INJ 5% 5 %
SOLUTION INTRAVENOUS
Status: DISPENSED
Start: 2022-09-26 | End: 2022-09-27

## 2022-09-26 RX ADMIN — METOPROLOL TARTRATE 25 MG: 25 TABLET, FILM COATED ORAL at 09:18

## 2022-09-26 RX ADMIN — POTASSIUM CHLORIDE 40 MEQ: 1500 TABLET, EXTENDED RELEASE ORAL at 02:08

## 2022-09-26 RX ADMIN — POTASSIUM CHLORIDE 40 MEQ: 1500 TABLET, EXTENDED RELEASE ORAL at 23:04

## 2022-09-26 RX ADMIN — COLLAGENASE SANTYL: 250 OINTMENT TOPICAL at 08:42

## 2022-09-26 RX ADMIN — ENOXAPARIN SODIUM 70 MG: 80 INJECTION SUBCUTANEOUS at 08:40

## 2022-09-26 RX ADMIN — PREDNISONE 20 MG: 20 TABLET ORAL at 08:41

## 2022-09-26 RX ADMIN — ALBUMIN (HUMAN) 12.5 G: 0.25 INJECTION, SOLUTION INTRAVENOUS at 03:05

## 2022-09-26 RX ADMIN — LIDOCAINE HYDROCHLORIDE 10 ML: 10 INJECTION, SOLUTION EPIDURAL; INFILTRATION; INTRACAUDAL; PERINEURAL at 14:19

## 2022-09-26 RX ADMIN — CARBIDOPA AND LEVODOPA 2.5 MG: 50; 200 TABLET, EXTENDED RELEASE ORAL at 15:17

## 2022-09-26 RX ADMIN — Medication 125 MG: at 23:03

## 2022-09-26 RX ADMIN — Medication 2000 UNITS: at 08:41

## 2022-09-26 RX ADMIN — MAGNESIUM SULFATE HEPTAHYDRATE 2 G: 40 INJECTION, SOLUTION INTRAVENOUS at 09:46

## 2022-09-26 RX ADMIN — FOLIC ACID 1 MG: 1 TABLET ORAL at 08:41

## 2022-09-26 RX ADMIN — METOROPROLOL TARTRATE 2.5 MG: 5 INJECTION, SOLUTION INTRAVENOUS at 00:31

## 2022-09-26 RX ADMIN — ENOXAPARIN SODIUM 70 MG: 80 INJECTION SUBCUTANEOUS at 22:49

## 2022-09-26 RX ADMIN — CHOLESTYRAMINE 4 G: 4 POWDER, FOR SUSPENSION ORAL at 22:49

## 2022-09-26 RX ADMIN — Medication 125 MG: at 05:47

## 2022-09-26 RX ADMIN — POTASSIUM & SODIUM PHOSPHATES POWDER PACK 280-160-250 MG 2 PACKET: 280-160-250 PACK at 08:40

## 2022-09-26 RX ADMIN — CARBIDOPA AND LEVODOPA 2.5 MG: 50; 200 TABLET, EXTENDED RELEASE ORAL at 12:30

## 2022-09-26 RX ADMIN — Medication 12.5 MG: at 22:58

## 2022-09-26 RX ADMIN — Medication 12.5 MG: at 15:26

## 2022-09-26 RX ADMIN — ATORVASTATIN CALCIUM 40 MG: 40 TABLET, FILM COATED ORAL at 18:19

## 2022-09-26 RX ADMIN — PANTOPRAZOLE SODIUM 40 MG: 40 TABLET, DELAYED RELEASE ORAL at 06:15

## 2022-09-26 RX ADMIN — Medication 125 MG: at 18:19

## 2022-09-26 RX ADMIN — MIRTAZAPINE 7.5 MG: 15 TABLET, FILM COATED ORAL at 22:49

## 2022-09-26 RX ADMIN — PANTOPRAZOLE SODIUM 40 MG: 40 TABLET, DELAYED RELEASE ORAL at 15:17

## 2022-09-26 RX ADMIN — Medication 125 MG: at 12:30

## 2022-09-26 RX ADMIN — METOROPROLOL TARTRATE 2.5 MG: 5 INJECTION, SOLUTION INTRAVENOUS at 09:17

## 2022-09-26 RX ADMIN — Medication 125 MG: at 00:09

## 2022-09-26 NOTE — PHYSICAL THERAPY NOTE
Physical Therapy Evaluation     Patient's Name: Roberto Silverman    Admitting Diagnosis  Pulmonary embolism ([facility] ) [I26 [id number]]    Problem List  Patient Active Problem List   Diagnosis    Metastatic breast cancer ([facility] )    Adverse reaction to pneumococcal vaccine    Anxiety    Cataract, bilateral    Mixed hyperlipidemia    Pulmonary nodule seen on imaging study    Sleep disorder    Chronic kidney disease (CKD) stage G3a/A1, moderately decreased glomerular filtration rate (GFR) between 45-59 mL/min/1 73 square meter and albuminuria creatinine ratio less than 30 mg/g (HCC)    Primary hypertension    Osteopenia of multiple sites    Cough due to ACE inhibitor    Acute costochondritis    Lytic lesion of bone on x-ray    Neoplasm of uncertain behavior of sternum    Rectal bleeding    Toxic gastroenteritis and colitis    Secondary malignant neoplasm of bone ([facility] )    REYES (acute kidney injury) ([facility] )    Diarrhea    Hypokalemia    Anemia    Severe protein-calorie malnutrition ([facility] )    Single subsegmental pulmonary embolism without acute cor pulmonale (HCC)    Tachycardia    Venous insufficiency    Saddle embolus of pulmonary artery (HCC)    Urinary retention    Swelling of lower extremity       Past Medical History  Past Medical History:   Diagnosis Date    Abnormal weight loss     Allergic reaction     Anxiety     Breast cancer, right ([facility] ) 2011    right    Cancer ([facility] ) 2012    Candidal vulvovaginitis     Clotting disorder ([place] [address] ) Same as above    Endometrial hyperplasia     Epithelial cyst     benign, ovary    GI (gastrointestinal bleed) Blood mixed with stool    History of radiation therapy 2011    right breast cancer    Hyperlipidemia     Hypertension     Internal hemorrhoids     Knee tendonitis     Ovarian cyst     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[facility])        Past Surgical History  Past Surgical History:   Procedure Laterality Date    BILATERAL SALPINGOOPHORECTOMY      onset: 7/23/13    BREAST BIOPSY Right 06/13/2006    benign BREAST BIOPSY Right 03/02/2011    malignant    BREAST LUMPECTOMY Right 03/30/2011    malignant    CATARACT EXTRACTION W/  INTRAOCULAR LENS IMPLANT Right     phacoemulsification  Onset: 10/27/14    CHOLECYSTECTOMY      COLONOSCOPY  2017    approx    HYSTERECTOMY  Yes    INTRAOPERATIVE RADIATION THERAPY (IORT)      IR BIOPSY BONE  7/9/2021    IR IVC FILTER PLACEMENT OPTIONAL/TEMPORARY  9/23/2022    IR PE ENDOVASCULAR THERAPY  9/22/2022    IR PICC PLACEMENT SINGLE LUMEN  8/19/2022    SKIN LESION EXCISION Right     breast, single lesion    TONSILLECTOMY AND ADENOIDECTOMY          09/26/22 0914   PT Last Visit   PT Visit Date 09/26/22   Note Type   Note type Evaluation   Pain Assessment   Pain Assessment Tool 0-10   Pain Score No Pain   Restrictions/Precautions   Braces or Orthoses   (denies)   Other Precautions Cardiac/sternal;Multiple lines;Telemetry; Fall Risk;Limb alert;Contact/isolation   Home Living   Type of Home Apartment   Home Layout One level  (denies PALMA)   Prior Function   Level of [place] Independent with ADLs and functional mobility  (amb w/o AD)   Lives With Alone   Receives Help From [facility] in the last 6 months 1 to 4  (one recent fall)   General   Additional Pertinent History cleared for assessment (spoke to nsg)   Cognition   Overall Cognitive Status WFL   Arousal/Participation Alert   Orientation Level Oriented to person;Oriented to place;Oriented to situation   Memory Decreased recall of recent events   Following Commands Follows one step commands without difficulty   Subjective   Subjective Alert; in bed; apprehensive about mobilization but agreeable to try after encouragement and re-assurance provided   RUE Assessment   RUE Assessment WFL  (AROM)   LUE Assessment   LUE Assessment WFL  (AROM)   RLE Assessment   RLE Assessment X  (decreased AROM at the hip)   Strength RLE   RLE Overall Strength   (fair - hip; fair knee and ankle (grossly))   LLE Assessment   LLE Assessment X  (decreased AROM at the hip)   Strength LLE   LLE Overall Strength   (fair - hip; fair knee and ankle (grossly))   Bed Mobility   Supine to Sit 3  Moderate assistance   Additional items Assist x 2; Increased time required;Verbal cues;LE management   Transfers   Sit to Stand 3  Moderate assistance   Additional items Assist x 2; Increased time required;Verbal cues   Stand to Sit 3  Moderate assistance   Additional items Assist x 2; Increased time required;Verbal cues   Stand pivot 3  Moderate assistance   Additional items Assist x 2; Increased time required;Verbal cues  (bed to chair to the (L) side)   Ambulation/Elevation   Gait pattern Excessively slow; Short stride; Foward flexed; Shuffling   Gait Assistance 3  Moderate assist   Additional items Assist x 2;Verbal cues; Tactile cues   Assistive Device Other (Comment)  (hand hold (A)x2; rw next visit)   Distance 3 ft total distance for bed to chair transition   Balance   Static Sitting Fair   Dynamic Sitting Fair -   Static Standing Poor +   Dynamic Standing Poor   Ambulatory Poor -   Activity Tolerance   Activity Tolerance Patient limited by fatigue;Treatment limited secondary to medical complications (Comment)  (HR from 120s-130s bpm at rest and to 150s bpm post mobilization; subsided to 130s bpm w/ rest; RN aware)   Medical Staff Made Aware Co-eval performed w/ OTR due to complexity of medical status and multiple comorbidities   Nurse Made Aware spoke to Jose Allen RN   Assessment   Prognosis Fair   Problem List Decreased strength;Decreased endurance; Impaired balance;Decreased mobility   Assessment Pt is 79 y o  female admitted with hx of fall and tachycardia and Dx of Saddle embolus of pulmonary artery  Pt 's comorbidities affecting POC include: Anxiety, Breast cancer, right ([address] ), Clotting disorder ([address] ), CKD, hypertension, and chronic anemia and personal factors of: advanced age and living alone   Pt's clinical presentation is currently unstable/unpredictable which is evident in ongoing telem monitoring w/ elevated HR noted, abn lab values, and need for assist x2 w/ all phases of limited mobility at bedside when usually mobilizing independently  Pt presents w/ generalized weakness, incl decreased LE strength, decreased functional endurance and activity tolerance, impaired balance, gait deviations and fall risk  Will cont to follow pt in PT for progressive mobilization to address above functional deficits and to max level of (I), endurance, and safety  Currently recommend rehab upon D/C  Will cont to follow until then  Barriers to Discharge Decreased caregiver support   Goals   Patient Goals to get stronger   STG Expiration Date 10/06/22   Short Term Goal #1 7-10 days  Pt will amb 50 ft w/ rw, mod (I) in order to facilitate safe return to premorbid environment and to at least household amb status  Pt will achieve (I) level w/ bed mob in order to facilitate safety with OOB and back to bed transitions in own living environment  Pt will perform transfers w/ mod (I) to assure (I) and safety w/ functional mobility/transitions w/ all aspects of mobility/locomotion  Pt will participate in LE therex and balance activities to max progression w/ mobility skills  PT Treatment Day 0   Plan   Treatment/Interventions Functional transfer training;LE strengthening/ROM; Therapeutic exercise; Endurance training;Bed mobility;Gait training;Spoke to nursing;OT   PT Frequency 3-5x/wk   Recommendation   PT Discharge Recommendation Post acute rehabilitation services   Equipment Recommended   (r/o rw)   AM-PAC Basic Mobility Inpatient   Turning in Bed Without Bedrails 3   Lying on Back to Sitting on Edge of Flat Bed 2   Moving Bed to Chair 2   Standing Up From Chair 2   Walk in Room 2   Climb 3-5 Stairs 2   Basic Mobility Inpatient Raw Score 13   Basic Mobility Standardized Score 33 99   Highest Level Of Mobility   -HLM Goal 4: Move to chair/commode   -HLM Achieved 4: Move to chair/commode   Modified [place] Scale   Modified [place] Scale 4   End of Consult   Patient Position at End of Consult Bedside chair; All needs within reach           [facility], PT

## 2022-09-26 NOTE — CASE MANAGEMENT
Case Management Discharge Planning Note    Patient name Susan Silverman  Location [facility] 406/[facility] 406-01 MRN [id number]  : 1940 Date 2022       Current Admission Date: 2022  Current Admission Diagnosis:Saddle embolus of pulmonary artery [facility])   Patient Active Problem List    Diagnosis Date Noted   • Saddle embolus of pulmonary artery ([facility] ) 2022   • Urinary retention 2022   • Swelling of lower extremity 2022   • Venous insufficiency 2022   • Tachycardia 2022   • Single subsegmental pulmonary embolism without acute cor pulmonale (HCC) 2022   • Severe protein-calorie malnutrition ([facility] ) 2022   • Anemia 2022   • Hypokalemia 2022   • REYES (acute kidney injury) ([place] [address] ) 2022   • Diarrhea 2022   • Secondary malignant neoplasm of bone ([place] [address] ) 2022   • Toxic gastroenteritis and colitis 2022   • Rectal bleeding 10/14/2021   • Lytic lesion of bone on x-ray 2021   • Neoplasm of uncertain behavior of sternum 2021   • Cough due to ACE inhibitor 2021   • Acute costochondritis 2021   • Osteopenia of multiple sites 2020   • Primary hypertension 10/22/2019   • Chronic kidney disease (CKD) stage G3a/A1, moderately decreased glomerular filtration rate (GFR) between 45-59 mL/min/1 73 square meter and albuminuria creatinine ratio less than 30 mg/g ([facility]) 2019   • Adverse reaction to pneumococcal vaccine 2015   • Cataract, bilateral 2014   • Pulmonary nodule seen on imaging study 09/10/2013   • Sleep disorder 2013   • Anxiety 2013   • Metastatic breast cancer ([facility] ) 10/17/2012   • Mixed hyperlipidemia 2012      LOS (days): 4  Geometric Mean LOS (GMLOS) (days): 4 40  Days to GMLOS:0 5     OBJECTIVE:  Risk of Unplanned Readmission Score: 33 92         Current admission status: Inpatient   Preferred Pharmacy:   [facility] [place] [place] - [address] [place] [address]  Phone: [phone] Fax: [address], [address]  [address] [place] [address]  [address]  [address] [place] [address]  Phone: [phone] Fax: [phone]    [address] [place] [address],[address]D, [facility] [address] [facility] [address] [address] [facility] [address] [address]  Phone: [phone] Fax: [phone]    Primary Care Provider: Cayetano Ramirez DO    Primary Insurance: MEDICARE  Secondary Insurance: [employer]    DISCHARGE DETAILS:                                          Other Referral/Resources/Interventions Provided:  Referral Comments: snfs previously referred, has used Medicare 20 days  Programs[de-identified] CHF

## 2022-09-26 NOTE — PLAN OF CARE
Problem: OCCUPATIONAL THERAPY ADULT  Goal: Performs self-care activities at highest level of function for planned discharge setting  See evaluation for individualized goals  Description: Treatment Interventions: ADL retraining, UE strengthening/ROM, Functional transfer training, Endurance training, Patient/family training, Compensatory technique education, Continued evaluation, Energy conservation, Activityengagement          See flowsheet documentation for full assessment, interventions and recommendations  Note: Limitation: Decreased ADL status, Decreased endurance, Decreased self-care trans, Decreased high-level ADLs  Prognosis: Good  Assessment: Pt is a 79 y o  female [facility] w/ saddle embolus of pulmonary artery s/p embolectomy and IVC filter placement on 9/23/22  Pt  has a past medical history of Abnormal weight loss, Allergic reaction, Anxiety, Breast cancer, right ([facility] ) (2011), Cancer ([facility] ) (2012), Candidal vulvovaginitis, Clotting disorder ([facility] ) (Same as above), Endometrial hyperplasia, Epithelial cyst, GI (gastrointestinal bleed) (Blood mixed with stool), History of radiation therapy (2011), Hyperlipidemia, Hypertension, Internal hemorrhoids, Knee tendonitis, Ovarian cyst, and 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([facility] )  Pt with active OT orders and up with assistance  orders  Pt resides in an apartment with 0 PALMA  Pt lives alone but reports that she has supportive family  Pt was I w/  ADLS and IADLS, (+) drove, & required no use of DME PTA  Currently pt is mod A x2 for functional transfers and functional mobility, min A for UB ADLS and mod A for LB ADLS  Pt is limited at this time 2*: endurance, activity tolerance, functional mobility, forward functional reach, functional mobility, functional standing tolerance and decreased I w/ ADLS/IADLS  The following Occupational Performance Areas to address include: grooming, bathing/shower, toilet hygiene, dressing, functional mobility and clothing management  Based on the aforementioned OT evaluation, functional performance deficits, and assessments, pt has been identified as a high complexity evaluation  From OT standpoint, anticipate d/c STR  Pt to continue to benefit from acute immediate OT services to address the following goals 3-5x/week to  w/in 10-14 days:        OT Discharge Recommendation: Post acute rehabilitation services

## 2022-09-26 NOTE — PROGRESS NOTES
[address] [facility]  Progress Note Daisy Silverman 1940, 79 y o  female MRN: [id number]  Unit/Bed#: [facility] 406-01 Encounter: [id number]  Primary Care Provider: Deanna Martin DO   Date and time admitted to hospital: 9/22/2022  3:35 PM    Swelling of lower extremity  Assessment & Plan  LE swelling has been worsening over the past few weeks  Right worse than left  ? 3+ on both lower extremities, improving along with pulses  ? Previous echo with a hyperdynamic LV EF 70%  ? Lower extremity duplex revealing significant acute DVTs in the right lower extremity  ? Can consider diuresis once the patient is stable for further fluid management      Urinary retention  Assessment & Plan  Patient presented with a massively distended bladder, 3 6 L out of for straight cath, greater than 800 mL out of 2nd straight cath  ? Urinary retention protocol, patient will likely need Crawley catheter  ? May be secondary to TCA use  ? UA with 1+ protein, innumerable rbc's, 4-10 wbc's  ? Urology consult, appreciate recommendations    Tachycardia  Assessment & Plan  · Patient had sinus tachycardia on her last discharge, likely in setting of pulmonary embolism  Still with sinus tachycardia on this admission  ? Consider amitriptyline as possible etiology as well  ? Due to hypotension will decrease metoprolol to 12 5 mg 4 times daily  ? Patient is notably 3rd spacing, will bolus with albumin  ? Start midodrine for blood pressure support      Anemia  Assessment & Plan  Macrocytic anemia complicated by acute blood loss anemia  ? Folate in September of 2022 3 3  ? Vitamin B12 in September of 2022 645  ? Iron panel and August of 2022 showed an iron saturation of 18, TIBC 105, iron 19, ferritin 755  ? Likely a mixed component of anemia chronic disease with possible folate deficiency  ? Continue folate supplementation  ?  Will transfuse 1 unit for hemoglobin 6 9       REYES (acute kidney injury) ([facility] [address] )  Assessment & Plan  REYES on CKD stage IIIA  ? Patient's baseline creatinine between 0 8 and 0 9 with an EGFR ranging around 60  ? Admission creatinine 1 36, down to 0 71  ? Patient did receive IV contrast for CTA  ? Patient does not have a history of heart failure, will initiate fluids  ? Accurate in's and out's  ? Avoid nephrotoxins    Toxic gastroenteritis and colitis  Assessment & Plan  Patient was admitted with toxic gastroenteritis in August of 2022  ? Determined to be possible IBD with additional insult of Verzenio from her breast cancer treatment  ? Continue to hold Verzenio    ? Patient positive for C diff will start on oral vancomycin  ? Will wean prednisone slowly as she has been on this chronically  ? Will stop loperamide      Primary hypertension  Assessment & Plan  Patient's antihypertensives were discontinued after previous hospital stay due to normotension  · Continue to monitor    Chronic kidney disease (CKD) stage G3a/A1, moderately decreased glomerular filtration rate (GFR) between 45-59 mL/min/1 73 square meter and albuminuria creatinine ratio less than 30 mg/g [facility])  Assessment & Plan  Lab Results   Component Value Date    EGFR 69 09/23/2022    EGFR 68 09/23/2022    EGFR 36 09/22/2022    CREATININE 0 79 09/23/2022    CREATININE 0 80 09/23/2022    CREATININE 1 36 (H) 09/22/2022         Mixed hyperlipidemia  Assessment & Plan  Stable  · Continue statin    Anxiety  Assessment & Plan  Depression/anxiety  ? Hold amitriptyline -possible etiology of tachycardia  ? 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 Patient was started on Faslodex and Xgeva at that time, in conjunction with radiation  ? In April 2022 there was interval development of 4 mm nodule at the right lower lobe and interval development of increased sclerotic lesions throughout the visualized spine  ? At that time, the patient's treatment was changed to F[place]ara and [place]  ? In July of 2022, the patient was changed from [place] to [place] because of intolerance  ? During the patient's hospitalization in August of 2022 to September of 2022, Verzenio was discontinued due to gastroenteritis  ? Patient's cancer is most likely contributing to her hypercoagulable state  ? Appreciate oncology and palliative care consult  ? Will continue on Lovenox for now      * Saddle embolus of pulmonary artery (HCC)  Assessment & Plan  Acute, submassive, intermediate to high risk pulmonary embolism  Patient has history of right popliteal DVT and right lower lobe subsegmental PE from previous hospitalization in August   ? Patient was sent home on Xarelto, likely not failure, was receiving it at her nursing home  ? Patient also has metastatic breast cancer, hypercoagulable state  ? Return to the ER on 09/22 after a fall, found to have saddle pulmonary embolus with evidence of right heart strain and high clot burden  ? RV to LV ratio on CTA 1 2  ? BMP greater than 1500, troponins relatively normal  ? TTE inconclusive for right heart strain  ? Patient is now status post thrombectomy with IR, no IVC filter placement  ? Duplex showing significant right lower extremity clot burden  ? Continue on Lovenox for pulmonary embolism            VTE Pharmacologic Prophylaxis:   High Risk (Score >/= 5) - Pharmacological DVT Prophylaxis Ordered: enoxaparin (Lovenox)  Sequential Compression Devices Ordered  Patient Centered Rounds: I performed bedside rounds with nursing staff today    Discussions with Specialists or Other Care Team Provider: GI, cards    Education and Discussions with Family / Patient: Updated  (daughter) at bedside  Time Spent for Care: 30 minutes  More than 50% of total time spent on counseling and coordination of care as described above  Current Length of Stay: 4 day(s)  Current Patient Status: Inpatient   Certification Statement: The patient will continue to require additional inpatient hospital stay due to Pending medical management  Discharge Plan: Anticipate discharge in >72 hrs to discharge location to be determined pending rehab evaluations  Code Status: Level 3 - DNAR and DNI    Subjective:   Patient denies any acute complaints today    Objective:     Vitals:   Temp (24hrs), Av 7 °F (36 5 °C), Min:97 5 °F (36 4 °C), Max:97 8 °F (36 6 °C)    Temp:  [97 5 °F (36 4 °C)-97 8 °F (36 6 °C)] 97 5 °F (36 4 °C)  HR:  [] 96  Resp:  [18-27] 22  BP: ()/(40-75) 96/49  SpO2:  [91 %-98 %] 98 %  Body mass index is 27 78 kg/m²  Input and Output Summary (last 24 hours): Intake/Output Summary (Last 24 hours) at 2022 1629  Last data filed at 2022 1110  Gross per 24 hour   Intake 455 ml   Output 890 ml   Net -435 ml       Physical Exam:   Physical Exam  Vitals and nursing note reviewed  Constitutional:       General: She is not in acute distress  Appearance: She is well-developed  She is ill-appearing  She is not toxic-appearing or diaphoretic  Comments: Chronically ill-appearing   HENT:      Head: Normocephalic and atraumatic  Eyes:      General: No scleral icterus  Conjunctiva/sclera: Conjunctivae normal    Cardiovascular:      Rate and Rhythm: Regular rhythm  Tachycardia present  Heart sounds: No murmur heard  No friction rub  No gallop  Pulmonary:      Effort: Pulmonary effort is normal  No respiratory distress  Breath sounds: Normal breath sounds  No stridor  No wheezing, rhonchi or rales  Chest:      Chest wall: No tenderness  Abdominal:      General: There is no distension  Palpations: Abdomen is soft  Tenderness: There is no abdominal tenderness  There is no guarding or rebound  Hernia: No hernia is present  Musculoskeletal:         General: No swelling or tenderness  Cervical back: Neck supple  Right lower leg: Edema present  Left lower leg: Edema present  Skin:     General: Skin is warm and dry  Neurological:      Mental Status: She is alert and oriented to person, place, and time  Additional Data:     Labs:  Results from last 7 days   Lab Units 09/26/22  1530 09/25/22  2358 09/25/22  1258 09/25/22  0324   WBC Thousand/uL 4 11* 5 58  --  3 20*   HEMOGLOBIN g/dL 6 9* 7 3*   < > 7 3*   HEMATOCRIT % 22 5* 23 3*  --  24 2*   PLATELETS Thousands/uL 62* 66*  --  66*   BANDS PCT %  --  12*  --   --    NEUTROS PCT %  --   --   --  81*   LYMPHS PCT %  --   --   --  9*   LYMPHO PCT %  --  3*  --   --    MONOS PCT %  --   --   --  8   MONO PCT %  --  3*  --   --    EOS PCT %  --  0  --  1    < > = values in this interval not displayed       Results from last 7 days   Lab Units 09/25/22  2358   SODIUM mmol/L 140   POTASSIUM mmol/L 3 3*   CHLORIDE mmol/L 112*   CO2 mmol/L 22   BUN mg/dL 13   CREATININE mg/dL 0 71   ANION GAP mmol/L 6   CALCIUM mg/dL 7 2*   ALBUMIN g/dL 2 0*   TOTAL BILIRUBIN mg/dL 0 52   ALK PHOS U/L 62   ALT U/L 18   AST U/L 8   GLUCOSE RANDOM mg/dL 82     Results from last 7 days   Lab Units 09/23/22  0416   INR  1 29*     Results from last 7 days   Lab Units 09/23/22  0804 09/23/22  0518   POC GLUCOSE mg/dl 111 67         Results from last 7 days   Lab Units 09/25/22  2358 09/22/22  1354 09/22/22  1145   LACTIC ACID mmol/L  --  1 2 2 3*   PROCALCITONIN ng/ml 0 19  --   --        Lines/Drains:  Invasive Devices  Report    Peripherally Inserted Central Catheter Line  Duration           PICC Line 09/26/22 <1 day          Peripheral Intravenous Line  Duration           Peripheral IV 09/26/22 Left;Proximal;Ventral (anterior) Forearm <1 day          Drain  Duration           Rectal Tube With balloon 2 days    Urethral Catheter 18 Fr  2 days              Urinary Catheter:  Goal for removal: Remove after 48 hrs of I/O monitoring         Central Line:  Goal for removal: N/A - Chronic PICC             Imaging: No pertinent imaging reviewed  Recent Cultures (last 7 days):   Results from last 7 days   Lab Units 09/24/22  1736   C DIFF TOXIN B BY PCR  Positive*       Last 24 Hours Medication List:   Current Facility-Administered Medications   Medication Dose Route Frequency Provider Last Rate   • atorvastatin  40 mg Oral After [address], DO     • cholecalciferol  2,000 Units Oral Daily Bessy Cecil, DO     • cholestyramine sugar free  4 g Oral HS Bessyradha Jacob, DO     • collagenase   Topical Daily Bessyradha Jacob, DO     • enoxaparin  1 mg/kg Subcutaneous Q12H [facility] Bessy Jacob, DO     • folic acid  1 mg Oral Daily Bessy Jacob, DO     • metoprolol  2 5 mg Intravenous Q6H PRN BROOKE Oliver     • metoprolol tartrate  12 5 mg Oral Q6H [facility] BROOKE Aranda     • midodrine  2 5 mg Oral TID AC Jersey Camacho, DO     • mirtazapine  7 5 mg Oral HS Jersey Camacho, DO     • pantoprazole  40 mg Oral BID AC Bessy Jacob DO     • [START ON 9/27/2022] predniSONE  10 mg Oral Daily Will Hilton MD     • senna-docusate sodium  2 tablet Oral BID Bessy Jacob, DO     • vancomycin  125 mg Oral Q6H [address], DO          Today, Patient Was Seen By: Gina Escalera DO    **Please Note: This note may have been constructed using a voice recognition system  **

## 2022-09-26 NOTE — CONSULTS
Cardiology   Jai Silverman 79 y o  female MRN: [id number]  Unit/Bed#: [facility] 406-01 Encounter: [id number]      Reason for Consult / Principal Problem:  Tachycardia    Physician Requesting Consult:  Kourtney Britt DO    Outpatient Cardiologist:  None    Assessment  1  Sinus tachycardia w/frequent PACs  2  Paroxysmal SVT   -Multifactorial etiology for this  Physiologic response in the setting of multiple acute clinical issues (see below)  -ECG 9/26 - sinus tachycardia, rate 133 bpm  -Telemetry review  Predominant sinus tachycardia - at times having frequent PACs, brief episodes of P-SVT  -BMP 9/25 evening --- (K+level 3 3, mag 2 1) - K+ replaced overnight -- repeat BMP level pending this a m  -Previously on metoprolol tartrate 50 mg q 12 hours - had been receiving up until the evening of 9/25 , then placed on hold and decreased to 25 mg q 12 hours today  -TSH level 3 6  3  Intermittent hypotension  -Suspect 2/2 to volume depletion  -BP last recorded 111/55  -S/p intermittent IV fluid boluses  -Started on midodrine 2 5 mg t i d today by the primary team   -TTE 9/22 - preserved BiV systolic function  3  Saddle PE  4  Bilateral DVT  -S/p IR guided thrombectomy/IVC filter placement  -Limited TTE 9/22 - LVEF 70%, RV normal size/systolic function, mild TR  -Previously on Xarelto - concern for failure  Currently on therapeutic Lovenox injections  5  Metastatic breast CA  -Palliative care/Heme-Onc following  6  Colitis / C  Diff infection   -GI Following  -Rectal tube in place    Plan  -Likely needs additional fluid resuscitation - appears dry on physical exam  Has B/L LE edema but this appears to be third spaced fluid retention likely in the setting of severe hypoalbuminemia  Would utilize albumin 25% vs PRBC transfusion (if repeat lab work indicative of worsening anemia)   -Agree w/starting low dose midodrine 2 5 mg TID to augment BP    -Will adjust metoprolol tartrate to 12 5 mg q6h - w/hold parameters    -Repeat BMP this am to reassess electrolytes  -Continue to monitor on telemetry      HPI: Brendan Curiel A Clunk 79y o  year old female a medical history of metastatic breast CA, right lower lobe sub segmental PE (diagnosed 8/21/2022) - on Xarelto, hypertension, dyslipidemia, CKD stage 3, and chronic anemia  She initially presented to the [facility] on 09/22/2022 from her nursing home facility with a mechanical fall  Per EMS report patient was hypotensive and tachycardic on transfer/arrival to the ED  In the ED was found to have evidence of lower extremity edema and subsequently underwent venous duplex studies of the bilateral lower extremities which demonstrated bilateral DVTs  She then underwent CTA imaging of the chest/abdomen/pelvis which demonstrated a saddle PE extending into the left main pulmonary artery and bilateral subsegmental PEs; RV/LV ratio 1  2  Xarelto was placed on hold and she was initiated on a IV heparin GTT for systemic anticoagulation  She was subsequently transferred to the [facility] for urgent IR guided thrombectomy and IVC filter placement  Postprocedure was transferred to the ICU for further management by the Critical Care Service  HGB levels dropped to 6 6 on 09/23 and she was transfused with 1 unit of PRBCs  Per documentation by the CC/Internal Medicine Services pt has been experiencing intermittent episodes of hypotension/tachycardia over the past couple of days in which she has received additional IV fluid resuscitation  Cardiology was consulted today for further treatment recommendations/management in reference to persistent tachycardia    ECG obtained this morning demonstrated sinus tachycardia,  bpm     Family History:   Family History   Problem Relation Age of Onset   • Stomach cancer Mother    • No Known Problems Father    • Cervical cancer Sister    • No Known Problems Daughter    • No Known Problems Maternal Grandmother    • No Known Problems Maternal Grandfather • No Known Problems Paternal Grandmother    • No Known Problems Paternal Grandfather    • Colon polyps Neg Hx    • Colon cancer Neg Hx      Historical Information   Past Medical History:   Diagnosis Date   • Abnormal weight loss    • Allergic reaction    • Anxiety    • Breast cancer, right ([facility] [address] )     right   • Cancer ([facility] [address] )    • Candidal vulvovaginitis    • Clotting disorder ([facility] [address] ) Same as above   • Endometrial hyperplasia    • Epithelial cyst     benign, ovary   • GI (gastrointestinal bleed) Blood mixed with stool   • History of radiation therapy 2011    right breast cancer   • Hyperlipidemia    • Hypertension    • Internal hemorrhoids    • Knee tendonitis    • Ovarian cyst    • 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[facility])      Past Surgical History:   Procedure Laterality Date   • BILATERAL SALPINGOOPHORECTOMY      onset: 12   • BREAST BIOPSY Right 2006    benign   • BREAST BIOPSY Right 2011    malignant   • BREAST LUMPECTOMY Right 2011    malignant   • CATARACT EXTRACTION W/  INTRAOCULAR LENS IMPLANT Right     phacoemulsification   Onset: 10/27/14   • CHOLECYSTECTOMY     • COLONOSCOPY  2017    approx   • HYSTERECTOMY  Yes   • INTRAOPERATIVE RADIATION THERAPY (IORT)     • IR BIOPSY BONE  2021   • IR IVC FILTER PLACEMENT OPTIONAL/TEMPORARY  2022   • IR PICC PLACEMENT SINGLE LUMEN  2022   • SKIN LESION EXCISION Right     breast, single lesion   • TONSILLECTOMY AND ADENOIDECTOMY       Social History   Social History     Substance and Sexual Activity   Alcohol Use Not Currently    Comment: (history)     Social History     Substance and Sexual Activity   Drug Use No     Social History     Tobacco Use   Smoking Status Former Smoker   • Packs/day: 1 00   • Years: 30 00   • Pack years: 30 00   • Types: Cigarettes   • Start date: 55   • Quit date: 0   • Years since quittin 7   Smokeless Tobacco Never Used     Family History:   Family History   Problem Relation Age of Onset   • Stomach cancer Mother    • No Known Problems Father    • Cervical cancer Sister    • No Known Problems Daughter    • No Known Problems Maternal Grandmother    • No Known Problems Maternal Grandfather    • No Known Problems Paternal Grandmother    • No Known Problems Paternal Grandfather    • Colon polyps Neg Hx    • Colon cancer Neg Hx        Review of Systems:  Review of Systems   Constitutional: Positive for activity change, appetite change and fatigue  Negative for chills and fever  Eyes: Negative for visual disturbance  Respiratory: Negative for cough, chest tightness and shortness of breath  Cardiovascular: Positive for leg swelling  Negative for chest pain and palpitations  Gastrointestinal: Positive for diarrhea  Negative for abdominal pain, nausea and vomiting  Neurological: Negative for dizziness, light-headedness and headaches  All other systems reviewed and are negative            Scheduled Meds:  Current Facility-Administered Medications   Medication Dose Route Frequency Provider Last Rate   • atorvastatin  40 mg Oral After Jay Jaye Woo [address], DO     • cholecalciferol  2,000 Units Oral Daily Juanita St. Francis, DO     • cholestyramine sugar free  4 g Oral HS Juanita [facility] Francis, DO     • collagenase   Topical Daily Juanita St[facility] Francis, DO     • enoxaparin  1 mg/kg Subcutaneous Q12H [address] Juanita St. Francis, DO     • folic acid  1 mg Oral Daily Juanita St[facility] Francis, DO     • magnesium sulfate  2 g Intravenous Once Jersey Banai, DO     • metoprolol  2 5 mg Intravenous Q6H PRN BROOKE Benites Sa     • metoprolol tartrate  25 mg Oral Q12H [address] Jersey Banai, DO     • midodrine  2 5 mg Oral TID AC Jersey Banai, DO     • pantoprazole  40 mg Oral BID AC Juanita St. Francis, DO     • predniSONE  20 mg Oral Daily Juanita St[facility] Francis, DO     • senna-docusate sodium  2 tablet Oral BID Juanita St. Francis, DO     • vancomycin  125 mg Oral Q6H [address] Jersey Banai, DO       Continuous Infusions:   PRN Meds: •  metoprolol  all current active meds have been reviewed and current meds:   Current Facility-Administered Medications   Medication Dose Route Frequency   • atorvastatin (LIPITOR) tablet 40 mg  40 mg Oral After Dinner   • cholecalciferol (VITAMIN D3) tablet 2,000 Units  2,000 Units Oral Daily   • cholestyramine sugar free (QUESTRAN LIGHT) packet 4 g  4 g Oral HS   • collagenase (SANTYL) ointment   Topical Daily   • enoxaparin (LOVENOX) subcutaneous injection 70 mg  1 mg/kg Subcutaneous D21O RIA   • folic acid (FOLVITE) tablet 1 mg  1 mg Oral Daily   • magnesium sulfate 2 g/50 mL IVPB (premix) 2 g  2 g Intravenous Once   • metoprolol (LOPRESSOR) injection 2 5 mg  2 5 mg Intravenous Q6H PRN   • metoprolol tartrate (LOPRESSOR) tablet 25 mg  25 mg Oral Q12H RIA   • midodrine (PROAMATINE) tablet 2 5 mg  2 5 mg Oral TID AC   • pantoprazole (PROTONIX) EC tablet 40 mg  40 mg Oral BID AC   • predniSONE tablet 20 mg  20 mg Oral Daily   • senna-docusate sodium (SENOKOT S) 8 6-50 mg per tablet 2 tablet  2 tablet Oral BID   • vancomycin (VANCOCIN) oral solution 125 mg  125 mg Oral Q6H [facility]       Allergies   Allergen Reactions   • Sulfa Antibiotics    • Pneumococcal Polysaccharide Vaccine Rash and Edema       Objective   Vitals: Blood pressure 111/55, pulse (!) 138, temperature 97 8 °F (36 6 °C), temperature source Oral, resp   rate 18, height 5' 1" (1 549 m), weight 66 7 kg (147 lb 0 8 oz), SpO2 98 %, not currently breastfeeding , Body mass index is 27 78 kg/m² ,   Orthostatic Blood Pressures    Flowsheet Row Most Recent Value   Blood Pressure 111/55 filed at 09/26/2022 7659   Patient Position - Orthostatic VS Lying filed at 09/26/2022 0600            Intake/Output Summary (Last 24 hours) at 9/26/2022 0955  Last data filed at 9/26/2022 0548  Gross per 24 hour   Intake 405 ml   Output 1060 ml   Net -655 ml       Invasive Devices  Report    Peripheral Intravenous Line  Duration           Peripheral IV 09/26/22 Left;Proximal;Ventral (anterior) Forearm <1 day          Drain  Duration           Urethral Catheter 18 Fr  2 days    Rectal Tube With balloon 1 day                Physical Exam:  Physical Exam  Vitals and nursing note reviewed  Constitutional:       General: She is not in acute distress  Appearance: She is not diaphoretic  HENT:      Head: Normocephalic and atraumatic  Eyes:      General: No scleral icterus  Cardiovascular:      Rate and Rhythm: Regular rhythm  Tachycardia present  Pulses: Normal pulses  Heart sounds: Normal heart sounds  Pulmonary:      Effort: Pulmonary effort is normal       Breath sounds: Normal breath sounds  No wheezing or rales  Abdominal:      Palpations: Abdomen is soft  Musculoskeletal:      Cervical back: Neck supple  Right lower leg: Edema present  Left lower leg: Edema present  Skin:     General: Skin is warm and dry  Capillary Refill: Capillary refill takes less than 2 seconds  Coloration: Skin is pale  Neurological:      General: No focal deficit present  Mental Status: She is alert and oriented to person, place, and time     Psychiatric:         Mood and Affect: Mood normal          Lab Results:   Recent Results (from the past 24 hour(s))   Hemoglobin    Collection Time: 09/25/22 12:58 PM   Result Value Ref Range    Hemoglobin 8 1 (L) 11 5 - 15 4 g/dL   ECG 12 lead    Collection Time: 09/25/22  9:15 PM   Result Value Ref Range    Ventricular Rate 133 BPM    Atrial Rate 133 BPM    DC Interval 104 ms    QRSD Interval 66 ms    QT Interval 382 ms    QTC Interval 568 ms    P Axis  degrees    QRS Axis 55 degrees    T Wave Axis 84 degrees   Comprehensive metabolic panel    Collection Time: 09/25/22 11:58 PM   Result Value Ref Range    Sodium 140 135 - 147 mmol/L    Potassium 3 3 (L) 3 5 - 5 3 mmol/L    Chloride 112 (H) 96 - 108 mmol/L    CO2 22 21 - 32 mmol/L    ANION GAP 6 4 - 13 mmol/L    BUN 13 5 - 25 mg/dL    Creatinine 0  71 0 60 - 1 30 mg/dL    Glucose 82 65 - 140 mg/dL    Calcium 7 2 (L) 8 3 - 10 1 mg/dL    Corrected Calcium 8 8 8 3 - 10 1 mg/dL    AST 8 5 - 45 U/L    ALT 18 12 - 78 U/L    Alkaline Phosphatase 62 46 - 116 U/L    Total Protein 4 2 (L) 6 4 - 8 4 g/dL    Albumin 2 0 (L) 3 5 - 5 0 g/dL    Total Bilirubin 0 52 0 20 - 1 00 mg/dL    eGFR 79 ml/min/1 73sq m   Procalcitonin    Collection Time: 09/25/22 11:58 PM   Result Value Ref Range    Procalcitonin 0 19 <=0 25 ng/ml   CBC and differential    Collection Time: 09/25/22 11:58 PM   Result Value Ref Range    WBC 5 58 4 31 - 10 16 Thousand/uL    RBC 2 27 (L) 3 81 - 5 12 Million/uL    Hemoglobin 7 3 (L) 11 5 - 15 4 g/dL    Hematocrit 23 3 (L) 34 8 - 46 1 %     (H) 82 - 98 fL    MCH 32 2 26 8 - 34 3 pg    MCHC 31 3 (L) 31 4 - 37 4 g/dL    RDW 17 9 (H) 11 6 - 15 1 %    MPV 11 4 8 9 - 12 7 fL    Platelets 66 (L) 238 - 390 Thousands/uL   Manual Differential(PHLEBS Do Not Order)    Collection Time: 09/25/22 11:58 PM   Result Value Ref Range    Segmented % 80 (H) 43 - 75 %    Bands % 12 (H) 0 - 8 %    Lymphocytes % 3 (L) 14 - 44 %    Monocytes % 3 (L) 4 - 12 %    Eosinophils, % 0 0 - 6 %    Basophils % 0 0 - 1 %    Metamyelocytes% 1 0 - 1 %    Atypical Lymphocytes % 1 (H) <=0 %    Absolute Neutrophils 5 13 1 85 - 7 62 Thousand/uL    Lymphocytes Absolute 0 17 (L) 0 60 - 4 47 Thousand/uL    Monocytes Absolute 0 17 0 00 - 1 22 Thousand/uL    Eosinophils Absolute 0 00 0 00 - 0 40 Thousand/uL    Basophils Absolute 0 00 0 00 - 0 10 Thousand/uL    Total Counted      nRBC 1 0 - 2 /100 WBC    RBC Morphology Present     Anisocytosis Present     Macrocytes Present     Poikilocytes Present     Polychromasia Present     Platelet Estimate Decreased (A) Adequate   C-reactive protein    Collection Time: 09/25/22 11:58 PM   Result Value Ref Range     0 (H) <3 0 mg/L   Prepare Leukoreduced RBC: 2 Units, Leukoreduced    Collection Time: 09/26/22  7:40 AM   Result Value Ref Range Unit Product Code S1254C23     Unit Number [id number]     Unit ABO O     Unit RH NEG     Crossmatch Compatible     Unit Dispense Status Return to [facility]     Unit Product Volume 350 mL    Unit Product Code Q5112G80     Unit Number [id number]     Unit ABO O     Unit RH NEG     Crossmatch Compatible     Unit Dispense Status [facility]     Unit Product Volume 350 mL     Imaging: I have personally reviewed pertinent reports  and I have personally reviewed pertinent films in PACS  Code Status:  Level 3 DNR DNI  Epic/ Allscripts/Care Everywhere records reviewed:  Yes    * Please Note: Fluency DirectDictation voice to text software may have been used in the creation of this document   **

## 2022-09-26 NOTE — PROCEDURES
PICC Line Insertion    Date/Time: 9/26/2022 2:29 PM  Performed by: BROOKE Henriquez  Authorized by: Arben Martinez MD     Patient location:  IR  Consent:     Consent obtained:  Written    Consent given by:  Patient    Risks discussed:  Arterial puncture, bleeding, incorrect placement, infection, nerve damage and pneumothorax    Alternatives discussed:  Delayed treatment  Universal protocol:     Procedure explained and questions answered to patient or proxy's satisfaction: yes      Relevant documents present and verified: yes      Test results available and properly labeled: yes      Radiology Images displayed and confirmed  If images not available, report reviewed: yes      Required blood products, implants, devices, and special equipment available: yes      Site/side marked: yes      Immediately prior to procedure, a time out was called: yes      Patient identity confirmed:  Verbally with patient and arm band  Pre-procedure details:     Hand hygiene: Hand hygiene performed prior to insertion      Sterile barrier technique: All elements of maximal sterile technique followed      Skin preparation:  2% chlorhexidine    Skin preparation agent: Skin preparation agent completely dried prior to procedure    Indications:     PICC line indications: no peripheral vascular access    Anesthesia (see MAR for exact dosages):      Anesthesia method:  Local infiltration    Local anesthetic:  Lidocaine 1% w/o epi  Procedure details:     Location:  Brachial    Vessel type: vein      Laterality:  Left    Approach: percutaneous endoscopic technique used      Patient position:  Flat    Procedural supplies:  Double lumen    Catheter size:  5 Fr    Landmarks identified: yes      Ultrasound guidance: yes      Ultrasound image availability:  Images available in PACS    Sterile ultrasound techniques: Sterile gel and sterile probe covers were used      Number of attempts:  3    Successful placement: yes      Cath access vessel: fluoro confirmed  Total catheter length (cm):  37    Catheter out on skin (cm):  0    Max flow rate:  999    Arm circumference:  28  Post-procedure details:     Post-procedure:  Dressing applied and securement device placed    Assessment:  Blood return through all ports and free fluid flow (fluoro confirmed)    Post-procedure complications: none      Patient tolerance of procedure:   Tolerated well, no immediate complications

## 2022-09-26 NOTE — PROGRESS NOTES
Progress Note- Pilo Silverman 79 y o  female MRN: [id number]    Unit/Bed#: [facility] 406-01 Encounter: [id number]      Assessment and Plan:  79 yr old with PMH of stage IV breast cancer status post radiation and currently on chemotherapy      Acute on chronic diarrhea  C diff infection    Patient had rectal bleeding for which she underwent colonoscopy in November 2021 which showed inflammation in the distal sigmoid colon rectum biopsy showed focal active cryptitis  She was recommended to start Canasa suppository and hydrocortisone enemas  She was then started on mesalamine in April  According to the patient she feels that her chemotherapy medications specifically Kisqali and verzenio made her diarrhea worse  She has limited ID a if the suppositories were helping with her symptoms of rectal bleeding and diarrhea at all  With worsening diarrhea she underwent repeat flexible sigmoidoscopy in August 2022 which showed severe erythematous, friable, ulcerated mucosa in the sigmoid colon  Biopsies were negative for CMV and adenovirus  It showed active colitis-chronicity not mentioned in the pathology report but differential diagnosis included as IBD, infectious colitis, versus medication induced colitis  She has been on prednisone 40 mg a day since her hospital admission in August 2022  She states that she did have semi-formed stool for a few days, however, the daughter does not believe that there has been any significant improvement in her diarrhea on steroids  Unclear etiology of her chronic diarrhea but acute worsening likely secondary to C diff infection    · Patient states that she thinks her diarrhea has been going on since January and she is contributing to his ribiciclib however this was started in April 2022 and her colonoscopy showing signs of acute colitis was in November 2021  · Her CRP was elevated to 216 in August and this had improved to 10 1 on 09/01  On admission it was 111     · She was started on IV Solu-Medrol in August and discharged home on prednisone 40 mg a day  She states that since she was taking steroids her stool did become semi formed however over the last few days have been completely watery  · Fecal calprotectin pending  · Will discussed with our IBD specialist if her clinical picture is consistent with UC or not  Given acute colitis seen on biopsies in no evidence of chronicity this seems less likely or unusual  · One will also discussed with oncology to see if any of her chemo medications or CDK 4/6 inhibitors can cause colitis/diarrhea  · Will taper down dose of prednisone given active C diff infection and continue vancomycin 125 mg q 6  · Will taper down prednisone does slowly prevent adrenal insufficiency given patient on high-dose steroids for 4-5 weeks      Oncology medication history (metastatic breast cancer)  Faslodex and Xgeva (fulvestrant and denusomab)  started in July 2021  Femara and Kisqali (ribociclib tablets and letrozole) started in 4/2022  Had worsening appetite and fatigue  Notes do not mention worsening diarrhea  Kisqali discontinued and replaced by Verzenio (Abemaciclib) in 7/2022      Chronic anemia  Thrombocytopenia    · Currently patient has no evidence of GI bleeding    She has brown stool in the rectal bag  · She had a normal hemoglobin until August 2022  · Iron studies with low iron saturations but high ferritin which could be secondary to combination of iron deficiency anemia and anemia of chronic disease  · She has gotten 2 units of packed RBC transfusion this admission      Lower extremity DVT  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    Currently being managed by Hematology  It appears from notes that she was previously on Xarelto but the daughter states that she was not on anticoagulation, if she was on Xarelto then the current PE is thought to be consistent with Xarelto failure      Disposition:  GI will continue to follow  ______________________________________________________________________    Subjective:     Patient denies any abdominal pain    Denies any changes in her appetite    Medication Administration - last 24 hours from 09/25/2022 1326 to 09/26/2022 1326       Date/Time Order Dose Route Action Action by     09/26/2022 0840 senna-docusate sodium (SENOKOT S) 8 6-50 mg per tablet 2 tablet 2 tablet Oral Not Given Camilla Hahn RN     09/25/2022 1701 senna-docusate sodium (SENOKOT S) 8 6-50 mg per tablet 2 tablet 2 tablet Oral Not Given Camilla Hahn RN     43/53/3656 2515 folic acid (FOLVITE) tablet 1 mg 1 mg Oral Given Camilla Hahn RN     09/26/2022 0615 pantoprazole (PROTONIX) EC tablet 40 mg 40 mg Oral Given Lore Backbone     09/25/2022 1526 pantoprazole (PROTONIX) EC tablet 40 mg 40 mg Oral Given Camilla Hahn RN     09/25/2022 1703 atorvastatin (LIPITOR) tablet 40 mg 40 mg Oral Given Camilla Hahn RN     09/26/2022 0841 cholecalciferol (VITAMIN D3) tablet 2,000 Units 2,000 Units Oral Given Camilla Hahn RN     09/25/2022 2238 cholestyramine sugar free (QUESTRAN LIGHT) packet 4 g 4 g Oral Given Lore Backbone     09/26/2022 0841 predniSONE tablet 20 mg 20 mg Oral Given Camilla Hahn RN     09/26/2022 0842 collagenase (SANTYL) ointment   Topical Given Camilla Hahn RN     09/26/2022 0840 potassium-sodium phosphates (PHOS-NAK) packet 2 packet 2 packet Oral Given Camilla Hahn RN     09/25/2022 1637 potassium-sodium phosphates (PHOS-NAK) packet 2 packet 2 packet Oral Given Camilla Hahn RN     09/26/2022 0840 enoxaparin (LOVENOX) subcutaneous injection 70 mg 70 mg Subcutaneous Given Camilla Hahn RN     09/25/2022 2238 enoxaparin (LOVENOX) subcutaneous injection 70 mg 70 mg Subcutaneous Given Lore Justine     09/26/2022 0843 metoprolol tartrate (LOPRESSOR) tablet 50 mg 50 mg Oral Not Given Camilla Hahn RN     09/25/2022 2221 metoprolol tartrate (LOPRESSOR) tablet 50 mg 0 mg Oral [facility]     09/25/2022 2054 calcium gluconate 2 g in sodium chloride 0 9% 100 mL (premix) 0 g Intravenous Sto[facility]     09/26/2022 1230 vancomycin (VANCOCIN) oral solution 125 mg 125 mg Oral Given [facility], RN     09/26/2022 0547 vancomycin (VANCOCIN) oral solution 125 mg 125 mg Oral Given [facility]     09/26/2022 0009 vancomycin (VANCOCIN) oral solution 125 mg 125 mg Oral Given [facility]     09/25/2022 1703 vancomycin (VANCOCIN) oral solution 125 mg 125 mg Oral Given [facility], RN     09/25/2022 1527 vancomycin (VANCOCIN) oral solution 125 mg 125 mg Oral Not Given [facility], RN     09/25/2022 2126 multi-electrolyte (PLASMALYTE-A/ISOLYTE-S PH 7 4) IV solution 0 mL/hr Intravenous [facility]     09/25/2022 1802 multi-electrolyte (PLASMALYTE-A/ISOLYTE-S PH 7 4) IV solution 75 mL/hr Intravenous [facility], RN     09/25/2022 2324 albumin human (FLEXBUMIN) 25 % injection 12 5 g 0 g Intravenous [facility]     09/25/2022 2238 albumin human (FLEXBUMIN) 25 % injection 12 5 g 12 5 g Intravenous New Bag [facility]     09/26/2022 0917 metoprolol (LOPRESSOR) injection 2 5 mg 2 5 mg Intravenous Given Bruce Hum, RN     09/26/2022 0031 metoprolol (LOPRESSOR) injection 2 5 mg 2 5 mg Intravenous Given Thierry Alesia, RN     09/26/2022 0001 albumin human (FLEXBUMIN) 25 % injection 12 5 g 0 g Intravenous [facility]     09/25/2022 2359 albumin human (FLEXBUMIN) 25 % injection 12 5 g 12 5 g Intravenous New [facility]     09/26/2022 0208 potassium chloride (K-DUR,KLOR-CON) CR tablet 40 mEq 40 mEq Oral Given [facility]     09/26/2022 0307 albumin human (FLEXBUMIN) 25 % injection 12 5 g 0 g Intravenous [facility]     09/26/2022 0305 albumin human (FLEXBUMIN) 25 % injection 12 5 g 12 5 g Intravenous [facility]     09/26/2022 0393 metoprolol tartrate (LOPRESSOR) tablet 25 mg 25 mg Oral Given Aurora Astudillo, MAURO     09/26/2022 1230 midodrine (PROAMATINE) tablet 2 5 mg 2 5 mg Oral Given Aurora Astudillo, MAURO     09/26/2022 0946 magnesium sulfate 2 g/50 mL IVPB (premix) 2 g 2 g Intravenous New Bag Gladys Sands RN          Objective:     Vitals: Blood pressure (!) 119/47, pulse (!) 119, temperature 97 5 °F (36 4 °C), temperature source Oral, resp  rate 22, height 5' 1" (1 549 m), weight 66 7 kg (147 lb 0 8 oz), SpO2 98 %, not currently breastfeeding  ,Body mass index is 27 78 kg/m²  Intake/Output Summary (Last 24 hours) at 9/26/2022 1326  Last data filed at 9/26/2022 1110  Gross per 24 hour   Intake 455 ml   Output 890 ml   Net -435 ml       Physical Exam:   General Appearance: Awake and alert, in no acute distress  Abdomen: Soft, non-tender, non-distended; bowel sounds normal; no masses or no organomegaly    Invasive Devices  Report    Peripheral Intravenous Line  Duration           Peripheral IV 09/26/22 Left;Proximal;Ventral (anterior) Forearm <1 day          Drain  Duration           Rectal Tube With balloon 2 days    Urethral Catheter 18 Fr  2 days                Lab Results:  No results displayed because visit has over 200 results  Imaging Studies: I have personally reviewed pertinent imaging studies

## 2022-09-26 NOTE — PROGRESS NOTES
Progress Note - Palliative & Supportive Care  Maxine Silverman  79 y o   female  [facility] 406/[facility] 406-01   MRN: [id number]  Encounter: [id number]     Assessment/Plan:  1  Stage IV breast cancer, metastatic sternal lesion s/p radiation, sclerotic lesions thoracic/lumbar/pelvis, pulmonary nodules  2  Saddle PE s/p mechanical thrombectomy and IVC filter, bilateral DVT previously on Xarelto now on Lovenox  3  REYES on CKD3  4  Debility, deconditioning, ambulatory dysfunction, generalized weakness  5  Protein-calorie malnutrition  6  Paroxysmal SVT, sinus tachycardia, intermittent hypotension  7  Diarrhea, loose BM, rectal tube  8  Colitis, Cdiff  9  Goals of Care  -continue current medical care/optimization  -confirms no desire to pursue antineoplastic therapy for cancer treatment  -long discussion with patient and daughter/Taya at bedside regarding hospice; wishes to continue hospitalization with hope of improvement, rehab, then LTC facility vs home at this time; will revisit goals/hospice discussion if no improvement or decline in condition in next 48h  -d/w  via tigertext, patient requesting prayer support/Communion/Anointing of the Sick  -d/w primary  -d/w CM  -will f/u later in week; please tigertext palliative care with any questions, concerns, or if needs to be seen sooner     Long discussion with patient and daughter/Taya at bedside regarding current condition and goals of care  Explained palliative care service in role in care and difference from hospice services  Patient able to recall conversation with partner during initial consultation  She is concerned that she will not get better and that she is unlikely to return to home independently  She expressed to daughter that she is ok with going to a facility and feels this will be less of a burden and worry to her and family   She expresses desire to continue with treatment focused care with hope of improvement, then rehab, then ltc facility vs home depending on if she regains strength and independence at this time  Patient will continue to hospice if there is no improvement in her condition or if there is further decline, but she would like to try to get better and stronger  She does not have a living will or an advance directive  She has 3 adult children, daughter at bedside + 2 sons that are local  I encouraged patient to continue discussions regarding her wishes with her sons and her daughter  Patient stated that she has lived a good 81years and that her mother only lived to 63yo  She is at peace if things don't go as she hopes  She is agreeable to prayer support and requests Communion  She also seemed relieved when we discussed that a /FatherTaubert does rounds and can perform the Sacrament of the [address]  Emotional support and validation provided  Office contact number provided  Patient and daughter appreciative of visit  Code status: Level 3, DNAR/DNI    Subjective:  Patient seen and examined oob in chair  Daughter/Taya at bedside  Patient states she felt funny this am when HR fast and BP low, felt as if she could pass out  Cardiology at bedside  Patient denies any pains or shortness of breath  Feels weakened and without much appetite  Likes Ensure      Last BM: 9/26/22    Medications    Current Facility-Administered Medications:   •  atorvastatin (LIPITOR) tablet 40 mg, 40 mg, Oral, After Ty Ty DO Armando, 40 mg at 09/25/22 1703  •  cholecalciferol (VITAMIN D3) tablet 2,000 Units, 2,000 Units, Oral, Daily, Chente Miranda DO, 2,000 Units at 09/26/22 6059  •  cholestyramine sugar free (QUESTRAN LIGHT) packet 4 g, 4 g, Oral, HS, Chente Miranda DO, 4 g at 09/25/22 2238  •  collagenase (SANTYL) ointment, , Topical, Daily, Chente Miranda DO, Given at 09/26/22 3428  •  enoxaparin (LOVENOX) subcutaneous injection 70 mg, 1 mg/kg, Subcutaneous, Q12H [facility], Chente Miranda DO, 70 mg at 32/83/78 2610  •  folic acid (FOLVITE) tablet 1 mg, 1 mg, Oral, Daily, Allison Sarna, DO, 1 mg at 09/26/22 0841  •  magnesium sulfate 2 g/50 mL IVPB (premix) 2 g, 2 g, Intravenous, Once, Jersey Banai, DO, 2 g at 09/26/22 0946  •  metoprolol (LOPRESSOR) injection 2 5 mg, 2 5 mg, Intravenous, Q6H PRN, Coit Hillito, CRNP, 2 5 mg at 09/26/22 5605  •  metoprolol tartrate (LOPRESSOR) tablet 25 mg, 25 mg, Oral, Q12H [facility], Jersey Banai, DO, 25 mg at 09/26/22 3202  •  midodrine (PROAMATINE) tablet 2 5 mg, 2 5 mg, Oral, TID AC, Jersey Banai, DO  •  pantoprazole (PROTONIX) EC tablet 40 mg, 40 mg, Oral, BID AC, Hurley Sarna, DO, 40 mg at 09/26/22 8546  •  predniSONE tablet 20 mg, 20 mg, Oral, Daily, Hurley Sarna, DO, 20 mg at 09/26/22 9444  •  senna-docusate sodium (SENOKOT S) 8 6-50 mg per tablet 2 tablet, 2 tablet, Oral, BID, Allison Sarna, DO, 2 tablet at 09/23/22 0806  •  vancomycin (VANCOCIN) oral solution 125 mg, 125 mg, Oral, Q6H RIA, Jersey Banai, DO, 125 mg at 09/26/22 0547    Objective:  /55   Pulse (!) 138   Temp 97 8 °F (36 6 °C) (Oral)   Resp 18   Ht 5' 1" (1 549 m)   Wt 66 7 kg (147 lb 0 8 oz)   LMP  (LMP Unknown)   SpO2 98%   BMI 27 78 kg/m²   Physical Exam:  General: chronically ill appearing, on oxygen, occasional cough  Neurological: aaox3  Cardiovascular: tachy  Respiratory: normal effort, dyspneic with conversation at times  Gastrointestinal:  Soft, nt, nd  Musculoskeletal: +edema  Skin: warm, dry, pale  Psychiatric: pleasant, normal judgment and thought content    Lab Results:   I have personally reviewed pertinent labs  , CBC:   Lab Results   Component Value Date    WBC 5 58 09/25/2022    HGB 7 3 (L) 09/25/2022    HCT 23 3 (L) 09/25/2022     (H) 09/25/2022    PLT 66 (L) 09/25/2022    MCH 32 2 09/25/2022    MCHC 31 3 (L) 09/25/2022    RDW 17 9 (H) 09/25/2022    MPV 11 4 09/25/2022    NRBC 1 09/25/2022   , CMP:   Lab Results   Component Value Date    SODIUM 140 09/25/2022    K 3 3 (L) 09/25/2022     (H) 09/25/2022    CO2 22 09/25/2022    BUN 13 09/25/2022    CREATININE 0 71 09/25/2022    CALCIUM 7 2 (L) 09/25/2022    AST 8 09/25/2022    ALT 18 09/25/2022    ALKPHOS 62 09/25/2022    EGFR 79 09/25/2022   , PT/PTT:No results found for: PT, PTT    Counseling / Coordination of Care  Total floor / unit time spent today 75 minutes  Greater than 50% of total time was spent with the patient and / or family counseling and / or coordinating of care  A description of the counseling / coordination of care: current condition, goals of care, hospice discussion, emotional support      Manuela Terrell,   Palliative & Supportive Care

## 2022-09-26 NOTE — TELEPHONE ENCOUNTER
The patient presented to  [facility] and was transferred to [place] for a PE that required thrombectomy  She is still admitted and GI saw her yesterday, they are continuing steroids    Will likely still require a biologic after discharge, await further GI follow-up during this admission

## 2022-09-26 NOTE — QUICK NOTE
TT by RN in regard to pt with tachycardia HR in the 130's - 140   Ekg demonstrates sinus tachycardia   Pt satting 92% on 6 liters - lungs clear with cough + 3 edema with weeping  Pt on LR/hr   UO is 135ml since 5 30pm     · Upon arrival pt is asymptomatic   · Oxygen turned down to 2 liters satting 90-92%  · Pt is noted to be hypotensive in setting of sinus tachycardia , anemia   · Given 2 x 12 5gm albumin with improvement in sbp and now able to administer low dose bb iv 2 5mg  However post administration pt has become more hypotensive   · Will repeat albumin and monitor   · Will administer 1 additional  x albumin and monitor   HR with some improvement   · Added ensure shakes in setting of poor oral nutrition   · Chest xray appears if it is improved by prior chest xray obtained earlier this evening    · hh although low is not far from recent baseline , and coumadin will need to be monitored   · Continue to monitor platelet count  Down trending on Lovenox therapeutic dosing   · Will need to monitor

## 2022-09-26 NOTE — PLAN OF CARE
Problem: PHYSICAL THERAPY ADULT  Goal: Performs mobility at highest level of function for planned discharge setting  See evaluation for individualized goals  Description: Treatment/Interventions: Functional transfer training, LE strengthening/ROM, Therapeutic exercise, Endurance training, Bed mobility, Gait training, Spoke to nursing, OT  Equipment Recommended:  (r/o rw)       See flowsheet documentation for full assessment, interventions and recommendations  Note: Prognosis: Fair  Problem List: Decreased strength, Decreased endurance, Impaired balance, Decreased mobility  Assessment: Pt is 79 y o  female admitted with hx of fall and tachycardia and Dx of Saddle embolus of pulmonary artery  Pt 's comorbidities affecting POC include: Anxiety, Breast cancer, right ([facility] [address] ), Clotting disorder ([facility] [address] ), CKD, hypertension, and chronic anemia and personal factors of: advanced age and living alone  Pt's clinical presentation is currently unstable/unpredictable which is evident in ongoing telem monitoring w/ elevated HR noted, abn lab values, and need for assist x2 w/ all phases of limited mobility at bedside when usually mobilizing independently  Pt presents w/ generalized weakness, incl decreased LE strength, decreased functional endurance and activity tolerance, impaired balance, gait deviations and fall risk  Will cont to follow pt in PT for progressive mobilization to address above functional deficits and to max level of (I), endurance, and safety  Currently recommend rehab upon D/C  Will cont to follow until then  Barriers to Discharge: Decreased caregiver support     PT Discharge Recommendation: Post acute rehabilitation services    See flowsheet documentation for full assessment

## 2022-09-27 LAB
ABO GROUP BLD BPU: NORMAL
ALBUMIN SERPL BCP-MCNC: 2.2 G/DL (ref 3.5–5)
ALP SERPL-CCNC: 47 U/L (ref 46–116)
ALT SERPL W P-5'-P-CCNC: 15 U/L (ref 12–78)
ANION GAP SERPL CALCULATED.3IONS-SCNC: 6 MMOL/L (ref 4–13)
AST SERPL W P-5'-P-CCNC: 9 U/L (ref 5–45)
BASOPHILS # BLD AUTO: 0 THOUSANDS/ΜL (ref 0–0.1)
BASOPHILS NFR BLD AUTO: 0 % (ref 0–1)
BILIRUB SERPL-MCNC: 0.65 MG/DL (ref 0.2–1)
BPU ID: NORMAL
BUN SERPL-MCNC: 11 MG/DL (ref 5–25)
CALCIUM ALBUM COR SERPL-MCNC: 8.6 MG/DL (ref 8.3–10.1)
CALCIUM SERPL-MCNC: 7.2 MG/DL (ref 8.3–10.1)
CHLORIDE SERPL-SCNC: 112 MMOL/L (ref 96–108)
CO2 SERPL-SCNC: 24 MMOL/L (ref 21–32)
CREAT SERPL-MCNC: 0.5 MG/DL (ref 0.6–1.3)
CROSSMATCH: NORMAL
EOSINOPHIL # BLD AUTO: 0.03 THOUSAND/ΜL (ref 0–0.61)
EOSINOPHIL NFR BLD AUTO: 1 % (ref 0–6)
ERYTHROCYTE [DISTWIDTH] IN BLOOD BY AUTOMATED COUNT: 19.3 % (ref 11.6–15.1)
GFR SERPL CREATININE-BSD FRML MDRD: 90 ML/MIN/1.73SQ M
GLUCOSE SERPL-MCNC: 90 MG/DL (ref 65–140)
HCT VFR BLD AUTO: 23.4 % (ref 34.8–46.1)
HGB BLD-MCNC: 7.4 G/DL (ref 11.5–15.4)
IMM GRANULOCYTES # BLD AUTO: 0.02 THOUSAND/UL (ref 0–0.2)
IMM GRANULOCYTES NFR BLD AUTO: 1 % (ref 0–2)
LYMPHOCYTES # BLD AUTO: 0.27 THOUSANDS/ΜL (ref 0.6–4.47)
LYMPHOCYTES NFR BLD AUTO: 8 % (ref 14–44)
MAGNESIUM SERPL-MCNC: 2 MG/DL (ref 1.6–2.6)
MCH RBC QN AUTO: 31.2 PG (ref 26.8–34.3)
MCHC RBC AUTO-ENTMCNC: 31.6 G/DL (ref 31.4–37.4)
MCV RBC AUTO: 99 FL (ref 82–98)
MONOCYTES # BLD AUTO: 0.18 THOUSAND/ΜL (ref 0.17–1.22)
MONOCYTES NFR BLD AUTO: 5 % (ref 4–12)
NEUTROPHILS # BLD AUTO: 2.9 THOUSANDS/ΜL (ref 1.85–7.62)
NEUTS SEG NFR BLD AUTO: 85 % (ref 43–75)
NRBC BLD AUTO-RTO: 0 /100 WBCS
PLATELET # BLD AUTO: 50 THOUSANDS/UL (ref 149–390)
PMV BLD AUTO: 10.9 FL (ref 8.9–12.7)
POTASSIUM SERPL-SCNC: 3.3 MMOL/L (ref 3.5–5.3)
PROT SERPL-MCNC: 4.1 G/DL (ref 6.4–8.4)
RBC # BLD AUTO: 2.37 MILLION/UL (ref 3.81–5.12)
SODIUM SERPL-SCNC: 142 MMOL/L (ref 135–147)
UNIT DISPENSE STATUS: NORMAL
UNIT PRODUCT CODE: NORMAL
UNIT PRODUCT VOLUME: 350 ML
UNIT RH: NORMAL
WBC # BLD AUTO: 3.4 THOUSAND/UL (ref 4.31–10.16)

## 2022-09-27 PROCEDURE — 92610 EVALUATE SWALLOWING FUNCTION: CPT

## 2022-09-27 PROCEDURE — 99232 SBSQ HOSP IP/OBS MODERATE 35: CPT | Performed by: INTERNAL MEDICINE

## 2022-09-27 PROCEDURE — 83735 ASSAY OF MAGNESIUM: CPT | Performed by: NURSE PRACTITIONER

## 2022-09-27 PROCEDURE — 80053 COMPREHEN METABOLIC PANEL: CPT | Performed by: INTERNAL MEDICINE

## 2022-09-27 PROCEDURE — 85025 COMPLETE CBC W/AUTO DIFF WBC: CPT | Performed by: INTERNAL MEDICINE

## 2022-09-27 PROCEDURE — 99232 SBSQ HOSP IP/OBS MODERATE 35: CPT | Performed by: NURSE PRACTITIONER

## 2022-09-27 RX ORDER — PREDNISONE 1 MG/1
5 TABLET ORAL DAILY
Status: DISCONTINUED | OUTPATIENT
Start: 2022-09-28 | End: 2022-09-27

## 2022-09-27 RX ORDER — MAGNESIUM SULFATE HEPTAHYDRATE 40 MG/ML
2 INJECTION, SOLUTION INTRAVENOUS ONCE
Status: COMPLETED | OUTPATIENT
Start: 2022-09-27 | End: 2022-09-27

## 2022-09-27 RX ORDER — POTASSIUM CHLORIDE 20 MEQ/1
40 TABLET, EXTENDED RELEASE ORAL ONCE
Status: COMPLETED | OUTPATIENT
Start: 2022-09-27 | End: 2022-09-27

## 2022-09-27 RX ORDER — PREDNISONE 1 MG/1
5 TABLET ORAL DAILY
Status: DISCONTINUED | OUTPATIENT
Start: 2022-09-28 | End: 2022-09-29

## 2022-09-27 RX ORDER — TRAMADOL HYDROCHLORIDE 50 MG/1
50 TABLET ORAL ONCE AS NEEDED
Status: COMPLETED | OUTPATIENT
Start: 2022-09-27 | End: 2022-10-06

## 2022-09-27 RX ORDER — ALBUMIN (HUMAN) 12.5 G/50ML
25 SOLUTION INTRAVENOUS ONCE
Status: COMPLETED | OUTPATIENT
Start: 2022-09-27 | End: 2022-09-27

## 2022-09-27 RX ORDER — POTASSIUM CHLORIDE 14.9 MG/ML
20 INJECTION INTRAVENOUS
Status: COMPLETED | OUTPATIENT
Start: 2022-09-27 | End: 2022-09-27

## 2022-09-27 RX ORDER — PREDNISONE 10 MG/1
10 TABLET ORAL DAILY
Status: DISCONTINUED | OUTPATIENT
Start: 2022-09-28 | End: 2022-09-27

## 2022-09-27 RX ADMIN — PANTOPRAZOLE SODIUM 40 MG: 40 TABLET, DELAYED RELEASE ORAL at 06:46

## 2022-09-27 RX ADMIN — Medication 125 MG: at 12:36

## 2022-09-27 RX ADMIN — PANTOPRAZOLE SODIUM 40 MG: 40 TABLET, DELAYED RELEASE ORAL at 17:24

## 2022-09-27 RX ADMIN — POTASSIUM CHLORIDE 40 MEQ: 1500 TABLET, EXTENDED RELEASE ORAL at 09:01

## 2022-09-27 RX ADMIN — METOPROLOL TARTRATE 25 MG: 25 TABLET, FILM COATED ORAL at 20:44

## 2022-09-27 RX ADMIN — Medication 125 MG: at 17:27

## 2022-09-27 RX ADMIN — METOPROLOL TARTRATE 25 MG: 25 TABLET, FILM COATED ORAL at 17:24

## 2022-09-27 RX ADMIN — MIRTAZAPINE 7.5 MG: 15 TABLET, FILM COATED ORAL at 22:04

## 2022-09-27 RX ADMIN — Medication 12.5 MG: at 10:46

## 2022-09-27 RX ADMIN — POTASSIUM CHLORIDE 20 MEQ: 14.9 INJECTION, SOLUTION INTRAVENOUS at 06:46

## 2022-09-27 RX ADMIN — CARBIDOPA AND LEVODOPA 2.5 MG: 50; 200 TABLET, EXTENDED RELEASE ORAL at 06:46

## 2022-09-27 RX ADMIN — Medication 125 MG: at 06:57

## 2022-09-27 RX ADMIN — ENOXAPARIN SODIUM 70 MG: 80 INJECTION SUBCUTANEOUS at 20:44

## 2022-09-27 RX ADMIN — CARBIDOPA AND LEVODOPA 2.5 MG: 50; 200 TABLET, EXTENDED RELEASE ORAL at 12:36

## 2022-09-27 RX ADMIN — Medication 125 MG: at 23:17

## 2022-09-27 RX ADMIN — CARBIDOPA AND LEVODOPA 2.5 MG: 50; 200 TABLET, EXTENDED RELEASE ORAL at 17:24

## 2022-09-27 RX ADMIN — MAGNESIUM SULFATE HEPTAHYDRATE 2 G: 40 INJECTION, SOLUTION INTRAVENOUS at 10:46

## 2022-09-27 RX ADMIN — ENOXAPARIN SODIUM 70 MG: 80 INJECTION SUBCUTANEOUS at 09:03

## 2022-09-27 RX ADMIN — POTASSIUM CHLORIDE 20 MEQ: 14.9 INJECTION, SOLUTION INTRAVENOUS at 09:03

## 2022-09-27 RX ADMIN — Medication 12.5 MG: at 06:46

## 2022-09-27 RX ADMIN — ATORVASTATIN CALCIUM 40 MG: 40 TABLET, FILM COATED ORAL at 17:24

## 2022-09-27 RX ADMIN — ALBUMIN (HUMAN) 25 G: 0.25 INJECTION, SOLUTION INTRAVENOUS at 01:05

## 2022-09-27 NOTE — ASSESSMENT & PLAN NOTE
Macrocytic anemia complicated by acute blood loss anemia  ? Folate in September of 2022 3 3  ? Vitamin B12 in September of 2022 645  ? Iron panel and August of 2022 showed an iron saturation of 18, TIBC 105, iron 19, ferritin 755  ? Likely a mixed component of anemia chronic disease with possible folate deficiency  ? Continue folate supplementation  ?  Patient received 1 unit packed red blood cell for hemoglobin 6 9 yesterday with response to 7 4 this morning

## 2022-09-27 NOTE — SPEECH THERAPY NOTE
Speech Language/Pathology  Speech-Language Pathology Bedside Swallow Evaluation      Patient Name: Martine Silverman    Today's Date: 9/27/2022     Problem List  Principal Problem:    Saddle embolus of pulmonary artery (HCC)  Active Problems:    Metastatic breast cancer ([facility] )    Anxiety    Mixed hyperlipidemia    Primary hypertension    Toxic gastroenteritis and colitis    REYES (acute kidney injury) ([facility] )    Anemia    Tachycardia    Urinary retention    Swelling of lower extremity      Past Medical History  Past Medical History:   Diagnosis Date   • Abnormal weight loss    • Allergic reaction    • Anxiety    • Breast cancer, right ([facility] ) 2011    right   • Cancer ([facility] ) 2012   • Candidal vulvovaginitis    • Clotting disorder ([facility] ) Same as above   • Endometrial hyperplasia    • Epithelial cyst     benign, ovary   • GI (gastrointestinal bleed) Blood mixed with stool   • History of radiation therapy 2011    right breast cancer   • Hyperlipidemia    • Hypertension    • Internal hemorrhoids    • Knee tendonitis    • Ovarian cyst    • 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[facility])        Past Surgical History  Past Surgical History:   Procedure Laterality Date   • BILATERAL SALPINGOOPHORECTOMY      onset: 7/23/13   • BREAST BIOPSY Right 06/13/2006    benign   • BREAST BIOPSY Right 03/02/2011    malignant   • BREAST LUMPECTOMY Right 03/30/2011    malignant   • CATARACT EXTRACTION W/  INTRAOCULAR LENS IMPLANT Right     phacoemulsification   Onset: 10/27/14   • CHOLECYSTECTOMY     • COLONOSCOPY  2017    approx   • HYSTERECTOMY  Yes   • INTRAOPERATIVE RADIATION THERAPY (IORT)     • IR BIOPSY BONE  7/9/2021   • IR IVC FILTER PLACEMENT OPTIONAL/TEMPORARY  9/23/2022   • IR PE ENDOVASCULAR THERAPY  9/22/2022   • IR PICC PLACEMENT SINGLE LUMEN  8/19/2022   • SKIN LESION EXCISION Right     breast, single lesion   • TONSILLECTOMY AND ADENOIDECTOMY         Summary   Pt presented with functional appearing oral and pharyngeal stage swallowing skills with materials administered today  No significant difficulties w/ mastication or oral organization  Transfers are appropriate, no oral residue  Swallows suspected timely  No overt s/s aspiration across trials today  Pt reports "Choking" w/ pill this am as an isolated event, denies dysphagia or s/s aspiration previously or since  Education provided on strategies to optimize swallow safety and s/s dysphagia/aspiration to notify medical team of should they arise  Pt demonstrated understanding and denied questions at this time  Risk/s for Aspiration: Mild     Recommended Diet: regular diet and thin liquids   Recommended Form of Meds: whole with liquid and whole with puree if needed   Aspiration precautions and swallowing strategies: upright posture, only feed when fully alert and slow rate of feeding  Other Recommendations: Continue frequent oral care        Current Medical Status  Per Brianne Romero DO 9/25/22:   "79 y  o  female with past medical history of stage IV metastatic ER positive, TX negative, 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, hyperlipidemia, CKD stage 3a, hypertension, chronic anemia, protein-calorie malnutrition, and recent admission with discharge for toxic gastroenteritis and pulmonary embolism discharged on Xarelto on 09/06 that initially presented to [facility] on 9/22 for mechanical fall   During the patient's previous admission, the patient was diagnosed with IBD and started on steroids   Patient's Verzenio was discontinued by Oncology, likely contributing to her GI symptoms   In addition, she was noted to have lower extremity edema, found to have an acute occlusive thrombus in the right popliteal vein   Further workup with CT PE protocol showed right lower lobe subsegmental PE, patient was started on heparin drip at that time, and then eventually transitioned to Xarelto   The patient was discharged with Lopressor 50 mg twice daily, as well in the setting of her persistent sinus tachycardia   Lastly, her antihypertensive regimen was discontinued given the patient's new baseline of systolic blood pressures in the 100-110 range   Since discharge, the patient has been progressing slowly at her nursing home with regular follow-up with her geriatrician      Patient states that around 0600 on the day of presentation she was walking to the bathroom and her legs gave out   The patient denied head strike or loss of consciousness at that time   EMS was called, in route the patient's blood pressure dropped to 90/40 with heart rates increasing in the 150s   On presentation to the emergency department, the patient was found to be afebrile, but tachycardic   She was maintaining her blood pressures, and saturating 100% on room air   Physical exam significant for lower extremity edema   Lab significant for creatinine of 1 36 (baseline around 0 8-0 9), proBNP 1500, lactic acid 2 3, troponin 20, hemoglobin 9 6 (baseline around 9-10),   Trauma imaging was negative, but CTA revealed saddle pulmonary embolus, left greater than right, high clot burden, and RV to LV ratio 1 2   Patient was transferred urgently to [facility] for possible IR intervention      Patient is now status post thrombectomy with IR   No IVC filter placed   Patient was noted to be supratherapeutic on her heparin drip last night, and subsequently had questionable GI bleeding versus bleeding from her puncture site   A Code crimson was called, but she was never transfused   Repeat hemoglobins have been coming back around 7   The patient has been tachycardic, but maintain pressures   She does report some shivering, but afebrile   The patient was transferred back from step-down 1 to critical care due to concerns for bleeding "     Current Precautions:  Contact   Hand    Delirium     Allergies:  No known food allergies    Past medical history:  Please see H&P for details    Special Studies:  CXR 9/25/22: Increased bibasilar density, most likely atelectasis  CTA chest ct abdomen pelvis 9/22: 1  Saddle embolus and left greater than right pulmonary arterial filling defects consistent with PE (high clot burden) with evidence of RV strain including an RV/LV ratio = 1 2  This is greater than 0 9, which is abnormal and indicates right heart   strain  An abnormal RV/LV ratio has been shown to be associated with an increased risk of 30 day mortality in the setting of acute pulmonary embolism  Urgent consultation with the medical critical care team is recommended  2   Massive distention of the bladder  3   No acute posttraumatic injury  Social/Education/Vocational Hx:  Pt lives alone    Swallow Information   Current Risks for Dysphagia & Aspiration: age, current medical   Current Symptoms/Concerns: choking incident w/ pill this am   Current Diet: regular diet and thin liquids   Baseline Diet: regular diet and thin liquids      Baseline Assessment   Behavior/Cognition: alert  Speech/Language Status: able to participate in conversation and able to follow commands  Patient Positioning: upright in bed  Pain Status/Interventions/Response to Interventions:   No report of or nonverbal indications of pain  Swallow Mechanism Exam  Facial: symmetrical  Labial: WFL  Lingual: WFL  Velum: symmetrical  Mandible: adequate ROM  Dentition: adequate/somewhat limited dentition   Vocal quality:clear/adequate   Volitional Cough: strong/productive   Respiratory Status: on RA        Consistencies Assessed and Performance   Consistencies Administered: thin liquids, puree, soft solids and hard solids    Oral Stage: [facility]  Mastication was adequate with the materials administered today  Bolus formation and transfer were functional with no significant oral residue noted  No overt s/s reduced oral control  Pharyngeal Stage: WFL  Swallow Mechanics:  Swallowing initiation appeared prompt    Laryngeal rise was palpated and judged to be within functional limits  No coughing, throat clearing, change in vocal quality or respiratory status noted today  Esophageal Concerns: none reported    Strategies and Efficacy: -    Summary and Recommendations (see above)    Results Reviewed with: patient and RN     Treatment Recommended: Yes     Frequency of treatment: Brief x1 as able for diet tolerance     Patient Stated Goal: "They should coat the pills"     Dysphagia LTG  -Patient will demonstrate safe and effective oral intake (without overt s/s significant oral/pharyngeal dysphagia including s/s penetration or aspiration) for the highest appropriate diet level     Speech Therapy Prognosis   Prognosis: good    Prognosis Considerations: age, medical status and prior medical history

## 2022-09-27 NOTE — PROGRESS NOTES
General Cardiology   Progress Note -  Team One   Ricky Silverman 79 y o  female MRN: [id number]    Unit/Bed#: [facility] 406-01 Encounter: [id number]    Assessment  1  Sinus tachycardia w/frequent PACs  2  Paroxysmal SVT / PAT  -Multifactorial etiology for this  Physiologic response in the setting of multiple acute clinical issues (see below)  -ECG 9/26 - sinus tachycardia, rate 133 bpm  -Telemetry review  ST, w/occasional PACs and brief runs of what appears to be atrial tachycardia  Overall burden much less than yesterday  -BMP 9/27 --- (K+level 3 3, mag level pending)  -Currently on metoprolol tartrate 12 5 mg q 6 hours  -TSH level 3 6  3  Intermittent hypotension  -Improved  -Suspect 2/2 to intravascular volume depletion -- in the setting of C diff colitis infection and anemia   -Average /56, last recorded 107/58,     -S/p intermittent IV fluid boluses / PRBC transfusions   -On midodrine 2 5 mg t i d  -TTE 9/22 - preserved BiV systolic function  4  Saddle PE  5  Bilateral DVT  -S/p IR guided thrombectomy/IVC filter placement  -Limited TTE 9/22 - LVEF 70%, RV normal size/systolic function, mild TR  -Previously on Xarelto - concern for failure  Currently on therapeutic Lovenox injections  6  Metastatic breast CA  -Palliative care/Heme-Onc following  7  Colitis / C  Diff infection   -GI Following  -Rectal tube in place  8  Anemia  -Baseline HGB levels 12-13  -Of recent HGB levels ranging in the low 7-8 range  -HGB level dropped to 6 9 yesterday - s/p 1 unit of PRBC transfusion, repeat level 7 4 this a roc Gonzalez Plan  -Overall improvement in heart rate trends over the past 24 hours s/p volume expansion and re implementation of low-dose metoprolol q 6 hours  Heart rates remain mildly elevated in sinus tachycardia currently trending in the upper 90s to low 100 range  BP appears to have stabilized    Will up titrate metoprolol tartrate to 25 mg t i d  and if BP remains stable over the next 24 hours will placed back on home dose of 50 mg q 12 hours   -Continue midodrine 2 5 mg t i d  -Needs aggressive electrolyte replacement  K +level 3 3 this a m  - currently receiving IV KCL 20 mEq x2  Pending Mag level   -Continue to monitor on telemetry    Subjective  Review of Systems   Constitutional: Positive for malaise/fatigue  Negative for chills and fever  Eyes: Negative for visual disturbance  Cardiovascular: Positive for leg swelling  Negative for chest pain, dyspnea on exertion, orthopnea and palpitations  Respiratory: Negative for cough and shortness of breath  Gastrointestinal: Positive for diarrhea  Negative for abdominal pain  Neurological: Negative for dizziness, headaches and light-headedness  Objective:   Physical Exam  Vitals and nursing note reviewed  Constitutional:       General: She is not in acute distress  Appearance: She is obese  She is not diaphoretic  HENT:      Head: Normocephalic and atraumatic  Eyes:      General: No scleral icterus  Cardiovascular:      Rate and Rhythm: Regular rhythm  Tachycardia present  Pulses: Normal pulses  Heart sounds: Normal heart sounds  No murmur heard  Pulmonary:      Effort: Pulmonary effort is normal       Breath sounds: Normal breath sounds  No wheezing or rales  Abdominal:      Palpations: Abdomen is soft  Musculoskeletal:      Cervical back: Neck supple  Right lower leg: Edema present  Left lower leg: Edema present  Skin:     General: Skin is warm and dry  Capillary Refill: Capillary refill takes less than 2 seconds  Neurological:      General: No focal deficit present  Mental Status: She is alert and oriented to person, place, and time  Psychiatric:         Mood and Affect: Mood normal          Vitals: Blood pressure 107/58, pulse (!) 107, temperature 98 4 °F (36 9 °C), temperature source Oral, resp   rate 18, height 5' 1" (1 549 m), weight 66 3 kg (146 lb 2 6 oz), SpO2 91 %, not currently breastfeeding ,     Body mass index is 27 62 kg/m²  ,   Systolic (73BDM), TZI:783 , Min:96 , HJO:891     Diastolic (39RSN), KWF:14, Min:47, Max:66      Intake/Output Summary (Last 24 hours) at 9/27/2022 0903  Last data filed at 9/27/2022 0733  Gross per 24 hour   Intake 1350 ml   Output 990 ml   Net 360 ml     Weight (last 2 days)     Date/Time Weight    09/27/22 0600 66 3 (146 17)    09/26/22 0541 66 7 (147 05)          LABORATORY RESULTS      CBC with diff:   Results from last 7 days   Lab Units 09/27/22  0427 09/26/22  1530 09/25/22  2358 09/25/22  1258 09/25/22  0324 09/24/22  0517 09/24/22  0133 09/23/22  1630 09/23/22  0514 09/22/22  1630 09/22/22  1145   WBC Thousand/uL 3 40* 4 11* 5 58  --  3 20* 4 24*  --  5 24 6 20   < > 7 31   HEMOGLOBIN g/dL 7 4* 6 9* 7 3* 8 1* 7 3* 8 1* 8 6* 6 6* 7 1*   < > 9 6*   HEMATOCRIT % 23 4* 22 5* 23 3*  --  24 2* 25 5*  --  22 6* 23 6*   < > 31 0*   HEMATOCRIT, ISTAT   --   --   --   --   --   --   --   --   --    < >  --    MCV fL 99* 105* 103*  --  105* 101*  --  109* 107*   < > 107*   PLATELETS Thousands/uL 50* 62* 66*  --  66* 79*  --  79* 88*  --  110*   MCH pg 31 2 32 1 32 2  --  31 7 32 1  --  31 7 32 3   < > 33 1   MCHC g/dL 31 6 30 7* 31 3*  --  30 2* 31 8  --  29 2* 30 1*   < > 31 0*   RDW % 19 3* 17 3* 17 9*  --  19 3* 19 1*  --  16 8* 16 7*   < > 16 3*   MPV fL 10 9 11 1 11 4  --  10 2 10 4  --  10 4 10 4  --  9 8   NRBC AUTO /100 WBCs 0 0  --   --  0  --   --  0  --   --  0   NRBC /100 WBC  --   --  1  --   --   --   --   --   --   --   --     < > = values in this interval not displayed         CMP:  Results from last 7 days   Lab Units 09/27/22  0427 09/26/22  1530 09/25/22  2358 09/25/22  0330 09/24/22  0517 09/23/22  1630 09/23/22  1009 09/23/22  0436 09/23/22  0000 09/22/22  1145   POTASSIUM mmol/L 3 3* 3 5 3 3* 4 5 3 8 4 3 3 4*  --  3 0* 4 6   CHLORIDE mmol/L 112* 112* 112* 114* 117* 118* 118*  --  115* 107   CO2 mmol/L 24 23 22 23 21 18* 19*  --  18* 24   CO2, I-STAT mmol/L  --   --   --   --   --   --   --  16*  --   --    BUN mg/dL 11 13 13 13 13 15 16  --  17 28*   CREATININE mg/dL 0 50* 0 75 0 71 0 68 0 62 0 73 0 79  --  0 80 1 36*   GLUCOSE, ISTAT mg/dl  --   --   --   --   --   --   --  60*  --   --    CALCIUM mg/dL 7 2* 7 5* 7 2* 7 2* 6 7* 6 9* 6 7*  --  6 6* 8 2*   AST U/L 9 7 8  --  10  --  9  --  10 13   ALT U/L 15 19 18  --  21  --  18  --  21 30   ALK PHOS U/L 47 57 62  --  72  --  64  --  74 106   EGFR ml/min/1 73sq m 90 74 79 81 84 76 69  --  68 36       BMP:  Results from last 7 days   Lab Units 09/27/22  0427 09/26/22  1530 09/25/22  2358 09/25/22  0330 09/24/22  0517 09/23/22  1630 09/23/22  1009 09/23/22  0436   POTASSIUM mmol/L 3 3* 3 5 3 3* 4 5 3 8 4 3 3 4*  --    CHLORIDE mmol/L 112* 112* 112* 114* 117* 118* 118*  --    CO2 mmol/L 24 23 22 23 21 18* 19*  --    CO2, I-STAT mmol/L  --   --   --   --   --   --   --  16*   BUN mg/dL 11 13 13 13 13 15 16  --    CREATININE mg/dL 0 50* 0 75 0 71 0 68 0 62 0 73 0 79  --    GLUCOSE, ISTAT mg/dl  --   --   --   --   --   --   --  60*   CALCIUM mg/dL 7 2* 7 5* 7 2* 7 2* 6 7* 6 9* 6 7*  --        Lab Results   Component Value Date    NTBNP 1,555 (H) 09/22/2022        Results from last 7 days   Lab Units 09/25/22  0330 09/24/22  0517 09/23/22  1630   MAGNESIUM mg/dL 2 0 1 9 2 0                   Results from last 7 days   Lab Units 09/23/22  0416 09/22/22  1630 09/22/22  1145   INR  1 29* 1 38* 1 08       Lipid Profile:   Lab Results   Component Value Date    CHOL 183 03/28/2015    CHOL 161 05/27/2014     Lab Results   Component Value Date    HDL 29 (L) 08/16/2022    HDL 46 05/19/2020    HDL 46 04/12/2019     Lab Results   Component Value Date    LDLCALC 25 08/16/2022    LDLCALC 124 (H) 05/19/2020    LDLCALC 106 (H) 04/12/2019     Lab Results   Component Value Date    TRIG 79 08/16/2022    TRIG 157 (H) 05/19/2020    TRIG 142 04/12/2019       Cardiac testing:   No results found for this or any previous visit      No results found for this or any previous visit  No results found for this or any previous visit  No valid procedures specified  No results found for this or any previous visit  Meds/Allergies   all current active meds have been reviewed and current meds:   Current Facility-Administered Medications   Medication Dose Route Frequency   • atorvastatin (LIPITOR) tablet 40 mg  40 mg Oral After Dinner   • cholecalciferol (VITAMIN D3) tablet 2,000 Units  2,000 Units Oral Daily   • cholestyramine sugar free (QUESTRAN LIGHT) packet 4 g  4 g Oral HS   • collagenase (SANTYL) ointment   Topical Daily   • enoxaparin (LOVENOX) subcutaneous injection 70 mg  1 mg/kg Subcutaneous U08C RIA   • folic acid (FOLVITE) tablet 1 mg  1 mg Oral Daily   • metoprolol (LOPRESSOR) injection 2 5 mg  2 5 mg Intravenous Q6H PRN   • metoprolol tartrate (LOPRESSOR) partial tablet 12 5 mg  12 5 mg Oral Q6H RIA   • midodrine (PROAMATINE) tablet 2 5 mg  2 5 mg Oral TID AC   • mirtazapine (REMERON) tablet 7 5 mg  7 5 mg Oral HS   • pantoprazole (PROTONIX) EC tablet 40 mg  40 mg Oral BID AC   • potassium chloride 20 mEq IVPB (premix)  20 mEq Intravenous Q2H   • predniSONE tablet 10 mg  10 mg Oral Daily   • senna-docusate sodium (SENOKOT S) 8 6-50 mg per tablet 2 tablet  2 tablet Oral BID   • traMADol (ULTRAM) tablet 50 mg  50 mg Oral Once PRN   • vancomycin (VANCOCIN) oral solution 125 mg  125 mg Oral Q6H [address]        EKG personally reviewed by BROOKE Og    Assessment:  Principal Problem:    Saddle embolus of pulmonary artery ([facility] )  Active Problems:    Metastatic breast cancer ([facility] )    Anxiety    Mixed hyperlipidemia    Primary hypertension    Toxic gastroenteritis and colitis    REYES (acute kidney injury) ([facility] )    Anemia    Tachycardia    Urinary retention    Swelling of lower extremity      Counseling / Coordination of Care  Total floor / unit time spent today 20 minutes    Greater than 50% of total time was spent with the patient and / or family counseling and / or coordination of care  ** Please Note: Dragon Mangstor Dictation voice to text software may have been used in the creation of this document   **

## 2022-09-27 NOTE — ASSESSMENT & PLAN NOTE
Patient was admitted with toxic gastroenteritis in August of 2022  ? Determined to be possible IBD with additional insult of Verzenio from her breast cancer treatment  ? Continue to hold Verzenio    ? Patient positive for C diff will start on oral vancomycin  ? Will wean prednisone slowly as she has been on this chronically-continue on 10 mg for another 2-3 days and then wean to 5 mg in the setting of hypotension as well as chronic use  ?  Will stop loperamide, cholestyramine in all stool softeners

## 2022-09-27 NOTE — CASE MANAGEMENT
Case Management Discharge Planning Note    Patient name Lizet Silverman  Location [facility] 406-01 MRN [id number]  : 1940 Date 2022       Current Admission Date: 2022  Current Admission Diagnosis:Saddle embolus of pulmonary artery [facility])   Patient Active Problem List    Diagnosis Date Noted   • Saddle embolus of pulmonary artery ([facility] ) 2022   • Urinary retention 2022   • Swelling of lower extremity 2022   • Venous insufficiency 2022   • Tachycardia 2022   • Single subsegmental pulmonary embolism without acute cor pulmonale (HCC) 2022   • Severe protein-calorie malnutrition ([place] [address] ) 2022   • Anemia 2022   • Hypokalemia 2022   • REYES (acute kidney injury) ([place] [address] ) 2022   • Diarrhea 2022   • Secondary malignant neoplasm of bone ([place] [address] ) 2022   • Toxic gastroenteritis and colitis 2022   • Rectal bleeding 10/14/2021   • Lytic lesion of bone on x-ray 2021   • Neoplasm of uncertain behavior of sternum 2021   • Cough due to ACE inhibitor 2021   • Acute costochondritis 2021   • Osteopenia of multiple sites 2020   • Primary hypertension 10/22/2019   • Chronic kidney disease (CKD) stage G3a/A1, moderately decreased glomerular filtration rate (GFR) between 45-59 mL/min/1 73 square meter and albuminuria creatinine ratio less than 30 mg/g ([facility]) 2019   • Adverse reaction to pneumococcal vaccine 2015   • Cataract, bilateral 2014   • Pulmonary nodule seen on imaging study 09/10/2013   • Sleep disorder 2013   • Anxiety 2013   • Metastatic breast cancer ([facility] ) 10/17/2012   • Mixed hyperlipidemia 2012      LOS (days): 5  Geometric Mean LOS (GMLOS) (days): 4 40  Days to GMLOS:-0 4     OBJECTIVE:  Risk of Unplanned Readmission Score: 35 09         Current admission status: Inpatient   Preferred Pharmacy:   [facility] [place] [place] - [address] [facility] [address]  Phone: [phone] Fax: [address] [facility], [address] [place] [address]  [address]  [address] [facility] [address]  Phone: [phone] Fax: [phone] [address] [place], [facility] [place] [address] [facility] [address] [address]  Phone: [phone] Fax: [phone]    Primary Care Provider: Cristin Gaytan DO    Primary Insurance: MEDICARE  Secondary Insurance: [employer]    DISCHARGE DETAILS:                                          Other Referral/Resources/Interventions Provided:  Referral Comments: Per daughter only 15 Medicare days were used                                                      Additional Comments: Call placed to daughter Frank Townsend to discuss discharge planning  Pt is not ready for discharge yet  Several referrals were sent previously and several are available  Daughter does not want to return to [facility]  Discussed available facilities, discussed Medicare days, daughter says she only used 14 days so has 6 days left at 100% and understands 80 days at 80% and [employer] for 20%  She is reviewing facilities on line  She will visit tomorrow

## 2022-09-27 NOTE — QUICK NOTE
GI quick note  -------------------      Discussed patient's case with IBD specialist Dr Gordon Areas  He is concerned that given the chronicity of her colitis she could likely have IBD (her ulcers on sigmoidoscopy done in August or concerning for Crohn's disease)  I discussed with the patient's daughter regarding the concerning diagnosis for IBD and recommendation to start Remicade with 1 does being given while she is inpatient    The daughter states that she would like to think about the Remicade and do her research before making the decision of committing to Remicade infusions

## 2022-09-27 NOTE — PROGRESS NOTES
Progress note - Palliative and Supportive Care   Brendan Silverman 79 y o  female [id number]    Assessment:   - Raiza Stoddard is a 79 y o  female  PSC has been consulted for goals of care discussion  Principal Problem:    Saddle embolus of pulmonary artery (HCC)  Active Problems:    Metastatic breast cancer ([facility] [address] )    Anxiety    Mixed hyperlipidemia    Primary hypertension    Toxic gastroenteritis and colitis    REYES (acute kidney injury) ([facility] [address] )    Anemia    Tachycardia    Urinary retention    Swelling of lower extremity      Plan:  1  Symptom management - per primary team  • Pain management regimen:  o N/A  Denies any pain  • Bowel Regimen:  o None    o Diarrhea/ C diff positive  Rectal tube with balloon in place    2  Goals -   • Outpatient initial consultation with Parviz Spears 7/21/2022  • Continue current medical care  • No desire to persue cancer treatment    I can be contacted through [facility] CHILDREN Text for any questions regarding Brendan Silverman's case  If there are any questions after business hours, you may reach out to the on call provider  Code Status: DNR/DNI - Level 3   Decisional apparatus:  Patient is competent on my exam today  If competence is lost, patient's substitute decision maker would default to children by [place] 169  Advance Directive / Living Will / POLST: not in chart  Will review POLST with patient and daughter tomorrow when daughter comes to visit  Will re-address on post-acute rehab then LTC vs hospice  Interval history:       Brendan Silverman was seen and examined in bedside chair this morning  Patient's family was not present at time of examination  Discussed overnight events with nursing staff  No acute events OVN  She reports not sleeping well last night given noises from monitors and being checked many times  She had breakfast with no N/V  She uses incentive spirometry       MEDICATIONS / ALLERGIES:     current meds:   Current Facility-Administered Medications   Medication Dose Route Frequency • atorvastatin (LIPITOR) tablet 40 mg  40 mg Oral After Dinner   • cholecalciferol (VITAMIN D3) tablet 2,000 Units  2,000 Units Oral Daily   • collagenase (SANTYL) ointment   Topical Daily   • enoxaparin (LOVENOX) subcutaneous injection 70 mg  1 mg/kg Subcutaneous M[address]   • folic acid (FOLVITE) tablet 1 mg  1 mg Oral Daily   • magnesium sulfate 2 g/50 mL IVPB (premix) 2 g  2 g Intravenous Once   • metoprolol (LOPRESSOR) injection 2 5 mg  2 5 mg Intravenous Q6H PRN   • metoprolol tartrate (LOPRESSOR) tablet 25 mg  25 mg Oral TID   • midodrine (PROAMATINE) tablet 2 5 mg  2 5 mg Oral TID AC   • mirtazapine (REMERON) tablet 7 5 mg  7 5 mg Oral HS   • pantoprazole (PROTONIX) EC tablet 40 mg  40 mg Oral BID AC   • [START ON 9/28/2022] predniSONE tablet 10 mg  10 mg Oral Daily   • traMADol (ULTRAM) tablet 50 mg  50 mg Oral Once PRN   • vancomycin (VANCOCIN) oral solution 125 mg  125 mg Oral Q6H [address]       Allergies   Allergen Reactions   • Sulfa Antibiotics    • Pneumococcal Polysaccharide Vaccine Rash and Edema       OBJECTIVE:    Physical Exam  Physical Exam  Vitals and nursing note reviewed  Constitutional:       General: She is not in acute distress  Appearance: She is well-developed  She is not toxic-appearing  HENT:      Head: Normocephalic and atraumatic  Nose: Nose normal       Mouth/Throat:      Mouth: Mucous membranes are moist    Eyes:      Conjunctiva/sclera: Conjunctivae normal    Cardiovascular:      Rate and Rhythm: Normal rate and regular rhythm  Pulmonary:      Effort: Pulmonary effort is normal  No respiratory distress  Breath sounds: Normal breath sounds  Abdominal:      Palpations: Abdomen is soft  Tenderness: There is no abdominal tenderness  Comments: Rectal tube in place   Musculoskeletal:      Cervical back: Neck supple  Skin:     General: Skin is warm and dry  Neurological:      General: No focal deficit present        Mental Status: She is alert and oriented to person, place, and time  Psychiatric:         Behavior: Behavior normal          Lab Results:   CBC:   Lab Results   Component Value Date    WBC 3 40 (L) 09/27/2022    HGB 7 4 (L) 09/27/2022    HCT 23 4 (L) 09/27/2022    MCV 99 (H) 09/27/2022    PLT 50 (L) 09/27/2022    MCH 31 2 09/27/2022    MCHC 31 6 09/27/2022    RDW 19 3 (H) 09/27/2022    MPV 10 9 09/27/2022    NRBC 0 09/27/2022   , CMP:   Lab Results   Component Value Date    SODIUM 142 09/27/2022    K 3 3 (L) 09/27/2022     (H) 09/27/2022    CO2 24 09/27/2022    BUN 11 09/27/2022    CREATININE 0 50 (L) 09/27/2022    CALCIUM 7 2 (L) 09/27/2022    AST 9 09/27/2022    ALT 15 09/27/2022    ALKPHOS 47 09/27/2022    EGFR 90 09/27/2022     Imaging Studies:   XR chest portable    Result Date: 9/26/2022  Impression: Increased bibasilar density, most likely atelectasis  Workstation performed: [id number]     XR Trauma chest portable    Result Date: 9/22/2022  Impression: No acute cardiopulmonary disease  Workstation performed: [id number]     TRAUMA - CT head wo contrast    Result Date: 9/22/2022  Impression: No acute intracranial abnormality  Chronic microangiopathic changes  I personally discussed this study with Ranjan Collins on 9/22/2022 at 12:32 PM  Workstation performed: [id number]     TRAUMA - CT spine cervical wo contrast    Result Date: 9/22/2022  Impression: No cervical spine fracture or traumatic malalignment  I personally discussed this study with Ranjan Collins on 9/22/2022 at 12:23 PM   Workstation performed: [id number]     PE Study with CT abdomen & pelvis with contrast    Result Date: 9/22/2022  Impression: 1  Saddle embolus and left greater than right pulmonary arterial filling defects consistent with PE (high clot burden) with evidence of RV strain including an RV/LV ratio = 1 2  This is greater than 0 9, which is abnormal and indicates right heart strain   An abnormal RV/LV ratio has been shown to be associated with an increased risk of 30 day mortality in the setting of acute pulmonary embolism  Urgent consultation with the medical critical care team is recommended  2   Massive distention of the bladder  3   No acute posttraumatic injury  I personally discussed this study with Jennifer Syed on 9/22/2022 at 12:27 PM  Workstation performed: [id number]     IR IVC filter placement optional/temporary    Result Date: 9/26/2022  Impression: Placement of a temporary inferior vena cava filter  PLAN: This filter can be removed at any time in the future  Interventional Radiology will follow this patient to ensure removal when it is no longer needed  Workstation performed: [id number]     IR PE endovascular therapy    Result Date: 9/26/2022  Impression: 1  Technically successful mechanical thrombectomy of the left pulmonary artery with extirpation of large clot   Workstation performed: [id number]      I have personally reviewed imaging reports

## 2022-09-27 NOTE — PROGRESS NOTES
Spiritual Care Progress Note    2022  Patient: Coretha Ganser Clunk : 1940  Admission Date & Time: 2022 1535  MRN: [id number] CSN: [id number]      Fr Dick Parnell visited with the pt  No family was present                 Chaplaincy Interventions Utilized:     Collaboration: Facilitated respect for spiritual/cultural practice during hospitalization    Relationship Building: Cultivated a relationship of care and support    Ritual: Provided prayer and Provided ritual     22 1600   Clinical Encounter Type   Visited With Patient   Baptism Encounters   Baptism Needs Prayer   Sacramental Encounters   Communion Given Indicator Yes   Sacrament of Sick-Anointing Anointed

## 2022-09-27 NOTE — ASSESSMENT & PLAN NOTE
· Patient had sinus tachycardia on her last discharge, likely in setting of pulmonary embolism  Still with sinus tachycardia on this admission  ? Consider amitriptyline as possible etiology as well  ? Due to hypotension will decrease metoprolol to 12 5 mg 4 times daily  ? Patient is notably 3rd spacing, will bolus with albumin  ?  Continue midodrine for blood pressure support

## 2022-09-27 NOTE — PHYSICAL THERAPY NOTE
Physical Therapy Cancellation Note           09/27/22 1425   Note Type   Note Type Cancelled Session   Cancel Reasons Medical status     Chart reviewed; attempted to see pt x 2; in AM, pt was reportedly not appropriate for therapy due to med status (per OTR who spoke to RN); in PM, pt just recently returned back to bed and was observed resting in bed/sleeping; pt was left undisturbed at that time; will follow      [facility], PT

## 2022-09-27 NOTE — PROGRESS NOTES
[address] [facility]  Progress Note Yakelin Silverman 1940, 79 y o  female MRN: [id number]  Unit/Bed#: [facility] 406-01 Encounter: [id number]  Primary Care Provider: Cristin Gaytan DO   Date and time admitted to hospital: 9/22/2022  3:35 PM    Swelling of lower extremity  Assessment & Plan  LE swelling has been worsening over the past few weeks  Right worse than left  ? 3+ on both lower extremities, improving along with pulses  ? Previous echo with a hyperdynamic LV EF 70%  ? Lower extremity duplex revealing significant acute DVTs in the right lower extremity  ? Can consider diuresis once the patient is stable for further fluid management      Urinary retention  Assessment & Plan  Patient presented with a massively distended bladder, 3 6 L out of for straight cath, greater than 800 mL out of 2nd straight cath  ? Urinary retention protocol, patient will likely need Crawley catheter  ? May be secondary to TCA use  ? UA with 1+ protein, innumerable rbc's, 4-10 wbc's  ? Urology consult, appreciate recommendations    Tachycardia  Assessment & Plan  · Patient had sinus tachycardia on her last discharge, likely in setting of pulmonary embolism  Still with sinus tachycardia on this admission  ? Consider amitriptyline as possible etiology as well  ? Due to hypotension will decrease metoprolol to 12 5 mg 4 times daily  ? Patient is notably 3rd spacing, will bolus with albumin  ? Continue midodrine for blood pressure support      Anemia  Assessment & Plan  Macrocytic anemia complicated by acute blood loss anemia  ? Folate in September of 2022 3 3  ? Vitamin B12 in September of 2022 645  ? Iron panel and August of 2022 showed an iron saturation of 18, TIBC 105, iron 19, ferritin 755  ? Likely a mixed component of anemia chronic disease with possible folate deficiency  ? Continue folate supplementation  ?  Patient received 1 unit packed red blood cell for hemoglobin 6 9 yesterday with response to 7 4 this morning       REYES (acute kidney injury) [facility])  Assessment & Plan  REYES on CKD stage IIIA  ? Patient's baseline creatinine between 0 8 and 0 9 with an EGFR ranging around 60  ? Admission creatinine 1 36, down to 0 5  ? Patient did receive IV contrast for CTA  ? Patient does not have a history of heart failure, will initiate fluids  ? Accurate in's and out's  ? Avoid nephrotoxins    Toxic gastroenteritis and colitis  Assessment & Plan  Patient was admitted with toxic gastroenteritis in August of 2022  ? Determined to be possible IBD with additional insult of Verzenio from her breast cancer treatment  ? Continue to hold Verzenio    ? Patient positive for C diff will start on oral vancomycin  ? Will wean prednisone slowly as she has been on this chronically-continue on 10 mg for another 2-3 days and then wean to 5 mg in the setting of hypotension as well as chronic use  ? Will stop loperamide, cholestyramine in all stool softeners      Primary hypertension  Assessment & Plan  Patient's antihypertensives were discontinued after previous hospital stay due to normotension  · Continue to monitor    Mixed hyperlipidemia  Assessment & Plan  Stable  · Continue statin    Anxiety  Assessment & Plan  Depression/anxiety  ? Hold amitriptyline -possible etiology of tachycardia  ? Will resume Remeron      Metastatic breast cancer [facility])  Assessment & Plan  Stage IV metastatic ER positive, [place] negative, 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, progressed from stage I A ER/IL positive, 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 years ago  Status post resection and adjuvant radiation and hormone therapy for 5 years  Patient had symptoms of bony discomfort in her sternum in June of 2021, imaging revealed lytic lesion, biopsy in July of 2021 confirmed progression of disease  ? Patient was started on Faslodex and Xgeva at that time, in conjunction with radiation  ?  In April 2022 there was interval development of 4 mm nodule at the right lower lobe and interval development of increased sclerotic lesions throughout the visualized spine  ? At that time, the patient's treatment was changed to Femara and Taffy Snow  ? In July of 2022, the patient was changed from Taffy Snow to Teresabury because of intolerance  ? During the patient's hospitalization in August of 2022 to September of 2022, Verzenio was discontinued due to gastroenteritis  ? Patient's cancer is most likely contributing to her hypercoagulable state  ? Appreciate oncology and palliative care consult  ? Will continue on Lovenox for now  ? Continue on Remeron for insomnia and anxiety  ? Patient would not like any more treatment for her malignancy      * Saddle embolus of pulmonary artery (HCC)  Assessment & Plan  Acute, submassive, intermediate to high risk pulmonary embolism  Patient has history of right popliteal DVT and right lower lobe subsegmental PE from previous hospitalization in August   ? Patient was sent home on Xarelto, likely not failure, was receiving it at her nursing home  ? Patient also has metastatic breast cancer, hypercoagulable state  ? Return to the ER on 09/22 after a fall, found to have saddle pulmonary embolus with evidence of right heart strain and high clot burden  ? RV to LV ratio on CTA 1 2  ? BMP greater than 1500, troponins relatively normal  ? TTE inconclusive for right heart strain  ? Patient is now status post thrombectomy with IR, no IVC filter placement  ? Duplex showing significant right lower extremity clot burden  ? Continue on Lovenox for pulmonary embolism            VTE Pharmacologic Prophylaxis:   High Risk (Score >/= 5) - Pharmacological DVT Prophylaxis Ordered: enoxaparin (Lovenox)  Sequential Compression Devices Ordered  Patient Centered Rounds: I performed bedside rounds with nursing staff today    Discussions with Specialists or Other Care Team Provider: gi, palliative    Education and Discussions with Family / Patient: Updated  (daughter) via phone     Time Spent for Care: 30 minutes  More than 50% of total time spent on counseling and coordination of care as described above  Current Length of Stay: 5 day(s)  Current Patient Status: Inpatient   Certification Statement: The patient will continue to require additional inpatient hospital stay due to Monitoring bowel movements, heart rate and blood pressure  Discharge Plan: Anticipate discharge in 48-72 hrs to discharge location to be determined pending rehab evaluations  Code Status: Level 3 - DNAR and DNI    Subjective:   Patient reports not sleeping well overnight  She denies any fevers chills nausea vomiting abdominal pain shortness a breath today  She does have some generalized fatigue but no focal weakness  Reports a poor appetite  Denies any pain    Objective:     Vitals:   Temp (24hrs), Av 1 °F (36 7 °C), Min:97 8 °F (36 6 °C), Max:98 4 °F (36 9 °C)    Temp:  [97 8 °F (36 6 °C)-98 4 °F (36 9 °C)] 98 3 °F (36 8 °C)  HR:  [] 118  Resp:  [12-19] 18  BP: ()/(49-83) 102/83  SpO2:  [88 %-96 %] 96 %  Body mass index is 27 62 kg/m²  Input and Output Summary (last 24 hours): Intake/Output Summary (Last 24 hours) at 2022 1350  Last data filed at 2022 1046  Gross per 24 hour   Intake 1450 ml   Output 910 ml   Net 540 ml       Physical Exam:   Physical Exam  Vitals and nursing note reviewed  Constitutional:       General: She is not in acute distress  Appearance: She is well-developed  She is ill-appearing  She is not toxic-appearing or diaphoretic  Comments: Chronically ill-appearing   HENT:      Head: Normocephalic and atraumatic  Eyes:      General: No scleral icterus  Conjunctiva/sclera: Conjunctivae normal    Cardiovascular:      Rate and Rhythm: Regular rhythm  Tachycardia present  Heart sounds: No murmur heard  No friction rub  No gallop  Pulmonary:      Effort: Pulmonary effort is normal  No respiratory distress        Breath sounds: Normal breath sounds  No stridor  No wheezing, rhonchi or rales  Chest:      Chest wall: No tenderness  Abdominal:      General: There is no distension  Palpations: Abdomen is soft  Tenderness: There is no abdominal tenderness  There is no guarding or rebound  Hernia: No hernia is present  Musculoskeletal:         General: No swelling or tenderness  Cervical back: Neck supple  Right lower leg: Edema present  Left lower leg: Edema present  Skin:     General: Skin is warm and dry  Neurological:      Mental Status: She is alert and oriented to person, place, and time  Additional Data:     Labs:  Results from last 7 days   Lab Units 09/27/22  0427 09/26/22  1530 09/25/22  2358   WBC Thousand/uL 3 40*   < > 5 58   HEMOGLOBIN g/dL 7 4*   < > 7 3*   HEMATOCRIT % 23 4*   < > 23 3*   PLATELETS Thousands/uL 50*   < > 66*   BANDS PCT %  --   --  12*   NEUTROS PCT % 85*  --   --    LYMPHS PCT % 8*  --   --    LYMPHO PCT %  --   --  3*   MONOS PCT % 5  --   --    MONO PCT %  --   --  3*   EOS PCT % 1  --  0    < > = values in this interval not displayed       Results from last 7 days   Lab Units 09/27/22  0427   SODIUM mmol/L 142   POTASSIUM mmol/L 3 3*   CHLORIDE mmol/L 112*   CO2 mmol/L 24   BUN mg/dL 11   CREATININE mg/dL 0 50*   ANION GAP mmol/L 6   CALCIUM mg/dL 7 2*   ALBUMIN g/dL 2 2*   TOTAL BILIRUBIN mg/dL 0 65   ALK PHOS U/L 47   ALT U/L 15   AST U/L 9   GLUCOSE RANDOM mg/dL 90     Results from last 7 days   Lab Units 09/23/22  0416   INR  1 29*     Results from last 7 days   Lab Units 09/23/22  0804 09/23/22  0518   POC GLUCOSE mg/dl 111 67         Results from last 7 days   Lab Units 09/25/22  2358 09/22/22  1354 09/22/22  1145   LACTIC ACID mmol/L  --  1 2 2 3*   PROCALCITONIN ng/ml 0 19  --   --        Lines/Drains:  Invasive Devices  Report    Peripherally Inserted Central Catheter Line  Duration           PICC Line 09/26/22 <1 day          Drain  Duration Rectal Tube With balloon 3 days    Urethral Catheter 18 Fr  3 days              Urinary Catheter:  Goal for removal: N/A - Chronic Crawley         Central Line:  Goal for removal: N/A - Chronic PICC             Imaging: No pertinent imaging reviewed  Recent Cultures (last 7 days):   Results from last 7 days   Lab Units 09/24/22  1736   C DIFF TOXIN B BY PCR  Positive*       Last 24 Hours Medication List:   Current Facility-Administered Medications   Medication Dose Route Frequency Provider Last Rate   • atorvastatin  40 mg Oral After [address], DO     • cholecalciferol  2,000 Units Oral Daily Nga Nephew, DO     • collagenase   Topical Daily Nga Nephew, DO     • enoxaparin  1 mg/kg Subcutaneous Q12H [address] Nga Nephew, DO     • folic acid  1 mg Oral Daily Nga Nephew, DO     • metoprolol  2 5 mg Intravenous Q6H PRN Nickolas Blood, CRNP     • metoprolol tartrate  25 mg Oral TID Maurice Rubio, CRNP     • midodrine  2 5 mg Oral TID AC Jersey Camacho, DO     • mirtazapine  7 5 mg Oral HS Jersey Camacho, DO     • pantoprazole  40 mg Oral BID AC Nga Nephew, DO     • [START ON 9/28/2022] predniSONE  10 mg Oral Daily Jersey Camacho, DO     • traMADol  50 mg Oral Once PRN Blanca Corbett PA-C     • vancomycin  125 mg Oral Q6H [address], DO          Today, Patient Was Seen By: Tiago Braxton DO    **Please Note: This note may have been constructed using a voice recognition system  **

## 2022-09-27 NOTE — ASSESSMENT & PLAN NOTE
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 Patient was started on Faslodex and Xgeva at that time, in conjunction with radiation  ? In April 2022 there was interval development of 4 mm nodule at the right lower lobe and interval development of increased sclerotic lesions throughout the visualized spine  ? At that time, the patient's treatment was changed to Femara and [place]  ? In July of 2022, the patient was changed from [place] to Teresabury because of intolerance  ? During the patient's hospitalization in August of 2022 to September of 2022, Verzenio was discontinued due to gastroenteritis  ? Patient's cancer is most likely contributing to her hypercoagulable state  ? Appreciate oncology and palliative care consult  ? Will continue on Lovenox for now  ? Continue on Remeron for insomnia and anxiety  ?  Patient would not like any more treatment for her malignancy

## 2022-09-27 NOTE — PROGRESS NOTES
Progress Note -  [facility]  Mirna Silverman 79 y o  female MRN: [id number]  Unit/Bed#: [facility] 406-01 Encounter: [id number]      ASSESSMENT AND PLAN:      Pt is a 79year old female with history of stage IV breast cancer with post radiation chemotherapy who underwent colonoscopy and flex sig showing significant colitis in the distal sigmoid colon treated for UC      1  C Diff Colitis   - Positive PCR 9/24  - Continue PO Vancomycin 125mg q6  - will stop hold stool softeners and cholestyramine in setting of acute c diff infection     2  Ulcerative Colitis  - Colonoscopy 10/21 and flex sig 8/20 showed active colitis; treated with mesalamine/hydrocortisone enemas started on prednisone for 4-5 weeks  - likely secondary to UC considering colitis secondary to breast cancer treatment therapy  - CRP elevated 111 this admission, previously 10  1(9/1/22) and 122 4 (8/29)  - she continues to have acute blood loss anemia actively growing transfusion for hemoglobin less than 7  - previous on 40mg prednisone; tapered down to 10mg  - will continue to taper down prednisone   - will discuss with patient on plans to initiate Remicade  - calprotectin pending  - further workup and treatment outpatient and repeat colonoscopy for persistent colitis      Rest of care per primary team     ______________________________________________________________________    Subjective:  Patient was examined  Denies any overnight events  She reports doing well with no complaints  She continues to report watery diarrhea but denies any subjective fevers or chills  She continues to have anemia with hemoglobin less than 7 requiring blood transfusion but patient denies any fatigue  She reports good appetite  REVIEW OF SYSTEMS:    Review of Systems   Constitutional: Negative for chills and fever  Respiratory: Negative for cough and shortness of breath  Cardiovascular: Negative for chest pain     Gastrointestinal: Positive for diarrhea  Negative for constipation, nausea and vomiting  Genitourinary: Negative for difficulty urinating  Neurological: Negative for dizziness  Historical Information   Past Medical History:   Diagnosis Date   • Abnormal weight loss    • Allergic reaction    • Anxiety    • Breast cancer, right [facility]) 2011    right   • Cancer ([facility] ) 2012   • Candidal vulvovaginitis    • Clotting disorder ([facility] ) Same as above   • Endometrial hyperplasia    • Epithelial cyst     benign, ovary   • GI (gastrointestinal bleed) Blood mixed with stool   • History of radiation therapy 2011    right breast cancer   • Hyperlipidemia    • Hypertension    • Internal hemorrhoids    • Knee tendonitis    • Ovarian cyst    • 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[facility])      Past Surgical History:   Procedure Laterality Date   • BILATERAL SALPINGOOPHORECTOMY      onset: 12   • BREAST BIOPSY Right 2006    benign   • BREAST BIOPSY Right 2011    malignant   • BREAST LUMPECTOMY Right 2011    malignant   • CATARACT EXTRACTION W/  INTRAOCULAR LENS IMPLANT Right     phacoemulsification   Onset: 10/27/14   • CHOLECYSTECTOMY     • COLONOSCOPY  2017    approx   • HYSTERECTOMY  Yes   • INTRAOPERATIVE RADIATION THERAPY (IORT)     • IR BIOPSY BONE  2021   • IR IVC FILTER PLACEMENT OPTIONAL/TEMPORARY  2022   • IR PE ENDOVASCULAR THERAPY  2022   • IR PICC PLACEMENT SINGLE LUMEN  2022   • SKIN LESION EXCISION Right     breast, single lesion   • TONSILLECTOMY AND ADENOIDECTOMY       Social History   Social History     Substance and Sexual Activity   Alcohol Use Not Currently    Comment: (history)     Social History     Substance and Sexual Activity   Drug Use No     Social History     Tobacco Use   Smoking Status Former Smoker   • Packs/day: 1 00   • Years: 30 00   • Pack years: 30 00   • Types: Cigarettes   • Start date: 55   • Quit date: 0   • Years since quittin 7   Smokeless Tobacco Never Used     Family History Problem Relation Age of Onset   • Stomach cancer Mother    • No Known Problems Father    • Cervical cancer Sister    • No Known Problems Daughter    • No Known Problems Maternal Grandmother    • No Known Problems Maternal Grandfather    • No Known Problems Paternal Grandmother    • No Known Problems Paternal Grandfather    • Colon polyps Neg Hx    • Colon cancer Neg Hx        Meds/Allergies     Medications Prior to Admission   Medication   • amitriptyline (ELAVIL) 25 mg tablet   • Cholecalciferol (VITAMIN D3) 2000 units capsule   • cholestyramine sugar free (QUESTRAN LIGHT) 4 g packet   • diphenoxylate-atropine (LOMOTIL) 2 5-0 025 mg per tablet   • folic acid (FOLVITE) 1 mg tablet   • loperamide (IMODIUM) 2 mg capsule   • metoprolol tartrate (LOPRESSOR) 50 mg tablet   • mirtazapine (REMERON) 7 5 MG tablet   • pantoprazole (PROTONIX) 40 mg tablet   • predniSONE 20 mg tablet   • rivaroxaban (XARELTO) 15 mg tablet   • rivaroxaban (Xarelto) 20 mg tablet   • simethicone (MYLICON) 80 mg chewable tablet   • simvastatin (ZOCOR) 10 mg tablet     Current Facility-Administered Medications   Medication Dose Route Frequency   • atorvastatin (LIPITOR) tablet 40 mg  40 mg Oral After Dinner   • cholecalciferol (VITAMIN D3) tablet 2,000 Units  2,000 Units Oral Daily   • cholestyramine sugar free (QUESTRAN LIGHT) packet 4 g  4 g Oral HS   • collagenase (SANTYL) ointment   Topical Daily   • enoxaparin (LOVENOX) subcutaneous injection 70 mg  1 mg/kg Subcutaneous Z00H RIA   • folic acid (FOLVITE) tablet 1 mg  1 mg Oral Daily   • metoprolol (LOPRESSOR) injection 2 5 mg  2 5 mg Intravenous Q6H PRN   • metoprolol tartrate (LOPRESSOR) partial tablet 12 5 mg  12 5 mg Oral Once   • metoprolol tartrate (LOPRESSOR) tablet 25 mg  25 mg Oral TID   • midodrine (PROAMATINE) tablet 2 5 mg  2 5 mg Oral TID AC   • mirtazapine (REMERON) tablet 7 5 mg  7 5 mg Oral HS   • pantoprazole (PROTONIX) EC tablet 40 mg  40 mg Oral BID AC   • potassium chloride 20 mEq IVPB (premix)  20 mEq Intravenous Q2H   • [START ON 9/28/2022] predniSONE tablet 5 mg  5 mg Oral Daily   • traMADol (ULTRAM) tablet 50 mg  50 mg Oral Once PRN   • vancomycin (VANCOCIN) oral solution 125 mg  125 mg Oral Q6H [address]       Allergies   Allergen Reactions   • Sulfa Antibiotics    • Pneumococcal Polysaccharide Vaccine Rash and Edema           Objective     Blood pressure 107/58, pulse (!) 107, temperature 98 4 °F (36 9 °C), temperature source Oral, resp  rate 18, height 5' 1" (1 549 m), weight 66 3 kg (146 lb 2 6 oz), SpO2 91 %, not currently breastfeeding  Body mass index is 27 62 kg/m²  Intake/Output Summary (Last 24 hours) at 9/27/2022 0932  Last data filed at 9/27/2022 2390  Gross per 24 hour   Intake 1350 ml   Output 990 ml   Net 360 ml         PHYSICAL EXAM:      Physical Exam  Constitutional:       General: She is not in acute distress  Appearance: Normal appearance  She is normal weight  She is not ill-appearing or toxic-appearing  HENT:      Head: Normocephalic and atraumatic  Cardiovascular:      Rate and Rhythm: Normal rate and regular rhythm  Heart sounds: No murmur heard  No friction rub  No gallop  Pulmonary:      Effort: Pulmonary effort is normal  No respiratory distress  Breath sounds: No wheezing or rales  Abdominal:      General: Abdomen is flat  There is no distension  Palpations: Abdomen is soft  Tenderness: There is no abdominal tenderness  There is no guarding  Neurological:      Mental Status: She is alert and oriented to person, place, and time  Psychiatric:         Mood and Affect: Mood normal          Behavior: Behavior normal           Lab Results:   No results displayed because visit has over 200 results  Imaging Studies: I have personally reviewed pertinent imaging studies      Eran Chavez Internal Medicine PGY-1

## 2022-09-27 NOTE — OCCUPATIONAL THERAPY NOTE
Occupational Therapy Cancellation Note        Patient Name: Armaan Silverman  Today's Date: 9/27/2022 09/27/22 0900   OT Last Visit   OT Visit Date 09/27/22   Note Type   Note Type Cancelled Session   Cancel Reasons Medical status       Chart reviewed, OT treatment attempted  Spoke with RN who reports pt is not appropriate for therapy at this time  OT will continue to follow and see as medically appropriate and able       VARGAS Hensley, OTR/L

## 2022-09-27 NOTE — ASSESSMENT & PLAN NOTE
REYES on CKD stage IIIA  ? Patient's baseline creatinine between 0 8 and 0 9 with an EGFR ranging around 60  ? Admission creatinine 1 36, down to 0 5  ? Patient did receive IV contrast for CTA  ? Patient does not have a history of heart failure, will initiate fluids  ? Accurate in's and out's  ?  Avoid nephrotoxins

## 2022-09-28 LAB
ABO GROUP BLD: NORMAL
ANION GAP SERPL CALCULATED.3IONS-SCNC: 6 MMOL/L (ref 4–13)
BASOPHILS # BLD AUTO: 0 THOUSANDS/ΜL (ref 0–0.1)
BASOPHILS NFR BLD AUTO: 0 % (ref 0–1)
BLD GP AB SCN SERPL QL: NEGATIVE
BUN SERPL-MCNC: 9 MG/DL (ref 5–25)
CALCIUM SERPL-MCNC: 6.9 MG/DL (ref 8.3–10.1)
CHLORIDE SERPL-SCNC: 109 MMOL/L (ref 96–108)
CO2 SERPL-SCNC: 25 MMOL/L (ref 21–32)
CREAT SERPL-MCNC: 0.46 MG/DL (ref 0.6–1.3)
EOSINOPHIL # BLD AUTO: 0.05 THOUSAND/ΜL (ref 0–0.61)
EOSINOPHIL NFR BLD AUTO: 2 % (ref 0–6)
ERYTHROCYTE [DISTWIDTH] IN BLOOD BY AUTOMATED COUNT: 18.3 % (ref 11.6–15.1)
GFR SERPL CREATININE-BSD FRML MDRD: 92 ML/MIN/1.73SQ M
GLUCOSE SERPL-MCNC: 73 MG/DL (ref 65–140)
HCT VFR BLD AUTO: 25.4 % (ref 34.8–46.1)
HGB BLD-MCNC: 7.9 G/DL (ref 11.5–15.4)
IMM GRANULOCYTES # BLD AUTO: 0.03 THOUSAND/UL (ref 0–0.2)
IMM GRANULOCYTES NFR BLD AUTO: 1 % (ref 0–2)
LYMPHOCYTES # BLD AUTO: 0.43 THOUSANDS/ΜL (ref 0.6–4.47)
LYMPHOCYTES NFR BLD AUTO: 14 % (ref 14–44)
MCH RBC QN AUTO: 30.4 PG (ref 26.8–34.3)
MCHC RBC AUTO-ENTMCNC: 31.1 G/DL (ref 31.4–37.4)
MCV RBC AUTO: 98 FL (ref 82–98)
MONOCYTES # BLD AUTO: 0.27 THOUSAND/ΜL (ref 0.17–1.22)
MONOCYTES NFR BLD AUTO: 9 % (ref 4–12)
NEUTROPHILS # BLD AUTO: 74.8 THOUSANDS/ΜL (ref 1.85–7.62)
NEUTS SEG NFR BLD AUTO: 75 % (ref 43–75)
NRBC BLD AUTO-RTO: 0 /100 WBCS
PLATELET # BLD AUTO: 50 THOUSANDS/UL (ref 149–390)
PMV BLD AUTO: 11.2 FL (ref 8.9–12.7)
POTASSIUM SERPL-SCNC: 3.3 MMOL/L (ref 3.5–5.3)
RBC # BLD AUTO: 2.6 MILLION/UL (ref 3.81–5.12)
RH BLD: NEGATIVE
SODIUM SERPL-SCNC: 140 MMOL/L (ref 135–147)
SPECIMEN EXPIRATION DATE: NORMAL
WBC # BLD AUTO: 3.1 THOUSAND/UL (ref 4.31–10.16)

## 2022-09-28 PROCEDURE — 86850 RBC ANTIBODY SCREEN: CPT | Performed by: INTERNAL MEDICINE

## 2022-09-28 PROCEDURE — 86900 BLOOD TYPING SEROLOGIC ABO: CPT | Performed by: INTERNAL MEDICINE

## 2022-09-28 PROCEDURE — 99232 SBSQ HOSP IP/OBS MODERATE 35: CPT | Performed by: HOSPITALIST

## 2022-09-28 PROCEDURE — 86901 BLOOD TYPING SEROLOGIC RH(D): CPT | Performed by: INTERNAL MEDICINE

## 2022-09-28 PROCEDURE — 80048 BASIC METABOLIC PNL TOTAL CA: CPT | Performed by: INTERNAL MEDICINE

## 2022-09-28 PROCEDURE — 99232 SBSQ HOSP IP/OBS MODERATE 35: CPT | Performed by: NURSE PRACTITIONER

## 2022-09-28 PROCEDURE — 99232 SBSQ HOSP IP/OBS MODERATE 35: CPT | Performed by: INTERNAL MEDICINE

## 2022-09-28 PROCEDURE — 85025 COMPLETE CBC W/AUTO DIFF WBC: CPT | Performed by: INTERNAL MEDICINE

## 2022-09-28 RX ORDER — POTASSIUM CHLORIDE 29.8 MG/ML
40 INJECTION INTRAVENOUS ONCE
Status: COMPLETED | OUTPATIENT
Start: 2022-09-28 | End: 2022-09-28

## 2022-09-28 RX ORDER — METOPROLOL TARTRATE 50 MG/1
50 TABLET, FILM COATED ORAL EVERY 12 HOURS SCHEDULED
Status: CANCELLED | OUTPATIENT
Start: 2022-09-28

## 2022-09-28 RX ORDER — METOPROLOL TARTRATE 50 MG/1
50 TABLET, FILM COATED ORAL EVERY 12 HOURS SCHEDULED
Status: DISCONTINUED | OUTPATIENT
Start: 2022-09-28 | End: 2022-09-30

## 2022-09-28 RX ADMIN — ENOXAPARIN SODIUM 70 MG: 80 INJECTION SUBCUTANEOUS at 08:26

## 2022-09-28 RX ADMIN — Medication 125 MG: at 11:46

## 2022-09-28 RX ADMIN — Medication 125 MG: at 17:14

## 2022-09-28 RX ADMIN — METOPROLOL TARTRATE 25 MG: 25 TABLET, FILM COATED ORAL at 09:20

## 2022-09-28 RX ADMIN — Medication 2000 UNITS: at 08:26

## 2022-09-28 RX ADMIN — METOPROLOL TARTRATE 50 MG: 50 TABLET, FILM COATED ORAL at 21:18

## 2022-09-28 RX ADMIN — POTASSIUM CHLORIDE 40 MEQ: 29.8 INJECTION, SOLUTION INTRAVENOUS at 09:05

## 2022-09-28 RX ADMIN — PREDNISONE 5 MG: 5 TABLET ORAL at 08:26

## 2022-09-28 RX ADMIN — CARBIDOPA AND LEVODOPA 2.5 MG: 50; 200 TABLET, EXTENDED RELEASE ORAL at 06:01

## 2022-09-28 RX ADMIN — PANTOPRAZOLE SODIUM 40 MG: 40 TABLET, DELAYED RELEASE ORAL at 15:15

## 2022-09-28 RX ADMIN — Medication 125 MG: at 05:11

## 2022-09-28 RX ADMIN — CARBIDOPA AND LEVODOPA 2.5 MG: 50; 200 TABLET, EXTENDED RELEASE ORAL at 15:15

## 2022-09-28 RX ADMIN — ATORVASTATIN CALCIUM 40 MG: 40 TABLET, FILM COATED ORAL at 17:13

## 2022-09-28 RX ADMIN — ENOXAPARIN SODIUM 70 MG: 80 INJECTION SUBCUTANEOUS at 21:18

## 2022-09-28 RX ADMIN — PANTOPRAZOLE SODIUM 40 MG: 40 TABLET, DELAYED RELEASE ORAL at 06:01

## 2022-09-28 RX ADMIN — MIRTAZAPINE 7.5 MG: 15 TABLET, FILM COATED ORAL at 21:18

## 2022-09-28 RX ADMIN — Medication 125 MG: at 23:11

## 2022-09-28 RX ADMIN — CARBIDOPA AND LEVODOPA 2.5 MG: 50; 200 TABLET, EXTENDED RELEASE ORAL at 11:46

## 2022-09-28 RX ADMIN — FOLIC ACID 1 MG: 1 TABLET ORAL at 08:26

## 2022-09-28 RX ADMIN — METOPROLOL TARTRATE 25 MG: 25 TABLET, FILM COATED ORAL at 08:25

## 2022-09-28 NOTE — ASSESSMENT & PLAN NOTE
Macrocytic anemia complicated by acute blood loss anemia  ? Folate in September of 2022 3 3  ? Vitamin B12 in September of 2022 645  ? Iron panel and August of 2022 showed an iron saturation of 18, TIBC 105, iron 19, ferritin 755  ? Likely a mixed component of anemia chronic disease with possible folate deficiency  ? Continue folate supplementation  ?  Patient received 1 unit packed red blood cell for hemoglobin 6 9 -> 7 4 -> 7 9

## 2022-09-28 NOTE — ASSESSMENT & PLAN NOTE
· Patient had sinus tachycardia on her last discharge, likely in setting of pulmonary embolism  Still with sinus tachycardia on this admission  ? Consider amitriptyline as possible etiology as well  ? Metoprolol changed to 50 gm BID  ?  Continue midodrine for blood pressure support

## 2022-09-28 NOTE — ASSESSMENT & PLAN NOTE
REYES on CKD stage IIIA  ? Patient's baseline creatinine between 0 8 and 0 9 with an EGFR ranging around 60  ? Admission creatinine 1 36, down to 0 5  ? Patient did receive IV contrast for CTA  ? Accurate in's and out's  ?  Avoid nephrotoxins

## 2022-09-28 NOTE — OCCUPATIONAL THERAPY NOTE
Occupational Therapy Cancellation Note        Patient Name: Armaan Silverman  YLGIT'W Date: 9/28/2022 09/28/22 1100   OT Last Visit   OT Visit Date 09/28/22   Note Type   Note Type Cancelled Session   Cancel Reasons Medical status       Chart reviewed, spoke with RN who reports pt is not medically appropriate for therapy at this time  OT will continue to follow and see as medically appropriate and able       VARGAS Hensley, OTR/L

## 2022-09-28 NOTE — ASSESSMENT & PLAN NOTE
Stage IV metastatic ER positive, MD negative, 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, progressed from stage I A ER/MD positive, 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 years ago  Status post resection and adjuvant radiation and hormone therapy for 5 years  Patient had symptoms of bony discomfort in her sternum in June of 2021, imaging revealed lytic lesion, biopsy in July of 2021 confirmed progression of disease  ? Patient was started on Faslodex and Xgeva at that time, in conjunction with radiation  ? In April 2022 there was interval development of 4 mm nodule at the right lower lobe and interval development of increased sclerotic lesions throughout the visualized spine  ? At that time, the patient's treatment was changed to Femara and Roosevelt Ruder  ? In July of 2022, the patient was changed from Roosevelt Ruder to [place] because of intolerance  ? During the patient's hospitalization in August of 2022 to September of 2022, Verzenio was discontinued due to gastroenteritis  ? Patient's cancer is most likely contributing to her hypercoagulable state  ? Appreciate oncology and palliative care consult  ? Will continue on Lovenox for now  ? Continue on Remeron for insomnia and anxiety  ?  Patient would not like any more treatment for her malignancy

## 2022-09-28 NOTE — ASSESSMENT & PLAN NOTE
Patient was admitted with toxic gastroenteritis in August of 2022  ? Determined to be possible IBD with additional insult of Verzenio from her breast cancer treatment  ? Continue to hold Verzenio    ? Patient positive for C diff started on oral vancomycin D-3, diarrhea better  ? Will wean prednisone slowly as she has been on this chronically- now down to 5 mg  ? Will stop loperamide, cholestyramine n all stool softeners  ?  GI offered remicaid to the daughter to Rx IBD - she is going to think about it

## 2022-09-28 NOTE — PROGRESS NOTES
Progress note - Palliative and Supportive Care   Amelia Silverman 79 y o  female [id number]    Assessment:   - Amelia Silverman is a 79 y o  female with PE, C diff colitis/IBD  PSC has been consulted for symptom management and goals of care discussion  Principal Problem:    Saddle embolus of pulmonary artery (HCC)  Active Problems:    Metastatic breast cancer ([facility] [address] [facility] )    Anxiety    Mixed hyperlipidemia    Primary hypertension    Toxic gastroenteritis and colitis    REYES (acute kidney injury) ([facility] )    Anemia    Tachycardia    Urinary retention    Swelling of lower extremity      Plan:  1  Symptom management - per primary team  • Pain management regimen:  o None  Pt denies any pain  2  Goals -   • POLST reviewed with patient and her daughter today  Daughter will discuss with family and we will complete the form tomorrow  I can be contacted through [employer] for any questions regarding Amelia Silverman's case  If there are any questions after business hours, you may reach out to the on call provider  Code Status: DNR/DNI - Level 3   Decisional apparatus:  Patient is competent on my exam today  If competence is lost, patient's substitute decision maker would default to ARIADNEA, daughterShruthi by [place] Act 169  Advance Directive / Living Will / POLST:  To be completed tomorrow    Interval history:       Amelia Silverman was seen and examined in bedside chair this morning  Patient's family was present at time of examination  Discussed overnight events with nursing staff  No acute event OVN  Her daughter is visiting at bed side  Patient denies CP, SOB, palpitations  She c/o tingling in her feet which has resolved after moving her feet  She c/o cold, so I adjusted the meter in her room  New blankets provided       MEDICATIONS / ALLERGIES:     current meds:   Current Facility-Administered Medications   Medication Dose Route Frequency   • atorvastatin (LIPITOR) tablet 40 mg  40 mg Oral After Dinner   • cholecalciferol (VITAMIN D3) tablet 2,000 Units  2,000 Units Oral Daily   • collagenase (SANTYL) ointment   Topical Daily   • enoxaparin (LOVENOX) subcutaneous injection 70 mg  1 mg/kg Subcutaneous L56A RIA   • folic acid (FOLVITE) tablet 1 mg  1 mg Oral Daily   • metoprolol (LOPRESSOR) injection 2 5 mg  2 5 mg Intravenous Q6H PRN   • metoprolol tartrate (LOPRESSOR) tablet 50 mg  50 mg Oral Q12H RIA   • midodrine (PROAMATINE) tablet 2 5 mg  2 5 mg Oral TID AC   • mirtazapine (REMERON) tablet 7 5 mg  7 5 mg Oral HS   • pantoprazole (PROTONIX) EC tablet 40 mg  40 mg Oral BID AC   • potassium chloride 40 mEq IVPB (premix)  40 mEq Intravenous Once   • predniSONE tablet 5 mg  5 mg Oral Daily   • traMADol (ULTRAM) tablet 50 mg  50 mg Oral Once PRN   • vancomycin (VANCOCIN) oral solution 125 mg  125 mg Oral Q6H [address]       Allergies   Allergen Reactions   • Sulfa Antibiotics    • Pneumococcal Polysaccharide Vaccine Rash and Edema       OBJECTIVE:    Physical Exam  Physical Exam  Constitutional:       General: She is not in acute distress  Appearance: She is not toxic-appearing  HENT:      Head: Normocephalic and atraumatic  Nose: Nose normal       Mouth/Throat:      Mouth: Mucous membranes are moist    Eyes:      Conjunctiva/sclera: Conjunctivae normal    Cardiovascular:      Rate and Rhythm: Regular rhythm  Tachycardia present  Pulses: Normal pulses  Heart sounds: No murmur heard  Pulmonary:      Effort: Pulmonary effort is normal       Breath sounds: Normal breath sounds  Abdominal:      General: There is no distension  Tenderness: There is no abdominal tenderness  Musculoskeletal:      Right lower leg: Edema (2+) present  Left lower leg: Edema (2+) present  Skin:     General: Skin is warm  Findings: Bruising (scattered ecchymoses b/l arms, left lower extremity, right dorsal foot) present  Neurological:      Mental Status: She is alert and oriented to person, place, and time     Psychiatric: Behavior: Behavior normal          Lab Results:   CBC:   Lab Results   Component Value Date    WBC 3 10 (L) 09/28/2022    HGB 7 9 (L) 09/28/2022    HCT 25 4 (L) 09/28/2022    MCV 98 09/28/2022    PLT 50 (L) 09/28/2022    MCH 30 4 09/28/2022    MCHC 31 1 (L) 09/28/2022    RDW 18 3 (H) 09/28/2022    MPV 11 2 09/28/2022    NRBC 0 09/28/2022   , BMP:  Lab Results   Component Value Date    SODIUM 140 09/28/2022    K 3 3 (L) 09/28/2022     (H) 09/28/2022    CO2 25 09/28/2022    BUN 9 09/28/2022    CREATININE 0 46 (L) 09/28/2022    GLUC 73 09/28/2022    CALCIUM 6 9 (L) 09/28/2022    AGAP 6 09/28/2022    EGFR 92 09/28/2022     Imaging Studies:   XR chest portable    Result Date: 9/26/2022  Impression: Increased bibasilar density, most likely atelectasis  Workstation performed: [id number]     XR Trauma chest portable    Result Date: 9/22/2022  Impression: No acute cardiopulmonary disease  Workstation performed: [id number]     TRAUMA - CT head wo contrast    Result Date: 9/22/2022  Impression: No acute intracranial abnormality  Chronic microangiopathic changes  I personally discussed this study with Mariela Berg on 9/22/2022 at 12:32 PM  Workstation performed: [id number]     TRAUMA - CT spine cervical wo contrast    Result Date: 9/22/2022  Impression: No cervical spine fracture or traumatic malalignment  I personally discussed this study with Mariela Berg on 9/22/2022 at 12:23 PM   Workstation performed: [id number]     PE Study with CT abdomen & pelvis with contrast    Result Date: 9/22/2022  Impression: 1  Saddle embolus and left greater than right pulmonary arterial filling defects consistent with PE (high clot burden) with evidence of RV strain including an RV/LV ratio = 1 2  This is greater than 0 9, which is abnormal and indicates right heart strain  An abnormal RV/LV ratio has been shown to be associated with an increased risk of 30 day mortality in the setting of acute pulmonary embolism    Urgent consultation with the medical critical care team is recommended  2   Massive distention of the bladder  3   No acute posttraumatic injury  I personally discussed this study with Rashid Emery on 9/22/2022 at 12:27 PM  Workstation performed: [id number]     IR IVC filter placement optional/temporary    Result Date: 9/26/2022  Impression: Placement of a temporary inferior vena cava filter  PLAN: This filter can be removed at any time in the future  Interventional Radiology will follow this patient to ensure removal when it is no longer needed  Workstation performed: [id number]     IR PE endovascular therapy    Result Date: 9/26/2022  Impression: 1  Technically successful mechanical thrombectomy of the left pulmonary artery with extirpation of large clot  Workstation performed: [id number]      I have personally reviewed imaging reports      Counseling / Coordination of Care

## 2022-09-28 NOTE — PROGRESS NOTES
General Cardiology   Progress Note -  [employer]   Emre Silverman 79 y o  female MRN: [id number]    Unit/Bed#: [facility] 406-01 Encounter: [id number]    Assessment  1  Sinus tachycardia w/ PACs  2  Paroxysmal SVT / PAT  -Multifactorial etiology for this  Physiologic response in the setting of multiple acute clinical issues (see below)  -ECG 9/26 - sinus tachycardia, rate 133 bpm  -Telemetry review  ST, occasional PACs, rare/brief runs of atrial tachycardia  Heart rates currently trending in the low 100s to 120 range, up to 140 bpm overnight  -BMP 9/28 --- (K+level 3 3, mag level 2 0 )  -Currently on metoprolol tartrate 25 mg t i d   -TSH level 3 6  3  Intermittent hypotension  -Resolved  -Suspected 2/2 to intravascular volume depletion -- in the setting of C diff colitis infection and anemia  S/p intermittent IV fluid boluses / PRBC transfusions   -Average /66, last recorded 128/61  -On midodrine 2 5 mg t i d  -TTE 9/22 - preserved BiV systolic function    4  Saddle PE  5  Bilateral DVT  -S/p IR guided thrombectomy/IVC filter placement  -Limited TTE 9/22 - LVEF 70%, RV normal size/systolic function, mild TR  -Previously on Xarelto - concern for failure   Currently on therapeutic Lovenox injections  6  Metastatic breast CA  -Palliative care/Heme-Onc following  7  Colitis / C  Diff infection   -GI Following  -Rectal tube in place  8  Anemia  -Baseline HGB levels 12-13  -Of recent HGB levels ranging in the low 7-8 range  -HGB level dropped to 6 9 on 9/26  - s/p 1 unit of PRBC transfusion, HGB levels have stabilized currently at 7 9 this a m        Plan  -HR's remain elevated (ranging low 100's - 120's) - all sinus tach w/PACs and occasional short bursts of atach (overall frequency/duration of these episodes has improved)  As stated above etiology for this is likely stemming from her multiple current medical conditions as stated above - as some of her clinical conditions improve as should her heart rates     -Increase metoprolol to 50 mg BID (home dose)   -Continue midodrine 2 5 mg TID-- may be able to wean off if BP continues to remain stable  -Needs aggressive electrolyte replacement  K +level 3 3 this a m  - currently receiving IV KCL 40 mEq x1  -Continue to monitor on telemetry    Subjective  Review of Systems   Constitutional: Positive for malaise/fatigue  Negative for chills and fever  Eyes: Negative for visual disturbance  Cardiovascular: Positive for leg swelling  Negative for chest pain, dyspnea on exertion, orthopnea and palpitations  Respiratory: Negative for cough and shortness of breath  Gastrointestinal: Positive for diarrhea  Negative for abdominal pain, constipation, nausea and vomiting  Neurological: Negative for dizziness, headaches and light-headedness  Objective:   Physical Exam  Vitals and nursing note reviewed  Constitutional:       General: She is not in acute distress  Appearance: She is obese  She is not diaphoretic  HENT:      Head: Normocephalic and atraumatic  Eyes:      General: No scleral icterus  Cardiovascular:      Rate and Rhythm: Regular rhythm  Tachycardia present  Pulses: Normal pulses  Heart sounds: Normal heart sounds  Pulmonary:      Effort: Pulmonary effort is normal       Breath sounds: Normal breath sounds  No wheezing or rales  Abdominal:      Palpations: Abdomen is soft  Musculoskeletal:      Cervical back: Neck supple  Right lower leg: Edema present  Left lower leg: Edema present  Skin:     General: Skin is warm and dry  Capillary Refill: Capillary refill takes less than 2 seconds  Neurological:      General: No focal deficit present  Mental Status: She is alert and oriented to person, place, and time  Psychiatric:         Mood and Affect: Mood normal          Vitals: Blood pressure 128/61, pulse (!) 124, temperature 98 2 °F (36 8 °C), temperature source Oral, resp   rate 21, height 5' 1" (1 549 m), weight 67 5 kg (148 lb 13 oz), SpO2 96 %, not currently breastfeeding ,     Body mass index is 28 12 kg/m²  ,   Systolic (17VLP), JHZ:689 , Min:102 , GTI:536     Diastolic (44LSQ), LMJ:06, Min:60, Max:83      Intake/Output Summary (Last 24 hours) at 9/28/2022 0831  Last data filed at 9/27/2022 2000  Gross per 24 hour   Intake 50 ml   Output 890 ml   Net -840 ml     Weight (last 2 days)     Date/Time Weight    09/28/22 0600 67 5 (148 81)    09/27/22 0600 66 3 (146 17)    09/26/22 0541 66 7 (147 05)          LABORATORY RESULTS      CBC with diff:   Results from last 7 days   Lab Units 09/28/22  0424 09/27/22  0427 09/26/22  1530 09/25/22  2358 09/25/22  1258 09/25/22  0324 09/24/22  0517 09/24/22  0133 09/23/22  1630 09/22/22  1630 09/22/22  1145   WBC Thousand/uL 3 10* 3 40* 4 11* 5 58  --  3 20* 4 24*  --  5 24   < > 7 31   HEMOGLOBIN g/dL 7 9* 7 4* 6 9* 7 3* 8 1* 7 3* 8 1*   < > 6 6*   < > 9 6*   HEMATOCRIT % 25 4* 23 4* 22 5* 23 3*  --  24 2* 25 5*  --  22 6*   < > 31 0*   HEMATOCRIT, ISTAT   --   --   --   --   --   --   --   --   --    < >  --    MCV fL 98 99* 105* 103*  --  105* 101*  --  109*   < > 107*   PLATELETS Thousands/uL 50* 50* 62* 66*  --  66* 79*  --  79*   < > 110*   MCH pg 30 4 31 2 32 1 32 2  --  31 7 32 1  --  31 7   < > 33 1   MCHC g/dL 31 1* 31 6 30 7* 31 3*  --  30 2* 31 8  --  29 2*   < > 31 0*   RDW % 18 3* 19 3* 17 3* 17 9*  --  19 3* 19 1*  --  16 8*   < > 16 3*   MPV fL 11 2 10 9 11 1 11 4  --  10 2 10 4  --  10 4   < > 9 8   NRBC AUTO /100 WBCs 0 0 0  --   --  0  --   --  0  --  0   NRBC /100 WBC  --   --   --  1  --   --   --   --   --   --   --     < > = values in this interval not displayed         CMP:  Results from last 7 days   Lab Units 09/28/22  0424 09/27/22  0427 09/26/22  1530 09/25/22  2358 09/25/22  0330 09/24/22  0517 09/23/22  1630 09/23/22  1009 09/23/22  0436 09/23/22  0000 09/22/22  1145   POTASSIUM mmol/L 3 3* 3 3* 3 5 3 3* 4 5 3 8 4 3 3 4*  --  3 0* 4 6   CHLORIDE mmol/L 109* 112* 112* 112* 114* 117* 118* 118*  --  115* 107   CO2 mmol/L 25 24 23 22 23 21 18* 19*  --  18* 24   CO2, I-STAT mmol/L  --   --   --   --   --   --   --   --  16*  --   --    BUN mg/dL 9 11 13 13 13 13 15 16  --  17 28*   CREATININE mg/dL 0 46* 0 50* 0 75 0 71 0 68 0 62 0 73 0 79  --  0 80 1 36*   GLUCOSE, ISTAT mg/dl  --   --   --   --   --   --   --   --  60*  --   --    CALCIUM mg/dL 6 9* 7 2* 7 5* 7 2* 7 2* 6 7* 6 9* 6 7*  --  6 6* 8 2*   AST U/L  --  9 7 8  --  10  --  9  --  10 13   ALT U/L  --  15 19 18  --  21  --  18  --  21 30   ALK PHOS U/L  --  47 57 62  --  72  --  64  --  74 106   EGFR ml/min/1 73sq m 92 90 74 79 81 84 76 69  --  68 36       BMP:  Results from last 7 days   Lab Units 09/28/22  0424 09/27/22  0427 09/26/22  1530 09/25/22  2358 09/25/22  0330 09/24/22  0517 09/23/22  1630 09/23/22  1009 09/23/22  0436   POTASSIUM mmol/L 3 3* 3 3* 3 5 3 3* 4 5 3 8 4 3   < >  --    CHLORIDE mmol/L 109* 112* 112* 112* 114* 117* 118*   < >  --    CO2 mmol/L 25 24 23 22 23 21 18*   < >  --    CO2, I-STAT mmol/L  --   --   --   --   --   --   --   --  16*   BUN mg/dL 9 11 13 13 13 13 15   < >  --    CREATININE mg/dL 0 46* 0 50* 0 75 0 71 0 68 0 62 0 73   < >  --    GLUCOSE, ISTAT mg/dl  --   --   --   --   --   --   --   --  60*   CALCIUM mg/dL 6 9* 7 2* 7 5* 7 2* 7 2* 6 7* 6 9*   < >  --     < > = values in this interval not displayed         Lab Results   Component Value Date    NTBNP 1,555 (H) 09/22/2022        Results from last 7 days   Lab Units 09/27/22  0427 09/25/22  0330 09/24/22  0517 09/23/22  1630   MAGNESIUM mg/dL 2 0 2 0 1 9 2 0                   Results from last 7 days   Lab Units 09/23/22  0416 09/22/22  1630 09/22/22  1145   INR  1 29* 1 38* 1 08       Lipid Profile:   Lab Results   Component Value Date    CHOL 183 03/28/2015    CHOL 161 05/27/2014     Lab Results   Component Value Date    HDL 29 (L) 08/16/2022    HDL 46 05/19/2020    HDL 46 04/12/2019     Lab Results   Component Value Date LDLCALC 25 08/16/2022    LDLCALC 124 (H) 05/19/2020    LDLCALC 106 (H) 04/12/2019     Lab Results   Component Value Date    TRIG 79 08/16/2022    TRIG 157 (H) 05/19/2020    TRIG 142 04/12/2019       Cardiac testing:   No results found for this or any previous visit  No results found for this or any previous visit  No results found for this or any previous visit  No valid procedures specified  No results found for this or any previous visit  Meds/Allergies   all current active meds have been reviewed and current meds:   Current Facility-Administered Medications   Medication Dose Route Frequency   • atorvastatin (LIPITOR) tablet 40 mg  40 mg Oral After Dinner   • cholecalciferol (VITAMIN D3) tablet 2,000 Units  2,000 Units Oral Daily   • collagenase (SANTYL) ointment   Topical Daily   • enoxaparin (LOVENOX) subcutaneous injection 70 mg  1 mg/kg Subcutaneous M82F [address]   • folic acid (FOLVITE) tablet 1 mg  1 mg Oral Daily   • metoprolol (LOPRESSOR) injection 2 5 mg  2 5 mg Intravenous Q6H PRN   • metoprolol tartrate (LOPRESSOR) tablet 50 mg  50 mg Oral Q12H RIA   • midodrine (PROAMATINE) tablet 2 5 mg  2 5 mg Oral TID AC   • mirtazapine (REMERON) tablet 7 5 mg  7 5 mg Oral HS   • pantoprazole (PROTONIX) EC tablet 40 mg  40 mg Oral BID AC   • potassium chloride 40 mEq IVPB (premix)  40 mEq Intravenous Once   • predniSONE tablet 5 mg  5 mg Oral Daily   • traMADol (ULTRAM) tablet 50 mg  50 mg Oral Once PRN   • vancomycin (VANCOCIN) oral solution 125 mg  125 mg Oral Q6H [address]        EKG personally reviewed by BROOKE Sargent       Assessment:  Principal Problem:    Saddle embolus of pulmonary artery (HCC)  Active Problems:    Metastatic breast cancer (HCC)    Anxiety    Mixed hyperlipidemia    Primary hypertension    Toxic gastroenteritis and colitis    REYES (acute kidney injury) ([facility] [address] )    Anemia    Tachycardia    Urinary retention    Swelling of lower extremity    Counseling / Coordination of Care  Total floor / unit time spent today 20 minutes  Greater than 50% of total time was spent with the patient and / or family counseling and / or coordination of care  ** Please Note: Dragon 360 Dictation voice to text software may have been used in the creation of this document   **

## 2022-09-28 NOTE — PHYSICAL THERAPY NOTE
Physical Therapy Cancellation Note       09/28/22 5405   Note Type   Note Type Cancelled Session   Cancel Reasons Medical status     Chart reviewed; attempted to see pt in AM; spoke to RN who requested to hold PT at that time due to med status; will follow as clinical course allows      Van Mcgowan

## 2022-09-28 NOTE — CASE MANAGEMENT
Case Management Discharge Planning Note    Patient name Trent Silverman  Location [facility] 406/[facility] 406-01 MRN [id number]  : 1940 Date 2022       Current Admission Date: 2022  Current Admission Diagnosis:Saddle embolus of pulmonary artery [facility])   Patient Active Problem List    Diagnosis Date Noted   • Saddle embolus of pulmonary artery ([facility] ) 2022   • Urinary retention 2022   • Swelling of lower extremity 2022   • Venous insufficiency 2022   • Tachycardia 2022   • Single subsegmental pulmonary embolism without acute cor pulmonale (HCC) 2022   • Severe protein-calorie malnutrition ([facility] ) 2022   • Anemia 2022   • Hypokalemia 2022   • REYES (acute kidney injury) ([place] [address] ) 2022   • Diarrhea 2022   • Secondary malignant neoplasm of bone ([place] [address] ) 2022   • Toxic gastroenteritis and colitis 2022   • Rectal bleeding 10/14/2021   • Lytic lesion of bone on x-ray 2021   • Neoplasm of uncertain behavior of sternum 2021   • Cough due to ACE inhibitor 2021   • Acute costochondritis 2021   • Osteopenia of multiple sites 2020   • Primary hypertension 10/22/2019   • Chronic kidney disease (CKD) stage G3a/A1, moderately decreased glomerular filtration rate (GFR) between 45-59 mL/min/1 73 square meter and albuminuria creatinine ratio less than 30 mg/g ([facility]) 2019   • Adverse reaction to pneumococcal vaccine 2015   • Cataract, bilateral 2014   • Pulmonary nodule seen on imaging study 09/10/2013   • Sleep disorder 2013   • Anxiety 2013   • Metastatic breast cancer ([facility] ) 10/17/2012   • Mixed hyperlipidemia 2012      LOS (days): 6  Geometric Mean LOS (GMLOS) (days): 4 40  Days to GMLOS:-1 7     OBJECTIVE:  Risk of Unplanned Readmission Score: 32 36         Current admission status: Inpatient   Preferred Pharmacy:   [facility] [place] - [address] [place] [address] [address]  Phone: [phone] Fax: [phone] Count includes the [facility], [address] [facility]  [address]  [address] [facility] [address]  Phone: [phone] Fax: [phone] [address] [place] [address] [place] [address] [facility] [address] [place] [address] [facility] [address]  Phone: [phone] Fax: [phone]    Primary Care Provider: Jamir Wilkes DO    Primary Insurance: MEDICARE  Secondary Insurance: [id number] [facility]     DISCHARGE DETAILS:                                                                                                 Additional Comments: Discussed rehab list with daughter, she would like to re-refer [address] [facility] and [facility] sent

## 2022-09-28 NOTE — PROGRESS NOTES
[address] [facility]  Progress Note Theresa Silverman 1940, 79 y o  female MRN: [id number]  Unit/Bed#: [facility] 406-01 Encounter: [id number]  Primary Care Provider: Juan Sutton DO   Date and time admitted to hospital: 9/22/2022  3:35 PM    Swelling of lower extremity  Assessment & Plan  LE swelling has been worsening over the past few weeks  Right worse than left  ? 3+ on both lower extremities, improving along with pulses  ? Previous echo with a hyperdynamic LV EF 70%  ? Lower extremity duplex revealing significant acute DVTs in the right lower extremity  ? Can consider diuresis once the patient is stable for further fluid management      Urinary retention  Assessment & Plan  Patient presented with a massively distended bladder, 3 6 L out of for straight cath, greater than 800 mL out of 2nd straight cath  ? Urinary retention protocol, patient will likely need Crawley catheter  ? May be secondary to TCA use  ? UA with 1+ protein, innumerable rbc's, 4-10 wbc's  ? Urology consult, appreciate recommendations    Tachycardia  Assessment & Plan  · Patient had sinus tachycardia on her last discharge, likely in setting of pulmonary embolism  Still with sinus tachycardia on this admission  ? Consider amitriptyline as possible etiology as well  ? Metoprolol changed to 50 gm BID  ? Continue midodrine for blood pressure support      Anemia  Assessment & Plan  Macrocytic anemia complicated by acute blood loss anemia  ? Folate in September of 2022 3 3  ? Vitamin B12 in September of 2022 645  ? Iron panel and August of 2022 showed an iron saturation of 18, TIBC 105, iron 19, ferritin 755  ? Likely a mixed component of anemia chronic disease with possible folate deficiency  ? Continue folate supplementation  ? Patient received 1 unit packed red blood cell for hemoglobin 6 9 -> 7 4 -> 7 9       REYES (acute kidney injury) ([facility] [address] )  Assessment & Plan  REYES on CKD stage IIIA  ?  Patient's baseline creatinine between 0 8 and 0 9 with an EGFR ranging around 60  ? Admission creatinine 1 36, down to 0 5  ? Patient did receive IV contrast for CTA  ? Accurate in's and out's  ? Avoid nephrotoxins    Toxic gastroenteritis and colitis  Assessment & Plan  Patient was admitted with toxic gastroenteritis in August of 2022  ? Determined to be possible IBD with additional insult of Verzenio from her breast cancer treatment  ? Continue to hold Verzenio    ? Patient positive for C diff started on oral vancomycin D-3, diarrhea better  ? Will wean prednisone slowly as she has been on this chronically- now down to 5 mg  ? Will stop loperamide, cholestyramine n all stool softeners      Primary hypertension  Assessment & Plan  Patient's antihypertensives were discontinued after previous hospital stay due to normotension  · Continue to monitor    Mixed hyperlipidemia  Assessment & Plan  Stable  · Continue statin    Anxiety  Assessment & Plan  Depression/anxiety  ? Hold amitriptyline -possible etiology of tachycardia  ? Will resume Remeron      Metastatic breast cancer [facility])  Assessment & Plan  Stage IV metastatic ER positive, MT negative, 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, progressed from stage I A ER/MT positive, 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 years ago  Status post resection and adjuvant radiation and hormone therapy for 5 years  Patient had symptoms of bony discomfort in her sternum in June of 2021, imaging revealed lytic lesion, biopsy in July of 2021 confirmed progression of disease  ? Patient was started on Faslodex and Xgeva at that time, in conjunction with radiation  ? In April 2022 there was interval development of 4 mm nodule at the right lower lobe and interval development of increased sclerotic lesions throughout the visualized spine  ? At that time, the patient's treatment was changed to Femara and [place] Leela  ? In July of 2022, the patient was changed from [place] Leela to [place] because of intolerance  ?  During the patient's hospitalization in August of 2022 to September of 2022, Verzenio was discontinued due to gastroenteritis  ? Patient's cancer is most likely contributing to her hypercoagulable state  ? Appreciate oncology and palliative care consult  ? Will continue on Lovenox for now  ? Continue on Remeron for insomnia and anxiety  ? Patient would not like any more treatment for her malignancy      * Saddle embolus of pulmonary artery (HCC)  Assessment & Plan  Acute, submassive, intermediate to high risk pulmonary embolism  Patient has history of right popliteal DVT and right lower lobe subsegmental PE from previous hospitalization in August   ? Patient was sent home on Xarelto, likely not failure, was receiving it at her nursing home  ? Patient also has metastatic breast cancer, hypercoagulable state  ? Return to the ER on 09/22 after a fall, found to have saddle pulmonary embolus with evidence of right heart strain and high clot burden  ? RV to LV ratio on CTA 1 2  ? BMP greater than 1500, troponins relatively normal  ? TTE inconclusive for right heart strain  ? Patient is now status post thrombectomy with IR, no IVC filter placement  ? Duplex showing significant right lower extremity clot burden  ? Continue on Lovenox for pulmonary embolism            VTE Pharmacologic Prophylaxis:   Moderate Risk (Score 3-4) - Pharmacological DVT Prophylaxis Ordered: enoxaparin (Lovenox)  Patient Centered Rounds: I performed bedside rounds with nursing staff today  Discussions with Specialists or Other Care Team Provider:     Education and Discussions with Family / Patient: patient  Time Spent for Care: 30 minutes  More than 50% of total time spent on counseling and coordination of care as described above      Current Length of Stay: 6 day(s)  Current Patient Status: Inpatient   Certification Statement: The patient will continue to require additional inpatient hospital stay due to monitor BM, HR, BP  Discharge Plan: Anticipate discharge in 48 hrs to rehab facility  Code Status: Level 3 - DNAR and DNI    Subjective:   Feels ok, no abdo pain or n/v, diarrhea is better    Objective:     Vitals:   Temp (24hrs), Av 3 °F (36 8 °C), Min:98 2 °F (36 8 °C), Max:98 3 °F (36 8 °C)    Temp:  [98 2 °F (36 8 °C)-98 3 °F (36 8 °C)] 98 3 °F (36 8 °C)  HR:  [] 102  Resp:  [18-22] 18  BP: ()/(50-77) 98/61  SpO2:  [95 %-97 %] 95 %  Body mass index is 28 12 kg/m²  Input and Output Summary (last 24 hours): Intake/Output Summary (Last 24 hours) at 2022 1836  Last data filed at 2022 1601  Gross per 24 hour   Intake 240 ml   Output 915 ml   Net -675 ml       Physical Exam:   Physical Exam  Vitals reviewed  HENT:      Head: Normocephalic and atraumatic  Mouth/Throat:      Mouth: Mucous membranes are moist    Eyes:      Conjunctiva/sclera: Conjunctivae normal    Cardiovascular:      Rate and Rhythm: Normal rate and regular rhythm  Heart sounds: Normal heart sounds  Pulmonary:      Effort: Pulmonary effort is normal  No respiratory distress  Breath sounds: Normal breath sounds  No wheezing  Abdominal:      General: There is no distension  Palpations: Abdomen is soft  Tenderness: There is no abdominal tenderness  Musculoskeletal:      Right lower leg: No edema  Left lower leg: No edema  Neurological:      Mental Status: She is alert and oriented to person, place, and time  Additional Data:     Labs:  Results from last 7 days   Lab Units 22  0424 22  1530 22  2358   WBC Thousand/uL 3 10*   < > 5 58   HEMOGLOBIN g/dL 7 9*   < > 7 3*   HEMATOCRIT % 25 4*   < > 23 3*   PLATELETS Thousands/uL 50*   < > 66*   BANDS PCT %  --   --  12*   NEUTROS PCT % 75   < >  --    LYMPHS PCT % 14   < >  --    LYMPHO PCT %  --   --  3*   MONOS PCT % 9   < >  --    MONO PCT %  --   --  3*   EOS PCT % 2   < > 0    < > = values in this interval not displayed       Results from last 7 days   Lab Units 09/28/22  0424 09/27/22  0427   SODIUM mmol/L 140 142   POTASSIUM mmol/L 3 3* 3 3*   CHLORIDE mmol/L 109* 112*   CO2 mmol/L 25 24   BUN mg/dL 9 11   CREATININE mg/dL 0 46* 0 50*   ANION GAP mmol/L 6 6   CALCIUM mg/dL 6 9* 7 2*   ALBUMIN g/dL  --  2 2*   TOTAL BILIRUBIN mg/dL  --  0 65   ALK PHOS U/L  --  47   ALT U/L  --  15   AST U/L  --  9   GLUCOSE RANDOM mg/dL 73 90     Results from last 7 days   Lab Units 09/23/22  0416   INR  1 29*     Results from last 7 days   Lab Units 09/23/22  0804 09/23/22  0518   POC GLUCOSE mg/dl 111 67         Results from last 7 days   Lab Units 09/25/22  2358 09/22/22  1354 09/22/22  1145   LACTIC ACID mmol/L  --  1 2 2 3*   PROCALCITONIN ng/ml 0 19  --   --        Lines/Drains:  Invasive Devices  Report    Peripherally Inserted Central Catheter Line  Duration           PICC Line 09/26/22 2 days          Drain  Duration           Urethral Catheter 18 Fr  4 days              Urinary Catheter:  Goal for removal: N/A- Discharging with Crawley         Central Line:  Goal for removal: Will discontinue when peripheral access obtained  Imaging: No pertinent imaging reviewed      Recent Cultures (last 7 days):   Results from last 7 days   Lab Units 09/24/22  1736   C DIFF TOXIN B BY PCR  Positive*       Last 24 Hours Medication List:   Current Facility-Administered Medications   Medication Dose Route Frequency Provider Last Rate   • atorvastatin  40 mg Oral After Yasmin Shannon [address], DO     • cholecalciferol  2,000 Units Oral Daily Grady [place], DO     • collagenase   Topical Daily Grady [place], DO     • enoxaparin  1 mg/kg Subcutaneous Q12H [address] Grady [place], DO     • folic acid  1 mg Oral Daily Grady [place], DO     • metoprolol  2 5 mg Intravenous Q6H PRN BROOKE Abrams     • metoprolol tartrate  50 mg Oral Q12H [address] BROOKE Dumas     • midodrine  2 5 mg Oral TID AC Jersey Camacho DO     • mirtazapine  7 5 mg Oral HS Kate Arroyo, DO     • pantoprazole  40 mg Oral BID AC Julio Cesar Camarena, DO     • predniSONE  5 mg Oral Daily Margaux Martinez MD     • traMADol  50 mg Oral Once PRN Adan Murphy PA-C     • vancomycin  125 mg Oral Q6H [address] [facility], DO          Today, Patient Was Seen By: Brittney Arredondo    **Please Note: This note may have been constructed using a voice recognition system  **

## 2022-09-29 LAB
ANION GAP SERPL CALCULATED.3IONS-SCNC: 3 MMOL/L (ref 4–13)
BUN SERPL-MCNC: 9 MG/DL (ref 5–25)
CALCIUM SERPL-MCNC: 7 MG/DL (ref 8.3–10.1)
CHLORIDE SERPL-SCNC: 108 MMOL/L (ref 96–108)
CO2 SERPL-SCNC: 27 MMOL/L (ref 21–32)
CREAT SERPL-MCNC: 0.46 MG/DL (ref 0.6–1.3)
GFR SERPL CREATININE-BSD FRML MDRD: 92 ML/MIN/1.73SQ M
GLUCOSE SERPL-MCNC: 74 MG/DL (ref 65–140)
POTASSIUM SERPL-SCNC: 3.4 MMOL/L (ref 3.5–5.3)
SODIUM SERPL-SCNC: 138 MMOL/L (ref 135–147)

## 2022-09-29 PROCEDURE — 99232 SBSQ HOSP IP/OBS MODERATE 35: CPT | Performed by: INTERNAL MEDICINE

## 2022-09-29 PROCEDURE — 80048 BASIC METABOLIC PNL TOTAL CA: CPT | Performed by: HOSPITALIST

## 2022-09-29 PROCEDURE — 99232 SBSQ HOSP IP/OBS MODERATE 35: CPT | Performed by: HOSPITALIST

## 2022-09-29 RX ORDER — SODIUM CHLORIDE, SODIUM GLUCONATE, SODIUM ACETATE, POTASSIUM CHLORIDE, MAGNESIUM CHLORIDE, SODIUM PHOSPHATE, DIBASIC, AND POTASSIUM PHOSPHATE .53; .5; .37; .037; .03; .012; .00082 G/100ML; G/100ML; G/100ML; G/100ML; G/100ML; G/100ML; G/100ML
75 INJECTION, SOLUTION INTRAVENOUS CONTINUOUS
Status: DISPENSED | OUTPATIENT
Start: 2022-09-29 | End: 2022-09-29

## 2022-09-29 RX ORDER — POTASSIUM CHLORIDE 20 MEQ/1
20 TABLET, EXTENDED RELEASE ORAL ONCE
Status: COMPLETED | OUTPATIENT
Start: 2022-09-29 | End: 2022-09-29

## 2022-09-29 RX ORDER — POTASSIUM CHLORIDE 29.8 MG/ML
40 INJECTION INTRAVENOUS ONCE
Status: COMPLETED | OUTPATIENT
Start: 2022-09-29 | End: 2022-09-29

## 2022-09-29 RX ADMIN — Medication 125 MG: at 11:11

## 2022-09-29 RX ADMIN — CARBIDOPA AND LEVODOPA 2.5 MG: 50; 200 TABLET, EXTENDED RELEASE ORAL at 06:21

## 2022-09-29 RX ADMIN — PREDNISONE 5 MG: 5 TABLET ORAL at 08:22

## 2022-09-29 RX ADMIN — Medication 125 MG: at 23:06

## 2022-09-29 RX ADMIN — Medication 2000 UNITS: at 08:22

## 2022-09-29 RX ADMIN — METOPROLOL TARTRATE 50 MG: 50 TABLET, FILM COATED ORAL at 21:22

## 2022-09-29 RX ADMIN — Medication 125 MG: at 06:21

## 2022-09-29 RX ADMIN — METOPROLOL TARTRATE 50 MG: 50 TABLET, FILM COATED ORAL at 08:21

## 2022-09-29 RX ADMIN — PANTOPRAZOLE SODIUM 40 MG: 40 TABLET, DELAYED RELEASE ORAL at 06:21

## 2022-09-29 RX ADMIN — MIRTAZAPINE 7.5 MG: 15 TABLET, FILM COATED ORAL at 21:22

## 2022-09-29 RX ADMIN — FOLIC ACID 1 MG: 1 TABLET ORAL at 08:19

## 2022-09-29 RX ADMIN — CARBIDOPA AND LEVODOPA 2.5 MG: 50; 200 TABLET, EXTENDED RELEASE ORAL at 17:13

## 2022-09-29 RX ADMIN — PANTOPRAZOLE SODIUM 40 MG: 40 TABLET, DELAYED RELEASE ORAL at 17:13

## 2022-09-29 RX ADMIN — SODIUM CHLORIDE, SODIUM GLUCONATE, SODIUM ACETATE, POTASSIUM CHLORIDE AND MAGNESIUM CHLORIDE 75 ML/HR: 526; 502; 368; 37; 30 INJECTION, SOLUTION INTRAVENOUS at 12:18

## 2022-09-29 RX ADMIN — POTASSIUM CHLORIDE 20 MEQ: 1500 TABLET, EXTENDED RELEASE ORAL at 10:53

## 2022-09-29 RX ADMIN — ATORVASTATIN CALCIUM 40 MG: 40 TABLET, FILM COATED ORAL at 17:14

## 2022-09-29 RX ADMIN — CARBIDOPA AND LEVODOPA 2.5 MG: 50; 200 TABLET, EXTENDED RELEASE ORAL at 10:53

## 2022-09-29 RX ADMIN — ENOXAPARIN SODIUM 70 MG: 80 INJECTION SUBCUTANEOUS at 08:22

## 2022-09-29 RX ADMIN — ENOXAPARIN SODIUM 70 MG: 80 INJECTION SUBCUTANEOUS at 21:21

## 2022-09-29 RX ADMIN — Medication 125 MG: at 17:14

## 2022-09-29 RX ADMIN — POTASSIUM CHLORIDE 40 MEQ: 29.8 INJECTION, SOLUTION INTRAVENOUS at 10:50

## 2022-09-29 NOTE — ASSESSMENT & PLAN NOTE
LE swelling has been worsening over the past few weeks  Right worse than left  ? 3+ on both lower extremities, improving along with pulses  ? Previous echo with a hyperdynamic LV EF 70%  ? Lower extremity duplex revealing significant acute DVTs in the right lower extremity  ?

## 2022-09-29 NOTE — OCCUPATIONAL THERAPY NOTE
Occupational Therapy Cancellation Note        Patient Name: Zara IBARRA Date: 9/29/2022 09/29/22 1000   OT Last Visit   OT Visit Date 09/29/22   Note Type   Note Type Cancelled Session   Cancel Reasons Medical status       Chart reviewed, OT treatment attempted  Pt continues to not be appropriate for therapy 2* elevated HR  OT will continue to follow and see as medically appropriate and able       VARGAS Conn, OTR/L

## 2022-09-29 NOTE — CASE MANAGEMENT
Case Management Discharge Planning Note    Patient name Saúl Silverman  Location [facility] 406/[facility] 406-01 MRN [id number]  : 1940 Date 2022       Current Admission Date: 2022  Current Admission Diagnosis:Saddle embolus of pulmonary artery [facility])   Patient Active Problem List    Diagnosis Date Noted   • Saddle embolus of pulmonary artery ([facility] ) 2022   • Urinary retention 2022   • Swelling of lower extremity 2022   • Venous insufficiency 2022   • Tachycardia 2022   • Single subsegmental pulmonary embolism without acute cor pulmonale (HCC) 2022   • Severe protein-calorie malnutrition ([facility] ) 2022   • Anemia 2022   • Hypokalemia 2022   • REYES (acute kidney injury) ([place] [address] ) 2022   • Diarrhea 2022   • Secondary malignant neoplasm of bone ([place] [address] ) 2022   • Toxic gastroenteritis and colitis 2022   • Rectal bleeding 10/14/2021   • Lytic lesion of bone on x-ray 2021   • Neoplasm of uncertain behavior of sternum 2021   • Cough due to ACE inhibitor 2021   • Acute costochondritis 2021   • Osteopenia of multiple sites 2020   • Primary hypertension 10/22/2019   • Chronic kidney disease (CKD) stage G3a/A1, moderately decreased glomerular filtration rate (GFR) between 45-59 mL/min/1 73 square meter and albuminuria creatinine ratio less than 30 mg/g ([facility]) 2019   • Adverse reaction to pneumococcal vaccine 2015   • Cataract, bilateral 2014   • Pulmonary nodule seen on imaging study 09/10/2013   • Sleep disorder 2013   • Anxiety 2013   • Metastatic breast cancer ([facility] ) 10/17/2012   • Mixed hyperlipidemia 2012      LOS (days): 7  Geometric Mean LOS (GMLOS) (days): 4 40  Days to GMLOS:-2 6     OBJECTIVE:  Risk of Unplanned Readmission Score: 32 74         Current admission status: Inpatient   Preferred Pharmacy:   [facility] [place] - [address] [place] [place] [address] [address]  Phone: [phone] Fax: [address] [address]  [address]  [address] [address]  Phone: [phone] Fax: [phone] [address] [place],[address] [place] [address] [facility] [address] [facility] [address] [address] [facility] [address] [address]  Phone: [phone] Fax: [phone]    Primary Care Provider: Diana Palmer DO    Primary Insurance: MEDICARE  Secondary Insurance: Jennifer Marin    DISCHARGE DETAILS:                                                                                                 Additional Comments: Received call from Daughter Faviola Solorio, she would now life referrals to [facility] rehab and [facility]  Referrals were sent manually to both facilities, they do not participate with Jose

## 2022-09-29 NOTE — SPEECH THERAPY NOTE
Reviewed chart, spoke with RN  RN reports no additional difficulty swallowing pills; she is breaking larger pills in half or dissolving them, smaller pills pt takes with water without issue  Discussed with pt; she reports that she has been tolerating her pills, and all food/liquids without any difficulty since swallow assessment  She denies any dysphagia, choking, aspiration other than the one episode with a large potassium pill  At this time no further dysphagia tx is indicated  Please send a new consult if any new dysphagia issues arise   Discharge from [address] [place] [address]

## 2022-09-29 NOTE — ASSESSMENT & PLAN NOTE
Stage IV metastatic ER positive, WA negative, 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, progressed from stage I A ER/WA positive, 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 years ago  Status post resection and adjuvant radiation and hormone therapy for 5 years  Patient had symptoms of bony discomfort in her sternum in June of 2021, imaging revealed lytic lesion, biopsy in July of 2021 confirmed progression of disease  ? Patient was started on Faslodex and Xgeva at that time, in conjunction with radiation  ? In April 2022 there was interval development of 4 mm nodule at the right lower lobe and interval development of increased sclerotic lesions throughout the visualized spine  ? At that time, the patient's treatment was changed to Femara and Adiel Mir  ? In July of 2022, the patient was changed from Adiel Mir to [place] because of intolerance  ? During the patient's hospitalization in August of 2022 to September of 2022, Verzenio was discontinued due to gastroenteritis  ? Patient's cancer is most likely contributing to her hypercoagulable state  ? Appreciate oncology and palliative care consult  ? Will continue on Lovenox for now  ? Continue on Remeron for insomnia and anxiety  ? Patient would not like any more treatment for her malignancy    Family meeting held on 10/04 with palliative care, GI, CM, and IM team  Patient's current clinical course discussed  Discussion of disposition planning occurred  Hospice was discussed  Patient currently pending placement to nursing facility

## 2022-09-29 NOTE — PLAN OF CARE
Problem: Potential for Falls  Goal: Patient will remain free of falls  Description: INTERVENTIONS:  - Educate patient/family on patient safety including physical limitations  - Instruct patient to call for assistance with activity   - Consult OT/PT to assist with strengthening/mobility   - Keep Call bell within reach  - Keep bed low and locked with side rails adjusted as appropriate  - Keep care items and personal belongings within reach  - Initiate and maintain comfort rounds  - Make Fall Risk Sign visible to staff  - Offer Toileting every 4 Hours, in advance of need  - Initiate/Maintain bed alarm  - Obtain necessary fall risk management equipment: yrs  - Apply yellow socks and bracelet for high fall risk patients  - Consider moving patient to room near nurses station  9/28/2022 2334 by Can Lopez, RN  Outcome: Progressing  9/28/2022 2334 by Can Lopez, RN  Outcome: Progressing

## 2022-09-29 NOTE — PHYSICAL THERAPY NOTE
09/29/22 1015   Note Type   Note Type Cancelled Session   Cancel Reasons Medical status   Pt continues to be cancelled by nursing due to med status  Will follow    Que Morejon PT, DPT CSRS

## 2022-09-29 NOTE — ASSESSMENT & PLAN NOTE
REYES on CKD stage IIIA  ? Patient's baseline creatinine between 0 8 and 0 9 with an EGFR ranging around 60  ? Admission creatinine 1 36, down to 0 5  ? Patient did receive IV contrast for CTA  ? Accurate in's and out's  ? Avoid nephrotoxins  ? Cr stable this morning at 0 84  ? Patient with suarez in place for urinary retention  Will D/C suarez today and initiate urinary retention protocol

## 2022-09-29 NOTE — ASSESSMENT & PLAN NOTE
Acute, submassive, intermediate to high risk pulmonary embolism  Patient has history of right popliteal DVT and right lower lobe subsegmental PE from previous hospitalization in August   ? Patient was sent home on Xarelto, likely not failure, was receiving it at her nursing home  ? Patient also has metastatic breast cancer, hypercoagulable state  ? Return to the ER on 09/22 after a fall, found to have saddle pulmonary embolus with evidence of right heart strain and high clot burden  ? RV to LV ratio on CTA 1 2  ? BMP greater than 1500, troponins relatively normal  ? TTE inconclusive for right heart strain  ? Patient is now status post thrombectomy with IR, no IVC filter placement  ? Duplex showing significant right lower extremity clot burden  ? Continue on Lovenox for pulmonary embolism  ?  Patient respiratory status stable

## 2022-09-29 NOTE — ASSESSMENT & PLAN NOTE
Macrocytic anemia complicated by acute blood loss anemia  ? Folate in September of 2022 3 3  ? Vitamin B12 in September of 2022 645  ? Iron panel and August of 2022 showed an iron saturation of 18, TIBC 105, iron 19, ferritin 755  ? Likely a mixed component of anemia chronic disease with possible folate deficiency  ? Continue folate supplementation  ?  Patient received 1 unit packed red blood cell for hemoglobin 6 9 -> 7 4 -> 7 9---8 8 this AM

## 2022-09-29 NOTE — ASSESSMENT & PLAN NOTE
Patient presented with a massively distended bladder, 3 6 L out of for straight cath, greater than 800 mL out of 2nd straight cath  ? Urinary retention protocol, patient will likely need Crawley catheter  ? May be secondary to TCA use  ? UA with 1+ protein, innumerable rbc's, 4-10 wbc's  ? Urology consulted, appreciate recommendations  ? Discontinued urinary catheter 10/04 and initiate urinary retention protocol     ? Patient unfortunately appears to be having urinary retention may need Crawley catheter placed back today on 10/8

## 2022-09-29 NOTE — PROGRESS NOTES
Progress note - Palliative and Supportive Care   Matt Silverman 79 y o  female [id number]    Assessment:   - Matt Silverman is a 79 y o  female with metastatic breast cancer, saddle PE, bilateral DVT, C diff infection, paroxysmal SVT and anemia   PSC has been consulted for symptom management and goals of care discussion  Principal Problem:    Saddle embolus of pulmonary artery (HCC)  Active Problems:    Metastatic breast cancer ([facility] )    Anxiety    Mixed hyperlipidemia    Primary hypertension    Toxic gastroenteritis and colitis    REYES (acute kidney injury) ([facility] )    Anemia    Tachycardia    Urinary retention    Swelling of lower extremity      Plan:  1  Symptom management - per primary team  • Pain management regimen: none  Currently in no pain  2  Goals -   • Continue medical treatment as per primary team and Cardiology  • POLST completed today and placed in chart  Copied given to daughter  • [facility] [address] [facility]  I can be contacted through Doctor Rosy Parada for any questions regarding Matt Silverman's case  If there are any questions after business hours, you may reach out to the on call provider  Code Status: DNR/DNI - Level 3   Decisional apparatus:  Patient is competent on my exam today  If competence is lost, patient's substitute decision maker would default to ARIADNESATISH, daughter, Melissa Heritage by [place] Act 169  Advance Directive / Living Will / POLST:  In chart  Interval history:       Matt Silverman was seen and examined in bedside chair this morning  Patient's family was not present at time of examination but available on patient's phone  Discussed overnight events with nursing staff  Patient was tachy and was not able to do PT yesterday  Stool OP has decreased  Patient denies any fever, chills, abdominal pain, CP, SOB, or palpitations  She c/o burning and tingling in groin area which has resolved with moving and loosening her diaper tapes    She is happy talking about her grandchildren and 2-yo great grandson who calls her Kian  MEDICATIONS / ALLERGIES:     current meds:   Current Facility-Administered Medications   Medication Dose Route Frequency   • atorvastatin (LIPITOR) tablet 40 mg  40 mg Oral After Dinner   • cholecalciferol (VITAMIN D3) tablet 2,000 Units  2,000 Units Oral Daily   • collagenase (SANTYL) ointment   Topical Daily   • enoxaparin (LOVENOX) subcutaneous injection 70 mg  1 mg/kg Subcutaneous H76X RIA   • folic acid (FOLVITE) tablet 1 mg  1 mg Oral Daily   • metoprolol (LOPRESSOR) injection 2 5 mg  2 5 mg Intravenous Q6H PRN   • metoprolol tartrate (LOPRESSOR) tablet 50 mg  50 mg Oral Q12H RIA   • midodrine (PROAMATINE) tablet 2 5 mg  2 5 mg Oral TID AC   • mirtazapine (REMERON) tablet 7 5 mg  7 5 mg Oral HS   • multi-electrolyte (PLASMALYTE-A/ISOLYTE-S PH 7 4) IV solution  75 mL/hr Intravenous Continuous   • pantoprazole (PROTONIX) EC tablet 40 mg  40 mg Oral BID AC   • potassium chloride 40 mEq IVPB (premix)  40 mEq Intravenous Once   • traMADol (ULTRAM) tablet 50 mg  50 mg Oral Once PRN   • vancomycin (VANCOCIN) oral solution 125 mg  125 mg Oral Q6H [facility]       Allergies   Allergen Reactions   • Sulfa Antibiotics    • Pneumococcal Polysaccharide Vaccine Rash and Edema       OBJECTIVE:    Physical Exam  Physical Exam  Vitals and nursing note reviewed  Constitutional:       General: She is not in acute distress  Appearance: She is well-developed  HENT:      Head: Normocephalic and atraumatic  Nose: Nose normal       Mouth/Throat:      Mouth: Mucous membranes are moist    Eyes:      Conjunctiva/sclera: Conjunctivae normal    Cardiovascular:      Rate and Rhythm: Regular rhythm  Tachycardia present  Pulmonary:      Effort: Pulmonary effort is normal  No respiratory distress  Breath sounds: Normal breath sounds  Abdominal:      Palpations: Abdomen is soft  Tenderness: There is no abdominal tenderness     Musculoskeletal:      Cervical back: Neck supple  Right lower leg: Edema (1+) present  Left lower leg: Edema (1+) present  Skin:     General: Skin is warm and dry  Findings: Bruising (hematoma in right arm, scattered ecchymoses left arm, abdomen, dorsal feet b/l) present  No rash  Neurological:      General: No focal deficit present  Mental Status: She is alert and oriented to person, place, and time  Psychiatric:         Mood and Affect: Mood normal          Behavior: Behavior normal          Lab Results:     Lab Results   Component Value Date    SODIUM 138 09/29/2022    K 3 4 (L) 09/29/2022     09/29/2022    CO2 27 09/29/2022    BUN 9 09/29/2022    CREATININE 0 46 (L) 09/29/2022    GLUC 74 09/29/2022    CALCIUM 7 0 (L) 09/29/2022    AGAP 3 (L) 09/29/2022    EGFR 92 09/29/2022     Imaging Studies:   XR chest portable    Result Date: 9/26/2022  Impression: Increased bibasilar density, most likely atelectasis  Workstation performed: [id number]     XR Trauma chest portable    Result Date: 9/22/2022  Impression: No acute cardiopulmonary disease  Workstation performed: [id number]     TRAUMA - CT head wo contrast    Result Date: 9/22/2022  Impression: No acute intracranial abnormality  Chronic microangiopathic changes  I personally discussed this study with Tere Mills on 9/22/2022 at 12:32 PM  Workstation performed: [id number]     TRAUMA - CT spine cervical wo contrast    Result Date: 9/22/2022  Impression: No cervical spine fracture or traumatic malalignment  I personally discussed this study with Tere Mills on 9/22/2022 at 12:23 PM   Workstation performed: [id number]     PE Study with CT abdomen & pelvis with contrast    Result Date: 9/22/2022  Impression: 1  Saddle embolus and left greater than right pulmonary arterial filling defects consistent with PE (high clot burden) with evidence of RV strain including an RV/LV ratio = 1 2   This is greater than 0 9, which is abnormal and indicates right heart strain  An abnormal RV/LV ratio has been shown to be associated with an increased risk of 30 day mortality in the setting of acute pulmonary embolism  Urgent consultation with the medical critical care team is recommended  2   Massive distention of the bladder  3   No acute posttraumatic injury  I personally discussed this study with Jose Марина on 9/22/2022 at 12:27 PM  Workstation performed: [id number]     IR IVC filter placement optional/temporary    Result Date: 9/26/2022  Impression: Placement of a temporary inferior vena cava filter  PLAN: This filter can be removed at any time in the future  Interventional Radiology will follow this patient to ensure removal when it is no longer needed  Workstation performed: [id number]     IR PE endovascular therapy    Result Date: 9/26/2022  Impression: 1  Technically successful mechanical thrombectomy of the left pulmonary artery with extirpation of large clot  Workstation performed: [id number]     IR PICC line placement single lumen (preferred for home anitbiotics/medications)    Result Date: 9/28/2022  Impression: 1  Status post placement of a peripherally inserted central venous catheter via the brachial vein with its tip at the cavoatrial junction under ultrasound and fluoroscopic guidance  Signed, performed, and dictated by BROOKE Palmer under the supervision of Dr Marlene Cook   Workstation performed: [id number]      I have personally reviewed imaging reports

## 2022-09-29 NOTE — ASSESSMENT & PLAN NOTE
Patient was admitted with toxic gastroenteritis in August of 2022  ? Determined to be possible IBD with additional insult of Verzenio from her breast cancer treatment  ? Continue to hold Verzenio    ? Patient positive for C diff started on oral vancomycin D-3, diarrhea improving but persisting  ? Will wean prednisone slowly as she has been on this chronically- now down to 5 mg  ? Will stop loperamide, cholestyramine n all stool softeners  ? GI offered remicaid to the daughter to Rx IBD - she is going to think about it  ? Spoke to GI about possibility of increasing Vanc dosing today  Also about concerns for IBD  Diarrhea slowly improving  Completed 10 day of PO cohen  ? Spoke to GI team regarding prolonged course of PO vancomycin  Will continue at BID dosing for 1 week followed by daily dosing for an additional week  ? Patient to follow-up with GI as outpatient for further IBD evaluation and decision on medications  ? Patient received Remicade on 10/01  Plan to continue as outpatent  ? Patient with rectal tube in place  Continued loose BMs, though patient states diarrhea is subjectively improving  Patient also with sacral wound  ? Montior stool output to assess progress  ? Rectal tube discontinued  ? Will proceed with disposition planning

## 2022-09-29 NOTE — PROGRESS NOTES
General Cardiology   Progress Note -  [employer]   Stephannie Halsted Clunk 79 y o  female MRN: [id number]    Unit/Bed#: [facility] 406-01 Encounter: [id number]    Assessment  1  Sinus tachycardia w/ PACs  2  Paroxysmal SVT / PAT  -Multifactorial etiology for this  Physiologic response in the setting of multiple acute clinical issues (see below)  -ECG 9/26 - sinus tachycardia, rate 133 bpm  -24 hour Telemetry review  NSR to ST, heart rates variable 80s to 110s, currently in the 110-120 range  -BMP 9/28 --- (K+level 3 4)  -Currently on metoprolol tartrate  50 mg q 12 hours  -TSH level 3 6  3  Intermittent hypotension  -Average BP  108/61, last recorded 112/63  -On midodrine 2 5 mg t i d  -TTE 9/22 - preserved BiV systolic function    4  Saddle PE  5  Bilateral DVT  -S/p IR guided thrombectomy/IVC filter placement  -Limited TTE 9/22 - LVEF 70%, RV normal size/systolic function, mild TR  -Previously on Xarelto - concern for failure   Currently on therapeutic Lovenox injections  6  Metastatic breast CA  -Palliative care/Heme-Onc following  7  Colitis / C  Diff infection   -GI Following  -Rectal tube in place  8  Anemia  -Baseline HGB levels 12-13  -Of recent HGB levels ranging in the low 7-8 range  -HGB level dropped to 6 9 on 9/26  - s/p 1 unit of PRBC transfusion, HGB levels have stabilized at 7 9 on 9/28      Plan  -HR's variable over the past 24 hours, ranging typically in the 80s to low 100s  Currently modestly elevated (110-120 range) - all sinus tach w/PACs  Suspect that there is a component of dehydration/hypovolemia contributing to her persistent tachycardia (likely secondary to poor oral intake, C diff colitis/persistent diarrhea, and anemia)  BP also intermittently low over the past 24 hours as well, but stable this a m Consider initiating IV maintenance fluids for hydration  Will discuss with slim    -Continue metoprolol tartrate 50 mg q 12 hours   -Continue midodrine 2 5 mg TID  -Needs aggressive electrolyte replacement   K +level 3 4 this a m  - currently receiving IV KCL 40 mEq x1 + K-Dur 20 mEq x1   -Continue to monitor on telemetry    Subjective  Review of Systems   Constitutional: Positive for malaise/fatigue  Negative for chills and fever  Eyes: Negative for visual disturbance  Cardiovascular: Positive for leg swelling  Negative for chest pain, dyspnea on exertion, orthopnea and palpitations  Respiratory: Negative for cough and shortness of breath  Gastrointestinal: Negative for abdominal pain  Neurological: Negative for dizziness, headaches and light-headedness  Objective:   Physical Exam  Vitals and nursing note reviewed  Constitutional:       General: She is not in acute distress  Appearance: She is obese  She is not diaphoretic  HENT:      Head: Normocephalic and atraumatic  Eyes:      General: No scleral icterus  Cardiovascular:      Rate and Rhythm: Regular rhythm  Tachycardia present  Pulses: Normal pulses  Heart sounds: Normal heart sounds  No murmur heard  Pulmonary:      Effort: Pulmonary effort is normal       Breath sounds: Normal breath sounds  No wheezing or rales  Abdominal:      Palpations: Abdomen is soft  Musculoskeletal:      Cervical back: Neck supple  Right lower leg: Edema present  Left lower leg: Edema present  Skin:     General: Skin is warm and dry  Capillary Refill: Capillary refill takes less than 2 seconds  Neurological:      General: No focal deficit present  Mental Status: She is alert and oriented to person, place, and time  Psychiatric:         Mood and Affect: Mood normal          Thought Content: Thought content normal          Vitals: Blood pressure 112/63, pulse (!) 128, temperature 99 7 °F (37 6 °C), temperature source Axillary, resp  rate 17, height 5' 1" (1 549 m), weight 70 8 kg (156 lb 1 4 oz), SpO2 91 %, not currently breastfeeding ,     Body mass index is 29 49 kg/m²  ,   Systolic (18KVV), EVX:518 , Min:87 , YLK:848     Diastolic (21EUQ), SIZ:25, Min:50, Max:77      Intake/Output Summary (Last 24 hours) at 9/29/2022 0919  Last data filed at 9/29/2022 0825  Gross per 24 hour   Intake 710 ml   Output 1300 ml   Net -590 ml     Weight (last 2 days)     Date/Time Weight    09/29/22 0600 70 8 (156 09)    09/28/22 0600 67 5 (148 81)    09/27/22 0600 66 3 (146 17)          LABORATORY RESULTS      CBC with diff:   Results from last 7 days   Lab Units 09/28/22  0424 09/27/22  0427 09/26/22  1530 09/25/22  2358 09/25/22  1258 09/25/22  0324 09/24/22  0517 09/24/22  0133 09/23/22  1630 09/22/22  1630 09/22/22  1145   WBC Thousand/uL 3 10* 3 40* 4 11* 5 58  --  3 20* 4 24*  --  5 24   < > 7 31   HEMOGLOBIN g/dL 7 9* 7 4* 6 9* 7 3* 8 1* 7 3* 8 1*   < > 6 6*   < > 9 6*   HEMATOCRIT % 25 4* 23 4* 22 5* 23 3*  --  24 2* 25 5*  --  22 6*   < > 31 0*   HEMATOCRIT, ISTAT   --   --   --   --   --   --   --   --   --    < >  --    MCV fL 98 99* 105* 103*  --  105* 101*  --  109*   < > 107*   PLATELETS Thousands/uL 50* 50* 62* 66*  --  66* 79*  --  79*   < > 110*   MCH pg 30 4 31 2 32 1 32 2  --  31 7 32 1  --  31 7   < > 33 1   MCHC g/dL 31 1* 31 6 30 7* 31 3*  --  30 2* 31 8  --  29 2*   < > 31 0*   RDW % 18 3* 19 3* 17 3* 17 9*  --  19 3* 19 1*  --  16 8*   < > 16 3*   MPV fL 11 2 10 9 11 1 11 4  --  10 2 10 4  --  10 4   < > 9 8   NRBC AUTO /100 WBCs 0 0 0  --   --  0  --   --  0  --  0   NRBC /100 WBC  --   --   --  1  --   --   --   --   --   --   --     < > = values in this interval not displayed         CMP:  Results from last 7 days   Lab Units 09/29/22  0427 09/28/22  0424 09/27/22  0427 09/26/22  1530 09/25/22  2358 09/25/22  0330 09/24/22  0517 09/23/22  1630 09/23/22  1009 09/23/22  0436 09/23/22  0000 09/22/22  1145   POTASSIUM mmol/L 3 4* 3 3* 3 3* 3 5 3 3* 4 5 3 8   < > 3 4*  --  3 0* 4 6   CHLORIDE mmol/L 108 109* 112* 112* 112* 114* 117*   < > 118*  --  115* 107   CO2 mmol/L 27 25 24 23 22 23 21   < > 19*  --  18* 24 CO2, I-STAT mmol/L  --   --   --   --   --   --   --   --   --  16*  --   --    BUN mg/dL 9 9 11 13 13 13 13   < > 16  --  17 28*   CREATININE mg/dL 0 46* 0 46* 0 50* 0 75 0 71 0 68 0 62   < > 0 79  --  0 80 1 36*   GLUCOSE, ISTAT mg/dl  --   --   --   --   --   --   --   --   --  60*  --   --    CALCIUM mg/dL 7 0* 6 9* 7 2* 7 5* 7 2* 7 2* 6 7*   < > 6 7*  --  6 6* 8 2*   AST U/L  --   --  9 7 8  --  10  --  9  --  10 13   ALT U/L  --   --  15 19 18  --  21  --  18  --  21 30   ALK PHOS U/L  --   --  47 57 62  --  72  --  64  --  74 106   EGFR ml/min/1 73sq m 92 92 90 74 79 81 84   < > 69  --  68 36    < > = values in this interval not displayed  BMP:  Results from last 7 days   Lab Units 09/29/22  0427 09/28/22  0424 09/27/22  0427 09/26/22  1530 09/25/22  2358 09/25/22  0330 09/24/22  0517 09/23/22  1009 09/23/22  0436   POTASSIUM mmol/L 3 4* 3 3* 3 3* 3 5 3 3* 4 5 3 8   < >  --    CHLORIDE mmol/L 108 109* 112* 112* 112* 114* 117*   < >  --    CO2 mmol/L 27 25 24 23 22 23 21   < >  --    CO2, I-STAT mmol/L  --   --   --   --   --   --   --   --  16*   BUN mg/dL 9 9 11 13 13 13 13   < >  --    CREATININE mg/dL 0 46* 0 46* 0 50* 0 75 0 71 0 68 0 62   < >  --    GLUCOSE, ISTAT mg/dl  --   --   --   --   --   --   --   --  60*   CALCIUM mg/dL 7 0* 6 9* 7 2* 7 5* 7 2* 7 2* 6 7*   < >  --     < > = values in this interval not displayed         Lab Results   Component Value Date    NTBNP 1,555 (H) 09/22/2022        Results from last 7 days   Lab Units 09/27/22  0427 09/25/22  0330 09/24/22  0517 09/23/22  1630   MAGNESIUM mg/dL 2 0 2 0 1 9 2 0                   Results from last 7 days   Lab Units 09/23/22  0416 09/22/22  1630 09/22/22  1145   INR  1 29* 1 38* 1 08       Lipid Profile:   Lab Results   Component Value Date    CHOL 183 03/28/2015    CHOL 161 05/27/2014     Lab Results   Component Value Date    HDL 29 (L) 08/16/2022    HDL 46 05/19/2020    HDL 46 04/12/2019     Lab Results   Component Value Date LDLCALC 25 08/16/2022    LDLCALC 124 (H) 05/19/2020    LDLCALC 106 (H) 04/12/2019     Lab Results   Component Value Date    TRIG 79 08/16/2022    TRIG 157 (H) 05/19/2020    TRIG 142 04/12/2019       Cardiac testing:   No results found for this or any previous visit  No results found for this or any previous visit  No results found for this or any previous visit  No valid procedures specified  No results found for this or any previous visit  Meds/Allergies   all current active meds have been reviewed and current meds:   Current Facility-Administered Medications   Medication Dose Route Frequency   • atorvastatin (LIPITOR) tablet 40 mg  40 mg Oral After Dinner   • cholecalciferol (VITAMIN D3) tablet 2,000 Units  2,000 Units Oral Daily   • collagenase (SANTYL) ointment   Topical Daily   • enoxaparin (LOVENOX) subcutaneous injection 70 mg  1 mg/kg Subcutaneous N77V [facility]   • folic acid (FOLVITE) tablet 1 mg  1 mg Oral Daily   • metoprolol (LOPRESSOR) injection 2 5 mg  2 5 mg Intravenous Q6H PRN   • metoprolol tartrate (LOPRESSOR) tablet 50 mg  50 mg Oral Q12H RIA   • midodrine (PROAMATINE) tablet 2 5 mg  2 5 mg Oral TID AC   • mirtazapine (REMERON) tablet 7 5 mg  7 5 mg Oral HS   • pantoprazole (PROTONIX) EC tablet 40 mg  40 mg Oral BID AC   • predniSONE tablet 5 mg  5 mg Oral Daily   • traMADol (ULTRAM) tablet 50 mg  50 mg Oral Once PRN   • vancomycin (VANCOCIN) oral solution 125 mg  125 mg Oral Q6H [facility]        EKG personally reviewed by Christ Esparza    Assessment:  Principal Problem:    Saddle embolus of pulmonary artery (HCC)  Active Problems:    [facility]    Anxiety    Mixed hyperlipidemia    Primary hypertension    Toxic gastroenteritis and colitis    REYES (acute kidney injury) ([facility] )    Anemia    Tachycardia    Urinary retention    Swelling of lower extremity    Counseling / Coordination of Care  Total floor / unit time spent today 20 minutes    Greater than 50% of total time was spent with the patient and / or family counseling and / or coordination of care  ** Please Note: Dragon 360 Dictation voice to text software may have been used in the creation of this document   **

## 2022-09-29 NOTE — PROGRESS NOTES
SLIM PROGRESS NOTE     Name: Shantel Mendosa   Age & Sex: 79 y o  female   MRN: [id number]  Unit/Bed#: [facility] 406-01   Encounter: [id number]  Team: SLIM    PATIENT INFORMATION     Name: Shantel Mendosa   Age & Sex: 79 y o  female   MRN: [id number]  Hospital Stay Days: 7    ASSESSMENT/PLAN     Principal Problem:    Saddle embolus of pulmonary artery ([facility] )  Active Problems:    Toxic gastroenteritis and colitis    Metastatic breast cancer ([place] [id number] )    Anxiety    Mixed hyperlipidemia    Primary hypertension    REYES (acute kidney injury) ([facility] )    Anemia    Tachycardia    Urinary retention    Swelling of lower extremity      * Saddle embolus of pulmonary artery (HCC)  Assessment & Plan  Acute, submassive, intermediate to high risk pulmonary embolism  Patient has history of right popliteal DVT and right lower lobe subsegmental PE from previous hospitalization in August   ? Patient was sent home on Xarelto, likely not failure, was receiving it at her nursing home  ? Patient also has metastatic breast cancer, hypercoagulable state  ? Return to the ER on 09/22 after a fall, found to have saddle pulmonary embolus with evidence of right heart strain and high clot burden  ? RV to LV ratio on CTA 1 2  ? BMP greater than 1500, troponins relatively normal  ? TTE inconclusive for right heart strain  ? Patient is now status post thrombectomy with IR, no IVC filter placement  ? Duplex showing significant right lower extremity clot burden  ? Continue on Lovenox for pulmonary embolism      Toxic gastroenteritis and colitis  Assessment & Plan  Patient was admitted with toxic gastroenteritis in August of 2022  ? Determined to be possible IBD with additional insult of Verzenio from her breast cancer treatment  ? Continue to hold Verzenio    ? Patient positive for C diff started on oral vancomycin D-3, diarrhea improving but persisting  ? Will wean prednisone slowly as she has been on this chronically- now down to 5 mg  ?  Will stop loperamide, cholestyramine n all stool softeners  ? GI offered remicaid to the daughter to Rx IBD - she is going to think about it  ? Spoke to GI about possibility of increasing Vanc dosing today  Also about concerns for IBD  Will f/u with GI recommendations  Swelling of lower extremity  Assessment & Plan  LE swelling has been worsening over the past few weeks  Right worse than left  ? 3+ on both lower extremities, improving along with pulses  ? Previous echo with a hyperdynamic LV EF 70%  ? Lower extremity duplex revealing significant acute DVTs in the right lower extremity  ? Can consider diuresis once the patient is stable for further fluid management      Urinary retention  Assessment & Plan  Patient presented with a massively distended bladder, 3 6 L out of for straight cath, greater than 800 mL out of 2nd straight cath  ? Urinary retention protocol, patient will likely need Crawley catheter  ? May be secondary to TCA use  ? UA with 1+ protein, innumerable rbc's, 4-10 wbc's  ? Urology consult, appreciate recommendations    Tachycardia  Assessment & Plan  · Patient had sinus tachycardia on her last discharge, likely in setting of pulmonary embolism  Still with sinus tachycardia on this admission  ? Consider amitriptyline as possible etiology as well  ? Metoprolol changed to 50 gm BID  ? Continue midodrine for blood pressure support  ? IVF today      Anemia  Assessment & Plan  Macrocytic anemia complicated by acute blood loss anemia  ? Folate in September of 2022 3 3  ? Vitamin B12 in September of 2022 645  ? Iron panel and August of 2022 showed an iron saturation of 18, TIBC 105, iron 19, ferritin 755  ? Likely a mixed component of anemia chronic disease with possible folate deficiency  ? Continue folate supplementation  ? Patient received 1 unit packed red blood cell for hemoglobin 6 9 -> 7 4 -> 7 9       REYES (acute kidney injury) ([facility] )  Assessment & Plan  REYES on CKD stage IIIA  ?  Patient's baseline creatinine between 0 8 and 0 9 with an EGFR ranging around 60  ? Admission creatinine 1 36, down to 0 5  ? Patient did receive IV contrast for CTA  ? Accurate in's and out's  ? Avoid nephrotoxins  ? Cr improved  Primary hypertension  Assessment & Plan  Patient's antihypertensives were discontinued after previous hospital stay due to normotension  · Continue to monitor    Mixed hyperlipidemia  Assessment & Plan  Stable  · Continue statin    Anxiety  Assessment & Plan  Depression/anxiety  ? Hold amitriptyline -possible etiology of tachycardia  ? Will resume Remeron      Metastatic breast cancer [facility])  Assessment & Plan  Stage IV metastatic ER positive, [place] negative, 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, progressed from stage I A ER/[place] positive, 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 years ago  Status post resection and adjuvant radiation and hormone therapy for 5 years  Patient had symptoms of bony discomfort in her sternum in June of 2021, imaging revealed lytic lesion, biopsy in July of 2021 confirmed progression of disease  ? Patient was started on Faslodex and Xgeva at that time, in conjunction with radiation  ? In April 2022 there was interval development of 4 mm nodule at the right lower lobe and interval development of increased sclerotic lesions throughout the visualized spine  ? At that time, the patient's treatment was changed to Femara and Alysa Banker  ? In July of 2022, the patient was changed from Alysa Banker to Teresabury because of intolerance  ? During the patient's hospitalization in August of 2022 to September of 2022, Verzenio was discontinued due to gastroenteritis  ? Patient's cancer is most likely contributing to her hypercoagulable state  ? Appreciate oncology and palliative care consult  ? Will continue on Lovenox for now  ? Continue on Remeron for insomnia and anxiety  ? Patient would not like any more treatment for her malignancy        Disposition: Will f/u with GI today regarding persistent diarrhea   IVF today for tachycardia  SUBJECTIVE     Patient seen and examined  No acute events overnight  Upon my encounter patient seated comfortably in hospital chair  Patient states that diarrhea has improved, but is still persistent  Presently denies any fever, chills, nausea, vomiting, constipation, chest pain, shortness of breath  She otherwise denies any new or worsening complaints  I spoke to patient's daughter, Amy Olea, via telephone  I was able to update Leesa in regards to patient's current plan of care  I was able to answer all of Taya's questions to her satisfaction  Diabetes agreeable to keep working with  in order to continue moving forward with rehabilitation placement  OBJECTIVE     Vitals:    22 0600 22 0742 22 0821 22 1200   BP:   112/63    BP Location:       Pulse:   (!) 128    Resp:       Temp:       TempSrc:       SpO2:  91%  95%   Weight: 70 8 kg (156 lb 1 4 oz)      Height:          Temperature:   Temp (24hrs), Av 7 °F (37 1 °C), Min:97 9 °F (36 6 °C), Max:99 7 °F (37 6 °C)    Temperature: 99 7 °F (37 6 °C)  Intake & Output:  I/O        0701   07 0701   07 07 07    P  O  350 590 240    Blood       IV Piggyback 150      Total Intake(mL/kg) 500 (7 4) 590 (8 3) 240 (3 4)    Urine (mL/kg/hr) 800 (0 5) 1300 (0 8)     Stool 90 0     Total Output 890 1300     Net -390 -710 +240           Unmeasured Stool Occurrence 2 x 3 x 1 x        Weights:        Body mass index is 29 49 kg/m²  Weight (last 2 days)     Date/Time Weight    22 06 70 8 (156 09)    22 06 67 5 (148 81)    22 0600 66 3 (146 17)        Physical Exam  Constitutional:       General: She is not in acute distress  Appearance: She is not ill-appearing  Cardiovascular:      Rate and Rhythm: Normal rate and regular rhythm  Pulses: Normal pulses  Heart sounds: No murmur heard    Pulmonary:      Effort: Pulmonary effort is normal  No respiratory distress  Breath sounds: Normal breath sounds  No wheezing, rhonchi or rales  Abdominal:      General: Abdomen is flat  Bowel sounds are normal  There is no distension  Palpations: Abdomen is soft  Tenderness: There is no abdominal tenderness  Musculoskeletal:      Right lower leg: No edema  Left lower leg: No edema  Neurological:      Mental Status: She is alert and oriented to person, place, and time  Psychiatric:         Mood and Affect: Mood normal          Behavior: Behavior normal          Thought Content: Thought content normal        LABORATORY DATA     Labs: I have personally reviewed pertinent reports  Results from last 7 days   Lab Units 09/28/22 0424 09/27/22 0427 09/26/22  1530 09/25/22  2358 09/25/22  1258 09/25/22  0324   WBC Thousand/uL 3 10* 3 40* 4 11* 5 58  --  3 20*   HEMOGLOBIN g/dL 7 9* 7 4* 6 9* 7 3*   < > 7 3*   HEMATOCRIT % 25 4* 23 4* 22 5* 23 3*  --  24 2*   PLATELETS Thousands/uL 50* 50* 62* 66*  --  66*   NEUTROS PCT % 75 85*  --   --   --  81*   MONOS PCT % 9 5  --   --   --  8   MONO PCT %  --   --   --  3*  --   --     < > = values in this interval not displayed  Results from last 7 days   Lab Units 09/29/22 0427 09/28/22 0424 09/27/22 0427 09/26/22  1530 09/25/22  2358 09/23/22  1009 09/23/22  0436   POTASSIUM mmol/L 3 4* 3 3* 3 3* 3 5 3 3*   < >  --    CHLORIDE mmol/L 108 109* 112* 112* 112*   < >  --    CO2 mmol/L 27 25 24 23 22   < >  --    CO2, I-STAT mmol/L  --   --   --   --   --   --  16*   BUN mg/dL 9 9 11 13 13   < >  --    CREATININE mg/dL 0 46* 0 46* 0 50* 0 75 0 71   < >  --    CALCIUM mg/dL 7 0* 6 9* 7 2* 7 5* 7 2*   < >  --    ALK PHOS U/L  --   --  47 57 62   < >  --    ALT U/L  --   --  15 19 18   < >  --    AST U/L  --   --  9 7 8   < >  --    GLUCOSE, ISTAT mg/dl  --   --   --   --   --   --  60*    < > = values in this interval not displayed       Results from last 7 days   Lab Units 09/27/22  0427 09/25/22  0339 09/24/22  0517   MAGNESIUM mg/dL 2 0 2 0 1 9     Results from last 7 days   Lab Units 09/25/22  0330 09/24/22  0517 09/23/22  1630   PHOSPHORUS mg/dL 2 1* 1 8* 2 1*      Results from last 7 days   Lab Units 09/24/22  0133 09/23/22  2329 09/23/22  1906 09/23/22  0514 09/23/22  0416 09/22/22  2332 09/22/22  1630   INR   --   --   --   --  1 29*  --  1 38*   PTT seconds 35 140* 102*   < >  --    < > >210*    < > = values in this interval not displayed  Results from last 7 days   Lab Units 09/22/22  1354   LACTIC ACID mmol/L 1 2           IMAGING & DIAGNOSTIC TESTING     Radiology Results: I have personally reviewed pertinent reports  XR chest portable    Result Date: 9/26/2022  Impression: Increased bibasilar density, most likely atelectasis  Workstation performed: [id number]     XR Trauma chest portable    Result Date: 9/22/2022  Impression: No acute cardiopulmonary disease  Workstation performed: [id number]     TRAUMA - CT head wo contrast    Result Date: 9/22/2022  Impression: No acute intracranial abnormality  Chronic microangiopathic changes  I personally discussed this study with Eloy Romberg on 9/22/2022 at 12:32 PM  Workstation performed: [id number]     TRAUMA - CT spine cervical wo contrast    Result Date: 9/22/2022  Impression: No cervical spine fracture or traumatic malalignment  I personally discussed this study with Eloy Romberg on 9/22/2022 at 12:23 PM   Workstation performed: [id number]     PE Study with CT abdomen & pelvis with contrast    Result Date: 9/22/2022  Impression: 1  Saddle embolus and left greater than right pulmonary arterial filling defects consistent with PE (high clot burden) with evidence of RV strain including an RV/LV ratio = 1 2  This is greater than 0 9, which is abnormal and indicates right heart strain  An abnormal RV/LV ratio has been shown to be associated with an increased risk of 30 day mortality in the setting of acute pulmonary embolism    Urgent consultation with the medical critical care team is recommended  2   Massive distention of the bladder  3   No acute posttraumatic injury  I personally discussed this study with Cynthia Sports on 9/22/2022 at 12:27 PM  Workstation performed: [id number]     IR IVC filter placement optional/temporary    Result Date: 9/26/2022  Impression: Placement of a temporary inferior vena cava filter  PLAN: This filter can be removed at any time in the future  Interventional Radiology will follow this patient to ensure removal when it is no longer needed  Workstation performed: [id number]     IR PE endovascular therapy    Result Date: 9/26/2022  Impression: 1  Technically successful mechanical thrombectomy of the left pulmonary artery with extirpation of large clot  Workstation performed: [id number]     IR PICC line placement single lumen (preferred for home anitbiotics/medications)    Result Date: 9/28/2022  Impression: 1  Status post placement of a peripherally inserted central venous catheter via the brachial vein with its tip at the cavoatrial junction under ultrasound and fluoroscopic guidance  Signed, performed, and dictated by BROOKE Flynn under the supervision of Dr Gutierrez Session  Workstation performed: [id number]     Other Diagnostic Testing: I have personally reviewed pertinent reports      ACTIVE MEDICATIONS     Current Facility-Administered Medications   Medication Dose Route Frequency   • atorvastatin (LIPITOR) tablet 40 mg  40 mg Oral After Dinner   • cholecalciferol (VITAMIN D3) tablet 2,000 Units  2,000 Units Oral Daily   • collagenase (SANTYL) ointment   Topical Daily   • enoxaparin (LOVENOX) subcutaneous injection 70 mg  1 mg/kg Subcutaneous F12S RIA   • folic acid (FOLVITE) tablet 1 mg  1 mg Oral Daily   • metoprolol (LOPRESSOR) injection 2 5 mg  2 5 mg Intravenous Q6H PRN   • metoprolol tartrate (LOPRESSOR) tablet 50 mg  50 mg Oral Q12H RIA   • midodrine (PROAMATINE) tablet 2 5 mg  2 5 mg Oral TID AC   • mirtazapine (REMERON) tablet 7 5 mg  7 5 mg Oral HS   • multi-electrolyte (PLASMALYTE-A/ISOLYTE-S PH 7 4) IV solution  75 mL/hr Intravenous Continuous   • pantoprazole (PROTONIX) EC tablet 40 mg  40 mg Oral BID AC   • potassium chloride 40 mEq IVPB (premix)  40 mEq Intravenous Once   • traMADol (ULTRAM) tablet 50 mg  50 mg Oral Once PRN   • vancomycin (VANCOCIN) oral solution 125 mg  125 mg Oral Q6H [address]       VTE Pharmacologic Prophylaxis: Enoxaparin (Lovenox)  VTE Mechanical Prophylaxis: sequential compression device    Portions of the record may have been created with voice recognition software  Occasional wrong word or "sound a like" substitutions may have occurred due to the inherent limitations of voice recognition software    Read the chart carefully and recognize, using context, where substitutions have occurred   ==  [facility]  Internal Medicine Residency PGY-2

## 2022-09-29 NOTE — PROGRESS NOTES
Progress Note- Anna Silverman 79 y o  female MRN: [id number]    Unit/Bed#: [facility] 406-01 Encounter: [id number]      Assessment and Plan:  79 yr old with PMH of stage IV breast cancer status post radiation and currently on chemotherapy presented to the hospital with shortness of breath and worsening diarrhea  Acute on chronic diarrhea  Inflammatory bowel disease  C diff infection     Patient had rectal bleeding for which she underwent colonoscopy in November 2021 which showed inflammation in the distal sigmoid colon rectum biopsy showed focal active colitis  She was recommended to start Canasa suppository and hydrocortisone enemas  She was then started on mesalamine in April  According to the patient she feels that her chemotherapy medications specifically Kisqali and verzenio made her diarrhea worse        With worsening diarrhea she underwent repeat flexible sigmoidoscopy in August 2022 which showed severe erythematous, friable, ulcerated mucosa in the sigmoid colon  Biopsies were negative for CMV and adenovirus  It showed active colitis-chronicity not mentioned in the pathology report but differential diagnosis included as IBD, infectious colitis, versus medication induced colitis      She has been on prednisone 40 mg a day since her hospital admission in August 2022  She states that she did have semi-formed stool for a few days, however, the daughter does not believe that there has been any significant improvement in her diarrhea on steroids      After discussion with Dr Navi Mobley it appears that she most likely has inflammatory bowel disease given her symptoms have been going on since November 2021  No strong association of colitis with any of her chemotherapy medications  Based on her longitudnal ulcers seen on sigmoidoscopy there is concern that this could be Crohn's       · Her CRP was elevated to 216 in August and this had improved to 10 1 on 09/01  On admission it was 111     · It is given high-dose steroids for over a month she was slowly tapered off steroids  · Fecal calprotectin pending  · I discussed with daughter that our recommendation would be to start Remicade, this can be done as outpatient if the daughter is hesitant  She had concerns that Remicade has a lot of side effects and is expensive in the patient might not be able to afford  Will reach out to our IBD coordinator  · Will taper down dose of prednisone given active C diff infection and continue vancomycin 125 mg q 6  Will continue to monitor stool output, we should see improvement in frequency and consistency of stool in another 2-3 days  · She is intermittently tachycardic and hypertensive and evaluated by Cardiology  This is thought to be secondary to hypovolemia and therefore she was started on IV fluids  · I discussed with daughter the importance of starting biologics for Ms Silverman but at this time she would like to get more information regarding Remicade and ensure that it is covered by insurance before committing to starting it        Disposition:  GI will continue to follow peripherally    ______________________________________________________________________    Subjective:     Patient denies any abdominal pain or discomfort    She is tolerating a regular diet    Medication Administration - last 24 hours from 09/28/2022 1558 to 09/29/2022 1558       Date/Time Order Dose Route Action Action by     59/66/9550 3458 folic acid (FOLVITE) tablet 1 mg 1 mg Oral Given Alberto Bass RN     09/29/2022 0621 pantoprazole (PROTONIX) EC tablet 40 mg 40 mg Oral Given Renetta Napoles RN     09/28/2022 1713 atorvastatin (LIPITOR) tablet 40 mg 40 mg Oral Given Alberto Bass RN     09/29/2022 0513 cholecalciferol (VITAMIN D3) tablet 2,000 Units 2,000 Units Oral Given Alberto Bass RN     09/29/2022 3922 collagenase (SANTYL) ointment   Topical Not Given Alberto Bass RN     09/29/2022 0822 enoxaparin (LOVENOX) subcutaneous injection 70 mg 70 mg Subcutaneous Given Padmini Emery, RN     09/28/2022 2118 enoxaparin (LOVENOX) subcutaneous injection 70 mg 70 mg Subcutaneous Given Nirmala Garcia, MAURO     09/29/2022 1111 vancomycin (VANCOCIN) oral solution 125 mg 125 mg Oral Given Padmini Emery, RN     09/29/2022 3118 vancomycin (VANCOCIN) oral solution 125 mg 125 mg Oral Given Nirmala Garcia, MAURO     09/28/2022 2311 vancomycin (VANCOCIN) oral solution 125 mg 125 mg Oral Given Nirmala Garcia, MAURO     09/28/2022 1714 vancomycin (VANCOCIN) oral solution 125 mg 125 mg Oral Given Padmini Emery, MAURO     09/29/2022 1053 midodrine (PROAMATINE) tablet 2 5 mg 2 5 mg Oral Given Padmini Emery, MAURO     09/29/2022 2924 midodrine (PROAMATINE) tablet 2 5 mg 2 5 mg Oral Given Nirmala Garcia, MAURO     09/28/2022 2118 mirtazapine (REMERON) tablet 7 5 mg 7 5 mg Oral Given Nirmala Garcia, MAURO     09/29/2022 6751 predniSONE tablet 5 mg 5 mg Oral Given Padmini Emery, RN     09/29/2022 8159 metoprolol tartrate (LOPRESSOR) tablet 50 mg 50 mg Oral Given Padmini Emery, RN     09/28/2022 2118 metoprolol tartrate (LOPRESSOR) tablet 50 mg 50 mg Oral Given Nirmala Garcia, MAURO     09/29/2022 1050 potassium chloride 40 mEq IVPB (premix) 40 mEq Intravenous New Bag Padmini Emery, MAURO     09/29/2022 1053 potassium chloride (K-DUR,KLOR-CON) CR tablet 20 mEq 20 mEq Oral Given Padmini Emery, MAURO     09/29/2022 1218 multi-electrolyte (PLASMALYTE-A/ISOLYTE-S PH 7 4) IV solution 75 mL/hr Intravenous New Bag Padmini Emery RN          Objective:     Vitals: Blood pressure (!) 113/49, pulse 91, temperature 97 9 °F (36 6 °C), temperature source Oral, resp  rate 18, height 5' 1" (1 549 m), weight 70 8 kg (156 lb 1 4 oz), SpO2 94 %, not currently breastfeeding  ,Body mass index is 29 49 kg/m²        Intake/Output Summary (Last 24 hours) at 9/29/2022 1558  Last data filed at 9/29/2022 1327  Gross per 24 hour   Intake 830 ml   Output 1200 ml   Net -370 ml       Physical Exam:   General Appearance: Awake and alert, in no acute distress  Abdomen: Soft, non-tender, non-distended; bowel sounds normal; no masses or no organomegaly    Invasive Devices  Report    Peripherally Inserted Central Catheter Line  Duration           PICC Line 09/26/22 3 days          Drain  Duration           Urethral Catheter 18 Fr  5 days                Lab Results:  No results displayed because visit has over 200 results  Imaging Studies: I have personally reviewed pertinent imaging studies

## 2022-09-29 NOTE — ASSESSMENT & PLAN NOTE
RT called to room by RN because patient was on 8L of oxygen ( sat 85-86%) post bronch. Patient placed on a NRB mask and sats improved to 95% RN making doctor aware. · Patient had sinus tachycardia on her last discharge, likely in setting of pulmonary embolism  Still with sinus tachycardia on this admission  ? Continue midodrine for blood pressure support  ?  Tachycardia improved over the course of this week will continue on current beta-blocker dose

## 2022-09-30 ENCOUNTER — APPOINTMENT (INPATIENT)
Dept: RADIOLOGY | Facility: HOSPITAL | Age: 82
DRG: 163 | End: 2022-09-30
Payer: MEDICARE

## 2022-09-30 ENCOUNTER — TELEPHONE (OUTPATIENT)
Dept: GASTROENTEROLOGY | Facility: CLINIC | Age: 82
End: 2022-09-30

## 2022-09-30 ENCOUNTER — TELEPHONE (OUTPATIENT)
Dept: HEMATOLOGY ONCOLOGY | Facility: CLINIC | Age: 82
End: 2022-09-30

## 2022-09-30 LAB
ANION GAP SERPL CALCULATED.3IONS-SCNC: 5 MMOL/L (ref 4–13)
ANISOCYTOSIS BLD QL SMEAR: PRESENT
BASOPHILS # BLD MANUAL: 0.03 THOUSAND/UL (ref 0–0.1)
BASOPHILS NFR MAR MANUAL: 1 % (ref 0–1)
BUN SERPL-MCNC: 9 MG/DL (ref 5–25)
CALCIUM SERPL-MCNC: 6.4 MG/DL (ref 8.3–10.1)
CALPROTECTIN STL-MCNT: 3261 UG/G (ref 0–120)
CHLORIDE SERPL-SCNC: 107 MMOL/L (ref 96–108)
CO2 SERPL-SCNC: 26 MMOL/L (ref 21–32)
CREAT SERPL-MCNC: 0.4 MG/DL (ref 0.6–1.3)
CRP SERPL QL: 55.3 MG/L
EOSINOPHIL # BLD MANUAL: 0 THOUSAND/UL (ref 0–0.4)
EOSINOPHIL NFR BLD MANUAL: 0 % (ref 0–6)
ERYTHROCYTE [DISTWIDTH] IN BLOOD BY AUTOMATED COUNT: 17.3 % (ref 11.6–15.1)
GFR SERPL CREATININE-BSD FRML MDRD: 97 ML/MIN/1.73SQ M
GLUCOSE SERPL-MCNC: 69 MG/DL (ref 65–140)
HCT VFR BLD AUTO: 26 % (ref 34.8–46.1)
HGB BLD-MCNC: 8.1 G/DL (ref 11.5–15.4)
LYMPHOCYTES # BLD AUTO: 0.03 THOUSAND/UL (ref 0.6–4.47)
LYMPHOCYTES # BLD AUTO: 1 % (ref 14–44)
MAGNESIUM SERPL-MCNC: 2 MG/DL (ref 1.6–2.6)
MCH RBC QN AUTO: 30.8 PG (ref 26.8–34.3)
MCHC RBC AUTO-ENTMCNC: 31.2 G/DL (ref 31.4–37.4)
MCV RBC AUTO: 99 FL (ref 82–98)
MONOCYTES # BLD AUTO: 0.13 THOUSAND/UL (ref 0–1.22)
MONOCYTES NFR BLD: 5 % (ref 4–12)
NEUTROPHILS # BLD MANUAL: 2.4 THOUSAND/UL (ref 1.85–7.62)
NEUTS BAND NFR BLD MANUAL: 35 % (ref 0–8)
NEUTS SEG NFR BLD AUTO: 56 % (ref 43–75)
OVALOCYTES BLD QL SMEAR: PRESENT
PLATELET # BLD AUTO: 72 THOUSANDS/UL (ref 149–390)
PLATELET BLD QL SMEAR: ABNORMAL
PMV BLD AUTO: 11.1 FL (ref 8.9–12.7)
POIKILOCYTOSIS BLD QL SMEAR: PRESENT
POTASSIUM SERPL-SCNC: 3.7 MMOL/L (ref 3.5–5.3)
PROCALCITONIN SERPL-MCNC: 0.25 NG/ML
RBC # BLD AUTO: 2.63 MILLION/UL (ref 3.81–5.12)
RBC MORPH BLD: PRESENT
SODIUM SERPL-SCNC: 138 MMOL/L (ref 135–147)
VARIANT LYMPHS # BLD AUTO: 2 %
WBC # BLD AUTO: 2.64 THOUSAND/UL (ref 4.31–10.16)

## 2022-09-30 PROCEDURE — 74176 CT ABD & PELVIS W/O CONTRAST: CPT

## 2022-09-30 PROCEDURE — 85007 BL SMEAR W/DIFF WBC COUNT: CPT | Performed by: STUDENT IN AN ORGANIZED HEALTH CARE EDUCATION/TRAINING PROGRAM

## 2022-09-30 PROCEDURE — 99232 SBSQ HOSP IP/OBS MODERATE 35: CPT | Performed by: INTERNAL MEDICINE

## 2022-09-30 PROCEDURE — 85027 COMPLETE CBC AUTOMATED: CPT | Performed by: STUDENT IN AN ORGANIZED HEALTH CARE EDUCATION/TRAINING PROGRAM

## 2022-09-30 PROCEDURE — 74177 CT ABD & PELVIS W/CONTRAST: CPT

## 2022-09-30 PROCEDURE — 80048 BASIC METABOLIC PNL TOTAL CA: CPT | Performed by: STUDENT IN AN ORGANIZED HEALTH CARE EDUCATION/TRAINING PROGRAM

## 2022-09-30 PROCEDURE — 86140 C-REACTIVE PROTEIN: CPT | Performed by: STUDENT IN AN ORGANIZED HEALTH CARE EDUCATION/TRAINING PROGRAM

## 2022-09-30 PROCEDURE — G1004 CDSM NDSC: HCPCS

## 2022-09-30 PROCEDURE — 99232 SBSQ HOSP IP/OBS MODERATE 35: CPT | Performed by: HOSPITALIST

## 2022-09-30 PROCEDURE — 84145 PROCALCITONIN (PCT): CPT | Performed by: HOSPITALIST

## 2022-09-30 PROCEDURE — 83735 ASSAY OF MAGNESIUM: CPT | Performed by: NURSE PRACTITIONER

## 2022-09-30 RX ORDER — POTASSIUM CHLORIDE 20MEQ/15ML
40 LIQUID (ML) ORAL ONCE
Status: COMPLETED | OUTPATIENT
Start: 2022-09-30 | End: 2022-09-30

## 2022-09-30 RX ORDER — METOPROLOL TARTRATE 5 MG/5ML
2.5 INJECTION INTRAVENOUS EVERY 6 HOURS PRN
Status: DISCONTINUED | OUTPATIENT
Start: 2022-09-30 | End: 2022-10-13

## 2022-09-30 RX ADMIN — MIRTAZAPINE 7.5 MG: 15 TABLET, FILM COATED ORAL at 21:32

## 2022-09-30 RX ADMIN — FOLIC ACID 1 MG: 1 TABLET ORAL at 09:33

## 2022-09-30 RX ADMIN — CALCIUM CHLORIDE 1 G: 100 INJECTION, SOLUTION INTRAVENOUS at 14:28

## 2022-09-30 RX ADMIN — Medication 2000 UNITS: at 09:33

## 2022-09-30 RX ADMIN — POTASSIUM CHLORIDE 40 MEQ: 20 SOLUTION ORAL at 09:33

## 2022-09-30 RX ADMIN — CARBIDOPA AND LEVODOPA 2.5 MG: 50; 200 TABLET, EXTENDED RELEASE ORAL at 17:51

## 2022-09-30 RX ADMIN — METOPROLOL SUCCINATE 75 MG: 50 TABLET, EXTENDED RELEASE ORAL at 09:33

## 2022-09-30 RX ADMIN — Medication 125 MG: at 05:08

## 2022-09-30 RX ADMIN — ATORVASTATIN CALCIUM 40 MG: 40 TABLET, FILM COATED ORAL at 17:51

## 2022-09-30 RX ADMIN — CARBIDOPA AND LEVODOPA 2.5 MG: 50; 200 TABLET, EXTENDED RELEASE ORAL at 05:08

## 2022-09-30 RX ADMIN — Medication 125 MG: at 17:50

## 2022-09-30 RX ADMIN — IOHEXOL 98 ML: 350 INJECTION, SOLUTION INTRAVENOUS at 22:04

## 2022-09-30 RX ADMIN — Medication 125 MG: at 14:29

## 2022-09-30 RX ADMIN — CARBIDOPA AND LEVODOPA 2.5 MG: 50; 200 TABLET, EXTENDED RELEASE ORAL at 14:28

## 2022-09-30 RX ADMIN — PANTOPRAZOLE SODIUM 40 MG: 40 TABLET, DELAYED RELEASE ORAL at 05:08

## 2022-09-30 RX ADMIN — COLLAGENASE SANTYL: 250 OINTMENT TOPICAL at 10:01

## 2022-09-30 RX ADMIN — METOROPROLOL TARTRATE 2.5 MG: 5 INJECTION, SOLUTION INTRAVENOUS at 02:33

## 2022-09-30 RX ADMIN — PANTOPRAZOLE SODIUM 40 MG: 40 TABLET, DELAYED RELEASE ORAL at 17:51

## 2022-09-30 RX ADMIN — METOPROLOL SUCCINATE 75 MG: 50 TABLET, EXTENDED RELEASE ORAL at 21:32

## 2022-09-30 RX ADMIN — ENOXAPARIN SODIUM 70 MG: 80 INJECTION SUBCUTANEOUS at 09:33

## 2022-09-30 RX ADMIN — ENOXAPARIN SODIUM 70 MG: 80 INJECTION SUBCUTANEOUS at 21:31

## 2022-09-30 NOTE — PROGRESS NOTES
Progress Note- Adan Silverman 79 y o  female MRN: [id number]    Unit/Bed#: [facility] 406-01 Encounter: [id number]      Assessment and Plan:  79 yr old with PMH of stage IV breast cancer status post radiation and currently on chemotherapy presented to the hospital with shortness of breath and worsening diarrhea      Acute on chronic diarrhea  Inflammatory bowel disease  C diff infection     Patient had a CT abdomen pelvis without contrast done today which shows that she has worsening proctocolitis of the sigmoid and rectum with perirectal fat stranding compared to her CT done on 09/22  There is improvement in inflammation of the transverse and descending colon  Her worsening CT findings are most likely due to Crohn's but S her C diff infection could be playing a role    · I have had multiple discussions with the daughter regarding starting Remicade and she has been hesitant  Given worsening findings she is now agreeable    Discussed with pharmacy that inpatient Remicade infusion is recommended in this clinical setting given worsening imaging findings  · Patient has worsening leukopenia  · No fevers, abdominal exam is benign  · Will give a dose of Remicade 5 mg/kg    · Recommend surgery consult  · Hepatitis-B surface antigen is negative interferon TB is indeterminate but CT chest unremarkable  ·  Continue vancomycin 125 mg q 6  · Primary team consulting ID, appreciate recommendations       Disposition:  GI will continue to follow   ______________________________________________________________________    Subjective:     Patient denies any abdominal pain, nausea or vomiting    Medication Administration - last 24 hours from 09/29/2022 1752 to 09/30/2022 1752       Date/Time Order Dose Route Action Action by     56/52/5423 5835 folic acid (FOLVITE) tablet 1 mg 1 mg Oral Given Alexandrasony Clifton     09/30/2022 1751 pantoprazole (PROTONIX) EC tablet 40 mg 40 mg Oral Given Alexandra Clifton     09/30/2022 0509 pantoprazole (PROTONIX) EC tablet 40 mg 40 mg Oral Given Chandler Reef, RN     09/30/2022 1751 atorvastatin (LIPITOR) tablet 40 mg 40 mg Oral Given Alena Masker     09/30/2022 0933 cholecalciferol (VITAMIN D3) tablet 2,000 Units 2,000 Units Oral Given [facility]     09/30/2022 1001 collagenase (SANTYL) ointment   Topical Given [facility]     09/30/2022 0933 enoxaparin (LOVENOX) subcutaneous injection 70 mg 70 mg Subcutaneous Given [facility]     09/29/2022 2121 enoxaparin (LOVENOX) subcutaneous injection 70 mg 70 mg Subcutaneous Given Chandler Reef, RN     09/30/2022 1750 vancomycin (VANCOCIN) oral solution 125 mg 125 mg Oral Given [facility]     09/30/2022 1429 vancomycin (VANCOCIN) oral solution 125 mg 125 mg Oral Given [facility]     09/30/2022 0508 vancomycin (VANCOCIN) oral solution 125 mg 125 mg Oral Given Chandler Reef, RN     09/29/2022 2306 vancomycin (VANCOCIN) oral solution 125 mg 125 mg Oral Given Chandler Reef, RN     09/30/2022 0233 metoprolol (LOPRESSOR) injection 2 5 mg 2 5 mg Intravenous Given Chandler Reef, RN     09/30/2022 1751 midodrine (PROAMATINE) tablet 2 5 mg 2 5 mg Oral Not Given Alena Masker     09/30/2022 1428 midodrine (PROAMATINE) tablet 2 5 mg 2 5 mg Oral Given [facility]     09/30/2022 0508 midodrine (PROAMATINE) tablet 2 5 mg 2 5 mg Oral Given Chandler Reef, RN     09/29/2022 2122 mirtazapine (REMERON) tablet 7 5 mg 7 5 mg Oral Given Chandler Reef, RN     09/29/2022 2122 metoprolol tartrate (LOPRESSOR) tablet 50 mg 50 mg Oral Given Chandler Reef, RN     09/30/2022 0516 multi-electrolyte (PLASMALYTE-A/ISOLYTE-S PH 7 4) IV solution 0 mL/hr Intravenous Stopped Chandler Reef, RN     09/30/2022 0933 potassium chloride oral solution 40 mEq 40 mEq Oral Given Alena Masker     09/30/2022 0933 metoprolol succinate (TOPROL-XL) 24 hr tablet 75 mg 75 mg Oral Given Alexandra Clifton     09/30/2022 1428 calcium chloride 1 g in sodium chloride 0 9 % 100 mL IVPB 1 g Intravenous New Bag Alexandra Clifton          Objective:     Vitals: Blood pressure 162/85, pulse (!) 113, temperature 98 5 °F (36 9 °C), temperature source Oral, resp  rate 17, height 5' 1" (1 549 m), weight 66 2 kg (145 lb 15 1 oz), SpO2 91 %, not currently breastfeeding  ,Body mass index is 27 58 kg/m²  Intake/Output Summary (Last 24 hours) at 9/30/2022 1752  Last data filed at 9/30/2022 1300  Gross per 24 hour   Intake --   Output 1675 ml   Net -1675 ml       Physical Exam:   General Appearance: Awake and alert, in no acute distress  Abdomen: Soft, non-tender, non-distended; bowel sounds normal; no masses or no organomegaly    Invasive Devices  Report    Peripherally Inserted Central Catheter Line  Duration           PICC Line 09/26/22 4 days          Drain  Duration           Urethral Catheter 18 Fr  6 days                Lab Results:  No results displayed because visit has over 200 results  Imaging Studies: I have personally reviewed pertinent imaging studies

## 2022-09-30 NOTE — PLAN OF CARE
Problem: GENITOURINARY - ADULT  Goal: Maintains or returns to baseline urinary function  Description: INTERVENTIONS:  - Assess urinary function  - Encourage oral fluids to ensure adequate hydration if ordered  - Administer IV fluids as ordered to ensure adequate hydration  - Administer ordered medications as needed  - Offer frequent toileting  - Follow urinary retention protocol if ordered  Outcome: Progressing  Goal: Absence of urinary retention  Description: INTERVENTIONS:  - Assess patient’s ability to void and empty bladder  - Monitor I/O  - Bladder scan as needed  - Discuss with physician/AP medications to alleviate retention as needed  - Discuss catheterization for long term situations as appropriate  Outcome: Progressing  Goal: Urinary catheter remains patent  Description: INTERVENTIONS:  - Assess patency of urinary catheter  - If patient has a chronic suarez, consider changing catheter if non-functioning  - Follow guidelines for intermittent irrigation of non-functioning urinary catheter  Outcome: Progressing

## 2022-09-30 NOTE — TELEPHONE ENCOUNTER
I spoke with our [facility] specialist for Remicade  Patient has Medicare and covers 80% patient also has secondary of which should  the remaining 20% unless the patient hasn't met the deductible  I suggest for the patient to call and make sure with her secondary as I cannot give a 100% accurate answer without completing an auth and urgent auths take at minimum 72hrs  Please let me know if you decide to proceed with Remicade so we can auth as outpatient        Thanks,  [phone]

## 2022-09-30 NOTE — QUICK NOTE
GI quick note  ---------------------      Informed by IBD coordinator that she spoke with the all [facility] specialist for Remicade and she has Medicare which covers 80% and her secondary should  the remaining 20% the patient has met her deductible  I reached out to the daughter to let her know that she can check with the secondary as we cannot give her 100% accurate answer at this time pending complete authorization and since Jason Barrett has not decided if she would like her mom to proceed with Remicade we would not be able to start an authorization      I left a voicemail for Jason Barrett discussing the above

## 2022-09-30 NOTE — TELEPHONE ENCOUNTER
Returned phone call of daughter, Faizan Rhoades  Per chart patient is currently admitted on [facility] p4 with a consult placed to medical oncology  Patient is a patient of Dr Lorri Kaur  Daughter calling requesting information about her mother's hospitalization  I did inform daughter that Dr Lorri Kaur is not rounding this week in the hospital, however our rounding physician in [facility] was consulted and would see her while she is inpatient  Daughter asked about "some sternum cancer"  I did review Dr Brandon Pruitt most recent office note with her word for word in regards to this  I did again mention to Faizan Rhoades that the teams do round in [facility], though it would not be Dr Lorri Kaur today  She states her mother has an office visit 10/7 but she may have to reschedule  I told her to call us if so and we would accommodate about reschedule her appointment

## 2022-09-30 NOTE — TELEPHONE ENCOUNTER
----- Message from LEIA FRANCO MD sent at 9/29/2022  4:06 PM EDT -----  Hello I discussed this patient with Dr Nadiya Sigala of who believes that she should be started on Remicade  The daughter is hesitant and would like to make sure it is covered by her insurance before we give her a dose inpatient  Are you able to help check with her insurance that it would be covered or not or do I have to reach out to somebody else for help?     Thank you

## 2022-09-30 NOTE — TELEPHONE ENCOUNTER
From: Dustin Watts MD  Sent: 9/30/2022  10:57 AM EDT  To: Tres Juarez    Thank you for looking into it  The daughter is still undecided if she would like to start Remicade at this time are not

## 2022-09-30 NOTE — WOUND OSTOMY CARE
Progress Note - Wound   Jhon Reynaga Clunk 79 y o  female MRN: [id number]  Unit/Bed#: [facility] 406-01 Encounter: [id number]        Assessment:   Patient is seen for wound care follow-up  Patient is 78 yo female admitted to [facility] with saddle PE which was discovered after patient came to the ER from a fall  Patient has history of stage IV metastatic breast cancer, anemia, CKD3, and hypertension  Indwelling catheter in place managing urine collection  Patient is moderate assist to turn side to side for assessment  No longer in MICU- now on P4 on regular mattress- specialty mattress ordered for patient  B/L heels are dry, intact, and rekha  Findings:  1  POA Left Buttocks Unstageable now evolved to stage 3 Pressure Injury:  no longer has yellow slough and eschar obscuring wound bed  Wound bed is full thickness skin loss with a 70% pink granulation tissue and 30% yellow wound base  Romana-wound is intact and hyperpigmented  Scant serosanguineous drainage noted  No longer requires use of Santyl  Will switch to stoma powder and TRIAD Paste  2  Midline and Right Buttocks MASD: Likely related to friction moisture  3 irregular shaped areas measured together  Wound beds are partial thickness skin loss with beefy red and bleeding wound bases  Scant sanguineous drainage noted  Wound beds and romana-wound blanches  Romana-wound is intact and fragile  3  Right Foot Weeping Edema: Resolved  Area is intact, dry scabs with no drainage or skin loss  Cleanse and leave STEPH  No induration, fluctuance, odor, warmth/temperature differences, redness, or purulence noted to the above noted wounds and skin areas assessed  New dressings applied per orders listed below  Patient tolerated well- no s/s of non-verbal pain or discomfort observed during the encounter  Bedside nurse aware of plan of care  See flow sheets for more detailed assessment findings        Orders listed below and wound care will continue to follow, call or tiger text with questions  Skin Care Plan:  1-Cleanse sacro-buttocks with soap and water  Dust wound beds with Stoma Powder  Apply Triad Overtop  Perform TID and PRN  2-Turn/reposition q2h or when medically stable for pressure re-distribution on skin   3-Elevate heels to offload pressure  4-Moisturize skin daily with skin nourishing cream  5-Ehob cushion in chair when out of bed  6-Hydraguard to bilateral heels BID and PRN   7-P-500 Specialty Mattress ordered for patient  WOUNDS:  Wound 09/22/22 Buttocks Left (Active)   Wound Image   09/30/22 1003   Wound Description Yellow;Pink 09/30/22 1003   Pressure Injury Stage 3 09/30/22 1003   Romana-wound Assessment Hyperpigmented;Fragile 09/30/22 1003   Wound Length (cm) 3 1 cm 09/30/22 1003   Wound Width (cm) 1 5 cm 09/30/22 1003   Wound Depth (cm) 0 2 cm 09/30/22 1003   Wound Surface Area (cm^2) 4 65 cm^2 09/30/22 1003   Wound Volume (cm^3) 0 93 cm^3 09/30/22 1003   Calculated Wound Volume (cm^3) 0 93 cm^3 09/30/22 1003   Change in Wound Size % -69 09 09/30/22 1003   Tunneling 0 cm 09/30/22 1003   Tunneling in depth located at 0 09/30/22 1003   Undermining 0 09/30/22 1003   Undermining is depth extending from 0 09/30/22 1003   Wound Site Closure RUDY 09/30/22 1003   Drainage Amount Scant 09/30/22 1003   Drainage Description Serosanguineous 09/30/22 1003   Non-staged Wound Description Full thickness 09/30/22 1003   Treatments Cleansed;Site care 09/30/22 1003   Dressing Other (Comment) 09/30/22 1003   Wound packed?  No 09/30/22 1003   Packing- # removed 0 09/30/22 1003   Packing- # inserted 0 09/30/22 1003   Dressing Changed New 09/30/22 1003   Patient Tolerance Tolerated well 09/30/22 1003   Dressing Status Intact 09/30/22 1003   Wound 09/23/22 Foot Anterior;Right (Active)   Wound Image   09/30/22 1003   Wound Description Intact 09/30/22 1003   Romana-wound Assessment Intact 09/30/22 1003   Wound Length (cm) 0 cm 09/30/22 1003   Wound Width (cm) 0 cm 09/30/22 1003   Wound Depth (cm) 0 cm 09/30/22 1003   Wound Surface Area (cm^2) 0 cm^2 09/30/22 1003   Wound Volume (cm^3) 0 cm^3 09/30/22 1003   Calculated Wound Volume (cm^3) 0 cm^3 09/30/22 1003   Change in Wound Size % 100 09/30/22 1003   Tunneling 0 cm 09/30/22 1003   Tunneling in depth located at 0 09/30/22 1003   Undermining 0 09/30/22 1003   Undermining is depth extending from 0 09/30/22 1003   Wound Site Closure RUDY 09/30/22 1003   Drainage Amount None 09/30/22 1003   Drainage Description Other (Comment) 09/30/22 1003   Non-staged Wound Description Not applicable 22/15/97 1244   Treatments Cleansed 09/30/22 1003   Dressing Open to air 09/30/22 1003   Wound packed?  No 09/30/22 1003   Packing- # removed 0 09/30/22 1003   Packing- # inserted 0 09/30/22 1003   Dressing Changed Other (Comment) 09/30/22 1003   Patient Tolerance Tolerated well 09/30/22 1003   Dressing Status Other (Comment) 09/30/22 1003       Wound 09/30/22 MASD Buttocks Right (Active)   Wound Image   09/30/22 1006   Wound Description Beefy red;Bleeding 09/30/22 1006   Romana-wound Assessment Intact;Fragile 09/30/22 1006   Wound Length (cm) 2 9 cm 09/30/22 1006   Wound Width (cm) 2 9 cm 09/30/22 1006   Wound Depth (cm) 0 1 cm 09/30/22 1006   Wound Surface Area (cm^2) 8 41 cm^2 09/30/22 1006   Wound Volume (cm^3) 0 841 cm^3 09/30/22 1006   Calculated Wound Volume (cm^3) 0 84 cm^3 09/30/22 1006   Drainage Amount Scant 09/30/22 1006   Drainage Description Sanguineous 09/30/22 1006   Non-staged Wound Description Partial thickness 09/30/22 1006   Treatments Cleansed;Site care 09/30/22 1006   Dressing Other (Comment) 09/30/22 1006   Dressing Changed New 09/30/22 1006   Patient Tolerance Tolerated well 09/30/22 1006   Dressing Status Intact 09/30/22 1006                Jada Campuzano RN, [facility]

## 2022-09-30 NOTE — TELEPHONE ENCOUNTER
CALL RETURN FORM   Reason for patient call? Daughter of patient calling about diagnosis    Patient's primary oncologist?  Gisel Null   Name of person the patient was calling for? Gisel Null    Any additional information to add, if applicable? Please call [phone]   Informed patient that the message will be forwarded to the team and someone will get back to them as soon as possible    Did you relay this information to the patient?  yes

## 2022-09-30 NOTE — PROGRESS NOTES
General Cardiology   Progress Note -  Team One   aSúl Silverman 79 y o  female MRN: [id number]    Unit/Bed#: [facility] 406-01 Encounter: [id number]    Assessment  1  Sinus tachycardia w/ PACs  2  Paroxysmal SVT / PAT  -Multifactorial etiology for this  Physiologic response in the setting of multiple acute clinical issues (see below)  -ECG 9/26 - sinus tachycardia, rate 133 bpm  -24 hour Telemetry review  NSR to ST, brief episodes of atrial tachycardia, rates are variable ranging in the 80s to 90s, but currently in the low 100-110 range  -BMP 9/30 --- (K+level 3 7)  -Currently on metoprolol tartrate  50 mg q 12 hours  -TSH level 3 6  3  Intermittent hypotension  -Improved  -Average /58, last recorded 131/63   -On midodrine 2 5 mg t i d  -TTE 9/22 - preserved BiV systolic function    4  Saddle PE  5  Bilateral DVT  -S/p IR guided thrombectomy/IVC filter placement  -Limited TTE 9/22 - LVEF 70%, RV normal size/systolic function, mild TR  -Previously on Xarelto - concern for failure   Currently on therapeutic Lovenox injections  6  Metastatic breast CA  -Palliative care/Heme-Onc following  7  Colitis / C  Diff infection   -GI Following  -On oral vancomycin  8  Anemia  -Baseline HGB levels 12-13  -Of recent HGB levels ranging in the low 7-8 range  -HGB level dropped to 6 9 on 9/26  - s/p 1 unit of PRBC transfusion, HGB levels have stabilized, currently at 8 1     Plan  -HR's in NSR/ST remain variable (predominately in the 80's to 90's) occasionally elevated into the 110-120 range, w/intermittent brief episodes of atrial tachycardia; now that BP, volume status, and anemia have stabilized we can up titrate her BB dose for further arrhythmia suppression - would favor utilizing metoprolol succinate 75 mg BID for overlapping coverage   -Continue midodrine 2 5 mg TID  -Monitor renal function and electrolytes closely    Replete to maintain K +level at 4 0 and magnesium level at 2 0   K +level 3 7 this a m  will replete with K-Dur 40 mEq x1, and check Mag level   -Continue to monitor on telemetry       Subjective  Review of Systems   Constitutional: Positive for malaise/fatigue  Negative for chills and fever  Eyes: Negative for visual disturbance  Cardiovascular: Positive for leg swelling  Negative for chest pain, dyspnea on exertion, orthopnea and palpitations  Respiratory: Negative for cough and shortness of breath  Gastrointestinal: Negative for abdominal pain  Neurological: Negative for dizziness, headaches and light-headedness  Objective:   Physical Exam  Vitals and nursing note reviewed  Constitutional:       General: She is not in acute distress  Appearance: She is not diaphoretic  HENT:      Head: Normocephalic and atraumatic  Eyes:      General: No scleral icterus  Cardiovascular:      Rate and Rhythm: Regular rhythm  Tachycardia present  Pulses: Normal pulses  Heart sounds: Normal heart sounds  No murmur heard  Pulmonary:      Effort: Pulmonary effort is normal       Breath sounds: Normal breath sounds  No wheezing or rales  Abdominal:      Palpations: Abdomen is soft  Musculoskeletal:      Cervical back: Neck supple  Right lower leg: Edema present  Left lower leg: Edema present  Skin:     General: Skin is warm and dry  Capillary Refill: Capillary refill takes less than 2 seconds  Neurological:      General: No focal deficit present  Mental Status: She is alert and oriented to person, place, and time  Psychiatric:         Mood and Affect: Mood normal          Vitals: Blood pressure 131/63, pulse (!) 130, temperature 97 6 °F (36 4 °C), temperature source Oral, resp  rate 16, height 5' 1" (1 549 m), weight 66 2 kg (145 lb 15 1 oz), SpO2 90 %, not currently breastfeeding ,     Body mass index is 27 58 kg/m²  ,   Systolic (55DAM), USD:539 , Min:113 , OGK:531     Diastolic (57IJA), EQQ:57, Min:49, Max:64      Intake/Output Summary (Last 24 hours) at 9/30/2022 8295  Last data filed at 9/30/2022 0525  Gross per 24 hour   Intake 240 ml   Output 1550 ml   Net -1310 ml     Weight (last 2 days)     Date/Time Weight    09/30/22 0600 66 2 (145 94)    09/29/22 0600 70 8 (156 09)    09/28/22 0600 67 5 (148 81)          LABORATORY RESULTS      CBC with diff:   Results from last 7 days   Lab Units 09/30/22  0509 09/28/22  0424 09/27/22  0427 09/26/22  1530 09/25/22  2358 09/25/22  1258 09/25/22  0324 09/24/22  0517 09/24/22  0133 09/23/22  1630   WBC Thousand/uL 2 64* 3 10* 3 40* 4 11* 5 58  --  3 20* 4 24*  --  5 24   HEMOGLOBIN g/dL 8 1* 7 9* 7 4* 6 9* 7 3* 8 1* 7 3* 8 1*   < > 6 6*   HEMATOCRIT % 26 0* 25 4* 23 4* 22 5* 23 3*  --  24 2* 25 5*  --  22 6*   MCV fL 99* 98 99* 105* 103*  --  105* 101*  --  109*   PLATELETS Thousands/uL 72* 50* 50* 62* 66*  --  66* 79*  --  79*   MCH pg 30 8 30 4 31 2 32 1 32 2  --  31 7 32 1  --  31 7   MCHC g/dL 31 2* 31 1* 31 6 30 7* 31 3*  --  30 2* 31 8  --  29 2*   RDW % 17 3* 18 3* 19 3* 17 3* 17 9*  --  19 3* 19 1*  --  16 8*   MPV fL 11 1 11 2 10 9 11 1 11 4  --  10 2 10 4  --  10 4   NRBC AUTO /100 WBCs  --  0 0 0  --   --  0  --   --  0   NRBC /100 WBC  --   --   --   --  1  --   --   --   --   --     < > = values in this interval not displayed         CMP:  Results from last 7 days   Lab Units 09/30/22  0509 09/29/22  0427 09/28/22  0424 09/27/22  0427 09/26/22  1530 09/25/22  2358 09/25/22  0330 09/24/22  0517 09/23/22  1630 09/23/22  1009   POTASSIUM mmol/L 3 7 3 4* 3 3* 3 3* 3 5 3 3* 4 5 3 8   < > 3 4*   CHLORIDE mmol/L 107 108 109* 112* 112* 112* 114* 117*   < > 118*   CO2 mmol/L 26 27 25 24 23 22 23 21   < > 19*   BUN mg/dL 9 9 9 11 13 13 13 13   < > 16   CREATININE mg/dL 0 40* 0 46* 0 46* 0 50* 0 75 0 71 0 68 0 62   < > 0 79   CALCIUM mg/dL 6 4* 7 0* 6 9* 7 2* 7 5* 7 2* 7 2* 6 7*   < > 6 7*   AST U/L  --   --   --  9 7 8  --  10  --  9   ALT U/L  --   --   --  15 19 18  --  21  --  18   ALK PHOS U/L  --   --   --  47 57 62  --  72  -- 64   EGFR ml/min/1 73sq m 97 92 92 90 74 79 81 84   < > 69    < > = values in this interval not displayed  BMP:  Results from last 7 days   Lab Units 09/30/22  0509 09/29/22  0427 09/28/22  0424 09/27/22  0427 09/26/22  1530 09/25/22  2358 09/25/22  0330   POTASSIUM mmol/L 3 7 3 4* 3 3* 3 3* 3 5 3 3* 4 5   CHLORIDE mmol/L 107 108 109* 112* 112* 112* 114*   CO2 mmol/L 26 27 25 24 23 22 23   BUN mg/dL 9 9 9 11 13 13 13   CREATININE mg/dL 0 40* 0 46* 0 46* 0 50* 0 75 0 71 0 68   CALCIUM mg/dL 6 4* 7 0* 6 9* 7 2* 7 5* 7 2* 7 2*       Lab Results   Component Value Date    NTBNP 1,555 (H) 09/22/2022        Results from last 7 days   Lab Units 09/27/22  0427 09/25/22  0330 09/24/22  0517 09/23/22  1630   MAGNESIUM mg/dL 2 0 2 0 1 9 2 0                         Lipid Profile:   Lab Results   Component Value Date    CHOL 183 03/28/2015    CHOL 161 05/27/2014     Lab Results   Component Value Date    HDL 29 (L) 08/16/2022    HDL 46 05/19/2020    HDL 46 04/12/2019     Lab Results   Component Value Date    LDLCALC 25 08/16/2022    LDLCALC 124 (H) 05/19/2020    LDLCALC 106 (H) 04/12/2019     Lab Results   Component Value Date    TRIG 79 08/16/2022    TRIG 157 (H) 05/19/2020    TRIG 142 04/12/2019       Cardiac testing:   No results found for this or any previous visit  No results found for this or any previous visit  No results found for this or any previous visit  No valid procedures specified  No results found for this or any previous visit        Meds/Allergies   all current active meds have been reviewed and current meds:   Current Facility-Administered Medications   Medication Dose Route Frequency   • atorvastatin (LIPITOR) tablet 40 mg  40 mg Oral After Dinner   • cholecalciferol (VITAMIN D3) tablet 2,000 Units  2,000 Units Oral Daily   • collagenase (SANTYL) ointment   Topical Daily   • enoxaparin (LOVENOX) subcutaneous injection 70 mg  1 mg/kg Subcutaneous C24Y [address]   • folic acid (FOLVITE) tablet 1 mg  1 mg Oral Daily   • metoprolol (LOPRESSOR) injection 2 5 mg  2 5 mg Intravenous Q6H PRN   • metoprolol tartrate (LOPRESSOR) tablet 50 mg  50 mg Oral Q12H [address]   • midodrine (PROAMATINE) tablet 2 5 mg  2 5 mg Oral TID AC   • mirtazapine (REMERON) tablet 7 5 mg  7 5 mg Oral HS   • pantoprazole (PROTONIX) EC tablet 40 mg  40 mg Oral BID AC   • traMADol (ULTRAM) tablet 50 mg  50 mg Oral Once PRN   • vancomycin (VANCOCIN) oral solution 125 mg  125 mg Oral Q6H [address]          EKG personally reviewed by Casimiro Townsend    Assessment:  Principal Problem:    Saddle embolus of pulmonary artery (HCC)  Active Problems:    Metastatic breast cancer ([facility] [address] )    Anxiety    Mixed hyperlipidemia    Primary hypertension    Toxic gastroenteritis and colitis    REYES (acute kidney injury) ([facility] )    Anemia    Tachycardia    Urinary retention    Swelling of lower extremity    Counseling / Coordination of Care  Total floor / unit time spent today 20 minutes  Greater than 50% of total time was spent with the patient and / or family counseling and / or coordination of care  ** Please Note: Dragon 360 Dictation voice to text software may have been used in the creation of this document   **

## 2022-09-30 NOTE — PROGRESS NOTES
[facility] PROGRESS NOTE     Name: Mihai Luevano   Age & Sex: 79 y o  female   MRN: [id number]  Unit/Bed#: [facility] 406-01   Encounter: [id number]  Team: SLIM    PATIENT INFORMATION     Name: Mihai Luevano   Age & Sex: 79 y o  female   MRN: [id number]  Hospital Stay Days: 8    ASSESSMENT/PLAN     Principal Problem:    Saddle embolus of pulmonary artery ([facility] )  Active Problems:    Toxic gastroenteritis and colitis    Metastatic breast cancer ([facility] )    Anxiety    Mixed hyperlipidemia    Primary hypertension    REYES (acute kidney injury) ([facility] )    Anemia    Tachycardia    Urinary retention    Swelling of lower extremity      * Saddle embolus of pulmonary artery (HCC)  Assessment & Plan  Acute, submassive, intermediate to high risk pulmonary embolism  Patient has history of right popliteal DVT and right lower lobe subsegmental PE from previous hospitalization in August   ? Patient was sent home on Xarelto, likely not failure, was receiving it at her nursing home  ? Patient also has metastatic breast cancer, hypercoagulable state  ? Return to the ER on 09/22 after a fall, found to have saddle pulmonary embolus with evidence of right heart strain and high clot burden  ? RV to LV ratio on CTA 1 2  ? BMP greater than 1500, troponins relatively normal  ? TTE inconclusive for right heart strain  ? Patient is now status post thrombectomy with IR, no IVC filter placement  ? Duplex showing significant right lower extremity clot burden  ? Continue on Lovenox for pulmonary embolism      Toxic gastroenteritis and colitis  Assessment & Plan  Patient was admitted with toxic gastroenteritis in August of 2022  ? Determined to be possible IBD with additional insult of Verzenio from her breast cancer treatment  ? Continue to hold Verzenio    ? Patient positive for C diff started on oral vancomycin D-3, diarrhea improving but persisting  ? Will wean prednisone slowly as she has been on this chronically- now down to 5 mg  ?  Will stop loperamide, cholestyramine n all stool softeners  ? GI offered remicaid to the daughter to Rx IBD - she is going to think about it  ? Spoke to GI about possibility of increasing Vanc dosing today  Also about concerns for IBD  Diarrhea slowly improving  To continue current dose of PO vancomycin  Patient to follow-up with GI as outpatient for further IBD evaluation and decision on medications  ? Diarrea persistent though improving  WBC decreasing, bandemia now present  Will check CT abdomen today      Swelling of lower extremity  Assessment & Plan  LE swelling has been worsening over the past few weeks  Right worse than left  ? 3+ on both lower extremities, improving along with pulses  ? Previous echo with a hyperdynamic LV EF 70%  ? Lower extremity duplex revealing significant acute DVTs in the right lower extremity  ? Can consider diuresis once the patient is stable for further fluid management      Urinary retention  Assessment & Plan  Patient presented with a massively distended bladder, 3 6 L out of for straight cath, greater than 800 mL out of 2nd straight cath  ? Urinary retention protocol, patient will likely need Crawley catheter  ? May be secondary to TCA use  ? UA with 1+ protein, innumerable rbc's, 4-10 wbc's  ? Urology consult, appreciate recommendations    Tachycardia  Assessment & Plan  · Patient had sinus tachycardia on her last discharge, likely in setting of pulmonary embolism  Still with sinus tachycardia on this admission  ? Continue midodrine for blood pressure support  ? S/p IVF on 09/29  Patient still with persistent sinus tachycardia  ? Cardiology to uptitrate metoprolol to 75 mg BID today  Anemia  Assessment & Plan  Macrocytic anemia complicated by acute blood loss anemia  ? Folate in September of 2022 3 3  ? Vitamin B12 in September of 2022 645  ? Iron panel and August of 2022 showed an iron saturation of 18, TIBC 105, iron 19, ferritin 755  ?  Likely a mixed component of anemia chronic disease with possible folate deficiency  ? Continue folate supplementation  ? Patient received 1 unit packed red blood cell for hemoglobin 6 9 -> 7 4 -> 7 9       REYES (acute kidney injury) ([facility] [address] )  Assessment & Plan  REYES on CKD stage IIIA  ? Patient's baseline creatinine between 0 8 and 0 9 with an EGFR ranging around 60  ? Admission creatinine 1 36, down to 0 5  ? Patient did receive IV contrast for CTA  ? Accurate in's and out's  ? Avoid nephrotoxins  ? Cr improved  Primary hypertension  Assessment & Plan  Patient's antihypertensives were discontinued after previous hospital stay due to normotension  · Continue to monitor    Mixed hyperlipidemia  Assessment & Plan  Stable  · Continue statin    Anxiety  Assessment & Plan  Depression/anxiety  ? Hold amitriptyline -possible etiology of tachycardia  ? Will resume Remeron      Metastatic breast cancer [facility])  Assessment & Plan  Stage IV metastatic ER positive, OH negative, 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, progressed from stage I A ER/OH positive, 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 years ago  Status post resection and adjuvant radiation and hormone therapy for 5 years  Patient had symptoms of bony discomfort in her sternum in June of 2021, imaging revealed lytic lesion, biopsy in July of 2021 confirmed progression of disease  ? Patient was started on Faslodex and Xgeva at that time, in conjunction with radiation  ? In April 2022 there was interval development of 4 mm nodule at the right lower lobe and interval development of increased sclerotic lesions throughout the visualized spine  ? At that time, the patient's treatment was changed to Femara and Hipolito Beech  ? In July of 2022, the patient was changed from Hipolito Beech to [place] because of intolerance  ? During the patient's hospitalization in August of 2022 to September of 2022, Verzenio was discontinued due to gastroenteritis  ? Patient's cancer is most likely contributing to her hypercoagulable state  ?  Appreciate oncology and palliative care consult  ? Will continue on Lovenox for now  ? Continue on Remeron for insomnia and anxiety  ? Patient would not like any more treatment for her malignancy        Disposition: Patient with persistent tachycardia  Will uptitrate metoprolol today  Diarrhea improving  SUBJECTIVE     Patient seen and examined  No acute events overnight  Upon my encounter patient lying comfortably in hospital bed  Presently denies any fever, chills, nausea, vomiting, diarrhea, constipation, chest pain, shortness a breath  Otherwise denies any new or worsening complaints  Patient states that diarrhea seems to be slowly improving  I spoke to patient's daughter, Victor Manuel Rodriguez, in order to update her on patient's current plan of care  I was able to answer all of Taya's questions to her satisfaction  OBJECTIVE     Vitals:    22 0600 22 0740 22 0800 22 1125   BP:  131/63  104/68   BP Location:  Left arm  Left arm   Pulse:  (!) 130  (!) 110   Resp:  16  18   Temp:  97 6 °F (36 4 °C)  98 7 °F (37 1 °C)   TempSrc:  Oral  Oral   SpO2:  90% 92% 95%   Weight: 66 2 kg (145 lb 15 1 oz)      Height:          Temperature:   Temp (24hrs), Av 2 °F (36 8 °C), Min:97 6 °F (36 4 °C), Max:99 2 °F (37 3 °C)    Temperature: 98 7 °F (37 1 °C)  Intake & Output:  I/O        0701   07 0701   07 0701  10/01 0700    P  O  590 480     IV Piggyback       Total Intake(mL/kg) 590 (8 3) 480 (7 3)     Urine (mL/kg/hr) 1300 (0 8) 1550 (1)     Stool 0 0     Total Output 1300 1550     Net -710 -1070            Unmeasured Stool Occurrence 3 x 4 x         Weights:        Body mass index is 27 58 kg/m²  Weight (last 2 days)     Date/Time Weight    22 0600 66 2 (145 94)    22 0600 70 8 (156 09)    22 0600 67 5 (148 81)        Physical Exam  Constitutional:       General: She is not in acute distress  Appearance: Normal appearance     Cardiovascular:      Rate and Rhythm: Regular rhythm  Tachycardia present  Pulses: Normal pulses  Heart sounds: Normal heart sounds  No murmur heard  Pulmonary:      Effort: Pulmonary effort is normal  No respiratory distress  Breath sounds: Normal breath sounds  No wheezing, rhonchi or rales  Abdominal:      General: Abdomen is flat  Bowel sounds are normal  There is no distension  Palpations: Abdomen is soft  Tenderness: There is no abdominal tenderness  Musculoskeletal:      Right lower leg: Edema present  Left lower leg: Edema present  Neurological:      Mental Status: She is alert and oriented to person, place, and time  Psychiatric:         Mood and Affect: Mood normal          Behavior: Behavior normal          Thought Content: Thought content normal        LABORATORY DATA     Labs: I have personally reviewed pertinent reports  Results from last 7 days   Lab Units 09/30/22  0509 09/28/22  0424 09/27/22  0427 09/25/22  1258 09/25/22  0324   WBC Thousand/uL 2 64* 3 10* 3 40*   < > 3 20*   HEMOGLOBIN g/dL 8 1* 7 9* 7 4*   < > 7 3*   HEMATOCRIT % 26 0* 25 4* 23 4*   < > 24 2*   PLATELETS Thousands/uL 72* 50* 50*   < > 66*   NEUTROS PCT %  --  75 85*  --  81*   MONOS PCT %  --  9 5  --  8   MONO PCT % 5  --   --    < >  --     < > = values in this interval not displayed        Results from last 7 days   Lab Units 09/30/22  0509 09/29/22 0427 09/28/22 0424 09/27/22  0427 09/26/22  1530 09/25/22  2358   POTASSIUM mmol/L 3 7 3 4* 3 3* 3 3* 3 5 3 3*   CHLORIDE mmol/L 107 108 109* 112* 112* 112*   CO2 mmol/L 26 27 25 24 23 22   BUN mg/dL 9 9 9 11 13 13   CREATININE mg/dL 0 40* 0 46* 0 46* 0 50* 0 75 0 71   CALCIUM mg/dL 6 4* 7 0* 6 9* 7 2* 7 5* 7 2*   ALK PHOS U/L  --   --   --  47 57 62   ALT U/L  --   --   --  15 19 18   AST U/L  --   --   --  9 7 8     Results from last 7 days   Lab Units 09/30/22  0509 09/27/22 0427 09/25/22  0330   MAGNESIUM mg/dL 2 0 2 0 2 0     Results from last 7 days   Lab Units 09/25/22  0330 09/24/22  0517 09/23/22  1630   PHOSPHORUS mg/dL 2 1* 1 8* 2 1*      Results from last 7 days   Lab Units 09/24/22  0133 09/23/22  2329 09/23/22  1906   PTT seconds 35 140* 102*               IMAGING & DIAGNOSTIC TESTING     Radiology Results: I have personally reviewed pertinent reports  XR chest portable    Result Date: 9/26/2022  Impression: Increased bibasilar density, most likely atelectasis  Workstation performed: [id number]     XR Trauma chest portable    Result Date: 9/22/2022  Impression: No acute cardiopulmonary disease  Workstation performed: [id number]     TRAUMA - CT head wo contrast    Result Date: 9/22/2022  Impression: No acute intracranial abnormality  Chronic microangiopathic changes  I personally discussed this study with Tere Mills on 9/22/2022 at 12:32 PM  Workstation performed: [id number]     TRAUMA - CT spine cervical wo contrast    Result Date: 9/22/2022  Impression: No cervical spine fracture or traumatic malalignment  I personally discussed this study with Tere Mills on 9/22/2022 at 12:23 PM   Workstation performed: [id number]     PE Study with CT abdomen & pelvis with contrast    Result Date: 9/22/2022  Impression: 1  Saddle embolus and left greater than right pulmonary arterial filling defects consistent with PE (high clot burden) with evidence of RV strain including an RV/LV ratio = 1 2  This is greater than 0 9, which is abnormal and indicates right heart strain  An abnormal RV/LV ratio has been shown to be associated with an increased risk of 30 day mortality in the setting of acute pulmonary embolism  Urgent consultation with the medical critical care team is recommended  2   Massive distention of the bladder  3   No acute posttraumatic injury   I personally discussed this study with Tere Mills on 9/22/2022 at 12:27 PM  Workstation performed: [id number]     IR IVC filter placement optional/temporary    Result Date: 9/26/2022  Impression: Placement of a temporary inferior vena cava filter  PLAN: This filter can be removed at any time in the future  Interventional Radiology will follow this patient to ensure removal when it is no longer needed  Workstation performed: [id number]     IR PE endovascular therapy    Result Date: 9/26/2022  Impression: 1  Technically successful mechanical thrombectomy of the left pulmonary artery with extirpation of large clot  Workstation performed: [id number]     IR PICC line placement single lumen (preferred for home anitbiotics/medications)    Result Date: 9/28/2022  Impression: 1  Status post placement of a peripherally inserted central venous catheter via the brachial vein with its tip at the cavoatrial junction under ultrasound and fluoroscopic guidance  Signed, performed, and dictated by BROOKE Alvarado under the supervision of Dr Mayi Rodriguez  Workstation performed: [id number]     Other Diagnostic Testing: I have personally reviewed pertinent reports      ACTIVE MEDICATIONS     Current Facility-Administered Medications   Medication Dose Route Frequency   • atorvastatin (LIPITOR) tablet 40 mg  40 mg Oral After Dinner   • cholecalciferol (VITAMIN D3) tablet 2,000 Units  2,000 Units Oral Daily   • collagenase (SANTYL) ointment   Topical Daily   • enoxaparin (LOVENOX) subcutaneous injection 70 mg  1 mg/kg Subcutaneous X36K RIA   • folic acid (FOLVITE) tablet 1 mg  1 mg Oral Daily   • metoprolol (LOPRESSOR) injection 2 5 mg  2 5 mg Intravenous Q6H PRN   • metoprolol succinate (TOPROL-XL) 24 hr tablet 75 mg  75 mg Oral BID   • midodrine (PROAMATINE) tablet 2 5 mg  2 5 mg Oral TID AC   • mirtazapine (REMERON) tablet 7 5 mg  7 5 mg Oral HS   • pantoprazole (PROTONIX) EC tablet 40 mg  40 mg Oral BID AC   • traMADol (ULTRAM) tablet 50 mg  50 mg Oral Once PRN   • vancomycin (VANCOCIN) oral solution 125 mg  125 mg Oral Q6H [address]       VTE Pharmacologic Prophylaxis: Enoxaparin (Lovenox)  VTE Mechanical Prophylaxis: sequential compression device    Portions of the record may have been created with voice recognition software  Occasional wrong word or "sound a like" substitutions may have occurred due to the inherent limitations of voice recognition software    Read the chart carefully and recognize, using context, where substitutions have occurred   ==  [facility]  Internal Medicine Residency PGY-2

## 2022-10-01 LAB
ANION GAP SERPL CALCULATED.3IONS-SCNC: 4 MMOL/L (ref 4–13)
BUN SERPL-MCNC: 9 MG/DL (ref 5–25)
CA-I BLD-SCNC: 1.07 MMOL/L (ref 1.12–1.32)
CALCIUM SERPL-MCNC: 7.6 MG/DL (ref 8.3–10.1)
CHLORIDE SERPL-SCNC: 105 MMOL/L (ref 96–108)
CO2 SERPL-SCNC: 27 MMOL/L (ref 21–32)
CREAT SERPL-MCNC: 0.52 MG/DL (ref 0.6–1.3)
CRP SERPL QL: 74.6 MG/L
ERYTHROCYTE [DISTWIDTH] IN BLOOD BY AUTOMATED COUNT: 16.8 % (ref 11.6–15.1)
ERYTHROCYTE [SEDIMENTATION RATE] IN BLOOD: 13 MM/HOUR (ref 0–29)
GFR SERPL CREATININE-BSD FRML MDRD: 89 ML/MIN/1.73SQ M
GLUCOSE SERPL-MCNC: 71 MG/DL (ref 65–140)
HCT VFR BLD AUTO: 27.6 % (ref 34.8–46.1)
HGB BLD-MCNC: 8.5 G/DL (ref 11.5–15.4)
MCH RBC QN AUTO: 30.2 PG (ref 26.8–34.3)
MCHC RBC AUTO-ENTMCNC: 30.8 G/DL (ref 31.4–37.4)
MCV RBC AUTO: 98 FL (ref 82–98)
NRBC BLD AUTO-RTO: 0 /100 WBCS
PLATELET # BLD AUTO: 108 THOUSANDS/UL (ref 149–390)
PMV BLD AUTO: 10.9 FL (ref 8.9–12.7)
POTASSIUM SERPL-SCNC: 3.5 MMOL/L (ref 3.5–5.3)
PROCALCITONIN SERPL-MCNC: 0.38 NG/ML
RBC # BLD AUTO: 2.81 MILLION/UL (ref 3.81–5.12)
SODIUM SERPL-SCNC: 136 MMOL/L (ref 135–147)
WBC # BLD AUTO: 4.18 THOUSAND/UL (ref 4.31–10.16)

## 2022-10-01 PROCEDURE — 85652 RBC SED RATE AUTOMATED: CPT | Performed by: HOSPITALIST

## 2022-10-01 PROCEDURE — 99222 1ST HOSP IP/OBS MODERATE 55: CPT | Performed by: INTERNAL MEDICINE

## 2022-10-01 PROCEDURE — 80048 BASIC METABOLIC PNL TOTAL CA: CPT | Performed by: STUDENT IN AN ORGANIZED HEALTH CARE EDUCATION/TRAINING PROGRAM

## 2022-10-01 PROCEDURE — 86140 C-REACTIVE PROTEIN: CPT | Performed by: HOSPITALIST

## 2022-10-01 PROCEDURE — 84145 PROCALCITONIN (PCT): CPT | Performed by: HOSPITALIST

## 2022-10-01 PROCEDURE — 99232 SBSQ HOSP IP/OBS MODERATE 35: CPT | Performed by: INTERNAL MEDICINE

## 2022-10-01 PROCEDURE — 85027 COMPLETE CBC AUTOMATED: CPT | Performed by: STUDENT IN AN ORGANIZED HEALTH CARE EDUCATION/TRAINING PROGRAM

## 2022-10-01 PROCEDURE — 82330 ASSAY OF CALCIUM: CPT | Performed by: STUDENT IN AN ORGANIZED HEALTH CARE EDUCATION/TRAINING PROGRAM

## 2022-10-01 PROCEDURE — 99232 SBSQ HOSP IP/OBS MODERATE 35: CPT | Performed by: HOSPITALIST

## 2022-10-01 RX ADMIN — CARBIDOPA AND LEVODOPA 2.5 MG: 50; 200 TABLET, EXTENDED RELEASE ORAL at 17:41

## 2022-10-01 RX ADMIN — Medication 125 MG: at 17:42

## 2022-10-01 RX ADMIN — PANTOPRAZOLE SODIUM 40 MG: 40 TABLET, DELAYED RELEASE ORAL at 17:42

## 2022-10-01 RX ADMIN — ATORVASTATIN CALCIUM 40 MG: 40 TABLET, FILM COATED ORAL at 17:41

## 2022-10-01 RX ADMIN — Medication 125 MG: at 00:14

## 2022-10-01 RX ADMIN — CARBIDOPA AND LEVODOPA 2.5 MG: 50; 200 TABLET, EXTENDED RELEASE ORAL at 06:21

## 2022-10-01 RX ADMIN — METOPROLOL SUCCINATE 75 MG: 50 TABLET, EXTENDED RELEASE ORAL at 22:00

## 2022-10-01 RX ADMIN — PANTOPRAZOLE SODIUM 40 MG: 40 TABLET, DELAYED RELEASE ORAL at 06:21

## 2022-10-01 RX ADMIN — INFLIXIMAB 300 MG: 100 INJECTION, POWDER, LYOPHILIZED, FOR SOLUTION INTRAVENOUS at 11:56

## 2022-10-01 RX ADMIN — CARBIDOPA AND LEVODOPA 2.5 MG: 50; 200 TABLET, EXTENDED RELEASE ORAL at 12:37

## 2022-10-01 RX ADMIN — ENOXAPARIN SODIUM 70 MG: 80 INJECTION SUBCUTANEOUS at 10:35

## 2022-10-01 RX ADMIN — Medication 125 MG: at 06:21

## 2022-10-01 RX ADMIN — ENOXAPARIN SODIUM 70 MG: 80 INJECTION SUBCUTANEOUS at 22:00

## 2022-10-01 RX ADMIN — MIRTAZAPINE 7.5 MG: 15 TABLET, FILM COATED ORAL at 22:05

## 2022-10-01 RX ADMIN — FOLIC ACID 1 MG: 1 TABLET ORAL at 10:35

## 2022-10-01 RX ADMIN — Medication 125 MG: at 12:37

## 2022-10-01 RX ADMIN — Medication 2000 UNITS: at 10:35

## 2022-10-01 NOTE — PROGRESS NOTES
Progress note - Palliative and Supportive Care   Kacy Silverman 79 y o  female [id number]    Patient Active Problem List   Diagnosis   • Metastatic breast cancer ([facility] [address] )   • Adverse reaction to pneumococcal vaccine   • Anxiety   • Cataract, bilateral   • Mixed hyperlipidemia   • Pulmonary nodule seen on imaging study   • Sleep disorder   • Chronic kidney disease (CKD) stage G3a/A1, moderately decreased glomerular filtration rate (GFR) between 45-59 mL/min/1 73 square meter and albuminuria creatinine ratio less than 30 mg/g (HCC)   • Primary hypertension   • Osteopenia of multiple sites   • Cough due to ACE inhibitor   • Acute costochondritis   • Lytic lesion of bone on x-ray   • Neoplasm of uncertain behavior of sternum   • Rectal bleeding   • Toxic gastroenteritis and colitis   • Secondary malignant neoplasm of bone (HCC)   • REYES (acute kidney injury) (HCC)   • Diarrhea   • Hypokalemia   • Anemia   • Severe protein-calorie malnutrition (HCC)   • Single subsegmental pulmonary embolism without acute cor pulmonale (HCC)   • Tachycardia   • Venous insufficiency   • Saddle embolus of pulmonary artery (HCC)   • Urinary retention   • Swelling of lower extremity     Active issues specifically addressed today include:    Kacy Silverman is a 79 y o  female with metastatic breast cancer, saddle PE, bilateral DVT, C diff infection, paroxysmal SVT and anemia   PSC has been consulted for symptom management and goals of care discussion  Plan:  1  Symptom management - no changes   -     2  Goals - palliative was asked to re-evaluate as patient has failed to progress forward  Will plan for family care meeting early this coming week  Time and date TBD, but will notify primary in case management when arranged    -    3  Psychosocial support- support provided   -    4  [facility] follow-up- will follow       Code Status:  DNR DNI - Level 3   Decisional apparatus:  Patient is competent on my exam today    If competence is lost, patient's substitute decision maker would default to daughter by [place] 169  Advance Directive / Living Will / POLST:  POLST completed    Interval history:       Patient frustrated that they had to replace her rectal tube  Patient was seen by GI and surgery is consulted as well  She currently denies any symptoms just overall fatigue and weakness from ongoing hospital stay  She agrees to have a family meeting arranged  MEDICATIONS / ALLERGIES:     all current active meds have been reviewed and current meds:   Current Facility-Administered Medications   Medication Dose Route Frequency   • atorvastatin (LIPITOR) tablet 40 mg  40 mg Oral After Dinner   • cholecalciferol (VITAMIN D3) tablet 2,000 Units  2,000 Units Oral Daily   • enoxaparin (LOVENOX) subcutaneous injection 70 mg  1 mg/kg Subcutaneous [address]   • folic acid (FOLVITE) tablet 1 mg  1 mg Oral Daily   • inFLIXimab (REMICADE) 300 mg in sodium chloride 0 9 % 220 mL IVPB  300 mg Intravenous Once   • iohexol (OMNIPAQUE) 240 MG/ML solution 50 mL  50 mL Oral 90 min pre-procedure   • metoprolol (LOPRESSOR) injection 2 5 mg  2 5 mg Intravenous Q6H PRN   • metoprolol succinate (TOPROL-XL) 24 hr tablet 75 mg  75 mg Oral BID   • midodrine (PROAMATINE) tablet 2 5 mg  2 5 mg Oral TID AC   • mirtazapine (REMERON) tablet 7 5 mg  7 5 mg Oral HS   • pantoprazole (PROTONIX) EC tablet 40 mg  40 mg Oral BID AC   • traMADol (ULTRAM) tablet 50 mg  50 mg Oral Once PRN   • vancomycin (VANCOCIN) oral solution 125 mg  125 mg Oral Q6H [address]       Allergies   Allergen Reactions   • Sulfa Antibiotics    • Pneumococcal Polysaccharide Vaccine Rash and Edema       OBJECTIVE:    Physical Exam  Physical Exam  Vitals and nursing note reviewed  Constitutional:       General: She is not in acute distress  HENT:      Head: Normocephalic and atraumatic  Right Ear: External ear normal       Left Ear: External ear normal    Eyes:      General:         Right eye: No discharge           Left eye: No discharge  Cardiovascular:      Rate and Rhythm: Normal rate  Abdominal:      General: There is no distension  Palpations: Abdomen is soft  Skin:     Coloration: Skin is pale  Neurological:      General: No focal deficit present  Psychiatric:         Mood and Affect: Mood normal          Behavior: Behavior normal          Thought Content: Thought content normal          Judgment: Judgment normal            Lab Results:   I have personally reviewed pertinent labs  , CBC:   Lab Results   Component Value Date    WBC 4 18 (L) 10/01/2022    HGB 8 5 (L) 10/01/2022    HCT 27 6 (L) 10/01/2022    MCV 98 10/01/2022     (L) 10/01/2022    MCH 30 2 10/01/2022    MCHC 30 8 (L) 10/01/2022    RDW 16 8 (H) 10/01/2022    MPV 10 9 10/01/2022    NRBC 0 10/01/2022   , CMP:   Lab Results   Component Value Date    SODIUM 136 10/01/2022    K 3 5 10/01/2022     10/01/2022    CO2 27 10/01/2022    BUN 9 10/01/2022    CREATININE 0 52 (L) 10/01/2022    CALCIUM 7 6 (L) 10/01/2022    EGFR 89 10/01/2022   , PT/PTT:No results found for: PT, PTT  Imaging Studies:  Reviewed  EKG, Pathology, and Other Studies:  Reviewed    Counseling / Coordination of Care    Total floor / unit time spent today 15+ minutes  Greater than 50% of total time was spent with the patient and / or family counseling and / or coordination of care  A description of the counseling / coordination of care:  Chart review, medication review, supportive listening  Portions of this document may have been created using dictation software and as such some "sound alike" terms may have been generated by the system  Do not hesitate to contact me with any questions or clarifications

## 2022-10-01 NOTE — PLAN OF CARE
Problem: MOBILITY - ADULT  Goal: Maintain or return to baseline ADL function  Description: INTERVENTIONS:  -  Assess patient's ability to carry out ADLs; assess patient's baseline for ADL function and identify physical deficits which impact ability to perform ADLs (bathing, care of mouth/teeth, toileting, grooming, dressing, etc )  - Assess/evaluate cause of self-care deficits   - Assess range of motion  - Assess patient's mobility; develop plan if impaired  - Assess patient's need for assistive devices and provide as appropriate  - Encourage maximum independence but intervene and supervise when necessary  - Involve family in performance of ADLs  - Assess for home care needs following discharge   - Consider OT consult to assist with ADL evaluation and planning for discharge  - Provide patient education as appropriate  Outcome: Progressing  Goal: Maintains/Returns to pre admission functional level  Description: INTERVENTIONS:  - Perform BMAT or MOVE assessment daily    - Set and communicate daily mobility goal to care team and patient/family/caregiver  - Collaborate with rehabilitation services on mobility goals if consulted  - Perform Range of Motion  times a day  - Reposition patient every  hours    - Dangle patie times a day  - Stand patient  times a day  - Ambulate patient  times a day  - Out of bed to chair  times a day   - Out of bed for meal44 times a day  - Out of bed for toileting  - Record patient progress and toleration of activity level   Outcome: Progressing

## 2022-10-01 NOTE — PROGRESS NOTES
Gastroenterology Progress Note      PATIENT INFORMATION      Patient: Vincent Silverman 79 y o  female   MRN: [id number]  PCP: Oziel Morfin DO  Unit/Bed#: [facility] 604-42 Encounter: [id number]  Date Of Visit: 10/01/22  Current Length of Stay: 9 day(s)     ASSESSMENTS & PLAN    Patient is an 79year old female with a past medical history of Stage IV breast cancer s/p chemoradiation who presents with diarrhea and IBD flare in the setting of C diff infection    Inflammatory bowel disease  · Recent diagnosis  · Recent flexible sigmoidoscopy showing severe erythematous, friable, hemorrhagic and ulcerated mucosa in the sigmoid colon and rectum, biopsies confirming active colitis  · CT abdomen pelvis without contrast this admission shows that she has worsening proctocolitis of the sigmoid and rectum with perirectal fat stranding compared to her CT done on   There is improvement in inflammation of the transverse and descending colon  · S/p 1 dose of remicade yesterday  · Avoiding steroids due to c diff infection    C  Diff colitis  · On po vancomycin D 6/10        Disposition: We will follow along      SUBJECTIVE     Patient had rectal tube placed overnight  She denies any symptoms this morning  Appetite is good but hasn't started lunch yet  OBJECTIVE     Vitals:   Temp (24hrs), Av 6 °F (37 °C), Min:98 °F (36 7 °C), Max:99 7 °F (37 6 °C)    Temp:  [98 °F (36 7 °C)-99 7 °F (37 6 °C)] 98 4 °F (36 9 °C)  HR:  [105-121] 113  Resp:  [17-19] 19  BP: ()/(52-85) 115/55  SpO2:  [87 %-98 %] 95 %  Body mass index is 27 66 kg/m²  Input and Output Summary (last 24 hours):        Intake/Output Summary (Last 24 hours) at 10/1/2022 1332  Last data filed at 10/1/2022 0241  Gross per 24 hour   Intake --   Output 1725 ml   Net -1725 ml       Physical Exam:   GENERAL: Non toxic appearing  HEENT:  Normocephalic, atraumatic, pupils bilaterally reactive to light  CARDIAC:  Regular rate and rhythm, S1, S2 heard, no murmurs  PULMONARY:  CTA B/L, no wheezing/rales/rhonci, non-labored breathing  ABDOMEN:  Soft, NT/ND, +BS, no rebound/guarding/rigidity  Extremities:  2+ Pulses in DP/PT  No edema, cyanosis, or clubbing  NEUROLOGIC:  Alert/oriented x3  SKIN:  No rashes or erythema          ADDITIONAL DATA     Labs & Recent Cultures:     Results from last 7 days   Lab Units 10/01/22  0618 09/30/22  0509 09/28/22  0424   WBC Thousand/uL 4 18* 2 64* 3 10*   HEMOGLOBIN g/dL 8 5* 8 1* 7 9*   HEMATOCRIT % 27 6* 26 0* 25 4*   PLATELETS Thousands/uL 108* 72* 50*   NEUTROS PCT %  --   --  75   LYMPHS PCT %  --   --  14   LYMPHO PCT %  --  1*  --    MONOS PCT %  --   --  9   MONO PCT %  --  5  --    EOS PCT %  --  0 2     Results from last 7 days   Lab Units 10/01/22  0618 09/28/22  0424 09/27/22  0427   POTASSIUM mmol/L 3 5   < > 3 3*   CHLORIDE mmol/L 105   < > 112*   CO2 mmol/L 27   < > 24   BUN mg/dL 9   < > 11   CREATININE mg/dL 0 52*   < > 0 50*   CALCIUM mg/dL 7 6*   < > 7 2*   ALK PHOS U/L  --   --  47   ALT U/L  --   --  15   AST U/L  --   --  9    < > = values in this interval not displayed  Results from last 7 days   Lab Units 09/24/22  1736   C DIFF TOXIN B BY PCR  Positive*         Nutrition:  Diet Regular; Regular House; Sodium 4 GM (CAYLA)  Radiology Results:   CT abdomen pelvis w contrast   Final Result by Chidi Fragoso MD (10/01 0940)         1  Pancolitis with stable contained perforation adjacent to proximal descending colon  No additional fluid collections identified  2   Small bilateral pleural effusions with bibasilar atelectasis  Workstation performed: [id number]         CT abdomen pelvis wo contrast   Final Result by Adelaide Estrada MD (09/30 1242)      1  Findings of worsening proctocolitis of the sigmoid and rectum with new perirectal fat stranding and presacral edema     2   Improving inflammation of the transverse and descending colon, with improved appearance of several small outpouchings from the mid descending colon which likely represented contained perforations  3   New small bilateral pleural effusions  The study was marked in EPIC for significant notification  Workstation performed: [id number]         IR PICC line placement single lumen (preferred for home anitbiotics/medications)   Final Result by Chata Schmid MD (09/28 1633)   1  Status post placement of a peripherally inserted central venous catheter via the brachial vein with its tip at the cavoatrial junction under ultrasound and fluoroscopic guidance  Signed, performed, and dictated by BROOKE Hallman under the supervision of Dr Angel Fisher  Workstation performed: [id number]         XR chest portable   Final Result by Quita Hines MD (09/26 0809)      Increased bibasilar density, most likely atelectasis  Workstation performed: [id number]         VAS upper limb venous duplex scan, unilateral/limited   Final Result by Norma Lu MD (09/24 1884)      IR IVC filter placement optional/temporary   Final Result by Ferd Aschoff, MD (09/26 7243)   Placement of a temporary inferior vena cava filter  PLAN:   This filter can be removed at any time in the future  Interventional Radiology will follow this patient to ensure removal when it is no longer needed  Workstation performed: [id number]         VAS lower limb venous duplex study, complete bilateral   Final Result by Allyn Villareal MD (09/23 1517)      IR PE endovascular therapy   Final Result by Ferd Aschoff, MD (09/26 1142)   1  Technically successful mechanical thrombectomy of the left pulmonary artery with extirpation of large clot           Workstation performed: [id number]           Scheduled Medications:  atorvastatin, 40 mg, After Dinner  cholecalciferol, 2,000 Units, Daily  enoxaparin, 1 mg/kg, [address]  folic acid, 1 mg, Daily  iohexol, 50 mL, 90 min pre-procedure  metoprolol succinate, 75 mg, BID  midodrine, 2 5 mg, TID AC  mirtazapine, 7 5 mg, HS  pantoprazole, 40 mg, BID AC  vancomycin, 125 mg, Q6H [facility]        PRN MEDS:  metoprolol, 2 5 mg, Q6H PRN  traMADol, 50 mg, Once PRN        Last 24 Hours Medication List:   Current Facility-Administered Medications   Medication Dose Route Frequency Provider Last Rate   • atorvastatin  40 mg Oral After [address], DO     • cholecalciferol  2,000 Units Oral Daily Kathline Dry, DO     • enoxaparin  1 mg/kg Subcutaneous Q12H [facility] Kathline Dry, DO     • folic acid  1 mg Oral Daily Kathline Dry, DO     • iohexol  50 mL Oral 90 min pre-procedure Rani Escalante, DO     • metoprolol  2 5 mg Intravenous Q6H PRN Franco Brown MD     • metoprolol succinate  75 mg Oral BID Dixon Phoenix Spironello, CRNP     • midodrine  2 5 mg Oral TID AC Franco Brown MD     • mirtazapine  7 5 mg Oral HS Jersey Banai, DO     • pantoprazole  40 mg Oral BID AC Kathline Dry, DO     • traMADol  50 mg Oral Once PRN Sherolyn Duverney, PA-C     • vancomycin  125 mg Oral Q6H [address], DO            Time Spent for Care: 30 mins spent in total   More than 50% of total time spent on counseling and coordination of care as described above  Current Length of Stay: 9 day(s)      Code Status: Level 3 - DNAR and DNI          ** Please Note: This note is constructed using a voice recognition dictation system   **

## 2022-10-01 NOTE — CONSULTS
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  4  Protein calorie malnutrition  5  Urinary retention    Recommendations:    Discuss with the primary service  1  I am not convinced that the patient has active C diff colitis  Diarrhea more likely secondary to IBD  However, have no objection to completing 10 day course with approximately 4 more days of p o  Vancomycin 125 mg q 6 hours  HISTORY OF PRESENT ILLNESS:    Reason for Consult:  Proctocolitis R/0 C diff  HPI: Saúl Silverman is a 79y o  year old female who originally presented to the [address] [place] [address] ER on 09/22 1 day after being discharged from [facility] sustaining a fall  She was found on CT scan to have a saddle embolus consistent with PE a m  Was transferred here  Patient was last admitted to the [address] [place] [address] from 8/15/22-9/6/22 with a history of stage IV breast cancer with bony metastases S/P resection and adjuvant RT (never on chemotherapy) and protein calorie malnutrition with progressively worsening diarrhea  A CT scan and showed pancolitis  At that time C diff in stool enteric panel were negative and a flexible sigmoidoscopy on 08/20 reveals severe colitis necessitating steroid Rx  Discharge diagnosis was toxic gastroenteritis and colitis  During this admission on 09/22 the patient underwent PE thrombectomy 9/22 and IVC filter placement 9/23    Course was complicated by urinary retention requiring Crawley catheter insertion, unstageable buttock pressure ulcer, paroxysmal SVT  The patient also developed uncontrolled diarrhea and considering her history of UC gastroenterology was consulted  Calprotectin was markedly elevated, C diff PCR was positive with a negative toxin EIA  None the less on 09/25 the patient was started on p o  Vancomycin 125 mg q 6 hours that continues  Remicade was begun yesterday  A CT scan shows worsening rectosigmoiditis  Repeat with contrast today revealed pancolitis with stable contained perforation adjacent to the proximal descending colon  WBC count yesterday was 2640 with a marked left shift, WBC count today      Review of Systems   Constitutional: Positive for activity change, appetite change and fatigue  Respiratory: Positive for shortness of breath  Cardiovascular: Positive for leg swelling  Gastrointestinal: Positive for diarrhea  Genitourinary: Positive for difficulty urinating  Neurological: Positive for weakness  A [id number] point system-based review of systems is otherwise negative  PAST MEDICAL HISTORY:  Past Medical History:   Diagnosis Date   • Abnormal weight loss    • Allergic reaction    • Anxiety    • Breast cancer, right [facility]) 2011    right   • Cancer ([facility] ) 2012   • Candidal vulvovaginitis    • Clotting disorder (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[facility])      Past Surgical History:   Procedure Laterality Date   • BILATERAL SALPINGOOPHORECTOMY      onset: 7/23/13   • BREAST BIOPSY Right 06/13/2006    benign   • BREAST BIOPSY Right 03/02/2011    malignant   • BREAST LUMPECTOMY Right 03/30/2011    malignant   • CATARACT EXTRACTION W/  INTRAOCULAR LENS IMPLANT Right     phacoemulsification   Onset: 10/27/14   • CHOLECYSTECTOMY     • COLONOSCOPY  2017    approx   • HYSTERECTOMY  Yes   • INTRAOPERATIVE RADIATION THERAPY ([facility])     • IR BIOPSY BONE  2021   • IR IVC FILTER PLACEMENT OPTIONAL/TEMPORARY  2022   • IR PE ENDOVASCULAR THERAPY  2022   • IR PICC PLACEMENT SINGLE LUMEN  2022   • IR PICC PLACEMENT SINGLE LUMEN  2022   • SKIN LESION EXCISION Right     breast, single lesion   • TONSILLECTOMY AND ADENOIDECTOMY         FAMILY HISTORY:  Non-contributory    SOCIAL HISTORY:  Social History     Social History     Substance and Sexual Activity   Alcohol Use Not Currently    Comment: (history)     Social History     Substance and Sexual Activity   Drug Use No     Social History     Tobacco Use   Smoking Status Former Smoker   • Packs/day: 1 00   • Years: 30 00   • Pack years:  00   • Types: Cigarettes   • Start date: 55   • Quit date: 0   • Years since quittin 7   Smokeless Tobacco Never Used       ALLERGIES:  Allergies   Allergen Reactions   • Sulfa Antibiotics    • Pneumococcal Polysaccharide Vaccine Rash and Edema       MEDICATIONS:  All current active medications have been reviewed        PHYSICAL EXAM:  Temp:  [98 °F (36 7 °C)-99 7 °F (37 6 °C)] 98 4 °F (36 9 °C)  HR:  [105-121] 115  Resp:  [16-19] 16  BP: ()/(52-76) 110/64  SpO2:  [87 %-98 %] 95 %  Temp (24hrs), Av 6 °F (37 °C), Min:98 °F (36 7 °C), Max:99 7 °F (37 6 °C)  Current: Temperature: 98 4 °F (36 9 °C)    Intake/Output Summary (Last 24 hours) at 10/1/2022 1749  Last data filed at 10/1/2022 1501  Gross per 24 hour   Intake --   Output 2275 ml   Net -2275 ml       General Appearance:  Appearing alert, chronically ill-appearing female, nontoxic, and in no distress, appears stated age   Head:  Normocephalic, without obvious abnormality, atraumatic   Eyes:  PERRL, conjunctiva pale and sclera anicteric, both eyes   Nose: Nares normal, mucosa normal, no drainage   Throat: Oropharynx moist without lesions; lips, mucosa, and tongue normal; teeth and gums normal   Neck: Supple, symmetrical, trachea midline, no adenopathy, no tenderness/mass/nodules   Back:   Symmetric, no curvature, ROM normal, no CVA tenderness   Lungs:   Clear to auscultation bilaterally, no audible wheezes, rhonchi and rales, respirations unlabored   Chest Wall:  No tenderness or deformity   Heart:  Regular rate and rhythm, S1, S2 normal, no murmur, rub or gallop   Abdomen:   Soft, non-tender, non-distended, positive bowel sounds, no masses, no organomegaly    No CVA tenderness, Crawley catheter, rectal to   Extremities: Extremities normal, atraumatic, no cyanosis, clubbing or edema   Skin: Skin color pale, texture, turgor normal, no rashes or lesions  No draining wounds noted  Neurologic: Alert and oriented times 3, extremity strength 5/5 and symmetric           Invasive Devices:   PICC Line 09/26/22 (Active)   Reasons to continue PICC No peripheral vascular access 09/28/22 2100   Line Necessity Reviewed Yes, reviewed with provider 09/28/22 2100   Site Assessment WDL 10/01/22 1500   #1 Lumen Color/Status Red lumen;Flushed;Normal saline locked 10/01/22 1500   #2 Lumen Color/Status Purple lumen;Flushed;Normal saline locked 10/01/22 1500   Catheter Out on Skin (cm) 0 cm 09/26/22 1455   Dressing Type Chlorhexidine dressing 10/01/22 1500   Dressing Status Clean;Dry; Intact 10/01/22 1500   Dressing Change Due 10/03/22 10/01/22 1500       Rectal Tube With balloon (Active)       Urethral Catheter 18 Fr   (Active)   Amt returned on insertion(mL) 150 mL 09/25/22 1200   Reasons to continue Urinary Catheter  Accurate I&O assessment in critically ill patients (48 hr  max) 10/01/22 1501   Goal for Removal Remove after 48 hrs of I/O monitoring 09/30/22 2012   Site Assessment Skin intact 10/01/22 1501   Crawley Care Done 10/01/22 1501   Collection Container Standard drainage bag 10/01/22 1501   Securement Method Securing device (Describe) 10/01/22 1501   Securing Device Change Date 09/27/22 09/27/22 2000   Output (mL) 550 mL 10/01/22 1501       LABS, IMAGING, & OTHER STUDIES:  Lab Results:      I have personally reviewed pertinent labs  Results from last 7 days   Lab Units 10/01/22  0618 09/30/22  0509 09/28/22  0424   WBC Thousand/uL 4 18* 2 64* 3 10*   HEMOGLOBIN g/dL 8 5* 8 1* 7 9*   PLATELETS Thousands/uL 108* 72* 50*     Results from last 7 days   Lab Units 10/01/22  0618 09/30/22  0509 09/29/22  0427 09/28/22  0424 09/27/22  0427 09/26/22  1530 09/25/22  2358   SODIUM mmol/L 136 138 138   < > 142 140 140   POTASSIUM mmol/L 3 5 3 7 3 4*   < > 3 3* 3 5 3 3*   CHLORIDE mmol/L 105 107 108   < > 112* 112* 112*   CO2 mmol/L 27 26 27   < > 24 23 22   BUN mg/dL 9 9 9   < > 11 13 13   CREATININE mg/dL 0 52* 0 40* 0 46*   < > 0 50* 0 75 0 71   EGFR ml/min/1 73sq m 89 97 92   < > 90 74 79   CALCIUM mg/dL 7 6* 6 4* 7 0*   < > 7 2* 7 5* 7 2*   AST U/L  --   --   --   --  9 7 8   ALT U/L  --   --   --   --  15 19 18   ALK PHOS U/L  --   --   --   --  47 57 62    < > = values in this interval not displayed  Imaging Studies:   I have personally reviewed pertinent imaging study reports and images in PACS  EKG, Pathology, and Other Studies:   I have personally reviewed pertinent reports

## 2022-10-01 NOTE — PROGRESS NOTES
Cardiology Progress Note - Lizet Silverman 79 y o  female MRN: [id number]    Unit/Bed#: [facility] 406-01 Encounter: [id number]      Assessment:  Principal Problem:    Saddle embolus of pulmonary artery (HCC)  Active Problems:    Metastatic breast cancer ([facility] [address] )    Anxiety    Mixed hyperlipidemia    Primary hypertension    Toxic gastroenteritis and colitis    REYES (acute kidney injury) ([facility] [address] )    Anemia    Tachycardia    Urinary retention    Swelling of lower extremity      Plan:  Patient with no issues overnight  She has no chest pain or significant dyspnea  She is in sinus rhythm on telemetry with some improvement in rate control on current medical regimen of metoprolol  She had brief episode of SVT  Blood pressure currently stable  She continues on midodrine  BMP today with potassium of 3 5 and creatinine of 0 52  Will continue her present medical regimen  Continues on Lovenox in reference to pulmonary embolism  Subjective:   Patient seen and examined  No significant events overnight   negative  Objective:     Vitals: Blood pressure 110/52, pulse (!) 106, temperature 98 °F (36 7 °C), temperature source Oral, resp  rate 17, height 5' 1" (1 549 m), weight 66 4 kg (146 lb 6 2 oz), SpO2 98 %, not currently breastfeeding , Body mass index is 27 66 kg/m² ,   Orthostatic Blood Pressures    Flowsheet Row Most Recent Value   Blood Pressure 110/52 filed at 10/01/2022 0241   Patient Position - Orthostatic VS Lying filed at 10/01/2022 0241      ,      Intake/Output Summary (Last 24 hours) at 10/1/2022 0739  Last data filed at 10/1/2022 0241  Gross per 24 hour   Intake --   Output 2250 ml   Net -2250 ml       No significant arrhythmias seen on telemetry review         Physical Exam:    GEN: Kerry Silverman   NECK: supple, no carotid bruits, no JVD or HJR  HEART: normal rate, regular rhythm, normal S1 and S2, no murmurs, clicks, gallops or rubs   LUNGS: clear to auscultation bilaterally; no wheezes, rales, or rhonchi   ABDOMEN: normal bowel sounds, soft, no tenderness, no distention  EXTREMITIES: peripheral pulses normal; no clubbing, cyanosis, or edema  SKIN: warm and well perfused, no suspicious lesions on exposed skin    Labs & Results:    No results displayed because visit has over 200 results  CT abdomen pelvis wo contrast    Result Date: 9/30/2022  Narrative: CT ABDOMEN AND PELVIS WITHOUT IV CONTRAST INDICATION:   Diarrhea Persistent diarrhea, decreasing WBC now with bandemia Persistent diarrhea, bandemia  COMPARISON:  CT chest/abdomen/pelvis 9/22/2022  TECHNIQUE:  CT examination of the abdomen and pelvis was performed without administration of intravenous contrast, limiting evaluation for certain processes  Axial, sagittal, and coronal 2D reformatted images were created from the source data and submitted for interpretation  Radiation dose length product (DLP) for this visit:  545 mGy-cm   This examination, like all CT scans performed in the [facility], was performed utilizing techniques to minimize radiation dose exposure, including the use of iterative reconstruction and automated exposure control  IV Contrast:  None  Enteric Contrast:  None  FINDINGS: ABDOMEN LOWER CHEST: Small bilateral pleural effusions with bibasilar atelectasis, new from prior CT  Hypodense (<35 HU) intracardiac blood pool indicating anemia  LIVER: Normal size and morphology  No gross abnormality on noncontrast study  Simple cyst at the dome measures 3 4 cm  BILIARY: No intrahepatic biliary ductal dilatation  Normal caliber common bile duct  GALLBLADDER: No calcified gallstones  Normal wall thickness  No pericholecystic inflammatory changes  SPLEEN: Within normal limits  No suspicious lesion  Normal spleen size  PANCREAS: Grossly within normal limits  No peripancreatic inflammation  ADRENAL GLANDS: Within normal limits  KIDNEYS/URETERS: Normal kidney size and position  Multiple bilateral simple cortical and peripelvic cysts   No suspicious solid mass within the limitations of a noncontrast exam  No calculi or hydronephrosis  STOMACH AND BOWEL: Stomach is grossly within normal limits  Normal caliber small bowel  Increasingly large stool burden in the ascending colon, which is otherwise within normal limits  Diffuse wall thickening of the transverse and descending colon is slightly improved from prior study; there are are several small air-filled outpouchings in the mid descending colon which were previously fluid-filled and focally inflamed with nodular enhancement (and new since 8/27/2022), likely to have represented contained perforations  However, there is worsened wall thickening of the rectosigmoid, with new perirectal fat stranding and presacral edema  APPENDIX: No findings to suggest acute appendicitis  ABDOMINOPELVIC CAVITY: No gross ascites  Trace fluid stranding along the left greater than right paracolic gutters  No intraperitoneal free air  No bulky lymphadenopathy  No retroperitoneal hematoma  New perirectal fat stranding and presacral edema  VESSELS: Normal caliber abdominal aorta with mild atherosclerotic plaque  Infrarenal IVC filter  PELVIS REPRODUCTIVE ORGANS:  Retroverted uterus  There is no evidence of adnexal mass  URINARY BLADDER:  Decompressed with Crawley catheter, associated air noted  ABDOMINAL WALL/INGUINAL REGIONS:  Tiny fat-containing umbilical hernia  Foci of air noted in the subcutaneous tissues of the right lower quadrant anterior abdominal wall, consistent with medication injections  Moderate bilateral atelectasis similar to prior study  BONES:  Vertebral body height and alignment are maintained  No acute fracture  Numerous tiny sclerotic foci are noted throughout the axial and appendicular skeleton similar studies dating back to at least 4/2022 in keeping with known metastatic breast cancer  Impression: 1    Findings of worsening proctocolitis of the sigmoid and rectum with new perirectal fat stranding and presacral edema  2   Improving inflammation of the transverse and descending colon, with improved appearance of several small outpouchings from the mid descending colon which likely represented contained perforations  3   New small bilateral pleural effusions  The study was marked in EPIC for significant notification  Workstation performed: [id number]     XR chest portable    Result Date: 9/26/2022  Narrative: CHEST INDICATION:   hypoxia  COMPARISON:  9/22/2022 EXAM PERFORMED/VIEWS:  XR CHEST PORTABLE FINDINGS: Cardiomediastinal silhouette appears unremarkable  There is mildly increased bibasilar density medially, likely atelectasis  No pneumothorax or pleural effusion  Stable left clavicular sclerotic lesion without aggressive features  Impression: Increased bibasilar density, most likely atelectasis  Workstation performed: [id number]     XR Trauma chest portable    Result Date: 9/22/2022  Narrative: CHEST INDICATION:   TRAUMA  COMPARISON:  8/1/2022 EXAM PERFORMED/VIEWS:  XR CHEST PORTABLE FINDINGS: Cardiomediastinal silhouette appears unremarkable  The lungs are clear  No pneumothorax or pleural effusion  Benign-appearing sclerotic lesion is identified within the proximal left clavicle, unchanged from prior CT and x-ray examinations  Impression: No acute cardiopulmonary disease  Workstation performed: [id number]     TRAUMA - CT head wo contrast    Result Date: 9/22/2022  Narrative: CT BRAIN - WITHOUT CONTRAST INDICATION:   TRAUMA  COMPARISON:  8/1/2022  TECHNIQUE:  CT examination of the brain was performed  In addition to axial images, sagittal and coronal 2D reformatted images were created and submitted for interpretation  Radiation dose length product (DLP) for this visit:  877 mGy-cm     This examination, like all CT scans performed in the [facility], was performed utilizing techniques to minimize radiation dose exposure, including the use of iterative reconstruction and automated exposure control  IMAGE QUALITY:  Diagnostic  FINDINGS: PARENCHYMA: Decreased attenuation is noted in periventricular and subcortical white matter demonstrating an appearance that is statistically most likely to represent mild microangiopathic change  No CT signs of acute infarction  No intracranial mass, mass effect or midline shift  No acute parenchymal hemorrhage  VENTRICLES AND EXTRA-AXIAL SPACES:  Ventricles and extra-axial CSF spaces are prominent commensurate with the degree of volume loss  No hydrocephalus  No acute extra-axial hemorrhage  VISUALIZED ORBITS AND PARANASAL SINUSES:  Unremarkable  CALVARIUM AND EXTRACRANIAL SOFT TISSUES:  Normal      Impression: No acute intracranial abnormality  Chronic microangiopathic changes  I personally discussed this study with Ivette Edwards on 9/22/2022 at 12:32 PM  Workstation performed: [id number]     TRAUMA - CT spine cervical wo contrast    Result Date: 9/22/2022  Narrative: CT CERVICAL SPINE - WITHOUT CONTRAST INDICATION:   TRAUMA  COMPARISON:  8/1/2022 TECHNIQUE:  CT examination of the cervical spine was performed without intravenous contrast   Contiguous axial images were obtained  Sagittal and coronal reconstructions were performed  Radiation dose length product (DLP) for this visit:   This examination, like all CT scans performed in the [facility], was performed utilizing techniques to minimize radiation dose exposure, including the use of iterative reconstruction  and automated exposure control  IMAGE QUALITY:  Diagnostic  FINDINGS: ALIGNMENT:  Normal alignment of the cervical spine  No subluxation  VERTEBRAL BODIES:  No fracture  DEGENERATIVE CHANGES:  Mild multilevel cervical degenerative changes are noted without critical central canal stenosis  PREVERTEBRAL AND PARASPINAL SOFT TISSUES:  Unremarkable  THORACIC INLET:  Biapical pleural parenchymal scarring noted, right greater than left       Impression: No cervical spine fracture or traumatic malalignment  I personally discussed this study with Leah Shrestha on 9/22/2022 at 12:23 PM   Workstation performed: [id number]     PE Study with CT abdomen & pelvis with contrast    Result Date: 9/22/2022  Narrative: CT PULMONARY ANGIOGRAM OF THE CHEST AND CT ABDOMEN AND PELVIS WITH INTRAVENOUS CONTRAST INDICATION:   tachycardia, hypotension, recent dx of pe and fall today  COMPARISON:  8/27/2022, 8/21/2022  TECHNIQUE:  CT examination of the chest, abdomen and pelvis was performed  Thin section CT angiographic technique was used in the chest in order to evaluate for pulmonary embolus and coronal 3D MIP postprocessing was performed on the acquisition scanner  Axial, sagittal, and coronal 2D reformatted images were created from the source data and submitted for interpretation  Radiation dose length product (DLP) for this visit:  1339 14 mGy-cm   This examination, like all CT scans performed in the [facility], was performed utilizing techniques to minimize radiation dose exposure, including the use of iterative reconstruction and automated exposure control  IV Contrast:  100 mL of iohexol (OMNIPAQUE) Enteric Contrast:  Enteric contrast was not administered  FINDINGS: CHEST PULMONARY ARTERIAL TREE:  Saddle embolus identified extending into the left main pulmonary artery and left lower lobe branches  Small filling defect left upper lobe extending to subsegmental vessels  Right basilar pulmonary artery filling defect also extending into subsegmental vessels  LUNGS:  Lungs are clear  There is no tracheal or endobronchial lesion  PLEURA:  Unremarkable  HEART/AORTA:  Heart is unremarkable for patient's age  No thoracic aortic aneurysm  MEDIASTINUM AND MADAN:  Unremarkable  CHEST WALL AND LOWER NECK:  Unremarkable  ABDOMEN LIVER/BILIARY TREE:  Enlarged fatty liver with scattered probable cysts noted  GALLBLADDER:  Gallbladder is surgically absent  SPLEEN:  Unremarkable  PANCREAS:  Unremarkable  ADRENAL GLANDS:  Unremarkable  KIDNEYS/URETERS:  Right greater than left parapelvic cortical cysts noted  STOMACH AND BOWEL:  Unremarkable  APPENDIX:  No findings to suggest appendicitis  ABDOMINOPELVIC CAVITY:  No ascites  No pneumoperitoneum  No lymphadenopathy  VESSELS:  Unremarkable for patient's age  PELVIS REPRODUCTIVE ORGANS:  Unremarkable for patient's age  URINARY BLADDER:  Massive distention of the bladder  ABDOMINAL WALL/INGUINAL REGIONS:  Unremarkable  OSSEOUS STRUCTURES:  No acute fracture or destructive osseous lesion  Impression: 1  Saddle embolus and left greater than right pulmonary arterial filling defects consistent with PE (high clot burden) with evidence of RV strain including an RV/LV ratio = 1 2  This is greater than 0 9, which is abnormal and indicates right heart strain  An abnormal RV/LV ratio has been shown to be associated with an increased risk of 30 day mortality in the setting of acute pulmonary embolism  Urgent consultation with the medical critical care team is recommended  2   Massive distention of the bladder  3   No acute posttraumatic injury  I personally discussed this study with Ivette Edwards on 9/22/2022 at 12:27 PM  Workstation performed: [id number]     VAS upper limb venous duplex scan, unilateral/limited    Result Date: 9/24/2022  Narrative:  [facility] REPORT CLINICAL: Indications: Patient presents with right upper extremity edema  Operative History: 2022-09-22 IR PE Endovascular Therapy Risk Factors The patient has no history of Obesity  FINDINGS:  Right                      Impression  Cephalic Upper Arm Distal  Thrombus       CONCLUSION:  Impression: RIGHT UPPER LIMB: No evidence of acute or chronic deep vein thrombosis  Superficial thrombophlebitis noted in the cephalic vein at the distal upper arm  Doppler evaluation shows a normal response to augmentation maneuvers    LEFT UPPER LIMB LIMITED: Evaluation shows no evidence of thrombus in the internal jugular vein, subclavian vein, and the brachiocephalic vein  SIGNATURE: Electronically Signed by: Brynn Rey MD, [address] on 2022-09-24 03:54:50 PM    VAS lower limb venous duplex study, complete bilateral    Result Date: 9/23/2022  Narrative:  [facility] REPORT CLINICAL: Indications: MICU patient presents with recent discovery of pulmonary embolism  Physician wants to determine propagation of previously noted DVT in the right popliteal and peroneal veins  discovered on 8/19/22  Operative History: 2022-09-22 IR PE Endovascular Therapy Risk Factors The patient has history of DVT  FINDINGS:  Segment         Right                   Left                            Impression              Impression              Ext_Iliac       Not Visualized                                  CFV             Non Occlusive Thrombus                          PFV             Non Occlusive Thrombus                          FV Prox         Non Occlusive Thrombus                          FV Mid          Non Occlusive Thrombus                          FV Dist         Non Occlusive Thrombus                          GSV Inguinal    Occlusive Subsegmental                          GSV Prox Thigh  Occlusive Subsegmental                          GSV Mid Thigh   Occlusive Subsegmental                          GSV Dist Thigh  Occlusive Subsegmental                          GSV Knee        Occlusive Subsegmental                          Peroneal        Non Occlusive Thrombus  Occlusive Subsegmental  Popliteal       Occlusive Subsegmental                          PostTibial      Non Occlusive Thrombus  Occlusive Subsegmental     CONCLUSION: Impression: RIGHT LOWER LIMB: Evidence of acute deep vein thrombosis identified in the common femoral, deep femoral, femoral, popliteal, posterior tibial and peroneal veins   Evidence of superficial thrombophlebitis noted in the saphenofemoral junction and in the great saphenous vein from the knee to the proximal thigh  Popliteal, posterior tibial and anterior tibial arterial Doppler waveforms are triphasic  LEFT LOWER LIMB: Evidence of acute deep vein thrombosis identified in the posterior tibial and peroneal veins  No evidence of superficial thrombophlebitis noted  Doppler evaluation shows a normal response to augmentation maneuvers  Popliteal, posterior tibial and anterior tibial arterial Doppler waveforms are triphasic  In comparison to the study of 8/19/22 , there is propagation of DVT and SVT in the right lower extremity and there is new DVT in the left PTV and Peroneal veins  Technical findings shared with Dr Lalo Boyd and posted in [employer]  SIGNATURE: Electronically Signed by: Sanya Washington MD, RPVI on 2022-09-23 03:17:16 PM    IR IVC filter placement optional/temporary    Result Date: 9/26/2022  Narrative: PROCEDURE: Inferior vena cava filter placement STAFF: QI Sánchez  CONTRAST: 20 mL Omnipaque iv  FLUOROSCOPY TIME: 1 7 minutes  NUMBER OF IMAGES: Multiple  COMPLICATIONS: None  MEDICATIONS: 1% lidocaine Moderate sedation was monitored by radiology nursing staff  Monitoring of conscious sedation time totaled 0 minutes  INDICATION: Coagulation failure in the setting of central saddle PE PROCEDURE: Via a right internal jugular vein approach, a diagnostic inferior vena cavagram was obtained to evaluate for caval thrombus, renal vein anatomy/anomalies and location, caval anatomy/anomalies and diameter  Following the diagnostic study, a Rhiannon retrievable inferior vena cava filter was deployed below the lowest renal vein  Post placement radiograph demonstrated good position  The sheath was removed and compression applied to the puncture site until hemostasis was achieved  FINDINGS: 1   Real-time ultrasound showed the right internal jugular vein to be anechoic and freely compressible   2   Diagnostic inferior vena cavagram showed no caval or renal vein anomalies, no caval thrombus, and a caval diameter under 28 mm  3   Final fluoroscopic image of the inferior vena cava filter to be in good position  Impression: Placement of a temporary inferior vena cava filter  PLAN: This filter can be removed at any time in the future  Interventional Radiology will follow this patient to ensure removal when it is no longer needed  Workstation performed: [id number]     IR PE endovascular therapy    Result Date: 9/26/2022  Narrative: PROCEDURE: Mechanical PE thrombectomy with extirpation of clot 1  Ultrasound-guided right femoral vein access  2   Left pulmonary artery angiogram  3   Mechanical thrombectomy with extirpation of clot from the left common artery  4   Pulmonary arterial pressure measurement  STAFF: QI Torres  CONTRAST: 85 mL Visipaque intravenous  FLUOROSCOPY TIME: 23 8 minutes  NUMBER OF IMAGES: Multiple  COMPLICATIONS: None  MEDICATIONS: Sedation was provided in the form of monitored anesthesia care by the anesthesia service  Please see their associated records  INDICATION: 42-year-old female with saddle pulmonary embolus, primarily located in the left pulmonary artery and branches  PROCEDURE: Real-time ultrasound guidance was used to access the right femoral vein  An image was stored in PACS  A sheath was placed and a pigtail catheter and Bentson wire were used to select the pulmonary artery  The pigtail catheter was exchanged for a glide catheter which was used to select a distal branch in the left pulmonary artery  The wire was exchanged for an Amplatz wire  The catheter was removed over the wire and following serial dilation the vascular sheath was exchanged for a 24 German vascular sheath  The entire closure device was advanced over the wire into the main pulmonary artery  Pulmonary arterial pressures were recorded  Prethrombectomy pulmonary artery pressure was 25/8 (15) mm Hg   Mechanical thrombectomy of the left pulmonary artery was performed with extirpation of large clot  Due to the size of clot the Inari catheter and sheath had to be removed from the patient's body and following removal of clot from the sheath and catheter the sheath was advanced into the IVC and the flowtriever was advanced over the wire back into the pulmonary artery  Repeat pulmonary artery angiogram demonstrated no significant residual clot  Postoperatively pulmonary artery pressure was 23/7 (13) mm Hg  At this point the decision to end the procedure was made  All catheters and wires removed from the patient's body  A pursestring suture and manual compression was used to obtain hemostasis of the right groin  A sterile dressing was applied  Impression: 1  Technically successful mechanical thrombectomy of the left pulmonary artery with extirpation of large clot  Workstation performed: [id number]     IR PICC line placement single lumen (preferred for home anitbiotics/medications)    Result Date: 9/28/2022  Narrative: PERCUTANEOUSLY INSERTED CENTRAL VENOUS CATHETER PLACEMENT HISTORY:   Recent PE thrombectomy, filter placement, difficult IV access and need frequent blood draws FLUORO TIME:  9 minutes NUMBER OF IMAGES:  3 PROCEDURE:  The patient was brought to the Interventional Radiology Suite and identified verbally and by wrist band  The left brachial vein was evaluated as a potential access site with ultrasound  The vessel was found to be patent and compressible  The site was prepped and draped in the usual sterile fashion  All elements of maximal sterile barrier technique, cap and mask and sterile gown and sterile gloves and sterile full-body drape and hand hygiene and 2% chlorhexidine for cutaneous antisepsis  Sterile ultrasound technique with sterile gel and sterile probe covers was also utilized  Lidocaine was administered to the skin and a small skin incision was made   Under ultrasound guidance, utilizing sterile ultrasound technique with sterile gel and a sterile probe cover, the brachial vein was accessed using single wall Seldinger technique  Static images of real time needle entry into the vessel were obtained  This was followed by a  018 guidewire and a sheath and dilator tapered to   018  A peripherally inserted central venous catheter was then advanced under fluoroscopic guidance to the cavoatrial junction  The catheter was cut at 37 cm with 0 cm external length  The position was confirmed fluoroscopically  Blood could be freely aspirated and heparinized saline injected  Patient experienced no untoward reactions  Impression: 1  Status post placement of a peripherally inserted central venous catheter via the brachial vein with its tip at the cavoatrial junction under ultrasound and fluoroscopic guidance  Signed, performed, and dictated by BROOKE Mckay under the supervision of Dr Lee Graff  Workstation performed: [id number]     Echo follow up/limited w/ contrast if indicated    Result Date: 9/23/2022  Narrative: •  Left Ventricle: The left ventricular ejection fraction is 70%  Systolic function is vigorous  Wall motion cannot be accurately assessed  •  Right Ventricle: Right ventricular cavity size is normal  Systolic function is normal  •  Tricuspid Valve: There is mild regurgitation  The right ventricular systolic pressure is normal  The estimated right ventricular systolic pressure is 91 82 mmHg  •  Only imaging window was the subcostal window  Limited and technically difficult study  EKG personally reviewed by Jason Rouse MD      Counseling / Coordination of Care  Total floor / unit time spent today 30 minutes  Greater than 50% of total time was spent with the patient and / or family counseling and / or coordination of care

## 2022-10-01 NOTE — PROGRESS NOTES
[facility] Internal Medicine Progress Note  Patient: Smith Silverman 79 y o  female   MRN: [id number]  PCP: Jamir Wilkes DO  Unit/Bed#: [facility] 049-72 Encounter: [id number]  Date Of Visit: 10/01/22    Assessment:    Principal Problem:    Saddle embolus of pulmonary artery (HCC)  Active Problems:    Metastatic breast cancer ([facility] [address] )    Anxiety    Mixed hyperlipidemia    Primary hypertension    Toxic gastroenteritis and colitis    REYES (acute kidney injury) ([facility] )    Anemia    Tachycardia    Urinary retention    Swelling of lower extremity      Plan:    · Saddle PE  · Toxic gastroenteritis and colitis -> suspicion of underlying IBD as per GI  · C diff infection  · Tachycardia  · Hypotension  · REYES  · Anemia  · Urinary retention  · 'metastatic breast cancer     · For her saddle PEs she is status post thrombectomy with IR   With concerns for possible Xarelto failure, she is being maintained on therapeutic Lovenox   Suspected underlying malignancy is contributing to her hypercoagulable state  · She was undergoing workup outpatient with GI due to her diarrhea, there were concerns that she has some underlying IBD   Initially suspected to be from verzenio given for breat cancer Rx but now GI suspects that she probably has underlying IBD   Due to her profuse diarrhea, C diff was tested, PCR positive, EIA negative   Being treated as C diff infection with oral vancomycin 125 mg q 6   Overall there is improvement in her bowel movements compared to before   Suspect her bowel movements are still contributing to her hypokalemia a probably hypovolemia and hence hypotension and tachycardia   Discussed with GI, for now continue current dose of vancomycin anticipate ongoing improvement in next 2-3 days   GI has been in discussion with patient's daughter and offered her Remicade for IBD treatment the daughter concerned about side effects and costing and insurance coverage and does not want to commit without conforming that   Now she agreed & remicaid given today  ·  - noted lower WBC, worsening bands 35% hence proceeded with CT A/P - which shows worsening rectosigmoiditis -> d/w GI they will talk to daughter but since she was refusing remicaid GI may recommend surgical evaluation  ID also consulted to comment on if this is infection related -> re check CT with contrast -> nothing new, contained perfs  GI suspects its IBD, per ID no evidence of infection - > will complete 10 days PO Vanco, remicaid given today per GI  · Due elevated HR -> gave IVF  Now BP better, BB being increased further by cardio  · Continue Crawley catheter  · Previous treatments for breast cancer as below, currently patient not opting for treatment   Palliative Care following - provided post form to daughter to review to fill it  Ultimate plan is for patient to go to rehab, cm working with daughter on same       VTE Pharmacologic Prophylaxis:     Moderate Risk (Score 3-4) - Pharmacological DVT Prophylaxis Ordered: Enoxaparin (Lovenox)  Mechanical VTE Prophylaxis in Place: Yes    Patient Centered Rounds: I have performed bedside rounds with nursing staff today  Discussions with Specialists or Other Care Team Provider:     Education and Discussions with Family / Patient: patient    Time Spent for Care: 30 minutes  More than 50% of total time spent on counseling and coordination of care as described above  Current Length of Stay: 9 day(s)  Current Patient Status: Inpatient     Certification Statement: The patient will continue to require additional inpatient hospital stay due to monitro HR, GI symptoms    Discharge Plan / Estimated Discharge Date: Anticipate discharge in > 72 hrs to rehab facility      Code Status: Level 3 - DNAR and DNI      Subjective:   Feels ok, no abdo pain, appetite is poor    Objective:     Vitals:   Temp (24hrs), Av 4 °F (36 9 °C), Min:98 °F (36 7 °C), Max:98 9 °F (37 2 °C)    Temp:  [98 °F (36 7 °C)-98 9 °F (37 2 °C)] 98 1 °F (36 7 °C)  HR: [105-115] 115  Resp:  [16-19] 16  BP: ()/(52-64) 121/56  SpO2:  [93 %-98 %] 95 %  Body mass index is 27 66 kg/m²  Input and Output Summary (last 24 hours): Intake/Output Summary (Last 24 hours) at 10/1/2022 1953  Last data filed at 10/1/2022 1901  Gross per 24 hour   Intake --   Output 2375 ml   Net -2375 ml       Physical Exam:   Physical Exam  Vitals reviewed  HENT:      Head: Normocephalic and atraumatic  Mouth/Throat:      Mouth: Mucous membranes are moist    Cardiovascular:      Rate and Rhythm: Normal rate and regular rhythm  Heart sounds: Normal heart sounds  Pulmonary:      Effort: Pulmonary effort is normal  No respiratory distress  Breath sounds: Normal breath sounds  No wheezing  Abdominal:      General: There is no distension  Palpations: Abdomen is soft  Tenderness: There is no abdominal tenderness  Musculoskeletal:      Right lower leg: No edema  Left lower leg: No edema  Neurological:      Mental Status: She is alert and oriented to person, place, and time            Additional Data:     Labs:  Results from last 7 days   Lab Units 10/01/22  0618 09/30/22  0509 09/28/22  0424   WBC Thousand/uL 4 18* 2 64* 3 10*   HEMOGLOBIN g/dL 8 5* 8 1* 7 9*   HEMATOCRIT % 27 6* 26 0* 25 4*   PLATELETS Thousands/uL 108* 72* 50*   BANDS PCT %  --  35*  --    NEUTROS PCT %  --   --  75   LYMPHS PCT %  --   --  14   LYMPHO PCT %  --  1*  --    MONOS PCT %  --   --  9   MONO PCT %  --  5  --    EOS PCT %  --  0 2     Results from last 7 days   Lab Units 10/01/22  0618 09/28/22  0424 09/27/22  0427   SODIUM mmol/L 136   < > 142   POTASSIUM mmol/L 3 5   < > 3 3*   CHLORIDE mmol/L 105   < > 112*   CO2 mmol/L 27   < > 24   BUN mg/dL 9   < > 11   CREATININE mg/dL 0 52*   < > 0 50*   ANION GAP mmol/L 4   < > 6   CALCIUM mg/dL 7 6*   < > 7 2*   ALBUMIN g/dL  --   --  2 2*   TOTAL BILIRUBIN mg/dL  --   --  0 65   ALK PHOS U/L  --   --  47   ALT U/L  --   --  15   AST U/L  -- --  9   GLUCOSE RANDOM mg/dL 71   < > 90    < > = values in this interval not displayed  Results from last 7 days   Lab Units 10/01/22  0618 09/30/22  0509 09/25/22  2358   PROCALCITONIN ng/ml 0 38* 0 25 0 19       Imaging: No pertinent imaging reviewed  Recent Cultures (last 7 days):           Lines/Drains:  Invasive Devices  Report    Peripherally Inserted Central Catheter Line  Duration           PICC Line 09/26/22 5 days          Drain  Duration           Urethral Catheter 18 Fr  7 days    Rectal Tube With balloon <1 day                Telemetry:        Last 24 Hours Medication List:   Current Facility-Administered Medications   Medication Dose Route Frequency Provider Last Rate   • atorvastatin  40 mg Oral After [address], DO     • cholecalciferol  2,000 Units Oral Daily Kathline Dry, DO     • enoxaparin  1 mg/kg Subcutaneous Q12H [address] Kathline Dry, DO     • folic acid  1 mg Oral Daily Kathline Dry, DO     • iohexol  50 mL Oral 90 min pre-procedure Rani Escalante, DO     • metoprolol  2 5 mg Intravenous Q6H PRN Franco Brown MD     • metoprolol succinate  75 mg Oral BID Cortez Phoenix Spironello, CRNP     • midodrine  2 5 mg Oral TID AC Nadine Wynne MD     • mirtazapine  7 5 mg Oral HS Jersey Camacho, DO     • pantoprazole  40 mg Oral BID AC Kathline Dry, DO     • traMADol  50 mg Oral Once PRN Sherolyn Duverney, PA-C     • vancomycin  125 mg Oral Q6H [address], DO          Today, Patient Was Seen By: Franco Brown    ** Please Note: This note has been constructed using a voice recognition system   **

## 2022-10-02 PROCEDURE — 99232 SBSQ HOSP IP/OBS MODERATE 35: CPT | Performed by: INTERNAL MEDICINE

## 2022-10-02 PROCEDURE — 99232 SBSQ HOSP IP/OBS MODERATE 35: CPT | Performed by: HOSPITALIST

## 2022-10-02 RX ADMIN — FOLIC ACID 1 MG: 1 TABLET ORAL at 08:52

## 2022-10-02 RX ADMIN — CARBIDOPA AND LEVODOPA 2.5 MG: 50; 200 TABLET, EXTENDED RELEASE ORAL at 12:12

## 2022-10-02 RX ADMIN — Medication 125 MG: at 22:15

## 2022-10-02 RX ADMIN — CARBIDOPA AND LEVODOPA 2.5 MG: 50; 200 TABLET, EXTENDED RELEASE ORAL at 06:02

## 2022-10-02 RX ADMIN — METOPROLOL SUCCINATE 75 MG: 50 TABLET, EXTENDED RELEASE ORAL at 22:13

## 2022-10-02 RX ADMIN — Medication 125 MG: at 06:02

## 2022-10-02 RX ADMIN — METOPROLOL SUCCINATE 75 MG: 50 TABLET, EXTENDED RELEASE ORAL at 08:52

## 2022-10-02 RX ADMIN — Medication 125 MG: at 12:12

## 2022-10-02 RX ADMIN — Medication 125 MG: at 00:00

## 2022-10-02 RX ADMIN — Medication 125 MG: at 17:52

## 2022-10-02 RX ADMIN — ENOXAPARIN SODIUM 70 MG: 80 INJECTION SUBCUTANEOUS at 22:13

## 2022-10-02 RX ADMIN — ENOXAPARIN SODIUM 70 MG: 80 INJECTION SUBCUTANEOUS at 08:52

## 2022-10-02 RX ADMIN — PANTOPRAZOLE SODIUM 40 MG: 40 TABLET, DELAYED RELEASE ORAL at 15:37

## 2022-10-02 RX ADMIN — Medication 2000 UNITS: at 08:52

## 2022-10-02 RX ADMIN — MIRTAZAPINE 7.5 MG: 15 TABLET, FILM COATED ORAL at 22:13

## 2022-10-02 RX ADMIN — PANTOPRAZOLE SODIUM 40 MG: 40 TABLET, DELAYED RELEASE ORAL at 06:02

## 2022-10-02 RX ADMIN — ATORVASTATIN CALCIUM 40 MG: 40 TABLET, FILM COATED ORAL at 17:52

## 2022-10-02 RX ADMIN — CARBIDOPA AND LEVODOPA 2.5 MG: 50; 200 TABLET, EXTENDED RELEASE ORAL at 15:37

## 2022-10-02 NOTE — PLAN OF CARE
Problem: GENITOURINARY - ADULT  Goal: Urinary catheter remains patent  Description: INTERVENTIONS:  - Assess patency of urinary catheter  - If patient has a chronic suarez, consider changing catheter if non-functioning  - Follow guidelines for intermittent irrigation of non-functioning urinary catheter  Outcome: Progressing

## 2022-10-02 NOTE — PROGRESS NOTES
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  4  Protein calorie malnutrition  5  Urinary retention    Recommendations:  Patient is afebrile with low WBC count when last taken  1  I am not convinced that the patient has active C diff colitis  Diarrhea more likely secondary to IBD  However, have no objection to completing 10 day course with approximately 3 more days of p o  Vancomycin 125 mg q 6 hours  2  Diarrhea seems to have markedly lessened since Remicade begun     Antibiotics:  1  Vancomycin 125 mg q 6 hours p o , day 7 of 10    Subjective:  She has minimal diarrhea  Denies fevers, chills, or sweats  Denies nausea, vomiting,       Objective:  Vitals:  Temp:  [97 9 °F (36 6 °C)-98 1 °F (36 7 °C)] 97 9 °F (36 6 °C)  HR:  [] 99  Resp:  [20] 20  BP: (113-119)/(51-60) 115/58  SpO2:  [91 %-97 %] 91 %  Temp (24hrs), Av °F (36 7 °C), Min:97 9 °F (36 6 °C), Max:98 1 °F (36 7 °C)  Current: Temperature: 97 9 °F (36 6 °C)    Physical Exam:     General Appearance:    Eyes:   Alert, chronically ill-appearing female nontoxic, no acute distress  Conjunctiva pale   Throat: Oropharynx moist without lesions  Lips, mucosa, and tongue normal   Neck: Supple, symmetrical, trachea midline, no adenopathy,  no tenderness/mass/nodules   Lungs:   Clear to auscultation bilaterally, no audible wheezes, rhonchi or rales; respirations unlabored   Heart:  Regular rate and rhythm, S1, S2 normal, no murmur, rub or gallop   Abdomen:   Soft, non-tender, non-distended, positive bowel sounds    No masses, no organomegaly    No CVA tenderness, Crawley catheter, rectal tube   Extremities: Extremities normal, atraumatic, no clubbing, cyanosis or edema, PICC line   Skin: Skin color pale, texture, turgor normal, no rashes or lesions  No draining wounds noted  Invasive Devices  Report    Peripherally Inserted Central Catheter Line  Duration           PICC Line 09/26/22 6 days          Drain  Duration           Urethral Catheter 18 Fr  8 days    Rectal Tube With balloon 1 day                Labs, Imaging, & Other studies:   All pertinent labs were personally reviewed  Results from last 7 days   Lab Units 10/01/22  0618 09/30/22  0509 09/28/22  0424   WBC Thousand/uL 4 18* 2 64* 3 10*   HEMOGLOBIN g/dL 8 5* 8 1* 7 9*   PLATELETS Thousands/uL 108* 72* 50*     Results from last 7 days   Lab Units 10/01/22  0618 09/30/22  0509 09/29/22  0427 09/28/22  0424 09/27/22  0427 09/26/22  1530 09/25/22  2358   SODIUM mmol/L 136 138 138   < > 142 140 140   POTASSIUM mmol/L 3 5 3 7 3 4*   < > 3 3* 3 5 3 3*   CHLORIDE mmol/L 105 107 108   < > 112* 112* 112*   CO2 mmol/L 27 26 27   < > 24 23 22   BUN mg/dL 9 9 9   < > 11 13 13   CREATININE mg/dL 0 52* 0 40* 0 46*   < > 0 50* 0 75 0 71   EGFR ml/min/1 73sq m 89 97 92   < > 90 74 79   CALCIUM mg/dL 7 6* 6 4* 7 0*   < > 7 2* 7 5* 7 2*   AST U/L  --   --   --   --  9 7 8   ALT U/L  --   --   --   --  15 19 18   ALK PHOS U/L  --   --   --   --  47 57 62    < > = values in this interval not displayed

## 2022-10-02 NOTE — PROGRESS NOTES
Cardiology Progress Note - Seymour Silverman 79 y o  female MRN: [id number]    Unit/Bed#: [facility] 406-01 Encounter: [id number]      Assessment:  Principal Problem:    Saddle embolus of pulmonary artery (HCC)  Active Problems:    Metastatic breast cancer ([facility] [address] )    Anxiety    Mixed hyperlipidemia    Primary hypertension    Toxic gastroenteritis and colitis    REYES (acute kidney injury) ([facility] )    Anemia    Tachycardia    Urinary retention    Swelling of lower extremity      Plan:  Patient with no issues overnight  She has no chest pain or significant dyspnea  She is in sinus rhythm on telemetry with improvement in rate control on current medical regimen of metoprolol  Blood pressure stable  She continues on midodrine  Will continue her present medical regimen  Continues on Lovenox in reference to pulmonary embolism  Patient is stable from a rhythm point of view  I do not think she needs ongoing telemetry monitoring and okay to discontinue from my point of view  Would continue current dose of metoprolol succinate  Please call if I can be of further assistance  Subjective:   Patient seen and examined  No significant events overnight   negative  Objective:     Vitals: Blood pressure 117/52, pulse 104, temperature 97 9 °F (36 6 °C), temperature source Oral, resp  rate 20, height 5' 1" (1 549 m), weight 65 9 kg (145 lb 4 5 oz), SpO2 95 %, not currently breastfeeding , Body mass index is 27 45 kg/m² ,   Orthostatic Blood Pressures    Flowsheet Row Most Recent Value   Blood Pressure 117/52 filed at 10/02/2022 0307   Patient Position - Orthostatic VS Lying filed at 10/02/2022 0307      ,      Intake/Output Summary (Last 24 hours) at 10/2/2022 0813  Last data filed at 10/2/2022 0603  Gross per 24 hour   Intake --   Output 1800 ml   Net -1800 ml       No significant arrhythmias seen on telemetry review         Physical Exam:    GEN: Kerry Silverman   NECK: supple, no carotid bruits, no JVD or HJR  HEART: normal rate, regular rhythm, normal S1 and S2, no murmurs, clicks, gallops or rubs   LUNGS: clear to auscultation bilaterally; no wheezes, rales, or rhonchi   EXTREMITIES: peripheral pulses normal; no clubbing, cyanosis, or edema  SKIN: warm and well perfused, no suspicious lesions on exposed skin    Labs & Results:    No results displayed because visit has over 200 results  CT abdomen pelvis wo contrast    Result Date: 9/30/2022  Narrative: CT ABDOMEN AND PELVIS WITHOUT IV CONTRAST INDICATION:   Diarrhea Persistent diarrhea, decreasing WBC now with bandemia Persistent diarrhea, bandemia  COMPARISON:  CT chest/abdomen/pelvis 9/22/2022  TECHNIQUE:  CT examination of the abdomen and pelvis was performed without administration of intravenous contrast, limiting evaluation for certain processes  Axial, sagittal, and coronal 2D reformatted images were created from the source data and submitted for interpretation  Radiation dose length product (DLP) for this visit:  545 mGy-cm   This examination, like all CT scans performed in the [facility], was performed utilizing techniques to minimize radiation dose exposure, including the use of iterative reconstruction and automated exposure control  IV Contrast:  None  Enteric Contrast:  None  FINDINGS: ABDOMEN LOWER CHEST: Small bilateral pleural effusions with bibasilar atelectasis, new from prior CT  Hypodense (<35 HU) intracardiac blood pool indicating anemia  LIVER: Normal size and morphology  No gross abnormality on noncontrast study  Simple cyst at the dome measures 3 4 cm  BILIARY: No intrahepatic biliary ductal dilatation  Normal caliber common bile duct  GALLBLADDER: No calcified gallstones  Normal wall thickness  No pericholecystic inflammatory changes  SPLEEN: Within normal limits  No suspicious lesion  Normal spleen size  PANCREAS: Grossly within normal limits  No peripancreatic inflammation  ADRENAL GLANDS: Within normal limits   KIDNEYS/URETERS: Normal kidney size and position  Multiple bilateral simple cortical and peripelvic cysts  No suspicious solid mass within the limitations of a noncontrast exam  No calculi or hydronephrosis  STOMACH AND BOWEL: Stomach is grossly within normal limits  Normal caliber small bowel  Increasingly large stool burden in the ascending colon, which is otherwise within normal limits  Diffuse wall thickening of the transverse and descending colon is slightly improved from prior study; there are are several small air-filled outpouchings in the mid descending colon which were previously fluid-filled and focally inflamed with nodular enhancement (and new since 8/27/2022), likely to have represented contained perforations  However, there is worsened wall thickening of the rectosigmoid, with new perirectal fat stranding and presacral edema  APPENDIX: No findings to suggest acute appendicitis  ABDOMINOPELVIC CAVITY: No gross ascites  Trace fluid stranding along the left greater than right paracolic gutters  No intraperitoneal free air  No bulky lymphadenopathy  No retroperitoneal hematoma  New perirectal fat stranding and presacral edema  VESSELS: Normal caliber abdominal aorta with mild atherosclerotic plaque  Infrarenal IVC filter  PELVIS REPRODUCTIVE ORGANS:  Retroverted uterus  There is no evidence of adnexal mass  URINARY BLADDER:  Decompressed with Crawley catheter, associated air noted  ABDOMINAL WALL/INGUINAL REGIONS:  Tiny fat-containing umbilical hernia  Foci of air noted in the subcutaneous tissues of the right lower quadrant anterior abdominal wall, consistent with medication injections  Moderate bilateral atelectasis similar to prior study  BONES:  Vertebral body height and alignment are maintained  No acute fracture  Numerous tiny sclerotic foci are noted throughout the axial and appendicular skeleton similar studies dating back to at least 4/2022 in keeping with known metastatic breast cancer  Impression: 1    Findings of worsening proctocolitis of the sigmoid and rectum with new perirectal fat stranding and presacral edema  2   Improving inflammation of the transverse and descending colon, with improved appearance of several small outpouchings from the mid descending colon which likely represented contained perforations  3   New small bilateral pleural effusions  The study was marked in EPIC for significant notification  Workstation performed: [id number]     XR chest portable    Result Date: 9/26/2022  Narrative: CHEST INDICATION:   hypoxia  COMPARISON:  9/22/2022 EXAM PERFORMED/VIEWS:  XR CHEST PORTABLE FINDINGS: Cardiomediastinal silhouette appears unremarkable  There is mildly increased bibasilar density medially, likely atelectasis  No pneumothorax or pleural effusion  Stable left clavicular sclerotic lesion without aggressive features  Impression: Increased bibasilar density, most likely atelectasis  Workstation performed: [id number]     XR Trauma chest portable    Result Date: 9/22/2022  Narrative: CHEST INDICATION:   TRAUMA  COMPARISON:  8/1/2022 EXAM PERFORMED/VIEWS:  XR CHEST PORTABLE FINDINGS: Cardiomediastinal silhouette appears unremarkable  The lungs are clear  No pneumothorax or pleural effusion  Benign-appearing sclerotic lesion is identified within the proximal left clavicle, unchanged from prior CT and x-ray examinations  Impression: No acute cardiopulmonary disease  Workstation performed: [id number]     TRAUMA - CT head wo contrast    Result Date: 9/22/2022  Narrative: CT BRAIN - WITHOUT CONTRAST INDICATION:   TRAUMA  COMPARISON:  8/1/2022  TECHNIQUE:  CT examination of the brain was performed  In addition to axial images, sagittal and coronal 2D reformatted images were created and submitted for interpretation  Radiation dose length product (DLP) for this visit:  877 mGy-cm     This examination, like all CT scans performed in the [facility], was performed utilizing techniques to minimize radiation dose exposure, including the use of iterative reconstruction and automated exposure control  IMAGE QUALITY:  Diagnostic  FINDINGS: PARENCHYMA: Decreased attenuation is noted in periventricular and subcortical white matter demonstrating an appearance that is statistically most likely to represent mild microangiopathic change  No CT signs of acute infarction  No intracranial mass, mass effect or midline shift  No acute parenchymal hemorrhage  VENTRICLES AND EXTRA-AXIAL SPACES:  Ventricles and extra-axial CSF spaces are prominent commensurate with the degree of volume loss  No hydrocephalus  No acute extra-axial hemorrhage  VISUALIZED ORBITS AND PARANASAL SINUSES:  Unremarkable  CALVARIUM AND EXTRACRANIAL SOFT TISSUES:  Normal      Impression: No acute intracranial abnormality  Chronic microangiopathic changes  I personally discussed this study with Rashid Emery on 9/22/2022 at 12:32 PM  Workstation performed: [id number]     TRAUMA - CT spine cervical wo contrast    Result Date: 9/22/2022  Narrative: CT CERVICAL SPINE - WITHOUT CONTRAST INDICATION:   TRAUMA  COMPARISON:  8/1/2022 TECHNIQUE:  CT examination of the cervical spine was performed without intravenous contrast   Contiguous axial images were obtained  Sagittal and coronal reconstructions were performed  Radiation dose length product (DLP) for this visit:   This examination, like all CT scans performed in the [facility], was performed utilizing techniques to minimize radiation dose exposure, including the use of iterative reconstruction  and automated exposure control  IMAGE QUALITY:  Diagnostic  FINDINGS: ALIGNMENT:  Normal alignment of the cervical spine  No subluxation  VERTEBRAL BODIES:  No fracture  DEGENERATIVE CHANGES:  Mild multilevel cervical degenerative changes are noted without critical central canal stenosis  PREVERTEBRAL AND PARASPINAL SOFT TISSUES:  Unremarkable   THORACIC INLET:  Biapical pleural parenchymal scarring noted, right greater than left  Impression: No cervical spine fracture or traumatic malalignment  I personally discussed this study with Mariela Berg on 9/22/2022 at 12:23 PM   Workstation performed: [id number]     PE Study with CT abdomen & pelvis with contrast    Result Date: 9/22/2022  Narrative: CT PULMONARY ANGIOGRAM OF THE CHEST AND CT ABDOMEN AND PELVIS WITH INTRAVENOUS CONTRAST INDICATION:   tachycardia, hypotension, recent dx of pe and fall today  COMPARISON:  8/27/2022, 8/21/2022  TECHNIQUE:  CT examination of the chest, abdomen and pelvis was performed  Thin section CT angiographic technique was used in the chest in order to evaluate for pulmonary embolus and coronal 3D MIP postprocessing was performed on the acquisition scanner  Axial, sagittal, and coronal 2D reformatted images were created from the source data and submitted for interpretation  Radiation dose length product (DLP) for this visit:  1339 14 mGy-cm   This examination, like all CT scans performed in the [facility], was performed utilizing techniques to minimize radiation dose exposure, including the use of iterative reconstruction and automated exposure control  IV Contrast:  100 mL of iohexol (OMNIPAQUE) Enteric Contrast:  Enteric contrast was not administered  FINDINGS: CHEST PULMONARY ARTERIAL TREE:  Saddle embolus identified extending into the left main pulmonary artery and left lower lobe branches  Small filling defect left upper lobe extending to subsegmental vessels  Right basilar pulmonary artery filling defect also extending into subsegmental vessels  LUNGS:  Lungs are clear  There is no tracheal or endobronchial lesion  PLEURA:  Unremarkable  HEART/AORTA:  Heart is unremarkable for patient's age  No thoracic aortic aneurysm  MEDIASTINUM AND MADAN:  Unremarkable  CHEST WALL AND LOWER NECK:  Unremarkable   ABDOMEN LIVER/BILIARY TREE:  Enlarged fatty liver with scattered probable cysts noted  GALLBLADDER:  Gallbladder is surgically absent  SPLEEN:  Unremarkable  PANCREAS:  Unremarkable  ADRENAL GLANDS:  Unremarkable  KIDNEYS/URETERS:  Right greater than left parapelvic cortical cysts noted  STOMACH AND BOWEL:  Unremarkable  APPENDIX:  No findings to suggest appendicitis  ABDOMINOPELVIC CAVITY:  No ascites  No pneumoperitoneum  No lymphadenopathy  VESSELS:  Unremarkable for patient's age  PELVIS REPRODUCTIVE ORGANS:  Unremarkable for patient's age  URINARY BLADDER:  Massive distention of the bladder  ABDOMINAL WALL/INGUINAL REGIONS:  Unremarkable  OSSEOUS STRUCTURES:  No acute fracture or destructive osseous lesion  Impression: 1  Saddle embolus and left greater than right pulmonary arterial filling defects consistent with PE (high clot burden) with evidence of RV strain including an RV/LV ratio = 1 2  This is greater than 0 9, which is abnormal and indicates right heart strain  An abnormal RV/LV ratio has been shown to be associated with an increased risk of 30 day mortality in the setting of acute pulmonary embolism  Urgent consultation with the medical critical care team is recommended  2   Massive distention of the bladder  3   No acute posttraumatic injury  I personally discussed this study with Roly Guillen on 9/22/2022 at 12:27 PM  Workstation performed: [id number]     VAS upper limb venous duplex scan, unilateral/limited    Result Date: 9/24/2022  Narrative:  [facility] REPORT CLINICAL: Indications: Patient presents with right upper extremity edema  Operative History: 2022-09-22 IR PE Endovascular Therapy Risk Factors The patient has no history of Obesity  FINDINGS:  Right                      Impression  Cephalic Upper Arm Distal  Thrombus       CONCLUSION:  Impression: RIGHT UPPER LIMB: No evidence of acute or chronic deep vein thrombosis  Superficial thrombophlebitis noted in the cephalic vein at the distal upper arm   Doppler evaluation shows a normal response to augmentation maneuvers  LEFT UPPER LIMB LIMITED: Evaluation shows no evidence of thrombus in the internal jugular vein, subclavian vein, and the brachiocephalic vein  SIGNATURE: Electronically Signed by: Lizz Herrera MD, [address] on 2022-09-24 03:54:50 PM    VAS lower limb venous duplex study, complete bilateral    Result Date: 9/23/2022  Narrative:  [facility] REPORT CLINICAL: Indications: MICU patient presents with recent discovery of pulmonary embolism  Physician wants to determine propagation of previously noted DVT in the right popliteal and peroneal veins  discovered on 8/19/22  Operative History: 2022-09-22 IR PE Endovascular Therapy Risk Factors The patient has history of DVT  FINDINGS:  Segment         Right                   Left                            Impression              Impression              Ext_Iliac       Not Visualized                                  CFV             Non Occlusive Thrombus                          PFV             Non Occlusive Thrombus                          FV Prox         Non Occlusive Thrombus                          FV Mid          Non Occlusive Thrombus                          FV Dist         Non Occlusive Thrombus                          GSV Inguinal    Occlusive Subsegmental                          GSV Prox Thigh  Occlusive Subsegmental                          GSV Mid Thigh   Occlusive Subsegmental                          GSV Dist Thigh  Occlusive Subsegmental                          GSV Knee        Occlusive Subsegmental                          Peroneal        Non Occlusive Thrombus  Occlusive Subsegmental  Popliteal       Occlusive Subsegmental                          PostTibial      Non Occlusive Thrombus  Occlusive Subsegmental     CONCLUSION: Impression: RIGHT LOWER LIMB: Evidence of acute deep vein thrombosis identified in the common femoral, deep femoral, femoral, popliteal, posterior tibial and peroneal veins   Evidence of superficial thrombophlebitis noted in the saphenofemoral junction and in the great saphenous vein from the knee to the proximal thigh  Popliteal, posterior tibial and anterior tibial arterial Doppler waveforms are triphasic  LEFT LOWER LIMB: Evidence of acute deep vein thrombosis identified in the posterior tibial and peroneal veins  No evidence of superficial thrombophlebitis noted  Doppler evaluation shows a normal response to augmentation maneuvers  Popliteal, posterior tibial and anterior tibial arterial Doppler waveforms are triphasic  In comparison to the study of 8/19/22 , there is propagation of DVT and SVT in the right lower extremity and there is new DVT in the left PTV and Peroneal veins  Technical findings shared with Dr Lalo Boyd and posted in [facility]  SIGNATURE: Electronically Signed by: Sanya Washington MD, RPVI on 2022-09-23 03:17:16 PM    CT abdomen pelvis w contrast    Result Date: 10/1/2022  Narrative: CT ABDOMEN AND PELVIS WITH IV CONTRAST INDICATION:   Abdominal abscess/infection suspected Peristent diarrhea, worsening leukpenia and bandemia  Previous CT showed contained perforation, rule out abscess  COMPARISON:  CT 9/30/2022 TECHNIQUE:  CT examination of the abdomen and pelvis was performed  Axial, sagittal, and coronal 2D reformatted images were created from the source data and submitted for interpretation  Radiation dose length product (DLP) for this visit:  328 13 mGy-cm   This examination, like all CT scans performed in the [facility], was performed utilizing techniques to minimize radiation dose exposure, including the use of iterative  reconstruction and automated exposure control  IV Contrast:  98 mL of iohexol (OMNIPAQUE) Enteric Contrast:  Enteric contrast was administered  FINDINGS: ABDOMEN LOWER CHEST:  Small bilateral pleural effusions and bibasilar atelectasis are present   LIVER/BILIARY TREE:  Cyst and hypoattenuating foci that are too small to characterize but likely represent cysts are again seen  GALLBLADDER:  No calcified gallstones  No pericholecystic inflammatory change  SPLEEN:  Unremarkable  PANCREAS:  Unremarkable  ADRENAL GLANDS:  Unremarkable  KIDNEYS/URETERS:  Bilateral cysts are again seen  No hydronephrosis  STOMACH AND BOWEL:  Colonic wall thickening extending from the hepatic flexure through the sigmoid colon and mild wall thickening of rectum is again seen, consistent with colitis  2 foci of walled off slightly rim-enhancing fluid and air collection adjacent to the proximal descending colon are again seen and appear stable, measuring approximately 1 1 x 0 8 cm and 1 4 x 0 9 cm (series 601 image 58)  No new foci concerning for perforation identified  There are no dilated small bowel loops  APPENDIX:  No findings to suggest appendicitis  ABDOMINOPELVIC CAVITY:  No ascites  No pneumoperitoneum  No lymphadenopathy  VESSELS:  Inferior vena cava filter is in place  PELVIS REPRODUCTIVE ORGANS:  Unremarkable for patient's age  URINARY BLADDER:  The bladder is decompressed by Crawley catheter  ABDOMINAL WALL/INGUINAL REGIONS:  Unremarkable  OSSEOUS STRUCTURES:  No acute fracture or destructive osseous lesion  Impression: 1  Pancolitis with stable contained perforation adjacent to proximal descending colon  No additional fluid collections identified  2   Small bilateral pleural effusions with bibasilar atelectasis  Workstation performed: [id number]     IR IVC filter placement optional/temporary    Result Date: 9/26/2022  Narrative: PROCEDURE: Inferior vena cava filter placement STAFF: QI Dominguez  CONTRAST: 20 mL Omnipaque iv  FLUOROSCOPY TIME: 1 7 minutes  NUMBER OF IMAGES: Multiple  COMPLICATIONS: None  MEDICATIONS: 1% lidocaine Moderate sedation was monitored by radiology nursing staff  Monitoring of conscious sedation time totaled 0 minutes   INDICATION: Coagulation failure in the setting of central saddle PE PROCEDURE: Via a right internal jugular vein approach, a diagnostic inferior vena cavagram was obtained to evaluate for caval thrombus, renal vein anatomy/anomalies and location, caval anatomy/anomalies and diameter  Following the diagnostic study, a Rhiannon retrievable inferior vena cava filter was deployed below the lowest renal vein  Post placement radiograph demonstrated good position  The sheath was removed and compression applied to the puncture site until hemostasis was achieved  FINDINGS: 1   Real-time ultrasound showed the right internal jugular vein to be anechoic and freely compressible  2   Diagnostic inferior vena cavagram showed no caval or renal vein anomalies, no caval thrombus, and a caval diameter under 28 mm  3   Final fluoroscopic image of the inferior vena cava filter to be in good position  Impression: Placement of a temporary inferior vena cava filter  PLAN: This filter can be removed at any time in the future  Interventional Radiology will follow this patient to ensure removal when it is no longer needed  Workstation performed: [id number]     IR PE endovascular therapy    Result Date: 9/26/2022  Narrative: PROCEDURE: Mechanical PE thrombectomy with extirpation of clot 1  Ultrasound-guided right femoral vein access  2   Left pulmonary artery angiogram  3   Mechanical thrombectomy with extirpation of clot from the left common artery  4   Pulmonary arterial pressure measurement  STAFF: QI Dominguez  CONTRAST: 85 mL Visipaque intravenous  FLUOROSCOPY TIME: 23 8 minutes  NUMBER OF IMAGES: Multiple  COMPLICATIONS: None  MEDICATIONS: Sedation was provided in the form of monitored anesthesia care by the anesthesia service  Please see their associated records  INDICATION: 79-year-old female with saddle pulmonary embolus, primarily located in the left pulmonary artery and branches  PROCEDURE: Real-time ultrasound guidance was used to access the right femoral vein  An image was stored in PACS    A sheath was placed and a pigtail catheter and Bentson wire were used to select the pulmonary artery  The pigtail catheter was exchanged for a glide catheter which was used to select a distal branch in the left pulmonary artery  The wire was exchanged for an Amplatz wire  The catheter was removed over the wire and following serial dilation the vascular sheath was exchanged for a 24 [place] vascular sheath  The entire closure device was advanced over the wire into the main pulmonary artery  Pulmonary arterial pressures were recorded  Prethrombectomy pulmonary artery pressure was 25/8 (15) mm Hg  Mechanical thrombectomy of the left pulmonary artery was performed with extirpation of large clot  Due to the size of clot the Inari catheter and sheath had to be removed from the patient's body and following removal of clot from the sheath and catheter the sheath was advanced into the IVC and the flowtriever was advanced over the wire back into the pulmonary artery  Repeat pulmonary artery angiogram demonstrated no significant residual clot  Postoperatively pulmonary artery pressure was 23/7 (13) mm Hg  At this point the decision to end the procedure was made  All catheters and wires removed from the patient's body  A pursestring suture and manual compression was used to obtain hemostasis of the right groin  A sterile dressing was applied  Impression: 1  Technically successful mechanical thrombectomy of the left pulmonary artery with extirpation of large clot  Workstation performed: [id number]     IR PICC line placement single lumen (preferred for home anitbiotics/medications)    Result Date: 9/28/2022  Narrative: PERCUTANEOUSLY INSERTED CENTRAL VENOUS CATHETER PLACEMENT HISTORY:   Recent PE thrombectomy, filter placement, difficult IV access and need frequent blood draws FLUORO TIME:  9 minutes NUMBER OF IMAGES:  3 PROCEDURE:  The patient was brought to the Interventional Radiology Suite and identified verbally and by wrist band    The left brachial vein was evaluated as a potential access site with ultrasound  The vessel was found to be patent and compressible  The site was prepped and draped in the usual sterile fashion  All elements of maximal sterile barrier technique, cap and mask and sterile gown and sterile gloves and sterile full-body drape and hand hygiene and 2% chlorhexidine for cutaneous antisepsis  Sterile ultrasound technique with sterile gel and sterile probe covers was also utilized  Lidocaine was administered to the skin and a small skin incision was made  Under ultrasound guidance, utilizing sterile ultrasound technique with sterile gel and a sterile probe cover, the brachial vein was accessed using single wall Seldinger technique  Static images of real time needle entry into the vessel were obtained  This was followed by a  018 guidewire and a sheath and dilator tapered to   018  A peripherally inserted central venous catheter was then advanced under fluoroscopic guidance to the cavoatrial junction  The catheter was cut at 37 cm with 0 cm external length  The position was confirmed fluoroscopically  Blood could be freely aspirated and heparinized saline injected  Patient experienced no untoward reactions  Impression: 1  Status post placement of a peripherally inserted central venous catheter via the brachial vein with its tip at the cavoatrial junction under ultrasound and fluoroscopic guidance  Signed, performed, and dictated by BROOKE Weiss under the supervision of Dr Gant [facility]  Workstation performed: [id number]     Echo follow up/limited w/ contrast if indicated    Result Date: 9/23/2022  Narrative: •  Left Ventricle: The left ventricular ejection fraction is 70%  Systolic function is vigorous  Wall motion cannot be accurately assessed  •  Right Ventricle: Right ventricular cavity size is normal  Systolic function is normal  •  Tricuspid Valve: There is mild regurgitation   The right ventricular systolic pressure is normal  The estimated right ventricular systolic pressure is 70 50 mmHg  •  Only imaging window was the subcostal window  Limited and technically difficult study  EKG personally reviewed by Meg Don MD      Counseling / Coordination of Care  Total floor / unit time spent today 30 minutes  Greater than 50% of total time was spent with the patient and / or family counseling and / or coordination of care

## 2022-10-02 NOTE — PROGRESS NOTES
[facility] Internal Medicine Progress Note  Patient: Gabriel Denver Clunk 79 y o  female   MRN: [id number]  PCP: Zachary Meléndez DO  Unit/Bed#: [facility] 812-87 Encounter: [id number]  Date Of Visit: 10/02/22    Assessment:    Principal Problem:    Saddle embolus of pulmonary artery (HCC)  Active Problems:    Metastatic breast cancer ([facility] )    Anxiety    Mixed hyperlipidemia    Primary hypertension    Toxic gastroenteritis and colitis    REYES (acute kidney injury) ([facility] )    Anemia    Tachycardia    Urinary retention    Swelling of lower extremity      Plan:    · Saddle PE  · Toxic gastroenteritis and colitis -> suspicion of underlying IBD as per GI  · C diff infection  · Tachycardia  · Hypotension  · REYES  · Anemia  · Urinary retention  · 'metastatic breast cancer     · For her saddle PEs she is status post thrombectomy with IR   With concerns for possible Xarelto failure, she is being maintained on therapeutic Lovenox   Suspected underlying malignancy is contributing to her hypercoagulable state  · She was undergoing workup outpatient with GI due to her diarrhea, there were concerns that she has some underlying IBD   Initially suspected to be from verzenio given for breat cancer Rx but now GI suspects that she probably has underlying IBD   Due to her profuse diarrhea, C diff was tested, PCR positive, EIA negative   Being treated as C diff infection with oral vancomycin 125 mg q 6   Overall there is improvement in her bowel movements compared to before   Suspect her bowel movements are still contributing to her hypokalemia a probably hypovolemia and hence hypotension and tachycardia   Discussed with GI, for now continue current dose of vancomycin anticipate ongoing improvement in next 2-3 days   GI has been in discussion with patient's daughter and offered her Remicade for IBD treatment the daughter concerned about side effects and costing and insurance coverage and does not want to commit without conforming that   Now she agreed & remicaid given on 10/1  ·  - noted lower WBC, worsening bands 35% hence proceeded with CT A/P - which shows worsening rectosigmoiditis -> d/w GI they will talk to daughter but since she was refusing remicaid GI may recommend surgical evaluation  ID also consulted to comment on if this is infection related -> re check CT with contrast -> nothing new, contained perfs  GI suspects its IBD, per ID no evidence of infection - > will complete 10 days PO Vanco, remicaid given on 10/1 per GI  · Due elevated HR -> gave IVF  Now BP better, BB being increased further by cardio  · Continue Crawley catheter  · Previous treatments for breast cancer as below, currently patient not opting for treatment   Palliative Care following - provided post form to daughter to review to fill it  Ultimate plan is for patient to go to rehab, cm working with daughter on same       VTE Pharmacologic Prophylaxis:     Moderate Risk (Score 3-4) - Pharmacological DVT Prophylaxis Ordered: Enoxaparin (Lovenox)  Mechanical VTE Prophylaxis in Place: Yes    Patient Centered Rounds: I have performed bedside rounds with nursing staff today  Discussions with Specialists or Other Care Team Provider:     Education and Discussions with Family / Patient: Updated  (daughter) via phone  Time Spent for Care: 30 minutes  More than 50% of total time spent on counseling and coordination of care as described above  Current Length of Stay: 10 day(s)  Current Patient Status: Inpatient     Certification Statement: The patient will continue to require additional inpatient hospital stay due to monitor BMs, response to remicaid    Discharge Plan / Estimated Discharge Date: Anticipate discharge in 48-72 hrs to rehab facility      Code Status: Level 3 - DNAR and DNI      Subjective:   She feels ok, no major complains, no abdo pain/n/v    Objective:     Vitals:   Temp (24hrs), Av °F (36 7 °C), Min:97 9 °F (36 6 °C), Max:98 1 °F (36 7 °C)    Temp: [97 9 °F (36 6 °C)-98 1 °F (36 7 °C)] 97 9 °F (36 6 °C)  HR:  [] 99  Resp:  [20] 20  BP: (113-121)/(51-60) 115/58  SpO2:  [91 %-97 %] 91 %  Body mass index is 27 45 kg/m²  Input and Output Summary (last 24 hours): Intake/Output Summary (Last 24 hours) at 10/2/2022 1556  Last data filed at 10/2/2022 0603  Gross per 24 hour   Intake --   Output 1250 ml   Net -1250 ml       Physical Exam:   Physical Exam     Additional Data:     Labs:  Results from last 7 days   Lab Units 10/01/22  0618 09/30/22  0509 09/28/22  0424   WBC Thousand/uL 4 18* 2 64* 3 10*   HEMOGLOBIN g/dL 8 5* 8 1* 7 9*   HEMATOCRIT % 27 6* 26 0* 25 4*   PLATELETS Thousands/uL 108* 72* 50*   BANDS PCT %  --  35*  --    NEUTROS PCT %  --   --  75   LYMPHS PCT %  --   --  14   LYMPHO PCT %  --  1*  --    MONOS PCT %  --   --  9   MONO PCT %  --  5  --    EOS PCT %  --  0 2     Results from last 7 days   Lab Units 10/01/22  0618 09/28/22  0424 09/27/22  0427   SODIUM mmol/L 136   < > 142   POTASSIUM mmol/L 3 5   < > 3 3*   CHLORIDE mmol/L 105   < > 112*   CO2 mmol/L 27   < > 24   BUN mg/dL 9   < > 11   CREATININE mg/dL 0 52*   < > 0 50*   ANION GAP mmol/L 4   < > 6   CALCIUM mg/dL 7 6*   < > 7 2*   ALBUMIN g/dL  --   --  2 2*   TOTAL BILIRUBIN mg/dL  --   --  0 65   ALK PHOS U/L  --   --  47   ALT U/L  --   --  15   AST U/L  --   --  9   GLUCOSE RANDOM mg/dL 71   < > 90    < > = values in this interval not displayed  Results from last 7 days   Lab Units 10/01/22  0618 09/30/22  0509 09/25/22  2358   PROCALCITONIN ng/ml 0 38* 0 25 0 19       Imaging: No pertinent imaging reviewed      Recent Cultures (last 7 days):           Lines/Drains:  Invasive Devices  Report    Peripherally Inserted Central Catheter Line  Duration           PICC Line 09/26/22 6 days          Drain  Duration           Urethral Catheter 18 Fr  8 days    Rectal Tube With balloon 1 day                Telemetry:        Last 24 Hours Medication List:   Current Facility-Administered Medications   Medication Dose Route Frequency Provider Last Rate   • atorvastatin  40 mg Oral After [address], DO     • cholecalciferol  2,000 Units Oral Daily Nazia Noon, DO     • enoxaparin  1 mg/kg Subcutaneous Q12H [facility] Nazia Noon, DO     • folic acid  1 mg Oral Daily Nazia Noon, DO     • iohexol  50 mL Oral 90 min pre-procedure Segun Argueta, DO     • metoprolol  2 5 mg Intravenous Q6H PRN Yajaira Lynn MD     • metoprolol succinate  75 mg Oral BID BROOKE Rodrigues     • midodrine  2 5 mg Oral TID AC Yajaira Lynn MD     • mirtazapine  7 5 mg Oral HS Jesrey Camacho, DO     • pantoprazole  40 mg Oral BID AC Nazia Noon, DO     • traMADol  50 mg Oral Once PRN Gretchen Larson PA-C     • vancomycin  125 mg Oral Q6H [address], DO          Today, Patient Was Seen By: Yajaira Lynn    ** Please Note: This note has been constructed using a voice recognition system   **

## 2022-10-03 ENCOUNTER — TELEPHONE (OUTPATIENT)
Dept: GASTROENTEROLOGY | Facility: CLINIC | Age: 82
End: 2022-10-03

## 2022-10-03 DIAGNOSIS — K51.011 ULCERATIVE PANCOLITIS WITH RECTAL BLEEDING (HCC): Primary | ICD-10-CM

## 2022-10-03 LAB
ANION GAP SERPL CALCULATED.3IONS-SCNC: 4 MMOL/L (ref 4–13)
BASOPHILS # BLD AUTO: 0.01 THOUSANDS/ΜL (ref 0–0.1)
BASOPHILS NFR BLD AUTO: 0 % (ref 0–1)
BUN SERPL-MCNC: 10 MG/DL (ref 5–25)
CALCIUM SERPL-MCNC: 7.2 MG/DL (ref 8.3–10.1)
CHLORIDE SERPL-SCNC: 107 MMOL/L (ref 96–108)
CO2 SERPL-SCNC: 27 MMOL/L (ref 21–32)
CREAT SERPL-MCNC: 0.61 MG/DL (ref 0.6–1.3)
EOSINOPHIL # BLD AUTO: 0.1 THOUSAND/ΜL (ref 0–0.61)
EOSINOPHIL NFR BLD AUTO: 2 % (ref 0–6)
ERYTHROCYTE [DISTWIDTH] IN BLOOD BY AUTOMATED COUNT: 16.3 % (ref 11.6–15.1)
GFR SERPL CREATININE-BSD FRML MDRD: 84 ML/MIN/1.73SQ M
GLUCOSE SERPL-MCNC: 81 MG/DL (ref 65–140)
HCT VFR BLD AUTO: 28.7 % (ref 34.8–46.1)
HGB BLD-MCNC: 8.8 G/DL (ref 11.5–15.4)
IMM GRANULOCYTES # BLD AUTO: 0.05 THOUSAND/UL (ref 0–0.2)
IMM GRANULOCYTES NFR BLD AUTO: 1 % (ref 0–2)
LYMPHOCYTES # BLD AUTO: 0.74 THOUSANDS/ΜL (ref 0.6–4.47)
LYMPHOCYTES NFR BLD AUTO: 15 % (ref 14–44)
MCH RBC QN AUTO: 29.9 PG (ref 26.8–34.3)
MCHC RBC AUTO-ENTMCNC: 30.7 G/DL (ref 31.4–37.4)
MCV RBC AUTO: 98 FL (ref 82–98)
MONOCYTES # BLD AUTO: 0.41 THOUSAND/ΜL (ref 0.17–1.22)
MONOCYTES NFR BLD AUTO: 8 % (ref 4–12)
NEUTROPHILS # BLD AUTO: 3.79 THOUSANDS/ΜL (ref 1.85–7.62)
NEUTS SEG NFR BLD AUTO: 74 % (ref 43–75)
NRBC BLD AUTO-RTO: 0 /100 WBCS
PLATELET # BLD AUTO: 182 THOUSANDS/UL (ref 149–390)
PMV BLD AUTO: 10.8 FL (ref 8.9–12.7)
POTASSIUM SERPL-SCNC: 3.5 MMOL/L (ref 3.5–5.3)
RBC # BLD AUTO: 2.94 MILLION/UL (ref 3.81–5.12)
SODIUM SERPL-SCNC: 138 MMOL/L (ref 135–147)
WBC # BLD AUTO: 5.1 THOUSAND/UL (ref 4.31–10.16)

## 2022-10-03 PROCEDURE — 99232 SBSQ HOSP IP/OBS MODERATE 35: CPT | Performed by: INTERNAL MEDICINE

## 2022-10-03 PROCEDURE — 80048 BASIC METABOLIC PNL TOTAL CA: CPT | Performed by: HOSPITALIST

## 2022-10-03 PROCEDURE — 97535 SELF CARE MNGMENT TRAINING: CPT

## 2022-10-03 PROCEDURE — 97530 THERAPEUTIC ACTIVITIES: CPT

## 2022-10-03 PROCEDURE — 97164 PT RE-EVAL EST PLAN CARE: CPT

## 2022-10-03 PROCEDURE — 99232 SBSQ HOSP IP/OBS MODERATE 35: CPT | Performed by: HOSPITALIST

## 2022-10-03 PROCEDURE — 99231 SBSQ HOSP IP/OBS SF/LOW 25: CPT | Performed by: INTERNAL MEDICINE

## 2022-10-03 PROCEDURE — 97116 GAIT TRAINING THERAPY: CPT

## 2022-10-03 PROCEDURE — 85025 COMPLETE CBC W/AUTO DIFF WBC: CPT | Performed by: HOSPITALIST

## 2022-10-03 RX ORDER — POTASSIUM CHLORIDE 20 MEQ/1
40 TABLET, EXTENDED RELEASE ORAL ONCE
Status: COMPLETED | OUTPATIENT
Start: 2022-10-03 | End: 2022-10-03

## 2022-10-03 RX ORDER — METHYLPREDNISOLONE SODIUM SUCCINATE 40 MG/ML
40 INJECTION, POWDER, LYOPHILIZED, FOR SOLUTION INTRAMUSCULAR; INTRAVENOUS ONCE
Status: CANCELLED | OUTPATIENT
Start: 2022-10-03

## 2022-10-03 RX ORDER — DIPHENHYDRAMINE HCL 25 MG
25 TABLET ORAL ONCE
Status: CANCELLED | OUTPATIENT
Start: 2022-10-03

## 2022-10-03 RX ORDER — SODIUM CHLORIDE 9 MG/ML
20 INJECTION, SOLUTION INTRAVENOUS ONCE
Status: CANCELLED | OUTPATIENT
Start: 2022-10-03

## 2022-10-03 RX ORDER — ACETAMINOPHEN 325 MG/1
650 TABLET ORAL ONCE
Status: CANCELLED | OUTPATIENT
Start: 2022-10-03

## 2022-10-03 RX ADMIN — Medication 125 MG: at 17:16

## 2022-10-03 RX ADMIN — Medication 125 MG: at 05:33

## 2022-10-03 RX ADMIN — PANTOPRAZOLE SODIUM 40 MG: 40 TABLET, DELAYED RELEASE ORAL at 17:16

## 2022-10-03 RX ADMIN — ENOXAPARIN SODIUM 70 MG: 80 INJECTION SUBCUTANEOUS at 22:03

## 2022-10-03 RX ADMIN — Medication 125 MG: at 23:52

## 2022-10-03 RX ADMIN — ATORVASTATIN CALCIUM 40 MG: 40 TABLET, FILM COATED ORAL at 17:16

## 2022-10-03 RX ADMIN — Medication 2000 UNITS: at 09:16

## 2022-10-03 RX ADMIN — METOPROLOL SUCCINATE 75 MG: 50 TABLET, EXTENDED RELEASE ORAL at 22:03

## 2022-10-03 RX ADMIN — ENOXAPARIN SODIUM 70 MG: 80 INJECTION SUBCUTANEOUS at 09:16

## 2022-10-03 RX ADMIN — POTASSIUM CHLORIDE 40 MEQ: 1500 TABLET, EXTENDED RELEASE ORAL at 05:32

## 2022-10-03 RX ADMIN — FOLIC ACID 1 MG: 1 TABLET ORAL at 09:16

## 2022-10-03 RX ADMIN — CARBIDOPA AND LEVODOPA 2.5 MG: 50; 200 TABLET, EXTENDED RELEASE ORAL at 05:32

## 2022-10-03 RX ADMIN — MIRTAZAPINE 7.5 MG: 15 TABLET, FILM COATED ORAL at 22:03

## 2022-10-03 RX ADMIN — CARBIDOPA AND LEVODOPA 2.5 MG: 50; 200 TABLET, EXTENDED RELEASE ORAL at 17:16

## 2022-10-03 RX ADMIN — CARBIDOPA AND LEVODOPA 2.5 MG: 50; 200 TABLET, EXTENDED RELEASE ORAL at 12:56

## 2022-10-03 RX ADMIN — PANTOPRAZOLE SODIUM 40 MG: 40 TABLET, DELAYED RELEASE ORAL at 05:32

## 2022-10-03 RX ADMIN — METOPROLOL SUCCINATE 75 MG: 50 TABLET, EXTENDED RELEASE ORAL at 09:16

## 2022-10-03 RX ADMIN — MAGNESIUM OXIDE TAB 400 MG (241.3 MG ELEMENTAL MG) 400 MG: 400 (241.3 MG) TAB at 09:17

## 2022-10-03 RX ADMIN — Medication 125 MG: at 12:56

## 2022-10-03 NOTE — TELEPHONE ENCOUNTER
Patient received first dose of Remicade inpatient on 10/1/2022   Will schedule remaining loading doses at [facility]

## 2022-10-03 NOTE — PROGRESS NOTES
Progress Note -  [facility]  Sheran Collet Clunk 79 y o  female MRN: [id number]  Unit/Bed#: [facility] 406-01 Encounter: [id number]      ASSESSMENT AND PLAN:      Pt is a 79year old female with history of stage IV breast cancer with post radiation chemotherapy who underwent colonoscopy and flex sig showing significant colitis in the distal sigmoid previously treated for IBD presented with diarrhea and saddle pulmonary embolism status post IR thrombectomy and IVC filter       1  C Diff Colitis   - Positive PCR , negative for toxin  - Can finish PO Vancomycin 125mg q6 course     2  Inflammatory Bowel Disease  - Colonoscopy 10/21 and flex sig 8/20 showed active colitis; treated with mesalamine/hydrocortisone enemas started on prednisone  August 2022 for 4-5 weeks  - CRP elevated 111 this admission down to 74 6 (10/1), previously 10  1(9/1/22) and 122 4 (8/29)  -calprotectin 09/24/2022 - in the 3000s  -  previously required multiple transfusions for hemoglobin less than 7, currently stable the past 4 days hb 8 5 (10/3)  - previous on 40mg prednisone; tapered down to 10mg   - started on Remicade 9/30  - further workup and treatment outpatient and repeat colonoscopy for persistent colitis        Rest of care per primary team   ______________________________________________________________________    Subjective:  Patient seen and examined  Denies any overnight events  Patient reports doing well and reports continued to have bowel movements through rectal tube  She denies fatigue or lightheadedness  Stool continues to be lose brown in color with no melena or bright red seen in rectal tube bag  REVIEW OF SYSTEMS:    Review of Systems   Constitutional: Negative for chills and fever  Respiratory: Negative for cough, shortness of breath and wheezing  Cardiovascular: Negative for chest pain and leg swelling  Gastrointestinal: Positive for diarrhea  Negative for abdominal pain, constipation and vomiting  Neurological: Negative for dizziness and light-headedness  Historical Information   Past Medical History:   Diagnosis Date   • Abnormal weight loss    • Allergic reaction    • Anxiety    • Breast cancer, right [facility]) 2011    right   • Cancer ([facility] ) 2012   • Candidal vulvovaginitis    • Clotting disorder ([facility] ) Same as above   • Endometrial hyperplasia    • Epithelial cyst     benign, ovary   • GI (gastrointestinal bleed) Blood mixed with stool   • History of radiation therapy 2011    right breast cancer   • Hyperlipidemia    • Hypertension    • Internal hemorrhoids    • Knee tendonitis    • Ovarian cyst    • 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[facility])      Past Surgical History:   Procedure Laterality Date   • BILATERAL SALPINGOOPHORECTOMY      onset: 12   • BREAST BIOPSY Right 2006    benign   • BREAST BIOPSY Right 2011    malignant   • BREAST LUMPECTOMY Right 2011    malignant   • CATARACT EXTRACTION W/  INTRAOCULAR LENS IMPLANT Right     phacoemulsification   Onset: 10/27/14   • CHOLECYSTECTOMY     • COLONOSCOPY  2017    approx   • HYSTERECTOMY  Yes   • INTRAOPERATIVE RADIATION THERAPY ([facility])     • IR BIOPSY BONE  2021   • IR IVC FILTER PLACEMENT OPTIONAL/TEMPORARY  2022   • IR PE ENDOVASCULAR THERAPY  2022   • IR PICC PLACEMENT SINGLE LUMEN  2022   • IR PICC PLACEMENT SINGLE LUMEN  2022   • SKIN LESION EXCISION Right     breast, single lesion   • TONSILLECTOMY AND ADENOIDECTOMY       Social History   Social History     Substance and Sexual Activity   Alcohol Use Not Currently    Comment: (history)     Social History     Substance and Sexual Activity   Drug Use No     Social History     Tobacco Use   Smoking Status Former Smoker   • Packs/day: 1 00   • Years: 30 00   • Pack years: 30 00   • Types: Cigarettes   • Start date: 55   • Quit date: 0   • Years since quittin 7   Smokeless Tobacco Never Used     Family History   Problem Relation Age of Onset   • Stomach cancer Mother    • No Known Problems Father    • Cervical cancer Sister    • No Known Problems Daughter    • No Known Problems Maternal Grandmother    • No Known Problems Maternal Grandfather    • No Known Problems Paternal Grandmother    • No Known Problems Paternal Grandfather    • Colon polyps Neg Hx    • Colon cancer Neg Hx        Meds/Allergies     Medications Prior to Admission   Medication   • amitriptyline (ELAVIL) 25 mg tablet   • Cholecalciferol (VITAMIN D3) 2000 units capsule   • cholestyramine sugar free (QUESTRAN LIGHT) 4 g packet   • diphenoxylate-atropine (LOMOTIL) 2 5-0 025 mg per tablet   • folic acid (FOLVITE) 1 mg tablet   • loperamide (IMODIUM) 2 mg capsule   • metoprolol tartrate (LOPRESSOR) 50 mg tablet   • mirtazapine (REMERON) 7 5 MG tablet   • pantoprazole (PROTONIX) 40 mg tablet   • predniSONE 20 mg tablet   • rivaroxaban (XARELTO) 15 mg tablet   • rivaroxaban (Xarelto) 20 mg tablet   • simethicone (MYLICON) 80 mg chewable tablet   • simvastatin (ZOCOR) 10 mg tablet     Current Facility-Administered Medications   Medication Dose Route Frequency   • atorvastatin (LIPITOR) tablet 40 mg  40 mg Oral After Dinner   • cholecalciferol (VITAMIN D3) tablet 2,000 Units  2,000 Units Oral Daily   • enoxaparin (LOVENOX) subcutaneous injection 70 mg  1 mg/kg Subcutaneous U79R RIA   • folic acid (FOLVITE) tablet 1 mg  1 mg Oral Daily   • iohexol (OMNIPAQUE) 240 MG/ML solution 50 mL  50 mL Oral 90 min pre-procedure   • magnesium oxide (MAG-OX) tablet 400 mg  400 mg Oral BID   • metoprolol (LOPRESSOR) injection 2 5 mg  2 5 mg Intravenous Q6H PRN   • metoprolol succinate (TOPROL-XL) 24 hr tablet 75 mg  75 mg Oral BID   • midodrine (PROAMATINE) tablet 2 5 mg  2 5 mg Oral TID AC   • mirtazapine (REMERON) tablet 7 5 mg  7 5 mg Oral HS   • pantoprazole (PROTONIX) EC tablet 40 mg  40 mg Oral BID AC   • traMADol (ULTRAM) tablet 50 mg  50 mg Oral Once PRN   • vancomycin (VANCOCIN) oral solution 125 mg  125 mg Oral Q6H [address]       Allergies   Allergen Reactions   • Sulfa Antibiotics    • Pneumococcal Polysaccharide Vaccine Rash and Edema           Objective     Blood pressure 121/55, pulse 96, temperature 97 8 °F (36 6 °C), temperature source Oral, resp  rate 17, height 5' 1" (1 549 m), weight 65 5 kg (144 lb 6 4 oz), SpO2 94 %, not currently breastfeeding  Body mass index is 27 28 kg/m²  Intake/Output Summary (Last 24 hours) at 10/3/2022 9307  Last data filed at 10/2/2022 2201  Gross per 24 hour   Intake 300 ml   Output 400 ml   Net -100 ml         PHYSICAL EXAM:      Physical Exam  Constitutional:       General: She is not in acute distress  Appearance: Normal appearance  She is normal weight  She is not ill-appearing or toxic-appearing  HENT:      Head: Normocephalic and atraumatic  Cardiovascular:      Rate and Rhythm: Normal rate and regular rhythm  Heart sounds: No murmur heard  No gallop  Pulmonary:      Effort: No respiratory distress  Breath sounds: No wheezing or rales  Abdominal:      General: Abdomen is flat  There is no distension  Palpations: Abdomen is soft  Tenderness: There is no abdominal tenderness  There is no guarding  Musculoskeletal:      Right lower leg: Edema present  Left lower leg: Edema present  Neurological:      Mental Status: She is alert and oriented to person, place, and time  Psychiatric:         Mood and Affect: Mood normal          Behavior: Behavior normal           Lab Results:   No results displayed because visit has over 200 results  Imaging Studies: I have personally reviewed pertinent imaging studies      Eran Guthrie Internal Medicine PGY-1

## 2022-10-03 NOTE — PLAN OF CARE
Problem: PHYSICAL THERAPY ADULT  Goal: Performs mobility at highest level of function for planned discharge setting  See evaluation for individualized goals  Description: Treatment/Interventions: Functional transfer training, LE strengthening/ROM, Therapeutic exercise, Endurance training, Bed mobility, Gait training, Spoke to nursing, OT  Equipment Recommended:  (r/o rw)       See flowsheet documentation for full assessment, interventions and recommendations  Outcome: Progressing  Note: Prognosis: Fair  Problem List: Decreased strength, Decreased endurance, Impaired balance, Decreased mobility, Pain  Assessment: Pt seen for session for re-eval as pt hadn't bee seen in multiple days due to med status, considreation for comfort measure and tachycardia, as well as mobility/treatment time  Pt continues to present w/ decreased functional mob, standing balance, endurance, B LE strength and coordination, barriers at home  Pt will benefit from skilled OT to correct for the above problems  Recommend rehab at d/c  Barriers to Discharge: Decreased caregiver support     PT Discharge Recommendation: Post acute rehabilitation services    See flowsheet documentation for full assessment

## 2022-10-03 NOTE — TELEPHONE ENCOUNTER
----- Message from Mustapha Cotto MD sent at 9/30/2022  6:03 PM EDT -----  Patient's daughter and family has decided to proceed with Remicade  If we could start the prior Auth that would be great    She will most likely get a dose inpatient    Dr Nikunj Avendaño is starting service on Monday and she can follow up with him as outpatient

## 2022-10-03 NOTE — PHYSICAL THERAPY NOTE
Physical Therapy Re-Evaluation and Treatment Note       10/03/22 0930   PT Last Visit   PT Visit Date 10/03/22   Note Type   Note Type Treatment  (re-eval, and treatment)   Pain Assessment   Pain Assessment Tool 0-10   Pain Score 5   Pain Location/Orientation   (sacral/buttocks)   Patient's Stated Pain Goal No pain   Hospital Pain Intervention(s) Ambulation/increased activity   Restrictions/Precautions   Weight Bearing Precautions Per Order No   Other Precautions Pain; Fall Risk;Multiple lines;Telemetry   General   Chart Reviewed Yes   Family/Caregiver Present No   Cognition   Overall Cognitive Status WFL   Arousal/Participation Responsive   Attention Attends with cues to redirect   Orientation Level Oriented X4   Memory Unable to assess   Following Commands Follows one step commands without difficulty   Subjective   Subjective states she feels OK  some pain  willing to mobilize/participate   Bed Mobility   Supine to Sit 4  Minimal assistance   Additional items Assist x 2; Increased time required   Additional Comments time spent for setup, reposiitoning  sat EOB x multiple minutes prior to transfer  fearful of falling     Transfers   Sit to Stand 3  Moderate assistance   Additional items Assist x 2   Stand to Sit 3  Moderate assistance   Additional items Assist x 1   Additional Comments time spent for reposiitoning to comfort after session   Ambulation/Elevation   Gait pattern   (slow, forward flexion, wide JANINA, short step length, antalgic)   Gait Assistance 3  Moderate assist   Additional items Assist x 1  (w/ 2 others for lines, chair follow)   Assistive Device Rolling walker   Distance 11'x1   Balance   Static Sitting Good   Dynamic Sitting Fair -   Static Standing Poor +   Dynamic Standing Poor +   Ambulatory Poor   Endurance Deficit   Endurance Deficit Yes   Activity Tolerance   Activity Tolerance Patient limited by fatigue;Patient limited by pain;Treatment limited secondary to medical complications (Comment)   Nurse Made Aware yes   Assessment   Prognosis Fair   Problem List Decreased strength;Decreased endurance; Impaired balance;Decreased mobility;Pain   Assessment Pt seen for session for re-eval as pt hadn't bee seen in multiple days due to med status, considreation for comfort measure and tachycardia, as well as mobility/treatment time  Pt continues to present w/ decreased functional mob, standing balance, endurance, B LE strength and coordination, barriers at home  Pt will benefit from skilled OT to correct for the above problems  Recommend rehab at d/c   Goals   Patient Goals to get better   STG Expiration Date 10/17/22   Short Term Goal #1 1-2 wks: bed mob ans transfers w/ S, standing balance to fair w/ device, ambulate  ft w/ RW and min A, increase B LE strength by 1/2 -1 grade   PT Treatment Day 0   Plan   Treatment/Interventions Functional transfer training;LE strengthening/ROM; Therapeutic exercise; Endurance training;Patient/family training;Equipment eval/education; Bed mobility;Gait training   Progress Progressing toward goals   PT Frequency 3-5x/wk   Recommendation   PT Discharge Recommendation Post acute rehabilitation services   AM-PAC Basic Mobility Inpatient   Turning in Bed Without Bedrails 3   Lying on Back to Sitting on Edge of Flat Bed 2   Moving Bed to Chair 2   Standing Up From Chair 1   Walk in Room 2   Climb 3-5 Stairs 1   Basic Mobility Inpatient Raw Score 11   Basic Mobility Standardized Score 30 25   Highest Level Of Mobility   JH-HLM Goal 4: Move to chair/commode   JH-HLM Achieved 6: Walk 10 steps or more   [place] PT, DPT CSRS

## 2022-10-03 NOTE — RESTORATIVE TECHNICIAN NOTE
Restorative Technician Note      Patient Name: Radha Silverman     Restorative Tech Visit Date: 10/3/2022  Note Type: Mobility  Patient Position Upon Consult: Bedside chair  Activity Performed: Transferred  Assistive Device: Other (Comment) (HHA x 2)  Patient Position at End of Consult: All needs within reach;  Supine

## 2022-10-03 NOTE — CASE MANAGEMENT
Case Management Discharge Planning Note    Patient name Marylen Panther Clunk  Location [facility] 406/PPHP 406-01 MRN [id number]  : 1940 Date 10/3/2022       Current Admission Date: 2022  Current Admission Diagnosis:Saddle embolus of pulmonary artery [facility])   Patient Active Problem List    Diagnosis Date Noted   • Saddle embolus of pulmonary artery ([facility] ) 2022   • Urinary retention 2022   • Swelling of lower extremity 2022   • Venous insufficiency 2022   • Tachycardia 2022   • Single subsegmental pulmonary embolism without acute cor pulmonale (HCC) 2022   • Severe protein-calorie malnutrition ([facility] ) 2022   • Anemia 2022   • Hypokalemia 2022   • REYES (acute kidney injury) ([place] [address] ) 2022   • Diarrhea 2022   • Secondary malignant neoplasm of bone ([place] [address] ) 2022   • Toxic gastroenteritis and colitis 2022   • Rectal bleeding 10/14/2021   • Lytic lesion of bone on x-ray 2021   • Neoplasm of uncertain behavior of sternum 2021   • Cough due to ACE inhibitor 2021   • Acute costochondritis 2021   • Osteopenia of multiple sites 2020   • Primary hypertension 10/22/2019   • Chronic kidney disease (CKD) stage G3a/A1, moderately decreased glomerular filtration rate (GFR) between 45-59 mL/min/1 73 square meter and albuminuria creatinine ratio less than 30 mg/g ([facility]) 2019   • Adverse reaction to pneumococcal vaccine 2015   • Cataract, bilateral 2014   • Pulmonary nodule seen on imaging study 09/10/2013   • Sleep disorder 2013   • Anxiety 2013   • Metastatic breast cancer ([facility] ) 10/17/2012   • Mixed hyperlipidemia 2012      LOS (days): 11  Geometric Mean LOS (GMLOS) (days): 4 40  Days to GMLOS:-6 6     OBJECTIVE:  Risk of Unplanned Readmission Score: 34 23         Current admission status: Inpatient   Preferred Pharmacy:   [address] - [address] [place] [address] [address]  Phone: [phone] Fax: [phone] [facility], [address] [place] [address]  [address]  [address] [place] [address]  Phone: [phone] Fax: [phone] [address] [place],[address] [place] [address] [address] [facility] [address] [place] [address] [facility] [address]  Phone: [phone] Fax: [phone]    Primary Care Provider: Efrain Parham DO    Primary Insurance: MEDICARE  Secondary Insurance: AETNA    DISCHARGE DETAILS:                                                                                                 Additional Comments: No response from any facilities  Today resent manually [facility], [employer] and [employer]  Awaiting responses

## 2022-10-03 NOTE — TELEPHONE ENCOUNTER
Can therapy plan for remicade please be updated  Patient needs week 2 and week 6 loading dose and then maintenance doses  Patient is due for week 2 on 10/17

## 2022-10-03 NOTE — PROGRESS NOTES
Progress note - Palliative and Supportive Care   Kimmy Silverman 79 y o  female [id number]    Assessment:   - Kimmy Silverman is a 79 y o  female with PMHx of stage IV breast cancer, CKD stage 3, saddle PE s/p thrombectomy with IVC filter placement, C diff colitis, IBD flare,   PSC has been consulted for goals of care discussion  Principal Problem:    Saddle embolus of pulmonary artery (HCC)  Active Problems:    Metastatic breast cancer ([facility] )    Anxiety    Mixed hyperlipidemia    Primary hypertension    Toxic gastroenteritis and colitis    [facility]    Anemia    Tachycardia    Urinary retention    Swelling of lower extremity      Plan:  1  Symptom management -   o As per primary care team   o Currently in no pain  2  Goals -   • Patient wants to continue PT which she believes it has helped her physically and mentally  • Plan to have family meeting at 6 o'clock tomorrow when daughter calls in  I can be contacted through [employer] for any questions regarding Kimmy Silverman's case  If there are any questions after business hours, you may reach out to the on call provider  Code Status: DNR/DNI - Level 3   Decisional apparatus:  Patient is competent on my exam today  If competence is lost, patient's substitute decision maker would default to daughter by [place] 169  Advance Directive / Living Will / POLST:  POLST completed  Interval history:       Kimmy Silverman was seen and examined in bedside chair this morning  Patient's family was not present at time of examination  Discussed overnight events with nursing staff  No acute event overnight  She is sitting on recliner, denies any pain in chest or abdomen  Decreased appetite  Able to walk with PT a few steps in the room      MEDICATIONS / ALLERGIES:     current meds:   Current Facility-Administered Medications   Medication Dose Route Frequency   • atorvastatin (LIPITOR) tablet 40 mg  40 mg Oral After Dinner   • cholecalciferol (VITAMIN D3) tablet 2,000 Units  2,000 Units Oral Daily   • enoxaparin (LOVENOX) subcutaneous injection 70 mg  1 mg/kg Subcutaneous I[address]   • folic acid (FOLVITE) tablet 1 mg  1 mg Oral Daily   • iohexol (OMNIPAQUE) 240 MG/ML solution 50 mL  50 mL Oral 90 min pre-procedure   • magnesium oxide (MAG-OX) tablet 400 mg  400 mg Oral BID   • metoprolol (LOPRESSOR) injection 2 5 mg  2 5 mg Intravenous Q6H PRN   • metoprolol succinate (TOPROL-XL) 24 hr tablet 75 mg  75 mg Oral BID   • midodrine (PROAMATINE) tablet 2 5 mg  2 5 mg Oral TID AC   • mirtazapine (REMERON) tablet 7 5 mg  7 5 mg Oral HS   • pantoprazole (PROTONIX) EC tablet 40 mg  40 mg Oral BID AC   • traMADol (ULTRAM) tablet 50 mg  50 mg Oral Once PRN   • vancomycin (VANCOCIN) oral solution 125 mg  125 mg Oral Q6H [address]       Allergies   Allergen Reactions   • Sulfa Antibiotics    • Pneumococcal Polysaccharide Vaccine Rash and Edema       OBJECTIVE:    Physical Exam  Physical Exam  Vitals and nursing note reviewed  Constitutional:       General: She is not in acute distress  Appearance: She is well-developed  She is not toxic-appearing  HENT:      Head: Normocephalic and atraumatic  Nose: Nose normal       Mouth/Throat:      Mouth: Mucous membranes are moist    Eyes:      Conjunctiva/sclera: Conjunctivae normal    Cardiovascular:      Rate and Rhythm: Normal rate and regular rhythm  Pulses: Normal pulses  Pulmonary:      Effort: Pulmonary effort is normal  No respiratory distress  Breath sounds: Normal breath sounds  Abdominal:      General: There is no distension  Palpations: Abdomen is soft  Tenderness: There is no abdominal tenderness  Genitourinary:     Comments: Rectal tube in place, dark brown watery stool  Musculoskeletal:      Cervical back: Neck supple  Skin:     General: Skin is warm and dry  Neurological:      Mental Status: She is alert and oriented to person, place, and time     Psychiatric: Behavior: Behavior normal          Lab Results: I have personally reviewed pertinent labs  Imaging Studies:   XR chest portable    Result Date: 9/26/2022  Impression: Increased bibasilar density, most likely atelectasis  Workstation performed: [id number]     XR Trauma chest portable    Result Date: 9/22/2022  Impression: No acute cardiopulmonary disease  Workstation performed: [id number]     TRAUMA - CT head wo contrast    Result Date: 9/22/2022  Impression: No acute intracranial abnormality  Chronic microangiopathic changes  I personally discussed this study with Kyaw Ba on 9/22/2022 at 12:32 PM  Workstation performed: [id number]     TRAUMA - CT spine cervical wo contrast    Result Date: 9/22/2022  Impression: No cervical spine fracture or traumatic malalignment  I personally discussed this study with yKaw Ba on 9/22/2022 at 12:23 PM   Workstation performed: [id number]     PE Study with CT abdomen & pelvis with contrast    Result Date: 9/22/2022  Impression: 1  Saddle embolus and left greater than right pulmonary arterial filling defects consistent with PE (high clot burden) with evidence of RV strain including an RV/LV ratio = 1 2  This is greater than 0 9, which is abnormal and indicates right heart strain  An abnormal RV/LV ratio has been shown to be associated with an increased risk of 30 day mortality in the setting of acute pulmonary embolism  Urgent consultation with the medical critical care team is recommended  2   Massive distention of the bladder  3   No acute posttraumatic injury  I personally discussed this study with Kyaw Ba on 9/22/2022 at 12:27 PM  Workstation performed: [id number]     IR IVC filter placement optional/temporary    Result Date: 9/26/2022  Impression: Placement of a temporary inferior vena cava filter  PLAN: This filter can be removed at any time in the future   Interventional Radiology will follow this patient to ensure removal when it is no longer needed  Workstation performed: [id number]     IR PE endovascular therapy    Result Date: 9/26/2022  Impression: 1  Technically successful mechanical thrombectomy of the left pulmonary artery with extirpation of large clot  Workstation performed: [id number]     IR PICC line placement single lumen (preferred for home anitbiotics/medications)    Result Date: 9/28/2022  Impression: 1  Status post placement of a peripherally inserted central venous catheter via the brachial vein with its tip at the cavoatrial junction under ultrasound and fluoroscopic guidance  Signed, performed, and dictated by BROOKE Galarza under the supervision of Dr Lesia Stoddard   Workstation performed: [id number]      I have personally reviewed imaging reports

## 2022-10-03 NOTE — PLAN OF CARE
Problem: OCCUPATIONAL THERAPY ADULT  Goal: Performs self-care activities at highest level of function for planned discharge setting  See evaluation for individualized goals  Description: Treatment Interventions: ADL retraining, UE strengthening/ROM, Functional transfer training, Endurance training, Patient/family training, Compensatory technique education, Continued evaluation, Energy conservation, Activityengagement          See flowsheet documentation for full assessment, interventions and recommendations  Outcome: Progressing  Note: Limitation: Decreased ADL status, Decreased endurance, Decreased self-care trans, Decreased high-level ADLs  Prognosis: Good  Assessment: Patient participated in Skilled OT session this date with interventions consisting of ADL re training with the use of correct body mechanics and  therapeutic activities to: increase activity tolerance   Patient agreeable to OT treatment session, upon arrival patient was found supine in bed  In comparison to previous session, patient with improvements in activity tolerance  Patient requiring frequent rest periods  Patient continues to be functioning below baseline level, occupational performance remains limited secondary to factors listed above and increased risk for falls and injury  From OT standpoint, recommendation at time of d/c would be Short Term Rehab  Patient to benefit from continued Occupational Therapy treatment while in the hospital to address deficits as defined above and maximize level of functional independence with ADLs and functional mobility       OT Discharge Recommendation: Post acute rehabilitation services

## 2022-10-03 NOTE — PROGRESS NOTES
SLIM PROGRESS NOTE     Name: Scott Horn   Age & Sex: 79 y o  female   MRN: [id number]  Unit/Bed#: [facility] 406-01   Encounter: [id number]  Team: SLIM    PATIENT INFORMATION     Name: Scott Horn   Age & Sex: 79 y o  female   MRN: [id number]  Hospital Stay Days: 11    ASSESSMENT/PLAN     Principal Problem:    Saddle embolus of pulmonary artery ([facility] )  Active Problems:    Toxic gastroenteritis and colitis    Metastatic breast cancer ([facility] )    Anxiety    Mixed hyperlipidemia    Primary hypertension    REYES (acute kidney injury) ([facility] )    Anemia    Tachycardia    Urinary retention    Swelling of lower extremity      * Saddle embolus of pulmonary artery (HCC)  Assessment & Plan  Acute, submassive, intermediate to high risk pulmonary embolism  Patient has history of right popliteal DVT and right lower lobe subsegmental PE from previous hospitalization in August   ? Patient was sent home on Xarelto, likely not failure, was receiving it at her nursing home  ? Patient also has metastatic breast cancer, hypercoagulable state  ? Return to the ER on 09/22 after a fall, found to have saddle pulmonary embolus with evidence of right heart strain and high clot burden  ? RV to LV ratio on CTA 1 2  ? BMP greater than 1500, troponins relatively normal  ? TTE inconclusive for right heart strain  ? Patient is now status post thrombectomy with IR, no IVC filter placement  ? Duplex showing significant right lower extremity clot burden  ? Continue on Lovenox for pulmonary embolism      Toxic gastroenteritis and colitis  Assessment & Plan  Patient was admitted with toxic gastroenteritis in August of 2022  ? Determined to be possible IBD with additional insult of Verzenio from her breast cancer treatment  ? Continue to hold Verzenio    ? Patient positive for C diff started on oral vancomycin D-3, diarrhea improving but persisting  ? Will wean prednisone slowly as she has been on this chronically- now down to 5 mg  ?  Will stop loperamide, cholestyramine n all stool softeners  ? GI offered remicaid to the daughter to Rx IBD - she is going to think about it  ? Spoke to GI about possibility of increasing Vanc dosing today  Also about concerns for IBD  Diarrhea slowly improving  To continue current dose of PO vancomycin  Patient to follow-up with GI as outpatient for further IBD evaluation and decision on medications  ? Patient received Remicade on 10/01  ? Patient with rectal tube in place  Continued loose BMs, though patient states diarrhea is subjectively improving  Patient also with sacral wound  Will leave rectal tube in place for time being  Swelling of lower extremity  Assessment & Plan  LE swelling has been worsening over the past few weeks  Right worse than left  ? 3+ on both lower extremities, improving along with pulses  ? Previous echo with a hyperdynamic LV EF 70%  ? Lower extremity duplex revealing significant acute DVTs in the right lower extremity  ? Can consider diuresis once the patient is stable for further fluid management      Urinary retention  Assessment & Plan  Patient presented with a massively distended bladder, 3 6 L out of for straight cath, greater than 800 mL out of 2nd straight cath  ? Urinary retention protocol, patient will likely need Crawley catheter  ? May be secondary to TCA use  ? UA with 1+ protein, innumerable rbc's, 4-10 wbc's  ? Urology consult, appreciate recommendations    Tachycardia  Assessment & Plan  · Patient had sinus tachycardia on her last discharge, likely in setting of pulmonary embolism  Still with sinus tachycardia on this admission  ? Continue midodrine for blood pressure support  ? S/p IVF on 09/29  Patient still with persistent sinus tachycardia  ? Continue metoprolol      Anemia  Assessment & Plan  Macrocytic anemia complicated by acute blood loss anemia  ? Folate in September of 2022 3 3  ? Vitamin B12 in September of 2022 645  ?  Iron panel and August of 2022 showed an iron saturation of 18, TIBC 105, iron 19, ferritin 755  ? Likely a mixed component of anemia chronic disease with possible folate deficiency  ? Continue folate supplementation  ? Patient received 1 unit packed red blood cell for hemoglobin 6 9 -> 7 4 -> 7 9       REYES (acute kidney injury) ([facility] )  Assessment & Plan  REYES on CKD stage IIIA  ? Patient's baseline creatinine between 0 8 and 0 9 with an EGFR ranging around 60  ? Admission creatinine 1 36, down to 0 5  ? Patient did receive IV contrast for CTA  ? Accurate in's and out's  ? Avoid nephrotoxins  ? Cr improved  Primary hypertension  Assessment & Plan  Patient's antihypertensives were discontinued after previous hospital stay due to normotension  · Continue to monitor    Mixed hyperlipidemia  Assessment & Plan  Stable  · Continue statin    Anxiety  Assessment & Plan  Depression/anxiety  ? Hold amitriptyline -possible etiology of tachycardia  ? Will resume Remeron      Metastatic breast cancer [facility])  Assessment & Plan  Stage IV metastatic ER positive, MA negative, 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, progressed from stage I A ER/MA positive, 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 years ago  Status post resection and adjuvant radiation and hormone therapy for 5 years  Patient had symptoms of bony discomfort in her sternum in June of 2021, imaging revealed lytic lesion, biopsy in July of 2021 confirmed progression of disease  ? Patient was started on Faslodex and Xgeva at that time, in conjunction with radiation  ? In April 2022 there was interval development of 4 mm nodule at the right lower lobe and interval development of increased sclerotic lesions throughout the visualized spine  ? At that time, the patient's treatment was changed to Femara and Pennye Muzzy  ? In July of 2022, the patient was changed from Pennye Muzzy to [place] because of intolerance  ?  During the patient's hospitalization in August of 2022 to September of 2022, Verzenio was discontinued due to gastroenteritis  ? Patient's cancer is most likely contributing to her hypercoagulable state  ? Appreciate oncology and palliative care consult  ? Will continue on Lovenox for now  ? Continue on Remeron for insomnia and anxiety  ? Patient would not like any more treatment for her malignancy        Disposition: Patient with continued loose stool  Rectal tube in place  Will begin dispo planning to rehab once stool output decreases  Will f/u with GI       SUBJECTIVE     Patient seen and examined  No acute events overnight  Upon my encounter patient was seated comfortably in hospital chair  She presently denies any fever, chills, nausea, vomiting, diarrhea, constipation, chest pain, shortness a breath  She otherwise denies any new or worsening complaints  She states that her diarrhea seems to be improving  I attempted to contact patient's daughter, Lsia Rodrigez, via telephone in order to provide her updates regarding patient's current plan of care  Lisa Rodrigez was not available to answer the phone at this time  Left message for Lisa Rodrigez indicating that this was a non-urgent update  OBJECTIVE     Vitals:    10/03/22 0737 10/03/22 0916 10/03/22 1057 10/03/22 1449   BP: 122/60 121/55 113/60 (!) 105/49   BP Location:       Pulse: 97 96 91 90   Resp: 17  18 18   Temp: 97 8 °F (36 6 °C)  98 1 °F (36 7 °C) 98 2 °F (36 8 °C)   TempSrc: Oral  Oral Oral   SpO2: 94%  98% 97%   Weight:       Height:          Temperature:   Temp (24hrs), Av 3 °F (36 8 °C), Min:97 8 °F (36 6 °C), Max:99 °F (37 2 °C)    Temperature: 98 2 °F (36 8 °C)  Intake & Output:  I/O       10/01 0701  10/02 0700 10/02 0701  10/03 0700 10/03 0701  10/04 0700    P  O   300     Total Intake(mL/kg)  300 (4 6)     Urine (mL/kg/hr) 1500 (0 9) 250 (0 2)     Stool 300 150     Total Output 1800 400     Net -1800 -100                Weights:        Body mass index is 27 28 kg/m²    Weight (last 2 days)     Date/Time Weight    10/03/22 0500 65 5 (144 4)    10/02/22 0600 65 9 (145 28)    10/01/22 0700 66 4 (146 39)    10/01/22 0600 66 4 (146 39)        Physical Exam  Constitutional:       General: She is not in acute distress  Appearance: Normal appearance  She is not ill-appearing  Cardiovascular:      Rate and Rhythm: Regular rhythm  Tachycardia present  Pulses: Normal pulses  Heart sounds: Normal heart sounds  No murmur heard  Pulmonary:      Effort: Pulmonary effort is normal  No respiratory distress  Breath sounds: Normal breath sounds  No wheezing, rhonchi or rales  Abdominal:      General: Abdomen is flat  Bowel sounds are normal  There is no distension  Palpations: Abdomen is soft  Tenderness: There is no abdominal tenderness  Genitourinary:     Comments: Rectal tube in place with loose stool   Musculoskeletal:      Right lower leg: No edema  Left lower leg: No edema  Neurological:      Mental Status: She is alert and oriented to person, place, and time  Psychiatric:         Mood and Affect: Mood normal          Behavior: Behavior normal          Thought Content: Thought content normal        LABORATORY DATA     Labs: I have personally reviewed pertinent reports    Results from last 7 days   Lab Units 10/03/22  0959 10/01/22  0618 09/30/22  0509 09/28/22  0424 09/27/22  0427   WBC Thousand/uL 5 10 4 18* 2 64* 3 10* 3 40*   HEMOGLOBIN g/dL 8 8* 8 5* 8 1* 7 9* 7 4*   HEMATOCRIT % 28 7* 27 6* 26 0* 25 4* 23 4*   PLATELETS Thousands/uL 182 108* 72* 50* 50*   NEUTROS PCT % 74  --   --  75 85*   MONOS PCT % 8  --   --  9 5   MONO PCT %  --   --  5  --   --       Results from last 7 days   Lab Units 10/03/22  0959 10/01/22  0618 09/30/22  0509 09/28/22  0424 09/27/22  0427 09/26/22  1530   POTASSIUM mmol/L 3 5 3 5 3 7   < > 3 3* 3 5   CHLORIDE mmol/L 107 105 107   < > 112* 112*   CO2 mmol/L 27 27 26   < > 24 23   BUN mg/dL 10 9 9   < > 11 13   CREATININE mg/dL 0 61 0 52* 0 40*   < > 0 50* 0 75   CALCIUM mg/dL 7 2* 7 6* 6 4*   < > 7 2* 7 5*   ALK PHOS U/L  --   --   --   --  47 57   ALT U/L  --   --   --   --  15 19   AST U/L  --   --   --   --  9 7    < > = values in this interval not displayed  Results from last 7 days   Lab Units 09/30/22  0509 09/27/22  0427   MAGNESIUM mg/dL 2 0 2 0                        IMAGING & DIAGNOSTIC TESTING     Radiology Results: I have personally reviewed pertinent reports  XR chest portable    Result Date: 9/26/2022  Impression: Increased bibasilar density, most likely atelectasis  Workstation performed: [id number]     XR Trauma chest portable    Result Date: 9/22/2022  Impression: No acute cardiopulmonary disease  Workstation performed: [id number]     TRAUMA - CT head wo contrast    Result Date: 9/22/2022  Impression: No acute intracranial abnormality  Chronic microangiopathic changes  I personally discussed this study with Chacorta Landon on 9/22/2022 at 12:32 PM  Workstation performed: [id number]     TRAUMA - CT spine cervical wo contrast    Result Date: 9/22/2022  Impression: No cervical spine fracture or traumatic malalignment  I personally discussed this study with Chacorta Landon on 9/22/2022 at 12:23 PM   Workstation performed: [id number]     PE Study with CT abdomen & pelvis with contrast    Result Date: 9/22/2022  Impression: 1  Saddle embolus and left greater than right pulmonary arterial filling defects consistent with PE (high clot burden) with evidence of RV strain including an RV/LV ratio = 1 2  This is greater than 0 9, which is abnormal and indicates right heart strain  An abnormal RV/LV ratio has been shown to be associated with an increased risk of 30 day mortality in the setting of acute pulmonary embolism  Urgent consultation with the medical critical care team is recommended  2   Massive distention of the bladder  3   No acute posttraumatic injury   I personally discussed this study with Chacorta Landon on 9/22/2022 at 12:27 PM  Workstation performed: [id number]     IR IVC filter placement optional/temporary    Result Date: 9/26/2022  Impression: Placement of a temporary inferior vena cava filter  PLAN: This filter can be removed at any time in the future  Interventional Radiology will follow this patient to ensure removal when it is no longer needed  Workstation performed: [id number]     IR PE endovascular therapy    Result Date: 9/26/2022  Impression: 1  Technically successful mechanical thrombectomy of the left pulmonary artery with extirpation of large clot  Workstation performed: [id number]     IR PICC line placement single lumen (preferred for home anitbiotics/medications)    Result Date: 9/28/2022  Impression: 1  Status post placement of a peripherally inserted central venous catheter via the brachial vein with its tip at the cavoatrial junction under ultrasound and fluoroscopic guidance  Signed, performed, and dictated by BROOKE Hallman under the supervision of Dr Angel Fisher  Workstation performed: [id number]     Other Diagnostic Testing: I have personally reviewed pertinent reports      ACTIVE MEDICATIONS     Current Facility-Administered Medications   Medication Dose Route Frequency   • atorvastatin (LIPITOR) tablet 40 mg  40 mg Oral After Dinner   • cholecalciferol (VITAMIN D3) tablet 2,000 Units  2,000 Units Oral Daily   • enoxaparin (LOVENOX) subcutaneous injection 70 mg  1 mg/kg Subcutaneous [address] [facility]   • folic acid (FOLVITE) tablet 1 mg  1 mg Oral Daily   • iohexol (OMNIPAQUE) 240 MG/ML solution 50 mL  50 mL Oral 90 min pre-procedure   • metoprolol (LOPRESSOR) injection 2 5 mg  2 5 mg Intravenous Q6H PRN   • metoprolol succinate (TOPROL-XL) 24 hr tablet 75 mg  75 mg Oral BID   • midodrine (PROAMATINE) tablet 2 5 mg  2 5 mg Oral TID AC   • mirtazapine (REMERON) tablet 7 5 mg  7 5 mg Oral HS   • pantoprazole (PROTONIX) EC tablet 40 mg  40 mg Oral BID AC   • traMADol (ULTRAM) tablet 50 mg  50 mg Oral Once PRN   • vancomycin (VANCOCIN) oral solution 125 mg 125 mg Oral Q6H [address]       VTE Pharmacologic Prophylaxis: Enoxaparin (Lovenox)  VTE Mechanical Prophylaxis: sequential compression device    Portions of the record may have been created with voice recognition software  Occasional wrong word or "sound a like" substitutions may have occurred due to the inherent limitations of voice recognition software    Read the chart carefully and recognize, using context, where substitutions have occurred   ==  [facility]  Internal Medicine Residency PGY-2

## 2022-10-03 NOTE — OCCUPATIONAL THERAPY NOTE
Occupational Therapy Progress Note     Patient Name: Zara Silverman  Today's Date: 10/3/2022  Problem List  Principal Problem:    Saddle embolus of pulmonary artery (HCC)  Active Problems:    Metastatic breast cancer ([facility] )    Anxiety    Mixed hyperlipidemia    Primary hypertension    Toxic gastroenteritis and colitis    REYES (acute kidney injury) ([facility] )    Anemia    Tachycardia    Urinary retention    Swelling of lower extremity            10/03/22 0955   OT Last Visit   OT Visit Date 10/03/22   Note Type   Note Type Treatment   Restrictions/Precautions   Other Precautions Fall Risk;Pain;Multiple lines;Contact/isolation  (rectal tube)   General   Response to Previous Treatment Patient with no complaints from previous session   Lifestyle   Autonomy Pt reports being I with ADLS, IADLS and mobility without device PTA  (+)    Reciprocal Relationships Pt lives alone but reports that she has supportive family able to assist   Service to Others Retired   [address] Enjoys staying active   Pain Assessment   Pain Assessment Tool FLACC   Pain Location/Orientation Location: [address] [place] [address] Pain Intervention(s) Repositioned; Ambulation/increased activity; Emotional support   Pain Rating: FLACC (Rest) - Face 1   Pain Rating: FLACC (Rest) - Legs 0   Pain Rating: FLACC (Rest) - Activity 0   Pain Rating: FLACC (Rest) - Cry 0   Pain Rating: FLACC (Rest) - Consolability 0   Score: FLACC (Rest) 1   Pain Rating: FLACC (Activity) - Face 1   Pain Rating: FLACC (Activity) - Legs 0   Pain Rating: FLACC (Activity) - Activity 0   Pain Rating: FLACC (Activity) - Cry 1   Pain Rating: FLACC (Activity) - Consolability 0   Score: FLACC (Activity) 2   ADL   Where Assessed Edge of bed   Grooming Assistance 5  Supervision/Setup   Grooming Deficit Setup;Supervision/safety; Increased time to complete;Brushing hair   LB Dressing Assistance 2  Maximal Assistance   LB Dressing Deficit Setup;Supervision/safety; Increased time to complete; Don/doff R sock; Don/doff L sock   Bed Mobility   Supine to Sit 3  Moderate assistance   Additional items Assist x 1; Increased time required;Verbal cues;LE management   Additional Comments Pt is able to sit EOB with supervision for trunk control  After OT session pt in chair with all needs within reach  Transfers   Sit to Stand 3  Moderate assistance   Additional items Assist x 2; Increased time required;Verbal cues   Stand to Sit 3  Moderate assistance   Additional items Assist x 1; Increased time required;Verbal cues   Functional Mobility   Functional Mobility 3  Moderate assistance   Additional Comments Pt demonstrated short mobility within room with RW  Additional items Rolling walker   Cognition   Overall Cognitive Status WFL   Arousal/Participation Alert; Cooperative   Attention Within functional limits   Orientation Level Oriented X4   Memory Within functional limits   Following Commands Follows all commands and directions without difficulty   Comments Pt is very pleasant and cooperative  Activity Tolerance   Activity Tolerance Patient limited by fatigue   Medical Staff Made Aware RN confirmed okay to see pt   Assessment   Assessment Patient participated in Skilled OT session this date with interventions consisting of ADL re training with the use of correct body mechanics and  therapeutic activities to: increase activity tolerance   Patient agreeable to OT treatment session, upon arrival patient was found supine in bed  In comparison to previous session, patient with improvements in activity tolerance  Patient requiring frequent rest periods  Patient continues to be functioning below baseline level, occupational performance remains limited secondary to factors listed above and increased risk for falls and injury  From OT standpoint, recommendation at time of d/c would be Short Term Rehab     Patient to benefit from continued Occupational Therapy treatment while in the hospital to address deficits as defined above and maximize level of functional independence with ADLs and functional mobility  Plan   Treatment Interventions ADL retraining;Functional transfer training;UE strengthening/ROM; Endurance training;Continued evaluation   Goal Expiration Date 10/10/22   OT Treatment Day 1   OT Frequency 3-5x/wk   Recommendation   OT Discharge Recommendation Post acute rehabilitation services   AM-PAC Daily Activity Inpatient   Lower Body Dressing 2   Bathing 2   Toileting 2   Upper Body Dressing 3   Grooming 4   Eating 4   Daily Activity Raw Score 17   Daily Activity Standardized Score (Calc for Raw Score >=11) 37 26   AM-PAC Applied Cognition Inpatient   Following a Speech/Presentation 4   Understanding Ordinary Conversation 4   Taking Medications 4   Remembering Where Things Are Placed or Put Away 4   Remembering List of 4-5 Errands 4   Taking Care of Complicated Tasks 4   Applied Cognition Raw Score 24   Applied Cognition Standardized Score 62 21   Modified [place] Scale   Modified [place] Scale 4       Brittney Hirsch, VARGAS, OTR/L

## 2022-10-04 ENCOUNTER — TELEPHONE (OUTPATIENT)
Dept: GASTROENTEROLOGY | Facility: MEDICAL CENTER | Age: 82
End: 2022-10-04

## 2022-10-04 LAB
ANION GAP SERPL CALCULATED.3IONS-SCNC: 4 MMOL/L (ref 4–13)
ANISOCYTOSIS BLD QL SMEAR: PRESENT
BASOPHILS # BLD MANUAL: 0 THOUSAND/UL (ref 0–0.1)
BASOPHILS NFR MAR MANUAL: 0 % (ref 0–1)
BUN SERPL-MCNC: 11 MG/DL (ref 5–25)
CALCIUM SERPL-MCNC: 7.5 MG/DL (ref 8.3–10.1)
CHLORIDE SERPL-SCNC: 110 MMOL/L (ref 96–108)
CO2 SERPL-SCNC: 27 MMOL/L (ref 21–32)
CREAT SERPL-MCNC: 0.54 MG/DL (ref 0.6–1.3)
CRP SERPL QL: 36.8 MG/L
EOSINOPHIL # BLD MANUAL: 0 THOUSAND/UL (ref 0–0.4)
EOSINOPHIL NFR BLD MANUAL: 0 % (ref 0–6)
ERYTHROCYTE [DISTWIDTH] IN BLOOD BY AUTOMATED COUNT: 16.4 % (ref 11.6–15.1)
GFR SERPL CREATININE-BSD FRML MDRD: 88 ML/MIN/1.73SQ M
GLUCOSE SERPL-MCNC: 72 MG/DL (ref 65–140)
HCT VFR BLD AUTO: 26.8 % (ref 34.8–46.1)
HGB BLD-MCNC: 8.4 G/DL (ref 11.5–15.4)
LYMPHOCYTES # BLD AUTO: 0.23 THOUSAND/UL (ref 0.6–4.47)
LYMPHOCYTES # BLD AUTO: 6 % (ref 14–44)
MCH RBC QN AUTO: 31.1 PG (ref 26.8–34.3)
MCHC RBC AUTO-ENTMCNC: 31.3 G/DL (ref 31.4–37.4)
MCV RBC AUTO: 99 FL (ref 82–98)
MONOCYTES # BLD AUTO: 0.27 THOUSAND/UL (ref 0–1.22)
MONOCYTES NFR BLD: 7 % (ref 4–12)
NEUTROPHILS # BLD MANUAL: 3.29 THOUSAND/UL (ref 1.85–7.62)
NEUTS BAND NFR BLD MANUAL: 1 % (ref 0–8)
NEUTS SEG NFR BLD AUTO: 85 % (ref 43–75)
PLATELET # BLD AUTO: 157 THOUSANDS/UL (ref 149–390)
PLATELET BLD QL SMEAR: ADEQUATE
PMV BLD AUTO: 10.6 FL (ref 8.9–12.7)
POLYCHROMASIA BLD QL SMEAR: PRESENT
POTASSIUM SERPL-SCNC: 3 MMOL/L (ref 3.5–5.3)
RBC # BLD AUTO: 2.7 MILLION/UL (ref 3.81–5.12)
RBC MORPH BLD: PRESENT
SODIUM SERPL-SCNC: 141 MMOL/L (ref 135–147)
VARIANT LYMPHS # BLD AUTO: 1 %
WBC # BLD AUTO: 3.82 THOUSAND/UL (ref 4.31–10.16)

## 2022-10-04 PROCEDURE — 85027 COMPLETE CBC AUTOMATED: CPT | Performed by: STUDENT IN AN ORGANIZED HEALTH CARE EDUCATION/TRAINING PROGRAM

## 2022-10-04 PROCEDURE — 86140 C-REACTIVE PROTEIN: CPT | Performed by: INTERNAL MEDICINE

## 2022-10-04 PROCEDURE — 85007 BL SMEAR W/DIFF WBC COUNT: CPT | Performed by: STUDENT IN AN ORGANIZED HEALTH CARE EDUCATION/TRAINING PROGRAM

## 2022-10-04 PROCEDURE — 97116 GAIT TRAINING THERAPY: CPT

## 2022-10-04 PROCEDURE — 80048 BASIC METABOLIC PNL TOTAL CA: CPT | Performed by: STUDENT IN AN ORGANIZED HEALTH CARE EDUCATION/TRAINING PROGRAM

## 2022-10-04 PROCEDURE — 97530 THERAPEUTIC ACTIVITIES: CPT

## 2022-10-04 PROCEDURE — 99232 SBSQ HOSP IP/OBS MODERATE 35: CPT | Performed by: INTERNAL MEDICINE

## 2022-10-04 PROCEDURE — 99231 SBSQ HOSP IP/OBS SF/LOW 25: CPT | Performed by: INTERNAL MEDICINE

## 2022-10-04 PROCEDURE — 97535 SELF CARE MNGMENT TRAINING: CPT

## 2022-10-04 RX ORDER — POTASSIUM CHLORIDE 20 MEQ/1
40 TABLET, EXTENDED RELEASE ORAL 2 TIMES DAILY
Status: COMPLETED | OUTPATIENT
Start: 2022-10-04 | End: 2022-10-04

## 2022-10-04 RX ADMIN — Medication 125 MG: at 18:06

## 2022-10-04 RX ADMIN — MIRTAZAPINE 7.5 MG: 15 TABLET, FILM COATED ORAL at 22:19

## 2022-10-04 RX ADMIN — Medication 2000 UNITS: at 09:04

## 2022-10-04 RX ADMIN — POTASSIUM CHLORIDE 40 MEQ: 1500 TABLET, EXTENDED RELEASE ORAL at 18:06

## 2022-10-04 RX ADMIN — ENOXAPARIN SODIUM 70 MG: 80 INJECTION SUBCUTANEOUS at 09:03

## 2022-10-04 RX ADMIN — POTASSIUM CHLORIDE 40 MEQ: 1500 TABLET, EXTENDED RELEASE ORAL at 09:04

## 2022-10-04 RX ADMIN — CARBIDOPA AND LEVODOPA 2.5 MG: 50; 200 TABLET, EXTENDED RELEASE ORAL at 12:34

## 2022-10-04 RX ADMIN — ATORVASTATIN CALCIUM 40 MG: 40 TABLET, FILM COATED ORAL at 18:05

## 2022-10-04 RX ADMIN — Medication 125 MG: at 06:40

## 2022-10-04 RX ADMIN — METOPROLOL SUCCINATE 75 MG: 50 TABLET, EXTENDED RELEASE ORAL at 09:04

## 2022-10-04 RX ADMIN — CARBIDOPA AND LEVODOPA 2.5 MG: 50; 200 TABLET, EXTENDED RELEASE ORAL at 06:39

## 2022-10-04 RX ADMIN — METOPROLOL SUCCINATE 75 MG: 50 TABLET, EXTENDED RELEASE ORAL at 22:18

## 2022-10-04 RX ADMIN — FOLIC ACID 1 MG: 1 TABLET ORAL at 09:04

## 2022-10-04 RX ADMIN — Medication 125 MG: at 12:34

## 2022-10-04 RX ADMIN — ENOXAPARIN SODIUM 70 MG: 80 INJECTION SUBCUTANEOUS at 22:18

## 2022-10-04 RX ADMIN — PANTOPRAZOLE SODIUM 40 MG: 40 TABLET, DELAYED RELEASE ORAL at 06:39

## 2022-10-04 RX ADMIN — PANTOPRAZOLE SODIUM 40 MG: 40 TABLET, DELAYED RELEASE ORAL at 18:06

## 2022-10-04 RX ADMIN — CARBIDOPA AND LEVODOPA 2.5 MG: 50; 200 TABLET, EXTENDED RELEASE ORAL at 18:05

## 2022-10-04 NOTE — OCCUPATIONAL THERAPY NOTE
Occupational Therapy Progress Note     Patient Name: Jhon Silverman  Today's Date: 10/4/2022  Problem List  Principal Problem:    Saddle embolus of pulmonary artery (HCC)  Active Problems:    Metastatic breast cancer ([facility] [address] )    Anxiety    Mixed hyperlipidemia    Primary hypertension    Toxic gastroenteritis and colitis    [facility]acute [facility] ([facility] )    Anemia    Tachycardia    Urinary retention    Swelling of lower extremity            10/04/22 0901   OT Last Visit   OT Visit Date 10/04/22   Note Type   Note Type Treatment   Restrictions/Precautions   Weight Bearing Precautions Per Order No   Other Precautions Pain;Cardiac/sternal;Fall Risk;O2   General   Response to Previous Treatment Patient with no complaints from previous session   Lifestyle   Autonomy Pt reports being I with ADLS, IADLS and mobility without device PTA  (+)    Reciprocal Relationships Pt lives alone but reports that she has supportive family able to assist   Service to Others Retired   [address] Enjoys staying active   Pain Assessment   Pain Assessment Tool 0-10   Pain Score 3   Hospital Pain Intervention(s) Repositioned; Ambulation/increased activity; Emotional support   ADL   Where Assessed Edge of bed   Grooming Assistance 4  Minimal Assistance   Grooming Deficit Setup;Supervision/safety   Grooming Comments A to wash and comb hair   LB Dressing Assistance 2  Maximal Assistance   LB Dressing Deficit Setup;Supervision/safety; Increased time to complete; Don/doff R sock; Don/doff L sock   Toileting Assistance  2  Maximal Assistance   Toileting Deficit Setup;Supervison/safety; Increased time to complete;Perineal hygiene   Bed Mobility   Supine to Sit 3  Moderate assistance   Additional items Assist x 1; Increased time required;Verbal cues;LE management   Additional Comments Pt sat EOB with supervision for trunk control  After OT session pt in chair with all needs within reach     Transfers   Sit to Stand 3  Moderate assistance Additional items Assist x 1; Increased time required;Verbal cues   Stand to Sit 3  Moderate assistance   Additional items Assist x 1; Increased time required;Verbal cues   Functional Mobility   Functional Mobility 3  Moderate assistance   Additional Comments Pt demonstrated short mobility within room with seated rest break  Additional items Rolling walker   Cognition   Overall Cognitive Status WFL   Arousal/Participation Alert; Cooperative   Attention Within functional limits   Orientation Level Oriented X4   Memory Within functional limits   Following Commands Follows one step commands without difficulty   Comments Pt is very pleasant and cooperative  Activity Tolerance   Activity Tolerance Patient limited by fatigue   Medical Staff Made Aware RN confirmed okay to see pt   Assessment   Assessment Patient participated in Skilled OT session this date with interventions consisting of ADL re training with the use of correct body mechanics,  therapeutic activities to: increase activity tolerance, increase postural control, increase trunk control and increase OOB/ sitting tolerance   Patient agreeable to OT treatment session, upon arrival patient was found supine in bed  In comparison to previous session, patient with improvements in activity tolerance  Patient requiring frequent rest periods  Patient continues to be functioning below baseline level, occupational performance remains limited secondary to factors listed above and increased risk for falls and injury  From OT standpoint, recommendation at time of d/c would be Short Term Rehab  Patient to benefit from continued Occupational Therapy treatment while in the hospital to address deficits as defined above and maximize level of functional independence with ADLs and functional mobility  Plan   Treatment Interventions ADL retraining;Functional transfer training;UE strengthening/ROM; Endurance training;Patient/family training;Equipment evaluation/education; Compensatory technique education;Continued evaluation; Energy conservation; Activityengagement   Goal Expiration Date 10/10/22   OT Treatment Day 2   OT Frequency 3-5x/wk   Recommendation   OT Discharge Recommendation Post acute rehabilitation services   AM-PAC Daily Activity Inpatient   Lower Body Dressing 2   Bathing 2   Toileting 2   Upper Body Dressing 3   Grooming 4   Eating 4   Daily Activity Raw Score 17   Daily Activity Standardized Score (Calc for Raw Score >=11) 37 26   AM-PAC Applied Cognition Inpatient   Following a Speech/Presentation 4   Understanding Ordinary Conversation 4   Taking Medications 4   Remembering Where Things Are Placed or Put Away 4   Remembering List of 4-5 Errands 4   Taking Care of Complicated Tasks 4   Applied Cognition Raw Score 24   Applied Cognition Standardized Score 62 21   Modified [place] Scale   Modified [place] Scale 4       VARGAS Porras, OTR/L

## 2022-10-04 NOTE — PROGRESS NOTES
Progress note - Palliative and Supportive Care   Taye Silverman 79 y o  female [id number]    Assessment:   - Taye Silverman is a 79 y o  female with Hx of right breast cancer stage IV with metastases to bone, saddle embolus sp thrombectomy with IVC filter placement, diarrhea 2/2 IBD flare with possible C diff colitis  PSC has been consulted for goals of care discussion  Principal Problem:    Saddle embolus of pulmonary artery (HCC)  Active Problems:    Metastatic breast cancer ([facility] [address] )    Anxiety    Mixed hyperlipidemia    Primary hypertension    Toxic gastroenteritis and colitis    REYES (acute kidney injury) ([facility] )    Anemia    Tachycardia    Urinary retention    Swelling of lower extremity      Plan:  1  Symptom management - as per primary team    2  Goals -   • Continue medical treatment with vancomycin 125 mg q 6 hours p o , day 9/10 as per ID  • Remicade given 10/1  • Family meeting with GI, primary care team, palliative and supportive care, and  at 11 o'clock today  Daughter pursuing treatment with infliximab which next dose will be in 2 weeks  Patient denies any treatment for her breast cancer  • Discussed on hospice option, daughter would like to hear more information from hospice liaison  Referral placed today  I can be contacted through Doctor Rosy Parada for any questions regarding Taye Silverman's case  If there are any questions after business hours, you may reach out to the on call provider  Code Status: DNR/DNI - Level 3   Decisional apparatus:  Patient is competent on my exam today  If competence is lost, patient's substitute decision maker would default to daughter by [place]  Advance Directive / Living Will / POLST:  Completed  Interval history:       Taye Silverman was seen and examined in bed this morning  Patient's family was not present at time of examination  Discussed overnight events with nursing staff  No acute event overnight  She is sitting in bed, having lunch   Denies chest pain or abdominal pain  MEDICATIONS / ALLERGIES:     current meds:   Current Facility-Administered Medications   Medication Dose Route Frequency   • atorvastatin (LIPITOR) tablet 40 mg  40 mg Oral After Dinner   • cholecalciferol (VITAMIN D3) tablet 2,000 Units  2,000 Units Oral Daily   • enoxaparin (LOVENOX) subcutaneous injection 70 mg  1 mg/kg Subcutaneous [address]   • folic acid (FOLVITE) tablet 1 mg  1 mg Oral Daily   • iohexol (OMNIPAQUE) 240 MG/ML solution 50 mL  50 mL Oral 90 min pre-procedure   • metoprolol (LOPRESSOR) injection 2 5 mg  2 5 mg Intravenous Q6H PRN   • metoprolol succinate (TOPROL-XL) 24 hr tablet 75 mg  75 mg Oral BID   • midodrine (PROAMATINE) tablet 2 5 mg  2 5 mg Oral TID AC   • mirtazapine (REMERON) tablet 7 5 mg  7 5 mg Oral HS   • pantoprazole (PROTONIX) EC tablet 40 mg  40 mg Oral BID AC   • potassium chloride (K-DUR,KLOR-CON) CR tablet 40 mEq  40 mEq Oral BID   • traMADol (ULTRAM) tablet 50 mg  50 mg Oral Once PRN   • vancomycin (VANCOCIN) oral solution 125 mg  125 mg Oral Q6H [address]       Allergies   Allergen Reactions   • Sulfa Antibiotics    • Pneumococcal Polysaccharide Vaccine Rash and Edema       OBJECTIVE:    Physical Exam  Physical Exam  Vitals and nursing note reviewed  Constitutional:       General: She is not in acute distress  Appearance: She is well-developed  Comments: cachectic   HENT:      Head: Normocephalic and atraumatic  Nose: Nose normal       Mouth/Throat:      Mouth: Mucous membranes are moist    Eyes:      Conjunctiva/sclera: Conjunctivae normal    Cardiovascular:      Rate and Rhythm: Normal rate  Pulmonary:      Effort: Pulmonary effort is normal  No respiratory distress  Breath sounds: Normal breath sounds  Abdominal:      General: There is no distension  Palpations: Abdomen is soft  Tenderness: There is no abdominal tenderness  Musculoskeletal:      Cervical back: Neck supple     Skin:     General: Skin is warm and dry  Neurological:      Mental Status: She is alert  Comments: Oriented to person and place   Psychiatric:         Behavior: Behavior normal          Lab Results:   CBC:   Lab Results   Component Value Date    WBC 3 82 (L) 10/04/2022    HGB 8 4 (L) 10/04/2022    HCT 26 8 (L) 10/04/2022    MCV 99 (H) 10/04/2022     10/04/2022    MCH 31 1 10/04/2022    MCHC 31 3 (L) 10/04/2022    RDW 16 4 (H) 10/04/2022    MPV 10 6 10/04/2022   , BMP:  Lab Results   Component Value Date    SODIUM 141 10/04/2022    K 3 0 (L) 10/04/2022     (H) 10/04/2022    CO2 27 10/04/2022    BUN 11 10/04/2022    CREATININE 0 54 (L) 10/04/2022    GLUC 72 10/04/2022    CALCIUM 7 5 (L) 10/04/2022    AGAP 4 10/04/2022    EGFR 88 10/04/2022     Imaging Studies:   XR chest portable    Result Date: 9/26/2022  Impression: Increased bibasilar density, most likely atelectasis  Workstation performed: [id number]     XR Trauma chest portable    Result Date: 9/22/2022  Impression: No acute cardiopulmonary disease  Workstation performed: [id number]     TRAUMA - CT head wo contrast    Result Date: 9/22/2022  Impression: No acute intracranial abnormality  Chronic microangiopathic changes  I personally discussed this study with Kyaw Ba on 9/22/2022 at 12:32 PM  Workstation performed: [id number]     TRAUMA - CT spine cervical wo contrast    Result Date: 9/22/2022  Impression: No cervical spine fracture or traumatic malalignment  I personally discussed this study with Kyaw Ba on 9/22/2022 at 12:23 PM   Workstation performed: [id number]     PE Study with CT abdomen & pelvis with contrast    Result Date: 9/22/2022  Impression: 1  Saddle embolus and left greater than right pulmonary arterial filling defects consistent with PE (high clot burden) with evidence of RV strain including an RV/LV ratio = 1 2  This is greater than 0 9, which is abnormal and indicates right heart strain   An abnormal RV/LV ratio has been shown to be associated with an increased risk of 30 day mortality in the setting of acute pulmonary embolism  Urgent consultation with the medical critical care team is recommended  2   Massive distention of the bladder  3   No acute posttraumatic injury  I personally discussed this study with Holden Beauchamp on 9/22/2022 at 12:27 PM  Workstation performed: [id number]     IR IVC filter placement optional/temporary    Result Date: 9/26/2022  Impression: Placement of a temporary inferior vena cava filter  PLAN: This filter can be removed at any time in the future  Interventional Radiology will follow this patient to ensure removal when it is no longer needed  Workstation performed: [id number]     IR PE endovascular therapy    Result Date: 9/26/2022  Impression: 1  Technically successful mechanical thrombectomy of the left pulmonary artery with extirpation of large clot  Workstation performed: [id number]     IR PICC line placement single lumen (preferred for home anitbiotics/medications)    Result Date: 9/28/2022  Impression: 1  Status post placement of a peripherally inserted central venous catheter via the brachial vein with its tip at the cavoatrial junction under ultrasound and fluoroscopic guidance  Signed, performed, and dictated by BROOKE Scherer under the supervision of Dr Anabel Wisdom   Workstation performed: [id number]

## 2022-10-04 NOTE — PROGRESS NOTES
Progress Note -  [facility]  Zara Silverman 79 y o  female MRN: [id number]  Unit/Bed#: [facility] 406-01 Encounter: [id number]      ASSESSMENT AND PLAN:      Pt is a 79year old female with history of stage IV breast cancer with post radiation chemotherapy who underwent colonoscopy and flex sig showing significant colitis in the distal sigmoid previously treated for IBD presented with diarrhea and saddle pulmonary embolism status post IR thrombectomy and IVC filter       1  C Diff Colitis   - Positive PCR , negative for toxin  - Can finish PO Vancomycin 125mg q6 course     2  Inflammatory Bowel Disease  - Colonoscopy 10/21 and flex sig 8/20 showed active colitis; treated with mesalamine/hydrocortisone enemas started on prednisone  August 2022 for 4-5 weeks  - CRPdown to 36 8 (10/4), previously 74  6(10/1/22) and 122 4 (8/29)  -calprotectin 09/24/2022 - in the 3000s  -  previously required multiple transfusions for hemoglobin less than 7, currently stable the past 5 days hb 8 4 (10/4)   - previous on 40mg prednisone; tapered and discontinued  - based on colonoscopy findings and ongoing diarrhea; patient may have underlying IBD did not respond to steroids  - started on Remicade 9/30  - recommend lactose-free diet  - further workup and treatment outpatient and repeat colonoscopy for persistent colitis     Rest of care per primary team     ______________________________________________________________________    Subjective:  Patient seen and examined  Denies any overnight events  She reports feeling overall better today with no T, dizziness, and lightheadedness  Reports having ambulated with PT yesterday  She continues to report urgency of bowel movement and has a rectal tube in  Stool continues to be lose brown in color with no melena or bright red seen in rectal tube bag  REVIEW OF SYSTEMS:    Review of Systems   Constitutional: Negative for chills and fever     Respiratory: Negative for shortness of breath and wheezing  Cardiovascular: Negative for chest pain  Gastrointestinal: Positive for diarrhea  Negative for abdominal distention, blood in stool, constipation, nausea and vomiting  Genitourinary: Negative for difficulty urinating  Neurological: Negative for dizziness and light-headedness  Historical Information   Past Medical History:   Diagnosis Date   • Abnormal weight loss    • Allergic reaction    • Anxiety    • Breast cancer, right [facility]) 2011    right   • Cancer ([facility] ) 2012   • Candidal vulvovaginitis    • Clotting disorder ([facility] ) Same as above   • Endometrial hyperplasia    • Epithelial cyst     benign, ovary   • GI (gastrointestinal bleed) Blood mixed with stool   • History of radiation therapy 2011    right breast cancer   • Hyperlipidemia    • Hypertension    • Internal hemorrhoids    • Knee tendonitis    • Ovarian cyst    • 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[facility])      Past Surgical History:   Procedure Laterality Date   • BILATERAL SALPINGOOPHORECTOMY      onset: 7/23/13   • BREAST BIOPSY Right 06/13/2006    benign   • BREAST BIOPSY Right 03/02/2011    malignant   • BREAST LUMPECTOMY Right 03/30/2011    malignant   • CATARACT EXTRACTION W/  INTRAOCULAR LENS IMPLANT Right     phacoemulsification   Onset: 10/27/14   • CHOLECYSTECTOMY     • COLONOSCOPY  2017    approx   • HYSTERECTOMY  Yes   • INTRAOPERATIVE RADIATION THERAPY (I[facility])     • IR BIOPSY BONE  7/9/2021   • IR IVC FILTER PLACEMENT OPTIONAL/TEMPORARY  9/23/2022   • IR PE ENDOVASCULAR THERAPY  9/22/2022   • IR PICC PLACEMENT SINGLE LUMEN  8/19/2022   • IR PICC PLACEMENT SINGLE LUMEN  9/26/2022   • SKIN LESION EXCISION Right     breast, single lesion   • TONSILLECTOMY AND ADENOIDECTOMY       Social History   Social History     Substance and Sexual Activity   Alcohol Use Not Currently    Comment: (history)     Social History     Substance and Sexual Activity   Drug Use No     Social History     Tobacco Use   Smoking Status Former Smoker   • Packs/day: 1 00   • Years: 30 00   • Pack years: 30 00   • Types: Cigarettes   • Start date: 55   • Quit date: 0   • Years since quittin 7   Smokeless Tobacco Never Used     Family History   Problem Relation Age of Onset   • Stomach cancer Mother    • No Known Problems Father    • Cervical cancer Sister    • No Known Problems Daughter    • No Known Problems Maternal Grandmother    • No Known Problems Maternal Grandfather    • No Known Problems Paternal Grandmother    • No Known Problems Paternal Grandfather    • Colon polyps Neg Hx    • Colon cancer Neg Hx        Meds/Allergies     Medications Prior to Admission   Medication   • amitriptyline (ELAVIL) 25 mg tablet   • Cholecalciferol (VITAMIN D3) 2000 units capsule   • cholestyramine sugar free (QUESTRAN LIGHT) 4 g packet   • diphenoxylate-atropine (LOMOTIL) 2 5-0 025 mg per tablet   • folic acid (FOLVITE) 1 mg tablet   • loperamide (IMODIUM) 2 mg capsule   • metoprolol tartrate (LOPRESSOR) 50 mg tablet   • mirtazapine (REMERON) 7 5 MG tablet   • pantoprazole (PROTONIX) 40 mg tablet   • predniSONE 20 mg tablet   • rivaroxaban (XARELTO) 15 mg tablet   • rivaroxaban (Xarelto) 20 mg tablet   • simethicone (MYLICON) 80 mg chewable tablet   • simvastatin (ZOCOR) 10 mg tablet     Current Facility-Administered Medications   Medication Dose Route Frequency   • atorvastatin (LIPITOR) tablet 40 mg  40 mg Oral After Dinner   • cholecalciferol (VITAMIN D3) tablet 2,000 Units  2,000 Units Oral Daily   • enoxaparin (LOVENOX) subcutaneous injection 70 mg  1 mg/kg Subcutaneous [address]11[address] RIA   • folic acid (FOLVITE) tablet 1 mg  1 mg Oral Daily   • iohexol (OMNIPAQUE) 240 MG/ML solution 50 mL  50 mL Oral 90 min pre-procedure   • metoprolol (LOPRESSOR) injection 2 5 mg  2 5 mg Intravenous Q6H PRN   • metoprolol succinate (TOPROL-XL) 24 hr tablet 75 mg  75 mg Oral BID   • midodrine (PROAMATINE) tablet 2 5 mg  2 5 mg Oral TID AC   • mirtazapine (REMERON) tablet 7 5 mg 7 5 mg Oral HS   • pantoprazole (PROTONIX) EC tablet 40 mg  40 mg Oral BID AC   • potassium chloride (K-DUR,KLOR-CON) CR tablet 40 mEq  40 mEq Oral BID   • traMADol (ULTRAM) tablet 50 mg  50 mg Oral Once PRN   • vancomycin (VANCOCIN) oral solution 125 mg  125 mg Oral Q6H [address]       Allergies   Allergen Reactions   • Sulfa Antibiotics    • Pneumococcal Polysaccharide Vaccine Rash and Edema           Objective     Blood pressure 107/62, pulse 88, temperature 98 7 °F (37 1 °C), temperature source Oral, resp  rate 18, height 5' 1" (1 549 m), weight 65 5 kg (144 lb 6 4 oz), SpO2 94 %, not currently breastfeeding  Body mass index is 27 28 kg/m²  Intake/Output Summary (Last 24 hours) at 10/4/2022 0920  Last data filed at 10/4/2022 0715  Gross per 24 hour   Intake 260 ml   Output 575 ml   Net -315 ml         PHYSICAL EXAM:      Physical Exam  Constitutional:       General: She is not in acute distress  Appearance: Normal appearance  She is normal weight  She is not ill-appearing or toxic-appearing  HENT:      Head: Normocephalic and atraumatic  Cardiovascular:      Rate and Rhythm: Normal rate and regular rhythm  Heart sounds: No murmur heard  No gallop  Pulmonary:      Effort: No respiratory distress  Breath sounds: No rales  Abdominal:      General: Abdomen is flat  There is no distension  Tenderness: There is no abdominal tenderness  There is no guarding  Musculoskeletal:      Right lower leg: Edema present  Left lower leg: Edema present  Neurological:      Mental Status: She is alert and oriented to person, place, and time  Psychiatric:         Mood and Affect: Mood normal          Behavior: Behavior normal           Lab Results:   No results displayed because visit has over 200 results  Imaging Studies: I have personally reviewed pertinent imaging studies      Eran Adams Internal Medicine PGY-1

## 2022-10-04 NOTE — TELEPHONE ENCOUNTER
----- Message from Felicia Dailey MD sent at 9/30/2022  7:43 AM EDT -----  Can you please arrange for follow-up for this patient to be seen with Dr Mirela Salinas in [address] [place] [address]

## 2022-10-04 NOTE — PROGRESS NOTES
Pastoral Care Progress Note    10/4/2022  Patient: Henry Silverman : 1940  Admission Date & Time: 2022 1535  MRN: [id number] CSN: [id number]      Pt expressed discouragement with her ongoing health challenges, stating that certain cancer drugs given in the past to cure her cancer ended up giving her other problems   listened with empathy, validated Pt frustration, supported Pt expressed hope that during this hospital stay she is feeling a little better day by day  We discussed her sincere nathaly and the hope she draws from it, and concluded in prayer for her healing and patience dealing with life's obstacles                 Chaplaincy Interventions Utilized:   Empowerment: Clarified, confirmed, or reviewed information from treatment team     Exploration: Explored emotional needs & resources    Relationship Building: Cultivated a relationship of care and support and Listened empathically    Ritual: Provided prayer    Chaplaincy Outcomes Achieved:  Debriefed/defused experience      Spiritual Coping Strategies Utilized:   Positive spiritual reframing       10/04/22 1400   Clinical Encounter Type   Visited With Patient   Routine Visit Follow-up   Protestant Encounters   Protestant Needs Prayer

## 2022-10-04 NOTE — TELEPHONE ENCOUNTER
Patient is scheduled for remaining loading doses and first maintenance dose       Week 0 - 10/1 (given inpatient)  Week 2- 10/17 at 10am  Week 6- 11/14 at 10am    First maintenance dose: 1/9 at 10am

## 2022-10-04 NOTE — TELEPHONE ENCOUNTER
BIF returned that patient has medicare supplement plan G, which covers 100% of cost that medicare does not

## 2022-10-04 NOTE — CASE MANAGEMENT
Case Management Discharge Planning Note    Patient name Stephannie Halsted Clunk  Location [facility] 406/[facility] 406-01 MRN [id number]  : 1940 Date 10/4/2022       Current Admission Date: 2022  Current Admission Diagnosis:Saddle embolus of pulmonary artery [facility])   Patient Active Problem List    Diagnosis Date Noted   • Ulcerative pancolitis with rectal bleeding ([facility] ) 10/03/2022   • Saddle embolus of pulmonary artery ([place] [address] ) 2022   • Urinary retention 2022   • Swelling of lower extremity 2022   • Venous insufficiency 2022   • Tachycardia 2022   • Single subsegmental pulmonary embolism without acute cor pulmonale (HCC) 2022   • Severe protein-calorie malnutrition ([place] [address] ) 2022   • Anemia 2022   • Hypokalemia 2022   • REYES (acute kidney injury) ([place] [address] ) 2022   • Diarrhea 2022   • Secondary malignant neoplasm of bone ([place] [address] ) 2022   • Toxic gastroenteritis and colitis 2022   • Rectal bleeding 10/14/2021   • Lytic lesion of bone on x-ray 2021   • Neoplasm of uncertain behavior of sternum 2021   • Cough due to ACE inhibitor 2021   • Acute costochondritis 2021   • Osteopenia of multiple sites 2020   • Primary hypertension 10/22/2019   • Chronic kidney disease (CKD) stage G3a/A1, moderately decreased glomerular filtration rate (GFR) between 45-59 mL/min/1 73 square meter and albuminuria creatinine ratio less than 30 mg/g ([facility]) 2019   • Adverse reaction to pneumococcal vaccine 2015   • Cataract, bilateral 2014   • Pulmonary nodule seen on imaging study 09/10/2013   • Sleep disorder 2013   • Anxiety 2013   • Metastatic [facility] ([facility] ) 10/17/2012   • Mixed hyperlipidemia 2012      LOS (days): 12  Geometric Mean LOS (GMLOS) (days): 4 40  Days to GMLOS:-7 5     OBJECTIVE:  Risk of Unplanned Readmission Score: 34 63         Current admission status: Inpatient   Preferred Pharmacy:   [facility], [address]  [address]  Phone: [phone] Fax: [phone] 532 [facility], [address] [place] [address]  [address]  [address] [facility] [address]  Phone: [phone] Fax: [phone] [address] [place], [facility] [place] [address] [place] [address] [facility] [place] [facility] [address] [place] [address] [facility] [address] [address]  Phone: [phone] Fax: [phone]    Primary Care Provider: Stephen Hassan DO    Primary Insurance: MEDICARE  Secondary Insurance: Debra Bella    DISCHARGE DETAILS:                                                                                                 Additional Comments: Team meeting held today with palliative care, GI, SLIM, palliative care with pt and daughter via phone  They wish to continue treatment at this time, may consider hospice in the future  No chemo at this time  A new snf list was printed for [employer] area and emailed to daughter at [email or web address]

## 2022-10-04 NOTE — PHYSICAL THERAPY NOTE
Physical Therapy Treatment Note       10/04/22 0900   PT Last Visit   PT Visit Date 10/04/22   Note Type   Note Type Treatment   Pain Assessment   Pain Assessment Tool 0-10   Pain Score 3   Pain Location/Orientation Location: Buttocks  (and sacrum)   Patient's Stated Pain Goal No pain   Hospital Pain Intervention(s) Ambulation/increased activity   Restrictions/Precautions   Weight Bearing Precautions Per Order No   Other Precautions Pain; Fall Risk;Multiple lines;Telemetry   General   Chart Reviewed Yes   Family/Caregiver Present No   Cognition   Overall Cognitive Status WFL   Arousal/Participation Responsive   Attention Attends with cues to redirect   Orientation Level Oriented X4   Memory Unable to assess   Following Commands Follows one step commands without difficulty   Subjective   Subjective states she feels OK  continued pain  cooperative w/ session   Bed Mobility   Supine to Sit 3  Moderate assistance   Additional items Assist x 1   Additional Comments sat EOB x several minutes prior to first transfer     Transfers   Sit to Stand 3  Moderate assistance   Additional items Assist x 1   Stand to Sit 3  Moderate assistance   Additional items Assist x 1   Ambulation/Elevation   Gait pattern   (slow, wide JANINA, lateral and posterior LOB, antalgic, short step length)   Gait Assistance 3  Moderate assist   Additional items Assist x 1  (w/ assist of 2 others for lines, chair follow)   Assistive Device Rolling walker   Distance 12'x1 w/ 10 min seated rest, followed by 6'x1   Balance   Static Sitting Good   Dynamic Sitting Fair -   Static Standing Poor +   Dynamic Standing Poor   Ambulatory Poor   Endurance Deficit   Endurance Deficit Yes   Endurance Deficit Description fatigue, weakness, pain   Activity Tolerance   Activity Tolerance Patient limited by fatigue;Patient limited by pain;Treatment limited secondary to medical complications (Comment)   Nurse Made Aware yes   Assessment   Prognosis Fair   Problem List Decreased strength;Decreased endurance; Impaired balance;Decreased mobility; Decreased coordination   Assessment Pt seen for session - portion of which seen as co-treatment w/ OT due to concerns re: medical stability  PT portion focused on mobility where OT portion was more for ADLs, self care  session was for setup, bed mob, time spent EOB, transfers, gait w/ seated rest, repositioning  Pt cooperative, willing to mobilize and participate  continued pain in sacrum  Note improved mobility tolerance today, but increased SOB w/ 2nd gait distance  several LOB noted w/ gait, and needs cues for safe RW mgmt  continue to recommend rehab at d/c   Goals   Patient Goals to get better   STG Expiration Date 10/17/22   PT Treatment Day 1   Plan   Treatment/Interventions Functional transfer training;LE strengthening/ROM; Therapeutic exercise; Endurance training;Patient/family training;Equipment eval/education; Bed mobility;Gait training   Progress Progressing toward goals   PT Frequency 3-5x/wk   Recommendation   PT Discharge Recommendation Post acute rehabilitation services   AM-PAC Basic Mobility Inpatient   Turning in Bed Without Bedrails 3   Lying on Back to Sitting on Edge of Flat Bed 2   Moving Bed to Chair 2   Standing Up From Chair 2   Walk in Room 2   Climb 3-5 Stairs 1   Basic Mobility Inpatient Raw Score 12   Basic Mobility Standardized Score 32 23   Highest Level Of Mobility   JH-HLM Goal 4: Move to chair/commode   JH-HLM Achieved 6: Walk 10 steps or more   Santiago Rivera PT, DPT CSRS

## 2022-10-04 NOTE — TELEPHONE ENCOUNTER
Called and spoke with patients daughter Navarro Roa  I provided appointment information  Patient will be going to a rehab facility upon discharge  I advised I would watch to see when patient is discharged and to where  At that point, we will communicate with facility on how to get patient to her infusion appointments  Daughter was appreciative of the information and help  Elaine - patients daughter had further questions regarding dx and IBD in general  Along with if medication is life long  I advised that you could discuss all of this with her either now or when we follow up regarding facilities and infusion appointments  She would be appreciative if you could call her soon as it is a lot of information and a lot going on right now with patient  Daughter was receiving call from hospital so had to end conversation  Thank you!

## 2022-10-04 NOTE — PROGRESS NOTES
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  4  Protein calorie malnutrition  5  Urinary retention    Recommendations:  Patient is afebrile with normal WBC count  1  I am not convinced that the patient has active C diff colitis  Diarrhea more likely secondary to IBD  However, have no objection to completing 10 day course with approximately 2 more days of p o  Vancomycin 125 mg q 6 hours  2  Stools are now semi formed    Antibiotics:  1  Vancomycin 125 mg q 6 hours p o , day 8 of 10    Subjective:  She has minimal diarrhea  Stool said to be semi formed  Denies fevers, chills, or sweats  Denies nausea, vomiting,       Objective:  Vitals:  Temp:  [97 8 °F (36 6 °C)-99 °F (37 2 °C)] 98 2 °F (36 8 °C)  HR:  [] 90  Resp:  [16-18] 18  BP: (105-126)/(49-60) 105/49  SpO2:  [93 %-98 %] 97 %  Temp (24hrs), Av 3 °F (36 8 °C), Min:97 8 °F (36 6 °C), Max:99 °F (37 2 °C)  Current: Temperature: 98 2 °F (36 8 °C)    Physical Exam:     General Appearance:    Eyes:   Alert, chronically ill-appearing female nontoxic, no acute distress  Conjunctiva pale   Throat: Oropharynx moist without lesions  Lips, mucosa, and tongue normal   Neck: Supple, symmetrical, trachea midline, no adenopathy,  no tenderness/mass/nodules   Lungs:   Clear to auscultation bilaterally, no audible wheezes, rhonchi or rales; respirations unlabored   Heart:  Regular rate and rhythm, S1, S2 normal, no murmur, rub or gallop   Abdomen:   Soft, non-tender, non-distended, positive bowel sounds    No masses, no organomegaly    No CVA tenderness, Crawley catheter, rectal tube   Extremities: Extremities normal, atraumatic, no clubbing, cyanosis or edema, PICC line   Skin: Skin color pale, texture, turgor normal, no rashes or lesions  No draining wounds noted  Invasive Devices  Report    Peripherally Inserted Central Catheter Line  Duration           PICC Line 09/26/22 7 days          Drain  Duration           Urethral Catheter 18 Fr  9 days    Rectal Tube With balloon 2 days                Labs, Imaging, & Other studies:   All pertinent labs were personally reviewed  Results from last 7 days   Lab Units 10/03/22  0959 10/01/22  0618 09/30/22  0509   WBC Thousand/uL 5 10 4 18* 2 64*   HEMOGLOBIN g/dL 8 8* 8 5* 8 1*   PLATELETS Thousands/uL 182 108* 72*     Results from last 7 days   Lab Units 10/03/22  0959 10/01/22  0618 09/30/22  0509 09/28/22  0424 09/27/22  0427   SODIUM mmol/L 138 136 138   < > 142   POTASSIUM mmol/L 3 5 3 5 3 7   < > 3 3*   CHLORIDE mmol/L 107 105 107   < > 112*   CO2 mmol/L 27 27 26   < > 24   BUN mg/dL 10 9 9   < > 11   CREATININE mg/dL 0 61 0 52* 0 40*   < > 0 50*   EGFR ml/min/1 73sq m 84 89 97   < > 90   CALCIUM mg/dL 7 2* 7 6* 6 4*   < > 7 2*   AST U/L  --   --   --   --  9   ALT U/L  --   --   --   --  15   ALK PHOS U/L  --   --   --   --  47    < > = values in this interval not displayed

## 2022-10-04 NOTE — PROGRESS NOTES
SLIM PROGRESS NOTE     Name: Jeffery Beckford   Age & Sex: 79 y o  female   MRN: [id number]  Unit/Bed#: [facility] 406-01   Encounter: [id number]  Team: SLIM    PATIENT INFORMATION     Name: Jeffery Beckford   Age & Sex: 79 y o  female   MRN: [id number]  Hospital Stay Days: 12    ASSESSMENT/PLAN     Principal Problem:    Saddle embolus of pulmonary artery ([facility] )  Active Problems:    Toxic gastroenteritis and colitis    Metastatic breast cancer ([facility] )    Anxiety    Mixed hyperlipidemia    Primary hypertension    REYES (acute kidney injury) ([facility] )    Anemia    Tachycardia    Urinary retention    Swelling of lower extremity      * Saddle embolus of pulmonary artery (HCC)  Assessment & Plan  Acute, submassive, intermediate to high risk pulmonary embolism  Patient has history of right popliteal DVT and right lower lobe subsegmental PE from previous hospitalization in August   ? Patient was sent home on Xarelto, likely not failure, was receiving it at her nursing home  ? Patient also has metastatic breast cancer, hypercoagulable state  ? Return to the ER on 09/22 after a fall, found to have saddle pulmonary embolus with evidence of right heart strain and high clot burden  ? RV to LV ratio on CTA 1 2  ? BMP greater than 1500, troponins relatively normal  ? TTE inconclusive for right heart strain  ? Patient is now status post thrombectomy with IR, no IVC filter placement  ? Duplex showing significant right lower extremity clot burden  ? Continue on Lovenox for pulmonary embolism      Toxic gastroenteritis and colitis  Assessment & Plan  Patient was admitted with toxic gastroenteritis in August of 2022  ? Determined to be possible IBD with additional insult of Verzenio from her breast cancer treatment  ? Continue to hold Verzenio    ? Patient positive for C diff started on oral vancomycin D-3, diarrhea improving but persisting  ? Will wean prednisone slowly as she has been on this chronically- now down to 5 mg  ?  Will stop loperamide, cholestyramine n all stool softeners  ? GI offered remicaid to the daughter to Rx IBD - she is going to think about it  ? Spoke to GI about possibility of increasing Vanc dosing today  Also about concerns for IBD  Diarrhea slowly improving  To continue current dose of PO vancomycin for 10 days  May prolong antibiotic course per GI    ? Patient to follow-up with GI as outpatient for further IBD evaluation and decision on medications  ? Patient received Remicade on 10/01  Plan to continue as outpatent  ? Patient with rectal tube in place  Continued loose BMs, though patient states diarrhea is subjectively improving  Patient also with sacral wound  Will leave rectal tube in place for time being  Swelling of lower extremity  Assessment & Plan  LE swelling has been worsening over the past few weeks  Right worse than left  ? 3+ on both lower extremities, improving along with pulses  ? Previous echo with a hyperdynamic LV EF 70%  ? Lower extremity duplex revealing significant acute DVTs in the right lower extremity  ? Can consider diuresis once the patient is stable for further fluid management      Urinary retention  Assessment & Plan  Patient presented with a massively distended bladder, 3 6 L out of for straight cath, greater than 800 mL out of 2nd straight cath  ? Urinary retention protocol, patient will likely need Crawley catheter  ? May be secondary to TCA use  ? UA with 1+ protein, innumerable rbc's, 4-10 wbc's  ? Urology consulted, appreciate recommendations  ? Will discontinue urinary catheter 10/04 and initiate urinary retention protocol  Tachycardia  Assessment & Plan  · Patient had sinus tachycardia on her last discharge, likely in setting of pulmonary embolism  Still with sinus tachycardia on this admission  ? Continue midodrine for blood pressure support  ? S/p IVF on 09/29  Patient still with persistent sinus tachycardia  ?  Continue metoprolol      Anemia  Assessment & Plan  Macrocytic anemia complicated by acute blood loss anemia  ? Folate in September of 2022 3 3  ? Vitamin B12 in September of 2022 645  ? Iron panel and August of 2022 showed an iron saturation of 18, TIBC 105, iron 19, ferritin 755  ? Likely a mixed component of anemia chronic disease with possible folate deficiency  ? Continue folate supplementation  ? Patient received 1 unit packed red blood cell for hemoglobin 6 9 -> 7 4 -> 7 9       REYES (acute kidney injury) ([facility] )  Assessment & Plan  REYES on CKD stage IIIA  ? Patient's baseline creatinine between 0 8 and 0 9 with an EGFR ranging around 60  ? Admission creatinine 1 36, down to 0 5  ? Patient did receive IV contrast for CTA  ? Accurate in's and out's  ? Avoid nephrotoxins  ? Cr improved  ? Patient with suarez in place for urinary retention  Will D/C suarez today and initiate urinary retention protocol  Primary hypertension  Assessment & Plan  Patient's antihypertensives were discontinued after previous hospital stay due to normotension  · Continue to monitor    Mixed hyperlipidemia  Assessment & Plan  Stable  · Continue statin    Anxiety  Assessment & Plan  Depression/anxiety  ? Hold amitriptyline -possible etiology of tachycardia  ? Will resume Remeron      Metastatic breast cancer [facility])  Assessment & Plan  Stage IV metastatic ER positive, HI negative, 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, progressed from stage I A ER/HI positive, 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 years ago  Status post resection and adjuvant radiation and hormone therapy for 5 years  Patient had symptoms of bony discomfort in her sternum in June of 2021, imaging revealed lytic lesion, biopsy in July of 2021 confirmed progression of disease  ? Patient was started on Faslodex and Xgeva at that time, in conjunction with radiation  ? In April 2022 there was interval development of 4 mm nodule at the right lower lobe and interval development of increased sclerotic lesions throughout the visualized spine  ?  At that time, the patient's treatment was changed to Femara and Dejuan Jennifer  ? In 2022, the patient was changed from Dejuan Jennifer to [place] because of intolerance  ? During the patient's hospitalization in 2022 to 2022, Verzenio was discontinued due to gastroenteritis  ? Patient's cancer is most likely contributing to her hypercoagulable state  ? Appreciate oncology and palliative care consult  ? Will continue on Lovenox for now  ? Continue on Remeron for insomnia and anxiety  ? Patient would not like any more treatment for her malignancy    Family meeting held on 10/04 with palliative care, GI, CM, and myself  Patient's current clinical course discussed  Discussion of disposition planning occurred  Hospice was discussed  Referral to hospice liaison placed to provide patient and family with more information  Disposition: Patient still with loose bowel movements requiring rectal tube  To be reassessed tomorrow and removed if stool output decreases and solidifies  SUBJECTIVE     Patient seen and examined  No acute events overnight  Upon my encounter patient was seated comfortably in hospital chair  Family meeting occurred this morning which included patient, patient's daughter, palliative team, GI team, , and myself  Discussed goals of care, disposition planning, and patient's current clinical course  Patient presently denies any fever, chills, nausea, vomiting, chest pain, shortness a breath  Otherwise denies any new or worsening complaints  Patient's daughter, Ismael Gross, was updated during family meeting      OBJECTIVE     Vitals:    10/03/22 2202 10/04/22 0715 10/04/22 0904 10/04/22 1141   BP: 106/57 128/60 107/62 116/63   BP Location: Left arm Right arm     Pulse: 97 96 88 84   Resp: 20 18  17   Temp: 97 7 °F (36 5 °C) 98 7 °F (37 1 °C)  98 8 °F (37 1 °C)   TempSrc: Oral Oral  Oral   SpO2: 97% 94%  96%   Weight:       Height:          Temperature:   Temp (24hrs), Av 4 °F (36 9 °C), Min:97 7 °F (36 5 °C), Max:98 8 °F (37 1 °C)    Temperature: 98 8 °F (37 1 °C)  Intake & Output:  I/O       10/02 0701  10/03 0700 10/03 0701  10/04 0700 10/04 0701  10/05 0700    P  O  300 260     Total Intake(mL/kg) 300 (4 6) 260 (4)     Urine (mL/kg/hr) 250 (0 2) 525 (0 3)     Stool 150 50     Total Output 400 575     Net -100 -315                Weights:        Body mass index is 27 28 kg/m²  Weight (last 2 days)     Date/Time Weight    10/03/22 0500 65 5 (144 4)    10/02/22 0600 65 9 (145 28)        Physical Exam  Constitutional:       General: She is not in acute distress  Appearance: Normal appearance  Cardiovascular:      Rate and Rhythm: Normal rate and regular rhythm  Pulses: Normal pulses  Heart sounds: Normal heart sounds  No murmur heard  Pulmonary:      Effort: Pulmonary effort is normal  No respiratory distress  Breath sounds: Normal breath sounds  No wheezing, rhonchi or rales  Abdominal:      General: Abdomen is flat  Bowel sounds are normal  There is no distension  Palpations: Abdomen is soft  Tenderness: There is no abdominal tenderness  Genitourinary:     Comments: Rectal tube in place with loose stool  Neurological:      Mental Status: She is alert and oriented to person, place, and time  Psychiatric:         Mood and Affect: Mood normal          Behavior: Behavior normal          Thought Content: Thought content normal        LABORATORY DATA     Labs: I have personally reviewed pertinent reports    Results from last 7 days   Lab Units 10/04/22  0452 10/03/22  0959 10/01/22  0618 09/30/22  0509 09/28/22  0424   WBC Thousand/uL 3 82* 5 10 4 18* 2 64* 3 10*   HEMOGLOBIN g/dL 8 4* 8 8* 8 5* 8 1* 7 9*   HEMATOCRIT % 26 8* 28 7* 27 6* 26 0* 25 4*   PLATELETS Thousands/uL 157 182 108* 72* 50*   NEUTROS PCT %  --  74  --   --  75   MONOS PCT %  --  8  --   --  9   MONO PCT % 7  --   --  5  --       Results from last 7 days   Lab Units 10/04/22  0452 10/03/22  0959 10/01/22  0618   POTASSIUM mmol/L 3 0* 3 5 3 5   CHLORIDE mmol/L 110* 107 105   CO2 mmol/L 27 27 27   BUN mg/dL 11 10 9   CREATININE mg/dL 0 54* 0 61 0 52*   CALCIUM mg/dL 7 5* 7 2* 7 6*     Results from last 7 days   Lab Units 09/30/22  0509   MAGNESIUM mg/dL 2 0                        IMAGING & DIAGNOSTIC TESTING     Radiology Results: I have personally reviewed pertinent reports  XR chest portable    Result Date: 9/26/2022  Impression: Increased bibasilar density, most likely atelectasis  Workstation performed: [id number]     XR Trauma chest portable    Result Date: 9/22/2022  Impression: No acute cardiopulmonary disease  Workstation performed: [id number]     TRAUMA - CT head wo contrast    Result Date: 9/22/2022  Impression: No acute intracranial abnormality  Chronic microangiopathic changes  I personally discussed this study with Synchro Carrier on 9/22/2022 at 12:32 PM  Workstation performed: [id number]     TRAUMA - CT spine cervical wo contrast    Result Date: 9/22/2022  Impression: No cervical spine fracture or traumatic malalignment  I personally discussed this study with Synchro Carrier on 9/22/2022 at 12:23 PM   Workstation performed: [id number]     PE Study with CT abdomen & pelvis with contrast    Result Date: 9/22/2022  Impression: 1  Saddle embolus and left greater than right pulmonary arterial filling defects consistent with PE (high clot burden) with evidence of RV strain including an RV/LV ratio = 1 2  This is greater than 0 9, which is abnormal and indicates right heart strain  An abnormal RV/LV ratio has been shown to be associated with an increased risk of 30 day mortality in the setting of acute pulmonary embolism  Urgent consultation with the medical critical care team is recommended  2   Massive distention of the bladder  3   No acute posttraumatic injury   I personally discussed this study with Synchro Carrier on 9/22/2022 at 12:27 PM  Workstation performed: [id number]     IR IVC filter placement optional/temporary    Result Date: 9/26/2022  Impression: Placement of a temporary inferior vena cava filter  PLAN: This filter can be removed at any time in the future  Interventional Radiology will follow this patient to ensure removal when it is no longer needed  Workstation performed: [id number]     IR PE endovascular therapy    Result Date: 9/26/2022  Impression: 1  Technically successful mechanical thrombectomy of the left pulmonary artery with extirpation of large clot  Workstation performed: [id number]     IR PICC line placement single lumen (preferred for home anitbiotics/medications)    Result Date: 9/28/2022  Impression: 1  Status post placement of a peripherally inserted central venous catheter via the brachial vein with its tip at the cavoatrial junction under ultrasound and fluoroscopic guidance  Signed, performed, and dictated by BROOKE Anderson under the supervision of Dr Montse Schmitt  Workstation performed: [id number]     Other Diagnostic Testing: I have personally reviewed pertinent reports      ACTIVE MEDICATIONS     Current Facility-Administered Medications   Medication Dose Route Frequency   • atorvastatin (LIPITOR) tablet 40 mg  40 mg Oral After Dinner   • cholecalciferol (VITAMIN D3) tablet 2,000 Units  2,000 Units Oral Daily   • enoxaparin (LOVENOX) subcutaneous injection 70 mg  1 mg/kg Subcutaneous [address] [facility]   • folic acid (FOLVITE) tablet 1 mg  1 mg Oral Daily   • iohexol (OMNIPAQUE) 240 MG/ML solution 50 mL  50 mL Oral 90 min pre-procedure   • metoprolol (LOPRESSOR) injection 2 5 mg  2 5 mg Intravenous Q6H PRN   • metoprolol succinate (TOPROL-XL) 24 hr tablet 75 mg  75 mg Oral BID   • midodrine (PROAMATINE) tablet 2 5 mg  2 5 mg Oral TID AC   • mirtazapine (REMERON) tablet 7 5 mg  7 5 mg Oral HS   • pantoprazole (PROTONIX) EC tablet 40 mg  40 mg Oral BID AC   • potassium chloride (K-DUR,KLOR-CON) CR tablet 40 mEq  40 mEq Oral BID   • traMADol (ULTRAM) tablet 50 mg  50 mg Oral Once PRN   • vancomycin (VANCOCIN) oral solution 125 mg  125 mg Oral Q6H [address]       VTE Pharmacologic Prophylaxis: Enoxaparin (Lovenox)  VTE Mechanical Prophylaxis: sequential compression device    Portions of the record may have been created with voice recognition software  Occasional wrong word or "sound a like" substitutions may have occurred due to the inherent limitations of voice recognition software    Read the chart carefully and recognize, using context, where substitutions have occurred   ==  [facility]  Internal Medicine Residency PGY-2

## 2022-10-04 NOTE — PLAN OF CARE
Problem: PHYSICAL THERAPY ADULT  Goal: Performs mobility at highest level of function for planned discharge setting  See evaluation for individualized goals  Description: Treatment/Interventions: Functional transfer training, LE strengthening/ROM, Therapeutic exercise, Endurance training, Bed mobility, Gait training, Spoke to nursing, OT  Equipment Recommended:  (r/o rw)       See flowsheet documentation for full assessment, interventions and recommendations  Outcome: Progressing  Note: Prognosis: Fair  Problem List: Decreased strength, Decreased endurance, Impaired balance, Decreased mobility, Decreased coordination  Assessment: Pt seen for session - portion of which seen as co-treatment w/ OT due to concerns re: medical stability  PT portion focused on mobility where OT portion was more for ADLs, self care  session was for setup, bed mob, time spent EOB, transfers, gait w/ seated rest, repositioning  Pt cooperative, willing to mobilize and participate  continued pain in sacrum  Note improved mobility tolerance today, but increased SOB w/ 2nd gait distance  several LOB noted w/ gait, and needs cues for safe RW mgmt  continue to recommend rehab at d/c  Barriers to Discharge: Decreased caregiver support     PT Discharge Recommendation: Post acute rehabilitation services    See flowsheet documentation for full assessment

## 2022-10-04 NOTE — TELEPHONE ENCOUNTER
Called Taya back and left a vm requesting a c/b in reference to the questions she had surrounding IBD and the medication  Provided my direct line

## 2022-10-04 NOTE — PLAN OF CARE
Problem: OCCUPATIONAL THERAPY ADULT  Goal: Performs self-care activities at highest level of function for planned discharge setting  See evaluation for individualized goals  Description: Treatment Interventions: ADL retraining, UE strengthening/ROM, Functional transfer training, Endurance training, Patient/family training, Compensatory technique education, Continued evaluation, Energy conservation, Activityengagement          See flowsheet documentation for full assessment, interventions and recommendations  Outcome: Progressing  Note: Limitation: Decreased ADL status, Decreased endurance, Decreased self-care trans, Decreased high-level ADLs  Prognosis: Good  Assessment: Patient participated in Skilled OT session this date with interventions consisting of ADL re training with the use of correct body mechanics,  therapeutic activities to: increase activity tolerance, increase postural control, increase trunk control and increase OOB/ sitting tolerance   Patient agreeable to OT treatment session, upon arrival patient was found supine in bed  In comparison to previous session, patient with improvements in activity tolerance  Patient requiring frequent rest periods  Patient continues to be functioning below baseline level, occupational performance remains limited secondary to factors listed above and increased risk for falls and injury  From OT standpoint, recommendation at time of d/c would be Short Term Rehab  Patient to benefit from continued Occupational Therapy treatment while in the hospital to address deficits as defined above and maximize level of functional independence with ADLs and functional mobility       OT Discharge Recommendation: Post acute rehabilitation services

## 2022-10-05 LAB
ANION GAP SERPL CALCULATED.3IONS-SCNC: 5 MMOL/L (ref 4–13)
BASOPHILS # BLD AUTO: 0.01 THOUSANDS/ΜL (ref 0–0.1)
BASOPHILS NFR BLD AUTO: 0 % (ref 0–1)
BUN SERPL-MCNC: 11 MG/DL (ref 5–25)
CALCIUM SERPL-MCNC: 7.3 MG/DL (ref 8.3–10.1)
CHLORIDE SERPL-SCNC: 110 MMOL/L (ref 96–108)
CO2 SERPL-SCNC: 27 MMOL/L (ref 21–32)
CREAT SERPL-MCNC: 0.59 MG/DL (ref 0.6–1.3)
EOSINOPHIL # BLD AUTO: 0.09 THOUSAND/ΜL (ref 0–0.61)
EOSINOPHIL NFR BLD AUTO: 2 % (ref 0–6)
ERYTHROCYTE [DISTWIDTH] IN BLOOD BY AUTOMATED COUNT: 16.4 % (ref 11.6–15.1)
GFR SERPL CREATININE-BSD FRML MDRD: 85 ML/MIN/1.73SQ M
GLUCOSE SERPL-MCNC: 75 MG/DL (ref 65–140)
HCT VFR BLD AUTO: 27.3 % (ref 34.8–46.1)
HGB BLD-MCNC: 8.2 G/DL (ref 11.5–15.4)
IMM GRANULOCYTES # BLD AUTO: 0.06 THOUSAND/UL (ref 0–0.2)
IMM GRANULOCYTES NFR BLD AUTO: 1 % (ref 0–2)
LYMPHOCYTES # BLD AUTO: 0.83 THOUSANDS/ΜL (ref 0.6–4.47)
LYMPHOCYTES NFR BLD AUTO: 18 % (ref 14–44)
MCH RBC QN AUTO: 30.3 PG (ref 26.8–34.3)
MCHC RBC AUTO-ENTMCNC: 30 G/DL (ref 31.4–37.4)
MCV RBC AUTO: 101 FL (ref 82–98)
MONOCYTES # BLD AUTO: 0.38 THOUSAND/ΜL (ref 0.17–1.22)
MONOCYTES NFR BLD AUTO: 8 % (ref 4–12)
NEUTROPHILS # BLD AUTO: 3.28 THOUSANDS/ΜL (ref 1.85–7.62)
NEUTS SEG NFR BLD AUTO: 71 % (ref 43–75)
NRBC BLD AUTO-RTO: 0 /100 WBCS
PLATELET # BLD AUTO: 181 THOUSANDS/UL (ref 149–390)
PMV BLD AUTO: 10.4 FL (ref 8.9–12.7)
POTASSIUM SERPL-SCNC: 3.5 MMOL/L (ref 3.5–5.3)
RBC # BLD AUTO: 2.71 MILLION/UL (ref 3.81–5.12)
SODIUM SERPL-SCNC: 142 MMOL/L (ref 135–147)
WBC # BLD AUTO: 4.65 THOUSAND/UL (ref 4.31–10.16)

## 2022-10-05 PROCEDURE — 80048 BASIC METABOLIC PNL TOTAL CA: CPT | Performed by: STUDENT IN AN ORGANIZED HEALTH CARE EDUCATION/TRAINING PROGRAM

## 2022-10-05 PROCEDURE — 99232 SBSQ HOSP IP/OBS MODERATE 35: CPT | Performed by: INTERNAL MEDICINE

## 2022-10-05 PROCEDURE — 85025 COMPLETE CBC W/AUTO DIFF WBC: CPT | Performed by: STUDENT IN AN ORGANIZED HEALTH CARE EDUCATION/TRAINING PROGRAM

## 2022-10-05 RX ADMIN — CARBIDOPA AND LEVODOPA 2.5 MG: 50; 200 TABLET, EXTENDED RELEASE ORAL at 17:46

## 2022-10-05 RX ADMIN — CARBIDOPA AND LEVODOPA 2.5 MG: 50; 200 TABLET, EXTENDED RELEASE ORAL at 14:41

## 2022-10-05 RX ADMIN — PANTOPRAZOLE SODIUM 40 MG: 40 TABLET, DELAYED RELEASE ORAL at 17:46

## 2022-10-05 RX ADMIN — SODIUM CHLORIDE 500 ML: 0.9 INJECTION, SOLUTION INTRAVENOUS at 22:12

## 2022-10-05 RX ADMIN — ENOXAPARIN SODIUM 70 MG: 80 INJECTION SUBCUTANEOUS at 10:07

## 2022-10-05 RX ADMIN — MIRTAZAPINE 7.5 MG: 15 TABLET, FILM COATED ORAL at 22:09

## 2022-10-05 RX ADMIN — Medication 125 MG: at 01:04

## 2022-10-05 RX ADMIN — CARBIDOPA AND LEVODOPA 2.5 MG: 50; 200 TABLET, EXTENDED RELEASE ORAL at 06:44

## 2022-10-05 RX ADMIN — Medication 125 MG: at 06:44

## 2022-10-05 RX ADMIN — ENOXAPARIN SODIUM 70 MG: 80 INJECTION SUBCUTANEOUS at 21:50

## 2022-10-05 RX ADMIN — FOLIC ACID 1 MG: 1 TABLET ORAL at 10:01

## 2022-10-05 RX ADMIN — Medication 2000 UNITS: at 10:01

## 2022-10-05 RX ADMIN — METOPROLOL SUCCINATE 75 MG: 50 TABLET, EXTENDED RELEASE ORAL at 10:04

## 2022-10-05 RX ADMIN — PANTOPRAZOLE SODIUM 40 MG: 40 TABLET, DELAYED RELEASE ORAL at 06:44

## 2022-10-05 RX ADMIN — ATORVASTATIN CALCIUM 40 MG: 40 TABLET, FILM COATED ORAL at 17:46

## 2022-10-05 RX ADMIN — Medication 125 MG: at 21:50

## 2022-10-05 NOTE — WOUND OSTOMY CARE
Progress Note - Wound   Elidavia Plane Clunk 79 y o  female MRN: [id number]  Unit/Bed#: [facility] 406-01 Encounter: [id number]        Assessment:   Patient is seen for wound care follow-up  80 yo F admitted to [facility]"" with saddle embolus of pulmonary artery  PMH: stage IV metastatic breast cancer, anemia, CKD3, and hypertension  Rectal tube and suarez in place managing bowel and bladder   Patient on strict contact precautions for C  Diff  Mod assist for turning and repositioning  Patient on p-500 specialty mattress  B/l heels are dry, intact, and rekha  Findings:  1  POA Stage 3 Pressure Injury Left Buttocks: wound bed is beefy red and pink granulation tissue and appears moist  Romana-wound is hyperpigmented, scar tissue noted and fragile  Scant serosanguineous drainage  Continue use of stoma powder and triad paste to wound beds  2  Midline and Right Buttocks MASD: right buttocks appears resolved  Midline MASD remains with partial thickness skin loss with a beefy red, bleeding, and blanchable wound bed  Scant sanguineous drainage noted  Romana-wound is fragile and blanches  Right buttocks intact and blanches  No induration, fluctuance, odor, warmth/temperature differences, redness, or purulence noted to the above noted wounds and skin areas assessed  New dressings applied per orders listed below  Patient tolerated well- no s/s of non-verbal pain or discomfort observed during the encounter  Bedside nurse aware of plan of care  See flow sheets for more detailed assessment findings  Orders listed below and wound care will continue to follow, call or tiger text with questions  Skin Care Plan:  1-Cleanse sacro-buttocks with soap and water  Dust wound beds with Stoma Powder  Apply Triad Overtop  Perform TID and PRN  2-Turn/reposition q2h or when medically stable for pressure re-distribution on skin     3-Elevate heels to offload pressure  4-Moisturize skin daily with skin nourishing cream  5-Ehob cushion in chair when out of bed  6-Hydraguard to bilateral heels BID and PRN   7-P-500 Specialty Mattress        WOUNDS:  Wound 09/22/22 Buttocks Left (Active)   Wound Image   10/05/22 1040   Wound Description Drainage;Fragile; Beefy red;Granulation tissue;Pink 10/05/22 1040   Pressure Injury Stage 3 10/05/22 1040   Romana-wound Assessment Hyperpigmented;Scar Tissue;Fragile 10/05/22 1040   Wound Length (cm) 3 cm 10/05/22 1040   Wound Width (cm) 1 5 cm 10/05/22 1040   Wound Depth (cm) 0 2 cm 10/05/22 1040   Wound Surface Area (cm^2) 4 5 cm^2 10/05/22 1040   Wound Volume (cm^3) 0 9 cm^3 10/05/22 1040   Calculated Wound Volume (cm^3) 0 9 cm^3 10/05/22 1040   Change in Wound Size % -63 64 10/05/22 1040   Tunneling 0 cm 10/05/22 1040   Tunneling in depth located at 0 10/05/22 1040   Undermining 0 10/05/22 1040   Undermining is depth extending from 0 10/05/22 1040   Wound Site Closure RUDY 10/05/22 1040   Drainage Amount Scant 10/05/22 1040   Drainage Description Serosanguineous 10/05/22 1040   Non-staged Wound Description Full thickness 10/05/22 1040   Treatments Cleansed;Site care 10/05/22 1040   Dressing Other (Comment) 10/05/22 1040   Wound packed?  No 10/05/22 1040   Packing- # removed 0 10/05/22 1040   Packing- # inserted 0 10/05/22 1040   Dressing Changed Changed 10/05/22 1040   Patient Tolerance Tolerated well 10/05/22 1040   Dressing Status Intact 10/05/22 1040       Wound 09/30/22 MASD Buttocks Right (Active)   Wound Image   10/05/22 1041   Wound Description Beefy red;Bleeding 10/05/22 1041   Romana-wound Assessment Fragile 10/05/22 1041   Wound Length (cm) 1 5 cm 10/05/22 1041   Wound Width (cm) 0 2 cm 10/05/22 1041   Wound Depth (cm) 0 1 cm 10/05/22 1041   Wound Surface Area (cm^2) 0 3 cm^2 10/05/22 1041   Wound Volume (cm^3) 0 03 cm^3 10/05/22 1041   Calculated Wound Volume (cm^3) 0 03 cm^3 10/05/22 1041   Change in Wound Size % 96 43 10/05/22 1041   Drainage Amount Scant 10/05/22 1041   Drainage Description Sanguineous 10/05/22 1041   Non-staged Wound Description Partial thickness 10/05/22 1041   Treatments Cleansed;Site care 10/05/22 1041   Dressing Other (Comment) 10/05/22 1041   Dressing Changed New 10/05/22 1041   Patient Tolerance Tolerated well 10/05/22 1041   Dressing Status Intact 10/05/22 Shahzad RN, BSN

## 2022-10-05 NOTE — PROGRESS NOTES
Progress Note -  [facility]  Ricky Silverman 79 y o  female MRN: [id number]  Unit/Bed#: [facility] 406-01 Encounter: [id number]      ASSESSMENT AND PLAN:      Pt is a 79year old female with history of stage IV breast cancer with post radiation chemotherapy who underwent colonoscopy and flex sig showing significant colitis in the distal sigmoid previously treated for IBD presented with diarrhea and saddle pulmonary embolism status post IR thrombectomy and IVC filter being treated for C diff       1  C Diff Colitis   - Positive PCR , negative for toxin; patient may not actually have C diff enteritis and diarrhea be secondary to IBD  - Can finish PO Vancomycin 125mg q6 course        2  Inflammatory Bowel Disease  - Colonoscopy 10/21 and flex sig 8/20 showed active colitis; treated with mesalamine/hydrocortisone enemas started on prednisone  August 2022 for 4-5 weeks  - CRPdown to 36 8 (10/4), previously 74  6(10/1/22) and 122 4 (8/29)  -calprotectin 09/24/2022 - in the 3000s  -  previously required multiple transfusions for hemoglobin less than 7, currently stable the past 6 days hb 8 2 (10/5)   - previous on 40mg prednisone; tapered and discontinued  - based on colonoscopy findings and ongoing diarrhea; patient may have underlying IBD did not respond to steroids  - started on Remicade 9/30  - recommend lactose-free diet  - further workup and treatment outpatient and repeat colonoscopy for persistent colitis       Rest of care per primary team      ______________________________________________________________________    Subjective:  Patient seen examined  Denies any overnight events  She reports overall feeling seems yesterday but feels more alert today  She denies any dizziness, fatigue, and lightheadedness  She reports having an appetite but not eating much  Still continues to be loose and brown color with no melena or bright red blood seen in the rectal tube bag       REVIEW OF SYSTEMS:    Review of Systems   Constitutional: Negative for chills and fever  Respiratory: Negative for shortness of breath and wheezing  Cardiovascular: Negative for chest pain  Gastrointestinal: Positive for diarrhea  Negative for abdominal distention, abdominal pain, constipation, nausea and vomiting  Neurological: Negative for dizziness, light-headedness and headaches  Historical Information   Past Medical History:   Diagnosis Date   • Abnormal weight loss    • Allergic reaction    • Anxiety    • Breast cancer, right [facility]) 2011    right   • Cancer ([facility] ) 2012   • Candidal vulvovaginitis    • Clotting disorder ([place] [address] ) Same as above   • Endometrial hyperplasia    • Epithelial cyst     benign, ovary   • GI (gastrointestinal bleed) Blood mixed with stool   • History of radiation therapy 2011    right breast cancer   • Hyperlipidemia    • Hypertension    • Internal hemorrhoids    • Knee tendonitis    • Ovarian cyst    • 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[facility])      Past Surgical History:   Procedure Laterality Date   • BILATERAL SALPINGOOPHORECTOMY      onset: 7/23/13   • BREAST BIOPSY Right 06/13/2006    benign   • BREAST BIOPSY Right 03/02/2011    malignant   • BREAST LUMPECTOMY Right 03/30/2011    malignant   • CATARACT EXTRACTION W/  INTRAOCULAR LENS IMPLANT Right     phacoemulsification   Onset: 10/27/14   • CHOLECYSTECTOMY     • COLONOSCOPY  2017    approx   • HYSTERECTOMY  Yes   • INTRAOPERATIVE RADIATION THERAPY ([facility])     • IR BIOPSY BONE  7/9/2021   • IR IVC FILTER PLACEMENT OPTIONAL/TEMPORARY  9/23/2022   • IR PE ENDOVASCULAR THERAPY  9/22/2022   • IR PICC PLACEMENT SINGLE LUMEN  8/19/2022   • IR PICC PLACEMENT SINGLE LUMEN  9/26/2022   • SKIN LESION EXCISION Right     breast, single lesion   • TONSILLECTOMY AND ADENOIDECTOMY       Social History   Social History     Substance and Sexual Activity   Alcohol Use Not Currently    Comment: (history)     Social History     Substance and Sexual Activity   Drug Use No     Social History     Tobacco Use   Smoking Status Former Smoker   • Packs/day: 1 00   • Years: 30 00   • Pack years: 30 00   • Types: Cigarettes   • Start date: 55   • Quit date: 0   • Years since quittin 7   Smokeless Tobacco Never Used     Family History   Problem Relation Age of Onset   • Stomach cancer Mother    • No Known Problems Father    • Cervical cancer Sister    • No Known Problems Daughter    • No Known Problems Maternal Grandmother    • No Known Problems Maternal Grandfather    • No Known Problems Paternal Grandmother    • No Known Problems Paternal Grandfather    • Colon polyps Neg Hx    • Colon cancer Neg Hx        Meds/Allergies     Medications Prior to Admission   Medication   • amitriptyline (ELAVIL) 25 mg tablet   • Cholecalciferol (VITAMIN D3) 2000 units capsule   • cholestyramine sugar free (QUESTRAN LIGHT) 4 g packet   • diphenoxylate-atropine (LOMOTIL) 2 5-0 025 mg per tablet   • folic acid (FOLVITE) 1 mg tablet   • loperamide (IMODIUM) 2 mg capsule   • metoprolol tartrate (LOPRESSOR) 50 mg tablet   • mirtazapine (REMERON) 7 5 MG tablet   • pantoprazole (PROTONIX) 40 mg tablet   • predniSONE 20 mg tablet   • rivaroxaban (XARELTO) 15 mg tablet   • rivaroxaban (Xarelto) 20 mg tablet   • simethicone (MYLICON) 80 mg chewable tablet   • simvastatin (ZOCOR) 10 mg tablet     Current Facility-Administered Medications   Medication Dose Route Frequency   • atorvastatin (LIPITOR) tablet 40 mg  40 mg Oral After Dinner   • cholecalciferol (VITAMIN D3) tablet 2,000 Units  2,000 Units Oral Daily   • enoxaparin (LOVENOX) subcutaneous injection 70 mg  1 mg/kg Subcutaneous Q10E RAI   • folic acid (FOLVITE) tablet 1 mg  1 mg Oral Daily   • iohexol (OMNIPAQUE) 240 MG/ML solution 50 mL  50 mL Oral 90 min pre-procedure   • metoprolol (LOPRESSOR) injection 2 5 mg  2 5 mg Intravenous Q6H PRN   • metoprolol succinate (TOPROL-XL) 24 hr tablet 75 mg  75 mg Oral BID   • midodrine (PROAMATINE) tablet 2 5 mg  2 5 mg Oral TID AC   • mirtazapine (REMERON) tablet 7 5 mg  7 5 mg Oral HS   • pantoprazole (PROTONIX) EC tablet 40 mg  40 mg Oral BID AC   • traMADol (ULTRAM) tablet 50 mg  50 mg Oral Once PRN   • vancomycin (VANCOCIN) oral solution 125 mg  125 mg Oral Q12H [facility] & long-term    Followed by   • [START ON 10/12/2022] vancomycin (VANCOCIN) oral solution 125 mg  125 mg Oral Daily       Allergies   Allergen Reactions   • Sulfa Antibiotics    • Pneumococcal Polysaccharide Vaccine Rash and Edema           Objective     Blood pressure 121/56, pulse 80, temperature 99 8 °F (37 7 °C), resp  rate 16, height 5' 1" (1 549 m), weight 68 2 kg (150 lb 5 7 oz), SpO2 92 %, not currently breastfeeding  Body mass index is 28 41 kg/m²  Intake/Output Summary (Last 24 hours) at 10/5/2022 0803  Last data filed at 10/5/2022 3454  Gross per 24 hour   Intake 100 ml   Output 635 ml   Net -535 ml         PHYSICAL EXAM:      Physical Exam  Constitutional:       General: She is not in acute distress  Appearance: Normal appearance  She is normal weight  She is not toxic-appearing  HENT:      Head: Normocephalic and atraumatic  Cardiovascular:      Rate and Rhythm: Normal rate and regular rhythm  Heart sounds: No murmur heard  No gallop  Pulmonary:      Effort: No respiratory distress  Breath sounds: No wheezing or rales  Abdominal:      General: Abdomen is flat  There is no distension  Tenderness: There is no abdominal tenderness  There is no guarding  Musculoskeletal:      Right lower leg: Edema present  Left lower leg: Edema present  Neurological:      Mental Status: She is alert and oriented to person, place, and time  Psychiatric:         Mood and Affect: Mood normal          Behavior: Behavior normal           Lab Results:   No results displayed because visit has over 200 results  Imaging Studies: I have personally reviewed pertinent imaging studies      Eran Azar Internal Medicine PGY-1

## 2022-10-05 NOTE — TELEPHONE ENCOUNTER
Connected with Taya via phone and answered all questions she had at that time  I spoke of the upcoming infusion appointment and her coordinating with the rehab that Wil Held is dc so they can facilitate transport to these appointments  We went over length of infusion time and how it is difficult to reschedule since the length of infusion is long  Taya understand and will reach out to me if she has more questions about the patient's IBD care

## 2022-10-05 NOTE — CASE MANAGEMENT
Case Management Discharge Planning Note    Patient name Eugene Silverman  Location [facility] 406-01 MRN [id number]  : 1940 Date 10/5/2022       Current Admission Date: 2022  Current Admission Diagnosis:Saddle embolus of pulmonary artery [facility])   Patient Active Problem List    Diagnosis Date Noted   • Ulcerative pancolitis with rectal bleeding ([facility] ) 10/03/2022   • Saddle embolus of pulmonary artery ([place] [address] ) 2022   • Urinary retention 2022   • Swelling of lower extremity 2022   • Venous insufficiency 2022   • Tachycardia 2022   • Single subsegmental pulmonary embolism without acute cor pulmonale (HCC) 2022   • Severe protein-calorie malnutrition ([place] [address] ) 2022   • Anemia 2022   • Hypokalemia 2022   • REYES (acute kidney injury) ([place] [address] ) 2022   • Diarrhea 2022   • Secondary malignant neoplasm of bone ([place] [address] ) 2022   • Toxic gastroenteritis and colitis 2022   • Rectal bleeding 10/14/2021   • Lytic lesion of bone on x-ray 2021   • Neoplasm of uncertain behavior of sternum 2021   • Cough due to ACE inhibitor 2021   • Acute costochondritis 2021   • Osteopenia of multiple sites 2020   • Primary hypertension 10/22/2019   • Chronic kidney disease (CKD) stage G3a/A1, moderately decreased glomerular filtration rate (GFR) between 45-59 mL/min/1 73 square meter and albuminuria creatinine ratio less than 30 mg/g (HCC) 2019   • Adverse reaction to pneumococcal vaccine 2015   • Cataract, bilateral 2014   • Pulmonary nodule seen on imaging study 09/10/2013   • Sleep disorder 2013   • Anxiety 2013   • Metastatic breast cancer ([facility] ) 10/17/2012   • Mixed hyperlipidemia 2012      LOS (days): 13  Geometric Mean LOS (GMLOS) (days): 4 40  Days to GMLOS:-8 4     OBJECTIVE:  Risk of Unplanned Readmission Score: 34 96         Current admission status: Inpatient   Preferred Pharmacy:   [facility], [address]  [address] [facility]  Phone: [phone] Fax: [phone] 532 [facility], [address] [place] [address]  [address]  119 [facility] [address]  Phone: [phone] Fax: [phone] [address] [place], [facility] [address] [place] [address] [address] [address] [facility] [address]  Phone: [phone] Fax: [phone]    Primary Care Provider: Katty Singletary DO    Primary Insurance: MEDICARE  Secondary Insurance: Karthik Cerda    DISCHARGE DETAILS:                                                                                                 Additional Comments: CM sent pt records to [facility] personal care [place] at daughter's request

## 2022-10-05 NOTE — PROGRESS NOTES
Progress Note - Infectious Disease   Marylen Panther Clunk 79 y o  female MRN: [id number]  Unit/Bed#: [facility] 406-01 Encounter: [id number]      Impression:  1  Diarrhea secondary to IBD flare with possible C diff colitis  2  Saddle embolus s/p thrombectomy with IVC filter placement  3  History of right breast carcinoma stage IV with metastases to bone  4  Protein calorie malnutrition  5  Urinary retention    Recommendations:  Patient is afebrile with low WBC count  1  I am not convinced that the patient has active C diff colitis  Diarrhea more likely secondary to IBD  However, have no objection to completing 10 day course with approximately 1 more day of p o  Vancomycin 125 mg q 6 hours  2  Stools are still loose requiring rectal tube    Antibiotics:  1  Vancomycin 125 mg q 6 hours p o , day 9 of 10    Subjective:  She has ongoing diarrhea  Denies fevers, chills, or sweats  Denies nausea, vomiting,       Objective:  Vitals:  Temp:  [97 7 °F (36 5 °C)-98 8 °F (37 1 °C)] 97 8 °F (36 6 °C)  HR:  [84-97] 86  Resp:  [17-20] 18  BP: (103-128)/(52-63) 103/52  SpO2:  [94 %-97 %] 95 %  Temp (24hrs), Av 3 °F (36 8 °C), Min:97 7 °F (36 5 °C), Max:98 8 °F (37 1 °C)  Current: Temperature: 97 8 °F (36 6 °C)    Physical Exam:     General Appearance:    Eyes:   Alert, chronically ill-appearing female nontoxic, no acute distress  Conjunctiva pale   Throat: Oropharynx moist without lesions  Lips, mucosa, and tongue normal   Neck: Supple, symmetrical, trachea midline, no adenopathy,  no tenderness/mass/nodules   Lungs:   Clear to auscultation bilaterally, no audible wheezes, rhonchi or rales; respirations unlabored   Heart:  Regular rate and rhythm, S1, S2 normal, no murmur, rub or gallop   Abdomen:   Soft, non-tender, non-distended, positive bowel sounds    No masses, no organomegaly    No CVA tenderness, Crawley catheter, rectal tube   Extremities: Extremities normal, atraumatic, no clubbing, cyanosis or edema, PICC line   Skin: Skin color pale, texture, turgor normal, no rashes or lesions  No draining wounds noted           Invasive Devices  Report    Peripherally Inserted Central Catheter Line  Duration           PICC Line 09/26/22 8 days          Drain  Duration           Urethral Catheter 18 Fr  10 days    Rectal Tube With balloon 3 days                Labs, Imaging, & Other studies:   All pertinent labs were personally reviewed  Results from last 7 days   Lab Units 10/04/22  0452 10/03/22  0959 10/01/22  0618   WBC Thousand/uL 3 82* 5 10 4 18*   HEMOGLOBIN g/dL 8 4* 8 8* 8 5*   PLATELETS Thousands/uL 157 182 108*     Results from last 7 days   Lab Units 10/04/22  0452 10/03/22  0959 10/01/22  0618   SODIUM mmol/L 141 138 136   POTASSIUM mmol/L 3 0* 3 5 3 5   CHLORIDE mmol/L 110* 107 105   CO2 mmol/L 27 27 27   BUN mg/dL 11 10 9   CREATININE mg/dL 0 54* 0 61 0 52*   EGFR ml/min/1 73sq m 88 84 89   CALCIUM mg/dL 7 5* 7 2* 7 6*

## 2022-10-05 NOTE — PROGRESS NOTES
SLIM PROGRESS NOTE     Name: Hossein Jim   Age & Sex: 79 y o  female   MRN: [id number]  Unit/Bed#: [facility] 406-01   Encounter: [id number]  Team:HERMELINDO    PATIENT INFORMATION     Name: Hossein Jim   Age & Sex: 79 y o  female   MRN: [id number]  Hospital Stay Days: 13    ASSESSMENT/PLAN     Principal Problem:    Saddle embolus of pulmonary artery ([facility] )  Active Problems:    Toxic gastroenteritis and colitis    Metastatic breast cancer ([facility] )    Anxiety    Mixed hyperlipidemia    Primary hypertension    REYES (acute kidney injury) ([facility] )    Anemia    Tachycardia    Urinary retention    Swelling of lower extremity      * Saddle embolus of pulmonary artery (HCC)  Assessment & Plan  Acute, submassive, intermediate to high risk pulmonary embolism  Patient has history of right popliteal DVT and right lower lobe subsegmental PE from previous hospitalization in August   ? Patient was sent home on Xarelto, likely not failure, was receiving it at her nursing home  ? Patient also has metastatic breast cancer, hypercoagulable state  ? Return to the ER on 09/22 after a fall, found to have saddle pulmonary embolus with evidence of right heart strain and high clot burden  ? RV to LV ratio on CTA 1 2  ? BMP greater than 1500, troponins relatively normal  ? TTE inconclusive for right heart strain  ? Patient is now status post thrombectomy with IR, no IVC filter placement  ? Duplex showing significant right lower extremity clot burden  ? Continue on Lovenox for pulmonary embolism      Toxic gastroenteritis and colitis  Assessment & Plan  Patient was admitted with toxic gastroenteritis in August of 2022  ? Determined to be possible IBD with additional insult of Verzenio from her breast cancer treatment  ? Continue to hold Verzenio    ? Patient positive for C diff started on oral vancomycin D-3, diarrhea improving but persisting  ? Will wean prednisone slowly as she has been on this chronically- now down to 5 mg  ?  Will stop loperamide, cholestyramine n all stool softeners  ? GI offered remicaid to the daughter to Rx IBD - she is going to think about it  ? Spoke to GI about possibility of increasing Vanc dosing today  Also about concerns for IBD  Diarrhea slowly improving  Completed 10 day of PO cohen  ? Spoke to GI team regarding prolonged course of PO vancomycin  Will continue at BID dosing for 1 week followed by daily dosing for an additional week  ? Patient to follow-up with GI as outpatient for further IBD evaluation and decision on medications  ? Patient received Remicade on 10/01  Plan to continue as outpatent  ? Patient with rectal tube in place  Continued loose BMs, though patient states diarrhea is subjectively improving  Patient also with sacral wound  Will leave rectal tube in place for time being  ? Montior stool output to assess progress      Swelling of lower extremity  Assessment & Plan  LE swelling has been worsening over the past few weeks  Right worse than left  ? 3+ on both lower extremities, improving along with pulses  ? Previous echo with a hyperdynamic LV EF 70%  ? Lower extremity duplex revealing significant acute DVTs in the right lower extremity  ? Can consider diuresis once the patient is stable for further fluid management      Urinary retention  Assessment & Plan  Patient presented with a massively distended bladder, 3 6 L out of for straight cath, greater than 800 mL out of 2nd straight cath  ? Urinary retention protocol, patient will likely need Crawley catheter  ? May be secondary to TCA use  ? UA with 1+ protein, innumerable rbc's, 4-10 wbc's  ? Urology consulted, appreciate recommendations  ? Discontinued urinary catheter 10/04 and initiate urinary retention protocol  Tachycardia  Assessment & Plan  · Patient had sinus tachycardia on her last discharge, likely in setting of pulmonary embolism  Still with sinus tachycardia on this admission  ? Continue midodrine for blood pressure support  ?  S/p IVF on 09/29  Patient still with persistent sinus tachycardia  ? Continue metoprolol      Anemia  Assessment & Plan  Macrocytic anemia complicated by acute blood loss anemia  ? Folate in September of 2022 3 3  ? Vitamin B12 in September of 2022 645  ? Iron panel and August of 2022 showed an iron saturation of 18, TIBC 105, iron 19, ferritin 755  ? Likely a mixed component of anemia chronic disease with possible folate deficiency  ? Continue folate supplementation  ? Patient received 1 unit packed red blood cell for hemoglobin 6 9 -> 7 4 -> 7 9       REYES (acute kidney injury) ([facility] )  Assessment & Plan  REYES on CKD stage IIIA  ? Patient's baseline creatinine between 0 8 and 0 9 with an EGFR ranging around 60  ? Admission creatinine 1 36, down to 0 5  ? Patient did receive IV contrast for CTA  ? Accurate in's and out's  ? Avoid nephrotoxins  ? Cr improved  ? Patient with suarez in place for urinary retention  Will D/C suarez today and initiate urinary retention protocol  Primary hypertension  Assessment & Plan  Patient's antihypertensives were discontinued after previous hospital stay due to normotension  · Continue to monitor    Mixed hyperlipidemia  Assessment & Plan  Stable  · Continue statin    Anxiety  Assessment & Plan  Depression/anxiety  ? Hold amitriptyline -possible etiology of tachycardia  ? Will resume Remeron      Metastatic breast cancer [facility])  Assessment & Plan  Stage IV metastatic ER positive, IN negative, 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, progressed from stage I A ER/IN positive, 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 years ago  Status post resection and adjuvant radiation and hormone therapy for 5 years  Patient had symptoms of bony discomfort in her sternum in June of 2021, imaging revealed lytic lesion, biopsy in July of 2021 confirmed progression of disease  ? Patient was started on Faslodex and Xgeva at that time, in conjunction with radiation  ?  In April 2022 there was interval development of 4 mm nodule at the right lower lobe and interval development of increased sclerotic lesions throughout the visualized spine  ? At that time, the patient's treatment was changed to Femara and Carlie Covert  ? In July of 2022, the patient was changed from Carlie Covert to [place] because of intolerance  ? During the patient's hospitalization in August of 2022 to September of 2022, Verzenio was discontinued due to gastroenteritis  ? Patient's cancer is most likely contributing to her hypercoagulable state  ? Appreciate oncology and palliative care consult  ? Will continue on Lovenox for now  ? Continue on Remeron for insomnia and anxiety  ? Patient would not like any more treatment for her malignancy    Family meeting held on 10/04 with palliative care, GI, CM, and myself  Patient's current clinical course discussed  Discussion of disposition planning occurred  Hospice was discussed  Referral to hospice liaison placed to provide patient and family with more information  Disposition: Will try to quantify stool output to assess for progress  PO vancomycin taper  F/u CM for dispo planning  SUBJECTIVE     Patient seen and examined  No acute events overnight  Upon my encounter patient was lying comfortably in hospital bed  Patient states the bowel movements are still loose  Presently denies any fever, chills, nausea, vomiting, diarrhea, constipation, chest pain, shortness a breath  Otherwise denies any new or worsening complaints  I called and spoke to patient's daughter, Agnieszka Peng, via telephone  I had extensive conversation with Agnieszka Peng regarding review of all of patient's current medications  I was able to answer all of her questions and addressed all her concerns to her satisfaction      OBJECTIVE     Vitals:    10/05/22 0600 10/05/22 0821 10/05/22 1004 10/05/22 1126   BP: 121/56 109/57 122/61 112/54   BP Location:  Left arm  Left arm   Pulse: 80 80 83    Resp:  16  16   Temp: 99 8 °F (37 7 °C) 97 7 °F (36 5 °C)  99 1 °F (37 3 °C) TempSrc:  Oral  Oral   SpO2: 92% 95%  96%   Weight: 68 2 kg (150 lb 5 7 oz)      Height:          Temperature:   Temp (24hrs), Av 5 °F (36 9 °C), Min:97 7 °F (36 5 °C), Max:99 8 °F (37 7 °C)    Temperature: 99 1 °F (37 3 °C)  Intake & Output:  I/O       10/03 0701  10/04 0700 10/04 0701  10/05 0700 10/05 0701  10/06 07    P  O  260 100     Total Intake(mL/kg) 260 (4) 100 (1 5)     Urine (mL/kg/hr) 525 (0 3) 435 (0 3)     Stool 50 200     Total Output 575 245     Net -315 -455                Weights:        Body mass index is 28 41 kg/m²  Weight (last 2 days)     Date/Time Weight    10/05/22 0600 68 2 (150 35)    10/03/22 0500 65 5 (144 4)        Physical Exam  Constitutional:       General: She is not in acute distress  Appearance: Normal appearance  Cardiovascular:      Rate and Rhythm: Normal rate and regular rhythm  Pulses: Normal pulses  Heart sounds: Normal heart sounds  No murmur heard  Pulmonary:      Effort: Pulmonary effort is normal  No respiratory distress  Breath sounds: Normal breath sounds  No wheezing, rhonchi or rales  Abdominal:      General: Abdomen is flat  Bowel sounds are normal  There is no distension  Palpations: Abdomen is soft  Tenderness: There is no abdominal tenderness  Genitourinary:     Comments: Rectal Tube in place draining loose stool   Musculoskeletal:      Right lower leg: No edema  Left lower leg: No edema  Neurological:      Mental Status: She is alert and oriented to person, place, and time  Psychiatric:         Mood and Affect: Mood normal          Behavior: Behavior normal          Thought Content: Thought content normal        LABORATORY DATA     Labs: I have personally reviewed pertinent reports    Results from last 7 days   Lab Units 10/05/22  0528 10/04/22  0452 10/03/22  0959   WBC Thousand/uL 4 65 3 82* 5 10   HEMOGLOBIN g/dL 8 2* 8 4* 8 8*   HEMATOCRIT % 27 3* 26 8* 28 7*   PLATELETS Thousands/uL 181 157 182   NEUTROS PCT % 71  --  74   MONOS PCT % 8  --  8   MONO PCT %  --  7  --       Results from last 7 days   Lab Units 10/05/22  0528 10/04/22  0452 10/03/22  0959   POTASSIUM mmol/L 3 5 3 0* 3 5   CHLORIDE mmol/L 110* 110* 107   CO2 mmol/L 27 27 27   BUN mg/dL 11 11 10   CREATININE mg/dL 0 59* 0 54* 0 61   CALCIUM mg/dL 7 3* 7 5* 7 2*     Results from last 7 days   Lab Units 09/30/22  0509   MAGNESIUM mg/dL 2 0                        IMAGING & DIAGNOSTIC TESTING     Radiology Results: I have personally reviewed pertinent reports  XR chest portable    Result Date: 9/26/2022  Impression: Increased bibasilar density, most likely atelectasis  Workstation performed: [id number]     XR Trauma chest portable    Result Date: 9/22/2022  Impression: No acute cardiopulmonary disease  Workstation performed: [id number]     TRAUMA - CT head wo contrast    Result Date: 9/22/2022  Impression: No acute intracranial abnormality  Chronic microangiopathic changes  I personally discussed this study with Sally Farah on 9/22/2022 at 12:32 PM  Workstation performed: [id number]     TRAUMA - CT spine cervical wo contrast    Result Date: 9/22/2022  Impression: No cervical spine fracture or traumatic malalignment  I personally discussed this study with Sally Farah on 9/22/2022 at 12:23 PM   Workstation performed: [id number]     PE Study with CT abdomen & pelvis with contrast    Result Date: 9/22/2022  Impression: 1  Saddle embolus and left greater than right pulmonary arterial filling defects consistent with PE (high clot burden) with evidence of RV strain including an RV/LV ratio = 1 2  This is greater than 0 9, which is abnormal and indicates right heart strain  An abnormal RV/LV ratio has been shown to be associated with an increased risk of 30 day mortality in the setting of acute pulmonary embolism  Urgent consultation with the medical critical care team is recommended  2   Massive distention of the bladder   3   No acute posttraumatic injury  I personally discussed this study with Bri Yu on 9/22/2022 at 12:27 PM  Workstation performed: [id number]     IR IVC filter placement optional/temporary    Result Date: 9/26/2022  Impression: Placement of a temporary inferior vena cava filter  PLAN: This filter can be removed at any time in the future  Interventional Radiology will follow this patient to ensure removal when it is no longer needed  Workstation performed: [id number]     IR PE endovascular therapy    Result Date: 9/26/2022  Impression: 1  Technically successful mechanical thrombectomy of the left pulmonary artery with extirpation of large clot  Workstation performed: [id number]     IR PICC line placement single lumen (preferred for home anitbiotics/medications)    Result Date: 9/28/2022  Impression: 1  Status post placement of a peripherally inserted central venous catheter via the brachial vein with its tip at the cavoatrial junction under ultrasound and fluoroscopic guidance  Signed, performed, and dictated by BROOKE Hilario under the supervision of Dr Laurie Fuentes  Workstation performed: [id number]     Other Diagnostic Testing: I have personally reviewed pertinent reports      ACTIVE MEDICATIONS     Current Facility-Administered Medications   Medication Dose Route Frequency   • atorvastatin (LIPITOR) tablet 40 mg  40 mg Oral After Dinner   • cholecalciferol (VITAMIN D3) tablet 2,000 Units  2,000 Units Oral Daily   • enoxaparin (LOVENOX) subcutaneous injection 70 mg  1 mg/kg Subcutaneous L43C [address]   • folic acid (FOLVITE) tablet 1 mg  1 mg Oral Daily   • iohexol (OMNIPAQUE) 240 MG/ML solution 50 mL  50 mL Oral 90 min pre-procedure   • metoprolol (LOPRESSOR) injection 2 5 mg  2 5 mg Intravenous Q6H PRN   • metoprolol succinate (TOPROL-XL) 24 hr tablet 75 mg  75 mg Oral BID   • midodrine (PROAMATINE) tablet 2 5 mg  2 5 mg Oral TID AC   • mirtazapine (REMERON) tablet 7 5 mg  7 5 mg Oral HS   • pantoprazole (PROTONIX) EC tablet 40 mg  40 mg Oral BID AC   • traMADol (ULTRAM) tablet 50 mg  50 mg Oral Once PRN   • vancomycin (VANCOCIN) oral solution 125 mg  125 mg Oral Q12H [address]    Followed by   • [START ON 10/12/2022] vancomycin (VANCOCIN) oral solution 125 mg  125 mg Oral Daily       VTE Pharmacologic Prophylaxis: Enoxaparin (Lovenox)  VTE Mechanical Prophylaxis: sequential compression device    Portions of the record may have been created with voice recognition software  Occasional wrong word or "sound a like" substitutions may have occurred due to the inherent limitations of voice recognition software    Read the chart carefully and recognize, using context, where substitutions have occurred   ==  [facility]  Internal Medicine Residency PGY-2

## 2022-10-06 LAB
ANION GAP SERPL CALCULATED.3IONS-SCNC: 5 MMOL/L (ref 4–13)
BASOPHILS # BLD AUTO: 0.01 THOUSANDS/ΜL (ref 0–0.1)
BASOPHILS NFR BLD AUTO: 0 % (ref 0–1)
BUN SERPL-MCNC: 11 MG/DL (ref 5–25)
CALCIUM SERPL-MCNC: 7 MG/DL (ref 8.3–10.1)
CHLORIDE SERPL-SCNC: 110 MMOL/L (ref 96–108)
CO2 SERPL-SCNC: 27 MMOL/L (ref 21–32)
CREAT SERPL-MCNC: 0.57 MG/DL (ref 0.6–1.3)
EOSINOPHIL # BLD AUTO: 0.14 THOUSAND/ΜL (ref 0–0.61)
EOSINOPHIL NFR BLD AUTO: 3 % (ref 0–6)
ERYTHROCYTE [DISTWIDTH] IN BLOOD BY AUTOMATED COUNT: 16.2 % (ref 11.6–15.1)
GFR SERPL CREATININE-BSD FRML MDRD: 86 ML/MIN/1.73SQ M
GLUCOSE SERPL-MCNC: 77 MG/DL (ref 65–140)
HCT VFR BLD AUTO: 28.6 % (ref 34.8–46.1)
HGB BLD-MCNC: 8.5 G/DL (ref 11.5–15.4)
IMM GRANULOCYTES # BLD AUTO: 0.04 THOUSAND/UL (ref 0–0.2)
IMM GRANULOCYTES NFR BLD AUTO: 1 % (ref 0–2)
LYMPHOCYTES # BLD AUTO: 0.84 THOUSANDS/ΜL (ref 0.6–4.47)
LYMPHOCYTES NFR BLD AUTO: 17 % (ref 14–44)
MCH RBC QN AUTO: 30 PG (ref 26.8–34.3)
MCHC RBC AUTO-ENTMCNC: 29.7 G/DL (ref 31.4–37.4)
MCV RBC AUTO: 101 FL (ref 82–98)
MONOCYTES # BLD AUTO: 0.35 THOUSAND/ΜL (ref 0.17–1.22)
MONOCYTES NFR BLD AUTO: 7 % (ref 4–12)
NEUTROPHILS # BLD AUTO: 3.51 THOUSANDS/ΜL (ref 1.85–7.62)
NEUTS SEG NFR BLD AUTO: 72 % (ref 43–75)
NRBC BLD AUTO-RTO: 0 /100 WBCS
PLATELET # BLD AUTO: 191 THOUSANDS/UL (ref 149–390)
PMV BLD AUTO: 10.1 FL (ref 8.9–12.7)
POTASSIUM SERPL-SCNC: 3 MMOL/L (ref 3.5–5.3)
RBC # BLD AUTO: 2.83 MILLION/UL (ref 3.81–5.12)
SODIUM SERPL-SCNC: 142 MMOL/L (ref 135–147)
WBC # BLD AUTO: 4.89 THOUSAND/UL (ref 4.31–10.16)

## 2022-10-06 PROCEDURE — 85025 COMPLETE CBC W/AUTO DIFF WBC: CPT | Performed by: STUDENT IN AN ORGANIZED HEALTH CARE EDUCATION/TRAINING PROGRAM

## 2022-10-06 PROCEDURE — 80048 BASIC METABOLIC PNL TOTAL CA: CPT | Performed by: STUDENT IN AN ORGANIZED HEALTH CARE EDUCATION/TRAINING PROGRAM

## 2022-10-06 PROCEDURE — 99231 SBSQ HOSP IP/OBS SF/LOW 25: CPT | Performed by: INTERNAL MEDICINE

## 2022-10-06 PROCEDURE — 97530 THERAPEUTIC ACTIVITIES: CPT

## 2022-10-06 PROCEDURE — 99232 SBSQ HOSP IP/OBS MODERATE 35: CPT | Performed by: INTERNAL MEDICINE

## 2022-10-06 RX ORDER — POTASSIUM CHLORIDE 20 MEQ/1
40 TABLET, EXTENDED RELEASE ORAL ONCE
Status: COMPLETED | OUTPATIENT
Start: 2022-10-06 | End: 2022-10-06

## 2022-10-06 RX ORDER — POTASSIUM CHLORIDE 20 MEQ/1
40 TABLET, EXTENDED RELEASE ORAL 2 TIMES DAILY
Status: COMPLETED | OUTPATIENT
Start: 2022-10-06 | End: 2022-10-06

## 2022-10-06 RX ADMIN — METOPROLOL SUCCINATE 75 MG: 50 TABLET, EXTENDED RELEASE ORAL at 21:23

## 2022-10-06 RX ADMIN — POTASSIUM CHLORIDE 40 MEQ: 1500 TABLET, EXTENDED RELEASE ORAL at 10:08

## 2022-10-06 RX ADMIN — POTASSIUM CHLORIDE 40 MEQ: 1500 TABLET, EXTENDED RELEASE ORAL at 17:30

## 2022-10-06 RX ADMIN — PANTOPRAZOLE SODIUM 40 MG: 40 TABLET, DELAYED RELEASE ORAL at 06:12

## 2022-10-06 RX ADMIN — FOLIC ACID 1 MG: 1 TABLET ORAL at 10:09

## 2022-10-06 RX ADMIN — CARBIDOPA AND LEVODOPA 2.5 MG: 50; 200 TABLET, EXTENDED RELEASE ORAL at 12:35

## 2022-10-06 RX ADMIN — MIRTAZAPINE 7.5 MG: 15 TABLET, FILM COATED ORAL at 21:23

## 2022-10-06 RX ADMIN — CARBIDOPA AND LEVODOPA 2.5 MG: 50; 200 TABLET, EXTENDED RELEASE ORAL at 06:12

## 2022-10-06 RX ADMIN — CARBIDOPA AND LEVODOPA 2.5 MG: 50; 200 TABLET, EXTENDED RELEASE ORAL at 17:30

## 2022-10-06 RX ADMIN — Medication 2000 UNITS: at 10:09

## 2022-10-06 RX ADMIN — Medication 125 MG: at 21:23

## 2022-10-06 RX ADMIN — ATORVASTATIN CALCIUM 40 MG: 40 TABLET, FILM COATED ORAL at 17:30

## 2022-10-06 RX ADMIN — ENOXAPARIN SODIUM 70 MG: 80 INJECTION SUBCUTANEOUS at 10:09

## 2022-10-06 RX ADMIN — ENOXAPARIN SODIUM 70 MG: 80 INJECTION SUBCUTANEOUS at 21:23

## 2022-10-06 RX ADMIN — PANTOPRAZOLE SODIUM 40 MG: 40 TABLET, DELAYED RELEASE ORAL at 17:30

## 2022-10-06 RX ADMIN — Medication 125 MG: at 10:11

## 2022-10-06 RX ADMIN — TRAMADOL HYDROCHLORIDE 50 MG: 50 TABLET, COATED ORAL at 12:35

## 2022-10-06 RX ADMIN — POTASSIUM CHLORIDE 40 MEQ: 1500 TABLET, EXTENDED RELEASE ORAL at 06:12

## 2022-10-06 RX ADMIN — METOPROLOL SUCCINATE 75 MG: 50 TABLET, EXTENDED RELEASE ORAL at 10:08

## 2022-10-06 NOTE — PLAN OF CARE
Problem: PHYSICAL THERAPY ADULT  Goal: Performs mobility at highest level of function for planned discharge setting  See evaluation for individualized goals  Description: Treatment/Interventions: Functional transfer training, LE strengthening/ROM, Therapeutic exercise, Endurance training, Bed mobility, Gait training, Spoke to nursing, OT  Equipment Recommended:  (r/o rw)       See flowsheet documentation for full assessment, interventions and recommendations  Outcome: Progressing  Note: Prognosis: Fair  Problem List: Decreased strength, Decreased endurance, Impaired balance, Decreased mobility, Pain, Decreased skin integrity  Assessment: Pt cont to demonstrate mod functional mobility deficits w/ mod (A) needed for bed mob, transfers and a few steps amb w/ rw at bedside; mobility skills are currently limited mainly due to buttock pain and overall weakness and deconditioning; overall, cont to recommend rehab upon D/C when medically cleared; will follow  Barriers to Discharge: Decreased caregiver support     PT Discharge Recommendation: Post acute rehabilitation services    See flowsheet documentation for full assessment

## 2022-10-06 NOTE — PROGRESS NOTES
SLIM PROGRESS NOTE     Name: Rosenda Ureña   Age & Sex: 79 y o  female   MRN: [id number]  Unit/Bed#: [facility] 406-01   Encounter: [id number]  Team: SLIM    PATIENT INFORMATION     Name: Rosenda Ureña   Age & Sex: 79 y o  female   MRN: [id number]  Hospital Stay Days: 14    ASSESSMENT/PLAN     Principal Problem:    Saddle embolus of pulmonary artery ([facility] )  Active Problems:    Toxic gastroenteritis and colitis    Metastatic breast cancer ([place] [address] )    Anxiety    Mixed hyperlipidemia    Primary hypertension    REYES (acute kidney injury) ([facility] )    Anemia    Tachycardia    Urinary retention    Swelling of lower extremity      * Saddle embolus of pulmonary artery (HCC)  Assessment & Plan  Acute, submassive, intermediate to high risk pulmonary embolism  Patient has history of right popliteal DVT and right lower lobe subsegmental PE from previous hospitalization in August   ? Patient was sent home on Xarelto, likely not failure, was receiving it at her nursing home  ? Patient also has metastatic breast cancer, hypercoagulable state  ? Return to the ER on 09/22 after a fall, found to have saddle pulmonary embolus with evidence of right heart strain and high clot burden  ? RV to LV ratio on CTA 1 2  ? BMP greater than 1500, troponins relatively normal  ? TTE inconclusive for right heart strain  ? Patient is now status post thrombectomy with IR, no IVC filter placement  ? Duplex showing significant right lower extremity clot burden  ? Continue on Lovenox for pulmonary embolism      Toxic gastroenteritis and colitis  Assessment & Plan  Patient was admitted with toxic gastroenteritis in August of 2022  ? Determined to be possible IBD with additional insult of Verzenio from her breast cancer treatment  ? Continue to hold Verzenio    ? Patient positive for C diff started on oral vancomycin D-3, diarrhea improving but persisting  ? Will wean prednisone slowly as she has been on this chronically- now down to 5 mg  ?  Will stop loperamide, cholestyramine n all stool softeners  ? GI offered remicaid to the daughter to Rx IBD - she is going to think about it  ? Spoke to GI about possibility of increasing Vanc dosing today  Also about concerns for IBD  Diarrhea slowly improving  Completed 10 day of PO cohen  ? Spoke to GI team regarding prolonged course of PO vancomycin  Will continue at BID dosing for 1 week followed by daily dosing for an additional week  ? Patient to follow-up with GI as outpatient for further IBD evaluation and decision on medications  ? Patient received Remicade on 10/01  Plan to continue as outpatent  ? Patient with rectal tube in place  Continued loose BMs, though patient states diarrhea is subjectively improving  Patient also with sacral wound  ? Montior stool output to assess progress  ? May discontinue rectal tube today if diarrhea improves  Swelling of lower extremity  Assessment & Plan  LE swelling has been worsening over the past few weeks  Right worse than left  ? 3+ on both lower extremities, improving along with pulses  ? Previous echo with a hyperdynamic LV EF 70%  ? Lower extremity duplex revealing significant acute DVTs in the right lower extremity  ? Can consider diuresis once the patient is stable for further fluid management      Urinary retention  Assessment & Plan  Patient presented with a massively distended bladder, 3 6 L out of for straight cath, greater than 800 mL out of 2nd straight cath  ? Urinary retention protocol, patient will likely need Crawley catheter  ? May be secondary to TCA use  ? UA with 1+ protein, innumerable rbc's, 4-10 wbc's  ? Urology consulted, appreciate recommendations  ? Discontinued urinary catheter 10/04 and initiate urinary retention protocol  Tachycardia  Assessment & Plan  · Patient had sinus tachycardia on her last discharge, likely in setting of pulmonary embolism  Still with sinus tachycardia on this admission  ?  Continue midodrine for blood pressure support  ? S/p IVF on 09/29  Patient still with persistent sinus tachycardia  ? Continue metoprolol      Anemia  Assessment & Plan  Macrocytic anemia complicated by acute blood loss anemia  ? Folate in September of 2022 3 3  ? Vitamin B12 in September of 2022 645  ? Iron panel and August of 2022 showed an iron saturation of 18, TIBC 105, iron 19, ferritin 755  ? Likely a mixed component of anemia chronic disease with possible folate deficiency  ? Continue folate supplementation  ? Patient received 1 unit packed red blood cell for hemoglobin 6 9 -> 7 4 -> 7 9       REYES (acute kidney injury) ([facility] )  Assessment & Plan  REYES on CKD stage IIIA  ? Patient's baseline creatinine between 0 8 and 0 9 with an EGFR ranging around 60  ? Admission creatinine 1 36, down to 0 5  ? Patient did receive IV contrast for CTA  ? Accurate in's and out's  ? Avoid nephrotoxins  ? Cr improved  ? Patient with suarez in place for urinary retention  Will D/C suarez today and initiate urinary retention protocol  Primary hypertension  Assessment & Plan  Patient's antihypertensives were discontinued after previous hospital stay due to normotension  · Continue to monitor    Mixed hyperlipidemia  Assessment & Plan  Stable  · Continue statin    Anxiety  Assessment & Plan  Depression/anxiety  ? Hold amitriptyline -possible etiology of tachycardia  ? Will resume Remeron      Metastatic breast cancer [facility])  Assessment & Plan  Stage IV metastatic ER positive, CT negative, 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, progressed from stage I A ER/CT positive, 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 years ago  Status post resection and adjuvant radiation and hormone therapy for 5 years  Patient had symptoms of bony discomfort in her sternum in June of 2021, imaging revealed lytic lesion, biopsy in July of 2021 confirmed progression of disease  ? Patient was started on Faslodex and Xgeva at that time, in conjunction with radiation  ?  In April 2022 there was interval development of 4 mm nodule at the right lower lobe and interval development of increased sclerotic lesions throughout the visualized spine  ? At that time, the patient's treatment was changed to Femara and Drucie Bake  ? In July of 2022, the patient was changed from Drucie Bake to [place] because of intolerance  ? During the patient's hospitalization in August of 2022 to September of 2022, Verzenio was discontinued due to gastroenteritis  ? Patient's cancer is most likely contributing to her hypercoagulable state  ? Appreciate oncology and palliative care consult  ? Will continue on Lovenox for now  ? Continue on Remeron for insomnia and anxiety  ? Patient would not like any more treatment for her malignancy    Family meeting held on 10/04 with palliative care, GI, CM, and myself  Patient's current clinical course discussed  Discussion of disposition planning occurred  Hospice was discussed  Referral to hospice liaison placed to provide patient and family with more information  Disposition: Subjective improvement in diarrhea  Will monitor and quantify stool output  Hopeful to remove rectal tube today  SUBJECTIVE     Patient seen and examined  No acute events overnight  Upon my encounter patient lying comfortably in hospital chair  Presently denies any fever, chills, nausea, vomiting, constipation, chest pain, shortness of breath  Otherwise denies any new or worsening complaints  Patient states that her diarrhea continues to seem to be subjectively improving  I was able to speak to patient's daughter, Marny Grandchild, via telephone  I was able to answer all of Taya's questions and concerns to her satisfaction       OBJECTIVE     Vitals:    10/06/22 0600 10/06/22 0700 10/06/22 1008 10/06/22 1235   BP:  140/62 120/54 91/59   BP Location:       Pulse:  (!) 109 (!) 109    Resp:  17     Temp:  98 4 °F (36 9 °C)     TempSrc:  Oral     SpO2:  91%     Weight: 63 7 kg (140 lb 8 oz)      Height:          Temperature:   Temp (24hrs), Av 2 °F (36 8 °C), Min:98 1 °F (36 7 °C), Max:98 4 °F (36 9 °C)    Temperature: 98 4 °F (36 9 °C)  Intake & Output:  I/O       10/04 0701  10/05 0700 10/05 0701  10/06 0700 10/06 0701  10/07 0700    P  O  100 120     Total Intake(mL/kg) 100 (1 5) 120 (1 9)     Urine (mL/kg/hr) 435 (0 3) 250 (0 2)     Stool 200 300     Total Output 635 550     Net -535 -430                Weights:        Body mass index is 26 55 kg/m²  Weight (last 2 days)     Date/Time Weight    10/06/22 0600 63 7 (140 5)    10/05/22 0600 68 2 (150 35)        Physical Exam  Constitutional:       General: She is not in acute distress  Appearance: Normal appearance  Cardiovascular:      Rate and Rhythm: Normal rate and regular rhythm  Pulses: Normal pulses  Heart sounds: Normal heart sounds  No murmur heard  Pulmonary:      Effort: Pulmonary effort is normal  No respiratory distress  Breath sounds: Normal breath sounds  No wheezing, rhonchi or rales  Abdominal:      General: Abdomen is flat  Bowel sounds are normal  There is no distension  Palpations: Abdomen is soft  Tenderness: There is no abdominal tenderness  Genitourinary:     Comments: Rectal tube in place with loose stool  Volume seems to be decreased  Musculoskeletal:      Right lower leg: No edema  Left lower leg: No edema  Neurological:      Mental Status: She is alert and oriented to person, place, and time  Psychiatric:         Mood and Affect: Mood normal          Behavior: Behavior normal          Thought Content: Thought content normal        LABORATORY DATA     Labs: I have personally reviewed pertinent reports    Results from last 7 days   Lab Units 10/06/22  0459 10/05/22  0528 10/04/22  0452 10/03/22  0959   WBC Thousand/uL 4 89 4 65 3 82* 5 10   HEMOGLOBIN g/dL 8 5* 8 2* 8 4* 8 8*   HEMATOCRIT % 28 6* 27 3* 26 8* 28 7*   PLATELETS Thousands/uL 191 181 157 182   NEUTROS PCT % 72 71  --  74   MONOS PCT % 7 8  --  8   MONO PCT %  -- --  7  --       Results from last 7 days   Lab Units 10/06/22  0459 10/05/22  0528 10/04/22  0452   POTASSIUM mmol/L 3 0* 3 5 3 0*   CHLORIDE mmol/L 110* 110* 110*   CO2 mmol/L 27 27 27   BUN mg/dL 11 11 11   CREATININE mg/dL 0 57* 0 59* 0 54*   CALCIUM mg/dL 7 0* 7 3* 7 5*     Results from last 7 days   Lab Units 09/30/22  0509   MAGNESIUM mg/dL 2 0                        IMAGING & DIAGNOSTIC TESTING     Radiology Results: I have personally reviewed pertinent reports  XR chest portable    Result Date: 9/26/2022  Impression: Increased bibasilar density, most likely atelectasis  Workstation performed: [id number]     XR Trauma chest portable    Result Date: 9/22/2022  Impression: No acute cardiopulmonary disease  Workstation performed: [id number]     TRAUMA - CT head wo contrast    Result Date: 9/22/2022  Impression: No acute intracranial abnormality  Chronic microangiopathic changes  I personally discussed this study with Nida Black on 9/22/2022 at 12:32 PM  Workstation performed: [id number]     TRAUMA - CT spine cervical wo contrast    Result Date: 9/22/2022  Impression: No cervical spine fracture or traumatic malalignment  I personally discussed this study with Nida Black on 9/22/2022 at 12:23 PM   Workstation performed: [id number]     PE Study with CT abdomen & pelvis with contrast    Result Date: 9/22/2022  Impression: 1  Saddle embolus and left greater than right pulmonary arterial filling defects consistent with PE (high clot burden) with evidence of RV strain including an RV/LV ratio = 1 2  This is greater than 0 9, which is abnormal and indicates right heart strain  An abnormal RV/LV ratio has been shown to be associated with an increased risk of 30 day mortality in the setting of acute pulmonary embolism  Urgent consultation with the medical critical care team is recommended  2   Massive distention of the bladder  3   No acute posttraumatic injury   I personally discussed this study with Paul Reveal on 9/22/2022 at 12:27 PM  Workstation performed: [id number]     IR IVC filter placement optional/temporary    Result Date: 9/26/2022  Impression: Placement of a temporary inferior vena cava filter  PLAN: This filter can be removed at any time in the future  Interventional Radiology will follow this patient to ensure removal when it is no longer needed  Workstation performed: [id number]     IR PE endovascular therapy    Result Date: 9/26/2022  Impression: 1  Technically successful mechanical thrombectomy of the left pulmonary artery with extirpation of large clot  Workstation performed: [id number]     IR PICC line placement single lumen (preferred for home anitbiotics/medications)    Result Date: 9/28/2022  Impression: 1  Status post placement of a peripherally inserted central venous catheter via the brachial vein with its tip at the cavoatrial junction under ultrasound and fluoroscopic guidance  Signed, performed, and dictated by BROOKE Grey under the supervision of Dr Nicola Goodrich  Workstation performed: [id number]     Other Diagnostic Testing: I have personally reviewed pertinent reports      ACTIVE MEDICATIONS     Current Facility-Administered Medications   Medication Dose Route Frequency   • atorvastatin (LIPITOR) tablet 40 mg  40 mg Oral After Dinner   • cholecalciferol (VITAMIN D3) tablet 2,000 Units  2,000 Units Oral Daily   • enoxaparin (LOVENOX) subcutaneous injection 70 mg  1 mg/kg Subcutaneous F35D [address]   • folic acid (FOLVITE) tablet 1 mg  1 mg Oral Daily   • iohexol (OMNIPAQUE) 240 MG/ML solution 50 mL  50 mL Oral 90 min pre-procedure   • metoprolol (LOPRESSOR) injection 2 5 mg  2 5 mg Intravenous Q6H PRN   • metoprolol succinate (TOPROL-XL) 24 hr tablet 75 mg  75 mg Oral BID   • midodrine (PROAMATINE) tablet 2 5 mg  2 5 mg Oral TID AC   • mirtazapine (REMERON) tablet 7 5 mg  7 5 mg Oral HS   • pantoprazole (PROTONIX) EC tablet 40 mg  40 mg Oral BID AC   • potassium chloride (K-DUR,KLOR-CON) CR tablet 40 mEq  40 mEq Oral BID   • vancomycin (VANCOCIN) oral solution 125 mg  125 mg Oral Q12H [address]    Followed by   • [START ON 10/12/2022] vancomycin (VANCOCIN) oral solution 125 mg  125 mg Oral Daily       VTE Pharmacologic Prophylaxis: Enoxaparin (Lovenox)  VTE Mechanical Prophylaxis: sequential compression device    Portions of the record may have been created with voice recognition software  Occasional wrong word or "sound a like" substitutions may have occurred due to the inherent limitations of voice recognition software    Read the chart carefully and recognize, using context, where substitutions have occurred   ==  [facility]  Internal Medicine Residency PGY-2

## 2022-10-06 NOTE — PHYSICAL THERAPY NOTE
PHYSICAL THERAPY NOTE          Patient Name: Armaan Silverman  [id number] Date: 10/6/2022       10/06/22 1237   PT Last Visit   PT Visit Date 10/06/22   Note Type   Note Type Treatment   Pain Assessment   Pain Assessment Tool FLACC   Pain Location/Orientation Location: Buttocks   Pain Onset/Description Onset: Ongoing;Frequency: Intermittent; Descriptor: Aching;Descriptor: Discomfort   Effect of Pain on Daily Activities discomfort in certain positions   Patient's Stated Pain Goal No pain   Hospital Pain Intervention(s) Repositioned; Ambulation/increased activity; Emotional support   Pain Rating: FLACC (Rest) - Face 0   Pain Rating: FLACC (Rest) - Legs 0   Pain Rating: FLACC (Rest) - Activity 0   Pain Rating: FLACC (Rest) - Cry 1   Pain Rating: FLACC (Rest) - Consolability 0   Score: FLACC (Rest) 1   Pain Rating: FLACC (Activity) - Face 1   Pain Rating: FLACC (Activity) - Legs 0   Pain Rating: FLACC (Activity) - Activity 1   Pain Rating: FLACC (Activity) - Cry 1   Pain Rating: FLACC (Activity) - Consolability 1   Score: FLACC (Activity) 4   Restrictions/Precautions   Other Precautions Contact/isolation;Multiple lines;Telemetry; Fall Risk;Pain   General   Chart Reviewed Yes   Additional Pertinent History cleared for Tx session (spoke to nsg)   Response to Previous Treatment Patient with no complaints from previous session  Cognition   Overall Cognitive Status WFL   Arousal/Participation Alert; Cooperative   Attention Attends with cues to redirect   Orientation Level Oriented to person;Oriented to place;Oriented to situation   Memory Decreased recall of precautions;Decreased recall of recent events   Following Commands Follows one step commands without difficulty   Subjective   Subjective Alert; in bed; agreeable to perform therex and mobilize   Bed Mobility   Supine to Sit 3  Moderate assistance   Additional items Assist x 1; Increased time required;Verbal cues;LE management   Transfers   Sit to Stand 3  Moderate assistance   Additional items Assist x 1;Verbal cues   Stand to Sit 3  Moderate assistance   Additional items Assist x 1;Verbal cues   Ambulation/Elevation   Gait pattern Excessively slow; Short stride; Foward flexed; Shuffling   Gait Assistance 3  Moderate assist   Additional items Assist x 1;Verbal cues; Tactile cues   Assistive Device Rolling walker   Distance 4 ft total distance for bed to chair transition   Balance   Static Sitting Fair   Dynamic Sitting Fair -   Static Standing Poor +   Dynamic Standing Poor   Ambulatory Poor   Activity Tolerance   Activity Tolerance Patient limited by fatigue;Patient limited by pain   Nurse Made Aware spoke to [facility], RN   Exercises   Hip Abduction Sitting;15 reps;AAROM; Bilateral   Knee AROM Long Arc Quad Sitting;15 reps;AROM; Bilateral   Ankle Pumps Sitting;15 reps;AROM; Bilateral   Marching Sitting;AROM; Bilateral;10 reps   Assessment   Prognosis Fair   Problem List Decreased strength;Decreased endurance; Impaired balance;Decreased mobility;Pain;Decreased skin integrity   Assessment Pt cont to demonstrate mod functional mobility deficits w/ mod (A) needed for bed mob, transfers and a few steps amb w/ rw at bedside; mobility skills are currently limited mainly due to buttock pain and overall weakness and deconditioning; overall, cont to recommend rehab upon D/C when medically cleared; will follow   Barriers to Discharge Decreased caregiver support   Goals   Patient Goals to feel better   STG Expiration Date 10/17/22   PT Treatment Day 2   Plan   Treatment/Interventions Functional transfer training;LE strengthening/ROM; Therapeutic exercise; Endurance training;Bed mobility;Gait training;Spoke to nursing;Spoke to case management   Progress Slow progress, decreased activity tolerance   PT Frequency 3-5x/wk   Recommendation   PT Discharge Recommendation Post acute rehabilitation services   AM-PAC Basic Mobility Inpatient   Turning in Bed Without Bedrails 2   Lying on Back to Sitting on Edge of Flat Bed 2   Moving Bed to Chair 2   Standing Up From Chair 2   Walk in Room 2   Climb 3-5 Stairs 1   Basic Mobility Inpatient Raw Score 11   Basic Mobility Standardized Score 30 25   Highest Level Of Mobility   -HLM Goal 4: Move to chair/commode   -HLM Achieved 4: Move to chair/commode   Education   Education Provided Mobility training;Assistive device   Patient Demonstrates verbal understanding   End of Consult   Patient Position at End of Consult Bedside chair; All needs within [facility]

## 2022-10-06 NOTE — PROGRESS NOTES
Progress Note - Infectious Disease   Gabriel Denver Clunk 79 y o  female MRN: [id number]  Unit/Bed#: [facility] 406-01 Encounter: [id number]      Impression:  1  Diarrhea secondary to IBD flare with possible C diff colitis  2  Saddle embolus s/p thrombectomy with IVC filter placement  3  History of right breast carcinoma stage IV with metastases to bone  4  Protein calorie malnutrition  5  Urinary retention    Recommendations:  Patient is afebrile with normal WBC count  1  I am not convinced that the patient has active C diff colitis  Diarrhea more likely secondary to IBD  Patient has completed full course of p o  Vancomycin  He is now on taper as per GI    2  Stools are still loose but less volume  Rectal tube to be removed today    Antibiotics:  1  Vancomycin 125 mg q 12 hours p o ,     Subjective:  She has ongoing diarrhea  Denies fevers, chills, or sweats  Denies nausea, vomiting,       Objective:  Vitals:  Temp:  [98 1 °F (36 7 °C)-98 4 °F (36 9 °C)] 98 1 °F (36 7 °C)  HR:  [] 101  Resp:  [17] 17  BP: ()/(46-62) 113/61  SpO2:  [91 %-93 %] 93 %  Temp (24hrs), Av 2 °F (36 8 °C), Min:98 1 °F (36 7 °C), Max:98 4 °F (36 9 °C)  Current: Temperature: 98 1 °F (36 7 °C)    Physical Exam:     General Appearance:    Eyes:   Alert, chronically ill-appearing female nontoxic, no acute distress  Conjunctiva pale   Throat: Oropharynx moist without lesions  Lips, mucosa, and tongue normal   Neck: Supple, symmetrical, trachea midline, no adenopathy,  no tenderness/mass/nodules   Lungs:   Clear to auscultation bilaterally, no audible wheezes, rhonchi or rales; respirations unlabored   Heart:  Regular rate and rhythm, S1, S2 normal, no murmur, rub or gallop   Abdomen:   Soft, non-tender, non-distended, positive bowel sounds    No masses, no organomegaly    No CVA tenderness, Crawley catheter, rectal tube   Extremities: Extremities normal, atraumatic, no clubbing, cyanosis or edema, PICC line   Skin: Skin color pale, texture, turgor normal, no rashes or lesions  No draining wounds noted           Invasive Devices  Report    Peripherally Inserted Central Catheter Line  Duration           PICC Line 09/26/22 10 days          Drain  Duration           Rectal Tube With balloon 5 days                Labs, Imaging, & Other studies:   All pertinent labs were personally reviewed  Results from last 7 days   Lab Units 10/06/22  0459 10/05/22  0528 10/04/22  0452   WBC Thousand/uL 4 89 4 65 3 82*   HEMOGLOBIN g/dL 8 5* 8 2* 8 4*   PLATELETS Thousands/uL 191 181 157     Results from last 7 days   Lab Units 10/06/22  0459 10/05/22  0528 10/04/22  0452   SODIUM mmol/L 142 142 141   POTASSIUM mmol/L 3 0* 3 5 3 0*   CHLORIDE mmol/L 110* 110* 110*   CO2 mmol/L 27 27 27   BUN mg/dL 11 11 11   CREATININE mg/dL 0 57* 0 59* 0 54*   EGFR ml/min/1 73sq m 86 85 88   CALCIUM mg/dL 7 0* 7 3* 7 5*

## 2022-10-07 LAB
ANION GAP SERPL CALCULATED.3IONS-SCNC: 7 MMOL/L (ref 4–13)
BUN SERPL-MCNC: 17 MG/DL (ref 5–25)
CALCIUM SERPL-MCNC: 7.2 MG/DL (ref 8.3–10.1)
CHLORIDE SERPL-SCNC: 112 MMOL/L (ref 96–108)
CO2 SERPL-SCNC: 24 MMOL/L (ref 21–32)
CREAT SERPL-MCNC: 0.8 MG/DL (ref 0.6–1.3)
GFR SERPL CREATININE-BSD FRML MDRD: 68 ML/MIN/1.73SQ M
GLUCOSE SERPL-MCNC: 86 MG/DL (ref 65–140)
POTASSIUM SERPL-SCNC: 4.1 MMOL/L (ref 3.5–5.3)
SODIUM SERPL-SCNC: 143 MMOL/L (ref 135–147)

## 2022-10-07 PROCEDURE — 80048 BASIC METABOLIC PNL TOTAL CA: CPT | Performed by: STUDENT IN AN ORGANIZED HEALTH CARE EDUCATION/TRAINING PROGRAM

## 2022-10-07 PROCEDURE — 97535 SELF CARE MNGMENT TRAINING: CPT

## 2022-10-07 PROCEDURE — 99232 SBSQ HOSP IP/OBS MODERATE 35: CPT | Performed by: INTERNAL MEDICINE

## 2022-10-07 PROCEDURE — 97530 THERAPEUTIC ACTIVITIES: CPT

## 2022-10-07 RX ADMIN — Medication 125 MG: at 11:31

## 2022-10-07 RX ADMIN — METOPROLOL SUCCINATE 75 MG: 50 TABLET, EXTENDED RELEASE ORAL at 21:22

## 2022-10-07 RX ADMIN — PANTOPRAZOLE SODIUM 40 MG: 40 TABLET, DELAYED RELEASE ORAL at 17:34

## 2022-10-07 RX ADMIN — Medication 125 MG: at 21:23

## 2022-10-07 RX ADMIN — METOPROLOL SUCCINATE 75 MG: 50 TABLET, EXTENDED RELEASE ORAL at 08:12

## 2022-10-07 RX ADMIN — FOLIC ACID 1 MG: 1 TABLET ORAL at 08:13

## 2022-10-07 RX ADMIN — CARBIDOPA AND LEVODOPA 2.5 MG: 50; 200 TABLET, EXTENDED RELEASE ORAL at 17:34

## 2022-10-07 RX ADMIN — ENOXAPARIN SODIUM 70 MG: 80 INJECTION SUBCUTANEOUS at 21:22

## 2022-10-07 RX ADMIN — ATORVASTATIN CALCIUM 40 MG: 40 TABLET, FILM COATED ORAL at 17:34

## 2022-10-07 RX ADMIN — ENOXAPARIN SODIUM 70 MG: 80 INJECTION SUBCUTANEOUS at 08:12

## 2022-10-07 RX ADMIN — Medication 2000 UNITS: at 08:12

## 2022-10-07 RX ADMIN — MIRTAZAPINE 7.5 MG: 15 TABLET, FILM COATED ORAL at 21:21

## 2022-10-07 RX ADMIN — PANTOPRAZOLE SODIUM 40 MG: 40 TABLET, DELAYED RELEASE ORAL at 07:38

## 2022-10-07 RX ADMIN — CARBIDOPA AND LEVODOPA 2.5 MG: 50; 200 TABLET, EXTENDED RELEASE ORAL at 11:30

## 2022-10-07 RX ADMIN — CARBIDOPA AND LEVODOPA 2.5 MG: 50; 200 TABLET, EXTENDED RELEASE ORAL at 07:38

## 2022-10-07 NOTE — PROGRESS NOTES
10/07/22 1500   Clinical Encounter Type   Visited With Patient   Sacramental Encounters   Communion Given Indicator Yes   Sacrament Other   (Colstrip by Shelli Moyer)

## 2022-10-07 NOTE — CASE MANAGEMENT
Case Management Discharge Planning Note    Patient name Ethel Silverman  Location [facility] 406/[facility] 406-01 MRN [id number]  : 1940 Date 10/7/2022       Current Admission Date: 2022  Current Admission Diagnosis:Saddle embolus of pulmonary artery [facility])   Patient Active Problem List    Diagnosis Date Noted   • Ulcerative pancolitis with rectal bleeding ([facility] ) 10/03/2022   • Saddle embolus of pulmonary artery ([place] [address] ) 2022   • Urinary retention 2022   • Swelling of lower extremity 2022   • Venous insufficiency 2022   • Tachycardia 2022   • Single subsegmental pulmonary embolism without acute cor pulmonale (HCC) 2022   • Severe protein-calorie malnutrition ([place] [address] ) 2022   • Anemia 2022   • Hypokalemia 2022   • REYES (acute kidney injury) ([place] [address] ) 2022   • Diarrhea 2022   • Secondary malignant neoplasm of bone ([place] [address] ) 2022   • Toxic gastroenteritis and colitis 2022   • Rectal bleeding 10/14/2021   • Lytic lesion of bone on x-ray 2021   • Neoplasm of uncertain behavior of sternum 2021   • Cough due to ACE inhibitor 2021   • Acute costochondritis 2021   • Osteopenia of multiple sites 2020   • Primary hypertension 10/22/2019   • Chronic kidney disease (CKD) stage G3a/A1, moderately decreased glomerular filtration rate (GFR) between 45-59 mL/min/1 73 square meter and albuminuria creatinine ratio less than 30 mg/g (HCC) 2019   • Adverse reaction to pneumococcal vaccine 2015   • Cataract, bilateral 2014   • Pulmonary nodule seen on imaging study 09/10/2013   • Sleep disorder 2013   • Anxiety 2013   • Metastatic breast cancer ([facility] ) 10/17/2012   • Mixed hyperlipidemia 2012      LOS (days): 15  Geometric Mean LOS (GMLOS) (days): 3 90  Days to GMLOS:-11     OBJECTIVE:  Risk of Unplanned Readmission Score: 34 97         Current admission status: Inpatient   Preferred Pharmacy:   [facility] [address] ROBIN BAUMAN RON Sethi [address]  [address] [facility]  Phone: [phone] Fax: [phone] 532 [facility], [address] [place] [address]  [address]  [address] [place] [address]  Phone: [phone] Fax: [phone] [address] [place],[address], [facility] [place] [address] [facility] [address] [facility] 72 [address]  Phone: [phone] Fax: [phone]    Primary Care Provider: Lisa Calderón DO    Primary Insurance: MEDICARE  Secondary Insurance: Rodger Augustine    DISCHARGE DETAILS:                                          Other Referral/Resources/Interventions Provided:  Referral Comments: Call received from daughter Faizan Rhoades to please make referral to [facility]  Referral made  Additional Comments: At daughter's request referral was made to [facility]

## 2022-10-07 NOTE — PROGRESS NOTES
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  4  Protein calorie malnutrition  5  Urinary retention    Recommendations:  Patient is afebrile with normal WBC count when last taken  1  I am not convinced that the patient has active C diff colitis  Diarrhea more likely secondary to IBD  Patient has completed full course of p o  Vancomycin  He is now on taper as per GI    2  Stools are still loose but much less volume and frequency  Rectal tube out  Antibiotics:  1  Vancomycin 125 mg q 12 hours p o ,     Subjective:  Generally feeling better with less diarrhea   Denies fevers, chills, or sweats  Denies nausea, vomiting,       Objective:  Vitals:  Temp:  [97 7 °F (36 5 °C)-98 3 °F (36 8 °C)] 97 7 °F (36 5 °C)  HR:  [87-89] 89  Resp:  [18] 18  BP: (102-105)/(47-64) 102/48  SpO2:  [87 %-94 %] 87 %  Temp (24hrs), Av 9 °F (36 6 °C), Min:97 7 °F (36 5 °C), Max:98 3 °F (36 8 °C)  Current: Temperature: 97 7 °F (36 5 °C)    Physical Exam:     General Appearance:    Eyes:   Alert, chronically ill-appearing female nontoxic, no acute distress  Nasal oxygen in place  Conjunctiva pale   Throat: Oropharynx moist without lesions  Lips, mucosa, and tongue normal   Neck: Supple, symmetrical, trachea midline, no adenopathy,  no tenderness/mass/nodules   Lungs:   Clear to auscultation bilaterally, no audible wheezes, rhonchi or rales; respirations unlabored   Heart:  Regular rate and rhythm, S1, S2 normal, no murmur, rub or gallop   Abdomen:   Soft, non-tender, non-distended, positive bowel sounds    No masses, no organomegaly    No CVA tenderness, Crawley catheter, rectal tube   Extremities: Extremities normal, atraumatic, no clubbing, cyanosis or edema, PICC line   Skin: Skin color pale, texture, turgor normal, no rashes or lesions  No draining wounds noted           Invasive Devices  Report    Peripherally Inserted Central Catheter Line  Duration           PICC Line 09/26/22 11 days                Labs, Imaging, & Other studies:   All pertinent labs were personally reviewed  Results from last 7 days   Lab Units 10/06/22  0459 10/05/22  0528 10/04/22  0452   WBC Thousand/uL 4 89 4 65 3 82*   HEMOGLOBIN g/dL 8 5* 8 2* 8 4*   PLATELETS Thousands/uL 191 181 157     Results from last 7 days   Lab Units 10/07/22  0811 10/06/22  0459 10/05/22  0528   SODIUM mmol/L 143 142 142   POTASSIUM mmol/L 4 1 3 0* 3 5   CHLORIDE mmol/L 112* 110* 110*   CO2 mmol/L 24 27 27   BUN mg/dL 17 11 11   CREATININE mg/dL 0 80 0 57* 0 59*   EGFR ml/min/1 73sq m 68 86 85   CALCIUM mg/dL 7 2* 7 0* 7 3*

## 2022-10-07 NOTE — OCCUPATIONAL THERAPY NOTE
Occupational Therapy treatment note      Naif COVARRUBIAS Clunk    10/7/2022    Principal Problem:    Saddle embolus of pulmonary artery (HCC)  Active Problems:    Metastatic breast cancer ([facility] )    Anxiety    Mixed hyperlipidemia    Primary hypertension    Toxic gastroenteritis and colitis    REYES (acute kidney injury) ([facility] )    Anemia    Tachycardia    Urinary retention    Swelling of lower extremity      Past Medical History:   Diagnosis Date    Abnormal weight loss     Allergic reaction     Anxiety     Breast cancer, right ([facility] ) 2011    right    Cancer ([facility] ) 2012    Candidal vulvovaginitis     Clotting disorder ([facility] 75 ) Same as above    Endometrial hyperplasia     Epithelial cyst     benign, ovary    GI (gastrointestinal bleed) Blood mixed with stool    History of radiation therapy 2011    right breast cancer    Hyperlipidemia     Hypertension     Internal hemorrhoids     Knee tendonitis     Ovarian cyst     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[facility])        Past Surgical History:   Procedure Laterality Date    BILATERAL SALPINGOOPHORECTOMY      onset: 7/23/13    BREAST BIOPSY Right 06/13/2006    benign    BREAST BIOPSY Right 03/02/2011    malignant    BREAST LUMPECTOMY Right 03/30/2011    malignant    CATARACT EXTRACTION W/  INTRAOCULAR LENS IMPLANT Right     phacoemulsification   Onset: 10/27/14    CHOLECYSTECTOMY      COLONOSCOPY  2017    approx    HYSTERECTOMY  Yes    INTRAOPERATIVE RADIATION THERAPY (IORT)      IR BIOPSY BONE  7/9/2021    IR IVC FILTER PLACEMENT OPTIONAL/TEMPORARY  9/23/2022    IR PE ENDOVASCULAR THERAPY  9/22/2022    IR PICC PLACEMENT SINGLE LUMEN  8/19/2022    IR PICC PLACEMENT SINGLE LUMEN  9/26/2022    SKIN LESION EXCISION Right     breast, single lesion    TONSILLECTOMY AND ADENOIDECTOMY          10/07/22 1039   OT Last Visit   OT Visit Date 10/07/22   Note Type   Note Type Treatment   Restrictions/Precautions   Weight Bearing Precautions Per Order No   Other Precautions Contact/isolation;Multiple lines;O2;Fall Risk;Pain   General   Response to Previous Treatment Patient reporting fatigue but able to participate   Lifestyle   Autonomy Pt reports being I with ADLS, IADLS and mobility without device PTA  (+)    Reciprocal Relationships Pt lives alone but reports that she has supportive family able to assist   Service to Others Retired   [address] Enjoys staying active   Pain Assessment   Pain Assessment Tool 0-10   Pain Score No Pain   ADL   Where Assessed Edge of bed   Grooming Assistance 5  Supervision/Setup   Grooming Deficit Setup; Increased time to complete;Wash/dry hands; Wash/dry face;Brushing hair   UB Bathing Assistance 4  Minimal Assistance   UB Bathing Deficit Increased time to complete; Chest;Right arm;Left arm; Abdomen   LB Bathing Assistance 2  Maximal Assistance   LB Bathing Deficit Increased time to complete;Right lower leg including foot; Left lower leg including foot; Buttocks   UB Dressing Assistance 4  Minimal Assistance   UB Dressing Deficit Thread RUE; Thread LUE;Pull over head   LB Dressing Assistance 2  Maximal Assistance   LB Dressing Deficit Don/doff R sock; Don/doff L sock   Toileting Assistance  2  Maximal Assistance   Toileting Deficit Increased time to complete;Clothing management up;Clothing management down;Perineal hygiene   Functional Standing Tolerance   Time 3-4 min   Activity standing for hygiene following BM   Comments support of RW, encouragement to remain standing   Bed Mobility   Supine to Sit 4  Minimal assistance   Additional items Assist x 1;HOB elevated; Increased time required;Verbal cues;LE management   Additional Comments pt able to sit at EOB x 15 min w fair + balance   Transfers   Sit to Stand 4  Minimal assistance   Additional items Assist x 1   Stand to Sit 4  Minimal assistance   Additional items Assist x 1   Stand pivot 4  Minimal assistance   Additional items Assist x 1   Functional Mobility   Functional Mobility 4  Minimal assistance   Additional Comments a few steps only   Additional items Rolling walker   Cognition   Overall Cognitive Status WFL   Arousal/Participation Alert; Cooperative   Attention Attends with cues to redirect   Orientation Level Oriented X4   Memory Decreased recall of recent events;Decreased recall of precautions   Following Commands Follows one step commands without difficulty   Activity Tolerance   Activity Tolerance Patient limited by fatigue   Medical Staff Made Aware RN OK for OT treatment session  notified RN pt left up in recliner chair at end of session   Assessment   Assessment Pt seen for skilled OT treatment session this AM  Pt agreeable to get up to chair in preparation for her family coming in to visit  Pt needing min assist for bedmobility to get to EOB  Once at EOB, pt demonstrating fair + balance sitting at EOB  she was able to complete simple grooming and UB self care  She does continue to need max assist w LB self care  Pt with incontinence of stool, needing max assist to clean up/hygiene  Pt able to get to chair w min assist/support of RW for mobility/transfers  Educated on ECT as pt fatigues easily  OT will continue to follow while in acute care  Plan   Treatment Interventions ADL retraining;Functional transfer training; Endurance training;Patient/family training; Compensatory technique education; Energy conservation; Activityengagement   Goal Expiration Date 10/10/22   OT Treatment Day 3   OT Frequency 3-5x/wk   Recommendation   OT Discharge Recommendation Post acute rehabilitation services   AM-PAC Daily Activity Inpatient   Lower Body Dressing 2   Bathing 2   Toileting 2   Upper Body Dressing 3   Grooming 4   Eating 4   Daily Activity Raw Score 17   Daily Activity Standardized Score (Calc for Raw Score >=11) 37 26

## 2022-10-07 NOTE — CASE MANAGEMENT
Case Management Discharge Planning Note    Patient name Zara Silverman  Location [facility] 406/PPHP 406-01 MRN [id number]  : 1940 Date 10/7/2022       Current Admission Date: 2022  Current Admission Diagnosis:Saddle embolus of pulmonary artery [facility])   Patient Active Problem List    Diagnosis Date Noted   • Ulcerative pancolitis with rectal bleeding ([facility] ) 10/03/2022   • Saddle embolus of pulmonary artery ([place] [address] ) 2022   • Urinary retention 2022   • Swelling of lower extremity 2022   • Venous insufficiency 2022   • Tachycardia 2022   • Single subsegmental pulmonary embolism without acute cor pulmonale (HCC) 2022   • Severe protein-calorie malnutrition ([place] [address] ) 2022   • Anemia 2022   • Hypokalemia 2022   • REYES (acute kidney injury) ([place] [address] ) 2022   • Diarrhea 2022   • Secondary malignant neoplasm of bone ([place] [address] ) 2022   • Toxic gastroenteritis and colitis 2022   • Rectal bleeding 10/14/2021   • Lytic lesion of bone on x-ray 2021   • Neoplasm of uncertain behavior of sternum 2021   • Cough due to ACE inhibitor 2021   • Acute costochondritis 2021   • Osteopenia of multiple sites 2020   • Primary hypertension 10/22/2019   • Chronic kidney disease (CKD) stage G3a/A1, moderately decreased glomerular filtration rate (GFR) between 45-59 mL/min/1 73 square meter and albuminuria creatinine ratio less than 30 mg/g (HCC) 2019   • Adverse reaction to pneumococcal vaccine 2015   • Cataract, bilateral 2014   • Pulmonary nodule seen on imaging study 09/10/2013   • Sleep disorder 2013   • Anxiety 2013   • Metast[facility] ([facility] ) 10/17/2012   • Mixed hyperlipidemia 2012      LOS (days): 15  Geometric Mean LOS (GMLOS) (days): 3 90  Days to GMLOS:-11 1     OBJECTIVE:  Risk of Unplanned Readmission Score: 35 03         Current admission status: Inpatient   Preferred Pharmacy:   [facility], [address]  [address] [facility]  Phone: [phone] Fax: [phone] 532 [facility], [address] [place] [address]  [address]  119 [facility] [address]  Phone: [phone] Fax: [phone] [address] [place],[address], [facility] [place] [address] [address] [facility] [address] [address] [facility] 77 [address]  Phone: [phone] Fax: [phone]    Primary Care Provider: Danika England DO    Primary Insurance: MEDICARE  Secondary Insurance: Osorio Griffith DETAILS:                                                                                                 Additional Comments: Daughter informed that [facility] at [address] [place] [address] has no beds available for unvaccinated pts   and to consider more facilities for referral

## 2022-10-07 NOTE — PLAN OF CARE
Problem: OCCUPATIONAL THERAPY ADULT  Goal: Performs self-care activities at highest level of function for planned discharge setting  See evaluation for individualized goals  Description: Treatment Interventions: ADL retraining, UE strengthening/ROM, Functional transfer training, Endurance training, Patient/family training, Compensatory technique education, Continued evaluation, Energy conservation, Activityengagement          See flowsheet documentation for full assessment, interventions and recommendations  Note: Limitation: Decreased ADL status, Decreased endurance, Decreased self-care trans, Decreased high-level ADLs  Prognosis: Good  Assessment: Pt seen for skilled OT treatment session this AM  Pt agreeable to get up to chair in preparation for her family coming in to visit  Pt needing min assist for bedmobility to get to EOB  Once at EOB, pt demonstrating fair + balance sitting at EOB  she was able to complete simple grooming and UB self care  She does continue to need max assist w LB self care  Pt with incontinence of stool, needing max assist to clean up/hygiene  Pt able to get to chair w min assist/support of RW for mobility/transfers  Educated on ECT as pt fatigues easily  OT will continue to follow while in acute care       OT Discharge Recommendation: Post acute rehabilitation services

## 2022-10-07 NOTE — PROGRESS NOTES
SLIM PROGRESS NOTE     Name: Edgar Hicks   Age & Sex: 79 y o  female   MRN: [id number]  Unit/Bed#: [facility] 406-01   Encounter: [id number]  Team: SLIM    PATIENT INFORMATION     Name: Edgar Hicks   Age & Sex: 79 y o  female   MRN: [id number]  Hospital Stay Days: 15    ASSESSMENT/PLAN     Principal Problem:    Saddle embolus of pulmonary artery ([facility] )  Active Problems:    Toxic gastroenteritis and colitis    Metastatic breast cancer ([place] [id number] )    Anxiety    Mixed hyperlipidemia    Primary hypertension    REYES (acute kidney injury) ([facility] )    Anemia    Tachycardia    Urinary retention    Swelling of lower extremity      * Saddle embolus of pulmonary artery (HCC)  Assessment & Plan  Acute, submassive, intermediate to high risk pulmonary embolism  Patient has history of right popliteal DVT and right lower lobe subsegmental PE from previous hospitalization in August   ? Patient was sent home on Xarelto, likely not failure, was receiving it at her nursing home  ? Patient also has metastatic breast cancer, hypercoagulable state  ? Return to the ER on 09/22 after a fall, found to have saddle pulmonary embolus with evidence of right heart strain and high clot burden  ? RV to LV ratio on CTA 1 2  ? BMP greater than 1500, troponins relatively normal  ? TTE inconclusive for right heart strain  ? Patient is now status post thrombectomy with IR, no IVC filter placement  ? Duplex showing significant right lower extremity clot burden  ? Continue on Lovenox for pulmonary embolism      Toxic gastroenteritis and colitis  Assessment & Plan  Patient was admitted with toxic gastroenteritis in August of 2022  ? Determined to be possible IBD with additional insult of Verzenio from her breast cancer treatment  ? Continue to hold Verzenio    ? Patient positive for C diff started on oral vancomycin D-3, diarrhea improving but persisting  ? Will wean prednisone slowly as she has been on this chronically- now down to 5 mg  ?  Will stop loperamide, cholestyramine n all stool softeners  ? GI offered remicaid to the daughter to Rx IBD - she is going to think about it  ? Spoke to GI about possibility of increasing Vanc dosing today  Also about concerns for IBD  Diarrhea slowly improving  Completed 10 day of PO cohen  ? Spoke to GI team regarding prolonged course of PO vancomycin  Will continue at BID dosing for 1 week followed by daily dosing for an additional week  ? Patient to follow-up with GI as outpatient for further IBD evaluation and decision on medications  ? Patient received Remicade on 10/01  Plan to continue as outpatent  ? Patient with rectal tube in place  Continued loose BMs, though patient states diarrhea is subjectively improving  Patient also with sacral wound  ? Montior stool output to assess progress  ? Rectal tube discontinued  Patient without bowel movement this AM    ? Will proceed with disposition planning  Swelling of lower extremity  Assessment & Plan  LE swelling has been worsening over the past few weeks  Right worse than left  ? 3+ on both lower extremities, improving along with pulses  ? Previous echo with a hyperdynamic LV EF 70%  ? Lower extremity duplex revealing significant acute DVTs in the right lower extremity  ? Can consider diuresis once the patient is stable for further fluid management      Urinary retention  Assessment & Plan  Patient presented with a massively distended bladder, 3 6 L out of for straight cath, greater than 800 mL out of 2nd straight cath  ? Urinary retention protocol, patient will likely need Crawley catheter  ? May be secondary to TCA use  ? UA with 1+ protein, innumerable rbc's, 4-10 wbc's  ? Urology consulted, appreciate recommendations  ? Discontinued urinary catheter 10/04 and initiate urinary retention protocol  Tachycardia  Assessment & Plan  · Patient had sinus tachycardia on her last discharge, likely in setting of pulmonary embolism    Still with sinus tachycardia on this admission  ? Continue midodrine for blood pressure support  ? S/p IVF on 09/29  Patient still with persistent sinus tachycardia  ? Continue metoprolol      Anemia  Assessment & Plan  Macrocytic anemia complicated by acute blood loss anemia  ? Folate in September of 2022 3 3  ? Vitamin B12 in September of 2022 645  ? Iron panel and August of 2022 showed an iron saturation of 18, TIBC 105, iron 19, ferritin 755  ? Likely a mixed component of anemia chronic disease with possible folate deficiency  ? Continue folate supplementation  ? Patient received 1 unit packed red blood cell for hemoglobin 6 9 -> 7 4 -> 7 9       REYES (acute kidney injury) ([facility] )  Assessment & Plan  REYES on CKD stage IIIA  ? Patient's baseline creatinine between 0 8 and 0 9 with an EGFR ranging around 60  ? Admission creatinine 1 36, down to 0 5  ? Patient did receive IV contrast for CTA  ? Accurate in's and out's  ? Avoid nephrotoxins  ? Cr improved  ? Patient with suarez in place for urinary retention  Will D/C suarez today and initiate urinary retention protocol  Primary hypertension  Assessment & Plan  Patient's antihypertensives were discontinued after previous hospital stay due to normotension  · Continue to monitor    Mixed hyperlipidemia  Assessment & Plan  Stable  · Continue statin    Anxiety  Assessment & Plan  Depression/anxiety  ? Hold amitriptyline -possible etiology of tachycardia  ? Will resume Remeron      Metastatic breast cancer [facility])  Assessment & Plan  Stage IV metastatic ER positive, MN negative, 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, progressed from stage I A ER/MN positive, 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 years ago  Status post resection and adjuvant radiation and hormone therapy for 5 years  Patient had symptoms of bony discomfort in her sternum in June of 2021, imaging revealed lytic lesion, biopsy in July of 2021 confirmed progression of disease  ?  Patient was started on Faslodex and Xgeva at that time, in conjunction with radiation  ? In April 2022 there was interval development of 4 mm nodule at the right lower lobe and interval development of increased sclerotic lesions throughout the visualized spine  ? At that time, the patient's treatment was changed to Femara and Michael Sauers  ? In July of 2022, the patient was changed from Michael Sauers to Teresabury because of intolerance  ? During the patient's hospitalization in August of 2022 to September of 2022, Verzenio was discontinued due to gastroenteritis  ? Patient's cancer is most likely contributing to her hypercoagulable state  ? Appreciate oncology and palliative care consult  ? Will continue on Lovenox for now  ? Continue on Remeron for insomnia and anxiety  ? Patient would not like any more treatment for her malignancy    Family meeting held on 10/04 with palliative care, GI, CM, and IM team  Patient's current clinical course discussed  Discussion of disposition planning occurred  Hospice was discussed  F/u CM and family decision  Disposition: Rectal tube removed  Monitor for diarrhea throughout day  Patient otherwise stable for discharge  F/u with CM for disposition planning  SUBJECTIVE     Patient seen and examined  No acute events overnight  Upon my encounter patient was lying comfortably in hospital bed  Patient states that she feels well today  Rectal tube is no longer in place inpatient is appreciative of this  Patient is without bowel movements this a m  Myles Avalos Presently denies any fevers, chills, nausea, vomiting, diarrhea, constipation, chest pain, shortness a breath  She otherwise denies any new or worsening complaints  Patient's daughter was present at bedside at time of encounter  I was able to speak with patient's daughter and update her as to patient's current plan of care  I was able to answer all of her questions to her satisfaction      OBJECTIVE     Vitals:    10/06/22 2300 10/07/22 0600 10/07/22 0754 10/07/22 1100   BP: (!) 103/47  105/64 104/62   BP Location: Right arm  Right arm Left arm   Pulse: 88   87   Resp:   18 18   Temp: 97 8 °F (36 6 °C)  98 3 °F (36 8 °C) 97 9 °F (36 6 °C)   TempSrc: Oral  Oral Oral   SpO2: 93%   91%   Weight:  62 4 kg (137 lb 9 6 oz)     Height:          Temperature:   Temp (24hrs), Av °F (36 7 °C), Min:97 8 °F (36 6 °C), Max:98 3 °F (36 8 °C)    Temperature: 97 9 °F (36 6 °C)  Intake & Output:  I/O       10/05 0701  10/06 0700 10/06 0701  10/07 0700 10/07 0701  10/08 0700    P  O  120      Total Intake(mL/kg) 120 (1 9)      Urine (mL/kg/hr) 250 (0 2) 675 (0 5)     Stool 300      Total Output 550 675     Net -430 -675                Weights:        Body mass index is 26 kg/m²  Weight (last 2 days)     Date/Time Weight    10/07/22 0600 62 4 (137 6)    10/06/22 0600 63 7 (140 5)    10/05/22 0600 68 2 (150 35)        Physical Exam  Constitutional:       General: She is not in acute distress  Appearance: Normal appearance  She is not ill-appearing  Cardiovascular:      Rate and Rhythm: Normal rate and regular rhythm  Pulses: Normal pulses  Heart sounds: Normal heart sounds  No murmur heard  Pulmonary:      Effort: Pulmonary effort is normal  No respiratory distress  Breath sounds: Normal breath sounds  No wheezing, rhonchi or rales  Abdominal:      General: Abdomen is flat  Bowel sounds are normal  There is no distension  Palpations: Abdomen is soft  Tenderness: There is no abdominal tenderness  Musculoskeletal:      Right lower leg: No edema  Left lower leg: No edema  Neurological:      Mental Status: She is alert and oriented to person, place, and time  Motor: No weakness  Psychiatric:         Mood and Affect: Mood normal          Behavior: Behavior normal          Thought Content: Thought content normal        LABORATORY DATA     Labs: I have personally reviewed pertinent reports    Results from last 7 days   Lab Units 10/06/22  0459 10/05/22  4053 10/04/22  0452 10/03/22  2639 WBC Thousand/uL 4 89 4 65 3 82* 5 10   HEMOGLOBIN g/dL 8 5* 8 2* 8 4* 8 8*   HEMATOCRIT % 28 6* 27 3* 26 8* 28 7*   PLATELETS Thousands/uL 191 181 157 182   NEUTROS PCT % 72 71  --  74   MONOS PCT % 7 8  --  8   MONO PCT %  --   --  7  --       Results from last 7 days   Lab Units 10/07/22  0811 10/06/22  0459 10/05/22  0528   POTASSIUM mmol/L 4 1 3 0* 3 5   CHLORIDE mmol/L 112* 110* 110*   CO2 mmol/L 24 27 27   BUN mg/dL 17 11 11   CREATININE mg/dL 0 80 0 57* 0 59*   CALCIUM mg/dL 7 2* 7 0* 7 3*                            IMAGING & DIAGNOSTIC TESTING     Radiology Results: I have personally reviewed pertinent reports  XR chest portable    Result Date: 9/26/2022  Impression: Increased bibasilar density, most likely atelectasis  Workstation performed: [id number]     XR Trauma chest portable    Result Date: 9/22/2022  Impression: No acute cardiopulmonary disease  Workstation performed: [id number]     TRAUMA - CT head wo contrast    Result Date: 9/22/2022  Impression: No acute intracranial abnormality  Chronic microangiopathic changes  I personally discussed this study with Brandi Crabtree on 9/22/2022 at 12:32 PM  Workstation performed: [id number]     TRAUMA - CT spine cervical wo contrast    Result Date: 9/22/2022  Impression: No cervical spine fracture or traumatic malalignment  I personally discussed this study with Brandi Crabtree on 9/22/2022 at 12:23 PM   Workstation performed: [id number]     PE Study with CT abdomen & pelvis with contrast    Result Date: 9/22/2022  Impression: 1  Saddle embolus and left greater than right pulmonary arterial filling defects consistent with PE (high clot burden) with evidence of RV strain including an RV/LV ratio = 1 2  This is greater than 0 9, which is abnormal and indicates right heart strain  An abnormal RV/LV ratio has been shown to be associated with an increased risk of 30 day mortality in the setting of acute pulmonary embolism    Urgent consultation with the medical critical care team is recommended  2   Massive distention of the bladder  3   No acute posttraumatic injury  I personally discussed this study with Holden Beauchamp on 9/22/2022 at 12:27 PM  Workstation performed: [id number]     IR IVC filter placement optional/temporary    Result Date: 9/26/2022  Impression: Placement of a temporary inferior vena cava filter  PLAN: This filter can be removed at any time in the future  Interventional Radiology will follow this patient to ensure removal when it is no longer needed  Workstation performed: [id number]     IR PE endovascular therapy    Result Date: 9/26/2022  Impression: 1  Technically successful mechanical thrombectomy of the left pulmonary artery with extirpation of large clot  Workstation performed: [id number]     IR PICC line placement single lumen (preferred for home anitbiotics/medications)    Result Date: 9/28/2022  Impression: 1  Status post placement of a peripherally inserted central venous catheter via the brachial vein with its tip at the cavoatrial junction under ultrasound and fluoroscopic guidance  Signed, performed, and dictated by BROOKE Scherer under the supervision of Dr Anabel Wisdom  Workstation performed: [id number]     Other Diagnostic Testing: I have personally reviewed pertinent reports      ACTIVE MEDICATIONS     Current Facility-Administered Medications   Medication Dose Route Frequency   • atorvastatin (LIPITOR) tablet 40 mg  40 mg Oral After Dinner   • cholecalciferol (VITAMIN D3) tablet 2,000 Units  2,000 Units Oral Daily   • enoxaparin (LOVENOX) subcutaneous injection 70 mg  1 mg/kg Subcutaneous Z10C RIA   • folic acid (FOLVITE) tablet 1 mg  1 mg Oral Daily   • iohexol (OMNIPAQUE) 240 MG/ML solution 50 mL  50 mL Oral 90 min pre-procedure   • metoprolol (LOPRESSOR) injection 2 5 mg  2 5 mg Intravenous Q6H PRN   • metoprolol succinate (TOPROL-XL) 24 hr tablet 75 mg  75 mg Oral BID   • midodrine (PROAMATINE) tablet 2 5 mg 2 5 mg Oral TID AC   • mirtazapine (REMERON) tablet 7 5 mg  7 5 mg Oral HS   • pantoprazole (PROTONIX) EC tablet 40 mg  40 mg Oral BID AC   • vancomycin (VANCOCIN) oral solution 125 mg  125 mg Oral Q12H [address]    Followed by   • [START ON 10/12/2022] vancomycin (VANCOCIN) oral solution 125 mg  125 mg Oral Daily       VTE Pharmacologic Prophylaxis: Enoxaparin (Lovenox)  VTE Mechanical Prophylaxis: sequential compression device    Portions of the record may have been created with voice recognition software  Occasional wrong word or "sound a like" substitutions may have occurred due to the inherent limitations of voice recognition software    Read the chart carefully and recognize, using context, where substitutions have occurred   ==  [facility]  Internal Medicine Residency PGY-2

## 2022-10-08 LAB
ANION GAP SERPL CALCULATED.3IONS-SCNC: 5 MMOL/L (ref 4–13)
BACTERIA UR QL AUTO: ABNORMAL /HPF
BASOPHILS # BLD AUTO: 0.03 THOUSANDS/ΜL (ref 0–0.1)
BASOPHILS NFR BLD AUTO: 0 % (ref 0–1)
BILIRUB UR QL STRIP: NEGATIVE
BUN SERPL-MCNC: 20 MG/DL (ref 5–25)
CALCIUM SERPL-MCNC: 6.3 MG/DL (ref 8.3–10.1)
CHLORIDE SERPL-SCNC: 114 MMOL/L (ref 96–108)
CLARITY UR: ABNORMAL
CO2 SERPL-SCNC: 24 MMOL/L (ref 21–32)
COLOR UR: ABNORMAL
CREAT SERPL-MCNC: 0.84 MG/DL (ref 0.6–1.3)
EOSINOPHIL # BLD AUTO: 0.13 THOUSAND/ΜL (ref 0–0.61)
EOSINOPHIL NFR BLD AUTO: 2 % (ref 0–6)
ERYTHROCYTE [DISTWIDTH] IN BLOOD BY AUTOMATED COUNT: 16.2 % (ref 11.6–15.1)
GFR SERPL CREATININE-BSD FRML MDRD: 64 ML/MIN/1.73SQ M
GLUCOSE SERPL-MCNC: 72 MG/DL (ref 65–140)
GLUCOSE UR STRIP-MCNC: NEGATIVE MG/DL
HCT VFR BLD AUTO: 28.2 % (ref 34.8–46.1)
HGB BLD-MCNC: 8.8 G/DL (ref 11.5–15.4)
HGB UR QL STRIP.AUTO: ABNORMAL
HYALINE CASTS #/AREA URNS LPF: ABNORMAL /LPF
IMM GRANULOCYTES # BLD AUTO: 0.06 THOUSAND/UL (ref 0–0.2)
IMM GRANULOCYTES NFR BLD AUTO: 1 % (ref 0–2)
KETONES UR STRIP-MCNC: NEGATIVE MG/DL
LEUKOCYTE ESTERASE UR QL STRIP: ABNORMAL
LYMPHOCYTES # BLD AUTO: 0.85 THOUSANDS/ΜL (ref 0.6–4.47)
LYMPHOCYTES NFR BLD AUTO: 11 % (ref 14–44)
MCH RBC QN AUTO: 30.8 PG (ref 26.8–34.3)
MCHC RBC AUTO-ENTMCNC: 31.2 G/DL (ref 31.4–37.4)
MCV RBC AUTO: 99 FL (ref 82–98)
MONOCYTES # BLD AUTO: 0.45 THOUSAND/ΜL (ref 0.17–1.22)
MONOCYTES NFR BLD AUTO: 6 % (ref 4–12)
MUCOUS THREADS UR QL AUTO: ABNORMAL
NEUTROPHILS # BLD AUTO: 5.96 THOUSANDS/ΜL (ref 1.85–7.62)
NEUTS SEG NFR BLD AUTO: 80 % (ref 43–75)
NITRITE UR QL STRIP: NEGATIVE
NON-SQ EPI CELLS URNS QL MICRO: ABNORMAL /HPF
NRBC BLD AUTO-RTO: 0 /100 WBCS
PH UR STRIP.AUTO: 8 [PH]
PLATELET # BLD AUTO: 204 THOUSANDS/UL (ref 149–390)
PMV BLD AUTO: 10.6 FL (ref 8.9–12.7)
POTASSIUM SERPL-SCNC: 3.7 MMOL/L (ref 3.5–5.3)
PROT UR STRIP-MCNC: ABNORMAL MG/DL
RBC # BLD AUTO: 2.86 MILLION/UL (ref 3.81–5.12)
RBC #/AREA URNS AUTO: ABNORMAL /HPF
SODIUM SERPL-SCNC: 143 MMOL/L (ref 135–147)
SP GR UR STRIP.AUTO: 1.01 (ref 1–1.03)
UROBILINOGEN UR STRIP-ACNC: <2 MG/DL
WBC # BLD AUTO: 7.48 THOUSAND/UL (ref 4.31–10.16)
WBC #/AREA URNS AUTO: ABNORMAL /HPF
WBC CLUMPS # UR AUTO: PRESENT /UL

## 2022-10-08 PROCEDURE — 99231 SBSQ HOSP IP/OBS SF/LOW 25: CPT | Performed by: INTERNAL MEDICINE

## 2022-10-08 PROCEDURE — 81001 URINALYSIS AUTO W/SCOPE: CPT | Performed by: STUDENT IN AN ORGANIZED HEALTH CARE EDUCATION/TRAINING PROGRAM

## 2022-10-08 PROCEDURE — 87086 URINE CULTURE/COLONY COUNT: CPT | Performed by: STUDENT IN AN ORGANIZED HEALTH CARE EDUCATION/TRAINING PROGRAM

## 2022-10-08 PROCEDURE — 87077 CULTURE AEROBIC IDENTIFY: CPT | Performed by: STUDENT IN AN ORGANIZED HEALTH CARE EDUCATION/TRAINING PROGRAM

## 2022-10-08 PROCEDURE — 85025 COMPLETE CBC W/AUTO DIFF WBC: CPT | Performed by: STUDENT IN AN ORGANIZED HEALTH CARE EDUCATION/TRAINING PROGRAM

## 2022-10-08 PROCEDURE — 80048 BASIC METABOLIC PNL TOTAL CA: CPT | Performed by: STUDENT IN AN ORGANIZED HEALTH CARE EDUCATION/TRAINING PROGRAM

## 2022-10-08 PROCEDURE — 87186 SC STD MICRODIL/AGAR DIL: CPT | Performed by: STUDENT IN AN ORGANIZED HEALTH CARE EDUCATION/TRAINING PROGRAM

## 2022-10-08 PROCEDURE — 99232 SBSQ HOSP IP/OBS MODERATE 35: CPT | Performed by: INTERNAL MEDICINE

## 2022-10-08 RX ADMIN — ENOXAPARIN SODIUM 70 MG: 80 INJECTION SUBCUTANEOUS at 08:41

## 2022-10-08 RX ADMIN — CARBIDOPA AND LEVODOPA 2.5 MG: 50; 200 TABLET, EXTENDED RELEASE ORAL at 06:40

## 2022-10-08 RX ADMIN — CARBIDOPA AND LEVODOPA 2.5 MG: 50; 200 TABLET, EXTENDED RELEASE ORAL at 17:15

## 2022-10-08 RX ADMIN — MIRTAZAPINE 7.5 MG: 15 TABLET, FILM COATED ORAL at 22:21

## 2022-10-08 RX ADMIN — ATORVASTATIN CALCIUM 40 MG: 40 TABLET, FILM COATED ORAL at 17:15

## 2022-10-08 RX ADMIN — Medication 125 MG: at 08:41

## 2022-10-08 RX ADMIN — METOPROLOL SUCCINATE 75 MG: 50 TABLET, EXTENDED RELEASE ORAL at 22:21

## 2022-10-08 RX ADMIN — Medication 2000 UNITS: at 08:40

## 2022-10-08 RX ADMIN — METOPROLOL SUCCINATE 75 MG: 50 TABLET, EXTENDED RELEASE ORAL at 08:40

## 2022-10-08 RX ADMIN — FOLIC ACID 1 MG: 1 TABLET ORAL at 08:40

## 2022-10-08 RX ADMIN — Medication 125 MG: at 22:22

## 2022-10-08 RX ADMIN — PANTOPRAZOLE SODIUM 40 MG: 40 TABLET, DELAYED RELEASE ORAL at 06:40

## 2022-10-08 RX ADMIN — CARBIDOPA AND LEVODOPA 2.5 MG: 50; 200 TABLET, EXTENDED RELEASE ORAL at 11:17

## 2022-10-08 RX ADMIN — CEFTRIAXONE 1000 MG: 1 INJECTION, POWDER, FOR SOLUTION INTRAMUSCULAR; INTRAVENOUS at 13:11

## 2022-10-08 RX ADMIN — ENOXAPARIN SODIUM 70 MG: 80 INJECTION SUBCUTANEOUS at 22:21

## 2022-10-08 RX ADMIN — PANTOPRAZOLE SODIUM 40 MG: 40 TABLET, DELAYED RELEASE ORAL at 17:15

## 2022-10-08 NOTE — PROGRESS NOTES
Progress Note - Infectious Disease   Mary Silverman 79 y o  female MRN: [id number]  Unit/Bed#: [facility] 406-01 Encounter: [id number]      Impression:  1  Diarrhea secondary to IBD flare with possible C diff colitis  2  Saddle embolus s/p thrombectomy with IVC filter placement  3  History of right breast carcinoma stage IV with metastases to bone  4  Protein calorie malnutrition  5  Urinary retention    Recommendations:  Patient is afebrile with normal WBC count     1  I am not convinced that the patient has active C diff colitis  Diarrhea more likely secondary to IBD  Patient has completed full course of p o  Vancomycin  He is now on taper as per GI    2  Stools are still loose but with much less volume and frequency  Rectal tube out  Antibiotics:  1  Vancomycin 125 mg q 12 hours p o ,     Subjective:  Continues to feel better with less diarrhea   Denies fevers, chills, or sweats  Denies nausea, vomiting,       Objective:  Vitals:  Temp:  [97 8 °F (36 6 °C)-99 2 °F (37 3 °C)] 98 4 °F (36 9 °C)  HR:  [88-98] 98  Resp:  [20] 20  BP: (100-119)/(42-77) 119/77  SpO2:  [92 %-95 %] 94 %  Temp (24hrs), Av 4 °F (36 9 °C), Min:97 8 °F (36 6 °C), Max:99 2 °F (37 3 °C)  Current: Temperature: 98 4 °F (36 9 °C)    Physical Exam:     General Appearance:    Eyes:   Alert, chronically ill-appearing female nontoxic, no acute distress  Nasal oxygen in place  Conjunctiva pale   Throat: Oropharynx moist without lesions  Lips, mucosa, and tongue normal   Neck: Supple, symmetrical, trachea midline, no adenopathy,  no tenderness/mass/nodules   Lungs:   Clear to auscultation bilaterally, no audible wheezes, rhonchi or rales; respirations unlabored   Heart:  Regular rate and rhythm, S1, S2 normal, no murmur, rub or gallop   Abdomen:   Soft, non-tender, non-distended, positive bowel sounds    No masses, no organomegaly    No CVA tenderness, Crawley catheter   Extremities: Extremities normal, atraumatic, no clubbing, cyanosis or edema, PICC line   Skin: Skin color pale, texture, turgor normal, no rashes or lesions  No draining wounds noted           Invasive Devices  Report    Peripherally Inserted Central Catheter Line  Duration           PICC Line 09/26/22 12 days          Drain  Duration           Urethral Catheter 14 Fr  <1 day                Labs, Imaging, & Other studies:   All pertinent labs were personally reviewed  Results from last 7 days   Lab Units 10/08/22  0501 10/06/22  0459 10/05/22  0528   WBC Thousand/uL 7 48 4 89 4 65   HEMOGLOBIN g/dL 8 8* 8 5* 8 2*   PLATELETS Thousands/uL 204 191 181     Results from last 7 days   Lab Units 10/08/22  0501 10/07/22  0811 10/06/22  0459   SODIUM mmol/L 143 143 142   POTASSIUM mmol/L 3 7 4 1 3 0*   CHLORIDE mmol/L 114* 112* 110*   CO2 mmol/L 24 24 27   BUN mg/dL 20 17 11   CREATININE mg/dL 0 84 0 80 0 57*   EGFR ml/min/1 73sq m 64 68 86   CALCIUM mg/dL 6 3* 7 2* 7 0*

## 2022-10-09 ENCOUNTER — APPOINTMENT (OUTPATIENT)
Dept: RADIOLOGY | Facility: HOSPITAL | Age: 82
DRG: 163 | End: 2022-10-09
Payer: MEDICARE

## 2022-10-09 PROBLEM — J96.00 ACUTE RESPIRATORY FAILURE (HCC): Status: ACTIVE | Noted: 2022-10-09

## 2022-10-09 LAB
ANION GAP SERPL CALCULATED.3IONS-SCNC: 6 MMOL/L (ref 4–13)
BASOPHILS # BLD AUTO: 0.02 THOUSANDS/ΜL (ref 0–0.1)
BASOPHILS NFR BLD AUTO: 0 % (ref 0–1)
BUN SERPL-MCNC: 22 MG/DL (ref 5–25)
CALCIUM SERPL-MCNC: 7.2 MG/DL (ref 8.3–10.1)
CHLORIDE SERPL-SCNC: 114 MMOL/L (ref 96–108)
CO2 SERPL-SCNC: 24 MMOL/L (ref 21–32)
CREAT SERPL-MCNC: 0.78 MG/DL (ref 0.6–1.3)
EOSINOPHIL # BLD AUTO: 0.04 THOUSAND/ΜL (ref 0–0.61)
EOSINOPHIL NFR BLD AUTO: 0 % (ref 0–6)
ERYTHROCYTE [DISTWIDTH] IN BLOOD BY AUTOMATED COUNT: 16.2 % (ref 11.6–15.1)
FLUAV RNA RESP QL NAA+PROBE: NEGATIVE
FLUBV RNA RESP QL NAA+PROBE: NEGATIVE
GFR SERPL CREATININE-BSD FRML MDRD: 71 ML/MIN/1.73SQ M
GLUCOSE SERPL-MCNC: 79 MG/DL (ref 65–140)
HCT VFR BLD AUTO: 26.7 % (ref 34.8–46.1)
HGB BLD-MCNC: 8 G/DL (ref 11.5–15.4)
IMM GRANULOCYTES # BLD AUTO: 0.06 THOUSAND/UL (ref 0–0.2)
IMM GRANULOCYTES NFR BLD AUTO: 1 % (ref 0–2)
LYMPHOCYTES # BLD AUTO: 1.22 THOUSANDS/ΜL (ref 0.6–4.47)
LYMPHOCYTES NFR BLD AUTO: 14 % (ref 14–44)
MCH RBC QN AUTO: 29.4 PG (ref 26.8–34.3)
MCHC RBC AUTO-ENTMCNC: 30 G/DL (ref 31.4–37.4)
MCV RBC AUTO: 98 FL (ref 82–98)
MONOCYTES # BLD AUTO: 0.47 THOUSAND/ΜL (ref 0.17–1.22)
MONOCYTES NFR BLD AUTO: 5 % (ref 4–12)
NEUTROPHILS # BLD AUTO: 7.1 THOUSANDS/ΜL (ref 1.85–7.62)
NEUTS SEG NFR BLD AUTO: 80 % (ref 43–75)
NRBC BLD AUTO-RTO: 0 /100 WBCS
PLATELET # BLD AUTO: 178 THOUSANDS/UL (ref 149–390)
PMV BLD AUTO: 10.4 FL (ref 8.9–12.7)
POTASSIUM SERPL-SCNC: 3.2 MMOL/L (ref 3.5–5.3)
RBC # BLD AUTO: 2.72 MILLION/UL (ref 3.81–5.12)
RSV RNA RESP QL NAA+PROBE: NEGATIVE
SARS-COV-2 RNA RESP QL NAA+PROBE: POSITIVE
SODIUM SERPL-SCNC: 144 MMOL/L (ref 135–147)
WBC # BLD AUTO: 8.91 THOUSAND/UL (ref 4.31–10.16)

## 2022-10-09 PROCEDURE — 99231 SBSQ HOSP IP/OBS SF/LOW 25: CPT | Performed by: INTERNAL MEDICINE

## 2022-10-09 PROCEDURE — 0241U HB NFCT DS VIR RESP RNA 4 TRGT: CPT | Performed by: INTERNAL MEDICINE

## 2022-10-09 PROCEDURE — 80048 BASIC METABOLIC PNL TOTAL CA: CPT | Performed by: INTERNAL MEDICINE

## 2022-10-09 PROCEDURE — 99232 SBSQ HOSP IP/OBS MODERATE 35: CPT | Performed by: INTERNAL MEDICINE

## 2022-10-09 PROCEDURE — XW033E5 INTRODUCTION OF REMDESIVIR ANTI-INFECTIVE INTO PERIPHERAL VEIN, PERCUTANEOUS APPROACH, NEW TECHNOLOGY GROUP 5: ICD-10-PCS | Performed by: INTERNAL MEDICINE

## 2022-10-09 PROCEDURE — 71045 X-RAY EXAM CHEST 1 VIEW: CPT

## 2022-10-09 PROCEDURE — 85025 COMPLETE CBC W/AUTO DIFF WBC: CPT | Performed by: INTERNAL MEDICINE

## 2022-10-09 RX ORDER — POTASSIUM CHLORIDE 20 MEQ/1
40 TABLET, EXTENDED RELEASE ORAL ONCE
Status: DISCONTINUED | OUTPATIENT
Start: 2022-10-09 | End: 2022-10-09

## 2022-10-09 RX ORDER — FUROSEMIDE 10 MG/ML
20 INJECTION INTRAMUSCULAR; INTRAVENOUS ONCE
Status: COMPLETED | OUTPATIENT
Start: 2022-10-09 | End: 2022-10-09

## 2022-10-09 RX ORDER — POTASSIUM CHLORIDE 14.9 MG/ML
20 INJECTION INTRAVENOUS ONCE
Status: COMPLETED | OUTPATIENT
Start: 2022-10-09 | End: 2022-10-09

## 2022-10-09 RX ORDER — DEXAMETHASONE SODIUM PHOSPHATE 4 MG/ML
6 INJECTION, SOLUTION INTRA-ARTICULAR; INTRALESIONAL; INTRAMUSCULAR; INTRAVENOUS; SOFT TISSUE EVERY 24 HOURS
Status: DISPENSED | OUTPATIENT
Start: 2022-10-09 | End: 2022-10-19

## 2022-10-09 RX ADMIN — CARBIDOPA AND LEVODOPA 2.5 MG: 50; 200 TABLET, EXTENDED RELEASE ORAL at 06:22

## 2022-10-09 RX ADMIN — Medication 125 MG: at 08:46

## 2022-10-09 RX ADMIN — FUROSEMIDE 20 MG: 10 INJECTION, SOLUTION INTRAMUSCULAR; INTRAVENOUS at 10:25

## 2022-10-09 RX ADMIN — PANTOPRAZOLE SODIUM 40 MG: 40 TABLET, DELAYED RELEASE ORAL at 15:16

## 2022-10-09 RX ADMIN — Medication 125 MG: at 22:04

## 2022-10-09 RX ADMIN — ATORVASTATIN CALCIUM 40 MG: 40 TABLET, FILM COATED ORAL at 17:01

## 2022-10-09 RX ADMIN — METOPROLOL SUCCINATE 75 MG: 50 TABLET, EXTENDED RELEASE ORAL at 21:03

## 2022-10-09 RX ADMIN — PANTOPRAZOLE SODIUM 40 MG: 40 TABLET, DELAYED RELEASE ORAL at 06:22

## 2022-10-09 RX ADMIN — POTASSIUM CHLORIDE 20 MEQ: 14.9 INJECTION, SOLUTION INTRAVENOUS at 13:31

## 2022-10-09 RX ADMIN — CEFTRIAXONE 1000 MG: 1 INJECTION, POWDER, FOR SOLUTION INTRAMUSCULAR; INTRAVENOUS at 10:25

## 2022-10-09 RX ADMIN — CARBIDOPA AND LEVODOPA 2.5 MG: 50; 200 TABLET, EXTENDED RELEASE ORAL at 15:16

## 2022-10-09 RX ADMIN — Medication 2000 UNITS: at 08:46

## 2022-10-09 RX ADMIN — POTASSIUM CHLORIDE 20 MEQ: 14.9 INJECTION, SOLUTION INTRAVENOUS at 10:25

## 2022-10-09 RX ADMIN — DEXAMETHASONE SODIUM PHOSPHATE 6 MG: 4 INJECTION INTRA-ARTICULAR; INTRALESIONAL; INTRAMUSCULAR; INTRAVENOUS; SOFT TISSUE at 13:32

## 2022-10-09 RX ADMIN — REMDESIVIR 200 MG: 100 INJECTION, POWDER, LYOPHILIZED, FOR SOLUTION INTRAVENOUS at 15:16

## 2022-10-09 RX ADMIN — ENOXAPARIN SODIUM 70 MG: 80 INJECTION SUBCUTANEOUS at 22:04

## 2022-10-09 RX ADMIN — FOLIC ACID 1 MG: 1 TABLET ORAL at 08:46

## 2022-10-09 RX ADMIN — MIRTAZAPINE 7.5 MG: 15 TABLET, FILM COATED ORAL at 21:03

## 2022-10-09 RX ADMIN — CARBIDOPA AND LEVODOPA 2.5 MG: 50; 200 TABLET, EXTENDED RELEASE ORAL at 11:31

## 2022-10-09 RX ADMIN — METOPROLOL SUCCINATE 75 MG: 50 TABLET, EXTENDED RELEASE ORAL at 08:46

## 2022-10-09 RX ADMIN — ENOXAPARIN SODIUM 70 MG: 80 INJECTION SUBCUTANEOUS at 08:46

## 2022-10-09 NOTE — ASSESSMENT & PLAN NOTE
Patient with worsening oxygenation this morning requiring increased to 5 L nasal cannula  In the setting of diarrhea and unvaccinated status  Will check COVID

## 2022-10-09 NOTE — ASSESSMENT & PLAN NOTE
Macrocytic anemia complicated by acute blood loss anemia  ? Folate in September of 2022 3 3  ? Vitamin B12 in September of 2022 645  ? Iron panel and August of 2022 showed an iron saturation of 18, TIBC 105, iron 19, ferritin 755  ? Likely a mixed component of anemia chronic disease with possible folate deficiency  ? Continue folate supplementation  ?  Patient received 1 unit packed red blood cell for hemoglobin 6 9 -> 7 4 -> 7 9---8 8--->8 0 this AM

## 2022-10-09 NOTE — PROGRESS NOTES
Progress Note - Infectious Disease   Sheran Collet Clunk 79 y o  female MRN: [id number]  Unit/Bed#: [facility] 406-01 Encounter: [id number]      Impression:  1  Diarrhea secondary to IBD flare with possible C diff colitis  2  Saddle embolus s/p thrombectomy with IVC filter placement  3  History of right breast carcinoma stage IV with metastases to bone  4  Protein calorie malnutrition  5  Urinary retention  6  Acute COVID 19     Recommendations:  Patient is afebrile with normal WBC count     Hemoglobin is now 8 0  Primary service placed on ceftriaxone yesterday  Diarrhea  1  I am not convinced that the patient has active C diff colitis  Diarrhea more likely secondary to IBD  Patient has completed full course of p o  Vancomycin  He is now on taper as per GI    2  Stools are still loose but with much less volume and frequency  Rectal tube out  COVID 19  1  Patient had increased SOB with mild dry cough  Chest x-ray not significant  2  SARS-CoV-2 PCR positive  Since patient came in with saddle embolus it is possible that she may have had COVID at that time  She is not immunized  3  COVID Protocol as per primary service  Antibiotics:  1  Vancomycin 125 mg q 12 hours p o ,   2  Ceftriaxone 1 g Q 24 hours IV, day 2 Rx    Subjective:  Less diarrhea  Increased shortness of breath with dry cough   Denies fevers, chills, or sweats  Denies nausea, vomiting,       Objective:  Vitals:  Temp:  [97 9 °F (36 6 °C)-98 1 °F (36 7 °C)] 98 °F (36 7 °C)  HR:  [] 84  Resp:  [20-22] 20  BP: (106-107)/(47-59) 106/56  SpO2:  [91 %-92 %] 92 %  Temp (24hrs), Av °F (36 7 °C), Min:97 9 °F (36 6 °C), Max:98 1 °F (36 7 °C)  Current: Temperature: 98 °F (36 7 °C)    Physical Exam:     General Appearance:    Eyes:   Alert, chronically ill-appearing female nontoxic, no acute distress  Nasal oxygen in place  Conjunctiva pale   Throat: Oropharynx moist without lesions    Lips, mucosa, and tongue normal   Neck: Supple, symmetrical, trachea midline, no adenopathy,  no tenderness/mass/nodules   Lungs:   Clear to auscultation bilaterally, no audible wheezes, rhonchi or rales; respirations unlabored   Heart:  Regular rate and rhythm, S1, S2 normal, no murmur, rub or gallop   Abdomen:   Soft, non-tender, non-distended, positive bowel sounds  No masses, no organomegaly    No CVA tenderness, Crawley catheter   Extremities: Extremities normal, atraumatic, no clubbing, cyanosis or edema, PICC line   Skin: Skin color pale, texture, turgor normal, no rashes or lesions  No draining wounds noted  Invasive Devices  Report    Peripherally Inserted Central Catheter Line  Duration           PICC Line 09/26/22 13 days          Drain  Duration           Urethral Catheter 14 Fr  1 day                Labs, Imaging, & Other studies:   All pertinent labs were personally reviewed  Results from last 7 days   Lab Units 10/09/22  0509 10/08/22  0501 10/06/22  0459   WBC Thousand/uL 8 91 7 48 4 89   HEMOGLOBIN g/dL 8 0* 8 8* 8 5*   PLATELETS Thousands/uL 178 204 191     Results from last 7 days   Lab Units 10/09/22  0509 10/08/22  0501 10/07/22  0811   SODIUM mmol/L 144 143 143   POTASSIUM mmol/L 3 2* 3 7 4 1   CHLORIDE mmol/L 114* 114* 112*   CO2 mmol/L 24 24 24   BUN mg/dL 22 20 17   CREATININE mg/dL 0 78 0 84 0 80   EGFR ml/min/1 73sq m 71 64 68   CALCIUM mg/dL 7 2* 6 3* 7 2*     Results from last 7 days   Lab Units 10/08/22  0417   URINE CULTURE  Culture results to follow

## 2022-10-09 NOTE — ASSESSMENT & PLAN NOTE
· Patient had sinus tachycardia on her last discharge, likely in setting of pulmonary embolism  Still with sinus tachycardia on this admission  ? Continue midodrine for blood pressure support  ?  Tachycardia improved over the course of this week will continue on current beta-blocker dose

## 2022-10-09 NOTE — ASSESSMENT & PLAN NOTE
Stage IV metastatic ER positive, PA negative, 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, progressed from stage I A ER/PA positive, 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 years ago  Status post resection and adjuvant radiation and hormone therapy for 5 years  Patient had symptoms of bony discomfort in her sternum in June of 2021, imaging revealed lytic lesion, biopsy in July of 2021 confirmed progression of disease  ? Patient was started on Faslodex and Xgeva at that time, in conjunction with radiation  ? In April 2022 there was interval development of 4 mm nodule at the right lower lobe and interval development of increased sclerotic lesions throughout the visualized spine  ? At that time, the patient's treatment was changed to Femara and Geraldine Gentle  ? In July of 2022, the patient was changed from Geraldine Gentle to Teresabury because of intolerance  ? During the patient's hospitalization in August of 2022 to September of 2022, Verzenio was discontinued due to gastroenteritis  ? Patient's cancer is most likely contributing to her hypercoagulable state  ? Appreciate oncology and palliative care consult  ? Will continue on Lovenox for now  ? Continue on Remeron for insomnia and anxiety  ? Patient would not like any more treatment for her malignancy    Family meeting held on 10/04 with palliative care, GI, CM, and IM team  Patient's current clinical course discussed  Discussion of disposition planning occurred  Hospice was discussed  Patient currently pending placement to nursing facility

## 2022-10-09 NOTE — PROGRESS NOTES
[facility]  Progress Note Janine Silverman 1940, 79 y o  female MRN: [id number]  Unit/Bed#: [facility] 406-01 Encounter: [id number]  Primary Care Provider: Myles Newman,    Date and time admitted to hospital: 9/22/2022  3:35 PM    Acute respiratory failure [facility])  Assessment & Plan  Patient with worsening oxygenation this morning requiring increased to 5 L nasal cannula  In the setting of diarrhea and unvaccinated status  Will check COVID    Swelling of lower extremity  Assessment & Plan  LE swelling has been worsening over the past few weeks  Right worse than left  ? 3+ on both lower extremities, improving along with pulses  ? Previous echo with a hyperdynamic LV EF 70%  ? Lower extremity duplex revealing significant acute DVTs in the right lower extremity  ? Urinary retention  Assessment & Plan  Patient presented with a massively distended bladder, 3 6 L out of for straight cath, greater than 800 mL out of 2nd straight cath  ? Urinary retention protocol, patient will likely need Crawley catheter  ? May be secondary to TCA use  ? UA with 1+ protein, innumerable rbc's, 4-10 wbc's  ? Urology consulted, appreciate recommendations  ? Discontinued urinary catheter 10/04 and initiate urinary retention protocol  ? Patient unfortunately appears to be having urinary retention may need Crawley catheter placed back today on 10/8    Tachycardia  Assessment & Plan  · Patient had sinus tachycardia on her last discharge, likely in setting of pulmonary embolism  Still with sinus tachycardia on this admission  ? Continue midodrine for blood pressure support  ? Tachycardia improved over the course of this week will continue on current beta-blocker dose      Anemia  Assessment & Plan  Macrocytic anemia complicated by acute blood loss anemia  ? Folate in September of 2022 3 3  ? Vitamin B12 in September of 2022 645  ?  Iron panel and August of 2022 showed an iron saturation of 18, TIBC 105, iron 19, ferritin 755  ? Likely a mixed component of anemia chronic disease with possible folate deficiency  ? Continue folate supplementation  ? Patient received 1 unit packed red blood cell for hemoglobin 6 9 -> 7 4 -> 7 9---8 8--->8 0 this AM       REYES (acute kidney injury) ([facility] [address] )  Assessment & Plan  REYES on CKD stage IIIA  ? Patient's baseline creatinine between 0 8 and 0 9 with an EGFR ranging around 60  ? Admission creatinine 1 36, down to 0 5  ? Patient did receive IV contrast for CTA  ? Accurate in's and out's  ? Avoid nephrotoxins  ? Cr stable this morning at 0 84  ? Patient with suarez in place for urinary retention  Will D/C suarez today and initiate urinary retention protocol  Toxic gastroenteritis and colitis  Assessment & Plan  Patient was admitted with toxic gastroenteritis in August of 2022  ? Determined to be possible IBD with additional insult of Verzenio from her breast cancer treatment  ? Continue to hold Verzenio    ? Patient positive for C diff started on oral vancomycin D-3, diarrhea improving but persisting  ? Will wean prednisone slowly as she has been on this chronically- now down to 5 mg  ? Will stop loperamide, cholestyramine n all stool softeners  ? GI offered remicaid to the daughter to Rx IBD - she is going to think about it  ? Spoke to GI about possibility of increasing Vanc dosing today  Also about concerns for IBD  Diarrhea slowly improving  Completed 10 day of PO cohen  ? Spoke to GI team regarding prolonged course of PO vancomycin  Will continue at BID dosing for 1 week followed by daily dosing for an additional week  ? Patient to follow-up with GI as outpatient for further IBD evaluation and decision on medications  ? Patient received Remicade on 10/01  Plan to continue as outpatent  ? Patient with rectal tube in place  Continued loose BMs, though patient states diarrhea is subjectively improving  Patient also with sacral wound     ? Montior stool output to assess progress  ? Rectal tube discontinued  ? Will proceed with disposition planning  Primary hypertension  Assessment & Plan  Patient's antihypertensives were discontinued after previous hospital stay due to normotension  · Continue to monitor  · Will continue midodrine for blood pressure support    Mixed hyperlipidemia  Assessment & Plan  Stable  · Continue statin    Anxiety  Assessment & Plan  Depression/anxiety  ? Hold amitriptyline -possible etiology of tachycardia  ? Will resume Remeron      Metastatic breast cancer [facility])  Assessment & Plan  Stage IV metastatic ER positive, [place] negative, 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, progressed from stage I A ER/[place] positive, 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 years ago  Status post resection and adjuvant radiation and hormone therapy for 5 years  Patient had symptoms of bony discomfort in her sternum in June of 2021, imaging revealed lytic lesion, biopsy in July of 2021 confirmed progression of disease  ? Patient was started on Faslodex and Xgeva at that time, in conjunction with radiation  ? In April 2022 there was interval development of 4 mm nodule at the right lower lobe and interval development of increased sclerotic lesions throughout the visualized spine  ? At that time, the patient's treatment was changed to Femara and Jazmyne Aldo  ? In July of 2022, the patient was changed from Jazmyne Aldo to Teresabury because of intolerance  ? During the patient's hospitalization in August of 2022 to September of 2022, Verzenio was discontinued due to gastroenteritis  ? Patient's cancer is most likely contributing to her hypercoagulable state  ? Appreciate oncology and palliative care consult  ? Will continue on Lovenox for now  ? Continue on Remeron for insomnia and anxiety  ? Patient would not like any more treatment for her malignancy    Family meeting held on 10/04 with palliative care, GI, CM, and IM team  Patient's current clinical course discussed  Discussion of disposition planning occurred  Hospice was discussed  Patient currently pending placement to nursing facility  * Saddle embolus of pulmonary artery (HCC)  Assessment & Plan  Acute, submassive, intermediate to high risk pulmonary embolism  Patient has history of right popliteal DVT and right lower lobe subsegmental PE from previous hospitalization in August   ? Patient was sent home on Xarelto, likely not failure, was receiving it at her nursing home  ? Patient also has metastatic breast cancer, hypercoagulable state  ? Return to the ER on 09/22 after a fall, found to have saddle pulmonary embolus with evidence of right heart strain and high clot burden  ? RV to LV ratio on CTA 1 2  ? BMP greater than 1500, troponins relatively normal  ? TTE inconclusive for right heart strain  ? Patient is now status post thrombectomy with IR, no IVC filter placement  ? Duplex showing significant right lower extremity clot burden  ? Continue on Lovenox for pulmonary embolism  ? Patient respiratory status stable            VTE Pharmacologic Prophylaxis:   Moderate Risk (Score 3-4) - Pharmacological DVT Prophylaxis Ordered: enoxaparin (Lovenox)  Patient Centered Rounds: I performed bedside rounds with nursing staff today  Discussions with Specialists or Other Care Team Provider: n/a    Education and Discussions with Family / Patient: Updated  (daughter) via phone  Time Spent for Care: 30 minutes  More than 50% of total time spent on counseling and coordination of care as described above  Current Length of Stay: 17 day(s)  Current Patient Status: Inpatient   Certification Statement: The patient will continue to require additional inpatient hospital stay due to Pending COVID testing, placement  Discharge Plan: Anticipate discharge in 48 hrs to rehab facility      Code Status: Level 3 - DNAR and DNI    Subjective:   Patient denies acute complaints today, denies shortness of breath chest pain abdominal pain nausea vomiting    Objective: Vitals:   Temp (24hrs), Av 1 °F (36 7 °C), Min:97 9 °F (36 6 °C), Max:98 4 °F (36 9 °C)    Temp:  [97 9 °F (36 6 °C)-98 4 °F (36 9 °C)] 97 9 °F (36 6 °C)  HR:  [] 87  Resp:  [20-22] 22  BP: (107-119)/(47-77) 107/59  SpO2:  [91 %-94 %] 92 %  Body mass index is 25 41 kg/m²  Input and Output Summary (last 24 hours): Intake/Output Summary (Last 24 hours) at 10/9/2022 1454  Last data filed at 10/9/2022 1405  Gross per 24 hour   Intake --   Output 1570 ml   Net -1570 ml       Physical Exam:   Physical Exam  Vitals and nursing note reviewed  Constitutional:       Appearance: She is well-developed  She is ill-appearing  She is not toxic-appearing or diaphoretic  Comments: Chronically ill-appearing female   HENT:      Head: Normocephalic and atraumatic  Eyes:      General: No scleral icterus  Conjunctiva/sclera: Conjunctivae normal    Cardiovascular:      Rate and Rhythm: Normal rate and regular rhythm  Heart sounds: No murmur heard  No friction rub  No gallop  Pulmonary:      Effort: Pulmonary effort is normal  No respiratory distress  Breath sounds: Normal breath sounds  No stridor  No wheezing, rhonchi or rales  Comments: 5 L nasal cannula  Chest:      Chest wall: No tenderness  Abdominal:      General: There is no distension  Palpations: Abdomen is soft  There is no mass  Tenderness: There is no abdominal tenderness  There is no guarding or rebound  Hernia: No hernia is present  Musculoskeletal:         General: No swelling, tenderness, deformity or signs of injury  Cervical back: Neck supple  Skin:     General: Skin is warm and dry  Coloration: Skin is pale  Neurological:      Mental Status: She is alert and oriented to person, place, and time            Additional Data:     Labs:  Results from last 7 days   Lab Units 10/09/22  0509 10/05/22  0528 10/04/22  0452   WBC Thousand/uL 8 91   < > 3 82*   HEMOGLOBIN g/dL 8 0*   < > 8 4* HEMATOCRIT % 26 7*   < > 26 8*   PLATELETS Thousands/uL 178   < > 157   BANDS PCT %  --   --  1   NEUTROS PCT % 80*   < >  --    LYMPHS PCT % 14   < >  --    LYMPHO PCT %  --   --  6*   MONOS PCT % 5   < >  --    MONO PCT %  --   --  7   EOS PCT % 0   < > 0    < > = values in this interval not displayed  Results from last 7 days   Lab Units 10/09/22  0509   SODIUM mmol/L 144   POTASSIUM mmol/L 3 2*   CHLORIDE mmol/L 114*   CO2 mmol/L 24   BUN mg/dL 22   CREATININE mg/dL 0 78   ANION GAP mmol/L 6   CALCIUM mg/dL 7 2*   GLUCOSE RANDOM mg/dL 79                       Lines/Drains:  Invasive Devices  Report    Peripherally Inserted Central Catheter Line  Duration           PICC Line 09/26/22 13 days          Drain  Duration           Urethral Catheter 14 Fr  <1 day              Urinary Catheter:  Goal for removal: N/A- Discharging with Crawley         Central Line:  Goal for removal: N/A - Chronic PICC             Imaging: No pertinent imaging reviewed  Recent Cultures (last 7 days):   Results from last 7 days   Lab Units 10/08/22  0417   URINE CULTURE  Culture results to follow         Last 24 Hours Medication List:   Current Facility-Administered Medications   Medication Dose Route Frequency Provider Last Rate   • atorvastatin  40 mg Oral After Dinner Grady Soria, DO     • cefTRIAXone  1,000 mg Intravenous Q24H Jersey Camacho, DO 1,000 mg (10/09/22 1025)   • cholecalciferol  2,000 Units Oral Daily Grady Soria, DO     • dexamethasone  6 mg Intravenous Q24H Jersey Camacho DO     • enoxaparin  1 mg/kg Subcutaneous Q12H [facility] DO     • folic acid  1 mg Oral Daily Grady Soria DO     • metoprolol  2 5 mg Intravenous Q6H PRN Queen Keith MD     • metoprolol succinate  75 mg Oral BID BROOKE Dumas     • midodrine  2 5 mg Oral TID AC Nadine Wynne MD     • mirtazapine  7 5 mg Oral HS Jersey Camacho, DO     • pantoprazole  40 mg Oral BID AC Elaine Perdomo DO Elvis     • potassium chloride  20 mEq Intravenous Once Jersey Camacho, DO 20 mEq (10/09/22 1331)   • remdesivir  200 mg Intravenous Q24H Jersey Janice DO      Followed by   • [START ON 10/10/2022] remdesivir  100 mg Intravenous Q24H Jersey Camacho DO     • vancomycin  125 mg Oral Q12H [facility] Segun Argueta DO      Followed by   • [START ON 10/12/2022] vancomycin  125 mg Oral Daily Segun Argueta DO          Today, Patient Was Seen By: Leena Bridges    **Please Note: This note may have been constructed using a voice recognition system  **

## 2022-10-09 NOTE — ASSESSMENT & PLAN NOTE
Patient's antihypertensives were discontinued after previous hospital stay due to normotension    · Continue to monitor  · Will continue midodrine for blood pressure support

## 2022-10-10 ENCOUNTER — HOSPITAL ENCOUNTER (OUTPATIENT)
Dept: INFUSION CENTER | Facility: HOSPITAL | Age: 82
End: 2022-10-10
Attending: INTERNAL MEDICINE

## 2022-10-10 LAB
ALBUMIN SERPL BCP-MCNC: 1.3 G/DL (ref 3.5–5)
ALP SERPL-CCNC: 86 U/L (ref 46–116)
ALT SERPL W P-5'-P-CCNC: 22 U/L (ref 12–78)
ANION GAP SERPL CALCULATED.3IONS-SCNC: 4 MMOL/L (ref 4–13)
AST SERPL W P-5'-P-CCNC: 13 U/L (ref 5–45)
BASOPHILS # BLD AUTO: 0.01 THOUSANDS/ΜL (ref 0–0.1)
BASOPHILS NFR BLD AUTO: 0 % (ref 0–1)
BILIRUB SERPL-MCNC: 0.3 MG/DL (ref 0.2–1)
BUN SERPL-MCNC: 19 MG/DL (ref 5–25)
CALCIUM ALBUM COR SERPL-MCNC: 9.1 MG/DL (ref 8.3–10.1)
CALCIUM SERPL-MCNC: 6.9 MG/DL (ref 8.3–10.1)
CHLORIDE SERPL-SCNC: 117 MMOL/L (ref 96–108)
CK SERPL-CCNC: 21 U/L (ref 26–192)
CO2 SERPL-SCNC: 25 MMOL/L (ref 21–32)
CREAT SERPL-MCNC: 0.62 MG/DL (ref 0.6–1.3)
CRP SERPL QL: 35.2 MG/L
EOSINOPHIL # BLD AUTO: 0.02 THOUSAND/ΜL (ref 0–0.61)
EOSINOPHIL NFR BLD AUTO: 0 % (ref 0–6)
ERYTHROCYTE [DISTWIDTH] IN BLOOD BY AUTOMATED COUNT: 16.4 % (ref 11.6–15.1)
GFR SERPL CREATININE-BSD FRML MDRD: 84 ML/MIN/1.73SQ M
GLUCOSE SERPL-MCNC: 83 MG/DL (ref 65–140)
HCT VFR BLD AUTO: 26.6 % (ref 34.8–46.1)
HGB BLD-MCNC: 8 G/DL (ref 11.5–15.4)
IMM GRANULOCYTES # BLD AUTO: 0.07 THOUSAND/UL (ref 0–0.2)
IMM GRANULOCYTES NFR BLD AUTO: 1 % (ref 0–2)
LYMPHOCYTES # BLD AUTO: 1.01 THOUSANDS/ΜL (ref 0.6–4.47)
LYMPHOCYTES NFR BLD AUTO: 15 % (ref 14–44)
MCH RBC QN AUTO: 29.6 PG (ref 26.8–34.3)
MCHC RBC AUTO-ENTMCNC: 30.1 G/DL (ref 31.4–37.4)
MCV RBC AUTO: 99 FL (ref 82–98)
MONOCYTES # BLD AUTO: 0.47 THOUSAND/ΜL (ref 0.17–1.22)
MONOCYTES NFR BLD AUTO: 7 % (ref 4–12)
NEUTROPHILS # BLD AUTO: 4.97 THOUSANDS/ΜL (ref 1.85–7.62)
NEUTS SEG NFR BLD AUTO: 77 % (ref 43–75)
NRBC BLD AUTO-RTO: 0 /100 WBCS
NT-PROBNP SERPL-MCNC: 1221 PG/ML
PLATELET # BLD AUTO: 181 THOUSANDS/UL (ref 149–390)
PMV BLD AUTO: 10.3 FL (ref 8.9–12.7)
POTASSIUM SERPL-SCNC: 3.6 MMOL/L (ref 3.5–5.3)
PROT SERPL-MCNC: 4.9 G/DL (ref 6.4–8.4)
RBC # BLD AUTO: 2.7 MILLION/UL (ref 3.81–5.12)
SODIUM SERPL-SCNC: 146 MMOL/L (ref 135–147)
WBC # BLD AUTO: 6.55 THOUSAND/UL (ref 4.31–10.16)

## 2022-10-10 PROCEDURE — 82550 ASSAY OF CK (CPK): CPT | Performed by: INTERNAL MEDICINE

## 2022-10-10 PROCEDURE — 83880 ASSAY OF NATRIURETIC PEPTIDE: CPT | Performed by: INTERNAL MEDICINE

## 2022-10-10 PROCEDURE — 80053 COMPREHEN METABOLIC PANEL: CPT | Performed by: INTERNAL MEDICINE

## 2022-10-10 PROCEDURE — 99232 SBSQ HOSP IP/OBS MODERATE 35: CPT | Performed by: INTERNAL MEDICINE

## 2022-10-10 PROCEDURE — 99231 SBSQ HOSP IP/OBS SF/LOW 25: CPT | Performed by: INTERNAL MEDICINE

## 2022-10-10 PROCEDURE — 85025 COMPLETE CBC W/AUTO DIFF WBC: CPT | Performed by: INTERNAL MEDICINE

## 2022-10-10 PROCEDURE — 86140 C-REACTIVE PROTEIN: CPT | Performed by: INTERNAL MEDICINE

## 2022-10-10 RX ADMIN — Medication 2000 UNITS: at 08:58

## 2022-10-10 RX ADMIN — CEFTRIAXONE 1000 MG: 1 INJECTION, POWDER, FOR SOLUTION INTRAMUSCULAR; INTRAVENOUS at 11:56

## 2022-10-10 RX ADMIN — FOLIC ACID 1 MG: 1 TABLET ORAL at 08:58

## 2022-10-10 RX ADMIN — MIRTAZAPINE 7.5 MG: 15 TABLET, FILM COATED ORAL at 21:01

## 2022-10-10 RX ADMIN — CARBIDOPA AND LEVODOPA 2.5 MG: 50; 200 TABLET, EXTENDED RELEASE ORAL at 17:53

## 2022-10-10 RX ADMIN — DEXAMETHASONE SODIUM PHOSPHATE 6 MG: 4 INJECTION INTRA-ARTICULAR; INTRALESIONAL; INTRAMUSCULAR; INTRAVENOUS; SOFT TISSUE at 13:26

## 2022-10-10 RX ADMIN — REMDESIVIR 100 MG: 100 INJECTION, POWDER, LYOPHILIZED, FOR SOLUTION INTRAVENOUS at 13:22

## 2022-10-10 RX ADMIN — Medication 125 MG: at 11:56

## 2022-10-10 RX ADMIN — ATORVASTATIN CALCIUM 40 MG: 40 TABLET, FILM COATED ORAL at 17:53

## 2022-10-10 RX ADMIN — ENOXAPARIN SODIUM 70 MG: 80 INJECTION SUBCUTANEOUS at 08:58

## 2022-10-10 RX ADMIN — CARBIDOPA AND LEVODOPA 2.5 MG: 50; 200 TABLET, EXTENDED RELEASE ORAL at 08:59

## 2022-10-10 RX ADMIN — PANTOPRAZOLE SODIUM 40 MG: 40 TABLET, DELAYED RELEASE ORAL at 06:10

## 2022-10-10 RX ADMIN — ENOXAPARIN SODIUM 70 MG: 80 INJECTION SUBCUTANEOUS at 20:50

## 2022-10-10 RX ADMIN — METOPROLOL SUCCINATE 75 MG: 50 TABLET, EXTENDED RELEASE ORAL at 20:50

## 2022-10-10 RX ADMIN — PANTOPRAZOLE SODIUM 40 MG: 40 TABLET, DELAYED RELEASE ORAL at 17:53

## 2022-10-10 RX ADMIN — Medication 125 MG: at 20:50

## 2022-10-10 RX ADMIN — METOPROLOL SUCCINATE 75 MG: 50 TABLET, EXTENDED RELEASE ORAL at 08:58

## 2022-10-10 RX ADMIN — CARBIDOPA AND LEVODOPA 2.5 MG: 50; 200 TABLET, EXTENDED RELEASE ORAL at 13:28

## 2022-10-10 NOTE — ASSESSMENT & PLAN NOTE
Patient noted to have worsening respiratory failure up to 5 L nasal cannula tested positive for COVID  In the setting of diarrhea and unvaccinated status  Patient's family prefers not to use remdesivir, will respect her wishes and stop the medication  Will continue steroids

## 2022-10-10 NOTE — CASE MANAGEMENT
Case Management Progress Note    Patient name Noel Silverman  Location /-64 MRN [id number]  : 1940 Date 10/10/2022       LOS (days): 18  Geometric Mean LOS (GMLOS) (days): 3 90  Days to GMLOS:-13 8        OBJECTIVE:        Current admission status: Inpatient  Preferred Pharmacy:   [facility], [address] [facility]  [address] [facility]  Phone: [phone] Fax: [phone] [address] [facility], [address] [place] [address]  [address]  [address] [facility] [address]  Phone: [phone] Fax: [phone]    [address] [place] [address],[address]D, [address] [facility]  Phone: [phone] Fax: [phone]    Primary Care Provider: Humphrey Swift DO    Primary Insurance: MEDICARE  Secondary Insurance: [employer]ET[employer]    PROGRESS NOTE:    Patient transferred from [facility]  Patient is covid positive on 10/9/22  Patient on 5L o2  Patient not stable for discharge at this time  Previous CM sent STR referrals out via [address], message sent inquiring if they have any beds available for covid-19 positive patients  CM will continue to follow

## 2022-10-10 NOTE — ASSESSMENT & PLAN NOTE
Stage IV metastatic ER positive, [place] negative, 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, progressed from stage I A ER/[place] positive, 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 years ago  Status post resection and adjuvant radiation and hormone therapy for 5 years  Patient had symptoms of bony discomfort in her sternum in June of 2021, imaging revealed lytic lesion, biopsy in July of 2021 confirmed progression of disease  ? Patient was started on Faslodex and Xgeva at that time, in conjunction with radiation  ? In April 2022 there was interval development of 4 mm nodule at the right lower lobe and interval development of increased sclerotic lesions throughout the visualized spine  ? At that time, the patient's treatment was changed to Femara and Ma Reddish  ? In July of 2022, the patient was changed from Ma Reddish to Teresabury because of intolerance  ? During the patient's hospitalization in August of 2022 to September of 2022, Verzenio was discontinued due to gastroenteritis  ? Patient's cancer is most likely contributing to her hypercoagulable state  ? Appreciate oncology and palliative care consult  ? Will continue on Lovenox for now  ? Continue on Remeron for insomnia and anxiety  ? Patient would not like any more treatment for her malignancy    Family meeting held on 10/04 with palliative care, GI, CM, and IM team  Patient's current clinical course discussed  Discussion of disposition planning occurred  Hospice was discussed  Patient currently pending placement to nursing facility

## 2022-10-10 NOTE — PROGRESS NOTES
Progress Note - Infectious Disease   Fidelia Silverman 79 y o  female MRN: [id number]  Unit/Bed#: -01 Encounter: [id number]      Impression:  1  Diarrhea secondary to IBD flare with possible C diff colitis  2  Saddle embolus s/p thrombectomy with IVC filter placement  3  History of right breast carcinoma stage IV with metastases to bone  4  Protein calorie malnutrition  5  Urinary retention  6  Acute COVID 19     Recommendations:  Patient is afebrile with normal WBC count     Hemoglobin is now 8 0  Primary service placed on ceftriaxone yesterday  Urine cultures polymicrobial including Enterobacter cloacae which is generally  resistant to ceftriaxone  Diarrhea  1  I am not convinced that the patient has active C diff colitis  Diarrhea more likely secondary to IBD  Patient has completed full course of p o  Vancomycin  He is now on taper as per GI    2  Stools are still loose but with much less volume and frequency  Rectal tube out  COVID 19  1  Currently stable and denies SOB  Chest x-ray not significant  2  SARS-CoV-2 PCR positive  Since patient came in with saddle embolus it is possible that she may have had COVID at that time  She is not immunized  3  COVID Protocol as per primary service  Antibiotics:  1  Vancomycin 125 mg q 12 hours p o ,   2  Ceftriaxone 1 g Q 24 hours IV, day 3 Rx    Subjective:  Less diarrhea and decreased shortness of breath with dry cough   Denies fevers, chills, or sweats  Denies nausea, vomiting,       Objective:  Vitals:  Temp:  [97 3 °F (36 3 °C)-98 1 °F (36 7 °C)] 97 8 °F (36 6 °C)  HR:  [78-87] 78  Resp:  [16-20] 16  BP: (117-127)/(52-61) 117/52  SpO2:  [87 %-98 %] 97 %  Temp (24hrs), Av 7 °F (36 5 °C), Min:97 3 °F (36 3 °C), Max:98 1 °F (36 7 °C)  Current: Temperature: 97 8 °F (36 6 °C)    Physical Exam:     General Appearance:    Eyes:   Alert, chronically ill-appearing female nontoxic, no acute distress    Nasal oxygen in place  Conjunctiva pale   Throat: Oropharynx moist without lesions  Lips, mucosa, and tongue normal   Neck: Supple, symmetrical, trachea midline, no adenopathy,  no tenderness/mass/nodules   Lungs:   Clear to auscultation bilaterally, no audible wheezes, rhonchi or rales; respirations unlabored   Heart:  Regular rate and rhythm, S1, S2 normal, no murmur, rub or gallop   Abdomen:   Soft, non-tender, non-distended, positive bowel sounds  No masses, no organomegaly    No CVA tenderness, Crawley catheter   Extremities: Extremities normal, atraumatic, no clubbing, cyanosis or edema, PICC line   Skin: Skin color pale, texture, turgor normal, no rashes or lesions  No draining wounds noted           Invasive Devices  Report    Peripherally Inserted Central Catheter Line  Duration           PICC Line 09/26/22 14 days          Drain  Duration           Urethral Catheter 14 Fr  2 days                Labs, Imaging, & Other studies:   All pertinent labs were personally reviewed  Results from last 7 days   Lab Units 10/10/22  0614 10/09/22  0509 10/08/22  0501   WBC Thousand/uL 6 55 8 91 7 48   HEMOGLOBIN g/dL 8 0* 8 0* 8 8*   PLATELETS Thousands/uL 181 178 204     Results from last 7 days   Lab Units 10/10/22  0614 10/09/22  0509 10/08/22  0501   SODIUM mmol/L 146 144 143   POTASSIUM mmol/L 3 6 3 2* 3 7   CHLORIDE mmol/L 117* 114* 114*   CO2 mmol/L 25 24 24   BUN mg/dL 19 22 20   CREATININE mg/dL 0 62 0 78 0 84   EGFR ml/min/1 73sq m 84 71 64   CALCIUM mg/dL 6 9* 7 2* 6 3*   AST U/L 13  --   --    ALT U/L 22  --   --    ALK PHOS U/L 86  --   --      Results from last 7 days   Lab Units 10/08/22  0417   URINE CULTURE  >100,000 cfu/ml Enterobacter cloacae*  >100,000 cfu/ml Escherichia coli*  >100,000 cfu/ml Klebsiella pneumoniae*

## 2022-10-10 NOTE — PLAN OF CARE
Problem: Potential for Falls  Goal: Patient will remain free of falls  Description: INTERVENTIONS:  - Educate patient/family on patient safety including physical limitations  - Instruct patient to call for assistance with activity   - Consult OT/PT to assist with strengthening/mobility   - Keep Call bell within reach  - Keep bed low and locked with side rails adjusted as appropriate  - Keep care items and personal belongings within reach  - Initiate and maintain comfort rounds  - Make Fall Risk Sign visible to staff  - Offer Toileting every  Hours, in advance of need  - Initiate/Maintain alarm  - Obtain necessary fall risk management equipment:   - Apply yellow socks and bracelet for high fall risk patients  - Consider moving patient to room near nurses station  Outcome: Progressing     Problem: MOBILITY - ADULT  Goal: Maintain or return to baseline ADL function  Description: INTERVENTIONS:  -  Assess patient's ability to carry out ADLs; assess patient's baseline for ADL function and identify physical deficits which impact ability to perform ADLs (bathing, care of mouth/teeth, toileting, grooming, dressing, etc )  - Assess/evaluate cause of self-care deficits   - Assess range of motion  - Assess patient's mobility; develop plan if impaired  - Assess patient's need for assistive devices and provide as appropriate  - Encourage maximum independence but intervene and supervise when necessary  - Involve family in performance of ADLs  - Assess for home care needs following discharge   - Consider OT consult to assist with ADL evaluation and planning for discharge  - Provide patient education as appropriate  Outcome: Progressing  Goal: Maintains/Returns to pre admission functional level  Description: INTERVENTIONS:  - Perform BMAT or MOVE assessment daily    - Set and communicate daily mobility goal to care team and patient/family/caregiver     - Collaborate with rehabilitation services on mobility goals if consulted  - Perform Range of Motion  times a day  - Reposition patient every  hours    - Dangle patient  times a day  - Stand patient  times a day  - Ambulate patient  times a day  - Out of bed to chair  times a day   - Out of bed for meals  times a day  - Out of bed for toileting  - Record patient progress and toleration of activity level   Outcome: Progressing     Problem: Prexisting or High Potential for Compromised Skin Integrity  Goal: Skin integrity is maintained or improved  Description: INTERVENTIONS:  - Identify patients at risk for skin breakdown  - Assess and monitor skin integrity  - Assess and monitor nutrition and hydration status  - Monitor labs   - Assess for incontinence   - Turn and reposition patient  - Assist with mobility/ambulation  - Relieve pressure over bony prominences  - Avoid friction and shearing  - Provide appropriate hygiene as needed including keeping skin clean and dry  - Evaluate need for skin moisturizer/barrier cream  - Collaborate with interdisciplinary team   - Patient/family teaching  - Consider wound care consult   Outcome: Progressing     Problem: GENITOURINARY - ADULT  Goal: Maintains or returns to baseline urinary function  Description: INTERVENTIONS:  - Assess urinary function  - Encourage oral fluids to ensure adequate hydration if ordered  - Administer IV fluids as ordered to ensure adequate hydration  - Administer ordered medications as needed  - Offer frequent toileting  - Follow urinary retention protocol if ordered  Outcome: Progressing  Goal: Absence of urinary retention  Description: INTERVENTIONS:  - Assess patient’s ability to void and empty bladder  - Monitor I/O  - Bladder scan as needed  - Discuss with physician/AP medications to alleviate retention as needed  - Discuss catheterization for long term situations as appropriate  Outcome: Progressing  Goal: Urinary catheter remains patent  Description: INTERVENTIONS:  - Assess patency of urinary catheter  - If patient has a chronic suarez, consider changing catheter if non-functioning  - Follow guidelines for intermittent irrigation of non-functioning urinary catheter  Outcome: Progressing     Problem: METABOLIC, FLUID AND ELECTROLYTES - ADULT  Goal: Electrolytes maintained within normal limits  Description: INTERVENTIONS:  - Monitor labs and assess patient for signs and symptoms of electrolyte imbalances  - Administer electrolyte replacement as ordered  - Monitor response to electrolyte replacements, including repeat lab results as appropriate  - Instruct patient on fluid and nutrition as appropriate  Outcome: Progressing  Goal: Fluid balance maintained  Description: INTERVENTIONS:  - Monitor labs   - Monitor I/O and WT  - Instruct patient on fluid and nutrition as appropriate  - Assess for signs & symptoms of volume excess or deficit  Outcome: Progressing  Goal: Glucose maintained within target range  Description: INTERVENTIONS:  - Monitor Blood Glucose as ordered  - Assess for signs and symptoms of hyperglycemia and hypoglycemia  - Administer ordered medications to maintain glucose within target range  - Assess nutritional intake and initiate nutrition service referral as needed  Outcome: Progressing     Problem: SKIN/TISSUE INTEGRITY - ADULT  Goal: Skin Integrity remains intact(Skin Breakdown Prevention)  Description: Assess:  -Perform Osito assessment every   -Clean and moisturize skin every   -Inspect skin when repositioning, toileting, and assisting with ADLS  -Assess under medical devices such as  every   -Assess extremities for adequate circulation and sensation     Bed Management:  -Have minimal linens on bed & keep smooth, unwrinkled  -Change linens as needed when moist or perspiring  -Avoid sitting or lying in one position for more than  hours while in bed  -Keep HOB at degrees     Toileting:  -Offer bedside commode  -Assess for incontinence every   -Use incontinent care products after each incontinent episode such as Activity:  -Mobilize patient  times a day  -Encourage activity and walks on unit  -Encourage or provide ROM exercises   -Turn and reposition patient every  Hours  -Use appropriate equipment to lift or move patient in bed  -Instruct/ Assist with weight shifting every  when out of bed in chair  -Consider limitation of chair time  hour intervals    Skin Care:  -Avoid use of baby powder, tape, friction and shearing, hot water or constrictive clothing  -Relieve pressure over bony prominences using   -Do not massage red bony areas    Next Steps:  -Teach patient strategies to minimize risks such as    -Consider consults to  interdisciplinary teams such as   Outcome: Progressing  Goal: Incision(s), wounds(s) or drain site(s) healing without S/S of infection  Description: INTERVENTIONS  - Assess and document dressing, incision, wound bed, drain sites and surrounding tissue  - Provide patient and family education  - Perform skin care/dressing changes every   Outcome: Progressing  Goal: Pressure injury heals and does not worsen  Description: Interventions:  - Implement low air loss mattress or specialty surface (Criteria met)  - Apply silicone foam dressing  - Instruct/assist with weight shifting every  minutes when in chair   - Limit chair time to  hour intervals  - Use special pressure reducing interventions such as  when in chair   - Apply fecal or urinary incontinence containment device   - Perform passive or active ROM every   - Turn and reposition patient & offload bony prominences every  hours   - Utilize friction reducing device or surface for transfers   - Consider consults to  interdisciplinary teams such as   - Use incontinent care products after each incontinent episode such a  - Consider nutrition services referral as needed  Outcome: Progressing     Problem: PAIN - ADULT  Goal: Verbalizes/displays adequate comfort level or baseline comfort level  Description: Interventions:  - Encourage patient to monitor pain and request assistance  - Assess pain using appropriate pain scale  - Administer analgesics based on type and severity of pain and evaluate response  - Implement non-pharmacological measures as appropriate and evaluate response  - Consider cultural and social influences on pain and pain management  - Notify physician/advanced practitioner if interventions unsuccessful or patient reports new pain  Outcome: Progressing     Problem: Nutrition/Hydration-ADULT  Goal: Nutrient/Hydration intake appropriate for improving, restoring or maintaining nutritional needs  Description: Monitor and assess patient's nutrition/hydration status for malnutrition  Collaborate with interdisciplinary team and initiate plan and interventions as ordered  Monitor patient's weight and dietary intake as ordered or per policy  Utilize nutrition screening tool and intervene as necessary  Determine patient's food preferences and provide high-protein, high-caloric foods as appropriate       INTERVENTIONS:  - Monitor oral intake, urinary output, labs, and treatment plans  - Assess nutrition and hydration status and recommend course of action  - Evaluate amount of meals eaten  - Assist patient with eating if necessary   - Allow adequate time for meals  - Recommend/ encourage appropriate diets, oral nutritional supplements, and vitamin/mineral supplements  - Order, calculate, and assess calorie counts as needed  - Recommend, monitor, and adjust tube feedings and TPN/PPN based on assessed needs  - Assess need for intravenous fluids  - Provide specific nutrition/hydration education as appropriate  - Include patient/family/caregiver in decisions related to nutrition  Outcome: Progressing

## 2022-10-11 PROBLEM — L89.323 PRESSURE INJURY OF LEFT BUTTOCK, STAGE 3 (HCC): Status: ACTIVE | Noted: 2022-10-11

## 2022-10-11 PROBLEM — J96.01 ACUTE RESPIRATORY FAILURE WITH HYPOXIA (HCC): Status: ACTIVE | Noted: 2022-10-09

## 2022-10-11 PROBLEM — U07.1 COVID-19 VIRUS INFECTION: Status: ACTIVE | Noted: 2022-10-11

## 2022-10-11 PROBLEM — N17.9 AKI (ACUTE KIDNEY INJURY) (HCC): Status: RESOLVED | Noted: 2022-08-01 | Resolved: 2022-10-11

## 2022-10-11 LAB
ANION GAP SERPL CALCULATED.3IONS-SCNC: 4 MMOL/L (ref 4–13)
BASOPHILS # BLD AUTO: 0.01 THOUSANDS/ΜL (ref 0–0.1)
BASOPHILS NFR BLD AUTO: 0 % (ref 0–1)
BUN SERPL-MCNC: 17 MG/DL (ref 5–25)
CALCIUM SERPL-MCNC: 7.2 MG/DL (ref 8.3–10.1)
CHLORIDE SERPL-SCNC: 116 MMOL/L (ref 96–108)
CO2 SERPL-SCNC: 27 MMOL/L (ref 21–32)
CREAT SERPL-MCNC: 0.56 MG/DL (ref 0.6–1.3)
EOSINOPHIL # BLD AUTO: 0.02 THOUSAND/ΜL (ref 0–0.61)
EOSINOPHIL NFR BLD AUTO: 0 % (ref 0–6)
ERYTHROCYTE [DISTWIDTH] IN BLOOD BY AUTOMATED COUNT: 16.3 % (ref 11.6–15.1)
GFR SERPL CREATININE-BSD FRML MDRD: 87 ML/MIN/1.73SQ M
GLUCOSE SERPL-MCNC: 81 MG/DL (ref 65–140)
HCT VFR BLD AUTO: 26.6 % (ref 34.8–46.1)
HGB BLD-MCNC: 7.9 G/DL (ref 11.5–15.4)
IMM GRANULOCYTES # BLD AUTO: 0.1 THOUSAND/UL (ref 0–0.2)
IMM GRANULOCYTES NFR BLD AUTO: 2 % (ref 0–2)
LYMPHOCYTES # BLD AUTO: 1.16 THOUSANDS/ΜL (ref 0.6–4.47)
LYMPHOCYTES NFR BLD AUTO: 18 % (ref 14–44)
MCH RBC QN AUTO: 29.4 PG (ref 26.8–34.3)
MCHC RBC AUTO-ENTMCNC: 29.7 G/DL (ref 31.4–37.4)
MCV RBC AUTO: 99 FL (ref 82–98)
MONOCYTES # BLD AUTO: 0.54 THOUSAND/ΜL (ref 0.17–1.22)
MONOCYTES NFR BLD AUTO: 9 % (ref 4–12)
NEUTROPHILS # BLD AUTO: 4.52 THOUSANDS/ΜL (ref 1.85–7.62)
NEUTS SEG NFR BLD AUTO: 71 % (ref 43–75)
NRBC BLD AUTO-RTO: 0 /100 WBCS
PLATELET # BLD AUTO: 222 THOUSANDS/UL (ref 149–390)
PMV BLD AUTO: 10 FL (ref 8.9–12.7)
POTASSIUM SERPL-SCNC: 3.3 MMOL/L (ref 3.5–5.3)
RBC # BLD AUTO: 2.69 MILLION/UL (ref 3.81–5.12)
SODIUM SERPL-SCNC: 147 MMOL/L (ref 135–147)
WBC # BLD AUTO: 6.35 THOUSAND/UL (ref 4.31–10.16)

## 2022-10-11 PROCEDURE — 85025 COMPLETE CBC W/AUTO DIFF WBC: CPT | Performed by: INTERNAL MEDICINE

## 2022-10-11 PROCEDURE — 80048 BASIC METABOLIC PNL TOTAL CA: CPT | Performed by: INTERNAL MEDICINE

## 2022-10-11 PROCEDURE — 97168 OT RE-EVAL EST PLAN CARE: CPT

## 2022-10-11 PROCEDURE — 97530 THERAPEUTIC ACTIVITIES: CPT

## 2022-10-11 PROCEDURE — 99232 SBSQ HOSP IP/OBS MODERATE 35: CPT | Performed by: INTERNAL MEDICINE

## 2022-10-11 PROCEDURE — 99231 SBSQ HOSP IP/OBS SF/LOW 25: CPT | Performed by: INTERNAL MEDICINE

## 2022-10-11 RX ORDER — POTASSIUM CHLORIDE 20 MEQ/1
40 TABLET, EXTENDED RELEASE ORAL ONCE
Status: COMPLETED | OUTPATIENT
Start: 2022-10-11 | End: 2022-10-11

## 2022-10-11 RX ADMIN — Medication 2000 UNITS: at 09:53

## 2022-10-11 RX ADMIN — MIRTAZAPINE 7.5 MG: 15 TABLET, FILM COATED ORAL at 22:07

## 2022-10-11 RX ADMIN — POTASSIUM CHLORIDE 40 MEQ: 1500 TABLET, EXTENDED RELEASE ORAL at 09:54

## 2022-10-11 RX ADMIN — DEXAMETHASONE SODIUM PHOSPHATE 6 MG: 4 INJECTION INTRA-ARTICULAR; INTRALESIONAL; INTRAMUSCULAR; INTRAVENOUS; SOFT TISSUE at 13:10

## 2022-10-11 RX ADMIN — CARBIDOPA AND LEVODOPA 2.5 MG: 50; 200 TABLET, EXTENDED RELEASE ORAL at 06:00

## 2022-10-11 RX ADMIN — FOLIC ACID 1 MG: 1 TABLET ORAL at 09:54

## 2022-10-11 RX ADMIN — CARBIDOPA AND LEVODOPA 2.5 MG: 50; 200 TABLET, EXTENDED RELEASE ORAL at 13:10

## 2022-10-11 RX ADMIN — Medication 125 MG: at 21:56

## 2022-10-11 RX ADMIN — PANTOPRAZOLE SODIUM 40 MG: 40 TABLET, DELAYED RELEASE ORAL at 06:00

## 2022-10-11 RX ADMIN — ENOXAPARIN SODIUM 70 MG: 80 INJECTION SUBCUTANEOUS at 21:56

## 2022-10-11 RX ADMIN — ATORVASTATIN CALCIUM 40 MG: 40 TABLET, FILM COATED ORAL at 18:00

## 2022-10-11 RX ADMIN — METOPROLOL SUCCINATE 75 MG: 50 TABLET, EXTENDED RELEASE ORAL at 09:54

## 2022-10-11 RX ADMIN — ENOXAPARIN SODIUM 70 MG: 80 INJECTION SUBCUTANEOUS at 10:02

## 2022-10-11 RX ADMIN — CARBIDOPA AND LEVODOPA 2.5 MG: 50; 200 TABLET, EXTENDED RELEASE ORAL at 18:00

## 2022-10-11 RX ADMIN — METOPROLOL SUCCINATE 75 MG: 50 TABLET, EXTENDED RELEASE ORAL at 21:57

## 2022-10-11 RX ADMIN — Medication 125 MG: at 10:04

## 2022-10-11 RX ADMIN — PANTOPRAZOLE SODIUM 40 MG: 40 TABLET, DELAYED RELEASE ORAL at 18:00

## 2022-10-11 NOTE — CASE MANAGEMENT
Case Management Discharge Planning Note    Patient name Slim Lay  Location /-10 MRN [id number]  : 1940 Date 10/11/2022       Current Admission Date: 2022  Current Admission Diagnosis:Saddle embolus of pulmonary artery [facility])   Patient Active Problem List    Diagnosis Date Noted   • Acute respiratory failure ([facility] ) 10/09/2022   • Ulcerative pancolitis with rectal bleeding ([facility] ) 10/03/2022   • Saddle embolus of pulmonary artery ([facility] ) 2022   • Urinary retention 2022   • Swelling of lower extremity 2022   • Venous insufficiency 2022   • Tachycardia 2022   • Single subsegmental pulmonary embolism without acute cor pulmonale ([facility] ) 2022   • Severe protein-calorie malnutrition ([place] [address] ) 2022   • Anemia 2022   • Hypokalemia 2022   • REYES (acute kidney injury) ([facility] ) 2022   • Diarrhea 2022   • Secondary malignant neoplasm of bone ([place] [address] ) 2022   • Toxic gastroenteritis and colitis 2022   • Rectal bleeding 10/14/2021   • Lytic lesion of bone on x-ray 2021   • Neoplasm of uncertain behavior of sternum 2021   • Cough due to ACE inhibitor 2021   • Acute costochondritis 2021   • Osteopenia of multiple sites 2020   • Primary hypertension 10/22/2019   • Chronic kidney disease (CKD) stage G3a/A1, moderately decreased glomerular filtration rate (GFR) between 45-59 mL/min/1 73 square meter and albuminuria creatinine ratio less than 30 mg/g (HCC) 2019   • Adverse reaction to pneumococcal vaccine 2015   • Cataract, bilateral 2014   • Pulmonary nodule seen on imaging study 09/10/2013   • Sleep disorder 2013   • Anxiety 2013   • Metastatic breast cancer ([facility] ) 10/17/2012   • Mixed hyperlipidemia 2012      LOS (days): 19  Geometric Mean LOS (GMLOS) (days): 3 90  Days to GMLOS:-14 9     OBJECTIVE:  Risk of Unplanned Readmission Score: 36 22         Current admission status: Inpatient   Preferred Pharmacy:   [facility] [address], [address]  [address] [facility]  Phone: [phone] Fax: [phone] 532 [facility], [address]  [address] [facility]  [address]  [address] [facility] [address]  Phone: [phone] Fax: [phone] [address] [place], [facility] [place] [address] [facility] [address] [address]  Phone: [phone] Fax: [phone]    Primary Care Provider: Nacho Sotelo DO    Primary Insurance: MEDICARE  Secondary Insurance: Monty Lord    DISCHARGE DETAILS:    Discharge planning discussed with[de-identified] Nusrat Martell  Freedom of Choice: Yes     CM contacted family/caregiver?: Yes  Were Treatment Team discharge recommendations reviewed with patient/caregiver?: Yes  Did patient/caregiver verbalize understanding of patient care needs?: N/A- going to facility  Were patient/caregiver advised of the risks associated with not following Treatment Team discharge recommendations?: Yes    Contacts  Patient Contacts: Rella Leavens, daughter  Relationship to Patient[de-identified] Family  Contact Method: Phone  Phone Number: [phone]  Reason/Outcome: Discharge Planning, Emergency Contact, Continuity of Care              Other Referral/Resources/Interventions Provided:  Referral Comments: CM called patient's daughter, Funmilayo Barr, to introduce self as patient transferred from [facility] to [facility]  CM explained that patient being covid-19 positive is a barrier to finding an accepting rehab facility  Taya agreeable to CM placing blanket STR referrals at this time  CM placed blanket STR referrals via [address]  Funmilayo Barr requesting list of accepting facilites to be emailed to [email or web address] when available

## 2022-10-11 NOTE — ASSESSMENT & PLAN NOTE
- incidentally found to be positive when being screened for discharge placement  - previously on remdesivir though discontinued per family request  - continue IV Decadron 6 mg daily, day 10/10  - wean oxygen as tolerated; down to 1 L  - weight based Lovenox for anticoagulation  - airborne and contact precautions; can come off precautions 10/19 after 10 day quarantine

## 2022-10-11 NOTE — ASSESSMENT & PLAN NOTE
- Patient was admitted with toxic gastroenteritis in August of 2022   Determined to be possible IBD with additional insult of Verzenio from her breast cancer treatment    - continue prednisone taper  - continue vancomycin taper per GI  - started on Remicade 10/1 inpatient, and will be continued on an outpatient basis after discharge

## 2022-10-11 NOTE — CASE MANAGEMENT
Case Management Discharge Planning Note    Patient name Edgar Kurt  Location /-55 MRN [id number]  : 1940 Date 10/11/2022       Current Admission Date: 2022  Current Admission Diagnosis:Saddle embolus of pulmonary artery [facility])   Patient Active Problem List    Diagnosis Date Noted   • [facility] ([facility] ) 10/09/2022   • Ulcerative pancolitis with rectal bleeding ([facility] ) 10/03/2022   • Saddle embolus of pulmonary artery ([place] [address] ) 2022   • Urinary retention 2022   • Swelling of lower extremity 2022   • Venous insufficiency 2022   • Tachycardia 2022   • Single subsegmental pulmonary embolism without acute cor pulmonale ([facility] ) 2022   • Severe protein-calorie malnutrition ([place] [address] ) 2022   • Anemia 2022   • Hypokalemia 2022   • REYES (acute kidney injury) ([facility] ) 2022   • Diarrhea 2022   • Secondary malignant neoplasm of bone ([place] [address] ) 2022   • Toxic gastroenteritis and colitis 2022   • Rectal bleeding 10/14/2021   • Lytic lesion of bone on x-ray 2021   • Neoplasm of uncertain behavior of sternum 2021   • Cough due to ACE inhibitor 2021   • Acute costochondritis 2021   • Osteopenia of multiple sites 2020   • Primary hypertension 10/22/2019   • Chronic kidney disease (CKD) stage G3a/A1, moderately decreased glomerular filtration rate (GFR) between 45-59 mL/min/1 73 square meter and albuminuria creatinine ratio less than 30 mg/g (HCC) 2019   • Adverse reaction to pneumococcal vaccine 2015   • Cataract, bilateral 2014   • Pulmonary nodule seen on imaging study 09/10/2013   • Sleep disorder 2013   • Anxiety 2013   • Metastatic breast cancer ([facility] ) 10/17/2012   • Mixed hyperlipidemia 2012      LOS (days): 19  Geometric Mean LOS (GMLOS) (days): 3 90  Days to GMLOS:-15 1     OBJECTIVE:  Risk of Unplanned Readmission Score: 37 1         Current admission status: Inpatient   Preferred Pharmacy:   [facility], [address]  615 [facility]  Phone: [phone] Fax: [phone] 532 [facility], [address]  [address] [facility]  [address]  [address] [place] [address]  Phone: [phone] Fax: [phone]    [address] [place], [facility] [place] [address] [facility] [address] [address]  Phone: [phone] Fax: [phone]    Primary Care Provider: Diana Palmer,     Primary Insurance: MEDICARE  Secondary Insurance: May Kimball DETAILS:    Discharge planning discussed with[de-identified] Patient, Faviola Solorio  [place] of Choice: Yes     CM contacted family/caregiver?: Yes  Were Treatment Team discharge recommendations reviewed with patient/caregiver?: Yes  Did patient/caregiver verbalize understanding of patient care needs?: N/A- going to facility  Were patient/caregiver advised of the risks associated with not following Treatment Team discharge recommendations?: Yes    Contacts  Patient Contacts: Faviola Solorio, daughter  Relationship to Patient[de-identified] Family  Contact Method: Phone  Phone Number: [phone]  Reason/Outcome: Discharge Planning              Other Referral/Resources/Interventions Provided:  Referral Comments: CM spoke to patient who stated that she wants all decisions made by her daughter, is willing to go to whichever facility her daughter is agreeable to  CM called patient's daughter, Judge Dunn, informed that patient is stable for discharge per Dr Steve Gonsales and only facility able to accept at this time is [facility] 2021  Judge Dunn stated she wants to look up facility and then will get back to   CM updated [facility]  Judge Dunn stated that patient cannot return to apartment and she has arranged for patient to move to [facility] when discharged from rehab              CM received JOSE EDUARDO from [facility] stating they have a covid bed available for patient  CM called Taya to inform

## 2022-10-11 NOTE — PLAN OF CARE
Problem: Potential for Falls  Goal: Patient will remain free of falls  Description: INTERVENTIONS:  - Educate patient/family on patient safety including physical limitations  - Instruct patient to call for assistance with activity   - Consult OT/PT to assist with strengthening/mobility   - Keep Call bell within reach  - Keep bed low and locked with side rails adjusted as appropriate  - Keep care items and personal belongings within reach  - Initiate and maintain comfort rounds  - Make Fall Risk Sign visible to staff  - Offer Toileting every  Hours, in advance of need  - Initiate/Maintain alarm  - Obtain necessary fall risk management equipment:   - Apply yellow socks and bracelet for high fall risk patients  - Consider moving patient to room near nurses station  Outcome: Progressing     Problem: MOBILITY - ADULT  Goal: Maintain or return to baseline ADL function  Description: INTERVENTIONS:  -  Assess patient's ability to carry out ADLs; assess patient's baseline for ADL function and identify physical deficits which impact ability to perform ADLs (bathing, care of mouth/teeth, toileting, grooming, dressing, etc )  - Assess/evaluate cause of self-care deficits   - Assess range of motion  - Assess patient's mobility; develop plan if impaired  - Assess patient's need for assistive devices and provide as appropriate  - Encourage maximum independence but intervene and supervise when necessary  - Involve family in performance of ADLs  - Assess for home care needs following discharge   - Consider OT consult to assist with ADL evaluation and planning for discharge  - Provide patient education as appropriate  Outcome: Progressing  Goal: Maintains/Returns to pre admission functional level  Description: INTERVENTIONS:  - Perform BMAT or MOVE assessment daily    - Set and communicate daily mobility goal to care team and patient/family/caregiver     - Collaborate with rehabilitation services on mobility goals if consulted  - Perform Range of Motion  times a day  - Reposition patient every  hours    - Dangle patient  times a day  - Stand patient  times a day  - Ambulate patient  times a day  - Out of bed to chair  times a day   - Out of bed for meals  times a day  - Out of bed for toileting  - Record patient progress and toleration of activity level   Outcome: Progressing     Problem: Prexisting or High Potential for Compromised Skin Integrity  Goal: Skin integrity is maintained or improved  Description: INTERVENTIONS:  - Identify patients at risk for skin breakdown  - Assess and monitor skin integrity  - Assess and monitor nutrition and hydration status  - Monitor labs   - Assess for incontinence   - Turn and reposition patient  - Assist with mobility/ambulation  - Relieve pressure over bony prominences  - Avoid friction and shearing  - Provide appropriate hygiene as needed including keeping skin clean and dry  - Evaluate need for skin moisturizer/barrier cream  - Collaborate with interdisciplinary team   - Patient/family teaching  - Consider wound care consult   Outcome: Progressing     Problem: GENITOURINARY - ADULT  Goal: Maintains or returns to baseline urinary function  Description: INTERVENTIONS:  - Assess urinary function  - Encourage oral fluids to ensure adequate hydration if ordered  - Administer IV fluids as ordered to ensure adequate hydration  - Administer ordered medications as needed  - Offer frequent toileting  - Follow urinary retention protocol if ordered  Outcome: Progressing  Goal: Absence of urinary retention  Description: INTERVENTIONS:  - Assess patient’s ability to void and empty bladder  - Monitor I/O  - Bladder scan as needed  - Discuss with physician/AP medications to alleviate retention as needed  - Discuss catheterization for long term situations as appropriate  Outcome: Progressing  Goal: Urinary catheter remains patent  Description: INTERVENTIONS:  - Assess patency of urinary catheter  - If patient has a chronic suarez, consider changing catheter if non-functioning  - Follow guidelines for intermittent irrigation of non-functioning urinary catheter  Outcome: Progressing     Problem: METABOLIC, FLUID AND ELECTROLYTES - ADULT  Goal: Electrolytes maintained within normal limits  Description: INTERVENTIONS:  - Monitor labs and assess patient for signs and symptoms of electrolyte imbalances  - Administer electrolyte replacement as ordered  - Monitor response to electrolyte replacements, including repeat lab results as appropriate  - Instruct patient on fluid and nutrition as appropriate  Outcome: Progressing  Goal: Fluid balance maintained  Description: INTERVENTIONS:  - Monitor labs   - Monitor I/O and WT  - Instruct patient on fluid and nutrition as appropriate  - Assess for signs & symptoms of volume excess or deficit  Outcome: Progressing  Goal: Glucose maintained within target range  Description: INTERVENTIONS:  - Monitor Blood Glucose as ordered  - Assess for signs and symptoms of hyperglycemia and hypoglycemia  - Administer ordered medications to maintain glucose within target range  - Assess nutritional intake and initiate nutrition service referral as needed  Outcome: Progressing     Problem: SKIN/TISSUE INTEGRITY - ADULT  Goal: Skin Integrity remains intact(Skin Breakdown Prevention)  Description: Assess:  -Perform Osito assessment every   -Clean and moisturize skin every   -Inspect skin when repositioning, toileting, and assisting with ADLS  -Assess under medical devices such as  every   -Assess extremities for adequate circulation and sensation     Bed Management:  -Have minimal linens on bed & keep smooth, unwrinkled  -Change linens as needed when moist or perspiring  -Avoid sitting or lying in one position for more than  hours while in bed  -Keep HOB at degrees     Toileting:  -Offer bedside commode  -Assess for incontinence every   -Use incontinent care products after each incontinent episode such as Activity:  -Mobilize patient  times a day  -Encourage activity and walks on unit  -Encourage or provide ROM exercises   -Turn and reposition patient every  Hours  -Use appropriate equipment to lift or move patient in bed  -Instruct/ Assist with weight shifting every  when out of bed in chair  -Consider limitation of chair time  hour intervals    Skin Care:  -Avoid use of baby powder, tape, friction and shearing, hot water or constrictive clothing  -Relieve pressure over bony prominences using   -Do not massage red bony areas    Next Steps:  -Teach patient strategies to minimize risks such as    -Consider consults to  interdisciplinary teams such as   Outcome: Progressing  Goal: Incision(s), wounds(s) or drain site(s) healing without S/S of infection  Description: INTERVENTIONS  - Assess and document dressing, incision, wound bed, drain sites and surrounding tissue  - Provide patient and family education  - Perform skin care/dressing changes every   Outcome: Progressing  Goal: Pressure injury heals and does not worsen  Description: Interventions:  - Implement low air loss mattress or specialty surface (Criteria met)  - Apply silicone foam dressing  - Instruct/assist with weight shifting every  minutes when in chair   - Limit chair time to  hour intervals  - Use special pressure reducing interventions such as  when in chair   - Apply fecal or urinary incontinence containment device   - Perform passive or active ROM every   - Turn and reposition patient & offload bony prominences every  hours   - Utilize friction reducing device or surface for transfers   - Consider consults to  interdisciplinary teams such as   - Use incontinent care products after each incontinent episode such as  - Consider nutrition services referral as needed  Outcome: Progressing     Problem: PAIN - ADULT  Goal: Verbalizes/displays adequate comfort level or baseline comfort level  Description: Interventions:  - Encourage patient to monitor pain and request assistance  - Assess pain using appropriate pain scale  - Administer analgesics based on type and severity of pain and evaluate response  - Implement non-pharmacological measures as appropriate and evaluate response  - Consider cultural and social influences on pain and pain management  - Notify physician/advanced practitioner if interventions unsuccessful or patient reports new pain  Outcome: Progressing     Problem: Nutrition/Hydration-ADULT  Goal: Nutrient/Hydration intake appropriate for improving, restoring or maintaining nutritional needs  Description: Monitor and assess patient's nutrition/hydration status for malnutrition  Collaborate with interdisciplinary team and initiate plan and interventions as ordered  Monitor patient's weight and dietary intake as ordered or per policy  Utilize nutrition screening tool and intervene as necessary  Determine patient's food preferences and provide high-protein, high-caloric foods as appropriate       INTERVENTIONS:  - Monitor oral intake, urinary output, labs, and treatment plans  - Assess nutrition and hydration status and recommend course of action  - Evaluate amount of meals eaten  - Assist patient with eating if necessary   - Allow adequate time for meals  - Recommend/ encourage appropriate diets, oral nutritional supplements, and vitamin/mineral supplements  - Order, calculate, and assess calorie counts as needed  - Recommend, monitor, and adjust tube feedings and TPN/PPN based on assessed needs  - Assess need for intravenous fluids  - Provide specific nutrition/hydration education as appropriate  - Include patient/family/caregiver in decisions related to nutrition  Outcome: Progressing

## 2022-10-11 NOTE — ASSESSMENT & PLAN NOTE
- Patient was admitted with toxic gastroenteritis in August of 2022  Determined to be possible IBD with additional insult of Verzenio from her breast cancer treatment  - low suspicion for C diff infection and p o   Vancomycin taper was discontinued per ID recs  - started on Remicade 10/1 inpatient, to receive 2nd dose tomorrow and will be continued on an outpatient basis after discharge

## 2022-10-11 NOTE — PHYSICAL THERAPY NOTE
PHYSICAL THERAPY NOTE          Patient Name: Ethel Silverman  [id number] Date: 10/11/2022         10/11/22 0951   PT Last Visit   PT Visit Date 10/11/22   Note Type   Note Type Treatment   Pain Assessment   Pain Assessment Tool 0-10   Pain Score No Pain   Restrictions/Precautions   Other Precautions Contact/isolation;Multiple lines;Telemetry; Fall Risk; Airborne/isolation;Hard of hearing   General   Chart Reviewed Yes   Additional Pertinent History cleared for Tx session (spoke to nsg)   Response to Previous Treatment Patient with no complaints from previous session  Cognition   Overall Cognitive Status WFL   Arousal/Participation Alert; Cooperative   Attention Attends with cues to redirect   Orientation Level Oriented to person;Oriented to place;Oriented to situation   Memory Decreased recall of recent events;Decreased recall of precautions   Following Commands Follows one step commands with increased time or repetition   Subjective   Subjective Alert; in bed; agreeable to try mobilization   Bed Mobility   Rolling R 3  Moderate assistance   Additional items Assist x 1; Increased time required;Verbal cues;LE management   Rolling L 3  Moderate assistance   Additional items Assist x 1; Increased time required;Verbal cues;LE management   Supine to Sit 3  Moderate assistance   Additional items Assist x 2;HOB elevated; Increased time required;Verbal cues;LE management   Sit to Supine 3  Moderate assistance   Additional items Assist x 2; Increased time required;Verbal cues;LE management  (repositioned higher in bed w/ (A)x2)   Additional Comments Upon initiation of bed mob, noted pt was incontinent of stool --> extensive pericare and bed pad changes initiated w/ staff   Transfers   Sit to Stand 3  Moderate assistance   Additional items Assist x 2; Increased time required;Verbal cues   Stand to Sit 3  Moderate assistance   Additional items Assist x 2;Increased time required;Verbal cues   Stand pivot 3  Moderate assistance   Additional items Assist x 2; Increased time required;Verbal cues  (bed to chair to the (R) side; chair to bed to the (L) side)   Ambulation/Elevation   Gait pattern Excessively slow; Short stride; Foward flexed; Shuffling   Gait Assistance 3  Moderate assist   Additional items Assist x 2;Verbal cues; Tactile cues   Assistive Device Rolling walker   Distance 2 x 3 ft for bed <--> chair transitions   Balance   Static Sitting Fair -   Dynamic Sitting Poor +   Static Standing Poor   Dynamic Standing Poor -   Ambulatory Poor -   Activity Tolerance   Activity Tolerance Patient limited by fatigue   Medical Staff Made Aware Co-Tx performed w/ OTR due to complexity of medical status and level of skilled assistance required;   Nurse Made Aware spoke to Prabhjot Valentine,  [address] [place] [address]   Assessment   Prognosis Fair   Problem List Decreased strength;Decreased endurance; Impaired balance;Decreased mobility   Assessment Functional mobility training performed at bedside consisting of bed mob and transfers training/limited amb trials w/ rw and (A)x2; pt seems to exhibit improved standing balance/tolerance w/ increased LE control in standing during a few steps amb/transitions; rest periods provided as needed and pt remained in NAD  Overall, cont to recommend rehab upon D/C when medically cleared; will follow   Barriers to Discharge Decreased caregiver support   Goals   Patient Goals to move better   STG Expiration Date 10/17/22   PT Treatment Day 3   Plan   Treatment/Interventions Functional transfer training;LE strengthening/ROM; Therapeutic exercise; Endurance training;Cognitive reorientation; Bed mobility;Gait training;Spoke to nursing;OT   Progress Progressing toward goals   PT Frequency 3-5x/wk   Recommendation   PT Discharge Recommendation Post acute rehabilitation services   AM-PAC Basic Mobility Inpatient   Turning in Bed Without Bedrails 2   Lying on Back to Sitting on Edge of Flat Bed 2   Moving Bed to Chair 2   Standing Up From Chair 2   Walk in Room 2   Climb 3-5 Stairs 1   Basic Mobility Inpatient Raw Score 11   Basic Mobility Standardized Score 30 25   Highest Level Of Mobility   -HLM Goal 4: Move to chair/commode   -HL Achieved 4: Move to chair/commode   Education   Education Provided Mobility training;Assistive device   Patient Demonstrates verbal understanding;Reinforcement needed   End of Consult   Patient Position at End of Consult Supine; All needs within [facility]

## 2022-10-11 NOTE — PROGRESS NOTES
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  4  Protein calorie malnutrition  5  Urinary retention  6  Acute COVID 19     Recommendations:  Patient is afebrile with normal WBC count     Hemoglobin is now 7 9  Off ceftriaxone  Urine cultures polymicrobial including Enterobacter cloacae which is generally  resistant to ceftriaxone  Diarrhea  1  I am not convinced that the patient has active C diff colitis  Diarrhea more likely secondary to IBD  Patient has completed full course of p o  Vancomycin  She is now on taper as per GI    2  Stools are still loose but with minimal volume and frequency  Rectal tube out  COVID 19  1  Currently stable and denies SOB  Chest x-ray not significant  2  SARS-CoV-2 PCR positive  Since patient came in with saddle embolus it is possible that she may have had COVID at that time  She is not immunized  3  COVID Protocol as per primary service  Antibiotics:  1  Vancomycin 125 mg q 12 hours p o ,       Subjective:  Less diarrhea and decreased shortness of breath with dry cough   Denies fevers, chills, or sweats  Denies nausea, vomiting,       Objective:  Vitals:  Temp:  [97 4 °F (36 3 °C)-98 8 °F (37 1 °C)] 97 4 °F (36 3 °C)  HR:  [77-85] 85  BP: (115-121)/(52-58) 120/58  SpO2:  [93 %-97 %] 97 %  Temp (24hrs), Av 2 °F (36 8 °C), Min:97 4 °F (36 3 °C), Max:98 8 °F (37 1 °C)  Current: Temperature: (!) 97 4 °F (36 3 °C)    Physical Exam:     General Appearance:    Eyes:   Alert, chronically ill-appearing female nontoxic, no acute distress  Nasal oxygen in place  Conjunctiva pale   Throat: Oropharynx moist without lesions    Lips, mucosa, and tongue normal   Neck: Supple, symmetrical, trachea midline, no adenopathy,  no tenderness/mass/nodules   Lungs:   Clear to auscultation bilaterally, no audible wheezes, rhonchi or rales; respirations unlabored   Heart:  Regular rate and rhythm, S1, S2 normal, no murmur, rub or gallop   Abdomen:   Soft, non-tender, non-distended, positive bowel sounds  No masses, no organomegaly    No CVA tenderness, Crawley catheter   Extremities: Extremities normal, atraumatic, no clubbing, cyanosis or edema, PICC line   Skin: Skin color pale, texture, turgor normal, no rashes or lesions  No draining wounds noted           Invasive Devices  Report    Peripherally Inserted Central Catheter Line  Duration           PICC Line 09/26/22 15 days          Drain  Duration           Urethral Catheter 14 Fr  3 days                Labs, Imaging, & Other studies:   All pertinent labs were personally reviewed  Results from last 7 days   Lab Units 10/11/22  0442 10/10/22  0614 10/09/22  0509   WBC Thousand/uL 6 35 6 55 8 91   HEMOGLOBIN g/dL 7 9* 8 0* 8 0*   PLATELETS Thousands/uL 222 181 178     Results from last 7 days   Lab Units 10/11/22  0442 10/10/22  0614 10/09/22  0509   SODIUM mmol/L 147 146 144   POTASSIUM mmol/L 3 3* 3 6 3 2*   CHLORIDE mmol/L 116* 117* 114*   CO2 mmol/L 27 25 24   BUN mg/dL 17 19 22   CREATININE mg/dL 0 56* 0 62 0 78   EGFR ml/min/1 73sq m 87 84 71   CALCIUM mg/dL 7 2* 6 9* 7 2*   AST U/L  --  13  --    ALT U/L  --  22  --    ALK PHOS U/L  --  86  --      Results from last 7 days   Lab Units 10/08/22  0417   URINE CULTURE  >100,000 cfu/ml Escherichia coli*  >100,000 cfu/ml Klebsiella pneumoniae*  >100,000 cfu/ml Enterobacter cloacae*

## 2022-10-11 NOTE — ASSESSMENT & PLAN NOTE
Patient was admitted with toxic gastroenteritis in August of 2022  ? Determined to be possible IBD with additional insult of Verzenio from her breast cancer treatment  ? Continue to hold Verzenio    ? Patient positive for C diff started on oral vancomycin D-3, diarrhea improving but persisting  ? Will wean prednisone slowly as she has been on this chronically- now down to 5 mg  ? Will stop loperamide, cholestyramine n all stool softeners  ? GI offered remicaid to the daughter to Rx IBD - she is going to think about it  ? Spoke to GI about possibility of increasing Vanc dosing today  Also about concerns for IBD  Diarrhea slowly improving  Completed 10 day of PO cohen  ? Spoke to GI team regarding prolonged course of PO vancomycin  Will continue at BID dosing for 1 week followed by daily dosing for an additional week  ? Patient to follow-up with GI as outpatient for further IBD evaluation and decision on medications  ? Patient received Remicade on 10/01  Plan to continue as outpatent  ? Patient with rectal tube in place  Continued loose BMs, though patient states diarrhea is subjectively improving  Patient also with sacral wound  ? Montior stool output to assess progress  ? Rectal tube discontinued  ? Will proceed with disposition planning     ? Will continue on vancomycin taper per GI

## 2022-10-11 NOTE — ASSESSMENT & PLAN NOTE
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 Patient was started on Faslodex and Xgeva at that time, in conjunction with radiation  ? In April 2022 there was interval development of 4 mm nodule at the right lower lobe and interval development of increased sclerotic lesions throughout the visualized spine  ? At that time, the patient's treatment was changed to Femara and Jolene China  ? In July of 2022, the patient was changed from Jolene Schurz to Teresabury because of intolerance  ? During the patient's hospitalization in August of 2022 to September of 2022, Verzenio was discontinued due to gastroenteritis  ? Patient's cancer is most likely contributing to her hypercoagulable state  ? Appreciate oncology and palliative care consult  ? To continue on lovenox indefinately  ? Continue on Remeron for insomnia and anxiety  ? Patient would not like any more treatment for her malignancy  Family meeting held on 10/04 with palliative care, GI, CM, and IM team  Patient's current clinical course discussed  Discussion of disposition planning occurred  Hospice was discussed  Patient is pending placement to nursing facility for continued rehab, and plans to transition to hospice when she clinically declines

## 2022-10-11 NOTE — PROGRESS NOTES
[facility]  Progress Note Theresa Silverman 1940, 79 y o  female MRN: [id number]  Unit/Bed#: -[place] Encounter: [id number]  Primary Care Provider: Juan Sutton DO   Date and time admitted to hospital: 9/22/2022  3:35 PM    * Saddle embolus of pulmonary artery [facility])  Assessment & Plan  - initially presented with acute respiratory failure and hypoxia  - found to have an acute submassive high-risk pulmonary embolism  History of prior DVT in August in the right lower extremity  Unclear if patient was receiving Xarelto from nursing facility when she was discharged  - patient is hypercoagulable in the setting of recurrent breast cancer    - patient is status post thrombectomy  - continue with weight based Lovenox indefinitely        COVID-19 virus infection  Assessment & Plan  - incidentally found to be positive when being screened for discharge placement, however has since developed oxygen needs and is on 5 L  - previously on remdesivir though discontinued per family request  - continue IV Decadron 6 mg daily for up to 10 days  - wean O2 as tolerated  - weight based Lovenox for anticoagulation  - airborne and contact precautions    Metastatic breast cancer ([facility] [address] )  Assessment & Plan  - Stage IV metastatic ER positive, [place] negative, 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, progressed from stage I A ER/[place] positive, 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 years ago  Status post resection and adjuvant radiation and hormone therapy for 5 years  Patient had symptoms of bony discomfort in her sternum in June of 2021, imaging revealed lytic lesion, biopsy in July of 2021 confirmed progression of disease  ? Patient was started on Faslodex and Xgeva at that time, in conjunction with radiation  ? In April 2022 there was interval development of 4 mm nodule at the right lower lobe and interval development of increased sclerotic lesions throughout the visualized spine  ?  At that time, the patient's treatment was changed to Femara and Kisqali  ? In July of 2022, the patient was changed from Camila to Teresabury because of intolerance  ? During the patient's hospitalization in August of 2022 to September of 2022, Verzenio was discontinued due to gastroenteritis  ? Patient's cancer is most likely contributing to her hypercoagulable state  ? Appreciate oncology and palliative care consult  ? Will continue on Lovenox for now  ? Continue on Remeron for insomnia and anxiety  ? Patient would not like any more treatment for her malignancy    Family meeting held on 10/04 with palliative care, GI, CM, and IM team  Patient's current clinical course discussed  Discussion of disposition planning occurred  Hospice was discussed  Patient is pending placement to nursing facility for continued rehab, and plans to transition to hospice when she clinically declines  Toxic gastroenteritis and colitis  Assessment & Plan  - Patient was admitted with toxic gastroenteritis in August of 2022   Determined to be possible IBD with additional insult of Verzenio from her breast cancer treatment    - continue prednisone taper  - continue vancomycin taper per GI  - started on Remicade 10/1 inpatient, and will be continued on an outpatient basis after discharge      Pressure injury of left buttock, stage 3 ([address] )  Assessment & Plan  - present on admission  - wound care consult    Acute respiratory failure with hypoxia ([address] )  Assessment & Plan  - secondary to COVID infection; see plan above    Urinary retention  Assessment & Plan  - marked urinary retention and bladder distention on admission  - failed voiding trial and urinary catheter replaced  - will plan to discharge with Crawley catheter  - status post empiric 3 day course of IV ceftriaxone    Anemia  Assessment & Plan  - Likely a mixed component of anemia chronic disease with possible folate deficiency  - Continue folate supplementation  - status post 1 unit PRBCs this admission; continue to monitor and transfuse for hemoglobin less than 7 0       Anxiety  Assessment & Plan  - Hold amitriptyline -possible etiology of tachycardia  - Will resume Remeron        VTE Pharmacologic Prophylaxis:   High Risk (Score >/= 5) - Pharmacological DVT Prophylaxis Ordered: enoxaparin (Lovenox)  Sequential Compression Devices Ordered  Patient Centered Rounds: I performed bedside rounds with nursing staff today  Discussions with Specialists or Other Care Team Provider: [facility]     Education and Discussions with Family / Patient: Updated  (daughter) via phone  Time Spent for Care: 30 minutes  More than 50% of total time spent on counseling and coordination of care as described above  Current Length of Stay: 19 day(s)  Current Patient Status: Inpatient   Certification Statement: The patient will continue to require additional inpatient hospital stay due to covid treatment, discharge planning  Discharge Plan: Anticipate discharge in 48-72 hrs to rehab facility  Code Status: Level 3 - DNAR and DNI    Subjective:   Patient seen and examined  Following up for multiple issues as above  Doing well today  No SOB  Denies any chest pain  No increasing oxygen needs  Afebrile  Objective:     Vitals:   Temp (24hrs), Av 2 °F (36 8 °C), Min:97 4 °F (36 3 °C), Max:98 8 °F (37 1 °C)    Temp:  [97 4 °F (36 3 °C)-98 8 °F (37 1 °C)] 97 4 °F (36 3 °C)  HR:  [77-85] 85  BP: (115-121)/(52-58) 120/58  SpO2:  [93 %-97 %] 97 %  Body mass index is 25 41 kg/m²  Input and Output Summary (last 24 hours): Intake/Output Summary (Last 24 hours) at 10/11/2022 1640  Last data filed at 10/11/2022 1500  Gross per 24 hour   Intake 300 ml   Output 550 ml   Net -250 ml       Physical Exam:   PHYSICAL EXAM:    Vitals signs reviewed  Constitutional   Awake and cooperative  NAD  Head/Neck   Normocephalic  Atraumatic  HEENT   No scleral icterus  EOMI  Heart   Regular rate and rhythm  No murmers     Lungs   Clear to auscultation bilaterally  Respirations unlaboured  Abdomen   Soft  Nontender  Nondistended  Skin   Skin color normal  No rashes  Extremities   No deformities  No peripheral edema  Neuro   Alert and oriented  No new deficits  Psych   Mood stable  Affect normal          Additional Data:     Labs:  Results from last 7 days   Lab Units 10/11/22  0442   WBC Thousand/uL 6 35   HEMOGLOBIN g/dL 7 9*   HEMATOCRIT % 26 6*   PLATELETS Thousands/uL 222   NEUTROS PCT % 71   LYMPHS PCT % 18   MONOS PCT % 9   EOS PCT % 0     Results from last 7 days   Lab Units 10/11/22  0442 10/10/22  0614   SODIUM mmol/L 147 146   POTASSIUM mmol/L 3 3* 3 6   CHLORIDE mmol/L 116* 117*   CO2 mmol/L 27 25   BUN mg/dL 17 19   CREATININE mg/dL 0 56* 0 62   ANION GAP mmol/L 4 4   CALCIUM mg/dL 7 2* 6 9*   ALBUMIN g/dL  --  1 3*   TOTAL BILIRUBIN mg/dL  --  0 30   ALK PHOS U/L  --  86   ALT U/L  --  22   AST U/L  --  13   GLUCOSE RANDOM mg/dL 81 83                       Lines/Drains:  Invasive Devices  Report    Peripherally Inserted Central Catheter Line  Duration           PICC Line 09/26/22 15 days          Drain  Duration           Urethral Catheter 14 Fr  3 days              Urinary Catheter:  Goal for removal: N/A- Discharging with Crawley         Central Line:  Goal for removal: Will discontinue when peripheral access obtained  Imaging: No pertinent imaging reviewed      Recent Cultures (last 7 days):   Results from last 7 days   Lab Units 10/08/22  0417   URINE CULTURE  >100,000 cfu/ml Escherichia coli*  >100,000 cfu/ml Klebsiella pneumoniae*  >100,000 cfu/ml Enterobacter cloacae*       Last 24 Hours Medication List:   Current Facility-Administered Medications   Medication Dose Route Frequency Provider Last Rate   • atorvastatin  40 mg Oral After Dinner Christine Hockey, DO     • cholecalciferol  2,000 Units Oral Daily Christine Hockey, DO     • dexamethasone  6 mg Intravenous Q24H Jersey Camacho, DO     • enoxaparin  1 mg/kg Subcutaneous Q12H [address] [place], DO     • folic acid  1 mg Oral Daily [place], DO     • metoprolol  2 5 mg Intravenous Q6H PRN Janeth Benitez MD     • metoprolol succinate  75 mg Oral BID BROOKE Brown     • midodrine  2 5 mg Oral TID AC Janeth Benitez MD     • mirtazapine  7 5 mg Oral HS Jersey Camacho DO     • pantoprazole  40 mg Oral BID AC [place] Ortega DO     • vancomycin  125 mg Oral Q12H [address] Riddhitrinh Rios, DO      Followed by   • [START ON 10/12/2022] vancomycin  125 mg Oral Daily Riddhi Blanca,           Today, Patient Was Seen By: Lashae Chung    **Please Note: This note may have been constructed using a voice recognition system  **

## 2022-10-11 NOTE — QUICK NOTE
Quick note:  - Pt with active COVID infection, pending placement to nursing home  Pt has elected to not have any more treatment for her metastatic cancer, which is appropriate given her declining performance status  - We will cancel all outpatient follow up appts with Oncology  - Hematology/Oncology will sign off at this point  If you have any questions or concerns in the future, please do not hesitate to reach out to us       Lindy Garcia, [id number]  Hematology/Oncology Nurse Practitioner

## 2022-10-11 NOTE — ASSESSMENT & PLAN NOTE
Patient presented with a massively distended bladder, 3 6 L out of for straight cath, greater than 800 mL out of 2nd straight cath  ? Urinary retention protocol, patient will likely need Crawley catheter  ? May be secondary to TCA use  ? UA with 1+ protein, innumerable rbc's, 4-10 wbc's  ? Urology consulted, appreciate recommendations  ? Discontinued urinary catheter 10/04 and initiate urinary retention protocol  ? Patient unfortunately appears to be having urinary retention may need Crawley catheter placed back today on 10/8  ?  Patient currently without any urinary symptoms otherwise, noted urine culture with multiple organisms, patient received 3 day treatment of ceftriaxone empirically will not treat further at this time and monitor off antibiotics

## 2022-10-11 NOTE — ASSESSMENT & PLAN NOTE
- initially presented with acute respiratory failure and hypoxia  - found to have an acute submassive high-risk pulmonary embolism  History of prior DVT in August in the right lower extremity  Unclear if patient was receiving Xarelto from nursing facility when she was discharged    - patient is hypercoagulable in the setting of recurrent breast cancer and active IBD  - status post thrombectomy and IVC filter placement  - continue with weight based Lovenox indefinitely

## 2022-10-11 NOTE — ASSESSMENT & PLAN NOTE
REYES on CKD stage IIIA  ? Patient's baseline creatinine between 0 8 and 0 9 with an EGFR ranging around 60  ? Admission creatinine 1 36, down to 0 5  ? Patient did receive IV contrast for CTA  ? Accurate in's and out's  ? Avoid nephrotoxins  ?  Cr stable this morning at 0 63

## 2022-10-11 NOTE — ASSESSMENT & PLAN NOTE
- Likely a mixed component of anemia chronic disease with possible folate deficiency  - Continue folate supplementation  - status post 1 unit PRBCs this admission; continue to monitor and transfuse for hemoglobin less than 7 0

## 2022-10-11 NOTE — ASSESSMENT & PLAN NOTE
- Stage IV metastatic ER positive, [place] negative, 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, progressed from stage I A ER/[place] positive, 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 years ago  Status post resection and adjuvant radiation and hormone therapy for 5 years  Patient had symptoms of bony discomfort in her sternum in June of 2021, imaging revealed lytic lesion, biopsy in July of 2021 confirmed progression of disease  ? Patient was started on Faslodex and Xgeva at that time, in conjunction with radiation  ? In April 2022 there was interval development of 4 mm nodule at the right lower lobe and interval development of increased sclerotic lesions throughout the visualized spine  ? At that time, the patient's treatment was changed to Femara and Taffy Snow  ? In July of 2022, the patient was changed from Taffy Snow to Teresabury because of intolerance  ? During the patient's hospitalization in August of 2022 to September of 2022, Verzenio was discontinued due to gastroenteritis  ? Patient's cancer is most likely contributing to her hypercoagulable state  ? Appreciate oncology and palliative care consult  ? Will continue on Lovenox for now  ? Continue on Remeron for insomnia and anxiety  ? Patient would not like any more treatment for her malignancy    Family meeting held on 10/04 with palliative care, GI, CM, and IM team  Patient's current clinical course discussed  Discussion of disposition planning occurred  Hospice was discussed  Patient is pending placement to nursing facility for continued rehab, and plans to transition to hospice when she clinically declines

## 2022-10-11 NOTE — ASSESSMENT & PLAN NOTE
- incidentally found to be positive when being screened for discharge placement, however has since developed oxygen needs and is on 5 L  - previously on remdesivir though discontinued per family request  - continue IV Decadron 6 mg daily for up to 10 days  - wean O2 as tolerated  - weight based Lovenox for anticoagulation  - airborne and contact precautions

## 2022-10-11 NOTE — OCCUPATIONAL THERAPY NOTE
Occupational Therapy Re-Evaluation     Patient Name: Ethel Silverman  AFUMI'BOB Date: 10/11/2022  Problem List  Principal Problem:    Saddle embolus of pulmonary artery (HCC)  Active Problems:    Metastatic breast cancer ([facility] )    Anxiety    Mixed hyperlipidemia    Primary hypertension    Toxic gastroenteritis and colitis    REYES (acute kidney injury) ([facility] )    Anemia    Tachycardia    Urinary retention    Swelling of lower extremity    Acute respiratory failure ([facility] )    Past Medical History  Past Medical History:   Diagnosis Date    Abnormal weight loss     Allergic reaction     Anxiety     Breast cancer, right ([facility] ) 2011    right    Cancer ([facility] ) 2012    Candidal vulvovaginitis     Clotting disorder ([facility] ) Same as above    Endometrial hyperplasia     Epithelial cyst     benign, ovary    GI (gastrointestinal bleed) Blood mixed with stool    History of radiation therapy 2011    right breast cancer    Hyperlipidemia     Hypertension     Internal hemorrhoids     Knee tendonitis     Ovarian cyst     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[facility])      Past Surgical History  Past Surgical History:   Procedure Laterality Date    BILATERAL SALPINGOOPHORECTOMY      onset: 7/23/13    BREAST BIOPSY Right 06/13/2006    benign    BREAST BIOPSY Right 03/02/2011    malignant    BREAST LUMPECTOMY Right 03/30/2011    malignant    CATARACT EXTRACTION W/  INTRAOCULAR LENS IMPLANT Right     phacoemulsification   Onset: 10/27/14    CHOLECYSTECTOMY      COLONOSCOPY  2017    approx    HYSTERECTOMY  Yes    INTRAOPERATIVE RADIATION THERAPY (IORT)      IR BIOPSY BONE  7/9/2021    IR IVC FILTER PLACEMENT OPTIONAL/TEMPORARY  9/23/2022    IR PE ENDOVASCULAR THERAPY  9/22/2022    IR PICC PLACEMENT SINGLE LUMEN  8/19/2022    IR PICC PLACEMENT SINGLE LUMEN  9/26/2022    SKIN LESION EXCISION Right     breast, single lesion    TONSILLECTOMY AND ADENOIDECTOMY           10/11/22 0930   OT Last Visit   OT Visit Date 10/11/22   Pain Assessment   Pain Assessment Tool 0-10 Pain Score No Pain   Hospital Pain Intervention(s) Repositioned; Ambulation/increased activity; Emotional support   Restrictions/Precautions   Weight Bearing Precautions Per Order No   Other Precautions Contact/isolation; Airborne/isolation;Droplet precautions;Multiple lines;Telemetry;O2;Fall Risk   Prior Function   Level of [place] Independent with ADLs; Independent with functional mobility   Lives With Alone   Receives Help From Family   IADLs Independent with meal prep   Falls in the last 6 months 1 to 4  (1)   Vocational Retired   [address] Pt reports being I with ADLS, IADLS and mobility without device PTA  (+)    Reciprocal Relationships Pt lives alone but reports that she has supportive family able to assist   Service to Others Retired   [address] Enjoys staying active   Subjective   Subjective "Please don't let me fall!"   ADL   Where Assessed Edge of bed   Eating Assistance 5  Supervision/Setup   Grooming Assistance 5  Supervision/Setup   UB Bathing Assistance 3  Moderate [address] 2  Maximal Assistance   [address] 3  Moderate Assistance   LB Dressing Assistance 2  Maximal [address] [place] [address]  1  Total Assistance   Toileting Deficit Setup;Steadying;Verbal cueing;Supervison/safety; Increased time to complete;Clothing management up;Clothing management down;Perineal hygiene   Functional Assistance 3  Moderate Assistance   Bed Mobility   Rolling R 3  Moderate assistance   Additional items Assist x 1;Bedrails; Increased time required;Verbal cues;LE management   Rolling L 3  Moderate assistance   Additional items Assist x 1; Increased time required;Verbal cues;LE management; Bedrails   Supine to Sit 3  Moderate assistance   Additional items Assist x 2;HOB elevated; Bedrails; Increased time required;Verbal cues;LE management   Sit to Supine 3  Moderate assistance   Additional items Assist x 2;Bedrails;HOB elevated; Increased time required;Verbal cues;LE management   Additional Comments Pt performed rolling R <> L w/ Mod A for LE management; pt performed supine <> sit with Mod A x2 for UB postural support and LE management and to scoot EOB; @ end of session, pt left lying supine in bed w/ all functional needs in reach   Transfers   Sit to Stand 3  Moderate assistance   Additional items Assist x 2;HOB elevated; Increased time required;Verbal cues   Stand to Sit 3  Moderate assistance   Additional items Assist x 2; Increased time required;Verbal cues   Stand pivot 3  Moderate assistance   Additional items Assist x 2; Increased time required;Verbal cues   Additional Comments Pt performed all functional transfers w/ Mod A x2 w/ RW for safety and support with verbal cues for proper technique and safety t/o   Functional Mobility   Functional Mobility 3  Moderate assistance   Additional Comments Pt completed short functional mobility from EOB <> recliner with Mod A x2 w/ RW for safety and support; increased verbal encouragement provided t/o as pt appears to be axnious during functional mobility   Additional items Rolling walker   Balance   Static Sitting Fair   Dynamic Sitting Fair -   Static Standing Poor +   Dynamic Standing Poor   Ambulatory Poor   Activity Tolerance   Activity Tolerance Patient limited by fatigue   Medical Staff Made Aware DPT [place]   Nurse Made Aware RN cleared for OT re-eval   Hand Function   Gross Motor Coordination Functional   Fine Motor Coordination Functional   Proprioception   Proprioception No apparent deficits   Vision - Complex Assessment   Ocular Range of Motion Intact   Tracking Intact   Psychosocial   Psychosocial (WDL) WDL   Perception   Inattention/Neglect Appears intact   Cognition   Overall Cognitive Status WFL   Arousal/Participation Alert; Responsive;Arousable; Cooperative   Attention Within functional limits   Orientation Level Oriented X4   Memory Within functional limits   Following Commands Follows one step commands without difficulty   Comments Pt pleasant and cooperative; @ times, increased emotional support and encouragement provided as pt appears to be anxious during functional mobility   Assessment   Limitation Decreased ADL status; Decreased UE strength;Decreased endurance;Decreased self-care trans;Decreased high-level ADLs   Prognosis Fair   Assessment Pt is a 80 yo female seen for OT re-eval on 10/11/2022 s/p expiration of OT goals  Pt originally presented to B w/ saddle embolus of pulmonary artery s/p embolectomy and IVC filter placement on 9 23 2022  Pt seen for co-eval with PT to increase safety, decrease fall risk, and maximize functional/occupational performance 2* medical complexity which is a regression from pt's functional baseline  Compared to the initial OT evaluation, pt is now performing at a level of: supine <> sit w/ Mod A x2, rolling R <> L in bed w/ Mod A x1, functional transfers w/ Mod A x2 w/ RW, functional mobility w/ Mod A x2 w/ RW, UB ADLs w/ Mod A, and LB ADLs w/ Max A  Pt continues to be functioning below baseline and is still limited by the following limitations/impairments which impact the pt's ability to engage in valued occupations: balance, endurance, strength, activity tolerance/endurance, pain, and safety awareness  From an OT standpoint, recommend discharge to post-acute rehab medically stable  The patient's raw score on the AM-PAC Daily Activity inpatient short form is 15, standardized score is 34 69, less than 39 4  Patients at this level are likely to benefit from discharge to post-acute rehabilitation services  Please refer to the recommendation of the Occupational Therapist for safe discharge planning  Pt would benefit from skilled OT services 3-5/wk to address immediate acute care needs and underlying performance skills to promote safety, decrease fall risk, and enhance occupational performance to return to PLOF   Goals to be met within the next 10-14 days  Goals   Patient Goals To get stronger   LTG Time Frame 10-14   Long Term Goal #1 See OT goals listed below   Plan   Treatment Interventions ADL retraining;Functional transfer training;UE strengthening/ROM; Endurance training;Patient/family training;Equipment evaluation/education; Compensatory technique education;Continued evaluation; Energy conservation; Activityengagement   Goal Expiration Date 10/25/22   OT Treatment Day 1   OT Frequency 3-5x/wk   Recommendation   OT Discharge Recommendation Post acute rehabilitation services   AM-PAC Daily Activity Inpatient   Lower Body Dressing 2   Bathing 2   Toileting 2   Upper Body Dressing 2   Grooming 3   Eating 4   Daily Activity Raw Score 15   Daily Activity Standardized Score (Calc for Raw Score >=11) 34 69   AM-PAC Applied Cognition Inpatient   Following a Speech/Presentation 4   Understanding Ordinary Conversation 4   Taking Medications 4   Remembering Where Things Are Placed or Put Away 4   Remembering List of 4-5 Errands 4   Taking Care of Complicated Tasks 4   Applied Cognition Raw Score 24   Applied Cognition Standardized Score 62 21       OT GOALS:    Pt will improve functional mobility during ADL/IADL/leisure tasks with supervision using AE/DME prn  Pt will improve activity tolerance/functional endurance during ADL/IADL/leisure tasks for at least 20 minutes to improve occupational performance and engagement in valued occupations using AE/DME prn  Pt will engage in ongoing functional/formal cognitive assessments to assist with safe d/c planning and increase safety during functional tasks  Pt will participate in simulated IADL management task w/ Min A to increase independence and engagement in valued occupations w/ good balance/safety  Pt will improve static/dynamic sitting balance for at least 15 minutes with Mod I during functional tasks to improve independence and engagement in ADL/IADL/leisure activities      Pt will improve dynamic standing balance for at least 10 minutes with supervision during functional tasks to improve independence and engagement in ADL/IADL/leisure activities  Pt will follow 100% of multi-step commands in ADL/IADL/leisure activities to improve functional cognition used in functional daily routines  Pt will complete functional transfers on and off all surfaces used in daily routines with supervision for safety to maximize functional/occupational performance  Pt will complete all bed mobility tasks with Mod I to serve as a prerequisite for EOB/OOB ADL/IADL/leisure tasks, optimize positioning/comfort, and increase functional independence  Pt will independently demonstrate good carryover of safety precautions and pt/caregiver education/training during ADL/IADL/leisure tasks with energy conservation techniques s/p skilled instruction without verbal cues  Pt will increase UE/LE MMT strength by 1/2 grade to improve bilateral coordination in ADL/IADL/leisure tasks with S  Pt will complete UB ADL tasks with Mod I using AE/DME prn to increase functional independence in ADL/IADL/leisure tasks  Pt will complete LB ADL tasks with supervision using AE/DME prn to increase functional independence in ADL/IADL/leisure tasks  Pt will complete toileting tasks with S and good hygiene/thoroughness using AE/DME prn to increase functional independence  Pt will independently identify and utilize 2-3 positive coping strategies to enhance overall wellbeing and engagement in valued occupations      Herberth Pérez, MS, OTR/L

## 2022-10-11 NOTE — ASSESSMENT & PLAN NOTE
- Hold amitriptyline; discontinued as it was thought it was contributing to her tachycardia  - continue Remeron

## 2022-10-11 NOTE — PLAN OF CARE
Problem: PHYSICAL THERAPY ADULT  Goal: Performs mobility at highest level of function for planned discharge setting  See evaluation for individualized goals  Description: Treatment/Interventions: Functional transfer training, LE strengthening/ROM, Therapeutic exercise, Endurance training, Bed mobility, Gait training, Spoke to nursing, OT  Equipment Recommended:  (r/o rw)       See flowsheet documentation for full assessment, interventions and recommendations  Outcome: Progressing  Note: Prognosis: Fair  Problem List: Decreased strength, Decreased endurance, Impaired balance, Decreased mobility  Assessment: Functional mobility training performed at bedside consisting of bed mob and transfers training/limited amb trials w/ rw and (A)x2; pt seems to exhibit improved standing balance/tolerance w/ increased LE control in standing during a few steps amb/transitions; rest periods provided as needed and pt remained in NAD  Overall, cont to recommend rehab upon D/C when medically cleared; will follow  Barriers to Discharge: Decreased caregiver support     PT Discharge Recommendation: Post acute rehabilitation services    See flowsheet documentation for full assessment

## 2022-10-11 NOTE — ASSESSMENT & PLAN NOTE
- initially presented with acute respiratory failure and hypoxia  - found to have an acute submassive high-risk pulmonary embolism  History of prior DVT in August in the right lower extremity  Unclear if patient was receiving Xarelto from nursing facility when she was discharged    - patient is hypercoagulable in the setting of recurrent breast cancer    - patient is status post thrombectomy  - continue with weight based Lovenox indefinitely

## 2022-10-11 NOTE — PLAN OF CARE
Problem: OCCUPATIONAL THERAPY ADULT  Goal: Performs self-care activities at highest level of function for planned discharge setting  See evaluation for individualized goals  Description: Treatment Interventions: ADL retraining, UE strengthening/ROM, Functional transfer training, Endurance training, Patient/family training, Compensatory technique education, Continued evaluation, Energy conservation, Activityengagement          See flowsheet documentation for full assessment, interventions and recommendations  Outcome: Progressing  Note: Limitation: Decreased ADL status, Decreased UE strength, Decreased endurance, Decreased self-care trans, Decreased high-level ADLs  Prognosis: Fair  Assessment: Pt is a 80 yo female seen for OT re-eval on 10/11/2022 s/p expiration of OT goals  Pt originally presented to B w/ saddle embolus of pulmonary artery s/p embolectomy and IVC filter placement on 9/23/2022  Pt seen for co-eval with PT to increase safety, decrease fall risk, and maximize functional/occupational performance 2* medical complexity which is a regression from pt's functional baseline  Compared to the initial OT evaluation, pt is now performing at a level of: supine <> sit w/ Mod A x2, rolling R <> L in bed w/ Mod A x1, functional transfers w/ Mod A x2 w/ RW, functional mobility w/ Mod A x2 w/ RW, UB ADLs w/ Mod A, and LB ADLs w/ Max A  Pt continues to be functioning below baseline and is still limited by the following limitations/impairments which impact the pt's ability to engage in valued occupations: balance, endurance, strength, activity tolerance/endurance, pain, and safety awareness  From an OT standpoint, recommend discharge to post-acute rehab medically stable  The patient's raw score on the AM-PAC Daily Activity inpatient short form is 15, standardized score is 34 69, less than 39 4  Patients at this level are likely to benefit from discharge to post-acute rehabilitation services   Please refer to the recommendation of the Occupational Therapist for safe discharge planning  Pt would benefit from skilled OT services 3-5/wk to address immediate acute care needs and underlying performance skills to promote safety, decrease fall risk, and enhance occupational performance to return to PLOF  Goals to be met within the next 10-14 days       OT Discharge Recommendation: Post acute rehabilitation services

## 2022-10-11 NOTE — ASSESSMENT & PLAN NOTE
- marked urinary retention and bladder distention on admission  - failed voiding trial and urinary catheter replaced  - will plan to discharge with [place] catheter  - status post empiric 3 day course of IV ceftriaxone

## 2022-10-11 NOTE — PROGRESS NOTES
[address] [facility]  Progress Note Abimbola Silverman 1940, 79 y o  female MRN: [id number]  Unit/Bed#: -[place] Encounter: [id number]  Primary Care Provider: Josias Hart,    Date and time admitted to hospital: 9/22/2022  3:35 PM    Acute respiratory failure [facility])  Assessment & Plan  Patient noted to have worsening respiratory failure up to 5 L nasal cannula tested positive for COVID  In the setting of diarrhea and unvaccinated status  Patient's family prefers not to use remdesivir, will respect her wishes and stop the medication  Will continue steroids    Swelling of lower extremity  Assessment & Plan  LE swelling has been worsening over the past few weeks  Right worse than left  ? 3+ on both lower extremities, improving along with pulses  ? Previous echo with a hyperdynamic LV EF 70%  ? Lower extremity duplex revealing significant acute DVTs in the right lower extremity  ? Urinary retention  Assessment & Plan  Patient presented with a massively distended bladder, 3 6 L out of for straight cath, greater than 800 mL out of 2nd straight cath  ? Urinary retention protocol, patient will likely need Crawley catheter  ? May be secondary to TCA use  ? UA with 1+ protein, innumerable rbc's, 4-10 wbc's  ? Urology consulted, appreciate recommendations  ? Discontinued urinary catheter 10/04 and initiate urinary retention protocol  ? Patient unfortunately appears to be having urinary retention may need Crawley catheter placed back today on 10/8  ? Patient currently without any urinary symptoms otherwise, noted urine culture with multiple organisms, patient received 3 day treatment of ceftriaxone empirically will not treat further at this time and monitor off antibiotics    Tachycardia  Assessment & Plan  · Patient had sinus tachycardia on her last discharge, likely in setting of pulmonary embolism  Still with sinus tachycardia on this admission  ?  Continue midodrine for blood pressure support  ? Tachycardia improved over the course of this week will continue on current beta-blocker dose      Anemia  Assessment & Plan  Macrocytic anemia complicated by acute blood loss anemia  ? Folate in September of 2022 3 3  ? Vitamin B12 in September of 2022 645  ? Iron panel and August of 2022 showed an iron saturation of 18, TIBC 105, iron 19, ferritin 755  ? Likely a mixed component of anemia chronic disease with possible folate deficiency  ? Continue folate supplementation  ? Patient received 1 unit packed red blood cell for hemoglobin 6 9 -> 7 4 -> 7 9---8 8--->8 0 this AM       REYES (acute kidney injury) ([facility] [address] )  Assessment & Plan  REYES on CKD stage IIIA  ? Patient's baseline creatinine between 0 8 and 0 9 with an EGFR ranging around 60  ? Admission creatinine 1 36, down to 0 5  ? Patient did receive IV contrast for CTA  ? Accurate in's and out's  ? Avoid nephrotoxins  ? Cr stable this morning at 0 63      Toxic gastroenteritis and colitis  Assessment & Plan  Patient was admitted with toxic gastroenteritis in August of 2022  ? Determined to be possible IBD with additional insult of Verzenio from her breast cancer treatment  ? Continue to hold Verzenio    ? Patient positive for C diff started on oral vancomycin D-3, diarrhea improving but persisting  ? Will wean prednisone slowly as she has been on this chronically- now down to 5 mg  ? Will stop loperamide, cholestyramine n all stool softeners  ? GI offered remicaid to the daughter to Rx IBD - she is going to think about it  ? Spoke to GI about possibility of increasing Vanc dosing today  Also about concerns for IBD  Diarrhea slowly improving  Completed 10 day of PO cohen  ? Spoke to GI team regarding prolonged course of PO vancomycin  Will continue at BID dosing for 1 week followed by daily dosing for an additional week  ? Patient to follow-up with GI as outpatient for further IBD evaluation and decision on medications     ? Patient received Remicade on 10/01  Plan to continue as outpatent  ? Patient with rectal tube in place  Continued loose BMs, though patient states diarrhea is subjectively improving  Patient also with sacral wound  ? Montior stool output to assess progress  ? Rectal tube discontinued  ? Will proceed with disposition planning  ? Will continue on vancomycin taper per GI    Primary hypertension  Assessment & Plan  Patient's antihypertensives were discontinued after previous hospital stay due to normotension  · Continue to monitor  · Will continue midodrine for blood pressure support    Mixed hyperlipidemia  Assessment & Plan  Stable  · Continue statin    Anxiety  Assessment & Plan  Depression/anxiety  ? Hold amitriptyline -possible etiology of tachycardia  ? Will resume Remeron      Metastatic breast cancer [facility])  Assessment & Plan  Stage IV metastatic ER positive, NH negative, 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, progressed from stage I A ER/NH positive, 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 years ago  Status post resection and adjuvant radiation and hormone therapy for 5 years  Patient had symptoms of bony discomfort in her sternum in June of 2021, imaging revealed lytic lesion, biopsy in July of 2021 confirmed progression of disease  ? Patient was started on Faslodex and Xgeva at that time, in conjunction with radiation  ? In April 2022 there was interval development of 4 mm nodule at the right lower lobe and interval development of increased sclerotic lesions throughout the visualized spine  ? At that time, the patient's treatment was changed to Femara and Milagro Yost  ? In July of 2022, the patient was changed from Milagro Yost to Teresabury because of intolerance  ? During the patient's hospitalization in August of 2022 to September of 2022, Verzenio was discontinued due to gastroenteritis  ? Patient's cancer is most likely contributing to her hypercoagulable state  ? Appreciate oncology and palliative care consult  ?  Will continue on Lovenox for now  ? Continue on Remeron for insomnia and anxiety  ? Patient would not like any more treatment for her malignancy    Family meeting held on 10/04 with palliative care, GI, CM, and IM team  Patient's current clinical course discussed  Discussion of disposition planning occurred  Hospice was discussed  Patient currently pending placement to nursing facility  * Saddle embolus of pulmonary artery (HCC)  Assessment & Plan  Acute, submassive, intermediate to high risk pulmonary embolism  Patient has history of right popliteal DVT and right lower lobe subsegmental PE from previous hospitalization in August   ? Patient was sent home on Xarelto, likely not failure, was receiving it at her nursing home  ? Patient also has metastatic breast cancer, hypercoagulable state  ? Return to the ER on 09/22 after a fall, found to have saddle pulmonary embolus with evidence of right heart strain and high clot burden  ? RV to LV ratio on CTA 1 2  ? BMP greater than 1500, troponins relatively normal  ? TTE inconclusive for right heart strain  ? Patient is now status post thrombectomy with IR, no IVC filter placement  ? Duplex showing significant right lower extremity clot burden  ? Continue on Lovenox for pulmonary embolism  ? Patient respiratory status stable          VTE Pharmacologic Prophylaxis:   Moderate Risk (Score 3-4) - Pharmacological DVT Prophylaxis Ordered: enoxaparin (Lovenox)  Patient Centered Rounds: I performed bedside rounds with nursing staff today  Discussions with Specialists or Other Care Team Provider: n/a    Education and Discussions with Family / Patient: Updated  (daughter) via phone  Time Spent for Care: 30 minutes  More than 50% of total time spent on counseling and coordination of care as described above      Current Length of Stay: 18 day(s)  Current Patient Status: Inpatient   Certification Statement: The patient will continue to require additional inpatient hospital stay due to Pending placement  Discharge Plan: Anticipate discharge in >72 hrs to rehab facility  Code Status: Level 3 - DNAR and DNI    Subjective:   Patient denies any acute complaints    Objective:     Vitals:   Temp (24hrs), Av 7 °F (36 5 °C), Min:97 3 °F (36 3 °C), Max:98 1 °F (36 7 °C)    Temp:  [97 3 °F (36 3 °C)-98 1 °F (36 7 °C)] 97 8 °F (36 6 °C)  HR:  [78-87] 78  Resp:  [16-20] 16  BP: (117-127)/(52-61) 117/52  SpO2:  [87 %-98 %] 97 %  Body mass index is 25 12 kg/m²  Input and Output Summary (last 24 hours): Intake/Output Summary (Last 24 hours) at 10/10/2022 2012  Last data filed at 10/10/2022 0615  Gross per 24 hour   Intake 0 ml   Output 525 ml   Net -525 ml       Physical Exam:   Physical Exam  Vitals and nursing note reviewed  Constitutional:       Appearance: She is well-developed  She is ill-appearing  She is not toxic-appearing or diaphoretic  Comments: Chronically ill-appearing female   HENT:      Head: Normocephalic and atraumatic  Eyes:      General: No scleral icterus  Conjunctiva/sclera: Conjunctivae normal    Cardiovascular:      Rate and Rhythm: Normal rate and regular rhythm  Heart sounds: No murmur heard  No friction rub  No gallop  Pulmonary:      Effort: Pulmonary effort is normal  No respiratory distress  Breath sounds: Normal breath sounds  No stridor  No wheezing, rhonchi or rales  Comments: 5 L nasal cannula  Chest:      Chest wall: No tenderness  Abdominal:      General: There is no distension  Palpations: Abdomen is soft  There is no mass  Tenderness: There is no abdominal tenderness  There is no guarding or rebound  Hernia: No hernia is present  Musculoskeletal:         General: No swelling, tenderness, deformity or signs of injury  Cervical back: Neck supple  Skin:     General: Skin is warm and dry  Coloration: Skin is pale  Neurological:      Mental Status: She is alert and oriented to person, place, and time  Additional Data:     Labs:  Results from last 7 days   Lab Units 10/10/22  0614 10/05/22  0528 10/04/22  0452   WBC Thousand/uL 6 55   < > 3 82*   HEMOGLOBIN g/dL 8 0*   < > 8 4*   HEMATOCRIT % 26 6*   < > 26 8*   PLATELETS Thousands/uL 181   < > 157   BANDS PCT %  --   --  1   NEUTROS PCT % 77*   < >  --    LYMPHS PCT % 15   < >  --    LYMPHO PCT %  --   --  6*   MONOS PCT % 7   < >  --    MONO PCT %  --   --  7   EOS PCT % 0   < > 0    < > = values in this interval not displayed  Results from last 7 days   Lab Units 10/10/22  0614   SODIUM mmol/L 146   POTASSIUM mmol/L 3 6   CHLORIDE mmol/L 117*   CO2 mmol/L 25   BUN mg/dL 19   CREATININE mg/dL 0 62   ANION GAP mmol/L 4   CALCIUM mg/dL 6 9*   ALBUMIN g/dL 1 3*   TOTAL BILIRUBIN mg/dL 0 30   ALK PHOS U/L 86   ALT U/L 22   AST U/L 13   GLUCOSE RANDOM mg/dL 83                       Lines/Drains:  Invasive Devices  Report    Peripherally Inserted Central Catheter Line  Duration           PICC Line 09/26/22 14 days          Drain  Duration           Urethral Catheter 14 Fr  2 days              Urinary Catheter:  Goal for removal: N/A- Discharging with Crawley         Central Line:  Goal for removal: N/A - Chronic PICC             Imaging: No pertinent imaging reviewed      Recent Cultures (last 7 days):   Results from last 7 days   Lab Units 10/08/22  0417   URINE CULTURE  >100,000 cfu/ml Enterobacter cloacae*  >100,000 cfu/ml Escherichia coli*  >100,000 cfu/ml Klebsiella pneumoniae*       Last 24 Hours Medication List:   Current Facility-Administered Medications   Medication Dose Route Frequency Provider Last Rate   • atorvastatin  40 mg Oral After [address], DO     • cholecalciferol  2,000 Units Oral Daily [place] Sarna, DO     • dexamethasone  6 mg Intravenous Q24H Jersey Camacho, DO     • enoxaparin  1 mg/kg Subcutaneous Q12H [address] Allison Sarna, DO     • folic acid  1 mg Oral Daily Allison Sarna, DO     • metoprolol 2 5 mg Intravenous Q6H PRN Beau Zamora MD     • metoprolol succinate  75 mg Oral BID BROOKE Yadav     • midodrine  2 5 mg Oral TID AC Beau Zamora MD     • mirtazapine  7 5 mg Oral HS Jersey Camacho DO     • pantoprazole  40 mg Oral BID AC Juan Edward, DO     • vancomycin  125 mg Oral Q12H [facility] Jerel Robb DO      Followed by   • [START ON 10/12/2022] vancomycin  125 mg Oral Daily Jerel Robb DO          Today, Patient Was Seen By: [facility]    **Please Note: This note may have been constructed using a voice recognition system  **

## 2022-10-12 PROBLEM — T46.4X5A COUGH DUE TO ACE INHIBITOR: Status: RESOLVED | Noted: 2021-06-22 | Resolved: 2022-10-12

## 2022-10-12 PROBLEM — R05.8 COUGH DUE TO ACE INHIBITOR: Status: RESOLVED | Noted: 2021-06-22 | Resolved: 2022-10-12

## 2022-10-12 LAB
ANION GAP SERPL CALCULATED.3IONS-SCNC: 2 MMOL/L (ref 4–13)
BACTERIA UR CULT: ABNORMAL
BUN SERPL-MCNC: 12 MG/DL (ref 5–25)
CALCIUM SERPL-MCNC: 7.3 MG/DL (ref 8.3–10.1)
CHLORIDE SERPL-SCNC: 117 MMOL/L (ref 96–108)
CO2 SERPL-SCNC: 27 MMOL/L (ref 21–32)
CREAT SERPL-MCNC: 0.53 MG/DL (ref 0.6–1.3)
GFR SERPL CREATININE-BSD FRML MDRD: 88 ML/MIN/1.73SQ M
GLUCOSE SERPL-MCNC: 75 MG/DL (ref 65–140)
POTASSIUM SERPL-SCNC: 3.2 MMOL/L (ref 3.5–5.3)
SODIUM SERPL-SCNC: 146 MMOL/L (ref 135–147)

## 2022-10-12 PROCEDURE — 99232 SBSQ HOSP IP/OBS MODERATE 35: CPT | Performed by: INTERNAL MEDICINE

## 2022-10-12 PROCEDURE — 80048 BASIC METABOLIC PNL TOTAL CA: CPT | Performed by: INTERNAL MEDICINE

## 2022-10-12 RX ORDER — POTASSIUM CHLORIDE 20MEQ/15ML
40 LIQUID (ML) ORAL ONCE
Status: COMPLETED | OUTPATIENT
Start: 2022-10-12 | End: 2022-10-12

## 2022-10-12 RX ORDER — MICONAZOLE NITRATE 20 MG/G
CREAM TOPICAL 2 TIMES DAILY
Status: DISCONTINUED | OUTPATIENT
Start: 2022-10-12 | End: 2022-10-20 | Stop reason: HOSPADM

## 2022-10-12 RX ADMIN — PANTOPRAZOLE SODIUM 40 MG: 40 TABLET, DELAYED RELEASE ORAL at 05:56

## 2022-10-12 RX ADMIN — MICONAZOLE NITRATE 1 APPLICATION: 20 CREAM TOPICAL at 12:45

## 2022-10-12 RX ADMIN — ATORVASTATIN CALCIUM 40 MG: 40 TABLET, FILM COATED ORAL at 18:12

## 2022-10-12 RX ADMIN — METOPROLOL SUCCINATE 75 MG: 50 TABLET, EXTENDED RELEASE ORAL at 09:26

## 2022-10-12 RX ADMIN — MICONAZOLE NITRATE 1 APPLICATION: 20 CREAM TOPICAL at 18:05

## 2022-10-12 RX ADMIN — FOLIC ACID 1 MG: 1 TABLET ORAL at 09:26

## 2022-10-12 RX ADMIN — Medication 125 MG: at 09:41

## 2022-10-12 RX ADMIN — CARBIDOPA AND LEVODOPA 2.5 MG: 50; 200 TABLET, EXTENDED RELEASE ORAL at 11:54

## 2022-10-12 RX ADMIN — CARBIDOPA AND LEVODOPA 2.5 MG: 50; 200 TABLET, EXTENDED RELEASE ORAL at 15:18

## 2022-10-12 RX ADMIN — PANTOPRAZOLE SODIUM 40 MG: 40 TABLET, DELAYED RELEASE ORAL at 15:18

## 2022-10-12 RX ADMIN — ALTEPLASE 2 MG: 2.2 INJECTION, POWDER, LYOPHILIZED, FOR SOLUTION INTRAVENOUS at 15:15

## 2022-10-12 RX ADMIN — ENOXAPARIN SODIUM 70 MG: 80 INJECTION SUBCUTANEOUS at 09:35

## 2022-10-12 RX ADMIN — Medication 2000 UNITS: at 09:26

## 2022-10-12 RX ADMIN — POTASSIUM CHLORIDE 40 MEQ: 20 SOLUTION ORAL at 09:35

## 2022-10-12 RX ADMIN — METOPROLOL SUCCINATE 75 MG: 50 TABLET, EXTENDED RELEASE ORAL at 21:50

## 2022-10-12 RX ADMIN — ENOXAPARIN SODIUM 70 MG: 80 INJECTION SUBCUTANEOUS at 21:50

## 2022-10-12 RX ADMIN — CARBIDOPA AND LEVODOPA 2.5 MG: 50; 200 TABLET, EXTENDED RELEASE ORAL at 05:56

## 2022-10-12 RX ADMIN — MIRTAZAPINE 7.5 MG: 15 TABLET, FILM COATED ORAL at 21:50

## 2022-10-12 NOTE — TELEPHONE ENCOUNTER
Patients daughter Jimbo Ferguson returned my phone call  She stated she did not mean to cancel the remicade, however at this time she feels she would like her mom to hold off on proceeding with further infusions and would prefer not to schedule them at this time  Patient was diagnosed with Covid and daughter can not get in to see patient for 10 days and she is upset over this  She would like to wait until patient is discharged and into a rehab before readdressing the infusions  I made her aware I would send a message to Dr Brito to make him aware and that we would continue to monitor patients chart to see when she is discharged and can follow up with daughter at that time  She was appreciative of the call and will reach out if she has any questions  Dr Brito- just to make you aware that patients daughter would like to hold off on scheduling or having patient receive any further Remicade infusions at this time       Thank you

## 2022-10-12 NOTE — PROGRESS NOTES
[address] [facility]  Progress Note Rosalind Silverman 1940, 79 y o  female MRN: [id number]  Unit/Bed#: -[place] Encounter: [id number]  Primary Care Provider: Opal Cedeño DO   Date and time admitted to hospital: 9/22/2022  3:35 PM    * Saddle embolus of pulmonary artery [facility])  Assessment & Plan  - initially presented with acute respiratory failure and hypoxia  - found to have an acute submassive high-risk pulmonary embolism  History of prior DVT in August in the right lower extremity  Unclear if patient was receiving Xarelto from nursing facility when she was discharged  - patient is hypercoagulable in the setting of recurrent breast cancer    - patient is status post thrombectomy  - continue with weight based Lovenox indefinitely        COVID-19 virus infection  Assessment & Plan  - incidentally found to be positive when being screened for discharge placement, however has since developed oxygen needs and is on 5 L  - previously on remdesivir though discontinued per family request  - continue IV Decadron 6 mg daily for up to 10 days  - wean O2 as tolerated  - weight based Lovenox for anticoagulation  - airborne and contact precautions    Metastatic breast cancer ([facility] [address] )  Assessment & Plan  - Stage IV metastatic ER positive, LA negative, 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, progressed from stage I A ER/LA positive, 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 years ago  Status post resection and adjuvant radiation and hormone therapy for 5 years  Patient had symptoms of bony discomfort in her sternum in June of 2021, imaging revealed lytic lesion, biopsy in July of 2021 confirmed progression of disease  ? Patient was started on Faslodex and Xgeva at that time, in conjunction with radiation  ? In April 2022 there was interval development of 4 mm nodule at the right lower lobe and interval development of increased sclerotic lesions throughout the visualized spine  ?  At that time, the patient's treatment was changed to Femara and Kisqali  ? In July of 2022, the patient was changed from Camila to Teresabury because of intolerance  ? During the patient's hospitalization in August of 2022 to September of 2022, Verzenio was discontinued due to gastroenteritis  ? Patient's cancer is most likely contributing to her hypercoagulable state  ? Appreciate oncology and palliative care consult  ? Will continue on Lovenox for now  ? Continue on Remeron for insomnia and anxiety  ? Patient would not like any more treatment for her malignancy    Family meeting held on 10/04 with palliative care, GI, CM, and IM team  Patient's current clinical course discussed  Discussion of disposition planning occurred  Hospice was discussed  Patient is pending placement to nursing facility for continued rehab, and plans to transition to hospice when she clinically declines  Toxic gastroenteritis and colitis  Assessment & Plan  - Patient was admitted with toxic gastroenteritis in August of 2022   Determined to be possible IBD with additional insult of Verzenio from her breast cancer treatment    - continue prednisone taper  - continue vancomycin taper per GI  - started on Remicade 10/1 inpatient, and will be continued on an outpatient basis after discharge      Pressure injury of left buttock, stage 3 ([address] )  Assessment & Plan  - present on admission  - wound care consult    Acute respiratory failure with hypoxia ([address] )  Assessment & Plan  - secondary to COVID infection; see plan above    Urinary retention  Assessment & Plan  - marked urinary retention and bladder distention on admission  - failed voiding trial and urinary catheter replaced  - will plan to discharge with Crawley catheter  - status post empiric 3 day course of IV ceftriaxone    Anemia  Assessment & Plan  - Likely a mixed component of anemia chronic disease with possible folate deficiency  - Continue folate supplementation  - status post 1 unit PRBCs this admission; continue to monitor and transfuse for hemoglobin less than 7 0       Anxiety  Assessment & Plan  - Hold amitriptyline -possible etiology of tachycardia  - Will resume Remeron        VTE Pharmacologic Prophylaxis:   High Risk (Score >/= 5) - Pharmacological DVT Prophylaxis Ordered: enoxaparin (Lovenox)  Sequential Compression Devices Ordered  Patient Centered Rounds: I performed bedside rounds with nursing staff today  Discussions with Specialists or Other Care Team Provider: CHAGO     Education and Discussions with Family / Patient: Updated  (daughter) via phone  Time Spent for Care: 30 minutes  More than 50% of total time spent on counseling and coordination of care as described above  Current Length of Stay: 20 day(s)  Current Patient Status: Inpatient   Certification Statement: The patient will continue to require additional inpatient hospital stay due to covid treatment, discharge planning  Discharge Plan: Anticipate discharge in 24-48 hrs to rehab facility  Code Status: Level 3 - DNAR and DNI    Subjective:   Patient seen and examined  Following up for multiple issues as above  Patient in good spirits today though does report some increased diarrhea  Have asked GI to evaluate the patient again  Otherwise no other new complaints  Denies any shortness of breath or respiratory complaints  Afebrile  Objective:     Vitals:   Temp (24hrs), Av 7 °F (36 5 °C), Min:97 4 °F (36 3 °C), Max:98 3 °F (36 8 °C)    Temp:  [97 4 °F (36 3 °C)-98 3 °F (36 8 °C)] 97 4 °F (36 3 °C)  HR:  [74-81] 81  Resp:  [16-19] 16  BP: (123-125)/(61-64) 125/64  SpO2:  [96 %-98 %] 98 %  Body mass index is 24 83 kg/m²  Input and Output Summary (last 24 hours): Intake/Output Summary (Last 24 hours) at 10/12/2022 1617  Last data filed at 10/12/2022 0601  Gross per 24 hour   Intake --   Output 350 ml   Net -350 ml       Physical Exam:   PHYSICAL EXAM:    Vitals signs reviewed  Constitutional   Awake and cooperative  NAD  Head/Neck   Normocephalic  Atraumatic  HEENT   No scleral icterus  EOMI  Heart   Regular rate and rhythm  No murmers  Lungs   Clear to auscultation bilaterally  Respirations unlaboured  Abdomen   Soft  Nontender  Nondistended  Skin   Skin color normal  No rashes  Extremities   No deformities  No peripheral edema  Neuro   Alert and oriented  No new deficits  Psych   Mood stable  Affect normal          Additional Data:     Labs:  Results from last 7 days   Lab Units 10/11/22  0442   WBC Thousand/uL 6 35   HEMOGLOBIN g/dL 7 9*   HEMATOCRIT % 26 6*   PLATELETS Thousands/uL 222   NEUTROS PCT % 71   LYMPHS PCT % 18   MONOS PCT % 9   EOS PCT % 0     Results from last 7 days   Lab Units 10/12/22  0613 10/11/22  0442 10/10/22  0614   SODIUM mmol/L 146   < > 146   POTASSIUM mmol/L 3 2*   < > 3 6   CHLORIDE mmol/L 117*   < > 117*   CO2 mmol/L 27   < > 25   BUN mg/dL 12   < > 19   CREATININE mg/dL 0 53*   < > 0 62   ANION GAP mmol/L 2*   < > 4   CALCIUM mg/dL 7 3*   < > 6 9*   ALBUMIN g/dL  --   --  1 3*   TOTAL BILIRUBIN mg/dL  --   --  0 30   ALK PHOS U/L  --   --  86   ALT U/L  --   --  22   AST U/L  --   --  13   GLUCOSE RANDOM mg/dL 75   < > 83    < > = values in this interval not displayed  Lines/Drains:  Invasive Devices  Report    Peripherally Inserted Central Catheter Line  Duration           PICC Line 09/26/22 16 days          Drain  Duration           Urethral Catheter 14 Fr  4 days              Urinary Catheter:  Goal for removal: N/A- Discharging with Crawley         Central Line:  Goal for removal: Will discontinue when peripheral access obtained  Imaging: No pertinent imaging reviewed      Recent Cultures (last 7 days):   Results from last 7 days   Lab Units 10/08/22  0417   URINE CULTURE  >100,000 cfu/ml Escherichia coli*  >100,000 cfu/ml Klebsiella pneumoniae*  >100,000 cfu/ml Enterobacter cloacae*       Last 24 Hours Medication List:   Current Facility-Administered Medications   Medication Dose Route Frequency Provider Last Rate   • atorvastatin  40 mg Oral After [address], DO     • cholecalciferol  2,000 Units Oral Daily Caralyn Keep, DO     • dexamethasone  6 mg Intravenous Q24H Jersey Camacho, DO     • enoxaparin  1 mg/kg Subcutaneous Q12H [address] Caralyn Keep, DO     • folic acid  1 mg Oral Daily Caralyn Keep, DO     • metoprolol  2 5 mg Intravenous Q6H PRN Armen Fiore MD     • metoprolol succinate  75 mg Oral BID BROOKE Mcpherson     • midodrine  2 5 mg Oral TID AC Nadine Wynne MD     • mirtazapine  7 5 mg Oral HS Jersey Camacho, DO     • ALYSSA ANTIFUNGAL   Topical BID Darylene Maize [facility], DO     • pantoprazole  40 mg Oral BID AC Caralyn Keep, DO     • vancomycin  125 mg Oral Daily Camelia Ayala DO          Today, Patient Was Seen By: Darylene Maize [facility]    **Please Note: This note may have been constructed using a voice recognition system  **

## 2022-10-12 NOTE — PHYSICIAN ADVISOR
Current patient class: Inpatient  The patient is currently on Hospital Day: 21      The patient was admitted to the hospital at  3:35 PM on 9/22/22 for the following diagnosis:  Pulmonary embolism ([address] ) [I26 [id number]]     CMS OUTLIER STAY REVIEW    After review of the relevant documentation, labs, vital signs and test results, the patient is appropriate for CONTINUED INPATIENT ADMISSION  The patient continues to remain hospitalized receiving acute medical care  The patient has surpassed the expected duration of stay, however given the clinical condition, need for further acute care management, the patient is appropriate to remain in an inpatient status  The patient still being actively managed, and does have unresolved medical issues requiring further hospitalization  This review is conducted at 20 days, to help satisfy the requirements for significant outlier stay review as per CMS  Given the current condition of this patient, the patient satisfies this review was determination for continued inpatient stay  Rationale is as follows: The patient is a 79 yrs old Female who presented 9/22/22  Pt with h/o Stage IV breast cancer  She initially presented with acute respiratory failure and hypoxia and found to have an acute submassive high-risk pulmonary embolism   Patient is status post thrombectomy  Her hospital course was complicated by toxic gastroenteritis/colitis and covid infection  Goals of care discussions ongoing with current plan to discharge to [facility] when able  Sge remains IP appropriate          The patient’s vitals on arrival were   ED Triage Vitals   Temperature Pulse Respirations Blood Pressure SpO2   09/22/22 1540 09/22/22 1540 09/22/22 1540 09/22/22 1600 09/22/22 1540   (!) 97 °F (36 1 °C) (!) 152 (!) 24 121/65 99 %      Temp Source Heart Rate Source Patient Position - Orthostatic VS BP Location FiO2 (%)   09/22/22 1540 09/22/22 1540 09/22/22 2300 09/22/22 2300 --   Oral Monitor Lying Left arm Pain Score       09/22/22 0000       No Pain           Past Medical History:   Diagnosis Date   • Abnormal weight loss    • Allergic reaction    • Anxiety    • Breast cancer, right [facility]) 2011    right   • Cancer ([facility] ) 2012   • Candidal vulvovaginitis    • Clotting disorder ([facility] ) Same as above   • Endometrial hyperplasia    • Epithelial cyst     benign, ovary   • GI (gastrointestinal bleed) Blood mixed with stool   • History of radiation therapy 2011    right breast cancer   • Hyperlipidemia    • Hypertension    • Internal hemorrhoids    • Knee tendonitis    • Ovarian cyst    • 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[facility])      Past Surgical History:   Procedure Laterality Date   • BILATERAL SALPINGOOPHORECTOMY      onset: 7/23/13   • BREAST BIOPSY Right 06/13/2006    benign   • BREAST BIOPSY Right 03/02/2011    malignant   • BREAST LUMPECTOMY Right 03/30/2011    malignant   • CATARACT EXTRACTION W/  INTRAOCULAR LENS IMPLANT Right     phacoemulsification   Onset: 10/27/14   • CHOLECYSTECTOMY     • COLONOSCOPY  2017    approx   • HYSTERECTOMY  Yes   • INTRAOPERATIVE RADIATION THERAPY (IORT)     • IR BIOPSY BONE  7/9/2021   • IR IVC FILTER PLACEMENT OPTIONAL/TEMPORARY  9/23/2022   • IR PE ENDOVASCULAR THERAPY  9/22/2022   • IR PICC PLACEMENT SINGLE LUMEN  8/19/2022   • IR PICC PLACEMENT SINGLE LUMEN  9/26/2022   • SKIN LESION EXCISION Right     breast, single lesion   • TONSILLECTOMY AND ADENOIDECTOMY             Consults have been placed to:   IP CONSULT TO CASE MANAGEMENT  INPATIENT CONSULT TO IR  IP CONSULT TO UROLOGY  IP CONSULT TO HEMATOLOGY  INPATIENT CONSULT TO IR  IP CONSULT TO GASTROENTEROLOGY  IP CONSULT TO PALLIATIVE CARE  IP CONSULT TO PASTORAL CARE  IP CONSULT TO CARDIOLOGY  IP CONSULT TO INFECTIOUS DISEASES    Vitals:    10/12/22 0557 10/12/22 0600 10/12/22 0730 10/12/22 0926   BP: 125/62  125/64    BP Location:       Pulse: 75  75 81   Resp: 16      Temp: 97 5 °F (36 4 °C)  (!) 97 4 °F (36 3 °C)    TempSrc: SpO2: 97%  98%    Weight:  59 6 kg (131 lb 6 3 oz)     Height:           Most recent labs:    Recent Labs     10/10/22  0614 10/11/22  0442 10/12/22  0613   WBC 6 55 6 35  --    HGB 8 0* 7 9*  --    HCT 26 6* 26 6*  --     222  --    K 3 6 3 3* 3 2*   CALCIUM 6 9* 7 2* 7 3*   BUN 19 17 12   CREATININE 0 62 0 56* 0 53*   CKTOTAL 21*  --   --    AST 13  --   --    ALT 22  --   --    ALKPHOS 86  --   --        Scheduled Meds:  Current Facility-Administered Medications   Medication Dose Route Frequency Provider Last Rate   • atorvastatin  40 mg Oral After [address], DO     • cholecalciferol  2,000 Units Oral Daily Puja Teresa, DO     • dexamethasone  6 mg Intravenous Q24H Jersey Janice, DO     • enoxaparin  1 mg/kg Subcutaneous Q12H [address] Puja Teresa, DO     • folic acid  1 mg Oral Daily Puja Moore, DO     • metoprolol  2 5 mg Intravenous Q6H PRN Elisa Pepper MD     • metoprolol succinate  75 mg Oral BID BROOKE Lopez     • midodrine  2 5 mg Oral TID AC Elisa Pepper MD     • mirtazapine  7 5 mg Oral HS Jersey Bandiane, DO     • pantoprazole  40 mg Oral BID AC Puja Moore, DO     • vancomycin  125 mg Oral Daily Anthony Caballero, DO       Continuous Infusions:   PRN Meds: •  metoprolol    Surgical procedures (if appropriate):

## 2022-10-12 NOTE — TELEPHONE ENCOUNTER
I called patients daughter Victor Manuel Rodriguez to check in and confirm as I did not see it notated anywhere that patient would be stopping remicade as well   Reached voicemail and asked for a call back to discuss

## 2022-10-12 NOTE — TELEPHONE ENCOUNTER
Noticed all upcoming infusions were cancelled for patient       Cancellation reason states "  Provider (Per in pt cancer care RN Ghada Pollock she spoke Lorenzo baltazar at Dr Hemant [place] office and Kash arenas in pt CC, pt no longer wants to do treatments and all listed agree, so Ghada Pollock asked me to cx all scheduled txs)     Event Tracking Log

## 2022-10-12 NOTE — CASE MANAGEMENT
Case Management Discharge Planning Note    Patient name Saúl Silverman  Location /-55 MRN [id number]  : 1940 Date 10/12/2022       Current Admission Date: 2022  Current Admission Diagnosis:Saddle embolus of pulmonary artery [facility])   Patient Active Problem List    Diagnosis Date Noted   • COVID-19 virus infection 10/11/2022   • Pressure injury of left buttock, stage 3 ([facility] ) 10/11/2022   • Acute respiratory failure with hypoxia ([facility] ) 10/09/2022   • Ulcerative pancolitis with rectal bleeding ([facility] ) 10/03/2022   • Saddle embolus of pulmonary artery ([facility] ) 2022   • Urinary retention 2022   • Swelling of lower extremity 2022   • Venous insufficiency 2022   • Tachycardia 2022   • Single subsegmental pulmonary embolism without acute cor pulmonale ([facility] ) 2022   • Severe protein-calorie malnutrition ([facility] ) 2022   • Anemia 2022   • Hypokalemia 2022   • Diarrhea 2022   • Secondary malignant neoplasm of bone ([facility] ) 2022   • Toxic gastroenteritis and colitis 2022   • Rectal bleeding 10/14/2021   • Lytic lesion of bone on x-ray 2021   • Neoplasm of uncertain behavior of sternum 2021   • Acute costochondritis 2021   • Osteopenia of multiple sites 2020   • Primary hypertension 10/22/2019   • Chronic kidney disease (CKD) stage G3a/A1, moderately decreased glomerular filtration rate (GFR) between 45-59 mL/min/1 73 square meter and albuminuria creatinine ratio less than 30 mg/g (HCC) 2019   • Adverse reaction to pneumococcal vaccine 2015   • Cataract, bilateral 2014   • Pulmonary nodule seen on imaging study 09/10/2013   • Sleep disorder 2013   • Anxiety 2013   • Metastatic breast cancer ([facility] ) 10/17/2012   • Mixed hyperlipidemia 2012      LOS (days): 20  Geometric Mean LOS (GMLOS) (days): 8 10  Days to GMLOS:-11 7     OBJECTIVE:  Risk of Unplanned Readmission Score: 37 44 Current admission status: Inpatient   Preferred Pharmacy:   [address] [facility], [address]  [address] [facility]  Phone: [phone] Fax: [phone] 532 [facility], [address]  [place] [address] [place] [address]  Phone: [phone] Fax: [phone] [address] [place], [place] [address] [place] [facility] [address] [address] [facility] [address] [address]  Phone: [phone] Fax: [phone]    Primary Care Provider: Opal Cedeño DO    Primary Insurance: MEDICARE  Secondary Insurance: [employer]    DISCHARGE DETAILS:                                          Other Referral/Resources/Interventions Provided:  Referral Comments: CM called patient's daughter  Edgar Jordan stated that she looked up [employer] and [employer] reviews and is still unsure  CM offered Jonny Pearson at [facility] contact information to call and answer questions/possibly Edgar patel accepted  Edgarpatricia Woods stated she will call Jonny Pearson and get back to CM

## 2022-10-12 NOTE — CASE MANAGEMENT
Case Management Discharge Planning Note    Patient name Saúl Silverman  Location /-17 MRN [id number]  : 1940 Date 10/12/2022       Current Admission Date: 2022  Current Admission Diagnosis:Saddle embolus of pulmonary artery [facility])   Patient Active Problem List    Diagnosis Date Noted   • COVID-19 virus infection 10/11/2022   • Pressure injury of left buttock, stage 3 ([facility] ) 10/11/2022   • Acute respiratory failure with hypoxia ([facility] ) 10/09/2022   • Ulcerative pancolitis with rectal bleeding ([facility] ) 10/03/2022   • Saddle embolus of pulmonary artery ([place] [address] ) 2022   • Urinary retention 2022   • Swelling of lower extremity 2022   • Venous insufficiency 2022   • Tachycardia 2022   • Single subsegmental pulmonary embolism without acute cor pulmonale ([facility] ) 2022   • Severe protein-calorie malnutrition ([facility] ) 2022   • Anemia 2022   • Hypokalemia 2022   • Diarrhea 2022   • Secondary malignant neoplasm of bone ([facility] ) 2022   • Toxic gastroenteritis and colitis 2022   • Rectal bleeding 10/14/2021   • Lytic lesion of bone on x-ray 2021   • Neoplasm of uncertain behavior of sternum 2021   • Acute costochondritis 2021   • Osteopenia of multiple sites 2020   • Primary hypertension 10/22/2019   • Chronic kidney disease (CKD) stage G3a/A1, moderately decreased glomerular filtration rate (GFR) between 45-59 mL/min/1 73 square meter and albuminuria creatinine ratio less than 30 mg/g (HCC) 2019   • Adverse reaction to pneumococcal vaccine 2015   • Cataract, bilateral 2014   • Pulmonary nodule seen on imaging study 09/10/2013   • Sleep disorder 2013   • Anxiety 2013   • Metastatic breast cancer ([facility] ) 10/17/2012   • Mixed hyperlipidemia 2012      LOS (days): 20  Geometric Mean LOS (GMLOS) (days): 8 10  Days to GMLOS:-11 9     OBJECTIVE:  Risk of Unplanned Readmission Score: 37 57 Current admission status: Inpatient   Preferred Pharmacy:   [address] [facility], [address]  615 [facility]  Phone: [phone] Fax: [phone] 532 [facility], [address]  6[address] [facility]  [address]  [address] [facility] [address]  Phone: [phone] Fax: [phone] [address] [place], [facility] [place] [address] [place] [facility] [place] [address] [address] [facility] [address] [address]  Phone: [phone] Fax: [phone]    Primary Care Provider: Deanna Martin DO    Primary Insurance: MEDICARE  Secondary Insurance: [employer]    DISCHARGE DETAILS:    Discharge planning discussed with[de-identified] PatientTimothy Big of Choice: Yes  Comments - Freedom of Choice: Patient stated she is agreeable to going to whichever STR daughter chooses  Daughter accepted bed at [facility]  CM reserved [facility] via [address], confirmed via [employer] message that bed is available  CM contacted family/caregiver?: Yes  Were Treatment Team discharge recommendations reviewed with patient/caregiver?: Yes  Did patient/caregiver verbalize understanding of patient care needs?: N/A- going to facility  Were patient/caregiver advised of the risks associated with not following Treatment Team discharge recommendations?: Yes    Contacts  Patient Contacts: reymundo Leger  Relationship to Patient[de-identified] Family  Contact Method: Phone  Phone Number: [phone]  Reason/Outcome: Discharge Planning              Other Referral/Resources/Interventions Provided:  Referral Comments: CM notified Dr Patito Clemens that patient accepted bed at [facility]  CM will request transport for tomorrow           Treatment Team Recommendation: Short Term Rehab  Discharge Destination Plan[de-identified] Short Term Rehab  Transport at Discharge : [facility]  Dispatcher Contacted: Yes  Number/Name of Dispatcher: Roundtrip     ETA of Transport (Date): 10/13/22       PASRR negative, added to [address] [place] [address]

## 2022-10-12 NOTE — WOUND OSTOMY CARE
Progress Note - Wound   Seymour Oliveira Clunk 79 y o  female MRN: [id number]  Unit/Bed#: -01 Encounter: [id number]        Assessment:   Patient is seen for wound care follow-up  80 yo F admitted to [facility] with saddle embolus of pulmonary artery  PMH: stage IV metastatic breast cancer, anemia, CKD3, and hypertension  Rectal tube and suarez in place managing bowel and bladder   Patient on strict contact precautions for C  Diff  Tested positive for COVID on 10/9/2022- on contact and airborne precautions  Min-Modertae assist for turning and repositioning on P-500 specialty mattress  B/L Heels are dry, intact, and rekha with no evidence of skin loss or wounds present  Findings:  1  POA Stage 3 Pressure Injury left Buttocks: wound appears oval in shape with full thickness skin loss  Wound bed is beefy red granulation tissue with scattered areas of yellow tissue  Romana-wound is hyperpigmented and fragile  Scant serosanguineous drainage  Recommend Continuing with stoma powder and triad to area  2  Right and Mid Buttocks MASD: RESOLVED  area is dry, intact, and pink in color with no skin loss noted  No longer with partial thickness skin loss  Skin blanches  No drainage noted  Recommend placing ALYSSA to area  3  B/L Inner Thighs and Perineum MASD: area is fragile to touch per patient and is pink in color  No skin loss noted  Likely related to moisture  Primary Rn states that patient has frequent bouts of diarrhea  No drainage  Recommend ALYSSA  No induration, fluctuance, odor, warmth/temperature differences, redness, or purulence noted to the above noted wounds and skin areas assessed  New dressings applied per orders listed below  Patient tolerated well- no s/s of non-verbal pain or discomfort observed during the encounter  Bedside nurse aware of plan of care  See flow sheets for more detailed assessment findings        Orders listed below and wound care will continue to follow, call or tiger text with questions  Skin Care Plan:  1-LEFT BUTTOCK: Cleanse wound & pat dry  Dust wound bed with Stoma Powder and apply TRIAD Paste overtop  Apply TID or PRN episodes of incontinence  2-Turn/reposition q2h or when medically stable for pressure re-distribution on skin   3-Elevate heels to offload pressure  4-Moisturize skin daily with skin nourishing cream  5-Ehob cushion in chair when out of bed  6-INNER THIGHS, PERINEUM, RIGHT BUTTOCKS: Cleanse areas with soap and water & pat dry  Apply ALYSSA anti-fungal cream BID and Prn episodes of incontinence  7-Continue with P-500 Specialty Mattress        WOUNDS:  Wound 09/22/22 Buttocks Left (Active)   Wound Image   10/12/22 0918   Wound Description Beefy red;Yellow 10/12/22 0918   Pressure Injury Stage 3 10/12/22 0918   Romana-wound Assessment Hyperpigmented;Fragile 10/12/22 0918   Wound Length (cm) 3 cm 10/12/22 0918   Wound Width (cm) 1 6 cm 10/12/22 0918   Wound Depth (cm) 0 2 cm 10/12/22 0918   Wound Surface Area (cm^2) 4 8 cm^2 10/12/22 0918   Wound Volume (cm^3) 0 96 cm^3 10/12/22 0918   Calculated Wound Volume (cm^3) 0 96 cm^3 10/12/22 0918   Change in Wound Size % -74 55 10/12/22 0918   Tunneling 0 cm 10/12/22 0918   Tunneling in depth located at 0 10/12/22 0918   Undermining 0 10/12/22 0918   Undermining is depth extending from 0 10/12/22 0918   Wound Site Closure RUDY 10/12/22 0918   Drainage Amount Scant 10/12/22 0918   Drainage Description Serosanguineous 10/12/22 0918   Non-staged Wound Description Full thickness 10/12/22 0918   Treatments Cleansed;Site care 10/12/22 0918   Dressing Other (Comment) 10/12/22 0918   Wound packed?  No 10/12/22 0918   Packing- # removed 0 10/12/22 0918   Packing- # inserted 0 10/12/22 0918   Dressing Changed Changed 10/12/22 0918   Patient Tolerance Tolerated well 10/12/22 0918   Dressing Status Intact 10/12/22 0918   Wound 09/30/22 MASD Buttocks Right (Active)   Wound Image   10/12/22 0918   Wound Description Intact;Dry 10/12/22 1057   Romana-wound Assessment Intact;Dry 10/12/22 1057   Wound Length (cm) 0 cm 10/12/22 1057   Wound Width (cm) 0 cm 10/12/22 1057   Wound Depth (cm) 0 cm 10/12/22 1057   Wound Surface Area (cm^2) 0 cm^2 10/12/22 1057   Wound Volume (cm^3) 0 cm^3 10/12/22 1057   Calculated Wound Volume (cm^3) 0 cm^3 10/12/22 1057   Change in Wound Size % 100 10/12/22 1057   Drainage Amount None 10/12/22 1057   Drainage Description Other (Comment) 10/12/22 1057   Non-staged Wound Description Not applicable 78/86/79 8429   Treatments Site care 10/12/22 1057   Dressing Moisture barrier 10/12/22 1057   Dressing Changed New 10/12/22 1057   Patient Tolerance Tolerated well 10/12/22 1057   Dressing Status Intact 10/12/22 1057       Wound 10/12/22 MASD Perineum (Active)   Wound Image    10/12/22 1056   Wound Description Fragile;Pink; Intact 10/12/22 1056   Romana-wound Assessment Intact 10/12/22 1056   Drainage Amount None 10/12/22 1056   Non-staged Wound Description Not applicable 04/99/18 7677   Treatments Cleansed;Site care 10/12/22 1056   Dressing Other (Comment) 10/12/22 1056   Dressing Changed New 10/12/22 1056   Patient Tolerance Tolerated well 10/12/22 Radha Ni, BSN

## 2022-10-13 PROBLEM — K92.1 HEMATOCHEZIA: Status: ACTIVE | Noted: 2022-10-13

## 2022-10-13 LAB
ANION GAP SERPL CALCULATED.3IONS-SCNC: 2 MMOL/L (ref 4–13)
BUN SERPL-MCNC: 7 MG/DL (ref 5–25)
CALCIUM SERPL-MCNC: 7.5 MG/DL (ref 8.3–10.1)
CHLORIDE SERPL-SCNC: 115 MMOL/L (ref 96–108)
CO2 SERPL-SCNC: 29 MMOL/L (ref 21–32)
CREAT SERPL-MCNC: 0.46 MG/DL (ref 0.6–1.3)
GFR SERPL CREATININE-BSD FRML MDRD: 92 ML/MIN/1.73SQ M
GLUCOSE SERPL-MCNC: 89 MG/DL (ref 65–140)
HCT VFR BLD AUTO: 27.6 % (ref 34.8–46.1)
HCT VFR BLD AUTO: 28.7 % (ref 34.8–46.1)
HCT VFR BLD AUTO: 28.7 % (ref 34.8–46.1)
HGB BLD-MCNC: 8.4 G/DL (ref 11.5–15.4)
HGB BLD-MCNC: 8.4 G/DL (ref 11.5–15.4)
HGB BLD-MCNC: 8.5 G/DL (ref 11.5–15.4)
POTASSIUM SERPL-SCNC: 3.6 MMOL/L (ref 3.5–5.3)
SODIUM SERPL-SCNC: 146 MMOL/L (ref 135–147)

## 2022-10-13 PROCEDURE — 99231 SBSQ HOSP IP/OBS SF/LOW 25: CPT | Performed by: INTERNAL MEDICINE

## 2022-10-13 PROCEDURE — 99232 SBSQ HOSP IP/OBS MODERATE 35: CPT | Performed by: INTERNAL MEDICINE

## 2022-10-13 PROCEDURE — 85018 HEMOGLOBIN: CPT | Performed by: INTERNAL MEDICINE

## 2022-10-13 PROCEDURE — 85014 HEMATOCRIT: CPT | Performed by: INTERNAL MEDICINE

## 2022-10-13 PROCEDURE — 97530 THERAPEUTIC ACTIVITIES: CPT

## 2022-10-13 PROCEDURE — 97110 THERAPEUTIC EXERCISES: CPT

## 2022-10-13 PROCEDURE — 99233 SBSQ HOSP IP/OBS HIGH 50: CPT | Performed by: INTERNAL MEDICINE

## 2022-10-13 PROCEDURE — NC001 PR NO CHARGE: Performed by: OBSTETRICS & GYNECOLOGY

## 2022-10-13 PROCEDURE — 80048 BASIC METABOLIC PNL TOTAL CA: CPT | Performed by: INTERNAL MEDICINE

## 2022-10-13 RX ORDER — ONDANSETRON 2 MG/ML
4 INJECTION INTRAMUSCULAR; INTRAVENOUS EVERY 4 HOURS PRN
Status: DISCONTINUED | OUTPATIENT
Start: 2022-10-13 | End: 2022-10-20 | Stop reason: HOSPADM

## 2022-10-13 RX ORDER — ENOXAPARIN SODIUM 100 MG/ML
1 INJECTION SUBCUTANEOUS EVERY 12 HOURS SCHEDULED
Status: DISCONTINUED | OUTPATIENT
Start: 2022-10-13 | End: 2022-10-14

## 2022-10-13 RX ORDER — ACETAMINOPHEN 325 MG/1
650 TABLET ORAL EVERY 6 HOURS PRN
Status: DISCONTINUED | OUTPATIENT
Start: 2022-10-13 | End: 2022-10-20 | Stop reason: HOSPADM

## 2022-10-13 RX ADMIN — Medication 125 MG: at 09:12

## 2022-10-13 RX ADMIN — Medication 2000 UNITS: at 09:08

## 2022-10-13 RX ADMIN — FOLIC ACID 1 MG: 1 TABLET ORAL at 09:09

## 2022-10-13 RX ADMIN — ENOXAPARIN SODIUM 70 MG: 80 INJECTION SUBCUTANEOUS at 09:09

## 2022-10-13 RX ADMIN — MICONAZOLE NITRATE: 20 CREAM TOPICAL at 17:36

## 2022-10-13 RX ADMIN — PANTOPRAZOLE SODIUM 40 MG: 40 TABLET, DELAYED RELEASE ORAL at 17:31

## 2022-10-13 RX ADMIN — CARBIDOPA AND LEVODOPA 2.5 MG: 50; 200 TABLET, EXTENDED RELEASE ORAL at 12:10

## 2022-10-13 RX ADMIN — METOPROLOL SUCCINATE 75 MG: 50 TABLET, EXTENDED RELEASE ORAL at 22:38

## 2022-10-13 RX ADMIN — MICONAZOLE NITRATE: 20 CREAM TOPICAL at 09:09

## 2022-10-13 RX ADMIN — ATORVASTATIN CALCIUM 40 MG: 40 TABLET, FILM COATED ORAL at 17:31

## 2022-10-13 RX ADMIN — CARBIDOPA AND LEVODOPA 2.5 MG: 50; 200 TABLET, EXTENDED RELEASE ORAL at 05:39

## 2022-10-13 RX ADMIN — METOPROLOL SUCCINATE 75 MG: 50 TABLET, EXTENDED RELEASE ORAL at 09:09

## 2022-10-13 RX ADMIN — DEXAMETHASONE SODIUM PHOSPHATE 6 MG: 4 INJECTION INTRA-ARTICULAR; INTRALESIONAL; INTRAMUSCULAR; INTRAVENOUS; SOFT TISSUE at 15:30

## 2022-10-13 RX ADMIN — ENOXAPARIN SODIUM 60 MG: 60 INJECTION SUBCUTANEOUS at 23:35

## 2022-10-13 RX ADMIN — PANTOPRAZOLE SODIUM 40 MG: 40 TABLET, DELAYED RELEASE ORAL at 05:39

## 2022-10-13 RX ADMIN — CARBIDOPA AND LEVODOPA 2.5 MG: 50; 200 TABLET, EXTENDED RELEASE ORAL at 17:32

## 2022-10-13 RX ADMIN — MIRTAZAPINE 7.5 MG: 15 TABLET, FILM COATED ORAL at 22:38

## 2022-10-13 NOTE — PROGRESS NOTES
Progress Rosatamiko COVARRUBIAS Clunk 79 y o  female MRN: [id number]    Unit/Bed#: -01 Encounter: [id number]      Assessment and Plan:    1  Ulcerative colitis  · Newly diagnosed UC last month   · Started on REMICADE on 10/01/2022  · Reported episode of blood in stool last night; RN who cleaned her mentions that it seemed more like vaginal bleeding; He mentions it was 90% blood and blood clots and 10% stool  · No abdominal pain, nausea, vomiting, diarrhea  · Patient does mention loose stools but no urgency/frequency  · Vitals stable  · Abdomen soft non-tender, no masses, no organomegaly  · Hemoglobin stable at 8  BMP unremarkable    Plan  · Observe for further bleeding episodes  · Gynecology evaluation can be worthwhile  · Will continue REMICADE infusion as scheduled      ______________________________________________________________________    Subjective:  Patient seen and examined at bedside today morning  She is concerned about the bleeding but otherwise feeling well overall  No fever, chills, abdominal pain, nausea, vomiting, diarrhea, constipation      Medication Administration - last 24 hours from 10/12/2022 1151 to 10/13/2022 1151       Date/Time Order Dose Route Action Action by     58/91/0886 6116 folic acid (FOLVITE) tablet 1 mg 1 mg Oral Given Pam Kyle RN     10/13/2022 0539 pantoprazole (PROTONIX) EC tablet 40 mg 40 mg Oral Given Kellie Estrada RN     10/12/2022 1518 pantoprazole (PROTONIX) EC tablet 40 mg 40 mg Oral Given Kristina Ronquillo RN     10/12/2022 1812 atorvastatin (LIPITOR) tablet 40 mg 40 mg Oral Given Kristina Ronquillo RN     10/13/2022 0908 cholecalciferol (VITAMIN D3) tablet 2,000 Units 2,000 Units Oral Given Pam Kyle RN     10/13/2022 0909 enoxaparin (LOVENOX) subcutaneous injection 70 mg 70 mg Subcutaneous Given Pam Kyle RN     10/12/2022 2150 enoxaparin (LOVENOX) subcutaneous injection 70 mg 70 mg Subcutaneous Given Kellie Estrada RN     10/13/2022 0539 midodrine (PROAMATINE) tablet 2 5 mg 2 5 mg Oral Given Landy Prasad RN     10/12/2022 1518 midodrine (PROAMATINE) tablet 2 5 mg 2 5 mg Oral Given Joe Mcdowell RN     10/12/2022 1154 midodrine (PROAMATINE) tablet 2 5 mg 2 5 mg Oral Given Joe Mcdowell RN     10/12/2022 2150 mirtazapine (REMERON) tablet 7 5 mg 7 5 mg Oral Given Landy Prasad RN     10/13/2022 0909 metoprolol succinate (TOPROL-XL) 24 hr tablet 75 mg 75 mg Oral Given Bryce Ingram, RN     10/12/2022 2150 metoprolol succinate (TOPROL-XL) 24 hr tablet 75 mg 75 mg Oral Given Landy Prasad RN     10/13/2022 0912 vancomycin (VANCOCIN) oral solution 125 mg 125 mg Oral Given Bryce Ingram RN     10/12/2022 1515 dexamethasone (DECADRON) injection 6 mg 0 mg Intravenous Hold Joe Mcdowell RN     10/13/2022 0909 moisture barrier miconazole 2% cream (aka ALYSSA MOISTURE BARRIER ANTIFUNGAL CREAM)   Topical Given Bryce Ingram RN     10/12/2022 1805 moisture barrier miconazole 2% cream (aka ALYSSA MOISTURE BARRIER ANTIFUNGAL CREAM) 1 application Topical Given Joe Mcdowell RN     10/12/2022 1245 moisture barrier miconazole 2% cream (aka ALYSSA MOISTURE BARRIER ANTIFUNGAL CREAM) 1 application Topical Given Joe Mcdowell RN     10/12/2022 1515 alteplase (CATHFLO) injection 2 mg 2 mg Intracatheter Given Joe Mcdowell RN          Objective:     Vitals: Blood pressure 117/66, pulse 74, temperature (!) 97 4 °F (36 3 °C), temperature source Oral, resp  rate 14, height 5' 1" (1 549 m), weight 59 9 kg (132 lb), SpO2 100 %, not currently breastfeeding  ,Body mass index is 24 94 kg/m²      No intake or output data in the 24 hours ending 10/13/22 1151    Physical Exam:   General Appearance: Awake and alert, in no acute distress  Abdomen: Soft, non-tender, non-distended; bowel sounds normal; no masses or no organomegaly    Invasive Devices  Report    Peripherally Inserted Central Catheter Line  Duration           PICC Line 09/26/22 16 days          Drain  Duration Urethral Catheter 14 Fr  4 days                Lab Results:  No results displayed because visit has over 200 results  Imaging Studies: I have personally reviewed pertinent imaging studies        Kingston Savage MD  PGY-1, Internal Medicine  [facility]

## 2022-10-13 NOTE — PROGRESS NOTES
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  4  Protein calorie malnutrition  5  Urinary retention  6  Acute COVID 19     Recommendations:  Patient is afebrile with normal WBC count when last taken     Hemoglobin is now 8 4  Remains on steroids and p o  Vancomycin  Diarrhea  1  I am not convinced that the patient has active C diff colitis  Diarrhea more likely secondary to IBD  Patient has completed full course of p o  Vancomycin  She is now on taper as per GI  Currently on p o  Vancomycin 125 mg Q daily    2  Three episodes of small volume diarrhea today with some rectal bleeding  COVID 19  1  Currently stable and denies SOB  Chest x-ray not significant  2  SARS-CoV-2 PCR positive  Since patient came in with saddle embolus it is possible that she may have had COVID at that time  She is not immunized  3  COVID Protocol as per primary service  Antibiotics:  1  Vancomycin 125 mg q  Daily p o ,       Subjective:  Less diarrhea and decreased shortness of breath with dry cough   Denies fevers, chills, or sweats  Denies nausea, vomiting,       Objective:  Vitals:  Temp:  [97 4 °F (36 3 °C)-97 9 °F (36 6 °C)] 97 5 °F (36 4 °C)  HR:  [72-78] 78  Resp:  [14-16] 16  BP: (117-121)/(62-68) 120/62  SpO2:  [97 %-100 %] 97 %  Temp (24hrs), Av 6 °F (36 4 °C), Min:97 4 °F (36 3 °C), Max:97 9 °F (36 6 °C)  Current: Temperature: 97 5 °F (36 4 °C)    Physical Exam:     General Appearance:    Eyes:   Alert, chronically ill-appearing female nontoxic, no acute distress  Nasal oxygen in place  Conjunctiva pale   Throat: Oropharynx moist without lesions    Lips, mucosa, and tongue normal   Neck: Supple, symmetrical, trachea midline, no adenopathy,  no tenderness/mass/nodules   Lungs:   Clear to auscultation bilaterally, no audible wheezes, rhonchi or rales; respirations unlabored   Heart:  Regular rate and rhythm, S1, S2 normal, no murmur, rub or gallop   Abdomen:   Soft, non-tender, non-distended, positive bowel sounds  No masses, no organomegaly    No CVA tenderness, Crawley catheter   Extremities: Extremities normal, atraumatic, no clubbing, cyanosis or edema, PICC line   Skin: Skin color pale, texture, turgor normal, no rashes or lesions  No draining wounds noted           Invasive Devices  Report    Peripherally Inserted Central Catheter Line  Duration           PICC Line 09/26/22 17 days          Drain  Duration           Urethral Catheter 14 Fr  5 days                Labs, Imaging, & Other studies:   All pertinent labs were personally reviewed  Results from last 7 days   Lab Units 10/13/22  1220 10/13/22  0308 10/11/22  0442 10/10/22  0614 10/09/22  0509   WBC Thousand/uL  --   --  6 35 6 55 8 91   HEMOGLOBIN g/dL 8 4* 8 4* 7 9* 8 0* 8 0*   PLATELETS Thousands/uL  --   --  222 181 178     Results from last 7 days   Lab Units 10/13/22  0308 10/12/22  0613 10/11/22  0442 10/10/22  0614   SODIUM mmol/L 146 146 147 146   POTASSIUM mmol/L 3 6 3 2* 3 3* 3 6   CHLORIDE mmol/L 115* 117* 116* 117*   CO2 mmol/L 29 27 27 25   BUN mg/dL 7 12 17 19   CREATININE mg/dL 0 46* 0 53* 0 56* 0 62   EGFR ml/min/1 73sq m 92 88 87 84   CALCIUM mg/dL 7 5* 7 3* 7 2* 6 9*   AST U/L  --   --   --  13   ALT U/L  --   --   --  22   ALK PHOS U/L  --   --   --  86     Results from last 7 days   Lab Units 10/08/22  0417   URINE CULTURE  >100,000 cfu/ml Escherichia coli*  >100,000 cfu/ml Klebsiella pneumoniae*  >100,000 cfu/ml Enterobacter cloacae*

## 2022-10-13 NOTE — PHYSICAL THERAPY NOTE
PT Treatment       10/13/22 1510   PT Last Visit   PT Visit Date 10/13/22   Note Type   Note Type Treatment   Pain Assessment   Pain Assessment Tool 0-10   Pain Score No Pain   Restrictions/Precautions   Other Precautions (S)  Contact/isolation; Fall Risk;O2;Bed Alarm  (+ COVID + CDIFF; 4 L O2)   General   Chart Reviewed Yes   Response to Previous Treatment Patient with no complaints from previous session  Family/Caregiver Present No   Cognition   Arousal/Participation Alert; Cooperative   Attention Attends with cues to redirect   Orientation Level Oriented X4   Memory Decreased recall of recent events   Following Commands Follows one step commands with increased time or repetition   Comments pleasant, cooperative   Subjective   Subjective "I can't believe how weak I am"   Bed Mobility   Rolling R 4  Minimal assistance   Additional items Assist x 1;Bedrails   Rolling L 4  Minimal assistance   Additional items Assist x 1;Bedrails   Supine to Sit 3  Moderate assistance   Additional items Assist x 1; Increased time required;LE management   Sit to Supine 3  Moderate assistance   Additional items Assist x 1; Increased time required;LE management   Additional Comments patient received supine in bed  she was able to perform supine-->sit transfer toward R side with HOB elevated with mod-AX1  While scooting EOB patient fearful of falling and required emotional support  She maintained dynamic seated balance EOB x 15 minutes while participating in active LE ther-ex  Patient reported she was not fatigued sitting EOB however was incontinent of bowel and required mod-AX1 for sit-->supine transfer  Once supine, she performed rolling toward L and R sides with min-AX1 and therapist performed dependent regan-care requiring extended time to change linen/gown  Patient then performed supine ther-ex     Balance   Static Sitting Fair +   Dynamic Sitting Fair   Endurance Deficit   Endurance Deficit Yes   Endurance Deficit Description gross weakness; 4  L O2; sats 96% at rest and 92% with mobility   Activity Tolerance   Activity Tolerance Patient tolerated treatment well;Patient limited by fatigue   Nurse Made Aware vera to see per RN Yung   Exercises   Heelslides Supine;10 reps;Bilateral;AROM   Hip Abduction Supine;10 reps;Bilateral;AROM   Hip Adduction Sitting;10 reps;Bilateral;AROM  (pillow squeeze)   Knee AROM Long Arc Quad Sitting;20 reps;Bilateral;AROM   Ankle Pumps Sitting;20 reps;Bilateral;AROM  (+ heel raises x 20 reps)   Balance training  dynamic balance EOB x 15 minutes while performing LE ther-ex; S level; VC for hand placement to correct for lateral lean toward R side   Assessment   Prognosis Fair   Problem List Decreased strength;Decreased endurance; Impaired balance;Decreased mobility   Assessment PT initiated treatment session in order to assist patient in achieving goals to improve sitting balance, LE strength, and overall activity tolerance  Patient pleasant, cooperative, and motivated to participate in therapy however reports being fearful of falling and required emotional support for EOB tasks  She maintained sitting EOB x 15 minutes with S while participating in LE there-ex  Throughout treatment session patient required both verbal and tactile cuing to improve safety, efficiency, and mechanics of mobility in addition to hands on assistance for all aspects of functional mobility  Additionally, she required increased time to execute specific mobility tasks with rest breaks in between secondary to gross fatigue and weakness  PT d/c recommendation remains for rehab  Patient will continue to benefit from continued skilled PT this admission to achieve maximal function and safety  Goals   Patient Goals to get stronger   STG Expiration Date 10/17/22   PT Treatment Day 4   Plan   Treatment/Interventions Functional transfer training;LE strengthening/ROM; Therapeutic exercise; Endurance training;Patient/family training;Equipment eval/education; Bed mobility;Gait training;OT;Spoke to nursing   Progress Slow progress, decreased activity tolerance  (+ fearful of falling- to trial standing/ambulation)   PT Frequency 3-5x/wk   Recommendation   PT Discharge Recommendation Post acute rehabilitation services   AM-PAC Basic Mobility Inpatient   Turning in Bed Without Bedrails 2   Lying on Back to Sitting on Edge of Flat Bed 2   Moving Bed to Chair 2   Standing Up From Chair 2   Walk in Room 2   Climb 3-5 Stairs 1   Basic Mobility Inpatient Raw Score 11   Basic Mobility Standardized Score 30 25   Highest Level Of Mobility   JH-HLM Goal 4: Move to chair/commode   JH-HLM Achieved 3: Sit at edge of bed     The patient's AM-PAC Basic Mobility Inpatient Standardized Score is less than 42 9, suggesting this patient may benefit from discharge to post-acute rehabilitation services  Please also refer to the recommendation of the Physical Therapist for safe discharge planning      ROSE ESTRADA PT, DPT

## 2022-10-13 NOTE — PROGRESS NOTES
[facility]  Progress Note Rosario Silverman 1940, 79 y o  female MRN: [id number]  Unit/Bed#: -[place] Encounter: [id number]  Primary Care Provider: Elliot Meza DO   Date and time admitted to hospital: 9/22/2022  3:35 PM    * Saddle embolus of pulmonary artery [facility])  Assessment & Plan  - initially presented with acute respiratory failure and hypoxia  - found to have an acute submassive high-risk pulmonary embolism  History of prior DVT in August in the right lower extremity  Unclear if patient was receiving Xarelto from nursing facility when she was discharged  - patient is hypercoagulable in the setting of recurrent breast cancer    - status post thrombectomy and IVC filter placement  - continue with weight based Lovenox indefinitely        Hematochezia  Assessment & Plan  - acutely developed overnight in the morning of 10/13  - patient was evaluated by both GI team and Gynecology as it was initially unclear whether she was having vaginal or rectal bleeding  - ultimately determined she was having rectal bleeding, which per GI team is to be expected given active UC  At this point the benefits of systemic anticoagulation in the setting of her recent PE outweigh current risks  It should be expected that she will have some bleeding while the UC is still active and she is on weight based Lovenox  Should bleeding worsened may need to reconsider anticoagulation  Okay to continue Lovenox though per GI and Gynecology teams      COVID-19 virus infection  Assessment & Plan  - incidentally found to be positive when being screened for discharge placement, however has since developed oxygen needs and is on 5 L  - previously on remdesivir though discontinued per family request  - continue IV Decadron 6 mg daily for up to 10 days  - wean O2 as tolerated  - weight based Lovenox for anticoagulation  - airborne and contact precautions    Metastatic breast cancer [facility])  Assessment & Plan  - Stage IV metastatic ER positive, AR negative, 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, progressed from stage I A ER/AR positive, 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 years ago  Status post resection and adjuvant radiation and hormone therapy for 5 years  Patient had symptoms of bony discomfort in her sternum in June of 2021, imaging revealed lytic lesion, biopsy in July of 2021 confirmed progression of disease  ? Patient was started on Faslodex and Xgeva at that time, in conjunction with radiation  ? In April 2022 there was interval development of 4 mm nodule at the right lower lobe and interval development of increased sclerotic lesions throughout the visualized spine  ? At that time, the patient's treatment was changed to [facility]  ? In July of 2022, the patient was changed from [facility] to [facility] because of intolerance  ? During the patient's hospitalization in August of 2022 to September of 2022, Verzenio was discontinued due to gastroenteritis  ? Patient's cancer is most likely contributing to her hypercoagulable state  ? Appreciate oncology and palliative care consult  ? Will continue on Lovenox for now  ? Continue on Remeron for insomnia and anxiety  ? Patient would not like any more treatment for her malignancy    Family meeting held on 10/04 with palliative care, GI, CM, and IM team  Patient's current clinical course discussed  Discussion of disposition planning occurred  Hospice was discussed  Patient is pending placement to nursing facility for continued rehab, and plans to transition to hospice when she clinically declines  Toxic gastroenteritis and colitis  Assessment & Plan  - Patient was admitted with toxic gastroenteritis in August of 2022   Determined to be possible IBD with additional insult of Verzenio from her breast cancer treatment    - continue vancomycin taper per GI  - started on Remicade 10/1 inpatient, and will be continued on an outpatient basis after discharge      Pressure injury of left buttock, stage 3 (HCC)  Assessment & Plan  - present on admission  - wound care consult    Acute respiratory failure with hypoxia ([address] )  Assessment & Plan  - secondary to COVID infection; see plan above    Urinary retention  Assessment & Plan  - marked urinary retention and bladder distention on admission  - failed voiding trial and urinary catheter replaced  - will plan to discharge with Crawley catheter  - status post empiric 3 day course of IV ceftriaxone    Anemia  Assessment & Plan  - Likely a mixed component of anemia chronic disease with possible folate deficiency  - Continue folate supplementation  - status post 1 unit PRBCs this admission; continue to monitor and transfuse for hemoglobin less than 7 0       Anxiety  Assessment & Plan  - Hold amitriptyline -possible etiology of tachycardia  - Will resume Remeron        VTE Pharmacologic Prophylaxis:   High Risk (Score >/= 5) - Pharmacological DVT Prophylaxis Ordered: enoxaparin (Lovenox)  Sequential Compression Devices Ordered  Patient Centered Rounds: I performed bedside rounds with nursing staff today  Discussions with Specialists or Other Care Team Provider: CM     Education and Discussions with Family / Patient: Updated  (daughter) via phone  Time Spent for Care: 30 minutes  More than 50% of total time spent on counseling and coordination of care as described above  Current Length of Stay: 21 day(s)  Current Patient Status: Inpatient   Certification Statement: The patient will continue to require additional inpatient hospital stay due to covid treatment, discharge planning  Discharge Plan: Anticipate discharge in 24-48 hrs to rehab facility  Code Status: Level 3 - DNAR and DNI    Subjective:   Patient seen and examined  Following up for multiple issues as above  Developed rectal bleed seen this morning  Has persisted, throughout the day remains hemodynamically stable  Hemoglobin stable thus far    No abdominal complaints and afebrile  Evaluated by both GI and Gynecology teams today  Objective:     Vitals:   Temp (24hrs), Av 6 °F (36 4 °C), Min:97 4 °F (36 3 °C), Max:97 9 °F (36 6 °C)    Temp:  [97 4 °F (36 3 °C)-97 9 °F (36 6 °C)] 97 5 °F (36 4 °C)  HR:  [72-78] 78  Resp:  [14-16] 16  BP: (117-121)/(62-68) 120/62  SpO2:  [97 %-100 %] 97 %  Body mass index is 24 94 kg/m²  Input and Output Summary (last 24 hours): Intake/Output Summary (Last 24 hours) at 10/13/2022 1649  Last data filed at 10/13/2022 1510  Gross per 24 hour   Intake 0 ml   Output 500 ml   Net -500 ml       Physical Exam:   PHYSICAL EXAM:    Vitals signs reviewed  Constitutional   Awake and cooperative  NAD  Head/Neck   Normocephalic  Atraumatic  HEENT   No scleral icterus  EOMI  Heart   Regular rate and rhythm  No murmers  Lungs   Clear to auscultation bilaterally  Respirations unlaboured  Abdomen   Soft  Nontender  Nondistended  Skin   Skin color normal  No rashes  Extremities   No deformities  No peripheral edema  Neuro   Alert and oriented  No new deficits  Psych   Mood stable  Affect normal          Additional Data:     Labs:  Results from last 7 days   Lab Units 10/13/22  1220 10/13/22  0308 10/11/22  0442   WBC Thousand/uL  --   --  6 35   HEMOGLOBIN g/dL 8 4*   < > 7 9*   HEMATOCRIT % 28 7*   < > 26 6*   PLATELETS Thousands/uL  --   --  222   NEUTROS PCT %  --   --  71   LYMPHS PCT %  --   --  18   MONOS PCT %  --   --  9   EOS PCT %  --   --  0    < > = values in this interval not displayed       Results from last 7 days   Lab Units 10/13/22  0308 10/11/22  0442 10/10/22  0614   SODIUM mmol/L 146   < > 146   POTASSIUM mmol/L 3 6   < > 3 6   CHLORIDE mmol/L 115*   < > 117*   CO2 mmol/L 29   < > 25   BUN mg/dL 7   < > 19   CREATININE mg/dL 0 46*   < > 0 62   ANION GAP mmol/L 2*   < > 4   CALCIUM mg/dL 7 5*   < > 6 9*   ALBUMIN g/dL  --   --  1 3*   TOTAL BILIRUBIN mg/dL  --   --  0 30   ALK PHOS U/L  --   --  86   ALT U/L  --   --  22   AST U/L  --   --  13   GLUCOSE RANDOM mg/dL 89   < > 83    < > = values in this interval not displayed  Lines/Drains:  Invasive Devices  Report    Peripherally Inserted Central Catheter Line  Duration           PICC Line 09/26/22 17 days          Drain  Duration           Urethral Catheter 14 Fr  5 days              Urinary Catheter:  Goal for removal: N/A- Discharging with Crawley         Central Line:  Goal for removal: Will discontinue when peripheral access obtained  Imaging: No pertinent imaging reviewed  Recent Cultures (last 7 days):   Results from last 7 days   Lab Units 10/08/22  0417   URINE CULTURE  >100,000 cfu/ml Escherichia coli*  >100,000 cfu/ml Klebsiella pneumoniae*  >100,000 cfu/ml Enterobacter cloacae*       Last 24 Hours Medication List:   Current Facility-Administered Medications   Medication Dose Route Frequency Provider Last Rate   • atorvastatin  40 mg Oral After Jay Jaye Woo [address], DO     • cholecalciferol  2,000 Units Oral Daily Ailyn Minors, DO     • dexamethasone  6 mg Intravenous Q24H Jersey Camacho DO     • enoxaparin  1 mg/kg Subcutaneous Q12H [address] Ken Chung, DO     • folic acid  1 mg Oral Daily Ailyn Minors, DO     • metoprolol  2 5 mg Intravenous Q6H PRN Janeth Benitez MD     • metoprolol succinate  75 mg Oral BID BROOKE Brown     • midodrine  2 5 mg Oral TID AC Nadine Wynne MD     • mirtazapine  7 5 mg Oral HS Jersey Camacho, DO     • ALYSSA ANTIFUNGAL   Topical BID Lashae Chung DO     • pantoprazole  40 mg Oral BID AC Ailyn Ortega DO     • vancomycin  125 mg Oral Daily Riddhi Rios DO          Today, Patient Was Seen By: Lashae Chung    **Please Note: This note may have been constructed using a voice recognition system  **

## 2022-10-13 NOTE — CONSULTS
Consult - OB/GYN   Maryyadira Silverman 79 y o  female MRN: [id number]  Unit/Bed#: -01 Encounter: [id number]    79 y o  post-menopausal female with rectal bleeding  Chief complaint:  "I'm bleeding"    HPI:  Wil Anguiano is an 78 yo F with PMHx of metastatic breast cancer, CKD, HLD, UC, admitted s/p mechanical fall on 9/22/22, found to have saddle PE, now on Lovenox  She was diagnosed with UC during this admission and was started on Remicade  She was noted to have rectal bleeding at the time of a bowel movement yesterday   Per GI evaluation this morning, rectum deemed to not be the source of bleeding, and suspected to be vaginal      Active Problems:  Patient Active Problem List   Diagnosis   • Metastatic breast cancer ([facility] [address] )   • Adverse reaction to pneumococcal vaccine   • Anxiety   • Cataract, bilateral   • Mixed hyperlipidemia   • Pulmonary nodule seen on imaging study   • Sleep disorder   • Chronic kidney disease (CKD) stage G3a/A1, moderately decreased glomerular filtration rate (GFR) between 45-59 mL/min/1 73 square meter and albuminuria creatinine ratio less than 30 mg/g (HCC)   • Primary hypertension   • Osteopenia of multiple sites   • Acute costochondritis   • Lytic lesion of bone on x-ray   • Neoplasm of uncertain behavior of sternum   • Rectal bleeding   • Toxic gastroenteritis and colitis   • Secondary malignant neoplasm of bone (HCC)   • Diarrhea   • Hypokalemia   • Anemia   • Severe protein-calorie malnutrition (HCC)   • Single subsegmental pulmonary embolism without acute cor pulmonale (HCC)   • Tachycardia   • Venous insufficiency   • Saddle embolus of pulmonary artery (HCC)   • Urinary retention   • Swelling of lower extremity   • Ulcerative pancolitis with rectal bleeding (HCC)   • Acute respiratory failure with hypoxia (HCC)   • COVID-19 virus infection   • Pressure injury of left buttock, stage 3 (HCC)       PMH:  Past Medical History:   Diagnosis Date   • Abnormal weight loss    • Allergic reaction • Anxiety    • Breast cancer, right [facility]) 2011    right   • Cancer ([facility] [address] ) 2012   • Candidal vulvovaginitis    • Clotting disorder ([facility] ) Same as above   • Endometrial hyperplasia    • Epithelial cyst     benign, ovary   • GI (gastrointestinal bleed) Blood mixed with stool   • History of radiation therapy 2011    right breast cancer   • Hyperlipidemia    • Hypertension    • Internal hemorrhoids    • Knee tendonitis    • Ovarian cyst    • 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([facility] )        PSH:  Past Surgical History:   Procedure Laterality Date   • BILATERAL SALPINGOOPHORECTOMY      onset: 7/23/13   • BREAST BIOPSY Right 06/13/2006    benign   • BREAST BIOPSY Right 03/02/2011    malignant   • BREAST LUMPECTOMY Right 03/30/2011    malignant   • CATARACT EXTRACTION W/  INTRAOCULAR LENS IMPLANT Right     phacoemulsification  Onset: 10/27/14   • CHOLECYSTECTOMY     • COLONOSCOPY  2017    approx   • HYSTERECTOMY  Yes   • INTRAOPERATIVE RADIATION THERAPY ([facility])     • IR BIOPSY BONE  7/9/2021   • IR IVC FILTER PLACEMENT OPTIONAL/TEMPORARY  9/23/2022   • IR PE ENDOVASCULAR THERAPY  9/22/2022   • IR PICC PLACEMENT SINGLE LUMEN  8/19/2022   • IR PICC PLACEMENT SINGLE LUMEN  9/26/2022   • SKIN LESION EXCISION Right     breast, single lesion   • TONSILLECTOMY AND ADENOIDECTOMY       Meds:  No current facility-administered medications on file prior to encounter       Current Outpatient Medications on File Prior to Encounter   Medication Sig Dispense Refill   • amitriptyline (ELAVIL) 25 mg tablet Take 1 tablet (25 mg total) by mouth daily at bedtime 90 tablet 1   • Cholecalciferol (VITAMIN D3) 2000 units capsule Take 2,000 Units by mouth daily       • cholestyramine sugar free (QUESTRAN LIGHT) 4 g packet Take 1 packet (4 g total) by mouth daily at bedtime 30 packet 0   • diphenoxylate-atropine (LOMOTIL) 2 5-0 025 mg per tablet Take 1 tablet by mouth 4 (four) times a day as needed for diarrhea 30 tablet 0   • folic acid (FOLVITE) 1 mg tablet Take 1 tablet (1 mg total) by mouth daily 30 tablet 0   • loperamide (IMODIUM) 2 mg capsule Take 2 capsules (4 mg total) by mouth 4 (four) times a day as needed for diarrhea 30 capsule 0   • metoprolol tartrate (LOPRESSOR) 50 mg tablet Take 1 tablet (50 mg total) by mouth every 12 (twelve) hours 60 tablet 0   • mirtazapine (REMERON) 7 5 MG tablet Take 1 tablet (7 5 mg total) by mouth daily at bedtime 30 tablet 0   • pantoprazole (PROTONIX) 40 mg tablet Take 1 tablet (40 mg total) by mouth 2 (two) times a day before meals 30 tablet 0   • predniSONE 20 mg tablet Take 2 tablets (40 mg total) by mouth daily 60 tablet 0   • rivaroxaban (XARELTO) 15 mg tablet Take 1 tablet (15 mg total) by mouth 2 (two) times a day with meals for 20 days 40 tablet 0   • rivaroxaban (Xarelto) 20 mg tablet Take 1 tablet (20 mg total) by mouth daily with breakfast 30 tablet 0   • simethicone (MYLICON) 80 mg chewable tablet Chew 1 tablet (80 mg total) 4 (four) times a day (with meals and at bedtime) 30 tablet 0   • simvastatin (ZOCOR) 10 mg tablet Take 1 tablet (10 mg total) by mouth daily at bedtime 90 tablet 1   • [DISCONTINUED] mesalamine (DELZICOL) 400 mg Take 2 capsules (800 mg total) by mouth 3 (three) times a day 90 capsule 0       Allergies: Allergies   Allergen Reactions   • Sulfa Antibiotics    • Pneumococcal Polysaccharide Vaccine Rash and Edema       Physical Exam:  /62 (BP Location: Left arm)   Pulse 78   Temp 97 5 °F (36 4 °C) (Oral)   Resp 16   Ht 5' 1" (1 549 m)   Wt 59 9 kg (132 lb)   LMP  (LMP Unknown)   SpO2 97%   BMI 24 94 kg/m²     Physical Exam  HENT:      Head: Normocephalic  Eyes:      Conjunctiva/sclera: Conjunctivae normal    Cardiovascular:      Rate and Rhythm: Normal rate  Pulses: Normal pulses  Pulmonary:      Effort: Pulmonary effort is normal    Abdominal:      Palpations: Abdomen is soft  Tenderness: There is no abdominal tenderness  Genitourinary:     General: Normal vulva  Comments: Speculum: Atrophic vaginal and cervical mucosa, with no bleeding noted  Bright red blood noted around anus  Rectal exam performed by Dr Lou Cotto (Internal Medicine) +bright red blood  Skin:     General: Skin is warm  Capillary Refill: Capillary refill takes less than 2 seconds  Neurological:      Mental Status: She is alert  Psychiatric:         Mood and Affect: Mood normal        Lab Results   Component Value Date    WBC 6 35 10/11/2022    HGB 8 4 (L) 10/13/2022    HCT 28 7 (L) 10/13/2022    MCV 99 (H) 10/11/2022     10/11/2022     Lab Results   Component Value Date    SODIUM 146 10/13/2022    K 3 6 10/13/2022     (H) 10/13/2022    CO2 29 10/13/2022    BUN 7 10/13/2022    CREATININE 0 46 (L) 10/13/2022    GLUC 89 10/13/2022    CALCIUM 7 5 (L) 10/13/2022     Assessment:   79 y o  post-menopausal female with newly diagnosed UC on Remicade, with rectal bleeding, vaginal bleeding ruled out  Plan:   1  Given no vaginal bleeding, no further Gyn intervention indicated at this time  2  Continue care per primary and GI team     Discussed with Dr Vanda Macias

## 2022-10-13 NOTE — ASSESSMENT & PLAN NOTE
- acutely developed overnight in the morning of 10/13  - secondary to ulcerative colitis and complicated by systemic anticoagulation  - multiple discussions held with GI  There is no contraindication to continuing anticoagulation  Some amount of bleeding should be expected, and fortunately her hematochezia has improved    - She will be receiving her 2nd infusion of Remicade on 10/19

## 2022-10-13 NOTE — QUICK NOTE
TT per run pt noted to have some blood in stool but when cleaned up per rn likely more vaginal  Would continue to monitor at this time if further bleeding noted would consider gyn  vss remain stable and labs collected per rn pending result

## 2022-10-13 NOTE — PLAN OF CARE
Problem: Potential for Falls  Goal: Patient will remain free of falls  Description: INTERVENTIONS:  - Educate patient/family on patient safety including physical limitations  - Instruct patient to call for assistance with activity   - Consult OT/PT to assist with strengthening/mobility   - Keep Call bell within reach  - Keep bed low and locked with side rails adjusted as appropriate  - Keep care items and personal belongings within reach  - Initiate and maintain comfort rounds  - Make Fall Risk Sign visible to staff  - Offer Toileting every  Hours, in advance of need  - Initiate/Maintain alarm  - Obtain necessary fall risk management equipment:  - Apply yellow socks and bracelet for high fall risk patients  - Consider moving patient to room near nurses station  Outcome: Progressing     Problem: MOBILITY - ADULT  Goal: Maintain or return to baseline ADL function  Description: INTERVENTIONS:  -  Assess patient's ability to carry out ADLs; assess patient's baseline for ADL function and identify physical deficits which impact ability to perform ADLs (bathing, care of mouth/teeth, toileting, grooming, dressing, etc )  - Assess/evaluate cause of self-care deficits   - Assess range of motion  - Assess patient's mobility; develop plan if impaired  - Assess patient's need for assistive devices and provide as appropriate  - Encourage maximum independence but intervene and supervise when necessary  - Involve family in performance of ADLs  - Assess for home care needs following discharge   - Consider OT consult to assist with ADL evaluation and planning for discharge  - Provide patient education as appropriate  Outcome: Progressing  Goal: Maintains/Returns to pre admission functional level  Description: INTERVENTIONS:  - Perform BMAT or MOVE assessment daily    - Set and communicate daily mobility goal to care team and patient/family/caregiver     - Collaborate with rehabilitation services on mobility goals if consulted  - Perform Range of Motion  times a day  - Reposition patient every hours    - Dangle patient  times a day  - Stand patient times a day  - Ambulate patient times a day  - Out of bed to chair  times a day   - Out of bed for meals times a day  - Out of bed for toileting  - Record patient progress and toleration of activity level   Outcome: Progressing     Problem: Prexisting or High Potential for Compromised Skin Integrity  Goal: Skin integrity is maintained or improved  Description: INTERVENTIONS:  - Identify patients at risk for skin breakdown  - Assess and monitor skin integrity  - Assess and monitor nutrition and hydration status  - Monitor labs   - Assess for incontinence   - Turn and reposition patient  - Assist with mobility/ambulation  - Relieve pressure over bony prominences  - Avoid friction and shearing  - Provide appropriate hygiene as needed including keeping skin clean and dry  - Evaluate need for skin moisturizer/barrier cream  - Collaborate with interdisciplinary team   - Patient/family teaching  - Consider wound care consult   Outcome: Progressing     Problem: GENITOURINARY - ADULT  Goal: Maintains or returns to baseline urinary function  Description: INTERVENTIONS:  - Assess urinary function  - Encourage oral fluids to ensure adequate hydration if ordered  - Administer IV fluids as ordered to ensure adequate hydration  - Administer ordered medications as needed  - Offer frequent toileting  - Follow urinary retention protocol if ordered  Outcome: Progressing  Goal: Absence of urinary retention  Description: INTERVENTIONS:  - Assess patient’s ability to void and empty bladder  - Monitor I/O  - Bladder scan as needed  - Discuss with physician/AP medications to alleviate retention as needed  - Discuss catheterization for long term situations as appropriate  Outcome: Progressing  Goal: Urinary catheter remains patent  Description: INTERVENTIONS:  - Assess patency of urinary catheter  - If patient has a chronic suarez, consider changing catheter if non-functioning  - Follow guidelines for intermittent irrigation of non-functioning urinary catheter  Outcome: Progressing     Problem: METABOLIC, FLUID AND ELECTROLYTES - ADULT  Goal: Electrolytes maintained within normal limits  Description: INTERVENTIONS:  - Monitor labs and assess patient for signs and symptoms of electrolyte imbalances  - Administer electrolyte replacement as ordered  - Monitor response to electrolyte replacements, including repeat lab results as appropriate  - Instruct patient on fluid and nutrition as appropriate  Outcome: Progressing  Goal: Fluid balance maintained  Description: INTERVENTIONS:  - Monitor labs   - Monitor I/O and WT  - Instruct patient on fluid and nutrition as appropriate  - Assess for signs & symptoms of volume excess or deficit  Outcome: Progressing  Goal: Glucose maintained within target range  Description: INTERVENTIONS:  - Monitor Blood Glucose as ordered  - Assess for signs and symptoms of hyperglycemia and hypoglycemia  - Administer ordered medications to maintain glucose within target range  - Assess nutritional intake and initiate nutrition service referral as needed  Outcome: Progressing     Problem: SKIN/TISSUE INTEGRITY - ADULT  Goal: Skin Integrity remains intact(Skin Breakdown Prevention)  Description: Assess:  -Perform Osito assessment every   -Clean and moisturize skin every  -Inspect skin when repositioning, toileting, and assisting with ADLS  -Assess under medical devices such as  every   -Assess extremities for adequate circulation and sensation     Bed Management:  -Have minimal linens on bed & keep smooth, unwrinkled  -Change linens as needed when moist or perspiring  -Avoid sitting or lying in one position for more than  hours while in bed  -Keep HOB atdegrees     Toileting:  -Offer bedside commode  -Assess for incontinence every   -Use incontinent care products after each incontinent episode such as    Activity:  -Mobilize patient  times a day  -Encourage activity and walks on unit  -Encourage or provide ROM exercises   -Turn and reposition patient every Hours  -Use appropriate equipment to lift or move patient in bed  -Instruct/ Assist with weight shifting every  when out of bed in chair  -Consider limitation of chair time hour intervals    Skin Care:  -Avoid use of baby powder, tape, friction and shearing, hot water or constrictive clothing  -Relieve pressure over bony prominences using   -Do not massage red bony areas    Next Steps:  -Teach patient strategies to minimize risks such as    -Consider consults to  interdisciplinary teams such as  Outcome: Progressing  Goal: Incision(s), wounds(s) or drain site(s) healing without S/S of infection  Description: INTERVENTIONS  - Assess and document dressing, incision, wound bed, drain sites and surrounding tissue  - Provide patient and family education  - Perform skin care/dressing changes every   Outcome: Progressing  Goal: Pressure injury heals and does not worsen  Description: Interventions:  - Implement low air loss mattress or specialty surface (Criteria met)  - Apply silicone foam dressing  - Instruct/assist with weight shifting every  minutes when in chair   - Limit chair time to  hour intervals  - Use special pressure reducing interventions such as when in chair   - Apply fecal or urinary incontinence containment device   - Perform passive or active ROM every   - Turn and reposition patient & offload bony prominences every hours   - Utilize friction reducing device or surface for transfers   - Consider consults to  interdisciplinary teams such as   - Use incontinent care products after each incontinent episode such as  - Consider nutrition services referral as needed  Outcome: Progressing     Problem: PAIN - ADULT  Goal: Verbalizes/displays adequate comfort level or baseline comfort level  Description: Interventions:  - Encourage patient to monitor pain and request assistance  - Assess pain using appropriate pain scale  - Administer analgesics based on type and severity of pain and evaluate response  - Implement non-pharmacological measures as appropriate and evaluate response  - Consider cultural and social influences on pain and pain management  - Notify physician/advanced practitioner if interventions unsuccessful or patient reports new pain  Outcome: Progressing     Problem: Nutrition/Hydration-ADULT  Goal: Nutrient/Hydration intake appropriate for improving, restoring or maintaining nutritional needs  Description: Monitor and assess patient's nutrition/hydration status for malnutrition  Collaborate with interdisciplinary team and initiate plan and interventions as ordered  Monitor patient's weight and dietary intake as ordered or per policy  Utilize nutrition screening tool and intervene as necessary  Determine patient's food preferences and provide high-protein, high-caloric foods as appropriate       INTERVENTIONS:  - Monitor oral intake, urinary output, labs, and treatment plans  - Assess nutrition and hydration status and recommend course of action  - Evaluate amount of meals eaten  - Assist patient with eating if necessary   - Allow adequate time for meals  - Recommend/ encourage appropriate diets, oral nutritional supplements, and vitamin/mineral supplements  - Order, calculate, and assess calorie counts as needed  - Recommend, monitor, and adjust tube feedings and TPN/PPN based on assessed needs  - Assess need for intravenous fluids  - Provide specific nutrition/hydration education as appropriate  - Include patient/family/caregiver in decisions related to nutrition  Outcome: Progressing

## 2022-10-13 NOTE — CASE MANAGEMENT
Case Management Discharge Planning Note    Patient name Susan Silverman  Location /-84 MRN [id number]  : 1940 Date 10/13/2022       Current Admission Date: 2022  Current Admission Diagnosis:Saddle embolus of pulmonary artery [facility])   Patient Active Problem List    Diagnosis Date Noted   • COVID-19 virus infection 10/11/2022   • Pressure injury of left buttock, stage 3 ([facility] [address] ) 10/11/2022   • Acute respiratory failure with hypoxia ([facility] ) 10/09/2022   • Ulcerative pancolitis with rectal bleeding ([place] [address] ) 10/03/2022   • Saddle embolus of pulmonary artery ([place] [address] ) 2022   • Urinary retention 2022   • Swelling of lower extremity 2022   • Venous insufficiency 2022   • Tachycardia 2022   • Single subsegmental pulmonary embolism without acute cor pulmonale ([facility] ) 2022   • Severe protein-calorie malnutrition ([facility] ) 2022   • Anemia 2022   • Hypokalemia 2022   • Diarrhea 2022   • Secondary malignant neoplasm of bone ([place] [address] ) 2022   • Toxic gastroenteritis and colitis 2022   • Rectal bleeding 10/14/2021   • Lytic lesion of bone on x-ray 2021   • Neoplasm of uncertain behavior of sternum 2021   • Acute costochondritis 2021   • Osteopenia of multiple sites 2020   • Primary hypertension 10/22/2019   • Chronic kidney disease (CKD) stage G3a/A1, moderately decreased glomerular filtration rate (GFR) between 45-59 mL/min/1 73 square meter and albuminuria creatinine ratio less than 30 mg/g (HCC) 2019   • Adverse reaction to pneumococcal vaccine 2015   • Cataract, bilateral 2014   • Pulmonary nodule seen on imaging study 09/10/2013   • Sleep disorder 2013   • Anxiety 2013   • Metastatic [facility] cancer ([facility] ) 10/17/2012   • Mixed hyperlipidemia 2012      LOS (days): 21  Geometric Mean LOS (GMLOS) (days): 8 10  Days to GMLOS:-12 7     OBJECTIVE:  Risk of Unplanned Readmission Score: 37 85 Current admission status: Inpatient   Preferred Pharmacy:   [facility] DR ROBIN BAUMAN [facility], [address]  [address] [facility]  Phone: [phone] Fax: [phone] [address] [facility], [address]  [address] [facility]  [address]  [address] [facility] [address]  Phone: [phone] Fax: [phone] [address] [place], [facility] [place] [address] [facility] [address] [facility] [address] [address]  Phone: [phone] Fax: [phone]    Primary Care Provider: Deanna Martin DO    Primary Insurance: MEDICARE  Secondary Insurance: [employer]    DISCHARGE DETAILS:    Discharge planning discussed with[de-identified] Tamra Minor                                     Other Referral/Resources/Interventions Provided:  Referral Comments: CM was informed by Dr Patito Clemens that patient now has rectal bleeding, not stable for discharge  Dr Dylon Gilliland requesting CM to cancel transport request for rehab and to inform daughter  CM informed Taya that discharge is cancelled at this time and Dr Patito Clemens will be providing further medical update  CM cancelled transport via [facility]  CM informed Kun via [address] message that discharge is canncelled at this time

## 2022-10-13 NOTE — PLAN OF CARE
Problem: PHYSICAL THERAPY ADULT  Goal: Performs mobility at highest level of function for planned discharge setting  See evaluation for individualized goals  Description: Treatment/Interventions: Functional transfer training, LE strengthening/ROM, Therapeutic exercise, Endurance training, Bed mobility, Gait training, Spoke to nursing, OT  Equipment Recommended:  (r/o rw)       See flowsheet documentation for full assessment, interventions and recommendations  Outcome: Progressing  Note: Prognosis: Fair  Problem List: Decreased strength, Decreased endurance, Impaired balance, Decreased mobility  Assessment: PT initiated treatment session in order to assist patient in achieving goals to improve sitting balance, LE strength, and overall activity tolerance  Patient pleasant, cooperative, and motivated to participate in therapy however reports being fearful of falling and required emotional support for EOB tasks  She maintained sitting EOB x 15 minutes with S while participating in LE there-ex  Throughout treatment session patient required both verbal and tactile cuing to improve safety, efficiency, and mechanics of mobility in addition to hands on assistance for all aspects of functional mobility  Additionally, she required increased time to execute specific mobility tasks with rest breaks in between secondary to gross fatigue and weakness  PT d/c recommendation remains for rehab  Patient will continue to benefit from continued skilled PT this admission to achieve maximal function and safety  Barriers to Discharge: Decreased caregiver support     PT Discharge Recommendation: Post acute rehabilitation services    See flowsheet documentation for full assessment

## 2022-10-14 LAB
ABO GROUP BLD: NORMAL
ALBUMIN SERPL BCP-MCNC: 1.6 G/DL (ref 3.5–5)
ALP SERPL-CCNC: 84 U/L (ref 46–116)
ALT SERPL W P-5'-P-CCNC: 24 U/L (ref 12–78)
ANION GAP SERPL CALCULATED.3IONS-SCNC: 1 MMOL/L (ref 4–13)
ANION GAP SERPL CALCULATED.3IONS-SCNC: 3 MMOL/L (ref 4–13)
AST SERPL W P-5'-P-CCNC: 17 U/L (ref 5–45)
BASOPHILS # BLD AUTO: 0.01 THOUSANDS/ΜL (ref 0–0.1)
BASOPHILS NFR BLD AUTO: 0 % (ref 0–1)
BILIRUB SERPL-MCNC: 0.28 MG/DL (ref 0.2–1)
BLD GP AB SCN SERPL QL: NEGATIVE
BUN SERPL-MCNC: 7 MG/DL (ref 5–25)
BUN SERPL-MCNC: 8 MG/DL (ref 5–25)
CALCIUM ALBUM COR SERPL-MCNC: 9.1 MG/DL (ref 8.3–10.1)
CALCIUM SERPL-MCNC: 7.2 MG/DL (ref 8.3–10.1)
CALCIUM SERPL-MCNC: 7.2 MG/DL (ref 8.3–10.1)
CHLORIDE SERPL-SCNC: 114 MMOL/L (ref 96–108)
CHLORIDE SERPL-SCNC: 114 MMOL/L (ref 96–108)
CO2 SERPL-SCNC: 28 MMOL/L (ref 21–32)
CO2 SERPL-SCNC: 29 MMOL/L (ref 21–32)
CREAT SERPL-MCNC: 0.36 MG/DL (ref 0.6–1.3)
CREAT SERPL-MCNC: 0.39 MG/DL (ref 0.6–1.3)
EOSINOPHIL # BLD AUTO: 0.07 THOUSAND/ΜL (ref 0–0.61)
EOSINOPHIL NFR BLD AUTO: 1 % (ref 0–6)
ERYTHROCYTE [DISTWIDTH] IN BLOOD BY AUTOMATED COUNT: 16.3 % (ref 11.6–15.1)
ERYTHROCYTE [DISTWIDTH] IN BLOOD BY AUTOMATED COUNT: 16.5 % (ref 11.6–15.1)
GFR SERPL CREATININE-BSD FRML MDRD: 100 ML/MIN/1.73SQ M
GFR SERPL CREATININE-BSD FRML MDRD: 98 ML/MIN/1.73SQ M
GLUCOSE SERPL-MCNC: 123 MG/DL (ref 65–140)
GLUCOSE SERPL-MCNC: 79 MG/DL (ref 65–140)
HCT VFR BLD AUTO: 24.9 % (ref 34.8–46.1)
HCT VFR BLD AUTO: 25 % (ref 34.8–46.1)
HCT VFR BLD AUTO: 25.9 % (ref 34.8–46.1)
HCT VFR BLD AUTO: 28 % (ref 34.8–46.1)
HCT VFR BLD AUTO: 30.4 % (ref 34.8–46.1)
HGB BLD-MCNC: 7.5 G/DL (ref 11.5–15.4)
HGB BLD-MCNC: 7.6 G/DL (ref 11.5–15.4)
HGB BLD-MCNC: 7.9 G/DL (ref 11.5–15.4)
HGB BLD-MCNC: 8.4 G/DL (ref 11.5–15.4)
HGB BLD-MCNC: 9.2 G/DL (ref 11.5–15.4)
IMM GRANULOCYTES # BLD AUTO: 0.12 THOUSAND/UL (ref 0–0.2)
IMM GRANULOCYTES NFR BLD AUTO: 2 % (ref 0–2)
LYMPHOCYTES # BLD AUTO: 1.41 THOUSANDS/ΜL (ref 0.6–4.47)
LYMPHOCYTES NFR BLD AUTO: 26 % (ref 14–44)
MCH RBC QN AUTO: 30.2 PG (ref 26.8–34.3)
MCH RBC QN AUTO: 30.4 PG (ref 26.8–34.3)
MCHC RBC AUTO-ENTMCNC: 30.4 G/DL (ref 31.4–37.4)
MCHC RBC AUTO-ENTMCNC: 30.5 G/DL (ref 31.4–37.4)
MCV RBC AUTO: 100 FL (ref 82–98)
MCV RBC AUTO: 99 FL (ref 82–98)
MONOCYTES # BLD AUTO: 0.49 THOUSAND/ΜL (ref 0.17–1.22)
MONOCYTES NFR BLD AUTO: 9 % (ref 4–12)
NEUTROPHILS # BLD AUTO: 3.41 THOUSANDS/ΜL (ref 1.85–7.62)
NEUTS SEG NFR BLD AUTO: 62 % (ref 43–75)
NRBC BLD AUTO-RTO: 0 /100 WBCS
PLATELET # BLD AUTO: 181 THOUSANDS/UL (ref 149–390)
PLATELET # BLD AUTO: 201 THOUSANDS/UL (ref 149–390)
PMV BLD AUTO: 10 FL (ref 8.9–12.7)
PMV BLD AUTO: 10.2 FL (ref 8.9–12.7)
POTASSIUM SERPL-SCNC: 3.3 MMOL/L (ref 3.5–5.3)
POTASSIUM SERPL-SCNC: 3.4 MMOL/L (ref 3.5–5.3)
PROT SERPL-MCNC: 4.5 G/DL (ref 6.4–8.4)
RBC # BLD AUTO: 2.52 MILLION/UL (ref 3.81–5.12)
RBC # BLD AUTO: 2.6 MILLION/UL (ref 3.81–5.12)
RH BLD: NEGATIVE
SODIUM SERPL-SCNC: 144 MMOL/L (ref 135–147)
SODIUM SERPL-SCNC: 145 MMOL/L (ref 135–147)
SPECIMEN EXPIRATION DATE: NORMAL
WBC # BLD AUTO: 4.87 THOUSAND/UL (ref 4.31–10.16)
WBC # BLD AUTO: 5.51 THOUSAND/UL (ref 4.31–10.16)

## 2022-10-14 PROCEDURE — 85018 HEMOGLOBIN: CPT | Performed by: INTERNAL MEDICINE

## 2022-10-14 PROCEDURE — 99233 SBSQ HOSP IP/OBS HIGH 50: CPT | Performed by: INTERNAL MEDICINE

## 2022-10-14 PROCEDURE — 80048 BASIC METABOLIC PNL TOTAL CA: CPT | Performed by: INTERNAL MEDICINE

## 2022-10-14 PROCEDURE — 86850 RBC ANTIBODY SCREEN: CPT | Performed by: NURSE PRACTITIONER

## 2022-10-14 PROCEDURE — 85014 HEMATOCRIT: CPT | Performed by: INTERNAL MEDICINE

## 2022-10-14 PROCEDURE — 80053 COMPREHEN METABOLIC PANEL: CPT | Performed by: NURSE PRACTITIONER

## 2022-10-14 PROCEDURE — 85025 COMPLETE CBC W/AUTO DIFF WBC: CPT | Performed by: NURSE PRACTITIONER

## 2022-10-14 PROCEDURE — 86901 BLOOD TYPING SEROLOGIC RH(D): CPT | Performed by: NURSE PRACTITIONER

## 2022-10-14 PROCEDURE — 86900 BLOOD TYPING SEROLOGIC ABO: CPT | Performed by: NURSE PRACTITIONER

## 2022-10-14 PROCEDURE — 85027 COMPLETE CBC AUTOMATED: CPT | Performed by: INTERNAL MEDICINE

## 2022-10-14 RX ORDER — ENOXAPARIN SODIUM 100 MG/ML
1 INJECTION SUBCUTANEOUS EVERY 12 HOURS SCHEDULED
Status: DISCONTINUED | OUTPATIENT
Start: 2022-10-14 | End: 2022-10-14

## 2022-10-14 RX ORDER — POTASSIUM CHLORIDE 14.9 MG/ML
20 INJECTION INTRAVENOUS
Status: COMPLETED | OUTPATIENT
Start: 2022-10-14 | End: 2022-10-14

## 2022-10-14 RX ORDER — POTASSIUM CHLORIDE 20 MEQ/1
40 TABLET, EXTENDED RELEASE ORAL ONCE
Status: COMPLETED | OUTPATIENT
Start: 2022-10-14 | End: 2022-10-14

## 2022-10-14 RX ADMIN — Medication 125 MG: at 09:34

## 2022-10-14 RX ADMIN — DEXAMETHASONE SODIUM PHOSPHATE 6 MG: 4 INJECTION INTRA-ARTICULAR; INTRALESIONAL; INTRAMUSCULAR; INTRAVENOUS; SOFT TISSUE at 12:17

## 2022-10-14 RX ADMIN — MIRTAZAPINE 7.5 MG: 15 TABLET, FILM COATED ORAL at 20:11

## 2022-10-14 RX ADMIN — Medication 2000 UNITS: at 09:33

## 2022-10-14 RX ADMIN — ATORVASTATIN CALCIUM 40 MG: 40 TABLET, FILM COATED ORAL at 17:58

## 2022-10-14 RX ADMIN — CARBIDOPA AND LEVODOPA 2.5 MG: 50; 200 TABLET, EXTENDED RELEASE ORAL at 17:58

## 2022-10-14 RX ADMIN — POTASSIUM CHLORIDE 20 MEQ: 14.9 INJECTION, SOLUTION INTRAVENOUS at 11:30

## 2022-10-14 RX ADMIN — CARBIDOPA AND LEVODOPA 2.5 MG: 50; 200 TABLET, EXTENDED RELEASE ORAL at 12:16

## 2022-10-14 RX ADMIN — ENOXAPARIN SODIUM 60 MG: 60 INJECTION SUBCUTANEOUS at 12:16

## 2022-10-14 RX ADMIN — PANTOPRAZOLE SODIUM 40 MG: 40 TABLET, DELAYED RELEASE ORAL at 17:58

## 2022-10-14 RX ADMIN — FOLIC ACID 1 MG: 1 TABLET ORAL at 09:33

## 2022-10-14 RX ADMIN — POTASSIUM CHLORIDE 40 MEQ: 1500 TABLET, EXTENDED RELEASE ORAL at 03:59

## 2022-10-14 RX ADMIN — MICONAZOLE NITRATE: 20 CREAM TOPICAL at 17:58

## 2022-10-14 RX ADMIN — PANTOPRAZOLE SODIUM 40 MG: 40 TABLET, DELAYED RELEASE ORAL at 05:37

## 2022-10-14 RX ADMIN — METOPROLOL SUCCINATE 75 MG: 50 TABLET, EXTENDED RELEASE ORAL at 09:33

## 2022-10-14 RX ADMIN — METOPROLOL SUCCINATE 75 MG: 50 TABLET, EXTENDED RELEASE ORAL at 20:12

## 2022-10-14 RX ADMIN — MICONAZOLE NITRATE: 20 CREAM TOPICAL at 09:34

## 2022-10-14 RX ADMIN — CARBIDOPA AND LEVODOPA 2.5 MG: 50; 200 TABLET, EXTENDED RELEASE ORAL at 06:07

## 2022-10-14 RX ADMIN — POTASSIUM CHLORIDE 20 MEQ: 14.9 INJECTION, SOLUTION INTRAVENOUS at 09:34

## 2022-10-14 NOTE — PROGRESS NOTES
[address] [facility]  Progress Note Jude Silverman 1940, 79 y o  female MRN: [id number]  Unit/Bed#: -[place] Encounter: [id number]  Primary Care Provider: Gus Monzon DO   Date and time admitted to hospital: 9/22/2022  3:35 PM    * Saddle embolus of pulmonary artery [facility])  Assessment & Plan  - initially presented with acute respiratory failure and hypoxia  - found to have an acute submassive high-risk pulmonary embolism  History of prior DVT in August in the right lower extremity  Unclear if patient was receiving Xarelto from nursing facility when she was discharged  - patient is hypercoagulable in the setting of recurrent breast cancer    - status post thrombectomy and IVC filter placement  - continue with weight based Lovenox indefinitely        Hematochezia  Assessment & Plan  - acutely developed overnight in the morning of 10/13  - secondary to ulcerative colitis and complicated by systemic anticoagulation  - have had extensive discussions with the GI team on both 10/13 and 10/14  There are not many options regarding the treatment of her ulcerative colitis  She is already on systemic steroids, and has been initiated on Remicade  They have been clear, that some bleeding should expected with the ulcerative colitis and anticoagulation  For now will continue with full-dose Lovenox and monitor hemoglobin q 8 hours  Will need to transfuse for hemoglobin less than 7 0    Will plan to monitor over the weekend, and if bleeding persists or worsen will need to investigate alternative strategies with GI team     COVID-19 virus infection  Assessment & Plan  - incidentally found to be positive when being screened for discharge placement, however has since developed oxygen needs and is on 5 L  - previously on remdesivir though discontinued per family request  - continue IV Decadron 6 mg daily for up to 10 days  - wean O2 as tolerated  - weight based Lovenox for anticoagulation  - airborne and contact precautions    Metastatic breast cancer ([facility] [address] )  Assessment & Plan  - Stage IV metastatic ER positive, NV negative, 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, progressed from stage I A ER/NV positive, 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 years ago  Status post resection and adjuvant radiation and hormone therapy for 5 years  Patient had symptoms of bony discomfort in her sternum in June of 2021, imaging revealed lytic lesion, biopsy in July of 2021 confirmed progression of disease  ? Patient was started on Faslodex and Xgeva at that time, in conjunction with radiation  ? In April 2022 there was interval development of 4 mm nodule at the right lower lobe and interval development of increased sclerotic lesions throughout the visualized spine  ? At that time, the patient's treatment was changed to Femara and Gaby Gutter  ? In July of 2022, the patient was changed from Gaby Gutter to [place] because of intolerance  ? During the patient's hospitalization in August of 2022 to September of 2022, Verzenio was discontinued due to gastroenteritis  ? Patient's cancer is most likely contributing to her hypercoagulable state  ? Appreciate oncology and palliative care consult  ? Will continue on Lovenox for now  ? Continue on Remeron for insomnia and anxiety  ? Patient would not like any more treatment for her malignancy    Family meeting held on 10/04 with palliative care, GI, CM, and IM team  Patient's current clinical course discussed  Discussion of disposition planning occurred  Hospice was discussed  Patient is pending placement to nursing facility for continued rehab, and plans to transition to hospice when she clinically declines  Toxic gastroenteritis and colitis  Assessment & Plan  - Patient was admitted with toxic gastroenteritis in August of 2022   Determined to be possible IBD with additional insult of Verzenio from her breast cancer treatment    - continue vancomycin taper per GI  - started on Remicade 10/1 inpatient, and will be continued on an outpatient basis after discharge      Pressure injury of left buttock, stage 3 ([facility] [address] )  Assessment & Plan  - present on admission  - wound care consult    Acute respiratory failure with hypoxia ([facility] [address] )  Assessment & Plan  - secondary to COVID infection; see plan above    Urinary retention  Assessment & Plan  - marked urinary retention and bladder distention on admission  - failed voiding trial and urinary catheter replaced  - will plan to discharge with Crawley catheter  - status post empiric 3 day course of IV ceftriaxone    Anemia  Assessment & Plan  - Likely a mixed component of anemia chronic disease with possible folate deficiency  - Continue folate supplementation  - status post 1 unit PRBCs this admission; continue to monitor and transfuse for hemoglobin less than 7 0       Anxiety  Assessment & Plan  - Hold amitriptyline -possible etiology of tachycardia  - Will resume Remeron        VTE Pharmacologic Prophylaxis:   High Risk (Score >/= 5) - Pharmacological DVT Prophylaxis Ordered: enoxaparin (Lovenox)  Sequential Compression Devices Ordered  Patient Centered Rounds: I performed bedside rounds with nursing staff today  Discussions with Specialists or Other Care Team Provider: CM     Education and Discussions with Family / Patient: Updated  (daughter) via phone  Time Spent for Care: 30 minutes  More than 50% of total time spent on counseling and coordination of care as described above  Current Length of Stay: 22 day(s)  Current Patient Status: Inpatient   Certification Statement: The patient will continue to require additional inpatient hospital stay due to covid treatment, discharge planning  Discharge Plan: Anticipate discharge in 48-72 hrs to rehab facility  Code Status: Level 3 - DNAR and DNI    Subjective:   Patient seen and examined  Following up for multiple issues as above  Continues to have hematochezia    However other than irritation of her pressure injuries on her sacrum denies any abdominal pain or associated symptoms  Hemoglobin has remained stable and she has not required transfusion thus yet  Denies any respiratory distress or chest pain  Oxygen requirements have remained stable  Objective:     Vitals:   Temp (24hrs), Av 9 °F (36 6 °C), Min:97 4 °F (36 3 °C), Max:98 4 °F (36 9 °C)    Temp:  [97 4 °F (36 3 °C)-98 4 °F (36 9 °C)] 98 4 °F (36 9 °C)  HR:  [69-80] 80  Resp:  [16-22] 16  BP: (106-137)/(53-65) 106/53  SpO2:  [96 %-100 %] 96 %  Body mass index is 24 83 kg/m²  Input and Output Summary (last 24 hours): Intake/Output Summary (Last 24 hours) at 10/14/2022 1654  Last data filed at 10/14/2022 1529  Gross per 24 hour   Intake 960 ml   Output 650 ml   Net 310 ml       Physical Exam:   PHYSICAL EXAM:    Vitals signs reviewed  Constitutional   Awake and cooperative  NAD  Head/Neck   Normocephalic  Atraumatic  HEENT   No scleral icterus  EOMI  Heart   Regular rate and rhythm  No murmers  Lungs   Clear to auscultation bilaterally  Respirations unlaboured  Abdomen   Soft  Nontender  Nondistended  Skin   Skin color normal  No rashes  Extremities   No deformities  No peripheral edema  Neuro   Alert and oriented  No new deficits  Psych   Mood stable   Affect normal          Additional Data:     Labs:  Results from last 7 days   Lab Units 10/14/22  0807 10/14/22  0538   WBC Thousand/uL  --  5 51   HEMOGLOBIN g/dL 8 4* 7 9*   HEMATOCRIT % 28 0* 25 9*   PLATELETS Thousands/uL  --  181   NEUTROS PCT %  --  62   LYMPHS PCT %  --  26   MONOS PCT %  --  9   EOS PCT %  --  1     Results from last 7 days   Lab Units 10/14/22  0538   SODIUM mmol/L 145   POTASSIUM mmol/L 3 4*   CHLORIDE mmol/L 114*   CO2 mmol/L 28   BUN mg/dL 7   CREATININE mg/dL 0 36*   ANION GAP mmol/L 3*   CALCIUM mg/dL 7 2*   ALBUMIN g/dL 1 6*   TOTAL BILIRUBIN mg/dL 0 28   ALK PHOS U/L 84   ALT U/L 24   AST U/L 17   GLUCOSE RANDOM mg/dL 79 Lines/Drains:  Invasive Devices  Report    Peripherally Inserted Central Catheter Line  Duration           PICC Line 09/26/22 18 days          Drain  Duration           Urethral Catheter 14 Fr  6 days    Rectal Tube With balloon <1 day              Urinary Catheter:  Goal for removal: N/A- Discharging with Crawley         Central Line:  Goal for removal: Will discontinue when peripheral access obtained  Imaging: No pertinent imaging reviewed  Recent Cultures (last 7 days):   Results from last 7 days   Lab Units 10/08/22  0417   URINE CULTURE  >100,000 cfu/ml Escherichia coli*  >100,000 cfu/ml Klebsiella pneumoniae*  >100,000 cfu/ml Enterobacter cloacae*       Last 24 Hours Medication List:   Current Facility-Administered Medications   Medication Dose Route Frequency Provider Last Rate   • acetaminophen  650 mg Oral Q6H PRN Latia Chung DO     • atorvastatin  40 mg Oral After Dinner [facility] Shoulder, DO     • cholecalciferol  2,000 Units Oral Daily [facility] Shoulder, DO     • dexamethasone  6 mg Intravenous Q24H Jersey Camacho, DO     • enoxaparin  1 mg/kg Subcutaneous Q12H [address] Ken Chung, DO     • folic acid  1 mg Oral Daily [facility] Shoulder, DO     • metoprolol succinate  75 mg Oral BID BROOKE Barahona     • midodrine  2 5 mg Oral TID AC Nadine Wynne MD     • mirtazapine  7 5 mg Oral HS Jersey Camacho, DO     • ALYSSA ANTIFUNGAL   Topical BID Latia Chung DO     • ondansetron  4 mg Intravenous Q4H PRN Latia Chung DO     • pantoprazole  40 mg Oral BID AC [facility], DO     • vancomycin  125 mg Oral Daily Mary Juarez DO          Today, Patient Was Seen By: Latia Chung    **Please Note: This note may have been constructed using a voice recognition system  **

## 2022-10-14 NOTE — NUTRITION
10/14/22 1016   Recommendations/Interventions   Summary Spoke with pt via phone d/t COVID+  Reported appetite getting better and eating more of meals  Continued diarrhea but with some improvement  Pt not consuming banatrol in applesauce because she did not know what it was; explained purpose of banatrol and pt agreeable to try now  Stated she consumes all of ensure compact with no complications d/t lactose content  Asked pt about lactose restriction in diet and she reported never having any issues with lactose before; was unsure why this was in her diet order  Noted pt has had 14# ( ~9% ) wt loss x 2 weeks during admission - significant  Continue to monitor for 2 criteria of malnutrition (NFPE) when able to visit pt in person again  Interventions/Recommendations Continue current diet order;Supplement continue;Adjust diet order   Recommendations to Provider Recommend removing lactose restriction from diet order as pt reported no previous intolerance or restrictions with lactose

## 2022-10-14 NOTE — PROGRESS NOTES
Received notice from nursing staff about further rectal bleeding  I went to see patient, she remains hemodynamically stable and has no pain  No transfusion thus far  Patient has had thrombectomy and IVC filter placed, and I think at this point the bleeding risks outweigh the risk of further pulmonary issues      I discussed the case again with on call GI fellow:  - will hold tonight's lovenox  - cont to monitor Hgb Q8h  - transfuse if Hgb < 7 0g  - low threshold for ICU transfer if unstable  - if pt becomes hemodynamically unstable will require endoscopy over the weekend, if she remains stable will likely pursue endoscopic eval on Monday  - GI to re-eval tmw

## 2022-10-15 LAB
ANION GAP SERPL CALCULATED.3IONS-SCNC: 0 MMOL/L (ref 4–13)
BUN SERPL-MCNC: 8 MG/DL (ref 5–25)
CALCIUM SERPL-MCNC: 7.3 MG/DL (ref 8.3–10.1)
CHLORIDE SERPL-SCNC: 113 MMOL/L (ref 96–108)
CO2 SERPL-SCNC: 29 MMOL/L (ref 21–32)
CREAT SERPL-MCNC: 0.4 MG/DL (ref 0.6–1.3)
CRP SERPL QL: <3 MG/L
GFR SERPL CREATININE-BSD FRML MDRD: 97 ML/MIN/1.73SQ M
GLUCOSE SERPL-MCNC: 90 MG/DL (ref 65–140)
HCT VFR BLD AUTO: 24.8 % (ref 34.8–46.1)
HCT VFR BLD AUTO: 25.9 % (ref 34.8–46.1)
HCT VFR BLD AUTO: 26.5 % (ref 34.8–46.1)
HGB BLD-MCNC: 7.4 G/DL (ref 11.5–15.4)
HGB BLD-MCNC: 7.8 G/DL (ref 11.5–15.4)
HGB BLD-MCNC: 8.1 G/DL (ref 11.5–15.4)
POTASSIUM SERPL-SCNC: 3.7 MMOL/L (ref 3.5–5.3)
SODIUM SERPL-SCNC: 142 MMOL/L (ref 135–147)

## 2022-10-15 PROCEDURE — 85014 HEMATOCRIT: CPT | Performed by: INTERNAL MEDICINE

## 2022-10-15 PROCEDURE — 99232 SBSQ HOSP IP/OBS MODERATE 35: CPT | Performed by: INTERNAL MEDICINE

## 2022-10-15 PROCEDURE — 85018 HEMOGLOBIN: CPT | Performed by: STUDENT IN AN ORGANIZED HEALTH CARE EDUCATION/TRAINING PROGRAM

## 2022-10-15 PROCEDURE — 85018 HEMOGLOBIN: CPT | Performed by: INTERNAL MEDICINE

## 2022-10-15 PROCEDURE — 85014 HEMATOCRIT: CPT | Performed by: STUDENT IN AN ORGANIZED HEALTH CARE EDUCATION/TRAINING PROGRAM

## 2022-10-15 PROCEDURE — 86140 C-REACTIVE PROTEIN: CPT | Performed by: STUDENT IN AN ORGANIZED HEALTH CARE EDUCATION/TRAINING PROGRAM

## 2022-10-15 PROCEDURE — 80048 BASIC METABOLIC PNL TOTAL CA: CPT | Performed by: INTERNAL MEDICINE

## 2022-10-15 PROCEDURE — 99231 SBSQ HOSP IP/OBS SF/LOW 25: CPT | Performed by: INTERNAL MEDICINE

## 2022-10-15 RX ORDER — ENOXAPARIN SODIUM 100 MG/ML
1 INJECTION SUBCUTANEOUS EVERY 12 HOURS SCHEDULED
Status: DISCONTINUED | OUTPATIENT
Start: 2022-10-15 | End: 2022-10-20 | Stop reason: HOSPADM

## 2022-10-15 RX ADMIN — PANTOPRAZOLE SODIUM 40 MG: 40 TABLET, DELAYED RELEASE ORAL at 06:08

## 2022-10-15 RX ADMIN — Medication 125 MG: at 08:50

## 2022-10-15 RX ADMIN — DEXAMETHASONE SODIUM PHOSPHATE 6 MG: 4 INJECTION INTRA-ARTICULAR; INTRALESIONAL; INTRAMUSCULAR; INTRAVENOUS; SOFT TISSUE at 13:11

## 2022-10-15 RX ADMIN — PANTOPRAZOLE SODIUM 40 MG: 40 TABLET, DELAYED RELEASE ORAL at 17:48

## 2022-10-15 RX ADMIN — CARBIDOPA AND LEVODOPA 2.5 MG: 50; 200 TABLET, EXTENDED RELEASE ORAL at 10:35

## 2022-10-15 RX ADMIN — METOPROLOL SUCCINATE 75 MG: 50 TABLET, EXTENDED RELEASE ORAL at 22:34

## 2022-10-15 RX ADMIN — ATORVASTATIN CALCIUM 40 MG: 40 TABLET, FILM COATED ORAL at 17:48

## 2022-10-15 RX ADMIN — CARBIDOPA AND LEVODOPA 2.5 MG: 50; 200 TABLET, EXTENDED RELEASE ORAL at 06:07

## 2022-10-15 RX ADMIN — FOLIC ACID 1 MG: 1 TABLET ORAL at 08:50

## 2022-10-15 RX ADMIN — Medication 2000 UNITS: at 08:50

## 2022-10-15 RX ADMIN — MIRTAZAPINE 7.5 MG: 15 TABLET, FILM COATED ORAL at 22:35

## 2022-10-15 RX ADMIN — ENOXAPARIN SODIUM 60 MG: 60 INJECTION SUBCUTANEOUS at 22:34

## 2022-10-15 RX ADMIN — CARBIDOPA AND LEVODOPA 2.5 MG: 50; 200 TABLET, EXTENDED RELEASE ORAL at 17:53

## 2022-10-15 RX ADMIN — MICONAZOLE NITRATE: 20 CREAM TOPICAL at 08:49

## 2022-10-15 RX ADMIN — MICONAZOLE NITRATE: 20 CREAM TOPICAL at 17:48

## 2022-10-15 NOTE — PLAN OF CARE
Problem: Potential for Falls  Goal: Patient will remain free of falls  Description: INTERVENTIONS:  - Educate patient/family on patient safety including physical limitations  - Instruct patient to call for assistance with activity   - Consult OT/PT to assist with strengthening/mobility   - Keep Call bell within reach  - Keep bed low and locked with side rails adjusted as appropriate  - Keep care items and personal belongings within reach  - Initiate and maintain comfort rounds  - Make Fall Risk Sign visible to staff  - Apply yellow socks and bracelet for high fall risk patients  - Consider moving patient to room near nurses station  Outcome: Progressing     Problem: MOBILITY - ADULT  Goal: Maintain or return to baseline ADL function  Description: INTERVENTIONS:  -  Assess patient's ability to carry out ADLs; assess patient's baseline for ADL function and identify physical deficits which impact ability to perform ADLs (bathing, care of mouth/teeth, toileting, grooming, dressing, etc )  - Assess/evaluate cause of self-care deficits   - Assess range of motion  - Assess patient's mobility; develop plan if impaired  - Assess patient's need for assistive devices and provide as appropriate  - Encourage maximum independence but intervene and supervise when necessary  - Involve family in performance of ADLs  - Assess for home care needs following discharge   - Consider OT consult to assist with ADL evaluation and planning for discharge  - Provide patient education as appropriate  Outcome: Progressing  Goal: Maintains/Returns to pre admission functional level  Description: INTERVENTIONS:  - Perform BMAT or MOVE assessment daily    - Set and communicate daily mobility goal to care team and patient/family/caregiver  - Collaborate with rehabilitation services on mobility goals if consulted  - Perform Range of Motion 2 times a day  - Reposition patient every 2 hours    - Dangle patient 2 times a day  - Stand patient 2 times a day  - Ambulate patient 2 times a day  - Out of bed to chair 2 times a day   - Out of bed for meals 2 times a day  - Out of bed for toileting  - Record patient progress and toleration of activity level   Outcome: Progressing     Problem: Prexisting or High Potential for Compromised Skin Integrity  Goal: Skin integrity is maintained or improved  Description: INTERVENTIONS:  - Identify patients at risk for skin breakdown  - Assess and monitor skin integrity  - Assess and monitor nutrition and hydration status  - Monitor labs   - Assess for incontinence   - Turn and reposition patient  - Assist with mobility/ambulation  - Relieve pressure over bony prominences  - Avoid friction and shearing  - Provide appropriate hygiene as needed including keeping skin clean and dry  - Evaluate need for skin moisturizer/barrier cream  - Collaborate with interdisciplinary team   - Patient/family teaching  - Consider wound care consult   Outcome: Progressing     Problem: GENITOURINARY - ADULT  Goal: Maintains or returns to baseline urinary function  Description: INTERVENTIONS:  - Assess urinary function  - Encourage oral fluids to ensure adequate hydration if ordered  - Administer IV fluids as ordered to ensure adequate hydration  - Administer ordered medications as needed  - Offer frequent toileting  - Follow urinary retention protocol if ordered  Outcome: Progressing  Goal: Absence of urinary retention  Description: INTERVENTIONS:  - Assess patient’s ability to void and empty bladder  - Monitor I/O  - Bladder scan as needed  - Discuss with physician/AP medications to alleviate retention as needed  - Discuss catheterization for long term situations as appropriate  Outcome: Progressing  Goal: Urinary catheter remains patent  Description: INTERVENTIONS:  - Assess patency of urinary catheter  - If patient has a chronic suarez, consider changing catheter if non-functioning  - Follow guidelines for intermittent irrigation of non-functioning urinary catheter  Outcome: Progressing     Problem: METABOLIC, FLUID AND ELECTROLYTES - ADULT  Goal: Electrolytes maintained within normal limits  Description: INTERVENTIONS:  - Monitor labs and assess patient for signs and symptoms of electrolyte imbalances  - Administer electrolyte replacement as ordered  - Monitor response to electrolyte replacements, including repeat lab results as appropriate  - Instruct patient on fluid and nutrition as appropriate  Outcome: Progressing  Goal: Fluid balance maintained  Description: INTERVENTIONS:  - Monitor labs   - Monitor I/O and WT  - Instruct patient on fluid and nutrition as appropriate  - Assess for signs & symptoms of volume excess or deficit  Outcome: Progressing  Goal: Glucose maintained within target range  Description: INTERVENTIONS:  - Monitor Blood Glucose as ordered  - Assess for signs and symptoms of hyperglycemia and hypoglycemia  - Administer ordered medications to maintain glucose within target range  - Assess nutritional intake and initiate nutrition service referral as needed  Outcome: Progressing     Problem: SKIN/TISSUE INTEGRITY - ADULT  Goal: Skin Integrity remains intact(Skin Breakdown Prevention)  Description: Assess:  -Assess extremities for adequate circulation and sensation     Bed Management:  -Have minimal linens on bed & keep smooth, unwrinkled  -Change linens as needed when moist or perspiring    Toileting:  -Offer bedside commode  -Assess for incontinence   -Use incontinent care products after each incontinent episode     Skin Care:  -Avoid use of baby powder, tape, friction and shearing, hot water or constrictive clothing  -Relieve pressure over bony prominences   -Do not massage red bony areas    Next Steps:  -Teach patient strategies to minimize risks   -Consider consults to  interdisciplinary teams  Outcome: Progressing  Goal: Incision(s), wounds(s) or drain site(s) healing without S/S of infection  Description: INTERVENTIONS  - Assess and document dressing, incision, wound bed, drain sites and surrounding tissue  - Provide patient and family education  - Perform skin care/dressing changes   Outcome: Progressing  Goal: Pressure injury heals and does not worsen  Description: Interventions:  - Implement low air loss mattress or specialty surface (Criteria met)  - Apply silicone foam dressing  - Apply fecal or urinary incontinence containment device   - Perform passive or active ROM   - Turn and reposition patient & offload bony prominences   - Utilize friction reducing device or surface for transfers   - Consider consults to  interdisciplinary teams   - Use incontinent care products after each incontinent   - Consider nutrition services referral as needed  Outcome: Progressing     Problem: PAIN - ADULT  Goal: Verbalizes/displays adequate comfort level or baseline comfort level  Description: Interventions:  - Encourage patient to monitor pain and request assistance  - Assess pain using appropriate pain scale  - Administer analgesics based on type and severity of pain and evaluate response  - Implement non-pharmacological measures as appropriate and evaluate response  - Consider cultural and social influences on pain and pain management  - Notify physician/advanced practitioner if interventions unsuccessful or patient reports new pain  Outcome: Progressing     Problem: Nutrition/Hydration-ADULT  Goal: Nutrient/Hydration intake appropriate for improving, restoring or maintaining nutritional needs  Description: Monitor and assess patient's nutrition/hydration status for malnutrition  Collaborate with interdisciplinary team and initiate plan and interventions as ordered  Monitor patient's weight and dietary intake as ordered or per policy  Utilize nutrition screening tool and intervene as necessary  Determine patient's food preferences and provide high-protein, high-caloric foods as appropriate       INTERVENTIONS:  - Monitor oral intake, urinary output, labs, and treatment plans  - Assess nutrition and hydration status and recommend course of action  - Evaluate amount of meals eaten  - Assist patient with eating if necessary   - Allow adequate time for meals  - Recommend/ encourage appropriate diets, oral nutritional supplements, and vitamin/mineral supplements  - Order, calculate, and assess calorie counts as needed  - Recommend, monitor, and adjust tube feedings and TPN/PPN based on assessed needs  - Assess need for intravenous fluids  - Provide specific nutrition/hydration education as appropriate  - Include patient/family/caregiver in decisions related to nutrition  Outcome: Progressing

## 2022-10-15 NOTE — PROGRESS NOTES
Progress Note- Marylen Panther Clunk 79 y o  female MRN: [id number]    Unit/Bed#: -01 Encounter: [id number]      Assessment and Plan:  70-year-old woman with metastatic breast cancer CKD, NAFLD, history of DVT and PE, on Lovenox, status post thrombectomy and IVC filter  We were following for IBD status post infliximab bon 9/30 recent C diff infection now on daily p o  vancomycin  GI being reconsulted for ongoing hematochezia  Painless Hematochezia    · Patient started having episodes of hematochezia 2 days ago  · Her hemoglobin has remained stable and she remains hemodynamically stable  Last blood transfusion required was on 09/26  · Lovenox was held by primary team yesterday, no contraindications from GI standpoint to continue anticoagulation  She should be on anticoagulation given her IBD as discussed below  · Her hematochezia is most likely from bleeding from the deep ulceration that was seen on flexible sigmoidoscopy in the setting of her being on anticoagulation  These ulcerations most likely the reason for her contained perforation seen in the descending colon on last imaging done on 09/30  · Less likely that flexible sigmoidoscopy or colonoscopy would be helpful in management of these ulcerations  · Will plan on another dose of Remicade given her last dose was 2 weeks ago  Improving inflammation and managing ulceration with treatment of IBD her bleeding should resolve  · CRP is negative  · Bleeding initially thought to be vaginal, evaluated by gynecology and this was ruled out      Inflammatory bowel disease  C diff infection    · Patient had flexible sigmoidoscopy on 08/20 which showed severe ulceration inflammation in the sigmoid colon  Biopsies were negative for viral infection    Although there were no features of chronicity on her biopsies her diarrhea has been ongoing for months  · She did not respond to steroids and received infliximab on 09/30  · Will plan on another dose of infliximab while inpatient, this would likely be done on Monday  · She also had positive C diff PCR and is currently on vancomycin daily (prolonged taper)      Disposition:  GI will continue to follow  ______________________________________________________________________    Subjective:     Patient denies any abdominal pain    She denies any nausea vomiting    Medication Administration - last 24 hours from 10/14/2022 0913 to 10/15/2022 0913       Date/Time Order Dose Route Action Action by     64/99/7504 4730 folic acid (FOLVITE) tablet 1 mg 1 mg Oral Given Lisa Horowitz RN     14/98/3482 4120 folic acid (FOLVITE) tablet 1 mg 1 mg Oral Given Nickolas Cary RN     10/15/2022 4447 pantoprazole (PROTONIX) EC tablet 40 mg 40 mg Oral Given Kirke Aase, RN     10/14/2022 1758 pantoprazole (PROTONIX) EC tablet 40 mg 40 mg Oral Given Nickolas Cary RN     10/14/2022 1758 atorvastatin (LIPITOR) tablet 40 mg 40 mg Oral Given Nickolas Cary RN     10/15/2022 0850 cholecalciferol (VITAMIN D3) tablet 2,000 Units 2,000 Units Oral Given Lisa Horowitz RN     10/14/2022 5948 cholecalciferol (VITAMIN D3) tablet 2,000 Units 2,000 Units Oral Given Nickolas Cary RN     10/15/2022 2704 midodrine (PROAMATINE) tablet 2 5 mg 2 5 mg Oral Given Kirke Aase, RN     10/14/2022 1758 midodrine (PROAMATINE) tablet 2 5 mg 2 5 mg Oral Given Nickolas Cary RN     10/14/2022 1216 midodrine (PROAMATINE) tablet 2 5 mg 2 5 mg Oral Given Nickolas Cary RN     10/14/2022 2200 mirtazapine (REMERON) tablet 7 5 mg   Oral Canceled Entry Kirke Aase, RN     10/14/2022 2011 mirtazapine (REMERON) tablet 7 5 mg 7 5 mg Oral Given Kirke Aase, RN     10/15/2022 0848 metoprolol succinate (TOPROL-XL) 24 hr tablet 75 mg 75 mg Oral Not Given Lisa Horowitz RN     10/14/2022 2012 metoprolol succinate (TOPROL-XL) 24 hr tablet 75 mg 75 mg Oral Given Kirke Aase, RN     10/14/2022 0933 metoprolol succinate (TOPROL-XL) 24 hr tablet 75 mg 75 mg Oral Given Nickolas Cary RN     10/15/2022 0850 vancomycin [facility]) oral solution 125 mg 125 mg Oral Given Daily Lopez, RN     10/14/2022 0934 vancomycin (VANCOCIN) oral solution 125 mg 125 mg Oral Given Clide Mail, RN     10/14/2022 1330 dexamethasone (DECADRON) injection 6 mg   Intravenous Canceled Entry Clide Mail, RN     10/14/2022 1217 dexamethasone (DECADRON) injection 6 mg 6 mg Intravenous Given Clide Mail, RN     10/15/2022 0849 moisture barrier miconazole 2% cream (aka ALYSSA MOISTURE BARRIER ANTIFUNGAL CREAM)   Topical Given Daily Lopez, RN     10/14/2022 1758 moisture barrier miconazole 2% cream (aka ALYSSA MOISTURE BARRIER ANTIFUNGAL CREAM)   Topical Given Clide Mail, RN     10/14/2022 0934 moisture barrier miconazole 2% cream (aka ALYSSA MOISTURE BARRIER ANTIFUNGAL CREAM)   Topical Given Clide Mail, RN     10/14/2022 1850 potassium chloride 20 mEq IVPB (premix) 0 mEq Intravenous Stopped Clide Mail, RN     10/14/2022 1130 potassium chloride 20 mEq IVPB (premix) 20 mEq Intravenous [address] Clide Mail, RN     10/14/2022 0934 potassium chloride 20 mEq IVPB (premix) 20 mEq Intravenous [address] Clide Mail, RN     10/14/2022 1216 enoxaparin (LOVENOX) subcutaneous injection 60 mg 60 mg Subcutaneous Given Clide Mail, RN          Objective:     Vitals: Blood pressure 93/53, pulse 74, temperature 97 8 °F (36 6 °C), resp  rate 18, height 5' 1" (1 549 m), weight 59 2 kg (130 lb 8 2 oz), SpO2 92 %, not currently breastfeeding  ,Body mass index is 24 66 kg/m²        Intake/Output Summary (Last 24 hours) at 10/15/2022 0913  Last data filed at 10/15/2022 0601  Gross per 24 hour   Intake 180 ml   Output 800 ml   Net -620 ml       Physical Exam:   General Appearance: Awake and alert, in no acute distress  Abdomen: Soft, non-tender, non-distended; bowel sounds normal; no masses or no organomegaly    Invasive Devices  Report    Peripherally Inserted Central Catheter Line  Duration           PICC Line 09/26/22 18 days          Drain  Duration           Urethral Catheter 14 Fr  6 days Lab Results:  No results displayed because visit has over 200 results  Imaging Studies: I have personally reviewed pertinent imaging studies

## 2022-10-15 NOTE — PROGRESS NOTES
[facility]  Progress Note Radha Silverman 1940, 79 y o  female MRN: [id number]  Unit/Bed#: -[place] Encounter: [id number]  Primary Care Provider: Virgen Pagan,    Date and time admitted to hospital: 9/22/2022  3:35 PM    * Saddle embolus of pulmonary artery [facility])  Assessment & Plan  - initially presented with acute respiratory failure and hypoxia  - found to have an acute submassive high-risk pulmonary embolism  History of prior DVT in August in the right lower extremity  Unclear if patient was receiving Xarelto from nursing facility when she was discharged  - patient is hypercoagulable in the setting of recurrent breast cancer    - status post thrombectomy and IVC filter placement  - continue with weight based Lovenox indefinitely        Hematochezia  Assessment & Plan  - acutely developed overnight in the morning of 10/13  - secondary to ulcerative colitis and complicated by systemic anticoagulation  - discussed again with GI team today 10/15:  No contraindication from GI standpoint to resume Lovenox  This has been restarted  She will receive an additional dose of Remicade infusion which will hopefully help with her hematochezia  COVID-19 virus infection  Assessment & Plan  - incidentally found to be positive when being screened for discharge placement    - previously on remdesivir though discontinued per family request  - continue IV Decadron 6 mg daily for up to 10 days or unless weaned off of oxygen  - wean oxygen as tolerated; down to 1 L  - weight based Lovenox for anticoagulation  - airborne and contact precautions    Metastatic breast cancer ([facility] [address] )  Assessment & Plan  - Stage IV metastatic ER positive, ND negative, 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, progressed from stage I A ER/ND positive, 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 years ago  Status post resection and adjuvant radiation and hormone therapy for 5 years   Patient had symptoms of bony discomfort in her sternum in June of 2021, imaging revealed lytic lesion, biopsy in July of 2021 confirmed progression of disease  ? Patient was started on Faslodex and Xgeva at that time, in conjunction with radiation  ? In April 2022 there was interval development of 4 mm nodule at the right lower lobe and interval development of increased sclerotic lesions throughout the visualized spine  ? At that time, the patient's treatment was changed to Femara and [place]  ? In July of 2022, the patient was changed from [place] to [place] because of intolerance  ? During the patient's hospitalization in August of 2022 to September of 2022, Verzenio was discontinued due to gastroenteritis  ? Patient's cancer is most likely contributing to her hypercoagulable state  ? Appreciate oncology and palliative care consult  ? Will continue on Lovenox for now  ? Continue on Remeron for insomnia and anxiety  ? Patient would not like any more treatment for her malignancy    Family meeting held on 10/04 with palliative care, GI, CM, and IM team  Patient's current clinical course discussed  Discussion of disposition planning occurred  Hospice was discussed  Patient is pending placement to nursing facility for continued rehab, and plans to transition to hospice when she clinically declines  Toxic gastroenteritis and colitis  Assessment & Plan  - Patient was admitted with toxic gastroenteritis in August of 2022   Determined to be possible IBD with additional insult of Verzenio from her breast cancer treatment    - continue vancomycin taper per GI  - started on Remicade 10/1 inpatient, and will be continued on an outpatient basis after discharge      Pressure injury of left buttock, stage 3 ([address] )  Assessment & Plan  - present on admission  - wound care consult    Acute respiratory failure with hypoxia ([address] )  Assessment & Plan  - secondary to COVID infection; see plan above    Urinary retention  Assessment & Plan  - marked urinary retention and bladder distention on admission  - failed voiding trial and urinary catheter replaced  - will plan to discharge with Crawley catheter  - status post empiric 3 day course of IV ceftriaxone    Anemia  Assessment & Plan  - Likely a mixed component of anemia chronic disease with possible folate deficiency  - Continue folate supplementation  - status post 1 unit PRBCs this admission; continue to monitor and transfuse for hemoglobin less than 7 0       Anxiety  Assessment & Plan  - Hold amitriptyline -possible etiology of tachycardia  - Will resume Remeron        VTE Pharmacologic Prophylaxis:   High Risk (Score >/= 5) - Pharmacological DVT Prophylaxis Ordered: enoxaparin (Lovenox)  Sequential Compression Devices Ordered  Patient Centered Rounds: I performed bedside rounds with nursing staff today  Discussions with Specialists or Other Care Team Provider: CM     Education and Discussions with Family / Patient: Updated  (daughter) via phone  Time Spent for Care: 30 minutes  More than 50% of total time spent on counseling and coordination of care as described above  Current Length of Stay: 23 day(s)  Current Patient Status: Inpatient   Certification Statement: The patient will continue to require additional inpatient hospital stay due to covid treatment, discharge planning  Discharge Plan: Anticipate discharge in 48-72 hrs to rehab facility  Code Status: Level 3 - DNAR and DNI    Subjective:   Patient seen and examined  Following up for multiple issues as above  Continues to have hematochezia however blood counts have remained stable and she has not required transfusion  Hemodynamically she has been stable as well  Denies any chest pain or shortness of breath  Have been able to wean oxygen quite considerably today    Objective:     Vitals:   Temp (24hrs), Av 8 °F (36 6 °C), Min:97 4 °F (36 3 °C), Max:98 4 °F (36 9 °C)    Temp:  [97 4 °F (36 3 °C)-98 4 °F (36 9 °C)] 98 4 °F (36 9 °C)  HR:  [74-80] 80  Resp:  [18] 18  BP: ()/(53-64) 97/53  SpO2:  [92 %-100 %] 95 %  Body mass index is 24 66 kg/m²  Input and Output Summary (last 24 hours): Intake/Output Summary (Last 24 hours) at 10/15/2022 1646  Last data filed at 10/15/2022 1300  Gross per 24 hour   Intake 180 ml   Output 300 ml   Net -120 ml       Physical Exam:   PHYSICAL EXAM:    Vitals signs reviewed  Constitutional   Awake and cooperative  NAD  Head/Neck   Normocephalic  Atraumatic  HEENT   No scleral icterus  EOMI  Heart   Regular rate and rhythm  No murmers  Lungs   Clear to auscultation bilaterally  Respirations unlaboured  Abdomen   Soft  Nontender  Nondistended  Skin   Skin color normal  No rashes  Extremities   No deformities  No peripheral edema  Neuro   Alert and oriented  No new deficits  Psych   Mood stable  Affect normal          Additional Data:     Labs:  Results from last 7 days   Lab Units 10/15/22  1035 10/14/22  0807 10/14/22  0538   WBC Thousand/uL  --   --  5 51   HEMOGLOBIN g/dL 7 8*   < > 7 9*   HEMATOCRIT % 25 9*   < > 25 9*   PLATELETS Thousands/uL  --   --  181   NEUTROS PCT %  --   --  62   LYMPHS PCT %  --   --  26   MONOS PCT %  --   --  9   EOS PCT %  --   --  1    < > = values in this interval not displayed       Results from last 7 days   Lab Units 10/15/22  0040 10/14/22  0538   SODIUM mmol/L 142 145   POTASSIUM mmol/L 3 7 3 4*   CHLORIDE mmol/L 113* 114*   CO2 mmol/L 29 28   BUN mg/dL 8 7   CREATININE mg/dL 0 40* 0 36*   ANION GAP mmol/L 0* 3*   CALCIUM mg/dL 7 3* 7 2*   ALBUMIN g/dL  --  1 6*   TOTAL BILIRUBIN mg/dL  --  0 28   ALK PHOS U/L  --  84   ALT U/L  --  24   AST U/L  --  17   GLUCOSE RANDOM mg/dL 90 79                       Lines/Drains:  Invasive Devices  Report    Peripherally Inserted Central Catheter Line  Duration           PICC Line 09/26/22 19 days          Drain  Duration           Urethral Catheter 14 Fr  7 days              Urinary Catheter:  Goal for removal: N/A- Discharging with Crawley         Central Line:  Goal for removal: Will discontinue when peripheral access obtained  Imaging: No pertinent imaging reviewed  Recent Cultures (last 7 days):         Last 24 Hours Medication List:   Current Facility-Administered Medications   Medication Dose Route Frequency Provider Last Rate   • acetaminophen  650 mg Oral Q6H PRN Lindsey Chung, DO     • atorvastatin  40 mg Oral After Ally Tomas, DO     • cholecalciferol  2,000 Units Oral Daily Marvene Litten, DO     • dexamethasone  6 mg Intravenous Q24H Jersey Camacho, DO     • enoxaparin  1 mg/kg Subcutaneous Q12H 101 [place] Ken Chung, DO     • folic acid  1 mg Oral Daily Marvene Litten, DO     • metoprolol succinate  75 mg Oral BID BROOKE Aldana     • midodrine  2 5 mg Oral TID AC Nadine Wynne MD     • mirtazapine  7 5 mg Oral HS Jersey Camacho, DO     • ALYSSA ANTIFUNGAL   Topical BID Lindsey Chung, DO     • ondansetron  4 mg Intravenous Q4H PRN Lindsey Chung DO     • pantoprazole  40 mg Oral BID AC Marvene Litten, DO     • vancomycin  125 mg Oral Daily Penelope Cardoso DO          Today, Patient Was Seen By: Lindsey Chung    **Please Note: This note may have been constructed using a voice recognition system  **

## 2022-10-15 NOTE — PROGRESS NOTES
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  4  Protein calorie malnutrition  5  Urinary retention  6  Acute COVID 19     Recommendations:  Patient is afebrile with normal WBC count when last taken     Hemoglobin is now 7 8  Remains on steroids and p o  Vancomycin  Nasal O2 supplementation down to 1 L  Diarrhea  1  I am not convinced that the patient has active C diff colitis  Diarrhea more likely secondary to IBD  Patient has completed full course of p o  Vancomycin  She is now on taper as per GI  Currently on p o  Vancomycin 125 mg Q daily    2  Several episodes of small volume diarrhea today with some rectal bleeding  COVID 19  1  Currently stable and denies SOB  Chest x-ray not significant  2  SARS-CoV-2 PCR positive  Since patient came in with saddle embolus it is possible that she may have had COVID at that time  She is not immunized  3  COVID Protocol as per primary service  Antibiotics:  1  Vancomycin 125 mg q  Daily p o ,       Subjective:  Less diarrhea and decreased shortness of breath with dry cough   Denies fevers, chills, or sweats  Denies nausea, vomiting,       Objective:  Vitals:  Temp:  [97 4 °F (36 3 °C)-98 4 °F (36 9 °C)] 98 4 °F (36 9 °C)  HR:  [74-80] 80  Resp:  [18] 18  BP: ()/(53-64) 97/53  SpO2:  [92 %-100 %] 95 %  Temp (24hrs), Av 8 °F (36 6 °C), Min:97 4 °F (36 3 °C), Max:98 4 °F (36 9 °C)  Current: Temperature: 98 4 °F (36 9 °C)    Physical Exam:     General Appearance:    Eyes:   Alert, chronically ill-appearing female nontoxic, no acute distress  Nasal oxygen in place  Conjunctiva pale   Throat: Oropharynx moist without lesions    Lips, mucosa, and tongue normal   Neck: Supple, symmetrical, trachea midline, no adenopathy,  no tenderness/mass/nodules   Lungs:   Clear to auscultation bilaterally, no audible wheezes, rhonchi or rales; respirations unlabored   Heart:  Regular rate and rhythm, S1, S2 normal, no murmur, rub or gallop   Abdomen:   Soft, non-tender, non-distended, positive bowel sounds  No masses, no organomegaly    No CVA tenderness, Crawley catheter   Extremities: Extremities normal, atraumatic, no clubbing, cyanosis or edema, PICC line   Skin: Skin color pale, texture, turgor normal, no rashes or lesions  No draining wounds noted  Invasive Devices  Report    Peripherally Inserted Central Catheter Line  Duration           PICC Line 09/26/22 19 days          Drain  Duration           Urethral Catheter 14 Fr  7 days                Labs, Imaging, & Other studies:   All pertinent labs were personally reviewed  Results from last 7 days   Lab Units 10/15/22  1035 10/15/22  0040 10/14/22  1800 10/14/22  0807 10/14/22  0538 10/14/22  0057 10/13/22  0308 10/11/22  0442   WBC Thousand/uL  --   --   --   --  5 51 4 87  --  6 35   HEMOGLOBIN g/dL 7 8* 7 4* 9 2*   < > 7 9* 7 5*  7 6*   < > 7 9*   PLATELETS Thousands/uL  --   --   --   --  181 201  --  222    < > = values in this interval not displayed  Results from last 7 days   Lab Units 10/15/22  0040 10/14/22  0538 10/14/22  0057 10/11/22  0442 10/10/22  0614   SODIUM mmol/L 142 145 144   < > 146   POTASSIUM mmol/L 3 7 3 4* 3 3*   < > 3 6   CHLORIDE mmol/L 113* 114* 114*   < > 117*   CO2 mmol/L 29 28 29   < > 25   BUN mg/dL 8 7 8   < > 19   CREATININE mg/dL 0 40* 0 36* 0 39*   < > 0 62   EGFR ml/min/1 73sq m 97 100 98   < > 84   CALCIUM mg/dL 7 3* 7 2* 7 2*   < > 6 9*   AST U/L  --  17  --   --  13   ALT U/L  --  24  --   --  22   ALK PHOS U/L  --  84  --   --  86    < > = values in this interval not displayed

## 2022-10-16 LAB
ANION GAP SERPL CALCULATED.3IONS-SCNC: 5 MMOL/L (ref 4–13)
BUN SERPL-MCNC: 9 MG/DL (ref 5–25)
CALCIUM SERPL-MCNC: 7.4 MG/DL (ref 8.3–10.1)
CHLORIDE SERPL-SCNC: 113 MMOL/L (ref 96–108)
CO2 SERPL-SCNC: 27 MMOL/L (ref 21–32)
CREAT SERPL-MCNC: 0.46 MG/DL (ref 0.6–1.3)
GFR SERPL CREATININE-BSD FRML MDRD: 92 ML/MIN/1.73SQ M
GLUCOSE SERPL-MCNC: 71 MG/DL (ref 65–140)
HCT VFR BLD AUTO: 29.5 % (ref 34.8–46.1)
HGB BLD-MCNC: 8.7 G/DL (ref 11.5–15.4)
POTASSIUM SERPL-SCNC: 3.3 MMOL/L (ref 3.5–5.3)
SODIUM SERPL-SCNC: 145 MMOL/L (ref 135–147)

## 2022-10-16 PROCEDURE — 85018 HEMOGLOBIN: CPT | Performed by: STUDENT IN AN ORGANIZED HEALTH CARE EDUCATION/TRAINING PROGRAM

## 2022-10-16 PROCEDURE — 99232 SBSQ HOSP IP/OBS MODERATE 35: CPT | Performed by: INTERNAL MEDICINE

## 2022-10-16 PROCEDURE — 99231 SBSQ HOSP IP/OBS SF/LOW 25: CPT | Performed by: INTERNAL MEDICINE

## 2022-10-16 PROCEDURE — 80048 BASIC METABOLIC PNL TOTAL CA: CPT | Performed by: INTERNAL MEDICINE

## 2022-10-16 PROCEDURE — 85014 HEMATOCRIT: CPT | Performed by: STUDENT IN AN ORGANIZED HEALTH CARE EDUCATION/TRAINING PROGRAM

## 2022-10-16 RX ORDER — POTASSIUM CHLORIDE 14.9 MG/ML
20 INJECTION INTRAVENOUS ONCE
Status: COMPLETED | OUTPATIENT
Start: 2022-10-16 | End: 2022-10-16

## 2022-10-16 RX ADMIN — CARBIDOPA AND LEVODOPA 2.5 MG: 50; 200 TABLET, EXTENDED RELEASE ORAL at 18:26

## 2022-10-16 RX ADMIN — MICONAZOLE NITRATE: 20 CREAM TOPICAL at 09:14

## 2022-10-16 RX ADMIN — ENOXAPARIN SODIUM 60 MG: 60 INJECTION SUBCUTANEOUS at 09:22

## 2022-10-16 RX ADMIN — DEXAMETHASONE SODIUM PHOSPHATE 6 MG: 4 INJECTION INTRA-ARTICULAR; INTRALESIONAL; INTRAMUSCULAR; INTRAVENOUS; SOFT TISSUE at 12:54

## 2022-10-16 RX ADMIN — METOPROLOL SUCCINATE 75 MG: 50 TABLET, EXTENDED RELEASE ORAL at 09:14

## 2022-10-16 RX ADMIN — POTASSIUM CHLORIDE 20 MEQ: 14.9 INJECTION, SOLUTION INTRAVENOUS at 09:13

## 2022-10-16 RX ADMIN — FOLIC ACID 1 MG: 1 TABLET ORAL at 09:14

## 2022-10-16 RX ADMIN — Medication 125 MG: at 09:15

## 2022-10-16 RX ADMIN — PANTOPRAZOLE SODIUM 40 MG: 40 TABLET, DELAYED RELEASE ORAL at 18:20

## 2022-10-16 RX ADMIN — MIRTAZAPINE 7.5 MG: 15 TABLET, FILM COATED ORAL at 21:16

## 2022-10-16 RX ADMIN — Medication 2000 UNITS: at 09:14

## 2022-10-16 RX ADMIN — ATORVASTATIN CALCIUM 40 MG: 40 TABLET, FILM COATED ORAL at 18:20

## 2022-10-16 RX ADMIN — PANTOPRAZOLE SODIUM 40 MG: 40 TABLET, DELAYED RELEASE ORAL at 05:57

## 2022-10-16 RX ADMIN — METOPROLOL SUCCINATE 75 MG: 50 TABLET, EXTENDED RELEASE ORAL at 21:17

## 2022-10-16 RX ADMIN — CARBIDOPA AND LEVODOPA 2.5 MG: 50; 200 TABLET, EXTENDED RELEASE ORAL at 05:57

## 2022-10-16 RX ADMIN — CARBIDOPA AND LEVODOPA 2.5 MG: 50; 200 TABLET, EXTENDED RELEASE ORAL at 12:56

## 2022-10-16 RX ADMIN — MICONAZOLE NITRATE: 20 CREAM TOPICAL at 18:20

## 2022-10-16 RX ADMIN — ENOXAPARIN SODIUM 60 MG: 60 INJECTION SUBCUTANEOUS at 21:17

## 2022-10-16 NOTE — PROGRESS NOTES
Progress Note- Flash Silverman 79 y o  female MRN: [id number]    Unit/Bed#: -01 Encounter: [id number]      Assessment and Plan:  39-year-old woman with metastatic breast cancer CKD, NAFLD, history of DVT and PE, on Lovenox, status post thrombectomy and IVC filter  We were following for IBD status post infliximab bon 9/30 recent C diff infection now on daily p o  vancomycin  GI being reconsulted for ongoing hematochezia      Painless Hematochezia  On therapeutic anticoagulation due to PE status post thrombectomy and IVC filter  COVID 19 infection     · Patient started having episodes of hematochezia on 10/13  She continues to have hematochezia fortunately her hemoglobin has remained stable and she remains hemodynamically stable  · Therapeutic anticoagulation restarted  · Her hematochezia is most likely from bleeding from the deep ulceration that was seen on flexible sigmoidoscopy in the setting of her being on anticoagulation  These ulcerations most likely the reason for her contained perforation seen in the descending colon on last imaging done on 09/30  · Less likely that flexible sigmoidoscopy or colonoscopy would be helpful in management of these ulcerations  · Will plan on another dose of Remicade given her last dose was 2 weeks ago  given patient with recent COVID infection diagnosed on 10/09 we will delay the Remicade dose to 10/19 after discussion with ID  · CRP is negative which is reassuring  · Currently on IV dexamethasone for COVID infection  · Bleeding initially thought to be vaginal, evaluated by gynecology and this was ruled out        Inflammatory bowel disease  C diff infection     · Patient had flexible sigmoidoscopy on 08/20 which showed severe ulceration inflammation in the sigmoid colon  Biopsies were negative for viral infection    Although there were no features of chronicity on her biopsies her diarrhea has been ongoing for months  · She did not respond to steroids and received infliximab on 09/30  · Will plan on another dose of infliximab while inpatient, this would likely be done on Monday  · She also had positive C diff PCR and is currently on vancomycin daily  Per ID less likely patient had COVID infection therefore vancomycin can be stopped        Disposition:  GI will continue to follow    Gissel Velasquez MD   Gastroenterology Fellow   ______________________________________________________________________    Subjective:     Patient denies any abdominal pain    She states that her appetite is improved today    Medication Administration - last 24 hours from 10/15/2022 1649 to 10/16/2022 1649       Date/Time Order Dose Route Action Action by     40/01/1140 9960 folic acid (FOLVITE) tablet 1 mg 1 mg Oral Given Odella Creselena, RN     10/16/2022 0601 pantoprazole (PROTONIX) EC tablet 40 mg   Oral Canceled Entry Jadyn Arredondo, RN     10/16/2022 0557 pantoprazole (PROTONIX) EC tablet 40 mg 40 mg Oral Given [facility] Alirio, RN     10/15/2022 1748 pantoprazole (PROTONIX) EC tablet 40 mg 40 mg Oral Given Odella Cres, RN     10/15/2022 1748 atorvastatin (LIPITOR) tablet 40 mg 40 mg Oral Given Odella Cres, RN     10/16/2022 6961 cholecalciferol (VITAMIN D3) tablet 2,000 Units 2,000 Units Oral Given Odella Creeks, RN     10/16/2022 1256 midodrine (PROAMATINE) tablet 2 5 mg 2 5 mg Oral Given Odella Creeks, RN     10/16/2022 0600 midodrine (PROAMATINE) tablet 2 5 mg   Oral Canceled Entry Jadyn Arredondo, RN     10/16/2022 0557 midodrine (PROAMATINE) tablet 2 5 mg 2 5 mg Oral Given Jadyn Maria Elena, RN     10/15/2022 1753 midodrine (PROAMATINE) tablet 2 5 mg 2 5 mg Oral Given Odella Creeks, RN     10/15/2022 2235 mirtazapine (REMERON) tablet 7 5 mg 7 5 mg Oral Given Jadyn Arredondo, MAURO     10/16/2022 0914 metoprolol succinate (TOPROL-XL) 24 hr tablet 75 mg 75 mg Oral Given Tony Salinas, MAURO     10/15/2022 2234 metoprolol succinate (TOPROL-XL) 24 hr tablet 75 mg 75 mg Oral Given Calista Mauro RN     10/16/2022 0915 vancomycin (VANCOCIN) oral solution 125 mg 125 mg Oral Given Bhavesh Crum RN     10/16/2022 1254 dexamethasone (DECADRON) injection 6 mg 6 mg Intravenous Given Bhavesh Crum RN     10/16/2022 0914 moisture barrier miconazole 2% cream (aka ALYSSA MOISTURE BARRIER ANTIFUNGAL CREAM)   Topical Given Bhavesh Crum RN     10/15/2022 1748 moisture barrier miconazole 2% cream (aka ALYSSA MOISTURE BARRIER ANTIFUNGAL CREAM)   Topical Given Bhavesh Crum RN     10/16/2022 0922 enoxaparin (LOVENOX) subcutaneous injection 60 mg 60 mg Subcutaneous Given Bhavesh Crum RN     10/15/2022 2234 enoxaparin (LOVENOX) subcutaneous injection 60 mg 60 mg Subcutaneous Given Calista Mauro RN     10/16/2022 1205 potassium chloride 20 mEq IVPB (premix) 0 mEq Intravenous Stopped Bhavesh Crum RN     10/16/2022 0913 potassium chloride 20 mEq IVPB (premix) 20 mEq Intravenous New Bag Bhavesh Crum RN          Objective:     Vitals: Blood pressure 109/58, pulse 85, temperature 97 7 °F (36 5 °C), temperature source Oral, resp  rate 18, height 5' 1" (1 549 m), weight 57 6 kg (126 lb 15 8 oz), SpO2 97 %, not currently breastfeeding  ,Body mass index is 23 99 kg/m²  Intake/Output Summary (Last 24 hours) at 10/16/2022 1649  Last data filed at 10/16/2022 1519  Gross per 24 hour   Intake 1103 33 ml   Output 370 ml   Net 733 33 ml       Physical Exam:   General Appearance: Awake and alert, in no acute distress  Abdomen: Soft, non-tender, non-distended; bowel sounds normal; no masses or no organomegaly    Invasive Devices  Report    Peripherally Inserted Central Catheter Line  Duration           PICC Line 09/26/22 20 days          Drain  Duration           Urethral Catheter 14 Fr  8 days                Lab Results:  No results displayed because visit has over 200 results  Imaging Studies: I have personally reviewed pertinent imaging studies

## 2022-10-16 NOTE — PROGRESS NOTES
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  4  Protein calorie malnutrition  5  Urinary retention  6  Acute COVID 19     Recommendations:  Patient is afebrile with normal WBC count when last taken     Hemoglobin is now 8 7  Remains on steroids and   Nasal O2 supplementation down to 1 L  Diarrhea  1  I am not convinced that the patient has active C diff colitis  Diarrhea more likely secondary to IBD  Patient has completed full course of p o  Vancomycin  Discussed with GI  Agreed to discontinue further vancomycin    2  Several episodes of small volume diarrhea today without rectal bleeding  COVID 19  1  Currently stable and denies SOB  Chest x-ray not significant  2  SARS-CoV-2 PCR positive  Since patient came in with saddle embolus it is possible that she may have had COVID at that time  She is not immunized  3  COVID Protocol as per primary service  4  Discussed with GI and advised holding Remicade for approximately 72 hours until she is 10 days out from her COVID diagnosis  Antibiotics:  1  None       Subjective:  Less diarrhea and decreased shortness of breath with dry cough   Denies fevers, chills, or sweats  Denies nausea, vomiting,       Objective:  Vitals:  Temp:  [97 7 °F (36 5 °C)-98 3 °F (36 8 °C)] 97 7 °F (36 5 °C)  HR:  [79-85] 85  Resp:  [18] 18  BP: (100-110)/(55-58) 109/58  SpO2:  [93 %-98 %] 97 %  Temp (24hrs), Av 1 °F (36 7 °C), Min:97 7 °F (36 5 °C), Max:98 3 °F (36 8 °C)  Current: Temperature: 97 7 °F (36 5 °C)    Physical Exam:     General Appearance:    Eyes:   Alert, chronically ill-appearing female nontoxic, no acute distress    Nasal oxygen in place  Conjunctiva pale   Throat: Oropharynx moist without lesions  Lips, mucosa, and tongue normal   Neck: Supple, symmetrical, trachea midline, no adenopathy,  no tenderness/mass/nodules   Lungs:   Clear to auscultation bilaterally, no audible wheezes, rhonchi or rales; respirations unlabored   Heart:  Regular rate and rhythm, S1, S2 normal, no murmur, rub or gallop   Abdomen:   Soft, non-tender, non-distended, positive bowel sounds  No masses, no organomegaly    No CVA tenderness, Crawley catheter   Extremities: Extremities normal, atraumatic, no clubbing, cyanosis or edema, PICC line   Skin: Skin color pale, texture, turgor normal, no rashes or lesions  No draining wounds noted  Invasive Devices  Report    Peripherally Inserted Central Catheter Line  Duration           PICC Line 09/26/22 20 days          Drain  Duration           Urethral Catheter 14 Fr  8 days                Labs, Imaging, & Other studies:   All pertinent labs were personally reviewed  Results from last 7 days   Lab Units 10/16/22  0923 10/15/22  2020 10/15/22  1035 10/14/22  0807 10/14/22  0538 10/14/22  0057 10/13/22  0308 10/11/22  0442   WBC Thousand/uL  --   --   --   --  5 51 4 87  --  6 35   HEMOGLOBIN g/dL 8 7* 8 1* 7 8*   < > 7 9* 7 5*  7 6*   < > 7 9*   PLATELETS Thousands/uL  --   --   --   --  181 201  --  222    < > = values in this interval not displayed  Results from last 7 days   Lab Units 10/16/22  0449 10/15/22  0040 10/14/22  0538 10/11/22  0442 10/10/22  0614   SODIUM mmol/L 145 142 145   < > 146   POTASSIUM mmol/L 3 3* 3 7 3 4*   < > 3 6   CHLORIDE mmol/L 113* 113* 114*   < > 117*   CO2 mmol/L 27 29 28   < > 25   BUN mg/dL 9 8 7   < > 19   CREATININE mg/dL 0 46* 0 40* 0 36*   < > 0 62   EGFR ml/min/1 73sq m 92 97 100   < > 84   CALCIUM mg/dL 7 4* 7 3* 7 2*   < > 6 9*   AST U/L  --   --  17  --  13   ALT U/L  --   --  24  --  22   ALK PHOS U/L  --   --  84  --  86    < > = values in this interval not displayed

## 2022-10-16 NOTE — PROGRESS NOTES
[facility]  Progress Note Jayda Silverman 1940, 79 y o  female MRN: [id number]  Unit/Bed#: MS [facility] Encounter: [id number]  Primary Care Provider: Anna Frederick DO   Date and time admitted to hospital: 9/22/2022  3:35 PM    * Saddle embolus of pulmonary artery [facility])  Assessment & Plan  - initially presented with acute respiratory failure and hypoxia  - found to have an acute submassive high-risk pulmonary embolism  History of prior DVT in August in the right lower extremity  Unclear if patient was receiving Xarelto from nursing facility when she was discharged  - patient is hypercoagulable in the setting of recurrent breast cancer and active IBD    - status post thrombectomy and IVC filter placement  - continue with weight based Lovenox indefinitely        Hematochezia  Assessment & Plan  - acutely developed overnight in the morning of 10/13  - secondary to ulcerative colitis and complicated by systemic anticoagulation  - discussed again with GI team 10/15:  No contraindication from GI standpoint to resume Lovenox  This has been restarted  She will receive an additional dose of Remicade infusion which will hopefully help with her hematochezia  COVID-19 virus infection  Assessment & Plan  - incidentally found to be positive when being screened for discharge placement    - previously on remdesivir though discontinued per family request  - continue IV Decadron 6 mg daily for up to 10 days or unless weaned off of oxygen  - wean oxygen as tolerated; down to 1 L  - weight based Lovenox for anticoagulation  - airborne and contact precautions; can come off precautions 10/19 after 10 day quarantine  Metastatic breast cancer ([facility] [place] [address] )  Assessment & Plan  - Stage IV metastatic ER positive, [place] negative, 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, progressed from stage I A ER/[place] positive, 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 years ago   Status post resection and adjuvant radiation and hormone therapy for 5 years  Patient had symptoms of bony discomfort in her sternum in June of 2021, imaging revealed lytic lesion, biopsy in July of 2021 confirmed progression of disease  ? Patient was started on Faslodex and Xgeva at that time, in conjunction with radiation  ? In April 2022 there was interval development of 4 mm nodule at the right lower lobe and interval development of increased sclerotic lesions throughout the visualized spine  ? At that time, the patient's treatment was changed to Femara and Asim Kin  ? In July of 2022, the patient was changed from Asim Kin to Teresabury because of intolerance  ? During the patient's hospitalization in August of 2022 to September of 2022, Verzenio was discontinued due to gastroenteritis  ? Patient's cancer is most likely contributing to her hypercoagulable state  ? Appreciate oncology and palliative care consult  ? Will continue on Lovenox for now  ? Continue on Remeron for insomnia and anxiety  ? Patient would not like any more treatment for her malignancy    Family meeting held on 10/04 with palliative care, GI, CM, and IM team  Patient's current clinical course discussed  Discussion of disposition planning occurred  Hospice was discussed  Patient is pending placement to nursing facility for continued rehab, and plans to transition to hospice when she clinically declines  Toxic gastroenteritis and colitis  Assessment & Plan  - Patient was admitted with toxic gastroenteritis in August of 2022   Determined to be possible IBD with additional insult of Verzenio from her breast cancer treatment    - continue vancomycin taper per GI  - started on Remicade 10/1 inpatient, and will be continued on an outpatient basis after discharge      Pressure injury of left buttock, stage 3 ([facility] [address] )  Assessment & Plan  - present on admission  - wound care consult    Acute respiratory failure with hypoxia [facility])  Assessment & Plan  - secondary to COVID infection; see plan above    Urinary retention  Assessment & Plan  - marked urinary retention and bladder distention on admission  - failed voiding trial and urinary catheter replaced  - will plan to discharge with Crawley catheter  - status post empiric 3 day course of IV ceftriaxone    Anemia  Assessment & Plan  - Likely a mixed component of anemia chronic disease with possible folate deficiency  - Continue folate supplementation  - status post 1 unit PRBCs this admission; continue to monitor and transfuse for hemoglobin less than 7 0       Anxiety  Assessment & Plan  - Hold amitriptyline -possible etiology of tachycardia  - Will resume Remeron        VTE Pharmacologic Prophylaxis:   High Risk (Score >/= 5) - Pharmacological DVT Prophylaxis Ordered: enoxaparin (Lovenox)  Sequential Compression Devices Ordered  Patient Centered Rounds: I performed bedside rounds with nursing staff today  Discussions with Specialists or Other Care Team Provider: CM     Education and Discussions with Family / Patient: Updated  (daughter) via phone  Time Spent for Care: 30 minutes  More than 50% of total time spent on counseling and coordination of care as described above  Current Length of Stay: 24 day(s)  Current Patient Status: Inpatient   Certification Statement: The patient will continue to require additional inpatient hospital stay due to covid treatment, discharge planning  Discharge Plan: Anticipate discharge in 48-72 hrs to rehab facility  Code Status: Level 3 - DNAR and DNI    Subjective:   Patient seen and examined  Following up for multiple issues as above  Patient continues to have hematochezia, though seems to be improved  Patient still having diarrhea but denies any abdominal pain or cramping  She is afebrile  Blood counts have remained stable  Stable on 1 L supplemental O2    Objective:     Vitals:   Temp (24hrs), Av 3 °F (36 8 °C), Min:98 2 °F (36 8 °C), Max:98 4 °F (36 9 °C)    Temp:  [98 2 °F (36 8 °C)-98 4 °F (36 9 °C)] 98 2 °F (36 8 °C)  HR:  [79-83] 79  Resp:  [18] 18  BP: ()/(53-57) 110/57  SpO2:  [95 %-98 %] 98 %  Body mass index is 23 99 kg/m²  Input and Output Summary (last 24 hours): Intake/Output Summary (Last 24 hours) at 10/16/2022 0954  Last data filed at 10/16/2022 0559  Gross per 24 hour   Intake 240 ml   Output 370 ml   Net -130 ml       Physical Exam:   PHYSICAL EXAM:    Vitals signs reviewed  Constitutional   Awake and cooperative  NAD  Head/Neck   Normocephalic  Atraumatic  HEENT   No scleral icterus  EOMI  Heart   Regular rate and rhythm  No murmers  Lungs   Clear to auscultation bilaterally  Respirations unlaboured  Abdomen   Soft  Nontender  Nondistended  Skin   Skin color normal  No rashes  Extremities   No deformities  No peripheral edema  Neuro   Alert and oriented  No new deficits  Psych   Mood stable  Affect normal          Additional Data:     Labs:  Results from last 7 days   Lab Units 10/15/22  2020 10/14/22  0807 10/14/22  0538   WBC Thousand/uL  --   --  5 51   HEMOGLOBIN g/dL 8 1*   < > 7 9*   HEMATOCRIT % 26 5*   < > 25 9*   PLATELETS Thousands/uL  --   --  181   NEUTROS PCT %  --   --  62   LYMPHS PCT %  --   --  26   MONOS PCT %  --   --  9   EOS PCT %  --   --  1    < > = values in this interval not displayed  Results from last 7 days   Lab Units 10/16/22  0449 10/15/22  0040 10/14/22  0538   SODIUM mmol/L 145   < > 145   POTASSIUM mmol/L 3 3*   < > 3 4*   CHLORIDE mmol/L 113*   < > 114*   CO2 mmol/L 27   < > 28   BUN mg/dL 9   < > 7   CREATININE mg/dL 0 46*   < > 0 36*   ANION GAP mmol/L 5   < > 3*   CALCIUM mg/dL 7 4*   < > 7 2*   ALBUMIN g/dL  --   --  1 6*   TOTAL BILIRUBIN mg/dL  --   --  0 28   ALK PHOS U/L  --   --  84   ALT U/L  --   --  24   AST U/L  --   --  17   GLUCOSE RANDOM mg/dL 71   < > 79    < > = values in this interval not displayed                         Lines/Drains:  Invasive Devices  Report    Peripherally Inserted Central Catheter Line  Duration           PICC Line 09/26/22 19 days          Drain  Duration           Urethral Catheter 14 Fr  7 days              Urinary Catheter:  Goal for removal: N/A- Discharging with Crawley         Central Line:  Goal for removal: Will discontinue when peripheral access obtained  Imaging: No pertinent imaging reviewed  Recent Cultures (last 7 days):         Last 24 Hours Medication List:   Current Facility-Administered Medications   Medication Dose Route Frequency Provider Last Rate   • acetaminophen  650 mg Oral Q6H PRN [place] Julian Chung, DO     • atorvastatin  40 mg Oral After Vamshi Camejo DO     • cholecalciferol  2,000 Units Oral Daily Mary Balo, DO     • dexamethasone  6 mg Intravenous Q24H [place] Janice, DO     • enoxaparin  1 mg/kg Subcutaneous Q12H [address] [place] Pearcharo Chung, DO     • folic acid  1 mg Oral Daily Mary Balo, DO     • metoprolol succinate  75 mg Oral BID BROOKE Hernández     • midodrine  2 5 mg Oral TID AC Nadine Wynne MD     • mirtazapine  7 5 mg Oral HS [place] Janice, DO     • ALYSSA ANTIFUNGAL   Topical BID Glory Pearcharo Chung, DO     • ondansetron  4 mg Intravenous Q4H PRN [place] Pearcharo Chung, DO     • pantoprazole  40 mg Oral BID AC Mary Balo, DO     • potassium chloride  20 mEq Intravenous Once Glory Pearcharo Chung, DO 20 mEq (10/16/22 0913)   • vancomycin  125 mg Oral Daily Cassandra Rangel DO          Today, Patient Was Seen By: Nicola Gonsales    **Please Note: This note may have been constructed using a voice recognition system  **

## 2022-10-17 ENCOUNTER — HOSPITAL ENCOUNTER (OUTPATIENT)
Dept: INFUSION CENTER | Facility: HOSPITAL | Age: 82
Discharge: HOME | End: 2022-10-17
Attending: INTERNAL MEDICINE

## 2022-10-17 LAB
ANION GAP SERPL CALCULATED.3IONS-SCNC: 3 MMOL/L (ref 4–13)
BUN SERPL-MCNC: 9 MG/DL (ref 5–25)
CALCIUM SERPL-MCNC: 7.6 MG/DL (ref 8.3–10.1)
CHLORIDE SERPL-SCNC: 112 MMOL/L (ref 96–108)
CO2 SERPL-SCNC: 29 MMOL/L (ref 21–32)
CREAT SERPL-MCNC: 0.43 MG/DL (ref 0.6–1.3)
GFR SERPL CREATININE-BSD FRML MDRD: 95 ML/MIN/1.73SQ M
GLUCOSE SERPL-MCNC: 80 MG/DL (ref 65–140)
HCT VFR BLD AUTO: 27.9 % (ref 34.8–46.1)
HGB BLD-MCNC: 8.3 G/DL (ref 11.5–15.4)
POTASSIUM SERPL-SCNC: 3.5 MMOL/L (ref 3.5–5.3)
SODIUM SERPL-SCNC: 144 MMOL/L (ref 135–147)

## 2022-10-17 PROCEDURE — 99231 SBSQ HOSP IP/OBS SF/LOW 25: CPT | Performed by: INTERNAL MEDICINE

## 2022-10-17 PROCEDURE — 85014 HEMATOCRIT: CPT | Performed by: STUDENT IN AN ORGANIZED HEALTH CARE EDUCATION/TRAINING PROGRAM

## 2022-10-17 PROCEDURE — 85018 HEMOGLOBIN: CPT | Performed by: STUDENT IN AN ORGANIZED HEALTH CARE EDUCATION/TRAINING PROGRAM

## 2022-10-17 PROCEDURE — 80048 BASIC METABOLIC PNL TOTAL CA: CPT | Performed by: INTERNAL MEDICINE

## 2022-10-17 PROCEDURE — 99232 SBSQ HOSP IP/OBS MODERATE 35: CPT | Performed by: INTERNAL MEDICINE

## 2022-10-17 RX ORDER — DIPHENOXYLATE HYDROCHLORIDE AND ATROPINE SULFATE 2.5; .025 MG/1; MG/1
1 TABLET ORAL 4 TIMES DAILY PRN
Status: DISCONTINUED | OUTPATIENT
Start: 2022-10-17 | End: 2022-10-20 | Stop reason: HOSPADM

## 2022-10-17 RX ORDER — LOPERAMIDE HYDROCHLORIDE 2 MG/1
4 CAPSULE ORAL 2 TIMES DAILY
Status: DISCONTINUED | OUTPATIENT
Start: 2022-10-17 | End: 2022-10-20 | Stop reason: HOSPADM

## 2022-10-17 RX ADMIN — PANTOPRAZOLE SODIUM 40 MG: 40 TABLET, DELAYED RELEASE ORAL at 05:17

## 2022-10-17 RX ADMIN — DEXAMETHASONE SODIUM PHOSPHATE 6 MG: 4 INJECTION INTRA-ARTICULAR; INTRALESIONAL; INTRAMUSCULAR; INTRAVENOUS; SOFT TISSUE at 12:30

## 2022-10-17 RX ADMIN — CARBIDOPA AND LEVODOPA 2.5 MG: 50; 200 TABLET, EXTENDED RELEASE ORAL at 17:52

## 2022-10-17 RX ADMIN — LOPERAMIDE HYDROCHLORIDE 4 MG: 2 CAPSULE ORAL at 12:13

## 2022-10-17 RX ADMIN — PANTOPRAZOLE SODIUM 40 MG: 40 TABLET, DELAYED RELEASE ORAL at 17:52

## 2022-10-17 RX ADMIN — Medication 2000 UNITS: at 08:13

## 2022-10-17 RX ADMIN — MIRTAZAPINE 7.5 MG: 15 TABLET, FILM COATED ORAL at 20:59

## 2022-10-17 RX ADMIN — METOPROLOL SUCCINATE 75 MG: 50 TABLET, EXTENDED RELEASE ORAL at 21:01

## 2022-10-17 RX ADMIN — Medication 125 MG: at 08:14

## 2022-10-17 RX ADMIN — MICONAZOLE NITRATE: 20 CREAM TOPICAL at 08:13

## 2022-10-17 RX ADMIN — CARBIDOPA AND LEVODOPA 2.5 MG: 50; 200 TABLET, EXTENDED RELEASE ORAL at 12:13

## 2022-10-17 RX ADMIN — FOLIC ACID 1 MG: 1 TABLET ORAL at 08:13

## 2022-10-17 RX ADMIN — ENOXAPARIN SODIUM 60 MG: 60 INJECTION SUBCUTANEOUS at 20:59

## 2022-10-17 RX ADMIN — LOPERAMIDE HYDROCHLORIDE 4 MG: 2 CAPSULE ORAL at 20:59

## 2022-10-17 RX ADMIN — METOPROLOL SUCCINATE 75 MG: 50 TABLET, EXTENDED RELEASE ORAL at 08:13

## 2022-10-17 RX ADMIN — ATORVASTATIN CALCIUM 40 MG: 40 TABLET, FILM COATED ORAL at 17:52

## 2022-10-17 RX ADMIN — MICONAZOLE NITRATE 1 APPLICATION: 20 CREAM TOPICAL at 17:53

## 2022-10-17 RX ADMIN — CARBIDOPA AND LEVODOPA 2.5 MG: 50; 200 TABLET, EXTENDED RELEASE ORAL at 05:20

## 2022-10-17 RX ADMIN — ENOXAPARIN SODIUM 60 MG: 60 INJECTION SUBCUTANEOUS at 08:13

## 2022-10-17 NOTE — CASE MANAGEMENT
Case Management Discharge Planning Note    Patient name Noel Silverman  Location /-77 MRN [id number]  : 1940 Date 10/17/2022       Current Admission Date: 2022  Current Admission Diagnosis:Saddle embolus of pulmonary artery [facility])   Patient Active Problem List    Diagnosis Date Noted   • Hematochezia 10/13/2022   • COVID-19 virus infection 10/11/2022   • Pressure injury of left buttock, stage 3 ([address] ) 10/11/2022   • Acute respiratory failure with hypoxia ([address] ) 10/09/2022   • Ulcerative pancolitis with rectal bleeding ([facility] [address] ) 10/03/2022   • Saddle embolus of pulmonary artery ([facility] [address] ) 2022   • Urinary retention 2022   • Swelling of lower extremity 2022   • Venous insufficiency 2022   • Tachycardia 2022   • Single subsegmental pulmonary embolism without acute cor pulmonale ([address] ) 2022   • Severe protein-calorie malnutrition ([address] ) 2022   • Anemia 2022   • Hypokalemia 2022   • Diarrhea 2022   • Secondary malignant neoplasm of bone ([address] ) 2022   • Toxic gastroenteritis and colitis 2022   • Rectal bleeding 10/14/2021   • Lytic lesion of bone on x-ray 2021   • Neoplasm of uncertain behavior of sternum 2021   • Acute costochondritis 2021   • Osteopenia of multiple sites 2020   • Primary hypertension 10/22/2019   • Chronic kidney disease (CKD) stage G3a/A1, moderately decreased glomerular filtration rate (GFR) between 45-59 mL/min/1 73 square meter and albuminuria creatinine ratio less than 30 mg/g (HCC) 2019   • Adverse reaction to pneumococcal vaccine 2015   • Cataract, bilateral 2014   • Pulmonary nodule seen on imaging study 09/10/2013   • Sleep disorder 2013   • Anxiety 2013   • Metastatic breast cancer ([address] ) 10/17/2012   • Mixed hyperlipidemia 2012      LOS (days): 25  Geometric Mean LOS (GMLOS) (days): 8 10  Days to GMLOS:-16 8     OBJECTIVE:  Risk of Unplanned Readmission Score: 40 18         Current admission status: Inpatient   Preferred Pharmacy:   [facility] DR ROBIN BAUMAN [facility], [address]  [facility]  Phone: [phone] Fax: [phone] [facility], [address]  Charisma George [address]  [address] [facility] [address]  Phone: [phone] Fax: [phone] [address] [place],[address], [address] [facility] 210 [facility]  Phone: [phone] Fax: [phone]    Primary Care Provider: Efrain Parham DO    Primary Insurance: MEDICARE  Secondary Insurance: Phong Arely DETAILS:    Discharge planning discussed with[de-identified] Korin Can of Choice: Yes                                  Other Referral/Resources/Interventions Provided:  Referral Comments: CM received call from Alyssa Metcalf who asked CM if she can see if other facilities will have a bed available for patient, [address], as patient will nto be discharged until 10/19 when she is off covid restrictions and gets remicaid  CM opened up referral in [address], sent message to [facility] via [address] inquiring if they have a bed available on 10/19

## 2022-10-17 NOTE — PROGRESS NOTES
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  4  Protein calorie malnutrition  5  Urinary retention  6  Acute COVID 19     Recommendations:  Patient is afebrile with normal WBC count when last taken     Hemoglobin is now 8 3  Remains on steroids and nasal O2 supplementation down to 1 L  Diarrhea  1  I am not convinced that the patient has active C diff colitis  Diarrhea more likely secondary to IBD  Patient has completed full course of p o  Vancomycin  Discussed with GI 10/16  P O  Vancomycin discontinued  2  Several episodes of small volume diarrhea today without rectal bleeding  COVID 19  1  Currently stable and denies SOB  Chest x-ray not significant  2  SARS-CoV-2 PCR positive  Since patient came in with saddle embolus it is possible that she may have had COVID at that time  She is not immunized  3  COVID Protocol as per primary service  4  Discussed with GI and advised holding Remicade until 10/19 when she is 10 days out from her COVID diagnosis  Antibiotics:  1  None       Subjective:  Less diarrhea and decreased shortness of breath with dry cough   Denies fevers, chills, or sweats  Denies nausea, vomiting,       Objective:  Vitals:  Temp:  [97 7 °F (36 5 °C)-98 9 °F (37 2 °C)] 98 9 °F (37 2 °C)  HR:  [76-85] 76  Resp:  [18] 18  BP: (108-115)/(52-58) 114/52  SpO2:  [94 %-98 %] 98 %  Temp (24hrs), Av 3 °F (36 8 °C), Min:97 7 °F (36 5 °C), Max:98 9 °F (37 2 °C)  Current: Temperature: 98 9 °F (37 2 °C)    Physical Exam:     General Appearance:    Eyes:   Alert, chronically ill-appearing female nontoxic, no acute distress  Nasal oxygen in place  Conjunctiva pale   Throat: Oropharynx moist without lesions  Lips, mucosa, and tongue normal   Neck: Supple, symmetrical, trachea midline, no adenopathy,  no tenderness/mass/nodules   Lungs:   Clear to auscultation bilaterally, no audible wheezes, rhonchi or rales; respirations unlabored   Heart:  Regular rate and rhythm, S1, S2 normal, no murmur, rub or gallop   Abdomen:   Soft, non-tender, non-distended, positive bowel sounds  No masses, no organomegaly    No CVA tenderness, Crawley catheter   Extremities: Extremities normal, atraumatic, no clubbing, cyanosis or edema, PICC line   Skin: Skin color pale, texture, turgor normal, no rashes or lesions  No draining wounds noted  Invasive Devices  Report    Peripherally Inserted Central Catheter Line  Duration           PICC Line 09/26/22 21 days          Drain  Duration           Urethral Catheter 14 Fr  8 days                Labs, Imaging, & Other studies:   All pertinent labs were personally reviewed  Results from last 7 days   Lab Units 10/17/22  0538 10/16/22  0923 10/15/22  2020 10/14/22  0807 10/14/22  0538 10/14/22  0057 10/13/22  0308 10/11/22  0442   WBC Thousand/uL  --   --   --   --  5 51 4 87  --  6 35   HEMOGLOBIN g/dL 8 3* 8 7* 8 1*   < > 7 9* 7 5*  7 6*   < > 7 9*   PLATELETS Thousands/uL  --   --   --   --  181 201  --  222    < > = values in this interval not displayed       Results from last 7 days   Lab Units 10/17/22  0538 10/16/22  0449 10/15/22  0040 10/14/22  0538   SODIUM mmol/L 144 145 142 145   POTASSIUM mmol/L 3 5 3 3* 3 7 3 4*   CHLORIDE mmol/L 112* 113* 113* 114*   CO2 mmol/L 29 27 29 28   BUN mg/dL 9 9 8 7   CREATININE mg/dL 0 43* 0 46* 0 40* 0 36*   EGFR ml/min/1 73sq m 95 92 97 100   CALCIUM mg/dL 7 6* 7 4* 7 3* 7 2*   AST U/L  --   --   --  17   ALT U/L  --   --   --  24   ALK PHOS U/L  --   --   --  84

## 2022-10-17 NOTE — PLAN OF CARE
Problem: Potential for Falls  Goal: Patient will remain free of falls  Description: INTERVENTIONS:  - Educate patient/family on patient safety including physical limitations  - Instruct patient to call for assistance with activity   - Consult OT/PT to assist with strengthening/mobility   - Keep Call bell within reach  - Keep bed low and locked with side rails adjusted as appropriate  - Keep care items and personal belongings within reach  - Initiate and maintain comfort rounds  - Make Fall Risk Sign visible to staff  - Apply yellow socks and bracelet for high fall risk patients  - Consider moving patient to room near nurses station  Outcome: Progressing     Problem: MOBILITY - ADULT  Goal: Maintain or return to baseline ADL function  Description: INTERVENTIONS:  -  Assess patient's ability to carry out ADLs; assess patient's baseline for ADL function and identify physical deficits which impact ability to perform ADLs (bathing, care of mouth/teeth, toileting, grooming, dressing, etc )  - Assess/evaluate cause of self-care deficits   - Assess range of motion  - Assess patient's mobility; develop plan if impaired  - Assess patient's need for assistive devices and provide as appropriate  - Encourage maximum independence but intervene and supervise when necessary  - Involve family in performance of ADLs  - Assess for home care needs following discharge   - Consider OT consult to assist with ADL evaluation and planning for discharge  - Provide patient education as appropriate  Outcome: Progressing  Goal: Maintains/Returns to pre admission functional level  Description: INTERVENTIONS:  - Perform BMAT or MOVE assessment daily    - Set and communicate daily mobility goal to care team and patient/family/caregiver  - Collaborate with rehabilitation services on mobility goals if consulted  - Perform Range of Motion 3 times a day  - Reposition patient every 3 hours    - Dangle patient 3 times a day  - Stand patient 3 times a day  - Ambulate patient 3 times a day  - Out of bed to chair 3 times a day   - Out of bed for meals 3 times a day  - Out of bed for toileting  - Record patient progress and toleration of activity level   Outcome: Progressing     Problem: Prexisting or High Potential for Compromised Skin Integrity  Goal: Skin integrity is maintained or improved  Description: INTERVENTIONS:  - Identify patients at risk for skin breakdown  - Assess and monitor skin integrity  - Assess and monitor nutrition and hydration status  - Monitor labs   - Assess for incontinence   - Turn and reposition patient  - Assist with mobility/ambulation  - Relieve pressure over bony prominences  - Avoid friction and shearing  - Provide appropriate hygiene as needed including keeping skin clean and dry  - Evaluate need for skin moisturizer/barrier cream  - Collaborate with interdisciplinary team   - Patient/family teaching  - Consider wound care consult   Outcome: Progressing     Problem: GENITOURINARY - ADULT  Goal: Maintains or returns to baseline urinary function  Description: INTERVENTIONS:  - Assess urinary function  - Encourage oral fluids to ensure adequate hydration if ordered  - Administer IV fluids as ordered to ensure adequate hydration  - Administer ordered medications as needed  - Offer frequent toileting  - Follow urinary retention protocol if ordered  Outcome: Progressing  Goal: Absence of urinary retention  Description: INTERVENTIONS:  - Assess patient’s ability to void and empty bladder  - Monitor I/O  - Bladder scan as needed  - Discuss with physician/AP medications to alleviate retention as needed  - Discuss catheterization for long term situations as appropriate  Outcome: Progressing  Goal: Urinary catheter remains patent  Description: INTERVENTIONS:  - Assess patency of urinary catheter  - If patient has a chronic suarez, consider changing catheter if non-functioning  - Follow guidelines for intermittent irrigation of non-functioning urinary catheter  Outcome: Progressing     Problem: METABOLIC, FLUID AND ELECTROLYTES - ADULT  Goal: Electrolytes maintained within normal limits  Description: INTERVENTIONS:  - Monitor labs and assess patient for signs and symptoms of electrolyte imbalances  - Administer electrolyte replacement as ordered  - Monitor response to electrolyte replacements, including repeat lab results as appropriate  - Instruct patient on fluid and nutrition as appropriate  Outcome: Progressing  Goal: Fluid balance maintained  Description: INTERVENTIONS:  - Monitor labs   - Monitor I/O and WT  - Instruct patient on fluid and nutrition as appropriate  - Assess for signs & symptoms of volume excess or deficit  Outcome: Progressing  Goal: Glucose maintained within target range  Description: INTERVENTIONS:  - Monitor Blood Glucose as ordered  - Assess for signs and symptoms of hyperglycemia and hypoglycemia  - Administer ordered medications to maintain glucose within target range  - Assess nutritional intake and initiate nutrition service referral as needed  Outcome: Progressing     Problem: SKIN/TISSUE INTEGRITY - ADULT  Goal: Skin Integrity remains intact(Skin Breakdown Prevention)  Description: Assess:  -Assess extremities for adequate circulation and sensation     Bed Management:  -Have minimal linens on bed & keep smooth, unwrinkled  -Change linens as needed when moist or perspiring  -Avoid sitting or lying in one position for more than 3 hours while in bed  -Keep HOB at 3degrees     Toileting:  -Offer bedside commode  -Assess for incontinence every 3  -Use incontinent care products after each incontinent episode such as 3    Activity:  -Mobilize patient 3 times a day  -Encourage activity and walks on unit  -Encourage or provide ROM exercises   -Turn and reposition patient every 3 Hours  -Use appropriate equipment to lift or move patient in bed  -Instruct/ Assist with weight shifting every 3 when out of bed in chair  -Consider limitation of chair time 3 hour intervals    Skin Care:  -Avoid use of baby powder, tape, friction and shearing, hot water or constrictive clothing  -Relieve pressure over bony prominences using 3  -Do not massage red bony areas    Next Steps:  -Teach patient strategies to minimize risks such as 3   -Consider consults to  interdisciplinary teams such as 3  Outcome: Progressing  Goal: Incision(s), wounds(s) or drain site(s) healing without S/S of infection  Description: INTERVENTIONS  - Assess and document dressing, incision, wound bed, drain sites and surrounding tissue  - Provide patient and family education  - Perform skin care/dressing changes every 3  Outcome: Progressing  Goal: Pressure injury heals and does not worsen  Description: Interventions:  - Implement low air loss mattress or specialty surface (Criteria met)  - Apply silicone foam dressing  - Instruct/assist with weight shifting every 3 minutes when in chair   - Limit chair time to 3 hour intervals  - Use special pressure reducing interventions such as 3 when in chair   - Apply fecal or urinary incontinence containment device   - Perform passive or active ROM every 3  - Turn and reposition patient & offload bony prominences every 3 hours   - Utilize friction reducing device or surface for transfers   - Consider consults to  interdisciplinary teams such as 3  - Use incontinent care products after each incontinent episode such as   - Consider nutrition services referral as needed  Outcome: Progressing     Problem: PAIN - ADULT  Goal: Verbalizes/displays adequate comfort level or baseline comfort level  Description: Interventions:  - Encourage patient to monitor pain and request assistance  - Assess pain using appropriate pain scale  - Administer analgesics based on type and severity of pain and evaluate response  - Implement non-pharmacological measures as appropriate and evaluate response  - Consider cultural and social influences on pain and pain management  - Notify physician/advanced practitioner if interventions unsuccessful or patient reports new pain  Outcome: Progressing     Problem: Nutrition/Hydration-ADULT  Goal: Nutrient/Hydration intake appropriate for improving, restoring or maintaining nutritional needs  Description: Monitor and assess patient's nutrition/hydration status for malnutrition  Collaborate with interdisciplinary team and initiate plan and interventions as ordered  Monitor patient's weight and dietary intake as ordered or per policy  Utilize nutrition screening tool and intervene as necessary  Determine patient's food preferences and provide high-protein, high-caloric foods as appropriate       INTERVENTIONS:  - Monitor oral intake, urinary output, labs, and treatment plans  - Assess nutrition and hydration status and recommend course of action  - Evaluate amount of meals eaten  - Assist patient with eating if necessary   - Allow adequate time for meals  - Recommend/ encourage appropriate diets, oral nutritional supplements, and vitamin/mineral supplements  - Order, calculate, and assess calorie counts as needed  - Recommend, monitor, and adjust tube feedings and TPN/PPN based on assessed needs  - Assess need for intravenous fluids  - Provide specific nutrition/hydration education as appropriate  - Include patient/family/caregiver in decisions related to nutrition  Outcome: Progressing

## 2022-10-17 NOTE — PROGRESS NOTES
[facility]  Progress Note Cha Silverman 1940, 79 y o  female MRN: [id number]  Unit/Bed#: MS 55Jw-[place] Encounter: [id number]  Primary Care Provider: Bryce Marques,    Date and time admitted to hospital: 9/22/2022  3:35 PM    * Saddle embolus of pulmonary artery [facility])  Assessment & Plan  - initially presented with acute respiratory failure and hypoxia  - found to have an acute submassive high-risk pulmonary embolism  History of prior DVT in August in the right lower extremity  Unclear if patient was receiving Xarelto from nursing facility when she was discharged  - patient is hypercoagulable in the setting of recurrent breast cancer and active IBD    - status post thrombectomy and IVC filter placement  - continue with weight based Lovenox indefinitely        Hematochezia  Assessment & Plan  - acutely developed overnight in the morning of 10/13  - secondary to ulcerative colitis and complicated by systemic anticoagulation  - multiple discussions held with GI  There is no contraindication to continuing anticoagulation  Some amount of bleeding should be expected, and fortunately her hematochezia has improved with only a small amount of bleeding still present  She will be receiving her 2nd infusion of Remicade on 10/19 which will hopefully help prevent further episodes  COVID-19 virus infection  Assessment & Plan  - incidentally found to be positive when being screened for discharge placement    - previously on remdesivir though discontinued per family request  - continue IV Decadron 6 mg daily for up to 10 days or unless weaned off of oxygen  - wean oxygen as tolerated; down to 1 L  - weight based Lovenox for anticoagulation  - airborne and contact precautions; can come off precautions 10/19 after 10 day quarantine      Metastatic breast cancer ([facility] [address] )  Assessment & Plan  - Stage IV metastatic ER positive, [place] negative, 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, progressed from stage I A ER/[place] positive, 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 years ago  Status post resection and adjuvant radiation and hormone therapy for 5 years  Patient had symptoms of bony discomfort in her sternum in June of 2021, imaging revealed lytic lesion, biopsy in July of 2021 confirmed progression of disease  ? Patient was started on Faslodex and Xgeva at that time, in conjunction with radiation  ? In April 2022 there was interval development of 4 mm nodule at the right lower lobe and interval development of increased sclerotic lesions throughout the visualized spine  ? At that time, the patient's treatment was changed to Femara and Yury Brakeman  ? In July of 2022, the patient was changed from Yury Brakeman to Teresabury because of intolerance  ? During the patient's hospitalization in August of 2022 to September of 2022, Verzenio was discontinued due to gastroenteritis  ? Patient's cancer is most likely contributing to her hypercoagulable state  ? Appreciate oncology and palliative care consult  ? Will continue on Lovenox for now  ? Continue on Remeron for insomnia and anxiety  ? Patient would not like any more treatment for her malignancy    Family meeting held on 10/04 with palliative care, GI, CM, and IM team  Patient's current clinical course discussed  Discussion of disposition planning occurred  Hospice was discussed  Patient is pending placement to nursing facility for continued rehab, and plans to transition to hospice when she clinically declines  Toxic gastroenteritis and colitis  Assessment & Plan  - Patient was admitted with toxic gastroenteritis in August of 2022  Determined to be possible IBD with additional insult of Verzenio from her breast cancer treatment    - low suspicion for C diff infection and p o   Vancomycin taper was discontinued per ID recs  - started on Remicade 10/1 inpatient, and will be continued on an outpatient basis after discharge      Pressure injury of left buttock, stage 3 (HCC)  Assessment & Plan  - present on admission  - wound care consult    Acute respiratory failure with hypoxia ([address] )  Assessment & Plan  - secondary to COVID infection; see plan above    Urinary retention  Assessment & Plan  - marked urinary retention and bladder distention on admission  - failed voiding trial and urinary catheter replaced  - will plan to discharge with Crawley catheter  - status post empiric 3 day course of IV ceftriaxone    Anemia  Assessment & Plan  - Likely a mixed component of anemia chronic disease with possible folate deficiency  - Continue folate supplementation  - status post 1 unit PRBCs this admission; continue to monitor and transfuse for hemoglobin less than 7 0       Anxiety  Assessment & Plan  - Hold amitriptyline discontinued as it was thought it was contributing to her tachycardia  - continue Remeron        VTE Pharmacologic Prophylaxis:   High Risk (Score >/= 5) - Pharmacological DVT Prophylaxis Ordered: enoxaparin (Lovenox)  Sequential Compression Devices Ordered  Patient Centered Rounds: I performed bedside rounds with nursing staff today  Discussions with Specialists or Other Care Team Provider: CM     Education and Discussions with Family / Patient: Updated  (daughter) via phone  Time Spent for Care: 30 minutes  More than 50% of total time spent on counseling and coordination of care as described above  Current Length of Stay: 25 day(s)  Current Patient Status: Inpatient   Certification Statement: The patient will continue to require additional inpatient hospital stay due to covid treatment, discharge planning  Discharge Plan: Anticipate discharge in 48-72 hrs to rehab facility  Code Status: Level 3 - DNAR and DNI    Subjective:   Patient seen and examined  Following up for multiple issues as above  Patient stable from respiratory standpoint and remains on 1 L  No complaints of chest pain or shortness of breath    Her hematochezia and diarrhea are slowly improving as well   Objective:     Vitals:   Temp (24hrs), Av 3 °F (36 8 °C), Min:97 7 °F (36 5 °C), Max:98 9 °F (37 2 °C)    Temp:  [97 7 °F (36 5 °C)-98 9 °F (37 2 °C)] 98 9 °F (37 2 °C)  HR:  [76-85] 76  Resp:  [18] 18  BP: (108-115)/(52-58) 114/52  SpO2:  [94 %-98 %] 94 %  Body mass index is 24 45 kg/m²  Input and Output Summary (last 24 hours): Intake/Output Summary (Last 24 hours) at 10/17/2022 1129  Last data filed at 10/17/2022 0900  Gross per 24 hour   Intake 953 33 ml   Output 375 ml   Net 578 33 ml       Physical Exam:   PHYSICAL EXAM:    Vitals signs reviewed  Constitutional   Awake and cooperative  NAD  Head/Neck   Normocephalic  Atraumatic  HEENT   No scleral icterus  EOMI  Heart   Regular rate and rhythm  No murmers  Lungs   Clear to auscultation bilaterally  Respirations unlaboured  Abdomen   Soft  Nontender  Nondistended  Skin   Skin color normal  No rashes  Extremities   No deformities  No peripheral edema  Neuro   Alert and oriented  No new deficits  Psych   Mood stable  Affect normal          Additional Data:     Labs:  Results from last 7 days   Lab Units 10/17/22  0538 10/14/22  0807 10/14/22  0538   WBC Thousand/uL  --   --  5 51   HEMOGLOBIN g/dL 8 3*   < > 7 9*   HEMATOCRIT % 27 9*   < > 25 9*   PLATELETS Thousands/uL  --   --  181   NEUTROS PCT %  --   --  62   LYMPHS PCT %  --   --  26   MONOS PCT %  --   --  9   EOS PCT %  --   --  1    < > = values in this interval not displayed       Results from last 7 days   Lab Units 10/17/22  0538 10/15/22  0040 10/14/22  0538   SODIUM mmol/L 144   < > 145   POTASSIUM mmol/L 3 5   < > 3 4*   CHLORIDE mmol/L 112*   < > 114*   CO2 mmol/L 29   < > 28   BUN mg/dL 9   < > 7   CREATININE mg/dL 0 43*   < > 0 36*   ANION GAP mmol/L 3*   < > 3*   CALCIUM mg/dL 7 6*   < > 7 2*   ALBUMIN g/dL  --   --  1 6*   TOTAL BILIRUBIN mg/dL  --   --  0 28   ALK PHOS U/L  --   --  84   ALT U/L  --   --  24   AST U/L  --   --  17   GLUCOSE RANDOM mg/dL 80 < > 79    < > = values in this interval not displayed  Lines/Drains:  Invasive Devices  Report    Peripherally Inserted Central Catheter Line  Duration           PICC Line 09/26/22 20 days          Drain  Duration           Urethral Catheter 14 Fr  8 days              Urinary Catheter:  Goal for removal: N/A- Discharging with Crawley         Central Line:  Goal for removal: Will discontinue when peripheral access obtained  Imaging: No pertinent imaging reviewed  Recent Cultures (last 7 days):         Last 24 Hours Medication List:   Current Facility-Administered Medications   Medication Dose Route Frequency Provider Last Rate   • acetaminophen  650 mg Oral Q6H PRN Kay Chung DO     • atorvastatin  40 mg Oral After Camden Rape, DO     • cholecalciferol  2,000 Units Oral Daily Pamalee Drain, DO     • dexamethasone  6 mg Intravenous Q24H Jersey Camacho, DO     • enoxaparin  1 mg/kg Subcutaneous Q12H [facility] Kay Chung, DO     • folic acid  1 mg Oral Daily Pamalee Drain, DO     • metoprolol succinate  75 mg Oral BID BROOKE Felipe     • midodrine  2 5 mg Oral TID AC Nadine Wynne MD     • mirtazapine  7 5 mg Oral HS Jersey Camacho, DO     • ALYSSA ANTIFUNGAL   Topical BID Kay Chung DO     • ondansetron  4 mg Intravenous Q4H PRN Kay Chung DO     • pantoprazole  40 mg Oral BID AC Pamalee Drain, DO          Today, Patient Was Seen By: Kay Chung    **Please Note: This note may have been constructed using a voice recognition system  **

## 2022-10-18 LAB
ANION GAP SERPL CALCULATED.3IONS-SCNC: 2 MMOL/L (ref 4–13)
BUN SERPL-MCNC: 7 MG/DL (ref 5–25)
CALCIUM SERPL-MCNC: 7.9 MG/DL (ref 8.3–10.1)
CHLORIDE SERPL-SCNC: 111 MMOL/L (ref 96–108)
CO2 SERPL-SCNC: 30 MMOL/L (ref 21–32)
CREAT SERPL-MCNC: 0.44 MG/DL (ref 0.6–1.3)
GFR SERPL CREATININE-BSD FRML MDRD: 94 ML/MIN/1.73SQ M
GLUCOSE SERPL-MCNC: 73 MG/DL (ref 65–140)
HCT VFR BLD AUTO: 28.2 % (ref 34.8–46.1)
HGB BLD-MCNC: 8.3 G/DL (ref 11.5–15.4)
POTASSIUM SERPL-SCNC: 3.6 MMOL/L (ref 3.5–5.3)
SODIUM SERPL-SCNC: 143 MMOL/L (ref 135–147)

## 2022-10-18 PROCEDURE — 85014 HEMATOCRIT: CPT | Performed by: INTERNAL MEDICINE

## 2022-10-18 PROCEDURE — 97535 SELF CARE MNGMENT TRAINING: CPT

## 2022-10-18 PROCEDURE — 97530 THERAPEUTIC ACTIVITIES: CPT

## 2022-10-18 PROCEDURE — 99232 SBSQ HOSP IP/OBS MODERATE 35: CPT | Performed by: INTERNAL MEDICINE

## 2022-10-18 PROCEDURE — 80048 BASIC METABOLIC PNL TOTAL CA: CPT | Performed by: INTERNAL MEDICINE

## 2022-10-18 PROCEDURE — 99231 SBSQ HOSP IP/OBS SF/LOW 25: CPT | Performed by: INTERNAL MEDICINE

## 2022-10-18 PROCEDURE — 85018 HEMOGLOBIN: CPT | Performed by: INTERNAL MEDICINE

## 2022-10-18 PROCEDURE — 97164 PT RE-EVAL EST PLAN CARE: CPT

## 2022-10-18 RX ORDER — METOPROLOL SUCCINATE 25 MG/1
25 TABLET, EXTENDED RELEASE ORAL DAILY
Status: DISCONTINUED | OUTPATIENT
Start: 2022-10-19 | End: 2022-10-20 | Stop reason: HOSPADM

## 2022-10-18 RX ADMIN — LOPERAMIDE HYDROCHLORIDE 4 MG: 2 CAPSULE ORAL at 21:31

## 2022-10-18 RX ADMIN — CARBIDOPA AND LEVODOPA 2.5 MG: 50; 200 TABLET, EXTENDED RELEASE ORAL at 05:37

## 2022-10-18 RX ADMIN — PANTOPRAZOLE SODIUM 40 MG: 40 TABLET, DELAYED RELEASE ORAL at 05:34

## 2022-10-18 RX ADMIN — ENOXAPARIN SODIUM 60 MG: 60 INJECTION SUBCUTANEOUS at 09:10

## 2022-10-18 RX ADMIN — ENOXAPARIN SODIUM 60 MG: 60 INJECTION SUBCUTANEOUS at 21:31

## 2022-10-18 RX ADMIN — PANTOPRAZOLE SODIUM 40 MG: 40 TABLET, DELAYED RELEASE ORAL at 18:12

## 2022-10-18 RX ADMIN — MICONAZOLE NITRATE: 20 CREAM TOPICAL at 09:11

## 2022-10-18 RX ADMIN — MIRTAZAPINE 7.5 MG: 15 TABLET, FILM COATED ORAL at 21:31

## 2022-10-18 RX ADMIN — MICONAZOLE NITRATE: 20 CREAM TOPICAL at 18:16

## 2022-10-18 RX ADMIN — Medication 2000 UNITS: at 09:09

## 2022-10-18 RX ADMIN — ATORVASTATIN CALCIUM 40 MG: 40 TABLET, FILM COATED ORAL at 18:12

## 2022-10-18 RX ADMIN — CARBIDOPA AND LEVODOPA 2.5 MG: 50; 200 TABLET, EXTENDED RELEASE ORAL at 13:09

## 2022-10-18 RX ADMIN — DEXAMETHASONE SODIUM PHOSPHATE 6 MG: 4 INJECTION INTRA-ARTICULAR; INTRALESIONAL; INTRAMUSCULAR; INTRAVENOUS; SOFT TISSUE at 13:09

## 2022-10-18 RX ADMIN — LOPERAMIDE HYDROCHLORIDE 4 MG: 2 CAPSULE ORAL at 09:09

## 2022-10-18 RX ADMIN — CARBIDOPA AND LEVODOPA 2.5 MG: 50; 200 TABLET, EXTENDED RELEASE ORAL at 18:12

## 2022-10-18 RX ADMIN — FOLIC ACID 1 MG: 1 TABLET ORAL at 09:09

## 2022-10-18 NOTE — PLAN OF CARE
Problem: PHYSICAL THERAPY ADULT  Goal: Performs mobility at highest level of function for planned discharge setting  See evaluation for individualized goals  Description: Treatment/Interventions: Functional transfer training, LE strengthening/ROM, Therapeutic exercise, Endurance training, Bed mobility, Gait training, Spoke to nursing, OT  Equipment Recommended:  (r/o rw)       See flowsheet documentation for full assessment, interventions and recommendations  Outcome: Progressing  Note: Prognosis: Fair  Problem List: Decreased strength, Decreased endurance, Impaired balance, Decreased mobility, Pain  Assessment: Pt seen for PT re-evaluation s/p goal expiration  Pt was initially admitted to [facility] on 9/22/2022 with acute respiratory failure and hypoxia, found to have an acute pulmonary embolism  Pt underwent thrombectomy and IVC filter placement  Pts current comorbidities effecting treatment include: anxiety, breast cancer, clotting disorder, HTN  Prior to admission, pt was living alone in an apt with 0 PALMA  Pt is normally I with ADLs and ambulates without any AD  At time of initial evaluation, pt was requiring modAx2 for bed mobility; modAx2 for transfers; and modAx2 for ambulation 3 ft with HHA  Pt has made slow progress towards mobility goals  Upon re-evaluation, pt currently is requiring modAx1 for bed mobility and modAx1 for transfers, unable to ambulate at this time 2* fear of falling  Pt presents at PT re-eval functioning below baseline and currently w/ overall mobility deficits 2* to: BLE weakness, impaired balance, decreased endurance, pain, decreased activity tolerance compared to baseline, decreased functional mobility tolerance compared to baseline, fall risk, SOB upon exertion  Pt currently at a fall risk 2* to impairments listed above    Pt will continue to benefit from skilled acute PT interventions to address stated impairments; to maximize functional mobility; for ongoing pt/ family training; and DME needs  At conclusion of PT session pt returned BTB with phone and call bell within reach  Pt denies any further questions at this time  The patient's AM-PAC Basic Mobility Inpatient Short Form Raw Score is 10  A Raw score of less than or equal to 16 suggests the patient may benefit from discharge to post-acute rehabilitation services  Please also refer to the recommendation of the Physical Therapist for safe discharge planning  Recommend rehab upon hospital D/C  Barriers to Discharge: Decreased caregiver support     PT Discharge Recommendation: Post acute rehabilitation services    See flowsheet documentation for full assessment

## 2022-10-18 NOTE — PROGRESS NOTES
Progress NoteSusan Silverman 79 y o  female MRN: [id number]    Unit/Bed#: -01 Encounter: [id number]      Assessment and Plan:    80 yo F with metastatic breast ca, CKD, NAFLD, hx of DVT/PE on anticoagulation s/p thrombectomy and IVC filter, with recent diagnosis of IBD s/p 1 dose of remicaide; recent covid infection, continues to have ongoing hematochezia  1  Painless hematochezia, likely due to rectal ulcers due to IBD  S/P 1 dose of remicaide; after which CRP normalized, symptoms improving but continues to have rectal bleeding  Patient has remained inpatient because of ongoing issues including rectal bleeding, especially in setting of AC use for VTE    -- Hb has remained stable, ok with checking Hb daily  -- plan for repeat remicaide dosing 10/19, had discussed previously with ID team regarding this given hx of recent COVID infection  -- spoke to pharmacy, will need paper script which we will fax pharmacy tomorrow    2  C  Diff infection  Treated with vancomycin    ______________________________________________________________________    Subjective:     Pt seen and examined at bedside  Continues to have some painless rectal bleeding but denies any other major complaints  Denies SOB, chest pain, diarrhea, lower abdominal pain, nausea, vomiting, fever at this time       Medication Administration - last 24 hours from 10/17/2022 1322 to 10/18/2022 1322       Date/Time Order Dose Route Action Action by     70/23/6293 8342 folic acid (FOLVITE) tablet 1 mg 1 mg Oral Given Alicia Carl RN     10/18/2022 0536 pantoprazole (PROTONIX) EC tablet 40 mg   Oral Canceled Entry Andrade Bynum RN     10/18/2022 0534 pantoprazole (PROTONIX) EC tablet 40 mg 40 mg Oral Given Andrade Bynum RN     10/17/2022 1752 pantoprazole (PROTONIX) EC tablet 40 mg 40 mg Oral Given Gini Ramirez RN     10/17/2022 1752 atorvastatin (LIPITOR) tablet 40 mg 40 mg Oral Given Gini Ramirez RN     10/18/2022 9384 cholecalciferol (VITAMIN D3) tablet 2,000 Units 2,000 Units Oral Given Cecile Dakin, RN     10/18/2022 1309 midodrine (PROAMATINE) tablet 2 5 mg 2 5 mg Oral Given Cecile Dakin, RN     10/18/2022 0537 midodrine (PROAMATINE) tablet 2 5 mg 2 5 mg Oral Given [facility], RN     10/17/2022 1752 midodrine (PROAMATINE) tablet 2 5 mg 2 5 mg Oral Given Preston Hong, RN     10/17/2022 2059 mirtazapine (REMERON) tablet 7 5 mg 7 5 mg Oral Given [facility], RN     10/18/2022 2586 metoprolol succinate (TOPROL-XL) 24 hr tablet 75 mg 75 mg Oral Not Given Cecile Dakin, RN     10/17/2022 2101 metoprolol succinate (TOPROL-XL) 24 hr tablet 75 mg 75 mg Oral Given [facility], RN     10/18/2022 1309 dexamethasone (DECADRON) injection 6 mg 6 mg Intravenous Given Cecile Dakin, RN     10/18/2022 0911 moisture barrier miconazole 2% cream (aka ALYSSA MOISTURE BARRIER ANTIFUNGAL CREAM)   Topical Given Cecile Dakin, RN     10/17/2022 1753 moisture barrier miconazole 2% cream (aka ALYSSA MOISTURE BARRIER ANTIFUNGAL CREAM) 1 application Topical Given Preston Hong, RN     10/18/2022 0910 enoxaparin (LOVENOX) subcutaneous injection 60 mg 60 mg Subcutaneous Given Cecile Dakin, RN     10/17/2022 2059 enoxaparin (LOVENOX) subcutaneous injection 60 mg 60 mg Subcutaneous Given [facility], RN     10/18/2022 4327 loperamide (IMODIUM) capsule 4 mg 4 mg Oral Given Cecile Dakin, RN     10/17/2022 2059 loperamide (IMODIUM) capsule 4 mg 4 mg Oral Given [facility], RN          Objective:     Vitals: Blood pressure 94/53, pulse 81, temperature (!) 97 4 °F (36 3 °C), resp  rate 18, height 5' 1" (1 549 m), weight 60 7 kg (133 lb 13 1 oz), SpO2 93 %, not currently breastfeeding  ,Body mass index is 25 28 kg/m²        Intake/Output Summary (Last 24 hours) at 10/18/2022 1322  Last data filed at 10/17/2022 2101  Gross per 24 hour   Intake --   Output 300 ml   Net -300 ml       Physical Exam:   General Appearance: Awake and alert, in no acute distress  Abdomen: Soft, non-tender, non-distended; bowel sounds normal; no masses or no organomegaly    Invasive Devices  Report    Peripherally Inserted Central Catheter Line  Duration           PICC Line 09/26/22 21 days          Drain  Duration           Urethral Catheter 14 Fr  9 days                Lab Results:  No results displayed because visit has over 200 results  Imaging Studies: I have personally reviewed pertinent imaging studies

## 2022-10-18 NOTE — PROGRESS NOTES
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  4  Protein calorie malnutrition  5  Urinary retention  6  Acute COVID 19     Recommendations:  Patient is afebrile with normal WBC count when last taken     Hemoglobin is now 8 3  Remains on steroids and nasal O2 supplementation down to 1 L  Diarrhea  1  I am not convinced that the patient has active C diff colitis  Diarrhea more likely secondary to IBD  Patient has completed full course of p o  Vancomycin  Discussed with GI 10/16  P O  Vancomycin discontinued  2  Several episodes of small volume diarrhea today with small amount rectal bleeding  COVID 19  1  Currently stable and denies SOB  Chest x-ray not significant  2  SARS-CoV-2 PCR positive  Since patient came in with saddle embolus it is possible that she may have had COVID at that time  She is not immunized  3  COVID Protocol as per primary service  4  Discussed with GI and advised holding Remicade until 10/19 when she is 10 days out from her COVID diagnosis  Scheduled to receive Remicade tomorrow  Antibiotics:  1  None       Subjective:  Less diarrhea and continued improvement in shortness of breath with dry cough   Denies fevers, chills, or sweats  Denies nausea, vomiting,       Objective:  Vitals:  Temp:  [97 4 °F (36 3 °C)-98 4 °F (36 9 °C)] 98 4 °F (36 9 °C)  HR:  [69-98] 83  Resp:  [16-18] 16  BP: ()/(51-79) 96/53  SpO2:  [84 %-98 %] 96 %  Temp (24hrs), Av 7 °F (36 5 °C), Min:97 4 °F (36 3 °C), Max:98 4 °F (36 9 °C)  Current: Temperature: 98 4 °F (36 9 °C)    Physical Exam:     General Appearance:    Eyes:   Alert, chronically ill-appearing female nontoxic, no acute distress    Nasal oxygen in place  Conjunctiva pale   Throat: Oropharynx moist without lesions  Lips, mucosa, and tongue normal   Neck: Supple, symmetrical, trachea midline, no adenopathy,  no tenderness/mass/nodules   Lungs:   Clear to auscultation bilaterally, no audible wheezes, rhonchi or rales; respirations unlabored   Heart:  Regular rate and rhythm, S1, S2 normal, no murmur, rub or gallop   Abdomen:   Soft, non-tender, non-distended, positive bowel sounds  No masses, no organomegaly    No CVA tenderness, Crawley catheter   Extremities: Extremities normal, atraumatic, no clubbing, cyanosis or edema, PICC line   Skin: Skin color pale, texture, turgor normal, no rashes or lesions  No draining wounds noted  Invasive Devices  Report    Peripherally Inserted Central Catheter Line  Duration           PICC Line 09/26/22 22 days          Drain  Duration           Urethral Catheter 14 Fr  10 days                Labs, Imaging, & Other studies:   All pertinent labs were personally reviewed  Results from last 7 days   Lab Units 10/18/22  0558 10/17/22  0538 10/16/22  0923 10/14/22  0807 10/14/22  0538 10/14/22  0057   WBC Thousand/uL  --   --   --   --  5 51 4 87   HEMOGLOBIN g/dL 8 3* 8 3* 8 7*   < > 7 9* 7 5*  7 6*   PLATELETS Thousands/uL  --   --   --   --  181 201    < > = values in this interval not displayed  Results from last 7 days   Lab Units 10/18/22  0558 10/17/22  0538 10/16/22  0449 10/15/22  0040 10/14/22  0538   SODIUM mmol/L 143 144 145   < > 145   POTASSIUM mmol/L 3 6 3 5 3 3*   < > 3 4*   CHLORIDE mmol/L 111* 112* 113*   < > 114*   CO2 mmol/L 30 29 27   < > 28   BUN mg/dL 7 9 9   < > 7   CREATININE mg/dL 0 44* 0 43* 0 46*   < > 0 36*   EGFR ml/min/1 73sq m 94 95 92   < > 100   CALCIUM mg/dL 7 9* 7 6* 7 4*   < > 7 2*   AST U/L  --   --   --   --  17   ALT U/L  --   --   --   --  24   ALK PHOS U/L  --   --   --   --  84    < > = values in this interval not displayed

## 2022-10-18 NOTE — OCCUPATIONAL THERAPY NOTE
Occupational Therapy Progress Note     Patient Name: Flash Silverman  VXSIO'[id number] Date: 10/18/2022  Problem List  Principal Problem:    Saddle embolus of pulmonary artery (HCC)  Active Problems:    Metastatic breast cancer ([facility] [address] )    Anxiety    Toxic gastroenteritis and colitis    Anemia    Tachycardia    Urinary retention    [facility] )    COVID-19 virus infection    Pressure injury of left buttock, stage 3 (HCC)    Hematochezia          10/18/22 0900   OT Last Visit   OT Visit Date 10/18/22   Note Type   Note Type Treatment   Pain Assessment   Pain Assessment Tool 0-10   Pain Score No Pain   Restrictions/Precautions   Other Precautions Contact/isolation;Droplet precautions; Airborne/isolation;Multiple lines;Telemetry;O2  (COVID-19+, c diff+)   Lifestyle   Autonomy Pt reports being I with ADLS, IADLS and mobility without device PTA  (+)    Reciprocal Relationships Pt lives alone but reports that she has supportive family able to assist   Service to Others Retired   [address] Enjoys staying active   ADL   Where Assessed Edge of bed   Grooming Assistance 4  Minimal Assistance   Grooming Deficit Setup;Supervision/safety; Increased time to complete   Grooming Comments A to comb back of head   LB Dressing Assistance 2  Maximal Assistance   LB Dressing Deficit Setup;Supervision/safety; Increased time to complete; Don/doff R sock; Don/doff L sock   Toileting Assistance  2  Maximal Assistance   Toileting Deficit Setup;Supervison/safety; Increased time to complete;Perineal hygiene   Bed Mobility   Rolling R 5  Supervision   Additional items Increased time required   Rolling L 5  Supervision   Additional items Increased time required   Supine to Sit 3  Moderate assistance   Additional items Assist x 1; Increased time required;Verbal cues;LE management   Sit to Supine 3  Moderate assistance   Additional items Assist x 1; Increased time required;Verbal cues   Additional Comments Pt sat EOB with min A-S for trunk control  Once in bed pt rolled several times for hygiene after BM  After OT session pt in bed with alarm on and all needs within reach  Transfers   Sit to Stand 3  Moderate assistance   Additional items Assist x 1; Increased time required;Verbal cues   Stand to Sit 3  Moderate assistance   Additional items Assist x 1; Increased time required;Verbal cues   Additional Comments Pt reporting dizziness with standing, BP taken and was 80/51  Pt laid down with significant improvements in symptoms  BP taken again and was 94/53  Functional Mobility   Additional Comments Pt reporting that she "feels funny" when standing and is declining mobility this date  Subjective   Subjective "I hope I can walk again one day"   Cognition   Overall Cognitive Status [facility]   Arousal/Participation Alert; Cooperative   Attention Within functional limits   Orientation Level Oriented X4   Memory Decreased recall of precautions   Following Commands Follows one step commands without difficulty   Comments Pt is anxious with mobility, but is very pleasant and cooperative  Activity Tolerance   Activity Tolerance Patient limited by fatigue   Assessment   Assessment Patient participated in Skilled OT session this date with interventions consisting of ADL re training with the use of correct body mechanics, deep breathing technique,  therapeutic activities to: increase activity tolerance and increase OOB/ sitting tolerance   Patient agreeable to OT treatment session, upon arrival patient was found supine in bed  Patient requiring frequent rest periods  Patient continues to be functioning below baseline level, occupational performance remains limited secondary to factors listed above and increased risk for falls and injury  From OT standpoint, recommendation at time of d/c would be Short Term Rehab     Patient to benefit from continued Occupational Therapy treatment while in the hospital to address deficits as defined above and maximize level of functional independence with ADLs and functional mobility  Plan   Treatment Interventions ADL retraining;Functional transfer training; Endurance training   Goal Expiration Date 10/25/22   OT Treatment Day 2   OT Frequency 3-5x/wk   Recommendation   OT Discharge Recommendation Post acute rehabilitation services   AM-PAC Daily Activity Inpatient   Lower Body Dressing 2   Bathing 2   Toileting 2   Upper Body Dressing 2   Grooming 3   Eating 3   Daily Activity Raw Score 14   Daily Activity Standardized Score (Calc for Raw Score >=11) 33 39   AM-PAC Applied Cognition Inpatient   Following a Speech/Presentation 3   Understanding Ordinary Conversation 4   Taking Medications 4   Remembering Where Things Are Placed or Put Away 4   Remembering List of 4-5 Errands 4   Taking Care of Complicated Tasks 3   Applied Cognition Raw Score 22   Applied Cognition Standardized Score 47 83   Modified Surprise Scale   Modified [place] Scale 4       Brittney Hirsch, VARGAS, OTR/L

## 2022-10-18 NOTE — PHYSICAL THERAPY NOTE
Physical Therapy Re-Evaluation + Treatment    Patient's Name: Fidelia Silverman    Admitting Diagnosis  Pulmonary embolism ([facility] ) [I26 [id number]]    Problem List  Patient Active Problem List   Diagnosis    Metastatic breast cancer ([facility] )    Adverse reaction to pneumococcal vaccine    Anxiety    Cataract, bilateral    Mixed hyperlipidemia    Pulmonary nodule seen on imaging study    Sleep disorder    Chronic kidney disease (CKD) stage G3a/A1, moderately decreased glomerular filtration rate (GFR) between 45-59 mL/min/1 73 square meter and albuminuria creatinine ratio less than 30 mg/g (HCC)    Primary hypertension    Osteopenia of multiple sites    Acute costochondritis    Lytic lesion of bone on x-ray    Neoplasm of uncertain behavior of sternum    Rectal bleeding    Toxic gastroenteritis and colitis    Secondary malignant neoplasm of bone (HCC)    Diarrhea    Hypokalemia    Anemia    Severe protein-calorie malnutrition ([facility] )    Single subsegmental pulmonary embolism without acute cor pulmonale (HCC)    Tachycardia    Venous insufficiency    Saddle embolus of pulmonary artery (HCC)    Urinary retention    Swelling of lower extremity    Ulcerative pancolitis with rectal bleeding ([facility])    Acute respiratory failure with hypoxia ([place] [address] )    COVID-19 virus infection    Pressure injury of left buttock, stage 3 ([facility] )    Hematochezia       Past Medical History  Past Medical History:   Diagnosis Date    Abnormal weight loss     Allergic reaction     Anxiety     Breast cancer, right ([facility] ) 2011    right    Cancer ([facility] ) 2012    Candidal vulvovaginitis     Clotting disorder ([facility] ) Same as above    Endometrial hyperplasia     Epithelial cyst     benign, ovary    GI (gastrointestinal bleed) Blood mixed with stool    History of radiation therapy 2011    right breast cancer    Hyperlipidemia     Hypertension     Internal hemorrhoids     Knee tendonitis     Ovarian cyst     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[facility])        Past Surgical History  Past Surgical History:   Procedure Laterality Date    BILATERAL SALPINGOOPHORECTOMY      onset: 7/23/13    BREAST BIOPSY Right 06/13/2006    benign    BREAST BIOPSY Right 03/02/2011    malignant    BREAST LUMPECTOMY Right 03/30/2011    malignant    CATARACT EXTRACTION W/  INTRAOCULAR LENS IMPLANT Right     phacoemulsification  Onset: 10/27/14    CHOLECYSTECTOMY      COLONOSCOPY  2017    approx    HYSTERECTOMY  Yes    INTRAOPERATIVE RADIATION THERAPY ([facility])      IR BIOPSY BONE  7/9/2021    IR IVC FILTER PLACEMENT OPTIONAL/TEMPORARY  9/23/2022    IR PE ENDOVASCULAR THERAPY  9/22/2022    IR PICC PLACEMENT SINGLE LUMEN  8/19/2022    IR PICC PLACEMENT SINGLE LUMEN  9/26/2022    SKIN LESION EXCISION Right     breast, single lesion    TONSILLECTOMY AND ADENOIDECTOMY          10/18/22 0858   PT Last Visit   PT Visit Date 10/18/22   Note Type   Note type Re-Evaluation  (+ followup treat)   Pain Assessment   Pain Score No Pain   Restrictions/Precautions   Other Precautions Contact/isolation; Airborne/isolation;Multiple lines;Telemetry;O2;Fall Risk  (C diff, COVID (+))   Home Living   Type of Home Apartment   Home Layout One level  (0 PALMA)   Additional Comments Pt normally lives alone in an apt with 0 PALMA, was ambulating without AD PTA   Prior Function   Level of [place] Independent with ADLs; Independent with functional mobility   Lives With Alone   Falls in the last 6 months 1 to 4  (1)   Cognition   Overall Cognitive Status WFL   Attention Within functional limits   Orientation Level Oriented X4   Memory Decreased recall of precautions   Following Commands Follows one step commands with increased time or repetition   Comments pt is pleasant and cooperative, but is anxious/fearful with mobility   RLE Assessment   RLE Assessment X  (~3+/5)   LLE Assessment   LLE Assessment X  (~3+/5)   Bed Mobility   Supine to Sit 3  Moderate assistance   Additional items Assist x 1;HOB elevated; Bedrails; Increased time required;Verbal cues;LE management Sit to Supine 3  Moderate assistance   Additional items Assist x 1;HOB elevated; Bedrails; Increased time required;LE management   Additional Comments pt required varying levels of assist from S/Elena for trunk control   Transfers   Sit to Stand 3  Moderate assistance   Additional items Assist x 1; Increased time required;Verbal cues   Stand to Sit 3  Moderate assistance   Additional items Assist x 1; Increased time required;Verbal cues   Additional Comments transfers with RW   Ambulation/Elevation   Gait Assistance 3  Moderate assist   Assistive Device Rolling walker   Distance weight shifting in place for ~1 minute, unable to take steps 2* fear of falling   Balance   Static Sitting Fair -   Dynamic Sitting Poor +   Static Standing Poor   Dynamic Standing Poor   Ambulatory Poor   Endurance Deficit   Endurance Deficit Yes   Endurance Deficit Description weakness, fatigue, deconditioning   Activity Tolerance   Activity Tolerance Patient limited by fatigue   Medical Staff Made Aware Seen with OT 2* pt's medical complexity and multiple comorbidities  PT focus on functional transfers, LE strength and endurance   Nurse Made Aware ok to see per RN   Assessment   Prognosis Fair   Problem List Decreased strength;Decreased endurance; Impaired balance;Decreased mobility;Pain   Assessment Pt seen for PT re-evaluation s/p goal expiration  Pt was initially admitted to [address] [place] [address] on 9/22/2022 with acute respiratory failure and hypoxia, found to have an acute pulmonary embolism  Pt underwent thrombectomy and IVC filter placement  Pts current comorbidities effecting treatment include: anxiety, breast cancer, clotting disorder, HTN  Prior to admission, pt was living alone in an apt with 0 PALMA  Pt is normally I with ADLs and ambulates without any AD  At time of initial evaluation, pt was requiring modAx2 for bed mobility; modAx2 for transfers; and modAx2 for ambulation 3 ft with A   Pt has made slow progress towards mobility goals  Upon re-evaluation, pt currently is requiring modAx1 for bed mobility and modAx1 for transfers, unable to ambulate at this time 2* fear of falling  Pt presents at PT re-eval functioning below baseline and currently w/ overall mobility deficits 2* to: BLE weakness, impaired balance, decreased endurance, pain, decreased activity tolerance compared to baseline, decreased functional mobility tolerance compared to baseline, fall risk, SOB upon exertion  Pt currently at a fall risk 2* to impairments listed above  Pt will continue to benefit from skilled acute PT interventions to address stated impairments; to maximize functional mobility; for ongoing pt/ family training; and DME needs  At conclusion of PT session pt returned BTB with phone and call bell within reach  Pt denies any further questions at this time  The patient's AM-PAC Basic Mobility Inpatient Short Form Raw Score is 10  A Raw score of less than or equal to 16 suggests the patient may benefit from discharge to post-acute rehabilitation services  Please also refer to the recommendation of the Physical Therapist for safe discharge planning  Recommend rehab upon hospital D/C  Barriers to Discharge Decreased caregiver support   Goals   Patient Goals to get stronger   STG Expiration Date 11/01/22   Short Term Goal #1 In 10-14 days pt will be able to: 1  Demonstrate ability to perform all aspects of bed mobility independently to increase functional independence  2  Perform functional transfers at mod (I) level to facilitate safe return to previous living environment  3   Ambulate at least 150 ft with RW at mod (I) level with stable vitals to improve safety with household distances and reduce fall risk  4  Improve LE strength grades by 1 to increase ease of functional mobility with transfers and gait  5  Pt will demonstrate improved balance by one grade order to decrease risk of falls     Plan   Treatment/Interventions Functional transfer training;LE strengthening/ROM; Elevations; Therapeutic exercise; Endurance training;Equipment eval/education; Bed mobility;Gait training;Spoke to nursing;Spoke to case management;OT   PT Frequency 3-5x/wk   Recommendation   PT Discharge Recommendation Post acute rehabilitation services   Equipment Recommended [address] Mobility Inpatient   Turning in Bed Without Bedrails 2   Lying on Back to Sitting on Edge of Flat Bed 2   Moving Bed to Chair 1   Standing Up From Chair 2   Walk in Room 2   Climb 3-5 Stairs 1   Basic Mobility Inpatient Raw Score 10   Turning Head Towards Sound 4   Follow Simple Instructions 4   Low Function Basic Mobility Raw Score 18   Low Function Basic Mobility Standardized Score 29 25   Highest Level Of Mobility   -HLM Goal 4: Move to chair/commode   JH-HLM Achieved 5: Stand (1 or more minutes)   Modified Linn Scale   Modified Linn Scale 4   Additional Treatment Session   Start Time 0840   End Time 0858   Treatment Assessment Pt seen for additional treatment session with a focus on functional transfers, LE strengthening and bed mobility  Pt performed additional STS transfer with RW with modAx1  Upon standing, pt becoming dizzy with standingm, only able to tolerate standing ~30-45s  BP assessed once back in sitting (80/51)  Pt agreeable to perform seated therex: 1x10 LAQs, x20 ankle pumps, and x10 marches bilaterally)  Pt then returned to supine with modA, where she rolled x2 at supervision level with handrails to be cleaned up s/p BM  Pt BP reassessed in supine (94/53)  Pt would benefit from continued acute skilled PT to address above deficits, continuing to recommend rehab upon d/c  End of Consult   Patient Position at End of Consult Supine;Bed/Chair alarm activated; All needs within reach       Malissa Swann, PT, DPT

## 2022-10-18 NOTE — CASE MANAGEMENT
Case Management Progress Note    Patient name Radha Silverman  Location /-14 MRN [id number]  : 1940 Date 10/18/2022       LOS (days): 26  Geometric Mean LOS (GMLOS) (days): 8 10  Days to GMLOS:-17 9        OBJECTIVE:        Current admission status: Inpatient  Preferred Pharmacy:   [address] [facility], [address] [facility]  [address] [facility]  Phone: [phone] Fax: [phone] [address] [facility], [facility] [address] [place] [address]  [address]  [address] [place] [address]  Phone: [phone] Fax: [phone]    [address] [place] [address] [address] [facility] [address] [place] [address] [facility] 77 [address]  Phone: [phone] Fax: [phone]    Primary Care Provider: Stephen Hassan DO    Primary Insurance: MEDICARE  Secondary Insurance: Debra Bella    PROGRESS NOTE: This CM called IP pharmacy regarding remicade administration as IP tomorrow    [facility], states that the medication is covered and approved for IP administration tomorrow 10/19

## 2022-10-18 NOTE — ASSESSMENT & PLAN NOTE
? Patient had sinus tachycardia on her last discharge, likely in setting of pulmonary embolism  Now resolved  ? Lower dose of metoprolol given borderline bp  ?  Continue midodrine for blood pressure support

## 2022-10-18 NOTE — PLAN OF CARE
Problem: OCCUPATIONAL THERAPY ADULT  Goal: Performs self-care activities at highest level of function for planned discharge setting  See evaluation for individualized goals  Description: Treatment Interventions: ADL retraining, UE strengthening/ROM, Functional transfer training, Endurance training, Patient/family training, Compensatory technique education, Continued evaluation, Energy conservation, Activityengagement          See flowsheet documentation for full assessment, interventions and recommendations  Outcome: Progressing  Note: Limitation: Decreased ADL status, Decreased UE strength, Decreased endurance, Decreased self-care trans, Decreased high-level ADLs  Prognosis: Fair  Assessment: Patient participated in Skilled OT session this date with interventions consisting of ADL re training with the use of correct body mechanics, deep breathing technique,  therapeutic activities to: increase activity tolerance and increase OOB/ sitting tolerance   Patient agreeable to OT treatment session, upon arrival patient was found supine in bed  Patient requiring frequent rest periods  Patient continues to be functioning below baseline level, occupational performance remains limited secondary to factors listed above and increased risk for falls and injury  From OT standpoint, recommendation at time of d/c would be Short Term Rehab  Patient to benefit from continued Occupational Therapy treatment while in the hospital to address deficits as defined above and maximize level of functional independence with ADLs and functional mobility       OT Discharge Recommendation: Post acute rehabilitation services

## 2022-10-18 NOTE — CASE MANAGEMENT
Case Management Discharge Planning Note    Patient name Fidelia Silverman  Location /-31 MRN [id number]  : 1940 Date 10/18/2022       Current Admission Date: 2022  Current Admission Diagnosis:Saddle embolus of pulmonary artery [facility])   Patient Active Problem List    Diagnosis Date Noted   • Hematochezia 10/13/2022   • COVID-19 virus infection 10/11/2022   • Pressure injury of left buttock, stage 3 ([address] ) 10/11/2022   • Acute respiratory failure with hypoxia ([address] ) 10/09/2022   • Ulcerative pancolitis with rectal bleeding ([facility] ) 10/03/2022   • Saddle embolus of pulmonary artery ([facility] ) 2022   • Urinary retention 2022   • Swelling of lower extremity 2022   • Venous insufficiency 2022   • Tachycardia 2022   • Single subsegmental pulmonary embolism without acute cor pulmonale ([address] ) 2022   • Severe protein-calorie malnutrition ([address] ) 2022   • Anemia 2022   • Hypokalemia 2022   • Diarrhea 2022   • Secondary malignant neoplasm of bone ([address] ) 2022   • Toxic gastroenteritis and colitis 2022   • Rectal bleeding 10/14/2021   • Lytic lesion of bone on x-ray 2021   • Neoplasm of uncertain behavior of sternum 2021   • Acute costochondritis 2021   • Osteopenia of multiple sites 2020   • Primary hypertension 10/22/2019   • Chronic kidney disease (CKD) stage G3a/A1, moderately decreased glomerular filtration rate (GFR) between 45-59 mL/min/1 73 square meter and albuminuria creatinine ratio less than 30 mg/g (HCC) 2019   • Adverse reaction to pneumococcal vaccine 2015   • Cataract, bilateral 2014   • Pulmonary nodule seen on imaging study 09/10/2013   • Sleep disorder 2013   • Anxiety 2013   • Metastatic breast cancer ([address] ) 10/17/2012   • Mixed hyperlipidemia 2012      LOS (days): 26  Geometric Mean LOS (GMLOS) (days): 8 10  Days to GMLOS:-17 9     OBJECTIVE:  Risk of Unplanned Readmission Score: 40 6         Current admission status: Inpatient   Preferred Pharmacy:   [address] [facility], [address]  615 [facility]  Phone: [phone] Fax: [facility], [address]   [address]  [address] [facility] [address]  Phone: [phone] Fax: [phone] [address] [place], [facility] [address] [facility] [address] [facility] [address] [address]  Phone: [phone] Fax: [phone]    Primary Care Provider: Marquis Reginaldo DO    Primary Insurance: MEDICARE  Secondary Insurance: Lucinda Saenz DETAILS:    Discharge planning discussed with[de-identified] Delfino Tinoco, patient  [place] of Choice: Yes     CM contacted family/caregiver?: Yes  Were Treatment Team discharge recommendations reviewed with patient/caregiver?: Yes  Did patient/caregiver verbalize understanding of patient care needs?: N/A- going to facility  Were patient/caregiver advised of the risks associated with not following Treatment Team discharge recommendations?: Yes    Contacts  Patient Contacts: Delfino Tinoco, daughter  Relationship to Patient[de-identified] Family  Contact Method: Phone  Phone Number: [phone]  Reason/Outcome: Discharge Planning, Emergency Contact, Continuity of Care              Other Referral/Resources/Interventions Provided:  Interventions: Short Term Rehab  Referral Comments: [facility] able to accept  Sandi [address] and patient made aware  CM reserved [facility] via [facility] to arrange transportation to [facility] tomorrow after remicade dose           Treatment Team Recommendation: Short Term Rehab  Discharge Destination Plan[de-identified] Short Term Rehab  Transport at Discharge : [facility]LS Ambulance

## 2022-10-18 NOTE — PROGRESS NOTES
[facility]  Progress Note Arlen Silverman 1940, 79 y o  female MRN: [id number]  Unit/Bed#: [facility] [place] Encounter: [id number]  Primary Care Provider: Jamir Wilkes DO   Date and time admitted to hospital: 9/22/2022  3:35 PM    * Saddle embolus of pulmonary artery [facility])  Assessment & Plan  - initially presented with acute respiratory failure and hypoxia  - found to have an acute submassive high-risk pulmonary embolism  History of prior DVT in August in the right lower extremity  Unclear if patient was receiving Xarelto from nursing facility when she was discharged  - patient is hypercoagulable in the setting of recurrent breast cancer and active IBD  - status post thrombectomy and IVC filter placement  - continue with weight based Lovenox indefinitely        Hematochezia  Assessment & Plan  - acutely developed overnight in the morning of 10/13  - secondary to ulcerative colitis and complicated by systemic anticoagulation  - multiple discussions held with GI  There is no contraindication to continuing anticoagulation  Some amount of bleeding should be expected, and fortunately her hematochezia has improved  - She will be receiving her 2nd infusion of Remicade on 10/19     Toxic gastroenteritis and colitis  Assessment & Plan  - Patient was admitted with toxic gastroenteritis in August of 2022  Determined to be possible IBD with additional insult of Verzenio from her breast cancer treatment  - low suspicion for C diff infection and p o   Vancomycin taper was discontinued per ID recs  - started on Remicade 10/1 inpatient, to receive 2nd dose tomorrow and will be continued on an outpatient basis after discharge      COVID-19 virus infection  Assessment & Plan  - incidentally found to be positive when being screened for discharge placement  - previously on remdesivir though discontinued per family request  - continue IV Decadron 6 mg daily, day 10/10  - wean oxygen as tolerated; down to 1 L  - weight based Lovenox for anticoagulation  - airborne and contact precautions; can come off precautions 10/19 after 10 day quarantine  Acute respiratory failure with hypoxia (HCC)  Assessment & Plan  - secondary to COVID infection; see plan above  - Cont to wean off as able to    Pressure injury of left buttock, stage 3 (HCC)  Assessment & Plan  - present on admission  - wound care consult    Urinary retention  Assessment & Plan  - marked urinary retention and bladder distention on admission  - failed voiding trial and urinary catheter replaced  - will plan to discharge with Crawley catheter  - status post empiric 3 day course of IV ceftriaxone    Tachycardia  Assessment & Plan  ? Patient had sinus tachycardia on her last discharge, likely in setting of pulmonary embolism  Now resolved  ? Lower dose of metoprolol given borderline bp  ? Continue midodrine for blood pressure support        Anemia  Assessment & Plan  - Likely a mixed component of anemia chronic disease with possible folate deficiency  - Continue folate supplementation  - status post 1 unit PRBCs this admission; continue to monitor and transfuse for hemoglobin less than 7 0       Anxiety  Assessment & Plan  - Hold amitriptyline; discontinued as it was thought it was contributing to her tachycardia  - continue Remeron      Metastatic breast cancer (HCC)  Assessment & Plan  - Stage IV metastatic ER positive, UT negative, 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, progressed from stage I A ER/UT positive, 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 years ago  Status post resection and adjuvant radiation and hormone therapy for 5 years  Patient had symptoms of bony discomfort in her sternum in June of 2021, imaging revealed lytic lesion, biopsy in July of 2021 confirmed progression of disease  ? Patient was started on Faslodex and Xgeva at that time, in conjunction with radiation  ?  In April 2022 there was interval development of 4 mm nodule at the right lower lobe and interval development of increased sclerotic lesions throughout the visualized spine  ? At that time, the patient's treatment was changed to Femara and [place]  ? In 2022, the patient was changed from [place] to [place] because of intolerance  ? During the patient's hospitalization in 2022 to 2022, Verzenio was discontinued due to gastroenteritis  ? Patient's cancer is most likely contributing to her hypercoagulable state  ? Appreciate oncology and palliative care consult  ? To continue on lovenox indefinately  ? Continue on Remeron for insomnia and anxiety  ? Patient would not like any more treatment for her malignancy  Family meeting held on 10/04 with palliative care, GI, CM, and IM team  Patient's current clinical course discussed  Discussion of disposition planning occurred  Hospice was discussed  Patient is pending placement to nursing facility for continued rehab, and plans to transition to hospice when she clinically declines  VTE Pharmacologic Prophylaxis:   Pharmacologic: Enoxaparin (Lovenox)  Mechanical VTE Prophylaxis in Place: No    Patient Centered Rounds: I have performed bedside rounds with nursing staff today  Discussions with Specialists or Other Care Team Provider:     Education and Discussions with Family / Patient: Patient and called her dtr Taya    Time Spent for Care: 30 minutes  More than 50% of total time spent on counseling and coordination of care as described above  Current Length of Stay: 26 day(s)    Current Patient Status: Inpatient   Certification Statement: The patient will continue to require additional inpatient hospital stay due to Pending placement and last dose of remicade    Discharge Plan: SNF    Code Status: Level 3 - DNAR and DNI      Subjective:   Per pt small amt of rectal bleeding   Denies diarrhea, abd pain  Objective:     Vitals:   Temp (24hrs), Av 7 °F (36 5 °C), Min:97 4 °F (36 3 °C), Max:98 4 °F (36 9 °C)    Temp:  [97 4 °F (36 3 °C)-98 4 °F (36 9 °C)] 98 4 °F (36 9 °C)  HR:  [69-98] 83  Resp:  [16-18] 16  BP: ()/(51-79) 96/53  SpO2:  [84 %-98 %] 96 %  Body mass index is 25 28 kg/m²  Input and Output Summary (last 24 hours): Intake/Output Summary (Last 24 hours) at 10/18/2022 1816  Last data filed at 10/18/2022 1631  Gross per 24 hour   Intake --   Output 700 ml   Net -700 ml       Physical Exam:     Physical Exam  Cardiovascular:      Rate and Rhythm: Normal rate and regular rhythm  Pulses: Normal pulses  Heart sounds: Normal heart sounds  Pulmonary:      Effort: Pulmonary effort is normal  No respiratory distress  Breath sounds: Normal breath sounds  No wheezing or rales  Abdominal:      General: Abdomen is flat  Bowel sounds are normal  There is no distension  Palpations: Abdomen is soft  Tenderness: There is no abdominal tenderness  There is no guarding  Musculoskeletal:         General: Normal range of motion  Cervical back: Normal range of motion and neck supple  Right lower leg: No edema  Left lower leg: No edema  Skin:     General: Skin is warm and dry  Neurological:      General: No focal deficit present  Mental Status: She is alert and oriented to person, place, and time  Mental status is at baseline  Cranial Nerves: No cranial nerve deficit  Motor: No weakness  Additional Data:     Labs:    Results from last 7 days   Lab Units 10/18/22  0558 10/14/22  0807 10/14/22  0538   WBC Thousand/uL  --   --  5 51   HEMOGLOBIN g/dL 8 3*   < > 7 9*   HEMATOCRIT % 28 2*   < > 25 9*   PLATELETS Thousands/uL  --   --  181   NEUTROS PCT %  --   --  62   LYMPHS PCT %  --   --  26   MONOS PCT %  --   --  9   EOS PCT %  --   --  1    < > = values in this interval not displayed       Results from last 7 days   Lab Units 10/18/22  0558 10/15/22  0040 10/14/22  0538   SODIUM mmol/L 143   < > 145   POTASSIUM mmol/L 3 6   < > 3 4* CHLORIDE mmol/L 111*   < > 114*   CO2 mmol/L 30   < > 28   BUN mg/dL 7   < > 7   CREATININE mg/dL 0 44*   < > 0 36*   ANION GAP mmol/L 2*   < > 3*   CALCIUM mg/dL 7 9*   < > 7 2*   ALBUMIN g/dL  --   --  1 6*   TOTAL BILIRUBIN mg/dL  --   --  0 28   ALK PHOS U/L  --   --  84   ALT U/L  --   --  24   AST U/L  --   --  17   GLUCOSE RANDOM mg/dL 73   < > 79    < > = values in this interval not displayed  * I Have Reviewed All Lab Data Listed Above  * Additional Pertinent Lab Tests Reviewed: All Labs Within Last 24 Hours Reviewed    Imaging:    Imaging Reports Reviewed Today Include:   Imaging Personally Reviewed by Myself Includes:      Recent Cultures (last 7 days):           Last 24 Hours Medication List:   Current Facility-Administered Medications   Medication Dose Route Frequency Provider Last Rate   • acetaminophen  650 mg Oral Q6H PRN Melven Specter Starsinic, DO     • atorvastatin  40 mg Oral After Dinner Christine Hockey, DO     • cholecalciferol  2,000 Units Oral Daily Christine Hockey, DO     • dexamethasone  6 mg Intravenous Q24H Jerseyjayda Camacho, DO     • diphenoxylate-atropine  1 tablet Oral 4x Daily PRN Melven Specter Starsinic, DO     • enoxaparin  1 mg/kg Subcutaneous Q12H [address] [place] [address],      • folic acid  1 mg Oral Daily Christine Hockey, DO     • loperamide  4 mg Oral BID Kalyn Anderson MD     • [START ON 10/19/2022] metoprolol succinate  25 mg Oral Daily Madison Hernandez DO     • midodrine  2 5 mg Oral TID AC Nadine Wynne MD     • mirtazapine  7 5 mg Oral HS Jersey Camacho, DO     • ALYSSA ANTIFUNGAL   Topical BID Melven Specter Starsinic, DO     • ondansetron  4 mg Intravenous Q4H PRN Melven Specter Starsinic, DO     • pantoprazole  40 mg Oral BID AC Christine Scott, DO          Today, Patient Was Seen By: Lew Jiang    ** Please Note: Dictation voice to text software may have been used in the creation of this document   **

## 2022-10-19 LAB
ANION GAP SERPL CALCULATED.3IONS-SCNC: 3 MMOL/L (ref 4–13)
BUN SERPL-MCNC: 9 MG/DL (ref 5–25)
CALCIUM SERPL-MCNC: 7.8 MG/DL (ref 8.3–10.1)
CHLORIDE SERPL-SCNC: 113 MMOL/L (ref 96–108)
CO2 SERPL-SCNC: 29 MMOL/L (ref 21–32)
CREAT SERPL-MCNC: 0.42 MG/DL (ref 0.6–1.3)
ERYTHROCYTE [DISTWIDTH] IN BLOOD BY AUTOMATED COUNT: 17 % (ref 11.6–15.1)
FERRITIN SERPL-MCNC: 1236 NG/ML (ref 8–388)
FOLATE SERPL-MCNC: 18.3 NG/ML (ref 3.1–17.5)
GFR SERPL CREATININE-BSD FRML MDRD: 95 ML/MIN/1.73SQ M
GLUCOSE SERPL-MCNC: 82 MG/DL (ref 65–140)
HCT VFR BLD AUTO: 25 % (ref 34.8–46.1)
HGB BLD-MCNC: 7.2 G/DL (ref 11.5–15.4)
IRON SATN MFR SERPL: 52 % (ref 15–50)
IRON SERPL-MCNC: 59 UG/DL (ref 50–170)
MAGNESIUM SERPL-MCNC: 1.8 MG/DL (ref 1.6–2.6)
MCH RBC QN AUTO: 29.6 PG (ref 26.8–34.3)
MCHC RBC AUTO-ENTMCNC: 28.8 G/DL (ref 31.4–37.4)
MCV RBC AUTO: 103 FL (ref 82–98)
PLATELET # BLD AUTO: 155 THOUSANDS/UL (ref 149–390)
PMV BLD AUTO: 10.7 FL (ref 8.9–12.7)
POTASSIUM SERPL-SCNC: 3.7 MMOL/L (ref 3.5–5.3)
RBC # BLD AUTO: 2.43 MILLION/UL (ref 3.81–5.12)
SODIUM SERPL-SCNC: 145 MMOL/L (ref 135–147)
TIBC SERPL-MCNC: 113 UG/DL (ref 250–450)
VIT B12 SERPL-MCNC: 705 PG/ML (ref 100–900)
WBC # BLD AUTO: 6.4 THOUSAND/UL (ref 4.31–10.16)

## 2022-10-19 PROCEDURE — 82607 VITAMIN B-12: CPT | Performed by: STUDENT IN AN ORGANIZED HEALTH CARE EDUCATION/TRAINING PROGRAM

## 2022-10-19 PROCEDURE — 99231 SBSQ HOSP IP/OBS SF/LOW 25: CPT | Performed by: INTERNAL MEDICINE

## 2022-10-19 PROCEDURE — 99232 SBSQ HOSP IP/OBS MODERATE 35: CPT | Performed by: INTERNAL MEDICINE

## 2022-10-19 PROCEDURE — 83540 ASSAY OF IRON: CPT | Performed by: STUDENT IN AN ORGANIZED HEALTH CARE EDUCATION/TRAINING PROGRAM

## 2022-10-19 PROCEDURE — 82746 ASSAY OF FOLIC ACID SERUM: CPT | Performed by: STUDENT IN AN ORGANIZED HEALTH CARE EDUCATION/TRAINING PROGRAM

## 2022-10-19 PROCEDURE — 80048 BASIC METABOLIC PNL TOTAL CA: CPT | Performed by: INTERNAL MEDICINE

## 2022-10-19 PROCEDURE — 85027 COMPLETE CBC AUTOMATED: CPT | Performed by: INTERNAL MEDICINE

## 2022-10-19 PROCEDURE — 83735 ASSAY OF MAGNESIUM: CPT | Performed by: INTERNAL MEDICINE

## 2022-10-19 PROCEDURE — 82728 ASSAY OF FERRITIN: CPT | Performed by: STUDENT IN AN ORGANIZED HEALTH CARE EDUCATION/TRAINING PROGRAM

## 2022-10-19 PROCEDURE — 83550 IRON BINDING TEST: CPT | Performed by: STUDENT IN AN ORGANIZED HEALTH CARE EDUCATION/TRAINING PROGRAM

## 2022-10-19 RX ADMIN — METOPROLOL SUCCINATE 25 MG: 25 TABLET, EXTENDED RELEASE ORAL at 08:18

## 2022-10-19 RX ADMIN — MICONAZOLE NITRATE: 20 CREAM TOPICAL at 08:20

## 2022-10-19 RX ADMIN — ATORVASTATIN CALCIUM 40 MG: 40 TABLET, FILM COATED ORAL at 17:15

## 2022-10-19 RX ADMIN — LOPERAMIDE HYDROCHLORIDE 4 MG: 2 CAPSULE ORAL at 21:28

## 2022-10-19 RX ADMIN — ENOXAPARIN SODIUM 60 MG: 60 INJECTION SUBCUTANEOUS at 08:20

## 2022-10-19 RX ADMIN — Medication 2000 UNITS: at 08:18

## 2022-10-19 RX ADMIN — CARBIDOPA AND LEVODOPA 2.5 MG: 50; 200 TABLET, EXTENDED RELEASE ORAL at 12:07

## 2022-10-19 RX ADMIN — FOLIC ACID 1 MG: 1 TABLET ORAL at 08:18

## 2022-10-19 RX ADMIN — MICONAZOLE NITRATE: 20 CREAM TOPICAL at 17:17

## 2022-10-19 RX ADMIN — LOPERAMIDE HYDROCHLORIDE 4 MG: 2 CAPSULE ORAL at 08:18

## 2022-10-19 RX ADMIN — MIRTAZAPINE 7.5 MG: 15 TABLET, FILM COATED ORAL at 21:28

## 2022-10-19 RX ADMIN — CARBIDOPA AND LEVODOPA 2.5 MG: 50; 200 TABLET, EXTENDED RELEASE ORAL at 05:21

## 2022-10-19 RX ADMIN — CARBIDOPA AND LEVODOPA 2.5 MG: 50; 200 TABLET, EXTENDED RELEASE ORAL at 15:49

## 2022-10-19 RX ADMIN — PANTOPRAZOLE SODIUM 40 MG: 40 TABLET, DELAYED RELEASE ORAL at 15:49

## 2022-10-19 RX ADMIN — PANTOPRAZOLE SODIUM 40 MG: 40 TABLET, DELAYED RELEASE ORAL at 05:21

## 2022-10-19 RX ADMIN — ENOXAPARIN SODIUM 60 MG: 60 INJECTION SUBCUTANEOUS at 21:29

## 2022-10-19 NOTE — ASSESSMENT & PLAN NOTE
? Patient had sinus tachycardia on her last discharge, likely in setting of pulmonary embolism  Now resolved  ? Lowered dose of metoprolol given borderline bp  ?  Continue midodrine for blood pressure support

## 2022-10-19 NOTE — PROGRESS NOTES
[facility]  Progress Note Deepak Greeneunk 1940, 79 y o  female MRN: [id number]  Unit/Bed#: -[place] Encounter: [id number]  Primary Care Provider: Efrain Parham DO   Date and time admitted to hospital: 9/22/2022  3:35 PM    * Saddle embolus of pulmonary artery [facility])  Assessment & Plan  Initially presented with acute respiratory failure and hypoxia  Found to have an acute submassive high-risk pulmonary embolism  History of prior DVT in August in the right lower extremity  Unclear if patient was receiving Xarelto from nursing facility when she was discharged  Patient is hypercoagulable in the setting of recurrent breast cancer and active IBD  Status post thrombectomy and IVC filter placement  Continue with weight based Lovenox indefinitely        Hematochezia  Assessment & Plan  Acutely developed overnight in the morning of 10/13  Secondary to ulcerative colitis and complicated by systemic anticoagulation  Multiple discussions held with GI  There is no contraindication to continuing anticoagulation  Some amount of bleeding should be expected, and fortunately her hematochezia has improved  She will be receiving her 2nd infusion of Remicade on 10/19     Toxic gastroenteritis and colitis  Assessment & Plan  Patient was admitted with toxic gastroenteritis in August of 2022  Determined to be possible IBD with additional insult of Verzenio from her breast cancer treatment  Low suspicion for C diff infection and p o   Vancomycin taper was discontinued per ID recs  Started on Remicade 10/1 inpatient, to receive 2nd dose 10/19 and will be continued on an outpatient basis after discharge      COVID-19 virus infection  Assessment & Plan  Incidentally found to be positive when being screened for discharge placement  Previously on remdesivir though discontinued per family request  Finished course of IV Decadron 6 mg daily, day 10/10  Cont to wean oxygen as tolerated; down to 1 L  Airborne and contact precautions; can be discontinued 10/19 after 10 day quarantine  Acute respiratory failure with hypoxia (HCC)  Assessment & Plan  - secondary to COVID infection; see plan above  - Cont to wean off as able to    Pressure injury of left buttock, stage 3 (HCC)  Assessment & Plan  POA  S/p wound care consult    Urinary retention  Assessment & Plan  - marked urinary retention and bladder distention on admission  - failed voiding trial and urinary catheter replaced  - will plan to discharge with Crawley catheter  - status post empiric 3 day course of IV ceftriaxone    Tachycardia  Assessment & Plan  ? Patient had sinus tachycardia on her last discharge, likely in setting of pulmonary embolism  Now resolved  ? Lowered dose of metoprolol given borderline bp  ? Continue midodrine for blood pressure support        Anemia  Assessment & Plan  Likely a mixed component of anemia chronic disease with possible folate deficiency  Continue folate supplementation  Status post 1 unit PRBCs this admission; continue to monitor and transfuse for hemoglobin less than 7 0       Anxiety  Assessment & Plan  Hold amitriptyline; discontinued as it was thought it was contributing to her tachycardia  Continue Remeron      Metastatic breast cancer ([facility] [address] )  Assessment & Plan  - Stage IV metastatic ER positive, [place] negative, 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, progressed from stage I A ER/TN positive, 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 years ago  Status post resection and adjuvant radiation and hormone therapy for 5 years  Patient had symptoms of bony discomfort in her sternum in June of 2021, imaging revealed lytic lesion, biopsy in July of 2021 confirmed progression of disease  ? Patient was started on Faslodex and Xgeva at that time, in conjunction with radiation  ?  In April 2022 there was interval development of 4 mm nodule at the right lower lobe and interval development of increased sclerotic lesions throughout the visualized spine  ? At that time, the patient's treatment was changed to Femara and [place]  ? In 2022, the patient was changed from [place] to [place] because of intolerance  ? During the patient's hospitalization in 2022 to 2022, Verzenio was discontinued due to gastroenteritis  ? Patient's cancer is most likely contributing to her hypercoagulable state  ? Appreciate oncology and palliative care consult  ? To continue on lovenox indefinately  ? Continue on Remeron for insomnia and anxiety  ? Patient would not like any more treatment for her malignancy  Family meeting held on 10/04 with palliative care, GI, CM, and IM team  Patient's current clinical course discussed  Discussion of disposition planning occurred  Hospice was discussed  Patient is pending placement to nursing facility for continued rehab, and plans to transition to hospice when she clinically declines  VTE Pharmacologic Prophylaxis:   Pharmacologic: Enoxaparin (Lovenox)  Mechanical VTE Prophylaxis in Place: No    Patient Centered Rounds: I have performed bedside rounds with nursing staff today  Discussions with Specialists or Other Care Team Provider:     Education and Discussions with Family / Patient: Patient and spoke to her dtr Charisma George [phone]    Time Spent for Care: 30 minutes  More than 50% of total time spent on counseling and coordination of care as described above  Current Length of Stay: 27 day(s)    Current Patient Status: Inpatient   Certification Statement: The patient will continue to require additional inpatient hospital stay due to Acute illness    Discharge Plan: Hopefully tomorrow if H/H remains stable    Code Status: Level 3 - DNAR and DNI      Subjective:    Intermittent blood per rectum but states is still much improved from prior  Denies abd pain    Objective:     Vitals:   Temp (24hrs), Av 6 °F (36 4 °C), Min:97 2 °F (36 2 °C), Max:97 9 °F (36 6 °C)    Temp:  [97 2 °F (36 2 °C)-97 9 °F (36 6 °C)] 97 2 °F (36 2 °C)  HR:  [81-86] 86  Resp:  [18-20] 20  BP: (117-120)/(60-61) 117/60  SpO2:  [97 %-98 %] 98 %  Body mass index is 25 41 kg/m²  Input and Output Summary (last 24 hours): Intake/Output Summary (Last 24 hours) at 10/19/2022 1811  Last data filed at 10/19/2022 1101  Gross per 24 hour   Intake --   Output 450 ml   Net -450 ml       Physical Exam:     Physical Exam  Cardiovascular:      Rate and Rhythm: Normal rate and regular rhythm  Pulses: Normal pulses  Heart sounds: Normal heart sounds  Pulmonary:      Effort: Pulmonary effort is normal  No respiratory distress  Breath sounds: Normal breath sounds  No wheezing or rales  Abdominal:      General: Bowel sounds are normal  There is no distension  Palpations: Abdomen is soft  Tenderness: There is no abdominal tenderness  There is no guarding  Musculoskeletal:         General: Normal range of motion  Cervical back: Normal range of motion and neck supple  Right lower leg: No edema  Left lower leg: No edema  Skin:     General: Skin is warm and dry  Neurological:      General: No focal deficit present  Mental Status: She is alert and oriented to person, place, and time  Mental status is at baseline  Cranial Nerves: No cranial nerve deficit  Motor: No weakness  Additional Data:     Labs:    Results from last 7 days   Lab Units 10/19/22  0528 10/14/22  0807 10/14/22  0538   WBC Thousand/uL 6 40  --  5 51   HEMOGLOBIN g/dL 7 2*   < > 7 9*   HEMATOCRIT % 25 0*   < > 25 9*   PLATELETS Thousands/uL 155  --  181   NEUTROS PCT %  --   --  62   LYMPHS PCT %  --   --  26   MONOS PCT %  --   --  9   EOS PCT %  --   --  1    < > = values in this interval not displayed       Results from last 7 days   Lab Units 10/19/22  0528 10/15/22  0040 10/14/22  0538   SODIUM mmol/L 145   < > 145   POTASSIUM mmol/L 3 7   < > 3 4*   CHLORIDE mmol/L 113*   < > 114*   CO2 mmol/L 29   < > 28   BUN mg/dL 9 < > 7   CREATININE mg/dL 0 42*   < > 0 36*   ANION GAP mmol/L 3*   < > 3*   CALCIUM mg/dL 7 8*   < > 7 2*   ALBUMIN g/dL  --   --  1 6*   TOTAL BILIRUBIN mg/dL  --   --  0 28   ALK PHOS U/L  --   --  84   ALT U/L  --   --  24   AST U/L  --   --  17   GLUCOSE RANDOM mg/dL 82   < > 79    < > = values in this interval not displayed  * I Have Reviewed All Lab Data Listed Above  * Additional Pertinent Lab Tests Reviewed: All Labs Within Last 24 Hours Reviewed    Imaging:    Imaging Reports Reviewed Today Include:   Imaging Personally Reviewed by Myself Includes:      Recent Cultures (last 7 days):           Last 24 Hours Medication List:   Current Facility-Administered Medications   Medication Dose Route Frequency Provider Last Rate   • acetaminophen  650 mg Oral Q6H PRN Jelena Chung, DO     • atorvastatin  40 mg Oral After Dinner Robert Vick, DO     • cholecalciferol  2,000 Units Oral Daily Robert Vick, DO     • diphenoxylate-atropine  1 tablet Oral 4x Daily PRN Jelena Chung, DO     • enoxaparin  1 mg/kg Subcutaneous Q12H [facility],      • folic acid  1 mg Oral Daily Robert Vick, DO     • loperamide  4 mg Oral BID Kary Contreras MD     • metoprolol succinate  25 mg Oral Daily Madison Annabella Hernandez, DO     • midodrine  2 5 mg Oral TID AC Nadine Wynne MD     • mirtazapine  7 5 mg Oral HS Jersey Camacho, DO     • ALYSSA ANTIFUNGAL   Topical BID Jelena Chung, DO     • ondansetron  4 mg Intravenous Q4H PRN Jelena Chung, DO     • pantoprazole  40 mg Oral BID AC Robert Vick DO          Today, Patient Was Seen By: William Zaragoza    ** Please Note: Dictation voice to text software may have been used in the creation of this document   **

## 2022-10-19 NOTE — ASSESSMENT & PLAN NOTE
Patient was admitted with toxic gastroenteritis in August of 2022  Determined to be possible IBD with additional insult of Verzenio from her breast cancer treatment  Low suspicion for C diff infection and p o   Vancomycin taper was discontinued per ID recs  Started on Remicade 10/1 inpatient, to receive 2nd dose 10/19 and will be continued on an outpatient basis after discharge

## 2022-10-19 NOTE — ASSESSMENT & PLAN NOTE
Hold amitriptyline; discontinued as it was thought it was contributing to her tachycardia  Continue Remeron

## 2022-10-19 NOTE — PROGRESS NOTES
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  4  Protein calorie malnutrition  5  Urinary retention  6  Acute COVID 19     Recommendations:  Patient is afebrile with normal WBC count when last taken     Hemoglobin is now decreased to 7 2  Remains on  nasal O2 supplementation down to 1 L  Diarrhea  1  I am not convinced that the patient has active C diff colitis  Diarrhea more likely secondary to IBD  Patient has completed full course of p o  Vancomycin  Discussed with GI 10/16  P O  Vancomycin discontinued  2  Several episodes of small volume diarrhea today with small amount rectal bleeding  3  Hemoglobin down to 7 2  Primary care following up  COVID 19  1  Currently stable and denies SOB  Chest x-ray not significant  2  SARS-CoV-2 PCR positive  Since patient came in with saddle embolus it is possible that she may have had COVID at that time  She is not immunized  3  COVID Protocol as per primary service  4  Patient was to receive Remicade today as per GI   Antibiotics:  1  None       Subjective:  Less diarrhea and continued improvement in shortness of breath with dry cough   Denies fevers, chills, or sweats  Denies nausea, vomiting,       Objective:  Vitals:  Temp:  [97 2 °F (36 2 °C)-97 9 °F (36 6 °C)] 97 2 °F (36 2 °C)  HR:  [81-86] 86  Resp:  [18-20] 20  BP: (117-120)/(60-61) 117/60  SpO2:  [97 %-98 %] 98 %  Temp (24hrs), Av 6 °F (36 4 °C), Min:97 2 °F (36 2 °C), Max:97 9 °F (36 6 °C)  Current: Temperature: (!) 97 2 °F (36 2 °C)    Physical Exam:     General Appearance:    Eyes:   Alert, chronically ill-appearing female nontoxic, no acute distress    Nasal oxygen in place  Conjunctiva pale   Throat: Oropharynx moist without lesions  Lips, mucosa, and tongue normal   Neck: Supple, symmetrical, trachea midline, no adenopathy,  no tenderness/mass/nodules   Lungs:   Clear to auscultation bilaterally, no audible wheezes, rhonchi or rales; respirations unlabored   Heart:  Regular rate and rhythm, S1, S2 normal, no murmur, rub or gallop   Abdomen:   Soft, non-tender, non-distended, positive bowel sounds  No masses, no organomegaly    No CVA tenderness, Crawley catheter   Extremities: Extremities normal, atraumatic, no clubbing, cyanosis or edema, PICC line   Skin: Skin color pale, texture, turgor normal, no rashes or lesions  No draining wounds noted  Invasive Devices  Report    Peripherally Inserted Central Catheter Line  Duration           PICC Line 09/26/22 23 days          Drain  Duration           Urethral Catheter 14 Fr  11 days                Labs, Imaging, & Other studies:   All pertinent labs were personally reviewed  Results from last 7 days   Lab Units 10/19/22  0528 10/18/22  0558 10/17/22  0538 10/14/22  0807 10/14/22  0538 10/14/22  0057   WBC Thousand/uL 6 40  --   --   --  5 51 4 87   HEMOGLOBIN g/dL 7 2* 8 3* 8 3*   < > 7 9* 7 5*  7 6*   PLATELETS Thousands/uL 155  --   --   --  181 201    < > = values in this interval not displayed  Results from last 7 days   Lab Units 10/19/22  0528 10/18/22  0558 10/17/22  0538 10/15/22  0040 10/14/22  0538   SODIUM mmol/L 145 143 144   < > 145   POTASSIUM mmol/L 3 7 3 6 3 5   < > 3 4*   CHLORIDE mmol/L 113* 111* 112*   < > 114*   CO2 mmol/L 29 30 29   < > 28   BUN mg/dL 9 7 9   < > 7   CREATININE mg/dL 0 42* 0 44* 0 43*   < > 0 36*   EGFR ml/min/1 73sq m 95 94 95   < > 100   CALCIUM mg/dL 7 8* 7 9* 7 6*   < > 7 2*   AST U/L  --   --   --   --  17   ALT U/L  --   --   --   --  24   ALK PHOS U/L  --   --   --   --  84    < > = values in this interval not displayed

## 2022-10-19 NOTE — PLAN OF CARE
Problem: Potential for Falls  Goal: Patient will remain free of falls  Description: INTERVENTIONS:  - Educate patient/family on patient safety including physical limitations  - Instruct patient to call for assistance with activity   - Consult OT/PT to assist with strengthening/mobility   - Keep Call bell within reach  - Keep bed low and locked with side rails adjusted as appropriate  - Keep care items and personal belongings within reach  - Initiate and maintain comfort rounds  - Make Fall Risk Sign visible to staff  - Offer Toileting every  Hours, in advance of need  - Initiate/Maintainalarm  - Obtain necessary fall risk management equipment:   - Apply yellow socks and bracelet for high fall risk patients  - Consider moving patient to room near nurses station  Outcome: Progressing     Problem: MOBILITY - ADULT  Goal: Maintain or return to baseline ADL function  Description: INTERVENTIONS:  -  Assess patient's ability to carry out ADLs; assess patient's baseline for ADL function and identify physical deficits which impact ability to perform ADLs (bathing, care of mouth/teeth, toileting, grooming, dressing, etc )  - Assess/evaluate cause of self-care deficits   - Assess range of motion  - Assess patient's mobility; develop plan if impaired  - Assess patient's need for assistive devices and provide as appropriate  - Encourage maximum independence but intervene and supervise when necessary  - Involve family in performance of ADLs  - Assess for home care needs following discharge   - Consider OT consult to assist with ADL evaluation and planning for discharge  - Provide patient education as appropriate  Outcome: Progressing  Goal: Maintains/Returns to pre admission functional level  Description: INTERVENTIONS:  - Perform BMAT or MOVE assessment daily    - Set and communicate daily mobility goal to care team and patient/family/caregiver     - Collaborate with rehabilitation services on mobility goals if consulted  - Perform Range of Motion times a day  - Reposition patient every  hours    - Dangle patient  times a day  - Stand patient times a day  - Ambulate patient  times a day  - Out of bed to chair times a day   - Out of bed for meals  times a day  - Out of bed for toileting  - Record patient progress and toleration of activity level   Outcome: Progressing     Problem: Prexisting or High Potential for Compromised Skin Integrity  Goal: Skin integrity is maintained or improved  Description: INTERVENTIONS:  - Identify patients at risk for skin breakdown  - Assess and monitor skin integrity  - Assess and monitor nutrition and hydration status  - Monitor labs   - Assess for incontinence   - Turn and reposition patient  - Assist with mobility/ambulation  - Relieve pressure over bony prominences  - Avoid friction and shearing  - Provide appropriate hygiene as needed including keeping skin clean and dry  - Evaluate need for skin moisturizer/barrier cream  - Collaborate with interdisciplinary team   - Patient/family teaching  - Consider wound care consult   Outcome: Progressing     Problem: GENITOURINARY - ADULT  Goal: Maintains or returns to baseline urinary function  Description: INTERVENTIONS:  - Assess urinary function  - Encourage oral fluids to ensure adequate hydration if ordered  - Administer IV fluids as ordered to ensure adequate hydration  - Administer ordered medications as needed  - Offer frequent toileting  - Follow urinary retention protocol if ordered  Outcome: Progressing  Goal: Absence of urinary retention  Description: INTERVENTIONS:  - Assess patient’s ability to void and empty bladder  - Monitor I/O  - Bladder scan as needed  - Discuss with physician/AP medications to alleviate retention as needed  - Discuss catheterization for long term situations as appropriate  Outcome: Progressing  Goal: Urinary catheter remains patent  Description: INTERVENTIONS:  - Assess patency of urinary catheter  - If patient has a chronic suarez, consider changing catheter if non-functioning  - Follow guidelines for intermittent irrigation of non-functioning urinary catheter  Outcome: Progressing     Problem: METABOLIC, FLUID AND ELECTROLYTES - ADULT  Goal: Electrolytes maintained within normal limits  Description: INTERVENTIONS:  - Monitor labs and assess patient for signs and symptoms of electrolyte imbalances  - Administer electrolyte replacement as ordered  - Monitor response to electrolyte replacements, including repeat lab results as appropriate  - Instruct patient on fluid and nutrition as appropriate  Outcome: Progressing  Goal: Fluid balance maintained  Description: INTERVENTIONS:  - Monitor labs   - Monitor I/O and WT  - Instruct patient on fluid and nutrition as appropriate  - Assess for signs & symptoms of volume excess or deficit  Outcome: Progressing  Goal: Glucose maintained within target range  Description: INTERVENTIONS:  - Monitor Blood Glucose as ordered  - Assess for signs and symptoms of hyperglycemia and hypoglycemia  - Administer ordered medications to maintain glucose within target range  - Assess nutritional intake and initiate nutrition service referral as needed  Outcome: Progressing     Problem: SKIN/TISSUE INTEGRITY - ADULT  Goal: Skin Integrity remains intact(Skin Breakdown Prevention)  Description: Assess:  -Perform Osito assessment every   -Clean and moisturize skin every  -Inspect skin when repositioning, toileting, and assisting with ADLS  -Assess under medical devices such as  every   -Assess extremities for adequate circulation and sensation     Bed Management:  -Have minimal linens on bed & keep smooth, unwrinkled  -Change linens as needed when moist or perspiring  -Avoid sitting or lying in one position for more than hours while in bed  -Keep HOB at degrees     Toileting:  -Offer bedside commode  -Assess for incontinence every  -Use incontinent care products after each incontinent episode such as    Activity:  -Mobilize patient  times a day  -Encourage activity and walks on unit  -Encourage or provide ROM exercises   -Turn and reposition patient every Hours  -Use appropriate equipment to lift or move patient in bed  -Instruct/ Assist with weight shifting every  when out of bed in chair  -Consider limitation of chair time  hour intervals    Skin Care:  -Avoid use of baby powder, tape, friction and shearing, hot water or constrictive clothing  -Relieve pressure over bony prominences using  -Do not massage red bony areas    Next Steps:  -Teach patient strategies to minimize risks such as    -Consider consults to  interdisciplinary teams such as  Outcome: Progressing  Goal: Incision(s), wounds(s) or drain site(s) healing without S/S of infection  Description: INTERVENTIONS  - Assess and document dressing, incision, wound bed, drain sites and surrounding tissue  - Provide patient and family education  - Perform skin care/dressing changes every   Outcome: Progressing  Goal: Pressure injury heals and does not worsen  Description: Interventions:  - Implement low air loss mattress or specialty surface (Criteria met)  - Apply silicone foam dressing  - Instruct/assist with weight shifting every minutes when in chair   - Limit chair time to hour intervals  - Use special pressure reducing interventions such as  when in chair   - Apply fecal or urinary incontinence containment device   - Perform passive or active ROM every   - Turn and reposition patient & offload bony prominences every hours   - Utilize friction reducing device or surface for transfers   - Consider consults to  interdisciplinary teams such as   - Use incontinent care products after each incontinent episode such as  - Consider nutrition services referral as needed  Outcome: Progressing     Problem: PAIN - ADULT  Goal: Verbalizes/displays adequate comfort level or baseline comfort level  Description: Interventions:  - Encourage patient to monitor pain and request assistance  - Assess pain using appropriate pain scale  - Administer analgesics based on type and severity of pain and evaluate response  - Implement non-pharmacological measures as appropriate and evaluate response  - Consider cultural and social influences on pain and pain management  - Notify physician/advanced practitioner if interventions unsuccessful or patient reports new pain  Outcome: Progressing     Problem: Nutrition/Hydration-ADULT  Goal: Nutrient/Hydration intake appropriate for improving, restoring or maintaining nutritional needs  Description: Monitor and assess patient's nutrition/hydration status for malnutrition  Collaborate with interdisciplinary team and initiate plan and interventions as ordered  Monitor patient's weight and dietary intake as ordered or per policy  Utilize nutrition screening tool and intervene as necessary  Determine patient's food preferences and provide high-protein, high-caloric foods as appropriate       INTERVENTIONS:  - Monitor oral intake, urinary output, labs, and treatment plans  - Assess nutrition and hydration status and recommend course of action  - Evaluate amount of meals eaten  - Assist patient with eating if necessary   - Allow adequate time for meals  - Recommend/ encourage appropriate diets, oral nutritional supplements, and vitamin/mineral supplements  - Order, calculate, and assess calorie counts as needed  - Recommend, monitor, and adjust tube feedings and TPN/PPN based on assessed needs  - Assess need for intravenous fluids  - Provide specific nutrition/hydration education as appropriate  - Include patient/family/caregiver in decisions related to nutrition  Outcome: Progressing

## 2022-10-19 NOTE — ASSESSMENT & PLAN NOTE
Likely a mixed component of anemia chronic disease with possible folate deficiency  Continue folate supplementation  Status post 1 unit PRBCs this admission; continue to monitor and transfuse for hemoglobin less than 7 0

## 2022-10-19 NOTE — ASSESSMENT & PLAN NOTE
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 Patient was started on Faslodex and Xgeva at that time, in conjunction with radiation  ? In April 2022 there was interval development of 4 mm nodule at the right lower lobe and interval development of increased sclerotic lesions throughout the visualized spine  ? At that time, the patient's treatment was changed to Femara and Tha Gunnels  ? In July of 2022, the patient was changed from Tha Gunnels to Teres[place]ury because of intolerance  ? During the patient's hospitalization in August of 2022 to September of 2022, Verzenio was discontinued due to gastroenteritis  ? Patient's cancer is most likely contributing to her hypercoagulable state  ? Appreciate oncology and palliative care consult  ? To continue on lovenox indefinately  ? Continue on Remeron for insomnia and anxiety  ? Patient would not like any more treatment for her malignancy  Family meeting held on 10/04 with palliative care, GI, CM, and IM team  Patient's current clinical course discussed  Discussion of disposition planning occurred  Hospice was discussed  Patient is pending placement to nursing facility for continued rehab, and plans to transition to hospice when she clinically declines

## 2022-10-19 NOTE — CASE MANAGEMENT
Case Management Progress Note    Patient name Garo Silverman  Location /-07 MRN [id number]  : 1940 Date 10/19/2022       LOS (days): 27  Geometric Mean LOS (GMLOS) (days): 8 10  Days to GMLOS:-18 7        OBJECTIVE:        Current admission status: Inpatient  Preferred Pharmacy:   [facility] DR ROBIN BAUMAN [facility], [address] [facility]  [address] [facility]  Phone: [phone] Fax: [phone] [address] [facility], [address] [place] [address] [id number]  [address] [place] [address]  Phone: [phone] Fax: [phone]    [address] [place], [facility] [address] [facility]  Phone: [phone] Fax: [phone]    Primary Care Provider: Rubin Parker DO    Primary Insurance: MEDICARE  Secondary Insurance: AET[employer]    PROGRESS NOTE:    CM received TT from Dr Simin Mondragon stating that patient's hgb dropped, patient not stable for discharge today  CM notified [facility] via [address] message and informed daughter that discharge for today is cancelled  CM will continue to follow

## 2022-10-19 NOTE — WOUND OSTOMY CARE
Progress Note - Wound   Martine Silverman 79 y o  female MRN: [id number]  Unit/Bed#: -01 Encounter: [id number]        Assessment:   Patient is seen for wound care follow-up  78 yo F admitted to [facility] with saddle embolus of pulmonary artery  PMH: stage IV metastatic breast cancer, anemia, CKD3, and hypertension  Rectal tube and suarez in place managing bowel and bladder   Patient on strict contact precautions for C  Diff       Tested positive for COVID on 10/9/2022- on contact and airborne precautions  Min-Modertae assist for turning and repositioning on P-500 specialty mattress       B/L Heels are dry, intact, and rekha with no evidence of skin loss or wounds present  B/L preventative Heel allevyn foams in place  Findings:  1  POA Stage 3 Pressure Injury left Buttocks: wound appears oval in shape with full thickness skin loss  Wound appears smaller in size  Wound bed is pink, non-blanchable erythema granulation tissue  Romana-wound is hyperpigmented with scar tissue noted around the site  Scant serosanguineous drainage  Recommend Continuing with stoma powder and triad to area       2  B/L Inner Thighs and Perineum MASD: area is less fragile to touch per patient and is pink in color  No skin loss noted  Continue with Andreia Wheelwright  No induration, fluctuance, odor, warmth/temperature differences, redness, or purulence noted to the above noted wounds and skin areas assessed  New dressings applied per orders listed below  Patient tolerated well- no s/s of non-verbal pain or discomfort observed during the encounter  Bedside nurse aware of plan of care  See flow sheets for more detailed assessment findings  Orders listed below and wound care will continue to follow, call or tiger text with questions  Skin Care Plan:  1-LEFT BUTTOCK: Cleanse wound & pat dry  Dust wound bed with Stoma Powder and apply TRIAD Paste overtop  Apply TID or PRN episodes of incontinence     2-Turn/reposition q2h or when medically stable for pressure re-distribution on skin   3-Elevate heels to offload pressure  4-Moisturize skin daily with skin nourishing cream  5-Ehob cushion in chair when out of bed  6-INNER THIGHS, PERINEUM, RIGHT BUTTOCKS: Cleanse areas with soap and water & pat dry  Apply ALYSSA anti-fungal cream BID and Prn episodes of incontinence  7-Continue with P-500 Specialty Mattress  8-B/L Preventative Allevyn Foam Dressings  Osmin with P for Prevention  Change Q3 days or PRN  Peel back and inspect skin Q-shift        WOUNDS:  Wound 09/22/22 Buttocks Left (Active)   Wound Image   10/19/22 1318   Wound Description Non-blanchable erythema;Pink 10/19/22 1318   Pressure Injury Stage 3 10/19/22 1318   Romana-wound Assessment Hyperpigmented;Scar Tissue 10/19/22 1318   Wound Length (cm) 2 1 cm 10/19/22 1318   Wound Width (cm) 1 1 cm 10/19/22 1318   Wound Depth (cm) 0 2 cm 10/19/22 1318   Wound Surface Area (cm^2) 2 31 cm^2 10/19/22 1318   Wound Volume (cm^3) 0 462 cm^3 10/19/22 1318   Calculated Wound Volume (cm^3) 0 46 cm^3 10/19/22 1318   Change in Wound Size % 16 36 10/19/22 1318   Tunneling 0 cm 10/19/22 1318   Tunneling in depth located at 0 10/19/22 1318   Undermining 0 10/19/22 1318   Undermining is depth extending from 0 10/19/22 1318   Wound Site Closure RUDY 10/19/22 1318   Drainage Amount Scant 10/19/22 1318   Drainage Description Serosanguineous 10/19/22 1318   Non-staged Wound Description Full thickness 10/19/22 1318   Treatments Cleansed;Site care 10/19/22 1318   Dressing Other (Comment) 10/19/22 1318   Wound packed?  No 10/19/22 1318   Packing- # removed 0 10/19/22 1318   Packing- # inserted 0 10/19/22 1318   Dressing Changed Changed 10/19/22 1318   Patient Tolerance Tolerated well 10/19/22 1318   Dressing Status Intact 10/19/22 119 Yasmin Clemons RN, BSN

## 2022-10-19 NOTE — ASSESSMENT & PLAN NOTE
Initially presented with acute respiratory failure and hypoxia  Found to have an acute submassive high-risk pulmonary embolism  History of prior DVT in August in the right lower extremity  Unclear if patient was receiving Xarelto from nursing facility when she was discharged    Patient is hypercoagulable in the setting of recurrent breast cancer and active IBD  Status post thrombectomy and IVC filter placement  Continue with weight based Lovenox indefinitely

## 2022-10-19 NOTE — ASSESSMENT & PLAN NOTE
Acutely developed overnight in the morning of 10/13  Secondary to ulcerative colitis and complicated by systemic anticoagulation  Multiple discussions held with GI  There is no contraindication to continuing anticoagulation  Some amount of bleeding should be expected, and fortunately her hematochezia has improved    She will be receiving her 2nd infusion of Remicade on 10/19

## 2022-10-19 NOTE — ASSESSMENT & PLAN NOTE
Incidentally found to be positive when being screened for discharge placement  Previously on remdesivir though discontinued per family request  Finished course of IV Decadron 6 mg daily, day 10/10  Cont to wean oxygen as tolerated; down to 1 L  Airborne and contact precautions; can be discontinued 10/19 after 10 day quarantine

## 2022-10-20 VITALS
OXYGEN SATURATION: 96 % | HEART RATE: 101 BPM | TEMPERATURE: 98.4 F | HEIGHT: 61 IN | WEIGHT: 134.48 LBS | BODY MASS INDEX: 25.39 KG/M2 | RESPIRATION RATE: 17 BRPM | SYSTOLIC BLOOD PRESSURE: 106 MMHG | DIASTOLIC BLOOD PRESSURE: 55 MMHG

## 2022-10-20 LAB
ABO GROUP BLD: NORMAL
BLD GP AB SCN SERPL QL: NEGATIVE
HCT VFR BLD AUTO: 24.3 % (ref 34.8–46.1)
HGB BLD-MCNC: 7.2 G/DL (ref 11.5–15.4)
RH BLD: NEGATIVE
SPECIMEN EXPIRATION DATE: NORMAL

## 2022-10-20 PROCEDURE — 85018 HEMOGLOBIN: CPT | Performed by: INTERNAL MEDICINE

## 2022-10-20 PROCEDURE — 85014 HEMATOCRIT: CPT | Performed by: INTERNAL MEDICINE

## 2022-10-20 PROCEDURE — 86850 RBC ANTIBODY SCREEN: CPT | Performed by: INTERNAL MEDICINE

## 2022-10-20 PROCEDURE — 99231 SBSQ HOSP IP/OBS SF/LOW 25: CPT | Performed by: INTERNAL MEDICINE

## 2022-10-20 PROCEDURE — NC001 PR NO CHARGE: Performed by: INTERNAL MEDICINE

## 2022-10-20 PROCEDURE — 99239 HOSP IP/OBS DSCHRG MGMT >30: CPT | Performed by: INTERNAL MEDICINE

## 2022-10-20 PROCEDURE — 86902 BLOOD TYPE ANTIGEN DONOR EA: CPT

## 2022-10-20 PROCEDURE — P9016 RBC LEUKOCYTES REDUCED: HCPCS

## 2022-10-20 PROCEDURE — 86901 BLOOD TYPING SEROLOGIC RH(D): CPT | Performed by: INTERNAL MEDICINE

## 2022-10-20 PROCEDURE — 86900 BLOOD TYPING SEROLOGIC ABO: CPT | Performed by: INTERNAL MEDICINE

## 2022-10-20 PROCEDURE — 86921 COMPATIBILITY TEST INCUBATE: CPT

## 2022-10-20 PROCEDURE — 86922 COMPATIBILITY TEST ANTIGLOB: CPT

## 2022-10-20 RX ORDER — ACETAMINOPHEN 325 MG/1
650 TABLET ORAL EVERY 6 HOURS PRN
Refills: 0
Start: 2022-10-20

## 2022-10-20 RX ORDER — LOPERAMIDE HYDROCHLORIDE 2 MG/1
4 CAPSULE ORAL 2 TIMES DAILY
Qty: 30 CAPSULE | Refills: 0 | Status: SHIPPED | OUTPATIENT
Start: 2022-10-20

## 2022-10-20 RX ORDER — ENOXAPARIN SODIUM 100 MG/ML
1 INJECTION SUBCUTANEOUS EVERY 12 HOURS SCHEDULED
Refills: 0
Start: 2022-10-20

## 2022-10-20 RX ORDER — METOPROLOL SUCCINATE 25 MG/1
25 TABLET, EXTENDED RELEASE ORAL DAILY
Refills: 0
Start: 2022-10-21

## 2022-10-20 RX ORDER — MIDODRINE HYDROCHLORIDE 2.5 MG/1
2.5 TABLET ORAL
Refills: 0
Start: 2022-10-20

## 2022-10-20 RX ADMIN — ATORVASTATIN CALCIUM 40 MG: 40 TABLET, FILM COATED ORAL at 17:09

## 2022-10-20 RX ADMIN — CARBIDOPA AND LEVODOPA 2.5 MG: 50; 200 TABLET, EXTENDED RELEASE ORAL at 05:20

## 2022-10-20 RX ADMIN — ENOXAPARIN SODIUM 60 MG: 60 INJECTION SUBCUTANEOUS at 09:37

## 2022-10-20 RX ADMIN — METOPROLOL SUCCINATE 25 MG: 25 TABLET, EXTENDED RELEASE ORAL at 09:36

## 2022-10-20 RX ADMIN — MICONAZOLE NITRATE: 20 CREAM TOPICAL at 09:37

## 2022-10-20 RX ADMIN — MICONAZOLE NITRATE: 20 CREAM TOPICAL at 17:09

## 2022-10-20 RX ADMIN — CARBIDOPA AND LEVODOPA 2.5 MG: 50; 200 TABLET, EXTENDED RELEASE ORAL at 12:25

## 2022-10-20 RX ADMIN — PANTOPRAZOLE SODIUM 40 MG: 40 TABLET, DELAYED RELEASE ORAL at 05:20

## 2022-10-20 RX ADMIN — Medication 2000 UNITS: at 09:36

## 2022-10-20 RX ADMIN — FOLIC ACID 1 MG: 1 TABLET ORAL at 09:36

## 2022-10-20 RX ADMIN — CARBIDOPA AND LEVODOPA 2.5 MG: 50; 200 TABLET, EXTENDED RELEASE ORAL at 17:09

## 2022-10-20 RX ADMIN — PANTOPRAZOLE SODIUM 40 MG: 40 TABLET, DELAYED RELEASE ORAL at 17:09

## 2022-10-20 RX ADMIN — INFLIXIMAB 300 MG: 100 INJECTION, POWDER, LYOPHILIZED, FOR SOLUTION INTRAVENOUS at 16:26

## 2022-10-20 RX ADMIN — LOPERAMIDE HYDROCHLORIDE 4 MG: 2 CAPSULE ORAL at 09:36

## 2022-10-20 NOTE — PROGRESS NOTES
Progress Note - Infectious Disease   Sheran Collet Clunk 79 y o  female MRN: [id number]  Unit/Bed#: -01 Encounter: [id number]      Impression:  1  Diarrhea secondary to IBD flare with possible C diff colitis  2  Saddle embolus s/p thrombectomy with IVC filter placement  3  History of right breast carcinoma stage IV with metastases to bone  4  Protein calorie malnutrition  5  Urinary retention  6  Acute COVID 19     Recommendations:  Patient is afebrile with normal WBC count when last taken     Hemoglobin is now stable at 7 2  Remains on  nasal O2 supplementation down to 1 L  Diarrhea  1  I am not convinced that the patient has active C diff colitis  Diarrhea more likely secondary to IBD  Patient has completed full course of p o  Vancomycin  Discussed with GI 10/16  P O  Vancomycin discontinued  2  No diarrhea so far today  3  Hemoglobin remains 7 2  Primary care following up  COVID 19  1  Currently stable and denies SOB  Chest x-ray not significant  2  SARS-CoV-2 PCR positive  Since patient came in with saddle embolus it is possible that she may have had COVID at that time  She is not immunized  3  COVID Protocol as per primary service  4  She is currently receiving her Remicade now and is slated for discharge after with   Antibiotics:  1  None       Subjective:  Generally feeling well  No diarrhea today  Denies fevers, chills, or sweats  Denies nausea, vomiting,       Objective:  Vitals:  Temp:  [98 2 °F (36 8 °C)-99 1 °F (37 3 °C)] 98 4 °F (36 9 °C)  HR:  [] 91  Resp:  [16-17] 17  BP: (101-118)/(55-68) 109/61  SpO2:  [93 %-100 %] 99 %  Temp (24hrs), Av 7 °F (37 1 °C), Min:98 2 °F (36 8 °C), Max:99 1 °F (37 3 °C)  Current: Temperature: 98 4 °F (36 9 °C)    Physical Exam:     General Appearance:    Eyes:   Alert, chronically ill-appearing female nontoxic, no acute distress  Nasal oxygen in place  Conjunctiva pale   Throat: Oropharynx moist without lesions    Lips, mucosa, and tongue normal Neck: Supple, symmetrical, trachea midline, no adenopathy,  no tenderness/mass/nodules   Lungs:   Clear to auscultation bilaterally, no audible wheezes, rhonchi or rales; respirations unlabored   Heart:  Regular rate and rhythm, S1, S2 normal, no murmur, rub or gallop   Abdomen:   Soft, non-tender, non-distended, positive bowel sounds  No masses, no organomegaly    No CVA tenderness, Crawley catheter   Extremities: Extremities normal, atraumatic, no clubbing, cyanosis or edema, PICC line   Skin: Skin color pale, texture, turgor normal, no rashes or lesions  No draining wounds noted  Invasive Devices  Report    Peripherally Inserted Central Catheter Line  Duration           PICC Line 09/26/22 24 days          Drain  Duration           Urethral Catheter 14 Fr  12 days                Labs, Imaging, & Other studies:   All pertinent labs were personally reviewed  Results from last 7 days   Lab Units 10/20/22  0521 10/19/22  0528 10/18/22  0558 10/14/22  0807 10/14/22  0538 10/14/22  0057   WBC Thousand/uL  --  6 40  --   --  5 51 4 87   HEMOGLOBIN g/dL 7 2* 7 2* 8 3*   < > 7 9* 7 5*  7 6*   PLATELETS Thousands/uL  --  155  --   --  181 201    < > = values in this interval not displayed  Results from last 7 days   Lab Units 10/19/22  0528 10/18/22  0558 10/17/22  0538 10/15/22  0040 10/14/22  0538   SODIUM mmol/L 145 143 144   < > 145   POTASSIUM mmol/L 3 7 3 6 3 5   < > 3 4*   CHLORIDE mmol/L 113* 111* 112*   < > 114*   CO2 mmol/L 29 30 29   < > 28   BUN mg/dL 9 7 9   < > 7   CREATININE mg/dL 0 42* 0 44* 0 43*   < > 0 36*   EGFR ml/min/1 73sq m 95 94 95   < > 100   CALCIUM mg/dL 7 8* 7 9* 7 6*   < > 7 2*   AST U/L  --   --   --   --  17   ALT U/L  --   --   --   --  24   ALK PHOS U/L  --   --   --   --  84    < > = values in this interval not displayed

## 2022-10-20 NOTE — ASSESSMENT & PLAN NOTE
Patient was admitted with toxic gastroenteritis in August of 2022  Determined to be due to UC flare with additional insult of Verzenio from her breast cancer treatment  Low suspicion for C diff infection and p o   Vancomycin taper was discontinued per ID recs  Received 2 doses of Remicade and will be continued on an outpatient basis after discharge

## 2022-10-20 NOTE — CASE MANAGEMENT
Bed in low position and floor mats surrounding bed to protect patient from fall injuries. Case Management Discharge Planning Note    Patient name Analia Giron  Location /-48 MRN [id number]  : 1940 Date 10/20/2022       Current Admission Date: 2022  Current Admission Diagnosis:Saddle embolus of pulmonary artery [facility])   Patient Active Problem List    Diagnosis Date Noted   • Hematochezia 10/13/2022   • COVID-19 virus infection 10/11/2022   • Pressure injury of left buttock, stage 3 ([facility] [address] ) 10/11/2022   • Acute respiratory failure with hypoxia ([facility] ) 10/09/2022   • Ulcerative pancolitis with rectal bleeding ([facility] ) 10/03/2022   • Saddle embolus of pulmonary artery ([facility] ) 2022   • Urinary retention 2022   • Swelling of lower extremity 2022   • Venous insufficiency 2022   • Tachycardia 2022   • Single subsegmental pulmonary embolism without acute cor pulmonale ([facility] ) 2022   • Severe protein-calorie malnutrition ([facility] ) 2022   • Anemia 2022   • Hypokalemia 2022   • Diarrhea 2022   • Secondary malignant neoplasm of bone ([facility] ) 2022   • Toxic gastroenteritis and colitis 2022   • Rectal bleeding 10/14/2021   • Lytic lesion of bone on x-ray 2021   • Neoplasm of uncertain behavior of sternum 2021   • Acute costochondritis 2021   • Osteopenia of multiple sites 2020   • Primary hypertension 10/22/2019   • Chronic kidney disease (CKD) stage G3a/A1, moderately decreased glomerular filtration rate (GFR) between 45-59 mL/min/1 73 square meter and albuminuria creatinine ratio less than 30 mg/g (HCC) 2019   • Adverse reaction to pneumococcal vaccine 2015   • Cataract, bilateral 2014   • Pulmonary nodule seen on imaging study 09/10/2013   • Sleep disorder 2013   • Anxiety 2013   • Metastatic breast cancer ([facility] ) 10/17/2012   • Mixed hyperlipidemia 2012      LOS (days): 28  Geometric Mean LOS (GMLOS) (days): 8 10  Days to GMLOS:-19 8     OBJECTIVE:  Risk of Unplanned Readmission Score: 41 14      BPCI Notification Given?:  (No, not a BPCI advanced patient)  Current admission status: Inpatient   Preferred Pharmacy:   [facility] DR ROBIN BAUMAN [facility], [address]  [address]  Phone: [phone] Fax: [phone] [facility], [address]  [facility] [address]  [address] [facility] [address]  Phone: [phone] Fax: [phone] [address] [place], [facility] [place] [address] [place] [facility] [place] [address] [address]  Phone: [phone] Fax: [phone]    Primary Care Provider: Myles Newman DO    Primary Insurance: MEDICARE  Secondary Insurance: Shanita Tubbs DETAILS:    Discharge planning discussed with[de-identified] Patient, daughter Arizona Revels of Choice: Yes     CM contacted family/caregiver?: Yes  Were Treatment Team discharge recommendations reviewed with patient/caregiver?: Yes  Did patient/caregiver verbalize understanding of patient care needs?: N/A- going to facility  Were patient/caregiver advised of the risks associated with not following Treatment Team discharge recommendations?: Yes    Contacts  Patient Contacts: Gerardo Reyna, daughter  Relationship to Patient[de-identified] Family  Contact Method: Phone  Phone Number: [phone]  Reason/Outcome: Discharge Planning, Emergency Contact, Continuity of Care              Other Referral/Resources/Interventions Provided:  Referral Comments: Per Dr Siria Hay, patient will get Remicade and blood transfusion, then will be ready for discharge  CM confirmed with [facility] that they have a bed available for patient  CM requested BLS transport and received confirmed transport for 1930 with [facility]  CM informed patient, daughter, Juan Linares RN, Dr Siria Hay, and [facility]  CMN on folder and given to RN                    Dispatcher Contacted: Yes  Number/Name of Dispatcher: Roundtrip  Transported by [facility] and Unit #):  [facility]  ETA of Transport (Date): 10/20/22  ETA of Transport (Time): 1930              IMM Given (Date):: 10/20/22  IMM Given to[de-identified] Patient          Accepting Facility Name, [address] : [facility]  Receiving Facility/Agency Phone Number: [phone]  Facility/Agency Fax Number: [phone]

## 2022-10-20 NOTE — ASSESSMENT & PLAN NOTE
Multifactorial secondary to anemia of chronic disease, acute blood loss due to rectal bleeding, folate deficiency  Received prbc transfusion x 4    S/p drop to 7 2 though remains stable, will transfuse x 1 now given symptomatic anemia  Continue folate supplementation  Repeat cbc as outpt

## 2022-10-20 NOTE — ASSESSMENT & PLAN NOTE
Initially presented with acute respiratory failure and hypoxia  Found to have an acute submassive high-risk pulmonary embolism with b/l LE acute DVT  History of prior DVT in August in the right lower extremity  Unclear if patient was receiving Xarelto from nursing facility when she was discharged    Patient is hypercoagulable in the setting of recurrent breast cancer and active IBD  Status post thrombectomy and IVC filter placement  Continue with weight based Lovenox indefinitely given recurrence and malignancy hx

## 2022-10-20 NOTE — ASSESSMENT & PLAN NOTE
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 Patient was started on Faslodex and Xgeva at that time, in conjunction with radiation  ? In April 2022 there was interval development of 4 mm nodule at the right lower lobe and interval development of increased sclerotic lesions throughout the visualized spine  ? At that time, the patient's treatment was changed to [facility]  ? In July of 2022, the patient was changed from [facility] to [facility] because of intolerance  ? During the patient's hospitalization in August of 2022 to September of 2022, Verzenio was discontinued due to gastroenteritis  ? Patient's cancer is most likely contributing to her hypercoagulable state  ? Appreciate oncology and palliative care consult  ? To continue on lovenox indefinately  ? Continue on Remeron for insomnia and anxiety  ? Patient no longer wishes to pursue any further antineoplastic txt for her malignancy  Family meeting held on 10/04 with palliative care, GI, CM, and IM team  Patient's current clinical course discussed  Discussion of disposition planning occurred  Hospice was discussed  Patient wishes to pursue continued rehab for now

## 2022-10-20 NOTE — ASSESSMENT & PLAN NOTE
Incidentally found to be positive when being screened for discharge placement  Previously on remdesivir though discontinued per family request  Finished course of IV Decadron 6 mg daily, day 10/10  Weaned down to 1 L  Isolation no longer indicated as completed quarantine x 10 days

## 2022-10-20 NOTE — ASSESSMENT & PLAN NOTE
Acutely developed overnight in the morning of 10/13  Secondary to rectal ulcers due to ulcerative colitis flare and complicated by systemic anticoagulation  Per GI, no contraindication to continuing anticoagulation  Some amount of intermittent bleeding should be expected but is improving  She will be receiving her 2nd infusion of Remicade on 10/20   Further dosing per GI as outpt

## 2022-10-20 NOTE — ASSESSMENT & PLAN NOTE
Amitriptyline; discontinued as it was thought it was contributing to her tachycardia  Continue Remeron

## 2022-10-20 NOTE — DISCHARGE SUMMARY
[address] [facility]  Discharge- Brendan Silverman 1940, 79 y o  female MRN: [id number]  Unit/Bed#: -01 Encounter: [id number]  Primary Care Provider: Efrain Parham DO   Date and time admitted to hospital: 9/22/2022  3:35 PM    * Saddle embolus of pulmonary artery [facility])  Assessment & Plan  Initially presented with acute respiratory failure and hypoxia  Found to have an acute submassive high-risk pulmonary embolism with b/l LE acute DVT  History of prior DVT in August in the right lower extremity  Unclear if patient was receiving Xarelto from nursing facility when she was discharged  Patient is hypercoagulable in the setting of recurrent breast cancer and active IBD  Status post thrombectomy and IVC filter placement  Continue with weight based Lovenox indefinitely given recurrence and malignancy hx        Hematochezia  Assessment & Plan  Acutely developed overnight in the morning of 10/13  Secondary to rectal ulcers due to ulcerative colitis flare and complicated by systemic anticoagulation  Per GI, no contraindication to continuing anticoagulation  Some amount of intermittent bleeding should be expected but is improving  She will be receiving her 2nd infusion of Remicade on 10/20  Further dosing per GI as outpt    Toxic gastroenteritis and colitis  Assessment & Plan  Patient was admitted with toxic gastroenteritis in August of 2022  Determined to be due to UC flare with additional insult of Verzenio from her breast cancer treatment  Low suspicion for C diff infection and p o   Vancomycin taper was discontinued per ID recs  Received 2 doses of Remicade and will be continued on an outpatient basis after discharge      COVID-19 virus infection  Assessment & Plan  Incidentally found to be positive when being screened for discharge placement  Previously on remdesivir though discontinued per family request  Finished course of IV Decadron 6 mg daily, day 10/10  Weaned down to 1 L  Isolation no longer indicated as completed quarantine x 10 days    Acute respiratory failure with hypoxia (HCC)  Assessment & Plan  Secondary to COVID infection; see plan above  Cont to wean off as able to    Pressure injury of left buttock, stage 3 (HCC)  Assessment & Plan  POA  S/p wound care consult  Contd wound care    Urinary retention  Assessment & Plan  Marked urinary retention and bladder distention on admission  Failed voiding trial and urinary catheter replaced  Voiding trial/suarez removal instructions on discharge instruction on 8th day at SNF   Status post empiric 3 day course of IV ceftriaxone    Anemia  Assessment & Plan  Multifactorial secondary to anemia of chronic disease, acute blood loss due to rectal bleeding, folate deficiency  Received prbc transfusion x 4  S/p drop to 7 2 though remains stable, will transfuse x 1 now given symptomatic anemia  Continue folate supplementation  Repeat cbc as outpt       Anxiety  Assessment & Plan  Amitriptyline; discontinued as it was thought it was contributing to her tachycardia  Continue Remeron      Metastatic breast cancer (HCC)  Assessment & Plan  Stage IV metastatic ER positive, 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 Patient was started on Faslodex and Xgeva at that time, in conjunction with radiation  ? In April 2022 there was interval development of 4 mm nodule at the right lower lobe and interval development of increased sclerotic lesions throughout the visualized spine  ? At that time, the patient's treatment was changed to [facility] [employer] [facility]  ?  In July of 2022, the patient was changed from [employer] to [employer] because of intolerance  ? During the patient's hospitalization in August of 2022 to September of 2022, Verzenio was discontinued due to gastroenteritis  ? Patient's cancer is most likely contributing to her hypercoagulable state  ? Appreciate oncology and palliative care consult  ? To continue on lovenox indefinately  ? Continue on Remeron for insomnia and anxiety  ? Patient no longer wishes to pursue any further antineoplastic txt for her malignancy  Family meeting held on 10/04 with palliative care, GI, CM, and IM team  Patient's current clinical course discussed  Discussion of disposition planning occurred  Hospice was discussed  Patient wishes to pursue continued rehab for now  Discharging Physician / Practitioner: Sada Cortes  PCP: Oziel Morfin DO  Admission Date:   Admission Orders (From admission, onward)     Ordered        09/22/22 1536  Inpatient Admission  Once                      Discharge Date: 10/20/22    Medical Problems             Resolved Problems  Date Reviewed: 10/20/2022          Resolved    REYES (acute kidney injury) ([facility] [address] ) 10/11/2022     Resolved by  Sonia Gold DO                Consultations During Hospital Stay:  · Interventional radiology  · Gastroenterology  · Hematology-Oncology  · Palliative care  · Cardiology  · Infectious Disease  · OBGYN    Procedures Performed:   · PRBC transfusion  · Thrombectomy  · IVC filter placement  · Arterial line placement  · PICC line placement    Significant Findings / Test Results:   · See above    Incidental Findings:   ·     Test Results Pending at Discharge (will require follow up): Outpatient Tests Requested:  · CBC, BMP in 1 week    Complications:      Reason for Admission:     Hospital Course:     Vincent Silverman is a 79 y o  female patient who originally presented to the hospital on 9/22/2022 due to mechanical fall, with workup revealing saddle pulmonary embolism   Further hospital course entailed rectal bleeding secondary to ulcerative colitis  Please see above list of diagnoses and related plan for additional information  Condition at Discharge: stable     Discharge Day Visit / Exam:     Subjective: Intermittent rectal bleeding but much less and improving  Mildly tachycardiac likely due to anemia  Vitals: Blood Pressure: 112/63 (10/20/22 1224)  Pulse: 104 (10/20/22 1224)  Temperature: 98 4 °F (36 9 °C) (10/20/22 1224)  Temp Source: Oral (10/20/22 0035)  Respirations: 16 (10/20/22 1202)  Height: 5' 1" (154 9 cm) (09/22/22 1640)  Weight - Scale: 61 kg (134 lb 7 7 oz) (10/19/22 0546)  SpO2: 99 % (10/20/22 1224)  Exam:   Physical Exam  Cardiovascular:      Rate and Rhythm: Normal rate and regular rhythm  Pulses: Normal pulses  Heart sounds: Normal heart sounds  Pulmonary:      Effort: Pulmonary effort is normal  No respiratory distress  Breath sounds: Normal breath sounds  No wheezing or rales  Abdominal:      General: Bowel sounds are normal  There is no distension  Palpations: Abdomen is soft  Tenderness: There is no abdominal tenderness  There is no guarding  Musculoskeletal:         General: Normal range of motion  Cervical back: Normal range of motion and neck supple  Right lower leg: No edema  Left lower leg: No edema  Skin:     General: Skin is warm and dry  Neurological:      General: No focal deficit present  Mental Status: She is alert and oriented to person, place, and time  Mental status is at baseline  Cranial Nerves: No cranial nerve deficit  Motor: No weakness  Discussion with Family: Plan of care d/w patient and her dtr Funmilayo Barr at length    Discharge instructions/Information to patient and family:   See after visit summary for information provided to patient and family  Provisions for Follow-Up Care:  See after visit summary for information related to follow-up care and any pertinent home health orders        Disposition:     Other [facility] to [facility] SNF:   · Not Applicable to this Patient - Not Applicable to this Patient    Planned Readmission: No     Discharge Statement:  I spent 35 minutes discharging the patient  This time was spent on the day of discharge  I had direct contact with the patient on the day of discharge  Greater than 50% of the total time was spent examining patient, answering all patient questions, arranging and discussing plan of care with patient as well as directly providing post-discharge instructions  Additional time then spent on discharge activities  Discharge Medications:  See after visit summary for reconciled discharge medications provided to patient and family        ** Please Note: This note has been constructed using a voice recognition system **

## 2022-10-20 NOTE — ASSESSMENT & PLAN NOTE
Marked urinary retention and bladder distention on admission  Failed voiding trial and urinary catheter replaced  Voiding trial/suarez removal instructions on discharge instruction on 8th day at SNF   Status post empiric 3 day course of IV ceftriaxone

## 2022-10-21 ENCOUNTER — NURSING HOME VISIT (OUTPATIENT)
Dept: FAMILY MEDICINE CLINIC | Facility: CLINIC | Age: 82
End: 2022-10-21
Payer: MEDICARE

## 2022-10-21 DIAGNOSIS — K51.011 ULCERATIVE PANCOLITIS WITH RECTAL BLEEDING (HCC): Primary | ICD-10-CM

## 2022-10-21 DIAGNOSIS — R33.9 URINARY RETENTION: ICD-10-CM

## 2022-10-21 DIAGNOSIS — K92.1 HEMATOCHEZIA: ICD-10-CM

## 2022-10-21 DIAGNOSIS — K62.5 RECTAL BLEEDING: ICD-10-CM

## 2022-10-21 DIAGNOSIS — C50.919 METASTATIC BREAST CANCER (HCC): ICD-10-CM

## 2022-10-21 DIAGNOSIS — K51.011 ULCERATIVE PANCOLITIS WITH RECTAL BLEEDING (HCC): ICD-10-CM

## 2022-10-21 DIAGNOSIS — I26.92 ACUTE SADDLE PULMONARY EMBOLISM, UNSPECIFIED WHETHER ACUTE COR PULMONALE PRESENT (HCC): Primary | ICD-10-CM

## 2022-10-21 LAB
ABO GROUP BLD BPU: NORMAL
BPU ID: NORMAL
CROSSMATCH: NORMAL
UNIT DISPENSE STATUS: NORMAL
UNIT PRODUCT CODE: NORMAL
UNIT PRODUCT VOLUME: 250 ML
UNIT RH: NORMAL

## 2022-10-21 PROCEDURE — 99306 1ST NF CARE HIGH MDM 50: CPT | Performed by: FAMILY MEDICINE

## 2022-10-21 NOTE — PROGRESS NOTES
[address] [place] [address]  Facility: [facility]    NAME: Edwin Silverman  AGE: 79 y o  SEX: female    DATE OF ENCOUNTER: 10/21/2022    Code status:  DNR w/ Hospitalization    Assessment and Plan     1  Acute saddle pulmonary embolism, unspecified whether acute cor pulmonale present ([facility] )    2  Ulcerative pancolitis with rectal bleeding ([facility] )    3  Rectal bleeding    4  Urinary retention    5  Metastatic breast cancer ([facility] )    6  Hematochezia        All medications and routine orders were reviewed and updated as needed  Plan discussed with: Patient    Chief Complaint     Seen for admission at [address] [place] [address]    History of Present Illness     69-year-old female returns after hospitalization for saddle pulmonary embolism  The patient was recently here due to a new diagnosis of pan colitis  She was discharged on an anticoagulant and had increased edema in her lower extremities  Venous Dopplers demonstrated the presence of deep vein thrombosis of the legs and she was subsequently diagnosis with a large saddle pulmonary embolism  She is currently anticoagulated with subcutaneous Lovenox  She had some rectal bleeding when she was 1st hospitalized but this has resolved  She reports having soft but formed bowel movements  She is noted to be in urinary retention and has a Crawley catheter in place  The patient has an underlying breast cancer with bone metastasis  She is hoping to gain enough strength to restart her treatments      HISTORY:  Past Medical History:   Diagnosis Date   • Abnormal weight loss    • Allergic reaction    • Anxiety    • Breast cancer, right [facility]) 2011    right   • Cancer ([facility] ) 2012   • Candidal vulvovaginitis    • [facility] Same as above   • Endometrial hyperplasia    • Epithelial cyst     benign, ovary   • GI (gastrointestinal bleed) Blood mixed with stool   • History of radiation therapy 2011    right breast cancer   • Hyperlipidemia    • Hypertension    • Internal hemorrhoids    • Knee tendonitis    • Ovarian cyst    • 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[facility])      Past Surgical History:   Procedure Laterality Date   • BILATERAL SALPINGOOPHORECTOMY      onset: 12   • BREAST BIOPSY Right 2006    benign   • BREAST BIOPSY Right 2011    malignant   • BREAST LUMPECTOMY Right 2011    malignant   • CATARACT EXTRACTION W/  INTRAOCULAR LENS IMPLANT Right     phacoemulsification   Onset: 10/27/14   • CHOLECYSTECTOMY     • COLONOSCOPY  2017    approx   • HYSTERECTOMY  Yes   • INTRAOPERATIVE RADIATION THERAPY ([facility])     • IR BIOPSY BONE  2021   • IR IVC FILTER PLACEMENT OPTIONAL/TEMPORARY  2022   • IR PE ENDOVASCULAR THERAPY  2022   • IR PICC PLACEMENT SINGLE LUMEN  2022   • IR PICC PLACEMENT SINGLE LUMEN  2022   • SKIN LESION EXCISION Right     breast, single lesion   • TONSILLECTOMY AND ADENOIDECTOMY       Family History   Problem Relation Age of Onset   • Stomach cancer Mother    • No Known Problems Father    • Cervical cancer Sister    • No Known Problems Daughter    • No Known Problems Maternal Grandmother    • No Known Problems Maternal Grandfather    • No Known Problems Paternal Grandmother    • No Known Problems Paternal Grandfather    • Colon polyps Neg Hx    • Colon cancer Neg Hx      Social History     Socioeconomic History   • Marital status:      Spouse name: None   • Number of children: 3   • Years of education: None   • Highest education level: None   Occupational History   • None   Tobacco Use   • Smoking status: Former Smoker     Packs/day: 1 00     Years: 30 00     Pack years: 30 00     Types: Cigarettes     Start date: 55     Quit date:      Years since quittin 8   • Smokeless tobacco: Never Used   Vaping Use   • Vaping Use: Never used   Substance and Sexual Activity   • Alcohol use: Not Currently     Comment: (history)   • Drug use: No   • Sexual activity: Not Currently   Other Topics Concern   • None   Social History Narrative   • None     Social Determinants of Health     Financial Resource Strain: Not on file   Food Insecurity: No Food Insecurity   • Worried About Running Out of Food in the Last Year: Never true   • Ran Out of Food in the Last Year: Never true   Transportation Needs: No Transportation Needs   • Lack of Transportation (Medical): No   • Lack of Transportation (Non-Medical): No   Physical Activity: Not on file   Stress: Not on file   Social Connections: Not on file   Intimate Partner Violence: Not At Risk   • Fear of Current or Ex-Partner: No   • Emotionally Abused: No   • Physically Abused: No   • Sexually Abused: No   Housing Stability: Low Risk    • Unable to Pay for Housing in the Last Year: No   • Number of Places Lived in the Last Year: 1   • Unstable Housing in the Last Year: No       Allergies: Allergies   Allergen Reactions   • Sulfa Antibiotics    • Pneumococcal Polysaccharide Vaccine Rash and Edema       Review of Systems     Review of Systems   Constitutional: Negative for activity change, appetite change, chills, diaphoresis, fatigue and unexpected weight change  HENT: Negative for congestion, ear discharge, ear pain, hearing loss, nosebleeds and rhinorrhea  Eyes: Negative for pain, redness, itching and visual disturbance  Respiratory: Positive for shortness of breath  Negative for cough, choking and chest tightness  Cardiovascular: Positive for leg swelling  Negative for chest pain  Gastrointestinal: Positive for blood in stool  Negative for abdominal pain, constipation, diarrhea and nausea  Endocrine: Negative for cold intolerance, polydipsia and polyphagia  Genitourinary: Positive for difficulty urinating  Negative for dysuria, frequency, hematuria and urgency  Musculoskeletal: Negative for arthralgias, back pain, gait problem, joint swelling, neck pain and neck stiffness  Skin: Negative for color change and rash     Allergic/Immunologic: Negative for environmental allergies and food allergies  Neurological: Positive for weakness  Negative for dizziness, tremors, seizures, speech difficulty, numbness and headaches  Hematological: Negative for adenopathy  Does not bruise/bleed easily  Psychiatric/Behavioral: Negative for behavioral problems, dysphoric mood, hallucinations and self-injury  Medications and orders     All medications reviewed and updated in Nursing Home EMR  Objective     Vitals: per nursing home record    Physical Exam  Constitutional:       Appearance: She is well-developed  HENT:      Head: Normocephalic and atraumatic  Right Ear: External ear normal       Left Ear: External ear normal       Mouth/Throat:      Pharynx: No oropharyngeal exudate  Eyes:      General: No scleral icterus  Right eye: No discharge  Left eye: No discharge  Conjunctiva/sclera: Conjunctivae normal       Pupils: Pupils are equal, round, and reactive to light  Neck:      Thyroid: No thyromegaly  Cardiovascular:      Rate and Rhythm: Normal rate and regular rhythm  Heart sounds: Normal heart sounds  No murmur heard  No gallop  Pulmonary:      Effort: Pulmonary effort is normal  No respiratory distress  Breath sounds: Normal breath sounds  No wheezing or rales  Abdominal:      General: Bowel sounds are normal       Palpations: Abdomen is soft  There is no mass  Tenderness: There is no guarding or rebound  Genitourinary:     Comments: Crawley catheter in place  Musculoskeletal:         General: No tenderness or deformity  Normal range of motion  Cervical back: Normal range of motion and neck supple  Lymphadenopathy:      Cervical: No cervical adenopathy  Skin:     General: Skin is warm and dry  Findings: No rash  Neurological:      Mental Status: She is alert and oriented to person, place, and time  Cranial Nerves: No cranial nerve deficit  Motor: Weakness present  Coordination: Coordination normal       Gait: Gait abnormal       Deep Tendon Reflexes: Reflexes are normal and symmetric  Reflexes normal    Psychiatric:         Behavior: Behavior normal          Thought Content: Thought content normal          Judgment: Judgment normal          Pertinent Laboratory/Diagnostic Studies: The following labs/studies were reviewed please see chart or hospital paperwork for details  Diagnostic studies from the hospital were reviewed    - Readmit for PT OT and medical therapy  We will continue with the subcutaneous Lovenox  She will benefit from gait training and muscular reconditioning as well as improve safety awareness    We will monitor her blood count    Martin Bo DO  10/21/2022 12:23 PM

## 2022-10-21 NOTE — TELEPHONE ENCOUNTER
Received below message    MD HOLLEY Claire Ibd  This patient got her 1st dose of Remicade on 09/30 and 2nd dose on 10/20, both were given in patient   Second dose was delayed due to COVID infection   She will need to be scheduled for her 6 week Remicade       She can follow-up with Dr Alo Patterson of as outpatient

## 2022-10-21 NOTE — TELEPHONE ENCOUNTER
Patient has been discharged        Accepting Facility Name, [address] : [facility]  Receiving Facility/Agency Phone Number: [phone]  Facility/Agency Fax Number: [phone]

## 2022-10-21 NOTE — TELEPHONE ENCOUNTER
Imani Argueta MD  You 4 minutes ago (2:08 PM)         Hi, I think it is ok to give her dose 3, but I would like her to have a repeat calprotectin and also she needs office follow up if we are continuing with Remicade  Thanks  I can order the calprotectin

## 2022-10-21 NOTE — TELEPHONE ENCOUNTER
Hi,    Please see below   Would like to confirm we are moving forward with infusions outpatient  Thank you!

## 2022-10-24 ENCOUNTER — NURSING HOME VISIT (OUTPATIENT)
Dept: FAMILY MEDICINE CLINIC | Facility: CLINIC | Age: 82
End: 2022-10-24
Payer: MEDICARE

## 2022-10-24 DIAGNOSIS — I26.92 ACUTE SADDLE PULMONARY EMBOLISM, UNSPECIFIED WHETHER ACUTE COR PULMONALE PRESENT (HCC): ICD-10-CM

## 2022-10-24 DIAGNOSIS — C50.919 METASTATIC BREAST CANCER (HCC): ICD-10-CM

## 2022-10-24 DIAGNOSIS — C79.51 SECONDARY MALIGNANT NEOPLASM OF BONE (HCC): ICD-10-CM

## 2022-10-24 DIAGNOSIS — K51.011 ULCERATIVE PANCOLITIS WITH RECTAL BLEEDING (HCC): Primary | ICD-10-CM

## 2022-10-24 DIAGNOSIS — J96.01 ACUTE RESPIRATORY FAILURE WITH HYPOXIA (HCC): ICD-10-CM

## 2022-10-24 PROCEDURE — 99308 SBSQ NF CARE LOW MDM 20: CPT | Performed by: FAMILY MEDICINE

## 2022-10-24 NOTE — PROGRESS NOTES
[address]  Facility: [facility]    NAME: Parrish Silverman  AGE: 79 y o  SEX: female    DATE OF ENCOUNTER: 10/24/2022    Code status:  DNR w/ Hospitalization    Assessment and Plan     1  Ulcerative pancolitis with rectal bleeding ([facility] )    2  Acute respiratory failure with hypoxia (HCC)    3  Acute saddle pulmonary embolism, unspecified whether acute cor pulmonale present ([facility] )    4  Secondary [facility] bone [facility] )    5  Metastatic breast cancer ([facility] )        All medications and routine orders were reviewed and updated as needed  Plan discussed with: Patient    Chief Complaint     Interim evaluation    History of Present Illness     The patient reports that her bowels are becoming soft an unformed  She has not been receiving any scheduled Imodium however  She was informed that she must ask for this if she would prefer  She denies any dyspnea  She has no swelling in her lower extremities  Her intake is suboptimal   She is hopeful that she can restart her infusions for her metastatic cancer  The following portions of the patient's history were reviewed and updated as appropriate: current medications, past family history, past medical history, past social history, past surgical history and problem list     Allergies: Allergies   Allergen Reactions   • Sulfa Antibiotics    • Pneumococcal Polysaccharide Vaccine Rash and Edema       Review of Systems     Review of Systems   Constitutional: Positive for appetite change  Negative for activity change, chills, diaphoresis, fatigue and unexpected weight change  HENT: Negative for congestion, ear discharge, ear pain, hearing loss, nosebleeds and rhinorrhea  Eyes: Negative for pain, redness, itching and visual disturbance  Respiratory: Negative for cough, choking, chest tightness and shortness of breath  Cardiovascular: Negative for chest pain and leg swelling     Gastrointestinal: Positive for diarrhea  Negative for abdominal pain, blood in stool, constipation and nausea  Endocrine: Negative for cold intolerance, polydipsia and polyphagia  Genitourinary: Negative for dysuria, frequency, hematuria and urgency  Musculoskeletal: Positive for back pain  Negative for arthralgias, gait problem, joint swelling, neck pain and neck stiffness  Skin: Negative for color change and rash  Allergic/Immunologic: Negative for environmental allergies and food allergies  Neurological: Positive for weakness  Negative for dizziness, tremors, seizures, speech difficulty, numbness and headaches  Hematological: Negative for adenopathy  Does not bruise/bleed easily  Psychiatric/Behavioral: Negative for behavioral problems, dysphoric mood, hallucinations and self-injury  Medications and orders     All medications reviewed and updated in Nursing Home EMR  Objective     Vitals: per nursing home records    Physical Exam  Constitutional:       Appearance: She is well-developed  HENT:      Head: Normocephalic and atraumatic  Right Ear: External ear normal       Left Ear: External ear normal       Mouth/Throat:      Pharynx: No oropharyngeal exudate  Eyes:      General: No scleral icterus  Right eye: No discharge  Left eye: No discharge  Conjunctiva/sclera: Conjunctivae normal       Pupils: Pupils are equal, round, and reactive to light  Neck:      Thyroid: No thyromegaly  Cardiovascular:      Rate and Rhythm: Normal rate and regular rhythm  Heart sounds: Normal heart sounds  No murmur heard  No gallop  Pulmonary:      Effort: Pulmonary effort is normal  No respiratory distress  Breath sounds: Normal breath sounds  No wheezing or rales  Abdominal:      General: Bowel sounds are normal       Palpations: Abdomen is soft  There is no mass  Tenderness: There is no guarding or rebound  Musculoskeletal:         General: Tenderness present   No deformity  Normal range of motion  Cervical back: Normal range of motion and neck supple  Lymphadenopathy:      Cervical: No cervical adenopathy  Skin:     General: Skin is warm and dry  Findings: No rash  Neurological:      Mental Status: She is alert and oriented to person, place, and time  Cranial Nerves: No cranial nerve deficit  Motor: Weakness present  Coordination: Coordination normal       Deep Tendon Reflexes: Reflexes are normal and symmetric  Reflexes normal    Psychiatric:         Behavior: Behavior normal          Thought Content: Thought content normal          Judgment: Judgment normal          Pertinent Laboratory/Diagnostic Studies: The following studies were reviewed please see chart or hospital paperwork for details  Space for lab dictation no new diagnostic testing    - She will utilize the Imodium on an as-needed basis      Aime Peraza DO  10/24/2022 11:50 AM

## 2022-10-24 NOTE — TELEPHONE ENCOUNTER
Spoke with Leonides Palmer and not ready to schedule at this time but she asked that I send an email to her about the office      I will send and email using our printer to provider the Office information, Dr Juanjo Bullock name and direct phone number    [email or web address] Class III - visualization of the soft palate and the base of the uvula

## 2022-10-25 DIAGNOSIS — K51.011 ULCERATIVE PANCOLITIS WITH RECTAL BLEEDING (HCC): Primary | ICD-10-CM

## 2022-10-25 DIAGNOSIS — K52.1 TOXIC GASTROENTERITIS AND COLITIS: ICD-10-CM

## 2022-10-25 RX ORDER — SODIUM CHLORIDE 9 MG/ML
20 INJECTION, SOLUTION INTRAVENOUS ONCE
OUTPATIENT
Start: 2022-11-17

## 2022-10-25 RX ORDER — DIPHENHYDRAMINE HCL 25 MG
25 TABLET ORAL ONCE
OUTPATIENT
Start: 2022-11-17

## 2022-10-25 RX ORDER — ACETAMINOPHEN 325 MG/1
650 TABLET ORAL ONCE
OUTPATIENT
Start: 2022-11-17

## 2022-10-27 ENCOUNTER — NURSING HOME VISIT (OUTPATIENT)
Dept: FAMILY MEDICINE CLINIC | Facility: CLINIC | Age: 82
End: 2022-10-27

## 2022-10-27 DIAGNOSIS — C50.919 METASTATIC BREAST CANCER (HCC): ICD-10-CM

## 2022-10-27 DIAGNOSIS — J96.01 ACUTE RESPIRATORY FAILURE WITH HYPOXIA (HCC): ICD-10-CM

## 2022-10-27 DIAGNOSIS — I26.92 ACUTE SADDLE PULMONARY EMBOLISM, UNSPECIFIED WHETHER ACUTE COR PULMONALE PRESENT (HCC): Primary | ICD-10-CM

## 2022-10-27 DIAGNOSIS — C79.51 SECONDARY MALIGNANT NEOPLASM OF BONE (HCC): ICD-10-CM

## 2022-10-27 DIAGNOSIS — K51.011 ULCERATIVE PANCOLITIS WITH RECTAL BLEEDING (HCC): ICD-10-CM

## 2022-10-31 ENCOUNTER — NURSING HOME VISIT (OUTPATIENT)
Dept: FAMILY MEDICINE CLINIC | Facility: CLINIC | Age: 82
End: 2022-10-31

## 2022-10-31 ENCOUNTER — TELEPHONE (OUTPATIENT)
Dept: UROLOGY | Facility: AMBULATORY SURGERY CENTER | Age: 82
End: 2022-10-31

## 2022-10-31 DIAGNOSIS — K62.5 RECTAL BLEEDING: ICD-10-CM

## 2022-10-31 DIAGNOSIS — C50.919 METASTATIC BREAST CANCER (HCC): ICD-10-CM

## 2022-10-31 DIAGNOSIS — C79.51 SECONDARY MALIGNANT NEOPLASM OF BONE (HCC): ICD-10-CM

## 2022-10-31 DIAGNOSIS — N18.31 CHRONIC KIDNEY DISEASE (CKD) STAGE G3A/A1, MODERATELY DECREASED GLOMERULAR FILTRATION RATE (GFR) BETWEEN 45-59 ML/MIN/1.73 SQUARE METER AND ALBUMINURIA CREATININE RATIO LESS THAN 30 MG/G (HCC): ICD-10-CM

## 2022-10-31 DIAGNOSIS — I26.92 ACUTE SADDLE PULMONARY EMBOLISM, UNSPECIFIED WHETHER ACUTE COR PULMONALE PRESENT (HCC): Primary | ICD-10-CM

## 2022-10-31 DIAGNOSIS — K51.011 ULCERATIVE PANCOLITIS WITH RECTAL BLEEDING (HCC): ICD-10-CM

## 2022-10-31 NOTE — TELEPHONE ENCOUNTER
Please Triage  New Patient    What is the reason for the patient’s appointment? [facility] called and needs to make an appointment for suarez removal from hospital stay  The family is requesting a void trial  They can do that at the [facility] if order can be faxed to them at [phone]    What office location does the patient prefer? [email or web address]     Imaging/Lab Results: epic     Do we accept the patient's insurance or is the patient Self-Pay? Insurance Provider: medicare   Plan Type/Number:  Member ID#: Has the patient had any previous Urologist(s)? no    Have patient records been requested? If not are records showing in Epic:     Has the patient had any outside testing done? Does the patient have a personal history of cancer?  No    [facility] [phone]

## 2022-10-31 NOTE — PROGRESS NOTES
[address]  Facility: [facility]    NAME: Nadege Silverman  AGE: 79 y o  SEX: female    DATE OF ENCOUNTER: 10/31/2022    Code status:  DNR w/ Hospitalization    Assessment and Plan     1  Acute saddle pulmonary embolism, unspecified whether acute cor pulmonale present ([facility] )    2  Ulcerative pancolitis with rectal bleeding ([facility] )    3  Rectal bleeding    4  Secondary malignant neoplasm of bone ([facility] )    5  Chronic kidney disease (CKD) stage G3a/A1, moderately decreased glomerular filtration rate (GFR) between 45-59 mL/min/1 73 square meter and albuminuria creatinine ratio less than 30 mg/g (HCC)    6  Metastatic breast cancer ([facility] )        All medications and routine orders were reviewed and updated as needed  Plan discussed with: Family member    Chief Complaint     Interim evaluation    History of Present Illness     Patient reports that her bowels are better with using the Imodium  She did have 1 episode of fecal incontinence earlier today however  She is embarrassed by these events  She denies having any bright red blood per rectum the L  she is feeling stronger  The following portions of the patient's history were reviewed and updated as appropriate: current medications, past family history, past medical history, past social history, past surgical history and problem list     Allergies: Allergies   Allergen Reactions   • Sulfa Antibiotics    • Pneumococcal Polysaccharide Vaccine Rash and Edema       Review of Systems     Review of Systems   Constitutional: Negative for activity change, appetite change, chills, diaphoresis, fatigue and unexpected weight change  HENT: Negative for congestion, ear discharge, ear pain, hearing loss, nosebleeds and rhinorrhea  Eyes: Negative for pain, redness, itching and visual disturbance  Respiratory: Negative for cough, choking, chest tightness and shortness of breath      Cardiovascular: Negative for chest pain and leg swelling  Gastrointestinal: Positive for diarrhea  Negative for abdominal pain, blood in stool, constipation and nausea  Endocrine: Negative for cold intolerance, polydipsia and polyphagia  Genitourinary: Negative for dysuria, frequency, hematuria and urgency  Musculoskeletal: Negative for arthralgias, back pain, gait problem, joint swelling, neck pain and neck stiffness  Skin: Negative for color change and rash  Allergic/Immunologic: Negative for environmental allergies and food allergies  Neurological: Positive for weakness  Negative for dizziness, tremors, seizures, speech difficulty, numbness and headaches  Hematological: Negative for adenopathy  Bruises/bleeds easily  Psychiatric/Behavioral: Negative for behavioral problems, dysphoric mood, hallucinations and self-injury  Medications and orders     All medications reviewed and updated in Nursing Home EMR  Objective     Vitals: per nursing home records    Physical Exam  Constitutional:       Appearance: She is well-developed  HENT:      Head: Normocephalic and atraumatic  Right Ear: External ear normal       Left Ear: External ear normal       Mouth/Throat:      Pharynx: No oropharyngeal exudate  Eyes:      General: No scleral icterus  Right eye: No discharge  Left eye: No discharge  Conjunctiva/sclera: Conjunctivae normal       Pupils: Pupils are equal, round, and reactive to light  Neck:      Thyroid: No thyromegaly  Cardiovascular:      Rate and Rhythm: Normal rate  Rhythm irregular  Heart sounds: Normal heart sounds  No murmur heard  No gallop  Pulmonary:      Effort: Pulmonary effort is normal  No respiratory distress  Breath sounds: Normal breath sounds  No wheezing or rales  Abdominal:      General: Bowel sounds are normal       Palpations: Abdomen is soft  There is no mass  Tenderness: There is no guarding or rebound     Musculoskeletal:         General: No tenderness or deformity  Normal range of motion  Cervical back: Normal range of motion and neck supple  Right lower leg: Edema present  Left lower leg: Edema present  Lymphadenopathy:      Cervical: No cervical adenopathy  Skin:     General: Skin is warm and dry  Findings: Bruising present  No rash  Neurological:      Mental Status: She is alert and oriented to person, place, and time  Cranial Nerves: No cranial nerve deficit  Motor: Weakness present  Coordination: Coordination normal       Gait: Gait abnormal       Deep Tendon Reflexes: Reflexes are normal and symmetric  Reflexes normal    Psychiatric:         Behavior: Behavior normal          Thought Content: Thought content normal          Judgment: Judgment normal          Pertinent Laboratory/Diagnostic Studies: The following studies were reviewed please see chart or hospital paperwork for details      Space for lab dictation no new diagnostic testing    - Ignacio Ko DO  10/31/2022 11:22 AM

## 2022-11-01 NOTE — TELEPHONE ENCOUNTER
I called and spoke with someone at pt facility for more clarification  Hospital visit was 9/22/22  She stated they do void trials in house and that pt failed first trial on the 11th day of being at the facility and it was replaced  Due to her failing they needed an appointment with us  She did state her family is the one who wants another void trial but it will be up to their doctors to make that decision until pt has an appt with us  PT is scheduled on 11/29 at 1pm with Gilbert in the [place] office  Directions were provided

## 2022-11-03 ENCOUNTER — NURSING HOME VISIT (OUTPATIENT)
Dept: FAMILY MEDICINE CLINIC | Facility: CLINIC | Age: 82
End: 2022-11-03

## 2022-11-03 DIAGNOSIS — K51.011 ULCERATIVE PANCOLITIS WITH RECTAL BLEEDING (HCC): ICD-10-CM

## 2022-11-03 DIAGNOSIS — C50.919 METASTATIC BREAST CANCER (HCC): ICD-10-CM

## 2022-11-03 DIAGNOSIS — I26.92 ACUTE SADDLE PULMONARY EMBOLISM, UNSPECIFIED WHETHER ACUTE COR PULMONALE PRESENT (HCC): Primary | ICD-10-CM

## 2022-11-03 DIAGNOSIS — C79.51 SECONDARY MALIGNANT NEOPLASM OF BONE (HCC): ICD-10-CM

## 2022-11-03 NOTE — PROGRESS NOTES
[address]  Facility: [facility]    NAME: Salina Silverman  AGE: 79 y o  SEX: female    DATE OF ENCOUNTER: 11/3/2022    Code status:  DNR w/ Hospitalization    Assessment and Plan     1  Acute saddle pulmonary embolism, unspecified whether acute cor pulmonale present ([facility] )    2  Ulcerative pancolitis with rectal bleeding ([facility] )    3  Metastatic breast cancer ([facility] )    4  Secondary malignant neoplasm of bone ([facility] )        All medications and routine orders were reviewed and updated as needed  Plan discussed with: Patient    Chief Complaint     Interim evaluation    History of Present Illness     The patient had another episode of blood per rectum but she attributes this to the meal that she a  Her bowels are doing better with the use of Imodium  She is denying dyspnea or pain  She is gaining some strength and confidence and hopes to be able to return home  She has elected to not proceed with treatment for her malignant breast cancer    The following portions of the patient's history were reviewed and updated as appropriate: current medications, past family history, past medical history, past social history, past surgical history and problem list     Allergies: Allergies   Allergen Reactions   • Sulfa Antibiotics    • Pneumococcal Polysaccharide Vaccine Rash and Edema       Review of Systems     Review of Systems   Constitutional: Negative for activity change, appetite change, chills, diaphoresis, fatigue and unexpected weight change  HENT: Negative for congestion, ear discharge, ear pain, hearing loss, nosebleeds and rhinorrhea  Eyes: Negative for pain, redness, itching and visual disturbance  Respiratory: Negative for cough, choking, chest tightness and shortness of breath  Cardiovascular: Negative for chest pain and leg swelling  Gastrointestinal: Positive for blood in stool and diarrhea   Negative for abdominal pain, constipation and nausea  Endocrine: Negative for cold intolerance, polydipsia and polyphagia  Genitourinary: Negative for dysuria, frequency, hematuria and urgency  Musculoskeletal: Positive for back pain  Negative for arthralgias, gait problem, joint swelling, neck pain and neck stiffness  Skin: Negative for color change and rash  Allergic/Immunologic: Negative for environmental allergies and food allergies  Neurological: Positive for weakness  Negative for dizziness, tremors, seizures, speech difficulty, numbness and headaches  Hematological: Negative for adenopathy  Does not bruise/bleed easily  Psychiatric/Behavioral: Negative for behavioral problems, dysphoric mood, hallucinations and self-injury  Medications and orders     All medications reviewed and updated in Nursing Home EMR  Objective     Vitals: per nursing home records    Physical Exam  Constitutional:       Appearance: She is well-developed  She is ill-appearing  HENT:      Head: Normocephalic and atraumatic  Right Ear: External ear normal       Left Ear: External ear normal       Mouth/Throat:      Pharynx: No oropharyngeal exudate  Eyes:      General: No scleral icterus  Right eye: No discharge  Left eye: No discharge  Conjunctiva/sclera: Conjunctivae normal       Pupils: Pupils are equal, round, and reactive to light  Neck:      Thyroid: No thyromegaly  Cardiovascular:      Rate and Rhythm: Normal rate and regular rhythm  Heart sounds: Normal heart sounds  No murmur heard  No gallop  Pulmonary:      Effort: Pulmonary effort is normal  No respiratory distress  Breath sounds: Normal breath sounds  No wheezing or rales  Abdominal:      General: Bowel sounds are normal       Palpations: Abdomen is soft  There is no mass  Tenderness: There is no guarding or rebound  Musculoskeletal:         General: No tenderness or deformity  Normal range of motion        Cervical back: Normal range of motion and neck supple  Lymphadenopathy:      Cervical: No cervical adenopathy  Skin:     General: Skin is warm and dry  Findings: No rash  Neurological:      Mental Status: She is alert and oriented to person, place, and time  Cranial Nerves: No cranial nerve deficit  Motor: Weakness present  Coordination: Coordination normal       Deep Tendon Reflexes: Reflexes are normal and symmetric  Reflexes normal    Psychiatric:         Behavior: Behavior normal          Thought Content: Thought content normal          Judgment: Judgment normal          Pertinent Laboratory/Diagnostic Studies: The following studies were reviewed please see chart or hospital paperwork for details      Space for lab dictation no new diagnostic studies    - Continue the current therapy plan and medication regimen    Isabelle Leung DO  11/3/2022 11:09 AM

## 2022-11-07 ENCOUNTER — NURSING HOME VISIT (OUTPATIENT)
Dept: FAMILY MEDICINE CLINIC | Facility: CLINIC | Age: 82
End: 2022-11-07

## 2022-11-07 DIAGNOSIS — K51.011 ULCERATIVE PANCOLITIS WITH RECTAL BLEEDING (HCC): ICD-10-CM

## 2022-11-07 DIAGNOSIS — C79.51 SECONDARY MALIGNANT NEOPLASM OF BONE (HCC): ICD-10-CM

## 2022-11-07 DIAGNOSIS — K92.1 HEMATOCHEZIA: ICD-10-CM

## 2022-11-07 DIAGNOSIS — C50.919 METASTATIC BREAST CANCER (HCC): ICD-10-CM

## 2022-11-07 DIAGNOSIS — I26.92 ACUTE SADDLE PULMONARY EMBOLISM, UNSPECIFIED WHETHER ACUTE COR PULMONALE PRESENT (HCC): Primary | ICD-10-CM

## 2022-11-07 NOTE — PROGRESS NOTES
[address]  Facility: [facility]    NAME: Yao Silverman  AGE: 79 y o  SEX: female    DATE OF ENCOUNTER: 11/7/2022    Code status:  DNR w/ Hospitalization    Assessment and Plan     1  Acute saddle pulmonary embolism, unspecified whether acute cor pulmonale present ([facility] )    2  Ulcerative pancolitis with rectal bleeding ([facility] )    3  Secondary malignant neoplasm of bone ([facility] )    4  Metastatic breast cancer ([facility] )    5  Hematochezia        All medications and routine orders were reviewed and updated as needed  Plan discussed with: Patient    Chief Complaint     Interim evaluation    History of Present Illness     Patient reports that her bowels are doing better  She is no longer seeing blood there  She uses Imodium on a continual basis which helps to control her colitis  Her breathing has been good  She denies dyspnea  Her appetite is improved  She has no dyspnea  The following portions of the patient's history were reviewed and updated as appropriate: current medications, past family history, past medical history, past social history, past surgical history and problem list     Allergies: Allergies   Allergen Reactions   • Sulfa Antibiotics    • Pneumococcal Polysaccharide Vaccine Rash and Edema       Review of Systems     Review of Systems   Constitutional: Negative for activity change, appetite change, chills, diaphoresis, fatigue and unexpected weight change  HENT: Negative for congestion, ear discharge, ear pain, hearing loss, nosebleeds and rhinorrhea  Eyes: Negative for pain, redness, itching and visual disturbance  Respiratory: Negative for cough, choking, chest tightness and shortness of breath  Cardiovascular: Positive for leg swelling  Negative for chest pain  Gastrointestinal: Positive for diarrhea  Negative for abdominal pain, blood in stool, constipation and nausea     Endocrine: Negative for cold intolerance, polydipsia and polyphagia  Genitourinary: Negative for dysuria, frequency, hematuria and urgency  Musculoskeletal: Negative for arthralgias, back pain, gait problem, joint swelling, neck pain and neck stiffness  Skin: Negative for color change and rash  Allergic/Immunologic: Negative for environmental allergies and food allergies  Neurological: Positive for weakness  Negative for dizziness, tremors, seizures, speech difficulty, numbness and headaches  Hematological: Negative for adenopathy  Does not bruise/bleed easily  Psychiatric/Behavioral: Negative for behavioral problems, dysphoric mood, hallucinations and self-injury  Medications and orders     All medications reviewed and updated in Nursing Home EMR  Objective     Vitals: per nursing home records    Physical Exam  Constitutional:       Appearance: She is well-developed  HENT:      Head: Normocephalic and atraumatic  Right Ear: External ear normal       Left Ear: External ear normal       Mouth/Throat:      Pharynx: No oropharyngeal exudate  Eyes:      General: No scleral icterus  Right eye: No discharge  Left eye: No discharge  Conjunctiva/sclera: Conjunctivae normal       Pupils: Pupils are equal, round, and reactive to light  Neck:      Thyroid: No thyromegaly  Cardiovascular:      Rate and Rhythm: Normal rate and regular rhythm  Heart sounds: Normal heart sounds  No murmur heard  No gallop  Pulmonary:      Effort: Pulmonary effort is normal  No respiratory distress  Breath sounds: Normal breath sounds  No wheezing or rales  Abdominal:      General: Bowel sounds are normal       Palpations: Abdomen is soft  There is no mass  Tenderness: There is no guarding or rebound  Musculoskeletal:         General: No tenderness or deformity  Normal range of motion  Cervical back: Normal range of motion and neck supple  Right lower leg: Edema present        Left lower leg: Edema present  Lymphadenopathy:      Cervical: No cervical adenopathy  Skin:     General: Skin is warm and dry  Findings: No rash  Neurological:      Mental Status: She is alert and oriented to person, place, and time  Cranial Nerves: No cranial nerve deficit  Coordination: Coordination normal       Deep Tendon Reflexes: Reflexes are normal and symmetric  Reflexes normal    Psychiatric:         Behavior: Behavior normal          Thought Content: Thought content normal          Judgment: Judgment normal          Pertinent Laboratory/Diagnostic Studies: The following studies were reviewed please see chart or hospital paperwork for details      Space for lab dictation no new diagnostic testing    - Maintain the current medical regimen    Orvilla Phalen, DO  11/7/2022 4:05 PM

## 2022-11-10 ENCOUNTER — NURSING HOME VISIT (OUTPATIENT)
Dept: FAMILY MEDICINE CLINIC | Facility: CLINIC | Age: 82
End: 2022-11-10

## 2022-11-10 DIAGNOSIS — C79.51 SECONDARY MALIGNANT NEOPLASM OF BONE (HCC): ICD-10-CM

## 2022-11-10 DIAGNOSIS — C50.919 METASTATIC BREAST CANCER (HCC): ICD-10-CM

## 2022-11-10 DIAGNOSIS — K51.011 ULCERATIVE PANCOLITIS WITH RECTAL BLEEDING (HCC): ICD-10-CM

## 2022-11-10 DIAGNOSIS — I26.92 ACUTE SADDLE PULMONARY EMBOLISM, UNSPECIFIED WHETHER ACUTE COR PULMONALE PRESENT (HCC): Primary | ICD-10-CM

## 2022-11-10 DIAGNOSIS — I87.2 VENOUS INSUFFICIENCY: ICD-10-CM

## 2022-11-10 NOTE — PROGRESS NOTES
[address]  Facility: [facility]    NAME: Scott Silverman  AGE: 79 y o  SEX: female    DATE OF ENCOUNTER: 11/10/2022    Code status:  DNR w/ Hospitalization    Assessment and Plan     1  Acute saddle pulmonary embolism, unspecified whether acute cor pulmonale present (HCC)    2  Venous insufficiency    3  Secondary malignant neoplasm of bone ([facility] )    4  Metastatic breast cancer ([facility] )    5  Ulcerative pancolitis with rectal bleeding (HCC)        All medications and routine orders were reviewed and updated as needed  Plan discussed with: Patient    Chief Complaint     Interim evaluation    History of Present Illness     Patient is making nice progress with her therapy  She is planning on moving to [place] but I think she would prefer to stay here  Her bowels are currently controlled  She is not seeing any blood there either  She has no dysuria  The following portions of the patient's history were reviewed and updated as appropriate: current medications, past family history, past medical history, past social history, past surgical history and problem list     Allergies: Allergies   Allergen Reactions   • Sulfa Antibiotics    • Pneumococcal Polysaccharide Vaccine Rash and Edema       Review of Systems     Review of Systems   Constitutional: Negative for activity change, appetite change, chills, diaphoresis, fatigue and unexpected weight change  HENT: Negative for congestion, ear discharge, ear pain, hearing loss, nosebleeds and rhinorrhea  Eyes: Negative for pain, redness, itching and visual disturbance  Respiratory: Positive for shortness of breath  Negative for cough, choking and chest tightness  Cardiovascular: Negative for chest pain and leg swelling  Gastrointestinal: Positive for diarrhea  Negative for abdominal pain, blood in stool, constipation and nausea     Endocrine: Negative for cold intolerance, polydipsia and polyphagia  Genitourinary: Negative for dysuria, frequency, hematuria and urgency  Musculoskeletal: Positive for back pain  Negative for arthralgias, gait problem, joint swelling, neck pain and neck stiffness  Skin: Negative for color change and rash  Allergic/Immunologic: Negative for environmental allergies and food allergies  Neurological: Positive for weakness  Negative for dizziness, tremors, seizures, speech difficulty, numbness and headaches  Hematological: Negative for adenopathy  Does not bruise/bleed easily  Psychiatric/Behavioral: Negative for behavioral problems, dysphoric mood, hallucinations and self-injury  Medications and orders     All medications reviewed and updated in Nursing Home EMR  Objective     Vitals: per nursing home records    Physical Exam  Constitutional:       Appearance: She is well-developed  HENT:      Head: Normocephalic and atraumatic  Right Ear: External ear normal       Left Ear: External ear normal       Mouth/Throat:      Pharynx: No oropharyngeal exudate  Eyes:      General: No scleral icterus  Right eye: No discharge  Left eye: No discharge  Conjunctiva/sclera: Conjunctivae normal       Pupils: Pupils are equal, round, and reactive to light  Neck:      Thyroid: No thyromegaly  Cardiovascular:      Rate and Rhythm: Normal rate  Rhythm irregular  Heart sounds: Normal heart sounds  No murmur heard  No gallop  Pulmonary:      Effort: Pulmonary effort is normal  No respiratory distress  Breath sounds: Normal breath sounds  No wheezing or rales  Abdominal:      General: Bowel sounds are normal       Palpations: Abdomen is soft  There is no mass  Tenderness: There is no guarding or rebound  Musculoskeletal:         General: Tenderness present  No deformity  Normal range of motion  Cervical back: Normal range of motion and neck supple  Right lower leg: Edema present        Left lower leg: Edema present  Lymphadenopathy:      Cervical: No cervical adenopathy  Skin:     General: Skin is warm and dry  Findings: Bruising present  No rash  Neurological:      Mental Status: She is alert and oriented to person, place, and time  Cranial Nerves: No cranial nerve deficit  Motor: Weakness present  Coordination: Coordination normal       Deep Tendon Reflexes: Reflexes are normal and symmetric  Reflexes normal    Psychiatric:         Behavior: Behavior normal          Thought Content: Thought content normal          Judgment: Judgment normal          Pertinent Laboratory/Diagnostic Studies: The following studies were reviewed please see chart or hospital paperwork for details      Space for lab dictation no new diagnostic studies    - Maintain the current regimen    Jaun Brown DO  11/10/2022 11:50 AM

## 2022-11-14 ENCOUNTER — NURSING HOME VISIT (OUTPATIENT)
Dept: FAMILY MEDICINE CLINIC | Facility: CLINIC | Age: 82
End: 2022-11-14

## 2022-11-14 DIAGNOSIS — C79.51 SECONDARY MALIGNANT NEOPLASM OF BONE (HCC): ICD-10-CM

## 2022-11-14 DIAGNOSIS — C50.919 METASTATIC BREAST CANCER (HCC): ICD-10-CM

## 2022-11-14 DIAGNOSIS — K51.011 ULCERATIVE PANCOLITIS WITH RECTAL BLEEDING (HCC): ICD-10-CM

## 2022-11-14 DIAGNOSIS — R33.9 URINARY RETENTION: ICD-10-CM

## 2022-11-14 DIAGNOSIS — I26.92 ACUTE SADDLE PULMONARY EMBOLISM, UNSPECIFIED WHETHER ACUTE COR PULMONALE PRESENT (HCC): Primary | ICD-10-CM

## 2022-11-14 NOTE — PROGRESS NOTES
[address]  [facility]: [facility]    NAME: Gabriela Silverman  AGE: 79 y o  SEX: female    DATE OF ENCOUNTER: 11/14/2022    Code status:  DNR w/ Hospitalization    Assessment and Plan     1  Acute saddle pulmonary embolism, unspecified whether acute cor pulmonale present ([facility] )    2  Ulcerative pancolitis with rectal bleeding ([facility] )    3  Secondary malignant neoplasm of bone ([facility] )    4  Urinary retention    5  Metastatic breast cancer ([facility] )        All medications and routine orders were reviewed and updated as needed  Plan discussed with: Patient    Chief Complaint     Interim evaluation    History of Present Illness     Patient reports that recurrence of some bloody diarrhea over the weekend  She has been receiving her Imodium tablets  She is denying any dyspnea however  She has no cough  She is performing well in physical therapy  She is hopeful she will be here at least another week    The following portions of the patient's history were reviewed and updated as appropriate: current medications, past family history, past medical history, past social history, past surgical history and problem list     Allergies: Allergies   Allergen Reactions   • Sulfa Antibiotics    • Pneumococcal Polysaccharide Vaccine Rash and Edema       Review of Systems     Review of Systems   Constitutional: Negative for activity change, appetite change, chills, diaphoresis, fatigue and unexpected weight change  HENT: Negative for congestion, ear discharge, ear pain, hearing loss, nosebleeds and rhinorrhea  Eyes: Negative for pain, redness, itching and visual disturbance  Respiratory: Negative for cough, choking, chest tightness and shortness of breath  Cardiovascular: Negative for chest pain and leg swelling  Gastrointestinal: Positive for blood in stool and diarrhea  Negative for abdominal pain and constipation     Endocrine: Negative for cold intolerance, polydipsia and polyphagia  Genitourinary: Negative for dysuria, frequency, hematuria and urgency  Musculoskeletal: Negative for arthralgias, back pain, gait problem, joint swelling, neck pain and neck stiffness  Skin: Negative for color change and rash  Allergic/Immunologic: Negative for environmental allergies and food allergies  Neurological: Positive for weakness  Negative for dizziness, tremors, seizures, speech difficulty, numbness and headaches  Hematological: Negative for adenopathy  Does not bruise/bleed easily  Psychiatric/Behavioral: Negative for behavioral problems, dysphoric mood, hallucinations and self-injury  Medications and orders     All medications reviewed and updated in Nursing Home EMR  Objective     Vitals: per nursing home records    Physical Exam  Constitutional:       Appearance: She is well-developed  HENT:      Head: Normocephalic and atraumatic  Right Ear: External ear normal       Left Ear: External ear normal       Mouth/Throat:      Pharynx: No oropharyngeal exudate  Eyes:      General: No scleral icterus  Right eye: No discharge  Left eye: No discharge  Conjunctiva/sclera: Conjunctivae normal       Pupils: Pupils are equal, round, and reactive to light  Neck:      Thyroid: No thyromegaly  Cardiovascular:      Rate and Rhythm: Normal rate and regular rhythm  Heart sounds: Normal heart sounds  No murmur heard  No gallop  Pulmonary:      Effort: Pulmonary effort is normal  No respiratory distress  Breath sounds: Normal breath sounds  No wheezing or rales  Abdominal:      General: Bowel sounds are normal       Palpations: Abdomen is soft  There is no mass  Tenderness: There is no guarding or rebound  Musculoskeletal:         General: No tenderness or deformity  Normal range of motion  Cervical back: Normal range of motion and neck supple  Right lower leg: Edema present        Left lower leg: Edema present  Lymphadenopathy:      Cervical: No cervical adenopathy  Skin:     General: Skin is warm and dry  Findings: No rash  Neurological:      Mental Status: She is alert and oriented to person, place, and time  Cranial Nerves: No cranial nerve deficit  Coordination: Coordination normal       Deep Tendon Reflexes: Reflexes are normal and symmetric  Reflexes normal    Psychiatric:         Behavior: Behavior normal          Thought Content: Thought content normal          Judgment: Judgment normal          Pertinent Laboratory/Diagnostic Studies: The following studies were reviewed please see chart or hospital paperwork for details  Space for lab dictation no new diagnostic studies    - Continue the current Rx    We will follow-up with labs later in the week    Kaelyn Goode DO  11/14/2022 1:18 PM

## 2022-11-17 ENCOUNTER — HOSPITAL ENCOUNTER (OUTPATIENT)
Dept: INFUSION CENTER | Facility: HOSPITAL | Age: 82
Discharge: HOME | End: 2022-11-17
Attending: INTERNAL MEDICINE

## 2022-11-17 ENCOUNTER — NURSING HOME VISIT (OUTPATIENT)
Dept: FAMILY MEDICINE CLINIC | Facility: CLINIC | Age: 82
End: 2022-11-17

## 2022-11-17 VITALS
OXYGEN SATURATION: 96 % | BODY MASS INDEX: 23.49 KG/M2 | SYSTOLIC BLOOD PRESSURE: 119 MMHG | HEART RATE: 90 BPM | RESPIRATION RATE: 14 BRPM | WEIGHT: 124.4 LBS | TEMPERATURE: 98.5 F | HEIGHT: 61 IN | DIASTOLIC BLOOD PRESSURE: 55 MMHG

## 2022-11-17 DIAGNOSIS — K51.011 ULCERATIVE PANCOLITIS WITH RECTAL BLEEDING (HCC): ICD-10-CM

## 2022-11-17 DIAGNOSIS — I26.92 ACUTE SADDLE PULMONARY EMBOLISM, UNSPECIFIED WHETHER ACUTE COR PULMONALE PRESENT (HCC): Primary | ICD-10-CM

## 2022-11-17 DIAGNOSIS — C79.51 SECONDARY MALIGNANT NEOPLASM OF BONE (HCC): ICD-10-CM

## 2022-11-17 DIAGNOSIS — C50.919 METASTATIC BREAST CANCER (HCC): ICD-10-CM

## 2022-11-17 DIAGNOSIS — K52.1 TOXIC GASTROENTERITIS AND COLITIS: Primary | ICD-10-CM

## 2022-11-17 RX ORDER — SODIUM CHLORIDE 9 MG/ML
20 INJECTION, SOLUTION INTRAVENOUS ONCE
OUTPATIENT
Start: 2023-01-12

## 2022-11-17 RX ORDER — ACETAMINOPHEN 325 MG/1
650 TABLET ORAL ONCE
OUTPATIENT
Start: 2023-01-12

## 2022-11-17 RX ORDER — DIPHENHYDRAMINE HCL 25 MG
25 TABLET ORAL ONCE
Status: COMPLETED | OUTPATIENT
Start: 2022-11-17 | End: 2022-11-17

## 2022-11-17 RX ORDER — ACETAMINOPHEN 325 MG/1
650 TABLET ORAL ONCE
Status: COMPLETED | OUTPATIENT
Start: 2022-11-17 | End: 2022-11-17

## 2022-11-17 RX ORDER — SODIUM CHLORIDE 9 MG/ML
20 INJECTION, SOLUTION INTRAVENOUS ONCE
Status: COMPLETED | OUTPATIENT
Start: 2022-11-17 | End: 2022-11-17

## 2022-11-17 RX ORDER — DIPHENHYDRAMINE HCL 25 MG
25 TABLET ORAL ONCE
OUTPATIENT
Start: 2023-01-12

## 2022-11-17 RX ADMIN — DIPHENHYDRAMINE HYDROCHLORIDE 25 MG: 25 TABLET ORAL at 12:15

## 2022-11-17 RX ADMIN — INFLIXIMAB 300 MG: 100 INJECTION, POWDER, LYOPHILIZED, FOR SOLUTION INTRAVENOUS at 12:59

## 2022-11-17 RX ADMIN — ACETAMINOPHEN 650 MG: 325 TABLET, FILM COATED ORAL at 12:15

## 2022-11-17 RX ADMIN — SODIUM CHLORIDE 20 ML/HR: 9 INJECTION, SOLUTION INTRAVENOUS at 12:16

## 2022-11-17 NOTE — PROGRESS NOTES
[address]  [facility]: [facility]    NAME: Brendan Silverman  AGE: 79 y o  SEX: female    DATE OF ENCOUNTER: 11/17/2022    Code status:  DNR w/ Hospitalization    Assessment and Plan     1  Acute saddle pulmonary embolism, unspecified whether acute cor pulmonale present ([facility] )    2  Ulcerative pancolitis with rectal bleeding ([facility] )    3  Secondary malignant neoplasm of bone ([facility] )    4  Metastatic breast cancer ([facility] )        All medications and routine orders were reviewed and updated as needed  Plan discussed with: Patient    Chief Complaint     Interim evaluation    History of Present Illness     The patient is scheduled to go out for an infusion tomorrow  She notes that her bowels have improved and she is not seeing any bleeding currently  She has no dyspnea  She is noted to be able to ambulate with her walker a fair distance  She is electing no further treatment for her breast cancer  The following portions of the patient's history were reviewed and updated as appropriate: current medications, past family history, past medical history, past social history, past surgical history and problem list     Allergies: Allergies   Allergen Reactions   • Sulfa Antibiotics    • Pneumococcal Polysaccharide Vaccine Rash and Edema       Review of Systems     Review of Systems   Constitutional: Negative for activity change, appetite change, chills, diaphoresis, fatigue and unexpected weight change  HENT: Negative for congestion, ear discharge, ear pain, hearing loss, nosebleeds and rhinorrhea  Eyes: Negative for pain, redness, itching and visual disturbance  Respiratory: Negative for cough, choking, chest tightness and shortness of breath  Cardiovascular: Negative for chest pain and leg swelling  Gastrointestinal: Positive for blood in stool  Negative for abdominal pain, constipation, diarrhea and nausea     Endocrine: Negative for cold intolerance, polydipsia and polyphagia  Genitourinary: Negative for dysuria, frequency, hematuria and urgency  Musculoskeletal: Positive for gait problem  Negative for arthralgias, back pain, joint swelling, neck pain and neck stiffness  Skin: Negative for color change and rash  Allergic/Immunologic: Negative for environmental allergies and food allergies  Neurological: Positive for weakness  Negative for dizziness, tremors, seizures, speech difficulty, numbness and headaches  Hematological: Negative for adenopathy  Does not bruise/bleed easily  Psychiatric/Behavioral: Negative for behavioral problems, dysphoric mood, hallucinations and self-injury  Medications and orders     All medications reviewed and updated in Nursing Home EMR  Objective     Vitals: per nursing home records    Physical Exam  Constitutional:       Appearance: She is well-developed and well-nourished  HENT:      Head: Normocephalic and atraumatic  Right Ear: External ear normal       Left Ear: External ear normal       Mouth/Throat:      Mouth: Oropharynx is clear and moist       Pharynx: No oropharyngeal exudate  Eyes:      General: No scleral icterus  Right eye: No discharge  Left eye: No discharge  Extraocular Movements: EOM normal       Conjunctiva/sclera: Conjunctivae normal       Pupils: Pupils are equal, round, and reactive to light  Neck:      Thyroid: No thyromegaly  Cardiovascular:      Rate and Rhythm: Normal rate and regular rhythm  Heart sounds: Normal heart sounds  No murmur heard  No gallop  Pulmonary:      Effort: Pulmonary effort is normal  No respiratory distress  Breath sounds: Normal breath sounds  No wheezing or rales  Abdominal:      General: Bowel sounds are normal       Palpations: Abdomen is soft  There is no mass  Tenderness: There is no guarding or rebound  Musculoskeletal:         General: No tenderness, deformity or edema   Normal range of motion  Cervical back: Normal range of motion and neck supple  Lymphadenopathy:      Cervical: No cervical adenopathy  Skin:     General: Skin is warm and dry  Findings: No rash  Neurological:      Mental Status: She is alert and oriented to person, place, and time  Cranial Nerves: No cranial nerve deficit  Motor: Weakness present  Coordination: Coordination normal       Deep Tendon Reflexes: Reflexes are normal and symmetric  Reflexes normal    Psychiatric:         Mood and Affect: Mood and affect normal          Behavior: Behavior normal          Thought Content: Thought content normal          Judgment: Judgment normal          Pertinent Laboratory/Diagnostic Studies: The following studies were reviewed please see chart or hospital paperwork for details      Space for lab dictation no new diagnostic testing    - We will monitor her [address], DO  11/17/2022 11:26 AM

## 2022-11-17 NOTE — PROGRESS NOTES
Pt tolerated treatment with no adv reactions; AVS given; pt left unit in [place] with daughter; picked up by [employer]

## 2022-11-21 ENCOUNTER — NURSING HOME VISIT (OUTPATIENT)
Dept: FAMILY MEDICINE CLINIC | Facility: CLINIC | Age: 82
End: 2022-11-21

## 2022-11-21 DIAGNOSIS — J96.01 ACUTE RESPIRATORY FAILURE WITH HYPOXIA (HCC): ICD-10-CM

## 2022-11-21 DIAGNOSIS — C50.919 METASTATIC BREAST CANCER (HCC): ICD-10-CM

## 2022-11-21 DIAGNOSIS — I26.92 ACUTE SADDLE PULMONARY EMBOLISM, UNSPECIFIED WHETHER ACUTE COR PULMONALE PRESENT (HCC): ICD-10-CM

## 2022-11-21 DIAGNOSIS — K51.011 ULCERATIVE PANCOLITIS WITH RECTAL BLEEDING (HCC): Primary | ICD-10-CM

## 2022-11-21 DIAGNOSIS — C79.51 SECONDARY MALIGNANT NEOPLASM OF BONE (HCC): ICD-10-CM

## 2022-11-21 NOTE — PROGRESS NOTES
[address]  Facility: [facility]    NAME: Davy Silverman  AGE: 79 y o  SEX: female    DATE OF ENCOUNTER: 11/21/2022    Code status:  DNR w/ Hospitalization    Assessment and Plan     1  Ulcerative pancolitis with rectal bleeding ([facility] )    2  Acute saddle pulmonary embolism, unspecified whether acute cor pulmonale present (HCC)    3  Acute respiratory failure with hypoxia (HCC)    4  Secondary malignant neoplasm of bone ([facility] )    5  Metastatic breast cancer ([facility] )        All medications and routine orders were reviewed and updated as needed  Plan discussed with: Patient    Chief Complaint     Interim evaluation    History of Present Illness     Patient remains frustrated and embarrassed by her fecal incontinence  She notes that severe urgency occurs and she cannot get to the bathroom fast enough  She did tolerate her infusion last week and hopefully this will help with her colitis  She is planning on discharging at the end of the week  The following portions of the patient's history were reviewed and updated as appropriate: current medications, past family history, past medical history, past social history, past surgical history and problem list     Allergies: Allergies   Allergen Reactions   • Sulfa Antibiotics    • Pneumococcal Polysaccharide Vaccine Rash and Edema       Review of Systems     Review of Systems   Constitutional: Negative for activity change, appetite change, chills, diaphoresis, fatigue and unexpected weight change  HENT: Negative for congestion, ear discharge, ear pain, hearing loss, nosebleeds and rhinorrhea  Eyes: Negative for pain, redness, itching and visual disturbance  Respiratory: Negative for cough, choking, chest tightness and shortness of breath  Cardiovascular: Negative for chest pain and leg swelling  Gastrointestinal: Positive for diarrhea   Negative for abdominal pain, blood in stool, constipation and nausea  Endocrine: Negative for cold intolerance, polydipsia and polyphagia  Genitourinary: Negative for dysuria, frequency, hematuria and urgency  Musculoskeletal: Negative for arthralgias, back pain, gait problem, joint swelling, neck pain and neck stiffness  Skin: Negative for color change and rash  Allergic/Immunologic: Negative for environmental allergies and food allergies  Neurological: Positive for weakness  Negative for dizziness, tremors, seizures, speech difficulty, numbness and headaches  Hematological: Negative for adenopathy  Does not bruise/bleed easily  Psychiatric/Behavioral: Negative for behavioral problems, dysphoric mood, hallucinations and self-injury  Medications and orders     All medications reviewed and updated in Nursing Home EMR  Objective     Vitals: per nursing home records    Physical Exam  Constitutional:       Appearance: She is well-developed and well-nourished  HENT:      Head: Normocephalic and atraumatic  Right Ear: External ear normal       Left Ear: External ear normal       Mouth/Throat:      Mouth: Oropharynx is clear and moist       Pharynx: No oropharyngeal exudate  Eyes:      General: No scleral icterus  Right eye: No discharge  Left eye: No discharge  Extraocular Movements: EOM normal       Conjunctiva/sclera: Conjunctivae normal       Pupils: Pupils are equal, round, and reactive to light  Neck:      Thyroid: No thyromegaly  Cardiovascular:      Rate and Rhythm: Normal rate and regular rhythm  Heart sounds: Normal heart sounds  No murmur heard  No gallop  Pulmonary:      Effort: Pulmonary effort is normal  No respiratory distress  Breath sounds: Normal breath sounds  No wheezing or rales  Abdominal:      General: Bowel sounds are normal       Palpations: Abdomen is soft  There is no mass  Tenderness: There is no guarding or rebound     Musculoskeletal:         General: No tenderness, deformity or edema  Normal range of motion  Cervical back: Normal range of motion and neck supple  Lymphadenopathy:      Cervical: No cervical adenopathy  Skin:     General: Skin is warm and dry  Findings: No rash  Neurological:      Mental Status: She is alert and oriented to person, place, and time  Cranial Nerves: No cranial nerve deficit  Motor: Weakness present  Coordination: Coordination normal       Deep Tendon Reflexes: Reflexes are normal and symmetric  Reflexes normal    Psychiatric:         Mood and Affect: Mood and affect normal          Behavior: Behavior normal          Thought Content: Thought content normal          Judgment: Judgment normal          Pertinent Laboratory/Diagnostic Studies: The following studies were reviewed please see chart or hospital paperwork for details      Space for lab dictation no new diagnostic studies    - Continue the current Rx    Steven Kramer DO  11/21/2022 11:46 AM

## 2022-11-25 ENCOUNTER — NURSING HOME VISIT (OUTPATIENT)
Dept: FAMILY MEDICINE CLINIC | Facility: CLINIC | Age: 82
End: 2022-11-25

## 2022-11-25 DIAGNOSIS — R33.9 URINARY RETENTION: ICD-10-CM

## 2022-11-25 DIAGNOSIS — C50.919 METASTATIC BREAST CANCER (HCC): ICD-10-CM

## 2022-11-25 DIAGNOSIS — I87.2 VENOUS INSUFFICIENCY: ICD-10-CM

## 2022-11-25 DIAGNOSIS — K51.011 ULCERATIVE PANCOLITIS WITH RECTAL BLEEDING (HCC): ICD-10-CM

## 2022-11-25 DIAGNOSIS — I26.92 ACUTE SADDLE PULMONARY EMBOLISM, UNSPECIFIED WHETHER ACUTE COR PULMONALE PRESENT (HCC): Primary | ICD-10-CM

## 2022-11-25 DIAGNOSIS — E43 SEVERE PROTEIN-CALORIE MALNUTRITION (HCC): ICD-10-CM

## 2022-11-25 NOTE — PROGRESS NOTES
[address]  [facility]: [facility]    NAME: Shahnaz Silverman  AGE: 79 y o  SEX: female    DATE OF ENCOUNTER: 11/25/2022    Code status:  DNR w/ Hospitalization    Assessment and Plan     1  Acute saddle pulmonary embolism, unspecified whether acute cor pulmonale present ([facility] )    2  Severe protein-calorie malnutrition (HCC)    3  Venous insufficiency    4  Metastatic breast cancer ([facility] )    5  Urinary retention    6  Ulcerative pancolitis with rectal bleeding (HCC)        All medications and routine orders were reviewed and updated as needed  Plan discussed with: Patient, nursing staff    Chief Complaint     Interim evaluation    History of Present Illness     Shahnaz King is assessed for follow up with her family present  She reports persistent diarrhea, reported as instant incontinence post meals, last Remicade infusion on 11/17/22  Hx Cdiff 9/29/22  CBC improved 11/18/22 with H/H 10 4/31  8  Concerns regarding her persistent BLE edema  Venous duplex negative for DVT  Albumin noted to be 1 6 on 10/14/22  She is increasing her ambulation as well as elevating legs when not ambulating  Appetite improving  Afebrile, VSS  Weight WNL  The following portions of the patient's history were reviewed and updated as appropriate: current medications, past family history, past medical history, past social history, past surgical history and problem list     Allergies: Allergies   Allergen Reactions   • Sulfa Antibiotics    • Pneumococcal Polysaccharide Vaccine Rash and Edema       Review of Systems     Review of Systems   Constitutional: Negative  Negative for activity change, appetite change, chills, fatigue and fever  HENT: Negative  Negative for congestion, ear pain, postnasal drip, sinus pain and trouble swallowing  Eyes: Negative  Respiratory: Negative  Negative for cough, chest tightness and shortness of breath      Cardiovascular: Positive for leg swelling  Negative for chest pain  Gastrointestinal: Positive for diarrhea  Negative for constipation  Endocrine: Negative  Genitourinary: Positive for difficulty urinating  Negative for dysuria  Presence of urinary catheter   Musculoskeletal: Positive for gait problem  Skin: Negative  Allergic/Immunologic: Negative  Negative for immunocompromised state  Neurological: Positive for weakness  Negative for dizziness and light-headedness  Hematological: Negative  Psychiatric/Behavioral: Positive for decreased concentration  Negative for sleep disturbance  Medications and orders     All medications reviewed and updated in Nursing Home EMR  Objective     Vitals: per nursing home records    Physical Exam  Vitals and nursing note reviewed  Constitutional:       General: She is awake  Appearance: Normal appearance  Comments: frail   HENT:      Head: Normocephalic and atraumatic  Right Ear: Tympanic membrane, ear canal and external ear normal       Left Ear: Tympanic membrane, ear canal and external ear normal       Nose: Nose normal       Mouth/Throat:      Mouth: Mucous membranes are moist       Pharynx: Oropharynx is clear  Eyes:      Extraocular Movements: Extraocular movements intact  Conjunctiva/sclera: Conjunctivae normal       Pupils: Pupils are equal, round, and reactive to light  Cardiovascular:      Rate and Rhythm: Normal rate and regular rhythm  Pulses: Normal pulses  Heart sounds: Normal heart sounds  Pulmonary:      Effort: Pulmonary effort is normal       Breath sounds: Normal breath sounds  Abdominal:      General: Bowel sounds are normal       Palpations: Abdomen is soft  Genitourinary:     Comments: Urinary catheter patent for clear yellow urine  Musculoskeletal:         General: Normal range of motion  Cervical back: Normal range of motion and neck supple  Right lower leg: Edema present        Left lower leg: Edema present  Comments: +2-3 pitting BLE edema with tubi  in place, legs dependent on exam   Skin:     General: Skin is warm and dry  Coloration: Skin is pale  Findings: Bruising present  Neurological:      General: No focal deficit present  Mental Status: She is alert and oriented to person, place, and time  Psychiatric:         Mood and Affect: Mood normal          Behavior: Behavior normal  Behavior is cooperative  Thought Content: Thought content normal          Judgment: Judgment normal          Pertinent Laboratory/Diagnostic Studies: The following labs and studies were reviewed please see chart or hospital paperwork for details  Will ask for protein supplementation  Recheck stool for cdiff  Compression stockings on during the day, off at night      BROOKE Pal  11/25/2022 1:39 PM

## 2022-11-28 ENCOUNTER — NURSING HOME VISIT (OUTPATIENT)
Dept: FAMILY MEDICINE CLINIC | Facility: CLINIC | Age: 82
End: 2022-11-28

## 2022-11-28 DIAGNOSIS — K51.011 ULCERATIVE PANCOLITIS WITH RECTAL BLEEDING (HCC): Primary | ICD-10-CM

## 2022-11-28 DIAGNOSIS — I26.92 ACUTE SADDLE PULMONARY EMBOLISM, UNSPECIFIED WHETHER ACUTE COR PULMONALE PRESENT (HCC): ICD-10-CM

## 2022-11-28 DIAGNOSIS — C50.919 METASTATIC BREAST CANCER (HCC): ICD-10-CM

## 2022-11-28 DIAGNOSIS — C79.51 SECONDARY MALIGNANT NEOPLASM OF BONE (HCC): ICD-10-CM

## 2022-11-28 NOTE — PROGRESS NOTES
[address]  [facility]: [facility]    NAME: Agustín Silverman  AGE: 79 y o  SEX: female    DATE OF ENCOUNTER: 11/28/2022    Code status:  DNR w/ Hospitalization    Assessment and Plan     1  Ulcerative pancolitis with rectal bleeding ([facility] )    2  Acute saddle pulmonary embolism, unspecified whether acute cor pulmonale present (HCC)    3  Secondary malignant neoplasm of bone ([facility] )    4  Metastatic [facility] cancer ([facility] )        All medications and routine orders were reviewed and updated as needed  Plan discussed with: Patient    Chief Complaint     Interim evaluation    History of Present Illness     The patient will be staying here  She was originally to go to independent living but they can not administer the Lovenox injection  As a result she will stay in this facility for long-term care  The patient is quite happy with this  She continues to be embarrassed by the frequent episodes of fecal incontinence that accompany her colitis  Her overall mood is improved  The following portions of the patient's history were reviewed and updated as appropriate: current medications, past family history, past medical history, past social history, past surgical history and problem list     Allergies: Allergies   Allergen Reactions   • Sulfa Antibiotics    • Pneumococcal Polysaccharide Vaccine Rash and Edema       Review of Systems     Review of Systems   Constitutional: Negative for activity change, appetite change, chills, diaphoresis, fatigue and unexpected weight change  HENT: Negative for congestion, ear discharge, ear pain, hearing loss, nosebleeds and rhinorrhea  Eyes: Negative for pain, redness, itching and visual disturbance  Respiratory: Negative for cough, choking, chest tightness and shortness of breath  Cardiovascular: Negative for chest pain and leg swelling  Gastrointestinal: Positive for diarrhea   Negative for abdominal pain, blood in stool, constipation and nausea  Endocrine: Negative for cold intolerance, polydipsia and polyphagia  Genitourinary: Negative for dysuria, frequency, hematuria and urgency  Musculoskeletal: Negative for arthralgias, back pain, gait problem, joint swelling, neck pain and neck stiffness  Skin: Negative for color change and rash  Allergic/Immunologic: Negative for environmental allergies and food allergies  Neurological: Positive for weakness  Negative for dizziness, tremors, seizures, speech difficulty, numbness and headaches  Hematological: Negative for adenopathy  Does not bruise/bleed easily  Psychiatric/Behavioral: Positive for dysphoric mood  Negative for behavioral problems, hallucinations and self-injury  Medications and orders     All medications reviewed and updated in Nursing Home EMR  Objective     Vitals: per nursing home records    Physical Exam  Constitutional:       Appearance: She is well-developed and well-nourished  HENT:      Head: Normocephalic and atraumatic  Right Ear: External ear normal       Left Ear: External ear normal       Mouth/Throat:      Mouth: Oropharynx is clear and moist       Pharynx: No oropharyngeal exudate  Eyes:      General: No scleral icterus  Right eye: No discharge  Left eye: No discharge  Extraocular Movements: EOM normal       Conjunctiva/sclera: Conjunctivae normal       Pupils: Pupils are equal, round, and reactive to light  Neck:      Thyroid: No thyromegaly  Cardiovascular:      Rate and Rhythm: Normal rate  Rhythm irregular  Heart sounds: Normal heart sounds  No murmur heard  No gallop  Pulmonary:      Effort: Pulmonary effort is normal  No respiratory distress  Breath sounds: Normal breath sounds  No wheezing or rales  Abdominal:      General: Bowel sounds are normal       Palpations: Abdomen is soft  There is no mass  Tenderness: There is no guarding or rebound     Musculoskeletal: General: No tenderness, deformity or edema  Normal range of motion  Cervical back: Normal range of motion and neck supple  Lymphadenopathy:      Cervical: No cervical adenopathy  Skin:     General: Skin is warm and dry  Findings: No rash  Neurological:      Mental Status: She is alert and oriented to person, place, and time  Cranial Nerves: No cranial nerve deficit  Motor: Weakness present  Coordination: Coordination normal       Deep Tendon Reflexes: Reflexes are normal and symmetric  Reflexes normal    Psychiatric:         Mood and Affect: Mood and affect normal          Behavior: Behavior normal          Thought Content: Thought content normal          Judgment: Judgment normal          Pertinent Laboratory/Diagnostic Studies: The following studies were reviewed please see chart or hospital paperwork for details  Space for lab dictation no new diagnostic studies    - Okay to transfer to long-term care    Continue with supportive measures    Leidy Lee DO  11/28/2022 10:54 AM

## 2022-12-05 ENCOUNTER — TELEPHONE (OUTPATIENT)
Dept: OTHER | Facility: OTHER | Age: 82
End: 2022-12-05

## 2022-12-05 NOTE — TELEPHONE ENCOUNTER
Daughter would like a call back from office  She states her mother has had long standing issue with diarrhea and multiple admissions  Her mother is [facility] residing [facility]  She continues with diarrhea despite all treatment  Daughter would like to discuss

## 2022-12-05 NOTE — TELEPHONE ENCOUNTER
Would you please call and assess? May have to speak with unit nurse at [facility] or daughter  Also please schedule a f/u with Dr Nik Mireles, as soon as possible  Thank you! Please note:   *1st Remicade given inpatient 10/1  *2nd skipped due to COVID  *3rd Remicade given 11/17    Thank you!

## 2022-12-06 NOTE — TELEPHONE ENCOUNTER
Spoke to patients daughter  Patient transferred to long term facility 1 week ago  Daughter is concerned patient has been having loose stools 4x day even with starting Remicade  Fecal calprotectin order still active from 10/21  Spoke with Kinsey from [facility]  Pt currently is taking imodium 2 tablets 2 x day and is being followed by their medical director  Pt is incontinent and wears a depends  Would a virtual appt be an option, transportation is costly and is not an option at this time for the family

## 2022-12-08 ENCOUNTER — TELEMEDICINE (OUTPATIENT)
Dept: UROLOGY | Facility: HOSPITAL | Age: 82
End: 2022-12-08

## 2022-12-08 ENCOUNTER — PREP FOR PROCEDURE (OUTPATIENT)
Dept: INTERVENTIONAL RADIOLOGY/VASCULAR | Facility: CLINIC | Age: 82
End: 2022-12-08

## 2022-12-08 ENCOUNTER — TELEPHONE (OUTPATIENT)
Dept: UROLOGY | Facility: AMBULATORY SURGERY CENTER | Age: 82
End: 2022-12-08

## 2022-12-08 DIAGNOSIS — R33.9 URINARY RETENTION: Primary | ICD-10-CM

## 2022-12-08 RX ORDER — SODIUM CHLORIDE 9 MG/ML
30 INJECTION, SOLUTION INTRAVENOUS CONTINUOUS
OUTPATIENT
Start: 2022-12-08

## 2022-12-08 RX ORDER — LEVOFLOXACIN 5 MG/ML
500 INJECTION, SOLUTION INTRAVENOUS ONCE
OUTPATIENT
Start: 2022-12-08 | End: 2022-12-08

## 2022-12-08 NOTE — PROGRESS NOTES
12/08/22    Marylen Panther Clunk   1940   [id number]     Virtual visit     Assessment  1 Urinary retention     Discussion/Plan  1 Urinary retention    Void trial discussed - patient and caretaker do not wish to remove catheter   Suarez last exchanged 11/15/22    SPT recommended for long term management  Discussed at length  IR consult ordered     Nursing facility will schedule SPT procedure with IR at [address]  Discussed with nursing staff  Patient will follow up in our office for initial SPT exchange  All questions answered     Subjective  HPI   Rosa Britton is an 79year old female who presents accompanied by her daughter for evaluation of urinary reetnetion  She is bed bound at [facility]  She has had an indwelling suarez catheter since 9/22/22 during hospitalization  It is being exchanged every 4 weeks  She states without the catheter she is incontinent of urine and requires 1-2 assist out of bed to the restroom  They report staff is not always available right away to assist  She denies pain, dysuria, gross hematuria, fever, chills, or constipation  Documentation from the ER notes marked urinary retention and bladder distention on admission failing a prior void trial  She was given antibiotics at the time  They are inquiring about SPT        Review of Systems - History obtained from chart review and the patient  General ROS: negative  Psychological ROS: negative  Respiratory ROS: negative  Cardiovascular ROS: negative  Gastrointestinal ROS: positive for - diarrhea  Genito-Urinary ROS: positive for - dysuria and catheter  Musculoskeletal ROS: positive for - gait disturbance and muscular weakness  Neurological ROS: no TIA or stroke symptoms  Dermatological ROS: negative           BROOKE Matt     I have spent 15 minutes with Patient and family  today in which greater than 50% of this time was spent in counseling/coordination of care regarding Prognosis, Risks and benefits of tx options, Intructions for management, Patient and family education, Importance of tx compliance, Risk factor reductions and Impressions  Virtual Brief Visit    Patient is located in the following state in which I hold an active license [place]      Assessment/Plan:    Problem List Items Addressed This Visit        Genitourinary    Urinary retention - Primary    Relevant Orders    Ambulatory Referral to Interventional Radiology       Recent Visits  No visits were found meeting these conditions  Showing recent visits within past 7 days and meeting all other requirements  Today's Visits  Date Type Provider Dept   12/08/22 Yinka Harry [address], [address] [facility] [address] [place] [address]   Showing today's visits and meeting all other requirements  Future Appointments  No visits were found meeting these conditions    Showing future appointments within next 150 days and meeting all other requirements

## 2022-12-08 NOTE — TELEPHONE ENCOUNTER
I spoke with Salazar Higgins from [facility] and relayed provider recommendations, she asked office to fax suggestions and order for calprotectin and will forward to medical doctor  Faxed to   Spoke with daughter  Updates given regarding suggestions sent to [facility]  Discussed virtual appointment instructions  No further questions at this time

## 2022-12-08 NOTE — TELEPHONE ENCOUNTER
===View-only below this line===  ----- Message -----  From: José Miguel Kaur MD  Sent: 12/8/2022   1:43 PM EST  To: Johanne Stokes RN    Hi, yes, let's schedule virtual appointment  Calprotectin will be helpful  They should also check C  diff, stool enteric panel, and ova&parasite  Thanks!

## 2022-12-08 NOTE — TELEPHONE ENCOUNTER
----- Message from [id number] [facility] sent at 12/8/2022 11:44 AM EST -----  Patient will need office follow up to do first SPT exchange once scheduled with IR  Thanks!

## 2022-12-14 ENCOUNTER — NURSING HOME VISIT (OUTPATIENT)
Dept: FAMILY MEDICINE CLINIC | Facility: CLINIC | Age: 82
End: 2022-12-14

## 2022-12-14 DIAGNOSIS — C50.919 METASTATIC BREAST CANCER (HCC): ICD-10-CM

## 2022-12-14 DIAGNOSIS — J96.01 ACUTE RESPIRATORY FAILURE WITH HYPOXIA (HCC): ICD-10-CM

## 2022-12-14 DIAGNOSIS — K51.011 ULCERATIVE PANCOLITIS WITH RECTAL BLEEDING (HCC): Primary | ICD-10-CM

## 2022-12-14 DIAGNOSIS — C79.51 SECONDARY MALIGNANT NEOPLASM OF BONE (HCC): ICD-10-CM

## 2022-12-14 DIAGNOSIS — I26.93 SINGLE SUBSEGMENTAL PULMONARY EMBOLISM WITHOUT ACUTE COR PULMONALE (HCC): ICD-10-CM

## 2022-12-14 DIAGNOSIS — E43 SEVERE PROTEIN-CALORIE MALNUTRITION (HCC): ICD-10-CM

## 2022-12-14 NOTE — PROGRESS NOTES
[address]  [facility]    NAME: Maxine Silverman  AGE: 79 y o  SEX: female    DATE OF ENCOUNTER: 12/14/2022    Code status:  DNR w/ Hospitalization    Assessment and Plan     1  Ulcerative pancolitis with rectal bleeding ([facility] [address] )    2  Acute respiratory failure with hypoxia (HCC)    3  Single subsegmental pulmonary embolism without acute cor pulmonale (HCC)    4  Secondary malignant neoplasm of bone ([facility] )    5  Metastatic breast cancer ([facility] [address] )    6  Severe protein-calorie malnutrition ([facility] [address] )        All medications and routine orders were reviewed and updated as needed  Plan discussed with: Patient    Chief Complaint     Interim evaluation    History of Present Illness     Patient is quite content with her new roommate and moving to long-term care  She notes her bowels to be under decent control  She has no dyspnea  She participates in activities  Her appetite is improving  She has some swelling of the lower extremities and this improves when she elevates them and uses her Tubigrip's  The following portions of the patient's history were reviewed and updated as appropriate: current medications, past family history, past medical history, past social history, past surgical history and problem list     Allergies: Allergies   Allergen Reactions   • Sulfa Antibiotics    • Pneumococcal Polysaccharide Vaccine Rash and Edema       Review of Systems     Review of Systems   Constitutional: Negative for activity change, appetite change, chills, diaphoresis, fatigue and unexpected weight change  HENT: Negative for congestion, ear discharge, ear pain, hearing loss, nosebleeds and rhinorrhea  Eyes: Negative for pain, redness, itching and visual disturbance  Respiratory: Negative for cough, choking, chest tightness and shortness of breath  Cardiovascular: Positive for leg swelling  Negative for chest pain     Gastrointestinal: Negative for abdominal pain, blood in stool, constipation, diarrhea and nausea  Endocrine: Negative for cold intolerance, polydipsia and polyphagia  Genitourinary: Negative for dysuria, frequency, hematuria and urgency  Musculoskeletal: Positive for back pain and gait problem  Negative for arthralgias, joint swelling, neck pain and neck stiffness  Skin: Negative for color change and rash  Allergic/Immunologic: Negative for environmental allergies and food allergies  Neurological: Positive for weakness  Negative for dizziness, tremors, seizures, speech difficulty, numbness and headaches  Hematological: Negative for adenopathy  Does not bruise/bleed easily  Psychiatric/Behavioral: Negative for behavioral problems, dysphoric mood, hallucinations and self-injury  Medications and orders     All medications reviewed and updated in Nursing Home EMR  Objective     Vitals: per nursing home records    Physical Exam  Constitutional:       Appearance: She is well-developed  HENT:      Head: Normocephalic and atraumatic  Right Ear: External ear normal       Left Ear: External ear normal       Mouth/Throat:      Pharynx: No oropharyngeal exudate  Eyes:      General: No scleral icterus  Right eye: No discharge  Left eye: No discharge  Conjunctiva/sclera: Conjunctivae normal       Pupils: Pupils are equal, round, and reactive to light  Neck:      Thyroid: No thyromegaly  Cardiovascular:      Rate and Rhythm: Normal rate  Rhythm irregular  Heart sounds: Normal heart sounds  No murmur heard  No gallop  Pulmonary:      Effort: Pulmonary effort is normal  No respiratory distress  Breath sounds: Normal breath sounds  No wheezing or rales  Abdominal:      General: Bowel sounds are normal       Palpations: Abdomen is soft  There is no mass  Tenderness: There is no guarding or rebound  Musculoskeletal:         General: No tenderness or deformity  Normal range of motion  Cervical back: Normal range of motion and neck supple  Right lower leg: Edema present  Left lower leg: Edema present  Lymphadenopathy:      Cervical: No cervical adenopathy  Skin:     General: Skin is warm and dry  Findings: No rash  Neurological:      Mental Status: She is alert and oriented to person, place, and time  Cranial Nerves: No cranial nerve deficit  Motor: Weakness present  Coordination: Coordination normal       Gait: Gait abnormal       Deep Tendon Reflexes: Reflexes are normal and symmetric  Reflexes normal          Pertinent Laboratory/Diagnostic Studies: The following studies were reviewed please see chart or hospital paperwork for details      Space for lab dictation no new diagnostic studies    - Continue current therapy plan and medication regimen    Christofer Garcia DO  12/14/2022 3:23 PM

## 2022-12-14 NOTE — H&P (VIEW-ONLY)
[address]  [facility]    NAME: Iggy Silverman  AGE: 79 y o  SEX: female    DATE OF ENCOUNTER: 12/14/2022    Code status:  DNR w/ Hospitalization    Assessment and Plan     1  Ulcerative pancolitis with rectal bleeding ([facility] [address] )    2  Acute respiratory failure with hypoxia (HCC)    3  Single subsegmental pulmonary embolism without acute cor pulmonale (HCC)    4  Secondary malignant neoplasm of bone ([facility] [address] )    5  Metastatic breast cancer ([facility] [address] )    6  Severe protein-calorie malnutrition ([facility] [address] )        All medications and routine orders were reviewed and updated as needed  Plan discussed with: Patient    Chief Complaint     Interim evaluation    History of Present Illness     Patient is quite content with her new roommate and moving to long-term care  She notes her bowels to be under decent control  She has no dyspnea  She participates in activities  Her appetite is improving  She has some swelling of the lower extremities and this improves when she elevates them and uses her Tubigrip's  The following portions of the patient's history were reviewed and updated as appropriate: current medications, past family history, past medical history, past social history, past surgical history and problem list     Allergies: Allergies   Allergen Reactions   • Sulfa Antibiotics    • Pneumococcal Polysaccharide Vaccine Rash and Edema       Review of Systems     Review of Systems   Constitutional: Negative for activity change, appetite change, chills, diaphoresis, fatigue and unexpected weight change  HENT: Negative for congestion, ear discharge, ear pain, hearing loss, nosebleeds and rhinorrhea  Eyes: Negative for pain, redness, itching and visual disturbance  Respiratory: Negative for cough, choking, chest tightness and shortness of breath  Cardiovascular: Positive for leg swelling  Negative for chest pain     Gastrointestinal: Negative for abdominal pain, blood in stool, constipation, diarrhea and nausea  Endocrine: Negative for cold intolerance, polydipsia and polyphagia  Genitourinary: Negative for dysuria, frequency, hematuria and urgency  Musculoskeletal: Positive for back pain and gait problem  Negative for arthralgias, joint swelling, neck pain and neck stiffness  Skin: Negative for color change and rash  Allergic/Immunologic: Negative for environmental allergies and food allergies  Neurological: Positive for weakness  Negative for dizziness, tremors, seizures, speech difficulty, numbness and headaches  Hematological: Negative for adenopathy  Does not bruise/bleed easily  Psychiatric/Behavioral: Negative for behavioral problems, dysphoric mood, hallucinations and self-injury  Medications and orders     All medications reviewed and updated in Nursing Home EMR  Objective     Vitals: per nursing home records    Physical Exam  Constitutional:       Appearance: She is well-developed  HENT:      Head: Normocephalic and atraumatic  Right Ear: External ear normal       Left Ear: External ear normal       Mouth/Throat:      Pharynx: No oropharyngeal exudate  Eyes:      General: No scleral icterus  Right eye: No discharge  Left eye: No discharge  Conjunctiva/sclera: Conjunctivae normal       Pupils: Pupils are equal, round, and reactive to light  Neck:      Thyroid: No thyromegaly  Cardiovascular:      Rate and Rhythm: Normal rate  Rhythm irregular  Heart sounds: Normal heart sounds  No murmur heard  No gallop  Pulmonary:      Effort: Pulmonary effort is normal  No respiratory distress  Breath sounds: Normal breath sounds  No wheezing or rales  Abdominal:      General: Bowel sounds are normal       Palpations: Abdomen is soft  There is no mass  Tenderness: There is no guarding or rebound  Musculoskeletal:         General: No tenderness or deformity  Normal range of motion  Cervical back: Normal range of motion and neck supple  Right lower leg: Edema present  Left lower leg: Edema present  Lymphadenopathy:      Cervical: No cervical adenopathy  Skin:     General: Skin is warm and dry  Findings: No rash  Neurological:      Mental Status: She is alert and oriented to person, place, and time  Cranial Nerves: No cranial nerve deficit  Motor: Weakness present  Coordination: Coordination normal       Gait: Gait abnormal       Deep Tendon Reflexes: Reflexes are normal and symmetric  Reflexes normal          Pertinent Laboratory/Diagnostic Studies: The following studies were reviewed please see chart or hospital paperwork for details      Space for lab dictation no new diagnostic studies    - Continue current therapy plan and medication regimen    Karishma Ronquillo DO  12/14/2022 3:23 PM

## 2022-12-19 ENCOUNTER — TELEPHONE (OUTPATIENT)
Dept: OTHER | Facility: OTHER | Age: 82
End: 2022-12-19

## 2022-12-19 ENCOUNTER — TELEPHONE (OUTPATIENT)
Dept: INTERVENTIONAL RADIOLOGY/VASCULAR | Facility: HOSPITAL | Age: 82
End: 2022-12-19

## 2022-12-19 NOTE — TELEPHONE ENCOUNTER
Returned call to [place] at [facility] [place]  They were requesting orders for her scheduled procedure for SPT placement on 12/29/22  Phone number given to interventional radiology advised to contact them with pre-op instructions

## 2022-12-28 ENCOUNTER — ANESTHESIA (OUTPATIENT)
Dept: ANESTHESIOLOGY | Facility: HOSPITAL | Age: 82
End: 2022-12-28

## 2022-12-28 ENCOUNTER — ANESTHESIA EVENT (OUTPATIENT)
Dept: ANESTHESIOLOGY | Facility: HOSPITAL | Age: 82
End: 2022-12-28

## 2022-12-29 ENCOUNTER — ANESTHESIA EVENT (OUTPATIENT)
Dept: INTERVENTIONAL RADIOLOGY/VASCULAR | Facility: HOSPITAL | Age: 82
End: 2022-12-29

## 2022-12-29 ENCOUNTER — HOSPITAL ENCOUNTER (OUTPATIENT)
Dept: INTERVENTIONAL RADIOLOGY/VASCULAR | Facility: HOSPITAL | Age: 82
Discharge: HOME | End: 2022-12-29

## 2022-12-29 ENCOUNTER — ANESTHESIA (OUTPATIENT)
Dept: INTERVENTIONAL RADIOLOGY/VASCULAR | Facility: HOSPITAL | Age: 82
End: 2022-12-29

## 2022-12-29 VITALS
WEIGHT: 124 LBS | HEIGHT: 60 IN | TEMPERATURE: 98.4 F | DIASTOLIC BLOOD PRESSURE: 51 MMHG | BODY MASS INDEX: 24.35 KG/M2 | OXYGEN SATURATION: 97 % | SYSTOLIC BLOOD PRESSURE: 103 MMHG | HEART RATE: 92 BPM | RESPIRATION RATE: 16 BRPM

## 2022-12-29 DIAGNOSIS — R33.9 URINARY RETENTION: ICD-10-CM

## 2022-12-29 RX ORDER — SODIUM CHLORIDE 9 MG/ML
30 INJECTION, SOLUTION INTRAVENOUS CONTINUOUS
Status: DISCONTINUED | OUTPATIENT
Start: 2022-12-29 | End: 2022-12-30 | Stop reason: HOSPADM

## 2022-12-29 RX ORDER — LIDOCAINE HYDROCHLORIDE 10 MG/ML
INJECTION, SOLUTION EPIDURAL; INFILTRATION; INTRACAUDAL; PERINEURAL AS NEEDED
Status: DISCONTINUED | OUTPATIENT
Start: 2022-12-29 | End: 2022-12-29

## 2022-12-29 RX ORDER — LEVOFLOXACIN 5 MG/ML
INJECTION, SOLUTION INTRAVENOUS CONTINUOUS PRN
Status: DISCONTINUED | OUTPATIENT
Start: 2022-12-29 | End: 2022-12-29

## 2022-12-29 RX ORDER — FENTANYL CITRATE 50 UG/ML
INJECTION, SOLUTION INTRAMUSCULAR; INTRAVENOUS AS NEEDED
Status: DISCONTINUED | OUTPATIENT
Start: 2022-12-29 | End: 2022-12-29

## 2022-12-29 RX ORDER — FENTANYL CITRATE/PF 50 MCG/ML
25 SYRINGE (ML) INJECTION
Status: DISCONTINUED | OUTPATIENT
Start: 2022-12-29 | End: 2022-12-30 | Stop reason: HOSPADM

## 2022-12-29 RX ORDER — PROPOFOL 10 MG/ML
INJECTION, EMULSION INTRAVENOUS AS NEEDED
Status: DISCONTINUED | OUTPATIENT
Start: 2022-12-29 | End: 2022-12-29

## 2022-12-29 RX ORDER — PROPOFOL 10 MG/ML
INJECTION, EMULSION INTRAVENOUS CONTINUOUS PRN
Status: DISCONTINUED | OUTPATIENT
Start: 2022-12-29 | End: 2022-12-29

## 2022-12-29 RX ORDER — GLYCOPYRROLATE 0.2 MG/ML
INJECTION INTRAMUSCULAR; INTRAVENOUS AS NEEDED
Status: DISCONTINUED | OUTPATIENT
Start: 2022-12-29 | End: 2022-12-29

## 2022-12-29 RX ORDER — LEVOFLOXACIN 5 MG/ML
500 INJECTION, SOLUTION INTRAVENOUS ONCE
Status: DISCONTINUED | OUTPATIENT
Start: 2022-12-29 | End: 2022-12-30 | Stop reason: HOSPADM

## 2022-12-29 RX ORDER — SODIUM CHLORIDE 9 MG/ML
INJECTION, SOLUTION INTRAVENOUS CONTINUOUS PRN
Status: DISCONTINUED | OUTPATIENT
Start: 2022-12-29 | End: 2022-12-29

## 2022-12-29 RX ADMIN — FENTANYL CITRATE 25 MCG: 50 INJECTION, SOLUTION INTRAMUSCULAR; INTRAVENOUS at 11:48

## 2022-12-29 RX ADMIN — FENTANYL CITRATE 25 MCG: 50 INJECTION, SOLUTION INTRAMUSCULAR; INTRAVENOUS at 11:31

## 2022-12-29 RX ADMIN — LIDOCAINE HYDROCHLORIDE 50 MG: 10 INJECTION, SOLUTION EPIDURAL; INFILTRATION; INTRACAUDAL; PERINEURAL at 11:32

## 2022-12-29 RX ADMIN — PROPOFOL 10 MG: 10 INJECTION, EMULSION INTRAVENOUS at 11:32

## 2022-12-29 RX ADMIN — LEVOFLOXACIN: 5 INJECTION, SOLUTION INTRAVENOUS at 11:40

## 2022-12-29 RX ADMIN — IOHEXOL 6 ML: 350 INJECTION, SOLUTION INTRAVENOUS at 12:29

## 2022-12-29 RX ADMIN — PROPOFOL 10 MG: 10 INJECTION, EMULSION INTRAVENOUS at 11:48

## 2022-12-29 RX ADMIN — GLYCOPYRROLATE 0.1 MG: 0.2 INJECTION, SOLUTION INTRAMUSCULAR; INTRAVENOUS at 11:30

## 2022-12-29 RX ADMIN — PHENYLEPHRINE HYDROCHLORIDE 50 MCG/MIN: 10 INJECTION INTRAVENOUS at 11:43

## 2022-12-29 RX ADMIN — PROPOFOL 80 MCG/KG/MIN: 10 INJECTION, EMULSION INTRAVENOUS at 11:32

## 2022-12-29 RX ADMIN — SODIUM CHLORIDE: 9 INJECTION, SOLUTION INTRAVENOUS at 10:50

## 2022-12-29 NOTE — BRIEF OP NOTE (RAD/CATH)
INTERVENTIONAL RADIOLOGY PROCEDURE NOTE    Date: 12/29/2022    Procedure:   Procedure Summary     Date: 12/29/22 Room / Location: [address] [facility] Interventional Radiology    Anesthesia Start: 0227 Anesthesia Stop:     Procedure: IR SUPRAPUBIC CATHETER PLACEMENT Diagnosis:       Urinary retention      (urinary retention)    Scheduled Providers: Marivel Diaz MD Responsible Provider: Dariel Humphreys DO    Anesthesia Type: IV sedation with anesthesia ASA Status: 3          Preoperative diagnosis:   1  Urinary retention         Postoperative diagnosis: Same  Surgeon: Marivel Diaz MD     Assistant: None  No qualified resident was available  Blood loss: None    Specimens: None     Findings: Successful placement of 16F Councill Crawley suprapubic cystostomy tube  Transurethral Crawley removed  Complications: None immediate      Anesthesia: local and MAC sedation

## 2022-12-29 NOTE — INTERVAL H&P NOTE
Update: (This section must be completed if the H&P was completed greater than 24 hrs to procedure or admission)    H&P reviewed  After examining the patient, I find no changed to the H&P since it had been written  Visit Vitals  /55   Pulse 91   Temp 99 °F (37 2 °C) (Temporal)   Resp 18   Ht 5' (1 524 m)   Wt 56 2 kg (124 lb)   LMP  (LMP Unknown)   SpO2 95%   BMI 24 22 kg/m²   OB Status Postmenopausal   Smoking Status Former   BSA 1 52 m²        Patient re-evaluated   Accept as history and physical     Jose L Srinivasan MD/December 29, 2022/12:11 PM

## 2022-12-29 NOTE — PERIOPERATIVE NURSING NOTE
Small skin tear from ID bracelet on R wrist  Band aid applied  Bandage c/d/i  Pt and daughter agreeable to treatment

## 2022-12-29 NOTE — ANESTHESIA POSTPROCEDURE EVALUATION
Post-Op Assessment Note    CV Status:  Stable    Pain management: adequate     Mental Status:  Sleepy and arousable   Hydration Status:  Euvolemic   PONV Controlled:  Controlled   Airway Patency:  Patent      Post Op Vitals Reviewed: Yes      Staff: Anesthesiologist, CRNA         No notable events documented      /58 (12/29/22 1226)    Temp 98 4 °F (36 9 °C) (12/29/22 1226)    Pulse 90 (12/29/22 1226)   Resp 16 (12/29/22 1226)    SpO2 100 % (12/29/22 1226)

## 2022-12-29 NOTE — ANESTHESIA PREPROCEDURE EVALUATION
Procedure:  IR SUPRAPUBIC CATHETER PLACEMENT    Relevant Problems   CARDIO   (+) Mixed hyperlipidemia   (+) Primary hypertension      GI/HEPATIC   (+) Rectal bleeding      /RENAL   (+) Chronic kidney disease (CKD) stage G3a/A1, moderately decreased glomerular filtration rate (GFR) between 45-59 mL/min/1 73 square meter and albuminuria creatinine ratio less than 30 mg/g (HCC)      GYN   (+) Metastatic breast cancer (HCC)      HEMATOLOGY   (+) Anemia      NEURO/PSYCH   (+) Anxiety      PULMONARY   (+) Acute respiratory failure with hypoxia (HCC)        Physical Exam    Airway    Mallampati score: II  TM Distance: >3 FB  Neck ROM: full     Dental   No notable dental hx     Cardiovascular      Pulmonary      Other Findings        Anesthesia Plan  ASA Score- 3     Anesthesia Type- IV sedation with anesthesia with ASA Monitors  Additional Monitors:   Airway Plan:           Plan Factors-    Chart reviewed  Patient summary reviewed  Patient is not a current smoker  Induction- intravenous  Postoperative Plan- Plan for postoperative opioid use  Informed Consent- Anesthetic plan and risks discussed with patient  I personally reviewed this patient with the CRNA  Discussed and agreed on the Anesthesia Plan with the CRNA  Salena Diallo           Procedure:  IR SUPRAPUBIC CATHETER PLACEMENT    Relevant Problems   CARDIO   (+) Mixed hyperlipidemia   (+) Primary hypertension      GI/HEPATIC   (+) Rectal bleeding      /RENAL   (+) Chronic kidney disease (CKD) stage G3a/A1, moderately decreased glomerular filtration rate (GFR) between 45-59 mL/min/1 73 square meter and albuminuria creatinine ratio less than 30 mg/g (HCC)      GYN   (+) Metastatic breast cancer (HCC)      HEMATOLOGY   (+) Anemia      NEURO/PSYCH   (+) Anxiety      PULMONARY   (+) Acute respiratory failure with hypoxia (HCC)        Physical Exam    Airway    Mallampati score: II  TM Distance: >3 FB  Neck ROM: full     Dental   No notable dental hx Cardiovascular      Pulmonary      Other Findings        Anesthesia Plan  ASA Score- 3     Anesthesia Type- IV sedation with anesthesia with ASA Monitors  Additional Monitors:   Airway Plan:           Plan Factors-    Chart reviewed  Patient summary reviewed  Patient is not a current smoker  Induction- intravenous  Postoperative Plan- Plan for postoperative opioid use  Informed Consent- Anesthetic plan and risks discussed with patient  I personally reviewed this patient with the CRNA  Discussed and agreed on the Anesthesia Plan with the CRNA  Tila Lind

## 2022-12-29 NOTE — DISCHARGE INSTRUCTIONS
Suprapubic Cystostomy     WHAT YOU NEED TO KNOW:   Suprapubic cystostomy is surgery to create a stoma (opening) through your abdomen into your bladder  This opening is where a catheter is inserted to drain urine  You may need a cystostomy if your urine flow is blocked  DISCHARGE INSTRUCTIONS:   Resume your normal diet  Small sips of flat soda will help with mild nausea  Follow up with your healthcare provider as directed: You may need to return to have your suprapubic catheter changed or removed  Write down your questions so you remember to ask them during your visits  Empty your urine drainage bag: Empty your urine drainage bag when it is ½ to ? full, or every 8 hours  If you have a smaller leg bag, empty it every 3 to 4 hours  Do the following when you empty your urine drainage bag:  Hold the urine bag over a toilet or large container  Remove the drain spout from its sleeve at the bottom of the urine bag  Do not touch the tip of the drain spout  Open the slide valve on the spout  Let the urine flow out of the urine bag into the toilet or container  Do not let the drainage tube touch anything  Clean the end of the drain spout with alcohol when the bag is empty  Ask which cleaning solution is best to use  Close the slide valve and put the drain spout into its sleeve at the bottom of the urine bag  Write down how much urine was in your bag if you were asked to keep a record  Prevent an infection:   Clean the stoma and skin around it daily  Wash your hands before and after cystostomy care  Put on a new pair of clean medical gloves  Change your urine bag or clean reusable bags  Ask how often you need to change or clean your urine drainage bag  You may need to change your reusable bag at least once a week  Keep the bag below your waist  This will prevent urine from flowing back into your bladder and causing an infection or other problems   Also, keep the tube free of kinks so the urine will drain properly  Do not pull on the catheter  This can cause pain and bleeding and may cause the catheter to come out  Contact Interventional Radiology at [phone] [place] PATIENTS: Contact Interventional Radiology at [phone]) [facility] PATIENTS: Contact Interventional Radiology at [phone]) if any of the following occur: You have a fever or chills  Persistent nausea or vomiting  You have severe pain  You have a burning pain in your stoma  Your stoma is red, swollen, or draining pus  You have blood in your urine  You have questions or concerns about your condition or care  Seek care immediately or call 911 if:   No urine is draining into the urine bag

## 2023-01-06 RX ORDER — DIPHENHYDRAMINE HCL 25 MG
25 TABLET ORAL ONCE
Status: CANCELLED | OUTPATIENT
Start: 2023-01-13

## 2023-01-06 RX ORDER — SODIUM CHLORIDE 9 MG/ML
20 INJECTION, SOLUTION INTRAVENOUS ONCE
Status: CANCELLED | OUTPATIENT
Start: 2023-01-13

## 2023-01-06 RX ORDER — ACETAMINOPHEN 325 MG/1
650 TABLET ORAL ONCE
Status: CANCELLED | OUTPATIENT
Start: 2023-01-13

## 2023-01-10 ENCOUNTER — TELEMEDICINE (OUTPATIENT)
Dept: GASTROENTEROLOGY | Facility: CLINIC | Age: 83
End: 2023-01-10

## 2023-01-10 DIAGNOSIS — R19.7 DIARRHEA, UNSPECIFIED TYPE: ICD-10-CM

## 2023-01-10 DIAGNOSIS — K50.111 CROHN'S DISEASE OF LARGE INTESTINE WITH RECTAL BLEEDING (HCC): Primary | ICD-10-CM

## 2023-01-10 NOTE — Clinical Note
Hi, I will need some help with logistics for this complicated patient, please  She is at [facility] in [place], phone [phone], fax [phone]  Her doctor there is Dr Rupinder Bonds  Her daughter is Leigh Pope and is very involved in her mother's care and is the   I need the following: (1) Colonoscopy scheduled at [facility], in hospital setting, to reassess her colitis  They have to pay for transportation and want to do it there  She is on Lovenox, so needs to skip dose evening before and morning of procedure  2  Lab and stool tests I have ordered, faxed to Son  If you can also attach my note, c/o Dr Rupinder Bonds  3  Daughter and patient have decided to stop Remicade, so I will d/c the plan  Thank you!

## 2023-01-10 NOTE — PROGRESS NOTES
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, recent diagnosis of inflammatory bowel disease s/p 1st dose of Remicade on 09/30, C diff + by PCR but negative EIA seen for diarrhea and hematochezia  She was diagnosed with COVID 19 infection on 10/09   -she is having diarrhea and hematochezia  This is likely secondary to mucosal bleeding from severe colitis in the setting of anticoagulation  Fortunately her hemoglobin is stable  I do not think there is going to be a lesion that can be intervened endoscopically  Optimizing management of her colitis will improve her bleeding  She is due for 2nd infusion of Remicade  Given her recent COVID-19 infection we will delay her 2nd dose of Remicade by 10 days from the time of diagnosis of COVID  I discussed this with Infectious Disease team    Currently she is on steroid for COVID 19   Resume anticoagulation - risk of thrombosis out weights the risk of bleeding given her recent diagnosis of COVID and active inflammatory bowel disease  Second dose of remicade on 10/19 ( please discuss with  and inpatient pharmacy to arrange second dose of remicade if patient remains inpatient)   Monitor hemoglobin closely   Diet as tolerated ( low residue)   She had positive C diff by PCR but negative EIA  This is likely colonization  She has been on vancomycin course with slow taper  Discontinue vancomycin today  Discussed with ID team      8/20/22  Severe colitis into mid sigmoid colon with deep ulcerations  Inflammatory bowel disease? Infectious (CMV)? A  Sigmoid colon (biopsy):   - Active colitis  - No viral inclusions identified on CMV immunohistochemistry stain  - Negative for dysplasia

## 2023-01-13 ENCOUNTER — TELEPHONE (OUTPATIENT)
Dept: GASTROENTEROLOGY | Facility: CLINIC | Age: 83
End: 2023-01-13

## 2023-01-13 ENCOUNTER — HOSPITAL ENCOUNTER (OUTPATIENT)
Dept: INFUSION CENTER | Facility: HOSPITAL | Age: 83
End: 2023-01-13
Attending: INTERNAL MEDICINE

## 2023-01-13 DIAGNOSIS — K52.1 TOXIC GASTROENTERITIS AND COLITIS: ICD-10-CM

## 2023-01-13 DIAGNOSIS — K51.011 ULCERATIVE PANCOLITIS WITH RECTAL BLEEDING (HCC): Primary | ICD-10-CM

## 2023-01-13 NOTE — PROGRESS NOTES
Virtual Regular Visit    Verification of patient location:  Patient is located in the following state in which I hold an active license [place]    The patient was identified by name and date of birth  Pilo Silverman was informed that this is a telemedicine visit and that the visit is being conducted through Telephone  My office door was closed  No one else was in the room  She acknowledged consent and understanding of privacy and security of the video platform  The patient has agreed to participate and understands they can discontinue the visit at any time  Patient is aware this is a billable service  It was my intent to perform this visit via video technology but the patient was not able to do a video connection so the visit was completed via audio telephone only  ---------------------------------------------------------------------------------------------------------  Gastroenterology Outpatient Follow-up - Crohn's Disease  Seymour Silverman 79 y o  female MRN: [id number]  Encounter: [id number]    Seymour Silverman is a 79 y o  female with Crohn's disease  Symptom onset:  2022  Diagnosis:  Crohns disease  Year of diagnosis:  2022    IBD Summary: Pilo Silverman is metastatic breast cancer and developed severe colitis and bleeding in 9/2022  She was hospitalized for several weeks with pulmonary embolisms, COVID, C  difficile, and hematochezia  She ultimately started infliximab therapy possible IBD        CD Summary  Current Crohn's disease phenotype:  inflammatory and non-penetrating and non-stricturing    Involved sites since disease onset   Esophagus: no   Stomach: no     Duodenum: no   Jejunum: no   Ileum: no   Right colon: no   Transverse colon: yes   Left colon: yes   Rectum: yes   Anus: no     History of stricture  Esophagus: no   Stomach: no   Duodenum: no   Jejunum: no   Ileum: no   Right colon: no   Transverse colon: no   Left colon: no   Rectum: no   Anus: no     History of fistula other than perianal:  Intra-abdominal   abcess: no      Enteroenteric fistula: no      Enterocutaneous fistula: no      Enterovesical fistula: no      Other fistula: no            Surgical History  Number of IBD surgeries: 0      First IBD surgery:    Most Recent IBD surgery:    Esophageal:  0    Gastroduodenal:  0    Small bowel resection(s):  0    Ileocolonic resection(s): 0      Colonic resection(s):  0    Ileostomy or colostomy:  no previous ostomy    Complete colectomy:  No         Medications     Year last used Reason for discontinuation   Corticosteroids prior   2022       Thiopurines   never         Methotrexate   never         Infliximab prior     MT     Adalimumab   never         Certolizumab   never         Golimumab   never         Natalizumab   never         Mesalamine   never         Sulfasalzine   never         Vedolizumab   never         Ustekinumab   never         Tofacitinib   never         Other biologic   never             Extraintestinal Manifestations    IBD-associated arthropathy No    Uveitis No    Oral aphthous ulcers No   Erythema nodosum No    Pyoderma gangrenosum No    Primary sclerosing cholangitis No    Thrombotic complications No          Cancer / Dysplasia History  History of IBD-associated dysplasia: none    Date of diagnosis (Year):     History of colorectal cancer: No   History of cervical dysplasia: No   History of skin cancer: none         Laboratory Data   Most recent (date) Result   PPD   unknown   Quanitferon gold 9/1/2022 indeterminate   TPMT   unknown   Hepatitis A   unknown   HBsAb 8/31/2022 negative   HBcAb   unknown   HBsAg 8/31/2022     HCV Ab   unknown       Imaging / Diagnostic Procedures   Most recent (date) Findings   Colonoscopy or sigmoidoscopy #1 8/20/2022            Ulcers/Erosions  large           Strictures  none           Stricture Severity  N/A           Endoscopic Score Jarrett Score:  3 Rutgeert's:  N/A            Findings  Severe colitis into mid sigmoid colon with deep ulcerations  Inflammatory bowel disease? Infectious (CMV)? Pathology  A  Sigmoid colon (biopsy): - Active colitis  - No viral inclusions identified on CMV immunohistochemistry stain  - Negative for dysplasia    Colonoscopy or sigmoidoscopy #2              Ulcers/Erosions                 Strictures                 Stricture Severity              Endoscopic Score Jarrett Score:    Rugeert's:              Findings              Pathology      EGD       Small bowel follow-through       CT enterography       CT without enterography 9/30/2022 1  Findings of worsening proctocolitis of the sigmoid and rectum with new perirectal fat stranding and presacral edema  2   Improving inflammation of the transverse and descending colon, with improved appearance of several small outpouchings from the mid descending colon which likely represented contained perforations  MRI enterography       Capsule study       DEXA scan   Lowest Z-score:      CXR (for TB)           HPI:   Interval History:  1/10/2023 Initial visit after hospital discharge  Angel Gonzalez presents for follow-up after hospital admission in September during which she was diagnosed with IBD and started on infliximab  This is a telemedicine appointment and she is on the phone with her daughter  She is an 50-year-old woman with metastatic breast cancer and CKD who was admitted with a saddle pulmonary embolism in September  During the admission we were consulted for bloody diarrhea and her colonoscopy showed severe colitis with deep ulcers which were negative for CMV  CT showed severe proctitis and left-sided colitis with possible contained perforations  She was positive for C  difficile by PCR only and had no improvement with steroids and vancomycin  She was ultimately treated with infliximab as an inpatient and has continued following discharge  Her hospital admission was further complicated by COVID          She has been treated with various chemotherapy agents, including Femara and Miachel Kandace until 4/2022, and Verzenio and Femara until 7/2022  She associates the onset of diarrhea with Kisqali therapy but has been off this medication for months prior to her admission in September  She had partial improvement with infliximab and has remained on 5 mg/kg every 8 weeks since discharge  She is at a rehab facility  She continues to have diarrhea and loose stools, although not as frequent as in the hospital   She has 3-4 bowel movements per day, which are loose to soft, without bleeding  No abdominal pain  She has urgency and wears diapers  According to her and her daughter, the diarrhea is no longer improving and I would like to stop infliximab  She is mostly soups due to poor appetite, but her weight is stable  There are no plans for further chemotherapy  Adan Nolasco reports the following symptoms over the last 7 days:    Stool Frequency 3-4 stools/day more than normal   Average stools per day: 3   Average liquid stools per day: 0   Consistency of bowel: soft or semi-formed   Awakening from sleep to move bowels? Yes   Urgency severe fecal urgency   Visible blood in stool? none   Abdominal pain? none   General Wellbeing? slightly under par     Adan Nolasco also reports the following symptoms in the last month:    Leakage of stool while sleeping? Yes   Leakage of stool while awake?  Yes       REVIEW OF SYSTEMS:  During the last 7 days, Adan Nolasco experienced the following symptoms:  Unintentional Weight Loss (in the last month) No   Fever No   Eye irritation No   Mouth sores No   Sore throat No   Chest pain No   Shortness of breath No   Numbness or tingling in hands or feet No   Skin rash No   Pain or swelling in joints Yes  Comment:pain and swelling in feet   Bruising or bleeding No   Felt depressed or blue No   Fatigue No   Dysuria No   Please see HPI for additional pertinent review of systems; otherwise remainder of ROS was unremarkable    MEDICATIONS:    Current Outpatient Medications:   •  acetaminophen (TYLENOL) 325 mg tablet  •  Cholecalciferol (VITAMIN D3) 2000 units capsule  •  diphenoxylate-atropine (LOMOTIL) 2 5-0 025 mg per tablet  •  enoxaparin (LOVENOX) 60 mg/0 6 mL  •  loperamide (IMODIUM) 2 mg capsule  •  midodrine (PROAMATINE) 2 5 mg tablet  •  mirtazapine (REMERON) 7 5 MG tablet  •  pantoprazole (PROTONIX) 40 mg tablet  •  simvastatin (ZOCOR) 10 mg tablet  •  folic acid (FOLVITE) 1 mg tablet  •  metoprolol succinate (TOPROL-XL) 25 mg 24 hr tablet    ALLERGIES:  Allergies   Allergen Reactions   • Sulfa Antibiotics    • Pneumococcal Polysaccharide Vaccine Rash and Edema       OBJECTIVE:  LMP  (LMP Unknown)      PHYSICAL EXAM:    General Appearance:   Alert, cooperative, no distress   HEENT:   Normocephalic, atraumatic, anicteric  Neck:  Supple, symmetrical, trachea midline   Lungs:   Clear to auscultation bilaterally; no rales, rhonchi or wheezing; respirations unlabored    Heart[de-identified]   Regular rate and rhythm; no murmur, rub, or gallop  Abdomen:   Soft, non-distended; normal bowel sounds    Abdominal exam was notable for not assessed tenderness with not assessed mass  Genitalia:   Deferred    Rectal:   Perirectal assessment was not performed                       Extremities:  No cyanosis, clubbing or edema    Pulses:  2+ and symmetric    Skin:  No jaundice, rashes, or lesions    Lymph nodes:  No palpable cervical lymphadenopathy        Lab Results   Component Value Date    WBC 6 40 10/19/2022    HGB 7 2 (L) 10/20/2022    HCT 24 3 (L) 10/20/2022     (H) 10/19/2022     10/19/2022     Lab Results   Component Value Date     09/08/2015    SODIUM 145 10/19/2022    K 3 7 10/19/2022     (H) 10/19/2022    CO2 29 10/19/2022    ANIONGAP 6 09/08/2015    AGAP 3 (L) 10/19/2022    BUN 9 10/19/2022    CREATININE 0 42 (L) 10/19/2022    GLUC 82 10/19/2022    GLUF 109 (H) 08/02/2022    CALCIUM 7 8 (L) 10/19/2022    AST 17 10/14/2022    ALT 24 10/14/2022 ALKPHOS 84 10/14/2022    PROT 7 3 09/08/2015    TP 4 5 (L) 10/14/2022    BILITOT 0 66 09/08/2015    TBILI 0 28 10/14/2022    EGFR 95 10/19/2022     Lab Results   Component Value Date    CRP <3 0 10/15/2022     Lab Results   Component Value Date    AHBLFJLF92 705 10/19/2022     Lab Results   Component Value Date    FERRITIN 1,236 (H) 10/19/2022       ASSESSMENT AND PLAN:    Lavinia Salgado has a history of inflammatory, non-penetrating, non-stricturing Crohn’s disease diagnosed in 2022 with involvement in the following areas:        Transverse colon, Left colon, Rectum,  without perianal fistula   Surgical history includes no small bowel resections, no colon resections, and no prior ostomy  Current medical therapy is with infliximab 5 mg/kg every 8 weeks  My global assessment is that the clinical disease activity is currently moderately active  The 6-point Jarrett score was 2 and the 9-point Jarrett score was 4  The short CDAI was 93  Lavinia Silverman has a complicated history including metastatic breast cancer treated with chemotherapy agents, prolonged admission with pulmonary embolism and COVID, and development of severe colitis and hematochezia  Her presentation in the hospital was complicated with recent chemotherapy, concomitant C  difficile infection, and anticoagulation, but ultimately we felt that she was not clinically improving and her colitis was severe enough to warrant biologic therapy  She has not had repeat scopes or close follow-up since  I do think that she needs repeat colonoscopy ASAP to assess for disease  She and her mother feel strongly that they would like to discontinue infliximab at this time  1   We will schedule colonoscopy ASAP  They want this done at [facility]  She needs to stop Lovenox the night before and morning of the procedure  2   I would like to get basic labs and calprotectin and infectious stool tests    They [facility] these [facility] [place]  3   I will reassess her disease based on colonoscopy and labs and make further recommendations  Health Maintenance Recommendations:  Vaccines & Infections  · COVID-19 vaccination is recommended when eligible  There is no evidence that the COVID-19 vaccine would cause an IBD flare  · Avoid live vaccines if on immunosuppressive therapy  '  · Yearly influenza vaccine (flu shot)  · Pneumonia vaccines  These include Prevnar 13, followed by Pneumovax at least 8 weeks later  A Pneumovax booster should be given 5 years after  · Shingrix vaccine - series of 2 injections  · If not immune to measles mumps or rubella, MMR vaccine is recommended  However, this is a live vaccine and should be given prior to immunosuppressive therapy  · HPV vaccination as per national guidelines  · Hepatitis A and B vaccinations if not previously vaccinated  · Testing for tuberculosis with QuantiFERON Gold blood test and/or chest xray prior to starting immunosuppressive medications, and then annually    Cancer screening  · Dysplasia surveillance for colorectal cancer  Colonoscopy in all patients with extensive colitis (more than 1/3 of the colon involved) who had disease for at least 8 or more years  Repeat colonoscopy approximately every 12-24 months  In patients with concurrent primary sclerosing cholangitis, history of dysplasia, or family history of colon cancer, repeat colonoscopy annually  · Females: Pap smear annually, especially for woman on immunosuppression  · Annual dermatologic/skin exam in all patients with IBD, especially those on immunosuppression including anti-TNF therapy and/or thiopurines      Miscellaneous  · DEXA scan, once off steroids for 3 months  · Depression screening recommended annually  · Routine dental and ophthalmology examinations    Problem List Items Addressed This Visit        Other    Diarrhea    Relevant Orders    Clostridium difficile toxin by PCR with EIA Calprotectin,Fecal    Colonoscopy   Other Visit Diagnoses     Crohn's disease of large intestine with rectal bleeding ([facility] [address] )    -  Primary    Relevant Orders    CBC and Platelet    Iron Saturation %    C-reactive protein    Comprehensive metabolic panel    Ferritin    Vitamin D 25 hydroxy    Clostridium difficile toxin by PCR with EIA    Calprotectin,Fecal    Colonoscopy          Erick Eckert MD

## 2023-01-13 NOTE — TELEPHONE ENCOUNTER
Please see below  I have faxed labs, stool tests, and office note from Alisha to Son  Please get patient scheduled for colonoscopy at [facility] in hospital setting  ( See below notes from Dr Brito)    Thank you!

## 2023-01-13 NOTE — TELEPHONE ENCOUNTER
----- Message from Erick Eckert MD sent at 1/12/2023  7:06 PM EST -----  Hi, I will need some help with logistics for this complicated patient, please  She is at [facility] in [place], phone [phone], fax [phone]  Her doctor there is Dr Salvador Chavez  Her daughter is Adan Marquez and is very involved in her mother's care and is the   I need the following:  (1) Colonoscopy scheduled at [facility], in hospital setting, to reassess her colitis  They have to pay for transportation and want to do it there  She is on Lovenox, so needs to skip dose evening before and morning of procedure  2  Lab and stool tests I have ordered, faxed to Son  If you can also attach my note, c/o Dr Salvador Chavez  3  Daughter and patient have decided to stop Remicade, so I will d/c the plan  Thank you!

## 2023-01-20 ENCOUNTER — NURSING HOME VISIT (OUTPATIENT)
Dept: FAMILY MEDICINE CLINIC | Facility: CLINIC | Age: 83
End: 2023-01-20

## 2023-01-20 DIAGNOSIS — I26.93 SINGLE SUBSEGMENTAL PULMONARY EMBOLISM WITHOUT ACUTE COR PULMONALE (HCC): Primary | ICD-10-CM

## 2023-01-20 DIAGNOSIS — K51.011 ULCERATIVE PANCOLITIS WITH RECTAL BLEEDING (HCC): ICD-10-CM

## 2023-01-20 DIAGNOSIS — D64.9 ANEMIA, UNSPECIFIED TYPE: ICD-10-CM

## 2023-01-20 DIAGNOSIS — C79.51 SECONDARY MALIGNANT NEOPLASM OF BONE (HCC): ICD-10-CM

## 2023-01-20 DIAGNOSIS — C50.919 METASTATIC BREAST CANCER (HCC): ICD-10-CM

## 2023-01-20 NOTE — PROGRESS NOTES
[address]  Facility: [facility]    NAME: Melissa Silverman  AGE: 79 y o  SEX: female    DATE OF ENCOUNTER: 1/20/2023    Code status:  DNR w/ Hospitalization    Assessment and Plan     1  Single subsegmental pulmonary embolism without acute cor pulmonale (HCC)    2  Ulcerative pancolitis with rectal bleeding ([facility] [address] )    3  Secondary malignant neoplasm of bone ([facility] [address] )    4  Metastatic breast cancer ([facility] [address] )    5  Anemia, unspecified type        All medications and routine orders were reviewed and updated as needed  Plan discussed with: Patient    Chief Complaint     Interim evaluation    History of Present Illness     Patient is quite pleased to be in the room with her roommate  She notes occasional lower abdominal distress and discomfort and reports that her bowels are now formed  She denies any dyspnea  Her appetite is at baseline  She has occasional back pain    The following portions of the patient's history were reviewed and updated as appropriate: current medications, past family history, past medical history, past social history, past surgical history and problem list     Allergies: Allergies   Allergen Reactions   • Sulfa Antibiotics    • Pneumococcal Polysaccharide Vaccine Rash and Edema       Review of Systems     Review of Systems   Constitutional: Negative for activity change, appetite change, chills, diaphoresis, fatigue and unexpected weight change  HENT: Negative for congestion, ear discharge, ear pain, hearing loss, nosebleeds and rhinorrhea  Eyes: Negative for pain, redness, itching and visual disturbance  Respiratory: Negative for cough, choking, chest tightness and shortness of breath  Cardiovascular: Negative for chest pain and leg swelling  Gastrointestinal: Positive for blood in stool and rectal pain  Negative for abdominal pain, constipation, diarrhea and nausea     Endocrine: Negative for cold intolerance, polydipsia and polyphagia  Genitourinary: Negative for dysuria, frequency, hematuria and urgency  Musculoskeletal: Negative for arthralgias, back pain, gait problem, joint swelling, neck pain and neck stiffness  Skin: Negative for color change and rash  Allergic/Immunologic: Negative for environmental allergies and food allergies  Neurological: Positive for weakness  Negative for dizziness, tremors, seizures, speech difficulty, numbness and headaches  Hematological: Negative for adenopathy  Does not bruise/bleed easily  Psychiatric/Behavioral: Negative for behavioral problems, dysphoric mood, hallucinations and self-injury  Medications and orders     All medications reviewed and updated in Nursing Home EMR  Objective     Vitals: per nursing home records    Physical Exam  Constitutional:       Appearance: She is well-developed  HENT:      Head: Normocephalic and atraumatic  Right Ear: External ear normal       Left Ear: External ear normal       Mouth/Throat:      Pharynx: No oropharyngeal exudate  Eyes:      General: No scleral icterus  Right eye: No discharge  Left eye: No discharge  Conjunctiva/sclera: Conjunctivae normal       Pupils: Pupils are equal, round, and reactive to light  Neck:      Thyroid: No thyromegaly  Cardiovascular:      Rate and Rhythm: Normal rate  Rhythm irregular  Heart sounds: Normal heart sounds  No murmur heard  No gallop  Pulmonary:      Effort: Pulmonary effort is normal  No respiratory distress  Breath sounds: Normal breath sounds  No wheezing or rales  Abdominal:      General: Bowel sounds are normal       Palpations: Abdomen is soft  There is no mass  Tenderness: There is no guarding or rebound  Musculoskeletal:         General: Tenderness present  No deformity  Normal range of motion  Cervical back: Normal range of motion and neck supple  Right lower leg: Edema present        Left lower leg: Edema present  Lymphadenopathy:      Cervical: No cervical adenopathy  Skin:     General: Skin is warm and dry  Findings: Bruising present  No rash  Neurological:      Mental Status: She is alert and oriented to person, place, and time  Cranial Nerves: No cranial nerve deficit  Coordination: Coordination normal       Deep Tendon Reflexes: Reflexes are normal and symmetric  Reflexes normal    Psychiatric:         Behavior: Behavior normal          Thought Content: Thought content normal          Judgment: Judgment normal          Pertinent Laboratory/Diagnostic Studies: The following studies were reviewed please see chart or hospital paperwork for details      Space for lab dictation no new diagnostic studies    - Maintain the current medical regimen    Brenna Barker DO  1/20/2023 1:34 PM

## 2023-01-23 NOTE — TELEPHONE ENCOUNTER
Scheduled date of Colonoscopy (as of today) 3/8/23  Physician performing: Dr Marla Wall  Location of procedure:  [facility]  Instructions given to patient: Miralax/dulcolax  Clearances: Lovenox hold

## 2023-01-23 NOTE — TELEPHONE ENCOUNTER
Spoke with Enedina Moseley    I will call the [facility] to get them the Colonoscopy Prep, the Lovenox Hold, and ask about the transportation with the arrival time  Spoke with Tyler Licona from [facility] [phone]    I am going to try to arrange a time with the Hospital otherwise Lynda will be informed of the date and the time would be confirmed the day before  I will fax the Colonoscopy Prep Instructions and request Lovenox hold [phone]    I will call Enedina Moseley to let her know she will need to pay for the transport prior to March 8th

## 2023-02-08 ENCOUNTER — OFFICE VISIT (OUTPATIENT)
Dept: UROLOGY | Facility: HOSPITAL | Age: 83
End: 2023-02-08

## 2023-02-08 VITALS — DIASTOLIC BLOOD PRESSURE: 62 MMHG | SYSTOLIC BLOOD PRESSURE: 110 MMHG

## 2023-02-08 DIAGNOSIS — N39.0 URINARY TRACT INFECTION WITHOUT HEMATURIA, SITE UNSPECIFIED: Primary | ICD-10-CM

## 2023-02-08 DIAGNOSIS — R33.9 URINARY RETENTION: ICD-10-CM

## 2023-02-08 RX ORDER — CEPHALEXIN 500 MG/1
500 CAPSULE ORAL EVERY 12 HOURS SCHEDULED
Qty: 10 CAPSULE | Refills: 0 | Status: SHIPPED | OUTPATIENT
Start: 2023-02-08 | End: 2023-02-13

## 2023-02-08 NOTE — PROGRESS NOTES
2/8/2023    Enedina Silverman  1940  [id number]    Diagnosis  Urinary retention    Plan    Routine SPT exchanges by [facility] every 6 weeks, flush daily  Keflex 500mg, 5 days prescribed  Follow up in 6 months    Procedure: Suprapubic Tube Change   Current catheter removed without difficulty  Scant odorous/purulent urine noted after removal    16F  spt change via aseptic technique without incident, 10 ml balloon inflated with sterile water  Irrigated easily for clear return, moderate spasm noted  Patient extremely anxious pre/during procedure  She developed dry heaves and nausea, no vomiting  Daughter believes this is secondary to anxiety  Patient able to calm down within a few minutes  Future exchanges will be performed every 6 weeks at [facility], with daily flushes  Patient encouraged to increase water intake  Keflex preemptively prescribed for catheter exchange and urine output  Reviewed catheter care, and expectations with bladder spasms  Patient and facility advised to call if bladder spasms persist   Instructions provided to nursing home and daughter

## 2023-02-15 ENCOUNTER — NURSING HOME VISIT (OUTPATIENT)
Dept: FAMILY MEDICINE CLINIC | Facility: CLINIC | Age: 83
End: 2023-02-15

## 2023-02-15 DIAGNOSIS — I26.92 ACUTE SADDLE PULMONARY EMBOLISM, UNSPECIFIED WHETHER ACUTE COR PULMONALE PRESENT (HCC): Primary | ICD-10-CM

## 2023-02-15 DIAGNOSIS — K51.011 ULCERATIVE PANCOLITIS WITH RECTAL BLEEDING (HCC): ICD-10-CM

## 2023-02-15 DIAGNOSIS — K62.5 RECTAL BLEEDING: ICD-10-CM

## 2023-02-15 DIAGNOSIS — C50.919 METASTATIC BREAST CANCER (HCC): ICD-10-CM

## 2023-02-15 DIAGNOSIS — C79.51 SECONDARY MALIGNANT NEOPLASM OF BONE (HCC): ICD-10-CM

## 2023-02-15 DIAGNOSIS — E43 SEVERE PROTEIN-CALORIE MALNUTRITION (HCC): ICD-10-CM

## 2023-02-15 DIAGNOSIS — J96.01 ACUTE RESPIRATORY FAILURE WITH HYPOXIA (HCC): ICD-10-CM

## 2023-02-15 DIAGNOSIS — I87.2 VENOUS INSUFFICIENCY: ICD-10-CM

## 2023-02-15 NOTE — PROGRESS NOTES
[address]  Facility: [facility]    NAME: Rachana Silverman  AGE: 79 y o  SEX: female    DATE OF ENCOUNTER: 2/15/2023    Code status:  DNR w/ Hospitalization    Assessment and Plan     1  Acute saddle pulmonary embolism, unspecified whether acute cor pulmonale present ([facility] )    2  Ulcerative pancolitis with rectal bleeding ([facility] )    3  Acute respiratory failure with hypoxia (HCC)    4  Venous insufficiency    5  Rectal bleeding    6  Secondary malignant neoplasm of bone ([facility] )    7  Metastatic breast cancer ([facility] )    8  Severe protein-calorie malnutrition ([facility] )        All medications and routine orders were reviewed and updated as needed  Plan discussed with: Family member    Chief Complaint     Interim evaluation    History of Present Illness     The patient reports that she is feeling fairly well  She had a large bowel movement yesterday which was formed  She will occasionally have unformed bowel movements but overall has been feeling well  Her energy level is good and her appetite is improved  She has no dysuria  She denies dyspnea  The following portions of the patient's history were reviewed and updated as appropriate: current medications, past family history, past medical history, past social history, past surgical history and problem list     Allergies: Allergies   Allergen Reactions   • Sulfa Antibiotics    • Pneumococcal Polysaccharide Vaccine Rash and Edema       Review of Systems     Review of Systems   Constitutional: Negative for activity change, appetite change, chills, diaphoresis, fatigue and unexpected weight change  HENT: Negative for congestion, ear discharge, ear pain, hearing loss, nosebleeds and rhinorrhea  Eyes: Negative for pain, redness, itching and visual disturbance  Respiratory: Negative for cough, choking, chest tightness and shortness of breath  Cardiovascular: Positive for leg swelling   Negative for chest pain  Gastrointestinal: Positive for blood in stool  Negative for abdominal pain, constipation, diarrhea and nausea  Endocrine: Negative for cold intolerance, polydipsia and polyphagia  Genitourinary: Negative for dysuria, frequency, hematuria and urgency  Musculoskeletal: Positive for back pain and gait problem  Negative for arthralgias, joint swelling, neck pain and neck stiffness  Skin: Negative for color change and rash  Allergic/Immunologic: Negative for environmental allergies and food allergies  Neurological: Positive for weakness  Negative for dizziness, tremors, seizures, speech difficulty, numbness and headaches  Hematological: Negative for adenopathy  Does not bruise/bleed easily  Psychiatric/Behavioral: Positive for dysphoric mood  Negative for behavioral problems, hallucinations and self-injury  Medications and orders     All medications reviewed and updated in Nursing Home EMR  Objective     Vitals: per nursing home records    Physical Exam  Constitutional:       Appearance: She is well-developed  HENT:      Head: Normocephalic and atraumatic  Right Ear: External ear normal       Left Ear: External ear normal       Mouth/Throat:      Pharynx: No oropharyngeal exudate  Eyes:      General: No scleral icterus  Right eye: No discharge  Left eye: No discharge  Conjunctiva/sclera: Conjunctivae normal       Pupils: Pupils are equal, round, and reactive to light  Neck:      Thyroid: No thyromegaly  Cardiovascular:      Rate and Rhythm: Normal rate  Rhythm irregular  Heart sounds: Normal heart sounds  No murmur heard  No gallop  Pulmonary:      Effort: Pulmonary effort is normal  No respiratory distress  Breath sounds: Normal breath sounds  No wheezing or rales  Abdominal:      General: Bowel sounds are normal       Palpations: Abdomen is soft  There is no mass  Tenderness: There is no guarding or rebound  Musculoskeletal:         General: No tenderness or deformity  Normal range of motion  Cervical back: Normal range of motion and neck supple  Right lower leg: Edema present  Left lower leg: Edema present  Lymphadenopathy:      Cervical: No cervical adenopathy  Skin:     General: Skin is warm and dry  Findings: No rash  Neurological:      Mental Status: She is alert and oriented to person, place, and time  Cranial Nerves: No cranial nerve deficit  Motor: Weakness present  Coordination: Coordination normal       Deep Tendon Reflexes: Reflexes are normal and symmetric  Reflexes normal    Psychiatric:         Behavior: Behavior normal          Thought Content: Thought content normal          Judgment: Judgment normal          Pertinent Laboratory/Diagnostic Studies: The following labs were reviewed please see chart or hospital paperwork for details  Space for lab dictation no new diagnostic studies    - Continue the current therapy plan and medication regimen    She needs to continue Lovenox because she failed Eliquis [place],   2/15/2023 4:41 PM

## 2023-03-07 RX ORDER — SODIUM CHLORIDE 9 MG/ML
100 INJECTION, SOLUTION INTRAVENOUS CONTINUOUS
Status: CANCELLED | OUTPATIENT
Start: 2023-03-07

## 2023-03-08 ENCOUNTER — ANESTHESIA (OUTPATIENT)
Dept: GASTROENTEROLOGY | Facility: HOSPITAL | Age: 83
End: 2023-03-08

## 2023-03-08 ENCOUNTER — HOSPITAL ENCOUNTER (OUTPATIENT)
Dept: GASTROENTEROLOGY | Facility: HOSPITAL | Age: 83
Setting detail: OUTPATIENT SURGERY
Discharge: HOME | End: 2023-03-08
Attending: INTERNAL MEDICINE

## 2023-03-08 ENCOUNTER — TELEPHONE (OUTPATIENT)
Dept: GASTROENTEROLOGY | Facility: CLINIC | Age: 83
End: 2023-03-08

## 2023-03-08 ENCOUNTER — ANESTHESIA EVENT (OUTPATIENT)
Dept: GASTROENTEROLOGY | Facility: HOSPITAL | Age: 83
End: 2023-03-08

## 2023-03-08 VITALS
DIASTOLIC BLOOD PRESSURE: 46 MMHG | RESPIRATION RATE: 16 BRPM | BODY MASS INDEX: 22.68 KG/M2 | SYSTOLIC BLOOD PRESSURE: 104 MMHG | TEMPERATURE: 97.7 F | OXYGEN SATURATION: 95 % | HEART RATE: 83 BPM | HEIGHT: 62 IN

## 2023-03-08 DIAGNOSIS — K50.111 CROHN'S DISEASE OF LARGE INTESTINE WITH RECTAL BLEEDING (HCC): ICD-10-CM

## 2023-03-08 DIAGNOSIS — R19.7 DIARRHEA, UNSPECIFIED TYPE: ICD-10-CM

## 2023-03-08 RX ORDER — SODIUM CHLORIDE 9 MG/ML
INJECTION, SOLUTION INTRAVENOUS CONTINUOUS PRN
Status: DISCONTINUED | OUTPATIENT
Start: 2023-03-08 | End: 2023-03-09

## 2023-03-08 RX ORDER — PREDNISONE 10 MG/1
TABLET ORAL
Qty: 140 TABLET | Refills: 0 | Status: SHIPPED | OUTPATIENT
Start: 2023-03-08 | End: 2023-05-03

## 2023-03-08 RX ORDER — SODIUM CHLORIDE, SODIUM LACTATE, POTASSIUM CHLORIDE, CALCIUM CHLORIDE 600; 310; 30; 20 MG/100ML; MG/100ML; MG/100ML; MG/100ML
INJECTION, SOLUTION INTRAVENOUS CONTINUOUS PRN
Status: DISCONTINUED | OUTPATIENT
Start: 2023-03-08 | End: 2023-03-08

## 2023-03-08 RX ORDER — PREDNISONE 10 MG/1
TABLET ORAL
Qty: 90 TABLET | Refills: 0 | Status: SHIPPED | OUTPATIENT
Start: 2023-03-08 | End: 2023-03-08 | Stop reason: SDUPTHER

## 2023-03-08 RX ORDER — PROPOFOL 10 MG/ML
INJECTION, EMULSION INTRAVENOUS AS NEEDED
Status: DISCONTINUED | OUTPATIENT
Start: 2023-03-08 | End: 2023-03-09

## 2023-03-08 RX ORDER — PHENYLEPHRINE HCL IN 0.9% NACL 1 MG/10 ML
SYRINGE (ML) INTRAVENOUS AS NEEDED
Status: DISCONTINUED | OUTPATIENT
Start: 2023-03-08 | End: 2023-03-09

## 2023-03-08 RX ADMIN — Medication 200 MCG: at 10:45

## 2023-03-08 RX ADMIN — SODIUM CHLORIDE: 0.9 INJECTION, SOLUTION INTRAVENOUS at 10:30

## 2023-03-08 RX ADMIN — PROPOFOL 40 MG: 10 INJECTION, EMULSION INTRAVENOUS at 10:46

## 2023-03-08 RX ADMIN — PROPOFOL 80 MG: 10 INJECTION, EMULSION INTRAVENOUS at 10:40

## 2023-03-08 NOTE — TELEPHONE ENCOUNTER
----- Message from Kenny Rosario MD sent at 3/8/2023  3:20 PM EST -----  Hi, she is not doing well and I would [facility]  She is at [facility] in [address] [place] [address]  I have spoken to her daughter and she is aware  Could you please call the nursing home and organize an appointment? Ideally within the next 2 to 3 weeks  Also, when I had the last appointment with her on 1/12, I ordered a bunch of blood work and stool tests that were supposed to be done at the nursing home  Can you ask if they did them? If no, can you fax orders? Last request: Can you update her daughter with the appointment time and date so she can be present? Thank you!

## 2023-03-08 NOTE — TELEPHONE ENCOUNTER
I called [facility], phone continued to ring, unable to leave a message  Will call tomorrow morning

## 2023-03-08 NOTE — H&P
History and Physical - [facility]  Eugenia Silverman 79 y o  female MRN: [id number]    HPI: Margie Florian is a 79y o  year old female who presents for colonoscopy for history of Crohn's disease  Last colonoscopy was in 8/2022  Last dose of enoxaparin yesterday morning  REVIEW OF SYSTEMS: Per the HPI, and otherwise unremarkable  Historical Information   Past Medical History:   Diagnosis Date   • Abnormal weight loss    • Allergic reaction    • Anxiety    • Breast cancer, right [facility]) 2011    right   • [facility] 2012   • Candidal vulvovaginitis    • Clotting disorder ([facility] ) Same as above   • Endometrial hyperplasia    • Epithelial cyst     benign, ovary   • GI (gastrointestinal bleed) Blood mixed with stool   • History of radiation therapy 2011    right breast cancer   • Hyperlipidemia    • Hypertension    • Internal hemorrhoids    • Knee tendonitis    • Ovarian cyst    • 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[facility])      Past Surgical History:   Procedure Laterality Date   • BILATERAL SALPINGOOPHORECTOMY      onset: 7/23/13   • BREAST BIOPSY Right 06/13/2006    benign   • BREAST BIOPSY Right 03/02/2011    malignant   • BREAST LUMPECTOMY Right 03/30/2011    malignant   • CATARACT EXTRACTION W/  INTRAOCULAR LENS IMPLANT Right     phacoemulsification   Onset: 10/27/14   • CHOLECYSTECTOMY     • COLONOSCOPY  2017    approx   • HYSTERECTOMY  Yes   • INTRAOPERATIVE RADIATION THERAPY ([facility])     • IR BIOPSY BONE  7/9/2021   • IR IVC FILTER PLACEMENT OPTIONAL/TEMPORARY  9/23/2022   • IR PE ENDOVASCULAR THERAPY  9/22/2022   • IR PICC PLACEMENT SINGLE LUMEN  8/19/2022   • IR PICC PLACEMENT SINGLE LUMEN  9/26/2022   • IR SUPRAPUBIC CATHETER PLACEMENT  12/29/2022   • SKIN LESION EXCISION Right     breast, single lesion   • TONSILLECTOMY AND ADENOIDECTOMY       Social History   Social History     Substance and Sexual Activity   Alcohol Use Not Currently    Comment: (history)     Social History     Substance and Sexual Activity   Drug Use No     Social History     Tobacco Use   Smoking Status Former   • Packs/day: 1 00   • Years: 30 00   • Pack years: 30 00   • Types: Cigarettes   • Start date: 55   • Quit date: 0   • Years since quittin 2   Smokeless Tobacco Never     Family History   Problem Relation Age of Onset   • Stomach cancer Mother    • No Known Problems Father    • Cervical cancer Sister    • No Known Problems Daughter    • No Known Problems Maternal Grandmother    • No Known Problems Maternal Grandfather    • No Known Problems Paternal Grandmother    • No Known Problems Paternal Grandfather    • Colon polyps Neg Hx    • Colon cancer Neg Hx        Meds/Allergies       Current Outpatient Medications:   •  Cholecalciferol (VITAMIN D3) 2000 units capsule  •  diphenoxylate-atropine (LOMOTIL) 2 5-0 025 mg per tablet  •  enoxaparin (LOVENOX) 60 mg/0 6 mL  •  loperamide (IMODIUM) 2 mg capsule  •  midodrine (PROAMATINE) 2 5 mg tablet  •  mirtazapine (REMERON) 7 5 MG tablet  •  pantoprazole (PROTONIX) 40 mg tablet  •  simvastatin (ZOCOR) 10 mg tablet  •  acetaminophen (TYLENOL) 325 mg tablet  •  folic acid (FOLVITE) 1 mg tablet  •  metoprolol succinate (TOPROL-XL) 25 mg 24 hr tablet  No current facility-administered medications for this encounter      Facility-Administered Medications Ordered in Other Encounters:   •  lactated ringers infusion, , Intravenous, Continuous PRN, New Bag at 23 1030    Allergies   Allergen Reactions   • Sulfa Antibiotics    • Pneumococcal Polysaccharide Vaccine Rash and Edema       Objective     /60   Pulse 89   Temp 97 6 °F (36 4 °C) (Temporal)   Resp 14   Ht 5' 2" (1 575 m)   LMP  (LMP Unknown)   SpO2 99%   BMI 22 68 kg/m²     PHYSICAL EXAM    Gen: NAD AAOx3  Head: Normocephalic, Atraumatic  CV: S1S2 RRR no m/r/g  CHEST: Clear b/l no c/r/w  ABD: soft, +BS NT/ND  EXT: no edema    ASSESSMENT/PLAN:  This is a 79y o  year old female here for colonoscopy, and she is stable and optimized for her procedure

## 2023-03-08 NOTE — ANESTHESIA POSTPROCEDURE EVALUATION
Post-Op Assessment Note    CV Status:  Stable  Pain Score: 0    Pain management: adequate     Mental Status:  Alert and awake   Hydration Status:  Euvolemic   PONV Controlled:  Controlled   Airway Patency:  Patent      Post Op Vitals Reviewed: Yes      Staff: Anesthesiologist, CRNA         No notable events documented      BP   107/54   Temp      Pulse  82   Resp   18   SpO2   99

## 2023-03-08 NOTE — ANESTHESIA PREPROCEDURE EVALUATION
Procedure:  COLONOSCOPY    Relevant Problems   ANESTHESIA (within normal limits)      CARDIO   (+) Mixed hyperlipidemia   (+) Primary hypertension   (+) Single subsegmental pulmonary embolism without acute cor pulmonale (HCC)      GI/HEPATIC   (+) Rectal bleeding      /RENAL   (+) Chronic kidney disease (CKD) stage G3a/A1, moderately decreased glomerular filtration rate (GFR) between 45-59 mL/min/1 73 square meter and albuminuria creatinine ratio less than 30 mg/g (HCC)      GYN   (+) Metastatic breast cancer (HCC)      HEMATOLOGY   (+) Anemia      NEURO/PSYCH   (+) Anxiety      PULMONARY   (-) Sleep apnea   (-) Smoking   (-) URI (upper respiratory infection)      Digestive   (+) Ulcerative pancolitis with rectal bleeding (HCC)      Physical Exam    Airway    Mallampati score: I  TM Distance: >3 FB  Neck ROM: limited     Dental   Comment: Missing, none loose,     Cardiovascular      Pulmonary      Other Findings       Lab Results   Component Value Date    WBC 6 40 10/19/2022    HGB 7 2 (L) 10/20/2022     10/19/2022     Lab Results   Component Value Date    SODIUM 145 10/19/2022    K 3 7 10/19/2022    BUN 9 10/19/2022    CREATININE 0 42 (L) 10/19/2022    EGFR 95 10/19/2022    GLUCOSE 60 (L) 09/23/2022     Lab Results   Component Value Date    HGBA1C 5 9 (H) 07/31/2020         Anesthesia Plan  ASA Score- 3     Anesthesia Type- IV sedation with anesthesia with ASA Monitors  Additional Monitors:   Airway Plan:           Plan Factors-    Chart reviewed  Existing labs reviewed  Patient summary reviewed  Patient is not a current smoker  Induction- intravenous  Postoperative Plan-     Informed Consent- Anesthetic plan and risks discussed with patient and daughter  I personally reviewed this patient with the CRNA  Discussed and agreed on the Anesthesia Plan with the CRNA  Nura Livingston

## 2023-03-09 NOTE — TELEPHONE ENCOUNTER
I called and spoke with Daniella Burns from [facility]  Discussed scheduling appointment for 3/15 or 3/31 at 4:20pm  Daniella Burns states will look into transportation given the later time and will call back our office today or tomorrow  Labs ordered from last visit 1/12 not completed  Labs and stool tests from 1/12 faxed to [phone] [address] Staff states will draw labs 3/10/23 and obtain stool samples  I spoke with pts daughter Gabino Sepulveda to update will be hearing from [address] later today or tomorrow regarding appointment date and time

## 2023-03-10 NOTE — TELEPHONE ENCOUNTER
Informed Pt Daughter Mike Martinez of appointment time  She had just received a voicemail from [address] her as well

## 2023-03-15 ENCOUNTER — OFFICE VISIT (OUTPATIENT)
Dept: GASTROENTEROLOGY | Facility: CLINIC | Age: 83
End: 2023-03-15

## 2023-03-15 VITALS
WEIGHT: 124 LBS | OXYGEN SATURATION: 99 % | HEIGHT: 61 IN | HEART RATE: 69 BPM | DIASTOLIC BLOOD PRESSURE: 62 MMHG | SYSTOLIC BLOOD PRESSURE: 107 MMHG | BODY MASS INDEX: 23.41 KG/M2 | TEMPERATURE: 98.7 F

## 2023-03-15 DIAGNOSIS — K51.011 ULCERATIVE PANCOLITIS WITH RECTAL BLEEDING (HCC): Primary | ICD-10-CM

## 2023-03-15 RX ORDER — ENOXAPARIN SODIUM 100 MG/ML
INJECTION SUBCUTANEOUS
COMMUNITY
Start: 2023-03-06

## 2023-03-15 RX ORDER — ONDANSETRON 4 MG/1
TABLET, ORALLY DISINTEGRATING ORAL
COMMUNITY
Start: 2023-02-23

## 2023-03-15 RX ORDER — METOPROLOL SUCCINATE 50 MG/1
TABLET, EXTENDED RELEASE ORAL
COMMUNITY
Start: 2023-02-03

## 2023-03-15 NOTE — Clinical Note
Hi, they agree to starting Stelara  They would like her to have the infusion in the [facility] area  Thank you

## 2023-03-15 NOTE — PROGRESS NOTES
Gastroenterology Outpatient Follow-up - Crohn's Disease  Colton Silverman 79 y o  female MRN: [id number]  Encounter: [id number]    Irlanda Acuna is a 79 y o  female with Ulcerative colitis  Symptom onset:  2022  Diagnosis:  Crohns disease  Year of diagnosis:  2022    IBD Summary: Shelley Silverman is metastatic breast cancer and developed severe colitis and bleeding in 9/2022  She was hospitalized for several weeks with pulmonary embolisms, COVID, C  difficile, and hematochezia  She ultimately started infliximab therapy for possible IBD, however did not improve and treatments were discontinued after several months  She had repeat colonoscopy in 3/2023 which showed Jarrett 3 colitis to the hepatic flexure  She was then started on prednisone and improved clinically  We are discussing switch to ustekinumab        Ulcerative Colitis Summary  Macroscopic extent of diseases: no value  Microscopic extent of diseases:  no value  Has the patient ever been hospitalized for severe disease:          Surgical History  Number of IBD surgeries: 0      First IBD surgery:    Most Recent IBD surgery:    Esophageal:  0    Gastroduodenal:  0    Small bowel resection(s):  0    Ileocolonic resection(s): 0      Colonic resection(s):  0    Ileostomy or colostomy:  no previous ostomy    Complete colectomy:  No         Medications     Year last used Reason for discontinuation   Corticosteroids prior   2022       Thiopurines   never         Methotrexate   never         Infliximab prior     [place]     Adalimumab   never         Certolizumab   never         Golimumab   never         Natalizumab   never         Mesalamine   never         Sulfasalzine   never         Vedolizumab   never         Ustekinumab   never         Tofacitinib   never         Other biologic   never             Extraintestinal Manifestations    IBD-associated arthropathy No    Uveitis No    Oral aphthous ulcers No   Erythema nodosum No    Pyoderma gangrenosum No    Primary sclerosing cholangitis No    Thrombotic complications No          Cancer / Dysplasia History  History of IBD-associated dysplasia: none    Date of diagnosis (Year):     History of colorectal cancer: No   History of cervical dysplasia: No   History of skin cancer: none         Laboratory Data   Most recent (date) Result   PPD   unknown   Quanitferon gold 9/1/2022 indeterminate   TPMT   unknown   Hepatitis A   unknown   HBsAb 8/31/2022 negative   HBcAb   unknown   HBsAg 8/31/2022     HCV Ab   unknown       Imaging / Diagnostic Procedures   Most recent (date) Findings   Colonoscopy or sigmoidoscopy #1 8/20/2022            Ulcers/Erosions  large           Strictures  none           Stricture Severity  N/A           Endoscopic Score Jarrett Score:  3 Rutgeert's:  N/A            Findings  Severe colitis into mid sigmoid colon with deep ulcerations  Inflammatory bowel disease? Infectious (CMV)? Pathology  A  Sigmoid colon (biopsy): - Active colitis  - No viral inclusions identified on CMV immunohistochemistry stain  - Negative for dysplasia    Colonoscopy or sigmoidoscopy #2 3/8/2023            Ulcers/Erosions  small              Strictures  none              Stricture Severity  N/A           Endoscopic Score Jarrett Score:  3 Rugeert's:  N/A           Findings  (1) The terminal ileum, ileocecal valve and cecum appeared normal   (2) Mild friable mucosa with loss of vascular pattern in the ascending colon  (3) Severe edematous, erythematous, friable and ulcerated mucosa with erosion, loss of folds and loss of vascular pattern in the hepatic flexure, transverse colon, splenic flexure, descending colon, sigmoid colon, rectosigmoid and rectum, consistent with IBD; performed cold forceps biopsy  PATH:           Pathology      EGD       Small bowel follow-through       CT enterography       CT without enterography 9/30/2022 1    Findings of worsening proctocolitis of the sigmoid and rectum with new perirectal fat stranding and presacral edema  2   Improving inflammation of the transverse and descending colon, with improved appearance of several small outpouchings from the mid descending colon which likely represented contained perforations  MRI enterography       Capsule study       DEXA scan   Lowest Z-score:      CXR (for TB)           HPI:   Interval History:  3/15/2023 Follow up  Since last visit, she discontinued infliximab because she and her daughter felt that it was not working  She had colonoscopy on 3/8/2023 which showed severe Jarrett 3 disease from the rectum to the hepatic flexure  Unfortunately, biopsies are pending at this time  Blood work was done on 3/10 and was significant for hemoglobin of 8 7 with normal MCV, albumin 1 5, CRP 13 2, ferritin 100, normal LFTs, normal kidney function  Prior to the colonoscopy, she was incontinent and had diarrhea 3-4 times per day  She was started on prednisone after the procedure and since starting prednisone, has had improvement in stool frequency, with some formed stools  However, she remains on 10  She feel surprising well and of the since starting the steroids  She has stage IV breast cancer with no plans for further treatment  1/10/2023 Initial visit after hospital discharge  David Plascencia presents for follow-up after hospital admission in September during which she was diagnosed with IBD and started on infliximab  This is a telemedicine appointment and she is on the phone with her daughter  She is an 26-year-old woman with metastatic breast cancer and CKD who was admitted with a saddle pulmonary embolism in September  During the admission we were consulted for bloody diarrhea and her colonoscopy showed severe colitis with deep ulcers which were negative for CMV  CT showed severe proctitis and left-sided colitis with possible contained perforations  She was positive for C  difficile by PCR only and had no improvement with steroids and vancomycin    She was ultimately treated with infliximab as an inpatient and has continued following discharge  Her hospital admission was further complicated by COVID  She has been treated with various chemotherapy agents, including Femara and Miguel Holland until 4/2022, and Verzenio and Femara until 7/2022  She associates the onset of diarrhea with Kisqali therapy but had been off this medication for months prior to her admission in September  She had partial improvement with infliximab and has remained on 5 mg/kg every 8 weeks since discharge  She is at a rehab facility  She continues to have diarrhea and loose stools, although not as frequent as in the hospital   She has 3-4 bowel movements per day, which are loose to soft, without bleeding  No abdominal pain  She has urgency and wears diapers  According to her and her daughter, the diarrhea is no longer improving and they would like to stop infliximab  She eats mostly soups due to poor appetite, but her weight is stable  There are no plans for further chemotherapy  Appetite much improved since starting steroids  She is on Lovenox  Has leg edema  Sweetie Gilliland reports the following symptoms over the last 7 days:    Stool Frequency 3-4 stools/day more than normal   Average stools per day: 4   Average liquid stools per day: 0   Consistency of bowel: soft or semi-formed   Awakening from sleep to move bowels? Yes   Urgency severe fecal urgency   Visible blood in stool? none   Abdominal pain? none   General Wellbeing? generally well     Sweetie Gilliland also reports the following symptoms in the last month:    Leakage of stool while sleeping? Yes   Leakage of stool while awake?  Yes       REVIEW OF SYSTEMS:  During the last 7 days, Sweetie Gilliland experienced the following symptoms:  Unintentional Weight Loss (in the last month) No   Fever No   Eye irritation No   Mouth sores No   Sore throat No   Chest pain No   Shortness of breath No   Numbness or tingling in hands or feet No   Skin rash No   Pain or swelling in joints No   Bruising or bleeding No   Felt depressed or blue No   Fatigue No   Dysuria No   Please see HPI for additional pertinent review of systems; otherwise remainder of ROS was unremarkable    MEDICATIONS:    Current Outpatient Medications:   •  acetaminophen (TYLENOL) 325 mg tablet  •  Cholecalciferol (VITAMIN D3) 2000 units capsule  •  diphenoxylate-atropine (LOMOTIL) 2 5-0 025 mg per tablet  •  enoxaparin (LOVENOX) 60 mg/0 6 mL  •  metoprolol succinate (TOPROL-XL) 50 mg 24 hr tablet  •  midodrine (PROAMATINE) 2 5 mg tablet  •  mirtazapine (REMERON) 7 5 MG tablet  •  ondansetron (ZOFRAN-ODT) 4 mg disintegrating tablet  •  pantoprazole (PROTONIX) 40 mg tablet  •  predniSONE 10 mg tablet  •  simvastatin (ZOCOR) 10 mg tablet  •  enoxaparin (LOVENOX) 30 JI/4 5 mL  •  folic acid (FOLVITE) 1 mg tablet  •  loperamide (IMODIUM) 2 mg capsule  •  metoprolol succinate (TOPROL-XL) 25 mg 24 hr tablet    ALLERGIES:  Allergies   Allergen Reactions   • Sulfa Antibiotics    • Pneumococcal Polysaccharide Vaccine Rash and Edema       OBJECTIVE:  /62 (BP Location: Left arm, Patient Position: Sitting, Cuff Size: Standard)   Pulse 69   Temp 98 7 °F (37 1 °C) (Tympanic)   Ht 5' 1" (1 549 m)   Wt 56 2 kg (124 lb)   LMP  (LMP Unknown)   SpO2 99%   BMI 23 43 kg/m²      PHYSICAL EXAM:    General Appearance:   Alert, cooperative, no distress   HEENT:   Normocephalic, atraumatic, anicteric  Neck:  Supple, symmetrical, trachea midline   Lungs:   Clear to auscultation bilaterally; no rales, rhonchi or wheezing; respirations unlabored    Heart[de-identified]   Regular rate and rhythm; no murmur, rub, or gallop  Abdomen:   Soft, non-distended; normal bowel sounds  Abdominal exam was notable for no tenderness with no mass  Genitalia:   Deferred    Rectal:   Perirectal assessment was not performed     Extremities:  No cyanosis or clubbing, 3+ pitting edema    Pulses:  2+ and symmetric    Skin:  No jaundice, rashes, or lesions    Lymph nodes:  No palpable cervical lymphadenopathy        Lab Results   Component Value Date    WBC 6 40 10/19/2022    HGB 7 2 (L) 10/20/2022    HCT 24 3 (L) 10/20/2022     (H) 10/19/2022     10/19/2022     Lab Results   Component Value Date     09/08/2015    SODIUM 145 10/19/2022    K 3 7 10/19/2022     (H) 10/19/2022    CO2 29 10/19/2022    ANIONGAP 6 09/08/2015    AGAP 3 (L) 10/19/2022    BUN 9 10/19/2022    CREATININE 0 42 (L) 10/19/2022    GLUC 82 10/19/2022    GLUF 109 (H) 08/02/2022    CALCIUM 7 8 (L) 10/19/2022    AST 17 10/14/2022    ALT 24 10/14/2022    ALKPHOS 84 10/14/2022    PROT 7 3 09/08/2015    TP 4 5 (L) 10/14/2022    BILITOT 0 66 09/08/2015    TBILI 0 28 10/14/2022    EGFR 95 10/19/2022     Lab Results   Component Value Date    CRP <3 0 10/15/2022     Lab Results   Component Value Date    IQZZBGLK46 705 10/19/2022     Lab Results   Component Value Date    FERRITIN 1,236 (H) 10/19/2022     3/10/23  WBC 5 1  Hgb 8 7  MCV 85  Plt 245    Na 142  K 3 4  Cr 0 88  BUN 8    AP 54  Alb 1 5  Tb 0 2  ALT 5  AST 6    CRP 13 2  Ferritin 100    VitD 53      ASSESSMENT AND PLAN:    Shama Drake has a history of macroscopic and microscopic ulcerative colitis diagnosed in 2022  Current medical therapy is with prednisone  My global assessment is that the clinical disease is currently    The 6-point Jarrett score was 2 and the 9-point Jarrett score was 4  The short CDAI was 44  Shama Drake A Clunk metastatic breast cancer which was treated with various medications, recent PE in the setting of COVID infection, C  difficile infection, and now severe colitis  She was treated with infliximab but apparently had no clinical improvement and it was discontinued  We have had an opportunity to check levels and adjust the dosing  Her recent colonoscopy confirms severe disease up to the hepatic flexure  Biopsies are still pending    I had also ordered stool tests which apparently were drawn at the nursing home but I do not have the results  She feels better with steroids but her disease remains severe  I had a long discussion with Ms Silverman and her daughter today  They blame her anticancer medications were causing her diarrhea  They expressed strong skepticism towards medications and the pharmaceutical industry in general   They question the utility or safety of further treatments and expressed concern that the medications could make Ms Silverman feel even worse  They also made it clear that IV treatments are not an option moving forward because of difficulty with IV access and trips to the infusion center  They inquired about oral treatment options  We discussed the followin  While her initial diarrhea may have been precipitated by the cancer treatment, she has not been off these medications for many months and clearly has severe colitis that cannot be attributed to meds  2   Her colitis is severe and we reviewed the pictures from her colonoscopy  Prednisone is making her feel better but is not a good long-term option and is likely masking symptoms rather than achieving mucosal healing  3   Biologic therapy is safe and serum side effects would be unusual   That is especially true for for non anti-TNF Biologics such as vedolizumab and ustekinumab  4   Oral agents such as tofacitinib and upadacitinib are available, but given her history of thrombotic complications and metastatic cancer, I do not recommend using them  5   Since she does not want to have IV infusions, ustekinumab may be a good option  It will require 1 IV infusion in the beginning followed by injections every 8 weeks  It is overall a very safe medication and I do not expect side effects  They understand that there is no guarantee that ustekinumab will work  They are willing to give it a try  6   We will attempt to get results from stool test that worse collected at the nursing home    7   We should begin tapering prednisone by 5 mg/week  8   We will await biopsies from her recent colonoscopy  9   She will continue Lovenox injections  10   Return in 3 months      I have spent a total time of 45  minutes on 03/18/23 in caring for this patient including Risks and benefits of tx options, Patient and family education, Risk factor reductions, Documenting in the medical record and Reviewing / ordering tests, medicine, procedures    Health Maintenance Recommendations:  Vaccines & Infections  · COVID-19 vaccination is recommended when eligible  There is no evidence that the COVID-19 vaccine would cause an IBD flare  · Avoid live vaccines if on immunosuppressive therapy  '  · Yearly influenza vaccine (flu shot)  · Pneumonia vaccines  These include Prevnar 13, followed by Pneumovax at least 8 weeks later  A Pneumovax booster should be given 5 years after  · Shingrix vaccine - series of 2 injections  · If not immune to measles mumps or rubella, MMR vaccine is recommended  However, this is a live vaccine and should be given prior to immunosuppressive therapy  · HPV vaccination as per national guidelines  · Hepatitis A and B vaccinations if not previously vaccinated  · Testing for tuberculosis with QuantiFERON Gold blood test and/or chest xray prior to starting immunosuppressive medications, and then annually    Cancer screening  · Dysplasia surveillance for colorectal cancer  Colonoscopy in all patients with extensive colitis (more than 1/3 of the colon involved) who had disease for at least 8 or more years  Repeat colonoscopy approximately every 12-24 months  In patients with concurrent primary sclerosing cholangitis, history of dysplasia, or family history of colon cancer, repeat colonoscopy annually  · Females: Pap smear annually, especially for woman on immunosuppression    · Annual dermatologic/skin exam in all patients with IBD, especially those on immunosuppression including anti-TNF therapy and/or thiopurines      Miscellaneous  · DEXA scan, once off steroids for 3 months  · Depression screening recommended annually  · Routine dental and ophthalmology examinations    Problem List Items Addressed This Visit    None      Terri Werner MD

## 2023-03-20 ENCOUNTER — TELEPHONE (OUTPATIENT)
Dept: GASTROENTEROLOGY | Facility: CLINIC | Age: 83
End: 2023-03-20

## 2023-03-20 NOTE — TELEPHONE ENCOUNTER
----- Message from Elijah Lipscomb MD sent at 3/18/2023  1:15 PM EDT -----  Hi, she had stool tests done at the nursing home, can you please track down the results? Thanks

## 2023-03-20 NOTE — TELEPHONE ENCOUNTER
----- Message from Breanne Mcginnis MD sent at 3/18/2023  1:13 PM EDT -----  Hi, they agree to starting Stelara  They would like her to have the infusion in the [facility] area  Thank you

## 2023-03-22 ENCOUNTER — TELEPHONE (OUTPATIENT)
Dept: GASTROENTEROLOGY | Facility: CLINIC | Age: 83
End: 2023-03-22

## 2023-03-22 DIAGNOSIS — K51.011 ULCERATIVE PANCOLITIS WITH RECTAL BLEEDING (HCC): Primary | ICD-10-CM

## 2023-03-22 DIAGNOSIS — A04.72 C. DIFFICILE COLITIS: ICD-10-CM

## 2023-03-22 RX ORDER — VANCOMYCIN HYDROCHLORIDE 125 MG/1
125 CAPSULE ORAL 4 TIMES DAILY
Qty: 84 CAPSULE | Refills: 0 | Status: SHIPPED | OUTPATIENT
Start: 2023-03-22 | End: 2023-04-12

## 2023-03-22 NOTE — TELEPHONE ENCOUNTER
I spoke with Yohan Patience to relay provider message regarding treatment of vancomycin 125 mg every 6 hours for 21 days after Dr Lalo Daugherty was notified of stool specimen results  Vancomycin prescription had already been sent to [facility] and being reviewed by Nurse Practitioner

## 2023-03-27 ENCOUNTER — NURSING HOME VISIT (OUTPATIENT)
Dept: FAMILY MEDICINE CLINIC | Facility: CLINIC | Age: 83
End: 2023-03-27

## 2023-03-27 ENCOUNTER — TELEPHONE (OUTPATIENT)
Dept: GASTROENTEROLOGY | Facility: CLINIC | Age: 83
End: 2023-03-27

## 2023-03-27 DIAGNOSIS — C79.51 SECONDARY MALIGNANT NEOPLASM OF BONE (HCC): ICD-10-CM

## 2023-03-27 DIAGNOSIS — I26.92 ACUTE SADDLE PULMONARY EMBOLISM, UNSPECIFIED WHETHER ACUTE COR PULMONALE PRESENT (HCC): Primary | ICD-10-CM

## 2023-03-27 DIAGNOSIS — K92.1 HEMATOCHEZIA: ICD-10-CM

## 2023-03-27 DIAGNOSIS — E43 SEVERE PROTEIN-CALORIE MALNUTRITION (HCC): ICD-10-CM

## 2023-03-27 DIAGNOSIS — K52.1 TOXIC GASTROENTERITIS AND COLITIS: ICD-10-CM

## 2023-03-27 DIAGNOSIS — K51.011 ULCERATIVE PANCOLITIS WITH RECTAL BLEEDING (HCC): ICD-10-CM

## 2023-03-27 DIAGNOSIS — C50.919 METASTATIC BREAST CANCER: ICD-10-CM

## 2023-03-27 DIAGNOSIS — I10 PRIMARY HYPERTENSION: ICD-10-CM

## 2023-03-27 NOTE — TELEPHONE ENCOUNTER
Called and spoke with patients daughter Monica Sargent  Reviewed information and she stated patient is a permanent residence of [facility]   Their phone number is [phone]

## 2023-03-27 NOTE — PROGRESS NOTES
[address]  [facility]ility: [facility]    NAME: David Silverman  AGE: 79 y o  SEX: female    DATE OF ENCOUNTER: 3/27/2023    Code status:  DNR w/ Hospitalization    Assessment and Plan     1  Acute saddle pulmonary embolism, unspecified whether acute cor pulmonale present ([facility] )    2  Toxic gastroenteritis and colitis    3  Ulcerative pancolitis with rectal bleeding ([facility] )    4  Primary hypertension    5  Secondary malignant neoplasm of bone ([facility] )    6  Metastatic breast cancer ([place] [address] )    7  Hematochezia    8  Severe protein-calorie malnutrition ([place] [address] )        All medications and routine orders were reviewed and updated as needed  Plan discussed with: Patient    Chief Complaint     Interim evaluation    History of Present Illness     Reports that her bowel movements are a little bit more formed  Her energy level is good  Her appetite is voracious  She denies Clarita  Her mood remains upbeat  She is content and happy  She denies any dyspnea    The following portions of the patient's history were reviewed and updated as appropriate: current medications, past family history, past medical history, past social history, past surgical history and problem list     Allergies: Allergies   Allergen Reactions   • Sulfa Antibiotics    • Pneumococcal Polysaccharide Vaccine Rash and Edema       Review of Systems     Review of Systems   Constitutional: Negative for activity change, appetite change, chills, diaphoresis, fatigue and unexpected weight change  HENT: Negative for congestion, ear discharge, ear pain, hearing loss, nosebleeds and rhinorrhea  Eyes: Negative for pain, redness, itching and visual disturbance  Respiratory: Negative for cough, choking, chest tightness and shortness of breath  Cardiovascular: Negative for chest pain and leg swelling  Gastrointestinal: Positive for blood in stool   Negative for abdominal pain, constipation, diarrhea and nausea  Endocrine: Negative for cold intolerance, polydipsia and polyphagia  Genitourinary: Negative for dysuria, frequency, hematuria and urgency  Musculoskeletal: Negative for arthralgias, back pain, gait problem, joint swelling, neck pain and neck stiffness  Skin: Negative for color change and rash  Allergic/Immunologic: Negative for environmental allergies and food allergies  Neurological: Positive for weakness  Negative for dizziness, tremors, seizures, speech difficulty, numbness and headaches  Hematological: Negative for adenopathy  Does not bruise/bleed easily  Psychiatric/Behavioral: Negative for behavioral problems, dysphoric mood, hallucinations and self-injury  Medications and orders     All medications reviewed and updated in Nursing Home EMR  Objective     Vitals: per nursing home records    Physical Exam  Constitutional:       Appearance: She is well-developed  HENT:      Head: Normocephalic and atraumatic  Right Ear: External ear normal       Left Ear: External ear normal       Mouth/Throat:      Pharynx: No oropharyngeal exudate  Eyes:      General: No scleral icterus  Right eye: No discharge  Left eye: No discharge  Conjunctiva/sclera: Conjunctivae normal       Pupils: Pupils are equal, round, and reactive to light  Neck:      Thyroid: No thyromegaly  Cardiovascular:      Rate and Rhythm: Normal rate and regular rhythm  Heart sounds: Normal heart sounds  No murmur heard  No gallop  Pulmonary:      Effort: Pulmonary effort is normal  No respiratory distress  Breath sounds: Normal breath sounds  No wheezing or rales  Abdominal:      General: Bowel sounds are normal       Palpations: Abdomen is soft  There is no mass  Tenderness: There is no guarding or rebound  Musculoskeletal:         General: No tenderness or deformity  Normal range of motion  Cervical back: Normal range of motion and neck supple  Lymphadenopathy:      Cervical: No cervical adenopathy  Skin:     General: Skin is warm and dry  Findings: Bruising present  No rash  Neurological:      Mental Status: She is alert and oriented to person, place, and time  Cranial Nerves: No cranial nerve deficit  Motor: Weakness present  Coordination: Coordination normal       Deep Tendon Reflexes: Reflexes are normal and symmetric  Reflexes normal    Psychiatric:         Behavior: Behavior normal          Thought Content: Thought content normal          Judgment: Judgment normal          Pertinent Laboratory/Diagnostic Studies: The following studies were reviewed please see chart or hospital paperwork for details      Space for lab dictation C  difficile toxin positive    - Continue the current therapy plan    Moi Murillo DO  3/27/2023 10:23 AM

## 2023-03-27 NOTE — TELEPHONE ENCOUNTER
I spoke with patient's daughter Yuliana Turcios  She is concerned with length of antibiotic use of 21 days for Cdiff infection  Discussed with the daughter treatment for CDiff is 21 days to eradicate infection  Pt's daughter agreeable with antibiotic length as it was ordered by Dr Parish Oconnor  Pt's daughter asking about probiotic

## 2023-03-27 NOTE — TELEPHONE ENCOUNTER
Fátima Murillo- I was on phone with patients daughter Maday Paula regarding her infusion  She had questions/concerns with mom being on antibiotics  She stated she was started on Vancomycin 4 times daily for 21 days  She does not really like mom to be on for that long  I did advise I would have you reach out to discuss and answer questions  Please call Maday Paula at [phone]

## 2023-03-28 NOTE — TELEPHONE ENCOUNTER
I spoke with the patient's daughter Jesika Villatoro  Relayed Dr Ma Mask message does not recommended probiotic for the patient  Daughter verbalized understanding

## 2023-03-28 NOTE — TELEPHONE ENCOUNTER
Called and spoke with asha esparza can be scheduled and information faxed [facility] at [phone]

## 2023-03-29 DIAGNOSIS — K51.011 ULCERATIVE PANCOLITIS WITH RECTAL BLEEDING (HCC): Primary | ICD-10-CM

## 2023-03-29 DIAGNOSIS — M89.9 LYTIC LESION OF BONE ON X-RAY: ICD-10-CM

## 2023-03-29 DIAGNOSIS — C50.919 METASTATIC BREAST CANCER: ICD-10-CM

## 2023-03-29 RX ORDER — SODIUM CHLORIDE 9 MG/ML
20 INJECTION, SOLUTION INTRAVENOUS ONCE
OUTPATIENT
Start: 2023-04-03

## 2023-04-07 ENCOUNTER — NURSING HOME VISIT (OUTPATIENT)
Dept: FAMILY MEDICINE CLINIC | Facility: CLINIC | Age: 83
End: 2023-04-07

## 2023-04-07 DIAGNOSIS — C79.51 SECONDARY MALIGNANT NEOPLASM OF BONE (HCC): ICD-10-CM

## 2023-04-07 DIAGNOSIS — C50.919 PRIMARY MALIGNANT NEOPLASM OF BREAST WITH METASTASIS (HCC): ICD-10-CM

## 2023-04-07 DIAGNOSIS — K51.011 ULCERATIVE PANCOLITIS WITH RECTAL BLEEDING (HCC): Primary | ICD-10-CM

## 2023-04-07 DIAGNOSIS — E43 SEVERE PROTEIN-CALORIE MALNUTRITION (HCC): ICD-10-CM

## 2023-04-07 DIAGNOSIS — I26.93 SINGLE SUBSEGMENTAL PULMONARY EMBOLISM WITHOUT ACUTE COR PULMONALE (HCC): ICD-10-CM

## 2023-04-07 NOTE — PROGRESS NOTES
[address]  [facility]ility: [facility]    NAME: Janell Silverman  AGE: 79 y o  SEX: female    DATE OF ENCOUNTER: 4/7/2023    Code status:  DNR w/ Hospitalization    Assessment and Plan     1  Ulcerative pancolitis with rectal bleeding ([facility] )    2  Secondary malignant neoplasm of bone ([facility] )    3  Single subsegmental pulmonary embolism without acute cor pulmonale (HCC)    4  Primary malignant neoplasm of breast with metastasis ([facility] )    5  Severe protein-calorie malnutrition ([facility] )        All medications and routine orders were reviewed and updated as needed  Plan discussed with: Family member    Chief Complaint     Interim evaluation    History of Present Illness     The patient reports that she is having 1 formed bowel movement after taking Imodium once daily on a regular basis  She denies any dysuria  She has no dyspnea  She has had no recent falls  She ambulates well with her walker  Her appetite is vigorous  Her family is involved and sees her on a regular basis  The following portions of the patient's history were reviewed and updated as appropriate: current medications, past family history, past medical history, past social history, past surgical history and problem list     Allergies: Allergies   Allergen Reactions   • Sulfa Antibiotics    • Pneumococcal Polysaccharide Vaccine Rash and Edema       Review of Systems     Review of Systems   Constitutional: Negative for activity change, appetite change, chills, diaphoresis, fatigue and unexpected weight change  HENT: Negative for congestion, ear discharge, ear pain, hearing loss, nosebleeds and rhinorrhea  Eyes: Negative for pain, redness, itching and visual disturbance  Respiratory: Negative for cough, choking, chest tightness and shortness of breath  Cardiovascular: Negative for chest pain and leg swelling  Gastrointestinal: Positive for abdominal pain and diarrhea   Negative for blood in stool, constipation and nausea  Endocrine: Negative for cold intolerance, polydipsia and polyphagia  Genitourinary: Negative for dysuria, frequency, hematuria and urgency  Musculoskeletal: Positive for gait problem  Negative for arthralgias, back pain, joint swelling, neck pain and neck stiffness  Skin: Negative for color change and rash  Allergic/Immunologic: Negative for environmental allergies and food allergies  Neurological: Positive for weakness  Negative for dizziness, tremors, seizures, speech difficulty, numbness and headaches  Hematological: Negative for adenopathy  Does not bruise/bleed easily  Psychiatric/Behavioral: Negative for behavioral problems, dysphoric mood, hallucinations and self-injury  Medications and orders     All medications reviewed and updated in Nursing Home EMR  Objective     Vitals: per nursing home records    Physical Exam  Constitutional:       Appearance: She is well-developed  She is ill-appearing  HENT:      Head: Normocephalic and atraumatic  Right Ear: External ear normal       Left Ear: External ear normal       Mouth/Throat:      Pharynx: No oropharyngeal exudate  Eyes:      General: No scleral icterus  Right eye: No discharge  Left eye: No discharge  Conjunctiva/sclera: Conjunctivae normal       Pupils: Pupils are equal, round, and reactive to light  Neck:      Thyroid: No thyromegaly  Cardiovascular:      Rate and Rhythm: Normal rate  Rhythm irregular  Heart sounds: Murmur heard  No gallop  Pulmonary:      Effort: Pulmonary effort is normal  No respiratory distress  Breath sounds: Normal breath sounds  No wheezing or rales  Abdominal:      General: Bowel sounds are normal       Palpations: Abdomen is soft  There is no mass  Tenderness: There is no guarding or rebound  Musculoskeletal:         General: No tenderness or deformity  Normal range of motion        Cervical back: Normal range of motion and neck supple  Lymphadenopathy:      Cervical: No cervical adenopathy  Skin:     General: Skin is warm and dry  Findings: No rash  Neurological:      Mental Status: She is alert and oriented to person, place, and time  Cranial Nerves: No cranial nerve deficit  Coordination: Coordination normal       Deep Tendon Reflexes: Reflexes are normal and symmetric  Reflexes normal    Psychiatric:         Behavior: Behavior normal          Thought Content: Thought content normal          Judgment: Judgment normal          Pertinent Laboratory/Diagnostic Studies: The following studies were reviewed please see chart or hospital paperwork for details  Space for lab dictation no new diagnostic studies    - Okay to continue the once daily Imodium  Continue with supportive care      Santiago Benavides DO  4/7/2023 2:54 PM

## 2023-05-10 ENCOUNTER — NURSING HOME VISIT (OUTPATIENT)
Dept: FAMILY MEDICINE CLINIC | Facility: CLINIC | Age: 83
End: 2023-05-10

## 2023-05-10 DIAGNOSIS — I26.93 SINGLE SUBSEGMENTAL PULMONARY EMBOLISM WITHOUT ACUTE COR PULMONALE (HCC): Primary | ICD-10-CM

## 2023-05-10 DIAGNOSIS — N18.31 CHRONIC KIDNEY DISEASE (CKD) STAGE G3A/A1, MODERATELY DECREASED GLOMERULAR FILTRATION RATE (GFR) BETWEEN 45-59 ML/MIN/1.73 SQUARE METER AND ALBUMINURIA CREATININE RATIO LESS THAN 30 MG/G (HCC): ICD-10-CM

## 2023-05-10 DIAGNOSIS — J96.01 ACUTE RESPIRATORY FAILURE WITH HYPOXIA (HCC): ICD-10-CM

## 2023-05-10 DIAGNOSIS — K52.1 TOXIC GASTROENTERITIS AND COLITIS: ICD-10-CM

## 2023-05-10 DIAGNOSIS — K62.5 RECTAL BLEEDING: ICD-10-CM

## 2023-05-10 DIAGNOSIS — C50.919 PRIMARY MALIGNANT NEOPLASM OF BREAST WITH METASTASIS (HCC): ICD-10-CM

## 2023-05-10 DIAGNOSIS — E43 SEVERE PROTEIN-CALORIE MALNUTRITION (HCC): ICD-10-CM

## 2023-05-10 DIAGNOSIS — C79.51 SECONDARY MALIGNANT NEOPLASM OF BONE (HCC): ICD-10-CM

## 2023-05-10 DIAGNOSIS — I10 PRIMARY HYPERTENSION: ICD-10-CM

## 2023-05-10 NOTE — PROGRESS NOTES
[address]  [facility]: [facility]    NAME: Dinora Silverman  AGE: 79 y o  SEX: female    DATE OF ENCOUNTER: 5/10/2023    Code status:  DNR w/ Hospitalization    Assessment and Plan     1  Single subsegmental pulmonary embolism without acute cor pulmonale (HCC)    2  Rectal bleeding    3  Toxic gastroenteritis and colitis    4  Primary hypertension    5  Acute respiratory failure with hypoxia (HCC)    6  Secondary malignant neoplasm of bone ([facility] )    7  Chronic kidney disease (CKD) stage G3a/A1, moderately decreased glomerular filtration rate (GFR) between 45-59 mL/min/1 73 square meter and albuminuria creatinine ratio less than 30 mg/g (HCC)    8  Primary malignant neoplasm of breast with metastasis ([facility] )    9  Severe protein[facility]calorie malnutrition [facility]        All medications and routine orders were reviewed and updated as needed  Plan discussed with: Family member    Chief Complaint     Interim evaluation    History of Present Illness     Patient has recovered completely from her recent episode of pneumonia  She reports that she is feeling much better and using bathroom for bowel  Therefore at this point  She has no discomfort when she goes  She is denying any dyspnea  Her appetite is good  She had some swelling lower extremities which is dependent on her fluid intake as well as initially and body positioning  Her overall performance status is improved    The following portions of the patient's history were reviewed and updated as appropriate: current medications, past family history, past medical history, past social history, past surgical history and problem list     Allergies:   Allergies   Allergen Reactions   • Sulfa Antibiotics    • Pneumococcal Polysaccharide Vaccine Rash and Edema       Review of Systems     Review of Systems   Constitutional: Negative for activity change, appetite change, chills, diaphoresis, fatigue and unexpected weight change  HENT: Negative for congestion, ear discharge, ear pain, hearing loss, nosebleeds and rhinorrhea  Eyes: Negative for pain, redness, itching and visual disturbance  Respiratory: Positive for cough  Negative for choking, chest tightness and shortness of breath  Cardiovascular: Positive for leg swelling  Negative for chest pain  Gastrointestinal: Negative for abdominal pain, blood in stool, constipation, diarrhea and nausea  Endocrine: Negative for cold intolerance, polydipsia and polyphagia  Genitourinary: Negative for dysuria, frequency, hematuria and urgency  Musculoskeletal: Negative for arthralgias, back pain, gait problem, joint swelling, neck pain and neck stiffness  Skin: Negative for color change and rash  Allergic/Immunologic: Negative for environmental allergies and food allergies  Neurological: Positive for weakness  Negative for dizziness, tremors, seizures, speech difficulty, numbness and headaches  Hematological: Negative for adenopathy  Does not bruise/bleed easily  Psychiatric/Behavioral: Negative for behavioral problems, dysphoric mood, hallucinations and self-injury  Medications and orders     All medications reviewed and updated in Nursing Home EMR  Objective     Vitals: per nursing home records    Physical Exam  Constitutional:       Appearance: She is well-developed  HENT:      Head: Normocephalic and atraumatic  Right Ear: External ear normal       Left Ear: External ear normal       Mouth/Throat:      Pharynx: No oropharyngeal exudate  Eyes:      General: No scleral icterus  Right eye: No discharge  Left eye: No discharge  Conjunctiva/sclera: Conjunctivae normal       Pupils: Pupils are equal, round, and reactive to light  Neck:      Thyroid: No thyromegaly  Cardiovascular:      Rate and Rhythm: Normal rate  Rhythm irregular  Heart sounds: Normal heart sounds  No murmur heard  No gallop     Pulmonary: Effort: Pulmonary effort is normal  No respiratory distress  Breath sounds: Normal breath sounds  No wheezing or rales  Abdominal:      General: Bowel sounds are normal       Palpations: Abdomen is soft  There is no mass  Tenderness: There is no guarding or rebound  Musculoskeletal:         General: Swelling present  No tenderness or deformity  Normal range of motion  Cervical back: Normal range of motion and neck supple  Right lower leg: Edema present  Left lower leg: Edema present  Lymphadenopathy:      Cervical: No cervical adenopathy  Skin:     General: Skin is warm and dry  Findings: No rash  Neurological:      Mental Status: She is alert and oriented to person, place, and time  Cranial Nerves: No cranial nerve deficit  Motor: Weakness present  Coordination: Coordination normal       Gait: Gait abnormal       Deep Tendon Reflexes: Reflexes are normal and symmetric  Reflexes normal    Psychiatric:         Behavior: Behavior normal          Thought Content: Thought content normal          Judgment: Judgment normal          Pertinent Laboratory/Diagnostic Studies: The following studies were reviewed please see chart or hospital paperwork for details      Space for lab dictation Chest x-ray from April shows pneumonia    - Continue the regimen    Bharati Spear DO  5/10/2023 3:08 PM

## 2023-06-07 ENCOUNTER — NURSING HOME VISIT (OUTPATIENT)
Dept: FAMILY MEDICINE CLINIC | Facility: CLINIC | Age: 83
End: 2023-06-07
Payer: MEDICARE

## 2023-06-07 DIAGNOSIS — K51.011 ULCERATIVE PANCOLITIS WITH RECTAL BLEEDING (HCC): Primary | ICD-10-CM

## 2023-06-07 DIAGNOSIS — C50.919 PRIMARY MALIGNANT NEOPLASM OF BREAST WITH METASTASIS (HCC): ICD-10-CM

## 2023-06-07 DIAGNOSIS — C79.51 SECONDARY MALIGNANT NEOPLASM OF BONE (HCC): ICD-10-CM

## 2023-06-07 DIAGNOSIS — M94.0 ACUTE COSTOCHONDRITIS: ICD-10-CM

## 2023-06-07 DIAGNOSIS — I26.92 ACUTE SADDLE PULMONARY EMBOLISM, UNSPECIFIED WHETHER ACUTE COR PULMONALE PRESENT (HCC): ICD-10-CM

## 2023-06-07 DIAGNOSIS — E43 SEVERE PROTEIN-CALORIE MALNUTRITION (HCC): ICD-10-CM

## 2023-06-07 PROCEDURE — 99308 SBSQ NF CARE LOW MDM 20: CPT | Performed by: FAMILY MEDICINE

## 2023-06-07 NOTE — PROGRESS NOTES
[address]  [facility]    NAME: Shawn Silverman  AGE: 79 y o  SEX: female    DATE OF ENCOUNTER: 6/7/2023    Code status:  DNR w/ Hospitalization    Assessment and Plan     1  Ulcerative pancolitis with rectal bleeding ([facility] )    2  Acute costochondritis    3  Acute saddle pulmonary embolism, unspecified whether acute cor pulmonale present ([facility] )    4  Secondary malignant neoplasm of bone ([facility] )    5  Primary malignant neoplasm of breast with metastasis ([facility] )    6  Severe protein-calorie malnutrition ([facility] )        All medications and routine orders were reviewed and updated as needed  Plan discussed with: Family member    Chief Complaint     Interim evaluation    History of Present Illness     The patient is concerned that patch of gas possibly in the diet  She denies a mucousy large urinary was with a pulse of discord with foul smell discharge to area  This occurred twice  She reports anal bowel movements which has been consistent since her here  The following portions of the patient's history were reviewed and updated as appropriate: current medications, past family history, past medical history, past social history, past surgical history and problem list     Allergies: Allergies   Allergen Reactions   • Sulfa Antibiotics    • Pneumococcal Polysaccharide Vaccine Rash and Edema       Review of Systems     Review of Systems   Constitutional: Negative for activity change, appetite change, chills, diaphoresis, fatigue and unexpected weight change  HENT: Negative for congestion, ear discharge, ear pain, hearing loss, nosebleeds and rhinorrhea  Eyes: Negative for pain, redness, itching and visual disturbance  Respiratory: Negative for cough, choking, chest tightness and shortness of breath  Cardiovascular: Negative for chest pain and leg swelling  Gastrointestinal: Positive for diarrhea   Negative for abdominal pain, blood in stool, constipation and nausea  Endocrine: Negative for cold intolerance, polydipsia and polyphagia  Genitourinary: Negative for dysuria, frequency, hematuria and urgency  Musculoskeletal: Negative for arthralgias, back pain, gait problem, joint swelling, neck pain and neck stiffness  Skin: Negative for color change and rash  Allergic/Immunologic: Negative for environmental allergies and food allergies  Neurological: Positive for weakness  Negative for dizziness, tremors, seizures, speech difficulty, numbness and headaches  Hematological: Negative for adenopathy  Does not bruise/bleed easily  Psychiatric/Behavioral: Negative for behavioral problems, dysphoric mood, hallucinations and self-injury  Medications and orders     All medications reviewed and updated in Nursing Home EMR  Objective     Vitals: per nursing home records    Physical Exam  Constitutional:       Appearance: She is well-developed  HENT:      Head: Normocephalic and atraumatic  Right Ear: External ear normal       Left Ear: External ear normal       Mouth/Throat:      Pharynx: No oropharyngeal exudate  Eyes:      General: No scleral icterus  Right eye: No discharge  Left eye: No discharge  Conjunctiva/sclera: Conjunctivae normal       Pupils: Pupils are equal, round, and reactive to light  Neck:      Thyroid: No thyromegaly  Cardiovascular:      Rate and Rhythm: Normal rate  Rhythm irregular  Heart sounds: Normal heart sounds  No murmur heard  No gallop  Pulmonary:      Effort: Pulmonary effort is normal  No respiratory distress  Breath sounds: Normal breath sounds  No wheezing or rales  Abdominal:      General: Bowel sounds are normal       Palpations: Abdomen is soft  There is no mass  Tenderness: There is no guarding or rebound  Musculoskeletal:         General: No tenderness or deformity  Normal range of motion        Cervical back: Normal range of motion and neck supple  Lymphadenopathy:      Cervical: No cervical adenopathy  Skin:     General: Skin is warm and dry  Findings: No rash  Neurological:      Mental Status: She is alert and oriented to person, place, and time  Cranial Nerves: No cranial nerve deficit  Coordination: Coordination normal       Deep Tendon Reflexes: Reflexes are normal and symmetric  Reflexes normal    Psychiatric:         Behavior: Behavior normal          Thought Content: Thought content normal          Judgment: Judgment normal          Pertinent Laboratory/Diagnostic Studies: The following studies were reviewed please see chart or hospital paperwork for details      Space for lab dictation no new studies    - I would consider scan of the abdomen/pelvis if this does persist become more prominent    Cathie Copeland DO  6/7/2023 2:28 PM

## 2023-06-15 ENCOUNTER — OFFICE VISIT (OUTPATIENT)
Dept: GASTROENTEROLOGY | Facility: CLINIC | Age: 83
End: 2023-06-15
Payer: MEDICARE

## 2023-06-15 VITALS
BODY MASS INDEX: 23.45 KG/M2 | SYSTOLIC BLOOD PRESSURE: 119 MMHG | HEIGHT: 61 IN | DIASTOLIC BLOOD PRESSURE: 58 MMHG | WEIGHT: 124.2 LBS

## 2023-06-15 DIAGNOSIS — E43 UNSPECIFIED SEVERE PROTEIN-CALORIE MALNUTRITION (HCC): ICD-10-CM

## 2023-06-15 DIAGNOSIS — K51.011 ULCERATIVE PANCOLITIS WITH RECTAL BLEEDING (HCC): Primary | ICD-10-CM

## 2023-06-15 PROCEDURE — 99214 OFFICE O/P EST MOD 30 MIN: CPT | Performed by: INTERNAL MEDICINE

## 2023-06-15 NOTE — PROGRESS NOTES
Gastroenterology Outpatient Follow-up - Crohn's Disease  Ezequiel Silverman 79 y o  female MRN: [id number]  Encounter: [id number]    Ezequiel Silverman is a 79 y o  female with Crohn's disease  Symptom onset:  2022  Diagnosis:  Crohns disease  Year of diagnosis:  2022    IBD Summary: Sanjana Silverman is metastatic breast cancer and developed severe colitis and bleeding in 9/2022  She was hospitalized for several weeks with pulmonary embolisms, COVID, C  difficile, and hematochezia  She ultimately started infliximab therapy for possible IBD, however did not improve and treatments were discontinued after several months  She had repeat colonoscopy in 3/2023 which showed Jarrett 3 colitis to the hepatic flexure  She was then started on prednisone and we discussed switching to ustekinumab  Diagnosed with C  difficile in 3/2023 and significantly improved with vancomycin  Remains off therapy        CD Summary  Current Crohn's disease phenotype:  inflammatory and non-penetrating and non-stricturing    Involved sites since disease onset   Esophagus: no   Stomach: no     Duodenum: no   Jejunum: no   Ileum: no   Right colon: no   Transverse colon: yes   Left colon: yes   Rectum: yes   Anus: no     History of stricture  Esophagus: no   Stomach: no   Duodenum: no   Jejunum: no   Ileum: no   Right colon: no   Transverse colon: no   Left colon: no   Rectum: no   Anus: no     History of fistula other than perianal:  Intra-abdominal   abcess: no      Enteroenteric fistula: no      Enterocutaneous fistula: no      Enterovesical fistula: no      Other fistula: no            Surgical History  Number of IBD surgeries: 0      First IBD surgery:    Most Recent IBD surgery:    Esophageal:  0    Gastroduodenal:  0    Small bowel resection(s):  0    Ileocolonic resection(s): 0      Colonic resection(s):  0    Ileostomy or colostomy:  no previous ostomy    Complete colectomy:  No         Medications     Year last used Reason for discontinuation Corticosteroids prior   2022       Thiopurines   never         Methotrexate   never         Infliximab prior     TN     Adalimumab   never         Certolizumab   never         Golimumab   never         Natalizumab   never         Mesalamine   never         Sulfasalzine   never         Vedolizumab   never         Ustekinumab   never         Tofacitinib   never         Other biologic   never             Extraintestinal Manifestations    IBD-associated arthropathy No    Uveitis No    Oral aphthous ulcers No   Erythema nodosum No    Pyoderma gangrenosum No    Primary sclerosing cholangitis No    Thrombotic complications No          Cancer / Dysplasia History  History of IBD-associated dysplasia: none    Date of diagnosis (Year):     History of colorectal cancer: No   History of cervical dysplasia: No   History of skin cancer: none         Laboratory Data   Most recent (date) Result   PPD   unknown   Quanitferon gold 9/1/2022 indeterminate   TPMT   unknown   Hepatitis A   unknown   HBsAb 8/31/2022 negative   HBcAb   unknown   HBsAg 8/31/2022     HCV Ab   unknown       Imaging / Diagnostic Procedures   Most recent (date) Findings   Colonoscopy or sigmoidoscopy #1 8/20/2022            Ulcers/Erosions  large           Strictures  none           Stricture Severity  N/A           Endoscopic Score Jarrett Score:  3 Rutgeert's:  N/A            Findings  Severe colitis into mid sigmoid colon with deep ulcerations  Inflammatory bowel disease? Infectious (CMV)? Pathology  A  Sigmoid colon (biopsy):   - Active colitis  - No viral inclusions identified on CMV immunohistochemistry stain  - Negative for dysplasia    Colonoscopy or sigmoidoscopy #2 3/8/2023            Ulcers/Erosions  small              Strictures  none              Stricture Severity  N/A           Endoscopic Score Jarrett Score:  3 Rugeert's:  N/A           Findings  (1) The terminal ileum, ileocecal valve and cecum appeared normal   (2) Mild friable mucosa with loss of vascular pattern in the ascending colon  (3) Severe edematous, erythematous, friable and ulcerated mucosa with erosion, loss of folds and loss of vascular pattern in the hepatic flexure, transverse colon, splenic flexure, descending colon, sigmoid colon, rectosigmoid and rectum, consistent with IBD; performed cold forceps biopsy  PATH:           Pathology      EGD       Small bowel follow-through       CT enterography       CT without enterography 9/30/2022 1  Findings of worsening proctocolitis of the sigmoid and rectum with new perirectal fat stranding and presacral edema  2   Improving inflammation of the transverse and descending colon, with improved appearance of several small outpouchings from the mid descending colon which likely represented contained perforations  MRI enterography       Capsule study       DEXA scan   Lowest Z-score:      CXR (for TB)           HPI:   Interval History:  6/15/2023 Follow up  Terri Ascencio returns for follow-up  She is in the office with her daughter  At our last visit, she was having diarrhea and incontinence and had stopped infliximab therapy because she felt it was not effective  We had a long discussion and ultimately decided to start ustekinumab because it involves just 1 IV dose followed by injections  However, this plan never materialized because I received the stool test result from 3/14/2023 which was positive for C  difficile by PCR  I then ordered vancomycin and it seems to have had a very positive impact  She now has 1-2 formed stools per day, rarely sees blood, there is no abdominal pain, no incontinence anymore  Loperamide is still on her medication list   She also had an episode of pneumonia which was treated  She says she feels very well, she has gained some weight, and her energy is improved  3/15/2023 Follow up  Since last visit, she discontinued infliximab because she and her daughter felt that it was not working    She had colonoscopy on 3/8/2023 which showed severe Jarrett 3 disease from the rectum to the hepatic flexure  Unfortunately, biopsies are pending at this time  Blood work was done on 3/10 and was significant for hemoglobin of 8 7 with normal MCV, albumin 1 5, CRP 13 2, ferritin 100, normal LFTs, normal kidney function  Prior to the colonoscopy, she was incontinent and had diarrhea 3-4 times per day  She was started on prednisone after the procedure and since starting prednisone, has had improvement in stool frequency, with some formed stools  However, she remains on 10  She feel surprising well and of the since starting the steroids  She has stage IV breast cancer with no plans for further treatment  1/10/2023 Initial visit after hospital discharge  Fiorella Barraza presents for follow-up after hospital admission in September during which she was diagnosed with IBD and started on infliximab  This is a telemedicine appointment and she is on the phone with her daughter  She is an 79-year-old woman with metastatic breast cancer and CKD who was admitted with a saddle pulmonary embolism in September  During the admission we were consulted for bloody diarrhea and her colonoscopy showed severe colitis with deep ulcers which were negative for CMV  CT showed severe proctitis and left-sided colitis with possible contained perforations  She was positive for C  difficile by PCR only and had no improvement with steroids and vancomycin  She was ultimately treated with infliximab as an inpatient and has continued following discharge  Her hospital admission was further complicated by COVID  She has been treated with various chemotherapy agents, including Femara and Cecilia Kleine until 4/2022, and Verzenio and Femara until 7/2022  She associates the onset of diarrhea with Kisqali therapy but had been off this medication for months prior to her admission in September    She had partial improvement with infliximab and has remained on 5 mg/kg every 8 weeks since discharge  She is at a rehab facility  She continues to have diarrhea and loose stools, although not as frequent as in the hospital   She has 3-4 bowel movements per day, which are loose to soft, without bleeding  No abdominal pain  She has urgency and wears diapers  According to her and her daughter, the diarrhea is no longer improving and they would like to stop infliximab  She eats mostly soups due to poor appetite, but her weight is stable  There are no plans for further chemotherapy  Appetite much improved since starting steroids  She is on Lovenox  Has leg edema  Sherif Woody reports the following symptoms over the last 7 days:    Stool Frequency normal   Average stools per day: 2   Average liquid stools per day: 0   Consistency of bowel: soft or semi-formed   Awakening from sleep to move bowels? No   Urgency mild fecal urgency   Visible blood in stool? visible blood in stool less than half the time   Abdominal pain? none   General Wellbeing? generally well     Sherif Woody also reports the following symptoms in the last month:    Leakage of stool while sleeping? No   Leakage of stool while awake?  No       REVIEW OF SYSTEMS:  During the last 7 days, Sherif Woody experienced the following symptoms:  Unintentional Weight Loss (in the last month) No   Fever No   Eye irritation No   Mouth sores No   Sore throat No   Chest pain No   Shortness of breath No   Numbness or tingling in hands or feet No   Skin rash No   Pain or swelling in joints No   Bruising or bleeding No   Felt depressed or blue No   Fatigue No   Dysuria No   Please see HPI for additional pertinent review of systems; otherwise remainder of ROS was unremarkable    MEDICATIONS:    Current Outpatient Medications:   •  acetaminophen (TYLENOL) 325 mg tablet  •  Cholecalciferol (VITAMIN D3) 2000 units capsule  •  diphenoxylate-atropine (LOMOTIL) 2 5-0 025 mg per tablet  •  enoxaparin (LOVENOX) 30 mg/0 3 mL  •  enoxaparin (LOVENOX) 60 mg/0 6 "mL  •  loperamide (IMODIUM) 2 mg capsule  •  metoprolol succinate (TOPROL-XL) 50 mg 24 hr tablet  •  midodrine (PROAMATINE) 2 5 mg tablet  •  mirtazapine (REMERON) 7 5 MG tablet  •  ondansetron (ZOFRAN-ODT) 4 mg disintegrating tablet  •  pantoprazole (PROTONIX) 40 mg tablet  •  simvastatin (ZOCOR) 10 mg tablet  •  folic acid (FOLVITE) 1 mg tablet  •  metoprolol succinate (TOPROL-XL) 25 mg 24 hr tablet    ALLERGIES:  Allergies   Allergen Reactions   • Sulfa Antibiotics    • Pneumococcal Polysaccharide Vaccine Rash and Edema       OBJECTIVE:  /58   Ht 5' 1\" (1 549 m)   Wt 56 3 kg (124 lb 3 2 oz)   LMP  (LMP Unknown)   BMI 23 47 kg/m²      PHYSICAL EXAM:    General Appearance:   Alert, cooperative, no distress   HEENT:   Normocephalic, atraumatic, anicteric  Neck:  Supple, symmetrical, trachea midline   Lungs:   Clear to auscultation bilaterally; no rales, rhonchi or wheezing; respirations unlabored    Heart[de-identified]   Regular rate and rhythm; no murmur, rub, or gallop  Abdomen:   Soft, non-distended; normal bowel sounds    Abdominal exam was notable for no tenderness with no mass  Genitalia:   Deferred    Rectal:   Perirectal assessment was not performed                       Extremities:  No cyanosis, clubbing or edema    Pulses:  2+ and symmetric    Skin:  No jaundice, rashes, or lesions    Lymph nodes:  No palpable cervical lymphadenopathy        Lab Results   Component Value Date    WBC 6 40 10/19/2022    HGB 7 2 (L) 10/20/2022    HCT 24 3 (L) 10/20/2022     (H) 10/19/2022     10/19/2022     Lab Results   Component Value Date     09/08/2015    SODIUM 145 10/19/2022    K 3 7 10/19/2022     (H) 10/19/2022    CO2 29 10/19/2022    ANIONGAP 6 09/08/2015    AGAP 3 (L) 10/19/2022    BUN 9 10/19/2022    CREATININE 0 42 (L) 10/19/2022    GLUC 82 10/19/2022    GLUF 109 (H) 08/02/2022    CALCIUM 7 8 (L) 10/19/2022    AST 17 10/14/2022    ALT 24 10/14/2022    ALKPHOS 84 10/14/2022    PROT " 7 3 09/08/2015    TP 4 5 (L) 10/14/2022    BILITOT 0 66 09/08/2015    TBILI 0 28 10/14/2022    EGFR 95 10/19/2022     Lab Results   Component Value Date    CRP <3 0 10/15/2022     Lab Results   Component Value Date    HDMAPCIK58 705 10/19/2022     Lab Results   Component Value Date    FERRITIN 1,236 (H) 10/19/2022       ASSESSMENT AND PLAN:    Juanita Talavera has a history of inflammatory, non-penetrating, non-stricturing Crohn’s disease diagnosed in 2022 with involvement in the following areas:        Transverse colon, Left colon, Rectum,  without perianal fistula   Surgical history includes no small bowel resections, no colon resections, and no prior ostomy  Currently not on medical therapy  My global assessment is that the clinical disease activity is currently mildly active  The 6-point Jarrett score was 1 and the 9-point Jarrett score was 2  The short CDAI was 44  Juanita Silverman has metastatic breast cancer which was treated with various medications, history of PE in the setting of COVID infection in 9/2022, C  difficile infection during her hospitalization, and severe colitis  Her presentation and clinical course have been quite confusing  Initially, she suspected that her colitis was precipitated by chemotherapy  However she did not improve even months after stopping treatment for her cancer  She then had C  difficile infection which was treated in the hospital, but she continued to have diarrhea and bleeding  Her colonoscopy showed severe inflammation and ulceration, prompting diagnosed with IBD  She had partial improvement with steroids, and ultimately started infliximab  She then felt that infliximab was not working and continues to be quite symptomatic  Repeat colonoscopy in March showed ongoing severe inflammation  We will then plan switching to ustekinumab but she tested positive for C  difficile again  Now she feels very well after another treatment course with vancomycin    We went over all these events with her and her daughter  They did not seem to recall our previous discussion re  ustekinumab  At this time, we are not going to do anything, but will continue watching her clinically  She seems to be asymptomatic and has stage IV breast cancer  They remain weary of medications  I do want to check calprotectin as a way following her inflammation  Health Maintenance Recommendations:  Vaccines & Infections  · COVID-19 vaccination and boosters are recommended  There is no evidence that the COVID-19 vaccine would cause an IBD flare  · Avoid live vaccines if on immunosuppressive therapy  · Yearly influenza vaccine (flu shot)  · Pneumonia vaccines for patients on immunosuppression  These include Prevnar 20, followed by Pneumovax 23 at least 8 weeks later  · Shingrix vaccine (series of 2 injections) for al patients 72 and older  Patients on tofacitinib or upadacitinib should be vaccinated regardless of age  · If not immune to measles mumps or rubella, MMR vaccine is recommended  However, this is a live vaccine and should be given prior to immunosuppressive therapy  · HPV vaccination as per national guidelines  · Hepatitis A and B vaccinations if not previously vaccinated  · Testing for tuberculosis with QuantiFERON Gold blood test and/or chest xray prior to starting immunosuppressive medications, and then annually    Cancer screening  · Dysplasia surveillance for colorectal cancer  Colonoscopy in all patients with extensive colitis (more than 1/3 of the colon involved) who had disease for at least 8 or more years  Repeat colonoscopy approximately every 12-24 months  In patients with concurrent primary sclerosing cholangitis, history of dysplasia, or family history of colon cancer, repeat colonoscopy annually  · Females: Pap smear annually for woman on immunosuppression    · Annual dermatologic/skin exam in all patients with IBD, especially those on immunosuppression with thiopurines or CAROL inhibitors      Miscellaneous  · DEXA scan, once off steroids for 3 months  · Depression screening recommended annually  · Routine dental and ophthalmology examinations    Problem List Items Addressed This Visit        Digestive    Ulcerative pancolitis with rectal bleeding ([facility] ) - Primary    Relevant Orders    Calprotectin,Fecal   Other Visit Diagnoses     Unspecified severe protein-calorie malnutrition ([facility] )              Rakel Murphy MD

## 2023-06-20 NOTE — PLAN OF CARE
PAST SURGICAL HISTORY:  AVF (arteriovenous fistula) placed 2009 - Left side    S/P appendectomy at age 7     Problem: Knowledge Deficit  Goal: Patient/family/caregiver demonstrates understanding of disease process, treatment plan, medications, and discharge instructions  Description: Complete learning assessment and assess knowledge base    Interventions:  - Provide teaching at level of understanding  - Provide teaching via preferred learning methods  Outcome: Progressing

## 2023-07-20 ENCOUNTER — NURSING HOME VISIT (OUTPATIENT)
Dept: FAMILY MEDICINE CLINIC | Facility: CLINIC | Age: 83
End: 2023-07-20
Payer: MEDICARE

## 2023-07-20 DIAGNOSIS — I87.2 VENOUS INSUFFICIENCY: ICD-10-CM

## 2023-07-20 DIAGNOSIS — I26.92 CHRONIC SADDLE PULMONARY EMBOLISM, UNSPECIFIED WHETHER ACUTE COR PULMONALE PRESENT (HCC): Primary | ICD-10-CM

## 2023-07-20 DIAGNOSIS — I27.82 CHRONIC SADDLE PULMONARY EMBOLISM, UNSPECIFIED WHETHER ACUTE COR PULMONALE PRESENT (HCC): Primary | ICD-10-CM

## 2023-07-20 DIAGNOSIS — R33.9 URINARY RETENTION: ICD-10-CM

## 2023-07-20 DIAGNOSIS — C50.919 PRIMARY MALIGNANT NEOPLASM OF BREAST WITH METASTASIS (HCC): ICD-10-CM

## 2023-07-20 DIAGNOSIS — Z93.59 SUPRAPUBIC CATHETER (HCC): ICD-10-CM

## 2023-07-20 DIAGNOSIS — E43 SEVERE PROTEIN-CALORIE MALNUTRITION (HCC): ICD-10-CM

## 2023-07-20 DIAGNOSIS — K51.011 ULCERATIVE PANCOLITIS WITH RECTAL BLEEDING (HCC): ICD-10-CM

## 2023-07-20 PROBLEM — R00.0 TACHYCARDIA: Status: RESOLVED | Noted: 2022-09-06 | Resolved: 2023-07-20

## 2023-07-20 PROBLEM — M79.89 SWELLING OF LOWER EXTREMITY: Status: RESOLVED | Noted: 2022-09-23 | Resolved: 2023-07-20

## 2023-07-20 PROBLEM — M94.0 ACUTE COSTOCHONDRITIS: Status: RESOLVED | Noted: 2021-06-22 | Resolved: 2023-07-20

## 2023-07-20 PROBLEM — J96.01 ACUTE RESPIRATORY FAILURE WITH HYPOXIA (HCC): Status: RESOLVED | Noted: 2022-10-09 | Resolved: 2023-07-20

## 2023-07-20 PROBLEM — L89.323 PRESSURE INJURY OF LEFT BUTTOCK, STAGE 3 (HCC): Status: RESOLVED | Noted: 2022-10-11 | Resolved: 2023-07-20

## 2023-07-20 PROCEDURE — 99309 SBSQ NF CARE MODERATE MDM 30: CPT | Performed by: NURSE PRACTITIONER

## 2023-07-20 NOTE — PROGRESS NOTES
[address] [facility], [address] [place] [address]  Facility: [facility]    NAME: Kendall Silverman  AGE: 79 y.o. SEX: female    DATE OF ENCOUNTER: 7/20/2023    Code status:  DNR w/ Hospitalization    Assessment and Plan     1. Chronic saddle pulmonary embolism, unspecified whether acute cor pulmonale present (HCC)    2. Venous insufficiency    3. Urinary retention    4. Suprapubic catheter ([address])    5. Severe protein-calorie malnutrition ([address])    6. Ulcerative pancolitis with rectal bleeding ([address])    7. Primary malignant neoplasm of breast with metastasis ([address])        All medications and routine orders were reviewed and updated as needed. Plan discussed with: Patient, nursing staff    Chief Complaint     Follow-up of chronic conditions    History of Present Illness     Mrs. Silverman is assessed for follow up. She continues to gain strength through therapy and has been walking the halls with RW and supervision. She denies pain, dyspnea, chest pressure. Her appetite is good, although persistent hypoalbuminemia present. Staying hydrated. Bowel pattern improved, reports intermittent SPC clogging requiring nursing to exchange catheter. It is patent today with clear yellow urine. Sleeping well at night. Questioning when her next DXA scan should be. Offered to order this but she deferred as her daughter is currently moving and she would prefer to wait. Afebrile, VSS. The following portions of the patient's history were reviewed and updated as appropriate: current medications, past family history, past medical history, past social history, past surgical history and problem list.    Allergies: Allergies   Allergen Reactions   • Sulfa Antibiotics    • Pneumococcal Polysaccharide Vaccine Rash and Edema       Review of Systems     Review of Systems   Constitutional: Negative. Negative for activity change, appetite change, chills, fatigue and fever. HENT: Negative.   Negative for congestion, ear pain, postnasal drip and sinus pain. Eyes: Negative. Respiratory: Negative. Negative for cough and shortness of breath. Cardiovascular: Negative. Negative for chest pain and leg swelling. Gastrointestinal: Negative. Negative for abdominal pain, blood in stool, constipation and diarrhea. Endocrine: Negative. Genitourinary: Positive for difficulty urinating. Negative for dysuria. Musculoskeletal: Positive for gait problem. Skin: Negative. Allergic/Immunologic: Negative. Negative for immunocompromised state. Neurological: Positive for weakness. Negative for dizziness and light-headedness. Hematological: Negative. Psychiatric/Behavioral: Negative. Medications and orders     All medications reviewed and updated in pen[facility]entiary EMR. Objective     Vitals: per nursing home records    Physical Exam  Vitals and nursing note reviewed. Constitutional:       Appearance: Normal appearance. HENT:      Head: Normocephalic and atraumatic. Right Ear: Tympanic membrane, ear canal and external ear normal.      Left Ear: Tympanic membrane, ear canal and external ear normal.      Nose: Nose normal.      Mouth/Throat:      Mouth: Mucous membranes are moist.      Pharynx: Oropharynx is clear. Eyes:      Extraocular Movements: Extraocular movements intact. Conjunctiva/sclera: Conjunctivae normal.      Pupils: Pupils are equal, round, and reactive to light. Cardiovascular:      Rate and Rhythm: Normal rate and regular rhythm. Pulses: Normal pulses. Heart sounds: Murmur heard. Pulmonary:      Effort: Pulmonary effort is normal.      Breath sounds: Normal breath sounds. Abdominal:      General: Bowel sounds are normal.      Palpations: Abdomen is soft. Genitourinary:     Comments: SPC patent for clear yellow urine  Musculoskeletal:         General: Normal range of motion. Cervical back: Normal range of motion and neck supple.       Right lower leg: No edema. Left lower leg: No edema. Skin:     General: Skin is warm and dry. Coloration: Skin is pale. Comments: Scabbed left shin abrasion, no s/s infection noted  PVD   Neurological:      General: No focal deficit present. Mental Status: She is alert and oriented to person, place, and time. Motor: Weakness present. Gait: Gait abnormal.   Psychiatric:         Mood and Affect: Mood normal.         Behavior: Behavior normal.         Thought Content: Thought content normal.         Judgment: Judgment normal.         Pertinent Laboratory/Diagnostic Studies: The following labs and studies were reviewed please see chart or hospital paperwork for details.     Continue current medical management    BROOKE Grove  7/20/2023 5:43 PM

## 2023-08-07 ENCOUNTER — NURSING HOME VISIT (OUTPATIENT)
Dept: FAMILY MEDICINE CLINIC | Facility: CLINIC | Age: 83
End: 2023-08-07
Payer: MEDICARE

## 2023-08-07 DIAGNOSIS — I87.2 VENOUS INSUFFICIENCY: ICD-10-CM

## 2023-08-07 DIAGNOSIS — D64.9 ANEMIA, UNSPECIFIED TYPE: ICD-10-CM

## 2023-08-07 DIAGNOSIS — I27.82 CHRONIC SADDLE PULMONARY EMBOLISM, UNSPECIFIED WHETHER ACUTE COR PULMONALE PRESENT (HCC): Primary | ICD-10-CM

## 2023-08-07 DIAGNOSIS — Z93.59 SUPRAPUBIC CATHETER (HCC): ICD-10-CM

## 2023-08-07 DIAGNOSIS — I26.92 CHRONIC SADDLE PULMONARY EMBOLISM, UNSPECIFIED WHETHER ACUTE COR PULMONALE PRESENT (HCC): Primary | ICD-10-CM

## 2023-08-07 DIAGNOSIS — I95.1 ORTHOSTATIC HYPOTENSION: ICD-10-CM

## 2023-08-07 PROBLEM — E87.6 HYPOKALEMIA: Status: RESOLVED | Noted: 2022-08-02 | Resolved: 2023-08-07

## 2023-08-07 PROCEDURE — 99309 SBSQ NF CARE MODERATE MDM 30: CPT | Performed by: NURSE PRACTITIONER

## 2023-08-07 NOTE — PROGRESS NOTES
[address] [facility], [address] [place] [address]  Facility: [facility]    NAME: Melanie Silverman  AGE: 79 y.o. SEX: female    DATE OF ENCOUNTER: 8/7/2023    Code status:  DNR w/ Hospitalization    Assessment and Plan     1. Chronic saddle pulmonary embolism, unspecified whether acute cor pulmonale present ([address])    2. Orthostatic hypotension    3. Venous insufficiency    4. Suprapubic catheter ([address])    5. Anemia, unspecified type        All medications and routine orders were reviewed and updated as needed. Plan discussed with: Patient, nursing staff    Chief Complaint     Follow-up of chronic conditions    History of Present Illness     Ms. Silverman is feeling well, encouraged to ambulate independently with RW. She denies pain, dyspnea, chest pressure/HA. She changes positions slowly due to orthostatic hypotension which is managed with midodrine 2.5mg TID. Appetite is good. No swallowing issues noted. Bowel pattern continues to improve, fecal calprotectin down to 313 (>3000 in March 2023). SPC patent. Healthy sleep hygiene. Afebrile, VSS. Weight is stable. The following portions of the patient's history were reviewed and updated as appropriate: current medications, past family history, past medical history, past social history, past surgical history and problem list.    Allergies: Allergies   Allergen Reactions   • Sulfa Antibiotics    • Pneumococcal Polysaccharide Vaccine Rash and Edema       Review of Systems     Review of Systems   Constitutional: Negative. Negative for activity change, appetite change, chills, fatigue and fever. HENT: Negative. Negative for congestion, ear pain, postnasal drip and sinus pain. Eyes: Negative. Respiratory: Negative. Negative for cough and shortness of breath. Cardiovascular: Negative. Negative for chest pain and leg swelling. Gastrointestinal: Negative. Negative for constipation and diarrhea. Endocrine: Negative. Genitourinary: Positive for difficulty urinating. Negative for dysuria. Musculoskeletal: Positive for gait problem. Skin: Negative. Allergic/Immunologic: Negative. Negative for immunocompromised state. Neurological: Positive for weakness. Negative for dizziness and light-headedness. Hematological: Negative. Psychiatric/Behavioral: Negative. Medications and orders     All medications reviewed and updated [facility] Objective     Vitals: per nursing home records    Physical Exam  Vitals and nursing note reviewed. Constitutional:       Appearance: Normal appearance. HENT:      Head: Normocephalic and atraumatic. Right Ear: Tympanic membrane, ear canal and external ear normal.      Left Ear: Tympanic membrane, ear canal and external ear normal.      Nose: Nose normal.      Mouth/Throat:      Mouth: Mucous membranes are moist.      Pharynx: Oropharynx is clear. Eyes:      Extraocular Movements: Extraocular movements intact. Conjunctiva/sclera: Conjunctivae normal.      Pupils: Pupils are equal, round, and reactive to light. Cardiovascular:      Rate and Rhythm: Normal rate and regular rhythm. Pulses: Normal pulses. Heart sounds: Murmur heard. Pulmonary:      Effort: Pulmonary effort is normal.      Breath sounds: Normal breath sounds. Abdominal:      General: Bowel sounds are normal.      Palpations: Abdomen is soft. Genitourinary:     Comments: SPC patent for yellow urine, scant sediment  Musculoskeletal:         General: Normal range of motion. Cervical back: Normal range of motion and neck supple. Skin:     General: Skin is warm and dry. Coloration: Skin is pale. Neurological:      General: No focal deficit present. Mental Status: She is alert and oriented to person, place, and time. Motor: Weakness present.       Gait: Gait abnormal.   Psychiatric:         Mood and Affect: Mood normal.         Behavior: Behavior normal. Thought Content: Thought content normal.         Judgment: Judgment normal.         Pertinent Laboratory/Diagnostic Studies: The following labs and studies were reviewed please see chart or hospital paperwork for details.     Will continue current medical management    BROOKE Fajardo  8/7/2023 11:10 AM

## 2023-09-13 ENCOUNTER — NURSING HOME VISIT (OUTPATIENT)
Dept: FAMILY MEDICINE CLINIC | Facility: CLINIC | Age: 83
End: 2023-09-13
Payer: MEDICARE

## 2023-09-13 DIAGNOSIS — I87.2 VENOUS INSUFFICIENCY: ICD-10-CM

## 2023-09-13 DIAGNOSIS — C79.51 SECONDARY MALIGNANT NEOPLASM OF BONE (HCC): ICD-10-CM

## 2023-09-13 DIAGNOSIS — N18.31 CHRONIC KIDNEY DISEASE (CKD) STAGE G3A/A1, MODERATELY DECREASED GLOMERULAR FILTRATION RATE (GFR) BETWEEN 45-59 ML/MIN/1.73 SQUARE METER AND ALBUMINURIA CREATININE RATIO LESS THAN 30 MG/G (HCC): ICD-10-CM

## 2023-09-13 DIAGNOSIS — I26.92 CHRONIC SADDLE PULMONARY EMBOLISM, UNSPECIFIED WHETHER ACUTE COR PULMONALE PRESENT (HCC): ICD-10-CM

## 2023-09-13 DIAGNOSIS — K51.011 ULCERATIVE PANCOLITIS WITH RECTAL BLEEDING (HCC): Primary | ICD-10-CM

## 2023-09-13 DIAGNOSIS — I27.82 CHRONIC SADDLE PULMONARY EMBOLISM, UNSPECIFIED WHETHER ACUTE COR PULMONALE PRESENT (HCC): ICD-10-CM

## 2023-09-13 DIAGNOSIS — C50.919 PRIMARY MALIGNANT NEOPLASM OF BREAST WITH METASTASIS (HCC): ICD-10-CM

## 2023-09-13 PROCEDURE — 99308 SBSQ NF CARE LOW MDM 20: CPT | Performed by: FAMILY MEDICINE

## 2023-09-13 NOTE — PROGRESS NOTES
[address] [facility], [address] [place] [address]  Facility: [facility]    NAME: Sandrine Silverman  AGE: 79 y.o. SEX: female    DATE OF ENCOUNTER: 9/13/2023    Code status:  DNR w/ Hospitalization    Assessment and Plan     1. Ulcerative pancolitis with rectal bleeding ([address])    2. Chronic saddle pulmonary embolism, unspecified whether acute cor pulmonale present (HCC)    3. Venous insufficiency    4. Secondary malignant neoplasm of bone ([address])    5. Chronic kidney disease (CKD) stage G3a/A1, moderately decreased glomerular filtration rate (GFR) between 45-59 mL/min/1.73 square meter and albuminuria creatinine ratio less than 30 mg/g (HCC)    6. Primary malignant neoplasm of breast with metastasis ([address])        All medications and routine orders were reviewed and updated as needed. Plan discussed with: Patient    Chief Complaint     Interim evaluation    History of Present Illness     She is taking her help to the bathroom. She is proud of this accomplishment. She has no dyspnea. Denies known pain currently. She questioned the diagnosis of metastatic disease accurate. Currently not being any treatment. She is her family. She has had no falls. Her sleeping habits are stable. Her appetite is good    The following portions of the patient's history were reviewed and updated as appropriate: current medications, past family history, past medical history, past social history, past surgical history and problem list.    Allergies: Allergies   Allergen Reactions   • Sulfa Antibiotics    • Pneumococcal Polysaccharide Vaccine Rash and Edema       Review of Systems     Review of Systems   Constitutional: Negative for activity change, appetite change, chills, diaphoresis, fatigue and unexpected weight change. HENT: Negative for congestion, ear discharge, ear pain, hearing loss, nosebleeds and rhinorrhea. Eyes: Negative for pain, redness, itching and visual disturbance.    Respiratory: Negative for cough, choking, chest tightness and shortness of breath. Cardiovascular: Negative for chest pain and leg swelling. Gastrointestinal: Positive for diarrhea. Negative for abdominal pain, blood in stool, constipation and nausea. Endocrine: Negative for cold intolerance, polydipsia and polyphagia. Genitourinary: Negative for dysuria, frequency, hematuria and urgency. Musculoskeletal: Negative for arthralgias, back pain, gait problem, joint swelling, neck pain and neck stiffness. Skin: Negative for color change and rash. Allergic/Immunologic: Negative for environmental allergies and food allergies. Neurological: Positive for weakness. Negative for dizziness, tremors, seizures, speech difficulty, numbness and headaches. Hematological: Negative for adenopathy. Does not bruise/bleed easily. Psychiatric/Behavioral: Negative for behavioral problems, dysphoric mood, hallucinations and self-injury. Medications and orders     All medications reviewed and updated in [facility] EMR. Objective     Vitals: per nursing home records    Physical Exam  Constitutional:       Appearance: She is well-developed. HENT:      Head: Normocephalic and atraumatic. Right Ear: External ear normal.      Left Ear: External ear normal.      Mouth/Throat:      Pharynx: No oropharyngeal exudate. Eyes:      General: No scleral icterus. Right eye: No discharge. Left eye: No discharge. Conjunctiva/sclera: Conjunctivae normal.      Pupils: Pupils are equal, round, and reactive to light. Neck:      Thyroid: No thyromegaly. Cardiovascular:      Rate and Rhythm: Normal rate. Rhythm irregular. Heart sounds: Normal heart sounds. No murmur heard. No gallop. Pulmonary:      Effort: Pulmonary effort is normal. No respiratory distress. Breath sounds: Normal breath sounds. No wheezing or rales.    Abdominal:      General: Bowel sounds are normal.      Palpations: Abdomen is soft. There is no mass. Tenderness: There is no guarding or rebound. Musculoskeletal:         General: No tenderness or deformity. Normal range of motion. Cervical back: Normal range of motion and neck supple. Right lower leg: Edema present. Left lower leg: Edema present. Lymphadenopathy:      Cervical: No cervical adenopathy. Skin:     General: Skin is warm and dry. Findings: No rash. Neurological:      Mental Status: She is alert and oriented to person, place, and time. Cranial Nerves: No cranial nerve deficit. Motor: Weakness present. Coordination: Coordination normal.      Deep Tendon Reflexes: Reflexes are normal and symmetric. Reflexes normal.         Pertinent Laboratory/Diagnostic Studies: The following studies were reviewed please see chart or hospital paperwork for details.     Space for lab dictation No new diagnostic    - Maintain the current therapy plan and medication regimen    Mike Solano DO  9/13/2023 2:12 PM

## 2023-10-11 ENCOUNTER — NURSING HOME VISIT (OUTPATIENT)
Dept: FAMILY MEDICINE CLINIC | Facility: CLINIC | Age: 83
End: 2023-10-11
Payer: MEDICARE

## 2023-10-11 DIAGNOSIS — N18.31 CHRONIC KIDNEY DISEASE (CKD) STAGE G3A/A1, MODERATELY DECREASED GLOMERULAR FILTRATION RATE (GFR) BETWEEN 45-59 ML/MIN/1.73 SQUARE METER AND ALBUMINURIA CREATININE RATIO LESS THAN 30 MG/G (HCC): ICD-10-CM

## 2023-10-11 DIAGNOSIS — I26.92 CHRONIC SADDLE PULMONARY EMBOLISM, UNSPECIFIED WHETHER ACUTE COR PULMONALE PRESENT (HCC): Primary | ICD-10-CM

## 2023-10-11 DIAGNOSIS — C50.919 PRIMARY MALIGNANT NEOPLASM OF BREAST WITH METASTASIS (HCC): ICD-10-CM

## 2023-10-11 DIAGNOSIS — E43 SEVERE PROTEIN-CALORIE MALNUTRITION (HCC): ICD-10-CM

## 2023-10-11 DIAGNOSIS — D64.9 ANEMIA, UNSPECIFIED TYPE: ICD-10-CM

## 2023-10-11 DIAGNOSIS — I27.82 CHRONIC SADDLE PULMONARY EMBOLISM, UNSPECIFIED WHETHER ACUTE COR PULMONALE PRESENT (HCC): Primary | ICD-10-CM

## 2023-10-11 DIAGNOSIS — C79.51 SECONDARY MALIGNANT NEOPLASM OF BONE (HCC): ICD-10-CM

## 2023-10-11 PROCEDURE — 99308 SBSQ NF CARE LOW MDM 20: CPT | Performed by: FAMILY MEDICINE

## 2023-10-11 NOTE — PROGRESS NOTES
[address] [facility], [address] [place] [address]  Facility: [facility]    NAME: Seymour Silverman  AGE: 79 y.o. SEX: female    DATE OF ENCOUNTER: 10/11/2023    Code status:  DNR w/ Hospitalization    Assessment and Plan     1. Chronic saddle pulmonary embolism, unspecified whether acute cor pulmonale present ([address])    2. Secondary malignant neoplasm of bone ([address])    3. Chronic kidney disease (CKD) stage G3a/A1, moderately decreased glomerular filtration rate (GFR) between 45-59 mL/min/1.73 square meter and albuminuria creatinine ratio less than 30 mg/g (HCC)    4. Anemia, unspecified type    5. Primary malignant neoplasm of breast with metastasis ([address])    6. Severe protein-calorie malnutrition ([address])        All medications and routine orders were reviewed and updated as needed. Plan discussed with: Patient    Chief Complaint     Interim evaluation    History of Present Illness     The patient reports difficulty with sleeping. She tosses and turns at night. She is interested in something to help restore sleep-wake cycles. She suggested melatonin. I agreed to this. She is declining any new vaccines. Her bowels have been stable and she takes her self to the bathroom. Her appetite is good although she is sad that her daughter moved away and she will be able to go out as frequently    The following portions of the patient's history were reviewed and updated as appropriate: current medications, past family history, past medical history, past social history, past surgical history and problem list.    Allergies: Allergies   Allergen Reactions    Sulfa Antibiotics     Pneumococcal Polysaccharide Vaccine Rash and Edema       Review of Systems     Review of Systems   Constitutional:  Negative for activity change, appetite change, chills, diaphoresis, fatigue and unexpected weight change. HENT:  Negative for congestion, ear discharge, ear pain, hearing loss, nosebleeds and rhinorrhea. Eyes:  Negative for pain, redness, itching and visual disturbance. Respiratory:  Negative for cough, choking, chest tightness and shortness of breath. Cardiovascular:  Negative for chest pain and leg swelling. Gastrointestinal:  Negative for abdominal pain, blood in stool, constipation, diarrhea and nausea. Endocrine: Negative for cold intolerance, polydipsia and polyphagia. Genitourinary:  Negative for dysuria, frequency, hematuria and urgency. Musculoskeletal:  Positive for back pain and gait problem. Negative for arthralgias, joint swelling, neck pain and neck stiffness. Skin:  Negative for color change and rash. Allergic/Immunologic: Negative for environmental allergies and food allergies. Neurological:  Positive for weakness. Negative for dizziness, tremors, seizures, speech difficulty, numbness and headaches. Hematological:  Negative for adenopathy. Does not bruise/bleed easily. Psychiatric/Behavioral:  Negative for behavioral problems, dysphoric mood, hallucinations and self-injury. Medications and orders     All medications reviewed and updated in long term EMR. Objective     Vitals: per nursing home records    Physical Exam  Constitutional:       Appearance: She is well-developed. HENT:      Head: Normocephalic and atraumatic. Right Ear: External ear normal.      Left Ear: External ear normal.      Mouth/Throat:      Pharynx: No oropharyngeal exudate. Eyes:      General: No scleral icterus. Right eye: No discharge. Left eye: No discharge. Conjunctiva/sclera: Conjunctivae normal.      Pupils: Pupils are equal, round, and reactive to light. Neck:      Thyroid: No thyromegaly. Cardiovascular:      Rate and Rhythm: Normal rate. Rhythm irregular. Heart sounds: Normal heart sounds. No murmur heard. No gallop. Pulmonary:      Effort: Pulmonary effort is normal. No respiratory distress. Breath sounds: No wheezing or rales. Abdominal:      General: Bowel sounds are normal.      Palpations: Abdomen is soft. There is no mass. Tenderness: There is no guarding or rebound. Musculoskeletal:         General: No tenderness or deformity. Normal range of motion. Cervical back: Normal range of motion and neck supple. Lymphadenopathy:      Cervical: No cervical adenopathy. Skin:     General: Skin is warm and dry. Findings: No rash. Neurological:      Mental Status: She is alert and oriented to person, place, and time. Cranial Nerves: No cranial nerve deficit. Motor: Weakness present. Coordination: Coordination normal.      Deep Tendon Reflexes: Reflexes are normal and symmetric. Reflexes normal.   Psychiatric:         Behavior: Behavior normal.         Thought Content: Thought content normal.         Judgment: Judgment normal.         Pertinent Laboratory/Diagnostic Studies: The following studies were reviewed please see chart or hospital paperwork for details.     Space for lab dictation no new diagnostic studies  Add melatonin 3 mg at bedtime  -      Bo Hardy DO  10/11/2023 2:15 PM

## 2023-11-14 ENCOUNTER — NURSING HOME VISIT (OUTPATIENT)
Dept: FAMILY MEDICINE CLINIC | Facility: CLINIC | Age: 83
End: 2023-11-14
Payer: MEDICARE

## 2023-11-14 DIAGNOSIS — C50.919 PRIMARY MALIGNANT NEOPLASM OF BREAST WITH METASTASIS (HCC): ICD-10-CM

## 2023-11-14 DIAGNOSIS — R33.9 URINARY RETENTION: ICD-10-CM

## 2023-11-14 DIAGNOSIS — I27.82 CHRONIC SADDLE PULMONARY EMBOLISM, UNSPECIFIED WHETHER ACUTE COR PULMONALE PRESENT (HCC): ICD-10-CM

## 2023-11-14 DIAGNOSIS — N18.31 CHRONIC KIDNEY DISEASE (CKD) STAGE G3A/A1, MODERATELY DECREASED GLOMERULAR FILTRATION RATE (GFR) BETWEEN 45-59 ML/MIN/1.73 SQUARE METER AND ALBUMINURIA CREATININE RATIO LESS THAN 30 MG/G (HCC): ICD-10-CM

## 2023-11-14 DIAGNOSIS — K51.011 ULCERATIVE PANCOLITIS WITH RECTAL BLEEDING (HCC): Primary | ICD-10-CM

## 2023-11-14 DIAGNOSIS — I26.92 CHRONIC SADDLE PULMONARY EMBOLISM, UNSPECIFIED WHETHER ACUTE COR PULMONALE PRESENT (HCC): ICD-10-CM

## 2023-11-14 DIAGNOSIS — E43 SEVERE PROTEIN-CALORIE MALNUTRITION (HCC): ICD-10-CM

## 2023-11-14 DIAGNOSIS — C79.51 SECONDARY MALIGNANT NEOPLASM OF BONE (HCC): ICD-10-CM

## 2023-11-14 DIAGNOSIS — Z93.59 SUPRAPUBIC CATHETER (HCC): ICD-10-CM

## 2023-11-14 PROCEDURE — 99308 SBSQ NF CARE LOW MDM 20: CPT | Performed by: FAMILY MEDICINE

## 2023-11-14 NOTE — PROGRESS NOTES
[address]  Facility: [facility]    NAME: Darnell Silverman  AGE: 79 y.o. SEX: female    DATE OF ENCOUNTER: 11/14/2023    Code status:  DNR w/ Hospitalization    Assessment and Plan     1. Ulcerative pancolitis with rectal bleeding ([address])    2. Chronic saddle pulmonary embolism, unspecified whether acute cor pulmonale present (HCC)    3. Secondary malignant neoplasm of bone ([address])    4. Chronic kidney disease (CKD) stage G3a/A1, moderately decreased glomerular filtration rate (GFR) between 45-59 mL/min/1.73 square meter and albuminuria creatinine ratio less than 30 mg/g (HCC)    5. Urinary retention    6. Primary malignant neoplasm of breast with metastasis ([address])    7. Suprapubic catheter ([address])    8. Severe protein-calorie malnutrition ([address])        All medications and routine orders were reviewed and updated as needed. Plan discussed with: Family member    Chief Complaint     Interim evaluation    History of Present Illness     The patient wanted to have a discussion regarding changing her anticoagulant. I explained to her that she failed Eliquis and had a pulmonary embolism while she was on this product. She has been doing well with the Lovenox. She would prefer not to have to have an injection every day. The following portions of the patient's history were reviewed and updated as appropriate: current medications, past family history, past medical history, past social history, past surgical history and problem list.    Allergies: Allergies   Allergen Reactions    Sulfa Antibiotics     Pneumococcal Polysaccharide Vaccine Rash and Edema       Review of Systems     Review of Systems   Constitutional:  Negative for activity change, appetite change, chills, diaphoresis, fatigue and unexpected weight change. HENT:  Negative for congestion, ear discharge, ear pain, hearing loss, nosebleeds and rhinorrhea.     Eyes:  Negative for pain, redness, itching and visual disturbance. Respiratory:  Negative for cough, choking, chest tightness and shortness of breath. Cardiovascular:  Negative for chest pain and leg swelling. Gastrointestinal:  Positive for diarrhea. Negative for abdominal pain, blood in stool, constipation and nausea. Endocrine: Negative for cold intolerance, polydipsia and polyphagia. Genitourinary:  Negative for dysuria, frequency, hematuria and urgency. Musculoskeletal:  Negative for arthralgias, back pain, gait problem, joint swelling, neck pain and neck stiffness. Skin:  Negative for color change and rash. Allergic/Immunologic: Negative for environmental allergies and food allergies. Neurological:  Positive for weakness. Negative for dizziness, tremors, seizures, speech difficulty, numbness and headaches. Hematological:  Negative for adenopathy. Does not bruise/bleed easily. Psychiatric/Behavioral:  Negative for behavioral problems, dysphoric mood, hallucinations and self-injury. Medications and orders     All medications reviewed and updated in [facility]f EMR. Objective     Vitals: per nursing home records    Physical Exam  Constitutional:       Appearance: Normal appearance. She is well-developed. HENT:      Head: Normocephalic and atraumatic. Right Ear: External ear normal.      Left Ear: External ear normal.      Mouth/Throat:      Mouth: Mucous membranes are moist.      Pharynx: Oropharynx is clear. No oropharyngeal exudate. Eyes:      General: No scleral icterus. Right eye: No discharge. Left eye: No discharge. Extraocular Movements: Extraocular movements intact. Conjunctiva/sclera: Conjunctivae normal.      Pupils: Pupils are equal, round, and reactive to light. Neck:      Thyroid: No thyromegaly. Cardiovascular:      Rate and Rhythm: Normal rate. Rhythm irregular. Heart sounds: Murmur heard. No gallop.    Pulmonary:      Effort: Pulmonary effort is normal. No respiratory distress. Breath sounds: Normal breath sounds. No wheezing or rales. Abdominal:      General: Bowel sounds are normal.      Palpations: Abdomen is soft. There is no mass. Tenderness: There is no guarding or rebound. Musculoskeletal:         General: No tenderness or deformity. Normal range of motion. Cervical back: Normal range of motion and neck supple. Lymphadenopathy:      Cervical: No cervical adenopathy. Skin:     General: Skin is warm and dry. Findings: No rash. Neurological:      Mental Status: She is alert and oriented to person, place, and time. Cranial Nerves: No cranial nerve deficit. Motor: Weakness present. Coordination: Coordination normal.      Deep Tendon Reflexes: Reflexes are normal and symmetric. Reflexes normal.   Psychiatric:         Mood and Affect: Mood normal.         Behavior: Behavior normal.         Thought Content: Thought content normal.         Judgment: Judgment normal.         Pertinent Laboratory/Diagnostic Studies: The following studies were reviewed please see chart or hospital paperwork for details.     Space for lab dictation no new diagnostic studies    - Maintain the current medical regimen    Joe Soares DO  11/14/2023 12:27 PM

## 2023-12-15 ENCOUNTER — NURSING HOME VISIT (OUTPATIENT)
Dept: FAMILY MEDICINE CLINIC | Facility: CLINIC | Age: 83
End: 2023-12-15
Payer: MEDICARE

## 2023-12-15 DIAGNOSIS — C79.51 SECONDARY MALIGNANT NEOPLASM OF BONE (HCC): ICD-10-CM

## 2023-12-15 DIAGNOSIS — C50.919 PRIMARY MALIGNANT NEOPLASM OF BREAST WITH METASTASIS (HCC): ICD-10-CM

## 2023-12-15 DIAGNOSIS — I26.92 CHRONIC SADDLE PULMONARY EMBOLISM, UNSPECIFIED WHETHER ACUTE COR PULMONALE PRESENT (HCC): Primary | ICD-10-CM

## 2023-12-15 DIAGNOSIS — Z93.59 SUPRAPUBIC CATHETER (HCC): ICD-10-CM

## 2023-12-15 DIAGNOSIS — D64.9 ANEMIA, UNSPECIFIED TYPE: ICD-10-CM

## 2023-12-15 DIAGNOSIS — I27.82 CHRONIC SADDLE PULMONARY EMBOLISM, UNSPECIFIED WHETHER ACUTE COR PULMONALE PRESENT (HCC): Primary | ICD-10-CM

## 2023-12-15 PROCEDURE — 99308 SBSQ NF CARE LOW MDM 20: CPT | Performed by: FAMILY MEDICINE

## 2023-12-15 NOTE — PROGRESS NOTES
[address]  Fac[facility]    NAME: Nana Rubinstein A Clunk  AGE: 79 y.o. SEX: female    DATE OF ENCOUNTER: 12/15/2023    Code status:  DNR w/ Hospitalization    Assessment and Plan     1. Chronic saddle pulmonary embolism, unspecified whether acute cor pulmonale present ([address])    2. Secondary malignant neoplasm of bone ([address])    3. Anemia, unspecified type    4. Suprapubic catheter ([address])    5. Primary malignant neoplasm of breast with metastasis ([address])        All medications and routine orders were reviewed and updated as needed. Plan discussed with: Family member    Chief Complaint     Interim evaluation    History of Present Illness     The patient continues to repeatedly request that we discontinue the Lovenox and treat her with an oral agent. I informed her that I am willing to try this with a low-dose of Eliquis. Reports her bowel function is normal.  She denies any dyspnea. Her appetite is at baseline. Catheter continues to function well. The following portions of the patient's history were reviewed and updated as appropriate: current medications, past family history, past medical history, past social history, past surgical history and problem list.    Allergies: Allergies   Allergen Reactions    Sulfa Antibiotics     Pneumococcal Polysaccharide Vaccine Rash and Edema       Review of Systems     Review of Systems   Constitutional:  Negative for activity change, appetite change, chills, diaphoresis, fatigue and unexpected weight change. HENT:  Negative for congestion, ear discharge, ear pain, hearing loss, nosebleeds and rhinorrhea. Eyes:  Negative for pain, redness, itching and visual disturbance. Respiratory:  Negative for cough, choking, chest tightness and shortness of breath. Cardiovascular:  Negative for chest pain and leg swelling.    Gastrointestinal:  Negative for abdominal pain, blood in stool, constipation, diarrhea and nausea. Endocrine: Negative for cold intolerance, polydipsia and polyphagia. Genitourinary:  Negative for dysuria, frequency, hematuria and urgency. Musculoskeletal:  Negative for arthralgias, back pain, gait problem, joint swelling, neck pain and neck stiffness. Skin:  Negative for color change and rash. Allergic/Immunologic: Negative for environmental allergies and food allergies. Neurological:  Positive for weakness. Negative for dizziness, tremors, seizures, speech difficulty, numbness and headaches. Hematological:  Negative for adenopathy. Bruises/bleeds easily. Psychiatric/Behavioral:  Negative for behavioral problems, dysphoric mood, hallucinations and self-injury. Medications and orders     All medications reviewed and updated in [facility] EMR. Objective     Vitals: per nursing home records    Physical Exam  Constitutional:       Appearance: Normal appearance. She is well-developed. HENT:      Head: Normocephalic and atraumatic. Right Ear: External ear normal.      Left Ear: External ear normal.      Mouth/Throat:      Mouth: Mucous membranes are moist.      Pharynx: Oropharynx is clear. No oropharyngeal exudate. Eyes:      General: No scleral icterus. Right eye: No discharge. Left eye: No discharge. Extraocular Movements: Extraocular movements intact. Conjunctiva/sclera: Conjunctivae normal.      Pupils: Pupils are equal, round, and reactive to light. Neck:      Thyroid: No thyromegaly. Cardiovascular:      Rate and Rhythm: Normal rate. Rhythm irregular. Heart sounds: Normal heart sounds. No murmur heard. No gallop. Pulmonary:      Effort: Pulmonary effort is normal. No respiratory distress. Breath sounds: Normal breath sounds. No wheezing or rales. Abdominal:      General: Bowel sounds are normal.      Palpations: Abdomen is soft. There is no mass. Tenderness: There is no guarding or rebound.    Musculoskeletal: General: No tenderness or deformity. Normal range of motion. Cervical back: Normal range of motion and neck supple. Lymphadenopathy:      Cervical: No cervical adenopathy. Skin:     General: Skin is warm and dry. Findings: Bruising present. No rash. Neurological:      Mental Status: She is alert and oriented to person, place, and time. Cranial Nerves: No cranial nerve deficit. Coordination: Coordination normal.      Deep Tendon Reflexes: Reflexes are normal and symmetric. Reflexes normal.   Psychiatric:         Mood and Affect: Mood normal.         Behavior: Behavior normal.         Thought Content: Thought content normal.         Judgment: Judgment normal.         Pertinent Laboratory/Diagnostic Studies: The following studies were reviewed please see chart or hospital paperwork for details. Space for lab dictation no new diagnostic studies    - Begin Eliquis 2.5 mg twice daily. 24 hours later discontinue Lovenox.     Tadeo Jensen DO  12/15/2023 2:13 PM

## 2024-01-01 ENCOUNTER — HOME CARE VISIT (OUTPATIENT)
Dept: HOME HEALTH SERVICES | Facility: HOME HEALTHCARE | Age: 84
End: 2024-01-01

## 2024-01-01 ENCOUNTER — HOME CARE VISIT (OUTPATIENT)
Dept: HOME HOSPICE | Facility: HOSPICE | Age: 84
End: 2024-01-01
Payer: MEDICARE

## 2024-01-01 ENCOUNTER — HOME CARE VISIT (OUTPATIENT)
Dept: HOME HEALTH SERVICES | Facility: HOME HEALTHCARE | Age: 84
End: 2024-01-01
Payer: MEDICARE

## 2024-01-01 ENCOUNTER — HOSPICE ADMISSION (OUTPATIENT)
Dept: HOME HOSPICE | Facility: HOSPICE | Age: 84
End: 2024-01-01
Payer: MEDICARE

## 2024-01-01 ENCOUNTER — NURSING HOME VISIT (OUTPATIENT)
Dept: FAMILY MEDICINE CLINIC | Facility: CLINIC | Age: 84
End: 2024-01-01
Payer: MEDICARE

## 2024-01-01 DIAGNOSIS — Z51.5 HOSPICE CARE: Primary | ICD-10-CM

## 2024-01-01 DIAGNOSIS — M54.9 INTRACTABLE BACK PAIN: ICD-10-CM

## 2024-01-01 DIAGNOSIS — Z93.59 SUPRAPUBIC CATHETER (HCC): ICD-10-CM

## 2024-01-01 DIAGNOSIS — C50.919 PRIMARY MALIGNANT NEOPLASM OF BREAST WITH METASTASIS (HCC): Primary | ICD-10-CM

## 2024-01-01 DIAGNOSIS — Z51.5 HOSPICE CARE: ICD-10-CM

## 2024-01-01 DIAGNOSIS — C79.51 SECONDARY MALIGNANT NEOPLASM OF BONE (HCC): ICD-10-CM

## 2024-01-01 PROCEDURE — G0156 HHCP-SVS OF AIDE,EA 15 MIN: HCPCS

## 2024-01-01 PROCEDURE — 10330057 MEDICATION, GENERAL

## 2024-01-01 PROCEDURE — 10330087 HSPC SERVICE INTENSITY ADD-ON

## 2024-01-01 PROCEDURE — G0299 HHS/HOSPICE OF RN EA 15 MIN: HCPCS

## 2024-01-01 PROCEDURE — 99309 SBSQ NF CARE MODERATE MDM 30: CPT | Performed by: NURSE PRACTITIONER

## 2024-01-01 RX ORDER — MORPHINE SULFATE 100 MG/5ML
SOLUTION ORAL
Qty: 60 ML | Refills: 0 | Status: SHIPPED | OUTPATIENT
Start: 2024-01-01 | End: 2024-01-01

## 2024-01-01 RX ORDER — METHADONE HYDROCHLORIDE 5 MG/5ML
SOLUTION ORAL
Qty: 500 ML | Refills: 0 | Status: SHIPPED | OUTPATIENT
Start: 2024-01-01 | End: 2024-01-01

## 2024-01-08 ENCOUNTER — NURSING HOME VISIT (OUTPATIENT)
Dept: FAMILY MEDICINE CLINIC | Facility: CLINIC | Age: 84
End: 2024-01-08
Payer: MEDICARE

## 2024-01-08 DIAGNOSIS — I26.92 CHRONIC SADDLE PULMONARY EMBOLISM, UNSPECIFIED WHETHER ACUTE COR PULMONALE PRESENT (HCC): ICD-10-CM

## 2024-01-08 DIAGNOSIS — E43 SEVERE PROTEIN-CALORIE MALNUTRITION (HCC): ICD-10-CM

## 2024-01-08 DIAGNOSIS — K62.5 RECTAL BLEEDING: Primary | ICD-10-CM

## 2024-01-08 DIAGNOSIS — C50.919 PRIMARY MALIGNANT NEOPLASM OF BREAST WITH METASTASIS (HCC): ICD-10-CM

## 2024-01-08 DIAGNOSIS — I27.82 CHRONIC SADDLE PULMONARY EMBOLISM, UNSPECIFIED WHETHER ACUTE COR PULMONALE PRESENT (HCC): ICD-10-CM

## 2024-01-08 DIAGNOSIS — K92.1 HEMATOCHEZIA: ICD-10-CM

## 2024-01-08 DIAGNOSIS — C79.51 SECONDARY MALIGNANT NEOPLASM OF BONE (HCC): ICD-10-CM

## 2024-01-08 PROCEDURE — 99309 SBSQ NF CARE MODERATE MDM 30: CPT | Performed by: FAMILY MEDICINE

## 2024-01-08 NOTE — PROGRESS NOTES
[facility]  [address] [place] [address]  Facility: [facility]    NAME: Kerry Silverman  AGE: 83 y.o. SEX: female    DATE OF ENCOUNTER: 1/8/2024    Code status:  DNR w/ Hospitalization    Assessment and Plan     1. Rectal bleeding    2. Chronic saddle pulmonary embolism, unspecified whether acute cor pulmonale present (HCC)    3. Secondary malignant neoplasm of bone (HCC)    4. Hematochezia    5. Primary malignant neoplasm of breast with metastasis (HCC)    6. Severe protein-calorie malnutrition (HCC)        All medications and routine orders were reviewed and updated as needed.    Plan discussed with: Family member    Chief Complaint     Interim evaluation    History of Present Illness     The patient reports that since discontinuing Lovenox and beginning Eliquis she has noticed the passage of bright red blood per her rectum.  Today she passed several large clots.  She previously had enterocolitis but has not had any difficulty with bleeding up until 2 weeks ago.  She is very happy to be off the Lovenox shots but in light of the large clots that she is passed we will have to discontinue her Eliquis.  She will begin a baby aspirin.  We will be checking her blood count in the next 2 days.    The following portions of the patient's history were reviewed and updated as appropriate: current medications, past family history, past medical history, past social history, past surgical history and problem list.    Allergies:  Allergies   Allergen Reactions    Sulfa Antibiotics     Pneumococcal Polysaccharide Vaccine Rash and Edema       Review of Systems     Review of Systems   Constitutional:  Negative for activity change, appetite change, chills, diaphoresis, fatigue and unexpected weight change.   HENT:  Negative for congestion, ear discharge, ear pain, hearing loss, nosebleeds and rhinorrhea.    Eyes:  Negative for pain, redness, itching and visual disturbance.   Respiratory:  Negative  for cough, choking, chest tightness and shortness of breath.    Cardiovascular:  Negative for chest pain and leg swelling.   Gastrointestinal:  Positive for anal bleeding and blood in stool. Negative for abdominal pain, constipation, diarrhea and nausea.   Endocrine: Negative for cold intolerance, polydipsia and polyphagia.   Genitourinary:  Negative for dysuria, frequency, hematuria and urgency.   Musculoskeletal:  Negative for arthralgias, back pain, gait problem, joint swelling, neck pain and neck stiffness.   Skin:  Negative for color change and rash.   Allergic/Immunologic: Negative for environmental allergies and food allergies.   Neurological:  Positive for weakness. Negative for dizziness, tremors, seizures, speech difficulty, numbness and headaches.   Hematological:  Negative for adenopathy. Does not bruise/bleed easily.   Psychiatric/Behavioral:  Negative for behavioral problems, dysphoric mood, hallucinations and self-injury.        Medications and orders     All medications reviewed and updated in intermediate EMR.      Objective     Vitals: per nursing home records    Physical Exam  Constitutional:       Appearance: Normal appearance. She is well-developed.   HENT:      Head: Normocephalic and atraumatic.      Right Ear: External ear normal.      Left Ear: External ear normal.      Mouth/Throat:      Mouth: Mucous membranes are moist.      Pharynx: Oropharynx is clear. No oropharyngeal exudate.   Eyes:      General: No scleral icterus.        Right eye: No discharge.         Left eye: No discharge.      Extraocular Movements: Extraocular movements intact.      Conjunctiva/sclera: Conjunctivae normal.      Pupils: Pupils are equal, round, and reactive to light.   Neck:      Thyroid: No thyromegaly.   Cardiovascular:      Rate and Rhythm: Normal rate and regular rhythm.      Heart sounds: Normal heart sounds. No murmur heard.     No gallop.   Pulmonary:      Effort: Pulmonary effort is normal. No  respiratory distress.      Breath sounds: Normal breath sounds. No wheezing or rales.   Abdominal:      General: Bowel sounds are normal.      Palpations: Abdomen is soft. There is no mass.      Tenderness: There is no guarding or rebound.   Musculoskeletal:         General: No tenderness or deformity. Normal range of motion.      Cervical back: Normal range of motion and neck supple.   Lymphadenopathy:      Cervical: No cervical adenopathy.   Skin:     General: Skin is warm and dry.      Findings: No rash.   Neurological:      Mental Status: She is alert and oriented to person, place, and time.      Cranial Nerves: No cranial nerve deficit.      Coordination: Coordination normal.      Deep Tendon Reflexes: Reflexes are normal and symmetric. Reflexes normal.   Psychiatric:         Mood and Affect: Mood normal.         Behavior: Behavior normal.         Thought Content: Thought content normal.         Judgment: Judgment normal.         Pertinent Laboratory/Diagnostic Studies:     The following studies were reviewed please see chart or hospital paperwork for details.    Space for lab dictation no new diagnostic studies    - We will discontinue Eliquis.  She will begin aspirin 81 mg daily.  We will check a CBC in 2 days    Nelson Yoder DO  1/8/2024 1:50 PM

## 2024-02-08 ENCOUNTER — OFFICE VISIT (OUTPATIENT)
Dept: GASTROENTEROLOGY | Facility: CLINIC | Age: 84
End: 2024-02-08
Payer: MEDICARE

## 2024-02-08 VITALS
SYSTOLIC BLOOD PRESSURE: 108 MMHG | BODY MASS INDEX: 25.22 KG/M2 | DIASTOLIC BLOOD PRESSURE: 60 MMHG | OXYGEN SATURATION: 98 % | WEIGHT: 133.6 LBS | HEIGHT: 61 IN | TEMPERATURE: 98.7 F | HEART RATE: 65 BPM

## 2024-02-08 DIAGNOSIS — K51.011 ULCERATIVE PANCOLITIS WITH RECTAL BLEEDING (HCC): Primary | ICD-10-CM

## 2024-02-08 DIAGNOSIS — Z93.59 SUPRAPUBIC CATHETER (HCC): ICD-10-CM

## 2024-02-08 DIAGNOSIS — K50.111 CROHN'S DISEASE OF LARGE INTESTINE WITH RECTAL BLEEDING (HCC): ICD-10-CM

## 2024-02-08 DIAGNOSIS — E43 SEVERE PROTEIN-CALORIE MALNUTRITION (HCC): ICD-10-CM

## 2024-02-08 PROCEDURE — 99215 OFFICE O/P EST HI 40 MIN: CPT | Performed by: INTERNAL MEDICINE

## 2024-02-08 RX ORDER — LANOLIN ALCOHOL/MO/W.PET/CERES
3 CREAM (GRAM) TOPICAL
COMMUNITY

## 2024-02-08 RX ORDER — ASPIRIN 81 MG
TABLET,CHEWABLE ORAL
COMMUNITY
Start: 2024-01-12

## 2024-02-08 NOTE — PATIENT INSTRUCTIONS
You have ulcerative colitis  We will check the stools to make sure there is no infection  If there is no infection, we will start treatment with Stelara.  One infusion, then injections every 8 weeks.

## 2024-02-08 NOTE — PROGRESS NOTES
Gastroenterology Outpatient Follow-up - Crohn's Disease  Kerry Silverman 83 y.o. female MRN: [id number]  Encounter: [id number]    Kerry Silverman is a 83 y.o. female with Crohn's disease.    Symptom onset:  2022  Diagnosis:  Crohns disease  Year of diagnosis:  2022    IBD Summary: Kerry Silverman is metastatic breast cancer and developed severe colitis and bleeding in 9/2022.  She was hospitalized for several weeks with pulmonary embolisms, COVID, C. difficile, and hematochezia.  She ultimately started infliximab therapy for possible IBD, however did not improve and treatments were discontinued after several months.  She had repeat colonoscopy in 3/2023 which showed Jarrett 3 colitis to the hepatic flexure.  She was then started on prednisone and we discussed switching to ustekinumab.  Diagnosed with C. difficile in 3/2023 and significantly improved with vancomycin.  Remains off therapy.      CD Summary  Current Crohn's disease phenotype:  inflammatory and non-penetrating and non-stricturing    Involved sites since disease onset   Esophagus: no   Stomach: no     Duodenum: no   Jejunum: no   Ileum: no   Right colon: no   Transverse colon: yes   Left colon: yes   Rectum: yes   Anus: no     History of stricture  Esophagus: no   Stomach: no   Duodenum: no   Jejunum: no   Ileum: no   Right colon: no   Transverse colon: no   Left colon: no   Rectum: no   Anus: no     History of fistula other than perianal:  Intra-abdominal   abcess: no      Enteroenteric fistula: no      Enterocutaneous fistula: no      Enterovesical fistula: no      Other fistula: no            Surgical History  Number of IBD surgeries: 0      First IBD surgery:    Most Recent IBD surgery:    Esophageal:  0    Gastroduodenal:  0    Small bowel resection(s):  0    Ileocolonic resection(s): 0      Colonic resection(s):  0    Ileostomy or colostomy:  no previous ostomy    Complete colectomy:  No         Medications     Year last used Reason for discontinuation    Corticosteroids prior   2022       Thiopurines   never         Methotrexate   never         Infliximab prior     LA     Adalimumab   never         Certolizumab   never         Golimumab   never         Natalizumab   never         Mesalamine   never         Sulfasalzine   never         Vedolizumab   never         Ustekinumab   never         Tofacitinib   never         Other biologic   never             Extraintestinal Manifestations    IBD-associated arthropathy No    Uveitis No    Oral aphthous ulcers No   Erythema nodosum No    Pyoderma gangrenosum No    Primary sclerosing cholangitis No    Thrombotic complications No          Cancer / Dysplasia History  History of IBD-associated dysplasia: none    Date of diagnosis (Year):     History of colorectal cancer: No   History of cervical dysplasia: No   History of skin cancer: none         Laboratory Data   Most recent (date) Result   PPD   unknown   Quanitferon gold 9/1/2022 indeterminate   TPMT   unknown   Hepatitis A   unknown   HBsAb 8/31/2022 negative   HBcAb   unknown   HBsAg 8/31/2022     HCV Ab   unknown       Imaging / Diagnostic Procedures   Most recent (date) Findings   Colonoscopy or sigmoidoscopy #1 8/20/2022            Ulcers/Erosions  large           Strictures  none           Stricture Severity  N/A           Endoscopic Score Jarrett Score:  3 Rutgeert's:  N/A            Findings  Severe colitis into mid sigmoid colon with deep ulcerations. Inflammatory bowel disease? Infectious (CMV)?           Pathology  A. Sigmoid colon (biopsy):  - Active colitis  - No viral inclusions identified on CMV immunohistochemistry stain  - Negative for dysplasia    Colonoscopy or sigmoidoscopy #2 3/8/2023            Ulcers/Erosions  small              Strictures  none              Stricture Severity  N/A           Endoscopic Score Jarrett Score:  3 Rugeert's:  N/A           Findings  (1) The terminal ileum, ileocecal valve and cecum appeared normal.  (2) Mild friable mucosa with  loss of vascular pattern in the ascending colon  (3) Severe edematous, erythematous, friable and ulcerated mucosa with erosion, loss of folds and loss of vascular pattern in the hepatic flexure, transverse colon, splenic flexure, descending colon, sigmoid colon, rectosigmoid and rectum, consistent with IBD; performed cold forceps biopsy.  PATH:           Pathology      EGD       Small bowel follow-through       CT enterography       CT without enterography 9/30/2022 1.  Findings of worsening proctocolitis of the sigmoid and rectum with new perirectal fat stranding and presacral edema. 2.  Improving inflammation of the transverse and descending colon, with improved appearance of several small outpouchings from the mid descending colon which likely represented contained perforations.   MRI enterography       Capsule study       DEXA scan   Lowest Z-score:      CXR (for TB)           HPI:   Interval History:  2/8/2024  Kerry is in the office with her daughter.  She remains off IBD therapy.  Still residing at [facility].  When I saw her in 6/2023, she was feeling fine and again elected to remain off therapy.  She started bleeding again in January and now has 6-8 diarrheal watery BMs with incontinence.  Calprotectin was last checked in August 2023 and was 313.  She had COVID 2 weeks ago.  She feels tired and has poor appetite.  Weight has increased since last visit.  She still has a suprapubic catheter. Last colonoscopy was in 3/2023 with Jarrett 3 pan-colitis.  Labs from 1/10/2024: Hgb 10.2, MCV 81, WBC 4.4, Plt 173    6/15/2023 Follow up  Kerry Silverman returns for follow-up.  She is in the office with her daughter.  At our last visit, she was having diarrhea and incontinence and had stopped infliximab therapy because she felt it was not effective.  We had a long discussion and ultimately decided to start ustekinumab because it involves just 1 IV dose followed by injections.  However, this plan never materialized  because I received the stool test result from 3/14/2023 which was positive for C. difficile by PCR.  I then ordered vancomycin and it seems to have had a very positive impact.  She now has 1-2 formed stools per day, rarely sees blood, there is no abdominal pain, no incontinence anymore.  Loperamide is still on her medication list.  She also had an episode of pneumonia which was treated.  She says she feels very well, she has gained some weight, and her energy is improved.    3/15/2023 Follow up  Since last visit, she discontinued infliximab because she and her daughter felt that it was not working.  She had colonoscopy on 3/8/2023 which showed severe Jarrett 3 disease from the rectum to the hepatic flexure.  Unfortunately, biopsies are pending at this time.  Blood work was done on 3/10 and was significant for hemoglobin of 8.7 with normal MCV, albumin 1.5, CRP 13.2, ferritin 100, normal LFTs, normal kidney function.  Prior to the colonoscopy, she was incontinent and had diarrhea 3-4 times per day.  She was started on prednisone after the procedure and since starting prednisone, has had improvement in stool frequency, with some formed stools.  However, she remains on 10.  She feel surprising well and of the since starting the steroids.  She has stage IV breast cancer with no plans for further treatment.    1/10/2023 Initial visit after hospital discharge  Kerry SATISH Silverman presents for follow-up after hospital admission in September during which she was diagnosed with IBD and started on infliximab.  This is a telemedicine appointment and she is on the phone with her daughter.  She is an 82-year-old woman with metastatic breast cancer and CKD who was admitted with a saddle pulmonary embolism in September.  During the admission we were consulted for bloody diarrhea and her colonoscopy showed severe colitis with deep ulcers which were negative for CMV.  CT showed severe proctitis and left-sided colitis with possible contained  perforations.  She was positive for C. difficile by PCR only and had no improvement with steroids and vancomycin.  She was ultimately treated with infliximab as an inpatient and has continued following discharge.  Her hospital admission was further complicated by COVID.        She has been treated with various chemotherapy agents, including Femara and Kisqali until 4/2022, and Verzenio and Femara until 7/2022.  She associates the onset of diarrhea with Kisqali therapy but had been off this medication for months prior to her admission in September.  She had partial improvement with infliximab and has remained on 5 mg/kg every 8 weeks since discharge.  She is at a rehab facility.  She continues to have diarrhea and loose stools, although not as frequent as in the hospital.  She has 3-4 bowel movements per day, which are loose to soft, without bleeding.  No abdominal pain.  She has urgency and wears diapers.  According to her and her daughter, the diarrhea is no longer improving and they would like to stop infliximab.  She eats mostly soups due to poor appetite, but her weight is stable.  There are no plans for further chemotherapy.  Appetite much improved since starting steroids.  She is on Lovenox.  Has leg edema.      Kerry reports the following symptoms over the last 7 days:    Stool Frequency >4 stools/day more than normal   Average stools per day: 7   Average liquid stools per day: 3   Consistency of bowel: soft or semi-formed   Awakening from sleep to move bowels? Yes   Urgency severe fecal urgency   Visible blood in stool? visible blood in stool half of the time or more   Abdominal pain? none   General Wellbeing? slightly under par     Kerry also reports the following symptoms in the last month:    Leakage of stool while sleeping? No   Leakage of stool while awake? No       REVIEW OF SYSTEMS:  During the last 7 days, Kerry experienced the following symptoms:  Unintentional Weight Loss (in the last month)  "No  Comment:6 lb   Fever No  Comment:night sweat   Eye irritation No   Mouth sores No   Sore throat No   Chest pain No   Shortness of breath No   Numbness or tingling in hands or feet No   Skin rash No   Pain or swelling in joints No   Bruising or bleeding No   Felt depressed or blue No   Fatigue Yes   Dysuria No   Please see HPI for additional pertinent review of systems; otherwise remainder of ROS was unremarkable    MEDICATIONS:    Current Outpatient Medications:     acetaminophen (TYLENOL) 325 mg tablet    Aspirin Low Dose 81 MG chewable tablet    melatonin 3 mg    metoprolol succinate (TOPROL-XL) 50 mg 24 hr tablet    midodrine (PROAMATINE) 2.5 mg tablet    mirtazapine (REMERON) 7.5 MG tablet    ondansetron (ZOFRAN-ODT) 4 mg disintegrating tablet    pantoprazole (PROTONIX) 40 mg tablet    simvastatin (ZOCOR) 10 mg tablet    Cholecalciferol (VITAMIN D3) 2000 units capsule    diphenoxylate-atropine (LOMOTIL) 2.5-0.025 mg per tablet    enoxaparin (LOVENOX) 30 mg/0.3 mL    enoxaparin (LOVENOX) 60 mg/0.6 mL    folic acid (FOLVITE) 1 mg tablet    loperamide (IMODIUM) 2 mg capsule    metoprolol succinate (TOPROL-XL) 25 mg 24 hr tablet    ALLERGIES:  Allergies   Allergen Reactions    Sulfa Antibiotics     Pneumococcal Polysaccharide Vaccine Rash and Edema       OBJECTIVE:  /60 (BP Location: Left arm, Patient Position: Sitting, Cuff Size: Adult)   Pulse 65   Temp 98.7 °F (37.1 °C) (Tympanic)   Ht 5' 1\" (1.549 m)   Wt 60.6 kg (133 lb 9.6 oz)   LMP  (LMP Unknown)   SpO2 98%   BMI 25.24 kg/m²      PHYSICAL EXAM:    General Appearance:   Alert, cooperative, no distress   HEENT:   Normocephalic, atraumatic, anicteric.     Neck:  Supple, symmetrical, trachea midline   Lungs:   Clear to auscultation bilaterally; no rales, rhonchi or wheezing; respirations unlabored    Heart::   Regular rate and rhythm; no murmur, rub, or gallop.   Abdomen:   Soft, non-distended; normal bowel sounds    Abdominal exam was notable " for no tenderness with no mass.     Genitalia:   Deferred    Rectal:   Perirectal assessment was not performed.                     Extremities:  No cyanosis, clubbing or edema    Pulses:  2+ and symmetric    Skin:  No jaundice, rashes, or lesions    Lymph nodes:  No palpable cervical lymphadenopathy        Lab Results   Component Value Date    WBC 6.40 10/19/2022    HGB 7.2 (L) 10/20/2022    HCT 24.3 (L) 10/20/2022     (H) 10/19/2022     10/19/2022     Lab Results   Component Value Date     09/08/2015    SODIUM 145 10/19/2022    K 3.7 10/19/2022     (H) 10/19/2022    CO2 29 10/19/2022    ANIONGAP 6 09/08/2015    AGAP 3 (L) 10/19/2022    BUN 9 10/19/2022    CREATININE 0.42 (L) 10/19/2022    GLUC 82 10/19/2022    GLUF 109 (H) 08/02/2022    CALCIUM 7.8 (L) 10/19/2022    AST 17 10/14/2022    ALT 24 10/14/2022    ALKPHOS 84 10/14/2022    PROT 7.3 09/08/2015    TP 4.5 (L) 10/14/2022    BILITOT 0.66 09/08/2015    TBILI 0.28 10/14/2022    EGFR 95 10/19/2022     Lab Results   Component Value Date    CRP <3.0 10/15/2022     Lab Results   Component Value Date    AFDMLZCS20 705 10/19/2022     Lab Results   Component Value Date    FERRITIN 1,236 (H) 10/19/2022       ASSESSMENT AND PLAN:    Kerry has a history of inflammatory, non-penetrating, non-stricturing Crohn’s disease diagnosed in 2022 with involvement in the following areas:        Transverse colon, Left colon, Rectum,  without perianal fistula . Surgical history includes no small bowel resections, no colon resections, and no prior ostomy. Currently not on medical therapy. My global assessment is that the clinical disease activity is currently  .    The 6-point Jarrett score was 5 and the 9-point Jarrett score was 2.  The short CDAI was 135.      Kerry Silverman has metastatic breast cancer which was treated with various medications, history of PE in the setting of COVID infection in 9/2022, recurrent C. difficile infection, and severe colitis.  Her  colitis started while she was being treated for breat cancer and she believes it was caused by Kisqali.  She has been off cancer therapy now for >a year and continues to have bleeding and diarrhea with 2 colonoscopies showing severe colitis.  She has been treated for C. diff twice.  She was treated with steroids and infliximab, the latter discontinued by the patient and her daughter because they felt it was ineffective.  She is currently off therapy.    As in previous visits, I had a long discussion with Kerry and her daughter.  They remain reluctant to accept her diagnoses and recommended treatments, and in general seem weary of the medical profession.  Today, Kerry's daughter questioned whether her mother really had stage 4 breast cancer.  We went over her biopsy results and I confirmed that she did, indeed, have breast cancer metastatic to the chest wall.  We also discussed her concern about Kisqali causing her mother's colitis. I explained that she may have had diarrhea or even colitis associated with this treatment, but this was over a year ago.  The fact remains that she has persistent colitis proven by colonoscopy.  I also strongly refuted the assertion that her GI symptoms may be caused by stress.  I emphasized that Kerry's colitis is real, it is not caused by stress, and is not the result of her cancer treatment which was stopped over a year ago.      We again discussed Stelara as a safe treatment option.  Serious sie effects are very unlikely.  As with other IBD medications, it may not be effective but the only way to know is to try it.  It is convenient because it requires a single IV dose followed by injections.  We  want to rule out C. diff recurrence before starting therapy.  Kerry and her daughter were on board wit this plan.    1. Check stool for C. diff, giardia, calprotectin  2. Plan to start ustekinumab  3. Recommended follow up with Urology re. suprapubic catheter.  Her daughter asked me if it  can be removed.  4. My assessment is that her nutritional status is currently stable.    Return in 3 months.    I have spent a total time of 45 minutes on 02/10/24 in caring for this patient including Diagnostic results, Prognosis, Risks and benefits of tx options, Instructions for management, Patient and family education, Impressions, Counseling / Coordination of care, Documenting in the medical record, Reviewing / ordering tests, medicine, procedures  , and Obtaining or reviewing history  .      Health Maintenance Recommendations:  Vaccines & Infections  COVID-19 vaccination and boosters are recommended. There is no evidence that the COVID-19 vaccine would cause an IBD flare.  Avoid live vaccines if on immunosuppressive therapy.  Yearly influenza vaccine (flu shot).  Pneumonia vaccines for patients on immunosuppression. These include Prevnar 20, followed by Pneumovax 23 at least 8 weeks later.  Shingrix vaccine (series of 2 injections) for al patients 65 and older. Patients on tofacitinib or upadacitinib should be vaccinated regardless of age.  If not immune to measles mumps or rubella, MMR vaccine is recommended. However, this is a live vaccine and should be given prior to immunosuppressive therapy.  HPV vaccination as per national guidelines.  Hepatitis A and B vaccinations if not previously vaccinated.  Testing for tuberculosis with QuantiFERON Gold blood test and/or chest xray prior to starting immunosuppressive medications, and then annually    Cancer screening  Dysplasia surveillance for colorectal cancer. Colonoscopy in all patients with extensive colitis (more than 1/3 of the colon involved) who had disease for at least 8 or more years.  Repeat colonoscopy approximately every 12-24 months.  In patients with concurrent primary sclerosing cholangitis, history of dysplasia, or family history of colon cancer, repeat colonoscopy annually.    Females: Pap smear annually for woman on immunosuppression.  Annual  dermatologic/skin exam in all patients with IBD, especially those on immunosuppression with thiopurines or CAROL inhibitors.    Miscellaneous  DEXA scan, once off steroids for 3 months  Depression screening recommended annually  Routine dental and ophthalmology examinations    Problem List Items Addressed This Visit       Severe protein-calorie malnutrition (HCC)    Ulcerative pancolitis with rectal bleeding (HCC) - Primary    Relevant Orders    Calprotectin,Fecal    Clostridium difficile toxin by PCR with EIA    Giardia antigen    Suprapubic catheter (HCC)    Crohn's disease of large intestine with rectal bleeding (HCC)       Rajani Brito MD

## 2024-02-12 ENCOUNTER — NURSING HOME VISIT (OUTPATIENT)
Dept: FAMILY MEDICINE CLINIC | Facility: CLINIC | Age: 84
End: 2024-02-12
Payer: MEDICARE

## 2024-02-12 DIAGNOSIS — I95.1 ORTHOSTATIC HYPOTENSION: ICD-10-CM

## 2024-02-12 DIAGNOSIS — L03.011 PARONYCHIA OF RIGHT THUMB: ICD-10-CM

## 2024-02-12 DIAGNOSIS — K51.011 ULCERATIVE PANCOLITIS WITH RECTAL BLEEDING (HCC): Primary | ICD-10-CM

## 2024-02-12 DIAGNOSIS — C50.919 PRIMARY MALIGNANT NEOPLASM OF BREAST WITH METASTASIS (HCC): ICD-10-CM

## 2024-02-12 DIAGNOSIS — U07.1 COVID-19 VIRUS INFECTION: Primary | ICD-10-CM

## 2024-02-12 DIAGNOSIS — I26.92 SADDLE EMBOLUS OF PULMONARY ARTERY WITHOUT ACUTE COR PULMONALE, UNSPECIFIED CHRONICITY (HCC): ICD-10-CM

## 2024-02-12 DIAGNOSIS — Z11.59 ENCOUNTER FOR SCREENING FOR OTHER VIRAL DISEASES: ICD-10-CM

## 2024-02-12 DIAGNOSIS — K51.011 ULCERATIVE PANCOLITIS WITH RECTAL BLEEDING (HCC): ICD-10-CM

## 2024-02-12 PROCEDURE — 99309 SBSQ NF CARE MODERATE MDM 30: CPT | Performed by: NURSE PRACTITIONER

## 2024-02-16 ENCOUNTER — NURSE TRIAGE (OUTPATIENT)
Age: 84
End: 2024-02-16

## 2024-02-16 DIAGNOSIS — M89.9 LYTIC LESION OF BONE ON X-RAY: Primary | ICD-10-CM

## 2024-02-16 DIAGNOSIS — C50.919 PRIMARY MALIGNANT NEOPLASM OF BREAST WITH METASTASIS (HCC): ICD-10-CM

## 2024-02-16 DIAGNOSIS — K51.011 ULCERATIVE PANCOLITIS WITH RECTAL BLEEDING (HCC): ICD-10-CM

## 2024-02-16 NOTE — TELEPHONE ENCOUNTER
Regarding: Stelara  ----- Message from Luisa Collins sent at 2/16/2024  3:27 PM EST -----  Pt's caregiver called and said that the pt would like to start her IV for Stelara. Dr Brito stated that he wanted to make sure pt did not have C. Diff first before starting the injections. Pt's daughter and caregiver stated that the tests came back neg and that they will fax the report. Pt's daughter stated that the pt is very weak, has diarrhea. She also stated that Dr Brito is aware. Please reach out to pt's daughter to discuss what Dr Brito would like to do, or have him call directly.

## 2024-02-19 ENCOUNTER — PATIENT MESSAGE (OUTPATIENT)
Dept: GASTROENTEROLOGY | Facility: CLINIC | Age: 84
End: 2024-02-19

## 2024-02-19 ENCOUNTER — HOSPITAL ENCOUNTER (INPATIENT)
Facility: HOSPITAL | Age: 84
LOS: 2 days | Discharge: NURSING, REHAB OR LONG-TERM CARE | DRG: 386 | End: 2024-02-21
Attending: EMERGENCY MEDICINE | Admitting: INTERNAL MEDICINE
Payer: MEDICARE

## 2024-02-19 DIAGNOSIS — Z86.711 HISTORY OF PULMONARY EMBOLISM: ICD-10-CM

## 2024-02-19 DIAGNOSIS — K51.011 ULCERATIVE PANCOLITIS WITH RECTAL BLEEDING (HCC): Primary | ICD-10-CM

## 2024-02-19 DIAGNOSIS — K92.2 GI BLEED: ICD-10-CM

## 2024-02-19 DIAGNOSIS — D64.9 SYMPTOMATIC ANEMIA: Primary | ICD-10-CM

## 2024-02-19 DIAGNOSIS — K52.9 IBD (INFLAMMATORY BOWEL DISEASE): ICD-10-CM

## 2024-02-19 DIAGNOSIS — E87.6 ACUTE HYPOKALEMIA: ICD-10-CM

## 2024-02-19 DIAGNOSIS — D64.9 ANEMIA, UNSPECIFIED TYPE: ICD-10-CM

## 2024-02-19 PROBLEM — E87.8 ELECTROLYTE ABNORMALITY: Status: ACTIVE | Noted: 2024-02-19

## 2024-02-19 LAB
ABO GROUP BLD: NORMAL
ALBUMIN SERPL BCP-MCNC: 2.4 G/DL (ref 3.5–5)
ALP SERPL-CCNC: 103 U/L (ref 34–104)
ALT SERPL W P-5'-P-CCNC: 5 U/L (ref 7–52)
ANION GAP SERPL CALCULATED.3IONS-SCNC: 9 MMOL/L
APTT PPP: 37 SECONDS (ref 23–37)
AST SERPL W P-5'-P-CCNC: 11 U/L (ref 13–39)
ATRIAL RATE: 74 BPM
BASOPHILS # BLD AUTO: 0.02 THOUSANDS/ÂΜL (ref 0–0.1)
BASOPHILS NFR BLD AUTO: 0 % (ref 0–1)
BILIRUB SERPL-MCNC: 0.45 MG/DL (ref 0.2–1)
BLD GP AB SCN SERPL QL: NEGATIVE
BUN SERPL-MCNC: 12 MG/DL (ref 5–25)
CALCIUM ALBUM COR SERPL-MCNC: 8.7 MG/DL (ref 8.3–10.1)
CALCIUM SERPL-MCNC: 7.4 MG/DL (ref 8.4–10.2)
CHLORIDE SERPL-SCNC: 103 MMOL/L (ref 96–108)
CO2 SERPL-SCNC: 22 MMOL/L (ref 21–32)
CREAT SERPL-MCNC: 1.12 MG/DL (ref 0.6–1.3)
EOSINOPHIL # BLD AUTO: 0.27 THOUSAND/ÂΜL (ref 0–0.61)
EOSINOPHIL NFR BLD AUTO: 5 % (ref 0–6)
ERYTHROCYTE [DISTWIDTH] IN BLOOD BY AUTOMATED COUNT: 16.7 % (ref 11.6–15.1)
FERRITIN SERPL-MCNC: 70 NG/ML (ref 11–307)
GFR SERPL CREATININE-BSD FRML MDRD: 45 ML/MIN/1.73SQ M
GLUCOSE SERPL-MCNC: 92 MG/DL (ref 65–140)
HCT VFR BLD AUTO: 25.6 % (ref 34.8–46.1)
HGB BLD-MCNC: 7.6 G/DL (ref 11.5–15.4)
IMM GRANULOCYTES # BLD AUTO: 0.05 THOUSAND/UL (ref 0–0.2)
IMM GRANULOCYTES NFR BLD AUTO: 1 % (ref 0–2)
INR PPP: 1.9 (ref 0.84–1.19)
IRON SATN MFR SERPL: 9 % (ref 15–50)
IRON SERPL-MCNC: 12 UG/DL (ref 50–212)
LYMPHOCYTES # BLD AUTO: 1 THOUSANDS/ÂΜL (ref 0.6–4.47)
LYMPHOCYTES NFR BLD AUTO: 19 % (ref 14–44)
MAGNESIUM SERPL-MCNC: 1.7 MG/DL (ref 1.9–2.7)
MCH RBC QN AUTO: 25.6 PG (ref 26.8–34.3)
MCHC RBC AUTO-ENTMCNC: 29.7 G/DL (ref 31.4–37.4)
MCV RBC AUTO: 86 FL (ref 82–98)
MONOCYTES # BLD AUTO: 0.58 THOUSAND/ÂΜL (ref 0.17–1.22)
MONOCYTES NFR BLD AUTO: 11 % (ref 4–12)
NEUTROPHILS # BLD AUTO: 3.44 THOUSANDS/ÂΜL (ref 1.85–7.62)
NEUTS SEG NFR BLD AUTO: 64 % (ref 43–75)
NRBC BLD AUTO-RTO: 0 /100 WBCS
P AXIS: 81 DEGREES
PLATELET # BLD AUTO: 288 THOUSANDS/UL (ref 149–390)
PMV BLD AUTO: 9.1 FL (ref 8.9–12.7)
POTASSIUM SERPL-SCNC: 3 MMOL/L (ref 3.5–5.3)
PR INTERVAL: 174 MS
PROT SERPL-MCNC: 5.6 G/DL (ref 6.4–8.4)
PROTHROMBIN TIME: 22.3 SECONDS (ref 11.6–14.5)
QRS AXIS: 80 DEGREES
QRSD INTERVAL: 84 MS
QT INTERVAL: 416 MS
QTC INTERVAL: 461 MS
RBC # BLD AUTO: 2.97 MILLION/UL (ref 3.81–5.12)
RH BLD: NEGATIVE
SODIUM SERPL-SCNC: 134 MMOL/L (ref 135–147)
SPECIMEN EXPIRATION DATE: NORMAL
T WAVE AXIS: 65 DEGREES
TIBC SERPL-MCNC: 141 UG/DL (ref 250–450)
UIBC SERPL-MCNC: 129 UG/DL (ref 155–355)
VENTRICULAR RATE: 74 BPM
WBC # BLD AUTO: 5.36 THOUSAND/UL (ref 4.31–10.16)

## 2024-02-19 PROCEDURE — 86901 BLOOD TYPING SEROLOGIC RH(D): CPT | Performed by: EMERGENCY MEDICINE

## 2024-02-19 PROCEDURE — 93005 ELECTROCARDIOGRAM TRACING: CPT

## 2024-02-19 PROCEDURE — 85610 PROTHROMBIN TIME: CPT | Performed by: EMERGENCY MEDICINE

## 2024-02-19 PROCEDURE — 85025 COMPLETE CBC W/AUTO DIFF WBC: CPT | Performed by: EMERGENCY MEDICINE

## 2024-02-19 PROCEDURE — 87505 NFCT AGENT DETECTION GI: CPT

## 2024-02-19 PROCEDURE — 80053 COMPREHEN METABOLIC PANEL: CPT | Performed by: EMERGENCY MEDICINE

## 2024-02-19 PROCEDURE — 99223 1ST HOSP IP/OBS HIGH 75: CPT

## 2024-02-19 PROCEDURE — 83540 ASSAY OF IRON: CPT

## 2024-02-19 PROCEDURE — 30233N1 TRANSFUSION OF NONAUTOLOGOUS RED BLOOD CELLS INTO PERIPHERAL VEIN, PERCUTANEOUS APPROACH: ICD-10-PCS | Performed by: STUDENT IN AN ORGANIZED HEALTH CARE EDUCATION/TRAINING PROGRAM

## 2024-02-19 PROCEDURE — 99285 EMERGENCY DEPT VISIT HI MDM: CPT

## 2024-02-19 PROCEDURE — 93010 ELECTROCARDIOGRAM REPORT: CPT | Performed by: INTERNAL MEDICINE

## 2024-02-19 PROCEDURE — 86900 BLOOD TYPING SEROLOGIC ABO: CPT | Performed by: EMERGENCY MEDICINE

## 2024-02-19 PROCEDURE — 82728 ASSAY OF FERRITIN: CPT

## 2024-02-19 PROCEDURE — 86922 COMPATIBILITY TEST ANTIGLOB: CPT

## 2024-02-19 PROCEDURE — 36415 COLL VENOUS BLD VENIPUNCTURE: CPT | Performed by: EMERGENCY MEDICINE

## 2024-02-19 PROCEDURE — 83550 IRON BINDING TEST: CPT

## 2024-02-19 PROCEDURE — 87493 C DIFF AMPLIFIED PROBE: CPT

## 2024-02-19 PROCEDURE — 83735 ASSAY OF MAGNESIUM: CPT

## 2024-02-19 PROCEDURE — P9040 RBC LEUKOREDUCED IRRADIATED: HCPCS

## 2024-02-19 PROCEDURE — 86921 COMPATIBILITY TEST INCUBATE: CPT

## 2024-02-19 PROCEDURE — 86902 BLOOD TYPE ANTIGEN DONOR EA: CPT

## 2024-02-19 PROCEDURE — C9113 INJ PANTOPRAZOLE SODIUM, VIA: HCPCS

## 2024-02-19 PROCEDURE — 99285 EMERGENCY DEPT VISIT HI MDM: CPT | Performed by: EMERGENCY MEDICINE

## 2024-02-19 PROCEDURE — 85730 THROMBOPLASTIN TIME PARTIAL: CPT | Performed by: EMERGENCY MEDICINE

## 2024-02-19 PROCEDURE — 86850 RBC ANTIBODY SCREEN: CPT | Performed by: EMERGENCY MEDICINE

## 2024-02-19 RX ORDER — PRAVASTATIN SODIUM 20 MG
20 TABLET ORAL
Status: DISCONTINUED | OUTPATIENT
Start: 2024-02-19 | End: 2024-02-21 | Stop reason: HOSPADM

## 2024-02-19 RX ORDER — METOPROLOL SUCCINATE 50 MG/1
50 TABLET, EXTENDED RELEASE ORAL DAILY
Status: DISCONTINUED | OUTPATIENT
Start: 2024-02-20 | End: 2024-02-19

## 2024-02-19 RX ORDER — POTASSIUM CHLORIDE 14.9 MG/ML
20 INJECTION INTRAVENOUS
Status: COMPLETED | OUTPATIENT
Start: 2024-02-19 | End: 2024-02-19

## 2024-02-19 RX ORDER — MAGNESIUM SULFATE HEPTAHYDRATE 40 MG/ML
2 INJECTION, SOLUTION INTRAVENOUS ONCE
Status: COMPLETED | OUTPATIENT
Start: 2024-02-19 | End: 2024-02-19

## 2024-02-19 RX ORDER — FOLIC ACID 1 MG/1
1 TABLET ORAL DAILY
Status: DISCONTINUED | OUTPATIENT
Start: 2024-02-20 | End: 2024-02-21 | Stop reason: HOSPADM

## 2024-02-19 RX ORDER — MIRTAZAPINE 15 MG/1
7.5 TABLET, FILM COATED ORAL
Status: DISCONTINUED | OUTPATIENT
Start: 2024-02-19 | End: 2024-02-21 | Stop reason: HOSPADM

## 2024-02-19 RX ORDER — SODIUM CHLORIDE, SODIUM GLUCONATE, SODIUM ACETATE, POTASSIUM CHLORIDE, MAGNESIUM CHLORIDE, SODIUM PHOSPHATE, DIBASIC, AND POTASSIUM PHOSPHATE .53; .5; .37; .037; .03; .012; .00082 G/100ML; G/100ML; G/100ML; G/100ML; G/100ML; G/100ML; G/100ML
75 INJECTION, SOLUTION INTRAVENOUS CONTINUOUS
Status: DISCONTINUED | OUTPATIENT
Start: 2024-02-19 | End: 2024-02-20

## 2024-02-19 RX ORDER — MIDODRINE HYDROCHLORIDE 5 MG/1
2.5 TABLET ORAL
Status: DISCONTINUED | OUTPATIENT
Start: 2024-02-19 | End: 2024-02-21 | Stop reason: HOSPADM

## 2024-02-19 RX ORDER — SODIUM CHLORIDE 9 MG/ML
20 INJECTION, SOLUTION INTRAVENOUS ONCE
OUTPATIENT
Start: 2024-02-27

## 2024-02-19 RX ORDER — PANTOPRAZOLE SODIUM 40 MG/10ML
40 INJECTION, POWDER, LYOPHILIZED, FOR SOLUTION INTRAVENOUS EVERY 12 HOURS SCHEDULED
Status: DISCONTINUED | OUTPATIENT
Start: 2024-02-19 | End: 2024-02-20

## 2024-02-19 RX ORDER — METOPROLOL SUCCINATE 50 MG/1
50 TABLET, EXTENDED RELEASE ORAL DAILY
Status: DISCONTINUED | OUTPATIENT
Start: 2024-02-20 | End: 2024-02-21 | Stop reason: HOSPADM

## 2024-02-19 RX ADMIN — SODIUM CHLORIDE, SODIUM GLUCONATE, SODIUM ACETATE, POTASSIUM CHLORIDE, MAGNESIUM CHLORIDE, SODIUM PHOSPHATE, DIBASIC, AND POTASSIUM PHOSPHATE 75 ML/HR: .53; .5; .37; .037; .03; .012; .00082 INJECTION, SOLUTION INTRAVENOUS at 20:12

## 2024-02-19 RX ADMIN — MIRTAZAPINE 7.5 MG: 15 TABLET, FILM COATED ORAL at 21:57

## 2024-02-19 RX ADMIN — MIDODRINE HYDROCHLORIDE 2.5 MG: 5 TABLET ORAL at 19:58

## 2024-02-19 RX ADMIN — PRAVASTATIN SODIUM 20 MG: 20 TABLET ORAL at 19:58

## 2024-02-19 RX ADMIN — POTASSIUM CHLORIDE 20 MEQ: 14.9 INJECTION, SOLUTION INTRAVENOUS at 17:43

## 2024-02-19 RX ADMIN — PANTOPRAZOLE SODIUM 40 MG: 40 INJECTION, POWDER, FOR SOLUTION INTRAVENOUS at 14:40

## 2024-02-19 RX ADMIN — PANTOPRAZOLE SODIUM 40 MG: 40 INJECTION, POWDER, FOR SOLUTION INTRAVENOUS at 23:47

## 2024-02-19 RX ADMIN — MAGNESIUM SULFATE HEPTAHYDRATE 2 G: 2 INJECTION, SOLUTION INTRAVENOUS at 17:45

## 2024-02-19 RX ADMIN — POTASSIUM CHLORIDE 20 MEQ: 14.9 INJECTION, SOLUTION INTRAVENOUS at 14:41

## 2024-02-19 NOTE — ASSESSMENT & PLAN NOTE
Hx of IBD, follows with GI. With diarrhea/rectal bleeding since Jan 2024, was seen 2/8/24 by GI and planned to start stelara. SNF sent patient to ED due to increased sx x 2-3 days along with worsening fatigue  Hgb 7.6, S/P 1 unit PRBC in ED  Stool studies pending   Gentle IVF + CLD  IV protonix  Monitor hgb  GI consult   Hold ASA/eliquis

## 2024-02-19 NOTE — ED PROVIDER NOTES
History  Chief Complaint   Patient presents with    Black or Bloody Stool     Pt to ED from [facility] for bloody stools x multiple months. Pt has seen GI for this, was to start a new medication but facility wanted pt to be evaluated and have CBC complete prior. Denies abd pain     History from patient, medical records, papers from [facility].  Past medical history pulmonary embolism GI bleeding inflammatory bowel disease hypertension bilateral salpingo-oophorectomy breast cancer metastatic cholecystectomy hysterectomy IVC filter placement PICC placement suprapubic catheter takes Eliquis.  Presents by ambulance with concern for general fatigue and large amount of dark blood in the bowel movements.  Patient describes it as sometimes appearing as a liver slab.  She denies any abdominal pain.  Symptoms have been for couple months now intermittent.  The nursing home wanted her hemoglobin checked for anemia.        Prior to Admission Medications   Prescriptions Last Dose Informant Patient Reported? Taking?   Aspirin Low Dose 81 MG chewable tablet  Self Yes No   Cholecalciferol (VITAMIN D3) 2000 units capsule  Self, Outside Facility (Specify) Yes No   Sig: Take 2,000 Units by mouth daily     Patient not taking: Reported on 2/8/2024   acetaminophen (TYLENOL) 325 mg tablet  Outside Facility (Specify), Self No No   Sig: Take 2 tablets (650 mg total) by mouth every 6 (six) hours as needed for mild pain   diphenoxylate-atropine (LOMOTIL) 2.5-0.025 mg per tablet  Self, Outside Facility (Specify) No No   Sig: Take 1 tablet by mouth 4 (four) times a day as needed for diarrhea   Patient not taking: Reported on 2/8/2024   enoxaparin (LOVENOX) 30 mg/0.3 mL  Self Yes No   Patient not taking: Reported on 2/8/2024   enoxaparin (LOVENOX) 60 mg/0.6 mL  Outside Facility (Specify), Self No No   Sig: Inject 0.6 mL (60 mg total) under the skin every 12 (twelve) hours   Patient not taking: Reported on 2/8/2024   folic acid (FOLVITE) 1 mg  tablet   No No   Sig: Take 1 tablet (1 mg total) by mouth daily   loperamide (IMODIUM) 2 mg capsule  Outside Facility (Specify), Self No No   Sig: Take 2 capsules (4 mg total) by mouth 2 (two) times a day   Patient not taking: Reported on 2/8/2024   melatonin 3 mg  Self Yes No   Sig: Take 3 mg by mouth daily at bedtime   metoprolol succinate (TOPROL-XL) 25 mg 24 hr tablet  Outside Facility (Specify), Self No No   Sig: Take 1 tablet (25 mg total) by mouth daily Do not start before October 21, 2022.   Patient not taking: Reported on 6/15/2023   metoprolol succinate (TOPROL-XL) 50 mg 24 hr tablet  Self Yes No   midodrine (PROAMATINE) 2.5 mg tablet  Outside Facility (Specify), Self No No   Sig: Take 1 tablet (2.5 mg total) by mouth 3 (three) times a day before meals   mirtazapine (REMERON) 7.5 MG tablet  Self, Outside Facility (Specify) No No   Sig: Take 1 tablet (7.5 mg total) by mouth daily at bedtime   ondansetron (ZOFRAN-ODT) 4 mg disintegrating tablet  Self Yes No   pantoprazole (PROTONIX) 40 mg tablet  Outside Facility (Specify), Self No No   Sig: Take 1 tablet (40 mg total) by mouth 2 (two) times a day before meals   simvastatin (ZOCOR) 10 mg tablet  Self, Outside Facility (Specify) No No   Sig: Take 1 tablet (10 mg total) by mouth daily at bedtime      Facility-Administered Medications: None       Past Medical History:   Diagnosis Date    Abnormal weight loss     Allergic reaction     Anxiety     Breast cancer, right (HCC) 2011    right    Cancer (HCC) 2012    Candidal vulvovaginitis     Clotting disorder (HCC) Same as above    Endometrial hyperplasia     Epithelial cyst     benign, ovary    GI (gastrointestinal bleed) Blood mixed with stool    History of radiation therapy 2011    right breast cancer    Hyperlipidemia     Hypertension     Internal hemorrhoids     Knee tendonitis     Ovarian cyst     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(HCC)        Past Surgical History:   Procedure Laterality Date    BILATERAL  SALPINGOOPHORECTOMY      onset: 12    BREAST BIOPSY Right 2006    benign    BREAST BIOPSY Right 2011    malignant    BREAST LUMPECTOMY Right 2011    malignant    CATARACT EXTRACTION W/  INTRAOCULAR LENS IMPLANT Right     phacoemulsification. Onset: 10/27/14    CHOLECYSTECTOMY      COLONOSCOPY  2017    approx    HYSTERECTOMY  Yes    INTRAOPERATIVE RADIATION THERAPY (IORT)      IR BIOPSY BONE  2021    IR IVC FILTER PLACEMENT OPTIONAL/TEMPORARY  2022    IR PE ENDOVASCULAR THERAPY  2022    IR PICC PLACEMENT SINGLE LUMEN  2022    IR PICC PLACEMENT SINGLE LUMEN  2022    IR SUPRAPUBIC CATHETER PLACEMENT  2022    SKIN LESION EXCISION Right     breast, single lesion    TONSILLECTOMY AND ADENOIDECTOMY         Family History   Problem Relation Age of Onset    Stomach cancer Mother     No Known Problems Father     Cervical cancer Sister     No Known Problems Daughter     No Known Problems Maternal Grandmother     No Known Problems Maternal Grandfather     No Known Problems Paternal Grandmother     No Known Problems Paternal Grandfather     Colon polyps Neg Hx     Colon cancer Neg Hx      I have reviewed and agree with the history as documented.    E-Cigarette/Vaping    E-Cigarette Use Never User      E-Cigarette/Vaping Substances    Nicotine No     THC No     CBD No     Flavoring No     Other No     Unknown No      Social History     Tobacco Use    Smoking status: Former     Current packs/day: 0.00     Average packs/day: 1 pack/day for 34.0 years (34.0 ttl pk-yrs)     Types: Cigarettes     Start date:      Quit date: 1981     Years since quittin.1    Smokeless tobacco: Never   Vaping Use    Vaping status: Never Used   Substance Use Topics    Alcohol use: Not Currently     Comment: (history)    Drug use: No       Review of Systems   Constitutional:  Positive for fatigue. Negative for chills and fever.   HENT:  Negative for ear pain and sore throat.    Eyes:  Negative  for pain and visual disturbance.   Respiratory:  Negative for cough and shortness of breath.    Cardiovascular:  Negative for chest pain and palpitations.   Gastrointestinal:  Positive for blood in stool. Negative for abdominal pain and vomiting.   Genitourinary:  Negative for dysuria and hematuria.   Musculoskeletal:  Negative for arthralgias and back pain.   Skin:  Negative for color change and rash.   Neurological:  Positive for weakness. Negative for seizures and syncope.   All other systems reviewed and are negative.      Physical Exam  Physical Exam  Vitals and nursing note reviewed.   Constitutional:       General: She is not in acute distress.     Appearance: She is well-developed.   HENT:      Head: Normocephalic and atraumatic.   Eyes:      Conjunctiva/sclera: Conjunctivae normal.   Cardiovascular:      Rate and Rhythm: Normal rate and regular rhythm.      Heart sounds: No murmur heard.  Pulmonary:      Effort: Pulmonary effort is normal. No respiratory distress.      Breath sounds: Normal breath sounds.   Abdominal:      Palpations: Abdomen is soft.      Tenderness: There is no abdominal tenderness.   Musculoskeletal:         General: No swelling.      Cervical back: Neck supple.   Skin:     General: Skin is warm and dry.      Capillary Refill: Capillary refill takes less than 2 seconds.      Coloration: Skin is pale.   Neurological:      Mental Status: She is alert and oriented to person, place, and time.   Psychiatric:         Mood and Affect: Mood normal.         Vital Signs  ED Triage Vitals   Temperature Pulse Respirations Blood Pressure SpO2   02/19/24 1129 02/19/24 1129 02/19/24 1129 02/19/24 1129 02/19/24 1129   98.8 °F (37.1 °C) 71 18 154/67 98 %      Temp Source Heart Rate Source Patient Position - Orthostatic VS BP Location FiO2 (%)   02/19/24 1129 02/19/24 1129 02/19/24 1129 02/19/24 1129 --   Oral Monitor Lying Left arm       Pain Score       02/19/24 1131       No Pain           Vitals:     02/19/24 1800 02/19/24 1815 02/19/24 1903 02/19/24 1935   BP: 133/61 135/60 (!) 134/45 129/50   Pulse: 76 79  70   Patient Position - Orthostatic VS:             Visual Acuity      ED Medications  Medications   pantoprazole (PROTONIX) injection 40 mg (40 mg Intravenous Given 2/19/24 1440)   folic acid (FOLVITE) tablet 1 mg (has no administration in time range)   midodrine (PROAMATINE) tablet 2.5 mg (2.5 mg Oral Given 2/19/24 1958)   mirtazapine (REMERON) tablet 7.5 mg (has no administration in time range)   pravastatin (PRAVACHOL) tablet 20 mg (20 mg Oral Given 2/19/24 1958)   multi-electrolyte (PLASMALYTE-A/ISOLYTE-S PH 7.4) IV solution (75 mL/hr Intravenous New Bag 2/19/24 2012)   metoprolol succinate (TOPROL-XL) 24 hr tablet 50 mg (has no administration in time range)   potassium chloride 20 mEq IVPB (premix) (20 mEq Intravenous New Bag 2/19/24 1743)   magnesium sulfate 2 g/50 mL IVPB (premix) 2 g (2 g Intravenous New Bag 2/19/24 1745)       Diagnostic Studies  Results Reviewed       Procedure Component Value Units Date/Time    TIBC Panel (incl. Iron, TIBC, % Iron Saturation) [[id number]]  (Abnormal) Collected: 02/19/24 1233    Lab Status: Final result Specimen: Blood from Arm, Left Updated: 02/19/24 2053     Iron Saturation 9 %      TIBC 141 ug/dL      Iron 12 ug/dL      UIBC 129 ug/dL     Ferritin [[id number]]  (Normal) Collected: 02/19/24 1233    Lab Status: Final result Specimen: Blood from Arm, Left Updated: 02/19/24 2053     Ferritin 70 ng/mL     Clostridium difficile toxin by PCR with EIA [[id number]]     Lab Status: No result Specimen: Stool from Per Rectum     Stool Enteric Bacterial Panel by PCR [[id number]]     Lab Status: No result Specimen: Stool from Rectum     Magnesium [[id number]]  (Abnormal) Collected: 02/19/24 1233    Lab Status: Final result Specimen: Blood from Arm, Left Updated: 02/19/24 1434     Magnesium 1.7 mg/dL     Protime-INR [[id number]]  (Abnormal) Collected: 02/19/24 1233    Lab Status:  Final result Specimen: Blood from Arm, Left Updated: 02/19/24 1300     Protime 22.3 seconds      INR 1.90    APTT [[id number]]  (Normal) Collected: 02/19/24 1233    Lab Status: Final result Specimen: Blood from Arm, Left Updated: 02/19/24 1300     PTT 37 seconds     Comprehensive metabolic panel [[id number]]  (Abnormal) Collected: 02/19/24 1233    Lab Status: Final result Specimen: Blood from Arm, Left Updated: 02/19/24 1257     Sodium 134 mmol/L      Potassium 3.0 mmol/L      Chloride 103 mmol/L      CO2 22 mmol/L      ANION GAP 9 mmol/L      BUN 12 mg/dL      Creatinine 1.12 mg/dL      Glucose 92 mg/dL      Calcium 7.4 mg/dL      Corrected Calcium 8.7 mg/dL      AST 11 U/L      ALT 5 U/L      Alkaline Phosphatase 103 U/L      Total Protein 5.6 g/dL      Albumin 2.4 g/dL      Total Bilirubin 0.45 mg/dL      eGFR 45 ml/min/1.73sq m     Narrative:      National Kidney Disease Foundation guidelines for Chronic Kidney Disease (CKD):     Stage 1 with normal or high GFR (GFR > 90 mL/min/1.73 square meters)    Stage 2 Mild CKD (GFR = 60-89 mL/min/1.73 square meters)    Stage 3A Moderate CKD (GFR = 45-59 mL/min/1.73 square meters)    Stage 3B Moderate CKD (GFR = 30-44 mL/min/1.73 square meters)    Stage 4 Severe CKD (GFR = 15-29 mL/min/1.73 square meters)    Stage 5 End Stage CKD (GFR <15 mL/min/1.73 square meters)  Note: GFR calculation is accurate only with a steady state creatinine    CBC and differential [[id number]]  (Abnormal) Collected: 02/19/24 1233    Lab Status: Final result Specimen: Blood from Arm, Left Updated: 02/19/24 1243     WBC 5.36 Thousand/uL      RBC 2.97 Million/uL      Hemoglobin 7.6 g/dL      Hematocrit 25.6 %      MCV 86 fL      MCH 25.6 pg      MCHC 29.7 g/dL      RDW 16.7 %      MPV 9.1 fL      Platelets 288 Thousands/uL      nRBC 0 /100 WBCs      Neutrophils Relative 64 %      Immat GRANS % 1 %      Lymphocytes Relative 19 %      Monocytes Relative 11 %      Eosinophils Relative 5 %      Basophils  Relative 0 %      Neutrophils Absolute 3.44 Thousands/µL      Immature Grans Absolute 0.05 Thousand/uL      Lymphocytes Absolute 1.00 Thousands/µL      Monocytes Absolute 0.58 Thousand/µL      Eosinophils Absolute 0.27 Thousand/µL      Basophils Absolute 0.02 Thousands/µL                    No orders to display              Procedures  ECG 12 Lead Documentation Only    Date/Time: 2/19/2024 2:40 PM    Performed by: Yung Wilder DO  Authorized by: Yung Wilder DO    Indications / Diagnosis:  Weakness fatigue  ECG reviewed by me, the ED Provider: yes    Patient location:  ED  Previous ECG:     Previous ECG:  Unavailable  Interpretation:     Interpretation: normal    Rate:     ECG rate:  74    ECG rate assessment: normal    Rhythm:     Rhythm: sinus rhythm    Ectopy:     Ectopy: none    QRS:     QRS axis:  Normal    QRS intervals:  Normal  Conduction:     Conduction: normal    ST segments:     ST segments:  Normal  T waves:     T waves: normal    Comments:      This EKG was interpreted by me.           ED Course  ED Course as of 02/19/24 2155   Mon Feb 19, 2024   1333 Patient states she hasn't been eating - no appetite                               SBIRT 20yo+      Flowsheet Row Most Recent Value   Initial Alcohol Screen: US AUDIT-C     1. How often do you have a drink containing alcohol? 0 Filed at: 02/19/2024 1132   2. How many drinks containing alcohol do you have on a typical day you are drinking?  0 Filed at: 02/19/2024 1132   3a. Male UNDER 65: How often do you have five or more drinks on one occasion? 0 Filed at: 02/19/2024 1132   3b. FEMALE Any Age, or MALE 65+: How often do you have 4 or more drinks on one occassion? 0 Filed at: 02/19/2024 1132   Audit-C Score 0 Filed at: 02/19/2024 1132   ADIS: How many times in the past year have you...    Used an illegal drug or used a prescription medication for non-medical reasons? Never Filed at: 02/19/2024 1132                      Medical Decision Making  Diff  includes GI bleed - subacute probably from IBD - less likely acute hemorrhage but patient is symptomatic and anemic - less likely stroke or coronary syndrome.    Amount and/or Complexity of Data Reviewed  Labs: ordered.    Risk  Decision regarding hospitalization.             Disposition  Final diagnoses:   Symptomatic anemia   GI bleed   Acute hypokalemia     Time reflects when diagnosis was documented in both MDM as applicable and the Disposition within this note       Time User Action Codes Description Comment    2/19/2024  1:25 PM Yung Wilder [D64.9] Symptomatic anemia     2/19/2024  1:25 PM Yung Wilder [K92.2] GI bleed     2/19/2024  1:25 PM Yung Wilder [E87.6] Acute hypokalemia           ED Disposition       ED Disposition   Admit    Condition   Stable    Date/Time   Mon Feb 19, 2024 1333    Comment   Case was discussed with SHAISTA Newman and the patient's admission status was agreed to be Admission Status: inpatient status to the service of Dr. SHAISTA Newman .               Follow-up Information    None         Current Discharge Medication List        CONTINUE these medications which have NOT CHANGED    Details   acetaminophen (TYLENOL) 325 mg tablet Take 2 tablets (650 mg total) by mouth every 6 (six) hours as needed for mild pain  Refills: 0    Associated Diagnoses: Pain      Aspirin Low Dose 81 MG chewable tablet       Cholecalciferol (VITAMIN D3) 2000 units capsule Take 2,000 Units by mouth daily        diphenoxylate-atropine (LOMOTIL) 2.5-0.025 mg per tablet Take 1 tablet by mouth 4 (four) times a day as needed for diarrhea  Qty: 30 tablet, Refills: 0    Associated Diagnoses: Metastatic breast cancer; Functional diarrhea      !! enoxaparin (LOVENOX) 30 mg/0.3 mL       !! enoxaparin (LOVENOX) 60 mg/0.6 mL Inject 0.6 mL (60 mg total) under the skin every 12 (twelve) hours  Refills: 0    Associated Diagnoses: Saddle embolus of pulmonary artery (HCC)      folic acid (FOLVITE) 1 mg tablet Take 1 tablet (1 mg  total) by mouth daily  Qty: 30 tablet, Refills: 0    Associated Diagnoses: Anemia, unspecified type      loperamide (IMODIUM) 2 mg capsule Take 2 capsules (4 mg total) by mouth 2 (two) times a day  Qty: 30 capsule, Refills: 0    Associated Diagnoses: Diarrhea, unspecified type; Ulcerative pancolitis with rectal bleeding (HCC)      melatonin 3 mg Take 3 mg by mouth daily at bedtime      !! metoprolol succinate (TOPROL-XL) 25 mg 24 hr tablet Take 1 tablet (25 mg total) by mouth daily Do not start before October 21, 2022.  Refills: 0    Associated Diagnoses: Toxic gastroenteritis and colitis      !! metoprolol succinate (TOPROL-XL) 50 mg 24 hr tablet       midodrine (PROAMATINE) 2.5 mg tablet Take 1 tablet (2.5 mg total) by mouth 3 (three) times a day before meals  Refills: 0    Associated Diagnoses: Hypotension      mirtazapine (REMERON) 7.5 MG tablet Take 1 tablet (7.5 mg total) by mouth daily at bedtime  Qty: 30 tablet, Refills: 0    Associated Diagnoses: Anxiety; Depression      ondansetron (ZOFRAN-ODT) 4 mg disintegrating tablet       pantoprazole (PROTONIX) 40 mg tablet Take 1 tablet (40 mg total) by mouth 2 (two) times a day before meals  Qty: 30 tablet, Refills: 0    Associated Diagnoses: Pancolitis (HCC)      simvastatin (ZOCOR) 10 mg tablet Take 1 tablet (10 mg total) by mouth daily at bedtime  Qty: 90 tablet, Refills: 1    Associated Diagnoses: Dyslipidemia       !! - Potential duplicate medications found. Please discuss with provider.          No discharge procedures on file.    PDMP Review         Value Time User    PDMP Reviewed  Yes 9/25/2022  1:51 PM Severo Caldwell MD            ED Provider  Electronically Signed by             Yung Wilder DO  02/19/24 7939

## 2024-02-19 NOTE — TELEPHONE ENCOUNTER
Rajani Brito MD  Gastro Ibd; RON Ramirez-C16 hours ago (3:40 PM)       I saw her a week ago and they agreed to start Stelara - can we please work on this?  Hep B and Quant gold is back and negative.  C. diff -.    Not much to do with another office appointment.  If she is dehydrated and weak, she should go to the ER.  We could put her on steroids as a temporizing measure.    Junaid, feel free to call me about her and I can fill you in.    Thanks

## 2024-02-19 NOTE — ASSESSMENT & PLAN NOTE
Hx of IBD (Crohns dx), follows with outpatient GI last seen Feb 8 2024  Colitis though 2/2 kisquali infusions for metastatic breast cancer, this was stopped over 1 year ago   Initially started on infliximab after severe colitis in 2022 however Dc'd as patient was without improvement. Repeat colonosocopy 3/2023 showed colitis to hepatic flexure. Patient was temporarily on prednisone however then diagnosed with C diff and improved with PO vanc. No longer on therapy since then  Colitis symptoms (bloody stool, watery diarrhea) started again most recently Jan 2024  At 2/8 appt plan was to start patient on stelara --> does not yet appear to have received first infusion yet

## 2024-02-19 NOTE — ASSESSMENT & PLAN NOTE
Lab Results   Component Value Date    EGFR 45 02/19/2024    EGFR 95 10/19/2022    EGFR 94 10/18/2022    CREATININE 1.12 02/19/2024    CREATININE 0.42 (L) 10/19/2022    CREATININE 0.44 (L) 10/18/2022     Baseline Cr is around 0.4  Cr currently elevated 1.12, GFR reduced   Suspect due to mild hypovolemia  Will give gentle IVF overnight   Monitor BMP  Avoid nephrotoxins

## 2024-02-19 NOTE — TELEPHONE ENCOUNTER
Patient is admitted to  ED. 2/10 NEG for C. Diff. Would you please place beacon order for Stelara IV at . Thank you!

## 2024-02-19 NOTE — ASSESSMENT & PLAN NOTE
Baseline appears 7-8  Hgb 7.6 on admission   S/P 1 unit PRBC  Iron panel pending   Trend Hgb, transfuse to maintain > 7

## 2024-02-19 NOTE — H&P
[facility]  H&P  Name: Kerry Silverman 83 y.o. female I MRN: [id number]  Unit/Bed#: ED 07 I Date of Admission: 2/19/2024   Date of Service: 2/19/2024 I Hospital Day: 0      Assessment/Plan   * Rectal bleeding  Assessment & Plan  Hx of IBD, follows with GI. With diarrhea/rectal bleeding since Jan 2024, was seen 2/8/24 by GI and planned to start stelara. SNF sent patient to ED due to increased sx x 2-3 days along with worsening fatigue  Hgb 7.6, S/P 1 unit PRBC in ED  Stool studies pending   Gentle IVF + CLD  IV protonix  Monitor hgb  GI consult   Hold ASA/eliquis    IBD (inflammatory bowel disease)  Assessment & Plan  Hx of IBD (Crohns dx), follows with outpatient GI last seen Feb 8 2024  Colitis though 2/2 kisquali infusions for metastatic breast cancer, this was stopped over 1 year ago   Initially started on infliximab after severe colitis in 2022 however Dc'd as patient was without improvement. Repeat colonosocopy 3/2023 showed colitis to hepatic flexure. Patient was temporarily on prednisone however then diagnosed with C diff and improved with PO vanc. No longer on therapy since then  Colitis symptoms (bloody stool, watery diarrhea) started again most recently Jan 2024  At 2/8 appt plan was to start patient on stelara --> does not yet appear to have received first infusion yet    Electrolyte abnormality  Assessment & Plan  Hypokalemia and hypomagnesemia in setting of diarrhea  Recheck and replete    History of pulmonary embolism  Assessment & Plan  S/P IVC filter   Maintained on eliquis 2.5 mg BID --> hold due to acute GIB     Suprapubic catheter (HCC)  Assessment & Plan  Chronic, 2/2 urinary retention     Anemia  Assessment & Plan  Baseline appears 7-8  Hgb 7.6 on admission   S/P 1 unit PRBC  Iron panel pending   Trend Hgb, transfuse to maintain > 7    Orthostatic hypotension  Assessment & Plan  Continue midodrine 5 mg TID     Chronic kidney disease (CKD) stage G3a/A1, moderately  decreased glomerular filtration rate (GFR) between 45-59 mL/min/1.73 square meter and albuminuria creatinine ratio less than 30 mg/g (HCC)  Assessment & Plan  Lab Results   Component Value Date    EGFR 45 02/19/2024    EGFR 95 10/19/2022    EGFR 94 10/18/2022    CREATININE 1.12 02/19/2024    CREATININE 0.42 (L) 10/19/2022    CREATININE 0.44 (L) 10/18/2022     Baseline Cr is around 0.4  Cr currently elevated 1.12, GFR reduced   Suspect due to mild hypovolemia  Will give gentle IVF overnight   Monitor BMP  Avoid nephrotoxins     Primary malignant neoplasm of breast with metastasis (HCC)  Assessment & Plan  Per chart review: History of Stage IV metastatic ER positive, [place] negative, 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, progressed from stage I A ER/[place] positive, 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 years ago. Status post resection and adjuvant radiation and hormone therapy for 5 years. Known mets to sternum as well as vertebrae + 4 mm RLL nodule. Patient was treated with multiple medications- Faslodex, Xgeva, Femara, Kisqali, Verzenio however all cancer treatment was stopped after 10/2022 admission due to severe colitis thought 2/2 meds and poor tolerance.     Does not appear to follow with oncology recently.              VTE Pharmacologic Prophylaxis: VTE Score: 7 High Risk (Score >/= 5) - Pharmacological DVT Prophylaxis Contraindicated. Sequential Compression Devices Ordered.  Code Status: Level 3 - DNAR and DNI   Discussion with family: Patient declined call to .     Anticipated Length of Stay: Patient will be admitted on an inpatient basis with an anticipated length of stay of greater than 2 midnights secondary to IV meds.    Total Time Spent on Date of Encounter in care of patient: 60 mins. This time was spent on one or more of the following: performing physical exam; counseling and coordination of care; obtaining or reviewing history; documenting in the medical record; reviewing/ordering tests, medications or  procedures; communicating with other healthcare professionals and discussing with patient's family/caregivers.    Chief Complaint: Rectal bleeding, fatigue    History of Present Illness:  Kerry Silverman is a 83 y.o. female with a PMH of IBD, metastatic breast cancer, PE on eliquis, chronic suprapubic catheter, hx of C diff, chronic anemia who presents with complaint of rectal bleeding and fatigue. Patient has hx of IBD which began after chemotherapy for metastatic breast cancer. She is no longer on chemo. She notes relapse in symptoms including watery diarrhea, dark/maroon blood in stool since Jan 2023. She was seen by GI 2/8/2024 and recommended to start stelara, she is awaiting her first infusions. Patient presented today due to increasing fatigue and increased stool output and rectal bleeding over the weekend. She denies fevers, chills, chest pain, SOB, cough, N/V. She notes feeling crampy prior to having diarrhea but otherwise is not experiencing pain.     Review of Systems:  Review of Systems   Constitutional:  Positive for activity change, appetite change and fatigue. Negative for chills and fever.   HENT:  Negative for congestion, rhinorrhea and sore throat.    Eyes:  Negative for visual disturbance.   Respiratory:  Negative for cough, chest tightness, shortness of breath and wheezing.    Cardiovascular:  Negative for chest pain and palpitations.   Gastrointestinal:  Positive for blood in stool and diarrhea. Negative for abdominal pain, constipation, nausea and vomiting.   Genitourinary:  Negative for difficulty urinating, dysuria, frequency and urgency.   Musculoskeletal:  Negative for arthralgias, back pain and myalgias.   Skin:  Negative for rash and wound.   Neurological:  Negative for dizziness, light-headedness and headaches.   All other systems reviewed and are negative.       Past Medical and Surgical History:   Past Medical History:   Diagnosis Date    Abnormal weight loss     Allergic reaction      Anxiety     Breast cancer, right (HCC) 2011    right    Cancer (HCC) 2012    Candidal vulvovaginitis     Clotting disorder (HCC) Same as above    Endometrial hyperplasia     Epithelial cyst     benign, ovary    GI (gastrointestinal bleed) Blood mixed with stool    History of radiation therapy 2011    right breast cancer    Hyperlipidemia     Hypertension     Internal hemorrhoids     Knee tendonitis     Ovarian cyst     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(HCC)        Past Surgical History:   Procedure Laterality Date    BILATERAL SALPINGOOPHORECTOMY      onset: 7/23/13    BREAST BIOPSY Right 06/13/2006    benign    BREAST BIOPSY Right 03/02/2011    malignant    BREAST LUMPECTOMY Right 03/30/2011    malignant    CATARACT EXTRACTION W/  INTRAOCULAR LENS IMPLANT Right     phacoemulsification. Onset: 10/27/14    CHOLECYSTECTOMY      COLONOSCOPY  2017    approx    HYSTERECTOMY  Yes    INTRAOPERATIVE RADIATION THERAPY (IORT)      IR BIOPSY BONE  7/9/2021    IR IVC FILTER PLACEMENT OPTIONAL/TEMPORARY  9/23/2022    IR PE ENDOVASCULAR THERAPY  9/22/2022    IR PICC PLACEMENT SINGLE LUMEN  8/19/2022    IR PICC PLACEMENT SINGLE LUMEN  9/26/2022    IR SUPRAPUBIC CATHETER PLACEMENT  12/29/2022    SKIN LESION EXCISION Right     breast, single lesion    TONSILLECTOMY AND ADENOIDECTOMY         Meds/Allergies:  Prior to Admission medications    Medication Sig Start Date End Date Taking? Authorizing Provider   acetaminophen (TYLENOL) 325 mg tablet Take 2 tablets (650 mg total) by mouth every 6 (six) hours as needed for mild pain 10/20/22   Madison Hernandez, DO   Aspirin Low Dose 81 MG chewable tablet  1/12/24   Historical Provider, MD   Cholecalciferol (VITAMIN D3) 2000 units capsule Take 2,000 Units by mouth daily    Patient not taking: Reported on 2/8/2024    Historical Provider, MD   diphenoxylate-atropine (LOMOTIL) 2.5-0.025 mg per tablet Take 1 tablet by mouth 4 (four) times a day as needed for diarrhea  Patient not taking: Reported  on 2/8/2024 8/10/22   BROOKE Wang   enoxaparin (LOVENOX) 30 mg/0.3 mL  3/6/23   Historical Provider, MD   enoxaparin (LOVENOX) 60 mg/0.6 mL Inject 0.6 mL (60 mg total) under the skin every 12 (twelve) hours  Patient not taking: Reported on 2/8/2024 10/20/22   Madison Hernandez DO   folic acid (FOLVITE) 1 mg tablet Take 1 tablet (1 mg total) by mouth daily 9/7/22 12/29/22  Ricki Cohen DO   loperamide (IMODIUM) 2 mg capsule Take 2 capsules (4 mg total) by mouth 2 (two) times a day  Patient not taking: Reported on 2/8/2024 10/20/22   Madison Hernandez DO   melatonin 3 mg Take 3 mg by mouth daily at bedtime    Historical Provider, MD   metoprolol succinate (TOPROL-XL) 25 mg 24 hr tablet Take 1 tablet (25 mg total) by mouth daily Do not start before October 21, 2022.  Patient not taking: Reported on 6/15/2023 10/21/22   Madison Hernandez DO   metoprolol succinate (TOPROL-XL) 50 mg 24 hr tablet  2/3/23   Historical Provider, MD   midodrine (PROAMATINE) 2.5 mg tablet Take 1 tablet (2.5 mg total) by mouth 3 (three) times a day before meals 10/20/22   Madison Hernandez DO   mirtazapine (REMERON) 7.5 MG tablet Take 1 tablet (7.5 mg total) by mouth daily at bedtime 7/21/22   Clara Belcher PA-C   ondansetron (ZOFRAN-ODT) 4 mg disintegrating tablet  2/23/23   Historical Provider, MD   pantoprazole (PROTONIX) 40 mg tablet Take 1 tablet (40 mg total) by mouth 2 (two) times a day before meals 8/26/22   Padmini Richardson PA-C   simvastatin (ZOCOR) 10 mg tablet Take 1 tablet (10 mg total) by mouth daily at bedtime 2/28/22   Elena Escalera DO   mesalamine (DELZICOL) 400 mg Take 2 capsules (800 mg total) by mouth 3 (three) times a day 8/26/22 9/6/22  Padmini Richardson PA-C     I have reveiwed home medications using records provided by [facility].    Allergies:   Allergies   Allergen Reactions    Sulfa Antibiotics     Pneumococcal Polysaccharide Vaccine Rash and Edema       Social History:  Marital  Status:    Occupation: None   Patient Pre-hospital Living Situation: Long Term Care Facility  Patient Pre-hospital Level of Mobility: walks with walker  Patient Pre-hospital Diet Restrictions: None   Substance Use History:   Social History     Substance and Sexual Activity   Alcohol Use Not Currently    Comment: (history)     Social History     Tobacco Use   Smoking Status Former    Current packs/day: 0.00    Average packs/day: 1 pack/day for 34.0 years (34.0 ttl pk-yrs)    Types: Cigarettes    Start date:     Quit date:     Years since quittin.1   Smokeless Tobacco Never     Social History     Substance and Sexual Activity   Drug Use No       Family History:  Family History   Problem Relation Age of Onset    Stomach cancer Mother     No Known Problems Father     Cervical cancer Sister     No Known Problems Daughter     No Known Problems Maternal Grandmother     No Known Problems Maternal Grandfather     No Known Problems Paternal Grandmother     No Known Problems Paternal Grandfather     Colon polyps Neg Hx     Colon cancer Neg Hx        Physical Exam:     Vitals:   Blood Pressure: 138/64 (24 1630)  Pulse: 75 (24 1630)  Temperature: 98.2 °F (36.8 °C) (24 1630)  Temp Source: Oral (24 1630)  Respirations: 19 (24 1630)  SpO2: 99 % (24 1630)    Physical Exam  Vitals and nursing note reviewed.   Constitutional:       General: She is not in acute distress.     Appearance: She is well-developed. She is not ill-appearing.   HENT:      Head: Normocephalic and atraumatic.   Eyes:      General:         Right eye: No discharge.         Left eye: No discharge.      Extraocular Movements: Extraocular movements intact.      Conjunctiva/sclera: Conjunctivae normal.   Cardiovascular:      Rate and Rhythm: Normal rate and regular rhythm.      Heart sounds: No murmur heard.  Pulmonary:      Effort: Pulmonary effort is normal. No respiratory distress.      Breath sounds: Normal  breath sounds. No wheezing or rales.   Abdominal:      General: Bowel sounds are normal. There is no distension.      Palpations: Abdomen is soft.      Tenderness: There is no abdominal tenderness.   Genitourinary:     Comments: Suprapubic catheter in place  Musculoskeletal:      Cervical back: Neck supple.      Right lower leg: No edema.      Left lower leg: No edema.   Skin:     General: Skin is warm and dry.      Capillary Refill: Capillary refill takes less than 2 seconds.   Neurological:      General: No focal deficit present.      Mental Status: She is alert and oriented to person, place, and time. Mental status is at baseline.      Cranial Nerves: No cranial nerve deficit.   Psychiatric:         Mood and Affect: Mood normal.         Behavior: Behavior normal.          Additional Data:     Lab Results:  Results from last 7 days   Lab Units 02/19/24  1233   WBC Thousand/uL 5.36   HEMOGLOBIN g/dL 7.6*   HEMATOCRIT % 25.6*   PLATELETS Thousands/uL 288   NEUTROS PCT % 64   LYMPHS PCT % 19   MONOS PCT % 11   EOS PCT % 5     Results from last 7 days   Lab Units 02/19/24  1233   SODIUM mmol/L 134*   POTASSIUM mmol/L 3.0*   CHLORIDE mmol/L 103   CO2 mmol/L 22   BUN mg/dL 12   CREATININE mg/dL 1.12   ANION GAP mmol/L 9   CALCIUM mg/dL 7.4*   ALBUMIN g/dL 2.4*   TOTAL BILIRUBIN mg/dL 0.45   ALK PHOS U/L 103   ALT U/L 5*   AST U/L 11*   GLUCOSE RANDOM mg/dL 92     Results from last 7 days   Lab Units 02/19/24  1233   INR  1.90*                   Lines/Drains:  Invasive Devices       Peripheral Intravenous Line  Duration             Peripheral IV 02/19/24 Left Antecubital <1 day    Peripheral IV 02/19/24 Left;Proximal;Ventral (anterior) Forearm <1 day              Drain  Duration             Suprapubic Catheter 16 Fr. 417 days                        Imaging: No pertinent imaging reviewed.  No orders to display       EKG and Other Studies Reviewed on Admission:   EKG: NSR. HR 74.    ** Please Note: This note has been  constructed using a voice recognition system. **

## 2024-02-19 NOTE — ASSESSMENT & PLAN NOTE
Per chart review: History of Stage IV metastatic ER positive, [place] negative, 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, progressed from stage I A ER/[place] positive, 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 years ago. Status post resection and adjuvant radiation and hormone therapy for 5 years. Known mets to sternum as well as vertebrae + 4 mm RLL nodule. Patient was treated with multiple medications- Faslodex, Xgeva, Femara, Kisqali, Verzenio however all cancer treatment was stopped after 10/2022 admission due to severe colitis thought 2/2 meds and poor tolerance.     Does not appear to follow with oncology recently.

## 2024-02-20 LAB
ABO GROUP BLD BPU: NORMAL
ALBUMIN SERPL BCP-MCNC: 2 G/DL (ref 3.5–5)
ALP SERPL-CCNC: 85 U/L (ref 34–104)
ALT SERPL W P-5'-P-CCNC: 5 U/L (ref 7–52)
ANION GAP SERPL CALCULATED.3IONS-SCNC: 6 MMOL/L
AST SERPL W P-5'-P-CCNC: 6 U/L (ref 13–39)
BASOPHILS # BLD MANUAL: 0 THOUSAND/UL (ref 0–0.1)
BASOPHILS NFR MAR MANUAL: 0 % (ref 0–1)
BILIRUB SERPL-MCNC: 0.52 MG/DL (ref 0.2–1)
BPU ID: NORMAL
BUN SERPL-MCNC: 10 MG/DL (ref 5–25)
C COLI+JEJUNI TUF STL QL NAA+PROBE: NEGATIVE
C DIFF TOX GENS STL QL NAA+PROBE: NEGATIVE
CALCIUM ALBUM COR SERPL-MCNC: 8.7 MG/DL (ref 8.3–10.1)
CALCIUM SERPL-MCNC: 7.1 MG/DL (ref 8.4–10.2)
CHLORIDE SERPL-SCNC: 109 MMOL/L (ref 96–108)
CO2 SERPL-SCNC: 22 MMOL/L (ref 21–32)
CREAT SERPL-MCNC: 0.96 MG/DL (ref 0.6–1.3)
CROSSMATCH: NORMAL
EC STX1+STX2 GENES STL QL NAA+PROBE: NEGATIVE
EOSINOPHIL # BLD MANUAL: 0.23 THOUSAND/UL (ref 0–0.4)
EOSINOPHIL NFR BLD MANUAL: 5 % (ref 0–6)
ERYTHROCYTE [DISTWIDTH] IN BLOOD BY AUTOMATED COUNT: 15.9 % (ref 11.6–15.1)
GFR SERPL CREATININE-BSD FRML MDRD: 54 ML/MIN/1.73SQ M
GLUCOSE SERPL-MCNC: 85 MG/DL (ref 65–140)
HCT VFR BLD AUTO: 23.4 % (ref 34.8–46.1)
HGB BLD-MCNC: 7.3 G/DL (ref 11.5–15.4)
LYMPHOCYTES # BLD AUTO: 0.41 THOUSAND/UL (ref 0.6–4.47)
LYMPHOCYTES # BLD AUTO: 9 % (ref 14–44)
MAGNESIUM SERPL-MCNC: 2.2 MG/DL (ref 1.9–2.7)
MCH RBC QN AUTO: 26.3 PG (ref 26.8–34.3)
MCHC RBC AUTO-ENTMCNC: 31.2 G/DL (ref 31.4–37.4)
MCV RBC AUTO: 84 FL (ref 82–98)
MONOCYTES # BLD AUTO: 0.18 THOUSAND/UL (ref 0–1.22)
MONOCYTES NFR BLD: 4 % (ref 4–12)
NEUTROPHILS # BLD MANUAL: 3.72 THOUSAND/UL (ref 1.85–7.62)
NEUTS BAND NFR BLD MANUAL: 2 % (ref 0–8)
NEUTS SEG NFR BLD AUTO: 80 % (ref 43–75)
PLATELET # BLD AUTO: 242 THOUSANDS/UL (ref 149–390)
PLATELET BLD QL SMEAR: ADEQUATE
PMV BLD AUTO: 9 FL (ref 8.9–12.7)
POTASSIUM SERPL-SCNC: 3.5 MMOL/L (ref 3.5–5.3)
PROT SERPL-MCNC: 4.4 G/DL (ref 6.4–8.4)
RBC # BLD AUTO: 2.78 MILLION/UL (ref 3.81–5.12)
RBC MORPH BLD: NORMAL
SALMONELLA SP SPAO STL QL NAA+PROBE: NEGATIVE
SHIGELLA SP+EIEC IPAH STL QL NAA+PROBE: NEGATIVE
SODIUM SERPL-SCNC: 137 MMOL/L (ref 135–147)
UNIT DISPENSE STATUS: NORMAL
UNIT PRODUCT CODE: NORMAL
UNIT PRODUCT VOLUME: 250 ML
UNIT RH: NORMAL
WBC # BLD AUTO: 4.54 THOUSAND/UL (ref 4.31–10.16)

## 2024-02-20 PROCEDURE — 85027 COMPLETE CBC AUTOMATED: CPT | Performed by: INTERNAL MEDICINE

## 2024-02-20 PROCEDURE — C9113 INJ PANTOPRAZOLE SODIUM, VIA: HCPCS

## 2024-02-20 PROCEDURE — 99233 SBSQ HOSP IP/OBS HIGH 50: CPT | Performed by: STUDENT IN AN ORGANIZED HEALTH CARE EDUCATION/TRAINING PROGRAM

## 2024-02-20 PROCEDURE — 97163 PT EVAL HIGH COMPLEX 45 MIN: CPT

## 2024-02-20 PROCEDURE — 85007 BL SMEAR W/DIFF WBC COUNT: CPT | Performed by: INTERNAL MEDICINE

## 2024-02-20 PROCEDURE — 83735 ASSAY OF MAGNESIUM: CPT | Performed by: INTERNAL MEDICINE

## 2024-02-20 PROCEDURE — 80053 COMPREHEN METABOLIC PANEL: CPT | Performed by: INTERNAL MEDICINE

## 2024-02-20 RX ORDER — PANTOPRAZOLE SODIUM 40 MG/1
40 TABLET, DELAYED RELEASE ORAL
Status: DISCONTINUED | OUTPATIENT
Start: 2024-02-21 | End: 2024-02-21 | Stop reason: HOSPADM

## 2024-02-20 RX ORDER — POTASSIUM CHLORIDE 20 MEQ/1
40 TABLET, EXTENDED RELEASE ORAL ONCE
Status: COMPLETED | OUTPATIENT
Start: 2024-02-20 | End: 2024-02-20

## 2024-02-20 RX ADMIN — MIDODRINE HYDROCHLORIDE 2.5 MG: 5 TABLET ORAL at 05:40

## 2024-02-20 RX ADMIN — MIRTAZAPINE 7.5 MG: 15 TABLET, FILM COATED ORAL at 21:14

## 2024-02-20 RX ADMIN — PRAVASTATIN SODIUM 20 MG: 20 TABLET ORAL at 17:19

## 2024-02-20 RX ADMIN — POTASSIUM CHLORIDE 40 MEQ: 1500 TABLET, EXTENDED RELEASE ORAL at 09:53

## 2024-02-20 RX ADMIN — IRON SUCROSE 300 MG: 20 INJECTION, SOLUTION INTRAVENOUS at 09:53

## 2024-02-20 RX ADMIN — FOLIC ACID 1 MG: 1 TABLET ORAL at 09:53

## 2024-02-20 RX ADMIN — PANTOPRAZOLE SODIUM 40 MG: 40 INJECTION, POWDER, FOR SOLUTION INTRAVENOUS at 09:53

## 2024-02-20 RX ADMIN — METOPROLOL SUCCINATE 50 MG: 50 TABLET, EXTENDED RELEASE ORAL at 09:53

## 2024-02-20 NOTE — CONSULTS
Consultation - [facility] Gastroenterology     Kerry Silverman 83 y.o. female MRN: [id number]  Unit/Bed#: -01 Encounter: [id number]    Inpatient consult to gastroenterology  Consult performed by: Marge Liriano PA-C  Consult ordered by: Sonja Stiles PA-C          ASSESSMENT and PLAN  Kerry Silverman is a 83 y.o. year old female with a past medical history of stage IV metastatic breast cancer to bone, status post resection, adjuvant radiation, hormone therapy meds stopped 10/2022 after developing colitis and poor tolerance, PE on Eliquis, chronic suprapubic catheter, chronic anemia, CKD, orthostatic hypotension on midodrine history of C. difficile, with Crohn's colitis with failed response to Remicade, started on prednisone course complicated by C. difficile infection 3/2023 which improved after vancomycin and has remained off IBD therapy since presents to the ER from nursing home with increasing fatigue and rectal bleeding and  watery diarrhea since January 2023. Hemoglobin 7.3 on admission, ordered for  1 unit PRBC's, baseline hemoglobin between 7-8 g. Pt seen by Dr. Brito  2/8/2024 who recommended starting Stelara after C. difficile negative.  Suspect bleeding due to Crohn's colitis off therapy and on Eliquis.  Pt hemodynamically stable without evidence of an active large-volume GI bleed at this time.    1.  Crohn's colitis  2.  Bloody diarrhea  3.  Normocytic anemia   -diet as tolerated, IV fluids, decrease PPI to 40 oral once daily  -Monitor Hg, transfuse as needed, receiving 1 unit, Hg nearly at baseline   -IV iron started per primary team   -Would like to avoid steroids in setting of history of C. difficile while on steroids  -If C. difficile negative and hemoglobin stable okay to discharge and expedite Stelara infusion after speaking with IBD team  -keep follow up Dr. Brito 5/2/2024  -Updated patient's daughter Taya Michelle over the phone about plan and agreeable  -Anticipated discharge  tomorrow, will have IBD team reach out to schedule Stelara infusion    4.  PE on Eliquis   -Eliquis on hold per primary team      Chief Complaint   Patient presents with    Black or Bloody Stool     Pt to ED from [facility] for bloody stools x multiple months. Pt has seen GI for this, was to start a new medication but facility wanted pt to be evaluated and have CBC complete prior. Denies abd pain       Physician Requesting Consult: Mayuri Fairchild MD    HPI    Kerry Silverman is a 83 y.o. year old female with a past medical history of stage IV metastatic breast cancer to bone, status post resection, adjuvant radiation, hormone therapy meds stopped 10/2022 after developing colitis and poor tolerance, PE on Eliquis, chronic suprapubic catheter, chronic anemia, CKD, orthostatic hypotension on midodrine history of C. difficile, with Crohn's colitis with failed response to Remicade, started on prednisone course complicated by C. difficile infection 3/2023 which improved after vancomycin and has remained off IBD therapy since presents to the ER from nursing home with increasing fatigue and rectal bleeding watery diarrhea with dark maroon blood up to 5 times a day has restarted since January 2023. She denies reflux vomiting abdominal pain anorexia weight loss or fever.    Pt seen by Dr. Brito  2/8/2024 who recommended starting Stelara after C. difficile negative.      Hemoglobin 7.3 on admission, ordered for  1 unit PRBC's, baseline hemoglobin between 7-8 g.     She had colonoscopy in 3/2023 which showed Jarrett 3 colitis from rectum  to the hepatic flexure.  Path showed chronic active colitis with cryptitis, crypt abscesses and architectural distortion consistent with active Crohn's disease. Rec repeat 6m.        ROS:   Constitutional:+ fatigue, fever.  HEENT: denies visual disturbance, postnasal drip, sore throat.  Respiratory: denies cough, shortness of breath.  Cardiovascular: denies chest pain, leg swelling.  Gastrointestinal:  as noted above in HPI.  : denies difficulty urinating, dysuria.  Musculoskeletal: denies arthralgias, back pain.  Neurological: denies dizziness, syncope.  Psychiatric: denies confusion, anxiety.    Historical Information   Past Medical History:   Diagnosis Date    Abnormal weight loss     Allergic reaction     Anxiety     Breast cancer, right (HCC) 2011    right    Cancer (HCC) 2012    Candidal vulvovaginitis     Clotting disorder (HCC) Same as above    Endometrial hyperplasia     Epithelial cyst     benign, ovary    GI (gastrointestinal bleed) Blood mixed with stool    History of radiation therapy 2011    right breast cancer    Hyperlipidemia     Hypertension     Internal hemorrhoids     Knee tendonitis     Ovarian cyst     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(HCC)      Past Surgical History:   Procedure Laterality Date    BILATERAL SALPINGOOPHORECTOMY      onset: 7/23/13    BREAST BIOPSY Right 06/13/2006    benign    BREAST BIOPSY Right 03/02/2011    malignant    BREAST LUMPECTOMY Right 03/30/2011    malignant    CATARACT EXTRACTION W/  INTRAOCULAR LENS IMPLANT Right     phacoemulsification. Onset: 10/27/14    CHOLECYSTECTOMY      COLONOSCOPY  2017    approx    HYSTERECTOMY  Yes    INTRAOPERATIVE RADIATION THERAPY (IORT)      IR BIOPSY BONE  7/9/2021    IR IVC FILTER PLACEMENT OPTIONAL/TEMPORARY  9/23/2022    IR PE ENDOVASCULAR THERAPY  9/22/2022    IR PICC PLACEMENT SINGLE LUMEN  8/19/2022    IR PICC PLACEMENT SINGLE LUMEN  9/26/2022    IR SUPRAPUBIC CATHETER PLACEMENT  12/29/2022    SKIN LESION EXCISION Right     breast, single lesion    TONSILLECTOMY AND ADENOIDECTOMY       Social History   Social History     Substance and Sexual Activity   Alcohol Use Not Currently    Comment: (history)     Social History     Substance and Sexual Activity   Drug Use No     Social History     Tobacco Use   Smoking Status Former    Current packs/day: 0.00    Average packs/day: 1 pack/day for 34.0 years (34.0 ttl pk-yrs)    Types:  Cigarettes    Start date:     Quit date:     Years since quittin.1   Smokeless Tobacco Never     Family History   Problem Relation Age of Onset    Stomach cancer Mother     No Known Problems Father     Cervical cancer Sister     No Known Problems Daughter     No Known Problems Maternal Grandmother     No Known Problems Maternal Grandfather     No Known Problems Paternal Grandmother     No Known Problems Paternal Grandfather     Colon polyps Neg Hx     Colon cancer Neg Hx        Meds/Allergies     Current Facility-Administered Medications   Medication Dose Route Frequency    folic acid (FOLVITE) tablet 1 mg  1 mg Oral Daily    iron sucrose (VENOFER) 300 mg in sodium chloride 0.9% 250 ml IVPB 300 mg  300 mg Intravenous Daily    metoprolol succinate (TOPROL-XL) 24 hr tablet 50 mg  50 mg Oral Daily    midodrine (PROAMATINE) tablet 2.5 mg  2.5 mg Oral TID AC    mirtazapine (REMERON) tablet 7.5 mg  7.5 mg Oral HS    pantoprazole (PROTONIX) injection 40 mg  40 mg Intravenous Q12H RIA    pravastatin (PRAVACHOL) tablet 20 mg  20 mg Oral Daily With Dinner     Medications Prior to Admission   Medication    acetaminophen (TYLENOL) 325 mg tablet    Aspirin Low Dose 81 MG chewable tablet    Cholecalciferol (VITAMIN D3) 2000 units capsule    diphenoxylate-atropine (LOMOTIL) 2.5-0.025 mg per tablet    enoxaparin (LOVENOX) 30 mg/0.3 mL    enoxaparin (LOVENOX) 60 mg/0.6 mL    folic acid (FOLVITE) 1 mg tablet    loperamide (IMODIUM) 2 mg capsule    melatonin 3 mg    metoprolol succinate (TOPROL-XL) 25 mg 24 hr tablet    metoprolol succinate (TOPROL-XL) 50 mg 24 hr tablet    midodrine (PROAMATINE) 2.5 mg tablet    mirtazapine (REMERON) 7.5 MG tablet    ondansetron (ZOFRAN-ODT) 4 mg disintegrating tablet    pantoprazole (PROTONIX) 40 mg tablet    simvastatin (ZOCOR) 10 mg tablet       Allergies   Allergen Reactions    Sulfa Antibiotics     Pneumococcal Polysaccharide Vaccine Rash and Edema       PHYSICAL EXAM:   "  Constitutional: Well-developed, no acute distress, elderly  HEENT: normocephalic, mucous membranes moist.  Neck: Supple  Skin: warm and dry  Respiratory: Lungs are clear to auscultation B/L.  Cardiovascular: Heart is regular rate and rhythm.  Gastrointestinal: Soft, nontender, nondistended with normal active bowel sounds.  No masses, guarding, rebound.   Rectal Exam: Deferred.  Extremities: No edema.  Neurologic: Nonfocal. A & O ×3.   Psychiatric: Normal affect.      Lab Results   Component Value Date    GLUCOSE 60 (L) 09/23/2022    CALCIUM 7.1 (L) 02/20/2024     09/08/2015    K 3.5 02/20/2024    CO2 22 02/20/2024     (H) 02/20/2024    BUN 10 02/20/2024    CREATININE 0.96 02/20/2024    CREATININE 1.12 02/19/2024    CREATININE 0.42 (L) 10/19/2022     Lab Results   Component Value Date    WBC 4.54 02/20/2024    WBC 5.36 02/19/2024    WBC 6.40 10/19/2022    HGB 7.3 (L) 02/20/2024    HGB 7.6 (L) 02/19/2024    HGB 7.2 (L) 10/20/2022    MCV 84 02/20/2024     02/20/2024     02/19/2024     10/19/2022     Lab Results   Component Value Date    ALT 5 (L) 02/20/2024    ALT 5 (L) 02/19/2024    ALT 24 10/14/2022    AST 6 (L) 02/20/2024    AST 11 (L) 02/19/2024    AST 17 10/14/2022    ALKPHOS 85 02/20/2024    ALKPHOS 103 02/19/2024    ALKPHOS 84 10/14/2022    TBILI 0.52 02/20/2024    TBILI 0.45 02/19/2024    TBILI 0.28 10/14/2022     No results found for: \"AMYLASE\"  Lab Results   Component Value Date    LIPASE 79 08/15/2022     Lab Results   Component Value Date    IRON 12 (L) 02/19/2024    TIBC 141 (L) 02/19/2024    FERRITIN 70 02/19/2024     Lab Results   Component Value Date    INR 1.90 (H) 02/19/2024    INR 1.29 (H) 09/23/2022    INR 1.38 (H) 09/22/2022       No results found.    Imaging Studies: I have personally reviewed pertinent reports.      Pathology, and Other Studies: I have personally reviewed pertinent reports.      Patient expressed understanding and had all questions and concerns " addressed.    Marge Liriano PA-C  02/20/24   12:01 PM     Counseling / Coordination of Care  Total floor / unit time spent today 45 minutes.     This chart was completed in part utilizing [employer] speech voice recognition software. Random word insertions, pronoun errors, and incomplete sentences are an occasional consequence of this system due to software limitations, and ambient noise. Any questions or concerns about the content, text, or information contained within the body of this dictation should be directly addressed to the provider for clarification.

## 2024-02-20 NOTE — ASSESSMENT & PLAN NOTE
Hx of IBD (Crohns dx), follows with outpatient GI last seen Feb 8 2024  Colitis though 2/2 kisquali infusions for metastatic breast cancer, this was stopped over 1 year ago   Initially started on infliximab after severe colitis in 2022 however Dc'd as patient was without improvement. Repeat colonosocopy 3/2023 showed colitis to hepatic flexure. Patient was temporarily on prednisone however then diagnosed with C diff and improved with PO vanc. No longer on therapy since then  Colitis symptoms (bloody stool, watery diarrhea) started again most recently Jan 2024  At 2/8 appt plan was to start patient on stelara   Discussed with GI to F/u outpt to initiate infusion

## 2024-02-20 NOTE — ASSESSMENT & PLAN NOTE
Lab Results   Component Value Date    EGFR 54 02/20/2024    EGFR 45 02/19/2024    EGFR 95 10/19/2022    CREATININE 0.96 02/20/2024    CREATININE 1.12 02/19/2024    CREATININE 0.42 (L) 10/19/2022     Baseline Cr is around 0.4  Cr currently elevated 1.12, GFR reduced   Suspect due to mild hypovolemia  Dc'd IVF  Monitor BMP  Avoid nephrotoxins   improved

## 2024-02-20 NOTE — ASSESSMENT & PLAN NOTE
Hx of IBD, follows with GI. With diarrhea/rectal bleeding since Jan 2024, was seen 2/8/24 by GI and planned to start stelara. SNF sent patient to ED due to increased sx x 2-3 days along with worsening fatigue  Hgb 7.6, S/P 1 unit PRBC in ED  Stool studies pending   Dc'd IVF   Regular diet  IV protonix  Monitor hgb-> stable  Discussed with GI  Hold ASA/eliquis

## 2024-02-20 NOTE — ASSESSMENT & PLAN NOTE
Per chart review: History of Stage IV metastatic ER positive, ID negative, 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, progressed from stage I A ER/ID positive, 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 years ago. Status post resection and adjuvant radiation and hormone therapy for 5 years. Known mets to sternum as well as vertebrae + 4 mm RLL nodule. Patient was treated with multiple medications- Faslodex, Xgeva, Femara, Kisqali, Verzenio however all cancer treatment was stopped after 10/2022 admission due to severe colitis thought 2/2 meds and poor tolerance.     Does not appear to follow with oncology recently.

## 2024-02-20 NOTE — ASSESSMENT & PLAN NOTE
Hx of IBD, follows with GI. With diarrhea/rectal bleeding since Jan 2024, was seen 2/8/24 by GI and planned to start stelara. SNF sent patient to ED due to increased sx x 2-3 days along with worsening fatigue  Hgb 7.6, S/P 1 unit PRBC in ED  Stool studies negative  Regular diet  IV protonix  Monitor hgb-> stable  Discussed with GI resumed ASA/eliquis on discharge  Added florastor and questran   Pt/Ot recommending Level 3 care      F/u with GI outpt

## 2024-02-20 NOTE — PROGRESS NOTES
[facility]  Progress Note  Name: Kerry Silverman I  MRN: [id number]  Unit/Bed#: -[place] I Date of Admission: 2/19/2024   Date of Service: 2/20/2024 I Hospital Day: 1    Assessment/Plan   * Rectal bleeding  Assessment & Plan  Hx of IBD, follows with GI. With diarrhea/rectal bleeding since Jan 2024, was seen 2/8/24 by GI and planned to start stelara. SNF sent patient to ED due to increased sx x 2-3 days along with worsening fatigue  Hgb 7.6, S/P 1 unit PRBC in ED  Stool studies pending   Dc'd IVF   Regular diet  IV protonix  Monitor hgb-> stable  Discussed with GI  Hold ASA/eliquis    Electrolyte abnormality  Assessment & Plan  Hypokalemia and hypomagnesemia in setting of diarrhea  Recheck and replete    IBD (inflammatory bowel disease)  Assessment & Plan  Hx of IBD (Crohns dx), follows with outpatient GI last seen Feb 8 2024  Colitis though 2/2 kisquali infusions for metastatic breast cancer, this was stopped over 1 year ago   Initially started on infliximab after severe colitis in 2022 however Dc'd as patient was without improvement. Repeat colonosocopy 3/2023 showed colitis to hepatic flexure. Patient was temporarily on prednisone however then diagnosed with C diff and improved with PO vanc. No longer on therapy since then  Colitis symptoms (bloody stool, watery diarrhea) started again most recently Jan 2024  At 2/8 appt plan was to start patient on stelara --> does not yet appear to have received first infusion yet    History of pulmonary embolism  Assessment & Plan  S/P IVC filter   Maintained on eliquis 2.5 mg BID --> hold due to acute GIB     Suprapubic catheter (HCC)  Assessment & Plan  Chronic, 2/2 urinary retention     Anemia  Assessment & Plan  Baseline appears 7-8  Hgb 7.6 on admission   S/P 1 unit PRBC  Iron panel showing iron deficiency-> ordered venofer  Trend Hgb, transfuse to maintain > 7    Orthostatic hypotension  Assessment & Plan  Continue midodrine 5 mg TID      Chronic kidney disease (CKD) stage G3a/A1, moderately decreased glomerular filtration rate (GFR) between 45-59 mL/min/1.73 square meter and albuminuria creatinine ratio less than 30 mg/g (HCC)  Assessment & Plan  Lab Results   Component Value Date    EGFR 54 02/20/2024    EGFR 45 02/19/2024    EGFR 95 10/19/2022    CREATININE 0.96 02/20/2024    CREATININE 1.12 02/19/2024    CREATININE 0.42 (L) 10/19/2022     Baseline Cr is around 0.4  Cr currently elevated 1.12, GFR reduced   Suspect due to mild hypovolemia  Dc'd IVF  Monitor BMP  Avoid nephrotoxins   improved    Primary malignant neoplasm of breast with metastasis (HCC)  Assessment & Plan  Per chart review: History of Stage IV metastatic ER positive, OK negative, 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, progressed from stage I A ER/OK positive, 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 years ago. Status post resection and adjuvant radiation and hormone therapy for 5 years. Known mets to sternum as well as vertebrae + 4 mm RLL nodule. Patient was treated with multiple medications- Faslodex, Xgeva, Femara, Kisqali, Verzenio however all cancer treatment was stopped after 10/2022 admission due to severe colitis thought 2/2 meds and poor tolerance.     Does not appear to follow with oncology recently.                VTE Pharmacologic Prophylaxis: VTE Score: 7 High Risk (Score >/= 5) - Pharmacological DVT Prophylaxis Contraindicated. Sequential Compression Devices Ordered.    Mobility:   Basic Mobility Inpatient Raw Score: 17  JH-HLM Goal: 5: Stand one or more mins  JH-HLM Achieved: 7: Walk 25 feet or more  HLM Goal achieved. Continue to encourage appropriate mobility.    Patient Centered Rounds: I performed bedside rounds with nursing staff today.   Discussions with Specialists or Other Care Team Provider: GI, CM, Pt, Ot    Education and Discussions with Family / Patient: Updated  (son) via phone.    Total Time Spent on Date of Encounter in care of patient: 56 mins. This time  was spent on one or more of the following: performing physical exam; counseling and coordination of care; obtaining or reviewing history; documenting in the medical record; reviewing/ordering tests, medications or procedures; communicating with other healthcare professionals and discussing with patient's family/caregivers.    Current Length of Stay: 1 day(s)  Current Patient Status: Inpatient   Certification Statement: The patient will continue to require additional inpatient hospital stay due to rectal bleed  Discharge Plan: Anticipate discharge in 24-48 hrs to discharge location to be determined pending rehab evaluations.    Code Status: Level 3 - DNAR and DNI    Subjective:   Kerry was seen and examined at bedside.  No acute events overnight.  Discussed plan of care.  All questions and concerns were answered and addressed.  Has no acute complaints at this time.  Received 1 unit of blood on admission.  Hemoglobin continues to be stable.  Continues to have bleeding per rectum.  Discussed with GI for potential procedure tomorrow.  Iron panel showing iron deficiency.  Continue to monitor H&H.    Objective:     Vitals:   Temp (24hrs), Av.3 °F (36.8 °C), Min:98 °F (36.7 °C), Max:98.8 °F (37.1 °C)    Temp:  [98 °F (36.7 °C)-98.8 °F (37.1 °C)] 98 °F (36.7 °C)  HR:  [70-83] 81  Resp:  [12-21] 18  BP: (125-160)/(45-72) 140/61  SpO2:  [96 %-100 %] 99 %  Body mass index is 23.99 kg/m².     Input and Output Summary (last 24 hours):     Intake/Output Summary (Last 24 hours) at 2024 1024  Last data filed at 2024 0219  Gross per 24 hour   Intake 302.08 ml   Output 375 ml   Net -72.92 ml       Physical Exam:   Physical Exam  Vitals and nursing note reviewed.   Constitutional:       General: She is not in acute distress.     Appearance: She is not ill-appearing.   HENT:      Head: Normocephalic and atraumatic.   Cardiovascular:      Rate and Rhythm: Normal rate and regular rhythm.      Pulses: Normal pulses.       Heart sounds: Normal heart sounds.   Pulmonary:      Effort: Pulmonary effort is normal.      Breath sounds: Normal breath sounds.   Abdominal:      General: Abdomen is flat. Bowel sounds are normal.      Palpations: Abdomen is soft.   Musculoskeletal:      Right lower leg: No edema.      Left lower leg: No edema.   Skin:     General: Skin is warm.   Neurological:      General: No focal deficit present.      Mental Status: She is alert and oriented to person, place, and time.          Additional Data:     Labs:  Results from last 7 days   Lab Units 02/20/24  0410 02/19/24  1233   WBC Thousand/uL 4.54 5.36   HEMOGLOBIN g/dL 7.3* 7.6*   HEMATOCRIT % 23.4* 25.6*   PLATELETS Thousands/uL 242 288   BANDS PCT % 2  --    NEUTROS PCT %  --  64   LYMPHS PCT %  --  19   LYMPHO PCT % 9*  --    MONOS PCT %  --  11   MONO PCT % 4  --    EOS PCT % 5 5     Results from last 7 days   Lab Units 02/20/24  0410   SODIUM mmol/L 137   POTASSIUM mmol/L 3.5   CHLORIDE mmol/L 109*   CO2 mmol/L 22   BUN mg/dL 10   CREATININE mg/dL 0.96   ANION GAP mmol/L 6   CALCIUM mg/dL 7.1*   ALBUMIN g/dL 2.0*   TOTAL BILIRUBIN mg/dL 0.52   ALK PHOS U/L 85   ALT U/L 5*   AST U/L 6*   GLUCOSE RANDOM mg/dL 85     Results from last 7 days   Lab Units 02/19/24  1233   INR  1.90*                   Lines/Drains:  Invasive Devices       Peripheral Intravenous Line  Duration             Peripheral IV 02/19/24 Left Antecubital <1 day    Peripheral IV 02/19/24 Left;Proximal;Ventral (anterior) Forearm <1 day              Drain  Duration             Suprapubic Catheter 16 Fr. 417 days                          Imaging: No pertinent imaging reviewed.    Recent Cultures (last 7 days):         Last 24 Hours Medication List:   Current Facility-Administered Medications   Medication Dose Route Frequency Provider Last Rate    folic acid  1 mg Oral Daily Sonja Stiles PA-C      iron sucrose  300 mg Intravenous Daily Mayuri Fairchild MD      metoprolol succinate   50 mg Oral Daily Sonja Stiles PA-C      midodrine  2.5 mg Oral TID AC Sonja Stiles PA-C      mirtazapine  7.5 mg Oral HS Sonja Stiles PA-C      pantoprazole  40 mg Intravenous Q12H RIA Sonja Stiles PA-C      pravastatin  20 mg Oral Daily With Dinner Sonja Stiles PA-C          Today, Patient Was Seen By: Mayuri Fairchild MD    **Please Note: This note may have been constructed using a voice recognition system.**

## 2024-02-20 NOTE — PROGRESS NOTES
Pastoral Care Progress Note    2024  Patient: Kerry Silverman : 1940  Admission Date & Time: 2024 1129  MRN: [id number] [facility]: [id number]           24 1300   Clinical Encounter Type   Visited With Family   Routine Visit Introduction   Referral From Nurse   Referral To       Intern was referred to pt based on nurse referral. When  Intern visited, only the family was present.  Intern asked the family if there was anything they needed at this time. Family declined support. Spiritual care remains available.    Chaplaincy Outcomes Achieved:  Declined  support

## 2024-02-20 NOTE — PHYSICAL THERAPY NOTE
PT EVALUATION    83 y.o.    [id number]    Acute hypokalemia [E87.6]  Bloody stool [K92.1]  GI bleed [K92.2]  Symptomatic anemia [D64.9]    Past Medical History:   Diagnosis Date    Abnormal weight loss     Allergic reaction     Anxiety     Breast cancer, right (HCC) 2011    right    Cancer (HCC) 2012    Candidal vulvovaginitis     Clotting disorder (HCC) Same as above    Endometrial hyperplasia     Epithelial cyst     benign, ovary    GI (gastrointestinal bleed) Blood mixed with stool    History of radiation therapy 2011    right breast cancer    Hyperlipidemia     Hypertension     Internal hemorrhoids     Knee tendonitis     Ovarian cyst     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(HCC)          Past Surgical History:   Procedure Laterality Date    BILATERAL SALPINGOOPHORECTOMY      onset: 7/23/13    BREAST BIOPSY Right 06/13/2006    benign    BREAST BIOPSY Right 03/02/2011    malignant    BREAST LUMPECTOMY Right 03/30/2011    malignant    CATARACT EXTRACTION W/  INTRAOCULAR LENS IMPLANT Right     phacoemulsification. Onset: 10/27/14    CHOLECYSTECTOMY      COLONOSCOPY  2017    approx    HYSTERECTOMY  Yes    INTRAOPERATIVE RADIATION THERAPY (IORT)      IR BIOPSY BONE  7/9/2021    IR IVC FILTER PLACEMENT OPTIONAL/TEMPORARY  9/23/2022    IR PE ENDOVASCULAR THERAPY  9/22/2022    IR PICC PLACEMENT SINGLE LUMEN  8/19/2022    IR PICC PLACEMENT SINGLE LUMEN  9/26/2022    IR SUPRAPUBIC CATHETER PLACEMENT  12/29/2022    SKIN LESION EXCISION Right     breast, single lesion    TONSILLECTOMY AND ADENOIDECTOMY          02/20/24 1105   PT Last Visit   PT Visit Date 02/20/24   Note Type   Note type Evaluation   Pain Assessment   Pain Assessment Tool 0-10   Pain Score No Pain   Restrictions/Precautions   Weight Bearing Precautions Per Order No   Other Precautions Contact/isolation;Multiple lines  (IV, suarez)   Home Living   Type of Home SNF  ([facility])   Bathroom Shower/Tub Walk-in shower   Bathroom Toilet Raised   Bathroom Equipment Shower  chair;Grab bars in shower;Grab bars around toilet   Home Equipment Walker   Prior Function   Level of [place] Independent with functional mobility;Needs assistance with ADLs;Needs assistance with IADLS   Lives With Facility staff   Receives Help From Personal care attendant   IADLs Family/Friend/Other provides transportation;Family/Friend/Other provides meals;Family/Friend/Other provides medication management   Falls in the last 6 months 0   Vocational Retired   General   Additional Pertinent History Pt admitted with rectal bleeding and fatigue. Hgb 7.3.   Family/Caregiver Present No   Cognition   Overall Cognitive Status WFL   Arousal/Participation Cooperative   Orientation Level Oriented X4   Memory Within functional limits   Following Commands Follows all commands and directions without difficulty   Subjective   Subjective Pt agreeable to evaluation.   Coordination   Movements are Fluid and Coordinated 1   Bed Mobility   Supine to Sit 6  Modified independent   Sit to Supine 6  Modified independent   Transfers   Sit to Stand 5  Supervision   Stand to Sit 5  Supervision   Ambulation/Elevation   Gait pattern Forward Flexion;Decreased foot clearance;Shuffling;Excessively slow   Gait Assistance 5  Supervision   Assistive Device Rolling walker   Distance 30'   Balance   Static Sitting Normal   Dynamic Sitting Good   Static Standing Fair -   Dynamic Standing Poor +   Ambulatory Poor +   Endurance Deficit   Endurance Deficit Yes   Endurance Deficit Description fatigue   Activity Tolerance   Activity Tolerance Patient limited by fatigue   Nurse Made Aware yes, Michael RN   Assessment   Prognosis Good   Problem List Decreased strength;Decreased endurance;Impaired balance;Decreased mobility   Assessment Pt is an 83 y.o. female admitted to [facility] on 2/19/24 with c/o fatigue and rectal bleeding. PT consulted with eval and treat and activity as tolerated orders. Pt admitted from [facility]. At baseline, pt is  independent with ambulation with RW, but gets assist with ADLs/ IADLs. Upon evaluation, pt was at a supervision level for mobility with RW. Pt presents with weakness, impaired balance, impaired endurance, gait dysfunction. The patient's [facility] Basic Mobility Inpatient Short Form Raw Score is 20. A Raw score of greater than 16 suggests the patient may benefit from discharge to home. Please also refer to the recommendation of the Physical Therapist for safe discharge planning. PT to follow 2-3x/wk during acute stay to address deficits. Recommend Level 3 rehab intensity to maximize safe mobility and function.   Barriers to Discharge None   Goals   Patient Goals to return to [facility]; feel better   [facility] Expiration Date 03/05/24   Short Term Goal #1 1).  Pt will perform bed mobility with Wade demonstrating appropriate technique 100% of the time in order to improve function.2)  Perform all transfers with Wade demonstrating safe and appropriate technique 100% of the time in order to improve ability to negotiate safely in home environment.3) Amb with least restrictive AD > 200'x1 with mod I in order to demonstrate ability to negotiate in home environment.4)  Improve overall strength and balance 1/2 grade in order to optimize ability to perform functional tasks and reduce fall risk.5) Increase activity tolerance to 45 minutes in order to improve endurance to functional tasks.6) PT for ongoing patient and family/caregiver education, DME needs and d/c planning in order to promote highest level of function in least restrictive environment.   PT Treatment Day 0   Plan   Treatment/Interventions Functional transfer training;Bed mobility;Gait training;Endurance training;Therapeutic exercise;Compensatory technique education;Spoke to nursing;OT   PT Frequency 2-3x/wk   Discharge Recommendation   Rehab Resource Intensity Level, PT III (Minimum Resource Intensity)   AM-PAC Basic Mobility Inpatient   Turning in Flat Bed Without Bedrails 4    Lying on Back to Sitting on Edge of Flat Bed Without Bedrails 4   Moving Bed to Chair 3   Standing Up From Chair Using Arms 3   Walk in Room 3   Climb 3-5 Stairs With Railing 3   Basic Mobility Inpatient Raw Score 20   Basic Mobility Standardized Score 43.99   Highest Level Of Mobility   -HL Goal 6: Walk 10 steps or more   -HLM Achieved 7: Walk 25 feet or more   End of Consult   Patient Position at End of Consult Supine;All needs within reach     Hx/personal factors: difficulty performing ADLs, difficulty performing IADLs, fall risk , and advanced age, comorbidities    Examination:decreased strength , decreased balance, decreased activity tolerance, and gait deviations.     Clinical: unpredictable Ongoing medical status.     Complexity: high          Sonja Olson, PT

## 2024-02-20 NOTE — ASSESSMENT & PLAN NOTE
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 Status post resection and adjuvant radiation and hormone therapy for 5 years. Known mets to sternum as well as vertebrae + 4 mm RLL nodule. Patient was treated with multiple medications- Faslodex, Xgeva, Femara, Kisqali, Verzenio however all cancer treatment was stopped after 10/2022 admission due to severe colitis thought 2/2 meds and poor tolerance.     Does not appear to follow with oncology recently.

## 2024-02-20 NOTE — DISCHARGE SUMMARY
[facility]  Discharge- Kerry Silverman 1940, 83 y.o. female MRN: [id number]  Unit/Bed#: -[place] Encounter: [id number]  Primary Care Provider: Elena Escalera DO   Date and time admitted to hospital: 2/19/2024 11:29 AM    * Rectal bleeding  Assessment & Plan  Hx of IBD, follows with GI. With diarrhea/rectal bleeding since Jan 2024, was seen 2/8/24 by GI and planned to start stelara. SNF sent patient to ED due to increased sx x 2-3 days along with worsening fatigue  Hgb 7.6, S/P 1 unit PRBC in ED  Stool studies negative  Regular diet  IV protonix  Monitor hgb-> stable  Discussed with GI resumed ASA/eliquis on discharge  Added florastor and questran   Pt/Ot recommending Level 3 care      F/u with GI outpt    Electrolyte abnormality  Assessment & Plan  Hypokalemia and hypomagnesemia in setting of diarrhea  Recheck and replete    IBD (inflammatory bowel disease)  Assessment & Plan  Hx of IBD (Crohns dx), follows with outpatient GI last seen Feb 8 2024  Colitis though 2/2 kisquali infusions for metastatic breast cancer, this was stopped over 1 year ago   Initially started on infliximab after severe colitis in 2022 however Dc'd as patient was without improvement. Repeat colonosocopy 3/2023 showed colitis to hepatic flexure. Patient was temporarily on prednisone however then diagnosed with C diff and improved with PO vanc. No longer on therapy since then  Colitis symptoms (bloody stool, watery diarrhea) started again most recently Jan 2024  At 2/8 appt plan was to start patient on stelara   Discussed with GI to F/u outpt to initiate infusion     History of pulmonary embolism  Assessment & Plan  S/P IVC filter   Maintained on eliquis 2.5 mg BID --> resumed on discharge    Suprapubic catheter (HCC)  Assessment & Plan  Chronic, 2/2 urinary retention     Anemia  Assessment & Plan  Baseline appears 7-8  Hgb 7.6 on admission   S/P 1 unit PRBC  Iron panel showing iron deficiency-> venofer  x2  Trend Hgb, transfuse to maintain > 7    Orthostatic hypotension  Assessment & Plan  Continue midodrine 5 mg TID     Chronic kidney disease (CKD) stage G3a/A1, moderately decreased glomerular filtration rate (GFR) between 45-59 mL/min/1.73 square meter and albuminuria creatinine ratio less than 30 mg/g (HCC)  Assessment & Plan  Lab Results   Component Value Date    EGFR 54 02/20/2024    EGFR 45 02/19/2024    EGFR 95 10/19/2022    CREATININE 0.96 02/20/2024    CREATININE 1.12 02/19/2024    CREATININE 0.42 (L) 10/19/2022     Baseline Cr is around 0.4  Cr currently elevated 1.12, GFR reduced   Suspect due to mild hypovolemia  Dc'd IVF  Monitor BMP  Avoid nephrotoxins   improved    Primary malignant neoplasm of breast with metastasis (HCC)  Assessment & Plan  Per chart review: History of Stage IV metastatic ER positive, [place] negative, 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, progressed from stage I A ER/[place] positive, 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 years ago. Status post resection and adjuvant radiation and hormone therapy for 5 years. Known mets to sternum as well as vertebrae + 4 mm RLL nodule. Patient was treated with multiple medications- Faslodex, Xgeva, Femara, Kisqali, Verzenio however all cancer treatment was stopped after 10/2022 admission due to severe colitis thought 2/2 meds and poor tolerance.     Does not appear to follow with oncology recently.         Medical Problems       Resolved Problems  Date Reviewed: 2/19/2024   None       Discharging Physician / Practitioner: Mayuri Fairchild MD  PCP: Elena Escalera DO  Admission Date:   Admission Orders (From admission, onward)       Ordered        02/19/24 1336  INPATIENT ADMISSION  Once                          Discharge Date: 02/21/24    Consultations During Hospital Stay:  GI  CM  PT  OT    Procedures Performed:   No orders to display         Significant Findings / Test Results:   none    Incidental Findings:   none       Test Results Pending at Discharge (will require  "follow up):   none     Outpatient Tests Requested:  CBC and bmp in 1 week    Complications:  none  known at this time    Reason for Admission: rectal bleeding    Hospital Course:   Kerry Silverman is a 83 y.o. female patient who originally presented to the hospital on 2/19/2024 due to rectal bleeding associated with worsening fatigue.  Patient received 1 unit PRBCs in the ED.  GI was consulted due to history of IBD.  Stool studies were obtained.  GI deferred steroids at this time due to history of C. difficile with steroids.  Hemoglobin has remained stable.  GI recommended resumption of Eliquis and aspirin.  PT OT evaluated the patient and recommended level 3 care.  She is otherwise remained hemodynamically stable afebrile in no acute respiratory distress.  She has been medically cleared for discharge by internal medicine and GI.  She is to follow-up with her PCP and GI outpatient.  She will be discharged with Questran Florastor iron.  She is to complete blood work in 1 week.      Please see above list of diagnoses and related plan for additional information.     Condition at Discharge: stable    Discharge Day Visit / Exam:   Subjective:  Kerry was seen and examined at bedside.  No acute events overnight.  Discussed plan of care.  All questions and concerns were answered and addressed.  Has no acute complaints at this time.  Is concerned about the frequency of her bowel movements.  Discussed that she will continue to have some blood in her stool until she starts receiving infusions.    Vitals: Blood Pressure: 138/64 (02/21/24 0743)  Pulse: 89 (02/21/24 0743)  Temperature: 98.2 °F (36.8 °C) (02/21/24 0743)  Temp Source: Oral (02/21/24 0743)  Respirations: 18 (02/21/24 0743)  Height: 5' 2\" (157.5 cm) (02/19/24 1855)  Weight - Scale: 60.8 kg (134 lb 0.6 oz) (02/21/24 0600)  SpO2: 100 % (02/21/24 0743)  Exam:   Physical Exam   Vitals and nursing note reviewed.   Constitutional:       General: She is not in acute " distress.     Appearance: She is not ill-appearing.   HENT:      Head: Normocephalic and atraumatic.   Cardiovascular:      Rate and Rhythm: Normal rate and regular rhythm.      Pulses: Normal pulses.      Heart sounds: Normal heart sounds.   Pulmonary:      Effort: Pulmonary effort is normal.      Breath sounds: Normal breath sounds.   Abdominal:      General: Abdomen is flat. Bowel sounds are normal.      Palpations: Abdomen is soft.   Musculoskeletal:      Right lower leg: No edema.      Left lower leg: No edema.   Skin:     General: Skin is warm.   Neurological:      General: No focal deficit present.      Mental Status: She is alert and oriented to person, place, and time    Discussion with Family: Updated  (son) via phone.    Discharge instructions/Information to patient and family:   See after visit summary for information provided to patient and family.      Provisions for Follow-Up Care:  See after visit summary for information related to follow-up care and any pertinent home health orders.      Mobility at time of Discharge:   Basic Mobility Inpatient Raw Score: 17  JH-HLM Goal: 5: Stand one or more mins  JH-HLM Achieved: 7: Walk 25 feet or more  HLM Goal achieved. Continue to encourage appropriate mobility.     Disposition:   Assisted Living Facility at [facility]    Planned Readmission: yes     Discharge Statement:  I spent 40 minutes discharging the patient. This time was spent on the day of discharge. I had direct contact with the patient on the day of discharge. Greater than 50% of the total time was spent examining patient, answering all patient questions, arranging and discussing plan of care with patient as well as directly providing post-discharge instructions.  Additional time then spent on discharge activities.    Discharge Medications:  See after visit summary for reconciled discharge medications provided to patient and/or family.      **Please Note: This note may have been  constructed using a voice recognition system**

## 2024-02-20 NOTE — PLAN OF CARE
Problem: PAIN - ADULT  Goal: Verbalizes/displays adequate comfort level or baseline comfort level  Description: Interventions:  - Encourage patient to monitor pain and request assistance  - Assess pain using appropriate pain scale  - Administer analgesics based on type and severity of pain and evaluate response  - Implement non-pharmacological measures as appropriate and evaluate response  - Consider cultural and social influences on pain and pain management  - Notify physician/advanced practitioner if interventions unsuccessful or patient reports new pain  Outcome: Progressing     Problem: INFECTION - ADULT  Goal: Absence or prevention of progression during hospitalization  Description: INTERVENTIONS:  - Assess and monitor for signs and symptoms of infection  - Monitor lab/diagnostic results  - Monitor all insertion sites, i.e. indwelling lines, tubes, and drains  - Monitor endotracheal if appropriate and nasal secretions for changes in amount and color  - [place] appropriate cooling/warming therapies per order  - Administer medications as ordered  - Instruct and encourage patient and family to use good hand hygiene technique  - Identify and instruct in appropriate isolation precautions for identified infection/condition  Outcome: Progressing  Goal: Absence of fever/infection during neutropenic period  Description: INTERVENTIONS:  - Monitor WBC    Outcome: Progressing

## 2024-02-20 NOTE — ASSESSMENT & PLAN NOTE
Baseline appears 7-8  Hgb 7.6 on admission   S/P 1 unit PRBC  Iron panel showing iron deficiency-> venofer x2  Trend Hgb, transfuse to maintain > 7

## 2024-02-20 NOTE — PLAN OF CARE
Problem: PHYSICAL THERAPY ADULT  Goal: Performs mobility at highest level of function for planned discharge setting.  See evaluation for individualized goals.  Description: Treatment/Interventions: Functional transfer training, Bed mobility, Gait training, Endurance training, Therapeutic exercise, Compensatory technique education, Spoke to nursing, OT          See flowsheet documentation for full assessment, interventions and recommendations.  Note: Prognosis: Good  Problem List: Decreased strength, Decreased endurance, Impaired balance, Decreased mobility  Assessment: Pt is an 83 y.o. female admitted to [facility] on 2/19/24 with c/o fatigue and rectal bleeding. PT consulted with eval and treat and activity as tolerated orders. Pt admitted from [facility]. At baseline, pt is independent with ambulation with RW, but gets assist with ADLs/ IADLs. Upon evaluation, pt was at a supervision level for mobility with RW. Pt presents with weakness, impaired balance, impaired endurance, gait dysfunction. The patient's AM-PAC Basic Mobility Inpatient Short Form Raw Score is 20. A Raw score of greater than 16 suggests the patient may benefit from discharge to home. Please also refer to the recommendation of the Physical Therapist for safe discharge planning. PT to follow 2-3x/wk during acute stay to address deficits. Recommend Level 3 rehab intensity to maximize safe mobility and function.  Barriers to Discharge: None     Rehab Resource Intensity Level, PT: III (Minimum Resource Intensity)    See flowsheet documentation for full assessment.

## 2024-02-21 ENCOUNTER — TRANSITIONAL CARE MANAGEMENT (OUTPATIENT)
Dept: FAMILY MEDICINE CLINIC | Facility: CLINIC | Age: 84
End: 2024-02-21

## 2024-02-21 VITALS
HEART RATE: 81 BPM | HEIGHT: 62 IN | WEIGHT: 134.04 LBS | SYSTOLIC BLOOD PRESSURE: 137 MMHG | DIASTOLIC BLOOD PRESSURE: 56 MMHG | BODY MASS INDEX: 24.67 KG/M2 | TEMPERATURE: 97.8 F | RESPIRATION RATE: 18 BRPM | OXYGEN SATURATION: 98 %

## 2024-02-21 PROBLEM — K52.1 TOXIC GASTROENTERITIS AND COLITIS: Status: RESOLVED | Noted: 2022-04-06 | Resolved: 2024-02-21

## 2024-02-21 LAB
ANION GAP SERPL CALCULATED.3IONS-SCNC: 6 MMOL/L
BUN SERPL-MCNC: 8 MG/DL (ref 5–25)
CALCIUM SERPL-MCNC: 7.4 MG/DL (ref 8.4–10.2)
CHLORIDE SERPL-SCNC: 110 MMOL/L (ref 96–108)
CO2 SERPL-SCNC: 22 MMOL/L (ref 21–32)
CREAT SERPL-MCNC: 0.92 MG/DL (ref 0.6–1.3)
ERYTHROCYTE [DISTWIDTH] IN BLOOD BY AUTOMATED COUNT: 16.8 % (ref 11.6–15.1)
GFR SERPL CREATININE-BSD FRML MDRD: 57 ML/MIN/1.73SQ M
GLUCOSE SERPL-MCNC: 80 MG/DL (ref 65–140)
HCT VFR BLD AUTO: 27.8 % (ref 34.8–46.1)
HGB BLD-MCNC: 8.6 G/DL (ref 11.5–15.4)
MCH RBC QN AUTO: 26.5 PG (ref 26.8–34.3)
MCHC RBC AUTO-ENTMCNC: 30.9 G/DL (ref 31.4–37.4)
MCV RBC AUTO: 86 FL (ref 82–98)
PLATELET # BLD AUTO: 253 THOUSANDS/UL (ref 149–390)
PMV BLD AUTO: 9.1 FL (ref 8.9–12.7)
POTASSIUM SERPL-SCNC: 3.7 MMOL/L (ref 3.5–5.3)
RBC # BLD AUTO: 3.25 MILLION/UL (ref 3.81–5.12)
SARS-COV-2 RNA RESP QL NAA+PROBE: NEGATIVE
SODIUM SERPL-SCNC: 138 MMOL/L (ref 135–147)
WBC # BLD AUTO: 5.89 THOUSAND/UL (ref 4.31–10.16)

## 2024-02-21 PROCEDURE — 87635 SARS-COV-2 COVID-19 AMP PRB: CPT | Performed by: STUDENT IN AN ORGANIZED HEALTH CARE EDUCATION/TRAINING PROGRAM

## 2024-02-21 PROCEDURE — 85027 COMPLETE CBC AUTOMATED: CPT | Performed by: STUDENT IN AN ORGANIZED HEALTH CARE EDUCATION/TRAINING PROGRAM

## 2024-02-21 PROCEDURE — 99239 HOSP IP/OBS DSCHRG MGMT >30: CPT | Performed by: STUDENT IN AN ORGANIZED HEALTH CARE EDUCATION/TRAINING PROGRAM

## 2024-02-21 PROCEDURE — 87081 CULTURE SCREEN ONLY: CPT | Performed by: STUDENT IN AN ORGANIZED HEALTH CARE EDUCATION/TRAINING PROGRAM

## 2024-02-21 PROCEDURE — 80048 BASIC METABOLIC PNL TOTAL CA: CPT | Performed by: STUDENT IN AN ORGANIZED HEALTH CARE EDUCATION/TRAINING PROGRAM

## 2024-02-21 RX ORDER — SACCHAROMYCES BOULARDII 250 MG
250 CAPSULE ORAL 2 TIMES DAILY
Status: DISCONTINUED | OUTPATIENT
Start: 2024-02-21 | End: 2024-02-21 | Stop reason: HOSPADM

## 2024-02-21 RX ORDER — CLINDAMYCIN HYDROCHLORIDE 300 MG/1
CAPSULE ORAL
Status: ON HOLD | COMMUNITY
Start: 2024-02-12

## 2024-02-21 RX ORDER — FLUTICASONE PROPIONATE 50 MCG
SPRAY, SUSPENSION (ML) NASAL
Status: ON HOLD | COMMUNITY
Start: 2024-01-31

## 2024-02-21 RX ORDER — CHOLESTYRAMINE LIGHT 4 G/5.7G
4 POWDER, FOR SUSPENSION ORAL 2 TIMES DAILY
Qty: 60 EACH | Refills: 0 | Status: ON HOLD | OUTPATIENT
Start: 2024-02-21

## 2024-02-21 RX ORDER — CHOLESTYRAMINE LIGHT 4 G/5.7G
4 POWDER, FOR SUSPENSION ORAL 2 TIMES DAILY
Status: DISCONTINUED | OUTPATIENT
Start: 2024-02-21 | End: 2024-02-21 | Stop reason: HOSPADM

## 2024-02-21 RX ORDER — FERROUS SULFATE 324(65)MG
324 TABLET, DELAYED RELEASE (ENTERIC COATED) ORAL
Qty: 60 TABLET | Refills: 0 | Status: ON HOLD | OUTPATIENT
Start: 2024-02-21 | End: 2024-03-22

## 2024-02-21 RX ORDER — SACCHAROMYCES BOULARDII 250 MG
250 CAPSULE ORAL 2 TIMES DAILY
Qty: 60 CAPSULE | Refills: 0 | Status: ON HOLD | OUTPATIENT
Start: 2024-02-21 | End: 2024-03-22

## 2024-02-21 RX ORDER — POTASSIUM CHLORIDE 20 MEQ/1
40 TABLET, EXTENDED RELEASE ORAL ONCE
Status: COMPLETED | OUTPATIENT
Start: 2024-02-21 | End: 2024-02-21

## 2024-02-21 RX ADMIN — POTASSIUM CHLORIDE 40 MEQ: 1500 TABLET, EXTENDED RELEASE ORAL at 07:03

## 2024-02-21 RX ADMIN — Medication 250 MG: at 09:31

## 2024-02-21 RX ADMIN — METOPROLOL SUCCINATE 50 MG: 50 TABLET, EXTENDED RELEASE ORAL at 08:48

## 2024-02-21 RX ADMIN — PANTOPRAZOLE SODIUM 40 MG: 40 TABLET, DELAYED RELEASE ORAL at 06:54

## 2024-02-21 RX ADMIN — APIXABAN 2.5 MG: 2.5 TABLET, FILM COATED ORAL at 08:48

## 2024-02-21 RX ADMIN — CHOLESTYRAMINE 4 G: 4 POWDER, FOR SUSPENSION ORAL at 09:31

## 2024-02-21 RX ADMIN — MIDODRINE HYDROCHLORIDE 2.5 MG: 5 TABLET ORAL at 06:54

## 2024-02-21 RX ADMIN — IRON SUCROSE 300 MG: 20 INJECTION, SOLUTION INTRAVENOUS at 08:48

## 2024-02-21 RX ADMIN — FOLIC ACID 1 MG: 1 TABLET ORAL at 08:48

## 2024-02-21 NOTE — QUICK NOTE
Spoke with daughter Taya. All questions and concerns were answered and addressed.    Mayuri Fairchild

## 2024-02-21 NOTE — PLAN OF CARE
Problem: PAIN - ADULT  Goal: Verbalizes/displays adequate comfort level or baseline comfort level  Description: Interventions:  - Encourage patient to monitor pain and request assistance  - Assess pain using appropriate pain scale  - Administer analgesics based on type and severity of pain and evaluate response  - Implement non-pharmacological measures as appropriate and evaluate response  - Consider cultural and social influences on pain and pain management  - Notify physician/advanced practitioner if interventions unsuccessful or patient reports new pain  Outcome: Progressing     Problem: INFECTION - ADULT  Goal: Absence or prevention of progression during hospitalization  Description: INTERVENTIONS:  - Assess and monitor for signs and symptoms of infection  - Monitor lab/diagnostic results  - Monitor all insertion sites, i.e. indwelling lines, tubes, and drains  - Monitor endotracheal if appropriate and nasal secretions for changes in amount and color  - Luck appropriate cooling/warming therapies per order  - Administer medications as ordered  - Instruct and encourage patient and family to use good hand hygiene technique  - Identify and instruct in appropriate isolation precautions for identified infection/condition  Outcome: Progressing  Goal: Absence of fever/infection during neutropenic period  Description: INTERVENTIONS:  - Monitor WBC    Outcome: Progressing     Problem: SAFETY ADULT  Goal: Patient will remain free of falls  Description: INTERVENTIONS:  - Educate patient/family on patient safety including physical limitations  - Instruct patient to call for assistance with activity   - Consult OT/PT to assist with strengthening/mobility   - Keep Call bell within reach  - Keep bed low and locked with side rails adjusted as appropriate  - Keep care items and personal belongings within reach  - Initiate and maintain comfort rounds  - Make Fall Risk Sign visible to staff  - Offer Toileting every 2 Hours,  in advance of need  - Initiate/Maintain bed/chair alarm  - Obtain necessary fall risk management equipment  - Apply yellow socks and bracelet for high fall risk patients  - Consider moving patient to room near nurses station  Outcome: Progressing  Goal: Maintain or return to baseline ADL function  Description: INTERVENTIONS:  -  Assess patient's ability to carry out ADLs; assess patient's baseline for ADL function and identify physical deficits which impact ability to perform ADLs (bathing, care of mouth/teeth, toileting, grooming, dressing, etc.)  - Assess/evaluate cause of self-care deficits   - Assess range of motion  - Assess patient's mobility; develop plan if impaired  - Assess patient's need for assistive devices and provide as appropriate  - Encourage maximum independence but intervene and supervise when necessary  - Involve family in performance of ADLs  - Assess for home care needs following discharge   - Consider OT consult to assist with ADL evaluation and planning for discharge  - Provide patient education as appropriate  Outcome: Progressing  Goal: Maintains/Returns to pre admission functional level  Description: INTERVENTIONS:  - Perform AM-PAC 6 Click Basic Mobility/ Daily Activity assessment daily.  - Set and communicate daily mobility goal to care team and patient/family/caregiver.   - Collaborate with rehabilitation services on mobility goals if consulted  - Perform Range of Motion 3 times a day.  - Reposition patient every 2 hours.  - Dangle patient 3 times a day  - Stand patient 3 times a day  - Ambulate patient 3 times a day  - Out of bed to chair 3 times a day   - Out of bed for meals 3 times a day  - Out of bed for toileting  - Record patient progress and toleration of activity level   Outcome: Progressing     Problem: DISCHARGE PLANNING  Goal: Discharge to home or other facility with appropriate resources  Description: INTERVENTIONS:  - Identify barriers to discharge w/patient and  caregiver  - Arrange for needed discharge resources and transportation as appropriate  - Identify discharge learning needs (meds, wound care, etc.)  - Arrange for interpretive services to assist at discharge as needed  - Refer to Case Management Department for coordinating discharge planning if the patient needs post-hospital services based on physician/advanced practitioner order or complex needs related to functional status, cognitive ability, or social support system  Outcome: Progressing     Problem: Knowledge Deficit  Goal: Patient/family/caregiver demonstrates understanding of disease process, treatment plan, medications, and discharge instructions  Description: Complete learning assessment and assess knowledge base.  Interventions:  - Provide teaching at level of understanding  - Provide teaching via preferred learning methods  Outcome: Progressing     Problem: Prexisting or High Potential for Compromised Skin Integrity  Goal: Skin integrity is maintained or improved  Description: INTERVENTIONS:  - Identify patients at risk for skin breakdown  - Assess and monitor skin integrity  - Assess and monitor nutrition and hydration status  - Monitor labs   - Assess for incontinence   - Turn and reposition patient  - Assist with mobility/ambulation  - Relieve pressure over bony prominences  - Avoid friction and shearing  - Provide appropriate hygiene as needed including keeping skin clean and dry  - Evaluate need for skin moisturizer/barrier cream  - Collaborate with interdisciplinary team   - Patient/family teaching  - Consider wound care consult   Outcome: Progressing

## 2024-02-21 NOTE — CASE MANAGEMENT
Case Management Discharge Planning Note    Patient name Kerry Silverman  Location /-01 MRN [id number]  : 1940 Date 2024       Current Admission Date: 2024  Current Admission Diagnosis:Rectal bleeding   Patient Active Problem List    Diagnosis Date Noted    History of pulmonary embolism 2024    IBD (inflammatory bowel disease) 2024    Electrolyte abnormality 2024    Crohn's disease of large intestine with rectal bleeding (HCC) 2024    Suprapubic catheter (HCC) 2023    Hematochezia 10/13/2022    COVID-19 virus infection 10/11/2022    Ulcerative pancolitis with rectal bleeding (HCC) 10/03/2022    Saddle embolus of pulmonary artery (HCC) 2022    Urinary retention 2022    Venous insufficiency 2022    Single subsegmental pulmonary embolism without acute cor pulmonale (HCC) 2022    Severe protein-calorie malnutrition (HCC) 2022    Anemia 2022    Diarrhea 2022    Secondary malignant neoplasm of bone (HCC) 2022    Toxic gastroenteritis and colitis 2022    Rectal bleeding 10/14/2021    Lytic lesion of bone on x-ray 2021    Neoplasm of uncertain behavior of sternum 2021    Osteopenia of multiple sites 2020    Orthostatic hypotension 10/22/2019    Chronic kidney disease (CKD) stage G3a/A1, moderately decreased glomerular filtration rate (GFR) between 45-59 mL/min/1.73 square meter and albuminuria creatinine ratio less than 30 mg/g (HCC) 2019    Adverse reaction to pneumococcal vaccine 2015    Cataract, bilateral 2014    Pulmonary nodule seen on imaging study 09/10/2013    Anxiety 2013    Primary malignant neoplasm of breast with metastasis (HCC) 10/17/2012    Mixed hyperlipidemia 2012      LOS (days): 2  Geometric Mean LOS (GMLOS) (days): 3.4  Days to GMLOS:1.4     OBJECTIVE:  Risk of Unplanned Readmission Score: 19.54         Current admission status: Inpatient    Preferred Pharmacy:   [id number] - [facility] [place] - [address]  [address]  [facility] [address]  Phone: [phone] Fax: [phone]    [facility] - [place] - [address]  [address]  [address]  [place] [address]  Phone: [phone] Fax: [phone]    [facility] - [place] - [address]  [address]  [place] [address]  Phone: [phone] Fax: [phone]    [facility] - [place] - [address]  [address] [place]  [address]  [place] [address]  Phone: [phone] Fax: [phone]    Primary Care Provider: Elena Escalera DO    Primary Insurance: MEDICARE  Secondary Insurance: AETNA    DISCHARGE DETAILS:    Additional Comments: [facility] can accept Pt today. Wheelchair van transport request sent to [facility]chair van to transport Pt to [facility]. Pickup is 4:30pm. Pt's nurse(Michael), Pt informed of transport time. Pt declined CM to call her dtr to inform of transport time, informed CM she is texting her dtr now. CM informed Padmini at [facility] of transport time.All in agreement.

## 2024-02-21 NOTE — NURSING NOTE
Patient to be discharged to [facility] in stable condition, via wheelchair van. Instructions sent with patient. Attempted to call report, no one answered the phone.

## 2024-02-21 NOTE — DISCHARGE INSTR - AVS FIRST PAGE
You are to follow-up with your PCP next week    You are to follow-up with GI to start infusions    You will be discharged with Florastor that you can take twice a day and Questran that you can take twice a day to bulk up your stools and decrease frequency of bowel movements    You will be discharged with iron supplementation for iron deficiency

## 2024-02-21 NOTE — PLAN OF CARE
Problem: PAIN - ADULT  Goal: Verbalizes/displays adequate comfort level or baseline comfort level  Description: Interventions:  - Encourage patient to monitor pain and request assistance  - Assess pain using appropriate pain scale  - Administer analgesics based on type and severity of pain and evaluate response  - Implement non-pharmacological measures as appropriate and evaluate response  - Consider cultural and social influences on pain and pain management  - Notify physician/advanced practitioner if interventions unsuccessful or patient reports new pain  Outcome: Progressing     Problem: INFECTION - ADULT  Goal: Absence or prevention of progression during hospitalization  Description: INTERVENTIONS:  - Assess and monitor for signs and symptoms of infection  - Monitor lab/diagnostic results  - Monitor all insertion sites, i.e. indwelling lines, tubes, and drains  - Monitor endotracheal if appropriate and nasal secretions for changes in amount and color  - [place] appropriate cooling/warming therapies per order  - Administer medications as ordered  - Instruct and encourage patient and family to use good hand hygiene technique  - Identify and instruct in appropriate isolation precautions for identified infection/condition  Outcome: Progressing  Goal: Absence of fever/infection during neutropenic period  Description: INTERVENTIONS:  - Monitor WBC    Outcome: Progressing     Problem: SAFETY ADULT  Goal: Maintain or return to baseline ADL function  Description: INTERVENTIONS:  -  Assess patient's ability to carry out ADLs; assess patient's baseline for ADL function and identify physical deficits which impact ability to perform ADLs (bathing, care of mouth/teeth, toileting, grooming, dressing, etc.)  - Assess/evaluate cause of self-care deficits   - Assess range of motion  - Assess patient's mobility; develop plan if impaired  - Assess patient's need for assistive devices and provide as appropriate  - Encourage  maximum independence but intervene and supervise when necessary  - Involve family in performance of ADLs  - Assess for home care needs following discharge   - Consider OT consult to assist with ADL evaluation and planning for discharge  - Provide patient education as appropriate  Outcome: Progressing  Goal: Maintains/Returns to pre admission functional level  Description: INTERVENTIONS:  - Perform AM-PAC 6 Click Basic Mobility/ Daily Activity assessment daily.  - Set and communicate daily mobility goal to care team and patient/family/caregiver.   - Collaborate with rehabilitation services on mobility goals if consulted  - Out of bed for toileting  - Record patient progress and toleration of activity level   Outcome: Progressing     Problem: DISCHARGE PLANNING  Goal: Discharge to home or other facility with appropriate resources  Description: INTERVENTIONS:  - Identify barriers to discharge w/patient and caregiver  - Arrange for needed discharge resources and transportation as appropriate  - Identify discharge learning needs (meds, wound care, etc.)  - Arrange for interpretive services to assist at discharge as needed  - Refer to Case Management Department for coordinating discharge planning if the patient needs post-hospital services based on physician/advanced practitioner order or complex needs related to functional status, cognitive ability, or social support system  Outcome: Progressing     Problem: Knowledge Deficit  Goal: Patient/family/caregiver demonstrates understanding of disease process, treatment plan, medications, and discharge instructions  Description: Complete learning assessment and assess knowledge base.  Interventions:  - Provide teaching at level of understanding  - Provide teaching via preferred learning methods  Outcome: Progressing     Problem: Prexisting or High Potential for Compromised Skin Integrity  Goal: Skin integrity is maintained or improved  Description: INTERVENTIONS:  - Identify  patients at risk for skin breakdown  - Assess and monitor skin integrity  - Assess and monitor nutrition and hydration status  - Monitor labs   - Assess for incontinence   - Turn and reposition patient  - Assist with mobility/ambulation  - Relieve pressure over bony prominences  - Avoid friction and shearing  - Provide appropriate hygiene as needed including keeping skin clean and dry  - Evaluate need for skin moisturizer/barrier cream  - Collaborate with interdisciplinary team   - Patient/family teaching  - Consider wound care consult   Outcome: Progressing

## 2024-02-22 ENCOUNTER — PATIENT OUTREACH (OUTPATIENT)
Dept: CASE MANAGEMENT | Facility: OTHER | Age: 84
End: 2024-02-22

## 2024-02-22 ENCOUNTER — TELEPHONE (OUTPATIENT)
Age: 84
End: 2024-02-22

## 2024-02-22 LAB — MRSA NOSE QL CULT: NORMAL

## 2024-02-22 NOTE — PROGRESS NOTES
Chart review complete Update obtained from [facility] ([facility]). The patient is currently admitted to the SNF and is receiving skilled therapies No LCD at this time. They are not on the [facility] discharge list. This Admin Coordinator will continue to monitor via chart review.

## 2024-02-22 NOTE — PROGRESS NOTES
Outpatient Care Management [facility]. Discharged 2/21/24 to [facility]. Email sent to facility to inform them the patient is on the REYES Pathway and I will be following them during their skilled stay.  This Admin Coordinator will continue to monitor via chart review.

## 2024-02-22 NOTE — PATIENT COMMUNICATION
Rec'd a phone call from the patient's daughter about Stelara. She would like to schedule the infusion. I explained authorization and scheduling. Patient will have Stelara injection administered at [facility] ([phone]) where she resides.     Called [facility] and spoke with multiple staff members. Patient resides on unit [facility] (phone: [phone], fax [phone]). I was told that I will need to speak with pharmacy and given their phone number [phone], I called and spoke with Lisa. They are unable order specialty medication and asked me to call back over to nursing to see what their process is. Called back to [facility] and phone call was picked up than disconnected. Called back and no one picked up the call. Hung up and called the nursing supervisor, Kem Mena who tried to guide me to call pharmacy again I explained the multiple phone encounters previously. He said that I can send the specialty medication to the Nursing Supervisor's office ([address] [place] [phone]) as they have a fridge in their office and to also send a prescription to the fax that was previously provided. I thanked him for the assistance.

## 2024-02-23 ENCOUNTER — NURSING HOME VISIT (OUTPATIENT)
Dept: FAMILY MEDICINE CLINIC | Facility: CLINIC | Age: 84
End: 2024-02-23
Payer: MEDICARE

## 2024-02-23 DIAGNOSIS — K62.5 RECTAL BLEEDING: ICD-10-CM

## 2024-02-23 DIAGNOSIS — C50.919 PRIMARY MALIGNANT NEOPLASM OF BREAST WITH METASTASIS (HCC): ICD-10-CM

## 2024-02-23 DIAGNOSIS — K50.111 CROHN'S DISEASE OF LARGE INTESTINE WITH RECTAL BLEEDING (HCC): Primary | ICD-10-CM

## 2024-02-23 DIAGNOSIS — C79.51 SECONDARY MALIGNANT NEOPLASM OF BONE (HCC): ICD-10-CM

## 2024-02-23 DIAGNOSIS — K51.011 ULCERATIVE PANCOLITIS WITH RECTAL BLEEDING (HCC): ICD-10-CM

## 2024-02-23 DIAGNOSIS — I27.82 CHRONIC SADDLE PULMONARY EMBOLISM, UNSPECIFIED WHETHER ACUTE COR PULMONALE PRESENT (HCC): ICD-10-CM

## 2024-02-23 DIAGNOSIS — I26.92 CHRONIC SADDLE PULMONARY EMBOLISM, UNSPECIFIED WHETHER ACUTE COR PULMONALE PRESENT (HCC): ICD-10-CM

## 2024-02-23 DIAGNOSIS — E43 SEVERE PROTEIN-CALORIE MALNUTRITION (HCC): ICD-10-CM

## 2024-02-23 PROCEDURE — 99306 1ST NF CARE HIGH MDM 50: CPT | Performed by: FAMILY MEDICINE

## 2024-02-23 NOTE — PROGRESS NOTES
[facility]  [address] [place] [address]  Facility: [facility]    NAME: Krery Silverman  AGE: 83 y.o. SEX: female    DATE OF ENCOUNTER: 2/23/2024    Code status:  DNR w/ Hospitalization    Assessment and Plan     1. Crohn's disease of large intestine with rectal bleeding (HCC)    2. Rectal bleeding    3. Ulcerative pancolitis with rectal bleeding (HCC)    4. Chronic saddle pulmonary embolism, unspecified whether acute cor pulmonale present (HCC)    5. Secondary malignant neoplasm of bone (HCC)    6. Primary malignant neoplasm of breast with metastasis (HCC)    7. Severe protein-calorie malnutrition (HCC)        All medications and routine orders were reviewed and updated as needed.    Plan discussed with: Patient    Chief Complaint     Seen for admission at Nursing Home    History of Present Illness     83-year-old female returns after hospitalization for rectal bleeding.  She was noted to be profoundly anemic.  The patient has a known history of Crohn's disease and had pan enterocolitis.  She was not tolerating oral treatment while she was here.  She reports she is feeling much better but continues to unfortunately have diarrhea.  Her hemoglobin remains low.  She is on a twice daily iron supplementation.  She is maintained on aspirin as well as Eliquis due to a history of coronary artery disease as well as a saddle pulmonary embolism.  She seems to be tolerating the Eliquis well.  She appears malnourished but reports that her appetite is improving.  She continues to question whether the diagnosis of breast cancer with bone metastasis is accurate.  I tried to reassure her that that is indeed the case but it does not appear to be active at this time.  She is denying other pain.  She she is not having any difficulty with the catheter    HISTORY:  Past Medical History:   Diagnosis Date    Abnormal weight loss     Allergic reaction     Anxiety     Breast cancer, right (HCC) 2011    right     Cancer (HCC) 2012    Candidal vulvovaginitis     Clotting disorder (HCC) Same as above    Endometrial hyperplasia     Epithelial cyst     benign, ovary    GI (gastrointestinal bleed) Blood mixed with stool    History of radiation therapy 2011    right breast cancer    Hyperlipidemia     Hypertension     Internal hemorrhoids     Knee tendonitis     Ovarian cyst     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(HCC)      Past Surgical History:   Procedure Laterality Date    BILATERAL SALPINGOOPHORECTOMY      onset: 7/23/13    BREAST BIOPSY Right 06/13/2006    benign    BREAST BIOPSY Right 03/02/2011    malignant    BREAST LUMPECTOMY Right 03/30/2011    malignant    CATARACT EXTRACTION W/  INTRAOCULAR LENS IMPLANT Right     phacoemulsification. Onset: 10/27/14    CHOLECYSTECTOMY      COLONOSCOPY  2017    approx    HYSTERECTOMY  Yes    INTRAOPERATIVE RADIATION THERAPY ([facility]ORT)      IR BIOPSY BONE  7/9/2021    IR IVC FILTER PLACEMENT OPTIONAL/TEMPORARY  9/23/2022    IR PE ENDOVASCULAR THERAPY  9/22/2022    IR PICC PLACEMENT SINGLE LUMEN  8/19/2022    IR PICC PLACEMENT SINGLE LUMEN  9/26/2022    IR SUPRAPUBIC CATHETER PLACEMENT  12/29/2022    SKIN LESION EXCISION Right     breast, single lesion    TONSILLECTOMY AND ADENOIDECTOMY       Family History   Problem Relation Age of Onset    Stomach cancer Mother     No Known Problems Father     Cervical cancer Sister     No Known Problems Daughter     No Known Problems Maternal Grandmother     No Known Problems Maternal Grandfather     No Known Problems Paternal Grandmother     No Known Problems Paternal Grandfather     Colon polyps Neg Hx     Colon cancer Neg Hx      Social History     Socioeconomic History    Marital status:      Spouse name: None    Number of children: 3    Years of education: None    Highest education level: None   Occupational History    None   Tobacco Use    Smoking status: Former     Current packs/day: 0.00     Average packs/day: 1 pack/day for 34.0 years (34.0 ttl  pk-yrs)     Types: Cigarettes     Start date:      Quit date: 1981     Years since quittin.1    Smokeless tobacco: Never   Vaping Use    Vaping status: Never Used   Substance and Sexual Activity    Alcohol use: Not Currently     Comment: (history)    Drug use: No    Sexual activity: Not Currently   Other Topics Concern    None   Social History Narrative    None     Social Determinants of Health     Financial Resource Strain: Low Risk  (2021)    Overall Financial Resource Strain (CARDIA)     Difficulty of Paying Living Expenses: Not hard at all   Food Insecurity: No Food Insecurity (2024)    Hunger Vital Sign     Worried About Running Out of Food in the Last Year: Never true     Ran Out of Food in the Last Year: Never true   Transportation Needs: No Transportation Needs (2024)    PRAPARE - Transportation     Lack of Transportation (Medical): No     Lack of Transportation (Non-Medical): No   Physical Activity: Inactive (2021)    Exercise Vital Sign     Days of Exercise per Week: 0 days     Minutes of Exercise per Session: 0 min   Stress: No Stress Concern Present (2021)    [employer] - Occupational Stress Questionnaire     Feeling of Stress : Not at all   Social Connections: Moderately Isolated (2021)    Social Connection and Isolation Panel [NHANES]     Frequency of Communication with Friends and Family: More than three times a week     Frequency of Social Gatherings with Friends and Family: Twice a week     Attends [employer] Services: Never     Active Member of Clubs or Organizations: Yes     Attends Club or Organization Meetings: More than 4 times per year     Marital Status:    Intimate Partner Violence: Not At Risk (8/10/2022)    Humiliation, Afraid, Rape, and Kick questionnaire     Fear of Current or Ex-Partner: No     Emotionally Abused: No     Physically Abused: No     Sexually Abused: No   Housing Stability: Low Risk  (2024)     Housing Stability Vital Sign     Unable to Pay for Housing in the Last Year: No     Number of Places Lived in the Last Year: 2     Unstable Housing in the Last Year: No       Allergies:  Allergies   Allergen Reactions    Sulfa Antibiotics     Pneumococcal Polysaccharide Vaccine Rash and Edema       Review of Systems     Review of Systems   Constitutional:  Negative for activity change, appetite change, chills, diaphoresis, fatigue and unexpected weight change.   HENT:  Negative for congestion, ear discharge, ear pain, hearing loss, nosebleeds and rhinorrhea.    Eyes:  Negative for pain, redness, itching and visual disturbance.   Respiratory:  Negative for cough, choking, chest tightness and shortness of breath.    Cardiovascular:  Negative for chest pain and leg swelling.   Gastrointestinal:  Positive for anal bleeding. Negative for abdominal pain, blood in stool, constipation, diarrhea and nausea.   Endocrine: Negative for cold intolerance, polydipsia and polyphagia.   Genitourinary:  Positive for difficulty urinating. Negative for dysuria, frequency, hematuria and urgency.   Musculoskeletal:  Negative for arthralgias, back pain, gait problem, joint swelling, neck pain and neck stiffness.   Skin:  Negative for color change and rash.   Allergic/Immunologic: Negative for environmental allergies and food allergies.   Neurological:  Positive for weakness. Negative for dizziness, tremors, seizures, speech difficulty, numbness and headaches.   Hematological:  Negative for adenopathy. Bruises/bleeds easily.   Psychiatric/Behavioral:  Negative for behavioral problems, dysphoric mood, hallucinations and self-injury.        Medications and orders     All medications [facility] and updated in [facility]      Objective     Vitals: per nursing home record    Physical Exam  Constitutional:       Appearance: Normal appearance. She is well-developed.   HENT:      Head: Normocephalic and atraumatic.      Right Ear: External  ear normal.      Left Ear: External ear normal.      Mouth/Throat:      Mouth: Mucous membranes are moist.      Pharynx: Oropharynx is clear. No oropharyngeal exudate.   Eyes:      General: No scleral icterus.        Right eye: No discharge.         Left eye: No discharge.      Extraocular Movements: Extraocular movements intact.      Conjunctiva/sclera: Conjunctivae normal.      Pupils: Pupils are equal, round, and reactive to light.   Neck:      Thyroid: No thyromegaly.   Cardiovascular:      Rate and Rhythm: Normal rate. Rhythm irregular.      Heart sounds: Normal heart sounds. No murmur heard.     No gallop.   Pulmonary:      Effort: Pulmonary effort is normal. No respiratory distress.      Breath sounds: Normal breath sounds. No wheezing or rales.   Abdominal:      General: Bowel sounds are normal.      Palpations: Abdomen is soft. There is no mass.      Tenderness: There is abdominal tenderness. There is no guarding or rebound.   Musculoskeletal:         General: No tenderness or deformity. Normal range of motion.      Cervical back: Normal range of motion and neck supple.   Lymphadenopathy:      Cervical: No cervical adenopathy.   Skin:     General: Skin is warm and dry.      Findings: No rash.   Neurological:      Mental Status: She is alert and oriented to person, place, and time.      Cranial Nerves: No cranial nerve deficit.      Coordination: Coordination normal.      Deep Tendon Reflexes: Reflexes are normal and symmetric. Reflexes normal.   Psychiatric:         Mood and Affect: Mood normal.         Behavior: Behavior normal.         Thought Content: Thought content normal.         Judgment: Judgment normal.         Pertinent Laboratory/Diagnostic Studies:   The following labs/studies were reviewed please see chart or hospital paperwork for details.  Diagnostic studies from the hospital were reviewed    - Readmit for long-term care.  Maintain anticoagulation.  We will monitor her lab count.  She will  continue on twice daily iron supplementation    Nelson Yoder DO  2/23/2024 12:33 PM

## 2024-02-23 NOTE — TELEPHONE ENCOUNTER
Pt scheduled 2/27 at 10:30am     Pt daughter aware. Advised I would call week after infusion to discuss next steps with injections moving forward

## 2024-02-26 ENCOUNTER — TELEPHONE (OUTPATIENT)
Dept: OTHER | Facility: OTHER | Age: 84
End: 2024-02-26

## 2024-02-26 ENCOUNTER — TELEPHONE (OUTPATIENT)
Age: 84
End: 2024-02-26

## 2024-02-26 NOTE — TELEPHONE ENCOUNTER
Petty called saying that id the patient is running a fever of 101 to give gastro a call. Petty stated that the patient is running a fever of 101.2 and that she needs clarification on what need to be done.

## 2024-02-26 NOTE — TELEPHONE ENCOUNTER
Patients GI provider:  Dr. Brito    Number to return call: [phone]    Reason for call: Kia Perez Unit Clerk from [facility] where pt resides called in stating that the nurses at the facility wanted us to be aware that pt has a current slight temp of 100.6.  FYI as pt has an upcoming Stelara infusion. Above is their call back number.     Scheduled procedure/appointment date if applicable: Apt/procedure N/A

## 2024-02-26 NOTE — TELEPHONE ENCOUNTER
I spoke with Kia at [facility]. Kia reports pts last temp 100.6, O2 98%, bp 121/47, denies runny nose, congestion or chills. Kia to call back our office it patients temperature increases to 101.     Stelara loading infusion scheduled for 2/27/24 at  infusion center.

## 2024-02-27 ENCOUNTER — HOSPITAL ENCOUNTER (OUTPATIENT)
Dept: INFUSION CENTER | Facility: HOSPITAL | Age: 84
Discharge: HOME | DRG: 386 | End: 2024-02-27
Attending: INTERNAL MEDICINE
Payer: MEDICARE

## 2024-02-27 ENCOUNTER — TELEPHONE (OUTPATIENT)
Age: 84
End: 2024-02-27

## 2024-02-27 DIAGNOSIS — M89.9 LYTIC LESION OF BONE ON X-RAY: ICD-10-CM

## 2024-02-27 DIAGNOSIS — K51.011 ULCERATIVE PANCOLITIS WITH RECTAL BLEEDING (HCC): Primary | ICD-10-CM

## 2024-02-27 DIAGNOSIS — C50.919 PRIMARY MALIGNANT NEOPLASM OF BREAST WITH METASTASIS (HCC): ICD-10-CM

## 2024-02-27 PROCEDURE — 96365 THER/PROPH/DIAG IV INF INIT: CPT

## 2024-02-27 RX ORDER — SODIUM CHLORIDE 9 MG/ML
20 INJECTION, SOLUTION INTRAVENOUS ONCE
Status: CANCELLED | OUTPATIENT
Start: 2024-02-27

## 2024-02-27 RX ORDER — SODIUM CHLORIDE 9 MG/ML
20 INJECTION, SOLUTION INTRAVENOUS ONCE
Status: COMPLETED | OUTPATIENT
Start: 2024-02-27 | End: 2024-02-27

## 2024-02-27 RX ADMIN — USTEKINUMAB 390 MG: 130 SOLUTION INTRAVENOUS at 11:52

## 2024-02-27 RX ADMIN — SODIUM CHLORIDE 20 ML/HR: 0.9 INJECTION, SOLUTION INTRAVENOUS at 11:14

## 2024-02-27 NOTE — TELEPHONE ENCOUNTER
Called the pt and Spoke with Angelita regarding the messages. Pt is okay no fever and on the on the way to her APT.

## 2024-02-27 NOTE — PROGRESS NOTES
..Kerry Silverman  tolerated treatment well with no complications.      Kerry Silverman does not have any more appointments to make at this time.      AVS printed and given to Kerry Silverman:  Yes

## 2024-02-27 NOTE — TELEPHONE ENCOUNTER
[facility] contacted office to verify patients appt today. Made aware pt is scheduled for Infusion.

## 2024-02-27 NOTE — PROGRESS NOTES
Rec'd pt via wc accompanied by daughter and son. Pt c/o severe diarrhea and understands that is the reason for her new treatment today.   Medication education provided, all questions answered to pt's and family's satisfaction. PIV easily obtained. Stelara now infusing.

## 2024-03-01 ENCOUNTER — NURSE TRIAGE (OUTPATIENT)
Age: 84
End: 2024-03-01

## 2024-03-01 ENCOUNTER — PATIENT OUTREACH (OUTPATIENT)
Dept: CASE MANAGEMENT | Facility: OTHER | Age: 84
End: 2024-03-01

## 2024-03-01 ENCOUNTER — TELEPHONE (OUTPATIENT)
Age: 84
End: 2024-03-01

## 2024-03-01 ENCOUNTER — HOSPITAL ENCOUNTER (INPATIENT)
Facility: HOSPITAL | Age: 84
LOS: 7 days | Discharge: NURSING, REHAB OR LONG-TERM CARE | DRG: 386 | End: 2024-03-08
Attending: EMERGENCY MEDICINE | Admitting: HOSPITALIST
Payer: MEDICARE

## 2024-03-01 DIAGNOSIS — K50.111 CROHN'S DISEASE OF LARGE INTESTINE WITH RECTAL BLEEDING (HCC): ICD-10-CM

## 2024-03-01 DIAGNOSIS — T83.010A SUPRAPUBIC CATHETER DYSFUNCTION (HCC): ICD-10-CM

## 2024-03-01 DIAGNOSIS — E83.51 HYPOCALCEMIA: ICD-10-CM

## 2024-03-01 DIAGNOSIS — E87.6 HYPOKALEMIA: ICD-10-CM

## 2024-03-01 DIAGNOSIS — D64.9 SYMPTOMATIC ANEMIA: ICD-10-CM

## 2024-03-01 DIAGNOSIS — K52.9 IBD (INFLAMMATORY BOWEL DISEASE): Primary | ICD-10-CM

## 2024-03-01 DIAGNOSIS — K92.2 GASTROINTESTINAL BLEEDING: ICD-10-CM

## 2024-03-01 DIAGNOSIS — Z86.711 HISTORY OF PULMONARY EMBOLISM: ICD-10-CM

## 2024-03-01 LAB
ABO GROUP BLD: NORMAL
ABO GROUP BLD: NORMAL
ALBUMIN SERPL BCP-MCNC: 2.2 G/DL (ref 3.5–5)
ALP SERPL-CCNC: 119 U/L (ref 34–104)
ALT SERPL W P-5'-P-CCNC: 10 U/L (ref 7–52)
ANION GAP SERPL CALCULATED.3IONS-SCNC: 8 MMOL/L
APTT PPP: 30 SECONDS (ref 23–37)
AST SERPL W P-5'-P-CCNC: 35 U/L (ref 13–39)
ATRIAL RATE: 107 BPM
B2 GLYCOPROT1 AB SER QL: NEGATIVE
BASOPHILS # BLD AUTO: 0.02 THOUSANDS/ÂΜL (ref 0–0.1)
BASOPHILS NFR BLD AUTO: 0 % (ref 0–1)
BILIRUB SERPL-MCNC: 1.09 MG/DL (ref 0.2–1)
BLD GP AB SCN SERPL QL: POSITIVE
BLD GP AB SCN SERPL QL: POSITIVE
BLOOD GROUP ANTIBODIES SERPL: NORMAL
BLOOD GROUP ANTIBODIES SERPL: NORMAL
BUN SERPL-MCNC: 13 MG/DL (ref 5–25)
CA-I BLD-SCNC: 1.05 MMOL/L (ref 1.12–1.32)
CALCIUM ALBUM COR SERPL-MCNC: 8.8 MG/DL (ref 8.3–10.1)
CALCIUM SERPL-MCNC: 7.4 MG/DL (ref 8.4–10.2)
CHLORIDE SERPL-SCNC: 106 MMOL/L (ref 96–108)
CO2 SERPL-SCNC: 21 MMOL/L (ref 21–32)
CREAT SERPL-MCNC: 0.92 MG/DL (ref 0.6–1.3)
EOSINOPHIL # BLD AUTO: 0.05 THOUSAND/ÂΜL (ref 0–0.61)
EOSINOPHIL NFR BLD AUTO: 1 % (ref 0–6)
ERYTHROCYTE [DISTWIDTH] IN BLOOD BY AUTOMATED COUNT: 20 % (ref 11.6–15.1)
FERRITIN SERPL-MCNC: 820 NG/ML (ref 11–307)
GFR SERPL CREATININE-BSD FRML MDRD: 57 ML/MIN/1.73SQ M
GLUCOSE SERPL-MCNC: 137 MG/DL (ref 65–140)
HCT VFR BLD AUTO: 24.5 % (ref 34.8–46.1)
HGB BLD-MCNC: 7 G/DL (ref 11.5–15.4)
HGB BLD-MCNC: 7.4 G/DL (ref 11.5–15.4)
IMM GRANULOCYTES # BLD AUTO: 0.08 THOUSAND/UL (ref 0–0.2)
IMM GRANULOCYTES NFR BLD AUTO: 1 % (ref 0–2)
INR PPP: 1.37 (ref 0.84–1.19)
IRON SATN MFR SERPL: 34 % (ref 15–50)
IRON SERPL-MCNC: 31 UG/DL (ref 50–212)
LYMPHOCYTES # BLD AUTO: 0.79 THOUSANDS/ÂΜL (ref 0.6–4.47)
LYMPHOCYTES NFR BLD AUTO: 14 % (ref 14–44)
MAGNESIUM SERPL-MCNC: 1.5 MG/DL (ref 1.9–2.7)
MCH RBC QN AUTO: 26.3 PG (ref 26.8–34.3)
MCHC RBC AUTO-ENTMCNC: 30.2 G/DL (ref 31.4–37.4)
MCV RBC AUTO: 87 FL (ref 82–98)
MONOCYTES # BLD AUTO: 0.4 THOUSAND/ÂΜL (ref 0.17–1.22)
MONOCYTES NFR BLD AUTO: 7 % (ref 4–12)
NEUTROPHILS # BLD AUTO: 4.41 THOUSANDS/ÂΜL (ref 1.85–7.62)
NEUTS SEG NFR BLD AUTO: 77 % (ref 43–75)
NRBC BLD AUTO-RTO: 0 /100 WBCS
PLATELET # BLD AUTO: 215 THOUSANDS/UL (ref 149–390)
PMV BLD AUTO: 9.4 FL (ref 8.9–12.7)
POTASSIUM SERPL-SCNC: 2.9 MMOL/L (ref 3.5–5.3)
PROT SERPL-MCNC: 5.6 G/DL (ref 6.4–8.4)
PROTHROMBIN TIME: 17.3 SECONDS (ref 11.6–14.5)
QRS AXIS: 69 DEGREES
QRSD INTERVAL: 76 MS
QT INTERVAL: 310 MS
QTC INTERVAL: 406 MS
RBC # BLD AUTO: 2.81 MILLION/UL (ref 3.81–5.12)
RH BLD: NEGATIVE
RH BLD: NEGATIVE
SODIUM SERPL-SCNC: 135 MMOL/L (ref 135–147)
SPECIMEN EXPIRATION DATE: NORMAL
SPECIMEN EXPIRATION DATE: NORMAL
T WAVE AXIS: -34 DEGREES
TIBC SERPL-MCNC: 91 UG/DL (ref 250–450)
UIBC SERPL-MCNC: 60 UG/DL (ref 155–355)
VENTRICULAR RATE: 103 BPM
WBC # BLD AUTO: 5.75 THOUSAND/UL (ref 4.31–10.16)

## 2024-03-01 PROCEDURE — 99223 1ST HOSP IP/OBS HIGH 75: CPT | Performed by: HOSPITALIST

## 2024-03-01 PROCEDURE — 83550 IRON BINDING TEST: CPT | Performed by: PHYSICIAN ASSISTANT

## 2024-03-01 PROCEDURE — 87081 CULTURE SCREEN ONLY: CPT | Performed by: INTERNAL MEDICINE

## 2024-03-01 PROCEDURE — 87493 C DIFF AMPLIFIED PROBE: CPT | Performed by: INTERNAL MEDICINE

## 2024-03-01 PROCEDURE — 85610 PROTHROMBIN TIME: CPT | Performed by: EMERGENCY MEDICINE

## 2024-03-01 PROCEDURE — 86870 RBC ANTIBODY IDENTIFICATION: CPT | Performed by: HOSPITALIST

## 2024-03-01 PROCEDURE — 86901 BLOOD TYPING SEROLOGIC RH(D): CPT | Performed by: EMERGENCY MEDICINE

## 2024-03-01 PROCEDURE — 82330 ASSAY OF CALCIUM: CPT | Performed by: EMERGENCY MEDICINE

## 2024-03-01 PROCEDURE — 86921 COMPATIBILITY TEST INCUBATE: CPT

## 2024-03-01 PROCEDURE — 85025 COMPLETE CBC W/AUTO DIFF WBC: CPT | Performed by: EMERGENCY MEDICINE

## 2024-03-01 PROCEDURE — 85018 HEMOGLOBIN: CPT | Performed by: PHYSICIAN ASSISTANT

## 2024-03-01 PROCEDURE — 93005 ELECTROCARDIOGRAM TRACING: CPT

## 2024-03-01 PROCEDURE — 36415 COLL VENOUS BLD VENIPUNCTURE: CPT | Performed by: EMERGENCY MEDICINE

## 2024-03-01 PROCEDURE — 86901 BLOOD TYPING SEROLOGIC RH(D): CPT | Performed by: PHYSICIAN ASSISTANT

## 2024-03-01 PROCEDURE — 85730 THROMBOPLASTIN TIME PARTIAL: CPT | Performed by: EMERGENCY MEDICINE

## 2024-03-01 PROCEDURE — 30233N1 TRANSFUSION OF NONAUTOLOGOUS RED BLOOD CELLS INTO PERIPHERAL VEIN, PERCUTANEOUS APPROACH: ICD-10-PCS | Performed by: INTERNAL MEDICINE

## 2024-03-01 PROCEDURE — C9113 INJ PANTOPRAZOLE SODIUM, VIA: HCPCS | Performed by: PHYSICIAN ASSISTANT

## 2024-03-01 PROCEDURE — 87505 NFCT AGENT DETECTION GI: CPT | Performed by: INTERNAL MEDICINE

## 2024-03-01 PROCEDURE — 83540 ASSAY OF IRON: CPT | Performed by: PHYSICIAN ASSISTANT

## 2024-03-01 PROCEDURE — 99285 EMERGENCY DEPT VISIT HI MDM: CPT

## 2024-03-01 PROCEDURE — 83735 ASSAY OF MAGNESIUM: CPT | Performed by: PHYSICIAN ASSISTANT

## 2024-03-01 PROCEDURE — 86850 RBC ANTIBODY SCREEN: CPT | Performed by: EMERGENCY MEDICINE

## 2024-03-01 PROCEDURE — 93010 ELECTROCARDIOGRAM REPORT: CPT | Performed by: INTERNAL MEDICINE

## 2024-03-01 PROCEDURE — 86905 BLOOD TYPING RBC ANTIGENS: CPT

## 2024-03-01 PROCEDURE — 86850 RBC ANTIBODY SCREEN: CPT | Performed by: PHYSICIAN ASSISTANT

## 2024-03-01 PROCEDURE — 87147 CULTURE TYPE IMMUNOLOGIC: CPT | Performed by: INTERNAL MEDICINE

## 2024-03-01 PROCEDURE — 82728 ASSAY OF FERRITIN: CPT | Performed by: PHYSICIAN ASSISTANT

## 2024-03-01 PROCEDURE — 99285 EMERGENCY DEPT VISIT HI MDM: CPT | Performed by: EMERGENCY MEDICINE

## 2024-03-01 PROCEDURE — 86900 BLOOD TYPING SEROLOGIC ABO: CPT | Performed by: PHYSICIAN ASSISTANT

## 2024-03-01 PROCEDURE — 80053 COMPREHEN METABOLIC PANEL: CPT | Performed by: EMERGENCY MEDICINE

## 2024-03-01 PROCEDURE — 86900 BLOOD TYPING SEROLOGIC ABO: CPT | Performed by: EMERGENCY MEDICINE

## 2024-03-01 PROCEDURE — 99223 1ST HOSP IP/OBS HIGH 75: CPT | Performed by: INTERNAL MEDICINE

## 2024-03-01 PROCEDURE — 86922 COMPATIBILITY TEST ANTIGLOB: CPT

## 2024-03-01 RX ORDER — SACCHAROMYCES BOULARDII 250 MG
250 CAPSULE ORAL 2 TIMES DAILY
Status: DISCONTINUED | OUTPATIENT
Start: 2024-03-01 | End: 2024-03-08 | Stop reason: HOSPADM

## 2024-03-01 RX ORDER — METOPROLOL SUCCINATE 50 MG/1
50 TABLET, EXTENDED RELEASE ORAL DAILY
Status: DISCONTINUED | OUTPATIENT
Start: 2024-03-02 | End: 2024-03-08 | Stop reason: HOSPADM

## 2024-03-01 RX ORDER — POTASSIUM CHLORIDE 20 MEQ/1
40 TABLET, EXTENDED RELEASE ORAL ONCE
Status: COMPLETED | OUTPATIENT
Start: 2024-03-01 | End: 2024-03-01

## 2024-03-01 RX ORDER — PRAVASTATIN SODIUM 20 MG
20 TABLET ORAL
Status: DISCONTINUED | OUTPATIENT
Start: 2024-03-01 | End: 2024-03-08 | Stop reason: HOSPADM

## 2024-03-01 RX ORDER — ASPIRIN 81 MG/1
81 TABLET, CHEWABLE ORAL DAILY
Status: DISCONTINUED | OUTPATIENT
Start: 2024-03-02 | End: 2024-03-08 | Stop reason: HOSPADM

## 2024-03-01 RX ORDER — LANOLIN ALCOHOL/MO/W.PET/CERES
3 CREAM (GRAM) TOPICAL
Status: DISCONTINUED | OUTPATIENT
Start: 2024-03-01 | End: 2024-03-08 | Stop reason: HOSPADM

## 2024-03-01 RX ORDER — ACETAMINOPHEN 325 MG/1
650 TABLET ORAL EVERY 6 HOURS PRN
Status: DISCONTINUED | OUTPATIENT
Start: 2024-03-01 | End: 2024-03-08 | Stop reason: HOSPADM

## 2024-03-01 RX ORDER — ONDANSETRON 2 MG/ML
4 INJECTION INTRAMUSCULAR; INTRAVENOUS EVERY 6 HOURS PRN
Status: DISCONTINUED | OUTPATIENT
Start: 2024-03-01 | End: 2024-03-08 | Stop reason: HOSPADM

## 2024-03-01 RX ORDER — MIRTAZAPINE 15 MG/1
7.5 TABLET, FILM COATED ORAL
Status: DISCONTINUED | OUTPATIENT
Start: 2024-03-01 | End: 2024-03-08 | Stop reason: HOSPADM

## 2024-03-01 RX ORDER — SODIUM CHLORIDE 9 MG/ML
75 INJECTION, SOLUTION INTRAVENOUS CONTINUOUS
Status: DISCONTINUED | OUTPATIENT
Start: 2024-03-01 | End: 2024-03-01

## 2024-03-01 RX ORDER — MAGNESIUM SULFATE HEPTAHYDRATE 40 MG/ML
2 INJECTION, SOLUTION INTRAVENOUS ONCE
Status: COMPLETED | OUTPATIENT
Start: 2024-03-01 | End: 2024-03-01

## 2024-03-01 RX ORDER — POTASSIUM CHLORIDE 14.9 MG/ML
20 INJECTION INTRAVENOUS ONCE
Status: DISCONTINUED | OUTPATIENT
Start: 2024-03-01 | End: 2024-03-01

## 2024-03-01 RX ORDER — PANTOPRAZOLE SODIUM 40 MG/10ML
40 INJECTION, POWDER, LYOPHILIZED, FOR SOLUTION INTRAVENOUS EVERY 12 HOURS SCHEDULED
Status: DISCONTINUED | OUTPATIENT
Start: 2024-03-01 | End: 2024-03-08 | Stop reason: HOSPADM

## 2024-03-01 RX ORDER — FERROUS SULFATE 325(65) MG
325 TABLET ORAL 2 TIMES DAILY WITH MEALS
Status: DISCONTINUED | OUTPATIENT
Start: 2024-03-01 | End: 2024-03-08 | Stop reason: HOSPADM

## 2024-03-01 RX ORDER — FOLIC ACID 1 MG/1
1 TABLET ORAL DAILY
Status: DISCONTINUED | OUTPATIENT
Start: 2024-03-02 | End: 2024-03-08 | Stop reason: HOSPADM

## 2024-03-01 RX ADMIN — POTASSIUM CHLORIDE 40 MEQ: 1500 TABLET, EXTENDED RELEASE ORAL at 21:12

## 2024-03-01 RX ADMIN — POTASSIUM CHLORIDE 20 MEQ: 14.9 INJECTION, SOLUTION INTRAVENOUS at 14:53

## 2024-03-01 RX ADMIN — Medication 250 MG: at 18:20

## 2024-03-01 RX ADMIN — MIRTAZAPINE 7.5 MG: 15 TABLET, FILM COATED ORAL at 21:11

## 2024-03-01 RX ADMIN — POTASSIUM CHLORIDE 40 MEQ: 1500 TABLET, EXTENDED RELEASE ORAL at 15:00

## 2024-03-01 RX ADMIN — Medication 3 MG: at 21:11

## 2024-03-01 RX ADMIN — PANTOPRAZOLE SODIUM 40 MG: 40 INJECTION, POWDER, FOR SOLUTION INTRAVENOUS at 21:11

## 2024-03-01 RX ADMIN — SODIUM CHLORIDE 75 ML/HR: 0.9 INJECTION, SOLUTION INTRAVENOUS at 14:56

## 2024-03-01 RX ADMIN — PANTOPRAZOLE SODIUM 40 MG: 40 INJECTION, POWDER, FOR SOLUTION INTRAVENOUS at 14:50

## 2024-03-01 RX ADMIN — FERROUS SULFATE TAB 325 MG (65 MG ELEMENTAL FE) 325 MG: 325 (65 FE) TAB at 18:20

## 2024-03-01 RX ADMIN — MAGNESIUM SULFATE HEPTAHYDRATE 2 G: 2 INJECTION, SOLUTION INTRAVENOUS at 18:17

## 2024-03-01 RX ADMIN — PRAVASTATIN SODIUM 20 MG: 20 TABLET ORAL at 18:20

## 2024-03-01 NOTE — ASSESSMENT & PLAN NOTE
Presents to the emergency department with outpatient labs showing decreased hemoglobin, reported intermittent rectal bleeding suspect in setting of known IBD  On ASA/Eliquis as outpatient - OK to continue per GI  Hemoglobin 7.4 on admission - was 8.6 following recent discharge from hospital in Feb 2024.  Decreased to 7.3 on outpt labs  Hemodynamically stable  Continue IV Protonix  Trend H/H - blood consent obtained and placed on chart.  Will transfuse 1 unit PRBC  Consult gastroenterology

## 2024-03-01 NOTE — H&P
formerly [facility]  H&P  Name: eKrry Silverman 83 y.o. female I MRN: [id number]  Unit/Bed#: -01 I Date of Admission: 3/1/2024   Date of Service: 3/1/2024  Hospital Day: 0      Assessment/Plan   * Rectal bleeding  Assessment & Plan  Presents to the emergency department with outpatient labs showing decreased hemoglobin, reported intermittent rectal bleeding suspect in setting of known IBD  On ASA/Eliquis as outpatient - OK to continue per GI  Hemoglobin 7.4 on admission - was 8.6 following recent discharge from hospital in Feb 2024.  Decreased to 7.3 on outpt labs  Hemodynamically stable  Continue IV Protonix  Trend H/H - blood consent obtained and placed on chart.  Will transfuse 1 unit PRBC  Consult gastroenterology    IBD (inflammatory bowel disease)  Assessment & Plan  Patient receives Stelara infusions as outpatient, first dose was earlier this week  Per GI if cdiff testing negative, plan to initiate steroids  Will need close outpt follow up    Anemia  Assessment & Plan  Hemoglobin 8.6 on day of discharge following recent hospitalization February 21  Per report outpatient blood work had revealed low hemoglobin of 7.3  Hemoglobin 7.4 on repeat at time of admission  Reports lightheadedness on standing, intermittent rectal bleeding secondary to known IBD recently started on stelara infusions earlier this week  Will transfuse 1 unit PRBCs for symptomatic anemia  Check iron panel  Per GI OK to continue eliquis/ASA unless evidence of overt bleeding  Trend H/H    Hypokalemia  Assessment & Plan  Potassium 2.9  Repleted  Check magnesium  Repeat BMP tomorrow    Mixed hyperlipidemia  Assessment & Plan  Continue statin    Primary malignant neoplasm of breast with metastasis (HCC)  Assessment & Plan  Continue outpatient follow-up       VTE Pharmacologic Prophylaxis: VTE Score: 6 High Risk (Score >/= 5) - Pharmacological DVT Prophylaxis Ordered: apixaban (Eliquis). Sequential Compression Devices  Ordered.  Code Status: Level 3 - DNAR and DNI   Discussion with family:  Per GI case was discussed with patient's daughter.  Will contact family with routine update tomorrow.     Anticipated Length of Stay: Patient will be admitted on an inpatient basis with an anticipated length of stay of greater than 2 midnights secondary to symptomatic anemia.    Total Time Spent on Date of Encounter in care of patient: 75 mins. This time was spent on one or more of the following: performing physical exam; counseling and coordination of care; obtaining or reviewing history; documenting in the medical record; reviewing/ordering tests, medications or procedures; communicating with other healthcare professionals and discussing with patient's family/caregivers.    Chief Complaint: Abnormal outpatient labs    History of Present Illness:  Kerry Silverman is a 83 y.o. female with a PMH of breast cancer, IBD, hypertension, hyperlipidemia who presents with abnormal outpatient labs.    Patient presented to the emergency department with outpatient labs which showed decreased hemoglobin to 7.3.  Of note, patient was recently admitted and discharged for acute on chronic anemia and hemoglobin was 8.6 on day of discharge.  Patient has known IBD and had recently started Stelara infusions earlier this week.  Patient reports she has been having intermittent rectal bleeding and dizziness on standing.  Denies chest pain/palpitations, shortness of breath, nausea/vomiting, abdominal pain, fever/chills.  Admits to poor appetite for at least several weeks.    Review of Systems:  Review of Systems   Constitutional:  Positive for appetite change, fatigue and unexpected weight change. Negative for chills and fever.   HENT:  Negative for congestion, sore throat and trouble swallowing.    Eyes:  Negative for photophobia, pain and visual disturbance.   Respiratory:  Negative for cough, shortness of breath and wheezing.    Cardiovascular:  Negative for chest  pain, palpitations and leg swelling.   Gastrointestinal:  Positive for blood in stool. Negative for abdominal pain, constipation, diarrhea, nausea and vomiting.   Endocrine: Negative for polyuria.   Genitourinary:  Negative for difficulty urinating, dysuria, flank pain, hematuria and urgency.   Musculoskeletal:  Negative for back pain, myalgias, neck pain and neck stiffness.   Skin:  Negative for pallor and rash.   Neurological:  Positive for dizziness. Negative for tremors, syncope, weakness, light-headedness, numbness and headaches.   Hematological:  Does not bruise/bleed easily.   Psychiatric/Behavioral:  Negative for agitation and confusion.        Past Medical and Surgical History:   Past Medical History:   Diagnosis Date    Abnormal weight loss     Allergic reaction     Anxiety     Breast cancer, right (HCC) 2011    right    Cancer (HCC) 2012    Candidal vulvovaginitis     Clotting disorder (HCC) Same as above    Endometrial hyperplasia     Epithelial cyst     benign, ovary    GI (gastrointestinal bleed) Blood mixed with stool    History of radiation therapy 2011    right breast cancer    Hyperlipidemia     Hypertension     Internal hemorrhoids     Knee tendonitis     Ovarian cyst     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(HCC)        Past Surgical History:   Procedure Laterality Date    BILATERAL SALPINGOOPHORECTOMY      onset: 7/23/13    BREAST BIOPSY Right 06/13/2006    benign    BREAST BIOPSY Right 03/02/2011    malignant    BREAST LUMPECTOMY Right 03/30/2011    malignant    CATARACT EXTRACTION W/  INTRAOCULAR LENS IMPLANT Right     phacoemulsification. Onset: 10/27/14    CHOLECYSTECTOMY      COLONOSCOPY  2017    approx    HYSTERECTOMY  Yes    INTRAOPERATIVE RADIATION THERAPY (IORT)      IR BIOPSY BONE  7/9/2021    IR IVC FILTER PLACEMENT OPTIONAL/TEMPORARY  9/23/2022    IR PE ENDOVASCULAR THERAPY  9/22/2022    IR PICC PLACEMENT SINGLE LUMEN  8/19/2022    IR PICC PLACEMENT SINGLE LUMEN  9/26/2022    IR SUPRAPUBIC  CATHETER PLACEMENT  12/29/2022    SKIN LESION EXCISION Right     breast, single lesion    TONSILLECTOMY AND ADENOIDECTOMY         Meds/Allergies:  Prior to Admission medications    Medication Sig Start Date End Date Taking? Authorizing Provider   acetaminophen (TYLENOL) 325 mg tablet Take 2 tablets (650 mg total) by mouth every 6 (six) hours as needed for mild pain 10/20/22   Madison Hernandez DO   apixaban (ELIQUIS) 2.5 mg Take 1 tablet (2.5 mg total) by mouth 2 (two) times a day 2/21/24 3/22/24  Mayuri Fairchild MD   Aspirin Low Dose 81 MG chewable tablet  1/12/24   Historical Provider, MD   cholestyramine sugar free (QUESTRAN LIGHT) 4 g packet Take 1 packet (4 g total) by mouth 2 (two) times a day 2/21/24   Mayuri Fairchild MD   clindamycin (CLEOCIN) 300 MG capsule  2/12/24   Historical Provider, MD   ferrous sulfate 324 (65 Fe) mg Take 1 tablet (324 mg total) by mouth 2 (two) times a day before meals 2/21/24 3/22/24  Mayuri Fairchild MD   fluticasone (FLONASE) 50 mcg/act nasal spray  1/31/24   Historical Provider, MD   folic acid (FOLVITE) 1 mg tablet Take 1 tablet (1 mg total) by mouth daily 9/7/22 12/29/22  Ricki Cohen DO   melatonin 3 mg Take 3 mg by mouth daily at bedtime    Historical Provider, MD   metoprolol succinate (TOPROL-XL) 50 mg 24 hr tablet  2/3/23   Historical Provider, MD   midodrine (PROAMATINE) 2.5 mg tablet Take 1 tablet (2.5 mg total) by mouth 3 (three) times a day before meals 10/20/22   Madison Hernandez DO   mirtazapine (REMERON) 7.5 MG tablet Take 1 tablet (7.5 mg total) by mouth daily at bedtime 7/21/22   Clara Belcher PA-C   ondansetron (ZOFRAN-ODT) 4 mg disintegrating tablet  2/23/23   Historical Provider, MD   pantoprazole (PROTONIX) 40 mg tablet Take 1 tablet (40 mg total) by mouth 2 (two) times a day before meals 8/26/22   Padmini Richardson PA-C   saccharomyces boulardii (FLORASTOR) 250 mg capsule Take 1 capsule (250 mg total) by mouth 2 (two) times a day 2/21/24  3/22/24  Mayuri Fairchild MD   simvastatin (ZOCOR) 10 mg tablet Take 1 tablet (10 mg total) by mouth daily at bedtime 22   Elena Escalera DO   mesalamine (DELZICOL) 400 mg Take 2 capsules (800 mg total) by mouth 3 (three) times a day 22  Padmini Richardson PA-C     I have reviewed home medications with patient personally.    Allergies:   Allergies   Allergen Reactions    Sulfa Antibiotics     Pneumococcal Polysaccharide Vaccine Rash and Edema       Social History:  Marital Status:    Occupation: Retired  Patient Pre-hospital Living Situation: Skilled Nursing Facility: [facility]  Patient Pre-hospital Level of Mobility: unable to be assessed at time of evaluation  Patient Pre-hospital Diet Restrictions:   Substance Use History:   Social History     Substance and Sexual Activity   Alcohol Use Not Currently    Comment: (history)     Social History     Tobacco Use   Smoking Status Former    Current packs/day: 0.00    Average packs/day: 1 pack/day for 34.0 years (34.0 ttl pk-yrs)    Types: Cigarettes    Start date:     Quit date:     Years since quittin.1   Smokeless Tobacco Never     Social History     Substance and Sexual Activity   Drug Use No       Family History:  Family History   Problem Relation Age of Onset    Stomach cancer Mother     No Known Problems Father     Cervical cancer Sister     No Known Problems Daughter     No Known Problems Maternal Grandmother     No Known Problems Maternal Grandfather     No Known Problems Paternal Grandmother     No Known Problems Paternal Grandfather     Colon polyps Neg Hx     Colon cancer Neg Hx        Physical Exam:     Vitals:   Blood Pressure: 128/72 (24 1526)  Pulse: 104 (24 1526)  Temperature: 97.8 °F (36.6 °C) (24 1327)  Temp Source: Oral (24 1327)  Respirations: 16 (24 1425)  Weight - Scale: 57.7 kg (127 lb 3.3 oz) (24 1324)  SpO2: 99 % (24 1526)    Physical Exam  Vitals and nursing note reviewed.    Constitutional:       Appearance: Normal appearance.      Comments: No acute distress   HENT:      Head: Normocephalic.   Eyes:      General: No scleral icterus.     Extraocular Movements: Extraocular movements intact.      Conjunctiva/sclera: Conjunctivae normal.   Cardiovascular:      Rate and Rhythm: Normal rate and regular rhythm.      Heart sounds: S1 normal and S2 normal.   Pulmonary:      Effort: Pulmonary effort is normal.      Breath sounds: Normal breath sounds. No wheezing, rhonchi or rales.   Abdominal:      General: Bowel sounds are normal.      Palpations: Abdomen is soft.      Tenderness: There is no abdominal tenderness. There is no guarding or rebound.      Comments: Suprapubic catheter   Musculoskeletal:         General: No swelling, tenderness or deformity.      Cervical back: Normal range of motion.      Comments: Able to move upper/lower ext bilaterally, trace pedal edema   Skin:     General: Skin is warm and dry.      Coloration: Skin is pale.   Neurological:      General: No focal deficit present.      Mental Status: She is alert and oriented to person, place, and time.   Psychiatric:         Mood and Affect: Mood normal.         Speech: Speech normal.         Behavior: Behavior normal.          Additional Data:     Lab Results:  Results from last 7 days   Lab Units 03/01/24  1334   WBC Thousand/uL 5.75   HEMOGLOBIN g/dL 7.4*   HEMATOCRIT % 24.5*   PLATELETS Thousands/uL 215   NEUTROS PCT % 77*   LYMPHS PCT % 14   MONOS PCT % 7   EOS PCT % 1     Results from last 7 days   Lab Units 03/01/24  1334   SODIUM mmol/L 135   POTASSIUM mmol/L 2.9*   CHLORIDE mmol/L 106   CO2 mmol/L 21   BUN mg/dL 13   CREATININE mg/dL 0.92   ANION GAP mmol/L 8   CALCIUM mg/dL 7.4*   ALBUMIN g/dL 2.2*   TOTAL BILIRUBIN mg/dL 1.09*   ALK PHOS U/L 119*   ALT U/L 10   AST U/L 35   GLUCOSE RANDOM mg/dL 137     Results from last 7 days   Lab Units 03/01/24  1339   INR  1.37*                   Lines/Drains:  Invasive  Devices       Peripheral Intravenous Line  Duration             Peripheral IV 03/01/24 Left;Proximal;Ventral (anterior) Forearm <1 day              Drain  Duration             Suprapubic Catheter 16 Fr. 428 days                        Imaging: No pertinent imaging reviewed.  No orders to display       EKG and Other Studies Reviewed on Admission:   EKG: Sinus Tachycardia. .    ** Please Note: This note has been constructed using a voice recognition system. **

## 2024-03-01 NOTE — ASSESSMENT & PLAN NOTE
Patient receives Stelara infusions as outpatient, first dose was earlier this week  Per GI if cdiff testing negative, plan to initiate steroids  Will need close outpt follow up

## 2024-03-01 NOTE — TELEPHONE ENCOUNTER
Patients GI provider:  Dr. Brito    Number to return call: [phone]    Reason for call: Nayeli from [facility] calling in regards to pt's bloodwork. Advised pt's hemoglobin level is low, last time it was that low doc had pt go to hospital for a blood transfusion. Nayeli would like to know if pt needs another transfusion done. Transferred to [facility] in triage for further assistance.    Scheduled procedure/appointment date if applicable: N/A

## 2024-03-01 NOTE — QUICK NOTE
Pt with bloody BM with jim red blood mixed in liquid stool. Receiving 1unit pRBC. Will hold tonight's dose of Eliquis and continue to monitor BM. If hematochezia improves overnight, plan to restart Eliquis in AM given hx of PE/DVT.

## 2024-03-01 NOTE — TELEPHONE ENCOUNTER
Nayeli calling in from [facility], pts hemoglobin dropped from 8.6 on 2/21 to 7.3 on 2/28. Pt is still having loose stools with rectal bleeding and first infusion of stelara was this week. No lightheadedness/ dizziness     I advised to take pt to ED for possible blood transfusion.

## 2024-03-01 NOTE — ASSESSMENT & PLAN NOTE
Hemoglobin 8.6 on day of discharge following recent hospitalization February 21  Per report outpatient blood work had revealed low hemoglobin of 7.3  Hemoglobin 7.4 on repeat at time of admission  Reports lightheadedness on standing, intermittent rectal bleeding secondary to known IBD recently started on stelara infusions earlier this week  Will transfuse 1 unit PRBCs for symptomatic anemia  Check iron panel  Per GI OK to continue eliquis/ASA unless evidence of overt bleeding  Trend H/H

## 2024-03-01 NOTE — ED PROVIDER NOTES
History  Chief Complaint   Patient presents with    Rectal Bleeding     Pt to er via ems from [facility] with reports that patient's recent blood work showed that her hbg dropped from 8.3 to 7.3 since the 21 of feb with active rectal bleeding. Denies dizziness or shortness of breath      83 year old female presents for evaluation of anemia with reported hemoglobin 7.3 on labs at [facility] which had dropped from 8.3 nine days ago.  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  She states that she has been having increasing lightheadedness with standing over the past week, but has not noticed any blood in her stool.  She states she is incontinent of bowel with roughly 5 loose stools daily.  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  Patient had been admitted for similar presentation 2/19-2/21/24 and required transfusion of 1 unit PRBCs.          Prior to Admission Medications   Prescriptions Last Dose Informant Patient Reported? Taking?   Aspirin Low Dose 81 MG chewable tablet  Self Yes No   acetaminophen (TYLENOL) 325 mg tablet  Outside Facility (Specify), Self No No   Sig: Take 2 tablets (650 mg total) by mouth every 6 (six) hours as needed for mild pain   apixaban (ELIQUIS) 2.5 mg   No No   Sig: Take 1 tablet (2.5 mg total) by mouth 2 (two) times a day   cholestyramine sugar free (QUESTRAN LIGHT) 4 g packet   No No   Sig: Take 1 packet (4 g total) by mouth 2 (two) times a day   clindamycin (CLEOCIN) 300 MG capsule   Yes No   ferrous sulfate 324 (65 Fe) mg   No No   Sig: Take 1 tablet (324 mg total) by mouth 2 (two) times a day before meals   fluticasone (FLONASE) 50 mcg/act nasal spray   Yes No   folic acid (FOLVITE) 1 mg tablet   No No   Sig: Take 1 tablet (1 mg total) by mouth daily   melatonin 3 mg  Self Yes No   Sig: Take 3 mg by mouth daily at bedtime   metoprolol succinate (TOPROL-XL) 50 mg 24 hr tablet  Self Yes No   midodrine (PROAMATINE) 2.5 mg tablet  Outside  Facility (Specify), Self No No   Sig: Take 1 tablet (2.5 mg total) by mouth 3 (three) times a day before meals   mirtazapine (REMERON) 7.5 MG tablet  Self, Outside Facility (Specify) No No   Sig: Take 1 tablet (7.5 mg total) by mouth daily at bedtime   ondansetron (ZOFRAN-ODT) 4 mg disintegrating tablet  Self Yes No   pantoprazole (PROTONIX) 40 mg tablet  Outside Facility (Specify), Self No No   Sig: Take 1 tablet (40 mg total) by mouth 2 (two) times a day before meals   saccharomyces boulardii (FLORASTOR) 250 mg capsule   No No   Sig: Take 1 capsule (250 mg total) by mouth 2 (two) times a day   simvastatin (ZOCOR) 10 mg tablet  Self, Outside Facility (Specify) No No   Sig: Take 1 tablet (10 mg total) by mouth daily at bedtime      Facility-Administered Medications: None       Past Medical History:   Diagnosis Date    Abnormal weight loss     Allergic reaction     Anxiety     Breast cancer, right (HCC) 2011    right    Cancer (HCC) 2012    Candidal vulvovaginitis     Clotting disorder (HCC) Same as above    Endometrial hyperplasia     Epithelial cyst     benign, ovary    GI (gastrointestinal bleed) Blood mixed with stool    History of radiation therapy 2011    right breast cancer    Hyperlipidemia     Hypertension     Internal hemorrhoids     Knee tendonitis     Ovarian cyst     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(HCC)        Past Surgical History:   Procedure Laterality Date    BILATERAL SALPINGOOPHORECTOMY      onset: 7/23/13    BREAST BIOPSY Right 06/13/2006    benign    BREAST BIOPSY Right 03/02/2011    malignant    BREAST LUMPECTOMY Right 03/30/2011    malignant    CATARACT EXTRACTION W/  INTRAOCULAR LENS IMPLANT Right     phacoemulsification. Onset: 10/27/14    CHOLECYSTECTOMY      COLONOSCOPY  2017    approx    HYSTERECTOMY  Yes    INTRAOPERATIVE RADIATION THERAPY (IORT)      IR BIOPSY BONE  7/9/2021    IR IVC FILTER PLACEMENT OPTIONAL/TEMPORARY  9/23/2022    IR PE ENDOVASCULAR THERAPY  9/22/2022    IR PICC PLACEMENT  SINGLE LUMEN  2022    IR PICC PLACEMENT SINGLE LUMEN  2022    IR SUPRAPUBIC CATHETER PLACEMENT  2022    SKIN LESION EXCISION Right     breast, single lesion    TONSILLECTOMY AND ADENOIDECTOMY         Family History   Problem Relation Age of Onset    Stomach cancer Mother     No Known Problems Father     Cervical cancer Sister     No Known Problems Daughter     No Known Problems Maternal Grandmother     No Known Problems Maternal Grandfather     No Known Problems Paternal Grandmother     No Known Problems Paternal Grandfather     Colon polyps Neg Hx     Colon cancer Neg Hx      I have reviewed and agree with the history as documented.    E-Cigarette/Vaping    E-Cigarette Use Never User      E-Cigarette/Vaping Substances    Nicotine No     THC No     CBD No     Flavoring No     Other No     Unknown No      Social History     Tobacco Use    Smoking status: Former     Current packs/day: 0.00     Average packs/day: 1 pack/day for 34.0 years (34.0 ttl pk-yrs)     Types: Cigarettes     Start date:      Quit date:      Years since quittin.1    Smokeless tobacco: Never   Vaping Use    Vaping status: Never Used   Substance Use Topics    Alcohol use: Not Currently     Comment: (history)    Drug use: No       Review of Systems    Physical Exam  Physical Exam  Vitals and nursing note reviewed.   Cardiovascular:      Rate and Rhythm: Normal rate and regular rhythm.      Pulses: Normal pulses.   Pulmonary:      Effort: Pulmonary effort is normal. No respiratory distress.   Abdominal:      General: There is no distension.      Palpations: Abdomen is soft.      Tenderness: There is no abdominal tenderness.      Comments: Suprapubic catheter in place   Genitourinary:     Rectum: Guaiac result positive. No tenderness or external hemorrhoid.   Skin:     General: Skin is warm and dry.         Vital Signs  ED Triage Vitals   Temperature Pulse Respirations Blood Pressure SpO2   24 1327 24 1325  03/01/24 1325 03/01/24 1325 03/01/24 1325   97.8 °F (36.6 °C) 99 18 127/62 95 %      Temp Source Heart Rate Source Patient Position - Orthostatic VS BP Location FiO2 (%)   03/01/24 1327 03/01/24 1325 03/01/24 1325 03/01/24 1325 --   Oral Monitor Lying Left arm       Pain Score       03/01/24 1425       No Pain           Vitals:    03/01/24 1325 03/01/24 1425   BP: 127/62 126/61   Pulse: 99 102   Patient Position - Orthostatic VS: Lying Lying         Visual Acuity      ED Medications  Medications   potassium chloride 20 mEq IVPB (premix) (20 mEq Intravenous New Bag 3/1/24 1453)   sodium chloride 0.9 % infusion (75 mL/hr Intravenous New Bag 3/1/24 1456)   pantoprazole (PROTONIX) injection 40 mg (40 mg Intravenous Given 3/1/24 1450)   potassium chloride (Klor-Con M20) CR tablet 40 mEq (40 mEq Oral Given 3/1/24 1500)       Diagnostic Studies  Results Reviewed       Procedure Component Value Units Date/Time    Calcium, ionized [[id number]]  (Abnormal) Collected: 03/01/24 1450    Lab Status: Final result Specimen: Blood from Arm, Left Updated: 03/01/24 1459     Calcium, Ionized 1.05 mmol/L     Magnesium [[id number]]     Lab Status: No result Specimen: Blood     Comprehensive metabolic panel [[id number]]  (Abnormal) Collected: 03/01/24 1334    Lab Status: Final result Specimen: Blood from Arm, Left Updated: 03/01/24 1359     Sodium 135 mmol/L      Potassium 2.9 mmol/L      Chloride 106 mmol/L      CO2 21 mmol/L      ANION GAP 8 mmol/L      BUN 13 mg/dL      Creatinine 0.92 mg/dL      Glucose 137 mg/dL      Calcium 7.4 mg/dL      Corrected Calcium 8.8 mg/dL      AST 35 U/L      ALT 10 U/L      Alkaline Phosphatase 119 U/L      Total Protein 5.6 g/dL      Albumin 2.2 g/dL      Total Bilirubin 1.09 mg/dL      eGFR 57 ml/min/1.73sq m     Narrative:      National Kidney Disease Foundation guidelines for Chronic Kidney Disease (CKD):     Stage 1 with normal or high GFR (GFR > 90 mL/min/1.73 square meters)    Stage 2 Mild CKD (GFR =  60-89 mL/min/1.73 square meters)    Stage 3A Moderate CKD (GFR = 45-59 mL/min/1.73 square meters)    Stage 3B Moderate CKD (GFR = 30-44 mL/min/1.73 square meters)    Stage 4 Severe CKD (GFR = 15-29 mL/min/1.73 square meters)    Stage 5 End Stage CKD (GFR <15 mL/min/1.73 square meters)  Note: GFR calculation is accurate only with a steady state creatinine    Protime-INR [[id number]]  (Abnormal) Collected: 03/01/24 1339    Lab Status: Final result Specimen: Blood from Arm, Left Updated: 03/01/24 1358     Protime 17.3 seconds      INR 1.37    APTT [[id number]]  (Normal) Collected: 03/01/24 1339    Lab Status: Final result Specimen: Blood from Arm, Left Updated: 03/01/24 1358     PTT 30 seconds     CBC and differential [[id number]]  (Abnormal) Collected: 03/01/24 1334    Lab Status: Final result Specimen: Blood from Arm, Left Updated: 03/01/24 1338     WBC 5.75 Thousand/uL      RBC 2.81 Million/uL      Hemoglobin 7.4 g/dL      Hematocrit 24.5 %      MCV 87 fL      MCH 26.3 pg      MCHC 30.2 g/dL      RDW 20.0 %      MPV 9.4 fL      Platelets 215 Thousands/uL      nRBC 0 /100 WBCs      Neutrophils Relative 77 %      Immat GRANS % 1 %      Lymphocytes Relative 14 %      Monocytes Relative 7 %      Eosinophils Relative 1 %      Basophils Relative 0 %      Neutrophils Absolute 4.41 Thousands/µL      Immature Grans Absolute 0.08 Thousand/uL      Lymphocytes Absolute 0.79 Thousands/µL      Monocytes Absolute 0.40 Thousand/µL      Eosinophils Absolute 0.05 Thousand/µL      Basophils Absolute 0.02 Thousands/µL                    No orders to display              Procedures  ECG 12 Lead Documentation Only    Date/Time: 3/1/2024 2:58 PM    Performed by: Bronwyn Snow MD  Authorized by: Bronwyn Snow MD    Indications / Diagnosis:  Hypokalemia  ECG reviewed by me, the ED Provider: yes    Patient location:  ED  Previous ECG:     Previous ECG:  Compared to current    Comparison ECG info:  2/19/24 normal ekg     Similarity:  Changes noted  Interpretation:     Interpretation: abnormal    Rate:     ECG rate:  103    ECG rate assessment: tachycardic    Rhythm:     Rhythm: sinus tachycardia    Ectopy:     Ectopy: none    QRS:     QRS axis:  Normal    QRS intervals:  Normal  Conduction:     Conduction: normal    ST segments:     ST segments:  Normal  T waves:     T waves: inverted      Inverted:  III, aVF, V3, V4, V5 and V6           ED Course  ED Course as of 03/01/24 1517   Fri Mar 01, 2024   1411 Hemoglobin(!): 7.4  8.6 nine days ago                               SBIRT 20yo+      Flowsheet Row Most Recent Value   Initial Alcohol Screen: US AUDIT-C     1. How often do you have a drink containing alcohol? 0 Filed at: 03/01/2024 1325   2. How many drinks containing alcohol do you have on a typical day you are drinking?  0 Filed at: 03/01/2024 1325   3a. Male UNDER 65: How often do you have five or more drinks on one occasion? 0 Filed at: 03/01/2024 1325   3b. FEMALE Any Age, or MALE 65+: How often do you have 4 or more drinks on one occassion? 0 Filed at: 03/01/2024 1325   Audit-C Score 0 Filed at: 03/01/2024 1325   ADIS: How many times in the past year have you...    Used an illegal drug or used a prescription medication for non-medical reasons? Never Filed at: 03/01/2024 1325                      Medical Decision Making  83 year old female presents for evaluation of symptomatic anemia secondary to rectal bleeding.  Recent admission and transfusion for same.  Heme positive on SIMA, but no bright red blood.  Hemoglobin 7.4 which is not quite low enough for transfusion; however, suspect this will continue to drop.  Currently hemodynamically stable.  Patient additionally found to be hypokalemic and hypocalcemic.  Replacement potassium ordered.  Ionized calcium ordered to further evaluate the hypocalcemia.  Patient admitted for further evaluation and management.    Amount and/or Complexity of Data Reviewed  Labs: ordered.  Decision-making details documented in ED Course.    Risk  Prescription drug management.  Decision regarding hospitalization.             Disposition  Final diagnoses:   Hypokalemia   Symptomatic anemia   Gastrointestinal bleeding   Hypocalcemia     Time reflects when diagnosis was documented in both MDM as applicable and the Disposition within this note       Time User Action Codes Description Comment    3/1/2024  2:04 PM Gwendolyn, Bronwyn J Add [E87.6] Hypokalemia     3/1/2024  2:04 PM Gwendolyn, Bronwyn J Add [D64.9] Symptomatic anemia     3/1/2024  2:04 PM Gwendolyn, Bronwyn J Add [K92.2] Gastrointestinal bleeding     3/1/2024  2:04 PM Gwendolyn, Bronwyn J Modify [E87.6] Hypokalemia     3/1/2024  2:04 PM Gwendolyn, Bronwyn J Modify [D64.9] Symptomatic anemia     3/1/2024  2:19 PM Gwendolyn, Bronwyn J Add [E83.51] Hypocalcemia           ED Disposition       ED Disposition   Admit    Condition   Stable    Date/Time   Fri Mar 1, 2024 1420    Comment   Case was discussed with HERMELINDO and the patient's admission status was agreed to be Admission Status: inpatient status to the service of Dr. Quintero .               Follow-up Information    None         Patient's Medications   Discharge Prescriptions    No medications on file       No discharge procedures on file.    PDMP Review         Value Time User    PDMP Reviewed  Yes 9/25/2022  1:51 PM Severo Caldwell MD            ED Provider  Electronically Signed by             Bronwyn Snow MD  03/01/24 1141       Bronwny Snow MD  03/01/24 3196

## 2024-03-01 NOTE — CONSULTS
Consultation - formerly [facility]     Kerry Silverman 83 y.o. female MRN: [id number]  Unit/Bed#: -01 Encounter: [id number]    Inpatient consult to gastroenterology  Consult performed by: Cristin Jones MD  Consult ordered by: Ofelia Wheeler PA-C          ASSESSMENT and PLAN    This is a 83-year-old female with history of metastatic breast cancer, Crohn's colitis, recently started on Stelara on 2/27/2024, history of thromboembolic disease on aspirin and Eliquis, who returns for rectal bleeding and symptomatic anemia, likely secondary to chronic anticoagulation in setting of poorly controlled Crohn's colitis.    Symptomatic anemia  Crohn's colitis  H/o cdiff  PE of Eliquis  Trend H&H and transfuse 1U today (consent obtained)  Check CDiff and stool cultures. If cdiff negative, start IV methylprednisolone 40mg daily and transition to PO prednisone as tolerated  Diet as tolerated for now  Given this is an ongoing issue, OK to resume Eliquis for now and monitor for overt GI bleeding  IV iron per primary team  Pt s/p Stelara on 2/27/24 - will need close outpatient follow up with Dr Brito  Updated daughter over the phone today and she is agreeable    Chief Complaint   Patient presents with    Rectal Bleeding     Pt to er via ems from [facility] with reports that patient's recent blood work showed that her hbg dropped from 8.3 to 7.3 since the 21 of feb with active rectal bleeding. Denies dizziness or shortness of breath        Physician Requesting Consult: Mayte Quintero MD    HPI  Kerry Silverman is a 83 y.o. year old female pmhx sig for met breast cancer, PE, COVID, CDiff, chronic suprapubic catheter, CKD, recurrent crohns colitis, who returns to the ER for bleeding and diarrhea. She had been off therapy until recently when admitted for bleeding and after discussion with Dr Brito, started Stelara on 2/27/24.     She is sent to the ER today from [facility] for anemia w drop in hgb and ongoing bleeding  and symptomatic anemia with lightheadedness and dizziness. She continues to have about 5 loose BM/day. She is still on Eliquis and ASA for VTE hx.     Denies significant abdominal pains, nausea vomiting, fever or chills.    She had a colonoscopy in March 2023 which showed Jarrett 3 colitis from rectum to the hepatic flexure.  Active colitis with cryptitis on pathology.  No repeat colonoscopy since.    Historical Information   Past Medical History:   Diagnosis Date    Abnormal weight loss     Allergic reaction     Anxiety     Breast cancer, right (HCC) 2011    right    Cancer (HCC) 2012    Candidal vulvovaginitis     Clotting disorder (HCC) Same as above    Endometrial hyperplasia     Epithelial cyst     benign, ovary    GI (gastrointestinal bleed) Blood mixed with stool    History of radiation therapy 2011    right breast cancer    Hyperlipidemia     Hypertension     Internal hemorrhoids     Knee tendonitis     Ovarian cyst     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(HCC)      Past Surgical History:   Procedure Laterality Date    BILATERAL SALPINGOOPHORECTOMY      onset: 7/23/13    BREAST BIOPSY Right 06/13/2006    benign    BREAST BIOPSY Right 03/02/2011    malignant    BREAST LUMPECTOMY Right 03/30/2011    malignant    CATARACT EXTRACTION W/  INTRAOCULAR LENS IMPLANT Right     phacoemulsification. Onset: 10/27/14    CHOLECYSTECTOMY      COLONOSCOPY  2017    approx    HYSTERECTOMY  Yes    INTRAOPERATIVE RADIATION THERAPY (IORT)      IR BIOPSY BONE  7/9/2021    IR IVC FILTER PLACEMENT OPTIONAL/TEMPORARY  9/23/2022    IR PE ENDOVASCULAR THERAPY  9/22/2022    IR PICC PLACEMENT SINGLE LUMEN  8/19/2022    IR PICC PLACEMENT SINGLE LUMEN  9/26/2022    IR SUPRAPUBIC CATHETER PLACEMENT  12/29/2022    SKIN LESION EXCISION Right     breast, single lesion    TONSILLECTOMY AND ADENOIDECTOMY       Social History   Social History     Substance and Sexual Activity   Alcohol Use Not Currently    Comment: (history)     Social History     Substance  and Sexual Activity   Drug Use No     Social History     Tobacco Use   Smoking Status Former    Current packs/day: 0.00    Average packs/day: 1 pack/day for 34.0 years (34.0 ttl pk-yrs)    Types: Cigarettes    Start date:     Quit date:     Years since quittin.1   Smokeless Tobacco Never     Family History   Problem Relation Age of Onset    Stomach cancer Mother     No Known Problems Father     Cervical cancer Sister     No Known Problems Daughter     No Known Problems Maternal Grandmother     No Known Problems Maternal Grandfather     No Known Problems Paternal Grandmother     No Known Problems Paternal Grandfather     Colon polyps Neg Hx     Colon cancer Neg Hx        Meds/Allergies     Current Facility-Administered Medications   Medication Dose Route Frequency    pantoprazole (PROTONIX) injection 40 mg  40 mg Intravenous Q12H RIA    potassium chloride 20 mEq IVPB (premix)  20 mEq Intravenous Once    sodium chloride 0.9 % infusion  75 mL/hr Intravenous Continuous     Medications Prior to Admission   Medication    acetaminophen (TYLENOL) 325 mg tablet    apixaban (ELIQUIS) 2.5 mg    Aspirin Low Dose 81 MG chewable tablet    cholestyramine sugar free (QUESTRAN LIGHT) 4 g packet    clindamycin (CLEOCIN) 300 MG capsule    ferrous sulfate 324 (65 Fe) mg    fluticasone (FLONASE) 50 mcg/act nasal spray    folic acid (FOLVITE) 1 mg tablet    melatonin 3 mg    metoprolol succinate (TOPROL-XL) 50 mg 24 hr tablet    midodrine (PROAMATINE) 2.5 mg tablet    mirtazapine (REMERON) 7.5 MG tablet    ondansetron (ZOFRAN-ODT) 4 mg disintegrating tablet    pantoprazole (PROTONIX) 40 mg tablet    saccharomyces boulardii (FLORASTOR) 250 mg capsule    simvastatin (ZOCOR) 10 mg tablet       Allergies   Allergen Reactions    Sulfa Antibiotics     Pneumococcal Polysaccharide Vaccine Rash and Edema       PHYSICAL EXAM    Blood pressure 128/72, pulse 104, temperature 97.8 °F (36.6 °C), temperature source Oral, resp. rate 16,  "weight 57.7 kg (127 lb 3.3 oz), SpO2 99%, not currently breastfeeding. Body mass index is 23.27 kg/m².  General Appearance: NAD, cooperative, alert  Eyes: Anicteric, PERRLA, EOMI  ENT:  Normocephalic, atraumatic, normal mucosa.    Neck:  Supple, symmetrical, trachea midline  Resp:  Clear to auscultation bilaterally; no rales, rhonchi or wheezing; respirations unlabored   CV:  S1 S2, Regular rate and rhythm; no murmur, rub, or gallop.  GI:  Soft, non-tender, non-distended; normal bowel sounds; no masses, no organomegaly.  Suprapubic catheter connected, clean dry intact.  Rectal: Deferred  Musculoskeletal: No cyanosis, clubbing or edema. Normal ROM.  Skin:  No jaundice, rashes, or lesions   Heme/Lymph: No palpable cervical lymphadenopathy  Psych: Normal affect, good eye contact  Neuro: No gross deficits, AAOx3    Lab Results   Component Value Date    GLUCOSE 60 (L) 09/23/2022    CALCIUM 7.4 (L) 03/01/2024     09/08/2015    K 2.9 (L) 03/01/2024    CO2 21 03/01/2024     03/01/2024    BUN 13 03/01/2024    CREATININE 0.92 03/01/2024     Lab Results   Component Value Date    WBC 5.75 03/01/2024    HGB 7.4 (L) 03/01/2024    HCT 24.5 (L) 03/01/2024    MCV 87 03/01/2024     03/01/2024     Lab Results   Component Value Date    ALT 10 03/01/2024    AST 35 03/01/2024    ALKPHOS 119 (H) 03/01/2024    BILITOT 0.66 09/08/2015     No results found for: \"AMYLASE\"  Lab Results   Component Value Date    LIPASE 79 08/15/2022     Lab Results   Component Value Date    IRON 12 (L) 02/19/2024    TIBC 141 (L) 02/19/2024    FERRITIN 70 02/19/2024     Lab Results   Component Value Date    INR 1.37 (H) 03/01/2024       Imaging Studies: I have personally reviewed pertinent reports.   and I have personally reviewed pertinent films in PACS    EKG, Pathology, and Other Studies: I have personally reviewed pertinent reports.   and I have personally reviewed pertinent films in PACS    REVIEW OF SYSTEMS:    CONSTITUTIONAL: Denies any " fever, chills, rigors, and weight loss.  HEENT: No earache or tinnitus. Denies hearing loss or visual disturbances.  CARDIOVASCULAR: No chest pain or palpitations.   RESPIRATORY: Denies any cough, hemoptysis, shortness of breath or dyspnea on exertion.  GASTROINTESTINAL: As noted in the History of Present Illness.   GENITOURINARY: No problems with urination. Denies any hematuria or dysuria.  NEUROLOGIC: Per HPI  MUSCULOSKELETAL: Denies any muscle or joint pain.   SKIN: Denies skin rashes or itching.   ENDOCRINE: Denies excessive thirst. Denies intolerance to heat or cold.  PSYCHOSOCIAL: Denies depression or anxiety. Denies any recent memory loss.

## 2024-03-02 LAB
ANION GAP SERPL CALCULATED.3IONS-SCNC: 7 MMOL/L
BASOPHILS # BLD AUTO: 0.02 THOUSANDS/ÂΜL (ref 0–0.1)
BASOPHILS NFR BLD AUTO: 1 % (ref 0–1)
BUN SERPL-MCNC: 12 MG/DL (ref 5–25)
C COLI+JEJUNI TUF STL QL NAA+PROBE: NEGATIVE
C DIFF TOX A+B STL QL IA: NEGATIVE
C DIFF TOX B TCDB STL QL NAA+PROBE: POSITIVE
CALCIUM SERPL-MCNC: 7.3 MG/DL (ref 8.4–10.2)
CHLORIDE SERPL-SCNC: 109 MMOL/L (ref 96–108)
CO2 SERPL-SCNC: 19 MMOL/L (ref 21–32)
CREAT SERPL-MCNC: 0.91 MG/DL (ref 0.6–1.3)
EC STX1+STX2 GENES STL QL NAA+PROBE: NEGATIVE
EOSINOPHIL # BLD AUTO: 0.15 THOUSAND/ÂΜL (ref 0–0.61)
EOSINOPHIL NFR BLD AUTO: 3 % (ref 0–6)
ERYTHROCYTE [DISTWIDTH] IN BLOOD BY AUTOMATED COUNT: 20.2 % (ref 11.6–15.1)
GFR SERPL CREATININE-BSD FRML MDRD: 58 ML/MIN/1.73SQ M
GLUCOSE SERPL-MCNC: 109 MG/DL (ref 65–140)
HCT VFR BLD AUTO: 20.8 % (ref 34.8–46.1)
HGB BLD-MCNC: 6 G/DL (ref 11.5–15.4)
HGB BLD-MCNC: 8.4 G/DL (ref 11.5–15.4)
IMM GRANULOCYTES # BLD AUTO: 0.05 THOUSAND/UL (ref 0–0.2)
IMM GRANULOCYTES NFR BLD AUTO: 1 % (ref 0–2)
LYMPHOCYTES # BLD AUTO: 0.98 THOUSANDS/ÂΜL (ref 0.6–4.47)
LYMPHOCYTES NFR BLD AUTO: 22 % (ref 14–44)
MAGNESIUM SERPL-MCNC: 2.3 MG/DL (ref 1.9–2.7)
MCH RBC QN AUTO: 26.3 PG (ref 26.8–34.3)
MCHC RBC AUTO-ENTMCNC: 28.8 G/DL (ref 31.4–37.4)
MCV RBC AUTO: 91 FL (ref 82–98)
MONOCYTES # BLD AUTO: 0.35 THOUSAND/ÂΜL (ref 0.17–1.22)
MONOCYTES NFR BLD AUTO: 8 % (ref 4–12)
NEUTROPHILS # BLD AUTO: 2.86 THOUSANDS/ÂΜL (ref 1.85–7.62)
NEUTS SEG NFR BLD AUTO: 65 % (ref 43–75)
NRBC BLD AUTO-RTO: 0 /100 WBCS
PLATELET # BLD AUTO: 160 THOUSANDS/UL (ref 149–390)
PMV BLD AUTO: 9.4 FL (ref 8.9–12.7)
POTASSIUM SERPL-SCNC: 2.8 MMOL/L (ref 3.5–5.3)
RBC # BLD AUTO: 2.28 MILLION/UL (ref 3.81–5.12)
SALMONELLA SP SPAO STL QL NAA+PROBE: NEGATIVE
SHIGELLA SP+EIEC IPAH STL QL NAA+PROBE: NEGATIVE
SODIUM SERPL-SCNC: 135 MMOL/L (ref 135–147)
WBC # BLD AUTO: 4.41 THOUSAND/UL (ref 4.31–10.16)

## 2024-03-02 PROCEDURE — 0T2BX0Z CHANGE DRAINAGE DEVICE IN BLADDER, EXTERNAL APPROACH: ICD-10-PCS | Performed by: UROLOGY

## 2024-03-02 PROCEDURE — 85018 HEMOGLOBIN: CPT | Performed by: PHYSICIAN ASSISTANT

## 2024-03-02 PROCEDURE — P9040 RBC LEUKOREDUCED IRRADIATED: HCPCS

## 2024-03-02 PROCEDURE — 99222 1ST HOSP IP/OBS MODERATE 55: CPT | Performed by: UROLOGY

## 2024-03-02 PROCEDURE — 99233 SBSQ HOSP IP/OBS HIGH 50: CPT | Performed by: INTERNAL MEDICINE

## 2024-03-02 PROCEDURE — C9113 INJ PANTOPRAZOLE SODIUM, VIA: HCPCS | Performed by: PHYSICIAN ASSISTANT

## 2024-03-02 PROCEDURE — 83735 ASSAY OF MAGNESIUM: CPT | Performed by: HOSPITALIST

## 2024-03-02 PROCEDURE — 86902 BLOOD TYPE ANTIGEN DONOR EA: CPT

## 2024-03-02 PROCEDURE — 99232 SBSQ HOSP IP/OBS MODERATE 35: CPT | Performed by: PHYSICIAN ASSISTANT

## 2024-03-02 PROCEDURE — 80048 BASIC METABOLIC PNL TOTAL CA: CPT | Performed by: HOSPITALIST

## 2024-03-02 PROCEDURE — 85025 COMPLETE CBC W/AUTO DIFF WBC: CPT | Performed by: HOSPITALIST

## 2024-03-02 RX ORDER — METHYLPREDNISOLONE SODIUM SUCCINATE 40 MG/ML
40 INJECTION, POWDER, LYOPHILIZED, FOR SOLUTION INTRAMUSCULAR; INTRAVENOUS DAILY
Status: DISCONTINUED | OUTPATIENT
Start: 2024-03-02 | End: 2024-03-08

## 2024-03-02 RX ORDER — POTASSIUM CHLORIDE 20 MEQ/1
40 TABLET, EXTENDED RELEASE ORAL EVERY 4 HOURS
Status: COMPLETED | OUTPATIENT
Start: 2024-03-02 | End: 2024-03-02

## 2024-03-02 RX ADMIN — METOPROLOL SUCCINATE 50 MG: 50 TABLET, EXTENDED RELEASE ORAL at 08:40

## 2024-03-02 RX ADMIN — MIRTAZAPINE 7.5 MG: 15 TABLET, FILM COATED ORAL at 22:01

## 2024-03-02 RX ADMIN — Medication 250 MG: at 08:40

## 2024-03-02 RX ADMIN — ASPIRIN 81 MG CHEWABLE TABLET 81 MG: 81 TABLET CHEWABLE at 08:40

## 2024-03-02 RX ADMIN — POTASSIUM CHLORIDE 40 MEQ: 1500 TABLET, EXTENDED RELEASE ORAL at 12:33

## 2024-03-02 RX ADMIN — METHYLPREDNISOLONE SODIUM SUCCINATE 40 MG: 40 INJECTION, POWDER, FOR SOLUTION INTRAMUSCULAR; INTRAVENOUS at 14:03

## 2024-03-02 RX ADMIN — POTASSIUM CHLORIDE 40 MEQ: 1500 TABLET, EXTENDED RELEASE ORAL at 08:40

## 2024-03-02 RX ADMIN — PANTOPRAZOLE SODIUM 40 MG: 40 INJECTION, POWDER, FOR SOLUTION INTRAVENOUS at 08:40

## 2024-03-02 RX ADMIN — FERROUS SULFATE TAB 325 MG (65 MG ELEMENTAL FE) 325 MG: 325 (65 FE) TAB at 17:44

## 2024-03-02 RX ADMIN — Medication 250 MG: at 17:44

## 2024-03-02 RX ADMIN — FERROUS SULFATE TAB 325 MG (65 MG ELEMENTAL FE) 325 MG: 325 (65 FE) TAB at 08:40

## 2024-03-02 RX ADMIN — PRAVASTATIN SODIUM 20 MG: 20 TABLET ORAL at 17:44

## 2024-03-02 RX ADMIN — POTASSIUM CHLORIDE 40 MEQ: 1500 TABLET, EXTENDED RELEASE ORAL at 04:08

## 2024-03-02 RX ADMIN — Medication 3 MG: at 22:01

## 2024-03-02 RX ADMIN — FOLIC ACID 1 MG: 1 TABLET ORAL at 08:41

## 2024-03-02 NOTE — ASSESSMENT & PLAN NOTE
Hemoglobin 8.6 on day of discharge following recent hospitalization February 21  Per report outpatient blood work had revealed low hemoglobin of 7.3  Hemoglobin 7.4 on repeat at time of admission  Reports lightheadedness on standing, intermittent rectal bleeding secondary to known IBD recently started on stelara infusions earlier this week  Will transfuse 1 unit PRBCs for symptomatic anemia -delayed as patient required additional antibody testing, completed earlier this morning  Check iron panel  Per GI OK to continue eliquis/ASA unless evidence of overt bleeding.  Family concerned regarding continuing Eliquis for now given report of rectal bleeding last night.  Patient does have IVC filter in place.  Will hold Eliquis for now pending improvement in anemia  Trend H/H; will repeat around 1200

## 2024-03-02 NOTE — NURSING NOTE
Verified with bloodbank that antibody identification needs to be confirmed prior to giving patient ordered 1 unit of RBCs. Waiting for result. Patient stable at this time. Plan of care ongoing.

## 2024-03-02 NOTE — PLAN OF CARE
Problem: Potential for Falls  Goal: Patient will remain free of falls  Description: INTERVENTIONS:  - Educate patient/family on patient safety including physical limitations  - Instruct patient to call for assistance with activity   - Consult OT/PT to assist with strengthening/mobility   - Keep Call bell within reach  - Keep bed low and locked with side rails adjusted as appropriate  - Keep care items and personal belongings within reach  - Initiate and maintain comfort rounds  - Make Fall Risk Sign visible to staff  - Offer Toileting every 2 Hours, in advance of need  - Initiate/Maintain alarm  - Obtain necessary fall risk management equipment:  - Apply yellow socks and bracelet for high fall risk patients  - Consider moving patient to room near nurses station  Outcome: Progressing     Problem: Prexisting or High Potential for Compromised Skin Integrity  Goal: Skin integrity is maintained or improved  Description: INTERVENTIONS:  - Identify patients at risk for skin breakdown  - Assess and monitor skin integrity  - Assess and monitor nutrition and hydration status  - Monitor labs   - Assess for incontinence   - Turn and reposition patient  - Assist with mobility/ambulation  - Relieve pressure over bony prominences  - Avoid friction and shearing  - Provide appropriate hygiene as needed including keeping skin clean and dry  - Evaluate need for skin moisturizer/barrier cream  - Collaborate with interdisciplinary team   - Patient/family teaching  - Consider wound care consult   Outcome: Progressing     Problem: Nutrition/Hydration-ADULT  Goal: Nutrient/Hydration intake appropriate for improving, restoring or maintaining nutritional needs  Description: Monitor and assess patient's nutrition/hydration status for malnutrition. Collaborate with interdisciplinary team and initiate plan and interventions as ordered.  Monitor patient's weight and dietary intake as ordered or per policy. Utilize nutrition screening tool and  intervene as necessary. Determine patient's food preferences and provide high-protein, high-caloric foods as appropriate.     INTERVENTIONS:  - Monitor oral intake, urinary output, labs, and treatment plans  - Assess nutrition and hydration status and recommend course of action  - Evaluate amount of meals eaten  - Assist patient with eating if necessary   - Allow adequate time for meals  - Recommend/ encourage appropriate diets, oral nutritional supplements, and vitamin/mineral supplements  - Order, calculate, and assess calorie counts as needed  - Recommend, monitor, and adjust tube feedings and TPN/PPN based on assessed needs  - Assess need for intravenous fluids  - Provide specific nutrition/hydration education as appropriate  - Include patient/family/caregiver in decisions related to nutrition  Outcome: Progressing

## 2024-03-02 NOTE — PLAN OF CARE
Problem: Potential for Falls  Goal: Patient will remain free of falls  Description: INTERVENTIONS:  - Educate patient/family on patient safety including physical limitations  - Instruct patient to call for assistance with activity   - Consult OT/PT to assist with strengthening/mobility   - Keep Call bell within reach  - Keep bed low and locked with side rails adjusted as appropriate  - Keep care items and personal belongings within reach  - Initiate and maintain comfort rounds  - Make Fall Risk Sign visible to staff  - Offer Toileting every 2 Hours, in advance of need  - Initiate/Maintain alarm  - Obtain necessary fall risk management equipment:  - Apply yellow socks and bracelet for high fall risk patients  - Consider moving patient to room near nurses station  Outcome: Progressing     Problem: Prexisting or High Potential for Compromised Skin Integrity  Goal: Skin integrity is maintained or improved  Description: INTERVENTIONS:  - Identify patients at risk for skin breakdown  - Assess and monitor skin integrity  - Assess and monitor nutrition and hydration status  - Monitor labs   - Assess for incontinence   - Turn and reposition patient  - Assist with mobility/ambulation  - Relieve pressure over bony prominences  - Avoid friction and shearing  - Provide appropriate hygiene as needed including keeping skin clean and dry  - Evaluate need for skin moisturizer/barrier cream  - Collaborate with interdisciplinary team   - Patient/family teaching  - Consider wound care consult   Outcome: Progressing     Problem: Nutrition/Hydration-ADULT  Goal: Nutrient/Hydration intake appropriate for improving, restoring or maintaining nutritional needs  Description: Monitor and assess patient's nutrition/hydration status for malnutrition. Collaborate with interdisciplinary team and initiate plan and interventions as ordered.  Monitor patient's weight and dietary intake as ordered or per policy. Utilize nutrition screening tool and  intervene as necessary. Determine patient's food preferences and provide high-protein, high-caloric foods as appropriate.     INTERVENTIONS:  - Monitor oral intake, urinary output, labs, and treatment plans  - Assess nutrition and hydration status and recommend course of action  - Evaluate amount of meals eaten  - Assist patient with eating if necessary   - Allow adequate time for meals  - Recommend/ encourage appropriate diets, oral nutritional supplements, and vitamin/mineral supplements  - Order, calculate, and assess calorie counts as needed  - Recommend, monitor, and adjust tube feedings and TPN/PPN based on assessed needs  - Assess need for intravenous fluids  - Provide specific nutrition/hydration education as appropriate  - Include patient/family/caregiver in decisions related to nutrition  Outcome: Progressing     Problem: GASTROINTESTINAL - ADULT  Goal: Minimal or absence of nausea and/or vomiting  Description: INTERVENTIONS:  - Administer IV fluids if ordered to ensure adequate hydration  - Maintain NPO status until nausea and vomiting are resolved  - Nasogastric tube if ordered  - Administer ordered antiemetic medications as needed  - Provide nonpharmacologic comfort measures as appropriate  - Advance diet as tolerated, if ordered  - Consider nutrition services referral to assist patient with adequate nutrition and appropriate food choices  Outcome: Progressing  Goal: Maintains or returns to baseline bowel function  Description: INTERVENTIONS:  - Assess bowel function  - Encourage oral fluids to ensure adequate hydration  - Administer IV fluids if ordered to ensure adequate hydration  - Administer ordered medications as needed  - Encourage mobilization and activity  - Consider nutritional services referral to assist patient with adequate nutrition and appropriate food choices  Outcome: Progressing  Goal: Maintains adequate nutritional intake  Description: INTERVENTIONS:  - Monitor percentage of each meal  consumed  - Identify factors contributing to decreased intake, treat as appropriate  - Assist with meals as needed  - Monitor I&O, weight, and lab values if indicated  - Obtain nutrition services referral as needed  Outcome: Progressing  Goal: Oral mucous membranes remain intact  Description: INTERVENTIONS  - Assess oral mucosa and hygiene practices  - Implement preventative oral hygiene regimen  - Implement oral medicated treatments as ordered  - Initiate Nutrition services referral as needed  Outcome: Progressing     Problem: METABOLIC, FLUID AND ELECTROLYTES - ADULT  Goal: Electrolytes maintained within normal limits  Description: INTERVENTIONS:  - Monitor labs and assess patient for signs and symptoms of electrolyte imbalances  - Administer electrolyte replacement as ordered  - Monitor response to electrolyte replacements, including repeat lab results as appropriate  - Instruct patient on fluid and nutrition as appropriate  Outcome: Progressing  Goal: Fluid balance maintained  Description: INTERVENTIONS:  - Monitor labs   - Monitor I/O and WT  - Instruct patient on fluid and nutrition as appropriate  - Assess for signs & symptoms of volume excess or deficit  Outcome: Progressing     Problem: SKIN/TISSUE INTEGRITY - ADULT  Goal: Skin Integrity remains intact(Skin Breakdown Prevention)  Description: Assess:  -Perform [place] assessment  -Clean and moisturize skin  -Inspect skin when repositioning, toileting, and assisting with ADLS  -Assess under medical devices  -Assess extremities for adequate circulation and sensation     Bed Management:  -Have minimal linens on bed & keep smooth, unwrinkled  -Change linens as needed when moist or perspiring  -Avoid sitting or lying in one position for more than 2 hours while in bed  -Keep HOB at 30 degrees     Toileting:  -Offer bedside commode  -Assess for incontinence  -Use incontinent care products after each incontinent episode     Activity:  -Mobilize patient 3 times a  day  -Encourage activity and walks on unit  -Encourage or provide ROM exercises   -Turn and reposition patient every 2 Hours  -Use appropriate equipment to lift or move patient in bed  -Instruct/ Assist with weight shifting when out of bed in chair  -Consider limitation of chair time 2 hour intervals    Skin Care:  -Avoid use of baby powder, tape, friction and shearing, hot water or constrictive clothing  -Relieve pressure over bony prominences  -Do not massage red bony areas    Next Steps:  -Teach patient strategies to minimize risks   -Consider consults to  interdisciplinary teams  Outcome: Progressing     Problem: HEMATOLOGIC - ADULT  Goal: Maintains hematologic stability  Description: INTERVENTIONS  - Assess for signs and symptoms of bleeding or hemorrhage  - Monitor labs  - Administer supportive blood products/factors as ordered and appropriate  Outcome: Progressing     Problem: MUSCULOSKELETAL - ADULT  Goal: Maintain or return mobility to safest level of function  Description: INTERVENTIONS:  - Assess patient's ability to carry out ADLs; assess patient's baseline for ADL function and identify physical deficits which impact ability to perform ADLs (bathing, care of mouth/teeth, toileting, grooming, dressing, etc.)  - Assess/evaluate cause of self-care deficits   - Assess range of motion  - Assess patient's mobility  - Assess patient's need for assistive devices and provide as appropriate  - Encourage maximum independence but intervene and supervise when necessary  - Involve family in performance of ADLs  - Assess for home care needs following discharge   - Consider OT consult to assist with ADL evaluation and planning for discharge  - Provide patient education as appropriate  Outcome: Progressing  Goal: Maintain proper alignment of affected body part  Description: INTERVENTIONS:  - Support, maintain and protect limb and body alignment  - Provide patient/ family with appropriate education  Outcome: Progressing      Problem: PAIN - ADULT  Goal: Verbalizes/displays adequate comfort level or baseline comfort level  Description: Interventions:  - Encourage patient to monitor pain and request assistance  - Assess pain using appropriate pain scale  - Administer analgesics based on type and severity of pain and evaluate response  - Implement non-pharmacological measures as appropriate and evaluate response  - Consider cultural and social influences on pain and pain management  - Notify physician/advanced practitioner if interventions unsuccessful or patient reports new pain  Outcome: Progressing     Problem: INFECTION - ADULT  Goal: Absence or prevention of progression during hospitalization  Description: INTERVENTIONS:  - Assess and monitor for signs and symptoms of infection  - Monitor lab/diagnostic results  - Monitor all insertion sites, i.e. indwelling lines, tubes, and drains  - Monitor endotracheal if appropriate and nasal secretions for changes in amount and color  - [place] appropriate cooling/warming therapies per order  - Administer medications as ordered  - Instruct and encourage patient and family to use good hand hygiene technique  - Identify and instruct in appropriate isolation precautions for identified infection/condition  Outcome: Progressing     Problem: SAFETY ADULT  Goal: Maintain or return to baseline ADL function  Description: INTERVENTIONS:  -  Assess patient's ability to carry out ADLs; assess patient's baseline for ADL function and identify physical deficits which impact ability to perform ADLs (bathing, care of mouth/teeth, toileting, grooming, dressing, etc.)  - Assess/evaluate cause of self-care deficits   - Assess range of motion  - Assess patient's mobility; develop plan if impaired  - Assess patient's need for assistive devices and provide as appropriate  - Encourage maximum independence but intervene and supervise when necessary  - Involve family in performance of ADLs  - Assess for home care  needs following discharge   - Consider OT consult to assist with ADL evaluation and planning for discharge  - Provide patient education as appropriate  Outcome: Progressing  Goal: Maintains/Returns to pre admission functional level  Description: INTERVENTIONS:  - Perform AM-PAC 6 Click Basic Mobility/ Daily Activity assessment daily.  - Set and communicate daily mobility goal to care team and patient/family/caregiver.   - Collaborate with rehabilitation services on mobility goals if consulted  - Perform Range of Motion 3 times a day.  - Reposition patient every 3 hours.  - Dangle patient 3 times a day  - Stand patient 3 times a day  - Ambulate patient 3 times a day  - Out of bed to chair 3 times a day   - Out of bed for meals 3 times a day  - Out of bed for toileting  - Record patient progress and toleration of activity level   Outcome: Progressing     Problem: DISCHARGE PLANNING  Goal: Discharge to home or other facility with appropriate resources  Description: INTERVENTIONS:  - Identify barriers to discharge w/patient and caregiver  - Arrange for needed discharge resources and transportation as appropriate  - Identify discharge learning needs (meds, wound care, etc.)  - Arrange for interpretive services to assist at discharge as needed  - Refer to Case Management Department for coordinating discharge planning if the patient needs post-hospital services based on physician/advanced practitioner order or complex needs related to functional status, cognitive ability, or social support system  Outcome: Progressing     Problem: Knowledge Deficit  Goal: Patient/family/caregiver demonstrates understanding of disease process, treatment plan, medications, and discharge instructions  Description: Complete learning assessment and assess knowledge base.  Interventions:  - Provide teaching at level of understanding  - Provide teaching via preferred learning methods  Outcome: Progressing

## 2024-03-02 NOTE — PROGRESS NOTES
"Progress Note - Kerry Silverman 83 y.o. 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     Symptomatic anemia  Crohn's colitis  History of C diff  PE on Eliquis  Hemoglobin has dropped from 7.4 -> 7.0 -> 6.0. She was transfused 1 unit leukoreduced, irradiated PRBCs this morning. Due for repeat H&H later this morning. Vital signs stable. No reports of rectal bleeding this morning.   -Monitor H&H and transfuse to keep > 7  -Await results of C diff and stool cultures  -If  C diff is negative, start IV methylprednisolone 40mg daily and transition to PO prednisone as tolerated  -Diet as tolerated for now  -Family concerned about resuming Eliquis; given that she has IVC filter, SLIM plans to continue holding Eliquis pending improvement in anemia  -Continue iron supplementation  -Patient was just initiated on Stelara 2/27/24 - will need close outpatient follow up with Dr Brito    Subjective:     Patient seen and examined at bedside. She believes she had about 5 loose/watery BMs overnight. Her last 2 BMs were documented as green/brown in color. No blood reported. She denies abdominal pain.    Objective:     Vitals: Blood pressure 139/61, pulse 103, temperature 97.9 °F (36.6 °C), resp. rate 18, height 5' 2\" (1.575 m), weight 57.9 kg (127 lb 10.3 oz), SpO2 100%, not currently breastfeeding.,Body mass index is 23.35 kg/m².      Intake/Output Summary (Last 24 hours) at 3/2/2024 1005  Last data filed at 3/2/2024 0856  Gross per 24 hour   Intake 350 ml   Output --   Net 350 ml       Physical Exam:     General Appearance: Alert, appears stated age and " cooperative  Lungs: Clear to auscultation bilaterally, no rales or rhonchi, no labored breathing/accessory muscle use  Heart: Regular rate and rhythm, S1, S2 normal, no murmur, click, rub or gallop  Abdomen: Soft, non-tender, non-distended; bowel sounds normal; no masses or no organomegaly  Extremities: No cyanosis, edema    Invasive Devices       Peripheral Intravenous Line  Duration             Peripheral IV 03/01/24 Distal;Dorsal (posterior);Left Forearm <1 day              Drain  Duration             Suprapubic Catheter 16 Fr. 428 days                    Lab Results:  Results from last 7 days   Lab Units 03/02/24  0219   WBC Thousand/uL 4.41   HEMOGLOBIN g/dL 6.0*   HEMATOCRIT % 20.8*   PLATELETS Thousands/uL 160   NEUTROS PCT % 65   LYMPHS PCT % 22   MONOS PCT % 8   EOS PCT % 3     Results from last 7 days   Lab Units 03/02/24  0219 03/01/24  1334   POTASSIUM mmol/L 2.8* 2.9*   CHLORIDE mmol/L 109* 106   CO2 mmol/L 19* 21   BUN mg/dL 12 13   CREATININE mg/dL 0.91 0.92   CALCIUM mg/dL 7.3* 7.4*   ALK PHOS U/L  --  119*   ALT U/L  --  10   AST U/L  --  35     Results from last 7 days   Lab Units 03/01/24  1339   INR  1.37*           Imaging Studies: I have personally reviewed pertinent imaging studies.    No results found.

## 2024-03-02 NOTE — PROGRESS NOTES
[facility]  Progress Note  Name: Kerry Silverman I  MRN: [id number]  Unit/Bed#: -01 I Date of Admission: 3/1/2024   Date of Service: 3/2/2024  Hospital Day: 1    Assessment/Plan   * Rectal bleeding  Assessment & Plan  Presents to the emergency department with outpatient labs showing decreased hemoglobin, reported intermittent rectal bleeding suspect in setting of known IBD  On ASA/Eliquis as outpatient - OK to continue per GI  Hemoglobin 7.4 on admission - was 8.6 following recent discharge from hospital in Feb 2024.  Decreased to 7.3 on outpt labs  Hemodynamically stable  Continue IV Protonix  Trend H/H - blood consent obtained and placed on chart.  Will transfuse 1 unit PRBC; did not receive until earlier this morning as patient required additional antibody testing  Consult gastroenterology    IBD (inflammatory bowel disease)  Assessment & Plan  Patient receives Stelara infusions as outpatient, first dose was earlier this week  Per GI if cdiff testing negative, plan to initiate steroids  Will need close outpt follow up    Anemia  Assessment & Plan  Hemoglobin 8.6 on day of discharge following recent hospitalization February 21  Per report outpatient blood work had revealed low hemoglobin of 7.3  Hemoglobin 7.4 on repeat at time of admission  Reports lightheadedness on standing, intermittent rectal bleeding secondary to known IBD recently started on stelara infusions earlier this week  Will transfuse 1 unit PRBCs for symptomatic anemia -delayed as patient required additional antibody testing, completed earlier this morning  Check iron panel  Per GI OK to continue eliquis/ASA unless evidence of overt bleeding.  Family concerned regarding continuing Eliquis for now given report of rectal bleeding last night.  Patient does have IVC filter in place.  Will hold Eliquis for now pending improvement in anemia  Trend H/H; will repeat around 1200    Hypokalemia  Assessment & Plan  Potassium  2.8  Likely related to diarrhea  Repleted  Check magnesium -1.5 on admission, improved to 2.1  Repeat BMP tomorrow    Mixed hyperlipidemia  Assessment & Plan  Continue statin    Primary malignant neoplasm of breast with metastasis (HCC)  Assessment & Plan  Continue outpatient follow-up         VTE Pharmacologic Prophylaxis: VTE Score: 6 High Risk (Score >/= 5) - Pharmacological DVT Prophylaxis Contraindicated. Sequential Compression Devices Ordered.    Mobility:   Basic Mobility Inpatient Raw Score: 13  JH-HLM Goal: 4: Move to chair/commode  JH-HLM Achieved: 4: Move to chair/commode  HLM Goal achieved. Continue to encourage appropriate mobility.    Patient Centered Rounds: I performed bedside rounds with nursing staff today.   Discussions with Specialists or Other Care Team Provider: GI AP    Education and Discussions with Family / Patient: Updated  (daughter) via phone.    Total Time Spent on Date of Encounter in care of patient: 40 mins. This time was spent on one or more of the following: performing physical exam; counseling and coordination of care; obtaining or reviewing history; documenting in the medical record; reviewing/ordering tests, medications or procedures; communicating with other healthcare professionals and discussing with patient's family/caregivers.    Current Length of Stay: 1 day(s)  Current Patient Status: Inpatient   Certification Statement: The patient will continue to require additional inpatient hospital stay due to rectal bleeding, acute on chronic anemia  Discharge Plan: Anticipate discharge in 48-72 hrs to discharge location to be determined pending rehab evaluations.    Code Status: Level 3 - DNAR and DNI    Subjective:   Patient feels okay this morning, reports poor sleep overnight.  Intermittent loose stools but no overt bleeding this morning per nursing.  Denies chest pain/palpitations, shortness of breath, nausea/vomiting or abdominal pain.    Objective:     Vitals:    Temp (24hrs), Av.7 °F (36.5 °C), Min:97.4 °F (36.3 °C), Max:98.1 °F (36.7 °C)    Temp:  [97.4 °F (36.3 °C)-98.1 °F (36.7 °C)] 97.9 °F (36.6 °C)  HR:  [] 103  Resp:  [14-18] 18  BP: (120-143)/(51-72) 139/61  SpO2:  [95 %-100 %] 100 %  Body mass index is 23.35 kg/m².     Input and Output Summary (last 24 hours):     Intake/Output Summary (Last 24 hours) at 3/2/2024 1137  Last data filed at 3/2/2024 0856  Gross per 24 hour   Intake 350 ml   Output --   Net 350 ml       Physical Exam:   Physical Exam  Vitals and nursing note reviewed.   Constitutional:       Appearance: Normal appearance.      Comments: No acute distress   HENT:      Head: Normocephalic.   Eyes:      General: No scleral icterus.     Extraocular Movements: Extraocular movements intact.      Conjunctiva/sclera: Conjunctivae normal.   Cardiovascular:      Rate and Rhythm: Normal rate and regular rhythm.      Heart sounds: S1 normal and S2 normal.   Pulmonary:      Effort: Pulmonary effort is normal.      Breath sounds: Normal breath sounds. No wheezing, rhonchi or rales.   Abdominal:      General: Bowel sounds are normal.      Palpations: Abdomen is soft.      Tenderness: There is no abdominal tenderness. There is no guarding or rebound.   Musculoskeletal:         General: No swelling, tenderness or deformity.      Cervical back: Normal range of motion.      Comments: Able to move upper/lower extremities bilaterally, no edema   Skin:     General: Skin is warm and dry.   Neurological:      Mental Status: She is alert and oriented to person, place, and time.   Psychiatric:         Mood and Affect: Mood normal.         Speech: Speech normal.         Behavior: Behavior normal.          Additional Data:     Labs:  Results from last 7 days   Lab Units 24  0219   WBC Thousand/uL 4.41   HEMOGLOBIN g/dL 6.0*   HEMATOCRIT % 20.8*   PLATELETS Thousands/uL 160   NEUTROS PCT % 65   LYMPHS PCT % 22   MONOS PCT % 8   EOS PCT % 3     Results from last 7  days   Lab Units 03/02/24  0219 03/01/24  1334   SODIUM mmol/L 135 135   POTASSIUM mmol/L 2.8* 2.9*   CHLORIDE mmol/L 109* 106   CO2 mmol/L 19* 21   BUN mg/dL 12 13   CREATININE mg/dL 0.91 0.92   ANION GAP mmol/L 7 8   CALCIUM mg/dL 7.3* 7.4*   ALBUMIN g/dL  --  2.2*   TOTAL BILIRUBIN mg/dL  --  1.09*   ALK PHOS U/L  --  119*   ALT U/L  --  10   AST U/L  --  35   GLUCOSE RANDOM mg/dL 109 137     Results from last 7 days   Lab Units 03/01/24  1339   INR  1.37*                   Lines/Drains:  Invasive Devices       Peripheral Intravenous Line  Duration             Peripheral IV 03/01/24 Distal;Dorsal (posterior);Left Forearm <1 day              Drain  Duration             Suprapubic Catheter 16 Fr. 428 days                      Telemetry:  Telemetry Orders (From admission, onward)               24 Hour Telemetry Monitoring  (ED Bridging Orders Panel)  Continuous x 24 Hours (Telem)        Question:  Reason for 24 Hour Telemetry  Answer:  Metabolic/electrolyte disturbance with high probability of dysrhythmia. K level <3 or >6 OR KCL infusion >10mEq/hr                     Telemetry Reviewed: Normal Sinus Rhythm  Indication for Continued Telemetry Use: Metabolic/electrolyte disturbance with high probability of dysrhythmia             Imaging: No pertinent imaging reviewed.    Recent Cultures (last 7 days):   Results from last 7 days   Lab Units 03/01/24  1606   C DIFF TOXIN B BY PCR  Positive*       Last 24 Hours Medication List:   Current Facility-Administered Medications   Medication Dose Route Frequency Provider Last Rate    acetaminophen  650 mg Oral Q6H PRN Ofelia Wheeler PA-C      aspirin  81 mg Oral Daily Ofelia Wheeler PA-C      ferrous sulfate  325 mg Oral BID With Meals Ofelia Wheeler PA-C      folic acid  1 mg Oral Daily Ofelia Wheeler PA-C      melatonin  3 mg Oral HS Ofelia Wheeler PA-C      metoprolol succinate  50 mg Oral Daily Ofelia Wheeler PA-C      mirtazapine  7.5 mg Oral HS  Ofelia Wheeler PA-C      ondansetron  4 mg Intravenous Q6H PRN Ofelia Wheeler PA-C      pantoprazole  40 mg Intravenous Q12H RIA Ofelia Wheelre PA-C      potassium chloride  40 mEq Oral Q4H Eddie Frausto MD      pravastatin  20 mg Oral Daily With Dinner Ofelia Wheeler PA-C      saccharomyces boulardii  250 mg Oral BID Ofelia Wheeler PA-C          Today, Patient Was Seen By: Ofelia Wheeler PA-C    **Please Note: This note may have been constructed using a voice recognition system.**

## 2024-03-02 NOTE — ASSESSMENT & PLAN NOTE
Presents to the emergency department with outpatient labs showing decreased hemoglobin, reported intermittent rectal bleeding suspect in setting of known IBD  On ASA/Eliquis as outpatient - OK to continue per GI  Hemoglobin 7.4 on admission - was 8.6 following recent discharge from hospital in Feb 2024.  Decreased to 7.3 on outpt labs  Hemodynamically stable  Continue IV Protonix  Trend H/H - blood consent obtained and placed on chart.  Will transfuse 1 unit PRBC; did not receive until earlier this morning as patient required additional antibody testing  Consult gastroenterology

## 2024-03-02 NOTE — ASSESSMENT & PLAN NOTE
Potassium 2.8  Likely related to diarrhea  Repleted  Check magnesium -1.5 on admission, improved to 2.1  Repeat BMP tomorrow

## 2024-03-02 NOTE — NURSING NOTE
Spoke with blood bank regarding antibody identification results. Blood bank is still in the process of receiving correct ordered one unit of RBCs based on lab result. Blood bank states this could take a couple of hours to fill request. Patient stable at this time. Plan of care ongoing.

## 2024-03-03 LAB
ABO GROUP BLD BPU: NORMAL
ANION GAP SERPL CALCULATED.3IONS-SCNC: 5 MMOL/L
BASOPHILS # BLD AUTO: 0.01 THOUSANDS/ÂΜL (ref 0–0.1)
BASOPHILS NFR BLD AUTO: 0 % (ref 0–1)
BPU ID: NORMAL
BUN SERPL-MCNC: 12 MG/DL (ref 5–25)
CALCIUM SERPL-MCNC: 7.8 MG/DL (ref 8.4–10.2)
CHLORIDE SERPL-SCNC: 114 MMOL/L (ref 96–108)
CO2 SERPL-SCNC: 19 MMOL/L (ref 21–32)
CREAT SERPL-MCNC: 0.77 MG/DL (ref 0.6–1.3)
CROSSMATCH: NORMAL
EOSINOPHIL # BLD AUTO: 0.08 THOUSAND/ÂΜL (ref 0–0.61)
EOSINOPHIL NFR BLD AUTO: 2 % (ref 0–6)
ERYTHROCYTE [DISTWIDTH] IN BLOOD BY AUTOMATED COUNT: 18.9 % (ref 11.6–15.1)
GFR SERPL CREATININE-BSD FRML MDRD: 71 ML/MIN/1.73SQ M
GLUCOSE SERPL-MCNC: 91 MG/DL (ref 65–140)
HCT VFR BLD AUTO: 27.6 % (ref 34.8–46.1)
HGB BLD-MCNC: 8.5 G/DL (ref 11.5–15.4)
IMM GRANULOCYTES # BLD AUTO: 0.05 THOUSAND/UL (ref 0–0.2)
IMM GRANULOCYTES NFR BLD AUTO: 1 % (ref 0–2)
LYMPHOCYTES # BLD AUTO: 1.05 THOUSANDS/ÂΜL (ref 0.6–4.47)
LYMPHOCYTES NFR BLD AUTO: 22 % (ref 14–44)
MAGNESIUM SERPL-MCNC: 2.2 MG/DL (ref 1.9–2.7)
MCH RBC QN AUTO: 27.1 PG (ref 26.8–34.3)
MCHC RBC AUTO-ENTMCNC: 30.8 G/DL (ref 31.4–37.4)
MCV RBC AUTO: 88 FL (ref 82–98)
MONOCYTES # BLD AUTO: 0.4 THOUSAND/ÂΜL (ref 0.17–1.22)
MONOCYTES NFR BLD AUTO: 8 % (ref 4–12)
MRSA NOSE QL CULT: ABNORMAL
MRSA NOSE QL CULT: ABNORMAL
NEUTROPHILS # BLD AUTO: 3.19 THOUSANDS/ÂΜL (ref 1.85–7.62)
NEUTS SEG NFR BLD AUTO: 67 % (ref 43–75)
NRBC BLD AUTO-RTO: 0 /100 WBCS
PLATELET # BLD AUTO: 203 THOUSANDS/UL (ref 149–390)
PMV BLD AUTO: 9.3 FL (ref 8.9–12.7)
POTASSIUM SERPL-SCNC: 4.2 MMOL/L (ref 3.5–5.3)
RBC # BLD AUTO: 3.14 MILLION/UL (ref 3.81–5.12)
SODIUM SERPL-SCNC: 138 MMOL/L (ref 135–147)
UNIT DISPENSE STATUS: NORMAL
UNIT PRODUCT CODE: NORMAL
UNIT PRODUCT VOLUME: 350 ML
UNIT RH: NORMAL
WBC # BLD AUTO: 4.78 THOUSAND/UL (ref 4.31–10.16)

## 2024-03-03 PROCEDURE — 85025 COMPLETE CBC W/AUTO DIFF WBC: CPT | Performed by: PHYSICIAN ASSISTANT

## 2024-03-03 PROCEDURE — C9113 INJ PANTOPRAZOLE SODIUM, VIA: HCPCS | Performed by: PHYSICIAN ASSISTANT

## 2024-03-03 PROCEDURE — 80048 BASIC METABOLIC PNL TOTAL CA: CPT | Performed by: PHYSICIAN ASSISTANT

## 2024-03-03 PROCEDURE — 83735 ASSAY OF MAGNESIUM: CPT | Performed by: PHYSICIAN ASSISTANT

## 2024-03-03 PROCEDURE — 99232 SBSQ HOSP IP/OBS MODERATE 35: CPT | Performed by: PHYSICIAN ASSISTANT

## 2024-03-03 PROCEDURE — 99232 SBSQ HOSP IP/OBS MODERATE 35: CPT | Performed by: INTERNAL MEDICINE

## 2024-03-03 RX ADMIN — FERROUS SULFATE TAB 325 MG (65 MG ELEMENTAL FE) 325 MG: 325 (65 FE) TAB at 16:39

## 2024-03-03 RX ADMIN — FOLIC ACID 1 MG: 1 TABLET ORAL at 09:15

## 2024-03-03 RX ADMIN — Medication 250 MG: at 09:15

## 2024-03-03 RX ADMIN — PRAVASTATIN SODIUM 20 MG: 20 TABLET ORAL at 16:39

## 2024-03-03 RX ADMIN — METHYLPREDNISOLONE SODIUM SUCCINATE 40 MG: 40 INJECTION, POWDER, FOR SOLUTION INTRAMUSCULAR; INTRAVENOUS at 09:15

## 2024-03-03 RX ADMIN — PANTOPRAZOLE SODIUM 40 MG: 40 INJECTION, POWDER, FOR SOLUTION INTRAVENOUS at 09:15

## 2024-03-03 RX ADMIN — FERROUS SULFATE TAB 325 MG (65 MG ELEMENTAL FE) 325 MG: 325 (65 FE) TAB at 09:15

## 2024-03-03 RX ADMIN — METOPROLOL SUCCINATE 50 MG: 50 TABLET, EXTENDED RELEASE ORAL at 09:15

## 2024-03-03 RX ADMIN — Medication 3 MG: at 21:36

## 2024-03-03 RX ADMIN — PANTOPRAZOLE SODIUM 40 MG: 40 INJECTION, POWDER, FOR SOLUTION INTRAVENOUS at 20:14

## 2024-03-03 RX ADMIN — ASPIRIN 81 MG CHEWABLE TABLET 81 MG: 81 TABLET CHEWABLE at 09:15

## 2024-03-03 RX ADMIN — MIRTAZAPINE 7.5 MG: 15 TABLET, FILM COATED ORAL at 21:36

## 2024-03-03 RX ADMIN — Medication 250 MG: at 17:58

## 2024-03-03 NOTE — QUICK NOTE
Urology quick note:    Suprapubic catheter checked at bedside, draining argenis colored urine into bag. Patient reports no more pain at the insertion site. There is some serosanguinous drainage from around tube site likely from trauma when tube was pulled on before replacement.     Continue nursing care to tube site, clean as needed and keep dry

## 2024-03-03 NOTE — ASSESSMENT & PLAN NOTE
Patient receives Stelara infusions as outpatient, first dose was earlier this week  Started on IV Solu-Medrol 3/2  C. difficile testing positive for PCR but negative EIA  Will need close outpt follow up

## 2024-03-03 NOTE — CONSULTS
Consultation -Urology  Kerry Silverman 83 y.o. female MRN: [id number]  Unit/Bed#: -01 Encounter: [id number]            ASSESSMENT:  Patient is an 83 year old female with chronic suprapubic catheter secondary to urinary retention with a dysfunctional catheter.      Medical Problems       Problem List       * (Principal) Rectal bleeding    Primary malignant neoplasm of breast with metastasis (HCC)    Overview Signed 4/5/2023 10:03 AM by Megan Del Castillo     Laterality: Right; Transitioned From: Breast cancer  updated diagnosis- BCNADMIN         Adverse reaction to pneumococcal vaccine    Anxiety    Cataract, bilateral    Mixed hyperlipidemia    Pulmonary nodule seen on imaging study    Chronic kidney disease (CKD) stage G3a/A1, moderately decreased glomerular filtration rate (GFR) between 45-59 mL/min/1.73 square meter and albuminuria creatinine ratio less than 30 mg/g (HCC)    Lab Results   Component Value Date    EGFR 58 03/02/2024    EGFR 57 03/01/2024    EGFR 57 02/21/2024    CREATININE 0.91 03/02/2024    CREATININE 0.92 03/01/2024    CREATININE 0.92 02/21/2024         Orthostatic hypotension    Osteopenia of multiple sites    Lytic lesion of bone on x-ray    Neoplasm of uncertain behavior of sternum    Secondary malignant neoplasm of bone (HCC)    Diarrhea    Hypokalemia    Anemia    Severe protein-calorie malnutrition (HCC)    Malnutrition Findings:                                 BMI Findings:           Body mass index is 23.35 kg/m².         Single subsegmental pulmonary embolism without acute cor pulmonale (HCC)    Venous insufficiency    Saddle embolus of pulmonary artery (HCC)    Urinary retention    Ulcerative pancolitis with rectal bleeding (HCC)    COVID-19 virus infection    Hematochezia    Suprapubic catheter (HCC)    Crohn's disease of large intestine with rectal bleeding (HCC)    History of pulmonary embolism    IBD (inflammatory bowel disease)    Electrolyte abnormality            Plan:  - suprapubic  catheter removed without difficulty  - new 18 f suprapubic catheter placed using sterile technique without difficulty. Patient tolerated well.  Urine began flowing into catheter immediately.  - continue routine catheter management  - please call for any further questions or concerns      Reason for Consult / Principal Problem:    HPI: Kerry Silverman is a 83 y.o. year old female with chronic suprapubic catheter.  Patient states that she has had the catheter for several years and that it was placed due to urinary retention.  Patient states that she resides at [facility]. Patient states that the staff at [facility] change the catheter only when it doesn't flush.  She states that the last time she had a catheter exchange was September 2023.  Patient states that her catheter was caught on her bedside table this evening and was accidentally pulled on.  She states that since that time, it has been painful.  She also states that she has been incontinent of urine from her urethra since that time and that her urine isn't draining into the bag.      Review of Systems   Constitutional:  Negative for fever.   Respiratory:  Negative for shortness of breath.    Cardiovascular:  Negative for chest pain.   Gastrointestinal:  Positive for abdominal pain. Negative for abdominal distention, nausea and vomiting.   Genitourinary:  Positive for difficulty urinating and dysuria.   All other systems reviewed and are negative.      Historical Information   Past Medical History:   Diagnosis Date    Abnormal weight loss     Allergic reaction     Anxiety     Breast cancer, right (HCC) 2011    right    Cancer (HCC) 2012    Candidal vulvovaginitis     Clotting disorder (HCC) Same as above    Endometrial hyperplasia     Epithelial cyst     benign, ovary    GI (gastrointestinal bleed) Blood mixed with stool    History of radiation therapy 2011    right breast cancer    Hyperlipidemia     Hypertension     Internal hemorrhoids     Knee tendonitis      Ovarian cyst     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(HCC)      Past Surgical History:   Procedure Laterality Date    BILATERAL SALPINGOOPHORECTOMY      onset: 12    BREAST BIOPSY Right 2006    benign    BREAST BIOPSY Right 2011    malignant    BREAST LUMPECTOMY Right 2011    malignant    CATARACT EXTRACTION W/  INTRAOCULAR LENS IMPLANT Right     phacoemulsification. Onset: 10/27/14    CHOLECYSTECTOMY      COLONOSCOPY  2017    approx    HYSTERECTOMY  Yes    INTRAOPERATIVE RADIATION THERAPY (IORT)      IR BIOPSY BONE  2021    IR IVC FILTER PLACEMENT OPTIONAL/TEMPORARY  2022    IR PE ENDOVASCULAR THERAPY  2022    IR PICC PLACEMENT SINGLE LUMEN  2022    IR PICC PLACEMENT SINGLE LUMEN  2022    IR SUPRAPUBIC CATHETER PLACEMENT  2022    SKIN LESION EXCISION Right     breast, single lesion    TONSILLECTOMY AND ADENOIDECTOMY       Social History   Social History     Substance and Sexual Activity   Alcohol Use Not Currently    Comment: (history)     Social History     Substance and Sexual Activity   Drug Use No     Social History     Tobacco Use   Smoking Status Former    Current packs/day: 0.00    Average packs/day: 1 pack/day for 34.0 years (34.0 ttl pk-yrs)    Types: Cigarettes    Start date:     Quit date:     Years since quittin.1   Smokeless Tobacco Never     Family History   Problem Relation Age of Onset    Stomach cancer Mother     No Known Problems Father     Cervical cancer Sister     No Known Problems Daughter     No Known Problems Maternal Grandmother     No Known Problems Maternal Grandfather     No Known Problems Paternal Grandmother     No Known Problems Paternal Grandfather     Colon polyps Neg Hx     Colon cancer Neg Hx        Meds/Allergies     Medications Prior to Admission   Medication    apixaban (ELIQUIS) 2.5 mg    Aspirin Low Dose 81 MG chewable tablet    ferrous sulfate 324 (65 Fe) mg    melatonin 3 mg    metoprolol succinate (TOPROL-XL) 50  "mg 24 hr tablet    midodrine (PROAMATINE) 2.5 mg tablet    mirtazapine (REMERON) 7.5 MG tablet    ondansetron (ZOFRAN-ODT) 4 mg disintegrating tablet    pantoprazole (PROTONIX) 40 mg tablet    simvastatin (ZOCOR) 10 mg tablet    acetaminophen (TYLENOL) 325 mg tablet    cholestyramine sugar free (QUESTRAN LIGHT) 4 g packet    clindamycin (CLEOCIN) 300 MG capsule    fluticasone (FLONASE) 50 mcg/act nasal spray    folic acid (FOLVITE) 1 mg tablet    saccharomyces boulardii (FLORASTOR) 250 mg capsule     Current Facility-Administered Medications   Medication Dose Route Frequency    acetaminophen (TYLENOL) tablet 650 mg  650 mg Oral Q6H PRN    aspirin chewable tablet 81 mg  81 mg Oral Daily    ferrous sulfate tablet 325 mg  325 mg Oral BID With Meals    folic acid (FOLVITE) tablet 1 mg  1 mg Oral Daily    melatonin tablet 3 mg  3 mg Oral HS    methylPREDNISolone sodium succinate (Solu-MEDROL) injection 40 mg  40 mg Intravenous Daily    metoprolol succinate (TOPROL-XL) 24 hr tablet 50 mg  50 mg Oral Daily    mirtazapine (REMERON) tablet 7.5 mg  7.5 mg Oral HS    ondansetron (ZOFRAN) injection 4 mg  4 mg Intravenous Q6H PRN    pantoprazole (PROTONIX) injection 40 mg  40 mg Intravenous Q12H RIA    pravastatin (PRAVACHOL) tablet 20 mg  20 mg Oral Daily With Dinner    saccharomyces boulardii (FLORASTOR) capsule 250 mg  250 mg Oral BID       Allergies   Allergen Reactions    Sulfa Antibiotics     Pneumococcal Polysaccharide Vaccine Rash and Edema       Objective     Blood pressure 128/62, pulse 97, temperature 97.7 °F (36.5 °C), temperature source Oral, resp. rate 18, height 5' 2\" (1.575 m), weight 57.9 kg (127 lb 10.3 oz), SpO2 99%, not currently breastfeeding.    Intake/Output Summary (Last 24 hours) at 3/3/2024 0537  Last data filed at 3/2/2024 1901  Gross per 24 hour   Intake 500 ml   Output 200 ml   Net 300 ml       PHYSICAL EXAM  Physical Exam  Vitals reviewed.   Constitutional:       Appearance: Normal appearance. "   HENT:      Head: Normocephalic.      Nose: Nose normal.      Mouth/Throat:      Mouth: Mucous membranes are moist.   Eyes:      Conjunctiva/sclera: Conjunctivae normal.   Cardiovascular:      Rate and Rhythm: Normal rate.   Pulmonary:      Effort: Pulmonary effort is normal.   Abdominal:      General: There is no distension.      Tenderness: There is abdominal tenderness in the suprapubic area.       Skin:     General: Skin is warm.   Neurological:      General: No focal deficit present.      Mental Status: She is alert and oriented to person, place, and time.   Psychiatric:         Mood and Affect: Mood normal.         Behavior: Behavior normal.         Thought Content: Thought content normal.         Judgment: Judgment normal.           Lab Results: I have personally reviewed pertinent lab results.  , CBC:   Lab Results   Component Value Date    HGB 8.4 (L) 03/02/2024       Petty Powell PA-C  3/3/2024 5:52 AM

## 2024-03-03 NOTE — ASSESSMENT & PLAN NOTE
Hemoglobin 8.6 on day of discharge following recent hospitalization February 21  Per report outpatient blood work had revealed low hemoglobin of 7.3  Hemoglobin 7.4 on repeat at time of admission  Reports lightheadedness on standing, intermittent rectal bleeding secondary to known IBD recently started on stelara infusions earlier this week  S/p 1 unit PRBCs for symptomatic anemia  Per GI OK to continue eliquis/ASA unless evidence of overt bleeding.  Patient and family concerned regarding continuing Eliquis given recurrent issues with rectal bleeding, anemia.  Patient does have IVC filter in place per daughter.  Will discuss with hematology further re: risk/benefit of resuming eliquis  No further evidence of rectal bleeding, hemoglobin stable at 8.5

## 2024-03-03 NOTE — PROGRESS NOTES
"Progress Note - Kerry Silverman 83 y.o. female MRN: [id number]    Unit/Bed#: -01 Encounter: [id number]    Assessment and Plan:    83-year-old female with history of metastatic breast cancer, Crohn's colitis, recently started on Stelara on 2/27/2024, history of thromboembolic disease on aspirin and Eliquis, admitted with rectal bleeding and symptomatic anemia likely secondary to chronic anticoagulation in setting of poorly controlled Crohn's colitis.     Symptomatic anemia  Crohn's colitis  History of C diff  PE on Eliquis  She received 1 unit PRBCs yesterday for hemoglobin 6.0. Hemoglobin came up to 8.4 and is stable this morning. Vital signs are stable. She continues to have frequent loose/watery stools but these have been non-bloody. C diff PCR positive but toxin negative suggesting colonization rather than active infection. No abdominal pain. Appetite is improving.                -Monitor H&H and transfuse to keep > 7   -Monitor stool color  -Continue Solumedrol 40 mg IV daily  -Continue regular diet as tolerated  -Eliquis remains on hold, OK to resume when necessary from GI standpoint  -Continue iron supplementation  -Patient was just initiated on Stelara 2/27/24 - will need close outpatient follow up with Dr Brito    Subjective:     Patient seen and examined at bedside. She is feeling better this morning. She had multiple loose/watery brown BMs yesterday and overnight. The stools were non-bloody as documented by nursing. She denies abdominal pain. She is eating better. She ate some meatloaf and potato last night for dinner.    Objective:     Vitals: Blood pressure 128/62, pulse 97, temperature 97.7 °F (36.5 °C), temperature source Oral, resp. rate 18, height 5' 2\" (1.575 m), weight 57.9 kg (127 lb 10.3 oz), SpO2 99%, not currently breastfeeding.,Body mass index is 23.35 kg/m².      Intake/Output Summary (Last 24 hours) at 3/3/2024 9688  Last data filed at 3/3/2024 0557  Gross per 24 hour   Intake 500 ml "   Output 375 ml   Net 125 ml       Physical Exam:     General Appearance: Alert, pleasant elderly female, appears stated age and cooperative  Lungs: Clear to auscultation bilaterally  Heart: Regular rate and rhythm  Abdomen: Soft, non-tender, non-distended; bowel sounds normal  Extremities: No cyanosis, edema    Invasive Devices       Peripheral Intravenous Line  Duration             Peripheral IV 03/03/24 Left;Ventral (anterior) Forearm <1 day              Drain  Duration             Suprapubic Catheter 16 Fr. 429 days                    Lab Results:  Results from last 7 days   Lab Units 03/03/24  0718   WBC Thousand/uL 4.78   HEMOGLOBIN g/dL 8.5*   HEMATOCRIT % 27.6*   PLATELETS Thousands/uL 203   NEUTROS PCT % 67   LYMPHS PCT % 22   MONOS PCT % 8   EOS PCT % 2     Results from last 7 days   Lab Units 03/03/24  0629 03/02/24  0219 03/01/24  1334   POTASSIUM mmol/L 4.2   < > 2.9*   CHLORIDE mmol/L 114*   < > 106   CO2 mmol/L 19*   < > 21   BUN mg/dL 12   < > 13   CREATININE mg/dL 0.77   < > 0.92   CALCIUM mg/dL 7.8*   < > 7.4*   ALK PHOS U/L  --   --  119*   ALT U/L  --   --  10   AST U/L  --   --  35    < > = values in this interval not displayed.     Results from last 7 days   Lab Units 03/01/24  1339   INR  1.37*           Imaging Studies: I have personally reviewed pertinent imaging studies.    No results found.

## 2024-03-03 NOTE — PROGRESS NOTES
[facility]  Progress Note  Name: Kerry Silverman I  MRN: [id number]  Unit/Bed#: -01 I Date of Admission: 3/1/2024   Date of Service: 3/3/2024 I Hospital Day: 2    Assessment/Plan   * Rectal bleeding  Assessment & Plan  Presents to the emergency department with outpatient labs showing decreased hemoglobin, reported intermittent rectal bleeding suspect in setting of known IBD  On ASA/Eliquis as outpatient - OK to continue per GI.  However patient and family concerned re: resuming eliquis  Hemoglobin 7.4 on admission - was 8.6 following recent discharge from hospital in Feb 2024.  Decreased to 7.3 on outpt labs  Hemodynamically stable  Continue IV Protonix  Trend H/H -s/p 1 unit PRBCs following admission  Hemoglobin stable at 8.5  Consult gastroenterology -started on IV Solu-Medrol 3/2    IBD (inflammatory bowel disease)  Assessment & Plan  Patient receives Stelara infusions as outpatient, first dose was earlier this week  Started on IV Solu-Medrol 3/2  C. difficile testing positive for PCR but negative EIA  Will need close outpt follow up    Anemia  Assessment & Plan  Hemoglobin 8.6 on day of discharge following recent hospitalization February 21  Per report outpatient blood work had revealed low hemoglobin of 7.3  Hemoglobin 7.4 on repeat at time of admission  Reports lightheadedness on standing, intermittent rectal bleeding secondary to known IBD recently started on stelara infusions earlier this week  S/p 1 unit PRBCs for symptomatic anemia  Per GI OK to continue eliquis/ASA unless evidence of overt bleeding.  Patient and family concerned regarding continuing Eliquis given recurrent issues with rectal bleeding, anemia.  Patient does have IVC filter in place per daughter.  Will discuss with hematology further re: risk/benefit of resuming eliquis  No further evidence of rectal bleeding, hemoglobin stable at 8.5    Hypokalemia  Assessment & Plan  Resolved  Trend BMP    Mixed  hyperlipidemia  Assessment & Plan  Continue statin    Primary malignant neoplasm of breast with metastasis (HCC)  Assessment & Plan  Continue outpatient follow-up         VTE Pharmacologic Prophylaxis: VTE Score: 6 High Risk (Score >/= 5) - Pharmacological DVT Prophylaxis Contraindicated. Sequential Compression Devices Ordered.    Mobility:   Basic Mobility Inpatient Raw Score: 13  JH-HLM Goal: 4: Move to chair/commode  JH-HLM Achieved: 4: Move to chair/commode  HLM Goal achieved. Continue to encourage appropriate mobility.    Patient Centered Rounds: I performed bedside rounds with nursing staff today.   Discussions with Specialists or Other Care Team Provider: GI AP    Education and Discussions with Family / Patient: Updated  (daughter) via phone.    Total Time Spent on Date of Encounter in care of patient: 40 mins. This time was spent on one or more of the following: performing physical exam; counseling and coordination of care; obtaining or reviewing history; documenting in the medical record; reviewing/ordering tests, medications or procedures; communicating with other healthcare professionals and discussing with patient's family/caregivers.    Current Length of Stay: 2 day(s)  Current Patient Status: Inpatient   Certification Statement: The patient will continue to require additional inpatient hospital stay due to IV steroids, monitoring hgb  Discharge Plan: Anticipate discharge in 24-48 hrs to rehab facility.    Code Status: Level 3 - DNAR and DNI    Subjective:   Patient feeling improved today, appetite slowly improving.  Still with loose stools overnight, reportedly nonbloody.  Denies chest pain/palpitations, shortness of breath, nausea/vomiting or abdominal pain.    Objective:     Vitals:   Temp (24hrs), Av.7 °F (36.5 °C), Min:97.6 °F (36.4 °C), Max:97.9 °F (36.6 °C)    Temp:  [97.6 °F (36.4 °C)-97.9 °F (36.6 °C)] 97.9 °F (36.6 °C)  HR:  [95-97] 97  Resp:  [18] 18  BP: (113-134)/(55-65)  134/65  SpO2:  [99 %-100 %] 100 %  Body mass index is 23.35 kg/m².     Input and Output Summary (last 24 hours):     Intake/Output Summary (Last 24 hours) at 3/3/2024 1033  Last data filed at 3/3/2024 0557  Gross per 24 hour   Intake 150 ml   Output 375 ml   Net -225 ml       Physical Exam:   Physical Exam  Vitals and nursing note reviewed.   Constitutional:       Appearance: Normal appearance.      Comments: No acute distress   HENT:      Head: Normocephalic.   Eyes:      General: No scleral icterus.     Extraocular Movements: Extraocular movements intact.      Conjunctiva/sclera: Conjunctivae normal.   Cardiovascular:      Rate and Rhythm: Normal rate and regular rhythm.      Heart sounds: S1 normal and S2 normal.   Pulmonary:      Effort: Pulmonary effort is normal.      Breath sounds: Normal breath sounds. No wheezing, rhonchi or rales.   Abdominal:      General: Bowel sounds are normal.      Palpations: Abdomen is soft.      Tenderness: There is no abdominal tenderness. There is no guarding or rebound.   Musculoskeletal:         General: No swelling, tenderness or deformity.      Cervical back: Normal range of motion.      Comments: Able to move upper/lower extremities bilaterally, no edema   Skin:     General: Skin is warm and dry.   Neurological:      Mental Status: She is alert and oriented to person, place, and time.   Psychiatric:         Mood and Affect: Mood normal.         Speech: Speech normal.         Behavior: Behavior normal.          Additional Data:     Labs:  Results from last 7 days   Lab Units 03/03/24  0718   WBC Thousand/uL 4.78   HEMOGLOBIN g/dL 8.5*   HEMATOCRIT % 27.6*   PLATELETS Thousands/uL 203   NEUTROS PCT % 67   LYMPHS PCT % 22   MONOS PCT % 8   EOS PCT % 2     Results from last 7 days   Lab Units 03/03/24  0629 03/02/24  0219 03/01/24  1334   SODIUM mmol/L 138   < > 135   POTASSIUM mmol/L 4.2   < > 2.9*   CHLORIDE mmol/L 114*   < > 106   CO2 mmol/L 19*   < > 21   BUN mg/dL 12   < >  13   CREATININE mg/dL 0.77   < > 0.92   ANION GAP mmol/L 5   < > 8   CALCIUM mg/dL 7.8*   < > 7.4*   ALBUMIN g/dL  --   --  2.2*   TOTAL BILIRUBIN mg/dL  --   --  1.09*   ALK PHOS U/L  --   --  119*   ALT U/L  --   --  10   AST U/L  --   --  35   GLUCOSE RANDOM mg/dL 91   < > 137    < > = values in this interval not displayed.     Results from last 7 days   Lab Units 03/01/24  1339   INR  1.37*                   Lines/Drains:  Invasive Devices       Peripheral Intravenous Line  Duration             Peripheral IV 03/03/24 Left;Ventral (anterior) Forearm <1 day              Drain  Duration             Suprapubic Catheter 16 Fr. 429 days                          Imaging: No pertinent imaging reviewed.    Recent Cultures (last 7 days):   Results from last 7 days   Lab Units 03/01/24  1606   C DIFF TOXIN B BY PCR  Positive*       Last 24 Hours Medication List:   Current Facility-Administered Medications   Medication Dose Route Frequency Provider Last Rate    acetaminophen  650 mg Oral Q6H PRN Ofelia Wheeler PA-C      aspirin  81 mg Oral Daily Ofelia Wheeler PA-C      ferrous sulfate  325 mg Oral BID With Meals Ofelia Wheeler PA-C      folic acid  1 mg Oral Daily Ofelia Wheeler PA-C      melatonin  3 mg Oral HS Ofelia Wheeler PA-C      methylPREDNISolone sodium succinate  40 mg Intravenous Daily Verna Gaston PA-C      metoprolol succinate  50 mg Oral Daily Ofelia Wheeler PA-C      mirtazapine  7.5 mg Oral HS Ofelia Wheeler PA-C      ondansetron  4 mg Intravenous Q6H PRN Ofelia Wheeler PA-C      pantoprazole  40 mg Intravenous Q12H [facility] Ofelia Wheeler PA-C      pravastatin  20 mg Oral Daily With Dinner Ofelia Wheeler PA-C      saccharomyces boulardii  250 mg Oral BID Ofelia Wheeler PA-C          Today, Patient Was Seen By: Ofelia Wheeler PA-C    **Please Note: This note may have been constructed using a voice recognition system.**

## 2024-03-03 NOTE — PLAN OF CARE
Problem: Potential for Falls  Goal: Patient will remain free of falls  Description: INTERVENTIONS:  - Educate patient/family on patient safety including physical limitations  - Instruct patient to call for assistance with activity   - Consult OT/PT to assist with strengthening/mobility   - Keep Call bell within reach  - Keep bed low and locked with side rails adjusted as appropriate  - Keep care items and personal belongings within reach  - Initiate and maintain comfort rounds  - Make Fall Risk Sign visible to staff  - Offer Toileting every 2 Hours, in advance of need  - Initiate/Maintain alarm  - Obtain necessary fall risk management equipment:  - Apply yellow socks and bracelet for high fall risk patients  - Consider moving patient to room near nurses station  Outcome: Progressing     Problem: GASTROINTESTINAL - ADULT  Goal: Minimal or absence of nausea and/or vomiting  Description: INTERVENTIONS:  - Administer IV fluids if ordered to ensure adequate hydration  - Maintain NPO status until nausea and vomiting are resolved  - Nasogastric tube if ordered  - Administer ordered antiemetic medications as needed  - Provide nonpharmacologic comfort measures as appropriate  - Advance diet as tolerated, if ordered  - Consider nutrition services referral to assist patient with adequate nutrition and appropriate food choices  Outcome: Progressing     Problem: SKIN/TISSUE INTEGRITY - ADULT  Goal: Skin Integrity remains intact(Skin Breakdown Prevention)  Description: Assess:  -Perform [place] assessment  -Clean and moisturize skin  -Inspect skin when repositioning, toileting, and assisting with ADLS  -Assess under medical devices  -Assess extremities for adequate circulation and sensation     Bed Management:  -Have minimal linens on bed & keep smooth, unwrinkled  -Change linens as needed when moist or perspiring  -Avoid sitting or lying in one position for more than 2 hours while in bed  -Keep HOB at 30 degrees      Toileting:  -Offer bedside commode  -Assess for incontinence  -Use incontinent care products after each incontinent episode     Activity:  -Mobilize patient 3 times a day  -Encourage activity and walks on unit  -Encourage or provide ROM exercises   -Turn and reposition patient every 2 Hours  -Use appropriate equipment to lift or move patient in bed  -Instruct/ Assist with weight shifting when out of bed in chair  -Consider limitation of chair time 2 hour intervals    Skin Care:  -Avoid use of baby powder, tape, friction and shearing, hot water or constrictive clothing  -Relieve pressure over bony prominences  -Do not massage red bony areas    Next Steps:  -Teach patient strategies to minimize risks   -Consider consults to  interdisciplinary teams  Outcome: Progressing     Problem: SAFETY ADULT  Goal: Maintain or return to baseline ADL function  Description: INTERVENTIONS:  -  Assess patient's ability to carry out ADLs; assess patient's baseline for ADL function and identify physical deficits which impact ability to perform ADLs (bathing, care of mouth/teeth, toileting, grooming, dressing, etc.)  - Assess/evaluate cause of self-care deficits   - Assess range of motion  - Assess patient's mobility; develop plan if impaired  - Assess patient's need for assistive devices and provide as appropriate  - Encourage maximum independence but intervene and supervise when necessary  - Involve family in performance of ADLs  - Assess for home care needs following discharge   - Consider OT consult to assist with ADL evaluation and planning for discharge  - Provide patient education as appropriate  Outcome: Progressing

## 2024-03-03 NOTE — ASSESSMENT & PLAN NOTE
Presents to the emergency department with outpatient labs showing decreased hemoglobin, reported intermittent rectal bleeding suspect in setting of known IBD  On ASA/Eliquis as outpatient - OK to continue per GI.  However patient and family concerned re: resuming eliquis  Hemoglobin 7.4 on admission - was 8.6 following recent discharge from hospital in Feb 2024.  Decreased to 7.3 on outpt labs  Hemodynamically stable  Continue IV Protonix  Trend H/H -s/p 1 unit PRBCs following admission  Hemoglobin stable at 8.5  Consult gastroenterology -started on IV Solu-Medrol 3/2

## 2024-03-04 ENCOUNTER — PATIENT OUTREACH (OUTPATIENT)
Dept: CASE MANAGEMENT | Facility: OTHER | Age: 84
End: 2024-03-04

## 2024-03-04 PROBLEM — E87.6 HYPOKALEMIA: Status: RESOLVED | Noted: 2022-08-02 | Resolved: 2024-03-04

## 2024-03-04 LAB
ABO GROUP BLD: NORMAL
ANION GAP SERPL CALCULATED.3IONS-SCNC: 5 MMOL/L
BASOPHILS # BLD AUTO: 0.02 THOUSANDS/ÂΜL (ref 0–0.1)
BASOPHILS NFR BLD AUTO: 0 % (ref 0–1)
BLD GP AB SCN SERPL QL: POSITIVE
BUN SERPL-MCNC: 15 MG/DL (ref 5–25)
CALCIUM SERPL-MCNC: 7.7 MG/DL (ref 8.4–10.2)
CHLORIDE SERPL-SCNC: 112 MMOL/L (ref 96–108)
CO2 SERPL-SCNC: 21 MMOL/L (ref 21–32)
CREAT SERPL-MCNC: 0.82 MG/DL (ref 0.6–1.3)
EOSINOPHIL # BLD AUTO: 0.2 THOUSAND/ÂΜL (ref 0–0.61)
EOSINOPHIL NFR BLD AUTO: 4 % (ref 0–6)
ERYTHROCYTE [DISTWIDTH] IN BLOOD BY AUTOMATED COUNT: 19.4 % (ref 11.6–15.1)
GFR SERPL CREATININE-BSD FRML MDRD: 65 ML/MIN/1.73SQ M
GLUCOSE SERPL-MCNC: 87 MG/DL (ref 65–140)
HCT VFR BLD AUTO: 26 % (ref 34.8–46.1)
HCT VFR BLD AUTO: 28 % (ref 34.8–46.1)
HGB BLD-MCNC: 7.8 G/DL (ref 11.5–15.4)
HGB BLD-MCNC: 8.5 G/DL (ref 11.5–15.4)
IMM GRANULOCYTES # BLD AUTO: 0.07 THOUSAND/UL (ref 0–0.2)
IMM GRANULOCYTES NFR BLD AUTO: 1 % (ref 0–2)
LYMPHOCYTES # BLD AUTO: 1.55 THOUSANDS/ÂΜL (ref 0.6–4.47)
LYMPHOCYTES NFR BLD AUTO: 27 % (ref 14–44)
MCH RBC QN AUTO: 26.8 PG (ref 26.8–34.3)
MCHC RBC AUTO-ENTMCNC: 30 G/DL (ref 31.4–37.4)
MCV RBC AUTO: 89 FL (ref 82–98)
MONOCYTES # BLD AUTO: 0.43 THOUSAND/ÂΜL (ref 0.17–1.22)
MONOCYTES NFR BLD AUTO: 8 % (ref 4–12)
NEUTROPHILS # BLD AUTO: 3.43 THOUSANDS/ÂΜL (ref 1.85–7.62)
NEUTS SEG NFR BLD AUTO: 60 % (ref 43–75)
NRBC BLD AUTO-RTO: 0 /100 WBCS
PLATELET # BLD AUTO: 234 THOUSANDS/UL (ref 149–390)
PMV BLD AUTO: 9.5 FL (ref 8.9–12.7)
POTASSIUM SERPL-SCNC: 4 MMOL/L (ref 3.5–5.3)
RBC # BLD AUTO: 2.91 MILLION/UL (ref 3.81–5.12)
RH BLD: NEGATIVE
SODIUM SERPL-SCNC: 138 MMOL/L (ref 135–147)
SPECIMEN EXPIRATION DATE: NORMAL
WBC # BLD AUTO: 5.7 THOUSAND/UL (ref 4.31–10.16)

## 2024-03-04 PROCEDURE — 99232 SBSQ HOSP IP/OBS MODERATE 35: CPT | Performed by: PHYSICIAN ASSISTANT

## 2024-03-04 PROCEDURE — 86900 BLOOD TYPING SEROLOGIC ABO: CPT | Performed by: PHYSICIAN ASSISTANT

## 2024-03-04 PROCEDURE — 97535 SELF CARE MNGMENT TRAINING: CPT

## 2024-03-04 PROCEDURE — 86921 COMPATIBILITY TEST INCUBATE: CPT

## 2024-03-04 PROCEDURE — 97167 OT EVAL HIGH COMPLEX 60 MIN: CPT

## 2024-03-04 PROCEDURE — 97163 PT EVAL HIGH COMPLEX 45 MIN: CPT

## 2024-03-04 PROCEDURE — 85014 HEMATOCRIT: CPT | Performed by: PHYSICIAN ASSISTANT

## 2024-03-04 PROCEDURE — C9113 INJ PANTOPRAZOLE SODIUM, VIA: HCPCS | Performed by: PHYSICIAN ASSISTANT

## 2024-03-04 PROCEDURE — 97116 GAIT TRAINING THERAPY: CPT

## 2024-03-04 PROCEDURE — 85025 COMPLETE CBC W/AUTO DIFF WBC: CPT | Performed by: HOSPITALIST

## 2024-03-04 PROCEDURE — 99223 1ST HOSP IP/OBS HIGH 75: CPT | Performed by: NURSE PRACTITIONER

## 2024-03-04 PROCEDURE — 80048 BASIC METABOLIC PNL TOTAL CA: CPT | Performed by: HOSPITALIST

## 2024-03-04 PROCEDURE — 86850 RBC ANTIBODY SCREEN: CPT | Performed by: PHYSICIAN ASSISTANT

## 2024-03-04 PROCEDURE — 86901 BLOOD TYPING SEROLOGIC RH(D): CPT | Performed by: PHYSICIAN ASSISTANT

## 2024-03-04 PROCEDURE — 85018 HEMOGLOBIN: CPT | Performed by: PHYSICIAN ASSISTANT

## 2024-03-04 PROCEDURE — 86922 COMPATIBILITY TEST ANTIGLOB: CPT

## 2024-03-04 PROCEDURE — 99232 SBSQ HOSP IP/OBS MODERATE 35: CPT | Performed by: INTERNAL MEDICINE

## 2024-03-04 RX ADMIN — FERROUS SULFATE TAB 325 MG (65 MG ELEMENTAL FE) 325 MG: 325 (65 FE) TAB at 08:05

## 2024-03-04 RX ADMIN — ASPIRIN 81 MG CHEWABLE TABLET 81 MG: 81 TABLET CHEWABLE at 09:01

## 2024-03-04 RX ADMIN — MIRTAZAPINE 7.5 MG: 15 TABLET, FILM COATED ORAL at 22:33

## 2024-03-04 RX ADMIN — FERROUS SULFATE TAB 325 MG (65 MG ELEMENTAL FE) 325 MG: 325 (65 FE) TAB at 17:53

## 2024-03-04 RX ADMIN — APIXABAN 2.5 MG: 2.5 TABLET, FILM COATED ORAL at 17:53

## 2024-03-04 RX ADMIN — Medication 250 MG: at 09:01

## 2024-03-04 RX ADMIN — APIXABAN 2.5 MG: 2.5 TABLET, FILM COATED ORAL at 12:51

## 2024-03-04 RX ADMIN — FOLIC ACID 1 MG: 1 TABLET ORAL at 09:01

## 2024-03-04 RX ADMIN — PANTOPRAZOLE SODIUM 40 MG: 40 INJECTION, POWDER, FOR SOLUTION INTRAVENOUS at 22:34

## 2024-03-04 RX ADMIN — PRAVASTATIN SODIUM 20 MG: 20 TABLET ORAL at 17:53

## 2024-03-04 RX ADMIN — METOPROLOL SUCCINATE 50 MG: 50 TABLET, EXTENDED RELEASE ORAL at 09:01

## 2024-03-04 RX ADMIN — Medication 3 MG: at 22:33

## 2024-03-04 RX ADMIN — Medication 250 MG: at 17:53

## 2024-03-04 NOTE — PLAN OF CARE
Problem: Potential for Falls  Goal: Patient will remain free of falls  Description: INTERVENTIONS:  - Educate patient/family on patient safety including physical limitations  - Instruct patient to call for assistance with activity   - Consult OT/PT to assist with strengthening/mobility   - Keep Call bell within reach  - Keep bed low and locked with side rails adjusted as appropriate  - Keep care items and personal belongings within reach  - Initiate and maintain comfort rounds  - Make Fall Risk Sign visible to staff  - Offer Toileting every 2 Hours, in advance of need  - Initiate/Maintain alarm  - Obtain necessary fall risk management equipment:  - Apply yellow socks and bracelet for high fall risk patients  - Consider moving patient to room near nurses station  Outcome: Progressing     Problem: Prexisting or High Potential for Compromised Skin Integrity  Goal: Skin integrity is maintained or improved  Description: INTERVENTIONS:  - Identify patients at risk for skin breakdown  - Assess and monitor skin integrity  - Assess and monitor nutrition and hydration status  - Monitor labs   - Assess for incontinence   - Turn and reposition patient  - Assist with mobility/ambulation  - Relieve pressure over bony prominences  - Avoid friction and shearing  - Provide appropriate hygiene as needed including keeping skin clean and dry  - Evaluate need for skin moisturizer/barrier cream  - Collaborate with interdisciplinary team   - Patient/family teaching  - Consider wound care consult   Outcome: Progressing     Problem: Nutrition/Hydration-ADULT  Goal: Nutrient/Hydration intake appropriate for improving, restoring or maintaining nutritional needs  Description: Monitor and assess patient's nutrition/hydration status for malnutrition. Collaborate with interdisciplinary team and initiate plan and interventions as ordered.  Monitor patient's weight and dietary intake as ordered or per policy. Utilize nutrition screening tool and  intervene as necessary. Determine patient's food preferences and provide high-protein, high-caloric foods as appropriate.     INTERVENTIONS:  - Monitor oral intake, urinary output, labs, and treatment plans  - Assess nutrition and hydration status and recommend course of action  - Evaluate amount of meals eaten  - Assist patient with eating if necessary   - Allow adequate time for meals  - Recommend/ encourage appropriate diets, oral nutritional supplements, and vitamin/mineral supplements  - Order, calculate, and assess calorie counts as needed  - Recommend, monitor, and adjust tube feedings and TPN/PPN based on assessed needs  - Assess need for intravenous fluids  - Provide specific nutrition/hydration education as appropriate  - Include patient/family/caregiver in decisions related to nutrition  Outcome: Progressing     Problem: GASTROINTESTINAL - ADULT  Goal: Minimal or absence of nausea and/or vomiting  Description: INTERVENTIONS:  - Administer IV fluids if ordered to ensure adequate hydration  - Maintain NPO status until nausea and vomiting are resolved  - Nasogastric tube if ordered  - Administer ordered antiemetic medications as needed  - Provide nonpharmacologic comfort measures as appropriate  - Advance diet as tolerated, if ordered  - Consider nutrition services referral to assist patient with adequate nutrition and appropriate food choices  Outcome: Progressing  Goal: Maintains or returns to baseline bowel function  Description: INTERVENTIONS:  - Assess bowel function  - Encourage oral fluids to ensure adequate hydration  - Administer IV fluids if ordered to ensure adequate hydration  - Administer ordered medications as needed  - Encourage mobilization and activity  - Consider nutritional services referral to assist patient with adequate nutrition and appropriate food choices  Outcome: Progressing  Goal: Maintains adequate nutritional intake  Description: INTERVENTIONS:  - Monitor percentage of each meal  consumed  - Identify factors contributing to decreased intake, treat as appropriate  - Assist with meals as needed  - Monitor I&O, weight, and lab values if indicated  - Obtain nutrition services referral as needed  Outcome: Progressing  Goal: Oral mucous membranes remain intact  Description: INTERVENTIONS  - Assess oral mucosa and hygiene practices  - Implement preventative oral hygiene regimen  - Implement oral medicated treatments as ordered  - Initiate Nutrition services referral as needed  Outcome: Progressing     Problem: METABOLIC, FLUID AND ELECTROLYTES - ADULT  Goal: Electrolytes maintained within normal limits  Description: INTERVENTIONS:  - Monitor labs and assess patient for signs and symptoms of electrolyte imbalances  - Administer electrolyte replacement as ordered  - Monitor response to electrolyte replacements, including repeat lab results as appropriate  - Instruct patient on fluid and nutrition as appropriate  Outcome: Progressing  Goal: Fluid balance maintained  Description: INTERVENTIONS:  - Monitor labs   - Monitor I/O and WT  - Instruct patient on fluid and nutrition as appropriate  - Assess for signs & symptoms of volume excess or deficit  Outcome: Progressing     Problem: SKIN/TISSUE INTEGRITY - ADULT  Goal: Skin Integrity remains intact(Skin Breakdown Prevention)  Description: Assess:  -Perform [place] assessment  -Clean and moisturize skin  -Inspect skin when repositioning, toileting, and assisting with ADLS  -Assess under medical devices  -Assess extremities for adequate circulation and sensation     Bed Management:  -Have minimal linens on bed & keep smooth, unwrinkled  -Change linens as needed when moist or perspiring  -Avoid sitting or lying in one position for more than 2 hours while in bed  -Keep HOB at 30 degrees     Toileting:  -Offer bedside commode  -Assess for incontinence  -Use incontinent care products after each incontinent episode     Activity:  -Mobilize patient 3 times a  day  -Encourage activity and walks on unit  -Encourage or provide ROM exercises   -Turn and reposition patient every 2 Hours  -Use appropriate equipment to lift or move patient in bed  -Instruct/ Assist with weight shifting when out of bed in chair  -Consider limitation of chair time 2 hour intervals    Skin Care:  -Avoid use of baby powder, tape, friction and shearing, hot water or constrictive clothing  -Relieve pressure over bony prominences  -Do not massage red bony areas    Next Steps:  -Teach patient strategies to minimize risks   -Consider consults to  interdisciplinary teams  Outcome: Progressing     Problem: HEMATOLOGIC - ADULT  Goal: Maintains hematologic stability  Description: INTERVENTIONS  - Assess for signs and symptoms of bleeding or hemorrhage  - Monitor labs  - Administer supportive blood products/factors as ordered and appropriate  Outcome: Progressing     Problem: MUSCULOSKELETAL - ADULT  Goal: Maintain or return mobility to safest level of function  Description: INTERVENTIONS:  - Assess patient's ability to carry out ADLs; assess patient's baseline for ADL function and identify physical deficits which impact ability to perform ADLs (bathing, care of mouth/teeth, toileting, grooming, dressing, etc.)  - Assess/evaluate cause of self-care deficits   - Assess range of motion  - Assess patient's mobility  - Assess patient's need for assistive devices and provide as appropriate  - Encourage maximum independence but intervene and supervise when necessary  - Involve family in performance of ADLs  - Assess for home care needs following discharge   - Consider OT consult to assist with ADL evaluation and planning for discharge  - Provide patient education as appropriate  Outcome: Progressing  Goal: Maintain proper alignment of affected body part  Description: INTERVENTIONS:  - Support, maintain and protect limb and body alignment  - Provide patient/ family with appropriate education  Outcome: Progressing      Problem: PAIN - ADULT  Goal: Verbalizes/displays adequate comfort level or baseline comfort level  Description: Interventions:  - Encourage patient to monitor pain and request assistance  - Assess pain using appropriate pain scale  - Administer analgesics based on type and severity of pain and evaluate response  - Implement non-pharmacological measures as appropriate and evaluate response  - Consider cultural and social influences on pain and pain management  - Notify physician/advanced practitioner if interventions unsuccessful or patient reports new pain  Outcome: Progressing     Problem: INFECTION - ADULT  Goal: Absence or prevention of progression during hospitalization  Description: INTERVENTIONS:  - Assess and monitor for signs and symptoms of infection  - Monitor lab/diagnostic results  - Monitor all insertion sites, i.e. indwelling lines, tubes, and drains  - Monitor endotracheal if appropriate and nasal secretions for changes in amount and color  - [place] appropriate cooling/warming therapies per order  - Administer medications as ordered  - Instruct and encourage patient and family to use good hand hygiene technique  - Identify and instruct in appropriate isolation precautions for identified infection/condition  Outcome: Progressing     Problem: SAFETY ADULT  Goal: Maintain or return to baseline ADL function  Description: INTERVENTIONS:  -  Assess patient's ability to carry out ADLs; assess patient's baseline for ADL function and identify physical deficits which impact ability to perform ADLs (bathing, care of mouth/teeth, toileting, grooming, dressing, etc.)  - Assess/evaluate cause of self-care deficits   - Assess range of motion  - Assess patient's mobility; develop plan if impaired  - Assess patient's need for assistive devices and provide as appropriate  - Encourage maximum independence but intervene and supervise when necessary  - Involve family in performance of ADLs  - Assess for home care  needs following discharge   - Consider OT consult to assist with ADL evaluation and planning for discharge  - Provide patient education as appropriate  Outcome: Progressing  Goal: Maintains/Returns to pre admission functional level  Description: INTERVENTIONS:  - Perform AM-PAC 6 Click Basic Mobility/ Daily Activity assessment daily.  - Set and communicate daily mobility goal to care team and patient/family/caregiver.   - Collaborate with rehabilitation services on mobility goals if consulted  - Perform Range of Motion 3 times a day.  - Reposition patient every 3 hours.  - Dangle patient 3 times a day  - Stand patient 3 times a day  - Ambulate patient 3 times a day  - Out of bed to chair 3 times a day   - Out of bed for meals 3 times a day  - Out of bed for toileting  - Record patient progress and toleration of activity level   Outcome: Progressing     Problem: DISCHARGE PLANNING  Goal: Discharge to home or other facility with appropriate resources  Description: INTERVENTIONS:  - Identify barriers to discharge w/patient and caregiver  - Arrange for needed discharge resources and transportation as appropriate  - Identify discharge learning needs (meds, wound care, etc.)  - Arrange for interpretive services to assist at discharge as needed  - Refer to Case Management Department for coordinating discharge planning if the patient needs post-hospital services based on physician/advanced practitioner order or complex needs related to functional status, cognitive ability, or social support system  Outcome: Progressing     Problem: Knowledge Deficit  Goal: Patient/family/caregiver demonstrates understanding of disease process, treatment plan, medications, and discharge instructions  Description: Complete learning assessment and assess knowledge base.  Interventions:  - Provide teaching at level of understanding  - Provide teaching via preferred learning methods  Outcome: Progressing

## 2024-03-04 NOTE — CONSULTS
Medical Oncology/Hematology Consult Note  Kerry Silverman, 83 y.o., 1940  /-[birth year], [id number]  03/04/24    Assessment and Plan:  Patient is an 83-year-old female with a history of metastatic breast cancer no longer on disease directed therapy, history of thromboembolic disease requiring thrombectomy and placement of IVC filter, Crohn's colitis/rectal bleeding who presents with symptomatic anemia in the setting of poorly controlled Crohn's colitis/chronic anticoagulation.  Hematology is consulted for recommendations regarding anticoagulation    1. Metastatic Breast Cancer   History of stage IV ER/FL positive 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.  Previous patient of Dr. Cardoso with medical oncology.  See oncology history for details  Patient elected to stop cancer therapy in 10/2022    Patient does not wish to undergo any additional workup and/or treatment for her metastatic breast cancer.      2. History of DVT/PE  On aspirin/low-dose Eliquis  9/2022 diagnosed with an acute submassive high risk for PE with bilateral lower extremity DVTs    9/22/22 Status post mechanical thrombectomy of left pulmonary artery with extirpation of large clot     9/23/22 status post IVC filter placement    Eliquis currently on hold due to anemia      3.  Acute on chronic anemia   4.  Crohn's colitis  Secondary from rectal bleeding in the setting of poorly controlled Crohn's colitis and chronic anticoagulation  Baseline Hgb 7-9    Hemoglobin on admission 7 with normocytic indices.  Hemoglobin dropped to a low of 6.  S/P 1 unit PRBC  Iron panel consistent with anemia of inflammation, serum ferritin 820    GI following, patient started on Solu-Medrol  Hemoglobin stable 7.8      Recommend:  Patient has a history of stage IV breast cancer that is not being treated per her wishes.  She also has a history of significant thromboembolic disease.  Hematology does recommend lifelong anticoagulation given clotting history and hypercoagulable state  of malignancy.  Eliquis 2.5 mg twice daily is an appropriate dose for her age.  Risk versus benefit discussed.  Patient is at greater risk for developing recurrent clots without blood thinner.  Her hemoglobin has been stable and GI has cleared patient to resume anticoagulation.  Would closely monitor H&H and transfuse if needed to keep hemoglobin greater than/equal to 7      Please do not hesitate to contact me if you have any questions or need additional information. Thank you for this consult.    Elvia Funez MSN, CRNP, OCN  Hematology Oncology Nurse Practitioner      Reason for Consultation: History of DVT/PE - on Eliquis but issues with rectal bleeding and anemia due to IBD.  Patient and family concerned whether pt needs to be on Eliquis.  Has IVC filter         History of present illness:   Kerry Silverman is a 83 y.o. female with a history of metastatic breast cancer off treatment since 2022, thromboembolic disease on aspirin and Eliquis, chronic suprapubic catheter secondary to urinary retention, Crohn's colitis, history of C. difficile, rectal bleeding, anemia who presents with symptomatic anemia in the setting of poorly controlled Crohn's disease and chronic anticoagulation        Oncologic History:  Patient has a history stage I A ER/AL positive, 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 s/p resection and adjuvant radiation therapy.  She did receive 5 years of adjuvant hormonal therapy with Arimidex     Due to symptoms of bony discomfort in her sternum she underwent imaging on 6/25/2021.  CT of chest demonstrate evidence of a large lytic lesion involving the upper half of the body of the sternum suspicious for metastatic disease.  CT of abdomen and pelvis showed additional osseous lesions.     On 07/09/2021 patient underwent IR bone biopsy:  Final Diagnosis  A. Bone, Sternal mass, Biopsy:  - Metastatic carcinoma, consistent with breast primary.   Patient now has stage IV disease with bony metastasis which was ER  positive, AR negative, HER2 negative     Since patient had Oligometastatic metastatic disease she was started on treatment with Faslodex in combination with Xgeva in July 2021  She was treated with a course of radiation therapy at [facility].     Imaging on 4/5/22  showed interval development of a 4 mm nodule at the right lower lobe along with interval development of increased sclerotic lesions throughout the visualized spine for example there is interval development of 7 mm sclerotic lesion in the anterior superior aspect of T8 concerning for metastases     Patient's treatment was changed to Femara and Kisqali in 4/2022     Due to poor tolerance, Kisqali was discontinued and patient was started on Verzenio 150 mg p.o. along with Femara 07/18/2022     Verzenio discontinued in 8/2022 due to severe diarrhea.  Patient elected not to undergo any additional treatment for her metastatic breast cancer      Interval history:  Patient seen and examined.  Denies any rectal bleeding today.  No abdominal pain.  Appetite is good.  Denies chest pain, shortness of breath, lightheadedness or dizziness.    Denies any breast pain or discomfort.      Review of Systems   All other systems reviewed and are negative.    All other review of systems were negative.    Past medical history:   Past Medical History:   Diagnosis Date    Abnormal weight loss     Allergic reaction     Anxiety     Breast cancer, right (HCC) 2011    right    Cancer (HCC) 2012    Candidal vulvovaginitis     Clotting disorder (HCC) Same as above    Endometrial hyperplasia     Epithelial cyst     benign, ovary    GI (gastrointestinal bleed) Blood mixed with stool    History of radiation therapy 2011    right breast cancer    Hyperlipidemia     Hypertension     Internal hemorrhoids     Knee tendonitis     Ovarian cyst     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(HCC)        Past surgical history:   Past Surgical History:   Procedure Laterality Date    BILATERAL  SALPINGOOPHORECTOMY      onset: 7/23/13    BREAST BIOPSY Right 06/13/2006    benign    BREAST BIOPSY Right 03/02/2011    malignant    BREAST LUMPECTOMY Right 03/30/2011    malignant    CATARACT EXTRACTION W/  INTRAOCULAR LENS IMPLANT Right     phacoemulsification. Onset: 10/27/14    CHOLECYSTECTOMY      COLONOSCOPY  2017    approx    HYSTERECTOMY  Yes    INTRAOPERATIVE RADIATION THERAPY (IORT)      IR BIOPSY BONE  7/9/2021    IR IVC FILTER PLACEMENT OPTIONAL/TEMPORARY  9/23/2022    IR PE ENDOVASCULAR THERAPY  9/22/2022    IR PICC PLACEMENT SINGLE LUMEN  8/19/2022    IR PICC PLACEMENT SINGLE LUMEN  9/26/2022    IR SUPRAPUBIC CATHETER PLACEMENT  12/29/2022    SKIN LESION EXCISION Right     breast, single lesion    TONSILLECTOMY AND ADENOIDECTOMY         Allergies:   Allergies   Allergen Reactions    Sulfa Antibiotics     Pneumococcal Polysaccharide Vaccine Rash and Edema       Home medications:   Medications Prior to Admission   Medication    apixaban (ELIQUIS) 2.5 mg    Aspirin Low Dose 81 MG chewable tablet    ferrous sulfate 324 (65 Fe) mg    melatonin 3 mg    metoprolol succinate (TOPROL-XL) 50 mg 24 hr tablet    midodrine (PROAMATINE) 2.5 mg tablet    mirtazapine (REMERON) 7.5 MG tablet    ondansetron (ZOFRAN-ODT) 4 mg disintegrating tablet    pantoprazole (PROTONIX) 40 mg tablet    simvastatin (ZOCOR) 10 mg tablet    acetaminophen (TYLENOL) 325 mg tablet    cholestyramine sugar free (QUESTRAN LIGHT) 4 g packet    clindamycin (CLEOCIN) 300 MG capsule    fluticasone (FLONASE) 50 mcg/act nasal spray    folic acid (FOLVITE) 1 mg tablet    saccharomyces boulardii (FLORASTOR) 250 mg capsule       Hospital medications:   Current Facility-Administered Medications:     acetaminophen (TYLENOL) tablet 650 mg, 650 mg, Oral, Q6H PRN, Ofelia Wheeler PA-C    aspirin chewable tablet 81 mg, 81 mg, Oral, Daily, Ofelia Wheeler PA-C, 81 mg at 03/03/24 0915    ferrous sulfate tablet 325 mg, 325 mg, Oral, BID With Meals,  Ofelia Wheeler PA-C, 325 mg at 24 163    folic acid (FOLVITE) tablet 1 mg, 1 mg, Oral, Daily, Ofelia Wheeler PA-C, 1 mg at 24    melatonin tablet 3 mg, 3 mg, Oral, HS, Ofelia Wheeler PA-C, 3 mg at 24    methylPREDNISolone sodium succinate (Solu-MEDROL) injection 40 mg, 40 mg, Intravenous, Daily, Verna Gaston PA-C, 40 mg at 24    metoprolol succinate (TOPROL-XL) 24 hr tablet 50 mg, 50 mg, Oral, Daily, Ofelia Wheeler PA-C, 50 mg at 24    mirtazapine (REMERON) tablet 7.5 mg, 7.5 mg, Oral, HS, Ofelia Wheeler PA-C, 7.5 mg at 24    ondansetron (ZOFRAN) injection 4 mg, 4 mg, Intravenous, Q6H PRN, Ofelia Wheeler PA-C    pantoprazole (PROTONIX) injection 40 mg, 40 mg, Intravenous, Q12H RIA, Ofelia Wheeler PA-C, 40 mg at 24    pravastatin (PRAVACHOL) tablet 20 mg, 20 mg, Oral, Daily With Dinner, Ofelia Wheeler PA-C, 20 mg at 24    saccharomyces boulardii (FLORASTOR) capsule 250 mg, 250 mg, Oral, BID, Ofelia Wheeler PA-C, 250 mg at 24 1758    Social history:   Social History     Tobacco Use    Smoking status: Former     Current packs/day: 0.00     Average packs/day: 1 pack/day for 34.0 years (34.0 ttl pk-yrs)     Types: Cigarettes     Start date:      Quit date: 1981     Years since quittin.2    Smokeless tobacco: Never   Vaping Use    Vaping status: Never Used   Substance Use Topics    Alcohol use: Not Currently     Comment: (history)    Drug use: No       Family history:   Family History   Problem Relation Age of Onset    Stomach cancer Mother     No Known Problems Father     Cervical cancer Sister     No Known Problems Daughter     No Known Problems Maternal Grandmother     No Known Problems Maternal Grandfather     No Known Problems Paternal Grandmother     No Known Problems Paternal Grandfather     Colon polyps Neg Hx     Colon cancer Neg Hx        Vitals:  Vitals:    24 2323    BP:    Pulse:    Resp:    Temp:    SpO2: 99%       Physical Exam  Constitutional:       General: She is not in acute distress.  HENT:      Head: Normocephalic and atraumatic.   Eyes:      General: No scleral icterus.  Cardiovascular:      Rate and Rhythm: Normal rate.   Pulmonary:      Effort: Pulmonary effort is normal. No respiratory distress.   Musculoskeletal:         General: Normal range of motion.      Cervical back: Normal range of motion.   Lymphadenopathy:      Cervical: No cervical adenopathy.      Upper Body:      Right upper body: No axillary adenopathy.      Left upper body: No axillary adenopathy.   Skin:     General: Skin is warm and dry.      Coloration: Skin is pale.   Neurological:      General: No focal deficit present.      Mental Status: She is alert and oriented to person, place, and time.   Psychiatric:         Mood and Affect: Mood normal.         Behavior: Behavior normal.         Recent Results (from the past 48 hour(s))   Serial Hemoglobin, Q12hrs    Collection Time: 03/02/24 12:59 PM   Result Value Ref Range    Hemoglobin 8.4 (L) 11.5 - 15.4 g/dL   Prepare Leukoreduced RBC: 1 Units, Leukoreduced, Irradiated    Collection Time: 03/03/24  5:49 AM   Result Value Ref Range    Unit Product Code I8439A09     Unit Number [id number]     Unit ABO O     Unit RH NEG     Crossmatch Compatible     Unit Dispense Status Presumed Trans     Unit Product Volume 350 mL   Basic metabolic panel    Collection Time: 03/03/24  6:29 AM   Result Value Ref Range    Sodium 138 135 - 147 mmol/L    Potassium 4.2 3.5 - 5.3 mmol/L    Chloride 114 (H) 96 - 108 mmol/L    CO2 19 (L) 21 - 32 mmol/L    ANION GAP 5 mmol/L    BUN 12 5 - 25 mg/dL    Creatinine 0.77 0.60 - 1.30 mg/dL    Glucose 91 65 - 140 mg/dL    Calcium 7.8 (L) 8.4 - 10.2 mg/dL    eGFR 71 ml/min/1.73sq m   Magnesium    Collection Time: 03/03/24  6:29 AM   Result Value Ref Range    Magnesium 2.2 1.9 - 2.7 mg/dL   CBC and differential    Collection  Time: 03/03/24  7:18 AM   Result Value Ref Range    WBC 4.78 4.31 - 10.16 Thousand/uL    RBC 3.14 (L) 3.81 - 5.12 Million/uL    Hemoglobin 8.5 (L) 11.5 - 15.4 g/dL    Hematocrit 27.6 (L) 34.8 - 46.1 %    MCV 88 82 - 98 fL    MCH 27.1 26.8 - 34.3 pg    MCHC 30.8 (L) 31.4 - 37.4 g/dL    RDW 18.9 (H) 11.6 - 15.1 %    MPV 9.3 8.9 - 12.7 fL    Platelets 203 149 - 390 Thousands/uL    nRBC 0 /100 WBCs    Neutrophils Relative 67 43 - 75 %    Immat GRANS % 1 0 - 2 %    Lymphocytes Relative 22 14 - 44 %    Monocytes Relative 8 4 - 12 %    Eosinophils Relative 2 0 - 6 %    Basophils Relative 0 0 - 1 %    Neutrophils Absolute 3.19 1.85 - 7.62 Thousands/µL    Immature Grans Absolute 0.05 0.00 - 0.20 Thousand/uL    Lymphocytes Absolute 1.05 0.60 - 4.47 Thousands/µL    Monocytes Absolute 0.40 0.17 - 1.22 Thousand/µL    Eosinophils Absolute 0.08 0.00 - 0.61 Thousand/µL    Basophils Absolute 0.01 0.00 - 0.10 Thousands/µL   Basic metabolic panel    Collection Time: 03/04/24  2:16 AM   Result Value Ref Range    Sodium 138 135 - 147 mmol/L    Potassium 4.0 3.5 - 5.3 mmol/L    Chloride 112 (H) 96 - 108 mmol/L    CO2 21 21 - 32 mmol/L    ANION GAP 5 mmol/L    BUN 15 5 - 25 mg/dL    Creatinine 0.82 0.60 - 1.30 mg/dL    Glucose 87 65 - 140 mg/dL    Calcium 7.7 (L) 8.4 - 10.2 mg/dL    eGFR 65 ml/min/1.73sq m   CBC and differential    Collection Time: 03/04/24  2:16 AM   Result Value Ref Range    WBC 5.70 4.31 - 10.16 Thousand/uL    RBC 2.91 (L) 3.81 - 5.12 Million/uL    Hemoglobin 7.8 (L) 11.5 - 15.4 g/dL    Hematocrit 26.0 (L) 34.8 - 46.1 %    MCV 89 82 - 98 fL    MCH 26.8 26.8 - 34.3 pg    MCHC 30.0 (L) 31.4 - 37.4 g/dL    RDW 19.4 (H) 11.6 - 15.1 %    MPV 9.5 8.9 - 12.7 fL    Platelets 234 149 - 390 Thousands/uL    nRBC 0 /100 WBCs    Neutrophils Relative 60 43 - 75 %    Immat GRANS % 1 0 - 2 %    Lymphocytes Relative 27 14 - 44 %    Monocytes Relative 8 4 - 12 %    Eosinophils Relative 4 0 - 6 %    Basophils Relative 0 0 - 1 %     Neutrophils Absolute 3.43 1.85 - 7.62 Thousands/µL    Immature Grans Absolute 0.07 0.00 - 0.20 Thousand/uL    Lymphocytes Absolute 1.55 0.60 - 4.47 Thousands/µL    Monocytes Absolute 0.43 0.17 - 1.22 Thousand/µL    Eosinophils Absolute 0.20 0.00 - 0.61 Thousand/µL    Basophils Absolute 0.02 0.00 - 0.10 Thousands/µL       Imaging Studies:   No results found.    Counseling / Coordination of Care  Total floor / unit time spent today 75 minutes. Greater than 50% of total time was spent with the patient and / or family counseling and / or coordination of care.       BROOKE Wang    Please note:  This report has been generated by voice recognition software system. Therefore, there may be syntax, spelling and/or grammatical errors.  Please call if you have any questions.

## 2024-03-04 NOTE — PROGRESS NOTES
"Kerry Silverman  [id number]    83 y.o.  female      ASSESSMENT and PLAN    Crohn's colitis/symptomatic anemia/history of PE requiring anticoagulation/history of C. difficile- presented with bloody diarrhea on anticoagulation.  Eliquis held.  Bleeding improved.  Stools negative, positive for colonization of C. difficile but no toxin or active infection.  Given no signs of active infection, commenced with steroid therapy to try to attain remission.  Patient recently started on Stelara for Crohn's colitis.  Clinically improved.    Plan:  Continue to follow H&H and transfuse as needed  Diet as tolerated  Continue IV steroids    Chief Complaint   Patient presents with    Rectal Bleeding     Pt to er via ems from [facility] with reports that patient's recent blood work showed that her hbg dropped from 8.3 to 7.3 since the 21 of feb with active rectal bleeding. Denies dizziness or shortness of breath        SUBJECTIVE/HPI is better.  Had good night sleep.  Still with loose stools but less frequent and fewer \"accidents\" appetite remains only fair.  No abdominal pain nausea or vomiting.  No reported bleeding.    /57   Pulse 104   Temp 97.6 °F (36.4 °C)   Resp 16   Ht 5' 2\" (1.575 m)   Wt 57.9 kg (127 lb 10.3 oz)   LMP  (LMP Unknown)   SpO2 100%   BMI 23.35 kg/m²     PHYSICALEXAM  General appearance: alert, appears stated age and cooperative  Head: Normocephalic, without obvious abnormality, atraumatic  Lungs: clear to auscultation bilaterally  Heart: regular rate and rhythm, S1, S2 normal, no murmur, click, rub or gallop  Abdomen: soft, non-tender; bowel sounds normal; no masses,  no organomegaly  Extremities: extremities normal, atraumatic, no cyanosis or edema  Neurologic: Grossly normal    Lab Results   Component Value Date    GLUCOSE 60 (L) 09/23/2022    CALCIUM 7.7 (L) 03/04/2024     09/08/2015    K 4.0 03/04/2024    CO2 21 03/04/2024     (H) 03/04/2024    BUN 15 03/04/2024    CREATININE 0.82 " "03/04/2024     Lab Results   Component Value Date    WBC 5.70 03/04/2024    HGB 7.8 (L) 03/04/2024    HCT 26.0 (L) 03/04/2024    MCV 89 03/04/2024     03/04/2024     Lab Results   Component Value Date    ALT 10 03/01/2024    AST 35 03/01/2024    ALKPHOS 119 (H) 03/01/2024    BILITOT 0.66 09/08/2015     No results found for: \"AMYLASE\"  Lab Results   Component Value Date    LIPASE 79 08/15/2022     Lab Results   Component Value Date    IRON 31 (L) 03/01/2024    TIBC 91 (L) 03/01/2024    FERRITIN 820 (H) 03/01/2024     Lab Results   Component Value Date    INR 1.37 (H) 03/01/2024       Counseling / Coordination of Care  Total floor / unit time spent today 20 minutes.                           "

## 2024-03-04 NOTE — CASE MANAGEMENT
Case Management Assessment & Discharge Planning Note    Patient name Kerry Silverman  Location /-01 MRN [id number]  : 1940 Date 3/4/2024       Current Admission Date: 3/1/2024  Current Admission Diagnosis:Rectal bleeding   Patient Active Problem List    Diagnosis Date Noted    History of pulmonary embolism 2024    IBD (inflammatory bowel disease) 2024    Electrolyte abnormality 2024    Crohn's disease of large intestine with rectal bleeding (HCC) 2024    Suprapubic catheter (HCC) 2023    Hematochezia 10/13/2022    COVID-19 virus infection 10/11/2022    Ulcerative pancolitis with rectal bleeding (HCC) 10/03/2022    Saddle embolus of pulmonary artery (HCC) 2022    Urinary retention 2022    Venous insufficiency 2022    Single subsegmental pulmonary embolism without acute cor pulmonale (HCC) 2022    Severe protein-calorie malnutrition (HCC) 2022    Anemia 2022    Diarrhea 2022    Secondary malignant neoplasm of bone (HCC) 2022    Rectal bleeding 10/14/2021    Lytic lesion of bone on x-ray 2021    Neoplasm of uncertain behavior of sternum 2021    Osteopenia of multiple sites 2020    Orthostatic hypotension 10/22/2019    Chronic kidney disease (CKD) stage G3a/A1, moderately decreased glomerular filtration rate (GFR) between 45-59 mL/min/1.73 square meter and albuminuria creatinine ratio less than 30 mg/g (HCC) 2019    Adverse reaction to pneumococcal vaccine 2015    Cataract, bilateral 2014    Pulmonary nodule seen on imaging study 09/10/2013    Anxiety 2013    Primary malignant neoplasm of breast with metastasis (HCC) 10/17/2012    Mixed hyperlipidemia 2012      LOS (days): 3  Geometric Mean LOS (GMLOS) (days): 2.1  Days to GMLOS:-0.9     OBJECTIVE:  PATIENT READMITTED TO HOSPITAL  Risk of Unplanned Readmission Score: 24.4         Current admission status: Inpatient        Preferred Pharmacy:   [id number] - [place], [place] - [address]  [address]  [facility] [address]  Phone: [phone] Fax: [phone]    [facility] - [place], [place] - [address]  [address]  [address]  [place] [address]  Phone: [phone] Fax: [phone]    [facility] - [place], [place] - [address]  [address]  [place] [address]  Phone: [phone] Fax: [phone]    [facility] - [place] - [address]  [address] [place] [address]  [address]  [place] [address]  Phone: [phone] Fax: [phone]    Primary Care Provider: Elena Escalera DO    Primary Insurance: MEDICARE  Secondary Insurance: AET[employer]    ASSESSMENT:  Active Health Care Proxies       Taya Michelle Alternate Health Care Agent - Daughter   Primary Phone: [phone] (Mobile)                           Readmission Root Cause  30 Day Readmission: No    Patient Information  Admitted from:: Facility  Mental Status: Alert  During Assessment patient was accompanied by: Not accompanied during assessment  Assessment information provided by:: Patient  Support Systems: Self, Children, Family members  What city do you live in?: [place]  Home entry access options. Select all that apply.: No steps to enter home  Type of Current Residence: Facility  Upon entering residence, is there a bedroom on the main floor (no further steps)?: Yes  Living Arrangements: Other (Comment) ([facility])  Is patient a ?: No    Activities of Daily Living Prior to Admission  Functional Status: Assistance  Completes ADLs independently?: Yes  Ambulates independently?: Yes  Does patient use assisted devices?: Yes  Assisted Devices (DME) used: Walker  Does patient currently own DME?: Yes  What DME does the patient currently own?: Walker  Does patient have a history of Outpatient Therapy (PT/OT)?: No  Does the patient have a history of  Short-Term Rehab?: No  Does patient have a history of HHC?: Yes ([facility])  Does patient currently have HHC?: No         Patient Information Continued  Income Source: Pension/penitentiary  Does patient have prescription coverage?: Yes  Does patient receive dialysis treatments?: No  Does patient have a history of substance abuse?: No  Does patient have a history of Mental Health Diagnosis?: No         Means of Transportation  Means of Transport to [facility]:: Family transport      Social Determinants of Health (SDOH)      Flowsheet Row Most Recent Value   Housing Stability    In the last 12 months, was there a time when you were not able to pay the mortgage or rent on time? N   In the last 12 months, how many places have you lived? 2   In the last 12 months, was there a time when you did not have a steady place to sleep or slept in a shelter (including now)? N   Transportation Needs    In the past 12 months, has lack of transportation kept you from medical appointments or from getting medications? no   In the past 12 months, has lack of transportation kept you from meetings, work, or from getting things needed for daily living? No   Food Insecurity    Within the past 12 months, you worried that your food would run out before you got the money to buy more. Never true   Within the past 12 months, the food you bought just didn't last and you didn't have money to get more. Never true   Utilities    In the past 12 months has the electric, gas, oil, or water company threatened to shut off services in your home? No            DISCHARGE DETAILS:    Discharge planning discussed with:: Pt at bedside  [place] of Choice: Yes     CM contacted family/caregiver?: No- see comments  Were Treatment Team discharge recommendations reviewed with patient/caregiver?: Yes  Did patient/caregiver verbalize understanding of patient care needs?: Yes  Were patient/caregiver advised of the risks associated with not following Treatment Team discharge  recommendations?: Yes         Requested Home Health Care         Is the patient interested in HHC at discharge?: No    DME Referral Provided  Referral made for DME?: No          Additional Comments: Met with the pt at bedside to review CM role and possible discharge planning needs. Pt stated that she lives at [facility] as a long term resident. Pt stated that she uses a walker to ambulate. Spoke with bob Gilbert from [facility] who stated that the pt is a long term resident and a bed hold. Pt stated that she [facility] at time of d/c. CM to follow for ongoing discharge planning needs.

## 2024-03-04 NOTE — PLAN OF CARE
Problem: Nutrition/Hydration-ADULT  Goal: Nutrient/Hydration intake appropriate for improving, restoring or maintaining nutritional needs  Description: Monitor and assess patient's nutrition/hydration status for malnutrition. Collaborate with interdisciplinary team and initiate plan and interventions as ordered.  Monitor patient's weight and dietary intake as ordered or per policy. Utilize nutrition screening tool and intervene as necessary. Determine patient's food preferences and provide high-protein, high-caloric foods as appropriate.     INTERVENTIONS:  - Monitor oral intake, urinary output, labs, and treatment plans  - Assess nutrition and hydration status and recommend course of action  - Evaluate amount of meals eaten  - Assist patient with eating if necessary   - Allow adequate time for meals  - Recommend/ encourage appropriate diets, oral nutritional supplements, and vitamin/mineral supplements  - Order, calculate, and assess calorie counts as needed  - Recommend, monitor, and adjust tube feedings and TPN/PPN based on assessed needs  - Assess need for intravenous fluids  - Provide specific nutrition/hydration education as appropriate  - Include patient/family/caregiver in decisions related to nutrition  Outcome: Progressing     Problem: GASTROINTESTINAL - ADULT  Goal: Minimal or absence of nausea and/or vomiting  Description: INTERVENTIONS:  - Administer IV fluids if ordered to ensure adequate hydration  - Maintain NPO status until nausea and vomiting are resolved  - Nasogastric tube if ordered  - Administer ordered antiemetic medications as needed  - Provide nonpharmacologic comfort measures as appropriate  - Advance diet as tolerated, if ordered  - Consider nutrition services referral to assist patient with adequate nutrition and appropriate food choices  Outcome: Progressing     Problem: MUSCULOSKELETAL - ADULT  Goal: Maintain or return mobility to safest level of function  Description:  INTERVENTIONS:  - Assess patient's ability to carry out ADLs; assess patient's baseline for ADL function and identify physical deficits which impact ability to perform ADLs (bathing, care of mouth/teeth, toileting, grooming, dressing, etc.)  - Assess/evaluate cause of self-care deficits   - Assess range of motion  - Assess patient's mobility  - Assess patient's need for assistive devices and provide as appropriate  - Encourage maximum independence but intervene and supervise when necessary  - Involve family in performance of ADLs  - Assess for home care needs following discharge   - Consider OT consult to assist with ADL evaluation and planning for discharge  - Provide patient education as appropriate  Outcome: Progressing     Problem: HEMATOLOGIC - ADULT  Goal: Maintains hematologic stability  Description: INTERVENTIONS  - Assess for signs and symptoms of bleeding or hemorrhage  - Monitor labs  - Administer supportive blood products/factors as ordered and appropriate  Outcome: Progressing

## 2024-03-04 NOTE — OCCUPATIONAL THERAPY NOTE
Occupational Therapy Evaluation & Treatment     Patient Name: Kerry Silverman  Today's Date: 3/4/2024  Problem List  Principal Problem:    Rectal bleeding  Active Problems:    Primary malignant neoplasm of breast with metastasis (HCC)    Mixed hyperlipidemia    Anemia    History of pulmonary embolism    IBD (inflammatory bowel disease)    Past Medical History  Past Medical History:   Diagnosis Date    Abnormal weight loss     Allergic reaction     Anxiety     Breast cancer, right (HCC) 2011    right    Cancer (HCC) 2012    Candidal vulvovaginitis     Clotting disorder (HCC) Same as above    Endometrial hyperplasia     Epithelial cyst     benign, ovary    GI (gastrointestinal bleed) Blood mixed with stool    History of radiation therapy 2011    right breast cancer    Hyperlipidemia     Hypertension     Internal hemorrhoids     Knee tendonitis     Ovarian cyst     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(HCC)      Past Surgical History  Past Surgical History:   Procedure Laterality Date    BILATERAL SALPINGOOPHORECTOMY      onset: 7/23/13    BREAST BIOPSY Right 06/13/2006    benign    BREAST BIOPSY Right 03/02/2011    malignant    BREAST LUMPECTOMY Right 03/30/2011    malignant    CATARACT EXTRACTION W/  INTRAOCULAR LENS IMPLANT Right     phacoemulsification. Onset: 10/27/14    CHOLECYSTECTOMY      COLONOSCOPY  2017    approx    HYSTERECTOMY  Yes    INTRAOPERATIVE RADIATION THERAPY (IORT)      IR BIOPSY BONE  7/9/2021    IR IVC FILTER PLACEMENT OPTIONAL/TEMPORARY  9/23/2022    IR PE ENDOVASCULAR THERAPY  9/22/2022    IR PICC PLACEMENT SINGLE LUMEN  8/19/2022    IR PICC PLACEMENT SINGLE LUMEN  9/26/2022    IR SUPRAPUBIC CATHETER PLACEMENT  12/29/2022    SKIN LESION EXCISION Right     breast, single lesion    TONSILLECTOMY AND ADENOIDECTOMY               03/04/24 1044   OT Last Visit   OT Visit Date 03/04/24   Note Type   Note type Evaluation   Pain Assessment   Pain Assessment Tool 0-10   Pain Score No Pain  "  Restrictions/Precautions   Weight Bearing Precautions Per Order No   Other Precautions Limb alert;Chair Alarm;Fall Risk;Contact/isolation   Home Living   Type of Home SNF  ([facility])   Home Layout One level   Home Equipment Walker   Additional Comments At baseline, pt able to walk community distances in her facility with RW   Prior Function   Level of [place] Needs assistance with ADLs;Needs assistance with IADLS;Independent with functional mobility   Lives With Facility staff   Receives Help From Family;Personal care attendant   IADLs Family/Friend/Other provides transportation;Family/Friend/Other provides meals;Family/Friend/Other provides medication management   Comments Pt reports she can mostly dress herself, gets assistance for showering/suarez management   General   Family/Caregiver Present No   Subjective   Subjective \"Oh I walk all over!\"   ADL   Eating Assistance 7  Independent   Grooming Assistance 7  Independent   Grooming Deficit Teeth care   UB Bathing Assistance 5  Supervision/Setup   LB Bathing Assistance 4  Minimal Assistance   UB Dressing Assistance 5  Supervision/Setup   LB Dressing Assistance 4  Minimal Assistance   Toileting Assistance  3  Moderate Assistance   Toileting Deficit   (Pt requiring increased assistance 2/2 diarrhea)   Bed Mobility   Supine to Sit 5  Supervision   Additional items Bedrails;Increased time required   Transfers   Sit to Stand 5  Supervision   Stand to Sit 5  Supervision   Toilet transfer 5  Supervision   Additional Comments 3 sit <> stands completed.   Functional Mobility   Functional Mobility 5  Supervision   Additional Comments Short household distance   Additional items Rolling walker   Balance   Static Sitting Fair +   Dynamic Sitting Fair +   Static Standing Fair   Dynamic Standing Fair   Ambulatory Fair   Activity Tolerance   Activity Tolerance Patient tolerated treatment well   Medical Staff Made Aware PT RON Samano   Nurse Made Aware MAURO Diaz/Delfino "   RUE Assessment   RUE Assessment WFL  (limb alert)   LUE Assessment   LUE Assessment WFL   Vision-Basic Assessment   Current Vision Wears glasses all the time   Assessment   Prognosis Good   Assessment Pt is a 83 y.o. female seen for OT evaluation at [facility], admitted 3/1/2024 w/ Rectal bleeding. OT completed extensive review of pt's medical and social history. Comorbidities affecting pt's functional performance at time of assessment include: h/o breast CA with metastasis to bone, HLD, anemia, h/o PE, IBD, malnutrition, h/o orthostatic hypotension, suprapubic catheter. Crohn's disease. Prior to admission, pt was living at [facility]. Pt was A for I/ADLs, Mod I for functional txfers/mobility with use of RW. Upon evaluation, pt presents to OT at functional baseline. Based on findings, pt is of high complexity. The patient's raw score on the AM-PAC Daily Activity inpatient short form is 20, standardized score is 42.03, greater than 39.4. Patients at this level are likely to benefit from DC to home. Please refer to the recommendation of the Occupational Therapist for safe DC planning. At this time, OT recommendations at time of discharge are no OT needs. No further acute OT needs indicated at this time - Recommend pt continue to be OOB for meals, ambulation to/from BR, perform self care tasks, and mobility in hallway with nursing. D/C from OT caseload with above recommendations.   Goals   Patient Goals Pt wants to go back to her facility   Plan   OT Frequency Eval only   Discharge Recommendation   Rehab Resource Intensity Level, OT No post-acute rehabilitation needs   AM-PAC Daily Activity Inpatient   Lower Body Dressing 3   Bathing 3   Toileting 2   Upper Body Dressing 4   Grooming 4   Eating 4   Daily Activity Raw Score 20   Daily Activity Standardized Score (Calc for Raw Score >=11) 42.03   AM-PAC Applied Cognition Inpatient   Following a Speech/Presentation 4   Understanding Ordinary Conversation 4    Taking Medications 4   Remembering Where Things Are Placed or Put Away 4   Remembering List of 4-5 Errands 4   Taking Care of Complicated Tasks 3   Applied Cognition Raw Score 23   Applied Cognition Standardized Score 53.08   Additional Treatment Session   Start Time 1032   End Time 1044   Treatment Assessment Pt on BSC in bathroom. Pt requesting to brush teeth standing at sink s/p toileting. Pt completed sit <> stand from BSC with S. Pt required Min A for regan-care s/p bowel for throughness. While standing at sink, Pt became dizzy & required seated rest break. Pt tachy with HR in 120-130's. HR decreased to 100's once seated on BSC that was placed behind her. BP Stable. Pt reports improvement in symptoms when seated. Pt completed additional sit <> stand & functional mobility x5 ft to recliner with S & RW. Pt left seated OOB to recliner with (+) chair alarm, oral care products on tray table for pt to complete while seated. RN aware.   End of Consult   Education Provided Yes   Patient Position at End of Consult Bedside chair;Bed/Chair alarm activated;All needs within reach   Nurse Communication Nurse aware of consult       Lucía Rogers, OTR/L

## 2024-03-04 NOTE — ASSESSMENT & PLAN NOTE
Maintained on eliquis and s/p IVC filter  Patient and family concerned re: continuing eliquis  Hematology consulted to discuss AC.  At this time given metastatic cancer and risk for clots, would recommend continuing eliquis

## 2024-03-04 NOTE — ASSESSMENT & PLAN NOTE
Hemoglobin 8.6 on day of discharge following recent hospitalization February 21  Per report outpatient blood work had revealed low hemoglobin of 7.3  Hemoglobin 7.4 on repeat at time of admission  Reports lightheadedness on standing, intermittent rectal bleeding secondary to known IBD recently started on stelara infusions earlier this week  S/p 1 unit PRBCs for symptomatic anemia  Per GI OK to continue eliquis/ASA unless evidence of overt bleeding.  Patient and family concerned regarding continuing Eliquis given recurrent issues with rectal bleeding, anemia.  Patient does have IVC filter in place per daughter.  After discussion with hematology would recommend patient remain on eliquis.    Hgb 7.8 this AM  Trend CBC

## 2024-03-04 NOTE — PROGRESS NOTES
[facility]  Progress Note  Name: Kerry Silverman I  MRN: [id number]  Unit/Bed#: -01 I Date of Admission: 3/1/2024   Date of Service: 3/4/2024 I Hospital Day: 3    Assessment/Plan   * Rectal bleeding  Assessment & Plan  Presents to the emergency department with outpatient labs showing decreased hemoglobin, reported intermittent rectal bleeding suspect in setting of known IBD  On ASA/Eliquis as outpatient - OK to continue per GI.  However patient and family concerned re: resuming eliquis.  After further discussion with hematology, would recommend resuming eliquis and monitor  Hemoglobin 7.4 on admission - was 8.6 following recent discharge from hospital in Feb 2024.  Decreased to 7.3 on outpt labs  Hemodynamically stable  Continue IV Protonix  Trend H/H -s/p 1 unit PRBCs following admission  Hemoglobin 7.8  Consult gastroenterology -started on IV Solu-Medrol 3/2    IBD (inflammatory bowel disease)  Assessment & Plan  Patient receives Stelara infusions as outpatient, first dose was earlier this week  Started on IV Solu-Medrol 3/2  C. difficile testing positive for PCR but negative EIA  Will need close outpt follow up    History of pulmonary embolism  Assessment & Plan  Maintained on eliquis and s/p IVC filter  Patient and family concerned re: continuing eliquis  Hematology consulted to discuss AC.  At this time given metastatic cancer and risk for clots, would recommend continuing eliquis    Anemia  Assessment & Plan  Hemoglobin 8.6 on day of discharge following recent hospitalization February 21  Per report outpatient blood work had revealed low hemoglobin of 7.3  Hemoglobin 7.4 on repeat at time of admission  Reports lightheadedness on standing, intermittent rectal bleeding secondary to known IBD recently started on stelara infusions earlier this week  S/p 1 unit PRBCs for symptomatic anemia  Per GI OK to continue eliquis/ASA unless evidence of overt bleeding.  Patient and family  concerned regarding continuing Eliquis given recurrent issues with rectal bleeding, anemia.  Patient does have IVC filter in place per daughter.  After discussion with hematology would recommend patient remain on eliquis.    Hgb 7.8 this AM  Trend CBC    Mixed hyperlipidemia  Assessment & Plan  Continue statin    Primary malignant neoplasm of breast with metastasis (HCC)  Assessment & Plan  Patient reports she stopped all treatment  Is not interested in ongoing treatment or follow up at this time    Hypokalemia-resolved as of 3/4/2024  Assessment & Plan  Resolved  Trend BMP         VTE Pharmacologic Prophylaxis: VTE Score: 6 High Risk (Score >/= 5) - Pharmacological DVT Prophylaxis Contraindicated. Sequential Compression Devices Ordered.  Pending hematology discussion with patient will anticipate restarting eliquis today.    Mobility:   Basic Mobility Inpatient Raw Score: 13  JH-HLM Goal: 4: Move to chair/commode  JH-HLM Achieved: 7: Walk 25 feet or more  HLM Goal achieved. Continue to encourage appropriate mobility.    Patient Centered Rounds: I performed bedside rounds with nursing staff today.   Discussions with Specialists or Other Care Team Provider: CM, GI AP, hematology AP    Education and Discussions with Family / Patient: Updated  (daughter) via phone.  Daughter verbalized understanding re: recommendation to continue eliquis.    Total Time Spent on Date of Encounter in care of patient: 40 mins. This time was spent on one or more of the following: performing physical exam; counseling and coordination of care; obtaining or reviewing history; documenting in the medical record; reviewing/ordering tests, medications or procedures; communicating with other healthcare professionals and discussing with patient's family/caregivers.    Current Length of Stay: 3 day(s)  Current Patient Status: Inpatient   Certification Statement: The patient will continue to require additional inpatient hospital stay due  to monitoring hgb, ongoing GI recs  Discharge Plan: Anticipate discharge tomorrow to rehab facility.    Code Status: Level 3 - DNAR and DNI    Subjective:   Patient feels well this morning.  Still with some loose stools overnight.  Denies chest pain/palpitations, shortness of breath, nausea/vomiting or abdominal pain.    Objective:     Vitals:   Temp (24hrs), Av.7 °F (36.5 °C), Min:97.6 °F (36.4 °C), Max:97.8 °F (36.6 °C)    Temp:  [97.6 °F (36.4 °C)-97.8 °F (36.6 °C)] 97.6 °F (36.4 °C)  HR:  [] 104  Resp:  [16-22] 16  BP: (102-137)/(45-60) 137/57  SpO2:  [99 %-100 %] 100 %  Body mass index is 23.35 kg/m².     Input and Output Summary (last 24 hours):     Intake/Output Summary (Last 24 hours) at 3/4/2024 1048  Last data filed at 3/4/2024 0923  Gross per 24 hour   Intake 1140 ml   Output 125 ml   Net 1015 ml       Physical Exam:   Physical Exam  Vitals and nursing note reviewed.   Constitutional:       Appearance: Normal appearance.      Comments: No acute distress   HENT:      Head: Normocephalic.   Eyes:      General: No scleral icterus.     Extraocular Movements: Extraocular movements intact.      Conjunctiva/sclera: Conjunctivae normal.   Cardiovascular:      Rate and Rhythm: Normal rate and regular rhythm.      Heart sounds: S1 normal and S2 normal.   Pulmonary:      Effort: Pulmonary effort is normal.      Breath sounds: Normal breath sounds. No wheezing, rhonchi or rales.   Abdominal:      General: Bowel sounds are normal.      Palpations: Abdomen is soft.      Tenderness: There is no abdominal tenderness. There is no guarding or rebound.   Musculoskeletal:         General: No swelling, tenderness or deformity.      Cervical back: Normal range of motion.      Comments: Able to move upper/lower ext bilaterally, no edema   Skin:     General: Skin is warm and dry.   Neurological:      Mental Status: She is alert and oriented to person, place, and time.   Psychiatric:         Mood and Affect: Mood  normal.         Speech: Speech normal.         Behavior: Behavior normal.          Additional Data:     Labs:  Results from last 7 days   Lab Units 03/04/24  0216   WBC Thousand/uL 5.70   HEMOGLOBIN g/dL 7.8*   HEMATOCRIT % 26.0*   PLATELETS Thousands/uL 234   NEUTROS PCT % 60   LYMPHS PCT % 27   MONOS PCT % 8   EOS PCT % 4     Results from last 7 days   Lab Units 03/04/24  0216 03/02/24  0219 03/01/24  1334   SODIUM mmol/L 138   < > 135   POTASSIUM mmol/L 4.0   < > 2.9*   CHLORIDE mmol/L 112*   < > 106   CO2 mmol/L 21   < > 21   BUN mg/dL 15   < > 13   CREATININE mg/dL 0.82   < > 0.92   ANION GAP mmol/L 5   < > 8   CALCIUM mg/dL 7.7*   < > 7.4*   ALBUMIN g/dL  --   --  2.2*   TOTAL BILIRUBIN mg/dL  --   --  1.09*   ALK PHOS U/L  --   --  119*   ALT U/L  --   --  10   AST U/L  --   --  35   GLUCOSE RANDOM mg/dL 87   < > 137    < > = values in this interval not displayed.     Results from last 7 days   Lab Units 03/01/24  1339   INR  1.37*                   Lines/Drains:  Invasive Devices       Peripheral Intravenous Line  Duration             Peripheral IV 03/04/24 Dorsal (posterior);Left Forearm <1 day              Drain  Duration             Suprapubic Catheter 16 Fr. 430 days                          Imaging: No pertinent imaging reviewed.    Recent Cultures (last 7 days):   Results from last 7 days   Lab Units 03/01/24  1606   C DIFF TOXIN B BY PCR  Positive*       Last 24 Hours Medication List:   Current Facility-Administered Medications   Medication Dose Route Frequency Provider Last Rate    acetaminophen  650 mg Oral Q6H PRN Ofelia Wheeler PA-C      aspirin  81 mg Oral Daily Ofelia Wheeler PA-C      ferrous sulfate  325 mg Oral BID With Meals Ofelia Wheeler PA-C      folic acid  1 mg Oral Daily Ofelia Wheeler PA-C      melatonin  3 mg Oral HS Ofelia Wheeler PA-C      methylPREDNISolone sodium succinate  40 mg Intravenous Daily Verna Gaston PA-C      metoprolol succinate  50 mg Oral  Daily Ofelia Wheeler PA-C      mirtazapine  7.5 mg Oral HS Ofelia Wheeler PA-C      ondansetron  4 mg Intravenous Q6H PRN Ofelia Wheeler PA-C      pantoprazole  40 mg Intravenous Q12H RIA Ofelia Wheeler PA-C      pravastatin  20 mg Oral Daily With Dinner Ofelia Wheeler PA-C      saccharomyces boulardii  250 mg Oral BID Ofelia Wheeler PA-C          Today, Patient Was Seen By: Ofelia Wheeler PA-C    **Please Note: This note may have been constructed using a voice recognition system.**

## 2024-03-04 NOTE — PROGRESS NOTES
ADT alert received the patient admitted 3/1/24 to [facility]. This Admin Coordinator will continue to monitor via chart review.

## 2024-03-04 NOTE — ASSESSMENT & PLAN NOTE
Presents to the emergency department with outpatient labs showing decreased hemoglobin, reported intermittent rectal bleeding suspect in setting of known IBD  On ASA/Eliquis as outpatient - OK to continue per GI.  However patient and family concerned re: resuming eliquis.  After further discussion with hematology, would recommend resuming eliquis and monitor  Hemoglobin 7.4 on admission - was 8.6 following recent discharge from hospital in Feb 2024.  Decreased to 7.3 on outpt labs  Hemodynamically stable  Continue IV Protonix  Trend H/H -s/p 1 unit PRBCs following admission  Hemoglobin 7.8  Consult gastroenterology -started on IV Solu-Medrol 3/2

## 2024-03-04 NOTE — NURSING NOTE
Pt noted to have hematochezia in most recent BM with half-dollar sized bright red blood clot in stool.  SLIM & GI NP notified.  H&H as well as T&S ordered.  VSS.  No complaints of chest pain or increased dizziness @ this time.  Pt resting comfortably in chair.  Pt instructed to notify RN if anything changes.  PT agreeable.  Care continuing.

## 2024-03-04 NOTE — ASSESSMENT & PLAN NOTE
Patient reports she stopped all treatment  Is not interested in ongoing treatment or follow up at this time

## 2024-03-04 NOTE — PHYSICAL THERAPY NOTE
PHYSICAL THERAPY EVALUATION NOTE      Patient Name: Kerry Silverman  Today's Date: 3/4/2024    AGE:   83 y.o.  Mrn:   [id number]  ADMIT DX:  Hypocalcemia [E83.51]  Hypokalemia [E87.6]  Gastrointestinal bleeding [K92.2]  Rectal bleeding [K62.5]  Symptomatic anemia [D64.9]    Past Medical History:   Diagnosis Date    Abnormal weight loss     Allergic reaction     Anxiety     Breast cancer, right (HCC)     right    Cancer (HCC)     Candidal vulvovaginitis     Clotting disorder (HCC) Same as above    Endometrial hyperplasia     Epithelial cyst     benign, ovary    GI (gastrointestinal bleed) Blood mixed with stool    History of radiation therapy     right breast cancer    Hyperlipidemia     Hypertension     Internal hemorrhoids     Knee tendonitis     Ovarian cyst     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(HCC)      Length Of Stay: 3  PHYSICAL THERAPY EVALUATION :   Patient's identity confirmed via 2 patient identifiers (full name and ) at start of session       24 1045   PT Last Visit   PT Visit Date 24   Note Type   Note type Evaluation   Pain Assessment   Pain Assessment Tool 0-10   Pain Score No Pain   Restrictions/Precautions   Weight Bearing Precautions Per Order No   Other Precautions Contact/isolation;Chair Alarm;Bed Alarm;Fall Risk;Limb alert   Home Living   Type of Home SNF  ([facility])   Home Layout One level   Home Equipment Walker   Additional Comments Pt reports being modified I at baseline w/ her RW at [facility]   Prior Function   Level of [place] Independent with ADLs;Independent with functional mobility   Lives With Facility staff   Receives Help From Family;Personal care attendant   IADLs Family/Friend/Other provides transportation;Family/Friend/Other provides meals;Family/Friend/Other provides medication management   General   Family/Caregiver Present No   Cognition   Arousal/Participation Alert   Orientation  Level Oriented X4   Memory Within functional limits   Following Commands Follows multistep commands with increased time or repetition   Comments Pt pleasant and motivated to participate in PT eval   RLE Assessment   RLE Assessment WFL   Strength RLE   RLE Overall Strength 4-/5   LLE Assessment   LLE Assessment WFL   Strength LLE   LLE Overall Strength 4-/5   Bed Mobility   Supine to Sit 5  Supervision   Additional items Assist x 1;Bedrails;Increased time required   Additional Comments Pt seated on commode in bathroom at end of eval   Transfers   Sit to Stand 5  Supervision   Additional items Assist x 1   Stand to Sit 5  Supervision   Additional items Assist x 1   Toilet transfer 5  Supervision   Additional items Assist x 1;Commode   Ambulation/Elevation   Gait pattern Decreased foot clearance;Short stride   Gait Assistance 5  Supervision   Additional items Assist x 1   Assistive Device Rolling walker   Distance 30 ft   Balance   Static Sitting Fair +   Dynamic Sitting Fair +   Static Standing Fair   Dynamic Standing Fair   Ambulatory Fair   Activity Tolerance   Activity Tolerance Patient tolerated treatment well   Medical Staff Made Aware OT Lucía   Nurse Made Aware RN Joe/Delfino   Assessment   Problem List Decreased endurance;Decreased cognition;Decreased strength   Assessment Ha Carr is a 64 y.o. Male who presents to [facility] on 3/2/2024 from home w/ c/o dizziness and ambulatory dysfunction and diagnosis of CVA. Orders for PT eval and treat received. Pt presents w/ comorbidities of HTN, HLD. At baseline, pt mobilizes independently w/ no AD, and reports 0 falls in the last 6 months. Upon evaluation, pt presents w/ the following deficits: weakness, impaired coordination, and impaired balance. Upon eval, pt requires min A x 1 for transfers, and min A x 1 for gait. Based on this PT evaluation today, patient's discharge recommendation is for Level I. During this admission, pt would benefit from continued skilled  "inpatient PT in the acute care setting in order to address the abovementioned deficits to maximize function and mobility before DC from acute care.   Goals   Patient Goals to go back to her facility   Discharge Recommendation   Rehab Resource Intensity Level, PT No post-acute rehabilitation needs   AM-PAC Basic Mobility Inpatient   Turning in Flat Bed Without Bedrails 3   Lying on Back to Sitting on Edge of Flat Bed Without Bedrails 3   Moving Bed to Chair 3   Standing Up From Chair Using Arms 3   Walk in Room 3   Climb 3-5 Stairs With Railing 2   Basic Mobility Inpatient Raw Score 17   Basic Mobility Standardized Score 39.67   Highest Level Of Mobility   JH-HLM Goal 5: Stand one or more mins   JH-HLM Achieved 7: Walk 25 feet or more   Additional Treatment Session   Start Time 1037   End Time 1045   Treatment Assessment Pt seated OOB on commode, agreeable to participate in PT intervention. She is able to perform STS from commode w/ supervision. She ambulates 5 ft w/ RW and supervision to the sink w/ intention to wash hands/brush teeth, however pt reports familiar \"woozy\" feeling. Brought commode behind patient to sit, immediately reports feeling better. Pt then performs STS from commode w/ supervision and ambulates 5ft w/ RW to bedside chair.   Equipment Use RW   Additional Treatment Day 1   End of Consult   Patient Position at End of Consult Bedside chair;Bed/Chair alarm activated;All needs within reach         The patient's AM-PAC Basic Mobility Inpatient Short Form Raw Score is 17, Standardized Score is 39.67. A standardized score greater than 38.32 (raw score of 16) suggests the patient may benefit from discharge to home which may not coincide with above PT recommendations. However please refer to therapist recommendation for discharge planning given other factors that may influence destination.    Given the above findings from this evaluation, at this time this patient does not require skilled inpatient PT for " the remainder of this admission. Will D/C patient from PT caseload, please reconsult if any changes or needs arise.        Jazmyne Appiah PT, DPT

## 2024-03-05 LAB
ABO GROUP BLD: NORMAL
ANION GAP SERPL CALCULATED.3IONS-SCNC: 4 MMOL/L
BASOPHILS # BLD AUTO: 0.01 THOUSANDS/ÂΜL (ref 0–0.1)
BASOPHILS NFR BLD AUTO: 0 % (ref 0–1)
BLD GP AB SCN SERPL QL: POSITIVE
BUN SERPL-MCNC: 14 MG/DL (ref 5–25)
CALCIUM SERPL-MCNC: 7.9 MG/DL (ref 8.4–10.2)
CHLORIDE SERPL-SCNC: 113 MMOL/L (ref 96–108)
CO2 SERPL-SCNC: 23 MMOL/L (ref 21–32)
CREAT SERPL-MCNC: 0.82 MG/DL (ref 0.6–1.3)
EOSINOPHIL # BLD AUTO: 0.1 THOUSAND/ÂΜL (ref 0–0.61)
EOSINOPHIL NFR BLD AUTO: 2 % (ref 0–6)
ERYTHROCYTE [DISTWIDTH] IN BLOOD BY AUTOMATED COUNT: 20 % (ref 11.6–15.1)
GFR SERPL CREATININE-BSD FRML MDRD: 65 ML/MIN/1.73SQ M
GLUCOSE SERPL-MCNC: 78 MG/DL (ref 65–140)
HCT VFR BLD AUTO: 24.2 % (ref 34.8–46.1)
HCT VFR BLD AUTO: 27.9 % (ref 34.8–46.1)
HGB BLD-MCNC: 7.4 G/DL (ref 11.5–15.4)
HGB BLD-MCNC: 8.8 G/DL (ref 11.5–15.4)
IMM GRANULOCYTES # BLD AUTO: 0.06 THOUSAND/UL (ref 0–0.2)
IMM GRANULOCYTES NFR BLD AUTO: 1 % (ref 0–2)
LYMPHOCYTES # BLD AUTO: 1.51 THOUSANDS/ÂΜL (ref 0.6–4.47)
LYMPHOCYTES NFR BLD AUTO: 30 % (ref 14–44)
MCH RBC QN AUTO: 27.1 PG (ref 26.8–34.3)
MCHC RBC AUTO-ENTMCNC: 30.6 G/DL (ref 31.4–37.4)
MCV RBC AUTO: 89 FL (ref 82–98)
MONOCYTES # BLD AUTO: 0.39 THOUSAND/ÂΜL (ref 0.17–1.22)
MONOCYTES NFR BLD AUTO: 8 % (ref 4–12)
NEUTROPHILS # BLD AUTO: 2.97 THOUSANDS/ÂΜL (ref 1.85–7.62)
NEUTS SEG NFR BLD AUTO: 59 % (ref 43–75)
NRBC BLD AUTO-RTO: 0 /100 WBCS
PLATELET # BLD AUTO: 241 THOUSANDS/UL (ref 149–390)
PMV BLD AUTO: 9.6 FL (ref 8.9–12.7)
POTASSIUM SERPL-SCNC: 3.8 MMOL/L (ref 3.5–5.3)
RBC # BLD AUTO: 2.73 MILLION/UL (ref 3.81–5.12)
RH BLD: NEGATIVE
SODIUM SERPL-SCNC: 140 MMOL/L (ref 135–147)
SPECIMEN EXPIRATION DATE: NORMAL
WBC # BLD AUTO: 5.04 THOUSAND/UL (ref 4.31–10.16)

## 2024-03-05 PROCEDURE — 86901 BLOOD TYPING SEROLOGIC RH(D): CPT | Performed by: PHYSICIAN ASSISTANT

## 2024-03-05 PROCEDURE — 99232 SBSQ HOSP IP/OBS MODERATE 35: CPT | Performed by: PHYSICIAN ASSISTANT

## 2024-03-05 PROCEDURE — P9040 RBC LEUKOREDUCED IRRADIATED: HCPCS

## 2024-03-05 PROCEDURE — 85014 HEMATOCRIT: CPT | Performed by: PHYSICIAN ASSISTANT

## 2024-03-05 PROCEDURE — 86850 RBC ANTIBODY SCREEN: CPT | Performed by: PHYSICIAN ASSISTANT

## 2024-03-05 PROCEDURE — 86902 BLOOD TYPE ANTIGEN DONOR EA: CPT

## 2024-03-05 PROCEDURE — 85018 HEMOGLOBIN: CPT | Performed by: PHYSICIAN ASSISTANT

## 2024-03-05 PROCEDURE — 86900 BLOOD TYPING SEROLOGIC ABO: CPT | Performed by: PHYSICIAN ASSISTANT

## 2024-03-05 PROCEDURE — 80048 BASIC METABOLIC PNL TOTAL CA: CPT | Performed by: INTERNAL MEDICINE

## 2024-03-05 PROCEDURE — C9113 INJ PANTOPRAZOLE SODIUM, VIA: HCPCS | Performed by: PHYSICIAN ASSISTANT

## 2024-03-05 PROCEDURE — 85025 COMPLETE CBC W/AUTO DIFF WBC: CPT | Performed by: INTERNAL MEDICINE

## 2024-03-05 PROCEDURE — 99232 SBSQ HOSP IP/OBS MODERATE 35: CPT | Performed by: INTERNAL MEDICINE

## 2024-03-05 RX ADMIN — Medication 3 MG: at 21:00

## 2024-03-05 RX ADMIN — Medication 250 MG: at 17:21

## 2024-03-05 RX ADMIN — APIXABAN 2.5 MG: 2.5 TABLET, FILM COATED ORAL at 08:21

## 2024-03-05 RX ADMIN — ASPIRIN 81 MG CHEWABLE TABLET 81 MG: 81 TABLET CHEWABLE at 08:21

## 2024-03-05 RX ADMIN — PANTOPRAZOLE SODIUM 40 MG: 40 INJECTION, POWDER, FOR SOLUTION INTRAVENOUS at 08:21

## 2024-03-05 RX ADMIN — MIRTAZAPINE 7.5 MG: 15 TABLET, FILM COATED ORAL at 21:00

## 2024-03-05 RX ADMIN — Medication 250 MG: at 08:21

## 2024-03-05 RX ADMIN — FOLIC ACID 1 MG: 1 TABLET ORAL at 08:21

## 2024-03-05 RX ADMIN — PANTOPRAZOLE SODIUM 40 MG: 40 INJECTION, POWDER, FOR SOLUTION INTRAVENOUS at 20:58

## 2024-03-05 RX ADMIN — FERROUS SULFATE TAB 325 MG (65 MG ELEMENTAL FE) 325 MG: 325 (65 FE) TAB at 08:21

## 2024-03-05 RX ADMIN — METHYLPREDNISOLONE SODIUM SUCCINATE 40 MG: 40 INJECTION, POWDER, FOR SOLUTION INTRAMUSCULAR; INTRAVENOUS at 08:21

## 2024-03-05 RX ADMIN — PRAVASTATIN SODIUM 20 MG: 20 TABLET ORAL at 17:19

## 2024-03-05 RX ADMIN — FERROUS SULFATE TAB 325 MG (65 MG ELEMENTAL FE) 325 MG: 325 (65 FE) TAB at 17:19

## 2024-03-05 NOTE — ASSESSMENT & PLAN NOTE
Presents to the emergency department with outpatient labs showing decreased hemoglobin, reported intermittent rectal bleeding suspect in setting of known IBD  On ASA/Eliquis as outpatient - initially OK to continue per GI.  However patient and family concerned re: resuming eliquis.  After further discussion with hematology, would recommend continuing eliquis life-long.  Eliquis was initially restarted 3/4 but now with recurrent bleeding and drop in hgb 3/5 will hold   Hemoglobin 7.4 on admission - was 8.6 following recent discharge from hospital in Feb 2024.  Decreased to 7.3 on outpt labs  Hemodynamically stable  Continue IV Protonix  Trend H/H -s/p 1 unit PRBCs following admission  Hemoglobin 7.4 to receive additional 1 unit PRBC 3/5  Hgb improved to 8.6  Consult gastroenterology -started on IV Solu-Medrol 3/2

## 2024-03-05 NOTE — PLAN OF CARE
Problem: Prexisting or High Potential for Compromised Skin Integrity  Goal: Skin integrity is maintained or improved  Description: INTERVENTIONS:  - Identify patients at risk for skin breakdown  - Assess and monitor skin integrity  - Assess and monitor nutrition and hydration status  - Monitor labs   - Assess for incontinence   - Turn and reposition patient  - Assist with mobility/ambulation  - Relieve pressure over bony prominences  - Avoid friction and shearing  - Provide appropriate hygiene as needed including keeping skin clean and dry  - Evaluate need for skin moisturizer/barrier cream  - Collaborate with interdisciplinary team   - Patient/family teaching  - Consider wound care consult   Outcome: Progressing     Problem: Nutrition/Hydration-ADULT  Goal: Nutrient/Hydration intake appropriate for improving, restoring or maintaining nutritional needs  Description: Monitor and assess patient's nutrition/hydration status for malnutrition. Collaborate with interdisciplinary team and initiate plan and interventions as ordered.  Monitor patient's weight and dietary intake as ordered or per policy. Utilize nutrition screening tool and intervene as necessary. Determine patient's food preferences and provide high-protein, high-caloric foods as appropriate.     INTERVENTIONS:  - Monitor oral intake, urinary output, labs, and treatment plans  - Assess nutrition and hydration status and recommend course of action  - Evaluate amount of meals eaten  - Assist patient with eating if necessary   - Allow adequate time for meals  - Recommend/ encourage appropriate diets, oral nutritional supplements, and vitamin/mineral supplements  - Order, calculate, and assess calorie counts as needed  - Recommend, monitor, and adjust tube feedings and TPN/PPN based on assessed needs  - Assess need for intravenous fluids  - Provide specific nutrition/hydration education as appropriate  - Include patient/family/caregiver in decisions related to  nutrition  Outcome: Progressing     Problem: GASTROINTESTINAL - ADULT  Goal: Minimal or absence of nausea and/or vomiting  Description: INTERVENTIONS:  - Administer IV fluids if ordered to ensure adequate hydration  - Maintain NPO status until nausea and vomiting are resolved  - Nasogastric tube if ordered  - Administer ordered antiemetic medications as needed  - Provide nonpharmacologic comfort measures as appropriate  - Advance diet as tolerated, if ordered  - Consider nutrition services referral to assist patient with adequate nutrition and appropriate food choices  Outcome: Progressing

## 2024-03-05 NOTE — ASSESSMENT & PLAN NOTE
Hemoglobin 8.6 on day of discharge following recent hospitalization February 21  Per report outpatient blood work had revealed low hemoglobin of 7.3  Hemoglobin 7.4 on repeat at time of admission  Reported lightheadedness on standing, intermittent rectal bleeding secondary to known IBD recently started on stelara infusions earlier this week  S/p 1 unit PRBCs on admit for symptomatic anemia  Per GI OK to continue eliquis/ASA unless evidence of overt bleeding.  Patient and family concerned regarding continuing Eliquis given recurrent issues with rectal bleeding, anemia.  Patient does have IVC filter in place per daughter.  After discussion with hematology would recommend patient remain on eliquis.    Hgb 7.4 this AM - given ongoing intermittent rectal bleeding, will transfuse 1 unit PRBC today  Trend CBC

## 2024-03-05 NOTE — ASSESSMENT & PLAN NOTE
Patient receives Stelara infusions as outpatient, first dose was earlier this week  Started on IV Solu-Medrol 3/2  C. difficile testing positive for PCR but negative EIA  Will need close outpt follow up   oral

## 2024-03-05 NOTE — PATIENT COMMUNICATION
Spoke to provider and he suggested to reach out to Sonja Maldonado who is in communication with Dr. Brito and visits [facility]. She suggested to call [phone] and speak with the nursing supervisor. I called and spoke with Jennifer she asked for the medication to have attn to: [facility] where the patient lives. I confirmed that they have cold storage there. She asked for us to fax in an order to [phone].

## 2024-03-05 NOTE — ASSESSMENT & PLAN NOTE
Maintained on eliquis and s/p IVC filter  Patient and family concerned re: continuing eliquis  Hematology consulted to discuss AC.  At this time given metastatic cancer and risk for clots, would recommend continuing eliquis lifelong.  Eliquis again held 3/5 following recurrent rectal bleeding

## 2024-03-05 NOTE — ASSESSMENT & PLAN NOTE
Presents to the emergency department with outpatient labs showing decreased hemoglobin, reported intermittent rectal bleeding suspect in setting of known IBD  On ASA/Eliquis as outpatient - OK to continue per GI.  However patient and family concerned re: resuming eliquis.  After further discussion with hematology, would recommend resuming eliquis and monitor  Hemoglobin 7.4 on admission - was 8.6 following recent discharge from hospital in Feb 2024.  Decreased to 7.3 on outpt labs  Hemodynamically stable  Continue IV Protonix  Trend H/H -s/p 1 unit PRBCs following admission  Hemoglobin 7.4 to receive additional 1 unit PRBC today  Consult gastroenterology -started on IV Solu-Medrol 3/2

## 2024-03-05 NOTE — ASSESSMENT & PLAN NOTE
Hemoglobin 8.6 on day of discharge following recent hospitalization February 21  Per report outpatient blood work had revealed low hemoglobin of 7.3  Hemoglobin 7.4 on repeat at time of admission  Reported lightheadedness on standing, intermittent rectal bleeding secondary to known IBD recently started on stelara infusions earlier this week  S/p 1 unit PRBCs on admit for symptomatic anemia  Per GI OK to continue eliquis/ASA unless evidence of overt bleeding.  Patient and family concerned regarding continuing Eliquis given recurrent issues with rectal bleeding, anemia.  Patient does have IVC filter in place per daughter.  After discussion with hematology would recommend patient remain on eliquis lifelong.    Hgb 7.4 this AM - given ongoing intermittent rectal bleeding, will transfuse 1 unit PRBC 3/5 and eliquis held  Hgb improved to 8.6  Trend CBC

## 2024-03-05 NOTE — PROGRESS NOTES
"Progress note - [facility]astroenterology   Kerry Silverman 83 y.o. female MRN: [id number]  Unit/Bed#: -[place] Encounter: [id number]    ASSESSMENT and PLAN    83-year-old female admitted with bloody diarrhea on anticoagulation for history of a pulmonary embolism.  Also history of C. difficile- presented with bloody diarrhea on anticoagulation.  Eliquis held.  Bleeding improved.  Stools negative, positive for colonization of C. difficile but no toxin or active infection.  Given no signs of active infection, commenced with steroid therapy to try to attain remission.  Subjectively improved.  She says she really does not want a colonoscopy. Patient recently started on Stelara for Crohn's colitis.  Clinically improved.     Plan:  Continue to follow H&H and transfuse as needed  Diet as tolerated  Continue IV steroids      Chief Complaint   Patient presents with    Rectal Bleeding     Pt to [place] [facility] with reports that patient's recent blood work showed that her hbg dropped from 8.3 to 7.3 since the 21 of feb with active rectal bleeding. Denies dizziness or shortness of breath        SUBJECTIVE/HPI   Feels better today overall still having diarrhea mix of brown stool with blood.  Loose.  I did examine.  No abdominal pain she did eat this morning.    /50 (BP Location: Left arm)   Pulse 93   Temp (!) 97.3 °F (36.3 °C) (Temporal)   Resp 18   Ht 5' 2\" (1.575 m)   Wt 57.9 kg (127 lb 10.3 oz)   LMP  (LMP Unknown)   SpO2 100%   BMI 23.35 kg/m²     PHYSICALEXAM  General appearance: alert, appears stated age and cooperative  Eyes: PERLLA, EOMI, no icterus   Head: Normocephalic, without obvious abnormality, atraumatic  Lungs: clear to auscultation bilaterally  Heart: regular rate and rhythm, S1, S2 normal, no murmur, click, rub or gallop  Abdomen: soft, mild diffuse tenderness; bowel sounds normal; no masses,  no organomegaly  Extremities: extremities normal, atraumatic, no cyanosis or " "edema  Neurologic: Grossly normal    Lab Results   Component Value Date    GLUCOSE 60 (L) 09/23/2022    CALCIUM 7.9 (L) 03/05/2024     09/08/2015    K 3.8 03/05/2024    CO2 23 03/05/2024     (H) 03/05/2024    BUN 14 03/05/2024    CREATININE 0.82 03/05/2024     Lab Results   Component Value Date    WBC 5.04 03/05/2024    HGB 7.4 (L) 03/05/2024    HCT 24.2 (L) 03/05/2024    MCV 89 03/05/2024     03/05/2024     Lab Results   Component Value Date    ALT 10 03/01/2024    AST 35 03/01/2024    ALKPHOS 119 (H) 03/01/2024    BILITOT 0.66 09/08/2015     No results found for: \"AMYLASE\"  Lab Results   Component Value Date    LIPASE 79 08/15/2022     Lab Results   Component Value Date    IRON 31 (L) 03/01/2024    TIBC 91 (L) 03/01/2024    FERRITIN 820 (H) 03/01/2024     Lab Results   Component Value Date    INR 1.37 (H) 03/01/2024     "

## 2024-03-05 NOTE — PROGRESS NOTES
[facility]  Progress Note  Name: Kerry Silverman I  MRN: [id number]  Unit/Bed#: -01 I Date of Admission: 3/1/2024   Date of Service: 3/5/2024 I Hospital Day: 4    Assessment/Plan   * Rectal bleeding  Assessment & Plan  Presents to the emergency department with outpatient labs showing decreased hemoglobin, reported intermittent rectal bleeding suspect in setting of known IBD  On ASA/Eliquis as outpatient - initially OK to continue per GI.  However patient and family concerned re: resuming eliquis.  After further discussion with hematology, would recommend continuing eliquis life-long.  Eliquis was initially restarted 3/4 but now with recurrent bleeding and drop in hgb this AM will hold evening dose  Hemoglobin 7.4 on admission - was 8.6 following recent discharge from hospital in Feb 2024.  Decreased to 7.3 on outpt labs  Hemodynamically stable  Continue IV Protonix  Trend H/H -s/p 1 unit PRBCs following admission  Hemoglobin 7.4 to receive additional 1 unit PRBC today  Consult gastroenterology -started on IV Solu-Medrol 3/2    IBD (inflammatory bowel disease)  Assessment & Plan  Patient receives Stelara infusions as outpatient, first dose was earlier this week  Started on IV Solu-Medrol 3/2  C. difficile testing positive for PCR but negative EIA  Will need close outpt follow up    History of pulmonary embolism  Assessment & Plan  Maintained on eliquis and s/p IVC filter  Patient and family concerned re: continuing eliquis  Hematology consulted to discuss AC.  At this time given metastatic cancer and risk for clots, would recommend continuing eliquis lifelong.  Will hold evening dose today given reports of blood in stool overnight/this AM    Anemia  Assessment & Plan  Hemoglobin 8.6 on day of discharge following recent hospitalization February 21  Per report outpatient blood work had revealed low hemoglobin of 7.3  Hemoglobin 7.4 on repeat at time of admission  Reported  lightheadedness on standing, intermittent rectal bleeding secondary to known IBD recently started on stelara infusions earlier this week  S/p 1 unit PRBCs on admit for symptomatic anemia  Per GI OK to continue eliquis/ASA unless evidence of overt bleeding.  Patient and family concerned regarding continuing Eliquis given recurrent issues with rectal bleeding, anemia.  Patient does have IVC filter in place per daughter.  After discussion with hematology would recommend patient remain on eliquis lifelong.    Hgb 7.4 this AM - given ongoing intermittent rectal bleeding, will transfuse 1 unit PRBC today and hold evening dose of eliquis  Trend CBC    Mixed hyperlipidemia  Assessment & Plan  Continue statin    Primary malignant neoplasm of breast with metastasis (HCC)  Assessment & Plan  Patient reports she stopped all treatment  Is not interested in ongoing treatment or follow up at this time         VTE Pharmacologic Prophylaxis: VTE Score: 6 High Risk (Score >/= 5) - Pharmacological DVT Prophylaxis Ordered: apixaban (Eliquis). Sequential Compression Devices Ordered.    Mobility:   Basic Mobility Inpatient Raw Score: 16  JH-HLM Goal: 5: Stand one or more mins  JH-HLM Achieved: 5: Stand (1 or more minutes)  HLM Goal achieved. Continue to encourage appropriate mobility.    Patient Centered Rounds: I performed bedside rounds with nursing staff today.   Discussions with Specialists or Other Care Team Provider: CM, GI    Education and Discussions with Family / Patient:  Per GI they discussed and updated patient's daughter.     Total Time Spent on Date of Encounter in care of patient: 40 mins. This time was spent on one or more of the following: performing physical exam; counseling and coordination of care; obtaining or reviewing history; documenting in the medical record; reviewing/ordering tests, medications or procedures; communicating with other healthcare professionals and discussing with patient's  family/caregivers.    Current Length of Stay: 4 day(s)  Current Patient Status: Inpatient   Certification Statement: The patient will continue to require additional inpatient hospital stay due to rectal bleeding, monitoring hgb  Discharge Plan: Anticipate discharge in 24-48 hrs to rehab facility.    Code Status: Level 3 - DNAR and DNI    Subjective:   Patient feels well.  Her appetite is greatly improved.  Denies chest pain/palpitations, shortness of breath, nausea/vomiting or abdominal pain.    Objective:     Vitals:   Temp (24hrs), Av.4 °F (36.3 °C), Min:96.3 °F (35.7 °C), Max:98.5 °F (36.9 °C)    Temp:  [96.3 °F (35.7 °C)-98.5 °F (36.9 °C)] 97.3 °F (36.3 °C)  HR:  [82-93] 93  Resp:  [18] 18  BP: (108-132)/(50-60) 108/50  SpO2:  [100 %] 100 %  Body mass index is 23.35 kg/m².     Input and Output Summary (last 24 hours):     Intake/Output Summary (Last 24 hours) at 3/5/2024 1119  Last data filed at 3/5/2024 0927  Gross per 24 hour   Intake 330 ml   Output 550 ml   Net -220 ml       Physical Exam:   Physical Exam  Vitals and nursing note reviewed.   Constitutional:       Appearance: Normal appearance.      Comments: No acute distress   HENT:      Head: Normocephalic.   Eyes:      General: No scleral icterus.     Extraocular Movements: Extraocular movements intact.      Conjunctiva/sclera: Conjunctivae normal.   Cardiovascular:      Rate and Rhythm: Normal rate and regular rhythm.      Heart sounds: S1 normal and S2 normal.   Pulmonary:      Effort: Pulmonary effort is normal.      Breath sounds: Normal breath sounds. No wheezing, rhonchi or rales.   Abdominal:      General: Bowel sounds are normal.      Palpations: Abdomen is soft.      Tenderness: There is no abdominal tenderness. There is no guarding or rebound.   Musculoskeletal:         General: No swelling, tenderness or deformity.      Cervical back: Normal range of motion.      Comments: Able to move upper/lower ext bilaterally, no edema   Skin:      General: Skin is warm and dry.   Neurological:      Mental Status: She is alert and oriented to person, place, and time.   Psychiatric:         Mood and Affect: Mood normal.         Speech: Speech normal.         Behavior: Behavior normal.          Additional Data:     Labs:  Results from last 7 days   Lab Units 03/05/24  0415   WBC Thousand/uL 5.04   HEMOGLOBIN g/dL 7.4*   HEMATOCRIT % 24.2*   PLATELETS Thousands/uL 241   NEUTROS PCT % 59   LYMPHS PCT % 30   MONOS PCT % 8   EOS PCT % 2     Results from last 7 days   Lab Units 03/05/24  0415 03/02/24  0219 03/01/24  1334   SODIUM mmol/L 140   < > 135   POTASSIUM mmol/L 3.8   < > 2.9*   CHLORIDE mmol/L 113*   < > 106   CO2 mmol/L 23   < > 21   BUN mg/dL 14   < > 13   CREATININE mg/dL 0.82   < > 0.92   ANION GAP mmol/L 4   < > 8   CALCIUM mg/dL 7.9*   < > 7.4*   ALBUMIN g/dL  --   --  2.2*   TOTAL BILIRUBIN mg/dL  --   --  1.09*   ALK PHOS U/L  --   --  119*   ALT U/L  --   --  10   AST U/L  --   --  35   GLUCOSE RANDOM mg/dL 78   < > 137    < > = values in this interval not displayed.     Results from last 7 days   Lab Units 03/01/24  1339   INR  1.37*                   Lines/Drains:  Invasive Devices       Peripheral Intravenous Line  Duration             Peripheral IV 03/04/24 Dorsal (posterior);Left Forearm 1 day              Drain  Duration             Suprapubic Catheter 16 Fr. 431 days                          Imaging: No pertinent imaging reviewed.    Recent Cultures (last 7 days):   Results from last 7 days   Lab Units 03/01/24  1606   C DIFF TOXIN B BY PCR  Positive*       Last 24 Hours Medication List:   Current Facility-Administered Medications   Medication Dose Route Frequency Provider Last Rate    acetaminophen  650 mg Oral Q6H PRN Ofelia Wheeler PA-C      aspirin  81 mg Oral Daily Ofelia Wheeler PA-C      ferrous sulfate  325 mg Oral BID With Meals Ofelia Wheeler PA-C      folic acid  1 mg Oral Daily Ofelia Wheeler PA-C      melatonin  3  mg Oral HS Ofelia Wheeler PA-C      methylPREDNISolone sodium succinate  40 mg Intravenous Daily Verna Gaston PA-C      metoprolol succinate  50 mg Oral Daily Ofelia Wheeler PA-C      mirtazapine  7.5 mg Oral HS Ofelia Wheeler PA-C      ondansetron  4 mg Intravenous Q6H PRN Ofelia Wheeler PA-C      pantoprazole  40 mg Intravenous Q12H RIA Ofelia Wheeler PA-C      pravastatin  20 mg Oral Daily With Dinner Ofelia Wheeler PA-C      saccharomyces boulardii  250 mg Oral BID Ofelia Wheeler PA-C          Today, Patient Was Seen By: Ofelia Wheeler PA-C    **Please Note: This note may have been constructed using a voice recognition system.**

## 2024-03-06 LAB
ABO GROUP BLD BPU: NORMAL
ANION GAP SERPL CALCULATED.3IONS-SCNC: 5 MMOL/L
ANISOCYTOSIS BLD QL SMEAR: PRESENT
BASOPHILS # BLD MANUAL: 0 THOUSAND/UL (ref 0–0.1)
BASOPHILS NFR MAR MANUAL: 0 % (ref 0–1)
BPU ID: NORMAL
BUN SERPL-MCNC: 16 MG/DL (ref 5–25)
CALCIUM SERPL-MCNC: 7.9 MG/DL (ref 8.4–10.2)
CHLORIDE SERPL-SCNC: 113 MMOL/L (ref 96–108)
CO2 SERPL-SCNC: 22 MMOL/L (ref 21–32)
CREAT SERPL-MCNC: 0.79 MG/DL (ref 0.6–1.3)
CROSSMATCH: NORMAL
EOSINOPHIL # BLD MANUAL: 0.06 THOUSAND/UL (ref 0–0.4)
EOSINOPHIL NFR BLD MANUAL: 1 % (ref 0–6)
ERYTHROCYTE [DISTWIDTH] IN BLOOD BY AUTOMATED COUNT: 18.6 % (ref 11.6–15.1)
GFR SERPL CREATININE-BSD FRML MDRD: 68 ML/MIN/1.73SQ M
GLUCOSE SERPL-MCNC: 78 MG/DL (ref 65–140)
HCT VFR BLD AUTO: 27.5 % (ref 34.8–46.1)
HGB BLD-MCNC: 8.6 G/DL (ref 11.5–15.4)
LYMPHOCYTES # BLD AUTO: 1.7 THOUSAND/UL (ref 0.6–4.47)
LYMPHOCYTES # BLD AUTO: 27 % (ref 14–44)
MCH RBC QN AUTO: 27.2 PG (ref 26.8–34.3)
MCHC RBC AUTO-ENTMCNC: 31.3 G/DL (ref 31.4–37.4)
MCV RBC AUTO: 87 FL (ref 82–98)
MONOCYTES # BLD AUTO: 0.44 THOUSAND/UL (ref 0–1.22)
MONOCYTES NFR BLD: 7 % (ref 4–12)
NEUTROPHILS # BLD MANUAL: 4.08 THOUSAND/UL (ref 1.85–7.62)
NEUTS BAND NFR BLD MANUAL: 2 % (ref 0–8)
NEUTS SEG NFR BLD AUTO: 63 % (ref 43–75)
PLATELET # BLD AUTO: 243 THOUSANDS/UL (ref 149–390)
PLATELET BLD QL SMEAR: ADEQUATE
PMV BLD AUTO: 9.1 FL (ref 8.9–12.7)
POTASSIUM SERPL-SCNC: 3.6 MMOL/L (ref 3.5–5.3)
RBC # BLD AUTO: 3.16 MILLION/UL (ref 3.81–5.12)
RBC MORPH BLD: PRESENT
SODIUM SERPL-SCNC: 140 MMOL/L (ref 135–147)
UNIT DISPENSE STATUS: NORMAL
UNIT PRODUCT CODE: NORMAL
UNIT PRODUCT VOLUME: 350 ML
UNIT RH: NORMAL
WBC # BLD AUTO: 6.28 THOUSAND/UL (ref 4.31–10.16)

## 2024-03-06 PROCEDURE — 99232 SBSQ HOSP IP/OBS MODERATE 35: CPT | Performed by: INTERNAL MEDICINE

## 2024-03-06 PROCEDURE — 99232 SBSQ HOSP IP/OBS MODERATE 35: CPT | Performed by: PHYSICIAN ASSISTANT

## 2024-03-06 PROCEDURE — 85007 BL SMEAR W/DIFF WBC COUNT: CPT | Performed by: INTERNAL MEDICINE

## 2024-03-06 PROCEDURE — 80048 BASIC METABOLIC PNL TOTAL CA: CPT | Performed by: INTERNAL MEDICINE

## 2024-03-06 PROCEDURE — 85027 COMPLETE CBC AUTOMATED: CPT | Performed by: INTERNAL MEDICINE

## 2024-03-06 PROCEDURE — C9113 INJ PANTOPRAZOLE SODIUM, VIA: HCPCS | Performed by: PHYSICIAN ASSISTANT

## 2024-03-06 RX ORDER — POTASSIUM CHLORIDE 20 MEQ/1
40 TABLET, EXTENDED RELEASE ORAL ONCE
Status: COMPLETED | OUTPATIENT
Start: 2024-03-06 | End: 2024-03-06

## 2024-03-06 RX ORDER — USTEKINUMAB 90 MG/ML
90 INJECTION, SOLUTION SUBCUTANEOUS
Qty: 1 ML | Refills: 15 | Status: SHIPPED | OUTPATIENT
Start: 2024-03-06

## 2024-03-06 RX ADMIN — METHYLPREDNISOLONE SODIUM SUCCINATE 40 MG: 40 INJECTION, POWDER, FOR SOLUTION INTRAMUSCULAR; INTRAVENOUS at 09:40

## 2024-03-06 RX ADMIN — Medication 250 MG: at 16:47

## 2024-03-06 RX ADMIN — ASPIRIN 81 MG CHEWABLE TABLET 81 MG: 81 TABLET CHEWABLE at 09:41

## 2024-03-06 RX ADMIN — PANTOPRAZOLE SODIUM 40 MG: 40 INJECTION, POWDER, FOR SOLUTION INTRAVENOUS at 09:38

## 2024-03-06 RX ADMIN — FOLIC ACID 1 MG: 1 TABLET ORAL at 09:41

## 2024-03-06 RX ADMIN — FERROUS SULFATE TAB 325 MG (65 MG ELEMENTAL FE) 325 MG: 325 (65 FE) TAB at 09:41

## 2024-03-06 RX ADMIN — PRAVASTATIN SODIUM 20 MG: 20 TABLET ORAL at 16:47

## 2024-03-06 RX ADMIN — FERROUS SULFATE TAB 325 MG (65 MG ELEMENTAL FE) 325 MG: 325 (65 FE) TAB at 16:47

## 2024-03-06 RX ADMIN — Medication 3 MG: at 21:13

## 2024-03-06 RX ADMIN — POTASSIUM CHLORIDE 40 MEQ: 1500 TABLET, EXTENDED RELEASE ORAL at 11:40

## 2024-03-06 RX ADMIN — MIRTAZAPINE 7.5 MG: 15 TABLET, FILM COATED ORAL at 21:13

## 2024-03-06 RX ADMIN — METOPROLOL SUCCINATE 50 MG: 50 TABLET, EXTENDED RELEASE ORAL at 09:41

## 2024-03-06 RX ADMIN — Medication 250 MG: at 09:41

## 2024-03-06 RX ADMIN — PANTOPRAZOLE SODIUM 40 MG: 40 INJECTION, POWDER, FOR SOLUTION INTRAVENOUS at 21:14

## 2024-03-06 NOTE — PROGRESS NOTES
"Progress note - [facility]astroenterology   Kerry Silverman 83 y.o. female MRN: [id number]  Unit/Bed#: -[place] Encounter: [id number]    ASSESSMENT and PLAN    83-year-old female admitted with bloody diarrhea on anticoagulation for history of a pulmonary embolism.  Also history of Crohn's disease and C. difficile- presented with bloody diarrhea on anticoagulation.  Eliquis held.  Bleeding improved.  (Stools positive for colonization of C. difficile but no toxin or active infection).    Given no signs of active infection, commenced with steroid therapy to try to attain remission.  Subjectively improved.  Difficult situation if she does not respond to this not sure what the next step would be could discuss with Dr. Weller in [place].  She says she really does not want a colonoscopy. Patient recently started on Stelara for Crohn's colitis.  I do not think she is gotten enough of trial with Stelara to declare it a failure.          Chief Complaint   Patient presents with    Rectal Bleeding     Pt to er via ems from [facility] with reports that patient's recent blood work showed that her hbg dropped from 8.3 to 7.3 since the 21 of feb with active rectal bleeding. Denies dizziness or shortness of breath        SUBJECTIVE/HPI   Still with diarrhea but eating much more no abdominal pain.  Small amounts of blood per rectum    /65   Pulse 90   Temp (!) 97.2 °F (36.2 °C) (Temporal)   Resp 20   Ht 5' 2\" (1.575 m)   Wt 57.9 kg (127 lb 10.3 oz)   LMP  (LMP Unknown)   SpO2 100%   BMI 23.35 kg/m²     PHYSICALEXAM  General appearance: alert, appears stated age and cooperative  Eyes: PERLLA, EOMI, no icterus   Head: Normocephalic, without obvious abnormality, atraumatic  Extremities: extremities normal, atraumatic, no cyanosis or edema  Neurologic: Grossly normal    Lab Results   Component Value Date    GLUCOSE 60 (L) 09/23/2022    CALCIUM 7.9 (L) 03/06/2024     09/08/2015    K 3.6 03/06/2024    CO2 22 " "03/06/2024     (H) 03/06/2024    BUN 16 03/06/2024    CREATININE 0.79 03/06/2024     Lab Results   Component Value Date    WBC 6.28 03/06/2024    HGB 8.6 (L) 03/06/2024    HCT 27.5 (L) 03/06/2024    MCV 87 03/06/2024     03/06/2024     Lab Results   Component Value Date    ALT 10 03/01/2024    AST 35 03/01/2024    ALKPHOS 119 (H) 03/01/2024    BILITOT 0.66 09/08/2015     No results found for: \"AMYLASE\"  Lab Results   Component Value Date    LIPASE 79 08/15/2022     Lab Results   Component Value Date    IRON 31 (L) 03/01/2024    TIBC 91 (L) 03/01/2024    FERRITIN 820 (H) 03/01/2024     Lab Results   Component Value Date    INR 1.37 (H) 03/01/2024     "

## 2024-03-06 NOTE — PROGRESS NOTES
[facility]  Progress Note  Name: Kerry Silverman I  MRN: [id number]  Unit/Bed#: -01 I Date of Admission: 3/1/2024   Date of Service: 3/6/2024 I Hospital Day: 5    Assessment/Plan   * Rectal bleeding  Assessment & Plan  Presents to the emergency department with outpatient labs showing decreased hemoglobin, reported intermittent rectal bleeding suspect in setting of known IBD  On ASA/Eliquis as outpatient - initially OK to continue per GI.  However patient and family concerned re: resuming eliquis.  After further discussion with hematology, would recommend continuing eliquis life-long.  Eliquis was initially restarted 3/4 but now with recurrent bleeding and drop in hgb 3/5 will hold   Hemoglobin 7.4 on admission - was 8.6 following recent discharge from hospital in Feb 2024.  Decreased to 7.3 on outpt labs  Hemodynamically stable  Continue IV Protonix  Trend H/H -s/p 1 unit PRBCs following admission  Hemoglobin 7.4 to receive additional 1 unit PRBC 3/5  Hgb improved to 8.6  Consult gastroenterology -started on IV Solu-Medrol 3/2    IBD (inflammatory bowel disease)  Assessment & Plan  Patient receives Stelara infusions as outpatient, first dose was earlier this week  Started on IV Solu-Medrol 3/2  C. difficile testing positive for PCR but negative EIA  Will need close outpt follow up    History of pulmonary embolism  Assessment & Plan  Maintained on eliquis and s/p IVC filter  Patient and family concerned re: continuing eliquis  Hematology consulted to discuss AC.  At this time given metastatic cancer and risk for clots, would recommend continuing eliquis lifelong.  Eliquis again held 3/5 following recurrent rectal bleeding    Anemia  Assessment & Plan  Hemoglobin 8.6 on day of discharge following recent hospitalization February 21  Per report outpatient blood work had revealed low hemoglobin of 7.3  Hemoglobin 7.4 on repeat at time of admission  Reported lightheadedness on standing,  intermittent rectal bleeding secondary to known IBD recently started on stelara infusions earlier this week  S/p 1 unit PRBCs on admit for symptomatic anemia  Per GI OK to continue eliquis/ASA unless evidence of overt bleeding.  Patient and family concerned regarding continuing Eliquis given recurrent issues with rectal bleeding, anemia.  Patient does have IVC filter in place per daughter.  After discussion with hematology would recommend patient remain on eliquis lifelong.    Hgb 7.4 this AM - given ongoing intermittent rectal bleeding, will transfuse 1 unit PRBC 3/5 and eliquis held  Hgb improved to 8.6  Trend CBC    Mixed hyperlipidemia  Assessment & Plan  Continue statin    Primary malignant neoplasm of breast with metastasis (HCC)  Assessment & Plan  Patient reports she stopped all treatment  Is not interested in ongoing treatment or follow up at this time         VTE Pharmacologic Prophylaxis: VTE Score: 6 High Risk (Score >/= 5) - Pharmacological DVT Prophylaxis Contraindicated. Sequential Compression Devices Ordered.    Mobility:   Basic Mobility Inpatient Raw Score: 16  JH-HLM Goal: 5: Stand one or more mins  JH-HLM Achieved: 5: Stand (1 or more minutes)  HLM Goal achieved. Continue to encourage appropriate mobility.    Patient Centered Rounds: I performed bedside rounds with nursing staff today.   Discussions with Specialists or Other Care Team Provider: CM, GI AP    Education and Discussions with Family / Patient: Updated  (daughter) via phone.    Total Time Spent on Date of Encounter in care of patient: 40 mins. This time was spent on one or more of the following: performing physical exam; counseling and coordination of care; obtaining or reviewing history; documenting in the medical record; reviewing/ordering tests, medications or procedures; communicating with other healthcare professionals and discussing with patient's family/caregivers.    Current Length of Stay: 5 day(s)  Current  Patient Status: Inpatient   Certification Statement: The patient will continue to require additional inpatient hospital stay due to monitoring hgb, ongoing GI recs  Discharge Plan: Anticipate discharge in 48 hrs to rehab facility.    Code Status: Level 3 - DNAR and DNI    Subjective:   Patient feels okay this morning, states she is fatigued after poor sleep overnight due to frequent bowel movements.  Denies chest pain/palpitations, shortness of breath, nausea/vomiting or abdominal pain.  Appetite improving.    Objective:     Vitals:   Temp (24hrs), Av.1 °F (36.2 °C), Min:96.4 °F (35.8 °C), Max:97.6 °F (36.4 °C)    Temp:  [96.4 °F (35.8 °C)-97.6 °F (36.4 °C)] 97.2 °F (36.2 °C)  HR:  [84-97] 90  Resp:  [16-20] 20  BP: ()/(51-65) 121/65  SpO2:  [98 %-100 %] 100 %  Body mass index is 23.35 kg/m².     Input and Output Summary (last 24 hours):     Intake/Output Summary (Last 24 hours) at 3/6/2024 1119  Last data filed at 3/6/2024 1001  Gross per 24 hour   Intake 870 ml   Output 1050 ml   Net -180 ml       Physical Exam:   Physical Exam  Vitals and nursing note reviewed.   Constitutional:       Appearance: Normal appearance.      Comments: No acute distress   HENT:      Head: Normocephalic.   Eyes:      General: No scleral icterus.     Extraocular Movements: Extraocular movements intact.      Conjunctiva/sclera: Conjunctivae normal.   Cardiovascular:      Rate and Rhythm: Normal rate and regular rhythm.      Heart sounds: S1 normal and S2 normal.   Pulmonary:      Effort: Pulmonary effort is normal.      Breath sounds: Normal breath sounds. No wheezing, rhonchi or rales.   Abdominal:      General: Bowel sounds are normal.      Palpations: Abdomen is soft.      Tenderness: There is no abdominal tenderness. There is no guarding or rebound.   Musculoskeletal:         General: No swelling, tenderness or deformity.      Cervical back: Normal range of motion.      Comments: Able to move upper/lower extremities  bilaterally, no edema   Skin:     General: Skin is warm and dry.   Neurological:      Mental Status: She is alert and oriented to person, place, and time.   Psychiatric:         Mood and Affect: Mood normal.         Speech: Speech normal.         Behavior: Behavior normal.          Additional Data:     Labs:  Results from last 7 days   Lab Units 03/06/24 0347 03/05/24 2012 03/05/24  0415   WBC Thousand/uL 6.28  --  5.04   HEMOGLOBIN g/dL 8.6*   < > 7.4*   HEMATOCRIT % 27.5*   < > 24.2*   PLATELETS Thousands/uL 243  --  241   BANDS PCT % 2  --   --    NEUTROS PCT %  --   --  59   LYMPHS PCT %  --   --  30   LYMPHO PCT % 27  --   --    MONOS PCT %  --   --  8   MONO PCT % 7  --   --    EOS PCT % 1  --  2    < > = values in this interval not displayed.     Results from last 7 days   Lab Units 03/06/24 0347 03/02/24 0219 03/01/24  1334   SODIUM mmol/L 140   < > 135   POTASSIUM mmol/L 3.6   < > 2.9*   CHLORIDE mmol/L 113*   < > 106   CO2 mmol/L 22   < > 21   BUN mg/dL 16   < > 13   CREATININE mg/dL 0.79   < > 0.92   ANION GAP mmol/L 5   < > 8   CALCIUM mg/dL 7.9*   < > 7.4*   ALBUMIN g/dL  --   --  2.2*   TOTAL BILIRUBIN mg/dL  --   --  1.09*   ALK PHOS U/L  --   --  119*   ALT U/L  --   --  10   AST U/L  --   --  35   GLUCOSE RANDOM mg/dL 78   < > 137    < > = values in this interval not displayed.     Results from last 7 days   Lab Units 03/01/24  1339   INR  1.37*                   Lines/Drains:  Invasive Devices       Peripheral Intravenous Line  Duration             Peripheral IV 03/04/24 Dorsal (posterior);Left Forearm 2 days              Drain  Duration             Suprapubic Catheter 16 Fr. 432 days                          Imaging: No pertinent imaging reviewed.    Recent Cultures (last 7 days):   Results from last 7 days   Lab Units 03/01/24  1606   C DIFF TOXIN B BY PCR  Positive*       Last 24 Hours Medication List:   Current Facility-Administered Medications   Medication Dose Route Frequency Provider  Last Rate    acetaminophen  650 mg Oral Q6H PRN Ofelia Wheeler PA-C      aspirin  81 mg Oral Daily Ofelia Wheeler PA-C      ferrous sulfate  325 mg Oral BID With Meals Ofelia Wheeler PA-C      folic acid  1 mg Oral Daily Ofelia Wheeler PA-C      melatonin  3 mg Oral HS Ofelia Wheeler PA-C      methylPREDNISolone sodium succinate  40 mg Intravenous Daily Verna Gaston PA-C      metoprolol succinate  50 mg Oral Daily Ofelia Wheeler PA-C      mirtazapine  7.5 mg Oral HS Ofelia Wheeler PA-C      ondansetron  4 mg Intravenous Q6H PRN Ofelia Wheeler PA-C      pantoprazole  40 mg Intravenous Q12H RIA Ofelia Wheeler PA-C      pravastatin  20 mg Oral Daily With Dinner Ofelia Wheeler PA-C      saccharomyces boulardii  250 mg Oral BID Ofelia Wheeler PA-C          Today, Patient Was Seen By: Ofelia Wheeler PA-C    **Please Note: This note may have been constructed using a voice recognition system.**

## 2024-03-06 NOTE — PLAN OF CARE
Problem: Potential for Falls  Goal: Patient will remain free of falls  Description: INTERVENTIONS:  - Educate patient/family on patient safety including physical limitations  - Instruct patient to call for assistance with activity   - Consult OT/PT to assist with strengthening/mobility   - Keep Call bell within reach  - Keep bed low and locked with side rails adjusted as appropriate  - Keep care items and personal belongings within reach  - Initiate and maintain comfort rounds  - Make Fall Risk Sign visible to staff  - Offer Toileting every 2 Hours, in advance of need  - Initiate/Maintain alarm  - Obtain necessary fall risk management equipment:  - Apply yellow socks and bracelet for high fall risk patients  - Consider moving patient to room near nurses station  Outcome: Progressing     Problem: Prexisting or High Potential for Compromised Skin Integrity  Goal: Skin integrity is maintained or improved  Description: INTERVENTIONS:  - Identify patients at risk for skin breakdown  - Assess and monitor skin integrity  - Assess and monitor nutrition and hydration status  - Monitor labs   - Assess for incontinence   - Turn and reposition patient  - Assist with mobility/ambulation  - Relieve pressure over bony prominences  - Avoid friction and shearing  - Provide appropriate hygiene as needed including keeping skin clean and dry  - Evaluate need for skin moisturizer/barrier cream  - Collaborate with interdisciplinary team   - Patient/family teaching  - Consider wound care consult   Outcome: Progressing     Problem: Nutrition/Hydration-ADULT  Goal: Nutrient/Hydration intake appropriate for improving, restoring or maintaining nutritional needs  Description: Monitor and assess patient's nutrition/hydration status for malnutrition. Collaborate with interdisciplinary team and initiate plan and interventions as ordered.  Monitor patient's weight and dietary intake as ordered or per policy. Utilize nutrition screening tool and  intervene as necessary. Determine patient's food preferences and provide high-protein, high-caloric foods as appropriate.     INTERVENTIONS:  - Monitor oral intake, urinary output, labs, and treatment plans  - Assess nutrition and hydration status and recommend course of action  - Evaluate amount of meals eaten  - Assist patient with eating if necessary   - Allow adequate time for meals  - Recommend/ encourage appropriate diets, oral nutritional supplements, and vitamin/mineral supplements  - Order, calculate, and assess calorie counts as needed  - Recommend, monitor, and adjust tube feedings and TPN/PPN based on assessed needs  - Assess need for intravenous fluids  - Provide specific nutrition/hydration education as appropriate  - Include patient/family/caregiver in decisions related to nutrition  Outcome: Progressing     Problem: GASTROINTESTINAL - ADULT  Goal: Minimal or absence of nausea and/or vomiting  Description: INTERVENTIONS:  - Administer IV fluids if ordered to ensure adequate hydration  - Maintain NPO status until nausea and vomiting are resolved  - Nasogastric tube if ordered  - Administer ordered antiemetic medications as needed  - Provide nonpharmacologic comfort measures as appropriate  - Advance diet as tolerated, if ordered  - Consider nutrition services referral to assist patient with adequate nutrition and appropriate food choices  Outcome: Progressing  Goal: Maintains or returns to baseline bowel function  Description: INTERVENTIONS:  - Assess bowel function  - Encourage oral fluids to ensure adequate hydration  - Administer IV fluids if ordered to ensure adequate hydration  - Administer ordered medications as needed  - Encourage mobilization and activity  - Consider nutritional services referral to assist patient with adequate nutrition and appropriate food choices  Outcome: Progressing  Goal: Maintains adequate nutritional intake  Description: INTERVENTIONS:  - Monitor percentage of each meal  consumed  - Identify factors contributing to decreased intake, treat as appropriate  - Assist with meals as needed  - Monitor I&O, weight, and lab values if indicated  - Obtain nutrition services referral as needed  Outcome: Progressing  Goal: Oral mucous membranes remain intact  Description: INTERVENTIONS  - Assess oral mucosa and hygiene practices  - Implement preventative oral hygiene regimen  - Implement oral medicated treatments as ordered  - Initiate Nutrition services referral as needed  Outcome: Progressing     Problem: METABOLIC, FLUID AND ELECTROLYTES - ADULT  Goal: Electrolytes maintained within normal limits  Description: INTERVENTIONS:  - Monitor labs and assess patient for signs and symptoms of electrolyte imbalances  - Administer electrolyte replacement as ordered  - Monitor response to electrolyte replacements, including repeat lab results as appropriate  - Instruct patient on fluid and nutrition as appropriate  Outcome: Progressing  Goal: Fluid balance maintained  Description: INTERVENTIONS:  - Monitor labs   - Monitor I/O and WT  - Instruct patient on fluid and nutrition as appropriate  - Assess for signs & symptoms of volume excess or deficit  Outcome: Progressing     Problem: SKIN/TISSUE INTEGRITY - ADULT  Goal: Skin Integrity remains intact(Skin Breakdown Prevention)  Description: Assess:  -Perform [place] assessment  -Clean and moisturize skin  -Inspect skin when repositioning, toileting, and assisting with ADLS  -Assess under medical devices  -Assess extremities for adequate circulation and sensation     Bed Management:  -Have minimal linens on bed & keep smooth, unwrinkled  -Change linens as needed when moist or perspiring  -Avoid sitting or lying in one position for more than 2 hours while in bed  -Keep HOB at 30 degrees     Toileting:  -Offer bedside commode  -Assess for incontinence  -Use incontinent care products after each incontinent episode     Activity:  -Mobilize patient 3 times a  day  -Encourage activity and walks on unit  -Encourage or provide ROM exercises   -Turn and reposition patient every 2 Hours  -Use appropriate equipment to lift or move patient in bed  -Instruct/ Assist with weight shifting when out of bed in chair  -Consider limitation of chair time 2 hour intervals    Skin Care:  -Avoid use of baby powder, tape, friction and shearing, hot water or constrictive clothing  -Relieve pressure over bony prominences  -Do not massage red bony areas    Next Steps:  -Teach patient strategies to minimize risks   -Consider consults to  interdisciplinary teams  Outcome: Progressing     Problem: HEMATOLOGIC - ADULT  Goal: Maintains hematologic stability  Description: INTERVENTIONS  - Assess for signs and symptoms of bleeding or hemorrhage  - Monitor labs  - Administer supportive blood products/factors as ordered and appropriate  Outcome: Progressing     Problem: MUSCULOSKELETAL - ADULT  Goal: Maintain or return mobility to safest level of function  Description: INTERVENTIONS:  - Assess patient's ability to carry out ADLs; assess patient's baseline for ADL function and identify physical deficits which impact ability to perform ADLs (bathing, care of mouth/teeth, toileting, grooming, dressing, etc.)  - Assess/evaluate cause of self-care deficits   - Assess range of motion  - Assess patient's mobility  - Assess patient's need for assistive devices and provide as appropriate  - Encourage maximum independence but intervene and supervise when necessary  - Involve family in performance of ADLs  - Assess for home care needs following discharge   - Consider OT consult to assist with ADL evaluation and planning for discharge  - Provide patient education as appropriate  Outcome: Progressing  Goal: Maintain proper alignment of affected body part  Description: INTERVENTIONS:  - Support, maintain and protect limb and body alignment  - Provide patient/ family with appropriate education  Outcome: Progressing      Problem: PAIN - ADULT  Goal: Verbalizes/displays adequate comfort level or baseline comfort level  Description: Interventions:  - Encourage patient to monitor pain and request assistance  - Assess pain using appropriate pain scale  - Administer analgesics based on type and severity of pain and evaluate response  - Implement non-pharmacological measures as appropriate and evaluate response  - Consider cultural and social influences on pain and pain management  - Notify physician/advanced practitioner if interventions unsuccessful or patient reports new pain  Outcome: Progressing     Problem: INFECTION - ADULT  Goal: Absence or prevention of progression during hospitalization  Description: INTERVENTIONS:  - Assess and monitor for signs and symptoms of infection  - Monitor lab/diagnostic results  - Monitor all insertion sites, i.e. indwelling lines, tubes, and drains  - Monitor endotracheal if appropriate and nasal secretions for changes in amount and color  - [place] appropriate cooling/warming therapies per order  - Administer medications as ordered  - Instruct and encourage patient and family to use good hand hygiene technique  - Identify and instruct in appropriate isolation precautions for identified infection/condition  Outcome: Progressing     Problem: SAFETY ADULT  Goal: Maintain or return to baseline ADL function  Description: INTERVENTIONS:  -  Assess patient's ability to carry out ADLs; assess patient's baseline for ADL function and identify physical deficits which impact ability to perform ADLs (bathing, care of mouth/teeth, toileting, grooming, dressing, etc.)  - Assess/evaluate cause of self-care deficits   - Assess range of motion  - Assess patient's mobility; develop plan if impaired  - Assess patient's need for assistive devices and provide as appropriate  - Encourage maximum independence but intervene and supervise when necessary  - Involve family in performance of ADLs  - Assess for home care  needs following discharge   - Consider OT consult to assist with ADL evaluation and planning for discharge  - Provide patient education as appropriate  Outcome: Progressing  Goal: Maintains/Returns to pre admission functional level  Description: INTERVENTIONS:  - Perform AM-PAC 6 Click Basic Mobility/ Daily Activity assessment daily.  - Set and communicate daily mobility goal to care team and patient/family/caregiver.   - Collaborate with rehabilitation services on mobility goals if consulted  - Perform Range of Motion 3 times a day.  - Reposition patient every 3 hours.  - Dangle patient 3 times a day  - Stand patient 3 times a day  - Ambulate patient 3 times a day  - Out of bed to chair 3 times a day   - Out of bed for meals 3 times a day  - Out of bed for toileting  - Record patient progress and toleration of activity level   Outcome: Progressing     Problem: DISCHARGE PLANNING  Goal: Discharge to home or other facility with appropriate resources  Description: INTERVENTIONS:  - Identify barriers to discharge w/patient and caregiver  - Arrange for needed discharge resources and transportation as appropriate  - Identify discharge learning needs (meds, wound care, etc.)  - Arrange for interpretive services to assist at discharge as needed  - Refer to Case Management Department for coordinating discharge planning if the patient needs post-hospital services based on physician/advanced practitioner order or complex needs related to functional status, cognitive ability, or social support system  Outcome: Progressing     Problem: Knowledge Deficit  Goal: Patient/family/caregiver demonstrates understanding of disease process, treatment plan, medications, and discharge instructions  Description: Complete learning assessment and assess knowledge base.  Interventions:  - Provide teaching at level of understanding  - Provide teaching via preferred learning methods  Outcome: Progressing

## 2024-03-07 ENCOUNTER — PATIENT OUTREACH (OUTPATIENT)
Dept: CASE MANAGEMENT | Facility: OTHER | Age: 84
End: 2024-03-07

## 2024-03-07 LAB
ANION GAP SERPL CALCULATED.3IONS-SCNC: 5 MMOL/L
BUN SERPL-MCNC: 15 MG/DL (ref 5–25)
CALCIUM SERPL-MCNC: 7.8 MG/DL (ref 8.4–10.2)
CHLORIDE SERPL-SCNC: 112 MMOL/L (ref 96–108)
CO2 SERPL-SCNC: 23 MMOL/L (ref 21–32)
CREAT SERPL-MCNC: 0.75 MG/DL (ref 0.6–1.3)
ERYTHROCYTE [DISTWIDTH] IN BLOOD BY AUTOMATED COUNT: 19.3 % (ref 11.6–15.1)
GFR SERPL CREATININE-BSD FRML MDRD: 73 ML/MIN/1.73SQ M
GLUCOSE SERPL-MCNC: 82 MG/DL (ref 65–140)
HCT VFR BLD AUTO: 32.5 % (ref 34.8–46.1)
HGB BLD-MCNC: 9.9 G/DL (ref 11.5–15.4)
MAGNESIUM SERPL-MCNC: 1.9 MG/DL (ref 1.9–2.7)
MCH RBC QN AUTO: 27.3 PG (ref 26.8–34.3)
MCHC RBC AUTO-ENTMCNC: 30.5 G/DL (ref 31.4–37.4)
MCV RBC AUTO: 90 FL (ref 82–98)
NRBC BLD AUTO-RTO: 0 /100 WBCS
PLATELET # BLD AUTO: 290 THOUSANDS/UL (ref 149–390)
PMV BLD AUTO: 9.2 FL (ref 8.9–12.7)
POTASSIUM SERPL-SCNC: 3.8 MMOL/L (ref 3.5–5.3)
RBC # BLD AUTO: 3.62 MILLION/UL (ref 3.81–5.12)
SODIUM SERPL-SCNC: 140 MMOL/L (ref 135–147)
WBC # BLD AUTO: 6.04 THOUSAND/UL (ref 4.31–10.16)

## 2024-03-07 PROCEDURE — 99233 SBSQ HOSP IP/OBS HIGH 50: CPT | Performed by: INTERNAL MEDICINE

## 2024-03-07 PROCEDURE — 83735 ASSAY OF MAGNESIUM: CPT | Performed by: INTERNAL MEDICINE

## 2024-03-07 PROCEDURE — C9113 INJ PANTOPRAZOLE SODIUM, VIA: HCPCS | Performed by: PHYSICIAN ASSISTANT

## 2024-03-07 PROCEDURE — 99232 SBSQ HOSP IP/OBS MODERATE 35: CPT | Performed by: PHYSICIAN ASSISTANT

## 2024-03-07 PROCEDURE — 80048 BASIC METABOLIC PNL TOTAL CA: CPT | Performed by: INTERNAL MEDICINE

## 2024-03-07 PROCEDURE — 85027 COMPLETE CBC AUTOMATED: CPT | Performed by: INTERNAL MEDICINE

## 2024-03-07 RX ORDER — MICONAZOLE NITRATE 20 MG/G
CREAM TOPICAL 2 TIMES DAILY
Status: DISCONTINUED | OUTPATIENT
Start: 2024-03-07 | End: 2024-03-08 | Stop reason: HOSPADM

## 2024-03-07 RX ADMIN — APIXABAN 2.5 MG: 2.5 TABLET, FILM COATED ORAL at 10:31

## 2024-03-07 RX ADMIN — ASPIRIN 81 MG CHEWABLE TABLET 81 MG: 81 TABLET CHEWABLE at 08:13

## 2024-03-07 RX ADMIN — MICONAZOLE NITRATE: 20 CREAM TOPICAL at 17:20

## 2024-03-07 RX ADMIN — Medication 250 MG: at 17:19

## 2024-03-07 RX ADMIN — FOLIC ACID 1 MG: 1 TABLET ORAL at 08:13

## 2024-03-07 RX ADMIN — METHYLPREDNISOLONE SODIUM SUCCINATE 40 MG: 40 INJECTION, POWDER, FOR SOLUTION INTRAMUSCULAR; INTRAVENOUS at 08:10

## 2024-03-07 RX ADMIN — MIRTAZAPINE 7.5 MG: 15 TABLET, FILM COATED ORAL at 21:21

## 2024-03-07 RX ADMIN — Medication 3 MG: at 21:21

## 2024-03-07 RX ADMIN — PANTOPRAZOLE SODIUM 40 MG: 40 INJECTION, POWDER, FOR SOLUTION INTRAVENOUS at 08:12

## 2024-03-07 RX ADMIN — METOPROLOL SUCCINATE 50 MG: 50 TABLET, EXTENDED RELEASE ORAL at 08:13

## 2024-03-07 RX ADMIN — Medication 250 MG: at 08:13

## 2024-03-07 RX ADMIN — FERROUS SULFATE TAB 325 MG (65 MG ELEMENTAL FE) 325 MG: 325 (65 FE) TAB at 17:20

## 2024-03-07 RX ADMIN — FERROUS SULFATE TAB 325 MG (65 MG ELEMENTAL FE) 325 MG: 325 (65 FE) TAB at 08:13

## 2024-03-07 RX ADMIN — PRAVASTATIN SODIUM 20 MG: 20 TABLET ORAL at 17:20

## 2024-03-07 RX ADMIN — APIXABAN 2.5 MG: 2.5 TABLET, FILM COATED ORAL at 17:20

## 2024-03-07 RX ADMIN — PANTOPRAZOLE SODIUM 40 MG: 40 INJECTION, POWDER, FOR SOLUTION INTRAVENOUS at 21:21

## 2024-03-07 NOTE — ASSESSMENT & PLAN NOTE
Hemoglobin 8.6 on day of discharge following recent hospitalization February 21  Per report outpatient blood work had revealed low hemoglobin of 7.3  Hemoglobin 7.4 on repeat at time of admission  Reported lightheadedness on standing, intermittent rectal bleeding secondary to known IBD recently started on stelara infusions earlier this week  S/p 1 unit PRBCs on admit for symptomatic anemia  Per GI OK to continue eliquis/ASA unless evidence of overt bleeding.  Patient and family concerned regarding continuing Eliquis given recurrent issues with rectal bleeding, anemia.  Patient does have IVC filter in place per daughter.  After discussion with hematology would recommend patient remain on eliquis lifelong.    Hgb 7.4 this AM - given ongoing intermittent rectal bleeding, transfused 1 unit PRBC 3/5 and eliquis held  Resumed 3/7  Hgb improved to 8.6  Trend CBC

## 2024-03-07 NOTE — PROGRESS NOTES
[facility]  Progress Note  Name: Kerry Silverman I  MRN: [id number]  Unit/Bed#: -01 I Date of Admission: 3/1/2024   Date of Service: 3/7/2024 I Hospital Day: 6    Assessment/Plan   * Rectal bleeding  Assessment & Plan  Presents to the emergency department with outpatient labs showing decreased hemoglobin, reported intermittent rectal bleeding suspect in setting of known IBD  On ASA/Eliquis as outpatient - initially OK to continue per GI.  However patient and family concerned re: resuming eliquis.  After further discussion with hematology, would recommend continuing eliquis life-long.  Eliquis was initially restarted 3/4 but now with recurrent bleeding and drop in hgb 3/5 will hold   Hemoglobin 7.4 on admission - was 8.6 following recent discharge from hospital in Feb 2024.  Decreased to 7.3 on outpt labs  Hemodynamically stable  Continue IV Protonix  Trend H/H -s/p 1 unit PRBCs following admission  Hemoglobin 7.4 to receive additional 1 unit PRBC 3/5  Hgb improved to 9.9  Consult gastroenterology -started on IV Solu-Medrol 3/2 - anticipate transition to oral    IBD (inflammatory bowel disease)  Assessment & Plan  Patient receives Stelara infusions as outpatient, first dose was earlier this week  Started on IV Solu-Medrol 3/2  C. difficile testing positive for PCR but negative EIA  Will need close outpt follow up    History of pulmonary embolism  Assessment & Plan  Maintained on eliquis and s/p IVC filter  Patient and family concerned re: continuing eliquis  Hematology consulted to discuss AC.  At this time given metastatic cancer and risk for clots, would recommend continuing eliquis lifelong.  Eliquis again held 3/5 following recurrent rectal bleeding  Resume 3/7, and monitor for 24 hours prior to discharge    Anemia  Assessment & Plan  Hemoglobin 8.6 on day of discharge following recent hospitalization February 21  Per report outpatient blood work had revealed low hemoglobin of  7.3  Hemoglobin 7.4 on repeat at time of admission  Reported lightheadedness on standing, intermittent rectal bleeding secondary to known IBD recently started on stelara infusions earlier this week  S/p 1 unit PRBCs on admit for symptomatic anemia  Per GI OK to continue eliquis/ASA unless evidence of overt bleeding.  Patient and family concerned regarding continuing Eliquis given recurrent issues with rectal bleeding, anemia.  Patient does have IVC filter in place per daughter.  After discussion with hematology would recommend patient remain on eliquis lifelong.    Hgb 7.4 this AM - given ongoing intermittent rectal bleeding, transfused 1 unit PRBC 3/5 and eliquis held  Resumed 3/7  Hgb improved to 8.6  Trend CBC    Mixed hyperlipidemia  Assessment & Plan  Continue statin    Primary malignant neoplasm of breast with metastasis (HCC)  Assessment & Plan  Patient reports she stopped all treatment  Is not interested in ongoing treatment or follow up at this time           VTE Pharmacologic Prophylaxis: VTE Score: 6 High Risk (Score >/= 5) - Pharmacological DVT Prophylaxis Ordered: apixaban (Eliquis). Sequential Compression Devices Ordered.    Mobility:   Basic Mobility Inpatient Raw Score: 16  JH-HLM Goal: 5: Stand one or more mins  JH-HLM Achieved: 5: Stand (1 or more minutes)  HLM Goal achieved. Continue to encourage appropriate mobility.    Patient Centered Rounds: I performed bedside rounds with nursing staff today.   Discussions with Specialists or Other Care Team Provider: Discussed with GI today. Given high risk of clot per hematology recommendations, goal is to resume anticoagulation as soon as possible    Education and Discussions with Family / Patient: Updated  (daughter) via phone.    Total Time Spent on Date of Encounter in care of patient: 35 mins. This time was spent on one or more of the following: performing physical exam; counseling and coordination of care; obtaining or reviewing history;  documenting in the medical record; reviewing/ordering tests, medications or procedures; communicating with other healthcare professionals and discussing with patient's family/caregivers.    Current Length of Stay: 6 day(s)  Current Patient Status: Inpatient   Certification Statement: The patient will continue to require additional inpatient hospital stay due to Monitoring Hb after resuming Eliquis  Discharge Plan: Anticipate discharge in 24-48 hrs to home.    Code Status: Level 3 - DNAR and DNI    Subjective:   Patient feels better today, is tolerating diet without difficulty.  She reports she will be discussing Eliquis use with her daughter.    Objective:     Vitals:   Temp (24hrs), Av.9 °F (36.1 °C), Min:96.4 °F (35.8 °C), Max:97.2 °F (36.2 °C)    Temp:  [96.4 °F (35.8 °C)-97.2 °F (36.2 °C)] 97.2 °F (36.2 °C)  HR:  [81-91] 91  Resp:  [16] 16  BP: (116-152)/(46-66) 152/66  SpO2:  [98 %-100 %] 100 %  Body mass index is 23.35 kg/m².     Input and Output Summary (last 24 hours):     Intake/Output Summary (Last 24 hours) at 3/7/2024 1210  Last data filed at 3/7/2024 0500  Gross per 24 hour   Intake 180 ml   Output 725 ml   Net -545 ml       Physical Exam:   Physical Exam  Vitals and nursing note reviewed.   Constitutional:       General: She is not in acute distress.     Appearance: Normal appearance. She is well-developed.   HENT:      Head: Normocephalic and atraumatic.   Eyes:      General: No scleral icterus.     Conjunctiva/sclera: Conjunctivae normal.   Cardiovascular:      Rate and Rhythm: Normal rate and regular rhythm.      Heart sounds: No murmur heard.  Pulmonary:      Effort: Pulmonary effort is normal.      Breath sounds: No wheezing, rhonchi or rales.   Abdominal:      General: There is no distension.      Palpations: Abdomen is soft.   Skin:     General: Skin is warm and dry.   Neurological:      General: No focal deficit present.      Mental Status: She is alert.   Psychiatric:         Mood and  Affect: Mood normal.        Additional Data:     Labs:  Results from last 7 days   Lab Units 03/07/24 0242 03/06/24 0347 03/05/24 2012 03/05/24  0415   WBC Thousand/uL 6.04 6.28  --  5.04   HEMOGLOBIN g/dL 9.9* 8.6*   < > 7.4*   HEMATOCRIT % 32.5* 27.5*   < > 24.2*   PLATELETS Thousands/uL 290 243  --  241   BANDS PCT %  --  2  --   --    NEUTROS PCT %  --   --   --  59   LYMPHS PCT %  --   --   --  30   LYMPHO PCT %  --  27  --   --    MONOS PCT %  --   --   --  8   MONO PCT %  --  7  --   --    EOS PCT %  --  1  --  2    < > = values in this interval not displayed.     Results from last 7 days   Lab Units 03/07/24  0242 03/02/24 0219 03/01/24  1334   SODIUM mmol/L 140   < > 135   POTASSIUM mmol/L 3.8   < > 2.9*   CHLORIDE mmol/L 112*   < > 106   CO2 mmol/L 23   < > 21   BUN mg/dL 15   < > 13   CREATININE mg/dL 0.75   < > 0.92   ANION GAP mmol/L 5   < > 8   CALCIUM mg/dL 7.8*   < > 7.4*   ALBUMIN g/dL  --   --  2.2*   TOTAL BILIRUBIN mg/dL  --   --  1.09*   ALK PHOS U/L  --   --  119*   ALT U/L  --   --  10   AST U/L  --   --  35   GLUCOSE RANDOM mg/dL 82   < > 137    < > = values in this interval not displayed.     Results from last 7 days   Lab Units 03/01/24  1339   INR  1.37*                   Lines/Drains:  Invasive Devices       Peripheral Intravenous Line  Duration             Peripheral IV 03/04/24 Dorsal (posterior);Left Forearm 3 days              Drain  Duration             Suprapubic Catheter 16 Fr. 434 days                          Imaging: No pertinent imaging reviewed.    Recent Cultures (last 7 days):   Results from last 7 days   Lab Units 03/01/24  1606   C DIFF TOXIN B BY PCR  Positive*       Last 24 Hours Medication List:   Current Facility-Administered Medications   Medication Dose Route Frequency Provider Last Rate    acetaminophen  650 mg Oral Q6H PRN Ofelia Wheeler PA-C      apixaban  2.5 mg Oral BID Padmini Richardson PA-C      aspirin  81 mg Oral Daily Ofelia Wheeler PA-C      ferrous  sulfate  325 mg Oral BID With Meals Ofelia Wheeler PA-C      folic acid  1 mg Oral Daily Ofelia Wheeler PA-C      melatonin  3 mg Oral HS Ofelia Wheeler PA-C      methylPREDNISolone sodium succinate  40 mg Intravenous Daily Verna Gaston PA-C      metoprolol succinate  50 mg Oral Daily Ofelia Wheeler PA-C      mirtazapine  7.5 mg Oral HS Ofelia Wheeler PA-C      ALYSSA ANTIFUNGAL   Topical BID Xenia Newman MD      ondansetron  4 mg Intravenous Q6H PRN Ofelia Wheeler PA-C      pantoprazole  40 mg Intravenous Q12H RIA Ofelia Wheeler PA-C      pravastatin  20 mg Oral Daily With Dinner Ofelia Wheeler PA-C      saccharomyces boulardii  250 mg Oral BID Ofelia Wheeler PA-C          Today, Patient Was Seen By: Padmini Richardson PA-C    **Please Note: This note may have been constructed using a voice recognition system.**

## 2024-03-07 NOTE — DISCHARGE INSTR - OTHER ORDERS
Skin Care Plan:  1-Apply ALYSSA Anti-Fungal Cream to B/L Sacro-Buttocks, Perineum, and Groin Folds BID and PRN episodes of incontinence.   2-Turn/reposition q2h or when medically stable for pressure re-distribution on skin .  3-Elevate heels to offload pressure.  4-Moisturize skin daily with skin nourishing cream  5-Ehob cushion in chair when out of bed.  6-Preventative Hydraguard to bilateral heels BID and PRN.

## 2024-03-07 NOTE — ASSESSMENT & PLAN NOTE
Presents to the emergency department with outpatient labs showing decreased hemoglobin, reported intermittent rectal bleeding suspect in setting of known IBD  On ASA/Eliquis as outpatient - initially OK to continue per GI.  However patient and family concerned re: resuming eliquis.  After further discussion with hematology, would recommend continuing eliquis life-long.  Eliquis was initially restarted 3/4 but now with recurrent bleeding and drop in hgb 3/5 will hold   Hemoglobin 7.4 on admission - was 8.6 following recent discharge from hospital in Feb 2024.  Decreased to 7.3 on outpt labs  Hemodynamically stable  Continue IV Protonix  Trend H/H -s/p 1 unit PRBCs following admission  Hemoglobin 7.4 to receive additional 1 unit PRBC 3/5  Hgb improved to 9.9  Consult gastroenterology -started on IV Solu-Medrol 3/2 - anticipate transition to oral

## 2024-03-07 NOTE — PLAN OF CARE
Problem: Potential for Falls  Goal: Patient will remain free of falls  Description: INTERVENTIONS:  - Educate patient/family on patient safety including physical limitations  - Instruct patient to call for assistance with activity   - Consult OT/PT to assist with strengthening/mobility   - Keep Call bell within reach  - Keep bed low and locked with side rails adjusted as appropriate  - Keep care items and personal belongings within reach  - Initiate and maintain comfort rounds  - Make Fall Risk Sign visible to staff  - Offer Toileting every 2 Hours, in advance of need  - Initiate/Maintain alarm  - Obtain necessary fall risk management equipment:  - Apply yellow socks and bracelet for high fall risk patients  - Consider moving patient to room near nurses station  Outcome: Progressing     Problem: Prexisting or High Potential for Compromised Skin Integrity  Goal: Skin integrity is maintained or improved  Description: INTERVENTIONS:  - Identify patients at risk for skin breakdown  - Assess and monitor skin integrity  - Assess and monitor nutrition and hydration status  - Monitor labs   - Assess for incontinence   - Turn and reposition patient  - Assist with mobility/ambulation  - Relieve pressure over bony prominences  - Avoid friction and shearing  - Provide appropriate hygiene as needed including keeping skin clean and dry  - Evaluate need for skin moisturizer/barrier cream  - Collaborate with interdisciplinary team   - Patient/family teaching  - Consider wound care consult   Outcome: Progressing     Problem: Nutrition/Hydration-ADULT  Goal: Nutrient/Hydration intake appropriate for improving, restoring or maintaining nutritional needs  Description: Monitor and assess patient's nutrition/hydration status for malnutrition. Collaborate with interdisciplinary team and initiate plan and interventions as ordered.  Monitor patient's weight and dietary intake as ordered or per policy. Utilize nutrition screening tool and  intervene as necessary. Determine patient's food preferences and provide high-protein, high-caloric foods as appropriate.     INTERVENTIONS:  - Monitor oral intake, urinary output, labs, and treatment plans  - Assess nutrition and hydration status and recommend course of action  - Evaluate amount of meals eaten  - Assist patient with eating if necessary   - Allow adequate time for meals  - Recommend/ encourage appropriate diets, oral nutritional supplements, and vitamin/mineral supplements  - Order, calculate, and assess calorie counts as needed  - Recommend, monitor, and adjust tube feedings and TPN/PPN based on assessed needs  - Assess need for intravenous fluids  - Provide specific nutrition/hydration education as appropriate  - Include patient/family/caregiver in decisions related to nutrition  Outcome: Progressing     Problem: GASTROINTESTINAL - ADULT  Goal: Minimal or absence of nausea and/or vomiting  Description: INTERVENTIONS:  - Administer IV fluids if ordered to ensure adequate hydration  - Maintain NPO status until nausea and vomiting are resolved  - Nasogastric tube if ordered  - Administer ordered antiemetic medications as needed  - Provide nonpharmacologic comfort measures as appropriate  - Advance diet as tolerated, if ordered  - Consider nutrition services referral to assist patient with adequate nutrition and appropriate food choices  Outcome: Progressing  Goal: Maintains or returns to baseline bowel function  Description: INTERVENTIONS:  - Assess bowel function  - Encourage oral fluids to ensure adequate hydration  - Administer IV fluids if ordered to ensure adequate hydration  - Administer ordered medications as needed  - Encourage mobilization and activity  - Consider nutritional services referral to assist patient with adequate nutrition and appropriate food choices  Outcome: Progressing  Goal: Maintains adequate nutritional intake  Description: INTERVENTIONS:  - Monitor percentage of each meal  consumed  - Identify factors contributing to decreased intake, treat as appropriate  - Assist with meals as needed  - Monitor I&O, weight, and lab values if indicated  - Obtain nutrition services referral as needed  Outcome: Progressing  Goal: Oral mucous membranes remain intact  Description: INTERVENTIONS  - Assess oral mucosa and hygiene practices  - Implement preventative oral hygiene regimen  - Implement oral medicated treatments as ordered  - Initiate Nutrition services referral as needed  Outcome: Progressing     Problem: METABOLIC, FLUID AND ELECTROLYTES - ADULT  Goal: Electrolytes maintained within normal limits  Description: INTERVENTIONS:  - Monitor labs and assess patient for signs and symptoms of electrolyte imbalances  - Administer electrolyte replacement as ordered  - Monitor response to electrolyte replacements, including repeat lab results as appropriate  - Instruct patient on fluid and nutrition as appropriate  Outcome: Progressing  Goal: Fluid balance maintained  Description: INTERVENTIONS:  - Monitor labs   - Monitor I/O and WT  - Instruct patient on fluid and nutrition as appropriate  - Assess for signs & symptoms of volume excess or deficit  Outcome: Progressing     Problem: SKIN/TISSUE INTEGRITY - ADULT  Goal: Skin Integrity remains intact(Skin Breakdown Prevention)  Description: Assess:  -Perform [place] assessment  -Clean and moisturize skin  -Inspect skin when repositioning, toileting, and assisting with ADLS  -Assess under medical devices  -Assess extremities for adequate circulation and sensation     Bed Management:  -Have minimal linens on bed & keep smooth, unwrinkled  -Change linens as needed when moist or perspiring  -Avoid sitting or lying in one position for more than 2 hours while in bed  -Keep HOB at 30 degrees     Toileting:  -Offer bedside commode  -Assess for incontinence  -Use incontinent care products after each incontinent episode     Activity:  -Mobilize patient 3 times a  day  -Encourage activity and walks on unit  -Encourage or provide ROM exercises   -Turn and reposition patient every 2 Hours  -Use appropriate equipment to lift or move patient in bed  -Instruct/ Assist with weight shifting when out of bed in chair  -Consider limitation of chair time 2 hour intervals    Skin Care:  -Avoid use of baby powder, tape, friction and shearing, hot water or constrictive clothing  -Relieve pressure over bony prominences  -Do not massage red bony areas    Next Steps:  -Teach patient strategies to minimize risks   -Consider consults to  interdisciplinary teams  Outcome: Progressing     Problem: HEMATOLOGIC - ADULT  Goal: Maintains hematologic stability  Description: INTERVENTIONS  - Assess for signs and symptoms of bleeding or hemorrhage  - Monitor labs  - Administer supportive blood products/factors as ordered and appropriate  Outcome: Progressing     Problem: MUSCULOSKELETAL - ADULT  Goal: Maintain or return mobility to safest level of function  Description: INTERVENTIONS:  - Assess patient's ability to carry out ADLs; assess patient's baseline for ADL function and identify physical deficits which impact ability to perform ADLs (bathing, care of mouth/teeth, toileting, grooming, dressing, etc.)  - Assess/evaluate cause of self-care deficits   - Assess range of motion  - Assess patient's mobility  - Assess patient's need for assistive devices and provide as appropriate  - Encourage maximum independence but intervene and supervise when necessary  - Involve family in performance of ADLs  - Assess for home care needs following discharge   - Consider OT consult to assist with ADL evaluation and planning for discharge  - Provide patient education as appropriate  Outcome: Progressing  Goal: Maintain proper alignment of affected body part  Description: INTERVENTIONS:  - Support, maintain and protect limb and body alignment  - Provide patient/ family with appropriate education  Outcome: Progressing      Problem: PAIN - ADULT  Goal: Verbalizes/displays adequate comfort level or baseline comfort level  Description: Interventions:  - Encourage patient to monitor pain and request assistance  - Assess pain using appropriate pain scale  - Administer analgesics based on type and severity of pain and evaluate response  - Implement non-pharmacological measures as appropriate and evaluate response  - Consider cultural and social influences on pain and pain management  - Notify physician/advanced practitioner if interventions unsuccessful or patient reports new pain  Outcome: Progressing     Problem: INFECTION - ADULT  Goal: Absence or prevention of progression during hospitalization  Description: INTERVENTIONS:  - Assess and monitor for signs and symptoms of infection  - Monitor lab/diagnostic results  - Monitor all insertion sites, i.e. indwelling lines, tubes, and drains  - Monitor endotracheal if appropriate and nasal secretions for changes in amount and color  - [place] appropriate cooling/warming therapies per order  - Administer medications as ordered  - Instruct and encourage patient and family to use good hand hygiene technique  - Identify and instruct in appropriate isolation precautions for identified infection/condition  Outcome: Progressing     Problem: SAFETY ADULT  Goal: Maintain or return to baseline ADL function  Description: INTERVENTIONS:  -  Assess patient's ability to carry out ADLs; assess patient's baseline for ADL function and identify physical deficits which impact ability to perform ADLs (bathing, care of mouth/teeth, toileting, grooming, dressing, etc.)  - Assess/evaluate cause of self-care deficits   - Assess range of motion  - Assess patient's mobility; develop plan if impaired  - Assess patient's need for assistive devices and provide as appropriate  - Encourage maximum independence but intervene and supervise when necessary  - Involve family in performance of ADLs  - Assess for home care  needs following discharge   - Consider OT consult to assist with ADL evaluation and planning for discharge  - Provide patient education as appropriate  Outcome: Progressing  Goal: Maintains/Returns to pre admission functional level  Description: INTERVENTIONS:  - Perform AM-PAC 6 Click Basic Mobility/ Daily Activity assessment daily.  - Set and communicate daily mobility goal to care team and patient/family/caregiver.   - Collaborate with rehabilitation services on mobility goals if consulted  - Perform Range of Motion 3 times a day.  - Reposition patient every 3 hours.  - Dangle patient 3 times a day  - Stand patient 3 times a day  - Ambulate patient 3 times a day  - Out of bed to chair 3 times a day   - Out of bed for meals 3 times a day  - Out of bed for toileting  - Record patient progress and toleration of activity level   Outcome: Progressing     Problem: DISCHARGE PLANNING  Goal: Discharge to home or other facility with appropriate resources  Description: INTERVENTIONS:  - Identify barriers to discharge w/patient and caregiver  - Arrange for needed discharge resources and transportation as appropriate  - Identify discharge learning needs (meds, wound care, etc.)  - Arrange for interpretive services to assist at discharge as needed  - Refer to Case Management Department for coordinating discharge planning if the patient needs post-hospital services based on physician/advanced practitioner order or complex needs related to functional status, cognitive ability, or social support system  Outcome: Progressing     Problem: Knowledge Deficit  Goal: Patient/family/caregiver demonstrates understanding of disease process, treatment plan, medications, and discharge instructions  Description: Complete learning assessment and assess knowledge base.  Interventions:  - Provide teaching at level of understanding  - Provide teaching via preferred learning methods  Outcome: Progressing

## 2024-03-07 NOTE — CASE MANAGEMENT
Case Management Discharge Planning Note    Patient name Kerry Silverman  Location /-01 MRN [id number]  : 1940 Date 3/7/2024       Current Admission Date: 3/1/2024  Current Admission Diagnosis:Rectal bleeding   Patient Active Problem List    Diagnosis Date Noted    History of pulmonary embolism 2024    IBD (inflammatory bowel disease) 2024    Electrolyte abnormality 2024    Crohn's disease of large intestine with rectal bleeding (HCC) 2024    Suprapubic catheter (HCC) 2023    Hematochezia 10/13/2022    COVID-19 virus infection 10/11/2022    Ulcerative pancolitis with rectal bleeding (HCC) 10/03/2022    Saddle embolus of pulmonary artery (HCC) 2022    Urinary retention 2022    Venous insufficiency 2022    Single subsegmental pulmonary embolism without acute cor pulmonale (HCC) 2022    Severe protein-calorie malnutrition (HCC) 2022    Anemia 2022    Diarrhea 2022    Secondary malignant neoplasm of bone (HCC) 2022    Rectal bleeding 10/14/2021    Lytic lesion of bone on x-ray 2021    Neoplasm of uncertain behavior of sternum 2021    Osteopenia of multiple sites 2020    Orthostatic hypotension 10/22/2019    Chronic kidney disease (CKD) stage G3a/A1, moderately decreased glomerular filtration rate (GFR) between 45-59 mL/min/1.73 square meter and albuminuria creatinine ratio less than 30 mg/g (HCC) 2019    Adverse reaction to pneumococcal vaccine 2015    Cataract, bilateral 2014    Pulmonary nodule seen on imaging study 09/10/2013    Anxiety 2013    Primary malignant neoplasm of breast with metastasis (HCC) 10/17/2012    Mixed hyperlipidemia 2012      LOS (days): 6  Geometric Mean LOS (GMLOS) (days): 2.1  Days to GMLOS:-3.9     OBJECTIVE:  Risk of Unplanned Readmission Score: 23.42         Current admission status: Inpatient   Preferred Pharmacy:   [address]  [place] - [address]  [address]  [place] [address]  Phone: [phone] Fax: [phone]    [facility] - [place] - [address]  [address]  [address]  [place] [address]  Phone: [phone] Fax: [phone]    [facility] - [place] - [address]  [address]  [place] [address]  Phone: [phone] Fax: [phone]    [facility] - [place] - [address]  [address]  [address]  [place] [address]  Phone: [phone] Fax: [phone]    Primary Care Provider: Elena Escalera DO    Primary Insurance: MEDICARE  Secondary Insurance: AETNA    DISCHARGE DETAILS:        IMM reviewed with  Taya Michelle dtr , Taya Michelle dtr agrees with discharge determination.                                                                                 IMM Given (Date):: 03/07/24 (238p)  IMM Given to:: Family  Family notified:: Taya Cross

## 2024-03-07 NOTE — WOUND OSTOMY CARE
Consult Note - Wound   Kerry COVARRUBIAS Clunk 83 y.o. female MRN: [id number]  Unit/Bed#: -01 Encounter: [id number]        History and Present Illness:  Patient is an 82 yo female that was admitted to [facility] for treatment of rectal bleeding. On contact for C. Diff.  Patient has a PMH of  breast cancer, IBD, hypertension, hyperlipidemia. Patient is a min assist for turning and repositioning. Patient reports occasional incontinence of liquid bowel and bladder is managed via suprapubic catheter. On assessment, patient is lying on regular mattress.     Wound Care was consulted for MASD perineum and buttocks     Assessment Findings:   B/L heels are dry intact and rekha with no skin loss or wounds present. Recommend preventative Hydraguard Cream and proper offloading/ repositioning.      B/L Sacro-Buttocks, Perineum and Groin Fungal Rash: skin is intact with no skin loss or drainage. Tissue is pink in color and rashy. Scattered satellite lesions noted. Recommend ALYSSA anti-fungal cream to areas.     No induration, fluctuance, odor, warmth/temperature differences, redness, or purulence noted to the above noted wounds and skin areas assessed. New dressings applied per orders listed below. Patient tolerated well- no s/s of non-verbal pain or discomfort observed during the encounter. Bedside nurse aware of plan of care. See flow sheets for more detailed assessment findings.      Orders listed below and wound care will sign off, call or tiger text with questions.     Skin Care Plan:  1-Apply ALYSSA Anti-Fungal Cream to B/L Sacro-Buttocks, Perineum, and Groin Folds BID and PRN episodes of incontinence.   2-Turn/reposition q2h or when medically stable for pressure re-distribution on skin .  3-Elevate heels to offload pressure.  4-Moisturize skin daily with skin nourishing cream  5-Ehob cushion in chair when out of bed.  6-Preventative Hydraguard to bilateral heels BID and PRN.       Wounds:  Wound 03/06/24 MASD Perineum  (Active)   Wound Image   03/07/24 0714   Wound Description Intact;Pink 03/07/24 0714   Romana-wound Assessment Rash 03/07/24 0714   Wound Site Closure RUDY 03/07/24 0714   Drainage Amount None 03/07/24 0714   Non-staged Wound Description Not applicable 03/07/24 0714   Treatments Cleansed;Site care 03/07/24 0714   Dressing Moisture barrier 03/07/24 0714   Dressing Changed New 03/07/24 0714   Patient Tolerance Tolerated well 03/07/24 0714   Dressing Status Clean;Intact 03/07/24 0714               Amrita Burnham RN, BSN, CWOCN

## 2024-03-07 NOTE — PROGRESS NOTES
Chart review complete the patient admitted 3/1/24 to [facility]. This Admin Coordinator will continue to monitor via chart review.

## 2024-03-07 NOTE — PROGRESS NOTES
"Progress note - [facility]   Kerry Silverman 83 y.o. female MRN: [id number]  Unit/Bed#: -[place] Encounter: [id number]    ASSESSMENT and PLAN    83-year-old female admitted with bloody diarrhea on anticoagulation for history of a pulmonary embolism.  Also history of Crohn's disease and C. difficile- presented with bloody diarrhea on anticoagulation for pulmonary embolism probably a complication of stage IV breast cancer..  Eliquis was held.  Bleeding improved.  (Stools positive for colonization of C. difficile but no toxin or active infection).    Given no signs of active infection, commenced with steroid therapy to try to attain remission.  She is improved.    Difficult situation probably the risk of recurrent embolism or clot exceeds that of GI bleeding so okay to resume the Eliquis and observe for 24 hours.      She says she really does not want a colonoscopy. Patient recently started on Stelara for Crohn's colitis.  I do not think she is gotten enough of trial with Stelara to declare it a failure.  Okay to switch to oral steroids.  Chief Complaint   Patient presents with    Rectal Bleeding     Pt to er via ems from [facility] with reports that patient's recent blood work showed that her hbg dropped from 8.3 to 7.3 since the 21 of feb with active rectal bleeding. Denies dizziness or shortness of breath        SUBJECTIVE/HPI   Doing better.  Tolerating diet and eating.  Less diarrhea no visible blood but she admits she might not see it.    /66   Pulse 91   Temp (!) 97.2 °F (36.2 °C)   Resp 16   Ht 5' 2\" (1.575 m)   Wt 57.9 kg (127 lb 10.3 oz)   LMP  (LMP Unknown)   SpO2 100%   BMI 23.35 kg/m²     PHYSICALEXAM  General appearance: alert, appears stated age and cooperative  Eyes: PERLLA, EOMI, no icterus   Head: Normocephalic, without obvious abnormality, atraumatic  Extremities: extremities normal, atraumatic, no cyanosis or edema  Neurologic: Grossly normal    Lab Results " "  Component Value Date    GLUCOSE 60 (L) 09/23/2022    CALCIUM 7.8 (L) 03/07/2024     09/08/2015    K 3.8 03/07/2024    CO2 23 03/07/2024     (H) 03/07/2024    BUN 15 03/07/2024    CREATININE 0.75 03/07/2024     Lab Results   Component Value Date    WBC 6.04 03/07/2024    HGB 9.9 (L) 03/07/2024    HCT 32.5 (L) 03/07/2024    MCV 90 03/07/2024     03/07/2024     Lab Results   Component Value Date    ALT 10 03/01/2024    AST 35 03/01/2024    ALKPHOS 119 (H) 03/01/2024    BILITOT 0.66 09/08/2015     No results found for: \"AMYLASE\"  Lab Results   Component Value Date    LIPASE 79 08/15/2022     Lab Results   Component Value Date    IRON 31 (L) 03/01/2024    TIBC 91 (L) 03/01/2024    FERRITIN 820 (H) 03/01/2024     Lab Results   Component Value Date    INR 1.37 (H) 03/01/2024     "

## 2024-03-08 VITALS
HEIGHT: 62 IN | OXYGEN SATURATION: 99 % | HEART RATE: 83 BPM | DIASTOLIC BLOOD PRESSURE: 49 MMHG | SYSTOLIC BLOOD PRESSURE: 110 MMHG | TEMPERATURE: 96.3 F | WEIGHT: 127.65 LBS | BODY MASS INDEX: 23.49 KG/M2 | RESPIRATION RATE: 16 BRPM

## 2024-03-08 LAB
ANION GAP SERPL CALCULATED.3IONS-SCNC: 5 MMOL/L
BASOPHILS # BLD AUTO: 0.01 THOUSANDS/ÂΜL (ref 0–0.1)
BASOPHILS NFR BLD AUTO: 0 % (ref 0–1)
BUN SERPL-MCNC: 15 MG/DL (ref 5–25)
CALCIUM SERPL-MCNC: 8 MG/DL (ref 8.4–10.2)
CHLORIDE SERPL-SCNC: 111 MMOL/L (ref 96–108)
CO2 SERPL-SCNC: 26 MMOL/L (ref 21–32)
CREAT SERPL-MCNC: 0.83 MG/DL (ref 0.6–1.3)
EOSINOPHIL # BLD AUTO: 0.19 THOUSAND/ÂΜL (ref 0–0.61)
EOSINOPHIL NFR BLD AUTO: 3 % (ref 0–6)
ERYTHROCYTE [DISTWIDTH] IN BLOOD BY AUTOMATED COUNT: 19.6 % (ref 11.6–15.1)
GFR SERPL CREATININE-BSD FRML MDRD: 64 ML/MIN/1.73SQ M
GLUCOSE SERPL-MCNC: 80 MG/DL (ref 65–140)
HCT VFR BLD AUTO: 32 % (ref 34.8–46.1)
HGB BLD-MCNC: 9.7 G/DL (ref 11.5–15.4)
IMM GRANULOCYTES # BLD AUTO: 0.13 THOUSAND/UL (ref 0–0.2)
IMM GRANULOCYTES NFR BLD AUTO: 2 % (ref 0–2)
LYMPHOCYTES # BLD AUTO: 1.73 THOUSANDS/ÂΜL (ref 0.6–4.47)
LYMPHOCYTES NFR BLD AUTO: 31 % (ref 14–44)
MCH RBC QN AUTO: 27.3 PG (ref 26.8–34.3)
MCHC RBC AUTO-ENTMCNC: 30.3 G/DL (ref 31.4–37.4)
MCV RBC AUTO: 90 FL (ref 82–98)
MONOCYTES # BLD AUTO: 0.51 THOUSAND/ÂΜL (ref 0.17–1.22)
MONOCYTES NFR BLD AUTO: 9 % (ref 4–12)
NEUTROPHILS # BLD AUTO: 3.05 THOUSANDS/ÂΜL (ref 1.85–7.62)
NEUTS SEG NFR BLD AUTO: 55 % (ref 43–75)
NRBC BLD AUTO-RTO: 0 /100 WBCS
PLATELET # BLD AUTO: 315 THOUSANDS/UL (ref 149–390)
PMV BLD AUTO: 8.8 FL (ref 8.9–12.7)
POTASSIUM SERPL-SCNC: 3.9 MMOL/L (ref 3.5–5.3)
RBC # BLD AUTO: 3.55 MILLION/UL (ref 3.81–5.12)
SODIUM SERPL-SCNC: 142 MMOL/L (ref 135–147)
WBC # BLD AUTO: 5.62 THOUSAND/UL (ref 4.31–10.16)

## 2024-03-08 PROCEDURE — 99239 HOSP IP/OBS DSCHRG MGMT >30: CPT | Performed by: PHYSICIAN ASSISTANT

## 2024-03-08 PROCEDURE — 80048 BASIC METABOLIC PNL TOTAL CA: CPT | Performed by: PHYSICIAN ASSISTANT

## 2024-03-08 PROCEDURE — 85025 COMPLETE CBC W/AUTO DIFF WBC: CPT | Performed by: PHYSICIAN ASSISTANT

## 2024-03-08 PROCEDURE — 99232 SBSQ HOSP IP/OBS MODERATE 35: CPT | Performed by: INTERNAL MEDICINE

## 2024-03-08 PROCEDURE — C9113 INJ PANTOPRAZOLE SODIUM, VIA: HCPCS | Performed by: PHYSICIAN ASSISTANT

## 2024-03-08 RX ORDER — PREDNISONE 20 MG/1
TABLET ORAL DAILY
Qty: 49 TABLET | Refills: 0 | Status: SHIPPED | OUTPATIENT
Start: 2024-03-09 | End: 2024-04-13

## 2024-03-08 RX ORDER — PREDNISONE 20 MG/1
40 TABLET ORAL DAILY
Status: DISCONTINUED | OUTPATIENT
Start: 2024-03-09 | End: 2024-03-08 | Stop reason: HOSPADM

## 2024-03-08 RX ADMIN — PANTOPRAZOLE SODIUM 40 MG: 40 INJECTION, POWDER, FOR SOLUTION INTRAVENOUS at 08:00

## 2024-03-08 RX ADMIN — METOPROLOL SUCCINATE 50 MG: 50 TABLET, EXTENDED RELEASE ORAL at 08:01

## 2024-03-08 RX ADMIN — APIXABAN 2.5 MG: 2.5 TABLET, FILM COATED ORAL at 08:00

## 2024-03-08 RX ADMIN — FERROUS SULFATE TAB 325 MG (65 MG ELEMENTAL FE) 325 MG: 325 (65 FE) TAB at 08:01

## 2024-03-08 RX ADMIN — Medication 250 MG: at 08:00

## 2024-03-08 RX ADMIN — FOLIC ACID 1 MG: 1 TABLET ORAL at 08:01

## 2024-03-08 RX ADMIN — ASPIRIN 81 MG CHEWABLE TABLET 81 MG: 81 TABLET CHEWABLE at 08:01

## 2024-03-08 RX ADMIN — MICONAZOLE NITRATE: 20 CREAM TOPICAL at 08:02

## 2024-03-08 RX ADMIN — METHYLPREDNISOLONE SODIUM SUCCINATE 40 MG: 40 INJECTION, POWDER, FOR SOLUTION INTRAMUSCULAR; INTRAVENOUS at 08:00

## 2024-03-08 NOTE — ASSESSMENT & PLAN NOTE
Maintained on eliquis and s/p IVC filter  Patient and family concerned re: continuing eliquis  Hematology consulted to discuss AC.  At this time given metastatic cancer and risk for clots, would recommend continuing eliquis lifelong.  Eliquis again held 3/5 following recurrent rectal bleeding  Resume 3/7, Hb has been stable x24 hours, cleared by GI for discharge

## 2024-03-08 NOTE — PLAN OF CARE
Problem: Potential for Falls  Goal: Patient will remain free of falls  Description: INTERVENTIONS:  - Educate patient/family on patient safety including physical limitations  - Instruct patient to call for assistance with activity   - Consult OT/PT to assist with strengthening/mobility   - Keep Call bell within reach  - Keep bed low and locked with side rails adjusted as appropriate  - Keep care items and personal belongings within reach  - Initiate and maintain comfort rounds  - Make Fall Risk Sign visible to staff  - Offer Toileting every 2 Hours, in advance of need  - Initiate/Maintain alarm  - Obtain necessary fall risk management equipment:  - Apply yellow socks and bracelet for high fall risk patients  - Consider moving patient to room near nurses station  Outcome: Progressing     Problem: Prexisting or High Potential for Compromised Skin Integrity  Goal: Skin integrity is maintained or improved  Description: INTERVENTIONS:  - Identify patients at risk for skin breakdown  - Assess and monitor skin integrity  - Assess and monitor nutrition and hydration status  - Monitor labs   - Assess for incontinence   - Turn and reposition patient  - Assist with mobility/ambulation  - Relieve pressure over bony prominences  - Avoid friction and shearing  - Provide appropriate hygiene as needed including keeping skin clean and dry  - Evaluate need for skin moisturizer/barrier cream  - Collaborate with interdisciplinary team   - Patient/family teaching  - Consider wound care consult   Outcome: Progressing     Problem: Nutrition/Hydration-ADULT  Goal: Nutrient/Hydration intake appropriate for improving, restoring or maintaining nutritional needs  Description: Monitor and assess patient's nutrition/hydration status for malnutrition. Collaborate with interdisciplinary team and initiate plan and interventions as ordered.  Monitor patient's weight and dietary intake as ordered or per policy. Utilize nutrition screening tool and  intervene as necessary. Determine patient's food preferences and provide high-protein, high-caloric foods as appropriate.     INTERVENTIONS:  - Monitor oral intake, urinary output, labs, and treatment plans  - Assess nutrition and hydration status and recommend course of action  - Evaluate amount of meals eaten  - Assist patient with eating if necessary   - Allow adequate time for meals  - Recommend/ encourage appropriate diets, oral nutritional supplements, and vitamin/mineral supplements  - Order, calculate, and assess calorie counts as needed  - Recommend, monitor, and adjust tube feedings and TPN/PPN based on assessed needs  - Assess need for intravenous fluids  - Provide specific nutrition/hydration education as appropriate  - Include patient/family/caregiver in decisions related to nutrition  Outcome: Progressing     Problem: GASTROINTESTINAL - ADULT  Goal: Minimal or absence of nausea and/or vomiting  Description: INTERVENTIONS:  - Administer IV fluids if ordered to ensure adequate hydration  - Maintain NPO status until nausea and vomiting are resolved  - Nasogastric tube if ordered  - Administer ordered antiemetic medications as needed  - Provide nonpharmacologic comfort measures as appropriate  - Advance diet as tolerated, if ordered  - Consider nutrition services referral to assist patient with adequate nutrition and appropriate food choices  Outcome: Progressing  Goal: Maintains or returns to baseline bowel function  Description: INTERVENTIONS:  - Assess bowel function  - Encourage oral fluids to ensure adequate hydration  - Administer IV fluids if ordered to ensure adequate hydration  - Administer ordered medications as needed  - Encourage mobilization and activity  - Consider nutritional services referral to assist patient with adequate nutrition and appropriate food choices  Outcome: Progressing  Goal: Maintains adequate nutritional intake  Description: INTERVENTIONS:  - Monitor percentage of each meal  consumed  - Identify factors contributing to decreased intake, treat as appropriate  - Assist with meals as needed  - Monitor I&O, weight, and lab values if indicated  - Obtain nutrition services referral as needed  Outcome: Progressing  Goal: Oral mucous membranes remain intact  Description: INTERVENTIONS  - Assess oral mucosa and hygiene practices  - Implement preventative oral hygiene regimen  - Implement oral medicated treatments as ordered  - Initiate Nutrition services referral as needed  Outcome: Progressing     Problem: METABOLIC, FLUID AND ELECTROLYTES - ADULT  Goal: Electrolytes maintained within normal limits  Description: INTERVENTIONS:  - Monitor labs and assess patient for signs and symptoms of electrolyte imbalances  - Administer electrolyte replacement as ordered  - Monitor response to electrolyte replacements, including repeat lab results as appropriate  - Instruct patient on fluid and nutrition as appropriate  Outcome: Progressing  Goal: Fluid balance maintained  Description: INTERVENTIONS:  - Monitor labs   - Monitor I/O and WT  - Instruct patient on fluid and nutrition as appropriate  - Assess for signs & symptoms of volume excess or deficit  Outcome: Progressing     Problem: SKIN/TISSUE INTEGRITY - ADULT  Goal: Skin Integrity remains intact(Skin Breakdown Prevention)  Description: Assess:  -Perform [place] assessment  -Clean and moisturize skin  -Inspect skin when repositioning, toileting, and assisting with ADLS  -Assess under medical devices  -Assess extremities for adequate circulation and sensation     Bed Management:  -Have minimal linens on bed & keep smooth, unwrinkled  -Change linens as needed when moist or perspiring  -Avoid sitting or lying in one position for more than 2 hours while in bed  -Keep HOB at 30 degrees     Toileting:  -Offer bedside commode  -Assess for incontinence  -Use incontinent care products after each incontinent episode     Activity:  -Mobilize patient 3 times a  day  -Encourage activity and walks on unit  -Encourage or provide ROM exercises   -Turn and reposition patient every 2 Hours  -Use appropriate equipment to lift or move patient in bed  -Instruct/ Assist with weight shifting when out of bed in chair  -Consider limitation of chair time 2 hour intervals    Skin Care:  -Avoid use of baby powder, tape, friction and shearing, hot water or constrictive clothing  -Relieve pressure over bony prominences  -Do not massage red bony areas    Next Steps:  -Teach patient strategies to minimize risks   -Consider consults to  interdisciplinary teams  Outcome: Progressing     Problem: HEMATOLOGIC - ADULT  Goal: Maintains hematologic stability  Description: INTERVENTIONS  - Assess for signs and symptoms of bleeding or hemorrhage  - Monitor labs  - Administer supportive blood products/factors as ordered and appropriate  Outcome: Progressing     Problem: MUSCULOSKELETAL - ADULT  Goal: Maintain or return mobility to safest level of function  Description: INTERVENTIONS:  - Assess patient's ability to carry out ADLs; assess patient's baseline for ADL function and identify physical deficits which impact ability to perform ADLs (bathing, care of mouth/teeth, toileting, grooming, dressing, etc.)  - Assess/evaluate cause of self-care deficits   - Assess range of motion  - Assess patient's mobility  - Assess patient's need for assistive devices and provide as appropriate  - Encourage maximum independence but intervene and supervise when necessary  - Involve family in performance of ADLs  - Assess for home care needs following discharge   - Consider OT consult to assist with ADL evaluation and planning for discharge  - Provide patient education as appropriate  Outcome: Progressing  Goal: Maintain proper alignment of affected body part  Description: INTERVENTIONS:  - Support, maintain and protect limb and body alignment  - Provide patient/ family with appropriate education  Outcome: Progressing      Problem: PAIN - ADULT  Goal: Verbalizes/displays adequate comfort level or baseline comfort level  Description: Interventions:  - Encourage patient to monitor pain and request assistance  - Assess pain using appropriate pain scale  - Administer analgesics based on type and severity of pain and evaluate response  - Implement non-pharmacological measures as appropriate and evaluate response  - Consider cultural and social influences on pain and pain management  - Notify physician/advanced practitioner if interventions unsuccessful or patient reports new pain  Outcome: Progressing     Problem: INFECTION - ADULT  Goal: Absence or prevention of progression during hospitalization  Description: INTERVENTIONS:  - Assess and monitor for signs and symptoms of infection  - Monitor lab/diagnostic results  - Monitor all insertion sites, i.e. indwelling lines, tubes, and drains  - Monitor endotracheal if appropriate and nasal secretions for changes in amount and color  - [place] appropriate cooling/warming therapies per order  - Administer medications as ordered  - Instruct and encourage patient and family to use good hand hygiene technique  - Identify and instruct in appropriate isolation precautions for identified infection/condition  Outcome: Progressing     Problem: SAFETY ADULT  Goal: Maintain or return to baseline ADL function  Description: INTERVENTIONS:  -  Assess patient's ability to carry out ADLs; assess patient's baseline for ADL function and identify physical deficits which impact ability to perform ADLs (bathing, care of mouth/teeth, toileting, grooming, dressing, etc.)  - Assess/evaluate cause of self-care deficits   - Assess range of motion  - Assess patient's mobility; develop plan if impaired  - Assess patient's need for assistive devices and provide as appropriate  - Encourage maximum independence but intervene and supervise when necessary  - Involve family in performance of ADLs  - Assess for home care  needs following discharge   - Consider OT consult to assist with ADL evaluation and planning for discharge  - Provide patient education as appropriate  Outcome: Progressing  Goal: Maintains/Returns to pre admission functional level  Description: INTERVENTIONS:  - Perform AM-PAC 6 Click Basic Mobility/ Daily Activity assessment daily.  - Set and communicate daily mobility goal to care team and patient/family/caregiver.   - Collaborate with rehabilitation services on mobility goals if consulted  - Perform Range of Motion 3 times a day.  - Reposition patient every 3 hours.  - Dangle patient 3 times a day  - Stand patient 3 times a day  - Ambulate patient 3 times a day  - Out of bed to chair 3 times a day   - Out of bed for meals 3 times a day  - Out of bed for toileting  - Record patient progress and toleration of activity level   Outcome: Progressing     Problem: DISCHARGE PLANNING  Goal: Discharge to home or other facility with appropriate resources  Description: INTERVENTIONS:  - Identify barriers to discharge w/patient and caregiver  - Arrange for needed discharge resources and transportation as appropriate  - Identify discharge learning needs (meds, wound care, etc.)  - Arrange for interpretive services to assist at discharge as needed  - Refer to Case Management Department for coordinating discharge planning if the patient needs post-hospital services based on physician/advanced practitioner order or complex needs related to functional status, cognitive ability, or social support system  Outcome: Progressing     Problem: Knowledge Deficit  Goal: Patient/family/caregiver demonstrates understanding of disease process, treatment plan, medications, and discharge instructions  Description: Complete learning assessment and assess knowledge base.  Interventions:  - Provide teaching at level of understanding  - Provide teaching via preferred learning methods  Outcome: Progressing

## 2024-03-08 NOTE — ASSESSMENT & PLAN NOTE
Presents to the emergency department with outpatient labs showing decreased hemoglobin, reported intermittent rectal bleeding suspect in setting of known IBD  On ASA/Eliquis as outpatient - initially OK to continue per GI.  However patient and family concerned re: resuming eliquis.  After further discussion with hematology, would recommend continuing eliquis life-long.  Eliquis was initially restarted 3/4 but now with recurrent bleeding and drop in hgb 3/5 will hold   Hemoglobin 7.4 on admission - was 8.6 following recent discharge from hospital in Feb 2024.  Decreased to 7.3 on outpt labs  Hemodynamically stable  Continue IV Protonix  Trend H/H -s/p 1 unit PRBCs following admission  Hemoglobin 7.4 received additional 1 unit PRBC 3/5  Hgb improved to 9.9  Consult gastroenterology -started on IV Solu-Medrol 3/2 - transitioned to 40mg daily by GI for discharge - will discharge on 2 weeks, and ultimately be placed on a taper by outpatient GI office

## 2024-03-08 NOTE — PROGRESS NOTES
Progress note - Gastroenterology   Kerry Silverman 83 y.o. 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   Continues with fecal urgency and incontinence of frequent loose to mushy bloody bowel movements  History of C. difficile colitis 9/2022.  Stool culture 3/1 positive by PCR however EIA toxin negative consistent with colonization  Treated with IV steroids for active Crohn's colitis    Symptomatic anemia  Crohn's colitis  History of C diff  PE on Eliquis  Hemoglobin stable at 9.7 this a.m.  Transfused with 2 units PRBCs during this admission.  Iron panel consistent with anemia of inflammation with increased ferritin and decreased TIBC  Continues to have 4-5 soft bowel movements daily but nursing reports decreased rectal bleeding this a.m.   Eliquis resumed 3/7  Symptoms consistent with persistent Crohn's colitis.  She did receive induction infusion of Stelara 2/27 and will be due to start subcutaneous injections in 8 weeks.  Likely not sufficient time for clinical improvement on biologic  -Continue steroids for now, will change to p.o. prednisone 40 mg daily   -Continue iron supplement  -Trend hemoglobin as outpatient every 2 weeks  -Outpatient follow-up with Dr. Brito    Chief Complaint   Patient presents with    Rectal Bleeding     Pt to er via ems from [facility] with reports that patient's recent blood work showed that her hbg dropped from 8.3 to 7.3 since the 21 of feb with active rectal bleeding. Denies dizziness or shortness of breath        SUBJECTIVE/HPI   Continues with frequent bowel movements, incontinent of soft to mushy  "stool  Nursing reports 3 bowel movements overnight, 2 this a.m., remains blood-tinged  Patient denies abdominal cramping or pain  Appetite good  Hemoglobin stable at 9.7, from 8.5 on admission.  Transfused with total of 2 units PRBCs since admission    /69   Pulse 86   Temp 98.6 °F (37 °C)   Resp 16   Ht 5' 2\" (1.575 m)   Wt 57.9 kg (127 lb 10.3 oz)   LMP  (LMP Unknown)   SpO2 99%   BMI 23.35 kg/m²     PHYSICALEXAM  General appearance: alert, appears stated age and cooperative  Eyes:  no icterus   Head: Normocephalic, without obvious abnormality, atraumatic  Lungs: clear to auscultation bilaterally  Heart: regular rate and rhythm, S1, S2 normal, no murmur, click, rub or gallop  Abdomen: soft, nondistended, non-tender bowel sounds normal.  Incontinent of soft to mushy bowel movements-patient feels no control.  Had 3 BMs overnight, 2 this a.m.  Extremities: extremities normal, atraumatic, no cyanosis or edema  Neurologic: Grossly normal    Lab Results   Component Value Date    GLUCOSE 60 (L) 09/23/2022    CALCIUM 8.0 (L) 03/08/2024     09/08/2015    K 3.9 03/08/2024    CO2 26 03/08/2024     (H) 03/08/2024    BUN 15 03/08/2024    CREATININE 0.83 03/08/2024     Lab Results   Component Value Date    WBC 5.62 03/08/2024    HGB 9.7 (L) 03/08/2024    HCT 32.0 (L) 03/08/2024    MCV 90 03/08/2024     03/08/2024     Lab Results   Component Value Date    ALT 10 03/01/2024    AST 35 03/01/2024    ALKPHOS 119 (H) 03/01/2024    BILITOT 0.66 09/08/2015       Lab Results   Component Value Date    LIPASE 79 08/15/2022     Lab Results   Component Value Date    IRON 31 (L) 03/01/2024    TIBC 91 (L) 03/01/2024    FERRITIN 820 (H) 03/01/2024     Lab Results   Component Value Date    INR 1.37 (H) 03/01/2024      "

## 2024-03-08 NOTE — CASE MANAGEMENT
Case Management Discharge Planning Note    Patient name Kerry Silverman  Location /-01 MRN [id number]  : 1940 Date 3/8/2024       Current Admission Date: 3/1/2024  Current Admission Diagnosis:Rectal bleeding   Patient Active Problem List    Diagnosis Date Noted    History of pulmonary embolism 2024    IBD (inflammatory bowel disease) 2024    Electrolyte abnormality 2024    Crohn's disease of large intestine with rectal bleeding (HCC) 2024    Suprapubic catheter (HCC) 2023    Hematochezia 10/13/2022    COVID-19 virus infection 10/11/2022    Ulcerative pancolitis with rectal bleeding (HCC) 10/03/2022    Saddle embolus of pulmonary artery (HCC) 2022    Urinary retention 2022    Venous insufficiency 2022    Single subsegmental pulmonary embolism without acute cor pulmonale (HCC) 2022    Severe protein-calorie malnutrition (HCC) 2022    Anemia 2022    Diarrhea 2022    Secondary malignant neoplasm of bone (HCC) 2022    Rectal bleeding 10/14/2021    Lytic lesion of bone on x-ray 2021    Neoplasm of uncertain behavior of sternum 2021    Osteopenia of multiple sites 2020    Orthostatic hypotension 10/22/2019    Chronic kidney disease (CKD) stage G3a/A1, moderately decreased glomerular filtration rate (GFR) between 45-59 mL/min/1.73 square meter and albuminuria creatinine ratio less than 30 mg/g (HCC) 2019    Adverse reaction to pneumococcal vaccine 2015    Cataract, bilateral 2014    Pulmonary nodule seen on imaging study 09/10/2013    Anxiety 2013    Primary malignant neoplasm of breast with metastasis (HCC) 10/17/2012    Mixed hyperlipidemia 2012      LOS (days): 7  Geometric Mean LOS (GMLOS) (days): 2.1  Days to GMLOS:-4.9     OBJECTIVE:  Risk of Unplanned Readmission Score: 23.73         Current admission status: Inpatient   Preferred Pharmacy:   [address]  [place] - [address]  [address]  [place] [address]  Phone: [phone] Fax: [phone]    [facility] - [place], [place] - [address]  [address]  [address]  [place] [address]  Phone: [phone] Fax: [phone]    [facility] [place] - [address]  [address]  [place] [address]  Phone: [phone] Fax: [phone]    [facility] [place] - [address]  [address] [place] [address]  [address]  [place] [address]  Phone: [phone] Fax: [phone]    Primary Care Provider: Elena Escalera DO    Primary Insurance: MEDICARE  Secondary Insurance: AETNA    DISCHARGE DETAILS:        As per roundtrip, [facility] can transport patient to [facility] at 4:15.  Notified patient, her daughter, Armida at [phone], Jose Martin of [facility] and [place], her nurse of transport time.

## 2024-03-08 NOTE — CASE MANAGEMENT
Case Management Discharge Planning Note    Patient name Kerry Silverman  Location /-01 MRN [id number]  : 1940 Date 3/8/2024       Current Admission Date: 3/1/2024  Current Admission Diagnosis:Rectal bleeding   Patient Active Problem List    Diagnosis Date Noted    History of pulmonary embolism 2024    IBD (inflammatory bowel disease) 2024    Electrolyte abnormality 2024    Crohn's disease of large intestine with rectal bleeding (HCC) 2024    Suprapubic catheter (HCC) 2023    Hematochezia 10/13/2022    COVID-19 virus infection 10/11/2022    Ulcerative pancolitis with rectal bleeding (HCC) 10/03/2022    Saddle embolus of pulmonary artery (HCC) 2022    Urinary retention 2022    Venous insufficiency 2022    Single subsegmental pulmonary embolism without acute cor pulmonale (HCC) 2022    Severe protein-calorie malnutrition (HCC) 2022    Anemia 2022    Diarrhea 2022    Secondary malignant neoplasm of bone (HCC) 2022    Rectal bleeding 10/14/2021    Lytic lesion of bone on x-ray 2021    Neoplasm of uncertain behavior of sternum 2021    Osteopenia of multiple sites 2020    Orthostatic hypotension 10/22/2019    Chronic kidney disease (CKD) stage G3a/A1, moderately decreased glomerular filtration rate (GFR) between 45-59 mL/min/1.73 square meter and albuminuria creatinine ratio less than 30 mg/g (HCC) 2019    Adverse reaction to pneumococcal vaccine 2015    Cataract, bilateral 2014    Pulmonary nodule seen on imaging study 09/10/2013    Anxiety 2013    Primary malignant neoplasm of breast with metastasis (HCC) 10/17/2012    Mixed hyperlipidemia 2012      LOS (days): 7  Geometric Mean LOS (GMLOS) (days): 2.1  Days to GMLOS:-4.9     OBJECTIVE:  Risk of Unplanned Readmission Score: 23.73         Current admission status: Inpatient   Preferred Pharmacy:   [address]  [place] - [address]  [address]  [place] [address]  Phone: [phone] Fax: [phone]    [facility] - [place], [place] - [address]  [address]  [address]  [place] [address]  Phone: [phone] Fax: [phone]    [facility] [place] - [address]  [address]  [place] [address]  Phone: [phone] Fax: [phone]    [facility] [place] - [address]  [address] [address]  [address]  [place] [address]  Phone: [phone] Fax: [phone]    Primary Care Provider: Elena Escalera DO    Primary Insurance: MEDICARE  Secondary Insurance: AETNA    DISCHARGE DETAILS:     Met with patient, she is agreeable to a WC van transport, was made aware of cost. Patient stated Medicaid will cover cost of WC van transport.   Placed request through roundtrip for a 4 pm transport. She was notified her daughter.

## 2024-03-08 NOTE — PLAN OF CARE
Problem: GASTROINTESTINAL - ADULT  Goal: Minimal or absence of nausea and/or vomiting  Description: INTERVENTIONS:  - Administer IV fluids if ordered to ensure adequate hydration  - Maintain NPO status until nausea and vomiting are resolved  - Nasogastric tube if ordered  - Administer ordered antiemetic medications as needed  - Provide nonpharmacologic comfort measures as appropriate  - Advance diet as tolerated, if ordered  - Consider nutrition services referral to assist patient with adequate nutrition and appropriate food choices  Outcome: Progressing  Goal: Maintains or returns to baseline bowel function  Description: INTERVENTIONS:  - Assess bowel function  - Encourage oral fluids to ensure adequate hydration  - Administer IV fluids if ordered to ensure adequate hydration  - Administer ordered medications as needed  - Encourage mobilization and activity  - Consider nutritional services referral to assist patient with adequate nutrition and appropriate food choices  Outcome: Progressing  Goal: Maintains adequate nutritional intake  Description: INTERVENTIONS:  - Monitor percentage of each meal consumed  - Identify factors contributing to decreased intake, treat as appropriate  - Assist with meals as needed  - Monitor I&O, weight, and lab values if indicated  - Obtain nutrition services referral as needed  Outcome: Progressing  Goal: Oral mucous membranes remain intact  Description: INTERVENTIONS  - Assess oral mucosa and hygiene practices  - Implement preventative oral hygiene regimen  - Implement oral medicated treatments as ordered  - Initiate Nutrition services referral as needed  Outcome: Progressing     Problem: MUSCULOSKELETAL - ADULT  Goal: Maintain or return mobility to safest level of function  Description: INTERVENTIONS:  - Assess patient's ability to carry out ADLs; assess patient's baseline for ADL function and identify physical deficits which impact ability to perform ADLs (bathing, care of  mouth/teeth, toileting, grooming, dressing, etc.)  - Assess/evaluate cause of self-care deficits   - Assess range of motion  - Assess patient's mobility  - Assess patient's need for assistive devices and provide as appropriate  - Encourage maximum independence but intervene and supervise when necessary  - Involve family in performance of ADLs  - Assess for home care needs following discharge   - Consider OT consult to assist with ADL evaluation and planning for discharge  - Provide patient education as appropriate  Outcome: Progressing  Goal: Maintain proper alignment of affected body part  Description: INTERVENTIONS:  - Support, maintain and protect limb and body alignment  - Provide patient/ family with appropriate education  Outcome: Progressing     Problem: PAIN - ADULT  Goal: Verbalizes/displays adequate comfort level or baseline comfort level  Description: Interventions:  - Encourage patient to monitor pain and request assistance  - Assess pain using appropriate pain scale  - Administer analgesics based on type and severity of pain and evaluate response  - Implement non-pharmacological measures as appropriate and evaluate response  - Consider cultural and social influences on pain and pain management  - Notify physician/advanced practitioner if interventions unsuccessful or patient reports new pain  Outcome: Progressing

## 2024-03-08 NOTE — ASSESSMENT & PLAN NOTE
Patient receives Stelara infusions as outpatient, first dose was earlier this week  Started on IV Solu-Medrol 3/2 - transition to oral prednisone for discharge 40mg daily x2 weeks, then taper  C. difficile testing positive for PCR but negative EIA  Will need close outpt follow up

## 2024-03-08 NOTE — DISCHARGE SUMMARY
[facility]  Discharge- Kerry Silverman 1940, 83 y.o. female MRN: [id number]  Unit/Bed#: -01 Encounter: [id number]  Primary Care Provider: Elena Escalera DO   Date and time admitted to hospital: 3/1/2024  1:30 PM    * Rectal bleeding  Assessment & Plan  Presents to the emergency department with outpatient labs showing decreased hemoglobin, reported intermittent rectal bleeding suspect in setting of known IBD  On ASA/Eliquis as outpatient - initially OK to continue per GI.  However patient and family concerned re: resuming eliquis.  After further discussion with hematology, would recommend continuing eliquis life-long.  Eliquis was initially restarted 3/4 but now with recurrent bleeding and drop in hgb 3/5 will hold   Hemoglobin 7.4 on admission - was 8.6 following recent discharge from hospital in Feb 2024.  Decreased to 7.3 on outpt labs  Hemodynamically stable  Continue IV Protonix  Trend H/H -s/p 1 unit PRBCs following admission  Hemoglobin 7.4 received additional 1 unit PRBC 3/5  Hgb improved to 9.9  Consult gastroenterology -started on IV Solu-Medrol 3/2 - transitioned to 40mg daily by GI for discharge - will discharge on 2 weeks, and ultimately be placed on a taper by outpatient GI office    IBD (inflammatory bowel disease)  Assessment & Plan  Patient receives Stelara infusions as outpatient, first dose was earlier this week  Started on IV Solu-Medrol 3/2 - transition to oral prednisone for discharge 40mg daily x2 weeks, then taper  C. difficile testing positive for PCR but negative EIA  Will need close outpt follow up    History of pulmonary embolism  Assessment & Plan  Maintained on eliquis and s/p IVC filter  Patient and family concerned re: continuing eliquis  Hematology consulted to discuss AC.  At this time given metastatic cancer and risk for clots, would recommend continuing eliquis lifelong.  Eliquis again held 3/5 following recurrent rectal bleeding  Resume  3/7, Hb has been stable x24 hours, cleared by GI for discharge    Anemia  Assessment & Plan  Hemoglobin 8.6 on day of discharge following recent hospitalization February 21  Per report outpatient blood work had revealed low hemoglobin of 7.3  Hemoglobin 7.4 on repeat at time of admission  Reported lightheadedness on standing, intermittent rectal bleeding secondary to known IBD recently started on stelara infusions earlier this week  S/p 1 unit PRBCs on admit for symptomatic anemia  Per GI OK to continue eliquis/ASA unless evidence of overt bleeding.  Patient and family concerned regarding continuing Eliquis given recurrent issues with rectal bleeding, anemia.  Patient does have IVC filter in place per daughter.  After discussion with hematology would recommend patient remain on eliquis lifelong.    Hgb 7.4 this AM - given ongoing intermittent rectal bleeding, transfused 1 unit PRBC 3/5 and eliquis held  Resumed 3/7  Hgb improved to 8.6  Trend CBC    Mixed hyperlipidemia  Assessment & Plan  Continue statin    Primary malignant neoplasm of breast with metastasis (HCC)  Assessment & Plan  Patient reports she stopped all treatment  Is not interested in ongoing treatment or follow up at this time      Medical Problems       Resolved Problems  Date Reviewed: 3/8/2024            Resolved    Hypokalemia 3/4/2024     Resolved by  Ofelia Wheeler PA-C        Discharging Physician / Practitioner: Padmini Richardson PA-C  PCP: Elena Escalera DO  Admission Date:   Admission Orders (From admission, onward)       Ordered        03/01/24 1427  INPATIENT ADMISSION  Once                          Discharge Date: 03/08/24    Consultations During Hospital Stay:  GI    Procedures Performed:   None    Significant Findings / Test Results:   Stool enteric bacterial panel 3/1: Negative  C. Diff 3/1: Positive - consistent with colonization  MRSA culture 3/1: positive    Incidental Findings:   None     Test Results Pending at Discharge (will  "require follow up):   None     Outpatient Tests Requested:  Repeat CBC 1 week    Complications:  None    Reason for Admission: rectal bleeding    Hospital Course:   Kerry Silverman is a 83 y.o. female patient with a past medical history of breast cancer, IBD, hypertension, hyperlipidemia who originally presented to the hospital on 3/1/2024 due to abnormal outpatient labs revealing decreased hemoglobin of 7.3.  She was recently admitted and discharged for acute on chronic anemia with a hemoglobin of 8.6 on day of discharge.  She does have known IBD and recently started Stelara infusions.  She did report intermittent rectal bleeding.  Patient was seen by GI during her hospitalization.  She was maintained on IV Protonix, transitioned to oral for discharge.  Her Eliquis was intermittently held and able to be safely resumed for 24 hours prior to discharge.  She should have repeat CBC in the outpatient setting and close follow-up with GI.  She is being discharged on prednisone with plan to taper.  Hemodynamically stable at time of discharge and appropriate for outpatient follow-up.    Please see above list of diagnoses and related plan for additional information.     Condition at Discharge: stable    Discharge Day Visit / Exam:   Subjective:  Patient denies any further bloody Bms. Feels well. Agreeable to discharge.   Vitals: Blood Pressure: 131/69 (03/08/24 0724)  Pulse: 86 (03/08/24 0724)  Temperature: 98.6 °F (37 °C) (03/08/24 0724)  Temp Source: Temporal (03/08/24 0553)  Respirations: 16 (03/08/24 0724)  Height: 5' 2\" (157.5 cm) (03/03/24 1328)  Weight - Scale: 57.9 kg (127 lb 10.3 oz) (03/01/24 1828)  SpO2: 99 % (03/08/24 0724)  Exam:   Physical Exam  Vitals and nursing note reviewed.   Constitutional:       General: She is not in acute distress.     Appearance: Normal appearance. She is well-developed.   HENT:      Head: Normocephalic and atraumatic.   Eyes:      General: No scleral icterus.     Conjunctiva/sclera: " Conjunctivae normal.   Cardiovascular:      Rate and Rhythm: Normal rate and regular rhythm.      Heart sounds: No murmur heard.  Pulmonary:      Effort: Pulmonary effort is normal.      Breath sounds: No wheezing, rhonchi or rales.   Abdominal:      General: There is no distension.      Palpations: Abdomen is soft.   Skin:     General: Skin is warm and dry.   Neurological:      General: No focal deficit present.      Mental Status: She is alert.   Psychiatric:         Mood and Affect: Mood normal.          Discussion with Family: Updated  (daughter) via phone.    Discharge instructions/Information to patient and family:   See after visit summary for information provided to patient and family.      Provisions for Follow-Up Care:  See after visit summary for information related to follow-up care and any pertinent home health orders.      Mobility at time of Discharge:   Basic Mobility Inpatient Raw Score: 17  JH-HLM Goal: 5: Stand one or more mins  JH-HLM Achieved: 7: Walk 25 feet or more  HLM Goal achieved. Continue to encourage appropriate mobility.     Disposition:   Home    Planned Readmission: None     Discharge Statement:  I spent 65 minutes discharging the patient. This time was spent on the day of discharge. I had direct contact with the patient on the day of discharge. Greater than 50% of the total time was spent examining patient, answering all patient questions, arranging and discussing plan of care with patient as well as directly providing post-discharge instructions.  Additional time then spent on discharge activities.    Discharge Medications:  See after visit summary for reconciled discharge medications provided to patient and/or family.      **Please Note: This note may have been constructed using a voice recognition system**

## 2024-03-09 ENCOUNTER — NURSING HOME VISIT (OUTPATIENT)
Dept: FAMILY MEDICINE CLINIC | Facility: CLINIC | Age: 84
End: 2024-03-09
Payer: MEDICARE

## 2024-03-09 DIAGNOSIS — K50.111 CROHN'S DISEASE OF LARGE INTESTINE WITH RECTAL BLEEDING (HCC): Primary | ICD-10-CM

## 2024-03-09 DIAGNOSIS — C79.51 SECONDARY MALIGNANT NEOPLASM OF BONE (HCC): ICD-10-CM

## 2024-03-09 DIAGNOSIS — Z86.711 HISTORY OF PULMONARY EMBOLISM: ICD-10-CM

## 2024-03-09 DIAGNOSIS — C50.919 PRIMARY MALIGNANT NEOPLASM OF BREAST WITH METASTASIS (HCC): ICD-10-CM

## 2024-03-09 DIAGNOSIS — N18.31 CHRONIC KIDNEY DISEASE (CKD) STAGE G3A/A1, MODERATELY DECREASED GLOMERULAR FILTRATION RATE (GFR) BETWEEN 45-59 ML/MIN/1.73 SQUARE METER AND ALBUMINURIA CREATININE RATIO LESS THAN 30 MG/G (HCC): ICD-10-CM

## 2024-03-09 DIAGNOSIS — E43 SEVERE PROTEIN-CALORIE MALNUTRITION (HCC): ICD-10-CM

## 2024-03-09 DIAGNOSIS — K62.5 RECTAL BLEEDING: ICD-10-CM

## 2024-03-09 PROCEDURE — 99306 1ST NF CARE HIGH MDM 50: CPT | Performed by: FAMILY MEDICINE

## 2024-03-09 NOTE — PROGRESS NOTES
[facility]  [address] [place] [address]  Facility: [facility]    NAME: Kerry Silverman  AGE: 83 y.o. SEX: female    DATE OF ENCOUNTER: 3/9/2024    Code status:  DNR w/ Hospitalization    Assessment and Plan     1. Crohn's disease of large intestine with rectal bleeding (HCC)    2. Rectal bleeding    3. Secondary malignant neoplasm of bone (HCC)    4. Chronic kidney disease (CKD) stage G3a/A1, moderately decreased glomerular filtration rate (GFR) between 45-59 mL/min/1.73 square meter and albuminuria creatinine ratio less than 30 mg/g (HCC)    5. History of pulmonary embolism    6. Primary malignant neoplasm of breast with metastasis (HCC)    7. Severe protein-calorie malnutrition (HCC)        All medications and routine orders were reviewed and updated as needed.    Plan discussed with: Family member    Chief Complaint     Seen for admission at Nursing Home    History of Present Illness     The patient returned from the hospital after emergent hospitalization for rectal bleeding and symptomatic anemia.  She is noted to have Crohn's disease with rectal bleeding.  She was started on Stelara and received intravenous steroids.  She is now on a slow prednisone wean.  Patient reports that when she stood up today she had an episode of hypotension and had a fair amount of continued rectal bleeding.  Her hemoglobin and hematocrit will be checked on Monday.  She received transfusions during her hospitalization.  Her family is questioning whether she should still be receiving anticoagulation.  The patient has a history of a saddle pulmonary embolism.  She also has a longstanding history of breast cancer with metastasis to the bone.  She is relatively asymptomatic from that.  She is denying dyspnea.  She is passing her urine following.  She has no swallowing issues and her appetite has returned    HISTORY:  Past Medical History:   Diagnosis Date    Abnormal weight loss     Allergic reaction      Anxiety     Breast cancer, right (HCC) 2011    right    Cancer (HCC) 2012    Candidal vulvovaginitis     Clotting disorder (HCC) Same as above    Endometrial hyperplasia     Epithelial cyst     benign, ovary    GI (gastrointestinal bleed) Blood mixed with stool    History of radiation therapy 2011    right breast cancer    Hyperlipidemia     Hypertension     Internal hemorrhoids     Knee tendonitis     Ovarian cyst     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(HCC)      Past Surgical History:   Procedure Laterality Date    BILATERAL SALPINGOOPHORECTOMY      onset: 7/23/13    BREAST BIOPSY Right 06/13/2006    benign    BREAST BIOPSY Right 03/02/2011    malignant    BREAST LUMPECTOMY Right 03/30/2011    malignant    CATARACT EXTRACTION W/  INTRAOCULAR LENS IMPLANT Right     phacoemulsification. Onset: 10/27/14    CHOLECYSTECTOMY      COLONOSCOPY  2017    approx    HYSTERECTOMY  Yes    INTRAOPERATIVE RADIATION THERAPY (IORT)      IR BIOPSY BONE  7/9/2021    IR IVC FILTER PLACEMENT OPTIONAL/TEMPORARY  9/23/2022    IR PE ENDOVASCULAR THERAPY  9/22/2022    IR PICC PLACEMENT SINGLE LUMEN  8/19/2022    IR PICC PLACEMENT SINGLE LUMEN  9/26/2022    IR SUPRAPUBIC CATHETER PLACEMENT  12/29/2022    SKIN LESION EXCISION Right     breast, single lesion    TONSILLECTOMY AND ADENOIDECTOMY       Family History   Problem Relation Age of Onset    Stomach cancer Mother     No Known Problems Father     Cervical cancer Sister     No Known Problems Daughter     No Known Problems Maternal Grandmother     No Known Problems Maternal Grandfather     No Known Problems Paternal Grandmother     No Known Problems Paternal Grandfather     Colon polyps Neg Hx     Colon cancer Neg Hx      Social History     Socioeconomic History    Marital status:      Spouse name: None    Number of children: 3    Years of education: None    Highest education level: None   Occupational History    None   Tobacco Use    Smoking status: Former     Current packs/day: 0.00      Average packs/day: 1 pack/day for 34.0 years (34.0 ttl pk-yrs)     Types: Cigarettes     Start date:      Quit date: 1981     Years since quittin.2    Smokeless tobacco: Never   Vaping Use    Vaping status: Never Used   Substance and Sexual Activity    Alcohol use: Not Currently     Comment: (history)    Drug use: No    Sexual activity: Not Currently   Other Topics Concern    None   Social History Narrative    None     Social Determinants of Health     Financial Resource Strain: Low Risk  (2021)    Overall Financial Resource Strain (CARDIA)     Difficulty of Paying Living Expenses: Not hard at all   Food Insecurity: No Food Insecurity (3/4/2024)    Hunger Vital Sign     Worried About Running Out of Food in the Last Year: Never true     Ran Out of Food in the Last Year: Never true   Transportation Needs: No Transportation Needs (3/4/2024)    PRAPARE - Transportation     Lack of Transportation (Medical): No     Lack of Transportation (Non-Medical): No   Physical Activity: Inactive (2021)    Exercise Vital Sign     Days of Exercise per Week: 0 days     Minutes of Exercise per Session: 0 min   Stress: No Stress Concern Present (2021)    [employer] [place] [employer] - Occupational Stress Questionnaire     Feeling of Stress : Not at all   Social Connections: Moderately Isolated (2021)    Social Connection and Isolation Panel [NHANES]     Frequency of Communication with Friends and Family: More than three times a week     Frequency of Social Gatherings with Friends and Family: Twice a week     Attends [employer] Services: Never     Active Member of Clubs or Organizations: Yes     Attends Club or Organization Meetings: More than 4 times per year     Marital Status:    Intimate Partner Violence: Not At Risk (8/10/2022)    Humiliation, Afraid, Rape, and Kick questionnaire     Fear of Current or Ex-Partner: No     Emotionally Abused: No     Physically Abused: No     Sexually  Abused: No   Housing Stability: Low Risk  (3/4/2024)    Housing Stability Vital Sign     Unable to Pay for Housing in the Last Year: No     Number of Places Lived in the Last Year: 2     Unstable Housing in the Last Year: No       Allergies:  Allergies   Allergen Reactions    Sulfa Antibiotics     Pneumococcal Polysaccharide Vaccine Rash and Edema       Review of Systems     Review of Systems   Constitutional:  Negative for activity change, appetite change, chills, diaphoresis, fatigue and unexpected weight change.   HENT:  Negative for congestion, ear discharge, ear pain, hearing loss, nosebleeds and rhinorrhea.    Eyes:  Negative for pain, redness, itching and visual disturbance.   Respiratory:  Negative for cough, choking, chest tightness and shortness of breath.    Cardiovascular:  Negative for chest pain and leg swelling.   Gastrointestinal:  Positive for anal bleeding and blood in stool. Negative for abdominal pain, constipation, diarrhea and nausea.   Endocrine: Negative for cold intolerance, polydipsia and polyphagia.   Genitourinary:  Negative for dysuria, frequency, hematuria and urgency.   Musculoskeletal:  Negative for arthralgias, back pain, gait problem, joint swelling, neck pain and neck stiffness.   Skin:  Negative for color change and rash.   Allergic/Immunologic: Negative for environmental allergies and food allergies.   Neurological:  Positive for weakness. Negative for dizziness, tremors, seizures, speech difficulty, numbness and headaches.   Hematological:  Negative for adenopathy. Bruises/bleeds easily.   Psychiatric/Behavioral:  Positive for dysphoric mood. Negative for behavioral problems, hallucinations and self-injury.        Medications and orders     All medications reviewed and updated in longterm EMR.      Objective     Vitals: per nursing home record    Physical Exam  Constitutional:       Appearance: Normal appearance. She is well-developed.   HENT:      Head: Normocephalic and  atraumatic.      Right Ear: External ear normal.      Left Ear: External ear normal.      Mouth/Throat:      Mouth: Mucous membranes are moist.      Pharynx: Oropharynx is clear. No oropharyngeal exudate.   Eyes:      General: No scleral icterus.        Right eye: No discharge.         Left eye: No discharge.      Extraocular Movements: Extraocular movements intact.      Conjunctiva/sclera: Conjunctivae normal.      Pupils: Pupils are equal, round, and reactive to light.   Neck:      Thyroid: No thyromegaly.   Cardiovascular:      Rate and Rhythm: Normal rate. Rhythm irregular.      Heart sounds: Normal heart sounds. No murmur heard.     No gallop.   Pulmonary:      Effort: Pulmonary effort is normal. No respiratory distress.      Breath sounds: Normal breath sounds. No wheezing or rales.   Abdominal:      General: Bowel sounds are normal.      Palpations: Abdomen is soft. There is no mass.      Tenderness: There is no guarding or rebound.   Musculoskeletal:         General: No tenderness or deformity. Normal range of motion.      Cervical back: Normal range of motion and neck supple.   Lymphadenopathy:      Cervical: No cervical adenopathy.   Skin:     General: Skin is warm and dry.      Findings: Bruising present. No rash.   Neurological:      Mental Status: She is alert and oriented to person, place, and time.      Cranial Nerves: No cranial nerve deficit.      Motor: Weakness present.      Coordination: Coordination normal.      Deep Tendon Reflexes: Reflexes are normal and symmetric. Reflexes normal.   Psychiatric:         Mood and Affect: Mood normal.         Behavior: Behavior normal.         Thought Content: Thought content normal.         Judgment: Judgment normal.         Pertinent Laboratory/Diagnostic Studies:   The following labs/studies were reviewed please see chart or hospital paperwork for details.  Diagnostic studies from the hospital were reviewed    Readmit for long-term care.  Will monitor her  H&H.  She will continue to use low-dose Eliquis as well as aspirin.-      Nelson Yoder,   3/9/2024 3:00 PM

## 2024-03-11 ENCOUNTER — NURSING HOME VISIT (OUTPATIENT)
Dept: FAMILY MEDICINE CLINIC | Facility: CLINIC | Age: 84
End: 2024-03-11
Payer: MEDICARE

## 2024-03-11 ENCOUNTER — PATIENT OUTREACH (OUTPATIENT)
Dept: CASE MANAGEMENT | Facility: OTHER | Age: 84
End: 2024-03-11

## 2024-03-11 ENCOUNTER — TRANSITIONAL CARE MANAGEMENT (OUTPATIENT)
Dept: FAMILY MEDICINE CLINIC | Facility: CLINIC | Age: 84
End: 2024-03-11

## 2024-03-11 DIAGNOSIS — K50.111 CROHN'S DISEASE OF LARGE INTESTINE WITH RECTAL BLEEDING (HCC): ICD-10-CM

## 2024-03-11 DIAGNOSIS — C50.919 PRIMARY MALIGNANT NEOPLASM OF BREAST WITH METASTASIS (HCC): ICD-10-CM

## 2024-03-11 DIAGNOSIS — I26.92 CHRONIC SADDLE PULMONARY EMBOLISM, UNSPECIFIED WHETHER ACUTE COR PULMONALE PRESENT (HCC): ICD-10-CM

## 2024-03-11 DIAGNOSIS — I27.82 CHRONIC SADDLE PULMONARY EMBOLISM, UNSPECIFIED WHETHER ACUTE COR PULMONALE PRESENT (HCC): ICD-10-CM

## 2024-03-11 DIAGNOSIS — K62.5 RECTAL BLEEDING: Primary | ICD-10-CM

## 2024-03-11 DIAGNOSIS — E43 SEVERE PROTEIN-CALORIE MALNUTRITION (HCC): ICD-10-CM

## 2024-03-11 PROCEDURE — 99308 SBSQ NF CARE LOW MDM 20: CPT | Performed by: FAMILY MEDICINE

## 2024-03-11 NOTE — PROGRESS NOTES
ADT alert received the patient discharged 3/8/24 to [facility]. This Admin Coordinator will continue to monitor via chart review.

## 2024-03-11 NOTE — PROGRESS NOTES
[facility]  [address] [place] [address]  Facility: [facility]    NAME: Kerry Silverman  AGE: 83 y.o. SEX: female    DATE OF ENCOUNTER: 3/11/2024    Code status:  DNR w/ Hospitalization    Assessment and Plan     1. Rectal bleeding    2. Crohn's disease of large intestine with rectal bleeding (HCC)    3. 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 Severe protein-calorie malnutrition (HCC)    5. Primary malignant neoplasm of breast with metastasis (HCC)        All medications and routine orders were reviewed and updated as needed.    Plan discussed with: Family member    Chief Complaint     Interim evaluation    History of Present Illness     The patient reports she has had no further episodes of of bloody diarrhea.  The last 1 was Saturday after returning from the hospital.  She had her blood count checked this morning and the results are pending.  She notes an improved appetite.  She is denying any dyspnea.  She had many questions regarding the new medication to help manage her Crohn's disease    The following portions of the patient's history were reviewed and updated as appropriate: current medications, past family history, past medical history, past social history, past surgical history and problem list.    Allergies:  Allergies   Allergen Reactions    Sulfa Antibiotics     Pneumococcal Polysaccharide Vaccine Rash and Edema       Review of Systems     Review of Systems   Constitutional:  Negative for activity change, appetite change, chills, diaphoresis, fatigue and unexpected weight change.   HENT:  Negative for congestion, ear discharge, ear pain, hearing loss, nosebleeds and rhinorrhea.    Eyes:  Negative for pain, redness, itching and visual disturbance.   Respiratory:  Negative for cough, choking, chest tightness and shortness of breath.    Cardiovascular:  Negative for chest pain and leg swelling.   Gastrointestinal:   Positive for blood in stool. Negative for abdominal pain, constipation, diarrhea and nausea.   Endocrine: Negative for cold intolerance, polydipsia and polyphagia.   Genitourinary:  Negative for dysuria, frequency, hematuria and urgency.   Musculoskeletal:  Negative for arthralgias, back pain, gait problem, joint swelling, neck pain and neck stiffness.   Skin:  Negative for color change and rash.   Allergic/Immunologic: Negative for environmental allergies and food allergies.   Neurological:  Positive for weakness. Negative for dizziness, tremors, seizures, speech difficulty, numbness and headaches.   Hematological:  Negative for adenopathy. Does not bruise/bleed easily.   Psychiatric/Behavioral:  Negative for behavioral problems, dysphoric mood, hallucinations and self-injury.        Medications and orders     All medications reviewed and updated in snf EMR.      Objective     Vitals: per nursing home records    Physical Exam  Constitutional:       Appearance: Normal appearance. She is well-developed.   HENT:      Head: Normocephalic and atraumatic.      Right Ear: External ear normal.      Left Ear: External ear normal.      Mouth/Throat:      Mouth: Mucous membranes are moist.      Pharynx: Oropharynx is clear. No oropharyngeal exudate.   Eyes:      General: No scleral icterus.        Right eye: No discharge.         Left eye: No discharge.      Extraocular Movements: Extraocular movements intact.      Conjunctiva/sclera: Conjunctivae normal.      Pupils: Pupils are equal, round, and reactive to light.   Neck:      Thyroid: No thyromegaly.   Cardiovascular:      Rate and Rhythm: Normal rate. Rhythm irregular.      Heart sounds: Normal heart sounds. No murmur heard.     No gallop.   Pulmonary:      Effort: Pulmonary effort is normal. No respiratory distress.      Breath sounds: Normal breath sounds. No wheezing or rales.   Abdominal:      General: Bowel sounds are normal.      Palpations: Abdomen is soft.  There is no mass.      Tenderness: There is no guarding or rebound.   Musculoskeletal:         General: No tenderness or deformity. Normal range of motion.      Cervical back: Normal range of motion and neck supple.   Lymphadenopathy:      Cervical: No cervical adenopathy.   Skin:     General: Skin is warm and dry.      Findings: No rash.   Neurological:      Mental Status: She is alert and oriented to person, place, and time.      Cranial Nerves: No cranial nerve deficit.      Motor: Weakness present.      Coordination: Coordination normal.      Deep Tendon Reflexes: Reflexes are normal and symmetric. Reflexes normal.   Psychiatric:         Mood and Affect: Mood normal.         Behavior: Behavior normal.         Thought Content: Thought content normal.         Judgment: Judgment normal.         Pertinent Laboratory/Diagnostic Studies:     The following studies were reviewed please see chart or hospital paperwork for details.    Space for lab dictation CBC is pending from this morning    - Await lab work.  Continue with the slow prednisone wean and the Andrew Yoder DO  3/11/2024 11:22 AM

## 2024-03-12 ENCOUNTER — TELEPHONE (OUTPATIENT)
Dept: FAMILY MEDICINE CLINIC | Facility: CLINIC | Age: 84
End: 2024-03-12

## 2024-03-12 ENCOUNTER — TELEPHONE (OUTPATIENT)
Dept: GASTROENTEROLOGY | Facility: CLINIC | Age: 84
End: 2024-03-12

## 2024-03-12 ENCOUNTER — TELEPHONE (OUTPATIENT)
Dept: OTHER | Facility: OTHER | Age: 84
End: 2024-03-12

## 2024-03-12 NOTE — TELEPHONE ENCOUNTER
Pt scheduled 4/4 [place] with Dr. Brito. Address provided.    Will reach out in 2 weeks prior to patient decreasing prednisone.

## 2024-03-12 NOTE — TELEPHONE ENCOUNTER
Patient is calling regarding cancelling an appointment.    Date/Time: 3/14/2024 10:00    Patient was rescheduled: YES [] NO [x]    Patient requesting call back to reschedule: YES [] NO [x]

## 2024-03-12 NOTE — TELEPHONE ENCOUNTER
Pt daughter called back. I advised pt's next apt is 4/4. Pt daughter is going to call her brother to see if he can bring pt to apt and if not pt will r/s.

## 2024-03-12 NOTE — TELEPHONE ENCOUNTER
----- Message from Cristin Jones MD sent at 3/8/2024  2:47 PM EST -----  Regarding: discharge follow up  Can we make sure this patient has f/u w dr umaña. Also can we check on her symptoms in 2 weeks before she tapers down on her prednisone? Thank you.

## 2024-03-14 ENCOUNTER — PATIENT OUTREACH (OUTPATIENT)
Dept: CASE MANAGEMENT | Facility: OTHER | Age: 84
End: 2024-03-14

## 2024-03-19 ENCOUNTER — NURSE TRIAGE (OUTPATIENT)
Age: 84
End: 2024-03-19

## 2024-03-19 NOTE — TELEPHONE ENCOUNTER
Kia BUSBY unit manager calling in from [facility], she wanted to find out if office received cbc results she faxed over today. I provided her with [employer] fax number as well for Dr. Brito to review. Hgb 7.1 pt has been in and out of hospital for blood transfusions.

## 2024-03-20 NOTE — TELEPHONE ENCOUNTER
I spoke with Elsa and relayed provider message. Dr. Brito's recommendations faxed to Elsa at . Pt currently taking 15 mg prednisone daily, will taper to 10 mg on 3/23/24.

## 2024-03-20 NOTE — TELEPHONE ENCOUNTER
I spoke with Kia at [facility], Kia confirmed received orders and their pharmacy to order Vancomycin and CBC/Iron panel.

## 2024-03-20 NOTE — TELEPHONE ENCOUNTER
===View-only below this line===  ----- Message -----  From: Rajani Brito MD  Sent: 3/19/2024   7:00 PM EDT  To: Sonja Nguyen RN    I reviewed her labs.  Recommend the following.  1. Repeat CBC in 1 week, if below 7, send to ER for another transfusion.  2. Start vancomycin 125 mg q6h for 14 days for C. diff colonization.  This helped her in the past.  3. Continue prednisone taper - how much is she on now?  4. Start daily iron supplement.   5. Check iron level with next labs.    I can order labs if necessary, also vanco, prednisone.      Thanks.

## 2024-03-21 ENCOUNTER — PATIENT OUTREACH (OUTPATIENT)
Dept: CASE MANAGEMENT | Facility: OTHER | Age: 84
End: 2024-03-21

## 2024-03-21 NOTE — PROGRESS NOTES
Update obtained from  SNF Coordinator the patient continues with Therapy no LCD at this time. This Admin Coordinator will continue to monitor via chart review.

## 2024-03-28 ENCOUNTER — PATIENT OUTREACH (OUTPATIENT)
Dept: CASE MANAGEMENT | Facility: OTHER | Age: 84
End: 2024-03-28

## 2024-03-28 NOTE — PROGRESS NOTES
Chart review completed.  Email sent to  SNF Coordinator to obtain an update on patient.   This Admin Coordinator will continue to monitor via chart review throughout episode.

## 2024-04-03 ENCOUNTER — TELEPHONE (OUTPATIENT)
Dept: GASTROENTEROLOGY | Facility: CLINIC | Age: 84
End: 2024-04-03

## 2024-04-03 NOTE — TELEPHONE ENCOUNTER
Left voicemail informing pt that Dr. Brito is unavailable on April 4th     Requested call back to confirm will attend NEW appointment August 1 at 3pm  Or reschedule for time that works best for the patient

## 2024-04-04 ENCOUNTER — PATIENT OUTREACH (OUTPATIENT)
Dept: CASE MANAGEMENT | Facility: OTHER | Age: 84
End: 2024-04-04

## 2024-04-08 ENCOUNTER — PATIENT OUTREACH (OUTPATIENT)
Dept: CASE MANAGEMENT | Facility: OTHER | Age: 84
End: 2024-04-08

## 2024-04-08 NOTE — PROGRESS NOTES
Update received the patient transitioned to LTC at [facility]. I have removed myself from the care team, updated the Care Coordination note, and closed the episode.

## 2024-04-10 ENCOUNTER — NURSING HOME VISIT (OUTPATIENT)
Dept: FAMILY MEDICINE CLINIC | Facility: CLINIC | Age: 84
End: 2024-04-10
Payer: MEDICARE

## 2024-04-10 DIAGNOSIS — R33.9 URINARY RETENTION: ICD-10-CM

## 2024-04-10 DIAGNOSIS — K51.011 ULCERATIVE PANCOLITIS WITH RECTAL BLEEDING (HCC): ICD-10-CM

## 2024-04-10 DIAGNOSIS — E43 SEVERE PROTEIN-CALORIE MALNUTRITION (HCC): ICD-10-CM

## 2024-04-10 DIAGNOSIS — I26.92 CHRONIC SADDLE PULMONARY EMBOLISM, UNSPECIFIED WHETHER ACUTE COR PULMONALE PRESENT (HCC): Primary | ICD-10-CM

## 2024-04-10 DIAGNOSIS — Z93.59 SUPRAPUBIC CATHETER (HCC): ICD-10-CM

## 2024-04-10 DIAGNOSIS — C79.51 SECONDARY MALIGNANT NEOPLASM OF BONE (HCC): ICD-10-CM

## 2024-04-10 DIAGNOSIS — I27.82 CHRONIC SADDLE PULMONARY EMBOLISM, UNSPECIFIED WHETHER ACUTE COR PULMONALE PRESENT (HCC): Primary | ICD-10-CM

## 2024-04-10 PROCEDURE — 99308 SBSQ NF CARE LOW MDM 20: CPT | Performed by: FAMILY MEDICINE

## 2024-04-10 NOTE — PROGRESS NOTES
[facility]  [address] [place] [address]  Facility: [facility]    NAME: Kerry Silverman  AGE: 83 y.o. SEX: female    DATE OF ENCOUNTER: 4/10/2024    Code status:  DNR w/ Hospitalization    Assessment and Plan     1. Chronic saddle pulmonary embolism, unspecified whether acute cor pulmonale present (HCC)    2. Ulcerative pancolitis with rectal bleeding (HCC)    3. Secondary malignant neoplasm of bone (HCC)    4. Urinary retention    5. Suprapubic catheter (HCC)    6. Severe protein-calorie malnutrition (HCC)        All medications and routine orders were reviewed and updated as needed.    Plan discussed with: Family member    Chief Complaint     Interim evaluation    History of Present Illness     The patient is seen for interim evaluation.  She has had no further rectal bleeding.  Her abdomen is feeling much better and she is noticing an improved appetite.  She is able to make it to the bathroom without episodes of fecal incontinence.  She is extremely happy.  She is denying any dyspnea.  Her suprapubic catheter is working well.  She is tolerating her oral anticoagulant without difficulty    The following portions of the patient's history were reviewed and updated as appropriate: current medications, past family history, past medical history, past social history, past surgical history and problem list.    Allergies:  Allergies   Allergen Reactions    Sulfa Antibiotics     Pneumococcal Polysaccharide Vaccine Rash and Edema       Review of Systems     Review of Systems   Constitutional:  Negative for activity change, appetite change, chills, diaphoresis, fatigue and unexpected weight change.   HENT:  Negative for congestion, ear discharge, ear pain, hearing loss, nosebleeds and rhinorrhea.    Eyes:  Negative for pain, redness, itching and visual disturbance.   Respiratory:  Negative for cough, choking, chest tightness and shortness of breath.    Cardiovascular:  Negative for chest  pain and leg swelling.   Gastrointestinal:  Positive for blood in stool. Negative for abdominal pain, constipation, diarrhea and nausea.   Endocrine: Negative for cold intolerance, polydipsia and polyphagia.   Genitourinary:  Positive for difficulty urinating. Negative for dysuria, frequency, hematuria and urgency.   Musculoskeletal:  Negative for arthralgias, back pain, gait problem, joint swelling, neck pain and neck stiffness.   Skin:  Negative for color change and rash.   Allergic/Immunologic: Negative for environmental allergies and food allergies.   Neurological:  Positive for weakness. Negative for dizziness, tremors, seizures, speech difficulty, numbness and headaches.   Hematological:  Negative for adenopathy. Does not bruise/bleed easily.   Psychiatric/Behavioral:  Negative for behavioral problems, dysphoric mood, hallucinations and self-injury.        Medications and orders     All medications reviewed and updated in retirement EMR.      Objective     Vitals: per nursing home records    Physical Exam  Constitutional:       Appearance: Normal appearance. She is well-developed.   HENT:      Head: Normocephalic and atraumatic.      Right Ear: External ear normal.      Left Ear: External ear normal.      Mouth/Throat:      Mouth: Mucous membranes are moist.      Pharynx: Oropharynx is clear. No oropharyngeal exudate.   Eyes:      General: No scleral icterus.        Right eye: No discharge.         Left eye: No discharge.      Extraocular Movements: Extraocular movements intact.      Conjunctiva/sclera: Conjunctivae normal.      Pupils: Pupils are equal, round, and reactive to light.   Neck:      Thyroid: No thyromegaly.   Cardiovascular:      Rate and Rhythm: Normal rate. Rhythm irregular.      Heart sounds: Normal heart sounds. No murmur heard.     No gallop.   Pulmonary:      Effort: Pulmonary effort is normal. No respiratory distress.      Breath sounds: Normal breath sounds. No wheezing or rales.    Abdominal:      General: Bowel sounds are normal.      Palpations: Abdomen is soft. There is no mass.      Tenderness: There is no guarding or rebound.   Musculoskeletal:         General: No tenderness or deformity. Normal range of motion.      Cervical back: Normal range of motion and neck supple.   Lymphadenopathy:      Cervical: No cervical adenopathy.   Skin:     General: Skin is warm and dry.      Findings: No rash.   Neurological:      Mental Status: She is alert and oriented to person, place, and time.      Cranial Nerves: No cranial nerve deficit.      Motor: Weakness present.      Coordination: Coordination normal.      Deep Tendon Reflexes: Reflexes are normal and symmetric. Reflexes normal.   Psychiatric:         Mood and Affect: Mood normal.         Behavior: Behavior normal.         Thought Content: Thought content normal.         Judgment: Judgment normal.         Pertinent Laboratory/Diagnostic Studies:     The following studies were reviewed please see chart or hospital paperwork for details.    Space for lab dictation hemoglobin is stable    - Continue the current therapy plan and medication regimen    Nelson Yoder DO  4/10/2024 2:31 PM

## 2024-05-01 ENCOUNTER — NURSING HOME VISIT (OUTPATIENT)
Dept: FAMILY MEDICINE CLINIC | Facility: CLINIC | Age: 84
End: 2024-05-01
Payer: MEDICARE

## 2024-05-01 DIAGNOSIS — K50.111 CROHN'S DISEASE OF LARGE INTESTINE WITH RECTAL BLEEDING (HCC): ICD-10-CM

## 2024-05-01 DIAGNOSIS — Z93.59 SUPRAPUBIC CATHETER (HCC): ICD-10-CM

## 2024-05-01 DIAGNOSIS — I27.82 CHRONIC SADDLE PULMONARY EMBOLISM, UNSPECIFIED WHETHER ACUTE COR PULMONALE PRESENT (HCC): Primary | ICD-10-CM

## 2024-05-01 DIAGNOSIS — I26.92 CHRONIC SADDLE PULMONARY EMBOLISM, UNSPECIFIED WHETHER ACUTE COR PULMONALE PRESENT (HCC): Primary | ICD-10-CM

## 2024-05-01 DIAGNOSIS — D64.9 ANEMIA, UNSPECIFIED TYPE: ICD-10-CM

## 2024-05-01 PROCEDURE — 99309 SBSQ NF CARE MODERATE MDM 30: CPT | Performed by: NURSE PRACTITIONER

## 2024-05-01 NOTE — PROGRESS NOTES
[facility]  [address] [place] [address]  Facility: [facility]    NAME: Kerry Silverman  AGE: 83 y.o. SEX: female    DATE OF ENCOUNTER: 5/1/2024    Code status:  DNR w/ Hospitalization    Assessment and Plan     1. Chronic saddle pulmonary embolism, unspecified whether acute cor pulmonale present (HCC)    2. Crohn's disease of large intestine with rectal bleeding (HCC)    3. Suprapubic catheter (HCC)    4. Anemia, unspecified type        All medications and routine orders were reviewed and updated as needed.    Plan discussed with: Patient, nursing staff    Chief Complaint     Follow-up of chronic conditions    History of Present Illness     Ms Silverman is assessed for follow up.  She is tolerating Sterala without complication.  She is thrilled to have formed stools without incontinence.  She has gained strength and is walking more rather than using WC.  She denies pain, dyspnea, palpitations.  Appetite is stable, stays hydrated.  H/H continues to improve with ASA/Eliquis on board for chronic saddle PE.  SPC patent, sleeps well at night.  Afebrile, VSS.  No orhtostatic hypotension noted, midodrine has been discontinued.     The following portions of the patient's history were reviewed and updated as appropriate: current medications, past family history, past medical history, past social history, past surgical history and problem list.    Allergies:  Allergies   Allergen Reactions    Sulfa Antibiotics     Pneumococcal Polysaccharide Vaccine Rash and Edema       Review of Systems     Review of Systems   Constitutional: Negative.  Negative for activity change, appetite change, chills, fatigue, fever and unexpected weight change.   HENT: Negative.  Negative for congestion, ear pain, postnasal drip and sinus pain.    Eyes: Negative.    Respiratory: Negative.  Negative for cough and shortness of breath.    Cardiovascular: Negative.  Negative for chest pain and leg swelling.    Gastrointestinal: Negative.  Negative for constipation and diarrhea.   Endocrine: Negative.    Genitourinary:  Positive for difficulty urinating. Negative for dysuria.   Musculoskeletal:  Positive for gait problem.   Skin:  Positive for pallor.   Allergic/Immunologic: Negative.  Negative for immunocompromised state.   Neurological:  Negative for dizziness and light-headedness.   Hematological: Negative.    Psychiatric/Behavioral: Negative.         Medications and orders     All medications reviewed and updated in [facility] EMR.      Objective     Vitals: per nursing home records    Physical Exam  Vitals and nursing note reviewed.   Constitutional:       Appearance: Normal appearance.   HENT:      Head: Normocephalic and atraumatic.      Right Ear: Tympanic membrane, ear canal and external ear normal.      Left Ear: Tympanic membrane, ear canal and external ear normal.      Nose: Nose normal.      Mouth/Throat:      Mouth: Mucous membranes are moist.      Pharynx: Oropharynx is clear.   Eyes:      Extraocular Movements: Extraocular movements intact.      Conjunctiva/sclera: Conjunctivae normal.      Pupils: Pupils are equal, round, and reactive to light.   Cardiovascular:      Rate and Rhythm: Normal rate and regular rhythm.      Pulses: Normal pulses.      Heart sounds: Normal heart sounds.   Pulmonary:      Effort: Pulmonary effort is normal.      Breath sounds: Normal breath sounds.   Abdominal:      General: Bowel sounds are normal.      Palpations: Abdomen is soft.   Genitourinary:     Comments: SPC patent for clear yellow urine  Musculoskeletal:         General: Normal range of motion.      Cervical back: Normal range of motion and neck supple.   Skin:     General: Skin is warm and dry.   Neurological:      General: No focal deficit present.      Mental Status: She is alert and oriented to person, place, and time.      Gait: Gait abnormal.   Psychiatric:         Mood and Affect: Mood normal.          Behavior: Behavior normal.         Thought Content: Thought content normal.         Judgment: Judgment normal.         Pertinent Laboratory/Diagnostic Studies:     The following labs and studies were reviewed please see chart or hospital paperwork for details.    Continue current medical management.     BROOKE Cancino  5/1/2024 4:29 PM

## 2024-05-10 ENCOUNTER — NURSING HOME VISIT (OUTPATIENT)
Dept: FAMILY MEDICINE CLINIC | Facility: CLINIC | Age: 84
End: 2024-05-10
Payer: MEDICARE

## 2024-05-10 DIAGNOSIS — C79.51 SECONDARY MALIGNANT NEOPLASM OF BONE (HCC): ICD-10-CM

## 2024-05-10 DIAGNOSIS — R91.1 LEFT UPPER LOBE PULMONARY NODULE: ICD-10-CM

## 2024-05-10 DIAGNOSIS — Z86.711 HISTORY OF PULMONARY EMBOLISM: ICD-10-CM

## 2024-05-10 DIAGNOSIS — F41.9 ANXIETY: ICD-10-CM

## 2024-05-10 DIAGNOSIS — C50.919 PRIMARY MALIGNANT NEOPLASM OF BREAST WITH METASTASIS (HCC): Primary | ICD-10-CM

## 2024-05-10 DIAGNOSIS — R91.1 NODULE OF UPPER LOBE OF LEFT LUNG: ICD-10-CM

## 2024-05-10 PROCEDURE — 99309 SBSQ NF CARE MODERATE MDM 30: CPT | Performed by: NURSE PRACTITIONER

## 2024-05-10 NOTE — PROGRESS NOTES
"[facility]  [address] [place] [address]  Facility: [facility]    NAME: Kerry Silverman  AGE: 83 y.o. SEX: female    DATE OF ENCOUNTER: 5/10/2024    Code status:  DNR w/ Hospitalization    Assessment and Plan     1. Primary malignant neoplasm of breast with metastasis (HCC)    2. Secondary malignant neoplasm of bone (HCC)    3. Left upper lobe pulmonary nodule    4. Anxiety    5. History of pulmonary embolism        All medications and routine orders were reviewed and updated as needed.    Plan discussed with: Patient, nursing staff    Chief Complaint     Interim evaluation    History of Present Illness     Ms. Silverman is assessed for nursing concern.  On 5/3/24 she rolled in bed and noted acute onset of left sided rib pain.  \"It hurts to take a deep breath\".  Hx Saddle PE s/p IVC filter and LT AC; stage IV ER/[place] positive 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 with bone mets (stopped treatment in the fall of 2022) Pain constant, worsened with movement, tender with palpation.  At rest it is 2/10, at worst it goes up to 8/10.  Has been taking tylenol and lidopatch with some improvement.  Notes pain is now on her right chest wall as well and in her back.  CXR 2view obtained 5/3/24 negative for rib fracture, +vascular calcification and new 14mm SURAJ nodule.  Kerry denies chest pressure/pain but does feel weak. VSS.  No change in appetite.  Bowels and SPC function at baseline.      The following portions of the patient's history were reviewed and updated as appropriate: current medications, past family history, past medical history, past social history, past surgical history and problem list.    Allergies:  Allergies   Allergen Reactions    Sulfa Antibiotics     Pneumococcal Polysaccharide Vaccine Rash and Edema       Review of Systems     Review of Systems   Constitutional: Negative.  Negative for activity change, appetite change, chills, fatigue and fever.   HENT: Negative.  Negative for " congestion, ear pain, postnasal drip and sinus pain.    Eyes: Negative.    Respiratory: Negative.  Negative for cough and shortness of breath.    Cardiovascular: Negative.  Negative for chest pain and leg swelling.   Gastrointestinal: Negative.  Negative for constipation and diarrhea.   Endocrine: Negative.    Genitourinary:  Positive for difficulty urinating. Negative for dysuria.   Musculoskeletal:  Positive for arthralgias, back pain, gait problem and myalgias.   Skin:  Positive for pallor.   Allergic/Immunologic: Negative.  Negative for immunocompromised state.   Neurological:  Positive for weakness. Negative for dizziness and light-headedness.   Hematological: Negative.    Psychiatric/Behavioral:  The patient is nervous/anxious.        Medications and orders     All medications reviewed and updated in [facility] EMR.      Objective     Vitals: per nursing home records    Physical Exam  Vitals and nursing note reviewed.   Constitutional:       Appearance: Normal appearance.   HENT:      Head: Normocephalic and atraumatic.      Right Ear: Tympanic membrane, ear canal and external ear normal.      Left Ear: Tympanic membrane, ear canal and external ear normal.      Nose: Nose normal.      Mouth/Throat:      Mouth: Mucous membranes are moist.      Pharynx: Oropharynx is clear.   Eyes:      Extraocular Movements: Extraocular movements intact.      Conjunctiva/sclera: Conjunctivae normal.      Pupils: Pupils are equal, round, and reactive to light.   Cardiovascular:      Rate and Rhythm: Normal rate and regular rhythm.      Pulses: Normal pulses.      Heart sounds: Normal heart sounds.   Pulmonary:      Effort: Pulmonary effort is normal.      Breath sounds: Normal breath sounds.   Chest:      Chest wall: Tenderness present.       Abdominal:      General: Bowel sounds are normal.      Palpations: Abdomen is soft.   Genitourinary:     Comments: SPC patent for clear yellow urine  Musculoskeletal:         General:  Normal range of motion.      Cervical back: Normal range of motion and neck supple. Pain with movement present.      Thoracic back: Bony tenderness present.      Lumbar back: Bony tenderness present.      Right lower leg: No edema.      Left lower leg: No edema.   Skin:     General: Skin is warm and dry.      Coloration: Skin is pale.      Findings: Bruising present.   Neurological:      General: No focal deficit present.      Mental Status: She is alert and oriented to person, place, and time.      Motor: Weakness present.      Gait: Gait abnormal.   Psychiatric:         Attention and Perception: Attention and perception normal.         Mood and Affect: Mood is anxious. Affect is tearful.         Behavior: Behavior normal. Behavior is cooperative.         Thought Content: Thought content normal.         Judgment: Judgment normal.         Pertinent Laboratory/Diagnostic Studies:     The following labs and studies were reviewed please see chart or hospital paperwork for details.    Long discussion with Kerry regarding CXR results and DDX including PE, carcinoma, costochondritis.  Kerry has continued to defer further treatment of her stage IV breast cancer since stopping treatment in 2022.  Discussed options:  ER for prompt evaluation of acute pain/further diagnostics with tx options; Urgent CT scan of chest to further evaluate CXR findings and understand source of pain vs palliative/comfort.  Kerry defers hospitalization.   At this time Kerry requests urgent CAT scan to further evaluate findings and dependent on results further discussion regarding goals of care.  Attempted to call Kerry's daughter, Taya Wade, per Kerry's request to discuss Kerry's presentation/test results and plan of care.  Left message to return phone call.  Will provide low dose prn tramadol for moderate/severe pain.     - Counseling Documentation: patient was counseled regarding: diagnostic results, impressions, and risks and benefits of  treatment options  - Counseling Time: counseling time more than 50% of visit: 20 minutes    BROOKE Cancino  5/10/2024 3:40 PM

## 2024-05-14 DIAGNOSIS — C79.51 SECONDARY MALIGNANT NEOPLASM OF BONE (HCC): Primary | ICD-10-CM

## 2024-05-14 RX ORDER — TRAMADOL HYDROCHLORIDE 50 MG/1
25 TABLET ORAL EVERY 12 HOURS
Qty: 60 TABLET | Refills: 0 | Status: SHIPPED | OUTPATIENT
Start: 2024-05-14

## 2024-05-15 ENCOUNTER — APPOINTMENT (EMERGENCY)
Dept: CT IMAGING | Facility: HOSPITAL | Age: 84
End: 2024-05-15
Payer: MEDICARE

## 2024-05-15 ENCOUNTER — HOSPITAL ENCOUNTER (EMERGENCY)
Facility: HOSPITAL | Age: 84
Discharge: HOME | End: 2024-05-15
Attending: EMERGENCY MEDICINE
Payer: MEDICARE

## 2024-05-15 VITALS
RESPIRATION RATE: 18 BRPM | DIASTOLIC BLOOD PRESSURE: 66 MMHG | OXYGEN SATURATION: 97 % | TEMPERATURE: 98.6 F | HEART RATE: 75 BPM | SYSTOLIC BLOOD PRESSURE: 149 MMHG

## 2024-05-15 DIAGNOSIS — R07.9 CHEST PAIN: Primary | ICD-10-CM

## 2024-05-15 DIAGNOSIS — C79.9 METASTASIS FROM MALIGNANT TUMOR OF BREAST (HCC): ICD-10-CM

## 2024-05-15 DIAGNOSIS — C50.919 METASTASIS FROM MALIGNANT TUMOR OF BREAST (HCC): ICD-10-CM

## 2024-05-15 LAB
ALBUMIN SERPL BCP-MCNC: 3.4 G/DL (ref 3.5–5)
ALP SERPL-CCNC: 160 U/L (ref 34–104)
ALT SERPL W P-5'-P-CCNC: 10 U/L (ref 7–52)
ANION GAP SERPL CALCULATED.3IONS-SCNC: 9 MMOL/L (ref 4–13)
AST SERPL W P-5'-P-CCNC: 22 U/L (ref 13–39)
BASOPHILS # BLD AUTO: 0.03 THOUSANDS/ÂΜL (ref 0–0.1)
BASOPHILS NFR BLD AUTO: 1 % (ref 0–1)
BILIRUB SERPL-MCNC: 0.32 MG/DL (ref 0.2–1)
BUN SERPL-MCNC: 24 MG/DL (ref 5–25)
CALCIUM ALBUM COR SERPL-MCNC: 9.5 MG/DL (ref 8.3–10.1)
CALCIUM SERPL-MCNC: 9 MG/DL (ref 8.4–10.2)
CHLORIDE SERPL-SCNC: 106 MMOL/L (ref 96–108)
CO2 SERPL-SCNC: 24 MMOL/L (ref 21–32)
CREAT SERPL-MCNC: 1.26 MG/DL (ref 0.6–1.3)
EOSINOPHIL # BLD AUTO: 0.82 THOUSAND/ÂΜL (ref 0–0.61)
EOSINOPHIL NFR BLD AUTO: 13 % (ref 0–6)
ERYTHROCYTE [DISTWIDTH] IN BLOOD BY AUTOMATED COUNT: 15.8 % (ref 11.6–15.1)
GFR SERPL CREATININE-BSD FRML MDRD: 39 ML/MIN/1.73SQ M
GLUCOSE SERPL-MCNC: 111 MG/DL (ref 65–140)
HCT VFR BLD AUTO: 32 % (ref 34.8–46.1)
HGB BLD-MCNC: 9.7 G/DL (ref 11.5–15.4)
IMM GRANULOCYTES # BLD AUTO: 0.03 THOUSAND/UL (ref 0–0.2)
IMM GRANULOCYTES NFR BLD AUTO: 1 % (ref 0–2)
LYMPHOCYTES # BLD AUTO: 1.41 THOUSANDS/ÂΜL (ref 0.6–4.47)
LYMPHOCYTES NFR BLD AUTO: 22 % (ref 14–44)
MCH RBC QN AUTO: 27.6 PG (ref 26.8–34.3)
MCHC RBC AUTO-ENTMCNC: 30.3 G/DL (ref 31.4–37.4)
MCV RBC AUTO: 91 FL (ref 82–98)
MONOCYTES # BLD AUTO: 0.47 THOUSAND/ÂΜL (ref 0.17–1.22)
MONOCYTES NFR BLD AUTO: 7 % (ref 4–12)
NEUTROPHILS # BLD AUTO: 3.81 THOUSANDS/ÂΜL (ref 1.85–7.62)
NEUTS SEG NFR BLD AUTO: 56 % (ref 43–75)
NRBC BLD AUTO-RTO: 0 /100 WBCS
PLATELET # BLD AUTO: 230 THOUSANDS/UL (ref 149–390)
PMV BLD AUTO: 9.6 FL (ref 8.9–12.7)
POTASSIUM SERPL-SCNC: 3.9 MMOL/L (ref 3.5–5.3)
PROT SERPL-MCNC: 7 G/DL (ref 6.4–8.4)
RBC # BLD AUTO: 3.52 MILLION/UL (ref 3.81–5.12)
SODIUM SERPL-SCNC: 139 MMOL/L (ref 135–147)
WBC # BLD AUTO: 6.57 THOUSAND/UL (ref 4.31–10.16)

## 2024-05-15 PROCEDURE — 99285 EMERGENCY DEPT VISIT HI MDM: CPT | Performed by: PHYSICIAN ASSISTANT

## 2024-05-15 PROCEDURE — 99284 EMERGENCY DEPT VISIT MOD MDM: CPT

## 2024-05-15 PROCEDURE — 85025 COMPLETE CBC W/AUTO DIFF WBC: CPT | Performed by: PHYSICIAN ASSISTANT

## 2024-05-15 PROCEDURE — 36415 COLL VENOUS BLD VENIPUNCTURE: CPT

## 2024-05-15 PROCEDURE — 71275 CT ANGIOGRAPHY CHEST: CPT

## 2024-05-15 PROCEDURE — 96360 HYDRATION IV INFUSION INIT: CPT

## 2024-05-15 PROCEDURE — 80053 COMPREHEN METABOLIC PANEL: CPT | Performed by: PHYSICIAN ASSISTANT

## 2024-05-15 RX ADMIN — SODIUM CHLORIDE 500 ML: 0.9 INJECTION, SOLUTION INTRAVENOUS at 15:22

## 2024-05-15 RX ADMIN — IOHEXOL 85 ML: 350 INJECTION, SOLUTION INTRAVENOUS at 15:38

## 2024-05-15 NOTE — DISCHARGE INSTRUCTIONS
Follow up with Elvia Funez oncology NP. Call for appointment    Continue tramadol for pain as prescribed by your primary care provider    Return to ED for increased pain, worsening symptoms

## 2024-05-15 NOTE — ED PROVIDER NOTES
History  Chief Complaint   Patient presents with    Pain     Pt to er with reports of left rib pain that started last week after rolling over in her bed, and has since then radiated to her right side. Had a chest x-ray done and they found a 14mm nodule in her lung. Has a cat scan scheduled for tomorrow, but was told by her doctor that she should come to the er today because there would be more testing available     Patient is an 83-year-old white female with history of breast cancer, sternum cancer, hypertension, hyperlipidemia, PE on eliquis from [facility] [place]. Patient reports she rolled to her left side in bed  over a week ago and felt L sided chest pain. States she had cxr at [facility] which showed 14 mm lung nodule. Within 3 days, the pain moved to her R chest.  States the pain in the right chest is worse when she coughs.  She was advised by her PCP  to get an outpatient CT chest without contrast.  Was referred to the ED today by her PCP today for earlier testing.  Stopped her breast cancer therapy 2 years ago. States tylenol was not helping her pain, then took tramadol 25 mg q 6 hrs without relief. This was changed to 50 mg q 6hrs; she has taken one dose of this. No fever or chills. L sided chest pain has resolved.         Prior to Admission Medications   Prescriptions Last Dose Informant Patient Reported? Taking?   Aspirin Low Dose 81 MG chewable tablet  Child Yes No   acetaminophen (TYLENOL) 325 mg tablet  Child No No   Sig: Take 2 tablets (650 mg total) by mouth every 6 (six) hours as needed for mild pain   apixaban (ELIQUIS) 2.5 mg  Child No No   Sig: Take 1 tablet (2.5 mg total) by mouth 2 (two) times a day   ferrous sulfate 324 (65 Fe) mg  Child No No   Sig: Take 1 tablet (324 mg total) by mouth 2 (two) times a day before meals   fluticasone (FLONASE) 50 mcg/act nasal spray  Child Yes No   folic acid (FOLVITE) 1 mg tablet  Child No No   Sig: Take 1 tablet (1 mg total) by mouth daily   melatonin 3  mg  Child Yes No   Sig: Take 3 mg by mouth daily at bedtime   metoprolol succinate (TOPROL-XL) 50 mg 24 hr tablet  Child Yes No   mirtazapine (REMERON) 7.5 MG tablet  Child No No   Sig: Take 1 tablet (7.5 mg total) by mouth daily at bedtime   ondansetron (ZOFRAN-ODT) 4 mg disintegrating tablet  Child Yes No   pantoprazole (PROTONIX) 40 mg tablet  Child No No   Sig: Take 1 tablet (40 mg total) by mouth 2 (two) times a day before meals   simvastatin (ZOCOR) 10 mg tablet  Child No No   Sig: Take 1 tablet (10 mg total) by mouth daily at bedtime   traMADol (Ultram) 50 mg tablet   No No   Sig: Take 0.5 tablets (25 mg total) by mouth every 12 (twelve) hours   ustekinumab (Stelara) 90 mg/mL subcutaneous injection  Child No No   Sig: Inject 1 mL (90 mg total) under the skin every 56 days First dose on 4/23/2024. Call [phone] if the patient has a fever prior to injecting.      Facility-Administered Medications: None       Past Medical History:   Diagnosis Date    Abnormal weight loss     Allergic reaction     Anxiety     Breast cancer, right (HCC) 2011    right    Cancer (HCC) 2012    Candidal vulvovaginitis     Clotting disorder (HCC) Same as above    Endometrial hyperplasia     Epithelial cyst     benign, ovary    GI (gastrointestinal bleed) Blood mixed with stool    History of radiation therapy 2011    right breast cancer    Hyperlipidemia     Hypertension     Internal hemorrhoids     Knee tendonitis     Ovarian cyst     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(HCC)        Past Surgical History:   Procedure Laterality Date    BILATERAL SALPINGOOPHORECTOMY      onset: 7/23/13    BREAST BIOPSY Right 06/13/2006    benign    BREAST BIOPSY Right 03/02/2011    malignant    BREAST LUMPECTOMY Right 03/30/2011    malignant    CATARACT EXTRACTION W/  INTRAOCULAR LENS IMPLANT Right     phacoemulsification. Onset: 10/27/14    CHOLECYSTECTOMY      COLONOSCOPY  2017    approx    HYSTERECTOMY  Yes    INTRAOPERATIVE RADIATION THERAPY (IORT)       IR BIOPSY BONE  2021    IR IVC FILTER PLACEMENT OPTIONAL/TEMPORARY  2022    IR PE ENDOVASCULAR THERAPY  2022    IR PICC PLACEMENT SINGLE LUMEN  2022    IR PICC PLACEMENT SINGLE LUMEN  2022    IR SUPRAPUBIC CATHETER PLACEMENT  2022    SKIN LESION EXCISION Right     breast, single lesion    TONSILLECTOMY AND ADENOIDECTOMY         Family History   Problem Relation Age of Onset    Stomach cancer Mother     No Known Problems Father     Cervical cancer Sister     No Known Problems Daughter     No Known Problems Maternal Grandmother     No Known Problems Maternal Grandfather     No Known Problems Paternal Grandmother     No Known Problems Paternal Grandfather     Colon polyps Neg Hx     Colon cancer Neg Hx      I have reviewed and agree with the history as documented.    E-Cigarette/Vaping    E-Cigarette Use Never User      E-Cigarette/Vaping Substances    Nicotine No     THC No     CBD No     Flavoring No     Other No     Unknown No      Social History     Tobacco Use    Smoking status: Former     Current packs/day: 0.00     Average packs/day: 1 pack/day for 34.0 years (34.0 ttl pk-yrs)     Types: Cigarettes     Start date:      Quit date:      Years since quittin.3    Smokeless tobacco: Never   Vaping Use    Vaping status: Never Used   Substance Use Topics    Alcohol use: Not Currently     Comment: (history)    Drug use: No       Review of Systems   Constitutional:  Negative for chills and fever.   Respiratory:  Negative for cough and shortness of breath.    Cardiovascular:  Positive for chest pain. Negative for leg swelling.   Gastrointestinal:  Negative for abdominal pain, nausea and vomiting.   Genitourinary:  Negative for flank pain.       Physical Exam  Physical Exam  Vitals and nursing note reviewed.   Constitutional:       General: She is not in acute distress.     Appearance: Normal appearance. She is not ill-appearing, toxic-appearing or diaphoretic.   HENT:      Head:  Normocephalic and atraumatic.      Nose: Nose normal.   Cardiovascular:      Rate and Rhythm: Normal rate and regular rhythm.      Pulses: Normal pulses.      Heart sounds: Normal heart sounds.   Pulmonary:      Effort: Pulmonary effort is normal.      Breath sounds: Normal breath sounds.      Comments: Pain R lateral chest with laughing/coughing   Abdominal:      General: Abdomen is flat. Bowel sounds are normal.      Palpations: Abdomen is soft.      Tenderness: There is no right CVA tenderness or left CVA tenderness.   Musculoskeletal:         General: Normal range of motion.   Skin:     General: Skin is warm and dry.      Capillary Refill: Capillary refill takes less than 2 seconds.   Neurological:      Mental Status: She is alert.         Vital Signs  ED Triage Vitals   Temperature Pulse Respirations Blood Pressure SpO2   05/15/24 1334 05/15/24 1334 05/15/24 1334 05/15/24 1334 05/15/24 1334   98.6 °F (37 °C) 73 18 134/64 96 %      Temp Source Heart Rate Source Patient Position - Orthostatic VS BP Location FiO2 (%)   05/15/24 1334 05/15/24 1334 05/15/24 1334 05/15/24 1334 --   Temporal Monitor Sitting Left arm       Pain Score       05/15/24 1544       2           Vitals:    05/15/24 1334 05/15/24 1544   BP: 134/64 149/66   Pulse: 73 75   Patient Position - Orthostatic VS: Sitting Lying         Visual Acuity      ED Medications  Medications   sodium chloride 0.9 % bolus 500 mL (0 mL Intravenous Stopped 5/15/24 1622)   iohexol (OMNIPAQUE) 350 MG/ML injection (MULTI-DOSE) 85 mL (85 mL Intravenous Given 5/15/24 1538)       Diagnostic Studies  Results Reviewed       Procedure Component Value Units Date/Time    [place] draw [[id number]] Collected: 05/15/24 1501    Lab Status: Final result Specimen: Blood from Arm, Left Updated: 05/15/24 1701    Narrative:      The following orders were created for panel order [place] draw.  Procedure                               Abnormality         Status                      ---------                               -----------         ------                     Light Blue Top on hold[[id number]]                           Final result                 Please view results for these tests on the individual orders.    Comprehensive metabolic panel [[id number]]  (Abnormal) Collected: 05/15/24 1501    Lab Status: Final result Specimen: Blood from Arm, Left Updated: 05/15/24 1525     Sodium 139 mmol/L      Potassium 3.9 mmol/L      Chloride 106 mmol/L      CO2 24 mmol/L      ANION GAP 9 mmol/L      BUN 24 mg/dL      Creatinine 1.26 mg/dL      Glucose 111 mg/dL      Calcium 9.0 mg/dL      Corrected Calcium 9.5 mg/dL      AST 22 U/L      ALT 10 U/L      Alkaline Phosphatase 160 U/L      Total Protein 7.0 g/dL      Albumin 3.4 g/dL      Total Bilirubin 0.32 mg/dL      eGFR 39 ml/min/1.73sq m     Narrative:      National Kidney Disease Foundation guidelines for Chronic Kidney Disease (CKD):     Stage 1 with normal or high GFR (GFR > 90 mL/min/1.73 square meters)    Stage 2 Mild CKD (GFR = 60-89 mL/min/1.73 square meters)    Stage 3A Moderate CKD (GFR = 45-59 mL/min/1.73 square meters)    Stage 3B Moderate CKD (GFR = 30-44 mL/min/1.73 square meters)    Stage 4 Severe CKD (GFR = 15-29 mL/min/1.73 square meters)    Stage 5 End Stage CKD (GFR <15 mL/min/1.73 square meters)  Note: GFR calculation is accurate only with a steady state creatinine    CBC and differential [[id number]]  (Abnormal) Collected: 05/15/24 1501    Lab Status: Final result Specimen: Blood from Arm, Left Updated: 05/15/24 1507     WBC 6.57 Thousand/uL      RBC 3.52 Million/uL      Hemoglobin 9.7 g/dL      Hematocrit 32.0 %      MCV 91 fL      MCH 27.6 pg      MCHC 30.3 g/dL      RDW 15.8 %      MPV 9.6 fL      Platelets 230 Thousands/uL      nRBC 0 /100 WBCs      Segmented % 56 %      Immature Grans % 1 %      Lymphocytes % 22 %      Monocytes % 7 %      Eosinophils Relative 13 %      Basophils Relative 1 %      Absolute Neutrophils  3.81 Thousands/µL      Absolute Immature Grans 0.03 Thousand/uL      Absolute Lymphocytes 1.41 Thousands/µL      Absolute Monocytes 0.47 Thousand/µL      Eosinophils Absolute 0.82 Thousand/µL      Basophils Absolute 0.03 Thousands/µL                    CTA ED chest PE study   Final Result by Kamari Syed MD (05/15 1731)         1. Diffuse osseous metastases. Chronic nondisplaced rib fractures.   2. No evidence of acute pulmonary embolus, thoracic aortic aneurysm or dissection.   3. Mucous plugging in the right upper lobe.                  Workstation performed: [id number]                    Procedures  Procedures         ED Course                               SBIRT 22yo+      Flowsheet Row Most Recent Value   Initial Alcohol Screen: US AUDIT-C     1. How often do you have a drink containing alcohol? 0 Filed at: 05/15/2024 7093   2. How many drinks containing alcohol do you have on a typical day you are drinking?  0 Filed at: 05/15/2024 1335   3a. Male UNDER 65: How often do you have five or more drinks on one occasion? 0 Filed at: 05/15/2024 1335   3b. FEMALE Any Age, or MALE 65+: How often do you have 4 or more drinks on one occassion? 0 Filed at: 05/15/2024 1335   Audit-C Score 0 Filed at: 05/15/2024 1335   ADIS: How many times in the past year have you...    Used an illegal drug or used a prescription medication for non-medical reasons? Never Filed at: 05/15/2024 1335            Wells' Criteria for PE      Flowsheet Row Most Recent Value   Wells' Criteria for PE    Clinical signs and symptoms of DVT 0 Filed at: 05/15/2024 1539   PE is primary diagnosis or equally likely 3 Filed at: 05/15/2024 1539   HR >100 0 Filed at: 05/15/2024 1539   Immobilization at least 3 days or Surgery in the previous 4 weeks 0 Filed at: 05/15/2024 1539   Previous, objectively diagnosed PE or DVT 1.5 Filed at: 05/15/2024 1539   Hemoptysis 0 Filed at: 05/15/2024 1539   Malignancy with treatment within 6 months or palliative 0  Filed at: 05/15/2024 1539   Wells' Criteria Total 4.5 Filed at: 05/15/2024 1539                  Medical Decision Making  Pulse ox 97% on room air indicating adequate oxygenation.  Patient is well-appearing and nontoxic.  Differential diagnosis includes PE, osseous metastasis from breast cancer, less likely ACS.  Labs reviewed.  Patient is chronically anemic.  CT PE study shows osseous mets in the sternum, ribs, thoracic spine.  There is also mucus plugging in the right upper lung.  There is no PE, dissection or thoracic aneurysm.  Plan will be to continue tramadol as prescribed by primary care provider for pain.  Patient has plans to follow-up with Elvia Funez oncology nurse practitioner.  Patient was advised of the increased creatinine and decrease in her GFR over the last couple months.  She was advised to maintain good hydration and to follow-up with her primary care provider regarding this.  Return precautions reviewed including worsening pain, shortness of breath.    Amount and/or Complexity of Data Reviewed  Labs: ordered.  Radiology: ordered.    Risk  Prescription drug management.             Disposition  Final diagnoses:   Chest pain   Metastasis from malignant tumor of breast (HCC)     Time reflects when diagnosis was documented in both MDM as applicable and the Disposition within this note       Time User Action Codes Description Comment    5/15/2024  6:09 PM Frank Hernandez Add [R07.9] Chest pain     5/15/2024  6:10 PM Frank Hernandez Add [C79.9,  C50.919] Metastasis from malignant tumor of breast (HCC)           ED Disposition       ED Disposition   Discharge    Condition   Stable    Date/Time   Wed May 15, 2024 1806    Comment   Kerry COVARRUBIAS Clunk discharge to home/self care.                   Follow-up Information       Follow up With Specialties Details Why Contact Info    Elena Escalera,  Family Medicine   [address] [place] [address]  [address] [place] [address] [phone]      Elvia COVARRUBIAS  [facility], Nurse Practitioner   [address]  [address]  [place] [address] [phone]              Patient's Medications   Discharge Prescriptions    No medications on file       No discharge procedures on file.    PDMP Review         Value Time User    PDMP Reviewed  Yes 5/14/2024 12:28 PM BROOKE Mensah            ED Provider  Electronically Signed by             Frank Hernandez PA-C  05/15/24 1829       Frank Hernandez PA-C  05/15/24 183

## 2024-05-16 ENCOUNTER — TELEPHONE (OUTPATIENT)
Dept: HEMATOLOGY ONCOLOGY | Facility: CLINIC | Age: 84
End: 2024-05-16

## 2024-05-16 ENCOUNTER — TELEPHONE (OUTPATIENT)
Age: 84
End: 2024-05-16

## 2024-05-16 NOTE — TELEPHONE ENCOUNTER
Patient Call    Who are you speaking with? Hailee Bain   If it is not the patient, are they listed on an active communication consent form? Yes   What is the reason for this call? Taya calling to schedule patient for a hospital follow up with Elvia Funez.  Patient last saw Elvia Funez on 2022.  Patient was advised to follow up with Elvia Funez for further testing.  I informed Taya that the first available appointment was in July for Elvia Funez.  Taya would like patient to be seen as soon as possible.  Taya would like a call back to discuss getting patient scheduled.    Does this require a call back? Yes   If a call back is required, please list best call back number [phone]   If a call back is required, advise that a message will be forwarded to their care team and someone will return their call as soon as possible.   Did you relay this information to the patient? Yes

## 2024-05-16 NOTE — TELEPHONE ENCOUNTER
She needs to see Robles.  She had a diagnosis of metastatic breast cancer and was on treatment last time I saw her.

## 2024-05-16 NOTE — TELEPHONE ENCOUNTER
Appointment Schedule   Who are you speaking with? Child   If it is not the patient, are they listed on an active communication consent form? Yes   Which provider is the appointment scheduled with? BROOKE Randall   At which location is the appointment scheduled for? [place]   When is the appointment scheduled?  Please list date and time 5/17 10am   What is the reason for this appointment? Recommended by ER   Did patient voice understanding of the details of this appointment? Yes   Was the no show policy reviewed with patient? Yes

## 2024-05-16 NOTE — TELEPHONE ENCOUNTER
Called patient phone number listed and no answer or voicemail.  Called to advise patient appt for 5/17 at 10 am that the ER scheduled with Rachel has been cancelled and rescheduled for Mon 5/20 with Dr Arboleda at 940 am.  Also sent a message to patient.

## 2024-05-17 ENCOUNTER — TELEPHONE (OUTPATIENT)
Age: 84
End: 2024-05-17

## 2024-05-17 NOTE — TELEPHONE ENCOUNTER
Called number listed as primary on acct.  Spoke to facility patient is in they advised to call patient's daughter who is listed on acct because she transports Kerry to appts.  Reached Taya and advised patient's appt is actual not today it is Monday 5/20 with   Dr Arboleda at 940 am. Taya appreciated notification of change.

## 2024-05-20 ENCOUNTER — TELEPHONE (OUTPATIENT)
Age: 84
End: 2024-05-20

## 2024-05-20 ENCOUNTER — OFFICE VISIT (OUTPATIENT)
Age: 84
End: 2024-05-20
Payer: MEDICARE

## 2024-05-20 VITALS
RESPIRATION RATE: 16 BRPM | BODY MASS INDEX: 25.28 KG/M2 | DIASTOLIC BLOOD PRESSURE: 68 MMHG | WEIGHT: 137.4 LBS | TEMPERATURE: 98.7 F | HEIGHT: 62 IN | OXYGEN SATURATION: 100 % | HEART RATE: 73 BPM | SYSTOLIC BLOOD PRESSURE: 126 MMHG

## 2024-05-20 DIAGNOSIS — C50.811 MALIGNANT NEOPLASM OF OVERLAPPING SITES OF RIGHT BREAST IN FEMALE, ESTROGEN RECEPTOR POSITIVE (HCC): ICD-10-CM

## 2024-05-20 DIAGNOSIS — C50.919 PRIMARY MALIGNANT NEOPLASM OF BREAST WITH METASTASIS (HCC): ICD-10-CM

## 2024-05-20 DIAGNOSIS — M89.9 LYTIC LESION OF BONE ON X-RAY: Primary | ICD-10-CM

## 2024-05-20 DIAGNOSIS — Z17.0 MALIGNANT NEOPLASM OF OVERLAPPING SITES OF RIGHT BREAST IN FEMALE, ESTROGEN RECEPTOR POSITIVE (HCC): ICD-10-CM

## 2024-05-20 DIAGNOSIS — C79.49 SECONDARY MALIGNANT NEOPLASM OF OTHER PARTS OF NERVOUS SYSTEM (HCC): ICD-10-CM

## 2024-05-20 DIAGNOSIS — Z17.0 MALIGNANT NEOPLASM OF RIGHT BREAST IN FEMALE, ESTROGEN RECEPTOR POSITIVE, UNSPECIFIED SITE OF BREAST (HCC): Primary | ICD-10-CM

## 2024-05-20 DIAGNOSIS — C50.911 MALIGNANT NEOPLASM OF RIGHT BREAST IN FEMALE, ESTROGEN RECEPTOR POSITIVE, UNSPECIFIED SITE OF BREAST (HCC): Primary | ICD-10-CM

## 2024-05-20 DIAGNOSIS — I26.02 ACUTE SADDLE PULMONARY EMBOLISM WITH ACUTE COR PULMONALE (HCC): ICD-10-CM

## 2024-05-20 PROCEDURE — G2211 COMPLEX E/M VISIT ADD ON: HCPCS | Performed by: INTERNAL MEDICINE

## 2024-05-20 PROCEDURE — 99215 OFFICE O/P EST HI 40 MIN: CPT | Performed by: INTERNAL MEDICINE

## 2024-05-20 NOTE — PROGRESS NOTES
Hematology/Oncology Outpatient Follow- up Note  Kerry Silverman, 1940, [id number]  5/21/2024    Chief Complaint   Patient presents with    Follow-up     Oncology History Overview Note   2011: pt dx with stage IA right breast cancer, ER/UT positive, HER2 negative (s/p right breast lumpectomy followed by adjuvant XRT.     2011 - 2016: Pt completed 5 years of AI (anastrozole)    June 2021: pt developed discomfort in her chest/sternal area. CT chest demonstrated large lytic lesion involving upper half of the body of sternum, suspicious for metastatic disease. CT abd/plevis revealed no evidence of soft tissue metastatic disease but did reveal an indeterminate solitary 8 mm lucency in L3 endplate which, in light of the lytic lesion in the sternum, was suspicious for osseous metastatic disease or myeloma.      7/9/21: underwent sternal mass bone biopsy with IR. Pathology confirmed metastatic carcinoma, consistent with breast primary (ER 95% positive, UT <1%, HER2 1+). Since patient had oligometastatic metastatic disease, she was started on treatment with Faslodex, in combination with Xgeva in July 2021.     She also underwent a course of radiation therapy to the sternum at [facility].     April 2022: Imaging was concerning for interval development of a RLL lung nodule, 4mm in size, along with interval development of increased sclerotic lesions throughout the visualized spine.      Patient's treatment was changed to letrozole and ribociclib, however patient had very poor tolerance of ribociclib.     July 2022: ribociclib was changed to abemaciclib and letrozole was recommended to be continued.    Pt developed severe diarrhea.     August 2022: abemaciclib was discontinued. Patient elected not to undergo any additional treatment for her metastatic breast cancer.     5/15/24: Pt presented to hospital from her nursing home with c/o of left sided chest pain. CTA chest reported diffuse sclerotic metastases throughout  "the thoracic spine, sternum and ribs. Chronic nondisplaced rib fractures.     24: CARIS testing requested on the bone biopsy from . Abemaciclib (3 weeks on, 1 week off) and letrozole recommended. Xgeva [id number].     Primary malignant neoplasm of breast with metastasis (HCC)   10/17/2012 Initial Diagnosis    Primary malignant neoplasm of breast with metastasis (HCC)       ECO    Interval History:      Patient is an 83 year old female with hx of metastatic ER positive breast cancer who presented to clinic with her son and daughter due to recent imaging showing diffuse osseous metastases. Patient has been off of any active treatment since 2022; she had poor tolerance of CDK4/6 inhibitors and opted not to pursue any treatment. Onc history provided in detail above.     Patient currently lives at a nursing home ([facility]). She is independent of most ADLs, but does require some assistance with taking a shower. She uses a walker for ambulatory assistance. She has had couple hospitalizations this year for evaluation of GIB episodes. Since being started on Stelara around end of 2024, she hasn't had any recurrence of GIB. She is on Eliquis for hx of b/l DVT and PE in .     On 5/15, patient had presented to the hospital as she noticed \"left sided rib pain.\" Imaging was concerning for diffuse osseous mets throughout the thoracic spine, sternum, and ribs. Additionally, there were chronic nondisplaced rib fractures. Clinically, patient denies any noticing any worsening back pain. Denied any recent fevers, infections, weight loss.     Review of Systems:   Constitutional:  Positive for fatigue (chronic). Negative for activity change, fever and unexpected weight change.   HENT:  Negative for sore throat and trouble swallowing.    Eyes:  Negative for redness and visual disturbance.   Respiratory:  Negative for cough, chest tightness, shortness of breath and wheezing.    Cardiovascular:  Negative for chest " pain and palpitations.   Gastrointestinal:  Negative for abdominal pain, blood in stool, constipation, diarrhea, nausea and vomiting.   Genitourinary:  Negative for difficulty urinating, dysuria, frequency and hematuria.   Musculoskeletal:  Positive for gait problem (needs a walker for ambulatory assistance). Negative for neck pain.   Skin:  Negative for color change, pallor and rash.   Neurological:  Negative for dizziness, tremors, facial asymmetry, speech difficulty, weakness and headaches.   Hematological:  Negative for adenopathy. Does not bruise/bleed easily.   Psychiatric/Behavioral:  Negative for agitation and hallucinations.    All other systems reviewed and are negative.      Labs:   Lab Results   Component Value Date    WBC 6.57 05/15/2024    HGB 9.7 (L) 05/15/2024    HCT 32.0 (L) 05/15/2024    MCV 91 05/15/2024     05/15/2024     Lab Results   Component Value Date     09/08/2015    K 3.9 05/15/2024     05/15/2024    CO2 24 05/15/2024    ANIONGAP 6 09/08/2015    BUN 24 05/15/2024    CREATININE 1.26 05/15/2024    GLUCOSE 60 (L) 09/23/2022    GLUF 109 (H) 08/02/2022    CALCIUM 9.0 05/15/2024    CORRECTEDCA 9.5 05/15/2024    AST 22 05/15/2024    ALT 10 05/15/2024    ALKPHOS 160 (H) 05/15/2024    PROT 7.3 09/08/2015    BILITOT 0.66 09/08/2015    EGFR 39 05/15/2024     Active Problems:   Patient Active Problem List   Diagnosis    Primary malignant neoplasm of breast with metastasis (HCC)    Adverse reaction to pneumococcal vaccine    Anxiety    Cataract, bilateral    Mixed hyperlipidemia    Pulmonary nodule seen on imaging study    Chronic kidney disease (CKD) stage G3a/A1, moderately decreased glomerular filtration rate (GFR) between 45-59 mL/min/1.73 square meter and albuminuria creatinine ratio less than 30 mg/g (HCC)    Orthostatic hypotension    Osteopenia of multiple sites    Lytic lesion of bone on x-ray    Neoplasm of uncertain behavior of sternum    Rectal bleeding    Secondary  malignant neoplasm of bone (HCC)    Diarrhea    Anemia    Severe protein-calorie malnutrition (HCC)    Single subsegmental pulmonary embolism without acute cor pulmonale (HCC)    Venous insufficiency    Saddle embolus of pulmonary artery (HCC)    Urinary retention    Ulcerative pancolitis with rectal bleeding (HCC)    COVID-19 virus infection    Hematochezia    Suprapubic catheter (HCC)    Crohn's disease of large intestine with rectal bleeding (HCC)    History of pulmonary embolism    IBD (inflammatory bowel disease)    Electrolyte abnormality    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)       Past Medical History:   Past Medical History:   Diagnosis Date    Abnormal weight loss     Allergic reaction     Anxiety     Breast cancer, right (HCC) 2011    right    Cancer (HCC) 2012    Candidal vulvovaginitis     Clotting disorder (HCC) Same as above    Endometrial hyperplasia     Epithelial cyst     benign, ovary    GI (gastrointestinal bleed) Blood mixed with stool    History of radiation therapy 2011    right breast cancer    Hyperlipidemia     Hypertension     Internal hemorrhoids     Knee tendonitis     Ovarian cyst     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(HCC)        Surgical History:   Past Surgical History:   Procedure Laterality Date    BILATERAL SALPINGOOPHORECTOMY      onset: 7/23/13    BREAST BIOPSY Right 06/13/2006    benign    BREAST BIOPSY Right 03/02/2011    malignant    BREAST LUMPECTOMY Right 03/30/2011    malignant    CATARACT EXTRACTION W/  INTRAOCULAR LENS IMPLANT Right     phacoemulsification. Onset: 10/27/14    CHOLECYSTECTOMY      COLONOSCOPY  2017    approx    HYSTERECTOMY  Yes    INTRAOPERATIVE RADIATION THERAPY (IORT)      IR BIOPSY BONE  7/9/2021    IR IVC FILTER PLACEMENT OPTIONAL/TEMPORARY  9/23/2022    IR PE ENDOVASCULAR THERAPY  9/22/2022    IR PICC PLACEMENT SINGLE LUMEN  8/19/2022    IR PICC PLACEMENT SINGLE LUMEN  9/26/2022    IR SUPRAPUBIC CATHETER PLACEMENT  12/29/2022     SKIN LESION EXCISION Right     breast, single lesion    TONSILLECTOMY AND ADENOIDECTOMY         Family History:    Family History   Problem Relation Age of Onset    Stomach cancer Mother     No Known Problems Father     Cervical cancer Sister     No Known Problems Daughter     No Known Problems Maternal Grandmother     No Known Problems Maternal Grandfather     No Known Problems Paternal Grandmother     No Known Problems Paternal Grandfather     Colon polyps Neg Hx     Colon cancer Neg Hx        Cancer-related family history includes Cervical cancer in her sister; Stomach cancer in her mother. There is no history of Colon cancer.    Social History:   Social History     Socioeconomic History    Marital status:      Spouse name: Not on file    Number of children: 3    Years of education: Not on file    Highest education level: Not on file   Occupational History    Not on file   Tobacco Use    Smoking status: Former     Current packs/day: 0.00     Average packs/day: 1 pack/day for 34.0 years (34.0 ttl pk-yrs)     Types: Cigarettes     Start date:      Quit date:      Years since quittin.4    Smokeless tobacco: Never   Vaping Use    Vaping status: Never Used   Substance and Sexual Activity    Alcohol use: Not Currently     Comment: (history)    Drug use: No    Sexual activity: Not Currently   Other Topics Concern    Not on file   Social History Narrative    Not on file     Social Determinants of Health     Financial Resource Strain: Low Risk  (2021)    Overall Financial Resource Strain (CARDIA)     Difficulty of Paying Living Expenses: Not hard at all   Food Insecurity: No Food Insecurity (3/4/2024)    Hunger Vital Sign     Worried About Running Out of Food in the Last Year: Never true     Ran Out of Food in the Last Year: Never true   Transportation Needs: No Transportation Needs (3/4/2024)    PRAPARE - Transportation     Lack of Transportation (Medical): No     Lack of Transportation  (Non-Medical): No   Physical Activity: Inactive (6/18/2021)    Exercise Vital Sign     Days of Exercise per Week: 0 days     Minutes of Exercise per Session: 0 min   Stress: No Stress Concern Present (6/18/2021)    [place] [employer] - Occupational Stress Questionnaire     Feeling of Stress : Not at all   Social Connections: Moderately Isolated (6/18/2021)    Social Connection and Isolation Panel [NHANES]     Frequency of Communication with Friends and Family: More than three times a week     Frequency of Social Gatherings with Friends and Family: Twice a week     Attends Jain Services: Never     Active Member of Clubs or Organizations: Yes     Attends Club or Organization Meetings: More than 4 times per year     Marital Status:    Intimate Partner Violence: Not At Risk (8/10/2022)    Humiliation, Afraid, Rape, and Kick questionnaire     Fear of Current or Ex-Partner: No     Emotionally Abused: No     Physically Abused: No     Sexually Abused: No   Housing Stability: Low Risk  (3/4/2024)    Housing Stability Vital Sign     Unable to Pay for Housing in the Last Year: No     Number of Places Lived in the Last Year: 2     Unstable Housing in the Last Year: No       Current Medications:   Current Outpatient Medications   Medication Sig Dispense Refill    acetaminophen (TYLENOL) 325 mg tablet Take 2 tablets (650 mg total) by mouth every 6 (six) hours as needed for mild pain  0    apixaban (ELIQUIS) 2.5 mg Take 1 tablet (2.5 mg total) by mouth 2 (two) times a day 60 tablet 0    Aspirin Low Dose 81 MG chewable tablet       ferrous sulfate 324 (65 Fe) mg Take 1 tablet (324 mg total) by mouth 2 (two) times a day before meals 60 tablet 0    fluticasone (FLONASE) 50 mcg/act nasal spray       folic acid (FOLVITE) 1 mg tablet Take 1 tablet (1 mg total) by mouth daily 30 tablet 0    melatonin 3 mg Take 3 mg by mouth daily at bedtime      metoprolol succinate (TOPROL-XL) 50 mg 24 hr tablet        "mirtazapine (REMERON) 7.5 MG tablet Take 1 tablet (7.5 mg total) by mouth daily at bedtime 30 tablet 0    ondansetron (ZOFRAN-ODT) 4 mg disintegrating tablet       pantoprazole (PROTONIX) 40 mg tablet Take 1 tablet (40 mg total) by mouth 2 (two) times a day before meals 30 tablet 0    simvastatin (ZOCOR) 10 mg tablet Take 1 tablet (10 mg total) by mouth daily at bedtime 90 tablet 1    traMADol (Ultram) 50 mg tablet Take 0.5 tablets (25 mg total) by mouth every 12 (twelve) hours 60 tablet 0    ustekinumab (Stelara) 90 mg/mL subcutaneous injection Inject 1 mL (90 mg total) under the skin every 56 days First dose on 4/23/2024. Call [phone] if the patient has a fever prior to injecting. 1 mL 15     No current facility-administered medications for this visit.       Allergies:   Allergies   Allergen Reactions    Sulfa Antibiotics     Pneumococcal Polysaccharide Vaccine Rash and Edema       Physical Exam:  /68 (BP Location: Left arm, Patient Position: Sitting, Cuff Size: Standard)   Pulse 73   Temp 98.7 °F (37.1 °C) (Temporal)   Resp 16   Ht 5' 2\" (1.575 m)   Wt 62.3 kg (137 lb 6.4 oz)   LMP  (LMP Unknown)   SpO2 100%   BMI 25.13 kg/m²   Body surface area is 1.63 meters squared.    Wt Readings from Last 3 Encounters:   05/20/24 62.3 kg (137 lb 6.4 oz)   03/01/24 57.9 kg (127 lb 10.3 oz)   02/21/24 60.8 kg (134 lb 0.6 oz)           Physical Exam  Constitutional:       General: She is not in acute distress.     Appearance: Normal appearance.   HENT:      Head: Normocephalic and atraumatic.      Mouth/Throat:      Mouth: Mucous membranes are moist.      Pharynx: No oropharyngeal exudate or posterior oropharyngeal erythema.   Eyes:      General: No scleral icterus.     Extraocular Movements: Extraocular movements intact.   Cardiovascular:      Rate and Rhythm: Normal rate and regular rhythm.      Heart sounds: No murmur heard.     No gallop.   Pulmonary:      Effort: Pulmonary effort is normal. No " respiratory distress.      Breath sounds: Normal breath sounds.   Abdominal:      General: Bowel sounds are normal. There is no distension.      Palpations: Abdomen is soft. There is no mass.      Tenderness: There is no abdominal tenderness.   Genitourinary:     Comments: Suprapublic catheter present  Musculoskeletal:         General: Normal range of motion.      Cervical back: Normal range of motion. No rigidity.      Right lower leg: No edema.      Left lower leg: No edema.   Lymphadenopathy:      Cervical: No cervical adenopathy.      Upper Body:      Right upper body: No supraclavicular, axillary or pectoral adenopathy.      Left upper body: No supraclavicular, axillary or pectoral adenopathy.   Skin:     Coloration: Skin is not pale.      Findings: Bruising (bruising noted in RLE) present. No rash.   Neurological:      General: No focal deficit present.      Mental Status: She is alert and oriented to person, place, and time.   Psychiatric:         Mood and Affect: Mood normal.         Behavior: Behavior normal.         Assessment / Plan:      Metastatic ER positive breast cancer   Diffuse osseous metastasis     Ms. Kerry Silverman is an 83 year old female with hx of right breast cancer, initially diagnosed as stage IA in 2011, s/p right breast lumpectomy, followed by adjuvant radiation therapy and 5 years of hormonal therapy. She was found to have biopsy confirmed osseous metastasis (sternum and L3) in July 2021 for which she was started on Faslodex and Xgeva. April 2022 surveillance imaging was notable for interval development of a 4 mm nodule at the lateral right lower lobe and increased sclerotic lesions throughout the visualized spine, concerning for disease progression. Treatment was changed to Femara plus ribociclib. Patient had difficulty tolerating ribociclib due to nausea, fatigue, and weight loss. As such, ribociclib was discontinued and abemaciclib was recommended in July 2022, but patient  unfortunately developed severe diarrhea/colitis and this was also discontinued. Patient opted to not pursue anymore active treatment afterwards. Patient was seen in the ED on 5/15/24 for evaluation of left sided chest pain and CTA chest was notable for diffuse sclerotic metastases throughout the thoracic spine, sternum and ribs, concerning for disease progression in the bones. Clinically, she hasn't noticed any worsening back pain or any concerning symptoms like weight loss. Patient came to the clinic today with her daughter and son to discuss about potential treatment options.     We discussed about trying a different CDK4/6 inhibitor, palbociclib, along with daily letrozole, as the next line of treatment. Common side effects of palbociclib was discussed, including neutropenia. Patient signed the informed consent to proceed with treatment. Patient was advised to get bloodwork (CBC and CMP) on the week off of treatment). Free one month supply of the medication was provided in office today and we will work on insurance authorization for this medication so that future supply can be delivered to pt's nursing home via mail order from speciality pharmacy. Letrozole to be dispensed at the nursing home (we will communicate with the rehab physician the need to prescribe letrozole 2.5mg daily). We only have a CTA chest right now and would like to do a PET scan to evaluate the whole body for extent of disease. We will request for CARIS testing to be done on pt's prior bone biopsy from July 2021 to check for any targetable mutations; if we are unable to use that bone specimen, we can look into ordering liquid biopsy. Additionally, we will arrange for Xgeva injections every 3 months for bone protection in setting of osseous metastasis. Patient was requested to f/u in office in about 1 month.

## 2024-05-24 ENCOUNTER — HOSPITAL ENCOUNTER (OUTPATIENT)
Dept: INFUSION CENTER | Facility: HOSPITAL | Age: 84
End: 2024-05-24
Attending: INTERNAL MEDICINE
Payer: MEDICARE

## 2024-05-24 VITALS
HEIGHT: 62 IN | SYSTOLIC BLOOD PRESSURE: 136 MMHG | OXYGEN SATURATION: 98 % | TEMPERATURE: 97.6 F | RESPIRATION RATE: 17 BRPM | HEART RATE: 60 BPM | WEIGHT: 144.62 LBS | DIASTOLIC BLOOD PRESSURE: 63 MMHG | BODY MASS INDEX: 26.61 KG/M2

## 2024-05-24 DIAGNOSIS — C50.919 PRIMARY MALIGNANT NEOPLASM OF BREAST WITH METASTASIS (HCC): Primary | ICD-10-CM

## 2024-05-24 PROCEDURE — 96372 THER/PROPH/DIAG INJ SC/IM: CPT

## 2024-05-24 RX ADMIN — DENOSUMAB 120 MG: 120 INJECTION SUBCUTANEOUS at 14:53

## 2024-05-24 NOTE — PROGRESS NOTES
Teams message to Dr. Arboleda's office RN, Jonna Manning, to notify calculated creatine clearance at 24.2 as per pharmacy (below threshold of 30 as per orders). Received verbal order as per MAURO Akins/Dr. Arboleda for OK to treat based on calculated creatinine clearance. Notified pharmacy verbal order received.

## 2024-05-24 NOTE — PROGRESS NOTES
Kerry Silverman  tolerated treatment well with no complications.      Kerry Silverman is aware of future appt on 08/17/2024 at 01:30 PM.     AVS printed and given to Kerry Silverman:  Yes (Copy provided to Elsa)

## 2024-05-28 ENCOUNTER — TELEPHONE (OUTPATIENT)
Dept: HEMATOLOGY ONCOLOGY | Facility: CLINIC | Age: 84
End: 2024-05-28

## 2024-05-28 ENCOUNTER — NURSING HOME VISIT (OUTPATIENT)
Dept: FAMILY MEDICINE CLINIC | Facility: CLINIC | Age: 84
End: 2024-05-28
Payer: MEDICARE

## 2024-05-28 DIAGNOSIS — I27.82 CHRONIC SADDLE PULMONARY EMBOLISM, UNSPECIFIED WHETHER ACUTE COR PULMONALE PRESENT (HCC): ICD-10-CM

## 2024-05-28 DIAGNOSIS — C79.51 SECONDARY MALIGNANT NEOPLASM OF BONE (HCC): ICD-10-CM

## 2024-05-28 DIAGNOSIS — C79.49 SECONDARY MALIGNANT NEOPLASM OF OTHER PARTS OF NERVOUS SYSTEM (HCC): Primary | ICD-10-CM

## 2024-05-28 DIAGNOSIS — C50.919 PRIMARY MALIGNANT NEOPLASM OF BREAST WITH METASTASIS (HCC): ICD-10-CM

## 2024-05-28 DIAGNOSIS — I26.92 CHRONIC SADDLE PULMONARY EMBOLISM, UNSPECIFIED WHETHER ACUTE COR PULMONALE PRESENT (HCC): ICD-10-CM

## 2024-05-28 DIAGNOSIS — D48.0: ICD-10-CM

## 2024-05-28 PROCEDURE — 99309 SBSQ NF CARE MODERATE MDM 30: CPT | Performed by: FAMILY MEDICINE

## 2024-05-28 NOTE — PROGRESS NOTES
[facility]  [address] [place] [address]  Facility: [facility]    NAME: Kerry Silverman  AGE: 83 y.o. SEX: female    DATE OF ENCOUNTER: 5/28/2024    Code status:  DNR w/ Hospitalization    Assessment and Plan     1. 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 Neoplasm of uncertain behavior of sternum  3. Secondary malignant neoplasm of bone (HCC)  4. Primary malignant neoplasm of breast with metastasis (HCC)  5. Chronic saddle pulmonary embolism, unspecified whether acute cor pulmonale present (HCC)      All medications and routine orders were reviewed and updated as needed.    Plan discussed with: Family member    Chief Complaint     Acute visit    History of Present Illness     The patient is seen due to uncontrolled unrelenting pain.  He has been using tramadol but it is not sufficient.  She was recently found to have diffuse bone metastasis as well as metastasis to the brain.  This is likely from her primary malignancy in the breast.  He had a very quiet course for a period of time but it is now apparent that it is in the sternum and the ribs and her back as well as the brain.  Been no seizure activity.  I had a long discussion with the patient and she is willing to try morphine.  Fearful that she will live the rest of her life in pain.  I assured her we will do everything to avoid that situation.  She notes that her bowels are stable.  She has a poor appetite.  She is tearful and anxious.    The following portions of the patient's history were reviewed and updated as appropriate: current medications, past family history, past medical history, past social history, past surgical history and problem list.    Allergies:  Allergies   Allergen Reactions    Sulfa Antibiotics     Pneumococcal Polysaccharide Vaccine Rash and Edema       Review of Systems     Review of Systems   Constitutional:  Negative for activity change, appetite change, chills,  diaphoresis, fatigue and unexpected weight change.   HENT:  Negative for congestion, ear discharge, ear pain, hearing loss, nosebleeds and rhinorrhea.    Eyes:  Negative for pain, redness, itching and visual disturbance.   Respiratory:  Positive for cough. Negative for choking, chest tightness and shortness of breath.    Cardiovascular:  Negative for chest pain and leg swelling.   Gastrointestinal:  Negative for abdominal pain, blood in stool, constipation, diarrhea and nausea.   Endocrine: Negative for cold intolerance, polydipsia and polyphagia.   Genitourinary:  Negative for dysuria, frequency, hematuria and urgency.   Musculoskeletal:  Positive for arthralgias and back pain. Negative for gait problem, joint swelling, neck pain and neck stiffness.   Skin:  Negative for color change and rash.   Allergic/Immunologic: Negative for environmental allergies and food allergies.   Neurological:  Positive for weakness. Negative for dizziness, tremors, seizures, speech difficulty, numbness and headaches.   Hematological:  Negative for adenopathy. Does not bruise/bleed easily.   Psychiatric/Behavioral:  Negative for behavioral problems, dysphoric mood, hallucinations and self-injury. The patient is nervous/anxious.        Medications and orders     All medications reviewed and updated in long term EMR.      Objective     Vitals: per nursing home records    Physical Exam  Constitutional:       Appearance: Normal appearance. She is well-developed.   HENT:      Head: Normocephalic and atraumatic.      Right Ear: External ear normal.      Left Ear: External ear normal.      Mouth/Throat:      Mouth: Mucous membranes are moist.      Pharynx: Oropharynx is clear. No oropharyngeal exudate.   Eyes:      General: No scleral icterus.        Right eye: No discharge.         Left eye: No discharge.      Extraocular Movements: Extraocular movements intact.      Conjunctiva/sclera: Conjunctivae normal.      Pupils: Pupils are equal,  round, and reactive to light.   Neck:      Thyroid: No thyromegaly.   Cardiovascular:      Rate and Rhythm: Normal rate. Rhythm irregular.      Heart sounds: Normal heart sounds. No murmur heard.     No gallop.   Pulmonary:      Effort: Pulmonary effort is normal. No respiratory distress.      Breath sounds: Normal breath sounds. No wheezing or rales.   Abdominal:      General: Bowel sounds are normal.      Palpations: Abdomen is soft. There is no mass.      Tenderness: There is no guarding or rebound.   Musculoskeletal:         General: Tenderness present. No deformity. Normal range of motion.      Cervical back: Normal range of motion and neck supple.      Right lower leg: Edema present.      Left lower leg: Edema present.   Lymphadenopathy:      Cervical: No cervical adenopathy.   Skin:     General: Skin is warm and dry.      Findings: No rash.   Neurological:      Mental Status: She is alert and oriented to person, place, and time.      Cranial Nerves: No cranial nerve deficit.      Motor: Weakness present.      Coordination: Coordination normal.      Deep Tendon Reflexes: Reflexes are normal and symmetric. Reflexes normal.   Psychiatric:         Mood and Affect: Mood normal.         Behavior: Behavior normal.         Thought Content: Thought content normal.         Judgment: Judgment normal.         Pertinent Laboratory/Diagnostic Studies:     The following studies were reviewed please see chart or hospital paperwork for details.    Space for lab dictation no new diagnostics since return from the hospital    - Add morphine on a scheduled and as needed basis    Nelson Yoder DO  5/28/2024 11:50 AM

## 2024-05-28 NOTE — TELEPHONE ENCOUNTER
Pt daughter Taya called to see reasoning for cancelling the PET scan. Pt daughter reported that pt just finished having a CT scan with barium and doesn't want to put her mother through the process again when they have enough information found the CT scan results. Pt daughter reports that pt is also having a lot of pain and is going to be starting on morphine.

## 2024-05-28 NOTE — TELEPHONE ENCOUNTER
Patient Call    Who are you speaking with? Child    If it is not the patient, are they listed on an active communication consent form? Yes   What is the reason for this call? Taya is calling to cancel her mom's PET scan on 6/7/24.  Taya said if the office has any question feel free to call her.   Does this require a call back? No   If a call back is required, please list best call back number na   If a call back is required, advise that a message will be forwarded to their care team and someone will return their call as soon as possible.   Did you relay this information to the patient? N/A

## 2024-06-04 LAB
CARIS ANDROGEN RECEPTOR: POSITIVE
CARIS ER: POSITIVE
CARIS GENOMIC LOH - EXOME: NORMAL
CARIS HER2/NEU: NORMAL
CARIS MSI - EXOME: NORMAL
CARIS PD-L1 (22C3): NEGATIVE
CARIS PR: NEGATIVE
CARIS PTEN: NEGATIVE
CARIS TMB - EXOME: NORMAL

## 2024-06-05 ENCOUNTER — HOME CARE VISIT (OUTPATIENT)
Dept: HOME HEALTH SERVICES | Facility: HOME HEALTHCARE | Age: 84
End: 2024-06-05
Payer: MEDICARE

## 2024-06-07 ENCOUNTER — HOME CARE VISIT (OUTPATIENT)
Dept: HOME HEALTH SERVICES | Facility: HOME HEALTHCARE | Age: 84
End: 2024-06-07
Payer: MEDICARE

## 2024-06-07 ENCOUNTER — HOME CARE VISIT (OUTPATIENT)
Dept: HOME HOSPICE | Facility: HOSPICE | Age: 84
End: 2024-06-07
Payer: MEDICARE

## 2024-06-07 VITALS
RESPIRATION RATE: 18 BRPM | BODY MASS INDEX: 25.21 KG/M2 | SYSTOLIC BLOOD PRESSURE: 132 MMHG | TEMPERATURE: 98.6 F | HEIGHT: 62 IN | DIASTOLIC BLOOD PRESSURE: 64 MMHG | HEART RATE: 105 BPM | OXYGEN SATURATION: 97 % | WEIGHT: 137 LBS

## 2024-06-07 DIAGNOSIS — Z51.5 HOSPICE CARE: Primary | ICD-10-CM

## 2024-06-07 PROCEDURE — G0299 HHS/HOSPICE OF RN EA 15 MIN: HCPCS

## 2024-06-07 RX ORDER — METHADONE HYDROCHLORIDE 10 MG/ML
5 CONCENTRATE ORAL EVERY 6 HOURS
Qty: 30 ML | Refills: 0 | Status: SHIPPED | OUTPATIENT
Start: 2024-06-07 | End: 2024-06-09

## 2024-06-07 RX ORDER — LORAZEPAM 2 MG/ML
0.5 CONCENTRATE ORAL EVERY 6 HOURS
Qty: 30 ML | Refills: 0 | Status: SHIPPED | OUTPATIENT
Start: 2024-06-07

## 2024-06-07 RX ORDER — MORPHINE SULFATE 100 MG/5ML
SOLUTION ORAL
Qty: 60 ML | Refills: 0 | Status: SHIPPED | OUTPATIENT
Start: 2024-06-07

## 2024-06-07 NOTE — TELEPHONE ENCOUNTER
6/7/2024 4:57 PM  [facility] facility patient requests emergency fill until [facility] order arrives.  Filled electronically via Epic as per [place] State Law.    Requested Prescriptions     Signed Prescriptions Disp Refills    methadone (Methadose) 10 mg/mL oral concentrated solution 30 mL 0     Sig: Take 0.5 mL (5 mg total) by mouth every 6 (six) hours Max Daily Amount: 20 mg     Authorizing Provider: JUDSON SEWELL    morphine 20 mg/mL concentrated solution 60 mL 0     Sig: Take 2 mL (40 mg total) by mouth every 6 (six) hours. May also take 1 mL (20 mg total) every hour as needed (pain/dyspnea). Use until methadone fully effective. Max Daily Amount: 640 mg.     Authorizing Provider: JUDSON SEWELL    LORazepam (LORazepam Intensol) 2 mg/mL concentrated solution 30 mL 0     Sig: Take 0.25 mL (0.5 mg total) by mouth every 6 (six) hours     Authorizing Provider: JUDSON SEWELL MD  [facility]  Hospice Answering Service: [phone]  You can find me on [employer]!

## 2024-06-08 ENCOUNTER — HOME CARE VISIT (OUTPATIENT)
Dept: HOME HEALTH SERVICES | Facility: HOME HEALTHCARE | Age: 84
End: 2024-06-08
Payer: MEDICARE

## 2024-06-08 ENCOUNTER — HOME CARE VISIT (OUTPATIENT)
Dept: HOME HOSPICE | Facility: HOSPICE | Age: 84
End: 2024-06-08
Payer: MEDICARE

## 2024-06-08 PROCEDURE — G0299 HHS/HOSPICE OF RN EA 15 MIN: HCPCS

## 2024-06-08 PROCEDURE — G0156 HHCP-SVS OF AIDE,EA 15 MIN: HCPCS

## 2024-06-09 VITALS — RESPIRATION RATE: 20 BRPM

## 2024-06-10 ENCOUNTER — HOME CARE VISIT (OUTPATIENT)
Dept: HOME HEALTH SERVICES | Facility: HOME HEALTHCARE | Age: 84
End: 2024-06-10
Payer: MEDICARE

## 2024-06-10 ENCOUNTER — HOME CARE VISIT (OUTPATIENT)
Dept: HOME HOSPICE | Facility: HOSPICE | Age: 84
End: 2024-06-10
Payer: MEDICARE

## 2024-06-10 PROCEDURE — G0156 HHCP-SVS OF AIDE,EA 15 MIN: HCPCS

## 2024-06-10 PROCEDURE — G0155 HHCP-SVS OF CSW,EA 15 MIN: HCPCS

## 2024-06-10 PROCEDURE — G0299 HHS/HOSPICE OF RN EA 15 MIN: HCPCS

## 2024-06-11 ENCOUNTER — HOME CARE VISIT (OUTPATIENT)
Dept: HOME HEALTH SERVICES | Facility: HOME HEALTHCARE | Age: 84
End: 2024-06-11
Payer: MEDICARE

## 2024-06-11 PROCEDURE — G0156 HHCP-SVS OF AIDE,EA 15 MIN: HCPCS

## 2024-06-12 ENCOUNTER — HOME CARE VISIT (OUTPATIENT)
Dept: HOME HOSPICE | Facility: HOSPICE | Age: 84
End: 2024-06-12
Payer: MEDICARE

## 2024-06-12 ENCOUNTER — HOME CARE VISIT (OUTPATIENT)
Dept: HOME HEALTH SERVICES | Facility: HOME HEALTHCARE | Age: 84
End: 2024-06-12
Payer: MEDICARE

## 2024-06-12 PROCEDURE — G0156 HHCP-SVS OF AIDE,EA 15 MIN: HCPCS

## 2024-06-13 ENCOUNTER — HOME CARE VISIT (OUTPATIENT)
Dept: HOME HEALTH SERVICES | Facility: HOME HEALTHCARE | Age: 84
End: 2024-06-13
Payer: MEDICARE

## 2024-06-13 ENCOUNTER — TELEPHONE (OUTPATIENT)
Dept: PALLIATIVE MEDICINE | Facility: CLINIC | Age: 84
End: 2024-06-13

## 2024-06-13 DIAGNOSIS — Z51.5 HOSPICE CARE: Primary | ICD-10-CM

## 2024-06-13 PROCEDURE — G0156 HHCP-SVS OF AIDE,EA 15 MIN: HCPCS

## 2024-06-13 RX ORDER — METHADONE HYDROCHLORIDE 5 MG/1
TABLET ORAL
Qty: 90 TABLET | Refills: 0 | Status: SHIPPED | OUTPATIENT
Start: 2024-06-13 | End: 2024-06-13

## 2024-06-13 RX ORDER — METHADONE HYDROCHLORIDE 5 MG/5ML
SOLUTION ORAL
Qty: 500 ML | Refills: 0 | Status: SHIPPED | OUTPATIENT
Start: 2024-06-13 | End: 2024-06-17

## 2024-06-13 NOTE — TELEPHONE ENCOUNTER
6/13/2024 9:01 AM  [facility] facility patient requests medication adjusted due to change in patient condition.  Filled electronically via Epic as per [place] State Law.    Requested Prescriptions     Signed Prescriptions Disp Refills    methadone (Dolophine) 1 mg/mL solution 500 mL 0     Sig: Take 10 mg every morning, 5 mg at lunch, and 10 mg at bedtime     Authorizing Provider: JUDSON SEWELL MD  [facility]  Hospice Answering Service: [phone]  You can find me on [employer]!

## 2024-06-19 NOTE — PROGRESS NOTES
[facility]  [address] [place] [address]  Facility: [facility]    NAME: Kerry Silverman  AGE: 83 y.o. SEX: female    DATE OF ENCOUNTER: 6/19/2024    Code status:  Hospice    Assessment and Plan     1. Primary malignant neoplasm of breast with metastasis (HCC)  2. Secondary malignant neoplasm of bone (HCC)  3. Intractable back pain  4. Suprapubic catheter (HCC)      All medications and routine orders were reviewed and updated as needed.    Plan discussed with: Patient, nursing staff    Chief Complaint     Follow-up of chronic conditions    History of Present Illness     Ms. Silverman is assessed for follow up.  She our last visit she was found to have diffuse sclerotic metastases throughout her thoracic spine, sternum and ribs along with nondisplaced rib fractures.  She met with oncology and restarted treatment for her metastatic breast cancer, however her pain became intractable and she decided to pursue hospice services.  It was challenging to manage her pain at first but she appears comfortable on today's assessment.  She was drowsy but awakes at my voice.  Able to tell me where she is and the year.  She has little to no appetite, oral intake has lessened last several weeks.  Bowels are moving, SPC patent.  No nursing concerns at this time.      The following portions of the patient's history were reviewed and updated as appropriate: current medications, past family history, past medical history, past social history, past surgical history and problem list.    Allergies:  Allergies   Allergen Reactions    Sulfa Antibiotics     Pneumococcal Polysaccharide Vaccine Rash and Edema       Review of Systems     Review of Systems   Constitutional:  Positive for appetite change.   Respiratory:  Negative for shortness of breath.    Cardiovascular:  Negative for palpitations and leg swelling.   Gastrointestinal:  Negative for constipation.   Genitourinary:  Positive for difficulty urinating.    Musculoskeletal:  Positive for arthralgias and back pain.   Skin:  Positive for pallor.   Neurological:  Positive for weakness.       Medications and orders     All medications reviewed and updated in longterm EMR.      Objective     Vitals: per nursing home records    Physical Exam  Vitals and nursing note reviewed.   Constitutional:       General: She is sleeping.      Appearance: She is ill-appearing.      Comments: frail   HENT:      Head: Normocephalic and atraumatic.      Right Ear: Tympanic membrane, ear canal and external ear normal.      Left Ear: Tympanic membrane, ear canal and external ear normal.      Nose: Nose normal.      Mouth/Throat:      Mouth: Mucous membranes are dry.      Pharynx: Oropharynx is clear.   Eyes:      Extraocular Movements: Extraocular movements intact.      Conjunctiva/sclera: Conjunctivae normal.      Pupils: Pupils are equal, round, and reactive to light.   Cardiovascular:      Rate and Rhythm: Normal rate and regular rhythm.      Pulses: Normal pulses.      Heart sounds: Normal heart sounds.   Pulmonary:      Effort: Pulmonary effort is normal.      Breath sounds: Normal breath sounds.   Abdominal:      General: Bowel sounds are normal.      Palpations: Abdomen is soft.   Genitourinary:     Comments: SPC patent for yellow urine  Musculoskeletal:         General: Tenderness present. Normal range of motion.      Cervical back: Normal range of motion and neck supple.      Thoracic back: Tenderness and bony tenderness present.      Lumbar back: Bony tenderness present.   Skin:     General: Skin is warm and dry.      Coloration: Skin is pale.   Neurological:      General: No focal deficit present.      Mental Status: She is oriented to person, place, and time and easily aroused.      Motor: Weakness present.      Gait: Gait abnormal.   Psychiatric:         Mood and Affect: Affect is blunt.         Behavior: Behavior is slowed. Behavior is cooperative.         Pertinent  Laboratory/Diagnostic Studies:     The following labs and studies were reviewed please see chart or hospital paperwork for details.    Continue current medical management    BROOKE Cancino  6/19/2024 4:40 PM

## 2024-06-21 NOTE — TELEPHONE ENCOUNTER
6/21/2024 10:18 AM  [facility] facility patient requests PCA started for improved symptom management.  Filled electronically via Epic as per [place] State Law.    Requested Prescriptions     Signed Prescriptions Disp Refills    HYDROmorphone (Dilaudid) 5 mg/mL 100 mL PCA (Home Health only) 100 mL 0     Sig: Route of Administration: Subcutaneous;  Basal Rate: 0.5 mg/hr;  Bolus Dose: 1 mg;  Bolus Interval: 10 minutes;  Max Bolus Doses/hr: 6     Authorizing Provider: JUDSON SEWELL MD  [facility]  Hospice Answering Service: [phone]  You can find me on [employer]!

## 2024-06-24 ENCOUNTER — HOME CARE VISIT (OUTPATIENT)
Dept: HOME HEALTH SERVICES | Facility: HOME HEALTHCARE | Age: 84
End: 2024-06-24
Payer: MEDICARE

## 2024-06-24 ENCOUNTER — HOME CARE VISIT (OUTPATIENT)
Dept: HOME HOSPICE | Facility: HOSPICE | Age: 84
End: 2024-06-24
Payer: MEDICARE

## 2024-06-25 ENCOUNTER — TELEPHONE (OUTPATIENT)
Age: 84
End: 2024-06-25

## 2024-06-25 NOTE — TELEPHONE ENCOUNTER
Maxine with [id number] called. Requesting signed Death Certificate. Need for cremation.     Fax # [phone]  Tel# [phone]

## 2024-06-26 NOTE — TELEPHONE ENCOUNTER
Polly from [facility] calling in to see if Sonja Maldonado would be the one signing death certificate since she was the last one to see patient in the nursing home . Please advise .   [facility] : [phone]

## 2024-06-28 ENCOUNTER — HOME CARE VISIT (OUTPATIENT)
Dept: HOME HOSPICE | Facility: HOSPICE | Age: 84
End: 2024-06-28
Payer: MEDICARE

## 2024-11-06 NOTE — PLAN OF CARE
Phone call from Becca at [facility], wanted to verify [facility] tax ID # and CPT codes to determine if a Prior Authorization is needed for patient's visit. Provided [facility] tax ID and CPT code for Gestational Diabetes Education () - patient had gotten email about verifying if a Prior Authorization was needed for the office- advised our purpose is to see Diabetes Patients- either to manage diabetes or educate on Diabetes- this patient would be for an education referral. Advised no appointment scheduled at this time- would not really be doing any procedures in office.    Problem: Potential for Falls  Goal: Patient will remain free of falls  Description: INTERVENTIONS:  - Educate patient/family on patient safety including physical limitations  - Instruct patient to call for assistance with activity   - Consult OT/PT to assist with strengthening/mobility   - Keep Call bell within reach  - Keep bed low and locked with side rails adjusted as appropriate  - Keep care items and personal belongings within reach  - Initiate and maintain comfort rounds  - Make Fall Risk Sign visible to staff  - Apply yellow socks and bracelet for high fall risk patients  - Consider moving patient to room near nurses station  Outcome: Progressing